# Patient Record
Sex: MALE | Race: WHITE | NOT HISPANIC OR LATINO | Employment: OTHER | URBAN - NONMETROPOLITAN AREA
[De-identification: names, ages, dates, MRNs, and addresses within clinical notes are randomized per-mention and may not be internally consistent; named-entity substitution may affect disease eponyms.]

---

## 2017-02-13 ENCOUNTER — APPOINTMENT (OUTPATIENT)
Dept: CT IMAGING | Facility: HOSPITAL | Age: 57
End: 2017-02-13

## 2017-02-13 ENCOUNTER — APPOINTMENT (OUTPATIENT)
Dept: GENERAL RADIOLOGY | Facility: HOSPITAL | Age: 57
End: 2017-02-13

## 2017-02-13 ENCOUNTER — HOSPITAL ENCOUNTER (INPATIENT)
Facility: HOSPITAL | Age: 57
LOS: 1 days | Discharge: ANOTHER HOSPITAL | End: 2017-02-14
Attending: EMERGENCY MEDICINE | Admitting: INTERNAL MEDICINE

## 2017-02-13 DIAGNOSIS — R07.9 CHEST PAIN AT REST: Primary | ICD-10-CM

## 2017-02-13 DIAGNOSIS — E03.9 HYPOTHYROIDISM, UNSPECIFIED TYPE: ICD-10-CM

## 2017-02-13 DIAGNOSIS — D72.829 LEUKOCYTOSIS, UNSPECIFIED TYPE: ICD-10-CM

## 2017-02-13 DIAGNOSIS — K22.89 ESOPHAGEAL THICKENING: ICD-10-CM

## 2017-02-13 DIAGNOSIS — E66.9 OBESITY, UNSPECIFIED OBESITY SEVERITY, UNSPECIFIED OBESITY TYPE: ICD-10-CM

## 2017-02-13 DIAGNOSIS — K44.9 HIATAL HERNIA: ICD-10-CM

## 2017-02-13 LAB
ALBUMIN SERPL-MCNC: 3.9 G/DL (ref 3.4–4.8)
ALBUMIN/GLOB SERPL: 1.5 G/DL (ref 1.1–1.8)
ALP SERPL-CCNC: 76 U/L (ref 38–126)
ALT SERPL W P-5'-P-CCNC: 41 U/L (ref 21–72)
ANION GAP SERPL CALCULATED.3IONS-SCNC: 8 MMOL/L (ref 5–15)
AST SERPL-CCNC: 27 U/L (ref 17–59)
BACTERIA UR QL AUTO: ABNORMAL /HPF
BASOPHILS # BLD AUTO: 0.04 10*3/MM3 (ref 0–0.2)
BASOPHILS NFR BLD AUTO: 0.3 % (ref 0–2)
BILIRUB SERPL-MCNC: 0.4 MG/DL (ref 0.2–1.3)
BILIRUB UR QL STRIP: NEGATIVE
BUN BLD-MCNC: 18 MG/DL (ref 7–21)
BUN/CREAT SERPL: 14.3 (ref 7–25)
CALCIUM SPEC-SCNC: 8.9 MG/DL (ref 8.4–10.2)
CHLORIDE SERPL-SCNC: 100 MMOL/L (ref 95–110)
CK MB SERPL-CCNC: 1.15 NG/ML (ref 0–5)
CK SERPL-CCNC: 100 U/L (ref 55–170)
CLARITY UR: CLEAR
CO2 SERPL-SCNC: 32 MMOL/L (ref 22–31)
COLOR UR: YELLOW
CREAT BLD-MCNC: 1.26 MG/DL (ref 0.7–1.3)
DEPRECATED RDW RBC AUTO: 43.7 FL (ref 35.1–43.9)
EOSINOPHIL # BLD AUTO: 0.49 10*3/MM3 (ref 0–0.7)
EOSINOPHIL NFR BLD AUTO: 3.4 % (ref 0–7)
ERYTHROCYTE [DISTWIDTH] IN BLOOD BY AUTOMATED COUNT: 13.8 % (ref 11.5–14.5)
GFR SERPL CREATININE-BSD FRML MDRD: 59 ML/MIN/1.73 (ref 60–130)
GLOBULIN UR ELPH-MCNC: 2.6 GM/DL (ref 2.3–3.5)
GLUCOSE BLD-MCNC: 124 MG/DL (ref 60–100)
GLUCOSE UR STRIP-MCNC: NEGATIVE MG/DL
GRAN CASTS URNS QL MICRO: ABNORMAL /LPF
HCT VFR BLD AUTO: 42.3 % (ref 39–49)
HGB BLD-MCNC: 14.5 G/DL (ref 13.7–17.3)
HGB UR QL STRIP.AUTO: NEGATIVE
HYALINE CASTS UR QL AUTO: ABNORMAL /LPF
IMM GRANULOCYTES # BLD: 0.05 10*3/MM3 (ref 0–0.02)
IMM GRANULOCYTES NFR BLD: 0.3 % (ref 0–0.5)
INR PPP: 1.11 (ref 0.8–1.2)
KETONES UR QL STRIP: NEGATIVE
LEUKOCYTE ESTERASE UR QL STRIP.AUTO: NEGATIVE
LIPASE SERPL-CCNC: 115 U/L (ref 23–300)
LYMPHOCYTES # BLD AUTO: 1.86 10*3/MM3 (ref 0.6–4.2)
LYMPHOCYTES NFR BLD AUTO: 12.9 % (ref 10–50)
MAGNESIUM SERPL-MCNC: 2 MG/DL (ref 1.6–2.3)
MCH RBC QN AUTO: 29.5 PG (ref 26.5–34)
MCHC RBC AUTO-ENTMCNC: 34.3 G/DL (ref 31.5–36.3)
MCV RBC AUTO: 86 FL (ref 80–98)
MONOCYTES # BLD AUTO: 0.82 10*3/MM3 (ref 0–0.9)
MONOCYTES NFR BLD AUTO: 5.7 % (ref 0–12)
NEUTROPHILS # BLD AUTO: 11.16 10*3/MM3 (ref 2–8.6)
NEUTROPHILS NFR BLD AUTO: 77.4 % (ref 37–80)
NITRITE UR QL STRIP: NEGATIVE
NT-PROBNP SERPL-MCNC: 71.5 PG/ML (ref 0–900)
PH UR STRIP.AUTO: 5.5 [PH] (ref 5–9)
PLATELET # BLD AUTO: 155 10*3/MM3 (ref 150–450)
PMV BLD AUTO: 11 FL (ref 8–12)
POTASSIUM BLD-SCNC: 3.7 MMOL/L (ref 3.5–5.1)
PROT SERPL-MCNC: 6.5 G/DL (ref 6.3–8.6)
PROT UR QL STRIP: ABNORMAL
PROTHROMBIN TIME: 14.3 SECONDS (ref 11.1–15.3)
RBC # BLD AUTO: 4.92 10*6/MM3 (ref 4.37–5.74)
RBC # UR: ABNORMAL /HPF
REF LAB TEST METHOD: ABNORMAL
SODIUM BLD-SCNC: 140 MMOL/L (ref 137–145)
SP GR UR STRIP: 1.04 (ref 1–1.03)
SQUAMOUS #/AREA URNS HPF: ABNORMAL /HPF
TROPONIN I SERPL-MCNC: <0.012 NG/ML
TSH SERPL DL<=0.05 MIU/L-ACNC: 6.88 MIU/ML (ref 0.46–4.68)
UROBILINOGEN UR QL STRIP: ABNORMAL
WBC NRBC COR # BLD: 14.42 10*3/MM3 (ref 3.2–9.8)
WBC UR QL AUTO: ABNORMAL /HPF

## 2017-02-13 PROCEDURE — 84443 ASSAY THYROID STIM HORMONE: CPT | Performed by: EMERGENCY MEDICINE

## 2017-02-13 PROCEDURE — 71010 HC CHEST PA OR AP: CPT

## 2017-02-13 PROCEDURE — 83735 ASSAY OF MAGNESIUM: CPT | Performed by: EMERGENCY MEDICINE

## 2017-02-13 PROCEDURE — 74176 CT ABD & PELVIS W/O CONTRAST: CPT

## 2017-02-13 PROCEDURE — 85610 PROTHROMBIN TIME: CPT | Performed by: EMERGENCY MEDICINE

## 2017-02-13 PROCEDURE — G0378 HOSPITAL OBSERVATION PER HR: HCPCS

## 2017-02-13 PROCEDURE — 84484 ASSAY OF TROPONIN QUANT: CPT | Performed by: EMERGENCY MEDICINE

## 2017-02-13 PROCEDURE — 93010 ELECTROCARDIOGRAM REPORT: CPT | Performed by: INTERNAL MEDICINE

## 2017-02-13 PROCEDURE — 81001 URINALYSIS AUTO W/SCOPE: CPT | Performed by: EMERGENCY MEDICINE

## 2017-02-13 PROCEDURE — 83690 ASSAY OF LIPASE: CPT | Performed by: EMERGENCY MEDICINE

## 2017-02-13 PROCEDURE — 83880 ASSAY OF NATRIURETIC PEPTIDE: CPT | Performed by: EMERGENCY MEDICINE

## 2017-02-13 PROCEDURE — 82550 ASSAY OF CK (CPK): CPT | Performed by: EMERGENCY MEDICINE

## 2017-02-13 PROCEDURE — 82553 CREATINE MB FRACTION: CPT | Performed by: EMERGENCY MEDICINE

## 2017-02-13 PROCEDURE — 85025 COMPLETE CBC W/AUTO DIFF WBC: CPT | Performed by: EMERGENCY MEDICINE

## 2017-02-13 PROCEDURE — 93005 ELECTROCARDIOGRAM TRACING: CPT | Performed by: EMERGENCY MEDICINE

## 2017-02-13 PROCEDURE — 99285 EMERGENCY DEPT VISIT HI MDM: CPT

## 2017-02-13 PROCEDURE — 80053 COMPREHEN METABOLIC PANEL: CPT | Performed by: EMERGENCY MEDICINE

## 2017-02-13 PROCEDURE — 93005 ELECTROCARDIOGRAM TRACING: CPT

## 2017-02-13 RX ORDER — MORPHINE SULFATE 2 MG/ML
1 INJECTION, SOLUTION INTRAMUSCULAR; INTRAVENOUS EVERY 4 HOURS PRN
Status: DISCONTINUED | OUTPATIENT
Start: 2017-02-13 | End: 2017-02-14

## 2017-02-13 RX ORDER — NALOXONE HCL 0.4 MG/ML
0.4 VIAL (ML) INJECTION
Status: DISCONTINUED | OUTPATIENT
Start: 2017-02-13 | End: 2017-02-14

## 2017-02-13 RX ORDER — ONDANSETRON 4 MG/1
4 TABLET, FILM COATED ORAL EVERY 6 HOURS PRN
Status: DISCONTINUED | OUTPATIENT
Start: 2017-02-13 | End: 2017-02-14 | Stop reason: HOSPADM

## 2017-02-13 RX ORDER — MAGNESIUM HYDROXIDE/ALUMINUM HYDROXICE/SIMETHICONE 120; 1200; 1200 MG/30ML; MG/30ML; MG/30ML
30 SUSPENSION ORAL EVERY 6 HOURS
Status: DISCONTINUED | OUTPATIENT
Start: 2017-02-13 | End: 2017-02-14 | Stop reason: HOSPADM

## 2017-02-13 RX ORDER — NITROGLYCERIN 0.4 MG/1
0.4 TABLET SUBLINGUAL
Status: DISCONTINUED | OUTPATIENT
Start: 2017-02-13 | End: 2017-02-14 | Stop reason: HOSPADM

## 2017-02-13 RX ORDER — ASPIRIN 325 MG
325 TABLET ORAL ONCE
Status: COMPLETED | OUTPATIENT
Start: 2017-02-13 | End: 2017-02-13

## 2017-02-13 RX ORDER — SODIUM CHLORIDE 0.9 % (FLUSH) 0.9 %
1-10 SYRINGE (ML) INJECTION AS NEEDED
Status: DISCONTINUED | OUTPATIENT
Start: 2017-02-13 | End: 2017-02-14 | Stop reason: HOSPADM

## 2017-02-13 RX ORDER — SODIUM CHLORIDE 0.9 % (FLUSH) 0.9 %
10 SYRINGE (ML) INJECTION AS NEEDED
Status: DISCONTINUED | OUTPATIENT
Start: 2017-02-13 | End: 2017-02-14 | Stop reason: HOSPADM

## 2017-02-13 RX ADMIN — ASPIRIN 325 MG: 325 TABLET, COATED ORAL at 20:56

## 2017-02-13 RX ADMIN — NITROGLYCERIN 1 INCH: 20 OINTMENT TOPICAL at 20:56

## 2017-02-13 RX ADMIN — NITROGLYCERIN 0.4 MG: 0.4 TABLET SUBLINGUAL at 20:56

## 2017-02-14 ENCOUNTER — HOSPITAL ENCOUNTER (INPATIENT)
Facility: HOSPITAL | Age: 57
LOS: 15 days | Discharge: HOME HEALTH | End: 2017-03-02
Attending: THORACIC SURGERY (CARDIOTHORACIC VASCULAR SURGERY) | Admitting: THORACIC SURGERY (CARDIOTHORACIC VASCULAR SURGERY)

## 2017-02-14 ENCOUNTER — PREP FOR SURGERY (OUTPATIENT)
Dept: CARDIAC SURGERY | Facility: CLINIC | Age: 57
End: 2017-02-14

## 2017-02-14 ENCOUNTER — APPOINTMENT (OUTPATIENT)
Dept: ULTRASOUND IMAGING | Facility: HOSPITAL | Age: 57
End: 2017-02-14

## 2017-02-14 ENCOUNTER — APPOINTMENT (OUTPATIENT)
Dept: CT IMAGING | Facility: HOSPITAL | Age: 57
End: 2017-02-14

## 2017-02-14 ENCOUNTER — ANESTHESIA EVENT (OUTPATIENT)
Dept: PERIOP | Facility: HOSPITAL | Age: 57
End: 2017-02-14

## 2017-02-14 ENCOUNTER — ANESTHESIA (OUTPATIENT)
Dept: PERIOP | Facility: HOSPITAL | Age: 57
End: 2017-02-14

## 2017-02-14 VITALS
HEART RATE: 66 BPM | BODY MASS INDEX: 35.89 KG/M2 | OXYGEN SATURATION: 93 % | SYSTOLIC BLOOD PRESSURE: 79 MMHG | WEIGHT: 265 LBS | DIASTOLIC BLOOD PRESSURE: 67 MMHG | TEMPERATURE: 98.2 F | RESPIRATION RATE: 18 BRPM | HEIGHT: 72 IN

## 2017-02-14 DIAGNOSIS — I71.010 ASCENDING AORTIC DISSECTION (HCC): ICD-10-CM

## 2017-02-14 DIAGNOSIS — R91.8 LUNG NODULES: ICD-10-CM

## 2017-02-14 DIAGNOSIS — J18.9 PNEUMONIA: ICD-10-CM

## 2017-02-14 DIAGNOSIS — R13.12 OROPHARYNGEAL DYSPHAGIA: ICD-10-CM

## 2017-02-14 DIAGNOSIS — G47.33 OSA (OBSTRUCTIVE SLEEP APNEA): ICD-10-CM

## 2017-02-14 DIAGNOSIS — R07.9 CHEST PAIN AT REST: Primary | ICD-10-CM

## 2017-02-14 DIAGNOSIS — Z98.890 S/P AORTIC DISSECTION REPAIR: ICD-10-CM

## 2017-02-14 DIAGNOSIS — J43.8 OTHER EMPHYSEMA (HCC): ICD-10-CM

## 2017-02-14 DIAGNOSIS — I71.010 ASCENDING AORTIC DISSECTION (HCC): Primary | ICD-10-CM

## 2017-02-14 LAB
ABO GROUP BLD: NORMAL
ABO GROUP BLD: NORMAL
ACT BLD: 126 SECONDS (ref 82–152)
ACT BLD: 374 SECONDS (ref 82–152)
ACT BLD: 379 SECONDS (ref 82–152)
ACT BLD: 399 SECONDS (ref 82–152)
ACT BLD: 477 SECONDS (ref 82–152)
ACT BLD: 523 SECONDS (ref 82–152)
ALBUMIN SERPL-MCNC: 3.8 G/DL (ref 3.4–4.8)
ALBUMIN/GLOB SERPL: 1.5 G/DL (ref 1.1–1.8)
ALP SERPL-CCNC: 60 U/L (ref 38–126)
ALT SERPL W P-5'-P-CCNC: 38 U/L (ref 21–72)
ANION GAP SERPL CALCULATED.3IONS-SCNC: 8 MMOL/L (ref 5–15)
APTT PPP: 35.4 SECONDS (ref 20–40.3)
AST SERPL-CCNC: 20 U/L (ref 17–59)
BASE EXCESS BLDA CALC-SCNC: -3 MMOL/L (ref -5–5)
BASOPHILS # BLD AUTO: 0.02 10*3/MM3 (ref 0–0.2)
BASOPHILS NFR BLD AUTO: 0.1 % (ref 0–2)
BILIRUB SERPL-MCNC: 0.6 MG/DL (ref 0.2–1.3)
BLD GP AB SCN SERPL QL: NEGATIVE
BLD GP AB SCN SERPL QL: NEGATIVE
BUN BLD-MCNC: 23 MG/DL (ref 7–21)
BUN/CREAT SERPL: 18.5 (ref 7–25)
CALCIUM SPEC-SCNC: 8.6 MG/DL (ref 8.4–10.2)
CHLORIDE SERPL-SCNC: 100 MMOL/L (ref 95–110)
CK MB SERPL-CCNC: 1.15 NG/ML (ref 0–5)
CK SERPL-CCNC: 106 U/L (ref 55–170)
CO2 BLDA-SCNC: 25 MMOL/L (ref 24–29)
CO2 SERPL-SCNC: 31 MMOL/L (ref 22–31)
CREAT BLD-MCNC: 1.24 MG/DL (ref 0.7–1.3)
D-LACTATE SERPL-SCNC: 1.5 MMOL/L (ref 0.5–2)
DEPRECATED RDW RBC AUTO: 44.4 FL (ref 35.1–43.9)
EOSINOPHIL # BLD AUTO: 0.07 10*3/MM3 (ref 0–0.7)
EOSINOPHIL NFR BLD AUTO: 0.4 % (ref 0–7)
ERYTHROCYTE [DISTWIDTH] IN BLOOD BY AUTOMATED COUNT: 13.9 % (ref 11.5–14.5)
FIBRINOGEN PPP-MCNC: 342 MG/DL (ref 228–514)
GFR SERPL CREATININE-BSD FRML MDRD: 60 ML/MIN/1.73 (ref 60–130)
GLOBULIN UR ELPH-MCNC: 2.5 GM/DL (ref 2.3–3.5)
GLUCOSE BLD-MCNC: 133 MG/DL (ref 60–100)
GLUCOSE BLDC GLUCOMTR-MCNC: 200 MG/DL (ref 70–130)
HCO3 BLDA-SCNC: 23.2 MMOL/L (ref 22–26)
HCT VFR BLD AUTO: 40.6 % (ref 39–49)
HCT VFR BLDA CALC: 26 % (ref 38–51)
HGB BLD-MCNC: 13.7 G/DL (ref 13.7–17.3)
HGB BLDA-MCNC: 8.8 G/DL (ref 12–17)
HOLD SPECIMEN: NORMAL
HOLD SPECIMEN: NORMAL
IMM GRANULOCYTES # BLD: 0.05 10*3/MM3 (ref 0–0.02)
IMM GRANULOCYTES NFR BLD: 0.3 % (ref 0–0.5)
INR PPP: 1.11 (ref 0.8–1.2)
LYMPHOCYTES # BLD AUTO: 1.82 10*3/MM3 (ref 0.6–4.2)
LYMPHOCYTES NFR BLD AUTO: 10.1 % (ref 10–50)
Lab: NORMAL
MCH RBC QN AUTO: 29.5 PG (ref 26.5–34)
MCHC RBC AUTO-ENTMCNC: 33.7 G/DL (ref 31.5–36.3)
MCV RBC AUTO: 87.3 FL (ref 80–98)
MONOCYTES # BLD AUTO: 1.35 10*3/MM3 (ref 0–0.9)
MONOCYTES NFR BLD AUTO: 7.5 % (ref 0–12)
NEUTROPHILS # BLD AUTO: 14.78 10*3/MM3 (ref 2–8.6)
NEUTROPHILS NFR BLD AUTO: 81.6 % (ref 37–80)
PCO2 BLDA: 46.1 MM HG (ref 35–45)
PH BLDA: 7.31 PH UNITS (ref 7.35–7.6)
PLATELET # BLD AUTO: 141 10*3/MM3 (ref 150–450)
PMV BLD AUTO: 12.2 FL (ref 8–12)
PO2 BLDA: 114 MMHG (ref 80–105)
POTASSIUM BLD-SCNC: 3.6 MMOL/L (ref 3.5–5.1)
POTASSIUM BLDA-SCNC: 3.7 MMOL/L (ref 3.5–4.9)
PROT SERPL-MCNC: 6.3 G/DL (ref 6.3–8.6)
PROTHROMBIN TIME: 14.3 SECONDS (ref 11.1–15.3)
RBC # BLD AUTO: 4.65 10*6/MM3 (ref 4.37–5.74)
RH BLD: POSITIVE
RH BLD: POSITIVE
SAO2 % BLDA: 98 % (ref 95–98)
SODIUM BLD-SCNC: 139 MMOL/L (ref 137–145)
TROPONIN I SERPL-MCNC: 0.02 NG/ML
TROPONIN I SERPL-MCNC: 0.03 NG/ML
WBC NRBC COR # BLD: 18.09 10*3/MM3 (ref 3.2–9.8)
WHOLE BLOOD HOLD SPECIMEN: NORMAL
WHOLE BLOOD HOLD SPECIMEN: NORMAL

## 2017-02-14 PROCEDURE — 85610 PROTHROMBIN TIME: CPT | Performed by: INTERNAL MEDICINE

## 2017-02-14 PROCEDURE — 85384 FIBRINOGEN ACTIVITY: CPT | Performed by: INTERNAL MEDICINE

## 2017-02-14 PROCEDURE — 06BP0ZZ EXCISION OF RIGHT SAPHENOUS VEIN, OPEN APPROACH: ICD-10-PCS | Performed by: THORACIC SURGERY (CARDIOTHORACIC VASCULAR SURGERY)

## 2017-02-14 PROCEDURE — 76705 ECHO EXAM OF ABDOMEN: CPT

## 2017-02-14 PROCEDURE — 86901 BLOOD TYPING SEROLOGIC RH(D): CPT

## 2017-02-14 PROCEDURE — 93010 ELECTROCARDIOGRAM REPORT: CPT | Performed by: INTERNAL MEDICINE

## 2017-02-14 PROCEDURE — 86900 BLOOD TYPING SEROLOGIC ABO: CPT

## 2017-02-14 PROCEDURE — 85014 HEMATOCRIT: CPT

## 2017-02-14 PROCEDURE — 93312 ECHO TRANSESOPHAGEAL: CPT | Performed by: ANESTHESIOLOGY

## 2017-02-14 PROCEDURE — 02RX0JZ REPLACEMENT OF THORACIC AORTA, ASCENDING/ARCH WITH SYNTHETIC SUBSTITUTE, OPEN APPROACH: ICD-10-PCS | Performed by: THORACIC SURGERY (CARDIOTHORACIC VASCULAR SURGERY)

## 2017-02-14 PROCEDURE — 25010000002 ALBUMIN HUMAN 25% PER 50 ML

## 2017-02-14 PROCEDURE — 85027 COMPLETE CBC AUTOMATED: CPT | Performed by: THORACIC SURGERY (CARDIOTHORACIC VASCULAR SURGERY)

## 2017-02-14 PROCEDURE — 82803 BLOOD GASES ANY COMBINATION: CPT

## 2017-02-14 PROCEDURE — 88305 TISSUE EXAM BY PATHOLOGIST: CPT | Performed by: THORACIC SURGERY (CARDIOTHORACIC VASCULAR SURGERY)

## 2017-02-14 PROCEDURE — 84484 ASSAY OF TROPONIN QUANT: CPT | Performed by: HOSPITALIST

## 2017-02-14 PROCEDURE — C1713 ANCHOR/SCREW BN/BN,TIS/BN: HCPCS | Performed by: THORACIC SURGERY (CARDIOTHORACIC VASCULAR SURGERY)

## 2017-02-14 PROCEDURE — 4A10X4G MONITORING OF CENTRAL NERVOUS ELECTRICAL ACTIVITY, INTRAOPERATIVE, EXTERNAL APPROACH: ICD-10-PCS | Performed by: THORACIC SURGERY (CARDIOTHORACIC VASCULAR SURGERY)

## 2017-02-14 PROCEDURE — 33864 ASCENDING AORTIC GRAFT: CPT | Performed by: PHYSICIAN ASSISTANT

## 2017-02-14 PROCEDURE — C1729 CATH, DRAINAGE: HCPCS | Performed by: THORACIC SURGERY (CARDIOTHORACIC VASCULAR SURGERY)

## 2017-02-14 PROCEDURE — 25010000002 FUROSEMIDE PER 20 MG

## 2017-02-14 PROCEDURE — 85670 THROMBIN TIME PLASMA: CPT | Performed by: INTERNAL MEDICINE

## 2017-02-14 PROCEDURE — 82947 ASSAY GLUCOSE BLOOD QUANT: CPT

## 2017-02-14 PROCEDURE — 5A1221Z PERFORMANCE OF CARDIAC OUTPUT, CONTINUOUS: ICD-10-PCS | Performed by: THORACIC SURGERY (CARDIOTHORACIC VASCULAR SURGERY)

## 2017-02-14 PROCEDURE — 25010000002 VANCOMYCIN PER 500 MG: Performed by: ANESTHESIOLOGY

## 2017-02-14 PROCEDURE — P9035 PLATELET PHERES LEUKOREDUCED: HCPCS

## 2017-02-14 PROCEDURE — 25010000002 MAGNESIUM SULFATE PER 500 MG OF MAGNESIUM: Performed by: ANESTHESIOLOGY

## 2017-02-14 PROCEDURE — 85385 FIBRINOGEN ANTIGEN: CPT | Performed by: INTERNAL MEDICINE

## 2017-02-14 PROCEDURE — P9046 ALBUMIN (HUMAN), 25%, 20 ML: HCPCS

## 2017-02-14 PROCEDURE — 25010000002 AMIODARONE PER 30 MG: Performed by: ANESTHESIOLOGY

## 2017-02-14 PROCEDURE — 25010000002 HEPARIN (PORCINE) PER 1000 UNITS: Performed by: ANESTHESIOLOGY

## 2017-02-14 PROCEDURE — 80053 COMPREHEN METABOLIC PANEL: CPT | Performed by: HOSPITALIST

## 2017-02-14 PROCEDURE — 33870 PR TRANSV AORTIC ARCH GRAFT W BYPASS: CPT | Performed by: THORACIC SURGERY (CARDIOTHORACIC VASCULAR SURGERY)

## 2017-02-14 PROCEDURE — 25010000002 HEPARIN (PORCINE) PER 1000 UNITS: Performed by: THORACIC SURGERY (CARDIOTHORACIC VASCULAR SURGERY)

## 2017-02-14 PROCEDURE — 0 IOPAMIDOL PER 1 ML: Performed by: HOSPITALIST

## 2017-02-14 PROCEDURE — 25010000002 MORPHINE SULFATE (PF) 2 MG/ML SOLUTION: Performed by: INTERNAL MEDICINE

## 2017-02-14 PROCEDURE — 85018 HEMOGLOBIN: CPT

## 2017-02-14 PROCEDURE — 83605 ASSAY OF LACTIC ACID: CPT | Performed by: HOSPITALIST

## 2017-02-14 PROCEDURE — 85635 REPTILASE TEST: CPT | Performed by: INTERNAL MEDICINE

## 2017-02-14 PROCEDURE — 25010000002 PROPOFOL 10 MG/ML EMULSION: Performed by: ANESTHESIOLOGY

## 2017-02-14 PROCEDURE — 25010000002 EPINEPHRINE PER 0.1 MG: Performed by: ANESTHESIOLOGY

## 2017-02-14 PROCEDURE — 33870 PR TRANSV AORTIC ARCH GRAFT W BYPASS: CPT | Performed by: PHYSICIAN ASSISTANT

## 2017-02-14 PROCEDURE — 86923 COMPATIBILITY TEST ELECTRIC: CPT

## 2017-02-14 PROCEDURE — 85347 COAGULATION TIME ACTIVATED: CPT

## 2017-02-14 PROCEDURE — 25010000002 PHENYLEPHRINE PER 1 ML: Performed by: ANESTHESIOLOGY

## 2017-02-14 PROCEDURE — 85384 FIBRINOGEN ACTIVITY: CPT | Performed by: THORACIC SURGERY (CARDIOTHORACIC VASCULAR SURGERY)

## 2017-02-14 PROCEDURE — 85730 THROMBOPLASTIN TIME PARTIAL: CPT | Performed by: THORACIC SURGERY (CARDIOTHORACIC VASCULAR SURGERY)

## 2017-02-14 PROCEDURE — C1751 CATH, INF, PER/CENT/MIDLINE: HCPCS | Performed by: ANESTHESIOLOGY

## 2017-02-14 PROCEDURE — 86850 RBC ANTIBODY SCREEN: CPT

## 2017-02-14 PROCEDURE — B24BZZ4 ULTRASONOGRAPHY OF HEART WITH AORTA, TRANSESOPHAGEAL: ICD-10-PCS | Performed by: THORACIC SURGERY (CARDIOTHORACIC VASCULAR SURGERY)

## 2017-02-14 PROCEDURE — 85025 COMPLETE CBC W/AUTO DIFF WBC: CPT | Performed by: HOSPITALIST

## 2017-02-14 PROCEDURE — P9017 PLASMA 1 DONOR FRZ W/IN 8 HR: HCPCS

## 2017-02-14 PROCEDURE — 25010000002 PROTAMINE SULFATE PER 10 MG: Performed by: ANESTHESIOLOGY

## 2017-02-14 PROCEDURE — 93005 ELECTROCARDIOGRAM TRACING: CPT | Performed by: HOSPITALIST

## 2017-02-14 PROCEDURE — 25010000002 MORPHINE SULFATE (PF) 2 MG/ML SOLUTION: Performed by: HOSPITALIST

## 2017-02-14 PROCEDURE — C1768 GRAFT, VASCULAR: HCPCS | Performed by: THORACIC SURGERY (CARDIOTHORACIC VASCULAR SURGERY)

## 2017-02-14 PROCEDURE — 85730 THROMBOPLASTIN TIME PARTIAL: CPT | Performed by: INTERNAL MEDICINE

## 2017-02-14 PROCEDURE — 25010000002 HEPARIN (PORCINE) PER 1000 UNITS

## 2017-02-14 PROCEDURE — G0378 HOSPITAL OBSERVATION PER HR: HCPCS

## 2017-02-14 PROCEDURE — 75574 CT ANGIO HRT W/3D IMAGE: CPT

## 2017-02-14 PROCEDURE — 36430 TRANSFUSION BLD/BLD COMPNT: CPT

## 2017-02-14 PROCEDURE — 85610 PROTHROMBIN TIME: CPT | Performed by: THORACIC SURGERY (CARDIOTHORACIC VASCULAR SURGERY)

## 2017-02-14 PROCEDURE — 25010000002 MIDAZOLAM PER 1 MG: Performed by: ANESTHESIOLOGY

## 2017-02-14 PROCEDURE — 99243 OFF/OP CNSLTJ NEW/EST LOW 30: CPT | Performed by: INTERNAL MEDICINE

## 2017-02-14 PROCEDURE — 25010000002 PROPOFOL 1000 MG/ML EMULSION: Performed by: ANESTHESIOLOGY

## 2017-02-14 PROCEDURE — P9012 CRYOPRECIPITATE EACH UNIT: HCPCS

## 2017-02-14 PROCEDURE — 88313 SPECIAL STAINS GROUP 2: CPT | Performed by: THORACIC SURGERY (CARDIOTHORACIC VASCULAR SURGERY)

## 2017-02-14 PROCEDURE — 25010000002 MORPHINE SULFATE (PF) 2 MG/ML SOLUTION: Performed by: PHYSICIAN ASSISTANT

## 2017-02-14 PROCEDURE — 25010000002 HYDROCORTISONE SODIUM SUCCINATE 100 MG RECONSTITUTED SOLUTION: Performed by: ANESTHESIOLOGY

## 2017-02-14 PROCEDURE — 33864 ASCENDING AORTIC GRAFT: CPT | Performed by: THORACIC SURGERY (CARDIOTHORACIC VASCULAR SURGERY)

## 2017-02-14 PROCEDURE — 86927 PLASMA FRESH FROZEN: CPT

## 2017-02-14 DEVICE — ADHS SURG BIOGLUE PREF STD/TP 10ML: Type: IMPLANTABLE DEVICE | Status: FUNCTIONAL

## 2017-02-14 DEVICE — GELWEAVE GELATIN IMPREGNATED WOVEN VASCULAR PROSTHESIS STRAIGHT
Type: IMPLANTABLE DEVICE | Site: AORTA | Status: FUNCTIONAL
Brand: GELWEAVE™

## 2017-02-14 DEVICE — SS SUTURE, 3 PER SLEEVE
Type: IMPLANTABLE DEVICE | Site: STERNUM | Status: FUNCTIONAL
Brand: MYO/WIRE II

## 2017-02-14 DEVICE — SS SUTURE, 4 PER SLEEVE
Type: IMPLANTABLE DEVICE | Site: STERNUM | Status: FUNCTIONAL
Brand: MYO/WIRE II

## 2017-02-14 RX ORDER — PROTAMINE SULFATE 10 MG/ML
INJECTION, SOLUTION INTRAVENOUS AS NEEDED
Status: DISCONTINUED | OUTPATIENT
Start: 2017-02-14 | End: 2017-02-15 | Stop reason: SURG

## 2017-02-14 RX ORDER — ESMOLOL HYDROCHLORIDE 10 MG/ML
50-300 INJECTION, SOLUTION INTRAVENOUS CONTINUOUS
Status: DISCONTINUED | OUTPATIENT
Start: 2017-02-14 | End: 2017-02-14 | Stop reason: HOSPADM

## 2017-02-14 RX ORDER — BACITRACIN 50000 [USP'U]/1
INJECTION, POWDER, LYOPHILIZED, FOR SOLUTION INTRAMUSCULAR AS NEEDED
Status: DISCONTINUED | OUTPATIENT
Start: 2017-02-14 | End: 2017-02-15 | Stop reason: HOSPADM

## 2017-02-14 RX ORDER — HEPARIN SODIUM 1000 [USP'U]/ML
INJECTION, SOLUTION INTRAVENOUS; SUBCUTANEOUS AS NEEDED
Status: DISCONTINUED | OUTPATIENT
Start: 2017-02-14 | End: 2017-02-15 | Stop reason: SURG

## 2017-02-14 RX ORDER — FENTANYL CITRATE 50 UG/ML
INJECTION, SOLUTION INTRAMUSCULAR; INTRAVENOUS
Status: DISPENSED
Start: 2017-02-14 | End: 2017-02-15

## 2017-02-14 RX ORDER — MORPHINE SULFATE 2 MG/ML
2 INJECTION, SOLUTION INTRAMUSCULAR; INTRAVENOUS
Status: DISCONTINUED | OUTPATIENT
Start: 2017-02-14 | End: 2017-02-14 | Stop reason: HOSPADM

## 2017-02-14 RX ORDER — METOPROLOL TARTRATE 5 MG/5ML
2.5 INJECTION INTRAVENOUS EVERY 6 HOURS
Status: DISCONTINUED | OUTPATIENT
Start: 2017-02-14 | End: 2017-02-14 | Stop reason: HOSPADM

## 2017-02-14 RX ORDER — PROPOFOL 10 MG/ML
VIAL (ML) INTRAVENOUS AS NEEDED
Status: DISCONTINUED | OUTPATIENT
Start: 2017-02-14 | End: 2017-02-15 | Stop reason: SURG

## 2017-02-14 RX ORDER — AMIODARONE HYDROCHLORIDE 50 MG/ML
INJECTION, SOLUTION INTRAVENOUS AS NEEDED
Status: DISCONTINUED | OUTPATIENT
Start: 2017-02-14 | End: 2017-02-15 | Stop reason: SURG

## 2017-02-14 RX ORDER — PAPAVERINE HYDROCHLORIDE 30 MG/ML
INJECTION INTRAMUSCULAR; INTRAVENOUS AS NEEDED
Status: DISCONTINUED | OUTPATIENT
Start: 2017-02-14 | End: 2017-02-15 | Stop reason: HOSPADM

## 2017-02-14 RX ORDER — MIDAZOLAM HYDROCHLORIDE 1 MG/ML
INJECTION INTRAMUSCULAR; INTRAVENOUS
Status: DISPENSED
Start: 2017-02-14 | End: 2017-02-15

## 2017-02-14 RX ORDER — NALOXONE HCL 0.4 MG/ML
0.4 VIAL (ML) INJECTION
Status: DISCONTINUED | OUTPATIENT
Start: 2017-02-14 | End: 2017-02-14 | Stop reason: HOSPADM

## 2017-02-14 RX ORDER — ACETAMINOPHEN 325 MG/1
650 TABLET ORAL EVERY 6 HOURS PRN
Status: DISCONTINUED | OUTPATIENT
Start: 2017-02-14 | End: 2017-02-14 | Stop reason: HOSPADM

## 2017-02-14 RX ORDER — LIDOCAINE HYDROCHLORIDE 20 MG/ML
INJECTION, SOLUTION INFILTRATION; PERINEURAL AS NEEDED
Status: DISCONTINUED | OUTPATIENT
Start: 2017-02-14 | End: 2017-02-15 | Stop reason: SURG

## 2017-02-14 RX ORDER — MIDAZOLAM HYDROCHLORIDE 1 MG/ML
INJECTION INTRAMUSCULAR; INTRAVENOUS AS NEEDED
Status: DISCONTINUED | OUTPATIENT
Start: 2017-02-14 | End: 2017-02-15 | Stop reason: SURG

## 2017-02-14 RX ORDER — HEPARIN SODIUM 5000 [USP'U]/ML
INJECTION, SOLUTION INTRAVENOUS; SUBCUTANEOUS AS NEEDED
Status: DISCONTINUED | OUTPATIENT
Start: 2017-02-14 | End: 2017-02-15 | Stop reason: HOSPADM

## 2017-02-14 RX ORDER — MAGNESIUM SULFATE HEPTAHYDRATE 500 MG/ML
INJECTION, SOLUTION INTRAMUSCULAR; INTRAVENOUS AS NEEDED
Status: DISCONTINUED | OUTPATIENT
Start: 2017-02-14 | End: 2017-02-15 | Stop reason: SURG

## 2017-02-14 RX ORDER — VECURONIUM BROMIDE 1 MG/ML
INJECTION, POWDER, LYOPHILIZED, FOR SOLUTION INTRAVENOUS AS NEEDED
Status: DISCONTINUED | OUTPATIENT
Start: 2017-02-14 | End: 2017-02-15 | Stop reason: SURG

## 2017-02-14 RX ORDER — AMINOCAPROIC ACID 250 MG/ML
INJECTION, SOLUTION INTRAVENOUS AS NEEDED
Status: DISCONTINUED | OUTPATIENT
Start: 2017-02-14 | End: 2017-02-15 | Stop reason: SURG

## 2017-02-14 RX ORDER — ROCURONIUM BROMIDE 10 MG/ML
INJECTION, SOLUTION INTRAVENOUS AS NEEDED
Status: DISCONTINUED | OUTPATIENT
Start: 2017-02-14 | End: 2017-02-15 | Stop reason: SURG

## 2017-02-14 RX ORDER — SODIUM CHLORIDE 9 MG/ML
INJECTION, SOLUTION INTRAVENOUS CONTINUOUS PRN
Status: DISCONTINUED | OUTPATIENT
Start: 2017-02-14 | End: 2017-02-15 | Stop reason: SURG

## 2017-02-14 RX ORDER — FENTANYL CITRATE 50 UG/ML
INJECTION, SOLUTION INTRAMUSCULAR; INTRAVENOUS AS NEEDED
Status: DISCONTINUED | OUTPATIENT
Start: 2017-02-14 | End: 2017-02-15 | Stop reason: SURG

## 2017-02-14 RX ADMIN — Medication 10 ML: at 09:07

## 2017-02-14 RX ADMIN — MIDAZOLAM HYDROCHLORIDE 2 MG: 1 INJECTION, SOLUTION INTRAMUSCULAR; INTRAVENOUS at 21:58

## 2017-02-14 RX ADMIN — PROPOFOL 100 MG: 10 INJECTION, EMULSION INTRAVENOUS at 18:56

## 2017-02-14 RX ADMIN — MORPHINE SULFATE 1 MG: 2 INJECTION, SOLUTION INTRAMUSCULAR; INTRAVENOUS at 04:59

## 2017-02-14 RX ADMIN — ROCURONIUM BROMIDE 50 MG: 10 INJECTION INTRAVENOUS at 18:57

## 2017-02-14 RX ADMIN — FENTANYL CITRATE 100 MCG: 50 INJECTION, SOLUTION INTRAMUSCULAR; INTRAVENOUS at 23:24

## 2017-02-14 RX ADMIN — MORPHINE SULFATE 2 MG: 2 INJECTION, SOLUTION INTRAMUSCULAR; INTRAVENOUS at 12:03

## 2017-02-14 RX ADMIN — ALUMINUM HYDROXIDE, MAGNESIUM HYDROXIDE, AND SIMETHICONE 30 ML: 200; 200; 20 SUSPENSION ORAL at 02:30

## 2017-02-14 RX ADMIN — MORPHINE SULFATE 1 MG: 2 INJECTION, SOLUTION INTRAMUSCULAR; INTRAVENOUS at 00:42

## 2017-02-14 RX ADMIN — FOSPHENYTOIN SODIUM 500 MG: 50 INJECTION, SOLUTION INTRAMUSCULAR; INTRAVENOUS at 20:28

## 2017-02-14 RX ADMIN — VANCOMYCIN HYDROCHLORIDE 2 G: 1 INJECTION, POWDER, LYOPHILIZED, FOR SOLUTION INTRAVENOUS at 19:25

## 2017-02-14 RX ADMIN — VECURONIUM BROMIDE 10 MG: 1 INJECTION, POWDER, LYOPHILIZED, FOR SOLUTION INTRAVENOUS at 19:26

## 2017-02-14 RX ADMIN — MIDAZOLAM HYDROCHLORIDE 1 MG: 1 INJECTION, SOLUTION INTRAMUSCULAR; INTRAVENOUS at 19:49

## 2017-02-14 RX ADMIN — IOPAMIDOL 90 ML: 755 INJECTION, SOLUTION INTRAVENOUS at 13:30

## 2017-02-14 RX ADMIN — PROPOFOL 50 MCG/KG/MIN: 10 INJECTION, EMULSION INTRAVENOUS at 20:20

## 2017-02-14 RX ADMIN — PHENYLEPHRINE HYDROCHLORIDE 0.3 MCG/KG/MIN: 10 INJECTION, SOLUTION INTRAMUSCULAR; INTRAVENOUS; SUBCUTANEOUS at 20:20

## 2017-02-14 RX ADMIN — METOPROLOL TARTRATE 5 MG: 5 INJECTION, SOLUTION INTRAVENOUS at 19:50

## 2017-02-14 RX ADMIN — MIDAZOLAM HYDROCHLORIDE 2 MG: 1 INJECTION, SOLUTION INTRAMUSCULAR; INTRAVENOUS at 18:42

## 2017-02-14 RX ADMIN — PROTAMINE SULFATE 150 MG: 10 INJECTION, SOLUTION INTRAVENOUS at 23:15

## 2017-02-14 RX ADMIN — ROCURONIUM BROMIDE 50 MG: 10 INJECTION INTRAVENOUS at 23:36

## 2017-02-14 RX ADMIN — AMINOCAPROIC ACID 10 G: 250 INJECTION, SOLUTION INTRAVENOUS at 23:20

## 2017-02-14 RX ADMIN — PROPOFOL 100 MG: 10 INJECTION, EMULSION INTRAVENOUS at 21:13

## 2017-02-14 RX ADMIN — MAGNESIUM SULFATE HEPTAHYDRATE 2 G: 500 INJECTION, SOLUTION INTRAMUSCULAR; INTRAVENOUS at 22:27

## 2017-02-14 RX ADMIN — Medication 10 ML: at 08:46

## 2017-02-14 RX ADMIN — MORPHINE SULFATE 2 MG: 2 INJECTION, SOLUTION INTRAMUSCULAR; INTRAVENOUS at 15:51

## 2017-02-14 RX ADMIN — SODIUM CHLORIDE: 9 INJECTION, SOLUTION INTRAVENOUS at 18:40

## 2017-02-14 RX ADMIN — VECURONIUM BROMIDE 10 MG: 1 INJECTION, POWDER, LYOPHILIZED, FOR SOLUTION INTRAVENOUS at 21:49

## 2017-02-14 RX ADMIN — AMIODARONE HYDROCHLORIDE 150 MG: 50 INJECTION, SOLUTION INTRAVENOUS at 22:38

## 2017-02-14 RX ADMIN — HEPARIN SODIUM 30000 UNITS: 1000 INJECTION, SOLUTION INTRAVENOUS; SUBCUTANEOUS at 20:07

## 2017-02-14 RX ADMIN — FENTANYL CITRATE 150 MCG: 50 INJECTION, SOLUTION INTRAMUSCULAR; INTRAVENOUS at 21:59

## 2017-02-14 RX ADMIN — FENTANYL CITRATE 200 MCG: 50 INJECTION, SOLUTION INTRAMUSCULAR; INTRAVENOUS at 19:49

## 2017-02-14 RX ADMIN — LIDOCAINE HYDROCHLORIDE 5 ML: 20 INJECTION, SOLUTION INFILTRATION; PERINEURAL at 22:27

## 2017-02-14 RX ADMIN — SODIUM CHLORIDE 0.25 MCG/KG/MIN: 0.9 INJECTION, SOLUTION INTRAVENOUS at 22:37

## 2017-02-14 RX ADMIN — FENTANYL CITRATE 150 MCG: 50 INJECTION, SOLUTION INTRAMUSCULAR; INTRAVENOUS at 20:33

## 2017-02-14 RX ADMIN — AMINOCAPROIC ACID 10 G: 250 INJECTION, SOLUTION INTRAVENOUS at 20:09

## 2017-02-14 RX ADMIN — FENTANYL CITRATE 100 MCG: 50 INJECTION, SOLUTION INTRAMUSCULAR; INTRAVENOUS at 23:18

## 2017-02-14 RX ADMIN — PROTAMINE SULFATE 50 MG: 10 INJECTION, SOLUTION INTRAVENOUS at 23:12

## 2017-02-14 RX ADMIN — PROTAMINE SULFATE 200 MG: 10 INJECTION, SOLUTION INTRAVENOUS at 23:14

## 2017-02-14 RX ADMIN — EPINEPHRINE 0.05 MCG/KG/MIN: 1 INJECTION PARENTERAL at 22:42

## 2017-02-14 RX ADMIN — PROTAMINE SULFATE 50 MG: 10 INJECTION, SOLUTION INTRAVENOUS at 23:13

## 2017-02-14 RX ADMIN — LIDOCAINE HYDROCHLORIDE 5 ML: 20 INJECTION, SOLUTION INFILTRATION; PERINEURAL at 18:57

## 2017-02-14 RX ADMIN — HYDROCORTISONE SODIUM SUCCINATE 250 MG: 100 INJECTION, POWDER, FOR SOLUTION INTRAMUSCULAR; INTRAVENOUS at 20:27

## 2017-02-14 RX ADMIN — ONDANSETRON 4 MG: 4 TABLET, FILM COATED ORAL at 00:43

## 2017-02-14 RX ADMIN — PROPOFOL 100 MG: 10 INJECTION, EMULSION INTRAVENOUS at 18:55

## 2017-02-14 RX ADMIN — SODIUM CHLORIDE 5 MG/HR: 9 INJECTION, SOLUTION INTRAVENOUS at 23:30

## 2017-02-14 RX ADMIN — Medication 10 ML: at 12:03

## 2017-02-14 RX ADMIN — FENTANYL CITRATE 50 MCG: 50 INJECTION, SOLUTION INTRAMUSCULAR; INTRAVENOUS at 18:42

## 2017-02-14 RX ADMIN — MORPHINE SULFATE 2 MG: 2 INJECTION, SOLUTION INTRAMUSCULAR; INTRAVENOUS at 09:07

## 2017-02-14 RX ADMIN — FENTANYL CITRATE 150 MCG: 50 INJECTION, SOLUTION INTRAMUSCULAR; INTRAVENOUS at 20:12

## 2017-02-14 NOTE — H&P
[facility] Medicine Admission      Date of Admission: 2/13/2017      Primary Care Physician: Marybeth Harrison, DO    Following information is obtained partially from patient, family and/or medical records.    Chief Complaint:   Chief Complaint   Patient presents with   • Chest Pain       HPI: Pt is a 56 y.o. male who presents for evaluation of chest pain Onset was 1 day ago, with unchanged course since that time.  The patient admits to chest discomfort that is constant, located in the substernal area or epigastric. with radiation to left neck/jaw and right neck/jaw, rated as a scale of 8/10 in intensity that is burning in nature.  Associated symptoms are bloated sensation. Aggravating factors are none.  Alleviating factors are: none. Patient's cardiac risk factors are advanced age (older than 55 for men, 65 for women), hypertension and obesity (BMI >= 30 kg/m2).       Concurrent Medical History: HTN    Past Surgical History: History reviewed. No pertinent past surgical history.    Family History: family history is not on file.    Social History:  reports that he has been smoking.  He has been smoking about 1.00 pack per day. He does not have any smokeless tobacco history on file. He reports that he does not drink alcohol or use illicit drugs.    Allergies:   Allergies   Allergen Reactions   • Acth [Corticotropin]    • Cephalosporins    • Eggs Or Egg-Derived Products    • Erythromycin    • Penicillins    • Tetracyclines & Related        Home Medications:   None reported    Review of Systems:  Review of Systems   Constitutional: Positive for diaphoresis.   Eyes: Negative.    Respiratory: Positive for chest tightness and shortness of breath.    Cardiovascular: Negative.    Gastrointestinal: Positive for nausea.   Endocrine: Negative.    Genitourinary: Negative.    Musculoskeletal: Positive for neck stiffness.   Skin: Negative.    Neurological: Positive for headaches.       Otherwise complete ROS is negative except as mentioned above and in HPI.    Physical Exam:   Temp:  [97.5 °F (36.4 °C)] 97.5 °F (36.4 °C)  Heart Rate:  [60-72] 61  Resp:  [18-20] 18  BP: (116-137)/(68-77) 137/69  Physical Exam   Constitutional: He is oriented to person, place, and time. He appears well-developed and well-nourished.   HENT:   Head: Normocephalic and atraumatic.   Nose: Nose normal.   Mouth/Throat: Oropharynx is clear and moist.   Eyes: Conjunctivae are normal. Pupils are equal, round, and reactive to light. No scleral icterus.   Neck: No tracheal deviation present.   Cardiovascular: Normal heart sounds.  Exam reveals no friction rub.    Pulmonary/Chest: Effort normal and breath sounds normal. No respiratory distress. He has no wheezes. He has no rales.   Abdominal: Soft. Bowel sounds are normal. He exhibits no distension. There is no tenderness.   Musculoskeletal: Normal range of motion.   Neurological: He is alert and oriented to person, place, and time.   Skin: Skin is warm and dry.         Results Reviewed:  I have personally reviewed current lab, radiology, and data and agree with results.  Lab Results (last 24 hours)     Procedure Component Value Units Date/Time    [place] Draw [[id number]] Collected:  02/13/17 2044    Specimen:  Blood Updated:  02/13/17 2049    Narrative:       The following orders were created for panel order [place] Draw.  Procedure                               Abnormality         Status                     ---------                               -----------         ------                     Light Blue Top[[id number]]                                    In process                 Green Top (Gel)[[id number]]                                   In process                 Lavender Top[[id number]]                                      In process                 Gold Top - SST[[id number]]                                    In process                   Please view results for these tests on  the individual orders.    Green Top (Gel) [[id number]] Collected:  02/13/17 2044    Specimen:  Blood Updated:  02/13/17 2049    Lavender Top [[id number]] Collected:  02/13/17 2044    Specimen:  Blood Updated:  02/13/17 2049    Gold Top - SST [[id number]] Collected:  02/13/17 2044    Specimen:  Blood Updated:  02/13/17 2049    Light Blue Top [[id number]] Collected:  02/13/17 2044    Specimen:  Blood Updated:  02/13/17 2049    CBC & Differential [[id number]] Collected:  02/13/17 2044    Specimen:  Blood Updated:  02/13/17 2052    Narrative:       The following orders were created for panel order CBC & Differential.  Procedure                               Abnormality         Status                     ---------                               -----------         ------                     CBC Auto Differential[[id number]]         Abnormal            Final result                 Please view results for these tests on the individual orders.    CBC Auto Differential [[id number]]  (Abnormal) Collected:  02/13/17 2044    Specimen:  Blood Updated:  02/13/17 2052     WBC 14.42 (H) 10*3/mm3      RBC 4.92 10*6/mm3      Hemoglobin 14.5 g/dL      Hematocrit 42.3 %      MCV 86.0 fL      MCH 29.5 pg      MCHC 34.3 g/dL      RDW 13.8 %      RDW-SD 43.7 fl      MPV 11.0 fL      Platelets 155 10*3/mm3      Neutrophil % 77.4 %      Lymphocyte % 12.9 %      Monocyte % 5.7 %      Eosinophil % 3.4 %      Basophil % 0.3 %      Immature Grans % 0.3 %      Neutrophils, Absolute 11.16 (H) 10*3/mm3      Lymphocytes, Absolute 1.86 10*3/mm3      Monocytes, Absolute 0.82 10*3/mm3      Eosinophils, Absolute 0.49 10*3/mm3      Basophils, Absolute 0.04 10*3/mm3      Immature Grans, Absolute 0.05 (H) 10*3/mm3     Comprehensive Metabolic Panel [[id number]]  (Abnormal) Collected:  02/13/17 2044    Specimen:  Blood Updated:  02/13/17 2103     Glucose 124 (H) mg/dL      BUN 18 mg/dL      Creatinine 1.26 mg/dL      Sodium 140 mmol/L      Potassium 3.7 mmol/L      Chloride  100 mmol/L      CO2 32.0 (H) mmol/L      Calcium 8.9 mg/dL      Total Protein 6.5 g/dL      Albumin 3.90 g/dL      ALT (SGPT) 41 U/L      AST (SGOT) 27 U/L      Alkaline Phosphatase 76 U/L      Total Bilirubin 0.4 mg/dL      eGFR Non African Amer 59 (L) mL/min/1.73      Globulin 2.6 gm/dL      A/G Ratio 1.5 g/dL      BUN/Creatinine Ratio 14.3      Anion Gap 8.0 mmol/L     CK [[id number]]  (Normal) Collected:  02/13/17 2044    Specimen:  Blood Updated:  02/13/17 2103     Creatine Kinase 100 U/L     Lipase [[id number]]  (Normal) Collected:  02/13/17 2044    Specimen:  Blood Updated:  02/13/17 2103     Lipase 115 U/L     Magnesium [[id number]]  (Normal) Collected:  02/13/17 2044    Specimen:  Blood Updated:  02/13/17 2103     Magnesium 2.0 mg/dL     Protime-INR [[id number]]  (Normal) Collected:  02/13/17 2044    Specimen:  Blood Updated:  02/13/17 2110     Protime 14.3 Seconds      INR 1.11     Narrative:       Therapeutic range for most indications is 2.0-3.0 INR,  or 2.5-3.5 for mechanical heart valves.    Troponin [[id number]]  (Normal) Collected:  02/13/17 2044    Specimen:  Blood Updated:  02/13/17 2115     Troponin I <0.012 ng/mL     CK-MB [[id number]]  (Normal) Collected:  02/13/17 2044    Specimen:  Blood Updated:  02/13/17 2115     CKMB 1.15 ng/mL     BNP [[id number]]  (Normal) Collected:  02/13/17 2044    Specimen:  Blood Updated:  02/13/17 2115     proBNP 71.5 pg/mL     TSH [[id number]]  (Abnormal) Collected:  02/13/17 2044    Specimen:  Blood Updated:  02/13/17 2135     TSH 6.880 (H) mIU/mL     Urinalysis With / Culture If Indicated [[id number]] Collected:  02/13/17 2255    Specimen:  Urine from Urine, Clean Catch Updated:  02/13/17 2300        Imaging Results (last 24 hours)     Procedure Component Value Units Date/Time    CT Abdomen Pelvis Without Contrast [[id number]] Collected:  02/13/17 2203     Updated:  02/13/17 2212    Narrative:       Patient Name:  ROSIE ALANIZGrzegorz ID:  [id number] Ordering:   HALLE  LEIDYAttending:  HALLE FORBESReferring:  HALLE FORBES------------------------------------------------PROCEDUR  :    EXAMINATION:  Computed Tomography         REGION: Abdomen /  Pelvis                 INDICATION: Leukocytosis and right upper  quadrant abdominal pain    HISTORY:PATRICIA. IMAGING: none         TECHNIQUE:    - reconstructions: axial, coronal, sagittal       -  contrast:  oral:  no ; intravenous:  no   - Please note:    -  Lack of IV contrast limits assessment of solid organ parenchyma,  urinary system, or vascular structures.    - Lack of oral  contrast limits assessment of GI tract structures.DLP is 1131.1      COMMENTS:       THORAX (INFERIOR):   The lung bases are  clear.The pleura is without fluid or a mass.The heart size is  normal size and there is no pericardial fluid.  The distal  esophagus is mildly thickened and there is a small hiatal hernia.  Endoscopy might be considered to ensure benignity.    ABDOMEN:   Limited assessment of the solid organ parenchyma is grossly  unremarkable demonstrating no evidence of organomegaly.Limited  assessment of the viscera is grossly unremarkable demonstrating  normal caliber bowel loops. No evidence of free fluid or free  intraperitoneal air.The osseous structures are grossly  unremarkable for age.Very low attenuation mass within the left  anterolateral abdominal musculature, probably representing a  small lipoma RETROPERITONEUM:Limited assessment of the kidneys  demonstrates overall normal size.Limited assessment of the  ureters demonstrates normal caliber and course.The adrenal glands  are of normal size and contour.No gross evidence of significant  retroperitoneal adenopathy.The vascular structures are grossly  within normal limits for age. PELVIS: Limited assessment of the  viscera is grossly unremarkable demonstrating normal caliber  bowel loops. No evidence of free fluid or free intraperitoneal  air.The osseous structures are grossly unremarkable  for age.The  vascular structures are grossly within normal limits for age.      Impression:        .    CONCLUSION:1. Limited examination due to the lack of  intravenous and oral contrast. Two. No acute abnormality  identified3. Incidental findings as above.    Electronically signed by:  Kristin Tubbs MD  2/13/2017 10:11 PM  CST Workstation: [id number]    XR Chest 1 View [[id number]] Collected:  02/13/17 2221     Updated:  02/13/17 2223    Narrative:       Exam: AP portable chest    INDICATION: Chest pain    FINDINGS: AP portable chest. The bony structures are intact. The  cardiomediastinal elsewhere is unremarkable. There is a probable  granuloma present in the left upper lung. Lungs otherwise are  clear. No pneumothorax or pleural effusion.      Impression:       No acute cardiopulmonary abnormality.    Electronically signed by:  Shahid Madrid MD  2/13/2017 10:22 PM  CST Workstation: [id number]              Assessment and Plan:  1. Chest pain poss acute coronary syndrome: IGNACIO therapy, trend cardiac enzymes.  2.  Likely indigestion with GERD: We'll start Gas-X along with PPI.      Riley Batista MD  02/13/17  11:19 PM

## 2017-02-14 NOTE — ED PROVIDER NOTES
Subjective   HPI Comments: Patient came to the emergency room complaining of chest pain with mild shortness of breath since about 6 PM.  He also complained of headachesjaw pain,  pain lower back pain and right upper quadrant abdominal pain.  cough also saying that his teeth hurts with breathing.    He said that this pain has been nonstop that he couldn't characterize the pain, he said it is just like a toothache.    He denies any surgical history.    Allergies: To cephalosporins.  Erythromycin penicillin tetracycline and ACTH.    Past medical history for hypertension and obesity.    Family history: Hypertension diabetes and cancer.    He smokes about a pack a day, used to drink but not anymore.          History provided by:  Patient      Review of Systems   Constitutional: Negative for activity change, appetite change, fatigue and fever.   HENT: Negative for congestion, facial swelling, mouth sores, nosebleeds, sore throat and trouble swallowing.    Eyes: Negative for discharge, redness and itching.   Respiratory: Negative for apnea, cough and wheezing.    Cardiovascular: Negative for chest pain and palpitations.   Gastrointestinal: Negative for blood in stool and nausea.   Endocrine: Negative for cold intolerance, heat intolerance, polydipsia, polyphagia and polyuria.   Genitourinary: Negative for difficulty urinating, dysuria, flank pain, frequency and hematuria.   Musculoskeletal: Negative for gait problem, joint swelling and neck pain.   Skin: Negative.  Negative for color change, pallor and rash.   Allergic/Immunologic: Negative for environmental allergies.   Neurological: Negative for dizziness, seizures, syncope, speech difficulty, light-headedness, numbness and headaches.   Hematological: Negative for adenopathy.   Psychiatric/Behavioral: Negative for agitation, behavioral problems, confusion and sleep disturbance. The patient is not nervous/anxious.        History reviewed. No pertinent past medical  "history.    Allergies   Allergen Reactions   • Acth [Corticotropin]    • Cephalosporins    • Eggs Or Egg-Derived Products    • Erythromycin    • Penicillins    • Tetracyclines & Related        History reviewed. No pertinent past surgical history.    History reviewed. No pertinent family history.    Social History     Social History   • Marital status:      Spouse name: N/A   • Number of children: N/A   • Years of education: N/A     Social History Main Topics   • Smoking status: Current Every Day Smoker     Packs/day: 1.00   • Smokeless tobacco: None   • Alcohol use No   • Drug use: No   • Sexual activity: Not Asked     Other Topics Concern   • None     Social History Narrative           Objective   Physical Exam   Constitutional: He is oriented to person, place, and time. He appears well-developed and well-nourished.   HENT:   Head: Normocephalic and atraumatic.   Nose: Nose normal.   Mouth/Throat: Oropharynx is clear and moist.   Eyes: Conjunctivae and EOM are normal. Pupils are equal, round, and reactive to light.   Neck: Normal range of motion. Neck supple.   Cardiovascular: Normal rate, regular rhythm, normal heart sounds and intact distal pulses.    Pulmonary/Chest: Effort normal and breath sounds normal.   Abdominal: Soft. Bowel sounds are normal. There is tenderness.       He is tender to palpation over the right upper quadrant area   Musculoskeletal: Normal range of motion.   Neurological: He is alert and oriented to person, place, and time.   Skin: Skin is warm and dry.   Psychiatric: He has a normal mood and affect. His behavior is normal. Judgment and thought content normal.   Nursing note and vitals reviewed.      Procedures         ED Course  ED Course      Blood pressure 117/60, pulse 59, temperature 98.5 °F (36.9 °C), temperature source Tympanic, resp. rate 18, height 72\" (182.9 cm), weight 270 lb (122 kg), SpO2 96 %.    Labs Reviewed   COMPREHENSIVE METABOLIC PANEL - Abnormal; Notable for the " following:        Result Value    Glucose 124 (*)     CO2 32.0 (*)     eGFR Non  Amer 59 (*)     All other components within normal limits   CBC WITH AUTO DIFFERENTIAL - Abnormal; Notable for the following:     WBC 14.42 (*)     Neutrophils, Absolute 11.16 (*)     Immature Grans, Absolute 0.05 (*)     All other components within normal limits   URINALYSIS W/ CULTURE IF INDICATED - Abnormal; Notable for the following:     Specific Gravity, UA 1.038 (*)     Protein,  mg/dL (2+) (*)     All other components within normal limits   TSH - Abnormal; Notable for the following:     TSH 6.880 (*)     All other components within normal limits   URINALYSIS, MICROSCOPIC ONLY - Abnormal; Notable for the following:     RBC, UA 0-2 (*)     Bacteria, UA Trace (*)     All other components within normal limits   PROTIME-INR - Normal    Narrative:     Therapeutic range for most indications is 2.0-3.0 INR,  or 2.5-3.5 for mechanical heart valves.   TROPONIN (IN-HOUSE) - Normal   TROPONIN (IN-HOUSE) - Normal   CK - Normal   CK - Normal   CK MB - Normal   CK MB - Normal   BNP (IN-HOUSE) - Normal   LIPASE - Normal   MAGNESIUM - Normal   RAINBOW DRAW    Narrative:     The following orders were created for panel order [place] Draw.  Procedure                               Abnormality         Status                     ---------                               -----------         ------                     Light Blue Top[[id number]]                                    Final result               Green Top (Gel)[[id number]]                                   Final result               Lavender Top[[id number]]                                      Final result               Gold Top - SST[[id number]]                                    Final result                 Please view results for these tests on the individual orders.   TROPONIN (IN-HOUSE)   CK   CBC WITH AUTO DIFFERENTIAL   COMPREHENSIVE METABOLIC PANEL   TROPONIN (IN-HOUSE)   CBC AND  DIFFERENTIAL    Narrative:     The following orders were created for panel order CBC & Differential.  Procedure                               Abnormality         Status                     ---------                               -----------         ------                     CBC Auto Differential[[id number]]         Abnormal            Final result                 Please view results for these tests on the individual orders.   LIGHT BLUE TOP   GREEN TOP   LAVENDER TOP   GOLD TOP - SST        XR Chest 1 View   Final Result   No acute cardiopulmonary abnormality.      Electronically signed by:  Shahid Madrid MD  2/13/2017 10:22 PM   CST Workstation: [id number]      CT Abdomen Pelvis Without Contrast   Final Result    .    CONCLUSION:1. Limited examination due to the lack of   intravenous and oral contrast. Two. No acute abnormality   identified3. Incidental findings as above.      Electronically signed by:  Kristin Tubbs MD  2/13/2017 10:11 PM   CST Workstation: [id number]                    [facility]    Final diagnoses:   Chest pain at rest   Leukocytosis, unspecified type   Hiatal hernia   Esophageal thickening   Hypothyroidism, unspecified type   Obesity, unspecified obesity severity, unspecified obesity type            Ronnell Koch MD  02/14/17 0549

## 2017-02-14 NOTE — DISCHARGE SUMMARY
"  Date of Discharge:  2/14/2017    Discharge Diagnosis: Aortic Dissection     Presenting Problem/History of Present Illness  Hiatal hernia [K44.9]  Chest pain at rest [R07.9]  Esophageal thickening [K22.8]  Obesity, unspecified obesity severity, unspecified obesity type [E66.9]  Leukocytosis, unspecified type [D72.829]  Hypothyroidism, unspecified type [E03.9]     This 56-year-old  male reported to the emergency department with multiple vague complaints. Patient states that approximately one day ago he was eating food and felt a \"pop.\" Patient also endorses generalized chest discomfort that radiates up into the jaw. He also complains of vague all over joint pains and malaise.     Due to patient's vague symptoms, multiple measures were taken to diagnose the patient's condition. While right upper quadrant ultrasound had been ordered secondary to the patient's complaints of a feeling that was similar to GERD, patient was also seen by cardiology.     Cardiology ordered a CT angiogram of the coronary arteries. At that time it was discovered that the patient had an aortic dissection.    Hospital Course  Patient is a 56 y.o. male presented with chest pain.  Patient was admitted in observation status.  Patient was evaluate cardio services and was recommended to have a CTA of coronaries.  During this test patient was found to have aortic dissection.  Patient was evaluated by CT surgery and was recommended to be transferred to [facility] for emergent procedure.  Patient was transferred by air.  Patient's family was present at the time of transfer.  Patient and patient's family is all questions were answered to their satisfaction.    Procedures Performed         Consults:   Consults     Date and Time Order Name Status Description    2/14/2017 1320 Inpatient Consult to Cardiology      2/14/2017 0859 Inpatient Consult to Cardiology Completed     2/13/2017 0384 Hospitalist (on-call MD unless specified) "            Pertinent Test Results:   Lab Results (last 24 hours)     Procedure Component Value Units Date/Time    CBC & Differential [[id number]] Collected:  02/13/17 2044    Specimen:  Blood Updated:  02/13/17 2052    Narrative:       The following orders were created for panel order CBC & Differential.  Procedure                               Abnormality         Status                     ---------                               -----------         ------                     CBC Auto Differential[[id number]]         Abnormal            Final result                 Please view results for these tests on the individual orders.    CBC Auto Differential [[id number]]  (Abnormal) Collected:  02/13/17 2044    Specimen:  Blood Updated:  02/13/17 2052     WBC 14.42 (H) 10*3/mm3      RBC 4.92 10*6/mm3      Hemoglobin 14.5 g/dL      Hematocrit 42.3 %      MCV 86.0 fL      MCH 29.5 pg      MCHC 34.3 g/dL      RDW 13.8 %      RDW-SD 43.7 fl      MPV 11.0 fL      Platelets 155 10*3/mm3      Neutrophil % 77.4 %      Lymphocyte % 12.9 %      Monocyte % 5.7 %      Eosinophil % 3.4 %      Basophil % 0.3 %      Immature Grans % 0.3 %      Neutrophils, Absolute 11.16 (H) 10*3/mm3      Lymphocytes, Absolute 1.86 10*3/mm3      Monocytes, Absolute 0.82 10*3/mm3      Eosinophils, Absolute 0.49 10*3/mm3      Basophils, Absolute 0.04 10*3/mm3      Immature Grans, Absolute 0.05 (H) 10*3/mm3     Comprehensive Metabolic Panel [[id number]]  (Abnormal) Collected:  02/13/17 2044    Specimen:  Blood Updated:  02/13/17 2103     Glucose 124 (H) mg/dL      BUN 18 mg/dL      Creatinine 1.26 mg/dL      Sodium 140 mmol/L      Potassium 3.7 mmol/L      Chloride 100 mmol/L      CO2 32.0 (H) mmol/L      Calcium 8.9 mg/dL      Total Protein 6.5 g/dL      Albumin 3.90 g/dL      ALT (SGPT) 41 U/L      AST (SGOT) 27 U/L      Alkaline Phosphatase 76 U/L      Total Bilirubin 0.4 mg/dL      eGFR Non African Amer 59 (L) mL/min/1.73      Globulin 2.6 gm/dL      A/G Ratio  1.5 g/dL      BUN/Creatinine Ratio 14.3      Anion Gap 8.0 mmol/L     CK [[id number]]  (Normal) Collected:  02/13/17 2044    Specimen:  Blood Updated:  02/13/17 2103     Creatine Kinase 100 U/L     Lipase [[id number]]  (Normal) Collected:  02/13/17 2044    Specimen:  Blood Updated:  02/13/17 2103     Lipase 115 U/L     Magnesium [[id number]]  (Normal) Collected:  02/13/17 2044    Specimen:  Blood Updated:  02/13/17 2103     Magnesium 2.0 mg/dL     Protime-INR [[id number]]  (Normal) Collected:  02/13/17 2044    Specimen:  Blood Updated:  02/13/17 2110     Protime 14.3 Seconds      INR 1.11     Narrative:       Therapeutic range for most indications is 2.0-3.0 INR,  or 2.5-3.5 for mechanical heart valves.    Troponin [[id number]]  (Normal) Collected:  02/13/17 2044    Specimen:  Blood Updated:  02/13/17 2115     Troponin I <0.012 ng/mL     CK-MB [[id number]]  (Normal) Collected:  02/13/17 2044    Specimen:  Blood Updated:  02/13/17 2115     CKMB 1.15 ng/mL     BNP [[id number]]  (Normal) Collected:  02/13/17 2044    Specimen:  Blood Updated:  02/13/17 2115     proBNP 71.5 pg/mL     TSH [[id number]]  (Abnormal) Collected:  02/13/17 2044    Specimen:  Blood Updated:  02/13/17 2135     TSH 6.880 (H) mIU/mL     Urinalysis With / Culture If Indicated [[id number]]  (Abnormal) Collected:  02/13/17 2255    Specimen:  Urine from Urine, Clean Catch Updated:  02/13/17 2327     Color, UA Yellow      Appearance, UA Clear      pH, UA 5.5      Specific Gravity, UA 1.038 (H)       Result obtained by Refractometer        Glucose, UA Negative      Ketones, UA Negative      Bilirubin, UA Negative      Blood, UA Negative      Protein,  mg/dL (2+) (A)      Leuk Esterase, UA Negative      Nitrite, UA Negative      Urobilinogen, UA 0.2 E.U./dL     Urinalysis, Microscopic Only [[id number]]  (Abnormal) Collected:  02/13/17 2255    Specimen:  Urine from Urine, Clean Catch Updated:  02/13/17 2331     RBC, UA 0-2 (A) /HPF      WBC, UA 3-5 /HPF      Bacteria,  UA Trace (A) /HPF      Squamous Epithelial Cells, UA 0-2 /HPF      Hyaline Casts, UA 13-20 /LPF      Granular Casts, UA 3-6 /LPF      Methodology Automated Microscopy     CK-MB [[id number]]  (Normal) Collected:  02/13/17 2336    Specimen:  Blood from Arm, Left Updated:  02/14/17 0002     CKMB 1.15 ng/mL     Green Top (Gel) [[id number]] Collected:  02/13/17 2044    Specimen:  Blood Updated:  02/14/17 0105     Extra Tube Hold for add-ons.       Auto resulted.       [place] Draw [[id number]] Collected:  02/13/17 2044    Specimen:  Blood Updated:  02/14/17 0105    Narrative:       The following orders were created for panel order [place] Draw.  Procedure                               Abnormality         Status                     ---------                               -----------         ------                     Light Blue Top[[id number]]                                    Final result               Green Top (Gel)[[id number]]                                   Final result               Lavender Top[[id number]]                                      Final result               Gold Top - SST[[id number]]                                    Final result                 Please view results for these tests on the individual orders.    Light Blue Top [[id number]] Collected:  02/13/17 2044    Specimen:  Blood Updated:  02/14/17 0105     Extra Tube hold for add-on       Auto resulted       Lavender Top [[id number]] Collected:  02/13/17 2044    Specimen:  Blood Updated:  02/14/17 0105     Extra Tube hold for add-on       Auto resulted       Gold Top - SST [[id number]] Collected:  02/13/17 2044    Specimen:  Blood Updated:  02/14/17 0105     Extra Tube Hold for add-ons.       Auto resulted.       CK [[id number]]  (Normal) Collected:  02/13/17 2336    Specimen:  Blood from Arm, Left Updated:  02/14/17 0452     Creatine Kinase 106 U/L     Troponin [[id number]]  (Normal) Collected:  02/13/17 2336    Specimen:  Blood from Arm, Left Updated:  02/14/17 0510      Troponin I 0.017 ng/mL     CBC Auto Differential [[id number]]  (Abnormal) Collected:  02/14/17 0553    Specimen:  Blood Updated:  02/14/17 0707     WBC 18.09 (H) 10*3/mm3      RBC 4.65 10*6/mm3      Hemoglobin 13.7 g/dL      Hematocrit 40.6 %      MCV 87.3 fL      MCH 29.5 pg      MCHC 33.7 g/dL      RDW 13.9 %      RDW-SD 44.4 (H) fl      MPV 12.2 (H) fL      Platelets 141 (L) 10*3/mm3      Neutrophil % 81.6 (H) %      Lymphocyte % 10.1 %      Monocyte % 7.5 %      Eosinophil % 0.4 %      Basophil % 0.1 %      Immature Grans % 0.3 %      Neutrophils, Absolute 14.78 (H) 10*3/mm3      Lymphocytes, Absolute 1.82 10*3/mm3      Monocytes, Absolute 1.35 (H) 10*3/mm3      Eosinophils, Absolute 0.07 10*3/mm3      Basophils, Absolute 0.02 10*3/mm3      Immature Grans, Absolute 0.05 (H) 10*3/mm3     Comprehensive Metabolic Panel [[id number]]  (Abnormal) Collected:  02/14/17 0553    Specimen:  Blood Updated:  02/14/17 0718     Glucose 133 (H) mg/dL      BUN 23 (H) mg/dL      Creatinine 1.24 mg/dL      Sodium 139 mmol/L      Potassium 3.6 mmol/L      Chloride 100 mmol/L      CO2 31.0 mmol/L      Calcium 8.6 mg/dL      Total Protein 6.3 g/dL      Albumin 3.80 g/dL      ALT (SGPT) 38 U/L      AST (SGOT) 20 U/L      Alkaline Phosphatase 60 U/L      Total Bilirubin 0.6 mg/dL      eGFR Non African Amer 60 (L) mL/min/1.73      Globulin 2.5 gm/dL      A/G Ratio 1.5 g/dL      BUN/Creatinine Ratio 18.5      Anion Gap 8.0 mmol/L     Troponin [[id number]]  (Normal) Collected:  02/14/17 0553    Specimen:  Blood Updated:  02/14/17 0733     Troponin I 0.026 ng/mL     Fibrinogen Evaluation Profile [[id number]] Collected:  02/14/17 1547    Specimen:  Blood Updated:  02/14/17 1549    Comprehensive Metabolic Panel [[id number]]     Specimen:  Blood Updated:  02/14/17 1556    Lactic Acid, Plasma [[id number]]  (Normal) Collected:  02/14/17 1547    Specimen:  Blood Updated:  02/14/17 1602     Lactate 1.5 mmol/L     Protime-INR [[id number]]  (Normal)  Collected:  02/14/17 1547    Specimen:  Blood Updated:  02/14/17 1615     Protime 14.3 Seconds      INR 1.11     Narrative:       Therapeutic range for most indications is 2.0-3.0 INR,  or 2.5-3.5 for mechanical heart valves.    aPTT [[id number]]  (Normal) Collected:  02/14/17 1547    Specimen:  Blood Updated:  02/14/17 1615     PTT 35.4 seconds     Narrative:       The recommended Heparin therapeutic range is 68-97 seconds.    Fibrinogen [[id number]]  (Normal) Collected:  02/14/17 1547    Specimen:  Blood Updated:  02/14/17 1615     Fibrinogen 342 mg/dL             Condition on Discharge:  worsening  Vital Signs  Temp:  [97.5 °F (36.4 °C)-98.7 °F (37.1 °C)] 98.2 °F (36.8 °C)  Heart Rate:  [58-72] 66  Resp:  [18-22] 18  BP: ()/(56-77) 79/67    Physical Exam:   Physical Exam   Constitutional: He is oriented to person, place, and time. He appears well-developed and well-nourished.   HENT:   Head: Normocephalic and atraumatic.   Nose: Nose normal.   Eyes: Conjunctivae and EOM are normal. Pupils are equal, round, and reactive to light.   Neck: Normal range of motion. Neck supple. No JVD present. No tracheal deviation present. No thyromegaly present.   Cardiovascular: Normal rate, regular rhythm, normal heart sounds and intact distal pulses.    Pulmonary/Chest: Effort normal and breath sounds normal. No respiratory distress. He has no wheezes. He has no rales. He exhibits no tenderness.   Abdominal: Soft. Bowel sounds are normal. He exhibits no distension. There is no tenderness. There is no rebound and no guarding.   Musculoskeletal: Normal range of motion. He exhibits no edema.   Lymphadenopathy:     He has no cervical adenopathy.   Neurological: He is alert and oriented to person, place, and time. He has normal reflexes. No cranial nerve deficit.   Skin: Skin is warm and dry.   Intact   Psychiatric: He has a normal mood and affect.         Discharge Disposition      Discharge Medications  There are no discharge  medications for this patient.       Discharge Diet:     Activity at Discharge:     Follow-up Appointments  No future appointments.      Test Results Pending at Discharge   Order Current Status    Fibrinogen Evaluation Profile In process           Godfrey Berman MD  02/14/17  5:37 PM    Time: 35 mins        EMR Dragon/Transcription disclaimer:   Much of this encounter note is an electronic transcription/translation of spoken language to printed text. The electronic translation of spoken language may permit erroneous, or at times, nonsensical words or phrases to be inadvertently transcribed; Although I have reviewed the note for such errors, some may still exist.

## 2017-02-14 NOTE — NURSING NOTE
Observation patient arrived to ICU overflow from Peds with newly diagnosed aortic dissection. Transfer to [facility] pending. Pt reports 7/10 chest pain, CT surgeon at bedside, vitals stable.

## 2017-02-14 NOTE — PROGRESS NOTES
"    [facility] Medicine Services  INPATIENT PROGRESS NOTE    Length of Stay: 0  Date of Admission: 2/13/2017  Primary Care Physician: Marybeth Harrison DO    Subjective   Chief Complaint/HPI:  This 56-year-old  male reported to the emergency department with multiple vague complaints.  Patient states that approximately one day ago he was eating food and felt a \"pop.\"  Patient also endorses generalized chest discomfort that radiates up into the jaw.  He also complains of vague all over joint pains and malaise.    Due to patient's vague symptoms, multiple measures were taken to diagnose the patient's condition.  While right upper quadrant ultrasound had been ordered secondary to the patient's complaints of a feeling that was similar to GERD, patient was also seen by cardiology.    Cardiology ordered a CT angiogram of the coronary arteries.  At that time it was discovered that the patient had an aortic dissection.  Though patient's labs were within normal limits upon admission, we have ordered new labs to reevaluate.    Dr. Liborio Chandra has already contacted CT in vascular surgery in [place].  Patient has been accepted.  CT surgery at [facility] has also been consulted and made aware.  Patient is currently being transferred to the ICU for blood pressure control as well as pain control.  The hospitalist responsible for ICU has been made aware and anticipates patient's arrival.    Patient has been informed of his condition.  He is aware that he will be transferred to [place] by [facility].    Patient currently is stable, he is not diaphoretic, he is in no apparent distress, and is breathing without any accessory muscle use.  Patient is able to speak in full sentences.  He is complaining of some chest discomfort, but no back pain, no dizziness, no syncope or presyncope.  He denies nausea and vomiting.    Review of Systems   Constitutional: Positive for " fatigue.   Respiratory: Negative for cough, chest tightness, shortness of breath and wheezing.    Cardiovascular: Positive for chest pain. Negative for palpitations and leg swelling.   Gastrointestinal: Negative for abdominal pain, blood in stool, constipation, diarrhea, nausea and vomiting.   Genitourinary: Negative for decreased urine volume, difficulty urinating, dysuria, flank pain, hematuria and urgency.   Musculoskeletal: Positive for arthralgias and myalgias. Negative for back pain, joint swelling and neck stiffness.   Neurological: Negative for dizziness, tremors, seizures, syncope, facial asymmetry, speech difficulty, weakness, light-headedness and numbness.   Psychiatric/Behavioral: Negative for confusion and decreased concentration.        All pertinent negatives and positives are as above. All other systems have been reviewed and are negative unless otherwise stated.     Objective    Temp:  [97.5 °F (36.4 °C)-98.7 °F (37.1 °C)] 98.7 °F (37.1 °C)  Heart Rate:  [58-72] 58  Resp:  [18-22] 20  BP: (109-137)/(56-77) 126/67    Physical Exam   Constitutional: He is oriented to person, place, and time. He appears well-developed and well-nourished. No distress.   HENT:   Head: Normocephalic and atraumatic.   Eyes: Conjunctivae and EOM are normal. Pupils are equal, round, and reactive to light.   Neck: Normal range of motion. Neck supple. No tracheal deviation present.   Cardiovascular: Normal rate, regular rhythm, normal heart sounds and intact distal pulses.  Exam reveals no gallop and no friction rub.    No murmur heard.  Pulmonary/Chest: Effort normal and breath sounds normal. No respiratory distress. He has no wheezes. He has no rales. He exhibits no tenderness.   Abdominal: Soft. Bowel sounds are normal. He exhibits no distension and no mass. There is no tenderness. There is no rebound and no guarding.   Musculoskeletal: Normal range of motion. He exhibits no edema, tenderness or deformity.   Neurological:  He is alert and oriented to person, place, and time.   Skin: Skin is warm and dry. No rash noted. He is not diaphoretic. No erythema. No pallor.   Psychiatric: He has a normal mood and affect. His behavior is normal. Judgment and thought content normal.       Results Review:  I have reviewed the labs, radiology results, and diagnostic studies.    Laboratory Data:     Results from last 7 days  Lab Units 02/14/17  0553 02/13/17  2044   SODIUM mmol/L 139 140   POTASSIUM mmol/L 3.6 3.7   CHLORIDE mmol/L 100 100   TOTAL CO2 mmol/L 31.0 32.0*   BUN mg/dL 23* 18   CREATININE mg/dL 1.24 1.26   GLUCOSE mg/dL 133* 124*   CALCIUM mg/dL 8.6 8.9   BILIRUBIN mg/dL 0.6 0.4   ALK PHOS U/L 60 76   ALT (SGPT) U/L 38 41   AST (SGOT) U/L 20 27   ANION GAP mmol/L 8.0 8.0     Estimated Creatinine Clearance: 89.8 mL/min (by C-G formula based on Cr of 1.24).    Results from last 7 days  Lab Units 02/13/17  2044   MAGNESIUM mg/dL 2.0           Results from last 7 days  Lab Units 02/14/17  0553 02/13/17  2044   WBC 10*3/mm3 18.09* 14.42*   HEMOGLOBIN g/dL 13.7 14.5   HEMATOCRIT % 40.6 42.3   PLATELETS 10*3/mm3 141* 155       Results from last 7 days  Lab Units 02/13/17  2044   INR  1.11       Culture Data:   No results found for: BLOODCX  No results found for: URINECX  No results found for: RESPCX  No results found for: WOUNDCX  No results found for: STOOLCX  No components found for: BODYFLD    Radiology Data:   Imaging Results (last 24 hours)     Procedure Component Value Units Date/Time    CT Abdomen Pelvis Without Contrast [[id number]] Collected:  02/13/17 2203     Updated:  02/13/17 2212    Narrative:       Patient Name:  ROSIE Latham ID:  [id number] Ordering:   HALLE FORBESAttending:  HALLE FORBESReferring:  HALLE FORBES------------------------------------------------PROCEDUR  :    EXAMINATION:  Computed Tomography         REGION: Abdomen /  Pelvis                 INDICATION: Leukocytosis and right upper  quadrant  abdominal pain    HISTORY:PATRICIA. IMAGING: none         TECHNIQUE:    - reconstructions: axial, coronal, sagittal       -  contrast:  oral:  no ; intravenous:  no   - Please note:    -  Lack of IV contrast limits assessment of solid organ parenchyma,  urinary system, or vascular structures.    - Lack of oral  contrast limits assessment of GI tract structures.DLP is 1131.1      COMMENTS:       THORAX (INFERIOR):   The lung bases are  clear.The pleura is without fluid or a mass.The heart size is  normal size and there is no pericardial fluid.  The distal  esophagus is mildly thickened and there is a small hiatal hernia.  Endoscopy might be considered to ensure benignity.    ABDOMEN:   Limited assessment of the solid organ parenchyma is grossly  unremarkable demonstrating no evidence of organomegaly.Limited  assessment of the viscera is grossly unremarkable demonstrating  normal caliber bowel loops. No evidence of free fluid or free  intraperitoneal air.The osseous structures are grossly  unremarkable for age.Very low attenuation mass within the left  anterolateral abdominal musculature, probably representing a  small lipoma RETROPERITONEUM:Limited assessment of the kidneys  demonstrates overall normal size.Limited assessment of the  ureters demonstrates normal caliber and course.The adrenal glands  are of normal size and contour.No gross evidence of significant  retroperitoneal adenopathy.The vascular structures are grossly  within normal limits for age. PELVIS: Limited assessment of the  viscera is grossly unremarkable demonstrating normal caliber  bowel loops. No evidence of free fluid or free intraperitoneal  air.The osseous structures are grossly unremarkable for age.The  vascular structures are grossly within normal limits for age.      Impression:        .    CONCLUSION:1. Limited examination due to the lack of  intravenous and oral contrast. Two. No acute abnormality  identified3. Incidental findings as  above.    Electronically signed by:  Kristin Tubbs MD  2/13/2017 10:11 PM  CST Workstation: [id number]    XR Chest 1 View [[id number]] Collected:  02/13/17 2221     Updated:  02/13/17 2223    Narrative:       Exam: AP portable chest    INDICATION: Chest pain    FINDINGS: AP portable chest. The bony structures are intact. The  cardiomediastinal elsewhere is unremarkable. There is a probable  granuloma present in the left upper lung. Lungs otherwise are  clear. No pneumothorax or pleural effusion.      Impression:       No acute cardiopulmonary abnormality.    Electronically signed by:  Shahid Madrid MD  2/13/2017 10:22 PM  CST Workstation: [id number]    US Gallbladder [[id number]]      Updated:  02/14/17 1316    CT Angiogram Coronary [[id number]] Collected:  02/14/17 1430     Updated:  02/14/17 1508    Narrative:       CTA coronary arteriography. Calcium score. Functional analysis.  CLINICAL HISTORY: 56-year-old male presenting with chest pain.    COMPARISON: CT abdomen February 13, 2017.    Post processing was performed by the radiologist at the [facility] workstation. Serial axial CT images were obtained through the heart at 3 mm thickness without contrast for calcium scoring. 3D images including vessel probing technique were also obtained.   Subsequently, following the intravenous administration of 90    ml of Isovue-370   , serial axial CT images were obtained through the heart at 0.6 mm thickness utilizing retrospective gating. Images were obtained without beta blocker premedication. Heart   rate 57-63. Full field of view reconstructed images were used for evaluation of the extracardiac tissues.    CALCIUM PLAQUE BURDEN:    REGION                                         CALCIUM SCORE ([place])  Left Main                                                      0      Right Coronary Artery                                 133      Left Anterior Descending                            0      Circumflex                                                     0      Posterior Descending Artery                       0             Your Calcium Score is 133   .      This places the patent in the moderate    risk for coronary artery disease. (Definite at least moderate atherosclerotic plaque. Mild coronary artery disease highly likely. Significant narrowings possible)    CTA OF THE CORONARY ARTERIES: There is adequate    visualization of the left main, LAD, diagonals, circumflex, and RCA. There is a type C    LAD. The patient is right    dominant.     There is no appreciable left main coronary artery. Duplication LAD., duplication LAD.    LAD: No calcified or soft tissue density plaque and no stenosis. There is however poor opacification of the more superior proximal LAD. This raises the possibility of dissection of the proximal LAD. There is still good flow in the mid and distal LAD.  Circumflex: No calcified or soft tissue density plaque and no stenosis.  RCA: There is calcified plaque in the mid right coronary artery. This may compromise the lumen between 50 and 75%. The right coronary is otherwise unremarkable.     There is distortion of aortic valve anatomy due to the dissection. Probably a bicuspid aortic valve. Only two cuts are identified. There is no myocardial bridging. On short axis views, the myocardium is homogeneous in thickness.    EXTRACARDIAC SOFT TISSUES:  Mediastinum: There is a an acute dissection involving the ascending and descending aorta. Both the true and false lumen are identified. In the ascending aorta the true lumen is significantly,  narrowed by intimal flap and false lumen. (Series 12 images   1hrough 45.). The dissection can be followed into the upper abdominal aorta just below the diaphragm. There is no mediastinal or hilar lymphadenopathy. The pericardium is normal.  Lungs: No consolidation or focal nodules are present. There is no pneumothorax or effusion. The pulmonary arteries are normal in  appearance.  Abdomen: No evidence of hiatal hernia. The imaged liver and spleen are unremarkable. Bones: There are no lytic or blastic lesions within the osseous structures.    CT FUNCTIONAL ANALYSIS:  Ejection Fraction      65    %  Diastolic Volume      149    ml  Systolic Volume       52    ml  Stroke Volume         97    ml  Cardiac Output        5.4    L/minute      Impression:       CONCLUSION: Acute dissection involving the ascending and descending aorta. The true lumen of the ascending aorta is significant compromised/narrowed by the intimal flap and false lumen. The dissection can be followed inferiorly to the level of the upper   abdominal aorta below the diaphragm. The more distal abdominal aorta is below the plane of view and not imaged.    Probably bicuspid aortic valve.    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 There is no appreciable left main coronary artery.     There is relatively diminished opacification of the proximal more superior LAD. This raises the specter of proximal LAD dissection. There is still good flow observed in the mid and distal LAD.    Apparently normal left circumflex.   Calcified plaque mid right coronary artery giving a borderline degree of stenosis of between 50 and 75%.     Normal functional analysis. Computer-assisted calculation of left ventricular ejection fraction 65%.    These findings discussed with Dr. Chandra at 2:45 PM.    Electronically signed by:  Russ Orozco  2/14/2017 3:06 PM CST            I have reviewed the patient current medications.     Assessment/Plan     Hospital Problem List     Chest pain at rest          Plan:  Transfer patient to ICU; continue per cardiology and CT surgery instructions; have arranged for transfer to [place] for further evaluation.  Have handed care over to Dr. Godfrey Berman in ICU.    STAN Milian   02/14/17   3:08 PM

## 2017-02-14 NOTE — CONSULTS
[facility] Cardiology  INPATIENT CONSULT NOTE  Truman Esquivel  56 y.o. male    Reason for the consult: Atypical chest pain        History of present Illness-56 yr old gentleman who is a  was bending to eat and suddenly felt a pop and developed pain in the left side of the neck and jaw and right side of the chest in the substernal area felt like a sharp shooting pain it was like a lasted for about couple of minutes is no aggravating or relieving factors.  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  He is a smoker smoked one pack a day for many years         Past medical history-hypertension    Past surgical history-none    Family history-father  of heart attack at the age of 67 post CABG    Social History     Social History   • Marital status:      Spouse name: N/A   • Number of children: N/A   • Years of education: N/A     Occupational History   • Not on file.     Social History Main Topics   • Smoking status: Current Every Day Smoker     Packs/day: 1.00   • Smokeless tobacco: Not on file   • Alcohol use No   • Drug use: No   • Sexual activity: Not on file     Other Topics Concern   • Not on file     Social History Narrative         Current Facility-Administered Medications   Medication Dose Route Frequency Provider Last Rate Last Dose   • acetaminophen (TYLENOL) tablet 650 mg  650 mg Oral Q6H PRN Riley Batista MD       • aluminum-magnesium hydroxide-simethicone (MAALOX/MYLANTA) suspension 30 mL  30 mL Oral Q6H Riley Batista MD   30 mL at 17 0230   • Morphine sulfate (PF) injection 2 mg  2 mg Intravenous Q2H PRN STAN Milian   2 mg at 17 0907    And   • naloxone (NARCAN) injection 0.4 mg  0.4 mg Intravenous Q5 Min PRN STAN Milian       • nitroglycerin (NITROSTAT) SL tablet 0.4 mg  0.4 mg Sublingual Q5 Min PRN Ronnell Koch MD   0.4 mg at 17   • ondansetron (ZOFRAN) tablet 4 mg  4 mg Oral Q6H PRN Riley  "Justice Batista MD   4 mg at 02/14/17 0043   • sodium chloride 0.9 % flush 1-10 mL  1-10 mL Intravenous PRN Lin Justice Batista MD   10 mL at 02/14/17 0907   • sodium chloride 0.9 % flush 10 mL  10 mL Intravenous PRN Ronnell Koch MD             Review of Systems     Constitution: Denies any fatigue, fever or chills    HENT: Denies any headache, hearing impairment    Eyes: Denies any blurring of vision, or photophobia     Cardivascular - As per history of present illness     Respiratory system-denies any COPD, shortness of breath     Endocrine:  No history of hyperlipidemia, diabetes       Musculoskeletal:  No history of arthritis with musculoskeletal problems    Gastrointestinal: No nausea, vomiting, or melena    Genitourinary: No dysuria or hematuria    Neurological:   No history of seizure disorder, stroke, memory problems    Psychiatric/Behavioral:        No history of depression, bipolar disorder or schizophrenia     Hematological- no history of easy bruising            OBJECTIVE    Visit Vitals   • /56 (BP Location: Left arm, Patient Position: Lying)   • Pulse 64   • Temp 98.7 °F (37.1 °C) (Temporal Artery )   • Resp 22   • Ht 72\" (182.9 cm)   • Wt 270 lb (122 kg)   • SpO2 94%   • BMI 36.62 kg/m2         Physical Exam     Constitutional: is oriented to person, place, and time.     Skin-warm and dry    Well developed and nourished in no acute distress      Head: Normocephalic and atraumatic.     Eyes: Pupils are equal, round, and reactive to light.     Neck: Neck supple. No bruit in the carotids, no elevation of JVD    Cardiovascular: [place] in the fifth intercostal space, regular rate, and  Rhythm,  S1 greater than S2, no S3 or S4, no gallop       Pulmonary/Chest:   Air  Entry is equal on both sides  No wheezing or crackles,      Abdominal: Soft.  No hepatosplenomegaly, bowel sounds are present    Musculoskeletal: No kyphoscoliosis    Neurological: is alert and oriented to person, place, and time.    cranial " nerve are intact .   No motor or sensory deficit    Extremities-no edema, no radial femoral delay      Psychiatric: He has a normal mood and affect.                  His behavior is normal.        Lab Results (last 24 hours)     Procedure Component Value Units Date/Time    CBC & Differential [[id number]] Collected:  02/13/17 2044    Specimen:  Blood Updated:  02/13/17 2052    Narrative:       The following orders were created for panel order CBC & Differential.  Procedure                               Abnormality         Status                     ---------                               -----------         ------                     CBC Auto Differential[[id number]]         Abnormal            Final result                 Please view results for these tests on the individual orders.    Comprehensive Metabolic Panel [[id number]]  (Abnormal) Collected:  02/13/17 2044    Specimen:  Blood Updated:  02/13/17 2103     Glucose 124 (H) mg/dL      BUN 18 mg/dL      Creatinine 1.26 mg/dL      Sodium 140 mmol/L      Potassium 3.7 mmol/L      Chloride 100 mmol/L      CO2 32.0 (H) mmol/L      Calcium 8.9 mg/dL      Total Protein 6.5 g/dL      Albumin 3.90 g/dL      ALT (SGPT) 41 U/L      AST (SGOT) 27 U/L      Alkaline Phosphatase 76 U/L      Total Bilirubin 0.4 mg/dL      eGFR Non African Amer 59 (L) mL/min/1.73      Globulin 2.6 gm/dL      A/G Ratio 1.5 g/dL      BUN/Creatinine Ratio 14.3      Anion Gap 8.0 mmol/L     Protime-INR [[id number]]  (Normal) Collected:  02/13/17 2044    Specimen:  Blood Updated:  02/13/17 2110     Protime 14.3 Seconds      INR 1.11     Narrative:       Therapeutic range for most indications is 2.0-3.0 INR,  or 2.5-3.5 for mechanical heart valves.    Troponin [[id number]]  (Normal) Collected:  02/13/17 2044    Specimen:  Blood Updated:  02/13/17 2115     Troponin I <0.012 ng/mL     CK [[id number]]  (Normal) Collected:  02/13/17 2044    Specimen:  Blood Updated:  02/13/17 2103     Creatine Kinase 100 U/L      CK-MB [[id number]]  (Normal) Collected:  02/13/17 2044    Specimen:  Blood Updated:  02/13/17 2115     CKMB 1.15 ng/mL     BNP [[id number]]  (Normal) Collected:  02/13/17 2044    Specimen:  Blood Updated:  02/13/17 2115     proBNP 71.5 pg/mL     CBC Auto Differential [[id number]]  (Abnormal) Collected:  02/13/17 2044    Specimen:  Blood Updated:  02/13/17 2052     WBC 14.42 (H) 10*3/mm3      RBC 4.92 10*6/mm3      Hemoglobin 14.5 g/dL      Hematocrit 42.3 %      MCV 86.0 fL      MCH 29.5 pg      MCHC 34.3 g/dL      RDW 13.8 %      RDW-SD 43.7 fl      MPV 11.0 fL      Platelets 155 10*3/mm3      Neutrophil % 77.4 %      Lymphocyte % 12.9 %      Monocyte % 5.7 %      Eosinophil % 3.4 %      Basophil % 0.3 %      Immature Grans % 0.3 %      Neutrophils, Absolute 11.16 (H) 10*3/mm3      Lymphocytes, Absolute 1.86 10*3/mm3      Monocytes, Absolute 0.82 10*3/mm3      Eosinophils, Absolute 0.49 10*3/mm3      Basophils, Absolute 0.04 10*3/mm3      Immature Grans, Absolute 0.05 (H) 10*3/mm3     Lipase [[id number]]  (Normal) Collected:  02/13/17 2044    Specimen:  Blood Updated:  02/13/17 2103     Lipase 115 U/L     Magnesium [[id number]]  (Normal) Collected:  02/13/17 2044    Specimen:  Blood Updated:  02/13/17 2103     Magnesium 2.0 mg/dL     TSH [[id number]]  (Abnormal) Collected:  02/13/17 2044    Specimen:  Blood Updated:  02/13/17 2135     TSH 6.880 (H) mIU/mL     Urinalysis With / Culture If Indicated [[id number]]  (Abnormal) Collected:  02/13/17 2255    Specimen:  Urine from Urine, Clean Catch Updated:  02/13/17 2327     Color, UA Yellow      Appearance, UA Clear      pH, UA 5.5      Specific Gravity, UA 1.038 (H)       Result obtained by Refractometer        Glucose, UA Negative      Ketones, UA Negative      Bilirubin, UA Negative      Blood, UA Negative      Protein,  mg/dL (2+) (A)      Leuk Esterase, UA Negative      Nitrite, UA Negative      Urobilinogen, UA 0.2 E.U./dL     Urinalysis, Microscopic Only [[id number]]   (Abnormal) Collected:  02/13/17 2255    Specimen:  Urine from Urine, Clean Catch Updated:  02/13/17 2331     RBC, UA 0-2 (A) /HPF      WBC, UA 3-5 /HPF      Bacteria, UA Trace (A) /HPF      Squamous Epithelial Cells, UA 0-2 /HPF      Hyaline Casts, UA 13-20 /LPF      Granular Casts, UA 3-6 /LPF      Methodology Automated Microscopy     Troponin [[id number]]  (Normal) Collected:  02/13/17 2336    Specimen:  Blood from Arm, Left Updated:  02/14/17 0510     Troponin I 0.017 ng/mL     CK [[id number]]  (Normal) Collected:  02/13/17 2336    Specimen:  Blood from Arm, Left Updated:  02/14/17 0452     Creatine Kinase 106 U/L     CK-MB [[id number]]  (Normal) Collected:  02/13/17 2336    Specimen:  Blood from Arm, Left Updated:  02/14/17 0002     CKMB 1.15 ng/mL     CBC Auto Differential [[id number]]  (Abnormal) Collected:  02/14/17 0553    Specimen:  Blood Updated:  02/14/17 0707     WBC 18.09 (H) 10*3/mm3      RBC 4.65 10*6/mm3      Hemoglobin 13.7 g/dL      Hematocrit 40.6 %      MCV 87.3 fL      MCH 29.5 pg      MCHC 33.7 g/dL      RDW 13.9 %      RDW-SD 44.4 (H) fl      MPV 12.2 (H) fL      Platelets 141 (L) 10*3/mm3      Neutrophil % 81.6 (H) %      Lymphocyte % 10.1 %      Monocyte % 7.5 %      Eosinophil % 0.4 %      Basophil % 0.1 %      Immature Grans % 0.3 %      Neutrophils, Absolute 14.78 (H) 10*3/mm3      Lymphocytes, Absolute 1.82 10*3/mm3      Monocytes, Absolute 1.35 (H) 10*3/mm3      Eosinophils, Absolute 0.07 10*3/mm3      Basophils, Absolute 0.02 10*3/mm3      Immature Grans, Absolute 0.05 (H) 10*3/mm3     Comprehensive Metabolic Panel [[id number]]  (Abnormal) Collected:  02/14/17 0553    Specimen:  Blood Updated:  02/14/17 0718     Glucose 133 (H) mg/dL      BUN 23 (H) mg/dL      Creatinine 1.24 mg/dL      Sodium 139 mmol/L      Potassium 3.6 mmol/L      Chloride 100 mmol/L      CO2 31.0 mmol/L      Calcium 8.6 mg/dL      Total Protein 6.3 g/dL      Albumin 3.80 g/dL      ALT (SGPT) 38 U/L      AST (SGOT) 20 U/L       Alkaline Phosphatase 60 U/L      Total Bilirubin 0.6 mg/dL      eGFR Non African Amer 60 (L) mL/min/1.73      Globulin 2.5 gm/dL      A/G Ratio 1.5 g/dL      BUN/Creatinine Ratio 18.5      Anion Gap 8.0 mmol/L     Troponin [[id number]]  (Normal) Collected:  02/14/17 0553    Specimen:  Blood Updated:  02/14/17 0733     Troponin I 0.026 ng/mL                  A/P    Atypical chest pain-with risk factors of hypertension, tobacco use, obesity scheduled for a CT coronary angiogram to rule out CAD.  His symptoms more sounded like cervical spondylosis.  His EKG is normal.  The troponins were normal.  Patient's heart rate is in the 50s.  Will give one dose of beta blocker and to the CT coronary angiogram.  If that is negative then he does not need any cardiac workup will need risk factor modification.    Tobacco use-needs his factor modification, will also need diet to control the BMI.      Liborio Chandra MD  2/14/2017  11:18 AM    EMR Dragon/Transcription disclaimer:   Some of this note may be an electronic transcription/translation of spoken language to printed text. The electronic translation of spoken language may permit erroneous, or at times, nonsensical words or phrases to be inadvertently transcribed; Although I have reviewed the note for such errors, some may still exist.

## 2017-02-14 NOTE — ANESTHESIA PROCEDURE NOTES
Procedure Performed: Emergent/Open-Heart Anesthesia MERI     Start Time:        End Time:      Preanesthesia Checklist:  Patient identified, IV assessed, risks and benefits discussed, monitors and equipment assessed, procedure being performed at surgeon's request and anesthesia consent obtained.

## 2017-02-14 NOTE — PLAN OF CARE
Problem: Patient Care Overview (Adult)  Goal: Plan of Care Review  Outcome: Ongoing (interventions implemented as appropriate)    02/14/17 0643   Coping/Psychosocial Response Interventions   Plan Of Care Reviewed With patient   Patient Care Overview   Progress no change       Goal: Adult Individualization and Mutuality  Outcome: Ongoing (interventions implemented as appropriate)    02/14/17 0643   Individualization   Patient Specific Preferences pt prefers door shut and lights off        Goal: Discharge Needs Assessment  Outcome: Ongoing (interventions implemented as appropriate)    02/14/17 0643   Discharge Needs Assessment   Concerns To Be Addressed no discharge needs identified   Equipment Needed After Discharge none   Discharge Disposition home or self-care   Self-Care   Equipment Currently Used at Home none         Problem: Acute Coronary Syndrome (ACS) (Adult)  Goal: Signs and Symptoms of Listed Potential Problems Will be Absent or Manageable (Acute Coronary Syndrome)  Outcome: Ongoing (interventions implemented as appropriate)    02/14/17 0643   Acute Coronary Syndrome (ACS)   Problems Assessed (Acute Coronary Syndrome (ACS)) chest pain (angina);dysrhythmia/arrhythmia   Problems Present (Acute Coronary Syndrome (ACS)) chest pain (angina)

## 2017-02-14 NOTE — PLAN OF CARE
Problem: Patient Care Overview (Adult)  Goal: Plan of Care Review  Outcome: Unable to achieve outcome(s) by discharge Date Met:  02/14/17 02/14/17 0421   Coping/Psychosocial Response Interventions   Plan Of Care Reviewed With patient;mother;sibling   Patient Care Overview   Progress declining   Outcome Evaluation   Outcome Summary/Follow up Plan pt trx to [facility] for higher acuity of care. pt to go straight to OR. pt newly dx with ascending and descending aortic tear per cardiac thoracic sx . pt a/o x 4 in nad, pain was controlled. talking in full sentences prior to transfer.        Goal: Adult Individualization and Mutuality  Outcome: Outcome(s) achieved Date Met:  02/14/17  Goal: Discharge Needs Assessment  Outcome: Unable to achieve outcome(s) by discharge Date Met:  02/14/17    Problem: Acute Coronary Syndrome (ACS) (Adult)  Goal: Signs and Symptoms of Listed Potential Problems Will be Absent or Manageable (Acute Coronary Syndrome)  Outcome: Unable to achieve outcome(s) by discharge Date Met:  02/14/17

## 2017-02-15 ENCOUNTER — APPOINTMENT (OUTPATIENT)
Dept: GENERAL RADIOLOGY | Facility: HOSPITAL | Age: 57
End: 2017-02-15

## 2017-02-15 ENCOUNTER — APPOINTMENT (OUTPATIENT)
Dept: CARDIOLOGY | Facility: HOSPITAL | Age: 57
End: 2017-02-15
Attending: THORACIC SURGERY (CARDIOTHORACIC VASCULAR SURGERY)

## 2017-02-15 ENCOUNTER — APPOINTMENT (OUTPATIENT)
Dept: GENERAL RADIOLOGY | Facility: HOSPITAL | Age: 57
End: 2017-02-15
Attending: THORACIC SURGERY (CARDIOTHORACIC VASCULAR SURGERY)

## 2017-02-15 PROBLEM — I71.010 ASCENDING AORTIC DISSECTION (HCC): Status: ACTIVE | Noted: 2017-02-15

## 2017-02-15 LAB
ABO + RH BLD: NORMAL
ACT BLD: 126 SECONDS (ref 82–152)
ALBUMIN SERPL-MCNC: 3.7 G/DL (ref 3.5–5.2)
ALBUMIN SERPL-MCNC: 3.8 G/DL (ref 3.5–5.2)
ALBUMIN SERPL-MCNC: 4.1 G/DL (ref 3.5–5.2)
ALBUMIN/GLOB SERPL: 2.1 G/DL
ALP SERPL-CCNC: 46 U/L (ref 39–117)
ALT SERPL W P-5'-P-CCNC: 20 U/L (ref 1–41)
ANION GAP SERPL CALCULATED.3IONS-SCNC: 16.3 MMOL/L
ANION GAP SERPL CALCULATED.3IONS-SCNC: 20.4 MMOL/L
ANION GAP SERPL CALCULATED.3IONS-SCNC: 29.2 MMOL/L
APTT PPP: 33.4 SECONDS (ref 22.7–35.4)
APTT PPP: 35.3 SECONDS (ref 22.7–35.4)
ARTERIAL PATENCY WRIST A: ABNORMAL
AST SERPL-CCNC: 35 U/L (ref 1–40)
ATMOSPHERIC PRESS: 740.4 MMHG
ATMOSPHERIC PRESS: 741.5 MMHG
ATMOSPHERIC PRESS: 746.1 MMHG
BASE EXCESS BLDA CALC-SCNC: -0.3 MMOL/L (ref 0–2)
BASE EXCESS BLDA CALC-SCNC: -1.3 MMOL/L (ref 0–2)
BASE EXCESS BLDA CALC-SCNC: -2 MMOL/L (ref -5–5)
BASE EXCESS BLDA CALC-SCNC: -9.1 MMOL/L (ref 0–2)
BASE EXCESS BLDA CALC-SCNC: 0 MMOL/L (ref -5–5)
BASE EXCESS BLDA CALC-SCNC: 0 MMOL/L (ref -5–5)
BASE EXCESS BLDA CALC-SCNC: 1 MMOL/L (ref -5–5)
BASE EXCESS BLDA CALC-SCNC: 2 MMOL/L (ref -5–5)
BASE EXCESS BLDA CALC-SCNC: 2 MMOL/L (ref -5–5)
BASOPHILS # BLD AUTO: 0.01 10*3/MM3 (ref 0–0.2)
BASOPHILS NFR BLD AUTO: 0.1 % (ref 0–1.5)
BDY SITE: ABNORMAL
BH BB BLOOD EXPIRATION DATE: NORMAL
BH BB BLOOD TYPE BARCODE: 5100
BH BB DISPENSE STATUS: NORMAL
BH BB PRODUCT CODE: NORMAL
BH BB UNIT NUMBER: NORMAL
BILIRUB SERPL-MCNC: 0.5 MG/DL (ref 0.1–1.2)
BUN BLD-MCNC: 25 MG/DL (ref 6–20)
BUN BLD-MCNC: 27 MG/DL (ref 6–20)
BUN BLD-MCNC: 37 MG/DL (ref 6–20)
BUN/CREAT SERPL: 10.3 (ref 7–25)
BUN/CREAT SERPL: 14.5 (ref 7–25)
BUN/CREAT SERPL: 16.9 (ref 7–25)
CA-I BLD-MCNC: 4.6 MG/DL (ref 4.6–5.4)
CA-I SERPL ISE-MCNC: 1.14 MMOL/L (ref 1.1–1.35)
CALCIUM SPEC-SCNC: 8 MG/DL (ref 8.6–10.5)
CALCIUM SPEC-SCNC: 8.1 MG/DL (ref 8.6–10.5)
CALCIUM SPEC-SCNC: 8.6 MG/DL (ref 8.6–10.5)
CHLORIDE SERPL-SCNC: 100 MMOL/L (ref 98–107)
CHLORIDE SERPL-SCNC: 100 MMOL/L (ref 98–107)
CHLORIDE SERPL-SCNC: 101 MMOL/L (ref 98–107)
CO2 BLDA-SCNC: 26 MMOL/L (ref 24–29)
CO2 BLDA-SCNC: 28 MMOL/L (ref 24–29)
CO2 BLDA-SCNC: 28 MMOL/L (ref 24–29)
CO2 BLDA-SCNC: 29 MMOL/L (ref 24–29)
CO2 BLDA-SCNC: 31 MMOL/L (ref 24–29)
CO2 BLDA-SCNC: 32 MMOL/L (ref 24–29)
CO2 SERPL-SCNC: 14.8 MMOL/L (ref 22–29)
CO2 SERPL-SCNC: 20.6 MMOL/L (ref 22–29)
CO2 SERPL-SCNC: 22.7 MMOL/L (ref 22–29)
CREAT BLD-MCNC: 1.48 MG/DL (ref 0.76–1.27)
CREAT BLD-MCNC: 1.86 MG/DL (ref 0.76–1.27)
CREAT BLD-MCNC: 3.58 MG/DL (ref 0.76–1.27)
DEPRECATED RDW RBC AUTO: 46.5 FL (ref 37–54)
DEPRECATED RDW RBC AUTO: 47 FL (ref 37–54)
DEPRECATED RDW RBC AUTO: 47 FL (ref 37–54)
DEPRECATED RDW RBC AUTO: 49.1 FL (ref 37–54)
EOSINOPHIL # BLD AUTO: 0.02 10*3/MM3 (ref 0–0.7)
EOSINOPHIL NFR BLD AUTO: 0.1 % (ref 0.3–6.2)
ERYTHROCYTE [DISTWIDTH] IN BLOOD BY AUTOMATED COUNT: 14 % (ref 11.5–14.5)
ERYTHROCYTE [DISTWIDTH] IN BLOOD BY AUTOMATED COUNT: 14.1 % (ref 11.5–14.5)
ERYTHROCYTE [DISTWIDTH] IN BLOOD BY AUTOMATED COUNT: 14.2 % (ref 11.5–14.5)
ERYTHROCYTE [DISTWIDTH] IN BLOOD BY AUTOMATED COUNT: 14.7 % (ref 11.5–14.5)
FIBRINOGEN PPP-MCNC: 260 MG/DL (ref 219–464)
GFR SERPL CREATININE-BSD FRML MDRD: 18 ML/MIN/1.73
GFR SERPL CREATININE-BSD FRML MDRD: 38 ML/MIN/1.73
GFR SERPL CREATININE-BSD FRML MDRD: 49 ML/MIN/1.73
GLOBULIN UR ELPH-MCNC: 2 GM/DL
GLUCOSE BLD-MCNC: 189 MG/DL (ref 65–99)
GLUCOSE BLD-MCNC: 238 MG/DL (ref 65–99)
GLUCOSE BLD-MCNC: 249 MG/DL (ref 65–99)
GLUCOSE BLDC GLUCOMTR-MCNC: 109 MG/DL (ref 70–130)
GLUCOSE BLDC GLUCOMTR-MCNC: 113 MG/DL (ref 70–130)
GLUCOSE BLDC GLUCOMTR-MCNC: 113 MG/DL (ref 70–130)
GLUCOSE BLDC GLUCOMTR-MCNC: 114 MG/DL (ref 70–130)
GLUCOSE BLDC GLUCOMTR-MCNC: 115 MG/DL (ref 70–130)
GLUCOSE BLDC GLUCOMTR-MCNC: 121 MG/DL (ref 70–130)
GLUCOSE BLDC GLUCOMTR-MCNC: 127 MG/DL (ref 70–130)
GLUCOSE BLDC GLUCOMTR-MCNC: 129 MG/DL (ref 70–130)
GLUCOSE BLDC GLUCOMTR-MCNC: 148 MG/DL (ref 70–130)
GLUCOSE BLDC GLUCOMTR-MCNC: 165 MG/DL (ref 70–130)
GLUCOSE BLDC GLUCOMTR-MCNC: 173 MG/DL (ref 70–130)
GLUCOSE BLDC GLUCOMTR-MCNC: 199 MG/DL (ref 70–130)
GLUCOSE BLDC GLUCOMTR-MCNC: 203 MG/DL (ref 70–130)
GLUCOSE BLDC GLUCOMTR-MCNC: 239 MG/DL (ref 70–130)
GLUCOSE BLDC GLUCOMTR-MCNC: 240 MG/DL (ref 70–130)
GLUCOSE BLDC GLUCOMTR-MCNC: 241 MG/DL (ref 70–130)
GLUCOSE BLDC GLUCOMTR-MCNC: 242 MG/DL (ref 70–130)
GLUCOSE BLDC GLUCOMTR-MCNC: 244 MG/DL (ref 70–130)
GLUCOSE BLDC GLUCOMTR-MCNC: 248 MG/DL (ref 70–130)
GLUCOSE BLDC GLUCOMTR-MCNC: 249 MG/DL (ref 70–130)
GLUCOSE BLDC GLUCOMTR-MCNC: 251 MG/DL (ref 70–130)
GLUCOSE BLDC GLUCOMTR-MCNC: 251 MG/DL (ref 70–130)
GLUCOSE BLDC GLUCOMTR-MCNC: 254 MG/DL (ref 70–130)
GLUCOSE BLDC GLUCOMTR-MCNC: 267 MG/DL (ref 70–130)
GLUCOSE BLDC GLUCOMTR-MCNC: 271 MG/DL (ref 70–130)
GLUCOSE BLDC GLUCOMTR-MCNC: 274 MG/DL (ref 70–130)
GLUCOSE BLDC GLUCOMTR-MCNC: 294 MG/DL (ref 70–130)
HBA1C MFR BLD: 5.52 % (ref 4.8–5.6)
HCO3 BLDA-SCNC: 16.1 MMOL/L (ref 22–28)
HCO3 BLDA-SCNC: 23.4 MMOL/L (ref 22–28)
HCO3 BLDA-SCNC: 24.8 MMOL/L (ref 22–26)
HCO3 BLDA-SCNC: 24.8 MMOL/L (ref 22–28)
HCO3 BLDA-SCNC: 26.4 MMOL/L (ref 22–26)
HCO3 BLDA-SCNC: 26.7 MMOL/L (ref 22–26)
HCO3 BLDA-SCNC: 27.9 MMOL/L (ref 22–26)
HCO3 BLDA-SCNC: 29.1 MMOL/L (ref 22–26)
HCO3 BLDA-SCNC: 29.2 MMOL/L (ref 22–26)
HCT VFR BLD AUTO: 26 % (ref 40.4–52.2)
HCT VFR BLD AUTO: 28.9 % (ref 40.4–52.2)
HCT VFR BLD AUTO: 33.1 % (ref 40.4–52.2)
HCT VFR BLD AUTO: 33.8 % (ref 40.4–52.2)
HCT VFR BLDA CALC: 29 % (ref 38–51)
HCT VFR BLDA CALC: 30 % (ref 38–51)
HCT VFR BLDA CALC: 30 % (ref 38–51)
HCT VFR BLDA CALC: 31 % (ref 38–51)
HCT VFR BLDA CALC: 32 % (ref 38–51)
HCT VFR BLDA CALC: 35 % (ref 38–51)
HGB BLD-MCNC: 10.7 G/DL (ref 13.7–17.6)
HGB BLD-MCNC: 11.2 G/DL (ref 13.7–17.6)
HGB BLD-MCNC: 8.6 G/DL (ref 13.7–17.6)
HGB BLD-MCNC: 9.5 G/DL (ref 13.7–17.6)
HGB BLDA-MCNC: 10.2 G/DL (ref 12–17)
HGB BLDA-MCNC: 10.2 G/DL (ref 12–17)
HGB BLDA-MCNC: 10.5 G/DL (ref 12–17)
HGB BLDA-MCNC: 10.9 G/DL (ref 12–17)
HGB BLDA-MCNC: 11.9 G/DL (ref 12–17)
HGB BLDA-MCNC: 9.9 G/DL (ref 12–17)
HOROWITZ INDEX BLD+IHG-RTO: 100 %
HOROWITZ INDEX BLD+IHG-RTO: 100 %
HOROWITZ INDEX BLD+IHG-RTO: 65 %
IMM GRANULOCYTES # BLD: 0.05 10*3/MM3 (ref 0–0.03)
IMM GRANULOCYTES NFR BLD: 0.3 % (ref 0–0.5)
INR PPP: 1.2 (ref 0.8–1.2)
INR PPP: 1.48 (ref 0.9–1.1)
INR PPP: 1.6 (ref 0.9–1.1)
LYMPHOCYTES # BLD AUTO: 0.82 10*3/MM3 (ref 0.9–4.8)
LYMPHOCYTES NFR BLD AUTO: 5 % (ref 19.6–45.3)
MAGNESIUM SERPL-MCNC: 2.3 MG/DL (ref 1.6–2.6)
MAGNESIUM SERPL-MCNC: 2.5 MG/DL (ref 1.6–2.6)
MAGNESIUM SERPL-MCNC: 2.8 MG/DL (ref 1.6–2.6)
MCH RBC QN AUTO: 29.5 PG (ref 27–32.7)
MCH RBC QN AUTO: 29.8 PG (ref 27–32.7)
MCH RBC QN AUTO: 29.9 PG (ref 27–32.7)
MCH RBC QN AUTO: 30.1 PG (ref 27–32.7)
MCHC RBC AUTO-ENTMCNC: 32.3 G/DL (ref 32.6–36.4)
MCHC RBC AUTO-ENTMCNC: 32.9 G/DL (ref 32.6–36.4)
MCHC RBC AUTO-ENTMCNC: 33.1 G/DL (ref 32.6–36.4)
MCHC RBC AUTO-ENTMCNC: 33.1 G/DL (ref 32.6–36.4)
MCV RBC AUTO: 90.1 FL (ref 79.8–96.2)
MCV RBC AUTO: 90.6 FL (ref 79.8–96.2)
MCV RBC AUTO: 90.9 FL (ref 79.8–96.2)
MCV RBC AUTO: 91.2 FL (ref 79.8–96.2)
MODALITY: ABNORMAL
MONOCYTES # BLD AUTO: 1.03 10*3/MM3 (ref 0.2–1.2)
MONOCYTES NFR BLD AUTO: 6.3 % (ref 5–12)
NEUTROPHILS # BLD AUTO: 14.32 10*3/MM3 (ref 1.9–8.1)
NEUTROPHILS NFR BLD AUTO: 88.2 % (ref 42.7–76)
O2 A-A PPRESDIFF RESPIRATORY: 0.1 MMHG
O2 A-A PPRESDIFF RESPIRATORY: 0.1 MMHG
O2 A-A PPRESDIFF RESPIRATORY: 0.2 MMHG
PCO2 BLDA: 31.7 MM HG (ref 35–45)
PCO2 BLDA: 38.5 MM HG (ref 35–45)
PCO2 BLDA: 41.6 MM HG (ref 35–45)
PCO2 BLDA: 45.5 MM HG (ref 35–45)
PCO2 BLDA: 51.7 MM HG (ref 35–45)
PCO2 BLDA: 51.9 MM HG (ref 35–45)
PCO2 BLDA: 53.2 MM HG (ref 35–45)
PCO2 BLDA: 64.6 MM HG (ref 35–45)
PCO2 BLDA: 86 MM HG (ref 35–45)
PEEP RESPIRATORY: 10 CM[H2O]
PEEP RESPIRATORY: 7.5 CM[H2O]
PEEP RESPIRATORY: 7.5 CM[H2O]
PH BLDA: 7.14 PH UNITS (ref 7.35–7.6)
PH BLDA: 7.26 PH UNITS (ref 7.35–7.6)
PH BLDA: 7.29 PH UNITS (ref 7.35–7.6)
PH BLDA: 7.31 PH UNITS (ref 7.35–7.45)
PH BLDA: 7.31 PH UNITS (ref 7.35–7.6)
PH BLDA: 7.34 PH UNITS (ref 7.35–7.6)
PH BLDA: 7.37 PH UNITS (ref 7.35–7.6)
PH BLDA: 7.38 PH UNITS (ref 7.35–7.45)
PH BLDA: 7.39 PH UNITS (ref 7.35–7.45)
PHOSPHATE SERPL-MCNC: 1.9 MG/DL (ref 2.5–4.5)
PHOSPHATE SERPL-MCNC: 1.9 MG/DL (ref 2.5–4.5)
PHOSPHATE SERPL-MCNC: 3.8 MG/DL (ref 2.5–4.5)
PLATELET # BLD AUTO: 106 10*3/MM3 (ref 140–500)
PLATELET # BLD AUTO: 120 10*3/MM3 (ref 140–500)
PLATELET # BLD AUTO: 129 10*3/MM3 (ref 140–500)
PLATELET # BLD AUTO: 174 10*3/MM3 (ref 140–500)
PMV BLD AUTO: 10.7 FL (ref 6–12)
PMV BLD AUTO: 10.9 FL (ref 6–12)
PMV BLD AUTO: 11 FL (ref 6–12)
PMV BLD AUTO: 11.1 FL (ref 6–12)
PO2 BLDA: 231 MMHG (ref 80–105)
PO2 BLDA: 324 MMHG (ref 80–105)
PO2 BLDA: 384 MMHG (ref 80–105)
PO2 BLDA: 42 MMHG (ref 80–105)
PO2 BLDA: 447 MMHG (ref 80–105)
PO2 BLDA: 59.6 MM HG (ref 80–100)
PO2 BLDA: 77.8 MM HG (ref 80–100)
PO2 BLDA: 78.4 MM HG (ref 80–100)
PO2 BLDA: 97 MMHG (ref 80–105)
POTASSIUM BLD-SCNC: 3 MMOL/L (ref 3.5–5.2)
POTASSIUM BLD-SCNC: 3.3 MMOL/L (ref 3.5–5.2)
POTASSIUM BLD-SCNC: 4 MMOL/L (ref 3.5–5.2)
POTASSIUM BLD-SCNC: 4.4 MMOL/L (ref 3.5–5.2)
POTASSIUM BLDA-SCNC: 3.5 MMOL/L (ref 3.5–4.9)
POTASSIUM BLDA-SCNC: 3.6 MMOL/L (ref 3.5–4.9)
POTASSIUM BLDA-SCNC: 3.7 MMOL/L (ref 3.5–4.9)
POTASSIUM BLDA-SCNC: 4.6 MMOL/L (ref 3.5–4.9)
POTASSIUM BLDA-SCNC: 4.6 MMOL/L (ref 3.5–4.9)
POTASSIUM BLDA-SCNC: 4.7 MMOL/L (ref 3.5–4.9)
PROT SERPL-MCNC: 6.1 G/DL (ref 6–8.5)
PROTHROMBIN TIME: 14.5 SECONDS (ref 12.8–15.2)
PROTHROMBIN TIME: 17.4 SECONDS (ref 11.7–14.2)
PROTHROMBIN TIME: 18.5 SECONDS (ref 11.7–14.2)
RBC # BLD AUTO: 2.86 10*6/MM3 (ref 4.6–6)
RBC # BLD AUTO: 3.19 10*6/MM3 (ref 4.6–6)
RBC # BLD AUTO: 3.63 10*6/MM3 (ref 4.6–6)
RBC # BLD AUTO: 3.75 10*6/MM3 (ref 4.6–6)
SAO2 % BLDA: 100 % (ref 95–98)
SAO2 % BLDA: 72 % (ref 95–98)
SAO2 % BLDA: 97 % (ref 95–98)
SAO2 % BLDCOA: 90 % (ref 92–99)
SAO2 % BLDCOA: 94.5 % (ref 92–99)
SAO2 % BLDCOA: 95.3 % (ref 92–99)
SET MECH RESP RATE: 12
SODIUM BLD-SCNC: 139 MMOL/L (ref 136–145)
SODIUM BLD-SCNC: 141 MMOL/L (ref 136–145)
SODIUM BLD-SCNC: 145 MMOL/L (ref 136–145)
TOTAL RATE: 12 BREATHS/MINUTE
TOTAL RATE: 12 BREATHS/MINUTE
TOTAL RATE: 18 BREATHS/MINUTE
UNIT  ABO: NORMAL
UNIT  RH: NORMAL
URATE SERPL-MCNC: 10.5 MG/DL (ref 3.4–7)
VENTILATOR MODE: AC
VT ON VENT VENT: 746 ML
VT ON VENT VENT: 900 ML
VT ON VENT VENT: 900 ML
WBC NRBC COR # BLD: 16.25 10*3/MM3 (ref 4.5–10.7)
WBC NRBC COR # BLD: 19.95 10*3/MM3 (ref 4.5–10.7)
WBC NRBC COR # BLD: 21.06 10*3/MM3 (ref 4.5–10.7)
WBC NRBC COR # BLD: 29.36 10*3/MM3 (ref 4.5–10.7)

## 2017-02-15 PROCEDURE — 93306 TTE W/DOPPLER COMPLETE: CPT

## 2017-02-15 PROCEDURE — 93312 ECHO TRANSESOPHAGEAL: CPT

## 2017-02-15 PROCEDURE — 82803 BLOOD GASES ANY COMBINATION: CPT

## 2017-02-15 PROCEDURE — 25010000002 INSULIN REGULAR HUMAN PER 5 UNITS: Performed by: THORACIC SURGERY (CARDIOTHORACIC VASCULAR SURGERY)

## 2017-02-15 PROCEDURE — 85027 COMPLETE CBC AUTOMATED: CPT | Performed by: INTERNAL MEDICINE

## 2017-02-15 PROCEDURE — 25010000002 PROPOFOL 1000 MG/ML EMULSION: Performed by: THORACIC SURGERY (CARDIOTHORACIC VASCULAR SURGERY)

## 2017-02-15 PROCEDURE — 93005 ELECTROCARDIOGRAM TRACING: CPT | Performed by: THORACIC SURGERY (CARDIOTHORACIC VASCULAR SURGERY)

## 2017-02-15 PROCEDURE — 25010000002 PHENYLEPHRINE PER 1 ML: Performed by: ANESTHESIOLOGY

## 2017-02-15 PROCEDURE — 83735 ASSAY OF MAGNESIUM: CPT | Performed by: THORACIC SURGERY (CARDIOTHORACIC VASCULAR SURGERY)

## 2017-02-15 PROCEDURE — 80053 COMPREHEN METABOLIC PANEL: CPT | Performed by: INTERNAL MEDICINE

## 2017-02-15 PROCEDURE — 94640 AIRWAY INHALATION TREATMENT: CPT

## 2017-02-15 PROCEDURE — 84132 ASSAY OF SERUM POTASSIUM: CPT | Performed by: THORACIC SURGERY (CARDIOTHORACIC VASCULAR SURGERY)

## 2017-02-15 PROCEDURE — 83036 HEMOGLOBIN GLYCOSYLATED A1C: CPT | Performed by: THORACIC SURGERY (CARDIOTHORACIC VASCULAR SURGERY)

## 2017-02-15 PROCEDURE — 94799 UNLISTED PULMONARY SVC/PX: CPT

## 2017-02-15 PROCEDURE — 5A1955Z RESPIRATORY VENTILATION, GREATER THAN 96 CONSECUTIVE HOURS: ICD-10-PCS | Performed by: THORACIC SURGERY (CARDIOTHORACIC VASCULAR SURGERY)

## 2017-02-15 PROCEDURE — 82330 ASSAY OF CALCIUM: CPT | Performed by: THORACIC SURGERY (CARDIOTHORACIC VASCULAR SURGERY)

## 2017-02-15 PROCEDURE — 25010000002 METOCLOPRAMIDE PER 10 MG: Performed by: THORACIC SURGERY (CARDIOTHORACIC VASCULAR SURGERY)

## 2017-02-15 PROCEDURE — 25010000002 ONDANSETRON PER 1 MG: Performed by: ANESTHESIOLOGY

## 2017-02-15 PROCEDURE — 84550 ASSAY OF BLOOD/URIC ACID: CPT | Performed by: INTERNAL MEDICINE

## 2017-02-15 PROCEDURE — 71010 HC CHEST PA OR AP: CPT

## 2017-02-15 PROCEDURE — 94002 VENT MGMT INPAT INIT DAY: CPT

## 2017-02-15 PROCEDURE — 25010000003 POTASSIUM CHLORIDE PER 2 MEQ: Performed by: THORACIC SURGERY (CARDIOTHORACIC VASCULAR SURGERY)

## 2017-02-15 PROCEDURE — 99024 POSTOP FOLLOW-UP VISIT: CPT | Performed by: PHYSICIAN ASSISTANT

## 2017-02-15 PROCEDURE — 25010000002 CHLOROTHIAZIDE PER 500 MG: Performed by: THORACIC SURGERY (CARDIOTHORACIC VASCULAR SURGERY)

## 2017-02-15 PROCEDURE — 80069 RENAL FUNCTION PANEL: CPT | Performed by: THORACIC SURGERY (CARDIOTHORACIC VASCULAR SURGERY)

## 2017-02-15 PROCEDURE — 84100 ASSAY OF PHOSPHORUS: CPT | Performed by: INTERNAL MEDICINE

## 2017-02-15 PROCEDURE — 25010000002 PROPOFOL 10 MG/ML EMULSION: Performed by: THORACIC SURGERY (CARDIOTHORACIC VASCULAR SURGERY)

## 2017-02-15 PROCEDURE — 93306 TTE W/DOPPLER COMPLETE: CPT | Performed by: INTERNAL MEDICINE

## 2017-02-15 PROCEDURE — 82962 GLUCOSE BLOOD TEST: CPT

## 2017-02-15 PROCEDURE — 74000 HC ABDOMEN KUB: CPT

## 2017-02-15 PROCEDURE — 85027 COMPLETE CBC AUTOMATED: CPT | Performed by: THORACIC SURGERY (CARDIOTHORACIC VASCULAR SURGERY)

## 2017-02-15 PROCEDURE — 85610 PROTHROMBIN TIME: CPT | Performed by: THORACIC SURGERY (CARDIOTHORACIC VASCULAR SURGERY)

## 2017-02-15 PROCEDURE — 25010000002 FENTANYL CITRATE (PF) 100 MCG/2ML SOLUTION: Performed by: ANESTHESIOLOGY

## 2017-02-15 PROCEDURE — 25010000002 ALBUMIN HUMAN 5% PER 50 ML: Performed by: THORACIC SURGERY (CARDIOTHORACIC VASCULAR SURGERY)

## 2017-02-15 PROCEDURE — 25010000002 VANCOMYCIN: Performed by: THORACIC SURGERY (CARDIOTHORACIC VASCULAR SURGERY)

## 2017-02-15 PROCEDURE — P9041 ALBUMIN (HUMAN),5%, 50ML: HCPCS | Performed by: THORACIC SURGERY (CARDIOTHORACIC VASCULAR SURGERY)

## 2017-02-15 PROCEDURE — 25010000002 MORPHINE PER 10 MG: Performed by: THORACIC SURGERY (CARDIOTHORACIC VASCULAR SURGERY)

## 2017-02-15 PROCEDURE — 93010 ELECTROCARDIOGRAM REPORT: CPT | Performed by: INTERNAL MEDICINE

## 2017-02-15 PROCEDURE — 83735 ASSAY OF MAGNESIUM: CPT | Performed by: INTERNAL MEDICINE

## 2017-02-15 PROCEDURE — 85730 THROMBOPLASTIN TIME PARTIAL: CPT | Performed by: THORACIC SURGERY (CARDIOTHORACIC VASCULAR SURGERY)

## 2017-02-15 PROCEDURE — 85025 COMPLETE CBC W/AUTO DIFF WBC: CPT | Performed by: THORACIC SURGERY (CARDIOTHORACIC VASCULAR SURGERY)

## 2017-02-15 PROCEDURE — 25010000002 MORPHINE SULFATE (PF) 2 MG/ML SOLUTION: Performed by: THORACIC SURGERY (CARDIOTHORACIC VASCULAR SURGERY)

## 2017-02-15 RX ORDER — ACETAMINOPHEN 325 MG/1
650 TABLET ORAL EVERY 4 HOURS PRN
Status: DISCONTINUED | OUTPATIENT
Start: 2017-02-15 | End: 2017-03-02 | Stop reason: HOSPADM

## 2017-02-15 RX ORDER — SODIUM CHLORIDE 9 MG/ML
30 INJECTION, SOLUTION INTRAVENOUS CONTINUOUS
Status: DISCONTINUED | OUTPATIENT
Start: 2017-02-15 | End: 2017-02-18

## 2017-02-15 RX ORDER — ALBUTEROL SULFATE 90 UG/1
6 AEROSOL, METERED RESPIRATORY (INHALATION)
Status: DISCONTINUED | OUTPATIENT
Start: 2017-02-15 | End: 2017-02-21

## 2017-02-15 RX ORDER — BUMETANIDE 0.25 MG/ML
4 INJECTION INTRAMUSCULAR; INTRAVENOUS ONCE
Status: COMPLETED | OUTPATIENT
Start: 2017-02-15 | End: 2017-02-15

## 2017-02-15 RX ORDER — OXYCODONE HYDROCHLORIDE 5 MG/1
10 TABLET ORAL EVERY 4 HOURS PRN
Status: DISPENSED | OUTPATIENT
Start: 2017-02-15 | End: 2017-02-25

## 2017-02-15 RX ORDER — CHLOROTHIAZIDE SODIUM 500 MG/1
500 INJECTION INTRAVENOUS ONCE
Status: COMPLETED | OUTPATIENT
Start: 2017-02-15 | End: 2017-02-15

## 2017-02-15 RX ORDER — SODIUM CHLORIDE 9 MG/ML
30 INJECTION, SOLUTION INTRAVENOUS CONTINUOUS PRN
Status: DISCONTINUED | OUTPATIENT
Start: 2017-02-15 | End: 2017-02-19

## 2017-02-15 RX ORDER — NITROGLYCERIN 20 MG/100ML
5-200 INJECTION INTRAVENOUS CONTINUOUS PRN
Status: DISCONTINUED | OUTPATIENT
Start: 2017-02-15 | End: 2017-03-02 | Stop reason: HOSPADM

## 2017-02-15 RX ORDER — HYDROCODONE BITARTRATE AND ACETAMINOPHEN 5; 325 MG/1; MG/1
2 TABLET ORAL EVERY 4 HOURS PRN
Status: DISPENSED | OUTPATIENT
Start: 2017-02-15 | End: 2017-02-25

## 2017-02-15 RX ORDER — BISACODYL 10 MG
10 SUPPOSITORY, RECTAL RECTAL DAILY PRN
Status: DISCONTINUED | OUTPATIENT
Start: 2017-02-16 | End: 2017-02-23

## 2017-02-15 RX ORDER — POTASSIUM CHLORIDE 750 MG/1
40 CAPSULE, EXTENDED RELEASE ORAL AS NEEDED
Status: DISCONTINUED | OUTPATIENT
Start: 2017-02-15 | End: 2017-02-18

## 2017-02-15 RX ORDER — DEXMEDETOMIDINE HYDROCHLORIDE 4 UG/ML
.2-1.5 INJECTION, SOLUTION INTRAVENOUS CONTINUOUS PRN
Status: DISCONTINUED | OUTPATIENT
Start: 2017-02-15 | End: 2017-03-02 | Stop reason: HOSPADM

## 2017-02-15 RX ORDER — POTASSIUM CHLORIDE 29.8 MG/ML
20 INJECTION INTRAVENOUS
Status: COMPLETED | OUTPATIENT
Start: 2017-02-15 | End: 2017-02-15

## 2017-02-15 RX ORDER — ALPRAZOLAM 0.25 MG/1
0.25 TABLET ORAL EVERY 8 HOURS PRN
Status: DISCONTINUED | OUTPATIENT
Start: 2017-02-15 | End: 2017-02-22

## 2017-02-15 RX ORDER — PROMETHAZINE HYDROCHLORIDE 25 MG/1
12.5 TABLET ORAL EVERY 6 HOURS PRN
Status: DISCONTINUED | OUTPATIENT
Start: 2017-02-15 | End: 2017-03-02 | Stop reason: HOSPADM

## 2017-02-15 RX ORDER — ATORVASTATIN CALCIUM 40 MG/1
40 TABLET, FILM COATED ORAL NIGHTLY
Status: DISCONTINUED | OUTPATIENT
Start: 2017-02-15 | End: 2017-02-23

## 2017-02-15 RX ORDER — DEXMEDETOMIDINE HYDROCHLORIDE 4 UG/ML
.2-1.5 INJECTION, SOLUTION INTRAVENOUS CONTINUOUS PRN
Status: DISCONTINUED | OUTPATIENT
Start: 2017-02-15 | End: 2017-02-15

## 2017-02-15 RX ORDER — MORPHINE SULFATE 2 MG/ML
1 INJECTION, SOLUTION INTRAMUSCULAR; INTRAVENOUS EVERY 4 HOURS PRN
Status: DISPENSED | OUTPATIENT
Start: 2017-02-15 | End: 2017-02-25

## 2017-02-15 RX ORDER — MIDAZOLAM HYDROCHLORIDE 1 MG/ML
2 INJECTION INTRAMUSCULAR; INTRAVENOUS
Status: DISCONTINUED | OUTPATIENT
Start: 2017-02-15 | End: 2017-03-02 | Stop reason: HOSPADM

## 2017-02-15 RX ORDER — PANTOPRAZOLE SODIUM 40 MG/10ML
40 INJECTION, POWDER, LYOPHILIZED, FOR SOLUTION INTRAVENOUS
Status: DISCONTINUED | OUTPATIENT
Start: 2017-02-16 | End: 2017-02-27

## 2017-02-15 RX ORDER — ONDANSETRON 2 MG/ML
INJECTION INTRAMUSCULAR; INTRAVENOUS AS NEEDED
Status: DISCONTINUED | OUTPATIENT
Start: 2017-02-15 | End: 2017-02-15 | Stop reason: SURG

## 2017-02-15 RX ORDER — PANTOPRAZOLE SODIUM 40 MG/10ML
40 INJECTION, POWDER, LYOPHILIZED, FOR SOLUTION INTRAVENOUS EVERY 24 HOURS
Status: DISCONTINUED | OUTPATIENT
Start: 2017-02-15 | End: 2017-02-15

## 2017-02-15 RX ORDER — SODIUM CHLORIDE 0.9 % (FLUSH) 0.9 %
30 SYRINGE (ML) INJECTION ONCE AS NEEDED
Status: DISCONTINUED | OUTPATIENT
Start: 2017-02-15 | End: 2017-03-02 | Stop reason: HOSPADM

## 2017-02-15 RX ORDER — ACETAMINOPHEN 650 MG/1
650 SUPPOSITORY RECTAL EVERY 4 HOURS PRN
Status: DISCONTINUED | OUTPATIENT
Start: 2017-02-15 | End: 2017-03-02 | Stop reason: HOSPADM

## 2017-02-15 RX ORDER — POTASSIUM CHLORIDE 1.5 G/1.77G
40 POWDER, FOR SOLUTION ORAL AS NEEDED
Status: DISCONTINUED | OUTPATIENT
Start: 2017-02-15 | End: 2017-02-18

## 2017-02-15 RX ORDER — POTASSIUM CHLORIDE 7.45 MG/ML
10 INJECTION INTRAVENOUS
Status: DISCONTINUED | OUTPATIENT
Start: 2017-02-15 | End: 2017-02-18

## 2017-02-15 RX ORDER — NALOXONE HCL 0.4 MG/ML
0.4 VIAL (ML) INJECTION
Status: DISCONTINUED | OUTPATIENT
Start: 2017-02-15 | End: 2017-03-02 | Stop reason: HOSPADM

## 2017-02-15 RX ORDER — PANTOPRAZOLE SODIUM 40 MG/1
40 TABLET, DELAYED RELEASE ORAL
Status: DISCONTINUED | OUTPATIENT
Start: 2017-02-16 | End: 2017-03-02 | Stop reason: HOSPADM

## 2017-02-15 RX ORDER — METOCLOPRAMIDE HYDROCHLORIDE 5 MG/ML
10 INJECTION INTRAMUSCULAR; INTRAVENOUS EVERY 6 HOURS
Status: DISPENSED | OUTPATIENT
Start: 2017-02-15 | End: 2017-02-16

## 2017-02-15 RX ORDER — POTASSIUM CHLORIDE 29.8 MG/ML
20 INJECTION INTRAVENOUS
Status: DISCONTINUED | OUTPATIENT
Start: 2017-02-15 | End: 2017-02-18

## 2017-02-15 RX ORDER — ACETAMINOPHEN 10 MG/ML
1000 INJECTION, SOLUTION INTRAVENOUS ONCE
Status: COMPLETED | OUTPATIENT
Start: 2017-02-15 | End: 2017-02-15

## 2017-02-15 RX ORDER — PROMETHAZINE HYDROCHLORIDE 25 MG/ML
12.5 INJECTION, SOLUTION INTRAMUSCULAR; INTRAVENOUS EVERY 6 HOURS PRN
Status: DISCONTINUED | OUTPATIENT
Start: 2017-02-15 | End: 2017-03-02 | Stop reason: HOSPADM

## 2017-02-15 RX ORDER — BUMETANIDE 0.25 MG/ML
2 INJECTION INTRAMUSCULAR; INTRAVENOUS ONCE
Status: COMPLETED | OUTPATIENT
Start: 2017-02-15 | End: 2017-02-15

## 2017-02-15 RX ORDER — ASPIRIN 81 MG/1
81 TABLET ORAL DAILY
Status: DISCONTINUED | OUTPATIENT
Start: 2017-02-16 | End: 2017-03-02 | Stop reason: HOSPADM

## 2017-02-15 RX ORDER — ALBUMIN, HUMAN INJ 5% 5 %
1500 SOLUTION INTRAVENOUS AS NEEDED
Status: DISPENSED | OUTPATIENT
Start: 2017-02-15 | End: 2017-02-16

## 2017-02-15 RX ORDER — DEXMEDETOMIDINE HYDROCHLORIDE 4 UG/ML
.2-1.5 INJECTION, SOLUTION INTRAVENOUS CONTINUOUS
Status: DISCONTINUED | OUTPATIENT
Start: 2017-02-15 | End: 2017-02-15

## 2017-02-15 RX ORDER — SENNA AND DOCUSATE SODIUM 50; 8.6 MG/1; MG/1
2 TABLET, FILM COATED ORAL NIGHTLY
Status: DISCONTINUED | OUTPATIENT
Start: 2017-02-16 | End: 2017-02-23

## 2017-02-15 RX ORDER — ONDANSETRON 2 MG/ML
4 INJECTION INTRAMUSCULAR; INTRAVENOUS EVERY 6 HOURS PRN
Status: DISCONTINUED | OUTPATIENT
Start: 2017-02-15 | End: 2017-03-02 | Stop reason: HOSPADM

## 2017-02-15 RX ORDER — DOPAMINE HYDROCHLORIDE 160 MG/100ML
2-20 INJECTION, SOLUTION INTRAVENOUS CONTINUOUS PRN
Status: DISCONTINUED | OUTPATIENT
Start: 2017-02-15 | End: 2017-03-02 | Stop reason: HOSPADM

## 2017-02-15 RX ORDER — CYCLOBENZAPRINE HCL 10 MG
10 TABLET ORAL EVERY 8 HOURS PRN
Status: DISCONTINUED | OUTPATIENT
Start: 2017-02-16 | End: 2017-02-23

## 2017-02-15 RX ADMIN — PHENYLEPHRINE HYDROCHLORIDE 0.5 MCG/KG/MIN: 10 INJECTION INTRAVENOUS at 17:50

## 2017-02-15 RX ADMIN — DEXMEDETOMIDINE HYDROCHLORIDE 0.5 MCG/KG/HR: 4 INJECTION, SOLUTION INTRAVENOUS at 17:49

## 2017-02-15 RX ADMIN — PHENYLEPHRINE HYDROCHLORIDE 0.3 MCG: 10 INJECTION INTRAVENOUS at 00:37

## 2017-02-15 RX ADMIN — SODIUM CHLORIDE 20.4 UNITS/HR: 9 INJECTION, SOLUTION INTRAVENOUS at 14:06

## 2017-02-15 RX ADMIN — METOCLOPRAMIDE 10 MG: 5 INJECTION, SOLUTION INTRAMUSCULAR; INTRAVENOUS at 20:37

## 2017-02-15 RX ADMIN — METOCLOPRAMIDE 10 MG: 5 INJECTION, SOLUTION INTRAMUSCULAR; INTRAVENOUS at 01:43

## 2017-02-15 RX ADMIN — SODIUM BICARBONATE 50 MEQ: 84 INJECTION, SOLUTION INTRAVENOUS at 16:28

## 2017-02-15 RX ADMIN — SODIUM CHLORIDE 30 ML/HR: 9 INJECTION, SOLUTION INTRAVENOUS at 02:05

## 2017-02-15 RX ADMIN — ACETAMINOPHEN 1000 MG: 10 INJECTION, SOLUTION INTRAVENOUS at 01:41

## 2017-02-15 RX ADMIN — SODIUM CHLORIDE 4.2 UNITS/HR: 9 INJECTION, SOLUTION INTRAVENOUS at 02:05

## 2017-02-15 RX ADMIN — DEXMEDETOMIDINE HYDROCHLORIDE 0.3 MCG/KG/HR: 4 INJECTION, SOLUTION INTRAVENOUS at 09:13

## 2017-02-15 RX ADMIN — OXYCODONE HYDROCHLORIDE 10 MG: 5 TABLET ORAL at 20:39

## 2017-02-15 RX ADMIN — VANCOMYCIN HYDROCHLORIDE 1750 MG: 1 INJECTION, POWDER, LYOPHILIZED, FOR SOLUTION INTRAVENOUS at 03:45

## 2017-02-15 RX ADMIN — ALBUMIN HUMAN 500 ML: 0.05 INJECTION, SOLUTION INTRAVENOUS at 13:29

## 2017-02-15 RX ADMIN — PROPOFOL 20 MCG/KG/MIN: 10 INJECTION, EMULSION INTRAVENOUS at 22:36

## 2017-02-15 RX ADMIN — METOCLOPRAMIDE 10 MG: 5 INJECTION, SOLUTION INTRAMUSCULAR; INTRAVENOUS at 13:23

## 2017-02-15 RX ADMIN — CHLOROTHIAZIDE SODIUM 500 MG: 500 INJECTION, POWDER, LYOPHILIZED, FOR SOLUTION INTRAVENOUS at 21:58

## 2017-02-15 RX ADMIN — SODIUM CHLORIDE 14.4 UNITS/HR: 9 INJECTION, SOLUTION INTRAVENOUS at 17:39

## 2017-02-15 RX ADMIN — ATORVASTATIN CALCIUM 40 MG: 40 TABLET, FILM COATED ORAL at 20:37

## 2017-02-15 RX ADMIN — MORPHINE SULFATE 4 MG: 4 INJECTION, SOLUTION INTRAMUSCULAR; INTRAVENOUS at 07:05

## 2017-02-15 RX ADMIN — PROPOFOL 30 MCG/KG/MIN: 10 INJECTION, EMULSION INTRAVENOUS at 20:38

## 2017-02-15 RX ADMIN — MILRINONE LACTATE 0.25 MCG/KG/MIN: 200 INJECTION, SOLUTION INTRAVENOUS at 22:36

## 2017-02-15 RX ADMIN — POTASSIUM CHLORIDE 20 MEQ: 400 INJECTION, SOLUTION INTRAVENOUS at 17:50

## 2017-02-15 RX ADMIN — ALBUMIN HUMAN 500 ML: 0.05 INJECTION, SOLUTION INTRAVENOUS at 18:53

## 2017-02-15 RX ADMIN — POTASSIUM CHLORIDE 20 MEQ: 400 INJECTION, SOLUTION INTRAVENOUS at 20:42

## 2017-02-15 RX ADMIN — ONDANSETRON 4 MG: 2 INJECTION INTRAMUSCULAR; INTRAVENOUS at 00:38

## 2017-02-15 RX ADMIN — ALBUMIN HUMAN 500 ML: 0.05 INJECTION, SOLUTION INTRAVENOUS at 04:08

## 2017-02-15 RX ADMIN — ACETAMINOPHEN 650 MG: 325 TABLET ORAL at 21:30

## 2017-02-15 RX ADMIN — ALBUTEROL SULFATE 6 PUFF: 90 AEROSOL, METERED RESPIRATORY (INHALATION) at 15:23

## 2017-02-15 RX ADMIN — PROPOFOL 20 MCG/KG/MIN: 10 INJECTION, EMULSION INTRAVENOUS at 13:22

## 2017-02-15 RX ADMIN — FENTANYL CITRATE 100 MCG: 50 INJECTION, SOLUTION INTRAMUSCULAR; INTRAVENOUS at 00:11

## 2017-02-15 RX ADMIN — BUMETANIDE 4 MG: 0.25 INJECTION, SOLUTION INTRAMUSCULAR; INTRAVENOUS at 18:30

## 2017-02-15 RX ADMIN — ALBUTEROL SULFATE 6 PUFF: 90 AEROSOL, METERED RESPIRATORY (INHALATION) at 11:51

## 2017-02-15 RX ADMIN — ALBUTEROL SULFATE 6 PUFF: 90 AEROSOL, METERED RESPIRATORY (INHALATION) at 20:00

## 2017-02-15 RX ADMIN — BUMETANIDE 2 MG: 0.25 INJECTION, SOLUTION INTRAMUSCULAR; INTRAVENOUS at 07:02

## 2017-02-15 RX ADMIN — VANCOMYCIN HYDROCHLORIDE 1750 MG: 1 INJECTION, POWDER, LYOPHILIZED, FOR SOLUTION INTRAVENOUS at 13:42

## 2017-02-15 RX ADMIN — MORPHINE SULFATE 2 MG: 2 INJECTION, SOLUTION INTRAMUSCULAR; INTRAVENOUS at 15:30

## 2017-02-15 RX ADMIN — MILRINONE LACTATE 0.25 MCG/KG/MIN: 200 INJECTION, SOLUTION INTRAVENOUS at 09:13

## 2017-02-15 RX ADMIN — PANTOPRAZOLE SODIUM 40 MG: 40 INJECTION, POWDER, FOR SOLUTION INTRAVENOUS at 01:42

## 2017-02-15 NOTE — ANESTHESIA PROCEDURE NOTES
Airway  Urgency: elective    Airway not difficult    General Information and Staff    Patient location during procedure: OR  Anesthesiologist: AGNES CASTILLO    Indications and Patient Condition  Indications for airway management: airway protection    Preoxygenated: yes  MILS maintained throughout  Mask difficulty assessment: 2 - vent by mask + OA or adjuvant +/- NMBA    Final Airway Details  Final airway type: endotracheal airway      Successful airway: ETT  Cuffed: yes   Successful intubation technique: direct laryngoscopy  Facilitating devices/methods: cricoid pressure  Endotracheal tube insertion site: oral  Blade: Hilda  Blade size: #3  ETT size: 8.0 mm  Cormack-Lehane Classification: grade IIa - partial view of glottis  Placement verified by: capnometry   Cuff volume (mL): 5  Measured from: lips  ETT to lips (cm): 23  Number of attempts at approach: 1

## 2017-02-15 NOTE — ANESTHESIA PROCEDURE NOTES
Arterial Line    Patient location during procedure: OR   Line placed for hemodynamic monitoring and ABGs/Labs/ISTAT.  Performed By   Anesthesiologist: TARIK MCLEOD  Preanesthetic Checklist  Completed: patient identified, site marked, surgical consent, pre-op evaluation, timeout performed, IV checked, risks and benefits discussed and monitors and equipment checked  Arterial Line Prep   Sterile Tech: cap and gloves  Prep: ChloraPrep  Patient monitoring: blood pressure monitoring, continuous pulse oximetry and EKG  Arterial Line Procedure   Laterality:right  Location:  radial artery  Catheter size: 20 G   Guidance: palpation technique  Number of attempts: 1  Successful placement: yes          Post Assessment   Dressing Type: occlusive dressing applied and wrist guard applied.   Complications no  Circ/Move/Sens Assessment: unchanged.   Patient Tolerance: patient tolerated the procedure well with no apparent complications

## 2017-02-15 NOTE — ANESTHESIA PREPROCEDURE EVALUATION
Anesthesia Evaluation     Patient summary reviewed and Nursing notes reviewed   NPO Status: waived due to emergency   Airway   Mallampati: III  TM distance: >3 FB  Neck ROM: full  no difficulty expected  Dental - normal exam     Pulmonary - negative pulmonary ROS and normal exam   Cardiovascular - negative cardio ROS and normal exam  Exercise tolerance: good (4-7 METS)    ECG reviewed  Beta blocker given within 24 hours of surgery  Rhythm: regular  Rate: normal        Neuro/Psych- negative ROS  GI/Hepatic/Renal/Endo    (+) morbid obesity,     Musculoskeletal (-) negative ROS    Abdominal  - normal exam   Substance History - negative use     OB/GYN          Other - negative ROS                                   Anesthesia Plan    ASA 4 - emergent     general   (Aortic dissection)  intravenous induction   Anesthetic plan and risks discussed with patient.

## 2017-02-15 NOTE — ANESTHESIA PROCEDURE NOTES
Central Line    Patient location during procedure: OR  Indications: central pressure monitoring, vascular access and MD/Surgeon request  Staff  Anesthesiologist: AGNES CASTILLO  Preanesthetic Checklist  Completed: patient identified, site marked, surgical consent, pre-op evaluation, timeout performed, IV checked, risks and benefits discussed and monitors and equipment checked  Central Line Prep  Sterile Tech:cap, gloves, gown, mask and sterile barriers  Prep: chloraprep  Patient monitoring: blood pressure monitoring, continuous pulse oximetry and EKGCentral Line Procedure  Laterality:right  Location:internal jugular  Catheter Type:[place]-Bob  Catheter Size:9 Fr  Guidance:ultrasound guided  Assessment  Post procedure:biopatch applied, line sutured and occlusive dressing applied  Assessement:blood return through all ports, free fluid flow and chest x-ray ordered  Complications:no  Patient Tolerance:patient tolerated the procedure well with no apparent complications

## 2017-02-16 ENCOUNTER — ANESTHESIA EVENT (OUTPATIENT)
Dept: CARDIOVASCULAR ICU | Facility: HOSPITAL | Age: 57
End: 2017-02-16

## 2017-02-16 ENCOUNTER — APPOINTMENT (OUTPATIENT)
Dept: GENERAL RADIOLOGY | Facility: HOSPITAL | Age: 57
End: 2017-02-16

## 2017-02-16 ENCOUNTER — ANESTHESIA (OUTPATIENT)
Dept: CARDIOVASCULAR ICU | Facility: HOSPITAL | Age: 57
End: 2017-02-16

## 2017-02-16 LAB
ALBUMIN SERPL-MCNC: 3.6 G/DL (ref 3.5–5.2)
ALBUMIN SERPL-MCNC: 3.6 G/DL (ref 3.5–5.2)
ALBUMIN SERPL-MCNC: 3.8 G/DL (ref 3.5–5.2)
ALBUMIN/GLOB SERPL: 1.8 G/DL
ALP SERPL-CCNC: 44 U/L (ref 39–117)
ALT SERPL W P-5'-P-CCNC: 49 U/L (ref 1–41)
ANION GAP SERPL CALCULATED.3IONS-SCNC: 18.5 MMOL/L
ANION GAP SERPL CALCULATED.3IONS-SCNC: 19.6 MMOL/L
ANION GAP SERPL CALCULATED.3IONS-SCNC: 20.6 MMOL/L
ARTERIAL PATENCY WRIST A: ABNORMAL
AST SERPL-CCNC: 143 U/L (ref 1–40)
ATMOSPHERIC PRESS: 745.9 MMHG
BASE EXCESS BLDA CALC-SCNC: -1.6 MMOL/L (ref 0–2)
BASOPHILS # BLD AUTO: 0.02 10*3/MM3 (ref 0–0.2)
BASOPHILS NFR BLD AUTO: 0.1 % (ref 0–1.5)
BDY SITE: ABNORMAL
BH CV ECHO MEAS - AO MEAN PG (FULL): 4 MMHG
BH CV ECHO MEAS - AO MEAN PG: 7 MMHG
BH CV ECHO MEAS - AO V2 MEAN: 121 CM/SEC
BH CV ECHO MEAS - AO V2 VTI: 29.2 CM
BH CV ECHO MEAS - BSA(HAYCOCK): 2.5 M^2
BH CV ECHO MEAS - BSA: 2.4 M^2
BH CV ECHO MEAS - BZI_BMI: 35.9 KILOGRAMS/M^2
BH CV ECHO MEAS - BZI_METRIC_HEIGHT: 182.9 CM
BH CV ECHO MEAS - BZI_METRIC_WEIGHT: 120.2 KG
BH CV ECHO MEAS - LV MEAN PG: 3 MMHG
BH CV ECHO MEAS - LV V1 MEAN: 85.8 CM/SEC
BH CV ECHO MEAS - LV V1 VTI: 17.4 CM
BH CV ECHO MEAS - MV A DUR: 0.14 SEC
BH CV ECHO MEAS - MV A MAX VEL: 73.5 CM/SEC
BH CV ECHO MEAS - MV DEC SLOPE: 481 CM/SEC^2
BH CV ECHO MEAS - MV DEC TIME: 0.21 SEC
BH CV ECHO MEAS - MV E MAX VEL: 98.2 CM/SEC
BH CV ECHO MEAS - MV E/A: 1.3
BH CV ECHO MEAS - MV MEAN PG: 2 MMHG
BH CV ECHO MEAS - MV P1/2T MAX VEL: 101 CM/SEC
BH CV ECHO MEAS - MV P1/2T: 61.5 MSEC
BH CV ECHO MEAS - MV V2 MEAN: 60.8 CM/SEC
BH CV ECHO MEAS - MV V2 VTI: 27.5 CM
BH CV ECHO MEAS - MVA P1/2T LCG: 2.2 CM^2
BH CV ECHO MEAS - MVA(P1/2T): 3.6 CM^2
BILIRUB SERPL-MCNC: 0.6 MG/DL (ref 0.1–1.2)
BUN BLD-MCNC: 48 MG/DL (ref 6–20)
BUN BLD-MCNC: 54 MG/DL (ref 6–20)
BUN BLD-MCNC: 56 MG/DL (ref 6–20)
BUN/CREAT SERPL: 11.7 (ref 7–25)
BUN/CREAT SERPL: 11.8 (ref 7–25)
BUN/CREAT SERPL: 12.6 (ref 7–25)
CALCIUM SPEC-SCNC: 8.6 MG/DL (ref 8.6–10.5)
CHLORIDE SERPL-SCNC: 101 MMOL/L (ref 98–107)
CHLORIDE SERPL-SCNC: 104 MMOL/L (ref 98–107)
CHLORIDE SERPL-SCNC: 104 MMOL/L (ref 98–107)
CO2 SERPL-SCNC: 21.4 MMOL/L (ref 22–29)
CO2 SERPL-SCNC: 21.4 MMOL/L (ref 22–29)
CO2 SERPL-SCNC: 21.5 MMOL/L (ref 22–29)
CREAT BLD-MCNC: 4.07 MG/DL (ref 0.76–1.27)
CREAT BLD-MCNC: 4.43 MG/DL (ref 0.76–1.27)
CREAT BLD-MCNC: 4.61 MG/DL (ref 0.76–1.27)
CYTO UR: NORMAL
DEPRECATED RDW RBC AUTO: 47.4 FL (ref 37–54)
EOSINOPHIL # BLD AUTO: 0 10*3/MM3 (ref 0–0.7)
EOSINOPHIL NFR BLD AUTO: 0 % (ref 0.3–6.2)
ERYTHROCYTE [DISTWIDTH] IN BLOOD BY AUTOMATED COUNT: 14.4 % (ref 11.5–14.5)
GFR SERPL CREATININE-BSD FRML MDRD: 13 ML/MIN/1.73
GFR SERPL CREATININE-BSD FRML MDRD: 14 ML/MIN/1.73
GFR SERPL CREATININE-BSD FRML MDRD: 15 ML/MIN/1.73
GLOBULIN UR ELPH-MCNC: 2.1 GM/DL
GLUCOSE BLD-MCNC: 100 MG/DL (ref 65–99)
GLUCOSE BLD-MCNC: 101 MG/DL (ref 65–99)
GLUCOSE BLD-MCNC: 115 MG/DL (ref 65–99)
GLUCOSE BLDC GLUCOMTR-MCNC: 101 MG/DL (ref 70–130)
GLUCOSE BLDC GLUCOMTR-MCNC: 104 MG/DL (ref 70–130)
GLUCOSE BLDC GLUCOMTR-MCNC: 106 MG/DL (ref 70–130)
GLUCOSE BLDC GLUCOMTR-MCNC: 107 MG/DL (ref 70–130)
GLUCOSE BLDC GLUCOMTR-MCNC: 108 MG/DL (ref 70–130)
GLUCOSE BLDC GLUCOMTR-MCNC: 110 MG/DL (ref 70–130)
GLUCOSE BLDC GLUCOMTR-MCNC: 111 MG/DL (ref 70–130)
GLUCOSE BLDC GLUCOMTR-MCNC: 113 MG/DL (ref 70–130)
GLUCOSE BLDC GLUCOMTR-MCNC: 115 MG/DL (ref 70–130)
GLUCOSE BLDC GLUCOMTR-MCNC: 116 MG/DL (ref 70–130)
GLUCOSE BLDC GLUCOMTR-MCNC: 117 MG/DL (ref 70–130)
GLUCOSE BLDC GLUCOMTR-MCNC: 121 MG/DL (ref 70–130)
GLUCOSE BLDC GLUCOMTR-MCNC: 123 MG/DL (ref 70–130)
GLUCOSE BLDC GLUCOMTR-MCNC: 131 MG/DL (ref 70–130)
HCO3 BLDA-SCNC: 22.8 MMOL/L (ref 22–28)
HCT VFR BLD AUTO: 30.3 % (ref 40.4–52.2)
HGB BLD-MCNC: 10.2 G/DL (ref 13.7–17.6)
HOROWITZ INDEX BLD+IHG-RTO: 90 %
IMM GRANULOCYTES # BLD: 0.11 10*3/MM3 (ref 0–0.03)
IMM GRANULOCYTES NFR BLD: 0.4 % (ref 0–0.5)
INR PPP: 1.43 (ref 0.9–1.1)
LAB AP CASE REPORT: NORMAL
LYMPHOCYTES # BLD AUTO: 1.74 10*3/MM3 (ref 0.9–4.8)
LYMPHOCYTES NFR BLD AUTO: 6.3 % (ref 19.6–45.3)
Lab: NORMAL
MAGNESIUM SERPL-MCNC: 2.6 MG/DL (ref 1.6–2.6)
MCH RBC QN AUTO: 29.9 PG (ref 27–32.7)
MCHC RBC AUTO-ENTMCNC: 33.7 G/DL (ref 32.6–36.4)
MCV RBC AUTO: 88.9 FL (ref 79.8–96.2)
MODALITY: ABNORMAL
MONOCYTES # BLD AUTO: 2.94 10*3/MM3 (ref 0.2–1.2)
MONOCYTES NFR BLD AUTO: 10.7 % (ref 5–12)
NEUTROPHILS # BLD AUTO: 22.68 10*3/MM3 (ref 1.9–8.1)
NEUTROPHILS NFR BLD AUTO: 82.5 % (ref 42.7–76)
O2 A-A PPRESDIFF RESPIRATORY: 0.1 MMHG
PATH REPORT.FINAL DX SPEC: NORMAL
PATH REPORT.GROSS SPEC: NORMAL
PCO2 BLDA: 36.5 MM HG (ref 35–45)
PEEP RESPIRATORY: 10 CM[H2O]
PH BLDA: 7.4 PH UNITS (ref 7.35–7.45)
PHOSPHATE SERPL-MCNC: 5.1 MG/DL (ref 2.5–4.5)
PHOSPHATE SERPL-MCNC: 6.3 MG/DL (ref 2.5–4.5)
PHOSPHATE SERPL-MCNC: 6.5 MG/DL (ref 2.5–4.5)
PLATELET # BLD AUTO: 126 10*3/MM3 (ref 140–500)
PMV BLD AUTO: 11.3 FL (ref 6–12)
PO2 BLDA: 75.1 MM HG (ref 80–100)
POTASSIUM BLD-SCNC: 3.6 MMOL/L (ref 3.5–5.2)
POTASSIUM BLD-SCNC: 3.7 MMOL/L (ref 3.5–5.2)
POTASSIUM BLD-SCNC: 3.7 MMOL/L (ref 3.5–5.2)
PROCALCITONIN SERPL-MCNC: 4.29 NG/ML (ref 0.1–0.25)
PROT SERPL-MCNC: 5.9 G/DL (ref 6–8.5)
PROTHROMBIN TIME: 16.9 SECONDS (ref 11.7–14.2)
RBC # BLD AUTO: 3.41 10*6/MM3 (ref 4.6–6)
SAO2 % BLDCOA: 95 % (ref 92–99)
SET MECH RESP RATE: 12
SODIUM BLD-SCNC: 143 MMOL/L (ref 136–145)
SODIUM BLD-SCNC: 144 MMOL/L (ref 136–145)
SODIUM BLD-SCNC: 145 MMOL/L (ref 136–145)
TOTAL RATE: 18 BREATHS/MINUTE
URATE SERPL-MCNC: 11 MG/DL (ref 3.4–7)
VENTILATOR MODE: ABNORMAL
VT ON VENT VENT: 647 ML
WBC NRBC COR # BLD: 27.49 10*3/MM3 (ref 4.5–10.7)

## 2017-02-16 PROCEDURE — 80069 RENAL FUNCTION PANEL: CPT | Performed by: THORACIC SURGERY (CARDIOTHORACIC VASCULAR SURGERY)

## 2017-02-16 PROCEDURE — 94640 AIRWAY INHALATION TREATMENT: CPT

## 2017-02-16 PROCEDURE — C1751 CATH, INF, PER/CENT/MIDLINE: HCPCS | Performed by: ANESTHESIOLOGY

## 2017-02-16 PROCEDURE — 93010 ELECTROCARDIOGRAM REPORT: CPT | Performed by: INTERNAL MEDICINE

## 2017-02-16 PROCEDURE — 83735 ASSAY OF MAGNESIUM: CPT | Performed by: INTERNAL MEDICINE

## 2017-02-16 PROCEDURE — 87205 SMEAR GRAM STAIN: CPT | Performed by: INTERNAL MEDICINE

## 2017-02-16 PROCEDURE — 25010000002 INSULIN REGULAR HUMAN PER 5 UNITS: Performed by: THORACIC SURGERY (CARDIOTHORACIC VASCULAR SURGERY)

## 2017-02-16 PROCEDURE — 85610 PROTHROMBIN TIME: CPT | Performed by: THORACIC SURGERY (CARDIOTHORACIC VASCULAR SURGERY)

## 2017-02-16 PROCEDURE — 94799 UNLISTED PULMONARY SVC/PX: CPT

## 2017-02-16 PROCEDURE — 25010000002 EPINEPHRINE PER 0.1 MG: Performed by: THORACIC SURGERY (CARDIOTHORACIC VASCULAR SURGERY)

## 2017-02-16 PROCEDURE — 93005 ELECTROCARDIOGRAM TRACING: CPT | Performed by: THORACIC SURGERY (CARDIOTHORACIC VASCULAR SURGERY)

## 2017-02-16 PROCEDURE — 85025 COMPLETE CBC W/AUTO DIFF WBC: CPT | Performed by: INTERNAL MEDICINE

## 2017-02-16 PROCEDURE — 25010000002 VANCOMYCIN: Performed by: THORACIC SURGERY (CARDIOTHORACIC VASCULAR SURGERY)

## 2017-02-16 PROCEDURE — 25010000002 LEVOFLOXACIN PER 250 MG: Performed by: INTERNAL MEDICINE

## 2017-02-16 PROCEDURE — 99253 IP/OBS CNSLTJ NEW/EST LOW 45: CPT | Performed by: INTERNAL MEDICINE

## 2017-02-16 PROCEDURE — 84550 ASSAY OF BLOOD/URIC ACID: CPT | Performed by: INTERNAL MEDICINE

## 2017-02-16 PROCEDURE — 84145 PROCALCITONIN (PCT): CPT | Performed by: INTERNAL MEDICINE

## 2017-02-16 PROCEDURE — 80053 COMPREHEN METABOLIC PANEL: CPT | Performed by: INTERNAL MEDICINE

## 2017-02-16 PROCEDURE — 25010000002 PROPOFOL 1000 MG/ML EMULSION: Performed by: THORACIC SURGERY (CARDIOTHORACIC VASCULAR SURGERY)

## 2017-02-16 PROCEDURE — 25010000002 PROPOFOL 10 MG/ML EMULSION: Performed by: THORACIC SURGERY (CARDIOTHORACIC VASCULAR SURGERY)

## 2017-02-16 PROCEDURE — 87070 CULTURE OTHR SPECIMN AEROBIC: CPT | Performed by: INTERNAL MEDICINE

## 2017-02-16 PROCEDURE — 87147 CULTURE TYPE IMMUNOLOGIC: CPT | Performed by: INTERNAL MEDICINE

## 2017-02-16 PROCEDURE — 94003 VENT MGMT INPAT SUBQ DAY: CPT

## 2017-02-16 PROCEDURE — 82962 GLUCOSE BLOOD TEST: CPT

## 2017-02-16 PROCEDURE — 71010 HC CHEST PA OR AP: CPT

## 2017-02-16 PROCEDURE — 94002 VENT MGMT INPAT INIT DAY: CPT

## 2017-02-16 PROCEDURE — 84100 ASSAY OF PHOSPHORUS: CPT | Performed by: INTERNAL MEDICINE

## 2017-02-16 PROCEDURE — 99024 POSTOP FOLLOW-UP VISIT: CPT | Performed by: NURSE PRACTITIONER

## 2017-02-16 PROCEDURE — 25010000002 MORPHINE PER 10 MG: Performed by: THORACIC SURGERY (CARDIOTHORACIC VASCULAR SURGERY)

## 2017-02-16 PROCEDURE — 82803 BLOOD GASES ANY COMBINATION: CPT

## 2017-02-16 RX ORDER — ASPIRIN 300 MG/1
150 SUPPOSITORY RECTAL DAILY
Status: DISCONTINUED | OUTPATIENT
Start: 2017-02-16 | End: 2017-03-01

## 2017-02-16 RX ORDER — LEVOFLOXACIN 5 MG/ML
750 INJECTION, SOLUTION INTRAVENOUS
Status: DISCONTINUED | OUTPATIENT
Start: 2017-02-16 | End: 2017-02-17

## 2017-02-16 RX ORDER — POTASSIUM CHLORIDE 1.5 G/1.77G
20 POWDER, FOR SOLUTION ORAL ONCE
Status: COMPLETED | OUTPATIENT
Start: 2017-02-16 | End: 2017-02-16

## 2017-02-16 RX ADMIN — DEXMEDETOMIDINE HYDROCHLORIDE 0.7 MCG/KG/HR: 4 INJECTION, SOLUTION INTRAVENOUS at 01:26

## 2017-02-16 RX ADMIN — OXYCODONE HYDROCHLORIDE 10 MG: 5 TABLET ORAL at 16:30

## 2017-02-16 RX ADMIN — DEXMEDETOMIDINE HYDROCHLORIDE 0.7 MCG/KG/HR: 4 INJECTION, SOLUTION INTRAVENOUS at 14:43

## 2017-02-16 RX ADMIN — VANCOMYCIN HYDROCHLORIDE 1750 MG: 1 INJECTION, POWDER, LYOPHILIZED, FOR SOLUTION INTRAVENOUS at 02:43

## 2017-02-16 RX ADMIN — ACETAMINOPHEN 650 MG: 325 TABLET ORAL at 21:52

## 2017-02-16 RX ADMIN — PANTOPRAZOLE SODIUM 40 MG: 40 INJECTION, POWDER, FOR SOLUTION INTRAVENOUS at 06:49

## 2017-02-16 RX ADMIN — PROPOFOL 20 MCG/KG/MIN: 10 INJECTION, EMULSION INTRAVENOUS at 16:50

## 2017-02-16 RX ADMIN — BUMETANIDE 1 MG/HR: 0.25 INJECTION, SOLUTION INTRAMUSCULAR; INTRAVENOUS at 08:51

## 2017-02-16 RX ADMIN — ALBUTEROL SULFATE 6 PUFF: 90 AEROSOL, METERED RESPIRATORY (INHALATION) at 10:54

## 2017-02-16 RX ADMIN — DEXMEDETOMIDINE HYDROCHLORIDE 0.7 MCG/KG/HR: 4 INJECTION, SOLUTION INTRAVENOUS at 05:19

## 2017-02-16 RX ADMIN — MORPHINE SULFATE 4 MG: 4 INJECTION, SOLUTION INTRAMUSCULAR; INTRAVENOUS at 05:45

## 2017-02-16 RX ADMIN — ASPIRIN 150 MG: 300 SUPPOSITORY RECTAL at 13:37

## 2017-02-16 RX ADMIN — MORPHINE SULFATE 2 MG: 2 INJECTION, SOLUTION INTRAMUSCULAR; INTRAVENOUS at 15:33

## 2017-02-16 RX ADMIN — MILRINONE LACTATE 0.25 MCG/KG/MIN: 200 INJECTION, SOLUTION INTRAVENOUS at 09:46

## 2017-02-16 RX ADMIN — ALBUTEROL SULFATE 6 PUFF: 90 AEROSOL, METERED RESPIRATORY (INHALATION) at 16:34

## 2017-02-16 RX ADMIN — OXYCODONE HYDROCHLORIDE 10 MG: 5 TABLET ORAL at 02:55

## 2017-02-16 RX ADMIN — PROPOFOL 20 MCG/KG/MIN: 10 INJECTION, EMULSION INTRAVENOUS at 22:12

## 2017-02-16 RX ADMIN — DEXMEDETOMIDINE HYDROCHLORIDE 0.7 MCG/KG/HR: 4 INJECTION, SOLUTION INTRAVENOUS at 09:53

## 2017-02-16 RX ADMIN — ATORVASTATIN CALCIUM 40 MG: 40 TABLET, FILM COATED ORAL at 20:58

## 2017-02-16 RX ADMIN — OXYCODONE HYDROCHLORIDE 10 MG: 5 TABLET ORAL at 20:58

## 2017-02-16 RX ADMIN — LEVOFLOXACIN 750 MG: 5 INJECTION, SOLUTION INTRAVENOUS at 15:40

## 2017-02-16 RX ADMIN — ALBUTEROL SULFATE 6 PUFF: 90 AEROSOL, METERED RESPIRATORY (INHALATION) at 08:46

## 2017-02-16 RX ADMIN — SODIUM CHLORIDE 5.6 UNITS/HR: 9 INJECTION, SOLUTION INTRAVENOUS at 08:52

## 2017-02-16 RX ADMIN — ALBUTEROL SULFATE 6 PUFF: 90 AEROSOL, METERED RESPIRATORY (INHALATION) at 20:20

## 2017-02-16 RX ADMIN — MORPHINE SULFATE 4 MG: 4 INJECTION, SOLUTION INTRAMUSCULAR; INTRAVENOUS at 13:58

## 2017-02-16 RX ADMIN — POTASSIUM CHLORIDE 20 MEQ: 1.5 POWDER, FOR SOLUTION ORAL at 23:02

## 2017-02-16 RX ADMIN — DEXMEDETOMIDINE HYDROCHLORIDE 0.7 MCG/KG/HR: 4 INJECTION, SOLUTION INTRAVENOUS at 19:18

## 2017-02-16 RX ADMIN — EPINEPHRINE 0.03 MCG/KG/MIN: 1 INJECTION PARENTERAL at 04:13

## 2017-02-16 RX ADMIN — MUPIROCIN 1 APPLICATION: 20 OINTMENT TOPICAL at 12:26

## 2017-02-16 RX ADMIN — PROPOFOL 10 MCG/KG/MIN: 10 INJECTION, EMULSION INTRAVENOUS at 08:07

## 2017-02-16 NOTE — ANESTHESIA POSTPROCEDURE EVALUATION
Patient: Truman Esquivel    Procedure Summary     Date Anesthesia Start Anesthesia Stop Room / Location    02/14/17 [id number] (02/15/17)  [facility]  / [facility] MAIN OR       Procedure Diagnosis Surgeon Provider    INTRAOPERATIVE TRANSESOPHAGEAL ECHOCARDIOGRAM, MIDLINE STERNOTOMY, ASCENDING AORTIC  AND PROXIMAL AORTIC ARCH REPAIR WITH 26MM GRAFT, AORTIC VALVE RESUSPENSION, AORTIC ROOT REPAIR, OPEN VEIN HARVEST OF RIGHT LEG (N/A Chest) Ascending aortic dissection  (Ascending aortic dissection [I71.01]) MD Navneet Ayala MD          Anesthesia Type: No value filed.  Last vitals  /73 (02/16/17 1413)    Temp      Pulse 72 (02/16/17 1413)   Resp 17 (02/16/17 1413)    SpO2 91 % (02/16/17 1413)      Post Anesthesia Care and Evaluation    Patient location during evaluation: ICU  Patient participation: complete - patient cannot participate  Level of consciousness: obtunded/minimal responses  Pain management: adequate  Airway patency: patent  Anesthetic complications: No anesthetic complications    Cardiovascular status: acceptable  Respiratory status: acceptable and intubated  Hydration status: acceptable

## 2017-02-16 NOTE — ANESTHESIA PROCEDURE NOTES
Central Line    Patient location during procedure: post-op  Indications: vascular access  Staff  Anesthesiologist: LUZMA MOFFETT  Preanesthetic Checklist  Completed: patient identified, site marked, surgical consent, pre-op evaluation, timeout performed, IV checked, risks and benefits discussed and monitors and equipment checked  Central Line Prep  Sterile Tech:cap, gloves, gown, mask and sterile barriers  Prep: chloraprep  Patient monitoring: blood pressure monitoring, continuous pulse oximetry and EKGCentral Line Procedure  Laterality:right  Location:internal jugular  Catheter Type:[place]-Bob  Catheter Size:8.5 Fr  Guidance:landmark technique  Assessment  Post procedure:biopatch applied, line sutured and occlusive dressing applied  Assessement:blood return through all ports and free fluid flow  Complications:no  Patient Tolerance:patient tolerated the procedure well with no apparent complications

## 2017-02-17 ENCOUNTER — APPOINTMENT (OUTPATIENT)
Dept: GENERAL RADIOLOGY | Facility: HOSPITAL | Age: 57
End: 2017-02-17

## 2017-02-17 ENCOUNTER — APPOINTMENT (OUTPATIENT)
Dept: GENERAL RADIOLOGY | Facility: HOSPITAL | Age: 57
End: 2017-02-17
Attending: THORACIC SURGERY (CARDIOTHORACIC VASCULAR SURGERY)

## 2017-02-17 LAB
ALBUMIN SERPL-MCNC: 3 G/DL (ref 3.5–5.2)
ALBUMIN SERPL-MCNC: 3.5 G/DL (ref 3.5–5.2)
ALBUMIN SERPL-MCNC: 3.6 G/DL (ref 3.5–5.2)
ALBUMIN/GLOB SERPL: 1.3 G/DL
ALP SERPL-CCNC: 60 U/L (ref 39–117)
ALT SERPL W P-5'-P-CCNC: 121 U/L (ref 1–41)
ANION GAP SERPL CALCULATED.3IONS-SCNC: 18.6 MMOL/L
ANION GAP SERPL CALCULATED.3IONS-SCNC: 19.1 MMOL/L
ANION GAP SERPL CALCULATED.3IONS-SCNC: 20.2 MMOL/L
APPEARANCE FLD: ABNORMAL
ARTERIAL PATENCY WRIST A: ABNORMAL
ARTERIAL PATENCY WRIST A: NORMAL
AST SERPL-CCNC: 288 U/L (ref 1–40)
ATMOSPHERIC PRESS: 743.6 MMHG
ATMOSPHERIC PRESS: 743.6 MMHG
ATMOSPHERIC PRESS: 744.2 MMHG
ATMOSPHERIC PRESS: 744.6 MMHG
ATMOSPHERIC PRESS: 745.5 MMHG
ATMOSPHERIC PRESS: 746.1 MMHG
ATMOSPHERIC PRESS: 750.9 MMHG
B PERT DNA SPEC QL NAA+PROBE: NOT DETECTED
BACTERIA UR QL AUTO: ABNORMAL /HPF
BASE EXCESS BLDA CALC-SCNC: -2.1 MMOL/L (ref 0–2)
BASE EXCESS BLDA CALC-SCNC: -3.7 MMOL/L (ref 0–2)
BASE EXCESS BLDA CALC-SCNC: 0.1 MMOL/L (ref 0–2)
BASE EXCESS BLDA CALC-SCNC: 0.8 MMOL/L (ref 0–2)
BASE EXCESS BLDV CALC-SCNC: -1.1 MMOL/L
BASE EXCESS BLDV CALC-SCNC: -2.3 MMOL/L
BASE EXCESS BLDV CALC-SCNC: -6 MMOL/L
BASOPHILS # BLD AUTO: 0.03 10*3/MM3 (ref 0–0.2)
BASOPHILS # BLD AUTO: 0.04 10*3/MM3 (ref 0–0.2)
BASOPHILS NFR BLD AUTO: 0.1 % (ref 0–1.5)
BASOPHILS NFR BLD AUTO: 0.2 % (ref 0–1.5)
BDY SITE: ABNORMAL
BDY SITE: NORMAL
BILIRUB SERPL-MCNC: 0.3 MG/DL (ref 0.1–1.2)
BILIRUB UR QL STRIP: NEGATIVE
BUN BLD-MCNC: 59 MG/DL (ref 6–20)
BUN BLD-MCNC: 61 MG/DL (ref 6–20)
BUN BLD-MCNC: 66 MG/DL (ref 6–20)
BUN/CREAT SERPL: 12.8 (ref 7–25)
BUN/CREAT SERPL: 13.4 (ref 7–25)
BUN/CREAT SERPL: 14.3 (ref 7–25)
C PNEUM DNA NPH QL NAA+NON-PROBE: NOT DETECTED
CA-I BLD-MCNC: 4.6 MG/DL (ref 4.6–5.4)
CA-I SERPL ISE-MCNC: 1.15 MMOL/L (ref 1.1–1.35)
CALCIUM SPEC-SCNC: 8.3 MG/DL (ref 8.6–10.5)
CALCIUM SPEC-SCNC: 8.6 MG/DL (ref 8.6–10.5)
CALCIUM SPEC-SCNC: 8.7 MG/DL (ref 8.6–10.5)
CHLORIDE SERPL-SCNC: 101 MMOL/L (ref 98–107)
CHLORIDE SERPL-SCNC: 102 MMOL/L (ref 98–107)
CHLORIDE SERPL-SCNC: 103 MMOL/L (ref 98–107)
CLARITY UR: CLEAR
CO2 SERPL-SCNC: 21.8 MMOL/L (ref 22–29)
CO2 SERPL-SCNC: 21.9 MMOL/L (ref 22–29)
CO2 SERPL-SCNC: 22.4 MMOL/L (ref 22–29)
COLOR FLD: COLORLESS
COLOR UR: YELLOW
CREAT BLD-MCNC: 4.54 MG/DL (ref 0.76–1.27)
CREAT BLD-MCNC: 4.6 MG/DL (ref 0.76–1.27)
CREAT BLD-MCNC: 4.61 MG/DL (ref 0.76–1.27)
DEPRECATED RDW RBC AUTO: 48 FL (ref 37–54)
DEPRECATED RDW RBC AUTO: 48.1 FL (ref 37–54)
EOSINOPHIL # BLD AUTO: 0.02 10*3/MM3 (ref 0–0.7)
EOSINOPHIL # BLD AUTO: 0.07 10*3/MM3 (ref 0–0.7)
EOSINOPHIL NFR BLD AUTO: 0.1 % (ref 0.3–6.2)
EOSINOPHIL NFR BLD AUTO: 0.3 % (ref 0.3–6.2)
ERYTHROCYTE [DISTWIDTH] IN BLOOD BY AUTOMATED COUNT: 14.8 % (ref 11.5–14.5)
ERYTHROCYTE [DISTWIDTH] IN BLOOD BY AUTOMATED COUNT: 14.9 % (ref 11.5–14.5)
FIBRINOGEN AG PPP IA-MCNC: 298 MG/DL (ref 180–350)
FIBRINOGEN PPP-MCNC: 272 MG/DL (ref 160–420)
FLUAV H1 2009 PAND RNA NPH QL NAA+PROBE: NOT DETECTED
FLUAV H1 HA GENE NPH QL NAA+PROBE: NOT DETECTED
FLUAV H3 RNA NPH QL NAA+PROBE: NOT DETECTED
FLUAV SUBTYP SPEC NAA+PROBE: NOT DETECTED
FLUBV RNA ISLT QL NAA+PROBE: NOT DETECTED
GFR SERPL CREATININE-BSD FRML MDRD: 13 ML/MIN/1.73
GLOBULIN UR ELPH-MCNC: 2.6 GM/DL
GLUCOSE BLD-MCNC: 120 MG/DL (ref 65–99)
GLUCOSE BLD-MCNC: 123 MG/DL (ref 65–99)
GLUCOSE BLD-MCNC: 91 MG/DL (ref 65–99)
GLUCOSE BLDC GLUCOMTR-MCNC: 100 MG/DL (ref 70–130)
GLUCOSE BLDC GLUCOMTR-MCNC: 104 MG/DL (ref 70–130)
GLUCOSE BLDC GLUCOMTR-MCNC: 120 MG/DL (ref 70–130)
GLUCOSE BLDC GLUCOMTR-MCNC: 122 MG/DL (ref 70–130)
GLUCOSE BLDC GLUCOMTR-MCNC: 125 MG/DL (ref 70–130)
GLUCOSE BLDC GLUCOMTR-MCNC: 127 MG/DL (ref 70–130)
GLUCOSE BLDC GLUCOMTR-MCNC: 128 MG/DL (ref 70–130)
GLUCOSE BLDC GLUCOMTR-MCNC: 134 MG/DL (ref 70–130)
GLUCOSE BLDC GLUCOMTR-MCNC: 135 MG/DL (ref 70–130)
GLUCOSE BLDC GLUCOMTR-MCNC: 138 MG/DL (ref 70–130)
GLUCOSE BLDC GLUCOMTR-MCNC: 139 MG/DL (ref 70–130)
GLUCOSE BLDC GLUCOMTR-MCNC: 147 MG/DL (ref 70–130)
GLUCOSE BLDC GLUCOMTR-MCNC: 88 MG/DL (ref 70–130)
GLUCOSE UR STRIP-MCNC: NEGATIVE MG/DL
HADV DNA SPEC NAA+PROBE: NOT DETECTED
HCO3 BLDA-SCNC: 21 MMOL/L (ref 22–28)
HCO3 BLDA-SCNC: 23.7 MMOL/L (ref 22–28)
HCO3 BLDA-SCNC: 24.4 MMOL/L (ref 22–28)
HCO3 BLDA-SCNC: 25.1 MMOL/L (ref 22–28)
HCO3 BLDV-SCNC: 18.7 MMOL/L (ref 22–28)
HCO3 BLDV-SCNC: 23.2 MMOL/L (ref 22–28)
HCO3 BLDV-SCNC: 24.5 MMOL/L (ref 22–28)
HCOV 229E RNA SPEC QL NAA+PROBE: NOT DETECTED
HCOV HKU1 RNA SPEC QL NAA+PROBE: NOT DETECTED
HCOV NL63 RNA SPEC QL NAA+PROBE: NOT DETECTED
HCOV OC43 RNA SPEC QL NAA+PROBE: NOT DETECTED
HCT VFR BLD AUTO: 33.3 % (ref 40.4–52.2)
HCT VFR BLD AUTO: 34 % (ref 40.4–52.2)
HGB BLD-MCNC: 11.2 G/DL (ref 13.7–17.6)
HGB BLD-MCNC: 11.5 G/DL (ref 13.7–17.6)
HGB UR QL STRIP.AUTO: ABNORMAL
HMPV RNA NPH QL NAA+NON-PROBE: NOT DETECTED
HOROWITZ INDEX BLD+IHG-RTO: 100 %
HOROWITZ INDEX BLD+IHG-RTO: 50 %
HOROWITZ INDEX BLD+IHG-RTO: 80 %
HPIV1 RNA SPEC QL NAA+PROBE: NOT DETECTED
HPIV2 RNA SPEC QL NAA+PROBE: NOT DETECTED
HPIV3 RNA NPH QL NAA+PROBE: NOT DETECTED
HPIV4 P GENE NPH QL NAA+PROBE: NOT DETECTED
HYALINE CASTS UR QL AUTO: ABNORMAL /LPF
IMM GRANULOCYTES # BLD: 0.05 10*3/MM3 (ref 0–0.03)
IMM GRANULOCYTES # BLD: 0.06 10*3/MM3 (ref 0–0.03)
IMM GRANULOCYTES NFR BLD: 0.2 % (ref 0–0.5)
IMM GRANULOCYTES NFR BLD: 0.3 % (ref 0–0.5)
INR PPP: 1.5 (ref 0.9–1.1)
KETONES UR QL STRIP: NEGATIVE
LEUKOCYTE ESTERASE UR QL STRIP.AUTO: NEGATIVE
LYMPHOCYTES # BLD AUTO: 2.14 10*3/MM3 (ref 0.9–4.8)
LYMPHOCYTES # BLD AUTO: 2.27 10*3/MM3 (ref 0.9–4.8)
LYMPHOCYTES NFR BLD AUTO: 10.7 % (ref 19.6–45.3)
LYMPHOCYTES NFR BLD AUTO: 10.8 % (ref 19.6–45.3)
M PNEUMO IGG SER IA-ACNC: NOT DETECTED
MAGNESIUM SERPL-MCNC: 2.3 MG/DL (ref 1.6–2.6)
MAGNESIUM SERPL-MCNC: 2.4 MG/DL (ref 1.6–2.6)
MAGNESIUM SERPL-MCNC: 2.5 MG/DL (ref 1.6–2.6)
MCH RBC QN AUTO: 29.6 PG (ref 27–32.7)
MCH RBC QN AUTO: 29.9 PG (ref 27–32.7)
MCHC RBC AUTO-ENTMCNC: 33.6 G/DL (ref 32.6–36.4)
MCHC RBC AUTO-ENTMCNC: 33.8 G/DL (ref 32.6–36.4)
MCV RBC AUTO: 87.9 FL (ref 79.8–96.2)
MCV RBC AUTO: 88.5 FL (ref 79.8–96.2)
METHOD: ABNORMAL
MODALITY: ABNORMAL
MODALITY: NORMAL
MONOCYTES # BLD AUTO: 1.53 10*3/MM3 (ref 0.2–1.2)
MONOCYTES # BLD AUTO: 1.57 10*3/MM3 (ref 0.2–1.2)
MONOCYTES NFR BLD AUTO: 7.5 % (ref 5–12)
MONOCYTES NFR BLD AUTO: 7.6 % (ref 5–12)
MONOCYTES NFR FLD: 1 %
MONOS+MACROS NFR FLD: 3 %
NEUTROPHILS # BLD AUTO: 16.26 10*3/MM3 (ref 1.9–8.1)
NEUTROPHILS # BLD AUTO: 17.04 10*3/MM3 (ref 1.9–8.1)
NEUTROPHILS NFR BLD AUTO: 80.9 % (ref 42.7–76)
NEUTROPHILS NFR BLD AUTO: 81.3 % (ref 42.7–76)
NEUTROPHILS NFR FLD MANUAL: 96 %
NITRITE UR QL STRIP: NEGATIVE
NRBC BLD MANUAL-RTO: 0 /100 WBC (ref 0–0)
NRBC BLD MANUAL-RTO: 0 /100 WBC (ref 0–0)
NUC CELL # FLD: 1550 /MM3
O2 A-A PPRESDIFF RESPIRATORY: 0.1 MMHG
O2 A-A PPRESDIFF RESPIRATORY: 0.1 MMHG
O2 A-A PPRESDIFF RESPIRATORY: 0.2 MMHG
O2 A-A PPRESDIFF RESPIRATORY: 0.2 MMHG
PCO2 BLDA: 35.7 MM HG (ref 35–45)
PCO2 BLDA: 37.4 MM HG (ref 35–45)
PCO2 BLDA: 37.9 MM HG (ref 35–45)
PCO2 BLDA: 43.8 MM HG (ref 35–45)
PCO2 BLDV: 33 MM HG (ref 41–51)
PCO2 BLDV: 41.9 MM HG (ref 41–51)
PCO2 BLDV: 43.8 MM HG (ref 41–51)
PEEP RESPIRATORY: 12 CM[H2O]
PH BLDA: 7.34 PH UNITS (ref 7.35–7.45)
PH BLDA: 7.38 PH UNITS (ref 7.35–7.45)
PH BLDA: 7.42 PH UNITS (ref 7.35–7.45)
PH BLDA: 7.43 PH UNITS (ref 7.35–7.45)
PH BLDV: 7.35 [PH] (ref 7.31–7.41)
PH BLDV: 7.36 [PH] (ref 7.31–7.41)
PH BLDV: 7.36 [PH] (ref 7.31–7.41)
PH UR STRIP.AUTO: 6 [PH] (ref 5–8)
PHOSPHATE SERPL-MCNC: 6.8 MG/DL (ref 2.5–4.5)
PHOSPHATE SERPL-MCNC: 6.9 MG/DL (ref 2.5–4.5)
PHOSPHATE SERPL-MCNC: 7 MG/DL (ref 2.5–4.5)
PLATELET # BLD AUTO: 126 10*3/MM3 (ref 140–500)
PLATELET # BLD AUTO: 126 10*3/MM3 (ref 140–500)
PMV BLD AUTO: 11.9 FL (ref 6–12)
PMV BLD AUTO: 12.4 FL (ref 6–12)
PO2 BLDA: 60.4 MM HG (ref 80–100)
PO2 BLDA: 69.6 MM HG (ref 80–100)
PO2 BLDA: 72.7 MM HG (ref 80–100)
PO2 BLDA: 82.7 MM HG (ref 80–100)
PO2 BLDV: 29.8 MM HG (ref 35–45)
PO2 BLDV: 32.2 MM HG (ref 35–45)
PO2 BLDV: 38.3 MM HG (ref 35–45)
POTASSIUM BLD-SCNC: 3.7 MMOL/L (ref 3.5–5.2)
POTASSIUM BLD-SCNC: 3.8 MMOL/L (ref 3.5–5.2)
PROCALCITONIN SERPL-MCNC: 3.3 NG/ML (ref 0.1–0.25)
PROT SERPL-MCNC: 6.1 G/DL (ref 6–8.5)
PROT UR QL STRIP: ABNORMAL
PROTHROMBIN TIME: 17.6 SECONDS (ref 11.7–14.2)
RBC # BLD AUTO: 3.79 10*6/MM3 (ref 4.6–6)
RBC # BLD AUTO: 3.84 10*6/MM3 (ref 4.6–6)
RBC # FLD AUTO: 75 /MM3
RBC # UR: ABNORMAL /HPF
REF LAB TEST METHOD: ABNORMAL
REPTILASE TIME: 14 SEC (ref 0–21.9)
RHINOVIRUS RNA SPEC NAA+PROBE: NOT DETECTED
RSV RNA NPH QL NAA+NON-PROBE: NOT DETECTED
SAO2 % BLDCOA: 53.9 % (ref 92–99)
SAO2 % BLDCOA: 58.9 % (ref 92–99)
SAO2 % BLDCOA: 71.2 % (ref 92–99)
SAO2 % BLDCOA: 91.4 % (ref 92–99)
SAO2 % BLDCOA: 92.6 % (ref 92–99)
SAO2 % BLDCOA: 94.2 % (ref 92–99)
SAO2 % BLDCOA: 96.5 % (ref 92–99)
SET MECH RESP RATE: 12
SODIUM BLD-SCNC: 143 MMOL/L (ref 136–145)
SODIUM BLD-SCNC: 143 MMOL/L (ref 136–145)
SODIUM BLD-SCNC: 144 MMOL/L (ref 136–145)
SP GR UR STRIP: 1.01 (ref 1–1.03)
SQUAMOUS #/AREA URNS HPF: ABNORMAL /HPF
THROMBIN TIME: 14.7 SEC (ref 0–20.9)
TOTAL RATE: 12 BREATHS/MINUTE
TOTAL RATE: 12 BREATHS/MINUTE
TOTAL RATE: 18 BREATHS/MINUTE
TOTAL RATE: 19 BREATHS/MINUTE
TOTAL RATE: 20 BREATHS/MINUTE
URATE SERPL-MCNC: 12.8 MG/DL (ref 3.4–7)
UROBILINOGEN UR QL STRIP: ABNORMAL
VENTILATOR MODE: ABNORMAL
VENTILATOR MODE: AC
VENTILATOR MODE: NORMAL
VT ON VENT VENT: 600 ML
VT ON VENT VENT: 602 ML
VT ON VENT VENT: 631 ML
VT ON VENT VENT: 673 ML
VT ON VENT VENT: 713 ML
VT ON VENT VENT: 750 ML
WBC NRBC COR # BLD: 20.03 10*3/MM3 (ref 4.5–10.7)
WBC NRBC COR # BLD: 21.05 10*3/MM3 (ref 4.5–10.7)
WBC UR QL AUTO: ABNORMAL /HPF

## 2017-02-17 PROCEDURE — 93005 ELECTROCARDIOGRAM TRACING: CPT | Performed by: THORACIC SURGERY (CARDIOTHORACIC VASCULAR SURGERY)

## 2017-02-17 PROCEDURE — 83735 ASSAY OF MAGNESIUM: CPT | Performed by: THORACIC SURGERY (CARDIOTHORACIC VASCULAR SURGERY)

## 2017-02-17 PROCEDURE — 94003 VENT MGMT INPAT SUBQ DAY: CPT

## 2017-02-17 PROCEDURE — 85610 PROTHROMBIN TIME: CPT | Performed by: THORACIC SURGERY (CARDIOTHORACIC VASCULAR SURGERY)

## 2017-02-17 PROCEDURE — 25010000002 AMIODARONE IN DEXTROSE 5% 360 MG/200ML SOLUTION: Performed by: THORACIC SURGERY (CARDIOTHORACIC VASCULAR SURGERY)

## 2017-02-17 PROCEDURE — 25010000002 MORPHINE PER 10 MG: Performed by: THORACIC SURGERY (CARDIOTHORACIC VASCULAR SURGERY)

## 2017-02-17 PROCEDURE — 82803 BLOOD GASES ANY COMBINATION: CPT | Performed by: THORACIC SURGERY (CARDIOTHORACIC VASCULAR SURGERY)

## 2017-02-17 PROCEDURE — 87486 CHLMYD PNEUM DNA AMP PROBE: CPT | Performed by: INTERNAL MEDICINE

## 2017-02-17 PROCEDURE — 88112 CYTOPATH CELL ENHANCE TECH: CPT | Performed by: INTERNAL MEDICINE

## 2017-02-17 PROCEDURE — 87206 SMEAR FLUORESCENT/ACID STAI: CPT | Performed by: INTERNAL MEDICINE

## 2017-02-17 PROCEDURE — 81001 URINALYSIS AUTO W/SCOPE: CPT | Performed by: NURSE PRACTITIONER

## 2017-02-17 PROCEDURE — 82962 GLUCOSE BLOOD TEST: CPT

## 2017-02-17 PROCEDURE — 88305 TISSUE EXAM BY PATHOLOGIST: CPT | Performed by: INTERNAL MEDICINE

## 2017-02-17 PROCEDURE — 25010000002 PROPOFOL 1000 MG/ML EMULSION: Performed by: THORACIC SURGERY (CARDIOTHORACIC VASCULAR SURGERY)

## 2017-02-17 PROCEDURE — 87116 MYCOBACTERIA CULTURE: CPT | Performed by: INTERNAL MEDICINE

## 2017-02-17 PROCEDURE — 87071 CULTURE AEROBIC QUANT OTHER: CPT | Performed by: INTERNAL MEDICINE

## 2017-02-17 PROCEDURE — 80069 RENAL FUNCTION PANEL: CPT | Performed by: THORACIC SURGERY (CARDIOTHORACIC VASCULAR SURGERY)

## 2017-02-17 PROCEDURE — 82330 ASSAY OF CALCIUM: CPT | Performed by: THORACIC SURGERY (CARDIOTHORACIC VASCULAR SURGERY)

## 2017-02-17 PROCEDURE — 85025 COMPLETE CBC W/AUTO DIFF WBC: CPT | Performed by: INTERNAL MEDICINE

## 2017-02-17 PROCEDURE — 87633 RESP VIRUS 12-25 TARGETS: CPT | Performed by: INTERNAL MEDICINE

## 2017-02-17 PROCEDURE — 88312 SPECIAL STAINS GROUP 1: CPT | Performed by: INTERNAL MEDICINE

## 2017-02-17 PROCEDURE — 25010000002 AMIODARONE IN DEXTROSE 5% 360 MG/200ML SOLUTION: Performed by: INTERNAL MEDICINE

## 2017-02-17 PROCEDURE — 25010000002 PROPOFOL 10 MG/ML EMULSION: Performed by: THORACIC SURGERY (CARDIOTHORACIC VASCULAR SURGERY)

## 2017-02-17 PROCEDURE — 82803 BLOOD GASES ANY COMBINATION: CPT

## 2017-02-17 PROCEDURE — 84550 ASSAY OF BLOOD/URIC ACID: CPT | Performed by: INTERNAL MEDICINE

## 2017-02-17 PROCEDURE — 71010 HC CHEST PA OR AP: CPT

## 2017-02-17 PROCEDURE — 87798 DETECT AGENT NOS DNA AMP: CPT | Performed by: INTERNAL MEDICINE

## 2017-02-17 PROCEDURE — 0BCB8ZZ EXTIRPATION OF MATTER FROM LEFT LOWER LOBE BRONCHUS, VIA NATURAL OR ARTIFICIAL OPENING ENDOSCOPIC: ICD-10-PCS | Performed by: INTERNAL MEDICINE

## 2017-02-17 PROCEDURE — 99232 SBSQ HOSP IP/OBS MODERATE 35: CPT | Performed by: INTERNAL MEDICINE

## 2017-02-17 PROCEDURE — 80053 COMPREHEN METABOLIC PANEL: CPT | Performed by: INTERNAL MEDICINE

## 2017-02-17 PROCEDURE — 84132 ASSAY OF SERUM POTASSIUM: CPT | Performed by: THORACIC SURGERY (CARDIOTHORACIC VASCULAR SURGERY)

## 2017-02-17 PROCEDURE — 89051 BODY FLUID CELL COUNT: CPT | Performed by: INTERNAL MEDICINE

## 2017-02-17 PROCEDURE — 94799 UNLISTED PULMONARY SVC/PX: CPT

## 2017-02-17 PROCEDURE — 84100 ASSAY OF PHOSPHORUS: CPT | Performed by: INTERNAL MEDICINE

## 2017-02-17 PROCEDURE — 94640 AIRWAY INHALATION TREATMENT: CPT

## 2017-02-17 PROCEDURE — 93010 ELECTROCARDIOGRAM REPORT: CPT | Performed by: INTERNAL MEDICINE

## 2017-02-17 PROCEDURE — 83735 ASSAY OF MAGNESIUM: CPT | Performed by: INTERNAL MEDICINE

## 2017-02-17 PROCEDURE — 84145 PROCALCITONIN (PCT): CPT | Performed by: INTERNAL MEDICINE

## 2017-02-17 PROCEDURE — 85025 COMPLETE CBC W/AUTO DIFF WBC: CPT | Performed by: THORACIC SURGERY (CARDIOTHORACIC VASCULAR SURGERY)

## 2017-02-17 PROCEDURE — 87205 SMEAR GRAM STAIN: CPT | Performed by: INTERNAL MEDICINE

## 2017-02-17 PROCEDURE — 0B9B8ZX DRAINAGE OF LEFT LOWER LOBE BRONCHUS, VIA NATURAL OR ARTIFICIAL OPENING ENDOSCOPIC, DIAGNOSTIC: ICD-10-PCS | Performed by: INTERNAL MEDICINE

## 2017-02-17 PROCEDURE — 87040 BLOOD CULTURE FOR BACTERIA: CPT | Performed by: NURSE PRACTITIONER

## 2017-02-17 PROCEDURE — 87581 M.PNEUMON DNA AMP PROBE: CPT | Performed by: INTERNAL MEDICINE

## 2017-02-17 PROCEDURE — 25010000002 AMIODARONE IN DEXTROSE 5% 150 MG/100ML SOLUTION

## 2017-02-17 PROCEDURE — 87102 FUNGUS ISOLATION CULTURE: CPT | Performed by: INTERNAL MEDICINE

## 2017-02-17 PROCEDURE — 99024 POSTOP FOLLOW-UP VISIT: CPT | Performed by: NURSE PRACTITIONER

## 2017-02-17 PROCEDURE — 25010000002 AMIODARONE IN DEXTROSE 5% 360 MG/200ML SOLUTION

## 2017-02-17 RX ORDER — LIDOCAINE HYDROCHLORIDE 10 MG/ML
INJECTION, SOLUTION EPIDURAL; INFILTRATION; INTRACAUDAL; PERINEURAL AS NEEDED
Status: DISCONTINUED | OUTPATIENT
Start: 2017-02-17 | End: 2017-02-17 | Stop reason: HOSPADM

## 2017-02-17 RX ORDER — METOPROLOL TARTRATE 5 MG/5ML
INJECTION INTRAVENOUS
Status: COMPLETED
Start: 2017-02-17 | End: 2017-02-17

## 2017-02-17 RX ORDER — ALBUMIN, HUMAN INJ 5% 5 %
SOLUTION INTRAVENOUS
Status: DISPENSED
Start: 2017-02-17 | End: 2017-02-17

## 2017-02-17 RX ADMIN — DEXMEDETOMIDINE HYDROCHLORIDE 0.7 MCG/KG/HR: 4 INJECTION, SOLUTION INTRAVENOUS at 00:01

## 2017-02-17 RX ADMIN — METOPROLOL TARTRATE 12.5 MG: 25 TABLET ORAL at 17:02

## 2017-02-17 RX ADMIN — AMIODARONE HYDROCHLORIDE 150 MG: 1.5 INJECTION, SOLUTION INTRAVENOUS at 01:15

## 2017-02-17 RX ADMIN — BUMETANIDE 1 MG/HR: 0.25 INJECTION, SOLUTION INTRAMUSCULAR; INTRAVENOUS at 01:09

## 2017-02-17 RX ADMIN — PANTOPRAZOLE SODIUM 40 MG: 40 INJECTION, POWDER, FOR SOLUTION INTRAVENOUS at 06:51

## 2017-02-17 RX ADMIN — CLINDAMYCIN PHOSPHATE 450 MG: 150 INJECTION, SOLUTION, CONCENTRATE INTRAVENOUS at 18:58

## 2017-02-17 RX ADMIN — MORPHINE SULFATE 4 MG: 4 INJECTION, SOLUTION INTRAMUSCULAR; INTRAVENOUS at 00:23

## 2017-02-17 RX ADMIN — MILRINONE LACTATE 0.25 MCG/KG/MIN: 200 INJECTION, SOLUTION INTRAVENOUS at 22:57

## 2017-02-17 RX ADMIN — OXYCODONE HYDROCHLORIDE 10 MG: 5 TABLET ORAL at 13:07

## 2017-02-17 RX ADMIN — DEXMEDETOMIDINE HYDROCHLORIDE 0.7 MCG/KG/HR: 4 INJECTION, SOLUTION INTRAVENOUS at 14:34

## 2017-02-17 RX ADMIN — PROPOFOL 40 MCG/KG/MIN: 10 INJECTION, EMULSION INTRAVENOUS at 10:07

## 2017-02-17 RX ADMIN — METOROPROLOL TARTRATE 5 MG: 5 INJECTION, SOLUTION INTRAVENOUS at 07:35

## 2017-02-17 RX ADMIN — AMIODARONE HYDROCHLORIDE 1 MG/MIN: 1.8 INJECTION, SOLUTION INTRAVENOUS at 14:23

## 2017-02-17 RX ADMIN — PHENYLEPHRINE HYDROCHLORIDE 0.9 MCG/KG/MIN: 10 INJECTION INTRAVENOUS at 15:42

## 2017-02-17 RX ADMIN — OXYCODONE HYDROCHLORIDE 10 MG: 5 TABLET ORAL at 17:03

## 2017-02-17 RX ADMIN — PHENYLEPHRINE HYDROCHLORIDE 0.3 MCG/KG/MIN: 10 INJECTION INTRAVENOUS at 01:20

## 2017-02-17 RX ADMIN — ALBUTEROL SULFATE 6 PUFF: 90 AEROSOL, METERED RESPIRATORY (INHALATION) at 19:47

## 2017-02-17 RX ADMIN — MUPIROCIN 1 APPLICATION: 20 OINTMENT TOPICAL at 08:56

## 2017-02-17 RX ADMIN — ALBUTEROL SULFATE 6 PUFF: 90 AEROSOL, METERED RESPIRATORY (INHALATION) at 16:15

## 2017-02-17 RX ADMIN — AMIODARONE HYDROCHLORIDE 1 MG/MIN: 1.8 INJECTION, SOLUTION INTRAVENOUS at 11:30

## 2017-02-17 RX ADMIN — MILRINONE LACTATE 0.12 MCG/KG/MIN: 200 INJECTION, SOLUTION INTRAVENOUS at 12:23

## 2017-02-17 RX ADMIN — METOPROLOL TARTRATE 5 MG: 1 INJECTION, SOLUTION INTRAVENOUS at 09:30

## 2017-02-17 RX ADMIN — METOPROLOL TARTRATE 5 MG: 5 INJECTION INTRAVENOUS at 08:24

## 2017-02-17 RX ADMIN — ACETAMINOPHEN 650 MG: 325 TABLET ORAL at 02:59

## 2017-02-17 RX ADMIN — PROPOFOL 20 MCG/KG/MIN: 10 INJECTION, EMULSION INTRAVENOUS at 04:36

## 2017-02-17 RX ADMIN — ASPIRIN 81 MG: 81 TABLET ORAL at 08:27

## 2017-02-17 RX ADMIN — DEXMEDETOMIDINE HYDROCHLORIDE 0.7 MCG/KG/HR: 4 INJECTION, SOLUTION INTRAVENOUS at 10:08

## 2017-02-17 RX ADMIN — ACETAMINOPHEN 650 MG: 325 TABLET ORAL at 13:07

## 2017-02-17 RX ADMIN — DEXMEDETOMIDINE HYDROCHLORIDE 0.7 MCG/KG/HR: 4 INJECTION, SOLUTION INTRAVENOUS at 05:14

## 2017-02-17 RX ADMIN — AMIODARONE HYDROCHLORIDE 1 MG/MIN: 1.8 INJECTION, SOLUTION INTRAVENOUS at 00:24

## 2017-02-17 RX ADMIN — OXYCODONE HYDROCHLORIDE 10 MG: 5 TABLET ORAL at 21:06

## 2017-02-17 RX ADMIN — CLINDAMYCIN PHOSPHATE 450 MG: 150 INJECTION, SOLUTION, CONCENTRATE INTRAVENOUS at 12:40

## 2017-02-17 RX ADMIN — PROPOFOL 40 MCG/KG/MIN: 10 INJECTION, EMULSION INTRAVENOUS at 20:48

## 2017-02-17 RX ADMIN — MORPHINE SULFATE 4 MG: 4 INJECTION, SOLUTION INTRAMUSCULAR; INTRAVENOUS at 15:41

## 2017-02-17 RX ADMIN — DEXMEDETOMIDINE HYDROCHLORIDE 0.7 MCG/KG/HR: 4 INJECTION, SOLUTION INTRAVENOUS at 20:06

## 2017-02-17 RX ADMIN — DILTIAZEM HYDROCHLORIDE 8 MG/HR: 100 INJECTION, POWDER, LYOPHILIZED, FOR SOLUTION INTRAVENOUS at 10:08

## 2017-02-17 RX ADMIN — ATORVASTATIN CALCIUM 40 MG: 40 TABLET, FILM COATED ORAL at 21:06

## 2017-02-17 RX ADMIN — PROPOFOL 40 MCG/KG/MIN: 10 INJECTION, EMULSION INTRAVENOUS at 17:02

## 2017-02-17 RX ADMIN — PROPOFOL 40 MCG/KG/MIN: 10 INJECTION, EMULSION INTRAVENOUS at 13:50

## 2017-02-17 RX ADMIN — SODIUM BICARBONATE 50 MEQ: 84 INJECTION, SOLUTION INTRAVENOUS at 12:06

## 2017-02-17 RX ADMIN — OXYCODONE HYDROCHLORIDE 10 MG: 5 TABLET ORAL at 08:24

## 2017-02-17 RX ADMIN — AMIODARONE HYDROCHLORIDE 1 MG/MIN: 1.8 INJECTION, SOLUTION INTRAVENOUS at 20:06

## 2017-02-17 RX ADMIN — METOPROLOL TARTRATE 12.5 MG: 25 TABLET ORAL at 08:27

## 2017-02-17 RX ADMIN — DILTIAZEM HYDROCHLORIDE 10 MG/HR: 100 INJECTION, POWDER, LYOPHILIZED, FOR SOLUTION INTRAVENOUS at 01:54

## 2017-02-17 RX ADMIN — AMIODARONE HYDROCHLORIDE 0.5 MG/MIN: 1.8 INJECTION, SOLUTION INTRAVENOUS at 08:07

## 2017-02-18 ENCOUNTER — APPOINTMENT (OUTPATIENT)
Dept: GENERAL RADIOLOGY | Facility: HOSPITAL | Age: 57
End: 2017-02-18

## 2017-02-18 ENCOUNTER — APPOINTMENT (OUTPATIENT)
Dept: CARDIOLOGY | Facility: HOSPITAL | Age: 57
End: 2017-02-18
Attending: INTERNAL MEDICINE

## 2017-02-18 LAB
ABO + RH BLD: NORMAL
ALBUMIN SERPL-MCNC: 3.1 G/DL (ref 3.5–5.2)
ALBUMIN/GLOB SERPL: 1.2 G/DL
ALP SERPL-CCNC: 77 U/L (ref 39–117)
ALT SERPL W P-5'-P-CCNC: 117 U/L (ref 1–41)
ANION GAP SERPL CALCULATED.3IONS-SCNC: 18.6 MMOL/L
ARTERIAL PATENCY WRIST A: ABNORMAL
AST SERPL-CCNC: 213 U/L (ref 1–40)
ATMOSPHERIC PRESS: 744.5 MMHG
ATMOSPHERIC PRESS: 744.6 MMHG
ATMOSPHERIC PRESS: 744.8 MMHG
ATMOSPHERIC PRESS: 745.2 MMHG
ATMOSPHERIC PRESS: 746.3 MMHG
BASE EXCESS BLDA CALC-SCNC: -1.1 MMOL/L (ref 0–2)
BASE EXCESS BLDA CALC-SCNC: -1.2 MMOL/L (ref 0–2)
BASE EXCESS BLDA CALC-SCNC: -1.2 MMOL/L (ref 0–2)
BASE EXCESS BLDA CALC-SCNC: -1.4 MMOL/L (ref 0–2)
BASE EXCESS BLDV CALC-SCNC: -1.1 MMOL/L
BASOPHILS # BLD AUTO: 0.04 10*3/MM3 (ref 0–0.2)
BASOPHILS NFR BLD AUTO: 0.2 % (ref 0–1.5)
BDY SITE: ABNORMAL
BH BB BLOOD EXPIRATION DATE: NORMAL
BH BB BLOOD TYPE BARCODE: 5100
BH BB DISPENSE STATUS: NORMAL
BH BB PRODUCT CODE: NORMAL
BH BB UNIT NUMBER: NORMAL
BH CV ECHO MEAS - BSA(HAYCOCK): 2.6 M^2
BH CV ECHO MEAS - BSA: 2.4 M^2
BH CV ECHO MEAS - BZI_BMI: 37.2 KILOGRAMS/M^2
BH CV ECHO MEAS - BZI_METRIC_HEIGHT: 182.9 CM
BH CV ECHO MEAS - BZI_METRIC_WEIGHT: 124.3 KG
BH CV ECHO MEAS - TR MAX VEL: 211 CM/SEC
BH CV LOWER VASCULAR LEFT COMMON FEMORAL AUGMENT: NORMAL
BH CV LOWER VASCULAR LEFT COMMON FEMORAL COMPETENT: NORMAL
BH CV LOWER VASCULAR LEFT COMMON FEMORAL COMPRESS: NORMAL
BH CV LOWER VASCULAR LEFT COMMON FEMORAL PHASIC: NORMAL
BH CV LOWER VASCULAR LEFT COMMON FEMORAL SPONT: NORMAL
BH CV LOWER VASCULAR LEFT DISTAL FEMORAL COMPRESS: NORMAL
BH CV LOWER VASCULAR LEFT GASTRONEMIUS COMPRESS: NORMAL
BH CV LOWER VASCULAR LEFT GREATER SAPH AK COMPRESS: NORMAL
BH CV LOWER VASCULAR LEFT GREATER SAPH BK COMPRESS: NORMAL
BH CV LOWER VASCULAR LEFT LESSER SAPH COMPRESS: NORMAL
BH CV LOWER VASCULAR LEFT MID FEMORAL AUGMENT: NORMAL
BH CV LOWER VASCULAR LEFT MID FEMORAL COMPETENT: NORMAL
BH CV LOWER VASCULAR LEFT MID FEMORAL COMPRESS: NORMAL
BH CV LOWER VASCULAR LEFT MID FEMORAL PHASIC: NORMAL
BH CV LOWER VASCULAR LEFT MID FEMORAL SPONT: NORMAL
BH CV LOWER VASCULAR LEFT PERONEAL COMPRESS: NORMAL
BH CV LOWER VASCULAR LEFT POPLITEAL AUGMENT: NORMAL
BH CV LOWER VASCULAR LEFT POPLITEAL COMPETENT: NORMAL
BH CV LOWER VASCULAR LEFT POPLITEAL COMPRESS: NORMAL
BH CV LOWER VASCULAR LEFT POPLITEAL PHASIC: NORMAL
BH CV LOWER VASCULAR LEFT POPLITEAL SPONT: NORMAL
BH CV LOWER VASCULAR LEFT POSTERIOR TIBIAL COMPRESS: NORMAL
BH CV LOWER VASCULAR LEFT PROXIMAL FEMORAL COMPRESS: NORMAL
BH CV LOWER VASCULAR LEFT SAPHENOFEMORAL JUNCTION AUGMENT: NORMAL
BH CV LOWER VASCULAR LEFT SAPHENOFEMORAL JUNCTION COMPRESS: NORMAL
BH CV LOWER VASCULAR LEFT SAPHENOFEMORAL JUNCTION PHASIC: NORMAL
BH CV LOWER VASCULAR LEFT SAPHENOFEMORAL JUNCTION SPONT: NORMAL
BH CV LOWER VASCULAR RIGHT COMMON FEMORAL AUGMENT: NORMAL
BH CV LOWER VASCULAR RIGHT COMMON FEMORAL COMPETENT: NORMAL
BH CV LOWER VASCULAR RIGHT COMMON FEMORAL COMPRESS: NORMAL
BH CV LOWER VASCULAR RIGHT COMMON FEMORAL PHASIC: NORMAL
BH CV LOWER VASCULAR RIGHT COMMON FEMORAL SPONT: NORMAL
BH CV LOWER VASCULAR RIGHT DISTAL FEMORAL COMPRESS: NORMAL
BH CV LOWER VASCULAR RIGHT GASTRONEMIUS COMPRESS: NORMAL
BH CV LOWER VASCULAR RIGHT GREATER SAPH AK COMPRESS: NORMAL
BH CV LOWER VASCULAR RIGHT GREATER SAPH BK COMPRESS: NORMAL
BH CV LOWER VASCULAR RIGHT LESSER SAPH COMPRESS: NORMAL
BH CV LOWER VASCULAR RIGHT MID FEMORAL AUGMENT: NORMAL
BH CV LOWER VASCULAR RIGHT MID FEMORAL COMPETENT: NORMAL
BH CV LOWER VASCULAR RIGHT MID FEMORAL COMPRESS: NORMAL
BH CV LOWER VASCULAR RIGHT MID FEMORAL PHASIC: NORMAL
BH CV LOWER VASCULAR RIGHT MID FEMORAL SPONT: NORMAL
BH CV LOWER VASCULAR RIGHT PERONEAL COMPRESS: NORMAL
BH CV LOWER VASCULAR RIGHT POPLITEAL AUGMENT: NORMAL
BH CV LOWER VASCULAR RIGHT POPLITEAL COMPETENT: NORMAL
BH CV LOWER VASCULAR RIGHT POPLITEAL COMPRESS: NORMAL
BH CV LOWER VASCULAR RIGHT POPLITEAL PHASIC: NORMAL
BH CV LOWER VASCULAR RIGHT POPLITEAL SPONT: NORMAL
BH CV LOWER VASCULAR RIGHT POSTERIOR TIBIAL COMPRESS: NORMAL
BH CV LOWER VASCULAR RIGHT PROXIMAL FEMORAL COMPRESS: NORMAL
BH CV LOWER VASCULAR RIGHT SAPHENOFEMORAL JUNCTION AUGMENT: NORMAL
BH CV LOWER VASCULAR RIGHT SAPHENOFEMORAL JUNCTION COMPRESS: NORMAL
BH CV LOWER VASCULAR RIGHT SAPHENOFEMORAL JUNCTION PHASIC: NORMAL
BH CV LOWER VASCULAR RIGHT SAPHENOFEMORAL JUNCTION SPONT: NORMAL
BILIRUB SERPL-MCNC: 0.3 MG/DL (ref 0.1–1.2)
BUN BLD-MCNC: 71 MG/DL (ref 6–20)
BUN/CREAT SERPL: 17.4 (ref 7–25)
CALCIUM SPEC-SCNC: 8.2 MG/DL (ref 8.6–10.5)
CHLORIDE SERPL-SCNC: 104 MMOL/L (ref 98–107)
CO2 SERPL-SCNC: 22.4 MMOL/L (ref 22–29)
CREAT BLD-MCNC: 4.09 MG/DL (ref 0.76–1.27)
DEPRECATED RDW RBC AUTO: 46.7 FL (ref 37–54)
EOSINOPHIL # BLD AUTO: 0.15 10*3/MM3 (ref 0–0.7)
EOSINOPHIL NFR BLD AUTO: 0.8 % (ref 0.3–6.2)
ERYTHROCYTE [DISTWIDTH] IN BLOOD BY AUTOMATED COUNT: 14.3 % (ref 11.5–14.5)
GFR SERPL CREATININE-BSD FRML MDRD: 15 ML/MIN/1.73
GIE STN SPEC: NORMAL
GLOBULIN UR ELPH-MCNC: 2.6 GM/DL
GLUCOSE BLD-MCNC: 137 MG/DL (ref 65–99)
GLUCOSE BLDC GLUCOMTR-MCNC: 106 MG/DL (ref 70–130)
GLUCOSE BLDC GLUCOMTR-MCNC: 108 MG/DL (ref 70–130)
GLUCOSE BLDC GLUCOMTR-MCNC: 110 MG/DL (ref 70–130)
GLUCOSE BLDC GLUCOMTR-MCNC: 116 MG/DL (ref 70–130)
GLUCOSE BLDC GLUCOMTR-MCNC: 120 MG/DL (ref 70–130)
GLUCOSE BLDC GLUCOMTR-MCNC: 125 MG/DL (ref 70–130)
GLUCOSE BLDC GLUCOMTR-MCNC: 126 MG/DL (ref 70–130)
GLUCOSE BLDC GLUCOMTR-MCNC: 126 MG/DL (ref 70–130)
GLUCOSE BLDC GLUCOMTR-MCNC: 133 MG/DL (ref 70–130)
HCO3 BLDA-SCNC: 23.2 MMOL/L (ref 22–28)
HCO3 BLDA-SCNC: 23.4 MMOL/L (ref 22–28)
HCO3 BLDA-SCNC: 23.9 MMOL/L (ref 22–28)
HCO3 BLDA-SCNC: 24 MMOL/L (ref 22–28)
HCO3 BLDV-SCNC: 25.3 MMOL/L (ref 22–28)
HCT VFR BLD AUTO: 33.6 % (ref 40.4–52.2)
HGB BLD-MCNC: 11.3 G/DL (ref 13.7–17.6)
HOROWITZ INDEX BLD+IHG-RTO: 60 %
HOROWITZ INDEX BLD+IHG-RTO: 70 %
HOROWITZ INDEX BLD+IHG-RTO: 90 %
IMM GRANULOCYTES # BLD: 0.07 10*3/MM3 (ref 0–0.03)
IMM GRANULOCYTES NFR BLD: 0.4 % (ref 0–0.5)
LV EF 2D ECHO EST: 60 %
LYMPHOCYTES # BLD AUTO: 1.78 10*3/MM3 (ref 0.9–4.8)
LYMPHOCYTES NFR BLD AUTO: 9 % (ref 19.6–45.3)
MAGNESIUM SERPL-MCNC: 2.3 MG/DL (ref 1.6–2.6)
MAGNESIUM SERPL-MCNC: 2.4 MG/DL (ref 1.6–2.6)
MCH RBC QN AUTO: 29.8 PG (ref 27–32.7)
MCHC RBC AUTO-ENTMCNC: 33.6 G/DL (ref 32.6–36.4)
MCV RBC AUTO: 88.7 FL (ref 79.8–96.2)
MODALITY: ABNORMAL
MONOCYTES # BLD AUTO: 1.34 10*3/MM3 (ref 0.2–1.2)
MONOCYTES NFR BLD AUTO: 6.7 % (ref 5–12)
NEUTROPHILS # BLD AUTO: 16.48 10*3/MM3 (ref 1.9–8.1)
NEUTROPHILS NFR BLD AUTO: 82.9 % (ref 42.7–76)
O2 A-A PPRESDIFF RESPIRATORY: 0.1 MMHG
O2 A-A PPRESDIFF RESPIRATORY: 0.2 MMHG
PCO2 BLDA: 36.9 MM HG (ref 35–45)
PCO2 BLDA: 37.3 MM HG (ref 35–45)
PCO2 BLDA: 40.5 MM HG (ref 35–45)
PCO2 BLDA: 42 MM HG (ref 35–45)
PCO2 BLDV: 48.7 MM HG (ref 41–51)
PEEP RESPIRATORY: 12 CM[H2O]
PH BLDA: 7.37 PH UNITS (ref 7.35–7.45)
PH BLDA: 7.38 PH UNITS (ref 7.35–7.45)
PH BLDA: 7.41 PH UNITS (ref 7.35–7.45)
PH BLDA: 7.41 PH UNITS (ref 7.35–7.45)
PH BLDV: 7.32 [PH] (ref 7.31–7.41)
PHOSPHATE SERPL-MCNC: 4.9 MG/DL (ref 2.5–4.5)
PHOSPHATE SERPL-MCNC: 6.8 MG/DL (ref 2.5–4.5)
PLATELET # BLD AUTO: 127 10*3/MM3 (ref 140–500)
PMV BLD AUTO: 12.2 FL (ref 6–12)
PO2 BLDA: 68.5 MM HG (ref 80–100)
PO2 BLDA: 78.2 MM HG (ref 80–100)
PO2 BLDA: 78.5 MM HG (ref 80–100)
PO2 BLDA: 98.9 MM HG (ref 80–100)
PO2 BLDV: 35 MM HG (ref 35–45)
POTASSIUM BLD-SCNC: 3.7 MMOL/L (ref 3.5–5.2)
POTASSIUM BLD-SCNC: 3.9 MMOL/L (ref 3.5–5.2)
PROT SERPL-MCNC: 5.7 G/DL (ref 6–8.5)
RBC # BLD AUTO: 3.79 10*6/MM3 (ref 4.6–6)
SAO2 % BLDCOA: 61.8 % (ref 92–99)
SAO2 % BLDCOA: 92.7 % (ref 92–99)
SAO2 % BLDCOA: 95.6 % (ref 92–99)
SAO2 % BLDCOA: 95.6 % (ref 92–99)
SAO2 % BLDCOA: 97.5 % (ref 92–99)
SET MECH RESP RATE: 12
SET MECH RESP RATE: 14
SET MECH RESP RATE: 14
SODIUM BLD-SCNC: 145 MMOL/L (ref 136–145)
T4 FREE SERPL-MCNC: 0.68 NG/DL (ref 0.93–1.7)
TOTAL RATE: 12 BREATHS/MINUTE
TOTAL RATE: 14 BREATHS/MINUTE
TOTAL RATE: 14 BREATHS/MINUTE
TOTAL RATE: 18 BREATHS/MINUTE
TOTAL RATE: 18 BREATHS/MINUTE
UNIT  ABO: NORMAL
UNIT  RH: NORMAL
URATE SERPL-MCNC: 13.3 MG/DL (ref 3.4–7)
VENTILATOR MODE: ABNORMAL
VT ON VENT VENT: 600 ML
VT ON VENT VENT: 600 ML
VT ON VENT VENT: 800 ML
WBC NRBC COR # BLD: 19.86 10*3/MM3 (ref 4.5–10.7)

## 2017-02-18 PROCEDURE — 93325 DOPPLER ECHO COLOR FLOW MAPG: CPT

## 2017-02-18 PROCEDURE — 71010 HC CHEST PA OR AP: CPT

## 2017-02-18 PROCEDURE — 84550 ASSAY OF BLOOD/URIC ACID: CPT | Performed by: INTERNAL MEDICINE

## 2017-02-18 PROCEDURE — 25010000002 AMIODARONE IN DEXTROSE 5% 150 MG/100ML SOLUTION: Performed by: INTERNAL MEDICINE

## 2017-02-18 PROCEDURE — 82962 GLUCOSE BLOOD TEST: CPT

## 2017-02-18 PROCEDURE — 93320 DOPPLER ECHO COMPLETE: CPT | Performed by: INTERNAL MEDICINE

## 2017-02-18 PROCEDURE — 84100 ASSAY OF PHOSPHORUS: CPT | Performed by: THORACIC SURGERY (CARDIOTHORACIC VASCULAR SURGERY)

## 2017-02-18 PROCEDURE — 93325 DOPPLER ECHO COLOR FLOW MAPG: CPT | Performed by: INTERNAL MEDICINE

## 2017-02-18 PROCEDURE — 93312 ECHO TRANSESOPHAGEAL: CPT | Performed by: INTERNAL MEDICINE

## 2017-02-18 PROCEDURE — 80053 COMPREHEN METABOLIC PANEL: CPT | Performed by: INTERNAL MEDICINE

## 2017-02-18 PROCEDURE — 25010000002 PROPOFOL 1000 MG/ML EMULSION: Performed by: THORACIC SURGERY (CARDIOTHORACIC VASCULAR SURGERY)

## 2017-02-18 PROCEDURE — 25010000002 INSULIN REGULAR HUMAN PER 5 UNITS: Performed by: THORACIC SURGERY (CARDIOTHORACIC VASCULAR SURGERY)

## 2017-02-18 PROCEDURE — 93005 ELECTROCARDIOGRAM TRACING: CPT | Performed by: THORACIC SURGERY (CARDIOTHORACIC VASCULAR SURGERY)

## 2017-02-18 PROCEDURE — 93970 EXTREMITY STUDY: CPT

## 2017-02-18 PROCEDURE — 93312 ECHO TRANSESOPHAGEAL: CPT

## 2017-02-18 PROCEDURE — 99232 SBSQ HOSP IP/OBS MODERATE 35: CPT | Performed by: INTERNAL MEDICINE

## 2017-02-18 PROCEDURE — 84100 ASSAY OF PHOSPHORUS: CPT | Performed by: INTERNAL MEDICINE

## 2017-02-18 PROCEDURE — 93010 ELECTROCARDIOGRAM REPORT: CPT | Performed by: INTERNAL MEDICINE

## 2017-02-18 PROCEDURE — 25010000002 AMIODARONE IN DEXTROSE 5% 360 MG/200ML SOLUTION: Performed by: INTERNAL MEDICINE

## 2017-02-18 PROCEDURE — 84132 ASSAY OF SERUM POTASSIUM: CPT | Performed by: THORACIC SURGERY (CARDIOTHORACIC VASCULAR SURGERY)

## 2017-02-18 PROCEDURE — 25010000002 ENOXAPARIN PER 10 MG: Performed by: THORACIC SURGERY (CARDIOTHORACIC VASCULAR SURGERY)

## 2017-02-18 PROCEDURE — 83735 ASSAY OF MAGNESIUM: CPT | Performed by: THORACIC SURGERY (CARDIOTHORACIC VASCULAR SURGERY)

## 2017-02-18 PROCEDURE — 82803 BLOOD GASES ANY COMBINATION: CPT

## 2017-02-18 PROCEDURE — 99024 POSTOP FOLLOW-UP VISIT: CPT | Performed by: THORACIC SURGERY (CARDIOTHORACIC VASCULAR SURGERY)

## 2017-02-18 PROCEDURE — 94799 UNLISTED PULMONARY SVC/PX: CPT

## 2017-02-18 PROCEDURE — 25010000002 EPINEPHRINE PER 0.1 MG: Performed by: THORACIC SURGERY (CARDIOTHORACIC VASCULAR SURGERY)

## 2017-02-18 PROCEDURE — 83735 ASSAY OF MAGNESIUM: CPT | Performed by: INTERNAL MEDICINE

## 2017-02-18 PROCEDURE — 84439 ASSAY OF FREE THYROXINE: CPT | Performed by: INTERNAL MEDICINE

## 2017-02-18 PROCEDURE — 94640 AIRWAY INHALATION TREATMENT: CPT

## 2017-02-18 PROCEDURE — 93320 DOPPLER ECHO COMPLETE: CPT

## 2017-02-18 PROCEDURE — 85025 COMPLETE CBC W/AUTO DIFF WBC: CPT | Performed by: INTERNAL MEDICINE

## 2017-02-18 RX ORDER — DEXTROSE MONOHYDRATE 25 G/50ML
25 INJECTION, SOLUTION INTRAVENOUS
Status: DISCONTINUED | OUTPATIENT
Start: 2017-02-18 | End: 2017-03-02 | Stop reason: HOSPADM

## 2017-02-18 RX ORDER — NICOTINE POLACRILEX 4 MG
15 LOZENGE BUCCAL
Status: DISCONTINUED | OUTPATIENT
Start: 2017-02-18 | End: 2017-03-02 | Stop reason: HOSPADM

## 2017-02-18 RX ADMIN — METOPROLOL TARTRATE 12.5 MG: 25 TABLET ORAL at 10:10

## 2017-02-18 RX ADMIN — AMIODARONE HYDROCHLORIDE 150 MG: 1.5 INJECTION, SOLUTION INTRAVENOUS at 16:24

## 2017-02-18 RX ADMIN — MUPIROCIN 1 APPLICATION: 20 OINTMENT TOPICAL at 10:10

## 2017-02-18 RX ADMIN — METOPROLOL TARTRATE 12.5 MG: 25 TABLET ORAL at 17:42

## 2017-02-18 RX ADMIN — MILRINONE LACTATE 0.25 MCG/KG/MIN: 200 INJECTION, SOLUTION INTRAVENOUS at 07:59

## 2017-02-18 RX ADMIN — PHENYLEPHRINE HYDROCHLORIDE 1.05 MCG/KG/MIN: 10 INJECTION INTRAVENOUS at 07:58

## 2017-02-18 RX ADMIN — PANTOPRAZOLE SODIUM 40 MG: 40 TABLET, DELAYED RELEASE ORAL at 05:53

## 2017-02-18 RX ADMIN — PROPOFOL 40 MCG/KG/MIN: 10 INJECTION, EMULSION INTRAVENOUS at 03:30

## 2017-02-18 RX ADMIN — DEXMEDETOMIDINE HYDROCHLORIDE 0.7 MCG/KG/HR: 4 INJECTION, SOLUTION INTRAVENOUS at 01:19

## 2017-02-18 RX ADMIN — PROPOFOL 30 MCG/KG/MIN: 10 INJECTION, EMULSION INTRAVENOUS at 12:31

## 2017-02-18 RX ADMIN — DEXMEDETOMIDINE HYDROCHLORIDE 0.7 MCG/KG/HR: 4 INJECTION, SOLUTION INTRAVENOUS at 14:37

## 2017-02-18 RX ADMIN — ALBUTEROL SULFATE 6 PUFF: 90 AEROSOL, METERED RESPIRATORY (INHALATION) at 08:19

## 2017-02-18 RX ADMIN — ATORVASTATIN CALCIUM 40 MG: 40 TABLET, FILM COATED ORAL at 20:00

## 2017-02-18 RX ADMIN — DILTIAZEM HYDROCHLORIDE 7.5 MG/HR: 100 INJECTION, POWDER, LYOPHILIZED, FOR SOLUTION INTRAVENOUS at 21:54

## 2017-02-18 RX ADMIN — DEXMEDETOMIDINE HYDROCHLORIDE 0.7 MCG/KG/HR: 4 INJECTION, SOLUTION INTRAVENOUS at 05:07

## 2017-02-18 RX ADMIN — AMIODARONE HYDROCHLORIDE 1 MG/MIN: 1.8 INJECTION, SOLUTION INTRAVENOUS at 07:59

## 2017-02-18 RX ADMIN — OXYCODONE HYDROCHLORIDE 10 MG: 5 TABLET ORAL at 15:08

## 2017-02-18 RX ADMIN — METOPROLOL TARTRATE 12.5 MG: 25 TABLET ORAL at 01:28

## 2017-02-18 RX ADMIN — EPINEPHRINE 0.02 MCG/KG/MIN: 1 INJECTION PARENTERAL at 03:18

## 2017-02-18 RX ADMIN — MILRINONE LACTATE 0.25 MCG/KG/MIN: 200 INJECTION, SOLUTION INTRAVENOUS at 20:08

## 2017-02-18 RX ADMIN — DILTIAZEM HYDROCHLORIDE 5 MG/HR: 100 INJECTION, POWDER, LYOPHILIZED, FOR SOLUTION INTRAVENOUS at 07:46

## 2017-02-18 RX ADMIN — AMIODARONE HYDROCHLORIDE 1 MG/MIN: 1.8 INJECTION, SOLUTION INTRAVENOUS at 23:45

## 2017-02-18 RX ADMIN — PHENYLEPHRINE HYDROCHLORIDE 0.8 MCG/KG/MIN: 10 INJECTION INTRAVENOUS at 23:41

## 2017-02-18 RX ADMIN — DEXMEDETOMIDINE HYDROCHLORIDE 0.7 MCG/KG/HR: 4 INJECTION, SOLUTION INTRAVENOUS at 19:24

## 2017-02-18 RX ADMIN — ALBUTEROL SULFATE 6 PUFF: 90 AEROSOL, METERED RESPIRATORY (INHALATION) at 12:40

## 2017-02-18 RX ADMIN — ENOXAPARIN SODIUM 30 MG: 30 INJECTION SUBCUTANEOUS at 17:43

## 2017-02-18 RX ADMIN — ACETAMINOPHEN 650 MG: 325 TABLET ORAL at 05:53

## 2017-02-18 RX ADMIN — ASPIRIN 150 MG: 300 SUPPOSITORY RECTAL at 10:10

## 2017-02-18 RX ADMIN — SODIUM CHLORIDE 18 ML/HR: 9 INJECTION, SOLUTION INTRAVENOUS at 03:51

## 2017-02-18 RX ADMIN — PROPOFOL 30 MCG/KG/MIN: 10 INJECTION, EMULSION INTRAVENOUS at 07:47

## 2017-02-18 RX ADMIN — CLINDAMYCIN PHOSPHATE 450 MG: 150 INJECTION, SOLUTION, CONCENTRATE INTRAVENOUS at 11:40

## 2017-02-18 RX ADMIN — CLINDAMYCIN PHOSPHATE 450 MG: 150 INJECTION, SOLUTION, CONCENTRATE INTRAVENOUS at 18:49

## 2017-02-18 RX ADMIN — DEXMEDETOMIDINE HYDROCHLORIDE 0.7 MCG/KG/HR: 4 INJECTION, SOLUTION INTRAVENOUS at 09:48

## 2017-02-18 RX ADMIN — PHENYLEPHRINE HYDROCHLORIDE 1 MCG/KG/MIN: 10 INJECTION INTRAVENOUS at 01:10

## 2017-02-18 RX ADMIN — SODIUM CHLORIDE 1.4 UNITS/HR: 9 INJECTION, SOLUTION INTRAVENOUS at 03:50

## 2017-02-18 RX ADMIN — OXYCODONE HYDROCHLORIDE 10 MG: 5 TABLET ORAL at 01:28

## 2017-02-18 RX ADMIN — PROPOFOL 30 MCG/KG/MIN: 10 INJECTION, EMULSION INTRAVENOUS at 20:37

## 2017-02-18 RX ADMIN — AMIODARONE HYDROCHLORIDE 1 MG/MIN: 1.8 INJECTION, SOLUTION INTRAVENOUS at 02:38

## 2017-02-18 RX ADMIN — CLINDAMYCIN PHOSPHATE 450 MG: 150 INJECTION, SOLUTION, CONCENTRATE INTRAVENOUS at 04:58

## 2017-02-18 RX ADMIN — PROPOFOL 40 MCG/KG/MIN: 10 INJECTION, EMULSION INTRAVENOUS at 00:42

## 2017-02-18 RX ADMIN — AMIODARONE HYDROCHLORIDE 1 MG/MIN: 1.8 INJECTION, SOLUTION INTRAVENOUS at 18:01

## 2017-02-19 ENCOUNTER — APPOINTMENT (OUTPATIENT)
Dept: GENERAL RADIOLOGY | Facility: HOSPITAL | Age: 57
End: 2017-02-19

## 2017-02-19 LAB
ALBUMIN SERPL-MCNC: 2.7 G/DL (ref 3.5–5.2)
ALBUMIN/GLOB SERPL: 0.9 G/DL
ALP SERPL-CCNC: 102 U/L (ref 39–117)
ALT SERPL W P-5'-P-CCNC: 112 U/L (ref 1–41)
ANION GAP SERPL CALCULATED.3IONS-SCNC: 17 MMOL/L
ANION GAP SERPL CALCULATED.3IONS-SCNC: 17 MMOL/L
ARTERIAL PATENCY WRIST A: ABNORMAL
AST SERPL-CCNC: 154 U/L (ref 1–40)
ATMOSPHERIC PRESS: 748.1 MMHG
ATMOSPHERIC PRESS: 749.3 MMHG
ATMOSPHERIC PRESS: 750 MMHG
BACTERIA SPEC RESP CULT: ABNORMAL
BASE EXCESS BLDA CALC-SCNC: -1.3 MMOL/L (ref 0–2)
BASE EXCESS BLDV CALC-SCNC: -0.3 MMOL/L
BASE EXCESS BLDV CALC-SCNC: -2.1 MMOL/L
BDY SITE: ABNORMAL
BILIRUB SERPL-MCNC: 0.4 MG/DL (ref 0.1–1.2)
BUN BLD-MCNC: 60 MG/DL (ref 6–20)
BUN BLD-MCNC: 60 MG/DL (ref 6–20)
BUN/CREAT SERPL: 22.1 (ref 7–25)
BUN/CREAT SERPL: 22.1 (ref 7–25)
CALCIUM SPEC-SCNC: 8.3 MG/DL (ref 8.6–10.5)
CALCIUM SPEC-SCNC: 8.3 MG/DL (ref 8.6–10.5)
CHLORIDE SERPL-SCNC: 105 MMOL/L (ref 98–107)
CHLORIDE SERPL-SCNC: 105 MMOL/L (ref 98–107)
CO2 SERPL-SCNC: 21 MMOL/L (ref 22–29)
CO2 SERPL-SCNC: 21 MMOL/L (ref 22–29)
CREAT BLD-MCNC: 2.71 MG/DL (ref 0.76–1.27)
CREAT BLD-MCNC: 2.71 MG/DL (ref 0.76–1.27)
DEPRECATED RDW RBC AUTO: 47 FL (ref 37–54)
ERYTHROCYTE [DISTWIDTH] IN BLOOD BY AUTOMATED COUNT: 14.6 % (ref 11.5–14.5)
GFR SERPL CREATININE-BSD FRML MDRD: 24 ML/MIN/1.73
GFR SERPL CREATININE-BSD FRML MDRD: 24 ML/MIN/1.73
GLOBULIN UR ELPH-MCNC: 3 GM/DL
GLUCOSE BLD-MCNC: 137 MG/DL (ref 65–99)
GLUCOSE BLD-MCNC: 137 MG/DL (ref 65–99)
GLUCOSE BLDC GLUCOMTR-MCNC: 115 MG/DL (ref 70–130)
GLUCOSE BLDC GLUCOMTR-MCNC: 123 MG/DL (ref 70–130)
GLUCOSE BLDC GLUCOMTR-MCNC: 128 MG/DL (ref 70–130)
GLUCOSE BLDC GLUCOMTR-MCNC: 128 MG/DL (ref 70–130)
GLUCOSE BLDC GLUCOMTR-MCNC: 130 MG/DL (ref 70–130)
GLUCOSE BLDC GLUCOMTR-MCNC: 136 MG/DL (ref 70–130)
GLUCOSE BLDC GLUCOMTR-MCNC: 145 MG/DL (ref 70–130)
GRAM STN SPEC: ABNORMAL
HCO3 BLDA-SCNC: 22.6 MMOL/L (ref 22–28)
HCO3 BLDV-SCNC: 22.4 MMOL/L (ref 22–28)
HCO3 BLDV-SCNC: 24.6 MMOL/L (ref 22–28)
HCT VFR BLD AUTO: 32.7 % (ref 40.4–52.2)
HGB BLD-MCNC: 11 G/DL (ref 13.7–17.6)
HOROWITZ INDEX BLD+IHG-RTO: 70 %
MCH RBC QN AUTO: 29.5 PG (ref 27–32.7)
MCHC RBC AUTO-ENTMCNC: 33.6 G/DL (ref 32.6–36.4)
MCV RBC AUTO: 87.7 FL (ref 79.8–96.2)
MODALITY: ABNORMAL
O2 A-A PPRESDIFF RESPIRATORY: 0.2 MMHG
PCO2 BLDA: 35 MM HG (ref 35–45)
PCO2 BLDV: 36.7 MM HG (ref 41–51)
PCO2 BLDV: 40.2 MM HG (ref 41–51)
PEEP RESPIRATORY: 10 CM[H2O]
PEEP RESPIRATORY: 7.5 CM[H2O]
PEEP RESPIRATORY: 7.5 CM[H2O]
PH BLDA: 7.42 PH UNITS (ref 7.35–7.45)
PH BLDV: 7.39 [PH] (ref 7.31–7.41)
PH BLDV: 7.39 [PH] (ref 7.31–7.41)
PLATELET # BLD AUTO: 134 10*3/MM3 (ref 140–500)
PMV BLD AUTO: 12.4 FL (ref 6–12)
PO2 BLDA: 80.9 MM HG (ref 80–100)
PO2 BLDV: 30.3 MM HG (ref 35–45)
PO2 BLDV: 33.5 MM HG (ref 35–45)
POTASSIUM BLD-SCNC: 3.9 MMOL/L (ref 3.5–5.2)
POTASSIUM BLD-SCNC: 3.9 MMOL/L (ref 3.5–5.2)
PROT SERPL-MCNC: 5.7 G/DL (ref 6–8.5)
RBC # BLD AUTO: 3.73 10*6/MM3 (ref 4.6–6)
SAO2 % BLDCOA: 57.6 % (ref 92–99)
SAO2 % BLDCOA: 64.6 % (ref 92–99)
SAO2 % BLDCOA: 96.2 % (ref 92–99)
SET MECH RESP RATE: 14
SODIUM BLD-SCNC: 143 MMOL/L (ref 136–145)
SODIUM BLD-SCNC: 143 MMOL/L (ref 136–145)
STREP GROUPING: ABNORMAL
TOTAL RATE: 14 BREATHS/MINUTE
VENTILATOR MODE: ABNORMAL
VENTILATOR MODE: AC
VENTILATOR MODE: AC
VT ON VENT VENT: 800 ML
WBC NRBC COR # BLD: 15.43 10*3/MM3 (ref 4.5–10.7)

## 2017-02-19 PROCEDURE — 99024 POSTOP FOLLOW-UP VISIT: CPT | Performed by: THORACIC SURGERY (CARDIOTHORACIC VASCULAR SURGERY)

## 2017-02-19 PROCEDURE — 94640 AIRWAY INHALATION TREATMENT: CPT

## 2017-02-19 PROCEDURE — 25010000002 CEFEPIME: Performed by: INTERNAL MEDICINE

## 2017-02-19 PROCEDURE — 25010000002 MORPHINE PER 10 MG: Performed by: THORACIC SURGERY (CARDIOTHORACIC VASCULAR SURGERY)

## 2017-02-19 PROCEDURE — 94799 UNLISTED PULMONARY SVC/PX: CPT

## 2017-02-19 PROCEDURE — 80053 COMPREHEN METABOLIC PANEL: CPT | Performed by: INTERNAL MEDICINE

## 2017-02-19 PROCEDURE — 87040 BLOOD CULTURE FOR BACTERIA: CPT | Performed by: THORACIC SURGERY (CARDIOTHORACIC VASCULAR SURGERY)

## 2017-02-19 PROCEDURE — 87070 CULTURE OTHR SPECIMN AEROBIC: CPT | Performed by: THORACIC SURGERY (CARDIOTHORACIC VASCULAR SURGERY)

## 2017-02-19 PROCEDURE — 82962 GLUCOSE BLOOD TEST: CPT

## 2017-02-19 PROCEDURE — 85027 COMPLETE CBC AUTOMATED: CPT | Performed by: INTERNAL MEDICINE

## 2017-02-19 PROCEDURE — 25010000002 ENOXAPARIN PER 10 MG: Performed by: THORACIC SURGERY (CARDIOTHORACIC VASCULAR SURGERY)

## 2017-02-19 PROCEDURE — 87205 SMEAR GRAM STAIN: CPT | Performed by: THORACIC SURGERY (CARDIOTHORACIC VASCULAR SURGERY)

## 2017-02-19 PROCEDURE — 71010 HC CHEST PA OR AP: CPT

## 2017-02-19 PROCEDURE — 99232 SBSQ HOSP IP/OBS MODERATE 35: CPT | Performed by: INTERNAL MEDICINE

## 2017-02-19 PROCEDURE — 82803 BLOOD GASES ANY COMBINATION: CPT

## 2017-02-19 PROCEDURE — 25010000002 AMIODARONE IN DEXTROSE 5% 360 MG/200ML SOLUTION: Performed by: INTERNAL MEDICINE

## 2017-02-19 PROCEDURE — 94003 VENT MGMT INPAT SUBQ DAY: CPT

## 2017-02-19 PROCEDURE — 87086 URINE CULTURE/COLONY COUNT: CPT | Performed by: THORACIC SURGERY (CARDIOTHORACIC VASCULAR SURGERY)

## 2017-02-19 PROCEDURE — 25010000002 PROPOFOL 1000 MG/ML EMULSION: Performed by: THORACIC SURGERY (CARDIOTHORACIC VASCULAR SURGERY)

## 2017-02-19 RX ORDER — BUMETANIDE 0.25 MG/ML
1 INJECTION INTRAMUSCULAR; INTRAVENOUS ONCE
Status: COMPLETED | OUTPATIENT
Start: 2017-02-19 | End: 2017-02-19

## 2017-02-19 RX ORDER — LEVOTHYROXINE SODIUM 0.03 MG/1
25 TABLET ORAL
Status: DISCONTINUED | OUTPATIENT
Start: 2017-02-19 | End: 2017-03-02 | Stop reason: HOSPADM

## 2017-02-19 RX ADMIN — MILRINONE LACTATE 0.25 MCG/KG/MIN: 200 INJECTION, SOLUTION INTRAVENOUS at 08:30

## 2017-02-19 RX ADMIN — ALBUTEROL SULFATE 6 PUFF: 90 AEROSOL, METERED RESPIRATORY (INHALATION) at 15:13

## 2017-02-19 RX ADMIN — METOPROLOL TARTRATE 12.5 MG: 25 TABLET ORAL at 17:30

## 2017-02-19 RX ADMIN — CLINDAMYCIN PHOSPHATE 450 MG: 150 INJECTION, SOLUTION, CONCENTRATE INTRAVENOUS at 11:34

## 2017-02-19 RX ADMIN — PHENYLEPHRINE HYDROCHLORIDE 0.8 MCG/KG/MIN: 10 INJECTION INTRAVENOUS at 09:00

## 2017-02-19 RX ADMIN — PHENYLEPHRINE HYDROCHLORIDE 0.5 MCG/KG/MIN: 10 INJECTION INTRAVENOUS at 20:33

## 2017-02-19 RX ADMIN — DEXMEDETOMIDINE HYDROCHLORIDE 0.8 MCG/KG/HR: 4 INJECTION, SOLUTION INTRAVENOUS at 05:30

## 2017-02-19 RX ADMIN — DEXMEDETOMIDINE HYDROCHLORIDE 0.8 MCG/KG/HR: 4 INJECTION, SOLUTION INTRAVENOUS at 10:00

## 2017-02-19 RX ADMIN — DEXMEDETOMIDINE HYDROCHLORIDE 0.7 MCG/KG/HR: 4 INJECTION, SOLUTION INTRAVENOUS at 18:53

## 2017-02-19 RX ADMIN — ALBUTEROL SULFATE 6 PUFF: 90 AEROSOL, METERED RESPIRATORY (INHALATION) at 19:01

## 2017-02-19 RX ADMIN — ACETAMINOPHEN 650 MG: 325 TABLET ORAL at 13:20

## 2017-02-19 RX ADMIN — PROPOFOL 20 MCG/KG/MIN: 10 INJECTION, EMULSION INTRAVENOUS at 14:12

## 2017-02-19 RX ADMIN — SODIUM CHLORIDE 9 ML/HR: 9 INJECTION, SOLUTION INTRAVENOUS at 08:08

## 2017-02-19 RX ADMIN — METOPROLOL TARTRATE 12.5 MG: 25 TABLET ORAL at 23:51

## 2017-02-19 RX ADMIN — ALBUTEROL SULFATE 6 PUFF: 90 AEROSOL, METERED RESPIRATORY (INHALATION) at 11:41

## 2017-02-19 RX ADMIN — ATORVASTATIN CALCIUM 40 MG: 40 TABLET, FILM COATED ORAL at 20:17

## 2017-02-19 RX ADMIN — PANTOPRAZOLE SODIUM 40 MG: 40 INJECTION, POWDER, FOR SOLUTION INTRAVENOUS at 05:28

## 2017-02-19 RX ADMIN — ACETAMINOPHEN 650 MG: 325 TABLET ORAL at 08:32

## 2017-02-19 RX ADMIN — AMIODARONE HYDROCHLORIDE 1 MG/MIN: 1.8 INJECTION, SOLUTION INTRAVENOUS at 23:08

## 2017-02-19 RX ADMIN — ENOXAPARIN SODIUM 30 MG: 30 INJECTION SUBCUTANEOUS at 17:35

## 2017-02-19 RX ADMIN — MORPHINE SULFATE 4 MG: 4 INJECTION, SOLUTION INTRAMUSCULAR; INTRAVENOUS at 23:14

## 2017-02-19 RX ADMIN — CLINDAMYCIN PHOSPHATE 450 MG: 150 INJECTION, SOLUTION, CONCENTRATE INTRAVENOUS at 18:43

## 2017-02-19 RX ADMIN — AMIODARONE HYDROCHLORIDE 1 MG/MIN: 1.8 INJECTION, SOLUTION INTRAVENOUS at 17:37

## 2017-02-19 RX ADMIN — CEFEPIME 2 G: 2 INJECTION, POWDER, FOR SOLUTION INTRAVENOUS at 20:08

## 2017-02-19 RX ADMIN — BUMETANIDE 1 MG: 0.25 INJECTION, SOLUTION INTRAMUSCULAR; INTRAVENOUS at 06:50

## 2017-02-19 RX ADMIN — MUPIROCIN 1 APPLICATION: 20 OINTMENT TOPICAL at 08:57

## 2017-02-19 RX ADMIN — DEXMEDETOMIDINE HYDROCHLORIDE 0.7 MCG/KG/HR: 4 INJECTION, SOLUTION INTRAVENOUS at 14:32

## 2017-02-19 RX ADMIN — ASPIRIN 150 MG: 300 SUPPOSITORY RECTAL at 08:57

## 2017-02-19 RX ADMIN — AMIODARONE HYDROCHLORIDE 1 MG/MIN: 1.8 INJECTION, SOLUTION INTRAVENOUS at 11:59

## 2017-02-19 RX ADMIN — LEVOTHYROXINE SODIUM 25 MCG: 25 TABLET ORAL at 08:57

## 2017-02-19 RX ADMIN — DILTIAZEM HYDROCHLORIDE 7.5 MG/HR: 100 INJECTION, POWDER, LYOPHILIZED, FOR SOLUTION INTRAVENOUS at 08:08

## 2017-02-19 RX ADMIN — HYDROCODONE BITARTATE AND ACETAMINOPHEN 2 TABLET: 5; 325 TABLET ORAL at 01:05

## 2017-02-19 RX ADMIN — OXYCODONE HYDROCHLORIDE 10 MG: 5 TABLET ORAL at 16:13

## 2017-02-19 RX ADMIN — DEXMEDETOMIDINE HYDROCHLORIDE 0.8 MCG/KG/HR: 4 INJECTION, SOLUTION INTRAVENOUS at 00:17

## 2017-02-19 RX ADMIN — CLINDAMYCIN PHOSPHATE 450 MG: 150 INJECTION, SOLUTION, CONCENTRATE INTRAVENOUS at 03:01

## 2017-02-19 RX ADMIN — PROPOFOL 20 MCG/KG/MIN: 10 INJECTION, EMULSION INTRAVENOUS at 07:46

## 2017-02-19 RX ADMIN — HYDROCODONE BITARTATE AND ACETAMINOPHEN 2 TABLET: 5; 325 TABLET ORAL at 20:17

## 2017-02-19 RX ADMIN — MILRINONE LACTATE 0.25 MCG/KG/MIN: 200 INJECTION, SOLUTION INTRAVENOUS at 06:11

## 2017-02-19 RX ADMIN — METOPROLOL TARTRATE 12.5 MG: 25 TABLET ORAL at 08:57

## 2017-02-19 RX ADMIN — DEXMEDETOMIDINE HYDROCHLORIDE 1 MCG/KG/HR: 4 INJECTION, SOLUTION INTRAVENOUS at 23:16

## 2017-02-19 RX ADMIN — ALBUTEROL SULFATE 6 PUFF: 90 AEROSOL, METERED RESPIRATORY (INHALATION) at 08:00

## 2017-02-19 RX ADMIN — AMIODARONE HYDROCHLORIDE 1 MG/MIN: 1.8 INJECTION, SOLUTION INTRAVENOUS at 06:11

## 2017-02-19 RX ADMIN — METOPROLOL TARTRATE 12.5 MG: 25 TABLET ORAL at 01:06

## 2017-02-20 ENCOUNTER — APPOINTMENT (OUTPATIENT)
Dept: GENERAL RADIOLOGY | Facility: HOSPITAL | Age: 57
End: 2017-02-20

## 2017-02-20 LAB
ALBUMIN SERPL-MCNC: 2.5 G/DL (ref 3.5–5.2)
ALBUMIN/GLOB SERPL: 0.9 G/DL
ALP SERPL-CCNC: 150 U/L (ref 39–117)
ALT SERPL W P-5'-P-CCNC: 105 U/L (ref 1–41)
ANION GAP SERPL CALCULATED.3IONS-SCNC: 13.8 MMOL/L
ARTERIAL PATENCY WRIST A: ABNORMAL
ARTERIAL PATENCY WRIST A: ABNORMAL
AST SERPL-CCNC: 114 U/L (ref 1–40)
ATMOSPHERIC PRESS: 750.5 MMHG
ATMOSPHERIC PRESS: 750.9 MMHG
BACTERIA SPEC AEROBE CULT: ABNORMAL
BACTERIA SPEC AEROBE CULT: ABNORMAL
BASE EXCESS BLDA CALC-SCNC: -0.9 MMOL/L (ref 0–2)
BASE EXCESS BLDA CALC-SCNC: -1 MMOL/L (ref 0–2)
BDY SITE: ABNORMAL
BDY SITE: ABNORMAL
BILIRUB SERPL-MCNC: 0.4 MG/DL (ref 0.1–1.2)
BUN BLD-MCNC: 59 MG/DL (ref 6–20)
BUN/CREAT SERPL: 26.3 (ref 7–25)
CA-I BLD-MCNC: 4.7 MG/DL (ref 4.6–5.4)
CA-I SERPL ISE-MCNC: 1.18 MMOL/L (ref 1.1–1.35)
CALCIUM SPEC-SCNC: 7.9 MG/DL (ref 8.6–10.5)
CHLORIDE SERPL-SCNC: 111 MMOL/L (ref 98–107)
CO2 SERPL-SCNC: 21.2 MMOL/L (ref 22–29)
CREAT BLD-MCNC: 2.24 MG/DL (ref 0.76–1.27)
CYTO UR: NORMAL
DEPRECATED RDW RBC AUTO: 48.7 FL (ref 37–54)
ERYTHROCYTE [DISTWIDTH] IN BLOOD BY AUTOMATED COUNT: 14.9 % (ref 11.5–14.5)
GFR SERPL CREATININE-BSD FRML MDRD: 30 ML/MIN/1.73
GLOBULIN UR ELPH-MCNC: 2.8 GM/DL
GLUCOSE BLD-MCNC: 147 MG/DL (ref 65–99)
GLUCOSE BLDC GLUCOMTR-MCNC: 120 MG/DL (ref 70–130)
GLUCOSE BLDC GLUCOMTR-MCNC: 141 MG/DL (ref 70–130)
GLUCOSE BLDC GLUCOMTR-MCNC: 143 MG/DL (ref 70–130)
GLUCOSE BLDC GLUCOMTR-MCNC: 144 MG/DL (ref 70–130)
GLUCOSE BLDC GLUCOMTR-MCNC: 146 MG/DL (ref 70–130)
GRAM STN SPEC: ABNORMAL
GRAM STN SPEC: ABNORMAL
HCO3 BLDA-SCNC: 22.5 MMOL/L (ref 22–28)
HCO3 BLDA-SCNC: 23 MMOL/L (ref 22–28)
HCT VFR BLD AUTO: 29.9 % (ref 40.4–52.2)
HGB BLD-MCNC: 10.1 G/DL (ref 13.7–17.6)
HOROWITZ INDEX BLD+IHG-RTO: 40 %
HOROWITZ INDEX BLD+IHG-RTO: 50 %
LAB AP CASE REPORT: NORMAL
Lab: NORMAL
MAGNESIUM SERPL-MCNC: 2.1 MG/DL (ref 1.6–2.6)
MCH RBC QN AUTO: 29.8 PG (ref 27–32.7)
MCHC RBC AUTO-ENTMCNC: 33.8 G/DL (ref 32.6–36.4)
MCV RBC AUTO: 88.2 FL (ref 79.8–96.2)
MODALITY: ABNORMAL
MODALITY: ABNORMAL
O2 A-A PPRESDIFF RESPIRATORY: 0.2 MMHG
O2 A-A PPRESDIFF RESPIRATORY: 0.3 MMHG
PATH REPORT.FINAL DX SPEC: NORMAL
PATH REPORT.GROSS SPEC: NORMAL
PCO2 BLDA: 32.2 MM HG (ref 35–45)
PCO2 BLDA: 34.7 MM HG (ref 35–45)
PEEP RESPIRATORY: 10 CM[H2O]
PEEP RESPIRATORY: 7.5 CM[H2O]
PH BLDA: 7.43 PH UNITS (ref 7.35–7.45)
PH BLDA: 7.45 PH UNITS (ref 7.35–7.45)
PHOSPHATE SERPL-MCNC: 3.1 MG/DL (ref 2.5–4.5)
PLATELET # BLD AUTO: 122 10*3/MM3 (ref 140–500)
PMV BLD AUTO: 11.8 FL (ref 6–12)
PO2 BLDA: 66.2 MM HG (ref 80–100)
PO2 BLDA: 79.3 MM HG (ref 80–100)
POTASSIUM BLD-SCNC: 3.5 MMOL/L (ref 3.5–5.2)
POTASSIUM BLD-SCNC: 3.6 MMOL/L (ref 3.5–5.2)
PROT SERPL-MCNC: 5.3 G/DL (ref 6–8.5)
PSV: 8 CMH2O
RBC # BLD AUTO: 3.39 10*6/MM3 (ref 4.6–6)
SAO2 % BLDCOA: 93.9 % (ref 92–99)
SAO2 % BLDCOA: 96.1 % (ref 92–99)
SET MECH RESP RATE: 14
SODIUM BLD-SCNC: 146 MMOL/L (ref 136–145)
TOTAL RATE: 18 BREATHS/MINUTE
TOTAL RATE: 21 BREATHS/MINUTE
VENTILATOR MODE: ABNORMAL
VENTILATOR MODE: AC
VT ON VENT VENT: 800 ML
WBC NRBC COR # BLD: 11.4 10*3/MM3 (ref 4.5–10.7)

## 2017-02-20 PROCEDURE — 25010000002 ENOXAPARIN PER 10 MG: Performed by: THORACIC SURGERY (CARDIOTHORACIC VASCULAR SURGERY)

## 2017-02-20 PROCEDURE — 82962 GLUCOSE BLOOD TEST: CPT

## 2017-02-20 PROCEDURE — 94003 VENT MGMT INPAT SUBQ DAY: CPT

## 2017-02-20 PROCEDURE — 25010000002 AMIODARONE IN DEXTROSE 5% 360 MG/200ML SOLUTION: Performed by: INTERNAL MEDICINE

## 2017-02-20 PROCEDURE — 94799 UNLISTED PULMONARY SVC/PX: CPT

## 2017-02-20 PROCEDURE — 80053 COMPREHEN METABOLIC PANEL: CPT | Performed by: INTERNAL MEDICINE

## 2017-02-20 PROCEDURE — 74000 HC ABDOMEN KUB: CPT

## 2017-02-20 PROCEDURE — 82330 ASSAY OF CALCIUM: CPT | Performed by: THORACIC SURGERY (CARDIOTHORACIC VASCULAR SURGERY)

## 2017-02-20 PROCEDURE — 83735 ASSAY OF MAGNESIUM: CPT | Performed by: INTERNAL MEDICINE

## 2017-02-20 PROCEDURE — 84100 ASSAY OF PHOSPHORUS: CPT | Performed by: INTERNAL MEDICINE

## 2017-02-20 PROCEDURE — 84132 ASSAY OF SERUM POTASSIUM: CPT | Performed by: THORACIC SURGERY (CARDIOTHORACIC VASCULAR SURGERY)

## 2017-02-20 PROCEDURE — 25010000002 CEFEPIME: Performed by: INTERNAL MEDICINE

## 2017-02-20 PROCEDURE — 94640 AIRWAY INHALATION TREATMENT: CPT

## 2017-02-20 PROCEDURE — 25010000002 MORPHINE PER 10 MG: Performed by: THORACIC SURGERY (CARDIOTHORACIC VASCULAR SURGERY)

## 2017-02-20 PROCEDURE — 99223 1ST HOSP IP/OBS HIGH 75: CPT | Performed by: INTERNAL MEDICINE

## 2017-02-20 PROCEDURE — 85027 COMPLETE CBC AUTOMATED: CPT | Performed by: INTERNAL MEDICINE

## 2017-02-20 PROCEDURE — 82803 BLOOD GASES ANY COMBINATION: CPT

## 2017-02-20 PROCEDURE — 99024 POSTOP FOLLOW-UP VISIT: CPT | Performed by: NURSE PRACTITIONER

## 2017-02-20 PROCEDURE — 99232 SBSQ HOSP IP/OBS MODERATE 35: CPT | Performed by: INTERNAL MEDICINE

## 2017-02-20 RX ORDER — POTASSIUM CHLORIDE 750 MG/1
40 CAPSULE, EXTENDED RELEASE ORAL ONCE
Status: COMPLETED | OUTPATIENT
Start: 2017-02-20 | End: 2017-02-20

## 2017-02-20 RX ORDER — DEXMEDETOMIDINE HYDROCHLORIDE 4 UG/ML
.2-1.5 INJECTION, SOLUTION INTRAVENOUS CONTINUOUS
Status: DISCONTINUED | OUTPATIENT
Start: 2017-02-20 | End: 2017-02-25

## 2017-02-20 RX ORDER — POTASSIUM CHLORIDE 1.5 G/1.77G
40 POWDER, FOR SOLUTION ORAL ONCE
Status: COMPLETED | OUTPATIENT
Start: 2017-02-20 | End: 2017-02-20

## 2017-02-20 RX ORDER — POTASSIUM CHLORIDE 29.8 MG/ML
20 INJECTION INTRAVENOUS ONCE
Status: COMPLETED | OUTPATIENT
Start: 2017-02-20 | End: 2017-02-20

## 2017-02-20 RX ADMIN — ALBUTEROL SULFATE 6 PUFF: 90 AEROSOL, METERED RESPIRATORY (INHALATION) at 19:24

## 2017-02-20 RX ADMIN — AMIODARONE HYDROCHLORIDE 0.5 MG/MIN: 1.8 INJECTION, SOLUTION INTRAVENOUS at 16:49

## 2017-02-20 RX ADMIN — POTASSIUM CHLORIDE 40 MEQ: 1.5 POWDER, FOR SOLUTION ORAL at 06:19

## 2017-02-20 RX ADMIN — DEXMEDETOMIDINE HYDROCHLORIDE 0.8 MCG/KG/HR: 4 INJECTION, SOLUTION INTRAVENOUS at 09:54

## 2017-02-20 RX ADMIN — PANTOPRAZOLE SODIUM 40 MG: 40 INJECTION, POWDER, FOR SOLUTION INTRAVENOUS at 05:10

## 2017-02-20 RX ADMIN — MILRINONE LACTATE 0.12 MCG/KG/MIN: 200 INJECTION, SOLUTION INTRAVENOUS at 02:11

## 2017-02-20 RX ADMIN — MORPHINE SULFATE 4 MG: 4 INJECTION, SOLUTION INTRAMUSCULAR; INTRAVENOUS at 07:53

## 2017-02-20 RX ADMIN — MUPIROCIN 1 APPLICATION: 20 OINTMENT TOPICAL at 08:21

## 2017-02-20 RX ADMIN — ALBUTEROL SULFATE 6 PUFF: 90 AEROSOL, METERED RESPIRATORY (INHALATION) at 18:02

## 2017-02-20 RX ADMIN — MORPHINE SULFATE 4 MG: 4 INJECTION, SOLUTION INTRAMUSCULAR; INTRAVENOUS at 03:34

## 2017-02-20 RX ADMIN — AMIODARONE HYDROCHLORIDE 1 MG/MIN: 1.8 INJECTION, SOLUTION INTRAVENOUS at 05:54

## 2017-02-20 RX ADMIN — HYDROCODONE BITARTATE AND ACETAMINOPHEN 2 TABLET: 5; 325 TABLET ORAL at 01:59

## 2017-02-20 RX ADMIN — CEFEPIME 2 G: 2 INJECTION, POWDER, FOR SOLUTION INTRAVENOUS at 18:04

## 2017-02-20 RX ADMIN — ENOXAPARIN SODIUM 30 MG: 30 INJECTION SUBCUTANEOUS at 18:13

## 2017-02-20 RX ADMIN — DEXMEDETOMIDINE HYDROCHLORIDE 0.8 MCG/KG/HR: 4 INJECTION, SOLUTION INTRAVENOUS at 09:46

## 2017-02-20 RX ADMIN — DEXMEDETOMIDINE HYDROCHLORIDE 0.6 MCG/KG/HR: 4 INJECTION, SOLUTION INTRAVENOUS at 13:00

## 2017-02-20 RX ADMIN — LEVOTHYROXINE SODIUM 25 MCG: 25 TABLET ORAL at 05:10

## 2017-02-20 RX ADMIN — ALBUTEROL SULFATE 6 PUFF: 90 AEROSOL, METERED RESPIRATORY (INHALATION) at 12:00

## 2017-02-20 RX ADMIN — HYDROCODONE BITARTATE AND ACETAMINOPHEN 2 TABLET: 5; 325 TABLET ORAL at 10:23

## 2017-02-20 RX ADMIN — ALBUTEROL SULFATE 6 PUFF: 90 AEROSOL, METERED RESPIRATORY (INHALATION) at 07:08

## 2017-02-20 RX ADMIN — DEXMEDETOMIDINE HYDROCHLORIDE 1 MCG/KG/HR: 4 INJECTION, SOLUTION INTRAVENOUS at 02:54

## 2017-02-20 RX ADMIN — METOPROLOL TARTRATE 12.5 MG: 25 TABLET ORAL at 15:48

## 2017-02-20 RX ADMIN — DEXMEDETOMIDINE HYDROCHLORIDE 1 MCG/KG/HR: 4 INJECTION, SOLUTION INTRAVENOUS at 18:04

## 2017-02-20 RX ADMIN — ASPIRIN 81 MG: 81 TABLET ORAL at 08:20

## 2017-02-20 RX ADMIN — DEXMEDETOMIDINE HYDROCHLORIDE 1 MCG/KG/HR: 4 INJECTION, SOLUTION INTRAVENOUS at 21:43

## 2017-02-20 RX ADMIN — DEXMEDETOMIDINE HYDROCHLORIDE 1.2 MCG/KG/HR: 4 INJECTION, SOLUTION INTRAVENOUS at 06:18

## 2017-02-20 RX ADMIN — POTASSIUM CHLORIDE 40 MEQ: 1.5 POWDER, FOR SOLUTION ORAL at 16:31

## 2017-02-20 RX ADMIN — HYDROCODONE BITARTATE AND ACETAMINOPHEN 2 TABLET: 5; 325 TABLET ORAL at 15:30

## 2017-02-21 ENCOUNTER — APPOINTMENT (OUTPATIENT)
Dept: GENERAL RADIOLOGY | Facility: HOSPITAL | Age: 57
End: 2017-02-21

## 2017-02-21 LAB
ALBUMIN SERPL-MCNC: 2.6 G/DL (ref 3.5–5.2)
ALBUMIN/GLOB SERPL: 1.1 G/DL
ALP SERPL-CCNC: 159 U/L (ref 39–117)
ALT SERPL W P-5'-P-CCNC: 131 U/L (ref 1–41)
ANION GAP SERPL CALCULATED.3IONS-SCNC: 13.1 MMOL/L
ARTERIAL PATENCY WRIST A: ABNORMAL
AST SERPL-CCNC: 128 U/L (ref 1–40)
ATMOSPHERIC PRESS: 747 MMHG
ATMOSPHERIC PRESS: 747.4 MMHG
ATMOSPHERIC PRESS: 748.3 MMHG
BACTERIA SPEC AEROBE CULT: NO GROWTH
BACTERIA SPEC RESP CULT: ABNORMAL
BASE EXCESS BLDA CALC-SCNC: -1.4 MMOL/L (ref 0–2)
BASE EXCESS BLDA CALC-SCNC: -1.9 MMOL/L (ref 0–2)
BASE EXCESS BLDA CALC-SCNC: -3.7 MMOL/L (ref 0–2)
BASOPHILS # BLD AUTO: 0.03 10*3/MM3 (ref 0–0.2)
BASOPHILS NFR BLD AUTO: 0.3 % (ref 0–1.5)
BDY SITE: ABNORMAL
BILIRUB SERPL-MCNC: 0.3 MG/DL (ref 0.1–1.2)
BUN BLD-MCNC: 54 MG/DL (ref 6–20)
BUN/CREAT SERPL: 28 (ref 7–25)
CALCIUM SPEC-SCNC: 8.1 MG/DL (ref 8.6–10.5)
CHLORIDE SERPL-SCNC: 114 MMOL/L (ref 98–107)
CO2 SERPL-SCNC: 20.9 MMOL/L (ref 22–29)
CREAT BLD-MCNC: 1.93 MG/DL (ref 0.76–1.27)
DEPRECATED RDW RBC AUTO: 49.6 FL (ref 37–54)
EOSINOPHIL # BLD AUTO: 0.77 10*3/MM3 (ref 0–0.7)
EOSINOPHIL NFR BLD AUTO: 6.8 % (ref 0.3–6.2)
ERYTHROCYTE [DISTWIDTH] IN BLOOD BY AUTOMATED COUNT: 15 % (ref 11.5–14.5)
GFR SERPL CREATININE-BSD FRML MDRD: 36 ML/MIN/1.73
GLOBULIN UR ELPH-MCNC: 2.3 GM/DL
GLUCOSE BLD-MCNC: 132 MG/DL (ref 65–99)
GLUCOSE BLDC GLUCOMTR-MCNC: 103 MG/DL (ref 70–130)
GLUCOSE BLDC GLUCOMTR-MCNC: 114 MG/DL (ref 70–130)
GLUCOSE BLDC GLUCOMTR-MCNC: 130 MG/DL (ref 70–130)
GLUCOSE BLDC GLUCOMTR-MCNC: 135 MG/DL (ref 70–130)
GRAM STN SPEC: ABNORMAL
GRAM STN SPEC: ABNORMAL
HCO3 BLDA-SCNC: 20.4 MMOL/L (ref 22–28)
HCO3 BLDA-SCNC: 22.8 MMOL/L (ref 22–28)
HCO3 BLDA-SCNC: 23.3 MMOL/L (ref 22–28)
HCT VFR BLD AUTO: 30.4 % (ref 40.4–52.2)
HGB BLD-MCNC: 10.1 G/DL (ref 13.7–17.6)
HOROWITZ INDEX BLD+IHG-RTO: 100 %
HOROWITZ INDEX BLD+IHG-RTO: 40 %
IMM GRANULOCYTES # BLD: 0.16 10*3/MM3 (ref 0–0.03)
IMM GRANULOCYTES NFR BLD: 1.4 % (ref 0–0.5)
LYMPHOCYTES # BLD AUTO: 0.62 10*3/MM3 (ref 0.9–4.8)
LYMPHOCYTES NFR BLD AUTO: 5.5 % (ref 19.6–45.3)
MAGNESIUM SERPL-MCNC: 2.3 MG/DL (ref 1.6–2.6)
MCH RBC QN AUTO: 30 PG (ref 27–32.7)
MCHC RBC AUTO-ENTMCNC: 33.2 G/DL (ref 32.6–36.4)
MCV RBC AUTO: 90.2 FL (ref 79.8–96.2)
MODALITY: ABNORMAL
MONOCYTES # BLD AUTO: 0.8 10*3/MM3 (ref 0.2–1.2)
MONOCYTES NFR BLD AUTO: 7.1 % (ref 5–12)
NEUTROPHILS # BLD AUTO: 8.95 10*3/MM3 (ref 1.9–8.1)
NEUTROPHILS NFR BLD AUTO: 78.9 % (ref 42.7–76)
O2 A-A PPRESDIFF RESPIRATORY: 0.1 MMHG
O2 A-A PPRESDIFF RESPIRATORY: 0.3 MMHG
PCO2 BLDA: 31.9 MM HG (ref 35–45)
PCO2 BLDA: 37.9 MM HG (ref 35–45)
PCO2 BLDA: 38.4 MM HG (ref 35–45)
PEEP RESPIRATORY: 7.5 CM[H2O]
PEEP RESPIRATORY: 7.5 CM[H2O]
PH BLDA: 7.39 PH UNITS (ref 7.35–7.45)
PH BLDA: 7.39 PH UNITS (ref 7.35–7.45)
PH BLDA: 7.41 PH UNITS (ref 7.35–7.45)
PHOSPHATE SERPL-MCNC: 2.9 MG/DL (ref 2.5–4.5)
PLATELET # BLD AUTO: 132 10*3/MM3 (ref 140–500)
PMV BLD AUTO: 12.2 FL (ref 6–12)
PO2 BLDA: 68.5 MM HG (ref 80–100)
PO2 BLDA: 70.4 MM HG (ref 80–100)
PO2 BLDA: 91.3 MM HG (ref 80–100)
POTASSIUM BLD-SCNC: 3.8 MMOL/L (ref 3.5–5.2)
PROT SERPL-MCNC: 4.9 G/DL (ref 6–8.5)
RBC # BLD AUTO: 3.37 10*6/MM3 (ref 4.6–6)
SAO2 % BLDCOA: 93.3 % (ref 92–99)
SAO2 % BLDCOA: 94.4 % (ref 92–99)
SAO2 % BLDCOA: 97 % (ref 92–99)
SET MECH RESP RATE: 14
SET MECH RESP RATE: 18
SODIUM BLD-SCNC: 148 MMOL/L (ref 136–145)
TOTAL RATE: 12 BREATHS/MINUTE
TOTAL RATE: 18 BREATHS/MINUTE
TOTAL RATE: 25 BREATHS/MINUTE
URATE SERPL-MCNC: 10.5 MG/DL (ref 3.4–7)
VENTILATOR MODE: ABNORMAL
VENTILATOR MODE: ABNORMAL
VT ON VENT VENT: 639 ML
WBC NRBC COR # BLD: 11.33 10*3/MM3 (ref 4.5–10.7)

## 2017-02-21 PROCEDURE — 94799 UNLISTED PULMONARY SVC/PX: CPT

## 2017-02-21 PROCEDURE — 83735 ASSAY OF MAGNESIUM: CPT | Performed by: INTERNAL MEDICINE

## 2017-02-21 PROCEDURE — 25010000002 ENOXAPARIN PER 10 MG: Performed by: THORACIC SURGERY (CARDIOTHORACIC VASCULAR SURGERY)

## 2017-02-21 PROCEDURE — 71010 HC CHEST PA OR AP: CPT

## 2017-02-21 PROCEDURE — 82803 BLOOD GASES ANY COMBINATION: CPT

## 2017-02-21 PROCEDURE — 99024 POSTOP FOLLOW-UP VISIT: CPT | Performed by: PHYSICIAN ASSISTANT

## 2017-02-21 PROCEDURE — 25010000002 CEFEPIME: Performed by: INTERNAL MEDICINE

## 2017-02-21 PROCEDURE — 94003 VENT MGMT INPAT SUBQ DAY: CPT

## 2017-02-21 PROCEDURE — 82962 GLUCOSE BLOOD TEST: CPT

## 2017-02-21 PROCEDURE — 25010000002 AMIODARONE IN DEXTROSE 5% 360 MG/200ML SOLUTION: Performed by: INTERNAL MEDICINE

## 2017-02-21 PROCEDURE — 99232 SBSQ HOSP IP/OBS MODERATE 35: CPT | Performed by: INTERNAL MEDICINE

## 2017-02-21 PROCEDURE — 85025 COMPLETE CBC W/AUTO DIFF WBC: CPT | Performed by: INTERNAL MEDICINE

## 2017-02-21 PROCEDURE — 84550 ASSAY OF BLOOD/URIC ACID: CPT | Performed by: INTERNAL MEDICINE

## 2017-02-21 PROCEDURE — 80053 COMPREHEN METABOLIC PANEL: CPT | Performed by: INTERNAL MEDICINE

## 2017-02-21 PROCEDURE — 84100 ASSAY OF PHOSPHORUS: CPT | Performed by: INTERNAL MEDICINE

## 2017-02-21 PROCEDURE — 25010000002 MIDAZOLAM PER 1 MG: Performed by: THORACIC SURGERY (CARDIOTHORACIC VASCULAR SURGERY)

## 2017-02-21 PROCEDURE — 25010000002 MORPHINE PER 10 MG: Performed by: THORACIC SURGERY (CARDIOTHORACIC VASCULAR SURGERY)

## 2017-02-21 PROCEDURE — 99231 SBSQ HOSP IP/OBS SF/LOW 25: CPT | Performed by: INTERNAL MEDICINE

## 2017-02-21 PROCEDURE — 25010000002 FUROSEMIDE PER 20 MG: Performed by: THORACIC SURGERY (CARDIOTHORACIC VASCULAR SURGERY)

## 2017-02-21 RX ORDER — FUROSEMIDE 10 MG/ML
40 INJECTION INTRAMUSCULAR; INTRAVENOUS ONCE
Status: COMPLETED | OUTPATIENT
Start: 2017-02-21 | End: 2017-02-21

## 2017-02-21 RX ORDER — IPRATROPIUM BROMIDE AND ALBUTEROL SULFATE 2.5; .5 MG/3ML; MG/3ML
3 SOLUTION RESPIRATORY (INHALATION)
Status: DISCONTINUED | OUTPATIENT
Start: 2017-02-21 | End: 2017-02-25

## 2017-02-21 RX ADMIN — OXYCODONE HYDROCHLORIDE 10 MG: 5 TABLET ORAL at 00:16

## 2017-02-21 RX ADMIN — FUROSEMIDE 40 MG: 10 INJECTION, SOLUTION INTRAMUSCULAR; INTRAVENOUS at 07:42

## 2017-02-21 RX ADMIN — HYDROCODONE BITARTATE AND ACETAMINOPHEN 2 TABLET: 5; 325 TABLET ORAL at 20:54

## 2017-02-21 RX ADMIN — MUPIROCIN 1 APPLICATION: 20 OINTMENT TOPICAL at 08:35

## 2017-02-21 RX ADMIN — DEXMEDETOMIDINE HYDROCHLORIDE 1.5 MCG/KG/HR: 4 INJECTION, SOLUTION INTRAVENOUS at 03:30

## 2017-02-21 RX ADMIN — NICARDIPINE HYDROCHLORIDE 5 MG/HR: 25 INJECTION INTRAVENOUS at 03:30

## 2017-02-21 RX ADMIN — DEXMEDETOMIDINE HYDROCHLORIDE 1 MCG/KG/HR: 4 INJECTION, SOLUTION INTRAVENOUS at 00:47

## 2017-02-21 RX ADMIN — DEXMEDETOMIDINE HYDROCHLORIDE 0.7 MCG/KG/HR: 4 INJECTION, SOLUTION INTRAVENOUS at 07:16

## 2017-02-21 RX ADMIN — PANTOPRAZOLE SODIUM 40 MG: 40 INJECTION, POWDER, FOR SOLUTION INTRAVENOUS at 05:25

## 2017-02-21 RX ADMIN — OXYCODONE HYDROCHLORIDE 10 MG: 5 TABLET ORAL at 04:42

## 2017-02-21 RX ADMIN — IPRATROPIUM BROMIDE AND ALBUTEROL SULFATE 3 ML: .5; 3 SOLUTION RESPIRATORY (INHALATION) at 19:49

## 2017-02-21 RX ADMIN — IPRATROPIUM BROMIDE AND ALBUTEROL SULFATE 3 ML: .5; 3 SOLUTION RESPIRATORY (INHALATION) at 23:55

## 2017-02-21 RX ADMIN — METOPROLOL TARTRATE 12.5 MG: 25 TABLET ORAL at 20:54

## 2017-02-21 RX ADMIN — CEFEPIME 2 G: 2 INJECTION, POWDER, FOR SOLUTION INTRAVENOUS at 03:15

## 2017-02-21 RX ADMIN — ENOXAPARIN SODIUM 30 MG: 30 INJECTION SUBCUTANEOUS at 18:07

## 2017-02-21 RX ADMIN — CEFEPIME 2 G: 2 INJECTION, POWDER, FOR SOLUTION INTRAVENOUS at 19:53

## 2017-02-21 RX ADMIN — HYDROCODONE BITARTATE AND ACETAMINOPHEN 2 TABLET: 5; 325 TABLET ORAL at 10:31

## 2017-02-21 RX ADMIN — ALBUTEROL SULFATE 6 PUFF: 90 AEROSOL, METERED RESPIRATORY (INHALATION) at 11:27

## 2017-02-21 RX ADMIN — ASPIRIN 81 MG: 81 TABLET ORAL at 08:31

## 2017-02-21 RX ADMIN — METOPROLOL TARTRATE 12.5 MG: 25 TABLET ORAL at 00:16

## 2017-02-21 RX ADMIN — ALBUTEROL SULFATE 6 PUFF: 90 AEROSOL, METERED RESPIRATORY (INHALATION) at 07:31

## 2017-02-21 RX ADMIN — ALPRAZOLAM 0.25 MG: 0.25 TABLET ORAL at 20:54

## 2017-02-21 RX ADMIN — MIDAZOLAM HYDROCHLORIDE 2 MG: 1 INJECTION, SOLUTION INTRAMUSCULAR; INTRAVENOUS at 03:45

## 2017-02-21 RX ADMIN — LEVOTHYROXINE SODIUM 25 MCG: 25 TABLET ORAL at 05:25

## 2017-02-21 RX ADMIN — DEXMEDETOMIDINE HYDROCHLORIDE 0.6 MCG/KG/HR: 4 INJECTION, SOLUTION INTRAVENOUS at 11:33

## 2017-02-21 RX ADMIN — METOPROLOL TARTRATE 12.5 MG: 25 TABLET ORAL at 10:11

## 2017-02-21 RX ADMIN — AMIODARONE HYDROCHLORIDE 0.5 MG/MIN: 1.8 INJECTION, SOLUTION INTRAVENOUS at 04:42

## 2017-02-21 RX ADMIN — MORPHINE SULFATE 4 MG: 4 INJECTION, SOLUTION INTRAMUSCULAR; INTRAVENOUS at 03:35

## 2017-02-21 RX ADMIN — ACETAMINOPHEN 650 MG: 325 TABLET ORAL at 04:42

## 2017-02-21 RX ADMIN — AMIODARONE HYDROCHLORIDE 0.5 MG/MIN: 1.8 INJECTION, SOLUTION INTRAVENOUS at 15:28

## 2017-02-21 RX ADMIN — ATORVASTATIN CALCIUM 40 MG: 40 TABLET, FILM COATED ORAL at 20:55

## 2017-02-21 RX ADMIN — CEFEPIME 2 G: 2 INJECTION, POWDER, FOR SOLUTION INTRAVENOUS at 10:46

## 2017-02-22 LAB
ALBUMIN SERPL-MCNC: 2.8 G/DL (ref 3.5–5.2)
ANION GAP SERPL CALCULATED.3IONS-SCNC: 14.4 MMOL/L
BACTERIA SPEC AEROBE CULT: NORMAL
BACTERIA SPEC AEROBE CULT: NORMAL
BUN BLD-MCNC: 54 MG/DL (ref 6–20)
BUN/CREAT SERPL: 32 (ref 7–25)
CALCIUM SPEC-SCNC: 8.3 MG/DL (ref 8.6–10.5)
CHLORIDE SERPL-SCNC: 113 MMOL/L (ref 98–107)
CO2 SERPL-SCNC: 20.6 MMOL/L (ref 22–29)
CREAT BLD-MCNC: 1.69 MG/DL (ref 0.76–1.27)
DEPRECATED RDW RBC AUTO: 49.3 FL (ref 37–54)
EOSINOPHIL # BLD MANUAL: 0.66 10*3/MM3 (ref 0–0.7)
EOSINOPHIL NFR BLD MANUAL: 4 % (ref 0.3–6.2)
ERYTHROCYTE [DISTWIDTH] IN BLOOD BY AUTOMATED COUNT: 15.2 % (ref 11.5–14.5)
GFR SERPL CREATININE-BSD FRML MDRD: 42 ML/MIN/1.73
GLUCOSE BLD-MCNC: 112 MG/DL (ref 65–99)
GLUCOSE BLDC GLUCOMTR-MCNC: 107 MG/DL (ref 70–130)
GLUCOSE BLDC GLUCOMTR-MCNC: 107 MG/DL (ref 70–130)
GLUCOSE BLDC GLUCOMTR-MCNC: 112 MG/DL (ref 70–130)
GLUCOSE BLDC GLUCOMTR-MCNC: 129 MG/DL (ref 70–130)
GLUCOSE BLDC GLUCOMTR-MCNC: 94 MG/DL (ref 70–130)
HCT VFR BLD AUTO: 31.1 % (ref 40.4–52.2)
HGB BLD-MCNC: 10.1 G/DL (ref 13.7–17.6)
LARGE PLATELETS: ABNORMAL
LYMPHOCYTES # BLD MANUAL: 0.82 10*3/MM3 (ref 0.9–4.8)
LYMPHOCYTES NFR BLD MANUAL: 5 % (ref 19.6–45.3)
LYMPHOCYTES NFR BLD MANUAL: 5 % (ref 5–12)
MAGNESIUM SERPL-MCNC: 2.3 MG/DL (ref 1.6–2.6)
MCH RBC QN AUTO: 28.9 PG (ref 27–32.7)
MCHC RBC AUTO-ENTMCNC: 32.5 G/DL (ref 32.6–36.4)
MCV RBC AUTO: 88.9 FL (ref 79.8–96.2)
METAMYELOCYTES NFR BLD MANUAL: 4 % (ref 0–0)
MONOCYTES # BLD AUTO: 0.82 10*3/MM3 (ref 0.2–1.2)
NEUTROPHILS # BLD AUTO: 13.46 10*3/MM3 (ref 1.9–8.1)
NEUTROPHILS NFR BLD MANUAL: 82 % (ref 42.7–76)
PHOSPHATE SERPL-MCNC: 3.5 MG/DL (ref 2.5–4.5)
PLATELET # BLD AUTO: 148 10*3/MM3 (ref 140–500)
PMV BLD AUTO: 12.4 FL (ref 6–12)
POLYCHROMASIA BLD QL SMEAR: ABNORMAL
POTASSIUM BLD-SCNC: 3.4 MMOL/L (ref 3.5–5.2)
POTASSIUM BLD-SCNC: 3.6 MMOL/L (ref 3.5–5.2)
PROCALCITONIN SERPL-MCNC: 0.44 NG/ML (ref 0.1–0.25)
RBC # BLD AUTO: 3.5 10*6/MM3 (ref 4.6–6)
SCAN SLIDE: NORMAL
SODIUM BLD-SCNC: 148 MMOL/L (ref 136–145)
STOMATOCYTES BLD QL SMEAR: ABNORMAL
URATE SERPL-MCNC: 9.9 MG/DL (ref 3.4–7)
WBC MORPH BLD: NORMAL
WBC NRBC COR # BLD: 16.42 10*3/MM3 (ref 4.5–10.7)

## 2017-02-22 PROCEDURE — 99232 SBSQ HOSP IP/OBS MODERATE 35: CPT | Performed by: INTERNAL MEDICINE

## 2017-02-22 PROCEDURE — 25010000002 CEFEPIME: Performed by: INTERNAL MEDICINE

## 2017-02-22 PROCEDURE — 63710000001 DIPHENHYDRAMINE PER 50 MG: Performed by: INTERNAL MEDICINE

## 2017-02-22 PROCEDURE — 99024 POSTOP FOLLOW-UP VISIT: CPT | Performed by: NURSE PRACTITIONER

## 2017-02-22 PROCEDURE — 82962 GLUCOSE BLOOD TEST: CPT

## 2017-02-22 PROCEDURE — 85007 BL SMEAR W/DIFF WBC COUNT: CPT | Performed by: INTERNAL MEDICINE

## 2017-02-22 PROCEDURE — 36600 WITHDRAWAL OF ARTERIAL BLOOD: CPT

## 2017-02-22 PROCEDURE — 87070 CULTURE OTHR SPECIMN AEROBIC: CPT | Performed by: THORACIC SURGERY (CARDIOTHORACIC VASCULAR SURGERY)

## 2017-02-22 PROCEDURE — 85025 COMPLETE CBC W/AUTO DIFF WBC: CPT | Performed by: INTERNAL MEDICINE

## 2017-02-22 PROCEDURE — 25010000002 AMIODARONE IN DEXTROSE 5% 360 MG/200ML SOLUTION: Performed by: INTERNAL MEDICINE

## 2017-02-22 PROCEDURE — 82803 BLOOD GASES ANY COMBINATION: CPT

## 2017-02-22 PROCEDURE — 25010000002 ENOXAPARIN PER 10 MG: Performed by: THORACIC SURGERY (CARDIOTHORACIC VASCULAR SURGERY)

## 2017-02-22 PROCEDURE — 80069 RENAL FUNCTION PANEL: CPT | Performed by: INTERNAL MEDICINE

## 2017-02-22 PROCEDURE — 97162 PT EVAL MOD COMPLEX 30 MIN: CPT

## 2017-02-22 PROCEDURE — 25010000002 PROMETHAZINE PER 50 MG: Performed by: THORACIC SURGERY (CARDIOTHORACIC VASCULAR SURGERY)

## 2017-02-22 PROCEDURE — 84550 ASSAY OF BLOOD/URIC ACID: CPT | Performed by: INTERNAL MEDICINE

## 2017-02-22 PROCEDURE — 63710000001 DIPHENHYDRAMINE PER 50 MG: Performed by: THORACIC SURGERY (CARDIOTHORACIC VASCULAR SURGERY)

## 2017-02-22 PROCEDURE — 94799 UNLISTED PULMONARY SVC/PX: CPT

## 2017-02-22 PROCEDURE — 83735 ASSAY OF MAGNESIUM: CPT | Performed by: INTERNAL MEDICINE

## 2017-02-22 PROCEDURE — 84145 PROCALCITONIN (PCT): CPT | Performed by: INTERNAL MEDICINE

## 2017-02-22 PROCEDURE — 92610 EVALUATE SWALLOWING FUNCTION: CPT

## 2017-02-22 PROCEDURE — 84132 ASSAY OF SERUM POTASSIUM: CPT | Performed by: INTERNAL MEDICINE

## 2017-02-22 PROCEDURE — 25010000002 MORPHINE PER 10 MG: Performed by: THORACIC SURGERY (CARDIOTHORACIC VASCULAR SURGERY)

## 2017-02-22 RX ORDER — DIPHENHYDRAMINE HCL 25 MG
25 CAPSULE ORAL EVERY 8 HOURS PRN
Status: DISCONTINUED | OUTPATIENT
Start: 2017-02-22 | End: 2017-03-02 | Stop reason: HOSPADM

## 2017-02-22 RX ORDER — POTASSIUM CHLORIDE 1.5 G/1.77G
40 POWDER, FOR SOLUTION ORAL ONCE
Status: COMPLETED | OUTPATIENT
Start: 2017-02-22 | End: 2017-02-22

## 2017-02-22 RX ORDER — ALPRAZOLAM 0.5 MG/1
0.5 TABLET ORAL 4 TIMES DAILY PRN
Status: DISCONTINUED | OUTPATIENT
Start: 2017-02-22 | End: 2017-03-02 | Stop reason: HOSPADM

## 2017-02-22 RX ORDER — LISINOPRIL 40 MG/1
40 TABLET ORAL DAILY
COMMUNITY
End: 2017-03-02 | Stop reason: HOSPADM

## 2017-02-22 RX ORDER — DIPHENHYDRAMINE HCL 25 MG
25 CAPSULE ORAL ONCE
Status: COMPLETED | OUTPATIENT
Start: 2017-02-22 | End: 2017-02-22

## 2017-02-22 RX ORDER — HYDROCHLOROTHIAZIDE 25 MG/1
25 TABLET ORAL DAILY
COMMUNITY
End: 2017-03-02 | Stop reason: HOSPADM

## 2017-02-22 RX ADMIN — AMIODARONE HYDROCHLORIDE 0.5 MG/MIN: 1.8 INJECTION, SOLUTION INTRAVENOUS at 04:14

## 2017-02-22 RX ADMIN — ENOXAPARIN SODIUM 30 MG: 30 INJECTION SUBCUTANEOUS at 17:36

## 2017-02-22 RX ADMIN — IPRATROPIUM BROMIDE AND ALBUTEROL SULFATE 3 ML: .5; 3 SOLUTION RESPIRATORY (INHALATION) at 08:22

## 2017-02-22 RX ADMIN — CEFEPIME 2 G: 2 INJECTION, POWDER, FOR SOLUTION INTRAVENOUS at 18:51

## 2017-02-22 RX ADMIN — ATORVASTATIN CALCIUM 40 MG: 40 TABLET, FILM COATED ORAL at 07:03

## 2017-02-22 RX ADMIN — OXYCODONE HYDROCHLORIDE 10 MG: 5 TABLET ORAL at 01:56

## 2017-02-22 RX ADMIN — PROMETHAZINE HYDROCHLORIDE 12.5 MG: 25 INJECTION INTRAMUSCULAR; INTRAVENOUS at 04:45

## 2017-02-22 RX ADMIN — ALPRAZOLAM 0.5 MG: 0.5 TABLET ORAL at 23:54

## 2017-02-22 RX ADMIN — ALPRAZOLAM 0.5 MG: 0.5 TABLET ORAL at 17:37

## 2017-02-22 RX ADMIN — CEFEPIME 2 G: 2 INJECTION, POWDER, FOR SOLUTION INTRAVENOUS at 02:39

## 2017-02-22 RX ADMIN — ATORVASTATIN CALCIUM 40 MG: 40 TABLET, FILM COATED ORAL at 20:08

## 2017-02-22 RX ADMIN — DEXMEDETOMIDINE HYDROCHLORIDE 0.7 MCG/KG/HR: 4 INJECTION, SOLUTION INTRAVENOUS at 05:48

## 2017-02-22 RX ADMIN — ALPRAZOLAM 0.25 MG: 0.25 TABLET ORAL at 07:04

## 2017-02-22 RX ADMIN — POTASSIUM CHLORIDE 40 MEQ: 1.5 POWDER, FOR SOLUTION ORAL at 22:05

## 2017-02-22 RX ADMIN — DEXMEDETOMIDINE HYDROCHLORIDE 0.7 MCG/KG/HR: 4 INJECTION, SOLUTION INTRAVENOUS at 01:15

## 2017-02-22 RX ADMIN — PANTOPRAZOLE SODIUM 40 MG: 40 INJECTION, POWDER, FOR SOLUTION INTRAVENOUS at 05:17

## 2017-02-22 RX ADMIN — IPRATROPIUM BROMIDE AND ALBUTEROL SULFATE 3 ML: .5; 3 SOLUTION RESPIRATORY (INHALATION) at 04:00

## 2017-02-22 RX ADMIN — OXYCODONE HYDROCHLORIDE 10 MG: 5 TABLET ORAL at 10:42

## 2017-02-22 RX ADMIN — MORPHINE SULFATE 4 MG: 4 INJECTION, SOLUTION INTRAMUSCULAR; INTRAVENOUS at 05:05

## 2017-02-22 RX ADMIN — DIPHENHYDRAMINE HYDROCHLORIDE 25 MG: 25 CAPSULE ORAL at 13:15

## 2017-02-22 RX ADMIN — DOCUSATE SODIUM -SENNOSIDES 2 TABLET: 50; 8.6 TABLET, COATED ORAL at 20:07

## 2017-02-22 RX ADMIN — METOPROLOL TARTRATE 12.5 MG: 25 TABLET ORAL at 20:08

## 2017-02-22 RX ADMIN — IPRATROPIUM BROMIDE AND ALBUTEROL SULFATE 3 ML: .5; 3 SOLUTION RESPIRATORY (INHALATION) at 21:43

## 2017-02-22 RX ADMIN — DIPHENHYDRAMINE HYDROCHLORIDE 25 MG: 25 CAPSULE ORAL at 23:54

## 2017-02-22 RX ADMIN — OXYCODONE HYDROCHLORIDE 10 MG: 5 TABLET ORAL at 22:05

## 2017-02-22 RX ADMIN — METOPROLOL TARTRATE 12.5 MG: 25 TABLET ORAL at 09:57

## 2017-02-22 RX ADMIN — ALPRAZOLAM 0.25 MG: 0.25 TABLET ORAL at 10:04

## 2017-02-22 RX ADMIN — CEFEPIME 2 G: 2 INJECTION, POWDER, FOR SOLUTION INTRAVENOUS at 10:49

## 2017-02-22 RX ADMIN — ASPIRIN 81 MG: 81 TABLET ORAL at 09:57

## 2017-02-22 RX ADMIN — LEVOTHYROXINE SODIUM 25 MCG: 25 TABLET ORAL at 07:04

## 2017-02-23 LAB
ALBUMIN SERPL-MCNC: 2.9 G/DL (ref 3.5–5.2)
ALBUMIN SERPL-MCNC: 3.1 G/DL (ref 3.5–5.2)
ALBUMIN/GLOB SERPL: 1 G/DL
ALP SERPL-CCNC: 182 U/L (ref 39–117)
ALT SERPL W P-5'-P-CCNC: 273 U/L (ref 1–41)
ANION GAP SERPL CALCULATED.3IONS-SCNC: 14.4 MMOL/L
ANION GAP SERPL CALCULATED.3IONS-SCNC: 15.3 MMOL/L
ANISOCYTOSIS BLD QL: ABNORMAL
ARTERIAL PATENCY WRIST A: NORMAL
AST SERPL-CCNC: 181 U/L (ref 1–40)
ATMOSPHERIC PRESS: NORMAL MMHG
BASE EXCESS BLDA CALC-SCNC: NORMAL MMOL/L (ref 0–2)
BDY SITE: NORMAL
BILIRUB SERPL-MCNC: 0.4 MG/DL (ref 0.1–1.2)
BUN BLD-MCNC: 47 MG/DL (ref 6–20)
BUN BLD-MCNC: 52 MG/DL (ref 6–20)
BUN/CREAT SERPL: 29.7 (ref 7–25)
BUN/CREAT SERPL: 32.7 (ref 7–25)
CALCIUM SPEC-SCNC: 8.4 MG/DL (ref 8.6–10.5)
CALCIUM SPEC-SCNC: 8.5 MG/DL (ref 8.6–10.5)
CHLORIDE SERPL-SCNC: 111 MMOL/L (ref 98–107)
CHLORIDE SERPL-SCNC: 112 MMOL/L (ref 98–107)
CO2 SERPL-SCNC: 21.7 MMOL/L (ref 22–29)
CO2 SERPL-SCNC: 22.6 MMOL/L (ref 22–29)
CREAT BLD-MCNC: 1.58 MG/DL (ref 0.76–1.27)
CREAT BLD-MCNC: 1.59 MG/DL (ref 0.76–1.27)
DEPRECATED RDW RBC AUTO: 51.3 FL (ref 37–54)
EOSINOPHIL # BLD MANUAL: 0.79 10*3/MM3 (ref 0–0.7)
EOSINOPHIL NFR BLD MANUAL: 5 % (ref 0.3–6.2)
ERYTHROCYTE [DISTWIDTH] IN BLOOD BY AUTOMATED COUNT: 15.3 % (ref 11.5–14.5)
GFR SERPL CREATININE-BSD FRML MDRD: 45 ML/MIN/1.73
GFR SERPL CREATININE-BSD FRML MDRD: 46 ML/MIN/1.73
GLOBULIN UR ELPH-MCNC: 3 GM/DL
GLUCOSE BLD-MCNC: 112 MG/DL (ref 65–99)
GLUCOSE BLD-MCNC: 112 MG/DL (ref 65–99)
GLUCOSE BLDC GLUCOMTR-MCNC: 118 MG/DL (ref 70–130)
GLUCOSE BLDC GLUCOMTR-MCNC: 119 MG/DL (ref 70–130)
GLUCOSE BLDC GLUCOMTR-MCNC: 132 MG/DL (ref 70–130)
GLUCOSE BLDC GLUCOMTR-MCNC: 145 MG/DL (ref 70–130)
HCO3 BLDA-SCNC: NORMAL MMOL/L (ref 22–28)
HCT VFR BLD AUTO: 34.5 % (ref 40.4–52.2)
HGB BLD-MCNC: 11 G/DL (ref 13.7–17.6)
HOROWITZ INDEX BLD+IHG-RTO: NORMAL %
LARGE PLATELETS: ABNORMAL
LYMPHOCYTES # BLD MANUAL: 2.54 10*3/MM3 (ref 0.9–4.8)
LYMPHOCYTES NFR BLD MANUAL: 16 % (ref 19.6–45.3)
MAGNESIUM SERPL-MCNC: 2.4 MG/DL (ref 1.6–2.6)
MCH RBC QN AUTO: 29.2 PG (ref 27–32.7)
MCHC RBC AUTO-ENTMCNC: 31.9 G/DL (ref 32.6–36.4)
MCV RBC AUTO: 91.5 FL (ref 79.8–96.2)
METAMYELOCYTES NFR BLD MANUAL: 2 % (ref 0–0)
MODALITY: NORMAL
NEUTROPHILS # BLD AUTO: 11.75 10*3/MM3 (ref 1.9–8.1)
NEUTROPHILS NFR BLD MANUAL: 74 % (ref 42.7–76)
O2 A-A PPRESDIFF RESPIRATORY: NORMAL MMHG
PCO2 BLDA: NORMAL MM HG (ref 35–45)
PEEP RESPIRATORY: NORMAL CM[H2O]
PH BLDA: NORMAL PH UNITS (ref 7.35–7.45)
PHOSPHATE SERPL-MCNC: 2.7 MG/DL (ref 2.5–4.5)
PLATELET # BLD AUTO: 190 10*3/MM3 (ref 140–500)
PMV BLD AUTO: 13.1 FL (ref 6–12)
PO2 BLDA: NORMAL MM HG (ref 80–100)
POLYCHROMASIA BLD QL SMEAR: ABNORMAL
POTASSIUM BLD-SCNC: 3.4 MMOL/L (ref 3.5–5.2)
POTASSIUM BLD-SCNC: 3.6 MMOL/L (ref 3.5–5.2)
POTASSIUM BLD-SCNC: 3.8 MMOL/L (ref 3.5–5.2)
PROT SERPL-MCNC: 6.1 G/DL (ref 6–8.5)
RBC # BLD AUTO: 3.77 10*6/MM3 (ref 4.6–6)
SAO2 % BLDCOA: NORMAL % (ref 92–99)
SCAN SLIDE: NORMAL
SET MECH RESP RATE: NORMAL
SODIUM BLD-SCNC: 148 MMOL/L (ref 136–145)
SODIUM BLD-SCNC: 149 MMOL/L (ref 136–145)
TOTAL RATE: NORMAL BREATHS/MINUTE
URATE SERPL-MCNC: 8.6 MG/DL (ref 3.4–7)
VARIANT LYMPHS NFR BLD MANUAL: 3 % (ref 0–5)
VENTILATOR MODE: NORMAL
VT ON VENT VENT: NORMAL ML
WBC MORPH BLD: NORMAL
WBC NRBC COR # BLD: 15.88 10*3/MM3 (ref 4.5–10.7)

## 2017-02-23 PROCEDURE — 25010000002 ENOXAPARIN PER 10 MG: Performed by: THORACIC SURGERY (CARDIOTHORACIC VASCULAR SURGERY)

## 2017-02-23 PROCEDURE — 85007 BL SMEAR W/DIFF WBC COUNT: CPT | Performed by: INTERNAL MEDICINE

## 2017-02-23 PROCEDURE — 82962 GLUCOSE BLOOD TEST: CPT

## 2017-02-23 PROCEDURE — 99024 POSTOP FOLLOW-UP VISIT: CPT | Performed by: NURSE PRACTITIONER

## 2017-02-23 PROCEDURE — 92526 ORAL FUNCTION THERAPY: CPT | Performed by: SPEECH-LANGUAGE PATHOLOGIST

## 2017-02-23 PROCEDURE — 99232 SBSQ HOSP IP/OBS MODERATE 35: CPT | Performed by: INTERNAL MEDICINE

## 2017-02-23 PROCEDURE — 84132 ASSAY OF SERUM POTASSIUM: CPT | Performed by: THORACIC SURGERY (CARDIOTHORACIC VASCULAR SURGERY)

## 2017-02-23 PROCEDURE — 94799 UNLISTED PULMONARY SVC/PX: CPT

## 2017-02-23 PROCEDURE — 93005 ELECTROCARDIOGRAM TRACING: CPT | Performed by: THORACIC SURGERY (CARDIOTHORACIC VASCULAR SURGERY)

## 2017-02-23 PROCEDURE — 85025 COMPLETE CBC W/AUTO DIFF WBC: CPT | Performed by: INTERNAL MEDICINE

## 2017-02-23 PROCEDURE — 84550 ASSAY OF BLOOD/URIC ACID: CPT | Performed by: INTERNAL MEDICINE

## 2017-02-23 PROCEDURE — 80069 RENAL FUNCTION PANEL: CPT | Performed by: INTERNAL MEDICINE

## 2017-02-23 PROCEDURE — 97110 THERAPEUTIC EXERCISES: CPT

## 2017-02-23 PROCEDURE — 63710000001 DIPHENHYDRAMINE PER 50 MG: Performed by: THORACIC SURGERY (CARDIOTHORACIC VASCULAR SURGERY)

## 2017-02-23 PROCEDURE — 93010 ELECTROCARDIOGRAM REPORT: CPT | Performed by: INTERNAL MEDICINE

## 2017-02-23 PROCEDURE — 25010000002 CEFEPIME: Performed by: INTERNAL MEDICINE

## 2017-02-23 PROCEDURE — 80053 COMPREHEN METABOLIC PANEL: CPT | Performed by: INTERNAL MEDICINE

## 2017-02-23 PROCEDURE — 83735 ASSAY OF MAGNESIUM: CPT | Performed by: INTERNAL MEDICINE

## 2017-02-23 RX ORDER — POTASSIUM CHLORIDE 1.5 G/1.77G
40 POWDER, FOR SOLUTION ORAL AS NEEDED
Status: DISCONTINUED | OUTPATIENT
Start: 2017-02-23 | End: 2017-03-02 | Stop reason: HOSPADM

## 2017-02-23 RX ORDER — BUMETANIDE 0.25 MG/ML
2 INJECTION INTRAMUSCULAR; INTRAVENOUS EVERY 8 HOURS
Status: DISCONTINUED | OUTPATIENT
Start: 2017-02-23 | End: 2017-02-24

## 2017-02-23 RX ORDER — DIPHENHYDRAMINE HYDROCHLORIDE, ZINC ACETATE 2; .1 G/100G; G/100G
CREAM TOPICAL 3 TIMES DAILY PRN
Status: DISCONTINUED | OUTPATIENT
Start: 2017-02-23 | End: 2017-03-02 | Stop reason: HOSPADM

## 2017-02-23 RX ORDER — CARVEDILOL 6.25 MG/1
6.25 TABLET ORAL EVERY 12 HOURS SCHEDULED
Status: DISCONTINUED | OUTPATIENT
Start: 2017-02-23 | End: 2017-02-26

## 2017-02-23 RX ORDER — DOCUSATE SODIUM 50 MG/5 ML
150 LIQUID (ML) ORAL DAILY
Status: DISCONTINUED | OUTPATIENT
Start: 2017-02-23 | End: 2017-03-02 | Stop reason: HOSPADM

## 2017-02-23 RX ORDER — BISACODYL 10 MG
10 SUPPOSITORY, RECTAL RECTAL DAILY PRN
Status: DISCONTINUED | OUTPATIENT
Start: 2017-02-23 | End: 2017-03-02 | Stop reason: HOSPADM

## 2017-02-23 RX ORDER — POTASSIUM CHLORIDE 7.45 MG/ML
10 INJECTION INTRAVENOUS
Status: DISCONTINUED | OUTPATIENT
Start: 2017-02-23 | End: 2017-03-02 | Stop reason: HOSPADM

## 2017-02-23 RX ORDER — POTASSIUM CHLORIDE 1.5 G/1.77G
40 POWDER, FOR SOLUTION ORAL AS NEEDED
Status: DISCONTINUED | OUTPATIENT
Start: 2017-02-23 | End: 2017-02-23 | Stop reason: CLARIF

## 2017-02-23 RX ORDER — CARVEDILOL 3.12 MG/1
3.12 TABLET ORAL ONCE
Status: COMPLETED | OUTPATIENT
Start: 2017-02-23 | End: 2017-02-23

## 2017-02-23 RX ORDER — CARVEDILOL 3.12 MG/1
3.12 TABLET ORAL EVERY 12 HOURS SCHEDULED
Status: DISCONTINUED | OUTPATIENT
Start: 2017-02-23 | End: 2017-02-23

## 2017-02-23 RX ORDER — DIPHENHYDRAMINE HYDROCHLORIDE, ZINC ACETATE 2; .1 G/100G; G/100G
CREAM TOPICAL 3 TIMES DAILY PRN
Status: DISCONTINUED | OUTPATIENT
Start: 2017-02-23 | End: 2017-02-23 | Stop reason: SDUPTHER

## 2017-02-23 RX ORDER — LACTULOSE 10 G/15ML
20 SOLUTION ORAL DAILY
Status: DISCONTINUED | OUTPATIENT
Start: 2017-02-23 | End: 2017-03-01

## 2017-02-23 RX ORDER — POTASSIUM CHLORIDE 750 MG/1
40 CAPSULE, EXTENDED RELEASE ORAL AS NEEDED
Status: DISCONTINUED | OUTPATIENT
Start: 2017-02-23 | End: 2017-03-02 | Stop reason: HOSPADM

## 2017-02-23 RX ADMIN — HYDROCODONE BITARTATE AND ACETAMINOPHEN 2 TABLET: 5; 325 TABLET ORAL at 16:30

## 2017-02-23 RX ADMIN — ENOXAPARIN SODIUM 30 MG: 30 INJECTION SUBCUTANEOUS at 18:03

## 2017-02-23 RX ADMIN — CARVEDILOL 6.25 MG: 6.25 TABLET, FILM COATED ORAL at 20:24

## 2017-02-23 RX ADMIN — IPRATROPIUM BROMIDE AND ALBUTEROL SULFATE 3 ML: .5; 3 SOLUTION RESPIRATORY (INHALATION) at 03:47

## 2017-02-23 RX ADMIN — LEVOTHYROXINE SODIUM 25 MCG: 25 TABLET ORAL at 10:53

## 2017-02-23 RX ADMIN — CEFEPIME 2 G: 2 INJECTION, POWDER, FOR SOLUTION INTRAVENOUS at 11:19

## 2017-02-23 RX ADMIN — ALPRAZOLAM 0.5 MG: 0.5 TABLET ORAL at 05:59

## 2017-02-23 RX ADMIN — POTASSIUM CHLORIDE 40 MEQ: 1.5 POWDER, FOR SOLUTION ORAL at 13:59

## 2017-02-23 RX ADMIN — DIPHENHYDRAMINE HYDROCHLORIDE 25 MG: 25 CAPSULE ORAL at 16:30

## 2017-02-23 RX ADMIN — CARVEDILOL 3.12 MG: 3.12 TABLET, FILM COATED ORAL at 10:53

## 2017-02-23 RX ADMIN — DOCUSATE SODIUM 150 MG: 50 LIQUID ORAL at 11:00

## 2017-02-23 RX ADMIN — BUMETANIDE 2 MG: 0.25 INJECTION, SOLUTION INTRAMUSCULAR; INTRAVENOUS at 17:04

## 2017-02-23 RX ADMIN — IPRATROPIUM BROMIDE AND ALBUTEROL SULFATE 3 ML: .5; 3 SOLUTION RESPIRATORY (INHALATION) at 14:12

## 2017-02-23 RX ADMIN — CEFEPIME 2 G: 2 INJECTION, POWDER, FOR SOLUTION INTRAVENOUS at 04:19

## 2017-02-23 RX ADMIN — POTASSIUM CHLORIDE 40 MEQ: 1.5 POWDER, FOR SOLUTION ORAL at 07:57

## 2017-02-23 RX ADMIN — ASPIRIN 81 MG: 81 TABLET ORAL at 10:53

## 2017-02-23 RX ADMIN — ALPRAZOLAM 0.5 MG: 0.5 TABLET ORAL at 20:24

## 2017-02-23 RX ADMIN — IPRATROPIUM BROMIDE AND ALBUTEROL SULFATE 3 ML: .5; 3 SOLUTION RESPIRATORY (INHALATION) at 08:08

## 2017-02-23 RX ADMIN — BUMETANIDE 2 MG: 0.25 INJECTION, SOLUTION INTRAMUSCULAR; INTRAVENOUS at 10:54

## 2017-02-23 RX ADMIN — LACTULOSE 20 G: 10 SOLUTION ORAL at 10:55

## 2017-02-23 RX ADMIN — MUPIROCIN 1 APPLICATION: 20 OINTMENT TOPICAL at 10:53

## 2017-02-23 RX ADMIN — IPRATROPIUM BROMIDE AND ALBUTEROL SULFATE 3 ML: .5; 3 SOLUTION RESPIRATORY (INHALATION) at 00:24

## 2017-02-23 RX ADMIN — CEFEPIME 2 G: 2 INJECTION, POWDER, FOR SOLUTION INTRAVENOUS at 20:24

## 2017-02-23 RX ADMIN — IPRATROPIUM BROMIDE AND ALBUTEROL SULFATE 3 ML: .5; 3 SOLUTION RESPIRATORY (INHALATION) at 20:35

## 2017-02-23 RX ADMIN — PANTOPRAZOLE SODIUM 40 MG: 40 TABLET, DELAYED RELEASE ORAL at 05:59

## 2017-02-23 RX ADMIN — CARVEDILOL 3.12 MG: 3.12 TABLET, FILM COATED ORAL at 16:12

## 2017-02-24 ENCOUNTER — APPOINTMENT (OUTPATIENT)
Dept: GENERAL RADIOLOGY | Facility: HOSPITAL | Age: 57
End: 2017-02-24

## 2017-02-24 LAB
ALBUMIN SERPL-MCNC: 3.3 G/DL (ref 3.5–5.2)
ALBUMIN/GLOB SERPL: 1.2 G/DL
ALP SERPL-CCNC: 168 U/L (ref 39–117)
ALT SERPL W P-5'-P-CCNC: 271 U/L (ref 1–41)
ANION GAP SERPL CALCULATED.3IONS-SCNC: 14.1 MMOL/L
AST SERPL-CCNC: 131 U/L (ref 1–40)
BACTERIA SPEC AEROBE CULT: NORMAL
BILIRUB SERPL-MCNC: 0.6 MG/DL (ref 0.1–1.2)
BUN BLD-MCNC: 46 MG/DL (ref 6–20)
BUN/CREAT SERPL: 27.1 (ref 7–25)
CALCIUM SPEC-SCNC: 8.4 MG/DL (ref 8.6–10.5)
CATHETER CULTURE: NORMAL
CHLORIDE SERPL-SCNC: 113 MMOL/L (ref 98–107)
CO2 SERPL-SCNC: 21.9 MMOL/L (ref 22–29)
CREAT BLD-MCNC: 1.7 MG/DL (ref 0.76–1.27)
GFR SERPL CREATININE-BSD FRML MDRD: 42 ML/MIN/1.73
GLOBULIN UR ELPH-MCNC: 2.7 GM/DL
GLUCOSE BLD-MCNC: 112 MG/DL (ref 65–99)
GLUCOSE BLDC GLUCOMTR-MCNC: 108 MG/DL (ref 70–130)
GLUCOSE BLDC GLUCOMTR-MCNC: 124 MG/DL (ref 70–130)
GLUCOSE BLDC GLUCOMTR-MCNC: 131 MG/DL (ref 70–130)
GLUCOSE BLDC GLUCOMTR-MCNC: 182 MG/DL (ref 70–130)
POTASSIUM BLD-SCNC: 4.9 MMOL/L (ref 3.5–5.2)
PROT SERPL-MCNC: 6 G/DL (ref 6–8.5)
SODIUM BLD-SCNC: 149 MMOL/L (ref 136–145)

## 2017-02-24 PROCEDURE — 82962 GLUCOSE BLOOD TEST: CPT

## 2017-02-24 PROCEDURE — 99232 SBSQ HOSP IP/OBS MODERATE 35: CPT | Performed by: INTERNAL MEDICINE

## 2017-02-24 PROCEDURE — 94799 UNLISTED PULMONARY SVC/PX: CPT

## 2017-02-24 PROCEDURE — 74230 X-RAY XM SWLNG FUNCJ C+: CPT

## 2017-02-24 PROCEDURE — 25010000002 ENOXAPARIN PER 10 MG: Performed by: THORACIC SURGERY (CARDIOTHORACIC VASCULAR SURGERY)

## 2017-02-24 PROCEDURE — 97110 THERAPEUTIC EXERCISES: CPT

## 2017-02-24 PROCEDURE — 92611 MOTION FLUOROSCOPY/SWALLOW: CPT

## 2017-02-24 PROCEDURE — 25010000002 HYDRALAZINE PER 20 MG: Performed by: NURSE PRACTITIONER

## 2017-02-24 PROCEDURE — 93005 ELECTROCARDIOGRAM TRACING: CPT | Performed by: THORACIC SURGERY (CARDIOTHORACIC VASCULAR SURGERY)

## 2017-02-24 PROCEDURE — 93010 ELECTROCARDIOGRAM REPORT: CPT | Performed by: INTERNAL MEDICINE

## 2017-02-24 PROCEDURE — 80053 COMPREHEN METABOLIC PANEL: CPT | Performed by: INTERNAL MEDICINE

## 2017-02-24 PROCEDURE — 63710000001 INSULIN ASPART PER 5 UNITS: Performed by: THORACIC SURGERY (CARDIOTHORACIC VASCULAR SURGERY)

## 2017-02-24 PROCEDURE — 99024 POSTOP FOLLOW-UP VISIT: CPT | Performed by: NURSE PRACTITIONER

## 2017-02-24 PROCEDURE — 25010000002 CEFEPIME: Performed by: INTERNAL MEDICINE

## 2017-02-24 RX ORDER — HYDRALAZINE HYDROCHLORIDE 20 MG/ML
20 INJECTION INTRAMUSCULAR; INTRAVENOUS EVERY 4 HOURS PRN
Status: DISCONTINUED | OUTPATIENT
Start: 2017-02-24 | End: 2017-03-02 | Stop reason: HOSPADM

## 2017-02-24 RX ORDER — AMLODIPINE BESYLATE 5 MG/1
5 TABLET ORAL
Status: DISCONTINUED | OUTPATIENT
Start: 2017-02-24 | End: 2017-03-02 | Stop reason: HOSPADM

## 2017-02-24 RX ORDER — DEXTROSE MONOHYDRATE 50 MG/ML
100 INJECTION, SOLUTION INTRAVENOUS CONTINUOUS
Status: ACTIVE | OUTPATIENT
Start: 2017-02-24 | End: 2017-02-25

## 2017-02-24 RX ADMIN — PANTOPRAZOLE SODIUM 40 MG: 40 TABLET, DELAYED RELEASE ORAL at 05:53

## 2017-02-24 RX ADMIN — BUMETANIDE 2 MG: 0.25 INJECTION, SOLUTION INTRAMUSCULAR; INTRAVENOUS at 01:24

## 2017-02-24 RX ADMIN — CARVEDILOL 6.25 MG: 6.25 TABLET, FILM COATED ORAL at 07:57

## 2017-02-24 RX ADMIN — OXYCODONE HYDROCHLORIDE 10 MG: 5 TABLET ORAL at 01:24

## 2017-02-24 RX ADMIN — ASPIRIN 81 MG: 81 TABLET ORAL at 08:27

## 2017-02-24 RX ADMIN — CEFEPIME 2 G: 2 INJECTION, POWDER, FOR SOLUTION INTRAVENOUS at 04:29

## 2017-02-24 RX ADMIN — CEFEPIME 2 G: 2 INJECTION, POWDER, FOR SOLUTION INTRAVENOUS at 10:54

## 2017-02-24 RX ADMIN — AMLODIPINE BESYLATE 5 MG: 5 TABLET ORAL at 09:32

## 2017-02-24 RX ADMIN — DEXTROSE MONOHYDRATE 100 ML/HR: 50 INJECTION, SOLUTION INTRAVENOUS at 18:59

## 2017-02-24 RX ADMIN — OXYCODONE HYDROCHLORIDE 10 MG: 5 TABLET ORAL at 10:00

## 2017-02-24 RX ADMIN — ENOXAPARIN SODIUM 30 MG: 30 INJECTION SUBCUTANEOUS at 17:21

## 2017-02-24 RX ADMIN — INSULIN ASPART 2 UNITS: 100 INJECTION, SOLUTION INTRAVENOUS; SUBCUTANEOUS at 20:26

## 2017-02-24 RX ADMIN — HYDRALAZINE HYDROCHLORIDE 20 MG: 20 INJECTION INTRAMUSCULAR; INTRAVENOUS at 10:54

## 2017-02-24 RX ADMIN — IPRATROPIUM BROMIDE AND ALBUTEROL SULFATE 3 ML: .5; 3 SOLUTION RESPIRATORY (INHALATION) at 11:00

## 2017-02-24 RX ADMIN — HYDRALAZINE HYDROCHLORIDE 20 MG: 20 INJECTION INTRAMUSCULAR; INTRAVENOUS at 16:23

## 2017-02-24 RX ADMIN — IPRATROPIUM BROMIDE AND ALBUTEROL SULFATE 3 ML: .5; 3 SOLUTION RESPIRATORY (INHALATION) at 16:07

## 2017-02-24 RX ADMIN — CEFEPIME 2 G: 2 INJECTION, POWDER, FOR SOLUTION INTRAVENOUS at 18:56

## 2017-02-24 RX ADMIN — LEVOTHYROXINE SODIUM 25 MCG: 25 TABLET ORAL at 06:55

## 2017-02-24 RX ADMIN — IPRATROPIUM BROMIDE AND ALBUTEROL SULFATE 3 ML: .5; 3 SOLUTION RESPIRATORY (INHALATION) at 23:28

## 2017-02-24 RX ADMIN — IPRATROPIUM BROMIDE AND ALBUTEROL SULFATE 3 ML: .5; 3 SOLUTION RESPIRATORY (INHALATION) at 03:35

## 2017-02-24 RX ADMIN — OXYCODONE HYDROCHLORIDE 10 MG: 5 TABLET ORAL at 18:54

## 2017-02-24 RX ADMIN — IPRATROPIUM BROMIDE AND ALBUTEROL SULFATE 3 ML: .5; 3 SOLUTION RESPIRATORY (INHALATION) at 19:05

## 2017-02-24 RX ADMIN — DEXTROSE MONOHYDRATE 100 ML/HR: 50 INJECTION, SOLUTION INTRAVENOUS at 07:44

## 2017-02-25 LAB
ALBUMIN SERPL-MCNC: 3.3 G/DL (ref 3.5–5.2)
ANION GAP SERPL CALCULATED.3IONS-SCNC: 14.9 MMOL/L
BASOPHILS # BLD AUTO: 0.04 10*3/MM3 (ref 0–0.2)
BASOPHILS NFR BLD AUTO: 0.2 % (ref 0–1.5)
BUN BLD-MCNC: 38 MG/DL (ref 6–20)
BUN/CREAT SERPL: 28.6 (ref 7–25)
CALCIUM SPEC-SCNC: 8.2 MG/DL (ref 8.6–10.5)
CHLORIDE SERPL-SCNC: 110 MMOL/L (ref 98–107)
CO2 SERPL-SCNC: 22.1 MMOL/L (ref 22–29)
CREAT BLD-MCNC: 1.33 MG/DL (ref 0.76–1.27)
DEPRECATED RDW RBC AUTO: 47.5 FL (ref 37–54)
EOSINOPHIL # BLD AUTO: 0.41 10*3/MM3 (ref 0–0.7)
EOSINOPHIL NFR BLD AUTO: 2.4 % (ref 0.3–6.2)
ERYTHROCYTE [DISTWIDTH] IN BLOOD BY AUTOMATED COUNT: 14.8 % (ref 11.5–14.5)
GFR SERPL CREATININE-BSD FRML MDRD: 56 ML/MIN/1.73
GLUCOSE BLD-MCNC: 118 MG/DL (ref 65–99)
GLUCOSE BLDC GLUCOMTR-MCNC: 110 MG/DL (ref 70–130)
GLUCOSE BLDC GLUCOMTR-MCNC: 124 MG/DL (ref 70–130)
GLUCOSE BLDC GLUCOMTR-MCNC: 127 MG/DL (ref 70–130)
GLUCOSE BLDC GLUCOMTR-MCNC: 181 MG/DL (ref 70–130)
HCT VFR BLD AUTO: 30.4 % (ref 40.4–52.2)
HGB BLD-MCNC: 10 G/DL (ref 13.7–17.6)
IMM GRANULOCYTES # BLD: 0.72 10*3/MM3 (ref 0–0.03)
IMM GRANULOCYTES NFR BLD: 4.2 % (ref 0–0.5)
LARGE PLATELETS: NORMAL
LYMPHOCYTES # BLD AUTO: 1.89 10*3/MM3 (ref 0.9–4.8)
LYMPHOCYTES NFR BLD AUTO: 11 % (ref 19.6–45.3)
MAGNESIUM SERPL-MCNC: 2.1 MG/DL (ref 1.6–2.6)
MCH RBC QN AUTO: 28.9 PG (ref 27–32.7)
MCHC RBC AUTO-ENTMCNC: 32.9 G/DL (ref 32.6–36.4)
MCV RBC AUTO: 87.9 FL (ref 79.8–96.2)
MONOCYTES # BLD AUTO: 1.28 10*3/MM3 (ref 0.2–1.2)
MONOCYTES NFR BLD AUTO: 7.4 % (ref 5–12)
NEUTROPHILS # BLD AUTO: 12.86 10*3/MM3 (ref 1.9–8.1)
NEUTROPHILS NFR BLD AUTO: 74.8 % (ref 42.7–76)
NRBC BLD MANUAL-RTO: 0 /100 WBC (ref 0–0)
PHOSPHATE SERPL-MCNC: 3.2 MG/DL (ref 2.5–4.5)
PLATELET # BLD AUTO: 171 10*3/MM3 (ref 140–500)
PMV BLD AUTO: 13.3 FL (ref 6–12)
POLYCHROMASIA BLD QL SMEAR: NORMAL
POTASSIUM BLD-SCNC: 3.8 MMOL/L (ref 3.5–5.2)
RBC # BLD AUTO: 3.46 10*6/MM3 (ref 4.6–6)
SODIUM BLD-SCNC: 147 MMOL/L (ref 136–145)
URATE SERPL-MCNC: 6.7 MG/DL (ref 3.4–7)
WBC MORPH BLD: NORMAL
WBC NRBC COR # BLD: 17.2 10*3/MM3 (ref 4.5–10.7)

## 2017-02-25 PROCEDURE — 93010 ELECTROCARDIOGRAM REPORT: CPT | Performed by: INTERNAL MEDICINE

## 2017-02-25 PROCEDURE — 25010000002 FUROSEMIDE PER 20 MG: Performed by: INTERNAL MEDICINE

## 2017-02-25 PROCEDURE — 25010000002 CEFEPIME: Performed by: INTERNAL MEDICINE

## 2017-02-25 PROCEDURE — 63710000001 INSULIN ASPART PER 5 UNITS: Performed by: THORACIC SURGERY (CARDIOTHORACIC VASCULAR SURGERY)

## 2017-02-25 PROCEDURE — 99232 SBSQ HOSP IP/OBS MODERATE 35: CPT | Performed by: INTERNAL MEDICINE

## 2017-02-25 PROCEDURE — 93005 ELECTROCARDIOGRAM TRACING: CPT | Performed by: THORACIC SURGERY (CARDIOTHORACIC VASCULAR SURGERY)

## 2017-02-25 PROCEDURE — 80069 RENAL FUNCTION PANEL: CPT | Performed by: INTERNAL MEDICINE

## 2017-02-25 PROCEDURE — 94762 N-INVAS EAR/PLS OXIMTRY CONT: CPT

## 2017-02-25 PROCEDURE — 85007 BL SMEAR W/DIFF WBC COUNT: CPT | Performed by: INTERNAL MEDICINE

## 2017-02-25 PROCEDURE — 83735 ASSAY OF MAGNESIUM: CPT | Performed by: INTERNAL MEDICINE

## 2017-02-25 PROCEDURE — 84550 ASSAY OF BLOOD/URIC ACID: CPT | Performed by: INTERNAL MEDICINE

## 2017-02-25 PROCEDURE — 85025 COMPLETE CBC W/AUTO DIFF WBC: CPT | Performed by: INTERNAL MEDICINE

## 2017-02-25 PROCEDURE — 94799 UNLISTED PULMONARY SVC/PX: CPT

## 2017-02-25 PROCEDURE — 97530 THERAPEUTIC ACTIVITIES: CPT

## 2017-02-25 PROCEDURE — 25010000002 ENOXAPARIN PER 10 MG: Performed by: THORACIC SURGERY (CARDIOTHORACIC VASCULAR SURGERY)

## 2017-02-25 PROCEDURE — 82962 GLUCOSE BLOOD TEST: CPT

## 2017-02-25 RX ORDER — FUROSEMIDE 40 MG/1
40 TABLET ORAL DAILY
Status: DISCONTINUED | OUTPATIENT
Start: 2017-02-25 | End: 2017-02-25

## 2017-02-25 RX ORDER — FUROSEMIDE 10 MG/ML
40 INJECTION INTRAMUSCULAR; INTRAVENOUS 2 TIMES DAILY
Status: DISCONTINUED | OUTPATIENT
Start: 2017-02-25 | End: 2017-02-26

## 2017-02-25 RX ORDER — LOSARTAN POTASSIUM 50 MG/1
50 TABLET ORAL DAILY
Status: DISCONTINUED | OUTPATIENT
Start: 2017-02-25 | End: 2017-03-02 | Stop reason: HOSPADM

## 2017-02-25 RX ORDER — POTASSIUM CHLORIDE 750 MG/1
20 CAPSULE, EXTENDED RELEASE ORAL DAILY
Status: DISCONTINUED | OUTPATIENT
Start: 2017-02-25 | End: 2017-03-02 | Stop reason: HOSPADM

## 2017-02-25 RX ORDER — DEXTROSE MONOHYDRATE 50 MG/ML
100 INJECTION, SOLUTION INTRAVENOUS CONTINUOUS
Status: ACTIVE | OUTPATIENT
Start: 2017-02-25 | End: 2017-02-26

## 2017-02-25 RX ORDER — IPRATROPIUM BROMIDE AND ALBUTEROL SULFATE 2.5; .5 MG/3ML; MG/3ML
3 SOLUTION RESPIRATORY (INHALATION)
Status: DISCONTINUED | OUTPATIENT
Start: 2017-02-25 | End: 2017-03-01

## 2017-02-25 RX ADMIN — DEXTROSE MONOHYDRATE 100 ML/HR: 50 INJECTION, SOLUTION INTRAVENOUS at 17:31

## 2017-02-25 RX ADMIN — AMLODIPINE BESYLATE 5 MG: 5 TABLET ORAL at 10:13

## 2017-02-25 RX ADMIN — IPRATROPIUM BROMIDE AND ALBUTEROL SULFATE 3 ML: .5; 3 SOLUTION RESPIRATORY (INHALATION) at 03:10

## 2017-02-25 RX ADMIN — LEVOTHYROXINE SODIUM 25 MCG: 25 TABLET ORAL at 10:18

## 2017-02-25 RX ADMIN — FUROSEMIDE 40 MG: 40 TABLET ORAL at 10:13

## 2017-02-25 RX ADMIN — CEFEPIME 2 G: 2 INJECTION, POWDER, FOR SOLUTION INTRAVENOUS at 11:42

## 2017-02-25 RX ADMIN — PANTOPRAZOLE SODIUM 40 MG: 40 TABLET, DELAYED RELEASE ORAL at 05:30

## 2017-02-25 RX ADMIN — ASPIRIN 81 MG: 81 TABLET ORAL at 10:13

## 2017-02-25 RX ADMIN — CARVEDILOL 6.25 MG: 6.25 TABLET, FILM COATED ORAL at 21:03

## 2017-02-25 RX ADMIN — FUROSEMIDE 40 MG: 10 INJECTION, SOLUTION INTRAMUSCULAR; INTRAVENOUS at 17:30

## 2017-02-25 RX ADMIN — ENOXAPARIN SODIUM 30 MG: 30 INJECTION SUBCUTANEOUS at 17:53

## 2017-02-25 RX ADMIN — INSULIN ASPART 2 UNITS: 100 INJECTION, SOLUTION INTRAVENOUS; SUBCUTANEOUS at 11:42

## 2017-02-25 RX ADMIN — CARVEDILOL 6.25 MG: 6.25 TABLET, FILM COATED ORAL at 10:13

## 2017-02-25 RX ADMIN — IPRATROPIUM BROMIDE AND ALBUTEROL SULFATE 3 ML: .5; 3 SOLUTION RESPIRATORY (INHALATION) at 07:53

## 2017-02-25 RX ADMIN — POTASSIUM CHLORIDE 20 MEQ: 750 CAPSULE, EXTENDED RELEASE ORAL at 10:14

## 2017-02-25 RX ADMIN — IPRATROPIUM BROMIDE AND ALBUTEROL SULFATE 3 ML: .5; 3 SOLUTION RESPIRATORY (INHALATION) at 11:06

## 2017-02-25 RX ADMIN — LOSARTAN POTASSIUM 50 MG: 50 TABLET, FILM COATED ORAL at 14:31

## 2017-02-25 RX ADMIN — IPRATROPIUM BROMIDE AND ALBUTEROL SULFATE 3 ML: .5; 3 SOLUTION RESPIRATORY (INHALATION) at 21:35

## 2017-02-25 RX ADMIN — CEFEPIME 2 G: 2 INJECTION, POWDER, FOR SOLUTION INTRAVENOUS at 19:15

## 2017-02-25 RX ADMIN — CEFEPIME 2 G: 2 INJECTION, POWDER, FOR SOLUTION INTRAVENOUS at 02:42

## 2017-02-26 ENCOUNTER — APPOINTMENT (OUTPATIENT)
Dept: CARDIOLOGY | Facility: HOSPITAL | Age: 57
End: 2017-02-26
Attending: INTERNAL MEDICINE

## 2017-02-26 LAB
ALBUMIN SERPL-MCNC: 3.4 G/DL (ref 3.5–5.2)
ANION GAP SERPL CALCULATED.3IONS-SCNC: 16 MMOL/L
BASOPHILS # BLD AUTO: 0.06 10*3/MM3 (ref 0–0.2)
BASOPHILS NFR BLD AUTO: 0.3 % (ref 0–1.5)
BH CV LOWER VASCULAR LEFT COMMON FEMORAL AUGMENT: NORMAL
BH CV LOWER VASCULAR LEFT COMMON FEMORAL COMPETENT: NORMAL
BH CV LOWER VASCULAR LEFT COMMON FEMORAL COMPRESS: NORMAL
BH CV LOWER VASCULAR LEFT COMMON FEMORAL PHASIC: NORMAL
BH CV LOWER VASCULAR LEFT COMMON FEMORAL SPONT: NORMAL
BH CV LOWER VASCULAR LEFT DISTAL FEMORAL COMPRESS: NORMAL
BH CV LOWER VASCULAR LEFT GASTRONEMIUS COMPRESS: NORMAL
BH CV LOWER VASCULAR LEFT GREATER SAPH AK COMPRESS: NORMAL
BH CV LOWER VASCULAR LEFT GREATER SAPH BK COMPRESS: NORMAL
BH CV LOWER VASCULAR LEFT MID FEMORAL AUGMENT: NORMAL
BH CV LOWER VASCULAR LEFT MID FEMORAL COMPETENT: NORMAL
BH CV LOWER VASCULAR LEFT MID FEMORAL COMPRESS: NORMAL
BH CV LOWER VASCULAR LEFT MID FEMORAL PHASIC: NORMAL
BH CV LOWER VASCULAR LEFT MID FEMORAL SPONT: NORMAL
BH CV LOWER VASCULAR LEFT PERONEAL COMPRESS: NORMAL
BH CV LOWER VASCULAR LEFT POPLITEAL AUGMENT: NORMAL
BH CV LOWER VASCULAR LEFT POPLITEAL COMPETENT: NORMAL
BH CV LOWER VASCULAR LEFT POPLITEAL COMPRESS: NORMAL
BH CV LOWER VASCULAR LEFT POPLITEAL PHASIC: NORMAL
BH CV LOWER VASCULAR LEFT POPLITEAL SPONT: NORMAL
BH CV LOWER VASCULAR LEFT POSTERIOR TIBIAL COMPRESS: NORMAL
BH CV LOWER VASCULAR LEFT PROXIMAL FEMORAL COMPRESS: NORMAL
BH CV LOWER VASCULAR LEFT SAPHENOFEMORAL JUNCTION COMPRESS: NORMAL
BH CV LOWER VASCULAR LEFT SAPHENOFEMORAL JUNCTION PHASIC: NORMAL
BH CV LOWER VASCULAR LEFT SAPHENOFEMORAL JUNCTION SPONT: NORMAL
BH CV LOWER VASCULAR RIGHT COMMON FEMORAL AUGMENT: NORMAL
BH CV LOWER VASCULAR RIGHT COMMON FEMORAL COMPETENT: NORMAL
BH CV LOWER VASCULAR RIGHT COMMON FEMORAL COMPRESS: NORMAL
BH CV LOWER VASCULAR RIGHT COMMON FEMORAL PHASIC: NORMAL
BH CV LOWER VASCULAR RIGHT COMMON FEMORAL SPONT: NORMAL
BH CV LOWER VASCULAR RIGHT DISTAL FEMORAL COMPRESS: NORMAL
BH CV LOWER VASCULAR RIGHT GASTRONEMIUS COMPRESS: NORMAL
BH CV LOWER VASCULAR RIGHT GREATER SAPH AK COMPRESS: NORMAL
BH CV LOWER VASCULAR RIGHT GREATER SAPH BK COMPRESS: NORMAL
BH CV LOWER VASCULAR RIGHT MID FEMORAL AUGMENT: NORMAL
BH CV LOWER VASCULAR RIGHT MID FEMORAL COMPETENT: NORMAL
BH CV LOWER VASCULAR RIGHT MID FEMORAL COMPRESS: NORMAL
BH CV LOWER VASCULAR RIGHT MID FEMORAL PHASIC: NORMAL
BH CV LOWER VASCULAR RIGHT MID FEMORAL SPONT: NORMAL
BH CV LOWER VASCULAR RIGHT PERONEAL COMPRESS: NORMAL
BH CV LOWER VASCULAR RIGHT POPLITEAL AUGMENT: NORMAL
BH CV LOWER VASCULAR RIGHT POPLITEAL COMPETENT: NORMAL
BH CV LOWER VASCULAR RIGHT POPLITEAL COMPRESS: NORMAL
BH CV LOWER VASCULAR RIGHT POPLITEAL PHASIC: NORMAL
BH CV LOWER VASCULAR RIGHT POPLITEAL SPONT: NORMAL
BH CV LOWER VASCULAR RIGHT POSTERIOR TIBIAL COMPRESS: NORMAL
BH CV LOWER VASCULAR RIGHT PROXIMAL FEMORAL COMPRESS: NORMAL
BH CV LOWER VASCULAR RIGHT SAPHENOFEMORAL JUNCTION COMPRESS: NORMAL
BH CV LOWER VASCULAR RIGHT SAPHENOFEMORAL JUNCTION PHASIC: NORMAL
BH CV LOWER VASCULAR RIGHT SAPHENOFEMORAL JUNCTION SPONT: NORMAL
BH CV UPPER VENOUS LEFT AXILLARY AUGMENT: NORMAL
BH CV UPPER VENOUS LEFT AXILLARY COMPETENT: NORMAL
BH CV UPPER VENOUS LEFT AXILLARY COMPRESS: NORMAL
BH CV UPPER VENOUS LEFT AXILLARY PHASIC: NORMAL
BH CV UPPER VENOUS LEFT AXILLARY SPONT: NORMAL
BH CV UPPER VENOUS LEFT BASILIC FOREARM COLOR: 1
BH CV UPPER VENOUS LEFT BASILIC FOREARM COMPRESS: NORMAL
BH CV UPPER VENOUS LEFT BASILIC FOREARM THROMBUS: NORMAL
BH CV UPPER VENOUS LEFT BASILIC UPPER COMPRESS: NORMAL
BH CV UPPER VENOUS LEFT BRACHIAL COMPRESS: NORMAL
BH CV UPPER VENOUS LEFT CEPHALIC FOREARM COMPRESS: NORMAL
BH CV UPPER VENOUS LEFT CEPHALIC UPPER COLOR: 1
BH CV UPPER VENOUS LEFT CEPHALIC UPPER COMPRESS: NORMAL
BH CV UPPER VENOUS LEFT CEPHALIC UPPER THROMBUS: NORMAL
BH CV UPPER VENOUS LEFT INTERNAL JUGULAR AUGMENT: NORMAL
BH CV UPPER VENOUS LEFT INTERNAL JUGULAR COMPETENT: NORMAL
BH CV UPPER VENOUS LEFT INTERNAL JUGULAR COMPRESS: NORMAL
BH CV UPPER VENOUS LEFT INTERNAL JUGULAR PHASIC: NORMAL
BH CV UPPER VENOUS LEFT INTERNAL JUGULAR SPONT: NORMAL
BH CV UPPER VENOUS LEFT RADIAL COMPRESS: NORMAL
BH CV UPPER VENOUS LEFT SUBCLAVIAN AUGMENT: NORMAL
BH CV UPPER VENOUS LEFT SUBCLAVIAN COMPETENT: NORMAL
BH CV UPPER VENOUS LEFT SUBCLAVIAN COMPRESS: NORMAL
BH CV UPPER VENOUS LEFT SUBCLAVIAN PHASIC: NORMAL
BH CV UPPER VENOUS LEFT SUBCLAVIAN SPONT: NORMAL
BH CV UPPER VENOUS LEFT ULNAR COMPRESS: NORMAL
BH CV UPPER VENOUS RIGHT AXILLARY AUGMENT: NORMAL
BH CV UPPER VENOUS RIGHT AXILLARY COMPETENT: NORMAL
BH CV UPPER VENOUS RIGHT AXILLARY COMPRESS: NORMAL
BH CV UPPER VENOUS RIGHT AXILLARY PHASIC: NORMAL
BH CV UPPER VENOUS RIGHT AXILLARY SPONT: NORMAL
BH CV UPPER VENOUS RIGHT BASILIC FOREARM COMPRESS: NORMAL
BH CV UPPER VENOUS RIGHT BASILIC UPPER COMPRESS: NORMAL
BH CV UPPER VENOUS RIGHT BRACHIAL COMPRESS: NORMAL
BH CV UPPER VENOUS RIGHT CEPHALIC FOREARM COMPRESS: NORMAL
BH CV UPPER VENOUS RIGHT CEPHALIC UPPER COLOR: 1
BH CV UPPER VENOUS RIGHT CEPHALIC UPPER COMPRESS: NORMAL
BH CV UPPER VENOUS RIGHT CEPHALIC UPPER THROMBUS: NORMAL
BH CV UPPER VENOUS RIGHT INTERNAL JUGULAR AUGMENT: NORMAL
BH CV UPPER VENOUS RIGHT INTERNAL JUGULAR COLOR: 1
BH CV UPPER VENOUS RIGHT INTERNAL JUGULAR COMPETENT: NORMAL
BH CV UPPER VENOUS RIGHT INTERNAL JUGULAR COMPRESS: NORMAL
BH CV UPPER VENOUS RIGHT INTERNAL JUGULAR PHASIC: NORMAL
BH CV UPPER VENOUS RIGHT INTERNAL JUGULAR SPONT: NORMAL
BH CV UPPER VENOUS RIGHT INTERNAL JUGULAR THROMBUS: NORMAL
BH CV UPPER VENOUS RIGHT RADIAL COMPRESS: NORMAL
BH CV UPPER VENOUS RIGHT SUBCLAVIAN AUGMENT: NORMAL
BH CV UPPER VENOUS RIGHT SUBCLAVIAN COMPETENT: NORMAL
BH CV UPPER VENOUS RIGHT SUBCLAVIAN COMPRESS: NORMAL
BH CV UPPER VENOUS RIGHT SUBCLAVIAN PHASIC: NORMAL
BH CV UPPER VENOUS RIGHT SUBCLAVIAN SPONT: NORMAL
BH CV UPPER VENOUS RIGHT ULNAR COMPRESS: NORMAL
BUN BLD-MCNC: 34 MG/DL (ref 6–20)
BUN/CREAT SERPL: 24.1 (ref 7–25)
CALCIUM SPEC-SCNC: 8.4 MG/DL (ref 8.6–10.5)
CHLORIDE SERPL-SCNC: 104 MMOL/L (ref 98–107)
CO2 SERPL-SCNC: 23 MMOL/L (ref 22–29)
CREAT BLD-MCNC: 1.41 MG/DL (ref 0.76–1.27)
DEPRECATED RDW RBC AUTO: 48.8 FL (ref 37–54)
EOSINOPHIL # BLD AUTO: 0.41 10*3/MM3 (ref 0–0.7)
EOSINOPHIL NFR BLD AUTO: 2 % (ref 0.3–6.2)
ERYTHROCYTE [DISTWIDTH] IN BLOOD BY AUTOMATED COUNT: 14.9 % (ref 11.5–14.5)
GFR SERPL CREATININE-BSD FRML MDRD: 52 ML/MIN/1.73
GLUCOSE BLD-MCNC: 121 MG/DL (ref 65–99)
GLUCOSE BLDC GLUCOMTR-MCNC: 107 MG/DL (ref 70–130)
GLUCOSE BLDC GLUCOMTR-MCNC: 128 MG/DL (ref 70–130)
GLUCOSE BLDC GLUCOMTR-MCNC: 151 MG/DL (ref 70–130)
GLUCOSE BLDC GLUCOMTR-MCNC: 153 MG/DL (ref 70–130)
HCT VFR BLD AUTO: 34.2 % (ref 40.4–52.2)
HGB BLD-MCNC: 10.9 G/DL (ref 13.7–17.6)
IMM GRANULOCYTES # BLD: 0.68 10*3/MM3 (ref 0–0.03)
IMM GRANULOCYTES NFR BLD: 3.2 % (ref 0–0.5)
INR PPP: 1.39 (ref 0.9–1.1)
LYMPHOCYTES # BLD AUTO: 1.74 10*3/MM3 (ref 0.9–4.8)
LYMPHOCYTES NFR BLD AUTO: 8.3 % (ref 19.6–45.3)
MCH RBC QN AUTO: 28.6 PG (ref 27–32.7)
MCHC RBC AUTO-ENTMCNC: 31.9 G/DL (ref 32.6–36.4)
MCV RBC AUTO: 89.8 FL (ref 79.8–96.2)
MONOCYTES # BLD AUTO: 1.42 10*3/MM3 (ref 0.2–1.2)
MONOCYTES NFR BLD AUTO: 6.8 % (ref 5–12)
NEUTROPHILS # BLD AUTO: 16.67 10*3/MM3 (ref 1.9–8.1)
NEUTROPHILS NFR BLD AUTO: 79.4 % (ref 42.7–76)
NRBC BLD MANUAL-RTO: 0 /100 WBC (ref 0–0)
PHOSPHATE SERPL-MCNC: 3.1 MG/DL (ref 2.5–4.5)
PLATELET # BLD AUTO: 308 10*3/MM3 (ref 140–500)
PMV BLD AUTO: 12.5 FL (ref 6–12)
POTASSIUM BLD-SCNC: 3.6 MMOL/L (ref 3.5–5.2)
PROTHROMBIN TIME: 16.6 SECONDS (ref 11.7–14.2)
RBC # BLD AUTO: 3.81 10*6/MM3 (ref 4.6–6)
SODIUM BLD-SCNC: 143 MMOL/L (ref 136–145)
URATE SERPL-MCNC: 5.9 MG/DL (ref 3.4–7)
WBC NRBC COR # BLD: 20.98 10*3/MM3 (ref 4.5–10.7)

## 2017-02-26 PROCEDURE — 80069 RENAL FUNCTION PANEL: CPT | Performed by: NURSE PRACTITIONER

## 2017-02-26 PROCEDURE — 85610 PROTHROMBIN TIME: CPT | Performed by: THORACIC SURGERY (CARDIOTHORACIC VASCULAR SURGERY)

## 2017-02-26 PROCEDURE — 93005 ELECTROCARDIOGRAM TRACING: CPT | Performed by: THORACIC SURGERY (CARDIOTHORACIC VASCULAR SURGERY)

## 2017-02-26 PROCEDURE — 94799 UNLISTED PULMONARY SVC/PX: CPT

## 2017-02-26 PROCEDURE — 82962 GLUCOSE BLOOD TEST: CPT

## 2017-02-26 PROCEDURE — 25010000002 ENOXAPARIN PER 10 MG: Performed by: THORACIC SURGERY (CARDIOTHORACIC VASCULAR SURGERY)

## 2017-02-26 PROCEDURE — 99232 SBSQ HOSP IP/OBS MODERATE 35: CPT | Performed by: INTERNAL MEDICINE

## 2017-02-26 PROCEDURE — 85025 COMPLETE CBC W/AUTO DIFF WBC: CPT | Performed by: NURSE PRACTITIONER

## 2017-02-26 PROCEDURE — 97110 THERAPEUTIC EXERCISES: CPT

## 2017-02-26 PROCEDURE — 93005 ELECTROCARDIOGRAM TRACING: CPT | Performed by: INTERNAL MEDICINE

## 2017-02-26 PROCEDURE — 25010000002 CEFEPIME: Performed by: INTERNAL MEDICINE

## 2017-02-26 PROCEDURE — 93970 EXTREMITY STUDY: CPT

## 2017-02-26 PROCEDURE — 84550 ASSAY OF BLOOD/URIC ACID: CPT | Performed by: NURSE PRACTITIONER

## 2017-02-26 PROCEDURE — 63710000001 INSULIN ASPART PER 5 UNITS: Performed by: THORACIC SURGERY (CARDIOTHORACIC VASCULAR SURGERY)

## 2017-02-26 PROCEDURE — 93010 ELECTROCARDIOGRAM REPORT: CPT | Performed by: INTERNAL MEDICINE

## 2017-02-26 RX ORDER — TORSEMIDE 20 MG/1
40 TABLET ORAL DAILY
Status: DISCONTINUED | OUTPATIENT
Start: 2017-02-26 | End: 2017-03-02 | Stop reason: HOSPADM

## 2017-02-26 RX ORDER — FUROSEMIDE 40 MG/1
40 TABLET ORAL DAILY
Status: DISCONTINUED | OUTPATIENT
Start: 2017-02-26 | End: 2017-02-26

## 2017-02-26 RX ORDER — WARFARIN SODIUM 4 MG/1
4 TABLET ORAL
Status: DISCONTINUED | OUTPATIENT
Start: 2017-02-26 | End: 2017-02-27

## 2017-02-26 RX ORDER — CARVEDILOL 25 MG/1
25 TABLET ORAL EVERY 12 HOURS SCHEDULED
Status: DISCONTINUED | OUTPATIENT
Start: 2017-02-26 | End: 2017-02-26

## 2017-02-26 RX ADMIN — TORSEMIDE 40 MG: 20 TABLET ORAL at 18:25

## 2017-02-26 RX ADMIN — ASPIRIN 81 MG: 81 TABLET ORAL at 10:08

## 2017-02-26 RX ADMIN — FUROSEMIDE 40 MG: 40 TABLET ORAL at 10:59

## 2017-02-26 RX ADMIN — ENOXAPARIN SODIUM 30 MG: 30 INJECTION SUBCUTANEOUS at 18:26

## 2017-02-26 RX ADMIN — CEFEPIME 2 G: 2 INJECTION, POWDER, FOR SOLUTION INTRAVENOUS at 03:11

## 2017-02-26 RX ADMIN — INSULIN ASPART 2 UNITS: 100 INJECTION, SOLUTION INTRAVENOUS; SUBCUTANEOUS at 21:38

## 2017-02-26 RX ADMIN — POTASSIUM CHLORIDE 20 MEQ: 750 CAPSULE, EXTENDED RELEASE ORAL at 10:07

## 2017-02-26 RX ADMIN — WARFARIN SODIUM 4 MG: 4 TABLET ORAL at 21:38

## 2017-02-26 RX ADMIN — LOSARTAN POTASSIUM 50 MG: 50 TABLET, FILM COATED ORAL at 10:09

## 2017-02-26 RX ADMIN — INSULIN ASPART 2 UNITS: 100 INJECTION, SOLUTION INTRAVENOUS; SUBCUTANEOUS at 10:59

## 2017-02-26 RX ADMIN — CEFEPIME 2 G: 2 INJECTION, POWDER, FOR SOLUTION INTRAVENOUS at 10:59

## 2017-02-26 RX ADMIN — IPRATROPIUM BROMIDE AND ALBUTEROL SULFATE 3 ML: .5; 3 SOLUTION RESPIRATORY (INHALATION) at 06:59

## 2017-02-26 RX ADMIN — PANTOPRAZOLE SODIUM 40 MG: 40 TABLET, DELAYED RELEASE ORAL at 05:00

## 2017-02-26 RX ADMIN — POTASSIUM CHLORIDE 40 MEQ: 750 CAPSULE, EXTENDED RELEASE ORAL at 05:00

## 2017-02-26 RX ADMIN — IPRATROPIUM BROMIDE AND ALBUTEROL SULFATE 3 ML: .5; 3 SOLUTION RESPIRATORY (INHALATION) at 19:44

## 2017-02-26 RX ADMIN — IPRATROPIUM BROMIDE AND ALBUTEROL SULFATE 3 ML: .5; 3 SOLUTION RESPIRATORY (INHALATION) at 11:10

## 2017-02-26 RX ADMIN — AMLODIPINE BESYLATE 5 MG: 5 TABLET ORAL at 10:09

## 2017-02-26 RX ADMIN — LEVOTHYROXINE SODIUM 25 MCG: 25 TABLET ORAL at 05:00

## 2017-02-27 ENCOUNTER — APPOINTMENT (OUTPATIENT)
Dept: GENERAL RADIOLOGY | Facility: HOSPITAL | Age: 57
End: 2017-02-27
Attending: INTERNAL MEDICINE

## 2017-02-27 ENCOUNTER — APPOINTMENT (OUTPATIENT)
Dept: GENERAL RADIOLOGY | Facility: HOSPITAL | Age: 57
End: 2017-02-27

## 2017-02-27 LAB
ALBUMIN SERPL-MCNC: 3.4 G/DL (ref 3.5–5.2)
ANION GAP SERPL CALCULATED.3IONS-SCNC: 14.2 MMOL/L
APTT PPP: 35.1 SECONDS (ref 22.7–35.4)
APTT PPP: 47.1 SECONDS (ref 22.7–35.4)
BASOPHILS # BLD AUTO: 0.06 10*3/MM3 (ref 0–0.2)
BASOPHILS # BLD AUTO: 0.07 10*3/MM3 (ref 0–0.2)
BASOPHILS NFR BLD AUTO: 0.2 % (ref 0–1.5)
BASOPHILS NFR BLD AUTO: 0.3 % (ref 0–1.5)
BUN BLD-MCNC: 33 MG/DL (ref 6–20)
BUN/CREAT SERPL: 21.7 (ref 7–25)
CALCIUM SPEC-SCNC: 8.2 MG/DL (ref 8.6–10.5)
CHLORIDE SERPL-SCNC: 100 MMOL/L (ref 98–107)
CO2 SERPL-SCNC: 28.8 MMOL/L (ref 22–29)
CREAT BLD-MCNC: 1.52 MG/DL (ref 0.76–1.27)
DEPRECATED RDW RBC AUTO: 48 FL (ref 37–54)
DEPRECATED RDW RBC AUTO: 48.5 FL (ref 37–54)
EOSINOPHIL # BLD AUTO: 0.16 10*3/MM3 (ref 0–0.7)
EOSINOPHIL # BLD AUTO: 0.42 10*3/MM3 (ref 0–0.7)
EOSINOPHIL NFR BLD AUTO: 0.6 % (ref 0.3–6.2)
EOSINOPHIL NFR BLD AUTO: 1.9 % (ref 0.3–6.2)
ERYTHROCYTE [DISTWIDTH] IN BLOOD BY AUTOMATED COUNT: 14.6 % (ref 11.5–14.5)
ERYTHROCYTE [DISTWIDTH] IN BLOOD BY AUTOMATED COUNT: 14.8 % (ref 11.5–14.5)
FUNGUS WND CULT: ABNORMAL
GFR SERPL CREATININE-BSD FRML MDRD: 48 ML/MIN/1.73
GLUCOSE BLD-MCNC: 120 MG/DL (ref 65–99)
GLUCOSE BLDC GLUCOMTR-MCNC: 115 MG/DL (ref 70–130)
GLUCOSE BLDC GLUCOMTR-MCNC: 132 MG/DL (ref 70–130)
GLUCOSE BLDC GLUCOMTR-MCNC: 140 MG/DL (ref 70–130)
GLUCOSE BLDC GLUCOMTR-MCNC: 93 MG/DL (ref 70–130)
HCT VFR BLD AUTO: 33.6 % (ref 40.4–52.2)
HCT VFR BLD AUTO: 33.9 % (ref 40.4–52.2)
HGB BLD-MCNC: 10.8 G/DL (ref 13.7–17.6)
HGB BLD-MCNC: 10.9 G/DL (ref 13.7–17.6)
IMM GRANULOCYTES # BLD: 0.61 10*3/MM3 (ref 0–0.03)
IMM GRANULOCYTES # BLD: 0.69 10*3/MM3 (ref 0–0.03)
IMM GRANULOCYTES NFR BLD: 2.7 % (ref 0–0.5)
IMM GRANULOCYTES NFR BLD: 2.7 % (ref 0–0.5)
INR PPP: 1.38 (ref 0.9–1.1)
INR PPP: 1.56 (ref 0.9–1.1)
LYMPHOCYTES # BLD AUTO: 1.32 10*3/MM3 (ref 0.9–4.8)
LYMPHOCYTES # BLD AUTO: 1.9 10*3/MM3 (ref 0.9–4.8)
LYMPHOCYTES NFR BLD AUTO: 5.2 % (ref 19.6–45.3)
LYMPHOCYTES NFR BLD AUTO: 8.5 % (ref 19.6–45.3)
MCH RBC QN AUTO: 29 PG (ref 27–32.7)
MCH RBC QN AUTO: 29.2 PG (ref 27–32.7)
MCHC RBC AUTO-ENTMCNC: 32.1 G/DL (ref 32.6–36.4)
MCHC RBC AUTO-ENTMCNC: 32.2 G/DL (ref 32.6–36.4)
MCV RBC AUTO: 90.1 FL (ref 79.8–96.2)
MCV RBC AUTO: 90.9 FL (ref 79.8–96.2)
MONOCYTES # BLD AUTO: 1.17 10*3/MM3 (ref 0.2–1.2)
MONOCYTES # BLD AUTO: 1.47 10*3/MM3 (ref 0.2–1.2)
MONOCYTES NFR BLD AUTO: 4.6 % (ref 5–12)
MONOCYTES NFR BLD AUTO: 6.6 % (ref 5–12)
NEUTROPHILS # BLD AUTO: 17.85 10*3/MM3 (ref 1.9–8.1)
NEUTROPHILS # BLD AUTO: 22.19 10*3/MM3 (ref 1.9–8.1)
NEUTROPHILS NFR BLD AUTO: 80 % (ref 42.7–76)
NEUTROPHILS NFR BLD AUTO: 86.7 % (ref 42.7–76)
PHOSPHATE SERPL-MCNC: 3.6 MG/DL (ref 2.5–4.5)
PLATELET # BLD AUTO: 348 10*3/MM3 (ref 140–500)
PLATELET # BLD AUTO: 382 10*3/MM3 (ref 140–500)
PMV BLD AUTO: 12.1 FL (ref 6–12)
PMV BLD AUTO: 12.3 FL (ref 6–12)
POTASSIUM BLD-SCNC: 4 MMOL/L (ref 3.5–5.2)
PROTHROMBIN TIME: 16.4 SECONDS (ref 11.7–14.2)
PROTHROMBIN TIME: 18.1 SECONDS (ref 11.7–14.2)
RBC # BLD AUTO: 3.73 10*6/MM3 (ref 4.6–6)
RBC # BLD AUTO: 3.73 10*6/MM3 (ref 4.6–6)
SODIUM BLD-SCNC: 143 MMOL/L (ref 136–145)
URATE SERPL-MCNC: 5.9 MG/DL (ref 3.4–7)
WBC NRBC COR # BLD: 22.32 10*3/MM3 (ref 4.5–10.7)
WBC NRBC COR # BLD: 25.59 10*3/MM3 (ref 4.5–10.7)

## 2017-02-27 PROCEDURE — 85025 COMPLETE CBC W/AUTO DIFF WBC: CPT | Performed by: NURSE PRACTITIONER

## 2017-02-27 PROCEDURE — 94799 UNLISTED PULMONARY SVC/PX: CPT

## 2017-02-27 PROCEDURE — 92611 MOTION FLUOROSCOPY/SWALLOW: CPT

## 2017-02-27 PROCEDURE — 85730 THROMBOPLASTIN TIME PARTIAL: CPT | Performed by: NURSE PRACTITIONER

## 2017-02-27 PROCEDURE — 74230 X-RAY XM SWLNG FUNCJ C+: CPT

## 2017-02-27 PROCEDURE — 85610 PROTHROMBIN TIME: CPT | Performed by: THORACIC SURGERY (CARDIOTHORACIC VASCULAR SURGERY)

## 2017-02-27 PROCEDURE — 25010000002 HEPARIN (PORCINE) PER 1000 UNITS: Performed by: NURSE PRACTITIONER

## 2017-02-27 PROCEDURE — 99232 SBSQ HOSP IP/OBS MODERATE 35: CPT | Performed by: INTERNAL MEDICINE

## 2017-02-27 PROCEDURE — 93010 ELECTROCARDIOGRAM REPORT: CPT | Performed by: INTERNAL MEDICINE

## 2017-02-27 PROCEDURE — 85730 THROMBOPLASTIN TIME PARTIAL: CPT | Performed by: THORACIC SURGERY (CARDIOTHORACIC VASCULAR SURGERY)

## 2017-02-27 PROCEDURE — 99024 POSTOP FOLLOW-UP VISIT: CPT | Performed by: NURSE PRACTITIONER

## 2017-02-27 PROCEDURE — 84550 ASSAY OF BLOOD/URIC ACID: CPT | Performed by: NURSE PRACTITIONER

## 2017-02-27 PROCEDURE — 99024 POSTOP FOLLOW-UP VISIT: CPT | Performed by: THORACIC SURGERY (CARDIOTHORACIC VASCULAR SURGERY)

## 2017-02-27 PROCEDURE — 71020 HC CHEST PA AND LATERAL: CPT

## 2017-02-27 PROCEDURE — 85610 PROTHROMBIN TIME: CPT | Performed by: NURSE PRACTITIONER

## 2017-02-27 PROCEDURE — 82962 GLUCOSE BLOOD TEST: CPT

## 2017-02-27 PROCEDURE — 80069 RENAL FUNCTION PANEL: CPT | Performed by: NURSE PRACTITIONER

## 2017-02-27 PROCEDURE — 93005 ELECTROCARDIOGRAM TRACING: CPT | Performed by: THORACIC SURGERY (CARDIOTHORACIC VASCULAR SURGERY)

## 2017-02-27 PROCEDURE — 97110 THERAPEUTIC EXERCISES: CPT

## 2017-02-27 RX ORDER — WARFARIN SODIUM 5 MG/1
5 TABLET ORAL
Status: DISCONTINUED | OUTPATIENT
Start: 2017-02-27 | End: 2017-03-02 | Stop reason: HOSPADM

## 2017-02-27 RX ADMIN — ASPIRIN 81 MG: 81 TABLET ORAL at 09:21

## 2017-02-27 RX ADMIN — HEPARIN SODIUM 8.13 UNITS/KG/HR: 10000 INJECTION, SOLUTION INTRAVENOUS at 15:10

## 2017-02-27 RX ADMIN — WARFARIN SODIUM 5 MG: 5 TABLET ORAL at 17:42

## 2017-02-27 RX ADMIN — PANTOPRAZOLE SODIUM 40 MG: 40 TABLET, DELAYED RELEASE ORAL at 06:03

## 2017-02-27 RX ADMIN — LACTULOSE 20 G: 10 SOLUTION ORAL at 09:21

## 2017-02-27 RX ADMIN — POTASSIUM CHLORIDE 20 MEQ: 750 CAPSULE, EXTENDED RELEASE ORAL at 09:21

## 2017-02-27 RX ADMIN — IPRATROPIUM BROMIDE AND ALBUTEROL SULFATE 3 ML: .5; 3 SOLUTION RESPIRATORY (INHALATION) at 15:24

## 2017-02-27 RX ADMIN — LEVOTHYROXINE SODIUM 25 MCG: 25 TABLET ORAL at 06:03

## 2017-02-27 RX ADMIN — AMLODIPINE BESYLATE 5 MG: 5 TABLET ORAL at 09:21

## 2017-02-27 RX ADMIN — LOSARTAN POTASSIUM 50 MG: 50 TABLET, FILM COATED ORAL at 09:21

## 2017-02-27 RX ADMIN — TORSEMIDE 40 MG: 20 TABLET ORAL at 09:21

## 2017-02-28 LAB
ANION GAP SERPL CALCULATED.3IONS-SCNC: 14.5 MMOL/L
APTT PPP: 43.7 SECONDS (ref 22.7–35.4)
APTT PPP: 72 SECONDS (ref 22.7–35.4)
BASOPHILS # BLD AUTO: 0.07 10*3/MM3 (ref 0–0.2)
BASOPHILS NFR BLD AUTO: 0.3 % (ref 0–1.5)
BUN BLD-MCNC: 38 MG/DL (ref 6–20)
BUN/CREAT SERPL: 22.5 (ref 7–25)
CALCIUM SPEC-SCNC: 8.7 MG/DL (ref 8.6–10.5)
CHLORIDE SERPL-SCNC: 98 MMOL/L (ref 98–107)
CO2 SERPL-SCNC: 28.5 MMOL/L (ref 22–29)
CREAT BLD-MCNC: 1.69 MG/DL (ref 0.76–1.27)
DEPRECATED RDW RBC AUTO: 48 FL (ref 37–54)
EOSINOPHIL # BLD AUTO: 0.42 10*3/MM3 (ref 0–0.7)
EOSINOPHIL NFR BLD AUTO: 1.7 % (ref 0.3–6.2)
ERYTHROCYTE [DISTWIDTH] IN BLOOD BY AUTOMATED COUNT: 14.7 % (ref 11.5–14.5)
GFR SERPL CREATININE-BSD FRML MDRD: 42 ML/MIN/1.73
GLUCOSE BLD-MCNC: 126 MG/DL (ref 65–99)
GLUCOSE BLDC GLUCOMTR-MCNC: 125 MG/DL (ref 70–130)
GLUCOSE BLDC GLUCOMTR-MCNC: 137 MG/DL (ref 70–130)
GLUCOSE BLDC GLUCOMTR-MCNC: 146 MG/DL (ref 70–130)
GLUCOSE BLDC GLUCOMTR-MCNC: 151 MG/DL (ref 70–130)
HCT VFR BLD AUTO: 34.5 % (ref 40.4–52.2)
HGB BLD-MCNC: 10.8 G/DL (ref 13.7–17.6)
IMM GRANULOCYTES # BLD: 0.44 10*3/MM3 (ref 0–0.03)
IMM GRANULOCYTES NFR BLD: 1.8 % (ref 0–0.5)
INR PPP: 1.54 (ref 0.9–1.1)
LYMPHOCYTES # BLD AUTO: 2.2 10*3/MM3 (ref 0.9–4.8)
LYMPHOCYTES NFR BLD AUTO: 9 % (ref 19.6–45.3)
MCH RBC QN AUTO: 28.3 PG (ref 27–32.7)
MCHC RBC AUTO-ENTMCNC: 31.3 G/DL (ref 32.6–36.4)
MCV RBC AUTO: 90.6 FL (ref 79.8–96.2)
MONOCYTES # BLD AUTO: 1.41 10*3/MM3 (ref 0.2–1.2)
MONOCYTES NFR BLD AUTO: 5.7 % (ref 5–12)
NEUTROPHILS # BLD AUTO: 20.04 10*3/MM3 (ref 1.9–8.1)
NEUTROPHILS NFR BLD AUTO: 81.5 % (ref 42.7–76)
PLATELET # BLD AUTO: 356 10*3/MM3 (ref 140–500)
PMV BLD AUTO: 11.8 FL (ref 6–12)
POTASSIUM BLD-SCNC: 4.6 MMOL/L (ref 3.5–5.2)
PROTHROMBIN TIME: 17.9 SECONDS (ref 11.7–14.2)
RBC # BLD AUTO: 3.81 10*6/MM3 (ref 4.6–6)
SODIUM BLD-SCNC: 141 MMOL/L (ref 136–145)
WBC NRBC COR # BLD: 24.58 10*3/MM3 (ref 4.5–10.7)

## 2017-02-28 PROCEDURE — 93005 ELECTROCARDIOGRAM TRACING: CPT | Performed by: THORACIC SURGERY (CARDIOTHORACIC VASCULAR SURGERY)

## 2017-02-28 PROCEDURE — 85610 PROTHROMBIN TIME: CPT | Performed by: THORACIC SURGERY (CARDIOTHORACIC VASCULAR SURGERY)

## 2017-02-28 PROCEDURE — 85730 THROMBOPLASTIN TIME PARTIAL: CPT | Performed by: THORACIC SURGERY (CARDIOTHORACIC VASCULAR SURGERY)

## 2017-02-28 PROCEDURE — 85025 COMPLETE CBC W/AUTO DIFF WBC: CPT | Performed by: NURSE PRACTITIONER

## 2017-02-28 PROCEDURE — 99232 SBSQ HOSP IP/OBS MODERATE 35: CPT | Performed by: INTERNAL MEDICINE

## 2017-02-28 PROCEDURE — 63710000001 INSULIN ASPART PER 5 UNITS: Performed by: THORACIC SURGERY (CARDIOTHORACIC VASCULAR SURGERY)

## 2017-02-28 PROCEDURE — 25010000002 ONDANSETRON PER 1 MG: Performed by: THORACIC SURGERY (CARDIOTHORACIC VASCULAR SURGERY)

## 2017-02-28 PROCEDURE — 82962 GLUCOSE BLOOD TEST: CPT

## 2017-02-28 PROCEDURE — 93010 ELECTROCARDIOGRAM REPORT: CPT | Performed by: INTERNAL MEDICINE

## 2017-02-28 PROCEDURE — 80048 BASIC METABOLIC PNL TOTAL CA: CPT | Performed by: INTERNAL MEDICINE

## 2017-02-28 PROCEDURE — 97110 THERAPEUTIC EXERCISES: CPT

## 2017-02-28 PROCEDURE — 94760 N-INVAS EAR/PLS OXIMETRY 1: CPT

## 2017-02-28 PROCEDURE — 25010000002 HEPARIN (PORCINE) PER 1000 UNITS: Performed by: NURSE PRACTITIONER

## 2017-02-28 PROCEDURE — 99024 POSTOP FOLLOW-UP VISIT: CPT | Performed by: NURSE PRACTITIONER

## 2017-02-28 PROCEDURE — 94799 UNLISTED PULMONARY SVC/PX: CPT

## 2017-02-28 RX ADMIN — ASPIRIN 81 MG: 81 TABLET ORAL at 08:44

## 2017-02-28 RX ADMIN — POTASSIUM CHLORIDE 20 MEQ: 750 CAPSULE, EXTENDED RELEASE ORAL at 08:43

## 2017-02-28 RX ADMIN — IPRATROPIUM BROMIDE AND ALBUTEROL SULFATE 3 ML: .5; 3 SOLUTION RESPIRATORY (INHALATION) at 19:33

## 2017-02-28 RX ADMIN — IPRATROPIUM BROMIDE AND ALBUTEROL SULFATE 3 ML: .5; 3 SOLUTION RESPIRATORY (INHALATION) at 10:12

## 2017-02-28 RX ADMIN — ONDANSETRON 4 MG: 2 INJECTION INTRAMUSCULAR; INTRAVENOUS at 22:12

## 2017-02-28 RX ADMIN — LEVOTHYROXINE SODIUM 25 MCG: 25 TABLET ORAL at 08:43

## 2017-02-28 RX ADMIN — METOPROLOL TARTRATE 12.5 MG: 25 TABLET ORAL at 09:55

## 2017-02-28 RX ADMIN — LOSARTAN POTASSIUM 50 MG: 50 TABLET, FILM COATED ORAL at 08:43

## 2017-02-28 RX ADMIN — IPRATROPIUM BROMIDE AND ALBUTEROL SULFATE 3 ML: .5; 3 SOLUTION RESPIRATORY (INHALATION) at 06:42

## 2017-02-28 RX ADMIN — PANTOPRAZOLE SODIUM 40 MG: 40 TABLET, DELAYED RELEASE ORAL at 05:55

## 2017-02-28 RX ADMIN — INSULIN ASPART 2 UNITS: 100 INJECTION, SOLUTION INTRAVENOUS; SUBCUTANEOUS at 17:16

## 2017-02-28 RX ADMIN — HEPARIN SODIUM 14.12 UNITS/KG/HR: 10000 INJECTION, SOLUTION INTRAVENOUS at 08:43

## 2017-02-28 RX ADMIN — AMLODIPINE BESYLATE 5 MG: 5 TABLET ORAL at 08:43

## 2017-02-28 RX ADMIN — WARFARIN SODIUM 5 MG: 5 TABLET ORAL at 17:16

## 2017-02-28 RX ADMIN — TORSEMIDE 40 MG: 20 TABLET ORAL at 08:43

## 2017-02-28 RX ADMIN — HEPARIN SODIUM 14.12 UNITS/KG/HR: 10000 INJECTION, SOLUTION INTRAVENOUS at 23:15

## 2017-02-28 RX ADMIN — METOPROLOL TARTRATE 12.5 MG: 25 TABLET ORAL at 21:27

## 2017-03-01 LAB
APTT PPP: 87.2 SECONDS (ref 22.7–35.4)
BASOPHILS # BLD AUTO: 0.12 10*3/MM3 (ref 0–0.2)
BASOPHILS NFR BLD AUTO: 0.5 % (ref 0–1.5)
DEPRECATED RDW RBC AUTO: 47.6 FL (ref 37–54)
EOSINOPHIL # BLD AUTO: 0.35 10*3/MM3 (ref 0–0.7)
EOSINOPHIL NFR BLD AUTO: 1.5 % (ref 0.3–6.2)
ERYTHROCYTE [DISTWIDTH] IN BLOOD BY AUTOMATED COUNT: 14.8 % (ref 11.5–14.5)
GLUCOSE BLDC GLUCOMTR-MCNC: 105 MG/DL (ref 70–130)
GLUCOSE BLDC GLUCOMTR-MCNC: 118 MG/DL (ref 70–130)
GLUCOSE BLDC GLUCOMTR-MCNC: 137 MG/DL (ref 70–130)
GLUCOSE BLDC GLUCOMTR-MCNC: 138 MG/DL (ref 70–130)
HCT VFR BLD AUTO: 35.2 % (ref 40.4–52.2)
HGB BLD-MCNC: 11.3 G/DL (ref 13.7–17.6)
IMM GRANULOCYTES # BLD: 0.53 10*3/MM3 (ref 0–0.03)
IMM GRANULOCYTES NFR BLD: 2.2 % (ref 0–0.5)
INR PPP: 2.02 (ref 0.9–1.1)
LYMPHOCYTES # BLD AUTO: 2.62 10*3/MM3 (ref 0.9–4.8)
LYMPHOCYTES NFR BLD AUTO: 11 % (ref 19.6–45.3)
MCH RBC QN AUTO: 28.5 PG (ref 27–32.7)
MCHC RBC AUTO-ENTMCNC: 32.1 G/DL (ref 32.6–36.4)
MCV RBC AUTO: 88.9 FL (ref 79.8–96.2)
MONOCYTES # BLD AUTO: 1.3 10*3/MM3 (ref 0.2–1.2)
MONOCYTES NFR BLD AUTO: 5.4 % (ref 5–12)
NEUTROPHILS # BLD AUTO: 19 10*3/MM3 (ref 1.9–8.1)
NEUTROPHILS NFR BLD AUTO: 79.4 % (ref 42.7–76)
NRBC BLD MANUAL-RTO: 0 /100 WBC (ref 0–0)
PLATELET # BLD AUTO: 325 10*3/MM3 (ref 140–500)
PMV BLD AUTO: 12.5 FL (ref 6–12)
PROTHROMBIN TIME: 22.2 SECONDS (ref 11.7–14.2)
RBC # BLD AUTO: 3.96 10*6/MM3 (ref 4.6–6)
WBC NRBC COR # BLD: 23.92 10*3/MM3 (ref 4.5–10.7)

## 2017-03-01 PROCEDURE — 94762 N-INVAS EAR/PLS OXIMTRY CONT: CPT

## 2017-03-01 PROCEDURE — 82962 GLUCOSE BLOOD TEST: CPT

## 2017-03-01 PROCEDURE — 85730 THROMBOPLASTIN TIME PARTIAL: CPT | Performed by: THORACIC SURGERY (CARDIOTHORACIC VASCULAR SURGERY)

## 2017-03-01 PROCEDURE — 97110 THERAPEUTIC EXERCISES: CPT

## 2017-03-01 PROCEDURE — 99024 POSTOP FOLLOW-UP VISIT: CPT | Performed by: NURSE PRACTITIONER

## 2017-03-01 PROCEDURE — 85025 COMPLETE CBC W/AUTO DIFF WBC: CPT | Performed by: NURSE PRACTITIONER

## 2017-03-01 PROCEDURE — 99232 SBSQ HOSP IP/OBS MODERATE 35: CPT | Performed by: INTERNAL MEDICINE

## 2017-03-01 PROCEDURE — 85610 PROTHROMBIN TIME: CPT | Performed by: THORACIC SURGERY (CARDIOTHORACIC VASCULAR SURGERY)

## 2017-03-01 PROCEDURE — 93005 ELECTROCARDIOGRAM TRACING: CPT | Performed by: THORACIC SURGERY (CARDIOTHORACIC VASCULAR SURGERY)

## 2017-03-01 PROCEDURE — 94799 UNLISTED PULMONARY SVC/PX: CPT

## 2017-03-01 PROCEDURE — 93010 ELECTROCARDIOGRAM REPORT: CPT | Performed by: INTERNAL MEDICINE

## 2017-03-01 RX ORDER — IPRATROPIUM BROMIDE AND ALBUTEROL SULFATE 2.5; .5 MG/3ML; MG/3ML
3 SOLUTION RESPIRATORY (INHALATION) EVERY 4 HOURS PRN
Status: DISCONTINUED | OUTPATIENT
Start: 2017-03-01 | End: 2017-03-02 | Stop reason: HOSPADM

## 2017-03-01 RX ADMIN — TORSEMIDE 40 MG: 20 TABLET ORAL at 09:23

## 2017-03-01 RX ADMIN — POTASSIUM CHLORIDE 20 MEQ: 750 CAPSULE, EXTENDED RELEASE ORAL at 09:24

## 2017-03-01 RX ADMIN — AMLODIPINE BESYLATE 5 MG: 5 TABLET ORAL at 09:23

## 2017-03-01 RX ADMIN — METOPROLOL TARTRATE 12.5 MG: 25 TABLET ORAL at 09:23

## 2017-03-01 RX ADMIN — ASPIRIN 81 MG: 81 TABLET ORAL at 09:24

## 2017-03-01 RX ADMIN — WARFARIN SODIUM 5 MG: 5 TABLET ORAL at 17:28

## 2017-03-01 RX ADMIN — LOSARTAN POTASSIUM 50 MG: 50 TABLET, FILM COATED ORAL at 09:24

## 2017-03-01 RX ADMIN — LEVOTHYROXINE SODIUM 25 MCG: 25 TABLET ORAL at 09:24

## 2017-03-01 RX ADMIN — PANTOPRAZOLE SODIUM 40 MG: 40 TABLET, DELAYED RELEASE ORAL at 06:38

## 2017-03-01 NOTE — PROGRESS NOTES
Discharge Planning Assessment  [facility]     Patient Name: Truman Esquivel  MRN: [id number]  Today's Date: 3/1/2017    Admit Date: 2/14/2017          Discharge Needs Assessment     None            Discharge Plan       03/01/17 0940    Case Management/Social Work Plan    Plan Home to mothers with [facility] to follow.      Additional Comments Pt states he is now going to d/c home at d/c.  Pt states he will be discharging to his mother Yolanda Esquivel's home at [address] [place], [place]/[facility]  Yolanda #[phone].  [facility].  [facility]P called and faxed HH referral to [facility] at 10:00 am  fx [phone].          Discharge Placement     Facility/Agency Request Status Selected? Address Phone Number Fax Number    [facility] Pending - No Request Sent     [address], [address] Declined   They are not in network with Pt [employer]     [address], [place] [facility] [address] [phone]        Philomena Woods RN 2/24/2017 19:31    2/24/2017 REFERRAL FAXED TO [facility].  Philomena Woods RN 7:27 PM                            Demographic Summary     None            Functional Status     None            Psychosocial     None            Abuse/Neglect     None            Legal     None            Substance Abuse     None            Patient Forms     None          Philomena Woods, RN

## 2017-03-01 NOTE — PROGRESS NOTES
Acute Care - Physical Therapy Treatment Note  [facility]     Patient Name: Truman Esquivel  : 1960  MRN: [id number]  Today's Date: 3/1/2017  Onset of Illness/Injury or Date of Surgery Date: 17  Date of Referral to PT: 17  Referring Physician: Marylin    Admit Date: 2017    Visit Dx:    ICD-10-CM ICD-9-CM   1. Chest pain at rest R07.9 786.50   2. Ascending aortic dissection I71.01 441.01   3. Pneumonia J18.9 486   4. Oropharyngeal dysphagia R13.12 787.22   5. Lung nodules R91.8 793.19   6. EMELIA (obstructive sleep apnea) G47.33 327.23     Patient Active Problem List   Diagnosis   • Chest pain at rest   • Ascending aortic dissection               Adult Rehabilitation Note       17 0947 17 0901 17 0927    Rehab Assessment/Intervention    Discipline physical therapist  -CH physical therapist  -CH physical therapist  -MD    Document Type therapy note (daily note)  -CH therapy note (daily note)  -CH therapy note (daily note)  -MD    Subjective Information agree to therapy  -CH agree to therapy  -CH no complaints;agree to therapy  -MD    Patient Effort, Rehab Treatment good  -CH good  -CH good  -MD    Symptoms Noted During/After Treatment none  -CH none  -CH none  -MD    Precautions/Limitations cardiac precautions;fall precautions;sternal precautions  -CH cardiac precautions;fall precautions;sternal precautions  -CH fall precautions;cardiac precautions  -MD    Recorded by [CH] Blanca Lloyd, PT [CH] Blanca Lloyd PT [MD] Gin Diamond, PT    Pain Assessment    Pain Assessment No/denies pain  -CH No/denies pain  -CH No/denies pain  -MD    Recorded by [CH] Blanca Lloyd PT [CH] Blanca Lloyd PT [MD] Gin Diamond, PT    Cognitive Assessment/Intervention    Current Cognitive/Communication Assessment functional  -CH functional  -CH functional  -MD    Orientation Status oriented x 4  -CH oriented x 4  -CH oriented to;person  -MD    Follows Commands/Answers Questions 100% of the  time  -% of the time  -% of the time  -MD    Personal Safety WNL/WFL  -CH WNL/WFL  -CH WNL/WFL  -MD    Personal Safety Interventions fall prevention program maintained;nonskid shoes/slippers when out of bed  -CH fall prevention program maintained;nonskid shoes/slippers when out of bed  -CH fall prevention program maintained  -MD    Recorded by [CH] Blanca Lloyd PT [CH] Blanca Lloyd PT [MD] Gin Diamond PT    Bed Mobility, Assessment/Treatment    Bed Mob, Supine to Sit, [place] not tested   sitting in chair  -CH not tested  -CH not tested  -MD    Bed Mob, Sit to Supine, [place] not tested   sitting in restroom  -CH not tested  -CH not tested  -MD    Bed Mobility, Comment  sitting in chair  -CH     Recorded by [CH] Blanca Lloyd PT [CH] Blanca Lloyd PT [MD] Gin Diamond PT    Transfer Assessment/Treatment    Transfers, Sit-Stand [place] verbal cues required;contact guard assist  - verbal cues required;nonverbal cues required (demo/gesture);contact guard assist;2 person assist required  - verbal cues required;stand by assist;contact guard assist  -MD    Transfers, Stand-Sit [place] verbal cues required;contact guard assist  - verbal cues required;nonverbal cues required (demo/gesture);contact guard assist;2 person assist required  - verbal cues required;contact guard assist;minimum assist (75% patient effort)  -MD    Transfer, Safety Issues   step length decreased  -MD    Transfer, Impairments   strength decreased;impaired balance  -MD    Recorded by [CH] Blanca Lloyd PT [CH] Blanca Lloyd PT [MD] Gin Diamond, PT    Gait Assessment/Treatment    Gait, [place] Level verbal cues required;contact guard assist  - verbal cues required;nonverbal cues required (demo/gesture);contact guard assist;2 person assist required  - verbal cues required;minimum assist (75% patient effort)  -MD    Gait, Distance (Feet) 200  -  -  -MD    Gait, Gait  Deviations rosanna decreased;step length decreased;stride length decreased  - [place] decreased;step length decreased;stride length decreased  - bilateral:;rosanna decreased;forward flexed posture;step length decreased;stride width increased  -MD    Gait, Safety Issues   step length decreased;balance decreased during turns  -MD    Gait, Impairments   strength decreased;impaired balance  -MD    Gait, Comment pt occassionally reaches for wall railing but no LOB noted  -      Recorded by [CH] Blanca Lloyd, PT [CH] Blanca Lloyd, PT [MD] Gin Diamond, PT    Stairs Assessment/Treatment    Number of Stairs 8  -      Stairs, Handrail Location both sides  -      Stairs, [place] Level verbal cues required;contact guard assist  -      Recorded by [CH] Blanca Lloyd PT      Therapy Exercises    Exercise Protocols --   cardiac level 5, exercises deferred due to bathroom needs  - --   10 reps cardiac protocol, level 4  -CH     Recorded by [CH] Blanca Lloyd, PT [CH] Blanca Lloyd PT     Positioning and Restraints    Pre-Treatment Position sitting in chair/recliner  - sitting in chair/recliner  - sitting in chair/recliner  -MD    Post Treatment Position bathroom  - chair  - chair  -MD    In Chair  reclined;call light within reach;encouraged to call for assist;exit alarm on  - reclined;sitting;call light within reach  -MD    Bathroom notified nsg;sitting;call light within reach;encouraged to call for assist  -      Recorded by [CH] Blanca Lloyd, PT [CH] Blanca Lloyd, PT [MD] Gin Diamond, PT      User Key  (r) = Recorded By, (t) = Taken By, (c) = Cosigned By    Initials Name Effective Dates     Blanca Lloyd, PT 12/01/15 -     MD Gin Diamond, PT 12/01/15 -                 IP PT Goals       02/22/17 1033          Cardiopulmonary PT LTG    Cardiopulmonary PT LTG, Date Established 02/22/17  -MD (r) MS (t) MD (c)      Cardiopulmonary PT LTG, Time to Achieve 5 - 7 days  -MD  (r) MS (t) MD (bro)      Cardiopulmonary PT LTG, Level Level IV  -MD (r) MS (t) MD (c)        User Key  (r) = Recorded By, (t) = Taken By, (c) = Cosigned By    Initials Name Provider Type    MD Gin Diamond, PT Physical Therapist    MS Carolyn Lay, PT Student PT Student          Physical Therapy Education     Title: PT OT SLP Therapies (Done)     Topic: Physical Therapy (Done)     Point: Precautions (Done)    Learning Progress Summary    Learner Readiness Method Response Comment Documented by Status   Patient Acceptance E,TB,D VU,NR   03/01/17 1248 Done    Acceptance E,TB,D VU,NR   03/01/17 1246 Done    Acceptance E,TB,D VU,NR   02/28/17 0914 Done    Acceptance E,D NR  MD 02/27/17 0948 Active    Acceptance E,D VU  MD 02/27/17 0929 Done    Acceptance E,D NR   02/26/17 0916 Active    Acceptance D,TB,E NR   02/25/17 0927 Active    Acceptance E,TB,D NR   02/24/17 0853 Active    Acceptance E,D VU,NR  MD 02/23/17 0956 Done    Acceptance E VU,NR  MS 02/22/17 1032 Done                      User Key     Initials Effective Dates Name Provider Type Discipline     12/01/15 -  Blanca Lloyd, PT Physical Therapist PT    MD 12/01/15 -  Gin Diamond, PT Physical Therapist PT     12/01/15 -  Carolann Garcia, PT Physical Therapist PT     01/17/16 -  Leslee Esquivel, PT Physical Therapist PT    MS 01/09/17 -  Carolyn Lay, PT Student PT Student PT                    PT Recommendation and Plan  Anticipated Discharge Disposition: skilled nursing facility  Planned Therapy Interventions: bed mobility training, transfer training, gait training  PT Frequency: daily  Plan of Care Review  Plan Of Care Reviewed With: patient  Progress: improving  Outcome Summary/Follow up Plan: Pt continues to make steady progress with PT as he required less assistance today, was able to increase his gait distance and was able to do some stairs. Pt may discharge home today with HHPT to follow to address strength, mobility, and balance.           Outcome Measures       03/01/17 1200 02/28/17 0900 02/27/17 0900    How much help from another person do you currently need...    Turning from your back to your side while in flat bed without using bedrails? 3  -CH 3  -CH 3  -MD    Moving from lying on back to sitting on the side of a flat bed without bedrails? 3  -CH 3  -CH 3  -MD    Moving to and from a bed to a chair (including a wheelchair)? 3  -CH 3  -CH 3  -MD    Standing up from a chair using your arms (e.g., wheelchair, bedside chair)? 3  -CH 3  -CH 3  -MD    Climbing 3-5 steps with a railing? 3  -CH 3  -CH 2  -MD    To walk in hospital room? 3  -CH 3  -CH 3  -MD    AM-PAC 6 Clicks Score 18  -CH 18  -CH 17  -MD    Functional Assessment    Outcome Measure Options AM-PAC 6 Clicks Basic Mobility (PT)  -CH AM-PAC 6 Clicks Basic Mobility (PT)  -CH AM-PAC 6 Clicks Basic Mobility (PT)  -MD      User Key  (r) = Recorded By, (t) = Taken By, (c) = Cosigned By    Initials Name Provider Type     Blanca Lloyd PT Physical Therapist    MD Gin Diamond, PT Physical Therapist           Time Calculation:         PT Charges       03/01/17 1250 03/01/17 1146       Time Calculation    Start Time 0937  -      Stop Time 0947  -      Time Calculation (min) 10 min  -      PT Received On 03/01/17  -      PT - Next Appointment 03/02/17  - 03/02/17  -       User Key  (r) = Recorded By, (t) = Taken By, (c) = Cosigned By    Initials Name Provider Type     Blanca lLoyd PT Physical Therapist          Therapy Charges for Today     Code Description Service Date Service Provider Modifiers Qty    90329534881 HC PT THER PROC EA 15 MIN 2/28/2017 Blanca Lloyd, PT GP 1    07081695492 HC PT THER SUPP EA 15 MIN 2/28/2017 Blanca Lloyd, PT GP 1    37939173685 HC PT THER PROC EA 15 MIN 3/1/2017 Blanca Lloyd, PT GP 1          PT G-Codes  Outcome Measure Options: AM-PAC 6 Clicks Basic Mobility (PT)    Blanca Lloyd, PT  3/1/2017

## 2017-03-01 NOTE — H&P
Patient Care Team:  Marybeth Harrison DO as PCP - General (Family Medicine)    Chief complaint   Chest pain    Subjective     History of Present Illness   Pt admitted emergently through the OR from an helicopter transfer due to his aortic dissection. Pt was sedated and unable to respond my questions.  I discussed case with his sister and got consent for surgery. Per notes, the patient had > 12 history of chest and upper back pain and high blood pressure.  A chest CTA showed an acute type A aortic dissection. No pericardial tamponade    Review of Systems     Unable to obtain  Past Medical History   Diagnosis Date   • Chest pain    • HTN (hypertension)    • Smoker      Past Surgical History   Procedure Laterality Date   • Ascending arch/hemiarch replacement N/A 2/14/2017     Procedure: INTRAOPERATIVE TRANSESOPHAGEAL ECHOCARDIOGRAM, MIDLINE STERNOTOMY, ASCENDING AORTIC  AND PROXIMAL AORTIC ARCH REPAIR WITH 26MM GRAFT, AORTIC VALVE RESUSPENSION, AORTIC ROOT REPAIR, OPEN VEIN HARVEST OF RIGHT LEG;  Surgeon: German Arreguin MD;  Location: [facility] OR;  Service:    • Bronchoscopy N/A 2/17/2017     Procedure: BRONCHOSCOPY BIOPSY AT BEDSIDE WITH BAL-LEFT LOWER LOBE;  Surgeon: Trent Chaney MD;  Location: [facility] ENDOSCOPY;  Service:      History reviewed. No pertinent family history.  Social History   Substance Use Topics   • Smoking status: Current Every Day Smoker     Packs/day: 1.00   • Smokeless tobacco: None   • Alcohol use No     Prescriptions Prior to Admission   Medication Sig Dispense Refill Last Dose   • hydrochlorothiazide (HYDRODIURIL) 25 MG tablet Take 25 mg by mouth Daily.      • lisinopril (PRINIVIL,ZESTRIL) 40 MG tablet Take 40 mg by mouth Daily.        Allergies:  Acth [corticotropin]; Eggs or egg-derived products; Erythromycin; Penicillins; Tetracyclines & related; and Cephalosporins     Objective      Vital Signs  Temp:  [98.2 °F (36.8 °C)-98.8 °F (37.1 °C)] 98.7 °F (37.1 °C)  Heart Rate:  [75-88]  75  Resp:  [16-20] 16  BP: ()/(58-91) 115/68    Physical Exam   Neck  + JVD, no bruits  Cor reg, 3/6 diastolic @, in aortic area, PMI non displaced.  No gallops or rubs  Lungs clear  Abdomen benign  Feet warm, with 2 + femoral and pedal pulses    Results Review:    CTA reviewed      Assessment/Plan     Active Problems:    Ascending aortic dissection  Acute type A  HTN    Assessment & Plan    I discussed the patients findings and my recommendations with patient and family   Plan for emergent aortic dissection repair.  OM quoted at 20 % and morbidity of 40 %.  Family agreable for surgery.    German Arreguin MD  03/01/17  3:06 PM    Time: 30 min

## 2017-03-01 NOTE — PLAN OF CARE
Problem: Patient Care Overview (Adult)  Goal: Plan of Care Review  Outcome: Ongoing (interventions implemented as appropriate)    03/01/17 1248   Coping/Psychosocial Response Interventions   Plan Of Care Reviewed With patient   Patient Care Overview   Progress improving   Outcome Evaluation   Outcome Summary/Follow up Plan Pt continues to make steady progress with PT as he required less assistance today, was able to increase his gait distance and was able to do some stairs. Pt may discharge home today with HHPT to follow to address strength, mobility, and balance.

## 2017-03-01 NOTE — PROGRESS NOTES
" LOS: 14 days   Patient Care Team:  Marybeth Harrison DO as PCP - General (Family Medicine)    Chief Complaint: f/u type A dissection repair    Subjective     Doing okay, no complaints this am      Vital Signs  Temp:  [98.2 °F (36.8 °C)-98.8 °F (37.1 °C)] 98.3 °F (36.8 °C)  Heart Rate:  [78-90] 78  Resp:  [16-20] 17  BP: ()/(58-91) 134/73  Body mass index is 35.53 kg/(m^2).    Intake/Output Summary (Last 24 hours) at 03/01/17 0816  Last data filed at 03/01/17 0700   Gross per 24 hour   Intake 1493.71 ml   Output   1300 ml   Net 193.71 ml              Last 3 weights    02/27/17  0448 02/28/17  0642 03/01/17  0500   Weight: 271 lb 9.6 oz (123 kg) 266 lb 12.8 oz (121 kg) 262 lb (119 kg)         Objective:  General Appearance:  Comfortable and in no acute distress.    Vital signs: (most recent): Blood pressure 134/73, pulse 78, temperature 98.3 °F (36.8 °C), temperature source Oral, resp. rate 17, height 72\" (182.9 cm), weight 262 lb (119 kg), SpO2 92 %.  Vital signs are normal.    Lungs:  Normal respiratory rate and normal effort.  Breath sounds clear to auscultation.    Heart: Normal rate.  Regular rhythm.  S1 normal and S2 normal.  (Tele: SR)  Abdomen: Abdomen is soft.    Extremities: Normal range of motion.    Neurological: Patient is alert and oriented to person, place and time.    Skin:  Warm and dry.  (Incision wnl)            Results Review:        WBC WBC   Date Value Ref Range Status   03/01/2017 23.92 (H) 4.50 - 10.70 10*3/mm3 Final   02/28/2017 24.58 (H) 4.50 - 10.70 10*3/mm3 Final   02/27/2017 25.59 (H) 4.50 - 10.70 10*3/mm3 Final   02/27/2017 22.32 (H) 4.50 - 10.70 10*3/mm3 Final      HGB HEMOGLOBIN   Date Value Ref Range Status   03/01/2017 11.3 (L) 13.7 - 17.6 g/dL Final   02/28/2017 10.8 (L) 13.7 - 17.6 g/dL Final   02/27/2017 10.8 (L) 13.7 - 17.6 g/dL Final   02/27/2017 10.9 (L) 13.7 - 17.6 g/dL Final      HCT HEMATOCRIT   Date Value Ref Range Status   03/01/2017 35.2 (L) 40.4 - 52.2 % Final "   02/28/2017 34.5 (L) 40.4 - 52.2 % Final   02/27/2017 33.6 (L) 40.4 - 52.2 % Final   02/27/2017 33.9 (L) 40.4 - 52.2 % Final      Platelets PLATELETS   Date Value Ref Range Status   03/01/2017 325 140 - 500 10*3/mm3 Final   02/28/2017 356 140 - 500 10*3/mm3 Final   02/27/2017 382 140 - 500 10*3/mm3 Final   02/27/2017 348 140 - 500 10*3/mm3 Final        PT/INR:    PROTIME   Date Value Ref Range Status   03/01/2017 22.2 (H) 11.7 - 14.2 Seconds Final   02/28/2017 17.9 (H) 11.7 - 14.2 Seconds Final   02/27/2017 18.1 (H) 11.7 - 14.2 Seconds Final   02/27/2017 16.4 (H) 11.7 - 14.2 Seconds Final   02/26/2017 16.6 (H) 11.7 - 14.2 Seconds Final   /  INR   Date Value Ref Range Status   03/01/2017 2.02 (H) 0.90 - 1.10 Final   02/28/2017 1.54 (H) 0.90 - 1.10 Final   02/27/2017 1.56 (H) 0.90 - 1.10 Final   02/27/2017 1.38 (H) 0.90 - 1.10 Final   02/26/2017 1.39 (H) 0.90 - 1.10 Final       Sodium SODIUM   Date Value Ref Range Status   02/28/2017 141 136 - 145 mmol/L Final   02/27/2017 143 136 - 145 mmol/L Final      Potassium POTASSIUM   Date Value Ref Range Status   02/28/2017 4.6 3.5 - 5.2 mmol/L Final   02/27/2017 4.0 3.5 - 5.2 mmol/L Final      Chloride CHLORIDE   Date Value Ref Range Status   02/28/2017 98 98 - 107 mmol/L Final   02/27/2017 100 98 - 107 mmol/L Final      Bicarbonate CO2   Date Value Ref Range Status   02/28/2017 28.5 22.0 - 29.0 mmol/L Final   02/27/2017 28.8 22.0 - 29.0 mmol/L Final      BUN BUN   Date Value Ref Range Status   02/28/2017 38 (H) 6 - 20 mg/dL Final   02/27/2017 33 (H) 6 - 20 mg/dL Final      Creatinine CREATININE   Date Value Ref Range Status   02/28/2017 1.69 (H) 0.76 - 1.27 mg/dL Final   02/27/2017 1.52 (H) 0.76 - 1.27 mg/dL Final      Calcium CALCIUM   Date Value Ref Range Status   02/28/2017 8.7 8.6 - 10.5 mg/dL Final   02/27/2017 8.2 (L) 8.6 - 10.5 mg/dL Final      Magnesium No results found for: MG         amLODIPine 5 mg Oral Q24H   aspirin 81 mg Oral Daily   aspirin 150 mg Rectal  Daily   docusate sodium 150 mg Oral Daily   insulin aspart 0-9 Units Subcutaneous 4x Daily AC & at Bedtime   ipratropium-albuterol 3 mL Nebulization 4x Daily - RT   lactulose 20 g Oral Daily   levothyroxine 25 mcg Oral Q AM   losartan 50 mg Oral Daily   metoprolol tartrate 12.5 mg Oral Q12H   pantoprazole 40 mg Oral Q AM   potassium chloride 20 mEq Oral Daily   torsemide 40 mg Oral Daily   warfarin 5 mg Oral Daily       dexmedetomidine 0.2-1.5 mcg/kg/hr Last Rate: 0.3 mcg/kg/hr (02/22/17 0650)   diltiaZEM 5-15 mg/hr Last Rate: Stopped (02/19/17 1645)   DOPamine 2-20 mcg/kg/min    EPINEPHrine 0.02-0.3 mcg/kg/min Last Rate: Stopped (02/19/17 0800)   heparin (porcine) 8.13 Units/kg/hr Last Rate: 12.12 Units/kg/hr (03/01/17 0502)   milrinone 0.25-0.75 mcg/kg/min Last Rate: Stopped (02/20/17 0651)   niCARdipine 5-15 mg/hr Last Rate: Stopped (02/21/17 0410)   nitroglycerin 5-200 mcg/min    norepinephrine 0.02-0.3 mcg/kg/min Last Rate: Stopped (02/17/17 0819)   phenylephrine 0.5-3 mcg/kg/min Last Rate: Stopped (02/20/17 1410)   propofol 5-50 mcg/kg/min Last Rate: Stopped (02/19/17 1900)   vasopressin 0.02-0.1 Units/min            Patient Active Problem List   Diagnosis Code   • Chest pain at rest R07.9   • Ascending aortic dissection I71.01       Assessment & Plan     -POD#14 Acute Type A aortic dissection s/p ascending aortic and proximal aortic arch repair with 26mm graft, aortic valve re-suspension and aortic root repair  -Expected post op acute blood loss anemia, stable, monitor  -Thrombocytopenia, resolved  -Leukocytosis, WBC improving slowly, +Bilateral upper extremity DVT's, no fever>ID following  -HTN-stable   -Tobacco abuse suspected COPD>pulmonary following  -Suspected sleep apnea, to be evaluated as outpatient  -JUAN JOSE likely associated with hemodynamic changes, non-oliguric>stable>renal following   -Post op respiratory failure, s/p bronch 2/17, extubated 2/2>on room air>pulmonary following  -Suspected  PNA/atelectasis> antibiotics completed 2/25>pulmonary & ID following  -Post op PAF but now SR and has been for several days, amio stopped due to elevated LFT's and stable rhythm, beta blocker now held due to periods of bradycardia, restarted at low dose  -Elevated LFT's ?passive congestion (2/24 , , alk phos 168)  -Dysphagia>speech evaluated 2/23>diet advanced   -Bilateral upper extremity DVT's>Coumadin therapeutic, dc heparin gtt  -Drug rash from cefepime but was able to complete the coarse    Doing okay but still requiring lots of assistance with ambulation, CCP checking to see if [facility] rehab will take him, could possibly be discharge today.    Lexie Parry, APRN  03/01/17  8:16 AM

## 2017-03-01 NOTE — PLAN OF CARE
Problem: Patient Care Overview (Adult)  Goal: Plan of Care Review  Outcome: Ongoing (interventions implemented as appropriate)  Goal: Adult Individualization and Mutuality  Outcome: Ongoing (interventions implemented as appropriate)  Goal: Discharge Needs Assessment  Outcome: Ongoing (interventions implemented as appropriate)    Problem: Aortic Aneurysm/Dissection (Adult)  Goal: Signs and Symptoms of Listed Potential Problems Will be Absent or Manageable (Aortic Aneurysm/Dissection)  Outcome: Ongoing (interventions implemented as appropriate)    Problem: Cardiac Surgery (Adult)  Goal: Signs and Symptoms of Listed Potential Problems Will be Absent or Manageable (Cardiac Surgery)  Outcome: Ongoing (interventions implemented as appropriate)    Problem: Fall Risk (Adult)  Goal: Identify Related Risk Factors and Signs and Symptoms  Outcome: Ongoing (interventions implemented as appropriate)  Goal: Absence of Falls  Outcome: Ongoing (interventions implemented as appropriate)    Problem: Skin Integrity Impairment, Risk/Actual (Adult)  Goal: Identify Related Risk Factors and Signs and Symptoms  Outcome: Ongoing (interventions implemented as appropriate)  Goal: Skin Integrity/Wound Healing  Outcome: Ongoing (interventions implemented as appropriate)    Problem: Infection, Risk/Actual (Adult)  Goal: Identify Related Risk Factors and Signs and Symptoms  Outcome: Ongoing (interventions implemented as appropriate)  Goal: Infection Prevention/Resolution  Outcome: Ongoing (interventions implemented as appropriate)

## 2017-03-01 NOTE — PROGRESS NOTES
"Patient Care Team:  Marybeth Harrison DO as PCP - General (Family Medicine)    Chief Complaint: Follow-up aortic dissection, postoperative atrial fibrillation.    Interval History:   Doing well.  Mobile.  No additional atrial fibrillation.    Objective   Vital Signs  Temp:  [98.2 °F (36.8 °C)-98.8 °F (37.1 °C)] 98.3 °F (36.8 °C)  Heart Rate:  [78-90] 78  Resp:  [16-20] 17  BP: ()/(58-91) 134/73    Intake/Output Summary (Last 24 hours) at 03/01/17 0859  Last data filed at 03/01/17 0700   Gross per 24 hour   Intake 1493.71 ml   Output   1300 ml   Net 193.71 ml     Flowsheet Rows         First Filed Value    Admission Height  72\" (182.9 cm) Documented at 02/15/2017 2000    Admission Weight  265 lb (120 kg) Documented at 02/15/2017 2000          General Appearance:    Alert, cooperative, in no acute distress   Head:    Normocephalic, without obvious abnormality, atraumatic       Neck:   No adenopathy, supple, no thyromegaly, no carotid bruit, no    JVD   Lungs:     Clear to auscultation bilaterally, no wheezes, rales, or     rhonchi    Heart:    Normal rate, regular rhythm,  No murmur, rub, or gallop   Chest Wall:    No abnormalities observed   Abdomen:     Normal bowel sounds, soft, non-tender, non-distended,            no rebound tenderness   Extremities:   No cyanosis, clubbing, or edema   Pulses:   Pulses palpable and equal bilaterally   Skin:   No bleeding or rash   Lymph nodes:   No cervical adenopathy   Neurologic:   Cranial nerves 2 - 12 grossly intact, sensation intact             amLODIPine 5 mg Oral Q24H   aspirin 81 mg Oral Daily   aspirin 150 mg Rectal Daily   docusate sodium 150 mg Oral Daily   insulin aspart 0-9 Units Subcutaneous 4x Daily AC & at Bedtime   ipratropium-albuterol 3 mL Nebulization 4x Daily - RT   lactulose 20 g Oral Daily   levothyroxine 25 mcg Oral Q AM   losartan 50 mg Oral Daily   metoprolol tartrate 12.5 mg Oral Q12H   pantoprazole 40 mg Oral Q AM   potassium chloride 20 mEq Oral " Daily   torsemide 40 mg Oral Daily   warfarin 5 mg Oral Daily         dexmedetomidine 0.2-1.5 mcg/kg/hr Last Rate: 0.3 mcg/kg/hr (02/22/17 0650)   diltiaZEM 5-15 mg/hr Last Rate: Stopped (02/19/17 1645)   DOPamine 2-20 mcg/kg/min    EPINEPHrine 0.02-0.3 mcg/kg/min Last Rate: Stopped (02/19/17 0800)   heparin (porcine) 8.13 Units/kg/hr Last Rate: 12.12 Units/kg/hr (03/01/17 0502)   milrinone 0.25-0.75 mcg/kg/min Last Rate: Stopped (02/20/17 0651)   niCARdipine 5-15 mg/hr Last Rate: Stopped (02/21/17 0410)   nitroglycerin 5-200 mcg/min    norepinephrine 0.02-0.3 mcg/kg/min Last Rate: Stopped (02/17/17 0819)   phenylephrine 0.5-3 mcg/kg/min Last Rate: Stopped (02/20/17 1410)   propofol 5-50 mcg/kg/min Last Rate: Stopped (02/19/17 1900)   vasopressin 0.02-0.1 Units/min        Results Review:      Results from last 7 days  Lab Units 02/28/17  0357   SODIUM mmol/L 141   POTASSIUM mmol/L 4.6   CHLORIDE mmol/L 98   TOTAL CO2 mmol/L 28.5   BUN mg/dL 38*   CREATININE mg/dL 1.69*   GLUCOSE mg/dL 126*   CALCIUM mg/dL 8.7           Results from last 7 days  Lab Units 03/01/17  0316   WBC 10*3/mm3 23.92*   HEMOGLOBIN g/dL 11.3*   HEMATOCRIT % 35.2*   PLATELETS 10*3/mm3 325       Results from last 7 days  Lab Units 03/01/17  0316 02/28/17  1032 02/28/17  0357  02/27/17  1352   INR  2.02*  --  1.54*  --  1.56*   APTT seconds 87.2* 72.0* 43.7*  < > 35.1   < > = values in this interval not displayed.        Results from last 7 days  Lab Units 02/25/17  0334   MAGNESIUM mg/dL 2.1           I reviewed the patient's new clinical results.  I personally viewed and interpreted the patient's EKG/Telemetry data        Assessment/Plan   1. POD #15 acute type A aortic dissection (ascending aortic and proximal arch repair, AV re-suspension, and aortic root repair).  2. Acute kidney injury  3. Hypertension. BP controlled.   4. Post-op paroxysmal atrial fibrillation  5. Acute hypoxic resp failure   6. Elevated LFT's   7. Leukocytosis   8.  VAP/sepsis  9. BUE DVT.  INR is therapeutic.  Stop heparin drip.  Continue Coumadin.  10. Bradycardia over the weekend. Resolved.  Tolerating low-dose metoprolol tartrate.     -Okay to discharge from my standpoint.  Home health likely. Will need a follow-up INR initially on Friday and then likely one week after that.  -Continue metoprolol tartrate at current dose.  -Will sign off.

## 2017-03-01 NOTE — PLAN OF CARE
Problem: Patient Care Overview (Adult)  Goal: Plan of Care Review  Outcome: Ongoing (interventions implemented as appropriate)    03/01/17 0127   Outcome Evaluation   Outcome Summary/Follow up Plan Lopressor restarted yesterday. Patient wears 2L while sleeping. Needs possible overnight. Patient still on heparin gtt.        Goal: Adult Individualization and Mutuality  Outcome: Ongoing (interventions implemented as appropriate)  Goal: Discharge Needs Assessment  Outcome: Ongoing (interventions implemented as appropriate)    Problem: Aortic Aneurysm/Dissection (Adult)  Goal: Signs and Symptoms of Listed Potential Problems Will be Absent or Manageable (Aortic Aneurysm/Dissection)  Outcome: Ongoing (interventions implemented as appropriate)    03/01/17 0127   Aortic Aneurysm/Dissection   Problems Assessed (Aortic Aneurysm/Dissection) all   Problems Present (Aortic Aneurysm/Dissection) none         Problem: Cardiac Surgery (Adult)  Goal: Signs and Symptoms of Listed Potential Problems Will be Absent or Manageable (Cardiac Surgery)  Outcome: Ongoing (interventions implemented as appropriate)    02/26/17 0044 03/01/17 0127   Cardiac Surgery   Problems Assessed (Cardiac Surgery) --  all   Problems Present (Cardiac Surgery) pain;fluid imbalance --          Problem: Fall Risk (Adult)  Goal: Identify Related Risk Factors and Signs and Symptoms  Outcome: Ongoing (interventions implemented as appropriate)    02/26/17 0044   Fall Risk   Fall Risk: Related Risk Factors confusion/agitation;gait/mobility problems;sensory deficits   Fall Risk: Signs and Symptoms presence of risk factors       Goal: Absence of Falls  Outcome: Ongoing (interventions implemented as appropriate)    Problem: Skin Integrity Impairment, Risk/Actual (Adult)  Goal: Identify Related Risk Factors and Signs and Symptoms  Outcome: Ongoing (interventions implemented as appropriate)    02/26/17 0044   Skin Integrity Impairment, Risk/Actual   Skin Integrity  Impairment, Risk/Actual: Related Risk Factors edema;fluid/nutrition status;infection/disease process   Signs and Symptoms (Skin Integrity Impairment) edema;rash       Goal: Skin Integrity/Wound Healing  Outcome: Ongoing (interventions implemented as appropriate)    Problem: Infection, Risk/Actual (Adult)  Goal: Identify Related Risk Factors and Signs and Symptoms  Outcome: Ongoing (interventions implemented as appropriate)    02/26/17 0044 02/28/17 0315   Infection, Risk/Actual   Infection, Risk/Actual: Related Risk Factors prolonged hospitalization --    Signs and Symptoms (Infection, Risk/Actual) --  edema       Goal: Infection Prevention/Resolution  Outcome: Ongoing (interventions implemented as appropriate)

## 2017-03-02 ENCOUNTER — TELEPHONE (OUTPATIENT)
Dept: CARDIAC SURGERY | Facility: CLINIC | Age: 57
End: 2017-03-02

## 2017-03-02 VITALS
OXYGEN SATURATION: 91 % | WEIGHT: 262.8 LBS | BODY MASS INDEX: 35.59 KG/M2 | HEIGHT: 72 IN | TEMPERATURE: 98.5 F | DIASTOLIC BLOOD PRESSURE: 67 MMHG | SYSTOLIC BLOOD PRESSURE: 98 MMHG | RESPIRATION RATE: 18 BRPM | HEART RATE: 88 BPM

## 2017-03-02 LAB
ANION GAP SERPL CALCULATED.3IONS-SCNC: 17.8 MMOL/L
APTT PPP: 42.9 SECONDS (ref 22.7–35.4)
BASOPHILS # BLD AUTO: 0.11 10*3/MM3 (ref 0–0.2)
BASOPHILS NFR BLD AUTO: 0.5 % (ref 0–1.5)
BUN BLD-MCNC: 42 MG/DL (ref 6–20)
BUN/CREAT SERPL: 27.6 (ref 7–25)
CALCIUM SPEC-SCNC: 8.5 MG/DL (ref 8.6–10.5)
CHLORIDE SERPL-SCNC: 93 MMOL/L (ref 98–107)
CO2 SERPL-SCNC: 26.2 MMOL/L (ref 22–29)
CREAT BLD-MCNC: 1.52 MG/DL (ref 0.76–1.27)
DEPRECATED RDW RBC AUTO: 48.1 FL (ref 37–54)
EOSINOPHIL # BLD AUTO: 0.58 10*3/MM3 (ref 0–0.7)
EOSINOPHIL NFR BLD AUTO: 2.6 % (ref 0.3–6.2)
ERYTHROCYTE [DISTWIDTH] IN BLOOD BY AUTOMATED COUNT: 14.6 % (ref 11.5–14.5)
GFR SERPL CREATININE-BSD FRML MDRD: 48 ML/MIN/1.73
GLUCOSE BLD-MCNC: 111 MG/DL (ref 65–99)
GLUCOSE BLDC GLUCOMTR-MCNC: 134 MG/DL (ref 70–130)
GLUCOSE BLDC GLUCOMTR-MCNC: 154 MG/DL (ref 70–130)
HCT VFR BLD AUTO: 35.3 % (ref 40.4–52.2)
HGB BLD-MCNC: 11.3 G/DL (ref 13.7–17.6)
IMM GRANULOCYTES # BLD: 0.36 10*3/MM3 (ref 0–0.03)
IMM GRANULOCYTES NFR BLD: 1.6 % (ref 0–0.5)
INR PPP: 2.64 (ref 0.9–1.1)
LYMPHOCYTES # BLD AUTO: 3.03 10*3/MM3 (ref 0.9–4.8)
LYMPHOCYTES NFR BLD AUTO: 13.5 % (ref 19.6–45.3)
MCH RBC QN AUTO: 28.6 PG (ref 27–32.7)
MCHC RBC AUTO-ENTMCNC: 32 G/DL (ref 32.6–36.4)
MCV RBC AUTO: 89.4 FL (ref 79.8–96.2)
MONOCYTES # BLD AUTO: 1.48 10*3/MM3 (ref 0.2–1.2)
MONOCYTES NFR BLD AUTO: 6.6 % (ref 5–12)
NEUTROPHILS # BLD AUTO: 16.82 10*3/MM3 (ref 1.9–8.1)
NEUTROPHILS NFR BLD AUTO: 75.2 % (ref 42.7–76)
NRBC BLD MANUAL-RTO: 0 /100 WBC (ref 0–0)
PLATELET # BLD AUTO: 356 10*3/MM3 (ref 140–500)
PMV BLD AUTO: 12 FL (ref 6–12)
POTASSIUM BLD-SCNC: 4.2 MMOL/L (ref 3.5–5.2)
PROTHROMBIN TIME: 27.3 SECONDS (ref 11.7–14.2)
RBC # BLD AUTO: 3.95 10*6/MM3 (ref 4.6–6)
SODIUM BLD-SCNC: 137 MMOL/L (ref 136–145)
WBC NRBC COR # BLD: 22.38 10*3/MM3 (ref 4.5–10.7)

## 2017-03-02 PROCEDURE — 85610 PROTHROMBIN TIME: CPT | Performed by: THORACIC SURGERY (CARDIOTHORACIC VASCULAR SURGERY)

## 2017-03-02 PROCEDURE — 93010 ELECTROCARDIOGRAM REPORT: CPT | Performed by: INTERNAL MEDICINE

## 2017-03-02 PROCEDURE — 85730 THROMBOPLASTIN TIME PARTIAL: CPT | Performed by: THORACIC SURGERY (CARDIOTHORACIC VASCULAR SURGERY)

## 2017-03-02 PROCEDURE — 99024 POSTOP FOLLOW-UP VISIT: CPT | Performed by: NURSE PRACTITIONER

## 2017-03-02 PROCEDURE — 80048 BASIC METABOLIC PNL TOTAL CA: CPT | Performed by: THORACIC SURGERY (CARDIOTHORACIC VASCULAR SURGERY)

## 2017-03-02 PROCEDURE — 82962 GLUCOSE BLOOD TEST: CPT

## 2017-03-02 PROCEDURE — 85025 COMPLETE CBC W/AUTO DIFF WBC: CPT | Performed by: NURSE PRACTITIONER

## 2017-03-02 PROCEDURE — 93005 ELECTROCARDIOGRAM TRACING: CPT | Performed by: THORACIC SURGERY (CARDIOTHORACIC VASCULAR SURGERY)

## 2017-03-02 RX ORDER — AMLODIPINE BESYLATE 5 MG/1
5 TABLET ORAL
Qty: 30 TABLET | Refills: 2 | Status: SHIPPED | OUTPATIENT
Start: 2017-03-02 | End: 2017-12-22

## 2017-03-02 RX ORDER — LOSARTAN POTASSIUM 50 MG/1
50 TABLET ORAL DAILY
Qty: 30 TABLET | Refills: 2 | Status: SHIPPED | OUTPATIENT
Start: 2017-03-02 | End: 2017-05-18

## 2017-03-02 RX ORDER — TORSEMIDE 20 MG/1
40 TABLET ORAL DAILY
Qty: 30 TABLET | Refills: 2 | Status: SHIPPED | OUTPATIENT
Start: 2017-03-02 | End: 2017-05-18

## 2017-03-02 RX ORDER — ASPIRIN 81 MG/1
81 TABLET ORAL DAILY
Qty: 30 TABLET | Refills: 2 | Status: SHIPPED | OUTPATIENT
Start: 2017-03-02 | End: 2017-05-18

## 2017-03-02 RX ORDER — LEVOTHYROXINE SODIUM 0.03 MG/1
25 TABLET ORAL DAILY
Qty: 30 TABLET | Refills: 2 | Status: SHIPPED | OUTPATIENT
Start: 2017-03-02 | End: 2018-02-01 | Stop reason: DRUGHIGH

## 2017-03-02 RX ORDER — WARFARIN SODIUM 2.5 MG/1
TABLET ORAL
Qty: 60 TABLET | Refills: 1 | Status: SHIPPED | OUTPATIENT
Start: 2017-03-02 | End: 2017-05-18

## 2017-03-02 RX ADMIN — AMLODIPINE BESYLATE 5 MG: 5 TABLET ORAL at 10:30

## 2017-03-02 RX ADMIN — LOSARTAN POTASSIUM 50 MG: 50 TABLET, FILM COATED ORAL at 10:29

## 2017-03-02 RX ADMIN — PANTOPRAZOLE SODIUM 40 MG: 40 TABLET, DELAYED RELEASE ORAL at 05:47

## 2017-03-02 RX ADMIN — LEVOTHYROXINE SODIUM 25 MCG: 25 TABLET ORAL at 10:30

## 2017-03-02 RX ADMIN — TORSEMIDE 40 MG: 20 TABLET ORAL at 10:30

## 2017-03-02 RX ADMIN — POTASSIUM CHLORIDE 20 MEQ: 750 CAPSULE, EXTENDED RELEASE ORAL at 10:30

## 2017-03-02 RX ADMIN — METOPROLOL TARTRATE 12.5 MG: 25 TABLET ORAL at 10:31

## 2017-03-02 RX ADMIN — ASPIRIN 81 MG: 81 TABLET ORAL at 10:30

## 2017-03-02 NOTE — SIGNIFICANT NOTE
03/02/17 1009   PT Discharge Summary   Anticipated Discharge Disposition home with home health   Reason for Discharge All goals achieved   Outcomes Achieved Refer to plan of care for updates on goals achieved

## 2017-03-02 NOTE — PLAN OF CARE
Problem: Inpatient Physical Therapy  Goal: Cardiopulmonary Goal LTG- PT  Outcome: Outcome(s) achieved Date Met:  03/02/17 03/02/17 1009   Cardiopulmonary PT LTG   Cardiopulmonary PT LTG, Level Level V   Cardiopulmonary PT LTG, Outcome goal met

## 2017-03-02 NOTE — PROGRESS NOTES
Continued Stay Note  [facility]     Patient Name: Truman Esquivel  MRN: [id number]  Today's Date: 3/2/2017    Admit Date: 2/14/2017          Discharge Plan       03/02/17 1046    Case Management/Social Work Plan    Plan Home with [facility] and [facility] to deliver nocturnal O2.    Additional Comments Plan is for Pt to d/c home today.  Pt going back and forth with [facility] this am about wether he is going to his own home or his mothers.  CCP repeatedly expressed to Pt that a definite answer had to be given so delivery of O2 concentrator and home health would be going to the correct address.  Pt ultimately decided he was going to go home to his address.  [facility] called and s/w Lianne at [facility] in regards to home O2 and she requested that [facility] fax Pt order for O2 w/ f/s to [facility] to fax [phone].  CCP faxed at 10:32 PM.  Pt will d/c home with assist of his mother and [facility] [phone] will follow.                 Discharge Codes     None            Philomena Woods RN

## 2017-03-02 NOTE — DISCHARGE SUMMARY
Date of Admission: 2/14/2017  Date of Discharge:  3/2/2017    Discharge Diagnosis:     -S/p Acute Type A aortic dissection s/p ascending aortic and proximal aortic arch repair with 26mm graft, aortic valve re-suspension and aortic root repair  -Expected post op acute blood loss anemia, stable, monitor  -Thrombocytopenia, resolved  -Leukocytosis, improving> +Bilateral upper extremity DVT's, no fever>ID following  -HTN-stable   -Tobacco abuse/COPD>pulmonary has seen and will follow as outpatient  -CXR w/lung nodules>Pulmonary has ordered follow up outpatient CT scan  -Suspected sleep apnea, to be evaluated as outpatient>did fail overnight oximetry and will require nocturnal oxygen  -JUAN JOSE likely associated with hemodynamic changes, now stable>renal will follow as outpatient  -Post op respiratory failure, s/p bronch 2/17, extubated 2/22>on room air>pulmonary to follow as outpatient  -Suspected PNA/atelectasis> antibiotics completed 2/25  -Post op PAF but now SR and has been for several days, amio stopped due to elevated LFT's and stable rhythm, beta blocker now held due to periods of bradycardia, restarted at low dose, tolerating well  -Elevated LFT's (2/24 , , alk phos 168)  -Dysphagia>speech evaluated 2/23>diet advanced   -Bilateral upper extremity DVT's>Coumadin therapeutic-INR goal 2-3.0  -Drug rash from cefepime but was able to complete the coarse without difficulty       Presenting Problem/History of Present Illness  Ascending aortic dissection [I71.01]  Ascending aortic dissection [I71.01]     Hospital Course  Patient is a 56 y.o. male presented to [facility] on 2/13/17. He was bending to eat and suddenly felt a pop and developed pain in the left side of his neck and jaw and right sided chest pain. He had a CT angiogram coronary which showed and acute dissection involving the ascending and descending aorta. He was transferred to [facility] and underwent emergency  repair of the ascending aorta and proximal arch dissection, aortic root repair and resuspension of the aortic valve. Postoperatively  He required high oxygen requirements so pulmonary, Dr. Chaney, was consulted. He had prolonged intubated but was finally able to be extubated on 2/22/17. He is now on room air during the day but has suspected sleep apnea and failed and overnight oximetry so will be discharged with nocturnal oxygen. Pulmonary has ordered for him to have an outpatient sleep evaluation. His CXR also showed some pulmonary nodules and needs a outpatient CT of the chest which pulmonary has also ordered. He also developed JUAN JOSE post operatively which was thought to be due to hemodynamic changes and over diuresis, renal was consulted and has been following him. His creatinine has improved and is stable, it is 1.5 on day of discharge. He needs to followup with then within 4 weeks. He developed PAF with RVR in the early postoperative phase and cardiology was consulted. He was initially on amiodarone but he converted to SR several days prior to discharge and has maintained it just on beta blocker. We did not place him on oral amiodarone secondary to elevated LFT's which may be secondary to passive congestion. He developed post op fevers and ID was consulted. He was treated for Sepsis/VAP. He was placed on cefepime and did develop a drug rash but when ID reviewed and given his multiple allergies to antibiotics it was felt prudent to finish the cefepime course unless his reaction progressed which it did not. He was able to complete his antibiotic course without any further problems. On 2/26 extremity dopplers were ordered for edema, they were positive for bilateral upper extremity DVT's. He was placed on Heparin and Coumadin was started. He is now therapeutic on his Coumadin and felt stable for discharge home. He is ambulating and actually did some stairs yesterday. He will follow up as described below and will be  followed by home health. He will discharge on 2.5mg of Coumadin daily with an INR goal of 2-3.0, this will be followed by his cardiologist, Dr. Chandra, likely the [facility] in his office.     Procedures Performed    PROCEDURES PERFORMED: 2/14/17  1. Repair of ascending aorta and proximal arch dissection using a 26 mm Dacron interpositional graft in a hemiarch configuration.  2. Aortic root repair and resuspension of the aortic valve.  3. Deep hyperthermic circulatory arrest with antegrade selective cerebral perfusion.  4. Exposure of the right common femoral artery for a cannulation.  5. Removal of a piece of saphenous vein graft from the right thigh.     Procedure(s): 2/17/17  BRONCHOSCOPY BIOPSY AT BEDSIDE WITH BAL-LEFT LOWER LOBE       Consults:   Consults     Date and Time Order Name Status Description    2/19/2017 1744 Inpatient Consult to Infectious Diseases Completed     2/16/2017 0552 Inpatient Consult to Cardiology Completed     2/15/2017 0909 Inpatient Consult to Nephrology Completed     2/15/2017 0657 Inpatient Consult to Pulmonology Completed     2/14/2017 0859 Inpatient Consult to Cardiology Completed           Pertinent Test Results:    Lab Results   Component Value Date    WBC 22.38 (H) 03/02/2017    HGB 11.3 (L) 03/02/2017    HCT 35.3 (L) 03/02/2017    MCV 89.4 03/02/2017     03/02/2017      Lab Results   Component Value Date    GLUCOSE 111 (H) 03/02/2017    CALCIUM 8.5 (L) 03/02/2017     03/02/2017    K 4.2 03/02/2017    CO2 26.2 03/02/2017    CL 93 (L) 03/02/2017    BUN 42 (H) 03/02/2017    CREATININE 1.52 (H) 03/02/2017    EGFRIFNONA 48 (L) 03/02/2017    BCR 27.6 (H) 03/02/2017    ANIONGAP 17.8 03/02/2017     Lab Results   Component Value Date    INR 2.64 (H) 03/02/2017    PROTIME 27.3 (H) 03/02/2017         Condition on Discharge:  Stable    Vital Signs  Temp:  [98.2 °F (36.8 °C)-98.8 °F (37.1 °C)] 98.3 °F (36.8 °C)  Heart Rate:  [75-94] 91  Resp:  [16-18] 18  BP:  (103136)/(68-87) 136/80      Discharge Disposition  Home or Self Care    Discharge Medications   Truman Esquivel   Home Medication Instructions PRABHAKAR:[id number]    Printed on:03/02/17 1107   Medication Information                      hydrochlorothiazide (HYDRODIURIL) 25 MG tablet  Take 25 mg by mouth Daily.             lisinopril (PRINIVIL,ZESTRIL) 40 MG tablet  Take 40 mg by mouth Daily.                 Discharge Diet:   Diet Instructions     Diet: Regular, Cardiac; Thin Liquids, No Restrictions       Discharge Diet:   Regular  Cardiac      Fluid Consistency:  Thin Liquids, No Restrictions   Low sodium diet                 Activity at Discharge:   Activity Instructions     Discharge Activity Restrictions       1) No driving for 2 weeks and no longer taking narcotics.   2) May shower   3) Do not lift / push / pull more then 10 lbs.  4) Walk at least 10 minutes at lease 3 times daily                 Follow-up Appointments  Future Appointments  Date Time Provider Department Center   3/23/2017 7:30 PM [facility] SLEEP [facility]     Additional Instructions for the Follow-ups that You Need to Schedule     Follow-Up    As directed    With Dr. Arreguin, f/u with Nephrology in 3-4 weeks, f/u with Dr. Mcnamara in 4 weeks (has outpatient followup CT and sleep study evaluation ordered by Dr. Mcnamara) but he did say he could follow with a pulmonary doctor in [place] if he could find one there. F/u with PCP in 1-2 weeks, F/u with Dr. Chandra in 2 weeks   Follow Up Details:  4-6 weeks       Referral to Home Health    As directed    Face to Face Visit Date:  3/2/2017   Follow-up Provider for Plan of Care?:  I will be treating the patient on an ongoing basis.  Please send me the Plan of Care for signature.   Follow-up Provider:  URBAN ARREGUIN   Reason/Clinical Findings:  s/p type a dissection repair   Describe mobility limitations that make leaving home difficult:  s/p type a dissection repair   Nursing/Therapeutic  Services Requested:  Skilled Nursing   Skilled nursing orders:   Medication education Comment - s/p dissectionr repair  Cardiopulmonary assessments      Frequency:  1 Week 1   PT/INR Friday 3/3 and call to Dr. Aceves's office [phone]       CT Chest Without Contrast    Mar 04, 2017    Send report to dr. cabrera [phone]   Reason for Exam:  lung nodules   Send report to dr. cabrera [phone]                    Order Current Status    Tissue Exam Collected (02/17/17 8266)    AFB Culture Preliminary result           ROWAN Lan  03/02/17  11:02 AM

## 2017-03-02 NOTE — PROGRESS NOTES
"   LOS: 14 days    Patient Care Team:  Marybeth Harrison DO as PCP - General (Family Medicine)    Chief Complaint:  No chief complaint on file.      Subjective     Interval History:     Patient Complaints: none  Patient Denies:  Cp or soa  History taken from: patient RN    Review of Systems:    The following systems were reviewed and negative;  constitution, respiratory, cardiovascular and gastrointestinal    Objective     Vital Signs  Temp:  [98.3 °F (36.8 °C)-98.8 °F (37.1 °C)] 98.3 °F (36.8 °C)  Heart Rate:  [75-88] 88  Resp:  [16-18] 17  BP: ()/(58-87) 121/87    Flowsheet Rows         First Filed Value    Admission Height  72\" (182.9 cm) Documented at 02/15/2017 2000    Admission Weight  265 lb (120 kg) Documented at 02/15/2017 2000             I/O last 3 completed shifts:  In: 2634.7 [P.O.:2140; I.V.:494.7]  Out: 1300 [Urine:1300]    Intake/Output Summary (Last 24 hours) at 03/01/17 2005  Last data filed at 03/01/17 1711   Gross per 24 hour   Intake   1564 ml   Output    400 ml   Net   1164 ml       Physical Exam:  Eating dinner in a chair  No jvd  ctab  rrr   abd soft nontender bs+  Ext no c/c/trace edema     Results Review:      Results from last 7 days  Lab Units 02/28/17  0357 02/27/17  0324 02/26/17  0338  02/24/17  0401  02/23/17  0339   SODIUM mmol/L 141 143 143  < > 149*  --  149*  148*   POTASSIUM mmol/L 4.6 4.0 3.6  < > 4.9  < > 3.6  3.4*   CHLORIDE mmol/L 98 100 104  < > 113*  --  112*  111*   TOTAL CO2 mmol/L 28.5 28.8 23.0  < > 21.9*  --  22.6  21.7*   BUN mg/dL 38* 33* 34*  < > 46*  --  52*  47*   CREATININE mg/dL 1.69* 1.52* 1.41*  < > 1.70*  --  1.59*  1.58*   CALCIUM mg/dL 8.7 8.2* 8.4*  < > 8.4*  --  8.5*  8.4*   BILIRUBIN mg/dL  --   --   --   --  0.6  --  0.4   ALK PHOS U/L  --   --   --   --  168*  --  182*   ALT (SGPT) U/L  --   --   --   --  271*  --  273*   AST (SGOT) U/L  --   --   --   --  131*  --  181*   GLUCOSE mg/dL 126* 120* 121*  < > 112*  --  112*  112*   < > = values " in this interval not displayed.    Estimated Creatinine Clearance: 65 mL/min (by C-G formula based on Cr of 1.69).      Results from last 7 days  Lab Units 02/27/17  0324 02/26/17  0338 02/25/17  0334 02/23/17  0339   MAGNESIUM mg/dL  --   --  2.1 2.4   PHOSPHORUS mg/dL 3.6 3.1 3.2 2.7         Results from last 7 days  Lab Units 02/27/17  0324 02/26/17  0338 02/25/17  0334 02/23/17  0339   URIC ACID mg/dL 5.9 5.9 6.7 8.6*         Results from last 7 days  Lab Units 03/01/17  0316 02/28/17  0357 02/27/17  1352 02/27/17 0324 02/26/17  0338   WBC 10*3/mm3 23.92* 24.58* 25.59* 22.32* 20.98*   HEMOGLOBIN g/dL 11.3* 10.8* 10.8* 10.9* 10.9*   PLATELETS 10*3/mm3 325 356 382 348 308         Results from last 7 days  Lab Units 03/01/17  0316 02/28/17  0357 02/27/17  1352 02/27/17  0324 02/26/17  1928   INR  2.02* 1.54* 1.56* 1.38* 1.39*         Imaging Results (last 24 hours)     ** No results found for the last 24 hours. **          amLODIPine 5 mg Oral Q24H   aspirin 81 mg Oral Daily   docusate sodium 150 mg Oral Daily   insulin aspart 0-9 Units Subcutaneous 4x Daily AC & at Bedtime   levothyroxine 25 mcg Oral Q AM   losartan 50 mg Oral Daily   metoprolol tartrate 12.5 mg Oral Q12H   pantoprazole 40 mg Oral Q AM   potassium chloride 20 mEq Oral Daily   torsemide 40 mg Oral Daily   warfarin 5 mg Oral Daily       dexmedetomidine 0.2-1.5 mcg/kg/hr Last Rate: 0.3 mcg/kg/hr (02/22/17 0650)   diltiaZEM 5-15 mg/hr Last Rate: Stopped (02/19/17 1645)   DOPamine 2-20 mcg/kg/min    EPINEPHrine 0.02-0.3 mcg/kg/min Last Rate: Stopped (02/19/17 0800)   milrinone 0.25-0.75 mcg/kg/min Last Rate: Stopped (02/20/17 0651)   niCARdipine 5-15 mg/hr Last Rate: Stopped (02/21/17 0410)   nitroglycerin 5-200 mcg/min    norepinephrine 0.02-0.3 mcg/kg/min Last Rate: Stopped (02/17/17 0819)   phenylephrine 0.5-3 mcg/kg/min Last Rate: Stopped (02/20/17 1410)   propofol 5-50 mcg/kg/min Last Rate: Stopped (02/19/17 1900)   vasopressin 0.02-0.1 Units/min         Medication Review:   Current Facility-Administered Medications   Medication Dose Route Frequency Provider Last Rate Last Dose   • acetaminophen (TYLENOL) suppository 650 mg  650 mg Rectal Q4H PRN German Arreguin MD   650 mg at 02/15/17 2122   • acetaminophen (TYLENOL) tablet 650 mg  650 mg Oral Q4H PRN German Arreguin MD   650 mg at 02/21/17 0442   • ALPRAZolam (XANAX) tablet 0.5 mg  0.5 mg Oral 4x Daily PRN Darien Hernandez MD   0.5 mg at 02/23/17 2024   • amLODIPine (NORVASC) tablet 5 mg  5 mg Oral Q24H Otto Wells MD   5 mg at 03/01/17 0923   • aspirin EC tablet 81 mg  81 mg Oral Daily German Arreguin MD   81 mg at 03/01/17 0924   • bisacodyl (DULCOLAX) suppository 10 mg  10 mg Rectal Daily PRN Sivan Meyer MD       • dexmedetomidine (PRECEDEX) 400 MCG/100ML infusion  0.2-1.5 mcg/kg/hr Intravenous Continuous PRN German Arreguin MD 9 mL/hr at 02/22/17 0650 0.3 mcg/kg/hr at 02/22/17 0650   • dextrose (D50W) solution 25 g  25 g Intravenous Q15 Min PRN German Arreguin MD       • dextrose (GLUTOSE) oral gel 15 g  15 g Oral Q15 Min PRN German Arreguin MD       • diltiaZEM (CARDIZEM) 100 mg/100 mL 0.9% NS  5-15 mg/hr Intravenous Titrated Kristi Watson MD   Stopped at 02/19/17 1645   • diphenhydrAMINE (BENADRYL) capsule 25 mg  25 mg Oral Q8H PRN German Arreguin MD   25 mg at 02/23/17 1630   • diphenhydrAMINE-zinc acetate 2-0.1 % cream   Topical TID PRN Daren Lindsay MD       • docusate sodium (COLACE) liquid 150 mg  150 mg Oral Daily Sivan Meyer MD   150 mg at 02/23/17 1100   • DOPamine infusion 1600 mcg/mL  2-20 mcg/kg/min Intravenous Continuous PRN German Arreguin MD       • EPINEPHrine 4 mg/250 mL (16 mcg/mL) infusion  0.02-0.3 mcg/kg/min Intravenous Continuous PRN German Arreguin MD   Stopped at 02/19/17 0800   • glucagon (human recombinant) (GLUCAGEN DIAGNOSTIC) injection 1 mg  1 mg Subcutaneous Q15 Min PRN German Arreguin MD       • hydrALAZINE (APRESOLINE)  injection 20 mg  20 mg Intravenous Q4H PRN ROWAN Lan   20 mg at 02/24/17 1623   • insulin aspart (novoLOG) injection 0-9 Units  0-9 Units Subcutaneous 4x Daily AC & at Bedtime German Arreguin MD   2 Units at 02/28/17 1716   • ipratropium-albuterol (DUO-NEB) nebulizer solution 3 mL  3 mL Nebulization Q4H PRN Hao Barragan MD       • levothyroxine (SYNTHROID, LEVOTHROID) tablet 25 mcg  25 mcg Oral Q AM Sivan Meyer MD   25 mcg at 03/01/17 0924   • losartan (COZAAR) tablet 50 mg  50 mg Oral Daily Joseph Rodriguez MD   50 mg at 03/01/17 0924   • metoprolol tartrate (LOPRESSOR) tablet 12.5 mg  12.5 mg Oral Q12H Daryl Roberts MD   12.5 mg at 03/01/17 0923   • midazolam (VERSED) injection 2 mg  2 mg Intravenous Q1H PRN German Arreguin MD   2 mg at 02/21/17 0345   • milrinone in dextrose infusion 200 mcg/mL  0.25-0.75 mcg/kg/min Intravenous Continuous PRN German Arreguin MD   Stopped at 02/20/17 0651   • naloxone (NARCAN) injection 0.4 mg  0.4 mg Intravenous Q5 Min PRN German Arreguin MD       • niCARdipine (CARDENE) 25 mg/250 mL 0.9% NS VTB (mbp)  5-15 mg/hr Intravenous Continuous PRN German Arreguin MD   Stopped at 02/21/17 0410   • nitroglycerin infusion 200 mcg/mL  5-200 mcg/min Intravenous Continuous PRN German Arreguin MD       • norepinephrine (LEVOPHED) 32 mcg/mL (8 mg/250 mL) in sodium chloride 0.9% infusion (premix)  0.02-0.3 mcg/kg/min Intravenous Continuous PRN German Arreguin MD   Stopped at 02/17/17 0819   • ondansetron (ZOFRAN) injection 4 mg  4 mg Intravenous Q6H PRN German Arreguin MD   4 mg at 02/28/17 2212   • pantoprazole (PROTONIX) EC tablet 40 mg  40 mg Oral Q AM German Arreguin MD   40 mg at 03/01/17 0638   • phenylephrine (MARIYA-SYNEPHRINE) 0.2 mg/mL in sodium chloride 0.9 % 250 mL infusion  0.5-3 mcg/kg/min Intravenous Continuous PRN German Arreguin MD   Stopped at 02/20/17 1410   • potassium chloride (KLOR-CON) packet 40 mEq  40 mEq Oral PRN Chun Coulter MD    40 mEq at 02/23/17 1359   • potassium chloride (MICRO-K) CR capsule 20 mEq  20 mEq Oral Daily Hao Barragan MD   20 mEq at 03/01/17 0924   • potassium chloride (MICRO-K) CR capsule 40 mEq  40 mEq Oral PRN Fabrizio Morris MD   40 mEq at 02/26/17 0500    Or   • potassium chloride 10 mEq in 100 mL IVPB  10 mEq Intravenous Q1H PRN Fabrizio Morris MD       • promethazine (PHENERGAN) tablet 12.5 mg  12.5 mg Oral Q6H PRN German Arreguin MD        Or   • promethazine (PHENERGAN) injection 12.5 mg  12.5 mg Intravenous Q6H PRN German Arreguin MD   12.5 mg at 02/22/17 0445   • propofol (DIPRIVAN) infusion 10 mg/mL 100 mL  5-50 mcg/kg/min Intravenous Continuous PRN German Arreguin MD   Stopped at 02/19/17 1900   • sodium chloride 0.9 % flush 30 mL  30 mL Intravenous Once PRN German Arreguin MD       • torsemide (DEMADEX) tablet 40 mg  40 mg Oral Daily Fabrizio Morris MD   40 mg at 03/01/17 0923   • vasopressin (PITRESSIN) 20 Units in sodium chloride 0.9 % 100 mL infusion  0.02-0.1 Units/min Intravenous Continuous PRN German Arreguin MD       • warfarin (COUMADIN) tablet 5 mg  5 mg Oral Daily Daryl Roberts MD   5 mg at 03/01/17 1728       Assessment/Plan       Active Problems:    Ascending aortic dissection    1. Acute kidney injury. Likely secondary to over diuresis. Creatinine stable at 1.5. Decreased edema.  2. Type I aortic dissection status post repair.   3. HTN improving control  4. PAF, now in sinus.  5. Hypothyroid now on replacement.         Plan: Continue torsemide 40mg qd.  Would like to see pt in the office within four weeks.  Discussed following a low sodium diet at home with patient.  Check bmp in am.          Kristi Watson MD  03/01/17  8:05 PM

## 2017-03-02 NOTE — PROGRESS NOTES
" LOS: 15 days   Patient Care Team:  Marybeth Harrison DO as PCP - General (Family Medicine)    Chief Complaint: F/u type A dissection    Subjective     Doing good, ready to go home      Vital Signs  Temp:  [98.2 °F (36.8 °C)-98.8 °F (37.1 °C)] 98.4 °F (36.9 °C)  Heart Rate:  [75-94] 94  Resp:  [16-18] 18  BP: (103-128)/(68-87) 128/69  Body mass index is 35.64 kg/(m^2).    Intake/Output Summary (Last 24 hours) at 03/02/17 0925  Last data filed at 03/01/17 1711   Gross per 24 hour   Intake    720 ml   Output      0 ml   Net    720 ml              Last 3 weights    02/28/17  0642 03/01/17  0500 03/02/17  0500   Weight: 266 lb 12.8 oz (121 kg) 262 lb (119 kg) 262 lb 12.8 oz (119 kg)         Objective:  General Appearance:  Comfortable and in no acute distress.    Vital signs: (most recent): Blood pressure 128/69, pulse 94, temperature 98.4 °F (36.9 °C), temperature source Oral, resp. rate 18, height 72\" (182.9 cm), weight 262 lb 12.8 oz (119 kg), SpO2 90 %.  Vital signs are normal.    Lungs:  Normal respiratory rate and normal effort.  Breath sounds clear to auscultation.    Heart: Normal rate.  Regular rhythm.  S1 normal and S2 normal.  (Tele: SR)  Abdomen: Abdomen is soft.    Extremities: Normal range of motion.    Neurological: Patient is alert and oriented to person, place and time.    Skin:  Warm and dry.  (Incision wnl)            Results Review:        WBC WBC   Date Value Ref Range Status   03/02/2017 22.38 (H) 4.50 - 10.70 10*3/mm3 Final   03/01/2017 23.92 (H) 4.50 - 10.70 10*3/mm3 Final   02/28/2017 24.58 (H) 4.50 - 10.70 10*3/mm3 Final   02/27/2017 25.59 (H) 4.50 - 10.70 10*3/mm3 Final      HGB HEMOGLOBIN   Date Value Ref Range Status   03/02/2017 11.3 (L) 13.7 - 17.6 g/dL Final   03/01/2017 11.3 (L) 13.7 - 17.6 g/dL Final   02/28/2017 10.8 (L) 13.7 - 17.6 g/dL Final   02/27/2017 10.8 (L) 13.7 - 17.6 g/dL Final      HCT HEMATOCRIT   Date Value Ref Range Status   03/02/2017 35.3 (L) 40.4 - 52.2 % Final "   03/01/2017 35.2 (L) 40.4 - 52.2 % Final   02/28/2017 34.5 (L) 40.4 - 52.2 % Final   02/27/2017 33.6 (L) 40.4 - 52.2 % Final      Platelets PLATELETS   Date Value Ref Range Status   03/02/2017 356 140 - 500 10*3/mm3 Final   03/01/2017 325 140 - 500 10*3/mm3 Final   02/28/2017 356 140 - 500 10*3/mm3 Final   02/27/2017 382 140 - 500 10*3/mm3 Final        PT/INR:    PROTIME   Date Value Ref Range Status   03/02/2017 27.3 (H) 11.7 - 14.2 Seconds Final   03/01/2017 22.2 (H) 11.7 - 14.2 Seconds Final   02/28/2017 17.9 (H) 11.7 - 14.2 Seconds Final   02/27/2017 18.1 (H) 11.7 - 14.2 Seconds Final   /  INR   Date Value Ref Range Status   03/02/2017 2.64 (H) 0.90 - 1.10 Final   03/01/2017 2.02 (H) 0.90 - 1.10 Final   02/28/2017 1.54 (H) 0.90 - 1.10 Final   02/27/2017 1.56 (H) 0.90 - 1.10 Final       Sodium SODIUM   Date Value Ref Range Status   03/02/2017 137 136 - 145 mmol/L Final   02/28/2017 141 136 - 145 mmol/L Final      Potassium POTASSIUM   Date Value Ref Range Status   03/02/2017 4.2 3.5 - 5.2 mmol/L Final   02/28/2017 4.6 3.5 - 5.2 mmol/L Final      Chloride CHLORIDE   Date Value Ref Range Status   03/02/2017 93 (L) 98 - 107 mmol/L Final   02/28/2017 98 98 - 107 mmol/L Final      Bicarbonate CO2   Date Value Ref Range Status   03/02/2017 26.2 22.0 - 29.0 mmol/L Final   02/28/2017 28.5 22.0 - 29.0 mmol/L Final      BUN BUN   Date Value Ref Range Status   03/02/2017 42 (H) 6 - 20 mg/dL Final   02/28/2017 38 (H) 6 - 20 mg/dL Final      Creatinine CREATININE   Date Value Ref Range Status   03/02/2017 1.52 (H) 0.76 - 1.27 mg/dL Final   02/28/2017 1.69 (H) 0.76 - 1.27 mg/dL Final      Calcium CALCIUM   Date Value Ref Range Status   03/02/2017 8.5 (L) 8.6 - 10.5 mg/dL Final   02/28/2017 8.7 8.6 - 10.5 mg/dL Final      Magnesium No results found for: MG         amLODIPine 5 mg Oral Q24H   aspirin 81 mg Oral Daily   docusate sodium 150 mg Oral Daily   insulin aspart 0-9 Units Subcutaneous 4x Daily AC & at Bedtime    levothyroxine 25 mcg Oral Q AM   losartan 50 mg Oral Daily   metoprolol tartrate 12.5 mg Oral Q12H   pantoprazole 40 mg Oral Q AM   potassium chloride 20 mEq Oral Daily   torsemide 40 mg Oral Daily   warfarin 5 mg Oral Daily       dexmedetomidine 0.2-1.5 mcg/kg/hr Last Rate: 0.3 mcg/kg/hr (02/22/17 0650)   diltiaZEM 5-15 mg/hr Last Rate: Stopped (02/19/17 1645)   DOPamine 2-20 mcg/kg/min    EPINEPHrine 0.02-0.3 mcg/kg/min Last Rate: Stopped (02/19/17 0800)   milrinone 0.25-0.75 mcg/kg/min Last Rate: Stopped (02/20/17 0651)   niCARdipine 5-15 mg/hr Last Rate: Stopped (02/21/17 0410)   nitroglycerin 5-200 mcg/min    norepinephrine 0.02-0.3 mcg/kg/min Last Rate: Stopped (02/17/17 0819)   phenylephrine 0.5-3 mcg/kg/min Last Rate: Stopped (02/20/17 1410)   propofol 5-50 mcg/kg/min Last Rate: Stopped (02/19/17 1900)   vasopressin 0.02-0.1 Units/min            Patient Active Problem List   Diagnosis Code   • Chest pain at rest R07.9   • Ascending aortic dissection I71.01       Assessment & Plan     -POD#15 Acute Type A aortic dissection s/p ascending aortic and proximal aortic arch repair with 26mm graft, aortic valve re-suspension and aortic root repair  -Expected post op acute blood loss anemia, stable, monitor  -Thrombocytopenia, resolved  -Leukocytosis, WBC improving slowly, +Bilateral upper extremity DVT's, no fever>ID following  -HTN-stable   -Tobacco abuse/COPD>pulmonary has seen and will follow as outpatient  -CXR w/lung nodules>Pulmonary has ordered follow up outpatient CT scan  -Suspected sleep apnea, to be evaluated as outpatient>did fail overnight oximetry and will require nocturnal oxygen  -JUAN JOSE likely associated with hemodynamic changes, non-oliguric>stable>renal following   -Post op respiratory failure, s/p bronch 2/17, extubated 2/2>on room air>pulmonary following  -Suspected PNA/atelectasis> antibiotics completed 2/25>pulmonary & ID following  -Post op PAF but now SR and has been for several days, amio  stopped due to elevated LFT's and stable rhythm, beta blocker now held due to periods of bradycardia, restarted at low dose, tolerating   -Elevated LFT's (2/24 , , alk phos 168)  -Dysphagia>speech evaluated 2/23>diet advanced   -Bilateral upper extremity DVT's>Coumadin therapeuti  -Drug rash from cefepime but was able to complete the coarse    Discharge home today with home health.     Lexie Parry, ROWAN  03/02/17  9:25 AM

## 2017-03-02 NOTE — TELEPHONE ENCOUNTER
I spoke with Lexie DONAHUE concerning Mr Esquivel. He is being discharged today on Coumadin.  is to follow in [place] and will be instructed to draw PT/INR tomorrow. The results need to be called to Maisha graham nurse in Dr Blackburn 's office ( she manages their coumadin clinic) [phone]. I spoke with Maisha and verified that they will follow and manage Mr Esquivel's PT/INR

## 2017-03-02 NOTE — PLAN OF CARE
Problem: Patient Care Overview (Adult)  Goal: Plan of Care Review  Outcome: Ongoing (interventions implemented as appropriate)    03/02/17 0450   Coping/Psychosocial Response Interventions   Plan Of Care Reviewed With patient   Patient Care Overview   Progress progress toward functional goals as expected   Outcome Evaluation   Outcome Summary/Follow up Plan Patient vitals stable overnight. No complaints of pain.        Goal: Adult Individualization and Mutuality  Outcome: Ongoing (interventions implemented as appropriate)  Goal: Discharge Needs Assessment  Outcome: Ongoing (interventions implemented as appropriate)    Problem: Aortic Aneurysm/Dissection (Adult)  Goal: Signs and Symptoms of Listed Potential Problems Will be Absent or Manageable (Aortic Aneurysm/Dissection)  Outcome: Ongoing (interventions implemented as appropriate)    03/02/17 0450   Aortic Aneurysm/Dissection   Problems Assessed (Aortic Aneurysm/Dissection) all   Problems Present (Aortic Aneurysm/Dissection) none         Problem: Cardiac Surgery (Adult)  Goal: Signs and Symptoms of Listed Potential Problems Will be Absent or Manageable (Cardiac Surgery)  Outcome: Ongoing (interventions implemented as appropriate)    03/02/17 0450   Cardiac Surgery   Problems Assessed (Cardiac Surgery) all   Problems Present (Cardiac Surgery) fluid imbalance         Problem: Fall Risk (Adult)  Goal: Identify Related Risk Factors and Signs and Symptoms  Outcome: Ongoing (interventions implemented as appropriate)    03/02/17 0450   Fall Risk   Fall Risk: Related Risk Factors gait/mobility problems;sensory deficits   Fall Risk: Signs and Symptoms presence of risk factors       Goal: Absence of Falls  Outcome: Ongoing (interventions implemented as appropriate)    03/02/17 0450   Fall Risk (Adult)   Absence of Falls making progress toward outcome         Problem: Skin Integrity Impairment, Risk/Actual (Adult)  Goal: Identify Related Risk Factors and Signs and  Symptoms  Outcome: Ongoing (interventions implemented as appropriate)    03/02/17 0450   Skin Integrity Impairment, Risk/Actual   Skin Integrity Impairment, Risk/Actual: Related Risk Factors edema;infection/disease process   Signs and Symptoms (Skin Integrity Impairment) edema       Goal: Skin Integrity/Wound Healing  Outcome: Ongoing (interventions implemented as appropriate)    03/02/17 0450   Skin Integrity Impairment, Risk/Actual (Adult)   Skin Integrity/Wound Healing making progress toward outcome         Problem: Infection, Risk/Actual (Adult)  Goal: Identify Related Risk Factors and Signs and Symptoms  Outcome: Ongoing (interventions implemented as appropriate)    03/02/17 0450   Infection, Risk/Actual   Infection, Risk/Actual: Related Risk Factors prolonged hospitalization   Signs and Symptoms (Infection, Risk/Actual) edema       Goal: Infection Prevention/Resolution  Outcome: Ongoing (interventions implemented as appropriate)    03/02/17 0450   Infection, Risk/Actual (Adult)   Infection Prevention/Resolution making progress toward outcome

## 2017-03-02 NOTE — PROGRESS NOTES
Continued Stay Note  [facility]     Patient Name: Truman Esquivel  MRN: [id number]  Today's Date: 3/2/2017    Admit Date: 2/14/2017          Discharge Plan       03/02/17 1727    Final Note    Final Note Pt d/c home to his mothers home (Pt mother changed mind on Pt going home) at [address] in  [place], [place].  Pt will be followed by [facility].  Pt sister called Marcelo  to advise Pt had changed his mind on where he was discharging to.  [facility] will deliver O2 conentrator once Pt calls them at [phone] once he arrives at his mothers home.  [facility] called and s/w Gabriela w/ [facility] and advised her Pt was now discharging to his mothers address.  Gabriela replied that she had already spoken with Pt and he gave her the mothers address.                Discharge Codes       03/02/17 1731    Discharge Codes    Discharge Codes 06  Discharged/transferred to home under care of organized home health service organization in anticipation of skilled care        Expected Discharge Date and Time     Expected Discharge Date Expected Discharge Time    Mar 2, 2017             Philomena Woods, RN

## 2017-03-03 ENCOUNTER — ANTICOAGULATION VISIT (OUTPATIENT)
Dept: CARDIOLOGY | Facility: CLINIC | Age: 57
End: 2017-03-03

## 2017-03-03 LAB
INR PPP: 3.4
INR PPP: 3.4

## 2017-03-03 PROCEDURE — 85610 PROTHROMBIN TIME: CPT | Performed by: INTERNAL MEDICINE

## 2017-03-03 NOTE — PROGRESS NOTES
[id number] INR 3.4, new pt to coumadin clinic, pt has DVT upper extremities, will adjust medication dosage and recheck in one week

## 2017-03-08 ENCOUNTER — LAB REQUISITION (OUTPATIENT)
Dept: LAB | Facility: HOSPITAL | Age: 57
End: 2017-03-08

## 2017-03-08 DIAGNOSIS — Z79.01 LONG TERM CURRENT USE OF ANTICOAGULANT: ICD-10-CM

## 2017-03-08 LAB
INR PPP: 3.4 (ref 0.8–1.2)
PROTHROMBIN TIME: 43.2 SECONDS (ref 11–15)

## 2017-03-08 NOTE — PROGRESS NOTES
I have reviewed the notes, assessments, and/or procedures performed by nurse, I concur with her/his documentation of Truman Esquivel.

## 2017-03-09 ENCOUNTER — ANTICOAGULATION VISIT (OUTPATIENT)
Dept: CARDIOLOGY | Facility: CLINIC | Age: 57
End: 2017-03-09

## 2017-03-09 LAB — INR PPP: 1.4

## 2017-03-09 PROCEDURE — 85610 PROTHROMBIN TIME: CPT

## 2017-03-13 ENCOUNTER — LAB REQUISITION (OUTPATIENT)
Dept: LAB | Facility: HOSPITAL | Age: 57
End: 2017-03-13

## 2017-03-13 DIAGNOSIS — Z79.01 LONG TERM CURRENT USE OF ANTICOAGULANT: ICD-10-CM

## 2017-03-13 LAB
INR PPP: 1.4 (ref 0.8–1.2)
PROTHROMBIN TIME: 16.2 SECONDS (ref 11–15)

## 2017-03-16 ENCOUNTER — ANTICOAGULATION VISIT (OUTPATIENT)
Dept: CARDIOLOGY | Facility: CLINIC | Age: 57
End: 2017-03-16

## 2017-03-16 LAB — INR PPP: 1.6

## 2017-03-16 PROCEDURE — 85610 PROTHROMBIN TIME: CPT | Performed by: INTERNAL MEDICINE

## 2017-03-17 NOTE — PROGRESS NOTES
I have reviewed the notes, assessments, and/or procedures performed by nurse, I concur with her/his documentation of Trmuan Esquivel.

## 2017-03-20 ENCOUNTER — OFFICE VISIT (OUTPATIENT)
Dept: CARDIOLOGY | Facility: CLINIC | Age: 57
End: 2017-03-20

## 2017-03-20 VITALS
DIASTOLIC BLOOD PRESSURE: 85 MMHG | HEART RATE: 80 BPM | SYSTOLIC BLOOD PRESSURE: 135 MMHG | BODY MASS INDEX: 34.95 KG/M2 | WEIGHT: 258 LBS | HEIGHT: 72 IN

## 2017-03-20 DIAGNOSIS — I71.010 ASCENDING AORTIC DISSECTION (HCC): Primary | ICD-10-CM

## 2017-03-20 DIAGNOSIS — E03.9 ACQUIRED HYPOTHYROIDISM: ICD-10-CM

## 2017-03-20 DIAGNOSIS — I10 ESSENTIAL HYPERTENSION: ICD-10-CM

## 2017-03-20 PROBLEM — I82.413 DEEP VEIN THROMBOSIS (DVT) OF FEMORAL VEIN OF BOTH LOWER EXTREMITIES: Status: ACTIVE | Noted: 2017-03-20

## 2017-03-20 PROBLEM — R07.9 CHEST PAIN AT REST: Status: RESOLVED | Noted: 2017-02-13 | Resolved: 2017-03-20

## 2017-03-20 PROCEDURE — 99024 POSTOP FOLLOW-UP VISIT: CPT | Performed by: INTERNAL MEDICINE

## 2017-03-20 RX ORDER — ATORVASTATIN CALCIUM 20 MG/1
20 TABLET, FILM COATED ORAL DAILY
Qty: 30 TABLET | Refills: 11 | Status: ON HOLD | OUTPATIENT
Start: 2017-03-20 | End: 2018-05-02

## 2017-03-20 RX ORDER — METOPROLOL SUCCINATE 25 MG/1
25 TABLET, EXTENDED RELEASE ORAL
Qty: 30 TABLET | Refills: 12 | Status: ON HOLD | OUTPATIENT
Start: 2017-03-20 | End: 2018-02-05 | Stop reason: SDUPTHER

## 2017-03-20 NOTE — PROGRESS NOTES
[facility]  OFFICE NOTE    Truman Esquivel  56 y.o. male    03/20/2017  1. Ascending aortic dissection    2. Essential hypertension    3. Acquired hypothyroidism        Chief complaint -follow-up ascending aortic dissection and repair      History of present Illness- 56 old gentleman who has history of hypertension for long-time, presented with chest pain and was found to have a aortic dissection had repair of the ascending aorta with resuspension of aortic valve.  He is currently on Coumadin for 3 months due to DVT post surgery.  He has lung nodules and is seeing a pulmonologist in [place].  He is a former smoker and has quit smoking since his surgery.  I change his Lopressor to Toprol-XL 25 mg daily.  We'll check an echo and decrease the dose of diuretics.  He still not allowed to left greater than 10-15 pounds.  He denies any shortness of breath but does have sternotomy site pain.              Allergies   Allergen Reactions   • Acth [Corticotropin]    • Eggs Or Egg-Derived Products    • Erythromycin    • Penicillins      Tolerated cefepime during 2/2017 admission. Had rash and itching (relieved by benadryl) after few doses of cefepime and cefepime was continued.   • Tetracyclines & Related    • Cephalosporins Itching and Rash     Pt developed rash, itching after cefepime administration (2-3 doses) during 2/2017 admission. Itching was relieved with benadryl. Cefepime was continued because his infection was improving and no symptoms of anaphylaxis present         Past Medical History   Diagnosis Date   • Chest pain    • HTN (hypertension)    • Smoker          Past Surgical History   Procedure Laterality Date   • Ascending arch/hemiarch replacement N/A 2/14/2017     Procedure: INTRAOPERATIVE TRANSESOPHAGEAL ECHOCARDIOGRAM, MIDLINE STERNOTOMY, ASCENDING AORTIC  AND PROXIMAL AORTIC ARCH REPAIR WITH 26MM GRAFT, AORTIC VALVE RESUSPENSION, AORTIC ROOT REPAIR, OPEN VEIN HARVEST OF RIGHT LEG;   Surgeon: German Arreguin MD;  Location: [facility] MAIN OR;  Service:    • Bronchoscopy N/A 2/17/2017     Procedure: BRONCHOSCOPY BIOPSY AT BEDSIDE WITH BAL-LEFT LOWER LOBE;  Surgeon: Trent Chaney MD;  Location: [facility] ENDOSCOPY;  Service:          No family history on file.      Social History     Social History   • Marital status: Single     Spouse name: N/A   • Number of children: N/A   • Years of education: N/A     Occupational History   • Not on file.     Social History Main Topics   • Smoking status: Former Smoker     Quit date: 2/13/2017   • Smokeless tobacco: Never Used   • Alcohol use No   • Drug use: No   • Sexual activity: Defer     Other Topics Concern   • Not on file     Social History Narrative         Current Outpatient Prescriptions   Medication Sig Dispense Refill   • amLODIPine (NORVASC) 5 MG tablet Take 1 tablet by mouth Daily. 30 tablet 2   • aspirin 81 MG EC tablet Take 1 tablet by mouth Daily. 30 tablet 2   • levothyroxine (SYNTHROID, LEVOTHROID) 25 MCG tablet Take 1 tablet by mouth Daily. 30 tablet 2   • losartan (COZAAR) 50 MG tablet Take 1 tablet by mouth Daily. 30 tablet 2   • torsemide (DEMADEX) 20 MG tablet Take 2 tablets by mouth Daily. 30 tablet 2   • warfarin (COUMADIN) 2.5 MG tablet 1 tablet daily or as directed 60 tablet 1   • atorvastatin (LIPITOR) 20 MG tablet Take 1 tablet by mouth Daily. 30 tablet 11   • metoprolol succinate XL (TOPROL XL) 25 MG 24 hr tablet Take 1 tablet by mouth Daily With Dinner. 30 tablet 12     No current facility-administered medications for this visit.          Review of Systems     Constitution: Denies any fatigue, fever or chills    HENT: Denies any headache, hearing impairment,     Eyes: Denies any blurring of vision, or photophobia     Cardivascular - As per history of present illness     Respiratory system-denies any COPD, shortness of breath,   sleep apnea.     Endocrine:   history of hyperlipidemia       Musculoskeletal:  No history of arthritis  "with musculoskeletal problems    Gastrointestinal: No nausea, vomiting, or melena    Genitourinary: No dysuria or hematuria    Neurological:   No history of seizure disorder, stroke, memory problems    Psychiatric/Behavioral:        No history of depression,  No history of bipolar disorder or schizophrenia     Hematological- no history of easy bruising or any bleeding diathesis            OBJECTIVE    Visit Vitals   • /85   • Pulse 80   • Ht 72\" (182.9 cm)   • Wt 258 lb (117 kg)   • BMI 34.99 kg/m2         Physical Exam     Constitutional: is oriented to person, place, and time.     Skin-warm and dry, sternotomy site healed well    Well developed and nourished in no acute distress      Head: Normocephalic and atraumatic.     Eyes: Pupils are equal, round, and reactive to light.     Neck: Neck supple. No bruit in the carotids, no elevation of JVD    Cardiovascular: [place] in the fifth intercostal space   Regular rate, and  Rhythm,    S1 greater than S2, no S3 or S4, no gallop     Pulmonary/Chest:   Air  Entry is equal on both sides  No wheezing or crackles,      Abdominal: Soft.  No hepatosplenomegaly, bowel sounds are present    Musculoskeletal: No kyphoscoliosis, no significant thickening of the joints    Neurological: is alert and oriented to person, place, and time.    cranial nerve are intact .   No motor or sensory deficit    Extremities-no edema, no radial femoral delay      Psychiatric: He has a normal mood and affect.                  His behavior is normal.           Procedures      Lab Results   Component Value Date    WBC 22.38 (H) 03/02/2017    HGB 11.3 (L) 03/02/2017    HCT 35.3 (L) 03/02/2017    MCV 89.4 03/02/2017     03/02/2017     Lab Results   Component Value Date    GLUCOSE 111 (H) 03/02/2017    BUN 42 (H) 03/02/2017    CREATININE 1.52 (H) 03/02/2017    EGFRIFNONA 48 (L) 03/02/2017    BCR 27.6 (H) 03/02/2017    CO2 26.2 03/02/2017    CALCIUM 8.5 (L) 03/02/2017    ALBUMIN 3.40 (L) " 02/27/2017    LABIL2 1.2 02/24/2017     (H) 02/24/2017     (H) 02/24/2017     Lab Results   Component Value Date    HGBA1C 5.52 02/15/2017     Lab Results   Component Value Date    TSH 6.880 (H) 02/13/2017                  A/P    Status post ascending aortic dissection with repair and resuspension of the aortic valve leaflets, doing well blood pressure is controlled with amlodipine, losartan and I switched her Lopressor to Toprol-XL 25 mg daily.  He is on a baby aspirin.  I asked him to discuss with the CT surgeon when he goes in 10 days to discuss about his going back to work what he can do and one of the limitations.    History of DVT post surgery-on Coumadin and will keep INR between 2 and 3.  And Coumadin is followed through the Coumadin clinic.    Hypertension well controlled with amlodipine, losartan and I started him on atorvastatin 20 mg daily.  We'll check an echo soon to reassess the valve and also to assess LV function and to decide about the diuretics.  Follow-up in 8 weeks      Liborio Chandra MD  3/20/2017  9:24 AM    EMR Dragon/Transcription disclaimer:   Some of this note may be an electronic transcription/translation of spoken language to printed text. The electronic translation of spoken language may permit erroneous, or at times, nonsensical words or phrases to be inadvertently transcribed; Although I have reviewed the note for such errors, some may still exist.

## 2017-03-21 ENCOUNTER — LAB REQUISITION (OUTPATIENT)
Dept: LAB | Facility: HOSPITAL | Age: 57
End: 2017-03-21

## 2017-03-21 DIAGNOSIS — Z48.812 ENCOUNTER FOR SURGICAL AFTERCARE FOLLOWING SURGERY OF CIRCULATORY SYSTEM: ICD-10-CM

## 2017-03-21 DIAGNOSIS — Z79.01 LONG TERM CURRENT USE OF ANTICOAGULANT: ICD-10-CM

## 2017-03-21 LAB
ALBUMIN SERPL-MCNC: 3.7 G/DL (ref 3.4–4.8)
ALBUMIN/GLOB SERPL: 1.4 G/DL (ref 1.1–1.8)
ALP SERPL-CCNC: 101 U/L (ref 38–126)
ALT SERPL W P-5'-P-CCNC: 35 U/L (ref 21–72)
ANION GAP SERPL CALCULATED.3IONS-SCNC: 12 MMOL/L (ref 5–15)
AST SERPL-CCNC: 16 U/L (ref 17–59)
BILIRUB SERPL-MCNC: 0.4 MG/DL (ref 0.2–1.3)
BILIRUB UR QL STRIP: NEGATIVE
BUN BLD-MCNC: 24 MG/DL (ref 7–21)
BUN/CREAT SERPL: 18.5 (ref 7–25)
CALCIUM SPEC-SCNC: 8.8 MG/DL (ref 8.4–10.2)
CHLORIDE SERPL-SCNC: 96 MMOL/L (ref 95–110)
CLARITY UR: ABNORMAL
CO2 SERPL-SCNC: 31 MMOL/L (ref 22–31)
COLOR UR: YELLOW
CREAT BLD-MCNC: 1.3 MG/DL (ref 0.7–1.3)
GFR SERPL CREATININE-BSD FRML MDRD: 57 ML/MIN/1.73 (ref 56–130)
GLOBULIN UR ELPH-MCNC: 2.7 GM/DL (ref 2.3–3.5)
GLUCOSE BLD-MCNC: 106 MG/DL (ref 60–100)
GLUCOSE UR STRIP-MCNC: NEGATIVE MG/DL
HGB UR QL STRIP.AUTO: NEGATIVE
INR PPP: 1.6 (ref 0.8–1.2)
KETONES UR QL STRIP: NEGATIVE
LEUKOCYTE ESTERASE UR QL STRIP.AUTO: NEGATIVE
NITRITE UR QL STRIP: NEGATIVE
PH UR STRIP.AUTO: 8 [PH] (ref 5–9)
POTASSIUM BLD-SCNC: 4.8 MMOL/L (ref 3.5–5.1)
PROT SERPL-MCNC: 6.4 G/DL (ref 6.3–8.6)
PROT UR QL STRIP: NEGATIVE
PROTHROMBIN TIME: 18.8 SECONDS (ref 11–15)
SODIUM BLD-SCNC: 139 MMOL/L (ref 137–145)
SP GR UR STRIP: 1.01 (ref 1–1.03)
URATE SERPL-MCNC: 8 MG/DL (ref 2.5–8.5)
UROBILINOGEN UR QL STRIP: ABNORMAL

## 2017-03-21 PROCEDURE — 84550 ASSAY OF BLOOD/URIC ACID: CPT | Performed by: INTERNAL MEDICINE

## 2017-03-21 PROCEDURE — 80053 COMPREHEN METABOLIC PANEL: CPT | Performed by: INTERNAL MEDICINE

## 2017-03-21 PROCEDURE — 81003 URINALYSIS AUTO W/O SCOPE: CPT | Performed by: INTERNAL MEDICINE

## 2017-03-23 ENCOUNTER — HOSPITAL ENCOUNTER (OUTPATIENT)
Dept: SLEEP MEDICINE | Facility: HOSPITAL | Age: 57
Discharge: HOME | End: 2017-03-23
Attending: INTERNAL MEDICINE | Admitting: INTERNAL MEDICINE

## 2017-03-23 DIAGNOSIS — G47.33 OSA (OBSTRUCTIVE SLEEP APNEA): ICD-10-CM

## 2017-03-23 PROCEDURE — 95810 POLYSOM 6/> YRS 4/> PARAM: CPT

## 2017-03-30 ENCOUNTER — TRANSCRIBE ORDERS (OUTPATIENT)
Dept: ADMINISTRATIVE | Facility: HOSPITAL | Age: 57
End: 2017-03-30

## 2017-03-30 ENCOUNTER — HOSPITAL ENCOUNTER (OUTPATIENT)
Dept: CT IMAGING | Facility: HOSPITAL | Age: 57
Discharge: HOME | End: 2017-03-30
Admitting: NURSE PRACTITIONER

## 2017-03-30 DIAGNOSIS — R91.8 LUNG NODULES: Primary | ICD-10-CM

## 2017-03-30 DIAGNOSIS — R91.8 LUNG NODULES: ICD-10-CM

## 2017-03-30 PROCEDURE — 71250 CT THORAX DX C-: CPT

## 2017-03-31 ENCOUNTER — APPOINTMENT (OUTPATIENT)
Dept: LAB | Facility: HOSPITAL | Age: 57
End: 2017-03-31

## 2017-03-31 ENCOUNTER — TELEPHONE (OUTPATIENT)
Dept: CARDIAC SURGERY | Facility: CLINIC | Age: 57
End: 2017-03-31

## 2017-03-31 ENCOUNTER — ANTICOAGULATION VISIT (OUTPATIENT)
Dept: CARDIOLOGY | Facility: CLINIC | Age: 57
End: 2017-03-31

## 2017-03-31 DIAGNOSIS — I82.413 DVT OF DEEP FEMORAL VEIN, BILATERAL (HCC): Primary | ICD-10-CM

## 2017-03-31 LAB
CHYMOTRYP AB SER-ACNC: NORMAL
INR PPP: 2.4
INR PPP: 2.4 (ref 0.8–1.2)
NIGHT BLUE STAIN TISS: NORMAL

## 2017-03-31 PROCEDURE — 85610 PROTHROMBIN TIME: CPT | Performed by: INTERNAL MEDICINE

## 2017-03-31 NOTE — TELEPHONE ENCOUNTER
Pt notified of appt. Cancellation, he is reqesuting to be seen ASAP in order to go back to work. 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 He has no other income. I told the patient our office would call him back on Monday, April 3, 17 -  with new time and date. He is ok with that. In addition he is scheduled for Echo on May 9 and PFTs on May 31. Afterwards he will look into having his pulmonlogist appt to [place]. Dr. Vivar/Dam[place] office [phone].

## 2017-04-13 ENCOUNTER — OFFICE VISIT (OUTPATIENT)
Dept: CARDIAC SURGERY | Facility: CLINIC | Age: 57
End: 2017-04-13

## 2017-04-13 VITALS
RESPIRATION RATE: 16 BRPM | TEMPERATURE: 98.5 F | WEIGHT: 258 LBS | BODY MASS INDEX: 34.99 KG/M2 | OXYGEN SATURATION: 96 % | DIASTOLIC BLOOD PRESSURE: 78 MMHG | SYSTOLIC BLOOD PRESSURE: 125 MMHG | HEART RATE: 88 BPM

## 2017-04-13 DIAGNOSIS — I71.010 ASCENDING AORTIC DISSECTION (HCC): Primary | ICD-10-CM

## 2017-04-13 PROCEDURE — 99024 POSTOP FOLLOW-UP VISIT: CPT | Performed by: NURSE PRACTITIONER

## 2017-04-13 NOTE — PROGRESS NOTES
"4/13/2017        Subjective:      Marybeth Harrison DO    Chief Complaint of No chief complaint on file.           Dear Dr. Marybeth Harrison DO and Colleagues,    It was nice to see Truman Esquivel in follow up today. He is status post Acute Type A aortic dissection s/p ascending aortic and proximal aortic arch repair with 26mm graft, aortic valve re-suspension and aortic root repair  . He reports he is doing fine, has followed up with his cardiologist, kidney specialist, and family practitioner.  He states he has been doing well, denies pain, shortness of breath, or popping sensation in chest.  His chest wall is stable and incision is well approximated.  He asked regarding his \"nerves\" and nightly headaches, I asked him to follow up with his primary care physician for reevaluation.  He is due for a repeat echo at his cardiologist request soon.      Patient Active Problem List   Diagnosis   • Ascending aortic dissection   • Essential hypertension   • Deep vein thrombosis (DVT) of femoral vein of both lower extremities   • Acquired hypothyroidism   • Snoring       Past Medical History:   Diagnosis Date   • Chest pain    • HTN (hypertension)    • Smoker        Past Surgical History:   Procedure Laterality Date   • ASCENDING ARCH/HEMIARCH REPLACEMENT N/A 2/14/2017    Procedure: INTRAOPERATIVE TRANSESOPHAGEAL ECHOCARDIOGRAM, MIDLINE STERNOTOMY, ASCENDING AORTIC  AND PROXIMAL AORTIC ARCH REPAIR WITH 26MM GRAFT, AORTIC VALVE RESUSPENSION, AORTIC ROOT REPAIR, OPEN VEIN HARVEST OF RIGHT LEG;  Surgeon: German Arreguin MD;  Location: [facility] OR;  Service:    • BRONCHOSCOPY N/A 2/17/2017    Procedure: BRONCHOSCOPY BIOPSY AT BEDSIDE WITH BAL-LEFT LOWER LOBE;  Surgeon: Trent Chaney MD;  Location: [facility] ENDOSCOPY;  Service:        Allergies   Allergen Reactions   • Acth [Corticotropin]    • Eggs Or Egg-Derived Products    • Erythromycin    • Penicillins      Tolerated cefepime during 2/2017 admission. Had rash and itching " (relieved by benadryl) after few doses of cefepime and cefepime was continued.   • Tetracyclines & Related    • Cephalosporins Itching and Rash     Pt developed rash, itching after cefepime administration (2-3 doses) during 2/2017 admission. Itching was relieved with benadryl. Cefepime was continued because his infection was improving and no symptoms of anaphylaxis present           Current Outpatient Prescriptions:   •  aspirin 81 MG EC tablet, Take 1 tablet by mouth Daily., Disp: 30 tablet, Rfl: 2  •  levothyroxine (SYNTHROID, LEVOTHROID) 25 MCG tablet, Take 1 tablet by mouth Daily., Disp: 30 tablet, Rfl: 2  •  metoprolol succinate XL (TOPROL XL) 25 MG 24 hr tablet, Take 1 tablet by mouth Daily With Dinner., Disp: 30 tablet, Rfl: 12  •  torsemide (DEMADEX) 20 MG tablet, Take 2 tablets by mouth Daily., Disp: 30 tablet, Rfl: 2  •  warfarin (COUMADIN) 2.5 MG tablet, 1 tablet daily or as directed, Disp: 60 tablet, Rfl: 1  •  amLODIPine (NORVASC) 5 MG tablet, Take 1 tablet by mouth Daily., Disp: 30 tablet, Rfl: 2  •  atorvastatin (LIPITOR) 20 MG tablet, Take 1 tablet by mouth Daily., Disp: 30 tablet, Rfl: 11  •  losartan (COZAAR) 50 MG tablet, Take 1 tablet by mouth Daily., Disp: 30 tablet, Rfl: 2    Social History     Social History   • Marital status: Single     Spouse name: N/A   • Number of children: N/A   • Years of education: N/A     Occupational History   • Not on file.     Social History Main Topics   • Smoking status: Former Smoker     Quit date: 2/13/2017   • Smokeless tobacco: Never Used   • Alcohol use No   • Drug use: No   • Sexual activity: Defer     Other Topics Concern   • Not on file     Social History Narrative       No family history on file.        Vital Signs:  Weight: 258 lb (117 kg)   Body mass index is 34.99 kg/(m^2).  Temp: 98.5 °F (36.9 °C)   Heart Rate: 88   BP: 125/78     Physical Exam   Constitutional: He is oriented to person, place, and time. He appears well-developed and well-nourished.    Cardiovascular: Normal rate, regular rhythm and normal heart sounds.  Exam reveals no gallop and no friction rub.    No murmur heard.  Pulmonary/Chest: Effort normal and breath sounds normal.   Neurological: He is alert and oriented to person, place, and time.   Skin: No rash noted. No erythema.   Sternal wound approximated, no clicking or popping in sternum        Recommendation/Plan:     Truman Esquivel was seen for post operative follow up. He is doing well from a surgical standpoint. His incisions are healing well. I have released him to resume normal daily activities without restrictions. 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  I will have our office staff discuss with Dr. Arreguin and contact the patient for ongoing restrictions regarding his employment and for follow up CT in 6 months to a year.        Thank you for allowing me to participate in his care.    Sincerely,    ROWAN Vale

## 2017-04-23 ENCOUNTER — HOSPITAL ENCOUNTER (EMERGENCY)
Facility: HOSPITAL | Age: 57
Discharge: HOME | End: 2017-04-24
Attending: EMERGENCY MEDICINE | Admitting: EMERGENCY MEDICINE

## 2017-04-23 ENCOUNTER — APPOINTMENT (OUTPATIENT)
Dept: GENERAL RADIOLOGY | Facility: HOSPITAL | Age: 57
End: 2017-04-23

## 2017-04-23 DIAGNOSIS — M25.061 HEMARTHROSIS OF KNEE JOINT, RIGHT: Primary | ICD-10-CM

## 2017-04-23 LAB
ALBUMIN SERPL-MCNC: 4.1 G/DL (ref 3.4–4.8)
ALBUMIN/GLOB SERPL: 1.4 G/DL (ref 1.1–1.8)
ALP SERPL-CCNC: 80 U/L (ref 38–126)
ALT SERPL W P-5'-P-CCNC: 21 U/L (ref 21–72)
ANION GAP SERPL CALCULATED.3IONS-SCNC: 13 MMOL/L (ref 5–15)
AST SERPL-CCNC: 20 U/L (ref 17–59)
BASOPHILS # BLD AUTO: 0.03 10*3/MM3 (ref 0–0.2)
BASOPHILS NFR BLD AUTO: 0.3 % (ref 0–2)
BILIRUB SERPL-MCNC: 0.7 MG/DL (ref 0.2–1.3)
BUN BLD-MCNC: 20 MG/DL (ref 7–21)
BUN/CREAT SERPL: 17.1 (ref 7–25)
CALCIUM SPEC-SCNC: 8.6 MG/DL (ref 8.4–10.2)
CHLORIDE SERPL-SCNC: 99 MMOL/L (ref 95–110)
CO2 SERPL-SCNC: 25 MMOL/L (ref 22–31)
CREAT BLD-MCNC: 1.17 MG/DL (ref 0.7–1.3)
DEPRECATED RDW RBC AUTO: 44.3 FL (ref 35.1–43.9)
EOSINOPHIL # BLD AUTO: 0.12 10*3/MM3 (ref 0–0.7)
EOSINOPHIL NFR BLD AUTO: 1 % (ref 0–7)
ERYTHROCYTE [DISTWIDTH] IN BLOOD BY AUTOMATED COUNT: 14.4 % (ref 11.5–14.5)
GFR SERPL CREATININE-BSD FRML MDRD: 64 ML/MIN/1.73 (ref 56–130)
GLOBULIN UR ELPH-MCNC: 2.9 GM/DL (ref 2.3–3.5)
GLUCOSE BLD-MCNC: 155 MG/DL (ref 60–100)
HCT VFR BLD AUTO: 32.1 % (ref 39–49)
HGB BLD-MCNC: 10.7 G/DL (ref 13.7–17.3)
IMM GRANULOCYTES # BLD: 0.03 10*3/MM3 (ref 0–0.02)
IMM GRANULOCYTES NFR BLD: 0.3 % (ref 0–0.5)
INR PPP: 3.28 (ref 0.8–1.2)
LYMPHOCYTES # BLD AUTO: 2.04 10*3/MM3 (ref 0.6–4.2)
LYMPHOCYTES NFR BLD AUTO: 17.1 % (ref 10–50)
MCH RBC QN AUTO: 27.6 PG (ref 26.5–34)
MCHC RBC AUTO-ENTMCNC: 33.3 G/DL (ref 31.5–36.3)
MCV RBC AUTO: 82.7 FL (ref 80–98)
MONOCYTES # BLD AUTO: 1.01 10*3/MM3 (ref 0–0.9)
MONOCYTES NFR BLD AUTO: 8.5 % (ref 0–12)
NEUTROPHILS # BLD AUTO: 8.72 10*3/MM3 (ref 2–8.6)
NEUTROPHILS NFR BLD AUTO: 72.8 % (ref 37–80)
PLATELET # BLD AUTO: 196 10*3/MM3 (ref 150–450)
PMV BLD AUTO: 10.5 FL (ref 8–12)
POTASSIUM BLD-SCNC: 3.8 MMOL/L (ref 3.5–5.1)
PROT SERPL-MCNC: 7 G/DL (ref 6.3–8.6)
PROTHROMBIN TIME: 33.9 SECONDS (ref 11.1–15.3)
RBC # BLD AUTO: 3.88 10*6/MM3 (ref 4.37–5.74)
SODIUM BLD-SCNC: 137 MMOL/L (ref 137–145)
WBC NRBC COR # BLD: 11.95 10*3/MM3 (ref 3.2–9.8)

## 2017-04-23 PROCEDURE — 85610 PROTHROMBIN TIME: CPT | Performed by: EMERGENCY MEDICINE

## 2017-04-23 PROCEDURE — 99284 EMERGENCY DEPT VISIT MOD MDM: CPT

## 2017-04-23 PROCEDURE — 85025 COMPLETE CBC W/AUTO DIFF WBC: CPT | Performed by: EMERGENCY MEDICINE

## 2017-04-23 PROCEDURE — 73564 X-RAY EXAM KNEE 4 OR MORE: CPT

## 2017-04-23 PROCEDURE — 80053 COMPREHEN METABOLIC PANEL: CPT | Performed by: EMERGENCY MEDICINE

## 2017-04-23 RX ORDER — HYDROCODONE BITARTRATE AND ACETAMINOPHEN 5; 325 MG/1; MG/1
1 TABLET ORAL ONCE
Status: COMPLETED | OUTPATIENT
Start: 2017-04-23 | End: 2017-04-23

## 2017-04-23 RX ORDER — LIDOCAINE HYDROCHLORIDE 10 MG/ML
10 INJECTION, SOLUTION EPIDURAL; INFILTRATION; INTRACAUDAL; PERINEURAL ONCE
Status: DISCONTINUED | OUTPATIENT
Start: 2017-04-23 | End: 2017-04-24 | Stop reason: HOSPADM

## 2017-04-23 RX ADMIN — HYDROCODONE BITARTRATE AND ACETAMINOPHEN 1 TABLET: 5; 325 TABLET ORAL at 22:59

## 2017-04-24 VITALS
BODY MASS INDEX: 34.95 KG/M2 | SYSTOLIC BLOOD PRESSURE: 163 MMHG | OXYGEN SATURATION: 97 % | WEIGHT: 258 LBS | HEIGHT: 72 IN | DIASTOLIC BLOOD PRESSURE: 92 MMHG | RESPIRATION RATE: 16 BRPM | HEART RATE: 78 BPM | TEMPERATURE: 97.9 F

## 2017-04-24 LAB
APPEARANCE FLD: ABNORMAL
COLOR FLD: ABNORMAL
HOLD SPECIMEN: NORMAL
MONOS+MACROS NFR FLD: 3 %
NEUTROPHILS NFR FLD MANUAL: 97 %
RBC # FLD AUTO: ABNORMAL /MM3 (ref 0–0)
WBC # FLD: ABNORMAL /MM3 (ref 0–5)

## 2017-04-24 PROCEDURE — 87205 SMEAR GRAM STAIN: CPT | Performed by: EMERGENCY MEDICINE

## 2017-04-24 PROCEDURE — 25010000002 MORPHINE PER 10 MG: Performed by: EMERGENCY MEDICINE

## 2017-04-24 PROCEDURE — 89051 BODY FLUID CELL COUNT: CPT | Performed by: EMERGENCY MEDICINE

## 2017-04-24 PROCEDURE — 25010000002 ONDANSETRON PER 1 MG: Performed by: EMERGENCY MEDICINE

## 2017-04-24 PROCEDURE — 87015 SPECIMEN INFECT AGNT CONCNTJ: CPT | Performed by: EMERGENCY MEDICINE

## 2017-04-24 PROCEDURE — 96374 THER/PROPH/DIAG INJ IV PUSH: CPT

## 2017-04-24 PROCEDURE — 96375 TX/PRO/DX INJ NEW DRUG ADDON: CPT

## 2017-04-24 PROCEDURE — 87070 CULTURE OTHR SPECIMN AEROBIC: CPT | Performed by: EMERGENCY MEDICINE

## 2017-04-24 RX ORDER — MORPHINE SULFATE 4 MG/ML
4 INJECTION, SOLUTION INTRAMUSCULAR; INTRAVENOUS ONCE
Status: COMPLETED | OUTPATIENT
Start: 2017-04-24 | End: 2017-04-24

## 2017-04-24 RX ORDER — ONDANSETRON 2 MG/ML
4 INJECTION INTRAMUSCULAR; INTRAVENOUS ONCE
Status: COMPLETED | OUTPATIENT
Start: 2017-04-24 | End: 2017-04-24

## 2017-04-24 RX ADMIN — MORPHINE SULFATE 4 MG: 4 INJECTION, SOLUTION INTRAMUSCULAR; INTRAVENOUS at 00:41

## 2017-04-24 RX ADMIN — ONDANSETRON 4 MG: 2 INJECTION INTRAMUSCULAR; INTRAVENOUS at 00:41

## 2017-04-24 NOTE — ED PROVIDER NOTES
Subjective   Patient is a 56 y.o. male presenting with lower extremity pain.   Lower Extremity Issue   Location:  Knee (4 days of increasing swelling/pain to R knee. No known injury. No fever.)  Time since incident:  4 days  Injury: no    Knee location:  R knee  Pain details:     Quality:  Aching    Radiates to:  Groin    Severity:  Moderate    Onset quality:  Gradual    Timing:  Constant    Progression:  Worsening  Chronicity:  New  Dislocation: no    Foreign body present:  No foreign bodies  Prior injury to area:  No  Relieved by:  Nothing  Worsened by:  Bearing weight  Ineffective treatments:  None tried  Associated symptoms: decreased ROM and swelling    Associated symptoms: no fatigue and no fever        Review of Systems   Constitutional: Negative for chills, diaphoresis, fatigue and fever.   HENT: Negative for rhinorrhea and sore throat.    Eyes: Negative for pain, discharge and visual disturbance.   Respiratory: Negative for cough, chest tightness, shortness of breath and wheezing.    Cardiovascular: Negative for chest pain and leg swelling.   Gastrointestinal: Negative for abdominal pain, blood in stool, constipation, diarrhea and vomiting.   Endocrine: Negative for polydipsia and polyuria.   Genitourinary: Negative for decreased urine volume, dysuria, flank pain, frequency and hematuria.   Musculoskeletal: Negative for myalgias.   Skin: Negative for rash.   Neurological: Negative for dizziness, syncope, speech difficulty, weakness, light-headedness and headaches.   Psychiatric/Behavioral: Negative for confusion and decreased concentration.   All other systems reviewed and are negative.      Past Medical History:   Diagnosis Date   • Chest pain    • HTN (hypertension)    • Smoker        Allergies   Allergen Reactions   • Acth [Corticotropin]    • Eggs Or Egg-Derived Products    • Erythromycin    • Penicillins      Tolerated cefepime during 2/2017 admission. Had rash and itching (relieved by benadryl) after  few doses of cefepime and cefepime was continued.   • Tetracyclines & Related    • Cephalosporins Itching and Rash     Pt developed rash, itching after cefepime administration (2-3 doses) during 2/2017 admission. Itching was relieved with benadryl. Cefepime was continued because his infection was improving and no symptoms of anaphylaxis present       Past Surgical History:   Procedure Laterality Date   • ASCENDING ARCH/HEMIARCH REPLACEMENT N/A 2/14/2017    Procedure: INTRAOPERATIVE TRANSESOPHAGEAL ECHOCARDIOGRAM, MIDLINE STERNOTOMY, ASCENDING AORTIC  AND PROXIMAL AORTIC ARCH REPAIR WITH 26MM GRAFT, AORTIC VALVE RESUSPENSION, AORTIC ROOT REPAIR, OPEN VEIN HARVEST OF RIGHT LEG;  Surgeon: German Arreguin MD;  Location: [facility] MAIN OR;  Service:    • BRONCHOSCOPY N/A 2/17/2017    Procedure: BRONCHOSCOPY BIOPSY AT BEDSIDE WITH BAL-LEFT LOWER LOBE;  Surgeon: Trent Chaney MD;  Location: [facility] ENDOSCOPY;  Service:        No family history on file.    Social History     Social History   • Marital status: Single     Spouse name: N/A   • Number of children: N/A   • Years of education: N/A     Social History Main Topics   • Smoking status: Former Smoker     Quit date: 2/13/2017   • Smokeless tobacco: Never Used   • Alcohol use No   • Drug use: No   • Sexual activity: Defer     Other Topics Concern   • Not on file     Social History Narrative           Objective   Physical Exam   Constitutional: He appears well-developed and well-nourished.  Non-toxic appearance.   HENT:   Head: Normocephalic and atraumatic.   Nose: Nose normal.   Mouth/Throat: Oropharynx is clear and moist.   Eyes: Conjunctivae, EOM and lids are normal.   Neck: Neck supple. No tracheal deviation present.   Cardiovascular: Normal rate, regular rhythm, normal heart sounds and intact distal pulses.    No murmur heard.  Pulmonary/Chest: Effort normal and breath sounds normal. No stridor. No tachypnea. No respiratory distress. He has no wheezes. He has no  "rales.   Abdominal: Soft. Bowel sounds are normal. He exhibits no distension and no mass. There is no tenderness. There is no rebound and no guarding.   Musculoskeletal: He exhibits no edema.   R knee with large effusion, no warmth or redness, no cords or calf pain.    ROM limited by pain.   Neurological: He is alert. No cranial nerve deficit. He exhibits normal muscle tone. Coordination normal.   Skin: Skin is warm and dry. No rash noted. No pallor.   Psychiatric: He has a normal mood and affect. His behavior is normal. Judgment and thought content normal.   Nursing note and vitals reviewed.      Arthrocentesis  Date/Time: 4/24/2017 12:15 AM  Performed by: SUE STAHL  Authorized by: SUE STAHL   Consent: Verbal consent obtained. Written consent obtained.  Risks and benefits: risks, benefits and alternatives were discussed  Consent given by: patient  Patient understanding: patient states understanding of the procedure being performed  Patient consent: the patient's understanding of the procedure matches consent given  Test results: test results available and properly labeled  Site marked: the operative site was marked  Imaging studies: imaging studies available  Required items: required blood products, implants, devices, and special equipment available  Patient identity confirmed: verbally with patient  Time out: Immediately prior to procedure a \"time out\" was called to verify the correct patient, procedure, equipment, support staff and site/side marked as required.  Indications: joint swelling and possible septic joint   Body area: knee  Joint: right knee  Local anesthesia used: yes  Anesthesia: local infiltration    Anesthesia:  Local anesthesia used: yes  Anesthesia: local infiltration  Local Anesthetic: lidocaine 1% without epinephrine   Anesthetic total: 10 mL  Sedation:  Patient sedated: no    Preparation: Patient was prepped and draped in the usual sterile fashion.  Needle size: 22 " G  Ultrasound guidance: no  Approach: lateral  Aspirate: bloody  Aspirate amount: 20 mL  Patient tolerance: Patient tolerated the procedure well with no immediate complications  Comments: Patient had significant pain during needle insertion and grabbed and squeezed my arm during procedure.               ED Course  ED Course                  MDM    Final diagnoses:   Hemarthrosis of knee joint, right            Daryl Estes MD  04/24/17 0118

## 2017-04-25 ENCOUNTER — APPOINTMENT (OUTPATIENT)
Dept: LAB | Facility: HOSPITAL | Age: 57
End: 2017-04-25

## 2017-04-25 ENCOUNTER — DOCUMENTATION (OUTPATIENT)
Dept: CARDIAC SURGERY | Facility: CLINIC | Age: 57
End: 2017-04-25

## 2017-04-25 ENCOUNTER — ANTICOAGULATION VISIT (OUTPATIENT)
Dept: CARDIOLOGY | Facility: CLINIC | Age: 57
End: 2017-04-25

## 2017-04-25 DIAGNOSIS — I82.413 ACUTE DEEP VEIN THROMBOSIS (DVT) OF FEMORAL VEIN OF BOTH LOWER EXTREMITIES (HCC): Primary | ICD-10-CM

## 2017-04-25 LAB
INR PPP: 3.2
INR PPP: 3.2 (ref 0.8–1.2)

## 2017-04-25 PROCEDURE — 85610 PROTHROMBIN TIME: CPT | Performed by: INTERNAL MEDICINE

## 2017-04-25 NOTE — PROGRESS NOTES
PT in today for INR, bleeding noted in knee, will hold until pt see orthopaedic surgeron DX I82.49 INR 3.2

## 2017-04-26 ENCOUNTER — LAB (OUTPATIENT)
Dept: LAB | Facility: HOSPITAL | Age: 57
End: 2017-04-26

## 2017-04-26 ENCOUNTER — OFFICE VISIT (OUTPATIENT)
Dept: ORTHOPEDIC SURGERY | Facility: CLINIC | Age: 57
End: 2017-04-26

## 2017-04-26 VITALS — HEIGHT: 72 IN | BODY MASS INDEX: 34.95 KG/M2 | WEIGHT: 258 LBS

## 2017-04-26 DIAGNOSIS — M25.461 KNEE EFFUSION, RIGHT: ICD-10-CM

## 2017-04-26 DIAGNOSIS — M25.561 ACUTE PAIN OF RIGHT KNEE: ICD-10-CM

## 2017-04-26 DIAGNOSIS — I82.413 ACUTE DEEP VEIN THROMBOSIS (DVT) OF FEMORAL VEIN OF BOTH LOWER EXTREMITIES (HCC): Primary | ICD-10-CM

## 2017-04-26 DIAGNOSIS — I82.413 ACUTE DEEP VEIN THROMBOSIS (DVT) OF FEMORAL VEIN OF BOTH LOWER EXTREMITIES (HCC): ICD-10-CM

## 2017-04-26 PROCEDURE — 87070 CULTURE OTHR SPECIMN AEROBIC: CPT | Performed by: ORTHOPAEDIC SURGERY

## 2017-04-26 PROCEDURE — 87205 SMEAR GRAM STAIN: CPT | Performed by: ORTHOPAEDIC SURGERY

## 2017-04-26 PROCEDURE — 89051 BODY FLUID CELL COUNT: CPT | Performed by: ORTHOPAEDIC SURGERY

## 2017-04-26 PROCEDURE — 20610 DRAIN/INJ JOINT/BURSA W/O US: CPT | Performed by: ORTHOPAEDIC SURGERY

## 2017-04-26 PROCEDURE — 87015 SPECIMEN INFECT AGNT CONCNTJ: CPT | Performed by: ORTHOPAEDIC SURGERY

## 2017-04-26 PROCEDURE — 99203 OFFICE O/P NEW LOW 30 MIN: CPT | Performed by: ORTHOPAEDIC SURGERY

## 2017-04-26 RX ORDER — HYDROCODONE BITARTRATE AND ACETAMINOPHEN 5; 325 MG/1; MG/1
1 TABLET ORAL EVERY 6 HOURS PRN
Qty: 50 TABLET | Refills: 0 | Status: SHIPPED | OUTPATIENT
Start: 2017-04-26 | End: 2017-12-21

## 2017-04-27 LAB
APPEARANCE FLD: ABNORMAL
COLOR FLD: ABNORMAL
MONOS+MACROS NFR FLD: 3 %
NEUTROPHILS NFR FLD MANUAL: 97 %
RBC # FLD AUTO: ABNORMAL /MM3 (ref 0–0)
WBC # FLD: ABNORMAL /MM3 (ref 0–5)

## 2017-04-30 LAB
BACTERIA FLD CULT: NORMAL
GRAM STN SPEC: NORMAL
GRAM STN SPEC: NORMAL

## 2017-05-01 LAB
BACTERIA FLD CULT: NORMAL
GRAM STN SPEC: NORMAL

## 2017-05-05 ENCOUNTER — TELEPHONE (OUTPATIENT)
Dept: CARDIAC SURGERY | Facility: CLINIC | Age: 57
End: 2017-05-05

## 2017-05-05 ENCOUNTER — OFFICE VISIT (OUTPATIENT)
Dept: ORTHOPEDIC SURGERY | Facility: CLINIC | Age: 57
End: 2017-05-05

## 2017-05-05 VITALS — HEIGHT: 72 IN | BODY MASS INDEX: 36.03 KG/M2 | WEIGHT: 266 LBS

## 2017-05-05 DIAGNOSIS — M25.561 ACUTE PAIN OF RIGHT KNEE: ICD-10-CM

## 2017-05-05 DIAGNOSIS — I82.413 ACUTE DEEP VEIN THROMBOSIS (DVT) OF FEMORAL VEIN OF BOTH LOWER EXTREMITIES (HCC): Primary | ICD-10-CM

## 2017-05-05 DIAGNOSIS — M25.461 KNEE EFFUSION, RIGHT: ICD-10-CM

## 2017-05-05 PROCEDURE — 99213 OFFICE O/P EST LOW 20 MIN: CPT | Performed by: ORTHOPAEDIC SURGERY

## 2017-05-08 ENCOUNTER — DOCUMENTATION (OUTPATIENT)
Dept: CARDIAC SURGERY | Facility: CLINIC | Age: 57
End: 2017-05-08

## 2017-05-09 ENCOUNTER — DOCUMENTATION (OUTPATIENT)
Dept: CARDIOLOGY | Facility: CLINIC | Age: 57
End: 2017-05-09

## 2017-05-09 LAB
BH CV ECHO MEAS - ACS: 2.3 CM
BH CV ECHO MEAS - AO MAX PG (FULL): 5.3 MMHG
BH CV ECHO MEAS - AO MAX PG: 11.7 MMHG
BH CV ECHO MEAS - AO MEAN PG (FULL): 2 MMHG
BH CV ECHO MEAS - AO MEAN PG: 5 MMHG
BH CV ECHO MEAS - AO ROOT AREA (BSA CORRECTED): 1.7
BH CV ECHO MEAS - AO ROOT AREA: 12.6 CM^2
BH CV ECHO MEAS - AO ROOT DIAM: 4 CM
BH CV ECHO MEAS - AO V2 MAX: 171 CM/SEC
BH CV ECHO MEAS - AO V2 MEAN: 97.4 CM/SEC
BH CV ECHO MEAS - AO V2 VTI: 32.5 CM
BH CV ECHO MEAS - BSA(HAYCOCK): 2.5 M^2
BH CV ECHO MEAS - BSA: 2.4 M^2
BH CV ECHO MEAS - BZI_BMI: 36.1 KILOGRAMS/M^2
BH CV ECHO MEAS - BZI_METRIC_HEIGHT: 182.9 CM
BH CV ECHO MEAS - BZI_METRIC_WEIGHT: 120.7 KG
BH CV ECHO MEAS - EDV(CUBED): 185.2 ML
BH CV ECHO MEAS - EDV(TEICH): 160 ML
BH CV ECHO MEAS - EF(CUBED): 72.6 %
BH CV ECHO MEAS - EF(TEICH): 63.7 %
BH CV ECHO MEAS - EPSS: 0.5 CM
BH CV ECHO MEAS - ESV(CUBED): 50.7 ML
BH CV ECHO MEAS - ESV(TEICH): 58.1 ML
BH CV ECHO MEAS - FS: 35.1 %
BH CV ECHO MEAS - IVS/LVPW: 0.88
BH CV ECHO MEAS - IVSD: 1.4 CM
BH CV ECHO MEAS - LA DIMENSION: 4 CM
BH CV ECHO MEAS - LA/AO: 1
BH CV ECHO MEAS - LV MASS(C)D: 394.4 GRAMS
BH CV ECHO MEAS - LV MASS(C)DI: 164 GRAMS/M^2
BH CV ECHO MEAS - LV MAX PG: 6.4 MMHG
BH CV ECHO MEAS - LV MEAN PG: 3 MMHG
BH CV ECHO MEAS - LV V1 MAX: 126 CM/SEC
BH CV ECHO MEAS - LV V1 MEAN: 74.3 CM/SEC
BH CV ECHO MEAS - LV V1 VTI: 26.2 CM
BH CV ECHO MEAS - LVIDD: 5.7 CM
BH CV ECHO MEAS - LVIDS: 3.7 CM
BH CV ECHO MEAS - LVPWD: 1.6 CM
BH CV ECHO MEAS - MV A MAX VEL: 96.7 CM/SEC
BH CV ECHO MEAS - MV E MAX VEL: 82.4 CM/SEC
BH CV ECHO MEAS - MV E/A: 0.85
BH CV ECHO MEAS - PA MAX PG: 6.6 MMHG
BH CV ECHO MEAS - PA MEAN PG: 3 MMHG
BH CV ECHO MEAS - PA V2 MAX: 128 CM/SEC
BH CV ECHO MEAS - PA V2 MEAN: 80.3 CM/SEC
BH CV ECHO MEAS - PA V2 VTI: 26.6 CM
BH CV ECHO MEAS - RAP SYSTOLE: 10 MMHG
BH CV ECHO MEAS - RVDD: 1.6 CM
BH CV ECHO MEAS - RVSP: 26.8 MMHG
BH CV ECHO MEAS - SI(AO): 169.8 ML/M^2
BH CV ECHO MEAS - SI(CUBED): 55.9 ML/M^2
BH CV ECHO MEAS - SI(TEICH): 42.4 ML/M^2
BH CV ECHO MEAS - SV(AO): 408.4 ML
BH CV ECHO MEAS - SV(CUBED): 134.5 ML
BH CV ECHO MEAS - SV(TEICH): 101.9 ML
BH CV ECHO MEAS - TR MAX VEL: 203.3 CM/SEC
LV EF 2D ECHO EST: 60 %

## 2017-05-15 ENCOUNTER — TELEPHONE (OUTPATIENT)
Dept: CARDIAC SURGERY | Facility: CLINIC | Age: 57
End: 2017-05-15

## 2017-05-17 ENCOUNTER — TELEPHONE (OUTPATIENT)
Dept: ORTHOPEDIC SURGERY | Facility: CLINIC | Age: 57
End: 2017-05-17

## 2017-05-18 ENCOUNTER — OFFICE VISIT (OUTPATIENT)
Dept: CARDIOLOGY | Facility: CLINIC | Age: 57
End: 2017-05-18

## 2017-05-18 VITALS
DIASTOLIC BLOOD PRESSURE: 78 MMHG | WEIGHT: 263 LBS | HEART RATE: 86 BPM | BODY MASS INDEX: 35.62 KG/M2 | HEIGHT: 72 IN | SYSTOLIC BLOOD PRESSURE: 138 MMHG

## 2017-05-18 DIAGNOSIS — G47.33 OBSTRUCTIVE SLEEP APNEA OF ADULT: ICD-10-CM

## 2017-05-18 DIAGNOSIS — I71.010 ASCENDING AORTIC DISSECTION (HCC): ICD-10-CM

## 2017-05-18 DIAGNOSIS — I10 ESSENTIAL HYPERTENSION: Primary | ICD-10-CM

## 2017-05-18 DIAGNOSIS — E03.9 ACQUIRED HYPOTHYROIDISM: ICD-10-CM

## 2017-05-18 PROCEDURE — 99213 OFFICE O/P EST LOW 20 MIN: CPT | Performed by: INTERNAL MEDICINE

## 2017-05-18 RX ORDER — ASPIRIN 325 MG
81 TABLET ORAL DAILY
Qty: 30 TABLET | Refills: 11 | COMMUNITY
End: 2017-12-21 | Stop reason: HOSPADM

## 2017-05-18 RX ORDER — LOSARTAN POTASSIUM AND HYDROCHLOROTHIAZIDE 25; 100 MG/1; MG/1
1 TABLET ORAL DAILY
Qty: 30 TABLET | Refills: 12 | Status: SHIPPED | OUTPATIENT
Start: 2017-05-18 | End: 2018-03-15

## 2017-07-07 ENCOUNTER — OFFICE VISIT (OUTPATIENT)
Dept: ORTHOPEDIC SURGERY | Facility: CLINIC | Age: 57
End: 2017-07-07

## 2017-07-07 VITALS — BODY MASS INDEX: 36.98 KG/M2 | HEIGHT: 72 IN | WEIGHT: 273 LBS

## 2017-07-07 DIAGNOSIS — M25.561 ACUTE PAIN OF RIGHT KNEE: ICD-10-CM

## 2017-07-07 DIAGNOSIS — M25.461 KNEE EFFUSION, RIGHT: Primary | ICD-10-CM

## 2017-07-07 PROCEDURE — 99212 OFFICE O/P EST SF 10 MIN: CPT | Performed by: ORTHOPAEDIC SURGERY

## 2017-07-07 NOTE — PROGRESS NOTES
Truman Esquivel is a 56 y.o. male returns for     Chief Complaint   Patient presents with   • Right Knee - Follow-up, Pain     Pain scale today 0/10   HISTORY OF PRESENT ILLNESS:continued pain and swelling right knee     HISTORY OF PRESENT ILLNESS: aspiration and steroid injection right knee done on 4/26/2017 patient states that he is doing better No longer using cane or crutches.   Knee is significantly improved, decreased swelling, now able to weight bear, with minimal pain, minimal swelling.     Knee seems to be doing ok.  He is getting around ok.  Complaining of right leg pain, states this is related to vein harvest surgery, and CABG.        CONCURRENT MEDICAL HISTORY:    Past Medical History:   Diagnosis Date   • Chest pain    • HTN (hypertension)    • Knee pain    • Smoker        Allergies   Allergen Reactions   • Acth [Corticotropin]    • Eggs Or Egg-Derived Products    • Erythromycin    • Penicillins      Tolerated cefepime during 2/2017 admission. Had rash and itching (relieved by benadryl) after few doses of cefepime and cefepime was continued.   • Tetracyclines & Related    • Cephalosporins Itching and Rash     Pt developed rash, itching after cefepime administration (2-3 doses) during 2/2017 admission. Itching was relieved with benadryl. Cefepime was continued because his infection was improving and no symptoms of anaphylaxis present         Current Outpatient Prescriptions:   •  amLODIPine (NORVASC) 5 MG tablet, Take 1 tablet by mouth Daily., Disp: 30 tablet, Rfl: 2  •  aspirin 325 MG tablet, Take 1 tablet by mouth Daily., Disp: 30 tablet, Rfl: 11  •  atorvastatin (LIPITOR) 20 MG tablet, Take 1 tablet by mouth Daily., Disp: 30 tablet, Rfl: 11  •  HYDROcodone-acetaminophen (NORCO) 5-325 MG per tablet, Take 1 tablet by mouth Every 6 (Six) Hours As Needed for Moderate Pain (4-6)., Disp: 50 tablet, Rfl: 0  •  levothyroxine (SYNTHROID, LEVOTHROID) 25 MCG tablet, Take 1 tablet by mouth Daily., Disp: 30 tablet,  "Rfl: 2  •  losartan-hydrochlorothiazide (HYZAAR) 100-25 MG per tablet, Take 1 tablet by mouth Daily., Disp: 30 tablet, Rfl: 12  •  metoprolol succinate XL (TOPROL XL) 25 MG 24 hr tablet, Take 1 tablet by mouth Daily With Dinner., Disp: 30 tablet, Rfl: 12    Past Surgical History:   Procedure Laterality Date   • ASCENDING ARCH/HEMIARCH REPLACEMENT N/A 2/14/2017    Procedure: INTRAOPERATIVE TRANSESOPHAGEAL ECHOCARDIOGRAM, MIDLINE STERNOTOMY, ASCENDING AORTIC  AND PROXIMAL AORTIC ARCH REPAIR WITH 26MM GRAFT, AORTIC VALVE RESUSPENSION, AORTIC ROOT REPAIR, OPEN VEIN HARVEST OF RIGHT LEG;  Surgeon: German Arreguin MD;  Location: [facility] MAIN OR;  Service:    • BRONCHOSCOPY N/A 2/17/2017    Procedure: BRONCHOSCOPY BIOPSY AT BEDSIDE WITH BAL-LEFT LOWER LOBE;  Surgeon: Trent Chaney MD;  Location: [facility] ENDOSCOPY;  Service:        ROS  No fevers or chills.  No chest pain or shortness of air.  No GI or  disturbances.    PHYSICAL EXAMINATION:       Ht 72\" (182.9 cm)  Wt 273 lb (124 kg)  BMI 37.03 kg/m2    Physical Exam   Musculoskeletal:        Right knee: He exhibits no effusion.        Left knee: He exhibits no effusion.       GAIT:     []  Normal  []  Antalgic    Assistive device: []  None  []  Walker     []  Crutches  []  Cane     []  Wheelchair  []  Stretcher    Right Knee Exam     Tenderness   The patient is experiencing tenderness in the medial hamstring.    Range of Motion   Extension: -5   Flexion: 110     Tests   Macho:  Medial - negative Lateral - negative    Other   Erythema: absent  Scars: absent  Pulse: present  Swelling: mild  Other tests: no effusion present    Comments:  Has pain and fullness of the back of the knee.       Left Knee Exam     Tenderness   The patient is experiencing no tenderness.         Range of Motion   Extension: -5   Flexion: 110     Tests   Macho:  Medial - negative Lateral - negative    Other   Erythema: absent  Scars: absent  Sensation: normal  Pulse: present  Swelling: " mild  Effusion: no effusion present    Comments:  No tenderness to palpation.              No results found.          ASSESSMENT:    Diagnoses and all orders for this visit:    Knee effusion, right    Acute pain of right knee          PLAN     physical therapy for right knee.         Daren Prater MD

## 2017-07-20 ENCOUNTER — TELEPHONE (OUTPATIENT)
Dept: CARDIAC SURGERY | Facility: CLINIC | Age: 57
End: 2017-07-20

## 2017-07-20 NOTE — TELEPHONE ENCOUNTER
Mr Esquivel called asking if he could use 20lb weights now during his rehab sessions. I let him know I would discuss with Dr Arreguin and call him back with an answer today or tomorrow

## 2017-07-21 ENCOUNTER — TELEPHONE (OUTPATIENT)
Dept: CARDIAC SURGERY | Facility: CLINIC | Age: 57
End: 2017-07-21

## 2017-07-21 NOTE — TELEPHONE ENCOUNTER
I called after speaking with Dr Arreguin to let patient know it was fine for him to continue rehab using 20-25 lb weights. He has requested a letter to provide to the rehab facility which we will get together and ask Dr Arreguin to sign.

## 2017-09-26 ENCOUNTER — TELEPHONE (OUTPATIENT)
Dept: CARDIAC SURGERY | Facility: CLINIC | Age: 57
End: 2017-09-26

## 2017-09-26 NOTE — TELEPHONE ENCOUNTER
After speaking with Dr Arreguin I attempted to reach Mr Esquivel. Dr Arreguin has said Mr Esquivel may return to work but should not be lifting more than 1/3 of his body weight. I have discussed this with him in the past but told him I would discuss with Dr Arreguin again and verify a weight limit

## 2017-09-26 NOTE — TELEPHONE ENCOUNTER
I discussed Mr Esquivel's concerns with Dr Arreguin . Dr Arreguin feels that he should not lift more than 1/3 of his body weight. He has no other restrictions at this time . He has been released to resume his normal activities of daily living. He feels he is unable to return to work. I let him know if he has concerns about his heart he should discuss them with his cardiologist which he will do

## 2017-10-20 ENCOUNTER — TELEPHONE (OUTPATIENT)
Dept: CARDIAC SURGERY | Facility: CLINIC | Age: 57
End: 2017-10-20

## 2017-10-20 NOTE — TELEPHONE ENCOUNTER
Mr Esquivel called asking for clarification of his restrictions. He states he has had knee problems and has gained 30lbs. Due to his aneurysm surgery the rehab facility where he receives therapy will not allow him to lift weights until he has a letter stating what it is safe for him to do. I let him know I would discuss with Dr Arreguin , have a letter formulated and mail it to him

## 2017-12-07 ENCOUNTER — DOCUMENTATION (OUTPATIENT)
Dept: CARDIOLOGY | Facility: HOSPITAL | Age: 57
End: 2017-12-07

## 2017-12-08 ENCOUNTER — HOSPITAL ENCOUNTER (EMERGENCY)
Facility: HOSPITAL | Age: 57
Discharge: HOME | End: 2017-12-08
Attending: FAMILY MEDICINE | Admitting: FAMILY MEDICINE

## 2017-12-08 ENCOUNTER — APPOINTMENT (OUTPATIENT)
Dept: GENERAL RADIOLOGY | Facility: HOSPITAL | Age: 57
End: 2017-12-08

## 2017-12-08 VITALS
TEMPERATURE: 97.9 F | OXYGEN SATURATION: 95 % | SYSTOLIC BLOOD PRESSURE: 155 MMHG | BODY MASS INDEX: 36.57 KG/M2 | DIASTOLIC BLOOD PRESSURE: 77 MMHG | WEIGHT: 270 LBS | RESPIRATION RATE: 20 BRPM | HEIGHT: 72 IN | HEART RATE: 88 BPM

## 2017-12-08 DIAGNOSIS — R04.0 ANTERIOR EPISTAXIS: Primary | ICD-10-CM

## 2017-12-08 PROCEDURE — 99282 EMERGENCY DEPT VISIT SF MDM: CPT

## 2017-12-08 RX ORDER — CETIRIZINE HYDROCHLORIDE 10 MG/1
10 TABLET ORAL DAILY
Qty: 30 TABLET | Refills: 0 | Status: SHIPPED | OUTPATIENT
Start: 2017-12-08 | End: 2018-02-01

## 2017-12-08 RX ORDER — CLOPIDOGREL BISULFATE 75 MG/1
75 TABLET ORAL DAILY
COMMUNITY
End: 2020-12-14

## 2017-12-08 NOTE — ED NOTES
Pt given d/c instructions and script. Instructed him to keep his appt on Monday. Verbalized understanding. Vss. No distress noted. Ambulated to lobby with family.     RAQUEL Howard RN  12/08/17 [id number]

## 2017-12-08 NOTE — ED PROVIDER NOTES
Subjective   Patient is a 57 y.o. male presenting with nosebleeds.   History provided by:  Patient   used: No    Nose Bleed   Location:  R nare  Severity:  Mild  Timing:  Intermittent  Progression:  Resolved  Chronicity:  New  Context: BiPAP    Relieved by:  Nothing  Worsened by:  Nothing  Ineffective treatments:  Applying pressure      Review of Systems   HENT: Positive for nosebleeds.    All other systems reviewed and are negative.      Past Medical History:   Diagnosis Date   • Chest pain    • HTN (hypertension)    • Knee pain    • Smoker        Allergies   Allergen Reactions   • Acth [Corticotropin]    • Eggs Or Egg-Derived Products    • Erythromycin    • Penicillins      Tolerated cefepime during 2/2017 admission. Had rash and itching (relieved by benadryl) after few doses of cefepime and cefepime was continued.   • Tetracyclines & Related    • Cephalosporins Itching and Rash     Pt developed rash, itching after cefepime administration (2-3 doses) during 2/2017 admission. Itching was relieved with benadryl. Cefepime was continued because his infection was improving and no symptoms of anaphylaxis present       Past Surgical History:   Procedure Laterality Date   • ASCENDING ARCH/HEMIARCH REPLACEMENT N/A 2/14/2017    Procedure: INTRAOPERATIVE TRANSESOPHAGEAL ECHOCARDIOGRAM, MIDLINE STERNOTOMY, ASCENDING AORTIC  AND PROXIMAL AORTIC ARCH REPAIR WITH 26MM GRAFT, AORTIC VALVE RESUSPENSION, AORTIC ROOT REPAIR, OPEN VEIN HARVEST OF RIGHT LEG;  Surgeon: German Arreguin MD;  Location: [facility] MAIN OR;  Service:    • BRONCHOSCOPY N/A 2/17/2017    Procedure: BRONCHOSCOPY BIOPSY AT BEDSIDE WITH BAL-LEFT LOWER LOBE;  Surgeon: Trent Chaney MD;  Location: [facility] ENDOSCOPY;  Service:        History reviewed. No pertinent family history.    Social History     Social History   • Marital status:      Spouse name: N/A   • Number of children: N/A   • Years of education: N/A     Social History Main  "Topics   • Smoking status: Former Smoker     Quit date: 2/13/2017   • Smokeless tobacco: Never Used   • Alcohol use No   • Drug use: No   • Sexual activity: Defer     Other Topics Concern   • None     Social History Narrative       /77  Pulse 88  Temp 97.9 °F (36.6 °C) (Oral)   Resp 20  Ht 182.9 cm (72\")  Wt 122 kg (270 lb)  SpO2 95%  BMI 36.62 kg/m2    Objective   Physical Exam   Constitutional: He is oriented to person, place, and time. He appears well-nourished.   HENT:   Nose: Rhinorrhea present. Epistaxis is observed.   Neck: Normal range of motion. Neck supple.   Cardiovascular: Normal rate, regular rhythm, normal heart sounds and intact distal pulses.    Pulmonary/Chest: Effort normal and breath sounds normal.   Abdominal: Soft. Bowel sounds are normal.   Neurological: He is alert and oriented to person, place, and time. He has normal reflexes.   Nursing note and vitals reviewed.      Procedures         ED Course  ED Course                  MDM    Final diagnoses:   Anterior epistaxis            Balwinder Brown MD  12/08/17 0547    "

## 2017-12-08 NOTE — ED TRIAGE NOTES
Pt reports coughing up blood x 2 weeks. States he has an appt on Monday with PCP. He went to his neurologist two days ago and was prescribed plavix. Pt reports he informed neurologist of coughing up blood.

## 2017-12-11 ENCOUNTER — DOCUMENTATION (OUTPATIENT)
Dept: CARDIOLOGY | Facility: HOSPITAL | Age: 57
End: 2017-12-11

## 2017-12-11 DIAGNOSIS — G45.9 TRANSIENT CEREBRAL ISCHEMIA, UNSPECIFIED TYPE: Primary | ICD-10-CM

## 2017-12-14 ENCOUNTER — HOSPITAL ENCOUNTER (OUTPATIENT)
Dept: MRI IMAGING | Facility: HOSPITAL | Age: 57
Discharge: HOME | End: 2017-12-14
Admitting: PSYCHIATRY & NEUROLOGY

## 2017-12-14 DIAGNOSIS — G45.0 VERTEBROBASILAR INSUFFICIENCY: ICD-10-CM

## 2017-12-14 DIAGNOSIS — I99.8 ISCHEMIA: ICD-10-CM

## 2017-12-14 DIAGNOSIS — G45.9 TRANSIENT ISCHEMIC ATTACK: ICD-10-CM

## 2017-12-14 PROCEDURE — A9576 INJ PROHANCE MULTIPACK: HCPCS | Performed by: PSYCHIATRY & NEUROLOGY

## 2017-12-14 PROCEDURE — 70553 MRI BRAIN STEM W/O & W/DYE: CPT

## 2017-12-14 PROCEDURE — 25010000002 GADOTERIDOL PER 1 ML: Performed by: PSYCHIATRY & NEUROLOGY

## 2017-12-14 RX ADMIN — GADOTERIDOL 20 ML: 279.3 INJECTION, SOLUTION INTRAVENOUS at 10:15

## 2017-12-21 ENCOUNTER — HOSPITAL ENCOUNTER (OUTPATIENT)
Dept: CARDIOLOGY | Facility: HOSPITAL | Age: 57
Discharge: HOME | End: 2017-12-21
Attending: INTERNAL MEDICINE | Admitting: INTERNAL MEDICINE

## 2017-12-21 VITALS
WEIGHT: 269.84 LBS | OXYGEN SATURATION: 98 % | BODY MASS INDEX: 36.55 KG/M2 | DIASTOLIC BLOOD PRESSURE: 75 MMHG | HEART RATE: 59 BPM | RESPIRATION RATE: 18 BRPM | HEIGHT: 72 IN | SYSTOLIC BLOOD PRESSURE: 128 MMHG | TEMPERATURE: 97.7 F

## 2017-12-21 DIAGNOSIS — G45.9 TRANSIENT CEREBRAL ISCHEMIA, UNSPECIFIED TYPE: ICD-10-CM

## 2017-12-21 LAB
BH CV ECHO MEAS - BSA(HAYCOCK): 2.5 M^2
BH CV ECHO MEAS - BSA: 2.4 M^2
BH CV ECHO MEAS - BZI_BMI: 36.5 KILOGRAMS/M^2
BH CV ECHO MEAS - BZI_METRIC_HEIGHT: 182.9 CM
BH CV ECHO MEAS - BZI_METRIC_WEIGHT: 122 KG

## 2017-12-21 PROCEDURE — 25010000002 HYDROMORPHONE PER 4 MG: Performed by: INTERNAL MEDICINE

## 2017-12-21 PROCEDURE — 93312 ECHO TRANSESOPHAGEAL: CPT

## 2017-12-21 PROCEDURE — 25010000002 MIDAZOLAM PER 1 MG: Performed by: INTERNAL MEDICINE

## 2017-12-21 PROCEDURE — 93325 DOPPLER ECHO COLOR FLOW MAPG: CPT | Performed by: INTERNAL MEDICINE

## 2017-12-21 PROCEDURE — 93312 ECHO TRANSESOPHAGEAL: CPT | Performed by: INTERNAL MEDICINE

## 2017-12-21 PROCEDURE — 93321 DOPPLER ECHO F-UP/LMTD STD: CPT | Performed by: INTERNAL MEDICINE

## 2017-12-21 PROCEDURE — 93321 DOPPLER ECHO F-UP/LMTD STD: CPT

## 2017-12-21 PROCEDURE — 93325 DOPPLER ECHO COLOR FLOW MAPG: CPT

## 2017-12-21 RX ORDER — MIDAZOLAM HYDROCHLORIDE 1 MG/ML
INJECTION INTRAMUSCULAR; INTRAVENOUS
Status: COMPLETED | OUTPATIENT
Start: 2017-12-21 | End: 2017-12-21

## 2017-12-21 RX ORDER — SODIUM CHLORIDE 0.9 % (FLUSH) 0.9 %
1-10 SYRINGE (ML) INJECTION AS NEEDED
Status: DISCONTINUED | OUTPATIENT
Start: 2017-12-21 | End: 2017-12-22 | Stop reason: HOSPADM

## 2017-12-21 RX ORDER — SODIUM CHLORIDE 9 MG/ML
75 INJECTION, SOLUTION INTRAVENOUS CONTINUOUS
Status: DISCONTINUED | OUTPATIENT
Start: 2017-12-21 | End: 2017-12-22 | Stop reason: HOSPADM

## 2017-12-21 RX ADMIN — MIDAZOLAM 2 MG: 1 INJECTION INTRAMUSCULAR; INTRAVENOUS at 09:50

## 2017-12-21 RX ADMIN — HYDROMORPHONE HYDROCHLORIDE 0.2 MG: 1 INJECTION, SOLUTION INTRAMUSCULAR; INTRAVENOUS; SUBCUTANEOUS at 09:50

## 2017-12-21 NOTE — H&P
CARDIOLOGY HISTORY AND PHYSICAL NOTE    Truman Esquivel  1960  57 y.o. male    Reason for evaluation: Transesophageal echocardiogram to rule out cardiac source of embolus    HPI  Mr. Esquivel is a 57-year-old  male with history of aortic dissection status post repair and aortic valve resuspension in February 2017, hypertension, hypothyroidism, was recently evaluated by Dr. Moore for recurrent episodes of dizziness.  Cardiac source of embolus was suspected and hence he was referred for transesophageal echocardiogram.  I understand that MRI of the brain and neck arteries have been performed.  9 any chest pain or shortness of breath.  His been compliant with his medications.    SUBJECTIVE    Past Medical History:   Diagnosis Date   • Chest pain    • Disease of thyroid gland    • HTN (hypertension)    • Knee pain    • Smoker          Past Surgical History:   Procedure Laterality Date   • ASCENDING ARCH/HEMIARCH REPLACEMENT N/A 2/14/2017    Procedure: INTRAOPERATIVE TRANSESOPHAGEAL ECHOCARDIOGRAM, MIDLINE STERNOTOMY, ASCENDING AORTIC  AND PROXIMAL AORTIC ARCH REPAIR WITH 26MM GRAFT, AORTIC VALVE RESUSPENSION, AORTIC ROOT REPAIR, OPEN VEIN HARVEST OF RIGHT LEG;  Surgeon: German Arreguin MD;  Location: [facility] OR;  Service:    • BRONCHOSCOPY N/A 2/17/2017    Procedure: BRONCHOSCOPY BIOPSY AT BEDSIDE WITH BAL-LEFT LOWER LOBE;  Surgeon: Trent Chaney MD;  Location: [facility] ENDOSCOPY;  Service:      Allergies   Acth [Corticotropin]     2/13/2017  Past Updates...   Eggs Or Egg-derived Products     2/13/2017  Past Updates...   Erythromycin     2/13/2017  Past Updates...   Penicillins     2/13/2017  Past Updates...   Tolerated cefepime during 2/2017 admission. Had rash and itching (relieved by benadryl) after few doses of cefepime and cefepime was continued.      Tetracyclines & Related     2/13/2017  Past Updates...   Adverse Reactions/Drug Intolerances   Cephalosporins  Itching, Rash Low Intolerance  2/13/2017  Past Updates...   Pt developed rash, itching after cefepime administration (2-3 do            History reviewed. No pertinent family history.      Social History     Social History   • Marital status:      Spouse name: N/A   • Number of children: N/A   • Years of education: N/A     Occupational History   • Not on file.     Social History Main Topics   • Smoking status: Former Smoker     Quit date: 2/13/2017   • Smokeless tobacco: Never Used   • Alcohol use No   • Drug use: No   • Sexual activity: Defer     Other Topics Concern   • Not on file     Social History Narrative         Current Outpatient Prescriptions   Medication Sig Dispense Refill   • amLODIPine (NORVASC) 5 MG tablet Take 1 tablet by mouth Daily. 30 tablet 2   • aspirin 325 MG tablet Take 81 mg by mouth Daily. 30 tablet 11   • ASPIRIN LOW DOSE 81 MG EC tablet      • atorvastatin (LIPITOR) 20 MG tablet Take 1 tablet by mouth Daily. 30 tablet 11   • cetirizine (zyrTEC) 10 MG tablet Take 1 tablet by mouth Daily. 30 tablet 0   • clopidogrel (PLAVIX) 75 MG tablet Take 75 mg by mouth Daily.     • gabapentin (NEURONTIN) 300 MG capsule      • levothyroxine (SYNTHROID, LEVOTHROID) 88 MCG tablet      • losartan-hydrochlorothiazide (HYZAAR) 100-25 MG per tablet Take 1 tablet by mouth Daily. 30 tablet 12   • metoprolol succinate XL (TOPROL XL) 25 MG 24 hr tablet Take 1 tablet by mouth Daily With Dinner. 30 tablet 12   • levothyroxine (SYNTHROID, LEVOTHROID) 25 MCG tablet Take 1 tablet by mouth Daily. (Patient taking differently: Take 88 mcg by mouth Daily.) 30 tablet 2     Current Facility-Administered Medications   Medication Dose Route Frequency Provider Last Rate Last Dose   • sodium chloride 0.9 % flush 1-10 mL  1-10 mL Intravenous PRN Anny Melendez MD       • sodium chloride 0.9 % infusion  75 mL/hr Intravenous Continuous Anny Melendez MD             OBJECTIVE    /75  Pulse 68  Temp 97.4 °F (36.3 °C)  Resp 18  " Ht 182.9 cm (72\")  Wt 122 kg (269 lb 13.5 oz)  SpO2 98%  BMI 36.6 kg/m2      REVIEW OF SYSTEMS    Constitutional:  Denies recent weight loss, weight gain, fever or chills, no change in exercise tolerance     HENT:  Denies any hearing loss, epistaxis, hoarseness, or difficulty speaking.     Eyes: Wears eyeglasses or contact lenses     Respiratory:  Denies dyspnea with exertion,no cough, wheezing, or hemoptysis.     Cardiovascular: Negative for palpations, chest pain    Gastrointestinal:  Denies change in bowel habits, dyspepsia, ulcer disease, hematochezia, or melena.     Endocrine: Negative for cold intolerance, heat intolerance, polydipsia, polyphagia and polyuria.     Genitourinary: Negative.      Musculoskeletal: Denies any history of arthritic symptoms or back problems     Skin:  Denies any change in hair or nails, rashes, or skin lesions.     Allergic/Immunologic: Negative.  Negative for environmental allergies, food allergies and immunocompromised state.     Neurological:  Dizziness, no weakness in the arms or legs    Hematological: Denies any food allergies, seasonal allergies, bleeding disorders, or lymphadenopathy.     Psychiatric/Behavioral: Denies any history of depression, substance abuse, or change in cognitive function.       PHYSICAL EXAM    Constitutional: Cooperative, alert and oriented, well-developed, well-nourished, in no acute distress.     HENT:   Head: Normocephalic, normal hair patterns, no masses or tenderness.  Ears, Nose, and Throat: No gross abnormalities. No pallor or cyanosis.  Eyes: EOMS intact, PERRL, conjunctivae and lids unremarkable. Fundoscopic exam and visual fields not performed.   Neck: No palpable masses or adenopathy, no thyromegaly, no JVD, carotid pulses are full and equal bilaterally and without  Bruits.     Cardiovascular: Regular rhythm, S1 and S2 normal, no S3 or S4.No murmurs, gallops, or rubs detected.     Pulmonary/Chest: Chest: normal symmetry, no tenderness to " palpation, normal respiratory excursion, no intercostal retraction, no use of accessory muscles.            Pulmonary: Normal breath sounds. No rales or ronchi.    Abdominal: Abdomen soft, bowel sounds normoactive, no masses, no hepatosplenomegaly, non-tender, no bruits.     Musculoskeletal: No deformities, clubbing, cyanosis, erythema, or edema observed. There are no spinal abnormalities noted. Normal muscle strength and tone.     Neurological: No gross motor or sensory deficits noted, Cranial nerves 2-12 normal. affect appropriate, oriented to time, person, place.     Skin: Warm and dry to the touch, no apparent skin lesions or masses noted.     Psychiatric: Normal mood and affect. Behavior is normal. Judgment and thought content normal.     RESULTS          ASSESSMENT AND PLAN    Mr. Esquivel is a 57-year-old male with multiple medical issues as described above was presented with recurrent episodes of dizziness.  To rule out cardiac source of embolus a transesophageal echocardiogram is being arranged.  Risks and benefits have been explained and he will be an ASA 2 and Mallampati 2.  Further recommendations will follow.    Anny Melendez MD  12/21/2017  8:28 AM

## 2017-12-21 NOTE — PLAN OF CARE
Problem: Patient Care Overview (Adult)  Goal: Plan of Care Review  Outcome: Outcome(s) achieved Date Met: 12/21/17  Dc criteria met.

## 2017-12-22 ENCOUNTER — OFFICE VISIT (OUTPATIENT)
Dept: CARDIOLOGY | Facility: CLINIC | Age: 57
End: 2017-12-22

## 2017-12-22 VITALS
HEIGHT: 72 IN | WEIGHT: 274 LBS | SYSTOLIC BLOOD PRESSURE: 118 MMHG | DIASTOLIC BLOOD PRESSURE: 70 MMHG | BODY MASS INDEX: 37.11 KG/M2 | HEART RATE: 75 BPM

## 2017-12-22 DIAGNOSIS — G47.33 OBSTRUCTIVE SLEEP APNEA OF ADULT: ICD-10-CM

## 2017-12-22 DIAGNOSIS — E78.00 PURE HYPERCHOLESTEROLEMIA: ICD-10-CM

## 2017-12-22 DIAGNOSIS — I10 ESSENTIAL HYPERTENSION: ICD-10-CM

## 2017-12-22 DIAGNOSIS — I71.010 ASCENDING AORTIC DISSECTION (HCC): Primary | ICD-10-CM

## 2017-12-22 PROCEDURE — 99214 OFFICE O/P EST MOD 30 MIN: CPT | Performed by: INTERNAL MEDICINE

## 2017-12-22 RX ORDER — AMLODIPINE BESYLATE 2.5 MG/1
2.5 TABLET ORAL
Qty: 30 TABLET | Refills: 11 | Status: SHIPPED | OUTPATIENT
Start: 2017-12-22 | End: 2018-02-01 | Stop reason: DRUGHIGH

## 2017-12-22 NOTE — PROGRESS NOTES
[facility] Cardiology  OFFICE NOTE    Truman Esquivel  57 y.o. male    12/22/2017  1. Ascending aortic dissection    2. Essential hypertension    3. Obstructive sleep apnea of adult    4. Pure hypercholesterolemia        Chief complaint -follow-up After MERI      History of present Illness- 57 old gentleman who has history of hypertension for long-time, presented with chest pain and was found to have a aortic dissection had repair of the ascending aorta with resuspension of aortic valve. He is doing well and he had followed with the CT surgeon in [place].  He is off of Coumadin for the DVT of the arm and takes Plavix 75 mg daily as he was having suspicion for TIAs.  And he had the MERI which did not show any abnormality.  His blood pressure is well controlled and he thinks some of it could the dizziness could be due to blood pressure getting too low and I decrease the dose of amlodipine to 2.5 mg in the evening.  We'll continue the Toprol-XL and the Hyzaar.        Allergies   Allergen Reactions   • Acth [Corticotropin]    • Eggs Or Egg-Derived Products    • Erythromycin    • Other      Mycins     • Penicillins      Tolerated cefepime during 2/2017 admission. Had rash and itching (relieved by benadryl) after few doses of cefepime and cefepime was continued.   • Tetracyclines & Related    • Cephalosporins Itching and Rash     Pt developed rash, itching after cefepime administration (2-3 doses) during 2/2017 admission. Itching was relieved with benadryl. Cefepime was continued because his infection was improving and no symptoms of anaphylaxis present         Past Medical History:   Diagnosis Date   • Chest pain    • Disease of thyroid gland    • HTN (hypertension)    • Knee pain    • Smoker          Past Surgical History:   Procedure Laterality Date   • ASCENDING ARCH/HEMIARCH REPLACEMENT N/A 2/14/2017    Procedure: INTRAOPERATIVE TRANSESOPHAGEAL ECHOCARDIOGRAM, MIDLINE STERNOTOMY, ASCENDING  AORTIC  AND PROXIMAL AORTIC ARCH REPAIR WITH 26MM GRAFT, AORTIC VALVE RESUSPENSION, AORTIC ROOT REPAIR, OPEN VEIN HARVEST OF RIGHT LEG;  Surgeon: German Arreguin MD;  Location: [facility] MAIN OR;  Service:    • BRONCHOSCOPY N/A 2/17/2017    Procedure: BRONCHOSCOPY BIOPSY AT BEDSIDE WITH BAL-LEFT LOWER LOBE;  Surgeon: Trent Chaney MD;  Location: [facility] ENDOSCOPY;  Service:          History reviewed. No pertinent family history.      Social History     Social History   • Marital status:      Spouse name: N/A   • Number of children: N/A   • Years of education: N/A     Occupational History   • Not on file.     Social History Main Topics   • Smoking status: Former Smoker     Quit date: 2/13/2017   • Smokeless tobacco: Never Used   • Alcohol use No   • Drug use: No   • Sexual activity: Defer     Other Topics Concern   • Not on file     Social History Narrative         Current Outpatient Prescriptions   Medication Sig Dispense Refill   • atorvastatin (LIPITOR) 20 MG tablet Take 1 tablet by mouth Daily. 30 tablet 11   • cetirizine (zyrTEC) 10 MG tablet Take 1 tablet by mouth Daily. 30 tablet 0   • clopidogrel (PLAVIX) 75 MG tablet Take 75 mg by mouth Daily.     • gabapentin (NEURONTIN) 300 MG capsule Take 300 mg by mouth As Needed.     • levothyroxine (SYNTHROID, LEVOTHROID) 25 MCG tablet Take 1 tablet by mouth Daily. (Patient taking differently: Take 88 mcg by mouth Daily.) 30 tablet 2   • levothyroxine (SYNTHROID, LEVOTHROID) 88 MCG tablet      • losartan-hydrochlorothiazide (HYZAAR) 100-25 MG per tablet Take 1 tablet by mouth Daily. 30 tablet 12   • metoprolol succinate XL (TOPROL XL) 25 MG 24 hr tablet Take 1 tablet by mouth Daily With Dinner. 30 tablet 12   • amLODIPine (NORVASC) 2.5 MG tablet Take 1 tablet by mouth Daily With Dinner. 30 tablet 11     No current facility-administered medications for this visit.          Review of Systems     Constitution: Denies any fatigue, fever or chills    HENT: Denies any  "headache, hearing impairment,     Eyes: Denies any blurring of vision, or photophobia     Cardivascular - As per history of present illness     Respiratory system-denies any COPD, shortness of breath,   sleep apnea Uses CPAP     Endocrine:   history of hyperlipidemia       Musculoskeletal:  history of arthritis with musculoskeletal problems of the knee    Gastrointestinal: No nausea, vomiting, or melena    Genitourinary: No dysuria or hematuria    Neurological:   Peripheral neuropathy, questionable TIA    Psychiatric/Behavioral:        No history of depression,  No history of bipolar disorder or schizophrenia     Hematological- no history of easy bruising or any bleeding diathesis            OBJECTIVE    /70  Pulse 75  Ht 182.9 cm (72\")  Wt 124 kg (274 lb)  BMI 37.16 kg/m2      Physical Exam     Constitutional: is oriented to person, place, and time.     Skin-warm and dry, sternotomy site healed well    Well developed and nourished in no acute distress      Head: Normocephalic and atraumatic.     Eyes: Pupils are equal    Neck: Neck supple. No bruit in the carotids    Cardiovascular: [place] in the fifth intercostal space   Regular rate, and  Rhythm,    S1 greater than S2, no S3 or S4, no gallop     Pulmonary/Chest:   Air  Entry is equal on both sides  No wheezing or crackles,      Abdominal: Soft.  No hepatosplenomegaly, bowel sounds are present    Musculoskeletal: No kyphoscoliosis, no significant thickening of the joints    Neurological: is alert and oriented to person, place, and time.    cranial nerve are intact .   No motor or sensory deficit    Extremities-no edema, no radial femoral delay      Psychiatric: He has a normal mood and affect.                  His behavior is normal.           Procedures      Lab Results   Component Value Date    WBC 11.95 (H) 04/23/2017    HGB 10.7 (L) 04/23/2017    HCT 32.1 (L) 04/23/2017    MCV 82.7 04/23/2017     04/23/2017     Lab Results   Component Value Date "    GLUCOSE 155 (H) 04/23/2017    BUN 20 04/23/2017    CREATININE 1.17 04/23/2017    EGFRIFNONA 64 04/23/2017    BCR 17.1 04/23/2017    CO2 25.0 04/23/2017    CALCIUM 8.6 04/23/2017    ALBUMIN 4.10 04/23/2017    LABIL2 1.4 04/23/2017    AST 20 04/23/2017    ALT 21 04/23/2017     Lab Results   Component Value Date    HGBA1C 5.52 02/15/2017     Lab Results   Component Value Date    TSH 6.880 (H) 02/13/2017                  A/P    Status post ascending aortic dissection with repair and resuspension of the aortic valve leaflets,Repeat echo showed normal LV systolic function and the valve leaflets are functioning okay.  And LV function is normal      History of DVT post surgery-Was on Coumadin and I have taken it off since it is post 3 months.  Now on Plavix per Dr. Rashel Hair for suspicious for TIA    Hypertension-Hyzaar 100/25 mg in the morning, Toprol-XL 25 mg in the evening and amlodipine 2.5 mg the evening.    Hypothyroidism continue the Synthroid      follow with me in 6 months      Liborio Chandra MD  12/22/2017  8:32 AM    EMR Dragon/Transcription disclaimer:   Some of this note may be an electronic transcription/translation of spoken language to printed text. The electronic translation of spoken language may permit erroneous, or at times, nonsensical words or phrases to be inadvertently transcribed; Although I have reviewed the note for such errors, some may still exist.

## 2018-01-23 ENCOUNTER — ANTICOAGULATION VISIT (OUTPATIENT)
Dept: CARDIAC SURGERY | Facility: CLINIC | Age: 58
End: 2018-01-23

## 2018-01-23 NOTE — PROGRESS NOTES
"Pt states he has been off of Coumadin since April 2017 when Dr Prater advised him to stop it due to it \"deteriorating the vessels\" in his knee.   "

## 2018-01-25 ENCOUNTER — OFFICE VISIT (OUTPATIENT)
Dept: OTOLARYNGOLOGY | Facility: CLINIC | Age: 58
End: 2018-01-25

## 2018-01-25 VITALS — HEIGHT: 72 IN | WEIGHT: 270 LBS | BODY MASS INDEX: 36.57 KG/M2

## 2018-01-25 DIAGNOSIS — R13.13 PHARYNGEAL DYSPHAGIA: ICD-10-CM

## 2018-01-25 DIAGNOSIS — R49.0 DYSPHONIA: ICD-10-CM

## 2018-01-25 DIAGNOSIS — D37.05 NEOPLASM OF UNCERTAIN BEHAVIOR OF PHARYNX: Primary | ICD-10-CM

## 2018-01-25 PROCEDURE — 99203 OFFICE O/P NEW LOW 30 MIN: CPT | Performed by: OTOLARYNGOLOGY

## 2018-01-25 PROCEDURE — 31575 DIAGNOSTIC LARYNGOSCOPY: CPT | Performed by: OTOLARYNGOLOGY

## 2018-01-25 RX ORDER — UBIDECARENONE 50 MG
100 CAPSULE ORAL NIGHTLY
COMMUNITY
End: 2018-02-27

## 2018-01-25 RX ORDER — UBIDECARENONE 75 MG
100 CAPSULE ORAL DAILY
COMMUNITY
End: 2021-06-09

## 2018-01-25 NOTE — PROGRESS NOTES
"Subjective   Truman Esquivel is a 57 y.o. male.     History of Present Illness   Patient is here for evaluation of his throat.  He says he's been hoarse for approximately 2 months and also had throat pain.  Symptoms are present every day.  Nothing in particular seems to bring this on.  Was not associated with an upper respiratory infection.  Also describes dysphagia.  States that he will eaten chew food and minutes later he will cough and the food will expectorate from his throat.  He says he doesn't typically get choked or feel like food goes down the wrong way.  He is also expectorated blood on occasion.  He has been anticoagulated previously.  Also has a history of ascending aortic dissection approximately 1 year ago that was treated surgically.  Was diagnosed with what sounds like a lacunar infarct of the brainstem by Dr. Vargas.  Patient states he feels like he has a \"growth in my throat\".  Uses a CPAP machine and feels like this makes things worse including the nasal bleeding.      The following portions of the patient's history were reviewed and updated as appropriate: allergies, current medications, past family history, past medical history, past social history, past surgical history and problem list.      Truman Esquivel reports that he quit smoking about a year ago. He has never used smokeless tobacco. He reports that he does not drink alcohol or use illicit drugs.  Patient is not a tobacco user and has not been counseled for use of tobacco products    Family History   Problem Relation Age of Onset   • Thyroid disease Mother        Allergies   Allergen Reactions   • Acth [Corticotropin]    • Eggs Or Egg-Derived Products    • Erythromycin    • Gabapentin Itching   • Other      Mycins     • Penicillins      Tolerated cefepime during 2/2017 admission. Had rash and itching (relieved by benadryl) after few doses of cefepime and cefepime was continued.   • Tetracyclines & Related    • Cephalosporins " Itching and Rash     Pt developed rash, itching after cefepime administration (2-3 doses) during 2/2017 admission. Itching was relieved with benadryl. Cefepime was continued because his infection was improving and no symptoms of anaphylaxis present         Current Outpatient Prescriptions:   •  amLODIPine (NORVASC) 2.5 MG tablet, Take 1 tablet by mouth Daily With Dinner., Disp: 30 tablet, Rfl: 11  •  atorvastatin (LIPITOR) 20 MG tablet, Take 1 tablet by mouth Daily., Disp: 30 tablet, Rfl: 11  •  cetirizine (zyrTEC) 10 MG tablet, Take 1 tablet by mouth Daily., Disp: 30 tablet, Rfl: 0  •  coenzyme Q10 50 MG capsule capsule, Take  by mouth Daily., Disp: , Rfl:   •  levothyroxine (SYNTHROID, LEVOTHROID) 25 MCG tablet, Take 1 tablet by mouth Daily. (Patient taking differently: Take 88 mcg by mouth Daily.), Disp: 30 tablet, Rfl: 2  •  levothyroxine (SYNTHROID, LEVOTHROID) 88 MCG tablet, , Disp: , Rfl:   •  losartan-hydrochlorothiazide (HYZAAR) 100-25 MG per tablet, Take 1 tablet by mouth Daily., Disp: 30 tablet, Rfl: 12  •  metoprolol succinate XL (TOPROL XL) 25 MG 24 hr tablet, Take 1 tablet by mouth Daily With Dinner., Disp: 30 tablet, Rfl: 12  •  Multiple Vitamins-Minerals (MULTIVITAMIN ADULT PO), Take  by mouth., Disp: , Rfl:   •  vitamin B-12 (CYANOCOBALAMIN) 100 MCG tablet, Take 50 mcg by mouth Daily., Disp: , Rfl:   •  vitamin E 100 UNIT capsule, Take 100 Units by mouth Daily., Disp: , Rfl:   •  aspirin 81 MG tablet, Take 81 mg by mouth Daily., Disp: , Rfl:   •  clopidogrel (PLAVIX) 75 MG tablet, Take 75 mg by mouth Daily., Disp: , Rfl:     Past Medical History:   Diagnosis Date   • Chest pain    • Disease of thyroid gland    • HTN (hypertension)    • Knee pain    • Smoker          Review of Systems   HENT: Positive for sore throat, trouble swallowing and voice change.    Eyes: Positive for visual disturbance.   Respiratory: Positive for cough.    Genitourinary:        Nocturia   Neurological: Positive for  headaches.   All other systems reviewed and are negative.          Objective   Physical Exam  General: Well-developed well-nourished male in no acute distress.  Alert and oriented ×3. Head: Normocephalic. Face: Symmetrical strength and appearance. PERRL. EOMI. Voice: Harsh, with slight hot potato quality no stridor or breathiness Speech:Fluent  Ears: External ears no deformity, canals no discharge, tympanic membranes intact clear and mobile bilaterally.  Nose: Nares show no discharge mass polyp or purulence.  Boggy mucosa is present.  No gross external deformity.  Septum: Midline  Oral cavity: Lips and gums without lesions.  Tongue and floor of mouth without lesions.  Parotid and submandibular ducts unobstructed.  No mucosal lesions on the buccal mucosa or vestibule of the mouth.  Pharynx: No erythema exudate mass or ulcer  Neck: No lymphadenopathy.  No thyromegaly.  Trachea and larynx midline.  No masses in the parotid or submandibular glands.    Procedure Note    Pre-operative Diagnosis:   Chief Complaint   Patient presents with   • Hoarse   • Nose Bleed       Post-operative Diagnosis: Neoplasm, hypopharynx, uncertain behavior    Anesthesia: topical with xylocaine and neosynephrine    Endoscopy Type:  Flexible Laryngoscopy    Procedure Details:    The patient was placed in the sitting position.  After topical anesthesia and decongestion, the 4 mm laryngoscope was passed.  The nasal cavities, nasopharynx, oropharynx, hypopharynx, and larynx were all examined.  Vocal cords were examined during respiration and phonation.  The following findings were noted:    Findings: No masses in the nose or nasopharynx.  In the hypopharynx there is a large exophytic lesion that appears to be roughly in the midline but extends into the vallecula bilaterally and completely obscures visualization of the larynx.  Vocal cord mobility cannot be assessed due to limited visualization because of the tumor.  This is highly suspicious for  malignancy.    Condition:  Stable.  Patient tolerated procedure well.    Complications:  None      Assessment/Plan   Truman was seen today for hoarse and nose bleed.    Diagnoses and all orders for this visit:    Neoplasm of uncertain behavior of pharynx    Dysphonia    Pharyngeal dysphagia    Plan: Obtain CT scan of the neck with contrast.  This will hopefully delineate whether this mass is confined to the tongue base or involves the supraglottic laryngeal structures as well.  Once this is available we will discuss options with the patient for obtaining tissue for biopsy.  May need surgical airway prior to diagnostic laryngoscopy.  Will return after the CT scan is obtained.

## 2018-01-29 ENCOUNTER — OFFICE VISIT (OUTPATIENT)
Dept: OTOLARYNGOLOGY | Facility: CLINIC | Age: 58
End: 2018-01-29

## 2018-01-29 ENCOUNTER — HOSPITAL ENCOUNTER (OUTPATIENT)
Dept: CT IMAGING | Facility: HOSPITAL | Age: 58
Discharge: HOME | End: 2018-01-29
Admitting: OTOLARYNGOLOGY

## 2018-01-29 VITALS — HEART RATE: 74 BPM | OXYGEN SATURATION: 98 % | WEIGHT: 270 LBS | HEIGHT: 72 IN | BODY MASS INDEX: 36.57 KG/M2

## 2018-01-29 DIAGNOSIS — R13.10 DYSPHAGIA, UNSPECIFIED TYPE: ICD-10-CM

## 2018-01-29 DIAGNOSIS — D37.05 NEOPLASM OF UNCERTAIN BEHAVIOR OF PHARYNX: ICD-10-CM

## 2018-01-29 DIAGNOSIS — J37.0 CHRONIC LARYNGITIS: ICD-10-CM

## 2018-01-29 DIAGNOSIS — D38.0 NEOPLASM OF UNCERTAIN BEHAVIOR OF LARYNX: Primary | ICD-10-CM

## 2018-01-29 PROCEDURE — 31575 DIAGNOSTIC LARYNGOSCOPY: CPT | Performed by: OTOLARYNGOLOGY

## 2018-01-29 PROCEDURE — 0 IOPAMIDOL 61 % SOLUTION: Performed by: OTOLARYNGOLOGY

## 2018-01-29 PROCEDURE — 70491 CT SOFT TISSUE NECK W/DYE: CPT

## 2018-01-29 PROCEDURE — 99214 OFFICE O/P EST MOD 30 MIN: CPT | Performed by: OTOLARYNGOLOGY

## 2018-01-29 RX ADMIN — IOPAMIDOL 94 ML: 612 INJECTION, SOLUTION INTRAVENOUS at 15:30

## 2018-01-30 ENCOUNTER — PREP FOR SURGERY (OUTPATIENT)
Dept: OTHER | Facility: HOSPITAL | Age: 58
End: 2018-01-30

## 2018-01-30 PROBLEM — R13.10 DYSPHAGIA: Status: ACTIVE | Noted: 2018-01-30

## 2018-01-30 PROBLEM — D38.0 NEOPLASM OF UNCERTAIN BEHAVIOR OF LARYNX: Status: ACTIVE | Noted: 2018-01-30

## 2018-01-30 NOTE — PROGRESS NOTES
Subjective   Truman Esquivel is a 57 y.o. male.     History of Present Illness   Patient was seen previously with what appeared to be an exophytic mass either of the tongue base or the supraglottic larynx.  Also was having dysphagia and occasional hemoptysis.  Returns today having undergone a CT scan of the neck.  This is personally reviewed and shows what appears to be an exophytic mass involving the epiglottis extending toward and abutting the tongue base but does not appear to be arising out of the tongue base.  No obvious suspicious lymphadenopathy is noted.  Patient reports his symptoms remain the same.  He feels like he has a mass in his throat.  Has trouble swallowing.  Is not experiencing any trouble breathing at this point.      The following portions of the patient's history were reviewed and updated as appropriate: allergies, current medications, past family history, past medical history, past social history, past surgical history and problem list.      Truman Esquivel reports that he quit smoking about a year ago. He has never used smokeless tobacco. He reports that he does not drink alcohol or use illicit drugs.  Patient is not a tobacco user and has not been counseled for use of tobacco products    Family History   Problem Relation Age of Onset   • Thyroid disease Mother        Allergies   Allergen Reactions   • Acth [Corticotropin]    • Eggs Or Egg-Derived Products    • Erythromycin    • Gabapentin Itching   • Other      Mycins     • Penicillins      Tolerated cefepime during 2/2017 admission. Had rash and itching (relieved by benadryl) after few doses of cefepime and cefepime was continued.   • Tetracyclines & Related    • Cephalosporins Itching and Rash     Pt developed rash, itching after cefepime administration (2-3 doses) during 2/2017 admission. Itching was relieved with benadryl. Cefepime was continued because his infection was improving and no symptoms of anaphylaxis present          Current Outpatient Prescriptions:   •  amLODIPine (NORVASC) 2.5 MG tablet, Take 1 tablet by mouth Daily With Dinner., Disp: 30 tablet, Rfl: 11  •  aspirin 81 MG tablet, Take 81 mg by mouth Daily., Disp: , Rfl:   •  levothyroxine (SYNTHROID, LEVOTHROID) 25 MCG tablet, Take 1 tablet by mouth Daily. (Patient taking differently: Take 88 mcg by mouth Daily.), Disp: 30 tablet, Rfl: 2  •  levothyroxine (SYNTHROID, LEVOTHROID) 88 MCG tablet, , Disp: , Rfl:   •  losartan-hydrochlorothiazide (HYZAAR) 100-25 MG per tablet, Take 1 tablet by mouth Daily., Disp: 30 tablet, Rfl: 12  •  metoprolol succinate XL (TOPROL XL) 25 MG 24 hr tablet, Take 1 tablet by mouth Daily With Dinner., Disp: 30 tablet, Rfl: 12  •  Multiple Vitamins-Minerals (MULTIVITAMIN ADULT PO), Take  by mouth., Disp: , Rfl:   •  vitamin B-12 (CYANOCOBALAMIN) 100 MCG tablet, Take 50 mcg by mouth Daily., Disp: , Rfl:   •  vitamin E 100 UNIT capsule, Take 100 Units by mouth Daily., Disp: , Rfl:   •  atorvastatin (LIPITOR) 20 MG tablet, Take 1 tablet by mouth Daily., Disp: 30 tablet, Rfl: 11  •  cetirizine (zyrTEC) 10 MG tablet, Take 1 tablet by mouth Daily., Disp: 30 tablet, Rfl: 0  •  clopidogrel (PLAVIX) 75 MG tablet, Take 75 mg by mouth Daily., Disp: , Rfl:   •  coenzyme Q10 50 MG capsule capsule, Take  by mouth Daily., Disp: , Rfl:   No current facility-administered medications for this visit.     Past Medical History:   Diagnosis Date   • Chest pain    • Disease of thyroid gland    • HTN (hypertension)    • Knee pain    • Smoker          Review of Systems   Constitutional: Negative for fever.   HENT: Positive for trouble swallowing and voice change.            Objective   Physical Exam  General: Well-developed well-nourished male in no acute distress.  Alert and oriented ×3. Head: Normocephalic. Face: Symmetrical strength and appearance Voice: Somewhat muffled but no breathiness or stridor. Speech:Fluent  Ears: External ears no deformity, canals no  discharge, tympanic membranes intact clear and mobile bilaterally.  Nose: Nares show no discharge mass polyp or purulence.  Boggy mucosa is present.  No gross external deformity.  Septum: Midline  Oral cavity: Lips and gums without lesions.  Tongue and floor of mouth without lesions.  Parotid and submandibular ducts unobstructed.  No mucosal lesions on the buccal mucosa or vestibule of the mouth.  Pharynx: No erythema exudate mass or ulcer  Neck: No lymphadenopathy.  No thyromegaly.  Trachea and larynx midline.  No masses in the parotid or submandibular glands.  Chest: Clear.  Heart: Regular.  Abdomen: Benign.    With the CT results reviewed, fiberoptic laryngoscopy is repeated.    Procedure Note    Pre-operative Diagnosis:   Chief Complaint   Patient presents with   • Follow-up     CT results    Hypopharyngeal mass    Post-operative Diagnosis: Exophytic lesion of the epiglottis    Anesthesia: topical with xylocaine and neosynephrine    Endoscopy Type:  Flexible Laryngoscopy    Procedure Details:    The patient was placed in the sitting position.  After topical anesthesia and decongestion, the 4 mm laryngoscope was passed.  The nasal cavities, nasopharynx, oropharynx, hypopharynx, and larynx were all examined.  Vocal cords were examined during respiration and phonation.  The following findings were noted:    Findings: No masses in the nose, nasopharynx.  The hypopharynx shows an exophytic lesion in the midline that appears friable and is overtly neoplastic.  Today I'm able to maneuver past the mass and see into the larynx itself which shows some chronic appearing edema but no evidence of obstruction of the vocal cords and no involvement of the larynx other than the epiglottis.  Vocal cord mobility is intact.    Condition:  Stable.  Patient tolerated procedure well.    Complications:  None      Assessment/Plan   Truman was seen today for follow-up.    Diagnoses and all orders for this visit:    Neoplasm of uncertain  behavior of larynx    Dysphagia, unspecified type    Chronic laryngitis      Plan: Told the patient that I this lesion is highly suspicious for malignancy and biopsy is warrant.  I have recommended direct laryngoscopy with biopsy and esophagoscopy, however given the size of the mass as well as the fact that it involves the majority of the epiglottis, I am concerned that oral endotracheal intubation would not be feasible and would have a significant risk of airway obstruction.  Therefore I recommended proceeding with tracheostomy under local anesthesia prior to the laryngoscopy with biopsy and esophagoscopy.  I explained the nature of all these procedures to the patient in layman's terms.  I've explain risks including bleeding, damage to the throat or voicebox, and the almost certain need for further treatment after pathology is confirmed.  Proposed benefit would be a secure airway which would allow treatment of his laryngeal disease without worrying about acute laryngeal obstruction, along with establishing a definitive diagnosis of the lesion.  The alternative would be observation or attempted oral endotracheal intubation both of which I think have significant risk.  Patient voices understanding of all the above and wishes to proceed with surgery.  This is scheduled.

## 2018-02-01 ENCOUNTER — ANESTHESIA EVENT (OUTPATIENT)
Dept: PERIOP | Facility: HOSPITAL | Age: 58
End: 2018-02-01

## 2018-02-01 ENCOUNTER — APPOINTMENT (OUTPATIENT)
Dept: PREADMISSION TESTING | Facility: HOSPITAL | Age: 58
End: 2018-02-01

## 2018-02-01 VITALS
SYSTOLIC BLOOD PRESSURE: 114 MMHG | RESPIRATION RATE: 16 BRPM | BODY MASS INDEX: 36.3 KG/M2 | DIASTOLIC BLOOD PRESSURE: 60 MMHG | HEIGHT: 72 IN | OXYGEN SATURATION: 97 % | WEIGHT: 268 LBS | HEART RATE: 80 BPM

## 2018-02-01 DIAGNOSIS — D37.05 NEOPLASM OF UNCERTAIN BEHAVIOR OF PHARYNX: ICD-10-CM

## 2018-02-01 LAB
ANION GAP SERPL CALCULATED.3IONS-SCNC: 14 MMOL/L (ref 5–15)
BUN BLD-MCNC: 19 MG/DL (ref 7–21)
BUN/CREAT SERPL: 15.1 (ref 7–25)
CALCIUM SPEC-SCNC: 9.1 MG/DL (ref 8.4–10.2)
CHLORIDE SERPL-SCNC: 99 MMOL/L (ref 95–110)
CO2 SERPL-SCNC: 29 MMOL/L (ref 22–31)
CREAT BLD-MCNC: 1.26 MG/DL (ref 0.7–1.3)
GFR SERPL CREATININE-BSD FRML MDRD: 59 ML/MIN/1.73 (ref 60–130)
GLUCOSE BLD-MCNC: 82 MG/DL (ref 60–100)
POTASSIUM BLD-SCNC: 3.7 MMOL/L (ref 3.5–5.1)
SODIUM BLD-SCNC: 142 MMOL/L (ref 137–145)

## 2018-02-01 PROCEDURE — 93005 ELECTROCARDIOGRAM TRACING: CPT

## 2018-02-01 PROCEDURE — 93010 ELECTROCARDIOGRAM REPORT: CPT | Performed by: INTERNAL MEDICINE

## 2018-02-01 PROCEDURE — 36415 COLL VENOUS BLD VENIPUNCTURE: CPT

## 2018-02-01 PROCEDURE — 80048 BASIC METABOLIC PNL TOTAL CA: CPT | Performed by: OTOLARYNGOLOGY

## 2018-02-01 RX ORDER — AMLODIPINE BESYLATE 5 MG/1
5 TABLET ORAL DAILY
COMMUNITY
End: 2018-03-15

## 2018-02-01 RX ORDER — SODIUM CHLORIDE, SODIUM GLUCONATE, SODIUM ACETATE, POTASSIUM CHLORIDE, AND MAGNESIUM CHLORIDE 526; 502; 368; 37; 30 MG/100ML; MG/100ML; MG/100ML; MG/100ML; MG/100ML
1000 INJECTION, SOLUTION INTRAVENOUS CONTINUOUS PRN
Status: CANCELLED | OUTPATIENT
Start: 2018-02-05

## 2018-02-01 NOTE — DISCHARGE INSTRUCTIONS
[facility]  Pre-op Information and Guidelines    You will be called after 2 p.m. the day before your surgery (Friday for Monday surgery) and notified of your time for arrival and approximate surgery time.  If you have not received a call by 4P.M., please contact Same Day Surgery at [phone] of if outside [facility] call [phone].    Please Follow these Important Safety Guidelines:    • The morning of your procedure, take only the medications listed below with   A sip of water:_____________________________________________       ____AMLODIPINE, LEVOTHYROXINE_____________      • DO NOT eat or drink anything after 12:00 midnight the night before surgery  Specific instructions concerning drinking clear liquids will be discussed during  the pre-surgery instruction call the day before your surgery.    • If you take a blood thinner (ex. Plavix, Coumadin, aspirin), ask your doctor when to stop it before surgery  STOP DATE: _________________    • Only 2 visitors are allowed in patient rooms at a time  Your visitors will be asked to wait in the lobby until the admission process is complete with the exception of a parent with a child and patients in need of special assistance.    • YOU CANNOT DRIVE YOURSELF HOME  You must be accompanied by someone who will be responsible for driving you home after surgery and for your care at home.    • DO NOT chew gum, use breath mints, hard candy, or smoke the day of surgery  • DO NOT drink alcohol for at least 24 hours before your surgery  • DO NOT wear any jewelry and remove all body piercing before coming to the hospital  • DO NOT wear make-up to the hospital  • If you are having surgery on an extremity (arm/leg/foot) remove nail polish/artificial nails on the surgical side  • Clothing, glasses, contacts, dentures, and hairpieces must be removed before surgery  • Bathe the night before or the morning of your surgery and do not use powders/lotions on  skin.

## 2018-02-01 NOTE — ANESTHESIA PREPROCEDURE EVALUATION
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 Patient with hoarseness and dysphagia, no air hunger.  States he did have problems with nose bleeds and posterior pharynx bleeding episodes when on \"blood thinners\". States since these were stopped no more episodes.   Dental - normal exam   (+) poor dentation    Comment: Overall upper and lower dentition in poor repair. Cautioned patient that due to airway and dental condition that trauma to teeth could occur. He understands and agrees.    Pulmonary - normal exam    breath sounds clear to auscultation  (+) sleep apnea,   Smoker: Quit 2/13/17.    PE comment: No audible upper airway obstruction on exam.  Cardiovascular - normal exam  Exercise tolerance: good (4-7 METS)    ECG reviewed  PT is on anticoagulation therapy  Patient on routine beta blocker and Beta blocker given within 24 hours of surgery  Rhythm: regular  Rate: normal    (+) hypertension, PVD (Aortic arch dissection repair Febuary 2017.), DVT (DVT of lower extremities.) resolved, hyperlipidemia  (-) carotid bruits    ROS comment: EKG:NSR    Neuro/Psych  (+) TIA, CVA,     GI/Hepatic/Renal/Endo    (+) obesity,  hypothyroidism,     Musculoskeletal (-) negative ROS    Abdominal  - normal exam  (+) obese,    Substance History - negative use     OB/GYN          Other      history of cancer (Larynx/supra glottic.) active                                        Anesthesia Plan    ASA 4     general   Awake fiberoptic intubation(Per surgeon, the plan is for an tracheostomy under local first to be followed by a general anesthetic to facilitate the evaluations of the esophagus, pharynx and trachea.)  intravenous induction   Anesthetic plan and risks discussed with patient.      "

## 2018-02-05 ENCOUNTER — ANESTHESIA (OUTPATIENT)
Dept: PERIOP | Facility: HOSPITAL | Age: 58
End: 2018-02-05

## 2018-02-05 ENCOUNTER — HOSPITAL ENCOUNTER (OUTPATIENT)
Facility: HOSPITAL | Age: 58
Setting detail: OBSERVATION
LOS: 1 days | Discharge: HOME | End: 2018-02-07
Attending: OTOLARYNGOLOGY | Admitting: OTOLARYNGOLOGY

## 2018-02-05 DIAGNOSIS — D38.0 NEOPLASM OF UNCERTAIN BEHAVIOR OF LARYNX: ICD-10-CM

## 2018-02-05 DIAGNOSIS — R13.13 PHARYNGEAL DYSPHAGIA: Primary | ICD-10-CM

## 2018-02-05 DIAGNOSIS — Z93.0 TRACHEOSTOMY IN PLACE (HCC): ICD-10-CM

## 2018-02-05 DIAGNOSIS — C32.9 SQUAMOUS CELL CARCINOMA OF LARYNX (HCC): ICD-10-CM

## 2018-02-05 DIAGNOSIS — C32.9 CARCINOMA LARYNX (HCC): Primary | ICD-10-CM

## 2018-02-05 DIAGNOSIS — R13.10 DYSPHAGIA, UNSPECIFIED TYPE: ICD-10-CM

## 2018-02-05 LAB
ALBUMIN SERPL-MCNC: 3.8 G/DL (ref 3.4–4.8)
ALBUMIN/GLOB SERPL: 1.4 G/DL (ref 1.1–1.8)
ALP SERPL-CCNC: 71 U/L (ref 38–126)
ALT SERPL W P-5'-P-CCNC: 29 U/L (ref 21–72)
ANION GAP SERPL CALCULATED.3IONS-SCNC: 13 MMOL/L (ref 5–15)
AST SERPL-CCNC: 16 U/L (ref 17–59)
BILIRUB SERPL-MCNC: 0.2 MG/DL (ref 0.2–1.3)
BUN BLD-MCNC: 18 MG/DL (ref 7–21)
BUN/CREAT SERPL: 15.5 (ref 7–25)
CALCIUM SPEC-SCNC: 8.8 MG/DL (ref 8.4–10.2)
CHLORIDE SERPL-SCNC: 99 MMOL/L (ref 95–110)
CO2 SERPL-SCNC: 29 MMOL/L (ref 22–31)
CREAT BLD-MCNC: 1.16 MG/DL (ref 0.7–1.3)
DEPRECATED RDW RBC AUTO: 39.1 FL (ref 35.1–43.9)
ERYTHROCYTE [DISTWIDTH] IN BLOOD BY AUTOMATED COUNT: 13.7 % (ref 11.5–14.5)
GFR SERPL CREATININE-BSD FRML MDRD: 65 ML/MIN/1.73 (ref 60–130)
GLOBULIN UR ELPH-MCNC: 2.7 GM/DL (ref 2.3–3.5)
GLUCOSE BLD-MCNC: 101 MG/DL (ref 60–100)
HCT VFR BLD AUTO: 35.2 % (ref 39–49)
HGB BLD-MCNC: 11.2 G/DL (ref 13.7–17.3)
MCH RBC QN AUTO: 24.9 PG (ref 26.5–34)
MCHC RBC AUTO-ENTMCNC: 31.8 G/DL (ref 31.5–36.3)
MCV RBC AUTO: 78.2 FL (ref 80–98)
PLATELET # BLD AUTO: 193 10*3/MM3 (ref 150–450)
PMV BLD AUTO: 11.1 FL (ref 8–12)
POTASSIUM BLD-SCNC: 4.2 MMOL/L (ref 3.5–5.1)
PROT SERPL-MCNC: 6.5 G/DL (ref 6.3–8.6)
RBC # BLD AUTO: 4.5 10*6/MM3 (ref 4.37–5.74)
SODIUM BLD-SCNC: 141 MMOL/L (ref 137–145)
WBC NRBC COR # BLD: 12.66 10*3/MM3 (ref 3.2–9.8)

## 2018-02-05 PROCEDURE — G8996 SWALLOW CURRENT STATUS: HCPCS | Performed by: SPEECH-LANGUAGE PATHOLOGIST

## 2018-02-05 PROCEDURE — 31600 PLANNED TRACHEOSTOMY: CPT | Performed by: OTOLARYNGOLOGY

## 2018-02-05 PROCEDURE — 25010000002 HYDROMORPHONE PER 4 MG: Performed by: NURSE ANESTHETIST, CERTIFIED REGISTERED

## 2018-02-05 PROCEDURE — 85027 COMPLETE CBC AUTOMATED: CPT | Performed by: OTOLARYNGOLOGY

## 2018-02-05 PROCEDURE — G8997 SWALLOW GOAL STATUS: HCPCS | Performed by: SPEECH-LANGUAGE PATHOLOGIST

## 2018-02-05 PROCEDURE — 31535 LARYNGOSCOPY W/BIOPSY: CPT | Performed by: OTOLARYNGOLOGY

## 2018-02-05 PROCEDURE — 88331 PATH CONSLTJ SURG 1 BLK 1SPC: CPT | Performed by: PATHOLOGY

## 2018-02-05 PROCEDURE — 88305 TISSUE EXAM BY PATHOLOGIST: CPT | Performed by: PATHOLOGY

## 2018-02-05 PROCEDURE — 25010000002 MORPHINE PER 10 MG: Performed by: OTOLARYNGOLOGY

## 2018-02-05 PROCEDURE — 88331 PATH CONSLTJ SURG 1 BLK 1SPC: CPT | Performed by: OTOLARYNGOLOGY

## 2018-02-05 PROCEDURE — 88305 TISSUE EXAM BY PATHOLOGIST: CPT | Performed by: OTOLARYNGOLOGY

## 2018-02-05 PROCEDURE — 25010000002 PHENYLEPHRINE PER 1 ML: Performed by: NURSE ANESTHETIST, CERTIFIED REGISTERED

## 2018-02-05 PROCEDURE — 25010000002 NEOSTIGMINE PER 0.5 MG: Performed by: NURSE ANESTHETIST, CERTIFIED REGISTERED

## 2018-02-05 PROCEDURE — 25010000002 FENTANYL CITRATE (PF) 100 MCG/2ML SOLUTION: Performed by: NURSE ANESTHETIST, CERTIFIED REGISTERED

## 2018-02-05 PROCEDURE — 25010000002 ONDANSETRON PER 1 MG: Performed by: NURSE ANESTHETIST, CERTIFIED REGISTERED

## 2018-02-05 PROCEDURE — 25010000002 MIDAZOLAM PER 1 MG: Performed by: NURSE ANESTHETIST, CERTIFIED REGISTERED

## 2018-02-05 PROCEDURE — 92610 EVALUATE SWALLOWING FUNCTION: CPT | Performed by: SPEECH-LANGUAGE PATHOLOGIST

## 2018-02-05 PROCEDURE — 25010000002 PROPOFOL 10 MG/ML EMULSION: Performed by: NURSE ANESTHETIST, CERTIFIED REGISTERED

## 2018-02-05 PROCEDURE — 43191 ESOPHAGOSCOPY RIGID TRNSO DX: CPT | Performed by: OTOLARYNGOLOGY

## 2018-02-05 PROCEDURE — 80053 COMPREHEN METABOLIC PANEL: CPT | Performed by: OTOLARYNGOLOGY

## 2018-02-05 RX ORDER — AMLODIPINE BESYLATE 5 MG/1
5 TABLET ORAL DAILY
Status: DISCONTINUED | OUTPATIENT
Start: 2018-02-05 | End: 2018-02-05 | Stop reason: SDUPTHER

## 2018-02-05 RX ORDER — DEXTROSE, SODIUM CHLORIDE, AND POTASSIUM CHLORIDE 5; .45; .15 G/100ML; G/100ML; G/100ML
80 INJECTION INTRAVENOUS CONTINUOUS
Status: DISCONTINUED | OUTPATIENT
Start: 2018-02-05 | End: 2018-02-07 | Stop reason: HOSPADM

## 2018-02-05 RX ORDER — SODIUM CHLORIDE 0.9 % (FLUSH) 0.9 %
1-10 SYRINGE (ML) INJECTION AS NEEDED
Status: DISCONTINUED | OUTPATIENT
Start: 2018-02-05 | End: 2018-02-07 | Stop reason: HOSPADM

## 2018-02-05 RX ORDER — ROCURONIUM BROMIDE 10 MG/ML
INJECTION, SOLUTION INTRAVENOUS AS NEEDED
Status: DISCONTINUED | OUTPATIENT
Start: 2018-02-05 | End: 2018-02-05 | Stop reason: SURG

## 2018-02-05 RX ORDER — LIDOCAINE HYDROCHLORIDE AND EPINEPHRINE 10; 10 MG/ML; UG/ML
INJECTION, SOLUTION INFILTRATION; PERINEURAL AS NEEDED
Status: DISCONTINUED | OUTPATIENT
Start: 2018-02-05 | End: 2018-02-07 | Stop reason: HOSPADM

## 2018-02-05 RX ORDER — ATORVASTATIN CALCIUM 20 MG/1
20 TABLET, FILM COATED ORAL DAILY
Status: DISCONTINUED | OUTPATIENT
Start: 2018-02-05 | End: 2018-02-05 | Stop reason: SDUPTHER

## 2018-02-05 RX ORDER — OXYCODONE HYDROCHLORIDE AND ACETAMINOPHEN 5; 325 MG/1; MG/1
1 TABLET ORAL EVERY 4 HOURS PRN
Status: DISCONTINUED | OUTPATIENT
Start: 2018-02-05 | End: 2018-02-07 | Stop reason: HOSPADM

## 2018-02-05 RX ORDER — METOPROLOL SUCCINATE 25 MG/1
25 TABLET, EXTENDED RELEASE ORAL NIGHTLY
Status: DISCONTINUED | OUTPATIENT
Start: 2018-02-05 | End: 2018-02-07 | Stop reason: HOSPADM

## 2018-02-05 RX ORDER — ACETAMINOPHEN 325 MG/1
650 TABLET ORAL ONCE AS NEEDED
Status: DISCONTINUED | OUTPATIENT
Start: 2018-02-05 | End: 2018-02-05 | Stop reason: HOSPADM

## 2018-02-05 RX ORDER — LEVOTHYROXINE SODIUM 88 UG/1
88 TABLET ORAL DAILY
Status: DISCONTINUED | OUTPATIENT
Start: 2018-02-05 | End: 2018-02-07 | Stop reason: HOSPADM

## 2018-02-05 RX ORDER — NALOXONE HCL 0.4 MG/ML
0.4 VIAL (ML) INJECTION
Status: DISCONTINUED | OUTPATIENT
Start: 2018-02-05 | End: 2018-02-07 | Stop reason: HOSPADM

## 2018-02-05 RX ORDER — NALOXONE HCL 0.4 MG/ML
0.2 VIAL (ML) INJECTION AS NEEDED
Status: DISCONTINUED | OUTPATIENT
Start: 2018-02-05 | End: 2018-02-05 | Stop reason: HOSPADM

## 2018-02-05 RX ORDER — LEVOTHYROXINE SODIUM 88 UG/1
88 TABLET ORAL DAILY
Status: DISCONTINUED | OUTPATIENT
Start: 2018-02-05 | End: 2018-02-05

## 2018-02-05 RX ORDER — LIDOCAINE HYDROCHLORIDE 5 MG/ML
INJECTION, SOLUTION INFILTRATION; PERINEURAL AS NEEDED
Status: DISCONTINUED | OUTPATIENT
Start: 2018-02-05 | End: 2018-02-07 | Stop reason: HOSPADM

## 2018-02-05 RX ORDER — ONDANSETRON 2 MG/ML
INJECTION INTRAMUSCULAR; INTRAVENOUS AS NEEDED
Status: DISCONTINUED | OUTPATIENT
Start: 2018-02-05 | End: 2018-02-05 | Stop reason: SURG

## 2018-02-05 RX ORDER — PROMETHAZINE HYDROCHLORIDE 25 MG/ML
12.5 INJECTION, SOLUTION INTRAMUSCULAR; INTRAVENOUS EVERY 4 HOURS PRN
Status: DISCONTINUED | OUTPATIENT
Start: 2018-02-05 | End: 2018-02-07 | Stop reason: HOSPADM

## 2018-02-05 RX ORDER — METOPROLOL SUCCINATE 25 MG/1
25 TABLET, EXTENDED RELEASE ORAL NIGHTLY
COMMUNITY
End: 2018-03-15 | Stop reason: SDUPTHER

## 2018-02-05 RX ORDER — ONDANSETRON 2 MG/ML
4 INJECTION INTRAMUSCULAR; INTRAVENOUS EVERY 8 HOURS PRN
Status: DISCONTINUED | OUTPATIENT
Start: 2018-02-05 | End: 2018-02-07 | Stop reason: HOSPADM

## 2018-02-05 RX ORDER — FENTANYL CITRATE 50 UG/ML
INJECTION, SOLUTION INTRAMUSCULAR; INTRAVENOUS AS NEEDED
Status: DISCONTINUED | OUTPATIENT
Start: 2018-02-05 | End: 2018-02-05 | Stop reason: SURG

## 2018-02-05 RX ORDER — UBIDECARENONE 75 MG
100 CAPSULE ORAL DAILY
Status: DISCONTINUED | OUTPATIENT
Start: 2018-02-05 | End: 2018-02-07 | Stop reason: HOSPADM

## 2018-02-05 RX ORDER — SODIUM CHLORIDE, SODIUM GLUCONATE, SODIUM ACETATE, POTASSIUM CHLORIDE, AND MAGNESIUM CHLORIDE 526; 502; 368; 37; 30 MG/100ML; MG/100ML; MG/100ML; MG/100ML; MG/100ML
1000 INJECTION, SOLUTION INTRAVENOUS CONTINUOUS PRN
Status: DISCONTINUED | OUTPATIENT
Start: 2018-02-05 | End: 2018-02-05 | Stop reason: HOSPADM

## 2018-02-05 RX ORDER — BACITRACIN ZINC 500 [USP'U]/G
OINTMENT TOPICAL AS NEEDED
Status: DISCONTINUED | OUTPATIENT
Start: 2018-02-05 | End: 2018-02-07 | Stop reason: HOSPADM

## 2018-02-05 RX ORDER — UBIDECARENONE 75 MG
100 CAPSULE ORAL DAILY
Status: DISCONTINUED | OUTPATIENT
Start: 2018-02-05 | End: 2018-02-05 | Stop reason: SDUPTHER

## 2018-02-05 RX ORDER — ONDANSETRON 2 MG/ML
4 INJECTION INTRAMUSCULAR; INTRAVENOUS ONCE AS NEEDED
Status: DISCONTINUED | OUTPATIENT
Start: 2018-02-05 | End: 2018-02-05 | Stop reason: HOSPADM

## 2018-02-05 RX ORDER — HYDROMORPHONE HCL 110MG/55ML
0.5 PATIENT CONTROLLED ANALGESIA SYRINGE INTRAVENOUS
Status: DISCONTINUED | OUTPATIENT
Start: 2018-02-05 | End: 2018-02-05 | Stop reason: HOSPADM

## 2018-02-05 RX ORDER — LABETALOL HYDROCHLORIDE 5 MG/ML
5 INJECTION, SOLUTION INTRAVENOUS
Status: DISCONTINUED | OUTPATIENT
Start: 2018-02-05 | End: 2018-02-05 | Stop reason: HOSPADM

## 2018-02-05 RX ORDER — HYDRALAZINE HYDROCHLORIDE 20 MG/ML
10 INJECTION INTRAMUSCULAR; INTRAVENOUS EVERY 6 HOURS PRN
Status: DISCONTINUED | OUTPATIENT
Start: 2018-02-05 | End: 2018-02-07 | Stop reason: HOSPADM

## 2018-02-05 RX ORDER — MIDAZOLAM HYDROCHLORIDE 1 MG/ML
INJECTION INTRAMUSCULAR; INTRAVENOUS AS NEEDED
Status: DISCONTINUED | OUTPATIENT
Start: 2018-02-05 | End: 2018-02-05 | Stop reason: SURG

## 2018-02-05 RX ORDER — GLYCOPYRROLATE 0.2 MG/ML
INJECTION INTRAMUSCULAR; INTRAVENOUS AS NEEDED
Status: DISCONTINUED | OUTPATIENT
Start: 2018-02-05 | End: 2018-02-05 | Stop reason: SURG

## 2018-02-05 RX ORDER — MORPHINE SULFATE 8 MG/ML
4 INJECTION INTRAMUSCULAR; INTRAVENOUS; SUBCUTANEOUS
Status: DISCONTINUED | OUTPATIENT
Start: 2018-02-05 | End: 2018-02-07 | Stop reason: HOSPADM

## 2018-02-05 RX ORDER — PROMETHAZINE HYDROCHLORIDE 25 MG/ML
12.5 INJECTION, SOLUTION INTRAMUSCULAR; INTRAVENOUS EVERY 6 HOURS PRN
Status: DISCONTINUED | OUTPATIENT
Start: 2018-02-05 | End: 2018-02-07 | Stop reason: HOSPADM

## 2018-02-05 RX ORDER — PROPOFOL 10 MG/ML
VIAL (ML) INTRAVENOUS AS NEEDED
Status: DISCONTINUED | OUTPATIENT
Start: 2018-02-05 | End: 2018-02-05 | Stop reason: SURG

## 2018-02-05 RX ORDER — METOPROLOL SUCCINATE 25 MG/1
25 TABLET, EXTENDED RELEASE ORAL NIGHTLY
Status: DISCONTINUED | OUTPATIENT
Start: 2018-02-05 | End: 2018-02-05 | Stop reason: SDUPTHER

## 2018-02-05 RX ORDER — AMLODIPINE BESYLATE 5 MG/1
5 TABLET ORAL DAILY
Status: DISCONTINUED | OUTPATIENT
Start: 2018-02-05 | End: 2018-02-07 | Stop reason: HOSPADM

## 2018-02-05 RX ORDER — FLUMAZENIL 0.1 MG/ML
0.2 INJECTION INTRAVENOUS AS NEEDED
Status: DISCONTINUED | OUTPATIENT
Start: 2018-02-05 | End: 2018-02-05 | Stop reason: HOSPADM

## 2018-02-05 RX ORDER — MORPHINE SULFATE 8 MG/ML
6 INJECTION INTRAMUSCULAR; INTRAVENOUS; SUBCUTANEOUS
Status: DISCONTINUED | OUTPATIENT
Start: 2018-02-05 | End: 2018-02-07 | Stop reason: HOSPADM

## 2018-02-05 RX ORDER — DIPHENHYDRAMINE HYDROCHLORIDE 50 MG/ML
12.5 INJECTION INTRAMUSCULAR; INTRAVENOUS
Status: DISCONTINUED | OUTPATIENT
Start: 2018-02-05 | End: 2018-02-05 | Stop reason: HOSPADM

## 2018-02-05 RX ORDER — ATORVASTATIN CALCIUM 20 MG/1
20 TABLET, FILM COATED ORAL DAILY
Status: DISCONTINUED | OUTPATIENT
Start: 2018-02-05 | End: 2018-02-07 | Stop reason: HOSPADM

## 2018-02-05 RX ORDER — ACETAMINOPHEN 650 MG/1
650 SUPPOSITORY RECTAL ONCE AS NEEDED
Status: DISCONTINUED | OUTPATIENT
Start: 2018-02-05 | End: 2018-02-05 | Stop reason: HOSPADM

## 2018-02-05 RX ORDER — EPHEDRINE SULFATE 50 MG/ML
5 INJECTION, SOLUTION INTRAVENOUS ONCE AS NEEDED
Status: DISCONTINUED | OUTPATIENT
Start: 2018-02-05 | End: 2018-02-05 | Stop reason: HOSPADM

## 2018-02-05 RX ADMIN — FENTANYL CITRATE 25 MCG: 50 INJECTION, SOLUTION INTRAMUSCULAR; INTRAVENOUS at 11:07

## 2018-02-05 RX ADMIN — FENTANYL CITRATE 50 MCG: 50 INJECTION, SOLUTION INTRAMUSCULAR; INTRAVENOUS at 12:01

## 2018-02-05 RX ADMIN — FENTANYL CITRATE 25 MCG: 50 INJECTION, SOLUTION INTRAMUSCULAR; INTRAVENOUS at 11:12

## 2018-02-05 RX ADMIN — ROCURONIUM BROMIDE 10 MG: 10 INJECTION INTRAVENOUS at 12:14

## 2018-02-05 RX ADMIN — FENTANYL CITRATE 50 MCG: 50 INJECTION, SOLUTION INTRAMUSCULAR; INTRAVENOUS at 10:51

## 2018-02-05 RX ADMIN — PROPOFOL 200 MG: 10 INJECTION, EMULSION INTRAVENOUS at 11:43

## 2018-02-05 RX ADMIN — HYDROMORPHONE HYDROCHLORIDE 0.5 MG: 2 INJECTION INTRAMUSCULAR; INTRAVENOUS; SUBCUTANEOUS at 12:38

## 2018-02-05 RX ADMIN — MIDAZOLAM 0.5 MG: 1 INJECTION INTRAMUSCULAR; INTRAVENOUS at 11:12

## 2018-02-05 RX ADMIN — Medication 3 MG: at 12:26

## 2018-02-05 RX ADMIN — SODIUM CHLORIDE, SODIUM GLUCONATE, SODIUM ACETATE, POTASSIUM CHLORIDE, AND MAGNESIUM CHLORIDE 1000 ML: 526; 502; 368; 37; 30 INJECTION, SOLUTION INTRAVENOUS at 08:51

## 2018-02-05 RX ADMIN — MORPHINE SULFATE 4 MG: 8 INJECTION, SOLUTION INTRAMUSCULAR; INTRAVENOUS at 16:17

## 2018-02-05 RX ADMIN — METOPROLOL SUCCINATE 25 MG: 25 TABLET, EXTENDED RELEASE ORAL at 21:35

## 2018-02-05 RX ADMIN — MORPHINE SULFATE 6 MG: 8 INJECTION, SOLUTION INTRAMUSCULAR; INTRAVENOUS at 18:32

## 2018-02-05 RX ADMIN — ROCURONIUM BROMIDE 40 MG: 10 INJECTION INTRAVENOUS at 11:46

## 2018-02-05 RX ADMIN — POTASSIUM CHLORIDE, DEXTROSE MONOHYDRATE AND SODIUM CHLORIDE 80 ML/HR: 150; 5; 450 INJECTION, SOLUTION INTRAVENOUS at 15:10

## 2018-02-05 RX ADMIN — ONDANSETRON 4 MG: 2 INJECTION INTRAMUSCULAR; INTRAVENOUS at 12:32

## 2018-02-05 RX ADMIN — MIDAZOLAM 0.5 MG: 1 INJECTION INTRAMUSCULAR; INTRAVENOUS at 11:07

## 2018-02-05 RX ADMIN — FENTANYL CITRATE 25 MCG: 50 INJECTION, SOLUTION INTRAMUSCULAR; INTRAVENOUS at 11:30

## 2018-02-05 RX ADMIN — PHENYLEPHRINE HYDROCHLORIDE 100 MCG: 10 INJECTION INTRAVENOUS at 11:51

## 2018-02-05 RX ADMIN — GLYCOPYRROLATE 0.4 MG: 0.2 INJECTION, SOLUTION INTRAMUSCULAR; INTRAVENOUS at 12:26

## 2018-02-05 RX ADMIN — HYDROMORPHONE HYDROCHLORIDE 0.5 MG: 2 INJECTION INTRAMUSCULAR; INTRAVENOUS; SUBCUTANEOUS at 13:03

## 2018-02-05 RX ADMIN — MIDAZOLAM 2 MG: 1 INJECTION INTRAMUSCULAR; INTRAVENOUS at 10:43

## 2018-02-05 RX ADMIN — MIDAZOLAM 1 MG: 1 INJECTION INTRAMUSCULAR; INTRAVENOUS at 10:55

## 2018-02-05 RX ADMIN — FENTANYL CITRATE 25 MCG: 50 INJECTION, SOLUTION INTRAMUSCULAR; INTRAVENOUS at 11:34

## 2018-02-05 RX ADMIN — SODIUM CHLORIDE, SODIUM GLUCONATE, SODIUM ACETATE, POTASSIUM CHLORIDE, AND MAGNESIUM CHLORIDE: 526; 502; 368; 37; 30 INJECTION, SOLUTION INTRAVENOUS at 12:16

## 2018-02-05 NOTE — BRIEF OP NOTE
TRACHEOSTOMY, DIRECT LARYNGOSCOPY, ESOPHAGOSCOPY, BRONCHOSCOPY  Progress Note    Truman Esquivel  2/5/2018    Pre-op Diagnosis:   Neoplasm of uncertain behavior of larynx [D38.0]  Dysphagia, unspecified type [R13.10]       Post-Op Diagnosis Codes:     * Neoplasm of uncertain behavior of larynx [D38.0]     * Dysphagia, unspecified type [R13.10]    Procedure/CPT® Codes:      Procedure(s):  TRACHEOSTOMY  local injection @ 1106 incision @ 1119  DIRECT LARYNGOSCOPY WITH BIOPSY, ESOPHAGOSCOPY     (no bronchoscopy, no laser)    Surgeon(s):  Joseph Venegas MD    Anesthesia: Monitor Anesthesia Care    Staff:   Circulator: Mireya Hanson RN  Scrub Person: Pau Hernandez; Shonna Butler  Assistant: Dilia Dawn    Estimated Blood Loss: 100 mL    Urine Voided: * No values recorded between 2/5/2018 10:47 AM and 2/5/2018 12:29 PM *    Specimens:                  ID Type Source Tests Collected by Time Destination   A : TUMOR EPIGLOTTIS  Tissue Oral Cavity TISSUE EXAM Joseph Venegas MD 2/5/2018 1157    B : TUMOR EPIGLOTTIS  Tissue Oral Cavity TISSUE EXAM Joseph Venegas MD 2/5/2018 1207          Drains:           Findings: Large pretracheal fat pad, size 8 cuffed Shiley tracheostomy placed without difficulty.  Exophytic tumor involving the laryngeal surface of the epiglottis measuring approximately 3.5 cm in greatest diameter.    Complications: None apparent      Joseph Venegas MD     Date: 2/5/2018  Time: 12:34 PM

## 2018-02-05 NOTE — PLAN OF CARE
Problem: Patient Care Overview (Adult)  Goal: Plan of Care Review  Outcome: Ongoing (interventions implemented as appropriate)   02/05/18 1311   Coping/Psychosocial Response Interventions   Plan Of Care Reviewed With patient   Patient Care Overview   Progress improving   Outcome Evaluation   Outcome Summary/Follow up Plan vss, neck dsg noted with slight drainage, pt tolerating trach well no distress noted     Goal: Adult Individualization and Mutuality  Outcome: Ongoing (interventions implemented as appropriate)      Problem: Perioperative Period (Adult)  Goal: Signs and Symptoms of Listed Potential Problems Will be Absent or Manageable (Perioperative Period)  Outcome: Ongoing (interventions implemented as appropriate)

## 2018-02-05 NOTE — THERAPY EVALUATION
Inpatient Rehabilitation - Speech Language Pathology   Swallow Initial Evaluation [facility]     Patient Name: Truman Esquivel  : 1960  MRN: [id number]  Today's Date: 2018        Referring Physician: GERI Venegas      Admit Date: 2018     Swallow evaluation completed this date. Pt presents s/p tracheostomy, is alert and communicating via writing. Pt states that he had been on thickened liquids during a hospital stay at [place]. Pt presented w/cough on thin liquids via cup and spoon. Pt presented w/cough on thinned NTL via cup. Pt safely tolerated NTL via cup w/no overt s/s of aspiration. SLP recommends NTL via cup at this time. Pt and family in agreement w/recommendations and POC. SLP discussed request for mighty shake and NT milk w/all meals w/dietian.    Goals:  1.  Pt will trial solids and thin liquids w/no overt s/s of aspiration:  2.  Pt will safely tolerate least restricted diet w/no overt s/s of aspiration for adequate nutrition and hydration:    Ileana Yañez MS CCC-SLP 2018 2:57 PM    Visit Dx:     ICD-10-CM ICD-9-CM   1. Pharyngeal dysphagia R13.13 787.23   2. 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 Dysphagia, unspecified type R13.10 787.20     Patient Active Problem List   Diagnosis   • Ascending aortic dissection   • Essential hypertension   • Deep vein thrombosis (DVT) of femoral vein of both lower extremities   • Acquired hypothyroidism   • Snoring   • Acute pain of right knee   • Knee effusion, right   • Knee pain   • Obstructive sleep apnea of adult   • TIA (transient ischemic attack)   • Neoplasm of uncertain behavior of larynx   • Dysphagia   • Squamous cell carcinoma of larynx     Past Medical History:   Diagnosis Date   • Aortic dissection    • Chest pain    • Disease of thyroid gland    • HTN (hypertension)    • Knee pain    • Sleep apnea    • Smoker    • Stroke    • Swallowing difficulty      Past Surgical History:   Procedure Laterality Date    • AORTA SURGERY      ruptured aorta repair   • ASCENDING ARCH/HEMIARCH REPLACEMENT N/A 2/14/2017    Procedure: INTRAOPERATIVE TRANSESOPHAGEAL ECHOCARDIOGRAM, MIDLINE STERNOTOMY, ASCENDING AORTIC  AND PROXIMAL AORTIC ARCH REPAIR WITH 26MM GRAFT, AORTIC VALVE RESUSPENSION, AORTIC ROOT REPAIR, OPEN VEIN HARVEST OF RIGHT LEG;  Surgeon: German Arreguin MD;  Location: [facility] MAIN OR;  Service:    • BRONCHOSCOPY N/A 2/17/2017    Procedure: BRONCHOSCOPY BIOPSY AT BEDSIDE WITH BAL-LEFT LOWER LOBE;  Surgeon: Trent Chaney MD;  Location: [facility] ENDOSCOPY;  Service:    • TRACHEOSTOMY  02/05/2018          SWALLOW EVALUATION (last 72 hours)      Swallow Evaluation       02/05/18 1402                Rehab Evaluation    Document Type evaluation  -CK        Subjective Information agree to therapy  -CK        Patient Effort, Rehab Treatment good  -CK        General Information    Patient Profile Review yes  -CK        Date of Surgery 02/05/18  -CK        Referring Physician GERI Venegas  -CK        Subjective Patient Observations Pt alert and communicating via writing  -CK        Pertinent History Of Current Problem dysphagia w/previous hospital admit in [place]  -CK        Current Diet Limitations NPO  -CK        Precautions/Limitations, Vision WFL with corrective lenses  -CK        Precautions/Limitations, Hearing WFL  -CK        Prior Level of Function- Communication functional in all spheres  -CK        Prior Level of Function- Swallowing diet modifications- liquid;diet modifications- foods  -CK        Plans/Goals Discussed With patient;family;agreed upon  -CK        Clinical Impression    Patient's Goals For Discharge eat/drink without coughing/choking  -CK        SLP Swallowing Diagnosis mild dysphagia;pharyngeal dysfunction  -CK        Rehab Potential/Prognosis, Swallowing good, to achieve stated therapy goals  -CK        Criteria for Skilled Therapeutic Interventions Met skilled criteria for dysphagia intervention met   -CK        FCM, Swallowing 4-->Level 4  -CK        Therapy Frequency 3-5 times/wk  -CK        Predicted Duration Therapy Interv (days) until discharge  -CK        Expected Duration Therapy Session (min) 15-30 minutes;30-45 minutes  -CK        SLP Diet Recommendation nectar/syrup-thick liquids  -CK        Recommended Feeding/Eating Techniques no straws;small sips/bites  -CK        SLP Rec. for Method of Medication Administration meds crushed in pudding/applesauce  -CK        Monitor For Signs Of Aspiration cough  -CK        Anticipated Discharge Disposition home with assist  -CK        Pain Assessment    Pain Assessment No/denies pain  -CK        Cognitive Assessment/Intervention    Current Cognitive/Communication Assessment functional  -CK        Orientation Status oriented x 4  -CK        Follows Commands/Answers Questions 100% of the time  -CK        Oral Motor Structure and Function    Dentition Assessment present and adequate  -CK        Secretion Management WNL/WFL  -CK        Volitional Swallow no difficulties initiating volitional swallow  -CK        Volitional Cough other (see comments)   trach  -CK        General Feeding/Swallowing Observations    Current Feeding Method NPO  -CK        Respiratory Support Currently in Use tracheostomy in place  -CK        Observations of Self Feeding Skills appropriate self feeding skills observed  -CK        Clinical Swallow Exam    Mode of Presentation self fed;cup;spoon  -CK        Oral Phase Results intact oral phase without signs of dysfunction   for liquids only  -CK        Pharyngeal Phase Results cough  -CK        Swallow Recommendations    Eating Assistance no assistance needed with self eating  -CK        Modified Eating Strategies NO straw use for liquid intake;upright positioning 90 degrees  -CK        Recommended Diet nectar/syrup-thick liquids  -CK        Dysphagia Treatment Objectives and Progress    Dysphagia Treatment Objectives Other 1;Other 2  -CK         Dysphagia Other 1    Dysphagia Other 1 Objective Pt will safely tolerate least restricted diet w/no overt s/s of aspiration for adequate nutrition and hydration.  -CK        Status: Dysphagia Other 1 New  -CK        Dysphagia Other 1 Progress continue to address  -CK        Dysphagia Other 2    Dysphagia Other 2 Objective Pt will trial solids and thin liquids w/no overt s/s of aspiration.  -CK        Status: Dysphagia Other 2 New  -CK        Dysphagia Other 2 Progress continue to address  -CK        Positioning and Restraints    Pre-Treatment Position in bed  -CK        Post Treatment Position bed  -CK        In Bed sitting;encouraged to call for assist;call light within reach;with other staff;with family/caregiver  -CK          User Key  (r) = Recorded By, (t) = Taken By, (c) = Cosigned By    Initials Name Effective Dates    CK Ileana Yañez MS CCC-SLP 10/17/16 -         EDUCATION  The patient has been educated in the following areas:   Dysphagia (Swallowing Impairment) Modified Diet Instruction.    SLP Recommendation and Plan  SLP Swallowing Diagnosis: mild dysphagia, pharyngeal dysfunction  SLP Diet Recommendation: nectar/syrup-thick liquids  Recommended Feeding/Eating Techniques: no straws, small sips/bites  SLP Rec. for Method of Medication Administration: meds crushed in pudding/applesauce  Monitor For Signs Of Aspiration: cough     Criteria for Skilled Therapeutic Interventions Met: skilled criteria for dysphagia intervention met  Anticipated Discharge Disposition: home with assist  Rehab Potential/Prognosis, Swallowing: good, to achieve stated therapy goals  Therapy Frequency: 3-5 times/wk             Plan of Care Review  Plan Of Care Reviewed With: patient, family  Outcome Summary/Follow up Plan: Swallow evaluation completed this date.  Pt presents s/p tracheostomy, is alert and communicating via writing.  Pt states that he had been on thickened liquids during a hospital stay at [place].  Pt  presented w/cough on thin liquids via cup and spoon.  Pt presented w/cough on thinned NTL via cup.  Pt safely tolerated NTL via cup w/no overt s/s of aspiration.  SLP recommends NTL via cup at this time.  Pt and family in agreement w/recommendations and POC.  SLP discussed request for mighty shake and NT milk w/all meals w/dietian.          IP SLP Goals       02/05/18 1452          Safely Consume Diet    Safely Consume Diet- SLP, Date Established 02/05/18  -CK      Safely Consume Diet- SLP, Time to Achieve by discharge  -CK      Safely Consume Diet- SLP, Additional Goal Pt will safely tolerate least restricted diet w/no overt s/s of aspiration for adequate nutrition and hydration.  -CK      Safely Consume Diet- SLP, Date Goal Reviewed 02/05/18  -CK        User Key  (r) = Recorded By, (t) = Taken By, (c) = Cosigned By    Initials Name Provider Type    CALEB Yañez MS CCC-SLP Speech and Language Pathologist               Time Calculation:         Time Calculation- SLP       02/05/18 1455          Time Calculation- SLP    SLP Start Time 1402  -CK      SLP Stop Time 1441  -CK      SLP Time Calculation (min) 39 min  -CK      Total Timed Code Minutes- SLP 39 minute(s)  -CK      SLP Received On 02/05/18  -CK      SLP Goal Re-Cert Due Date 02/19/18  -        User Key  (r) = Recorded By, (t) = Taken By, (c) = Cosigned By    Initials Name Provider Type    CALEB Yañez MS CCC-SLP Speech and Language Pathologist          Therapy Charges for Today     Code Description Service Date Service Provider Modifiers Qty    99065124621 HC ST SWALLOWING CURRENT STATUS 2/5/2018 Ileana Yañez MS CCC-SLP GN, CJ 1    67038259863 HC ST SWALLOWING PROJECTED 2/5/2018 Ileana Yañez MS CCC-SLP GN, CI 1    [id number] HC ST EVAL ORAL PHARYNG SWALLOW 3 2/5/2018 Ileana Yañez MS CCC-SLP GN 1          SLP G-Codes  Functional Limitations: Swallowing  Swallow Current Status (): At least 20 percent but less than 40  percent impaired, limited or restricted  Swallow Goal Status (): At least 1 percent but less than 20 percent impaired, limited or restricted    Ileana Yañez MS CCC-SLP  2/5/2018

## 2018-02-05 NOTE — OP NOTE
PREOPERATIVE DIAGNOSES:    1.  Obstructing laryngeal tumor.    2.  Dysphagia.     POSTOPERATIVE DIAGNOSES:   1.  Obstructing laryngeal tumor.    2.  Dysphagia.     PROCEDURES PERFORMED:   1.  Tracheostomy.  2.  Direct laryngoscopy with biopsy.  3.  Esophagoscopy.     SURGEON:  Joseph Venegas MD    ANESTHESIA:  Local anesthesia with monitored IV sedation for the tracheostomy and then general anesthesia for the direct laryngoscopy and esophagoscopy.      ESTIMATED BLOOD LOSS:  100 mL.     FLUIDS:  Crystalloid.    SPECIMENS:  Biopsies of the epiglottis.  Frozen section diagnosis positive for invasive squamous cell carcinoma.  Additional tissue sent for permanent section.     Complications: None(addendum added February 8, 2018)    INDICATIONS FOR PROCEDURE:  A 57-year-old man with a history of dysphagia and dysphonia who was found in the office to have an obstructing laryngeal tumor.     FINDINGS: Obstructing laryngeal tumor on the laryngeal surface of the epiglottis (previously thought to be on the lingual surface, but this was not the case).  This measured at least 3.5 cm in diameter.   No abnormal findings in the remainder of the hypopharynx and esophagus that were examined.      DESCRIPTION OF PROCEDURE:  The patient was taken to the operating room and placed in supine position.  The anterior neck was prepped with clippers and the skin was cleansed with alcohol. The sternal notch and thyroid notch as well as the proposed transverse incision were all marked.  The incision was infiltrated with 1% xylocaine with epinephrine.  The neck was then prepped and draped sterilely.  Incision was made with cutting cautery and carried to the pretracheal fat pad.  The pretracheal fat was resected using the Bovie.  This exposed the strap muscles which were retracted in the midline and retracted laterally.  Thyroid isthmus was thin and was elevated off the underlying trachea bluntly and divided between clamps.  A cricoid hook was  used to elevate the trachea anteriorly and superiorly.  A tracheostomy was created with #11 blade and heavy curved scissors creating an inferiorly based U shaped flap. This was secured to the midline of the lower neck skin with 0 Ethibond suture.  A size 8 cuffed Shiley tracheostomy tube was placed in the tracheostomy and the cuff was inflated.  Anesthesia circuit was secured and the patient was placed under general anesthesia. The flange of the tracheostomy was secured to the skin with 2-0 silk sutures.  The 0 Ethibond suture was left long as a stay suture and was taped to the patient's chest. At this point the head of the bed was turned and draped for laryngoscopy.  A mouth guard was placed over the maxillary dentition. The anterior commissure scope was passed in the oral cavity and advanced into the vallecula. The tumor was identified, but was noted to be on the laryngeal rather than the lingual surface of the epiglottis.  The scope was passed around the tumor into the piriform sinus on each side.  The mucosa of the pyriform sinus and post cricoid pharynx were examined and there was no evidence of neoplasm.  The scope was passed beneath the tumor and the endolarynx was visualized.  The tumor partially obstructed the view of the anterior commissure but the tumor did not appear to extend down to the vocal cords.  The anterior commissure scope was withdrawn.  The flared laryngoscope was passed into the mouth and placed in suspension.  This brought the tumor into view.  Multiple biopsies were taken with cup forceps and sent for frozen section. While awaiting frozen section, additional multiple biopsies were taken with cup forceps in an attempt to debulk the tumor as much as was feasible.  This tissue was saved and sent for permanent section. Frozen section returned invasive squamous cell carcinoma.  The scope was taken out of suspension and was withdrawn from the mouth. The rigid esophagoscope was passed through the  mouth into the hypopharynx and then subsequently passed through the cricopharyngeus to its full extent in the distal esophagus.  The scope was slowly withdrawn and the mucosa was examined and there was noted to be no evidence of mucosal abnormality.  At this point, the procedure was terminated. The patient tolerated the procedure well and went to the recovery room in satisfactory condition.

## 2018-02-05 NOTE — CONSULTS
CONSULT  NAME: Truman Esquivel  : 1960  MRN: [id number]    DATE OF ADMISSION: 18    DATE & TIME SEEN: 18 2:05 PM    PCP: Marybeth Harrison DO    CODE STATUS: Full Code    CHIEF COMPLAINT Tumor of unknown origin of larynx    HPI:  Truman Esquivel is a 57 y.o. male who presents to the floor s/p surgery per ENT.  Patient had tracheostomy and direct laryngoscopy with biopsy.  Patient was found to have an exophytic mass in his pharynx previously.  Patient has been evaluated by ENT.     CONCURRENT MEDICAL HISTORY:  Past Medical History:   Diagnosis Date   • Aortic dissection    • Chest pain    • Disease of thyroid gland    • HTN (hypertension)    • Knee pain    • Sleep apnea    • Smoker    • Stroke    • Swallowing difficulty        PAST SURGICAL HISTORY:  Past Surgical History:   Procedure Laterality Date   • AORTA SURGERY      ruptured aorta repair   • ASCENDING ARCH/HEMIARCH REPLACEMENT N/A 2017    Procedure: INTRAOPERATIVE TRANSESOPHAGEAL ECHOCARDIOGRAM, MIDLINE STERNOTOMY, ASCENDING AORTIC  AND PROXIMAL AORTIC ARCH REPAIR WITH 26MM GRAFT, AORTIC VALVE RESUSPENSION, AORTIC ROOT REPAIR, OPEN VEIN HARVEST OF RIGHT LEG;  Surgeon: German Arreguin MD;  Location: [facility] MAIN OR;  Service:    • BRONCHOSCOPY N/A 2017    Procedure: BRONCHOSCOPY BIOPSY AT BEDSIDE WITH BAL-LEFT LOWER LOBE;  Surgeon: Trent Chaney MD;  Location: [facility] ENDOSCOPY;  Service:    • TRACHEOSTOMY  2018       FAMILY HISTORY:  Family History   Problem Relation Age of Onset   • Thyroid disease Mother         SOCIAL HISTORY:  Social History     Social History   • Marital status:      Spouse name: N/A   • Number of children: N/A   • Years of education: N/A     Occupational History   • Not on file.     Social History Main Topics   • Smoking status: Former Smoker     Years: 38.00     Quit date: 2017   • Smokeless tobacco: Never Used   • Alcohol use No   • Drug use: No   • Sexual activity: Defer      Other Topics Concern   • Not on file     Social History Narrative       HOME MEDICATIONS:  Prior to Admission medications    Medication Sig Start Date End Date Taking? Authorizing Provider   amLODIPine (NORVASC) 5 MG tablet Take 5 mg by mouth Daily.   Yes Historical Provider, MD   atorvastatin (LIPITOR) 20 MG tablet Take 1 tablet by mouth Daily. 3/20/17  Yes Liborio Chandra MD   clopidogrel (PLAVIX) 75 MG tablet Take 75 mg by mouth Daily. Last dose approx one month ago   Yes Historical Provider, MD   coenzyme Q10 50 MG capsule capsule Take 100 mg by mouth Every Night.   Yes Historical Provider, MD   levothyroxine (SYNTHROID, LEVOTHROID) 88 MCG tablet Take 88 mcg by mouth Daily. 11/14/17  Yes Historical Provider, MD   losartan-hydrochlorothiazide (HYZAAR) 100-25 MG per tablet Take 1 tablet by mouth Daily. 5/18/17  Yes Liborio Chandra MD   metoprolol succinate XL (TOPROL-XL) 25 MG 24 hr tablet Take 25 mg by mouth Every Night.   Yes Historical Provider, MD   Multiple Vitamins-Minerals (MULTIVITAMIN ADULT PO) Take 1 tablet by mouth Daily.   Yes Historical Provider, MD   vitamin B-12 (CYANOCOBALAMIN) 100 MCG tablet Take 100 mcg by mouth Daily.   Yes Historical Provider, MD   vitamin E 100 UNIT capsule Take 100 Units by mouth Every Night.   Yes Historical Provider, MD   aspirin 81 MG tablet Take 81 mg by mouth Daily. Last dose 1/31/18    Historical Provider, MD   metoprolol succinate XL (TOPROL XL) 25 MG 24 hr tablet Take 1 tablet by mouth Daily With Dinner.  Patient taking differently: Take 25 mg by mouth Daily. 3/20/17 2/5/18  iLborio Chandra MD       ALLERGIES:  Eggs or egg-derived products; Erythromycin; Gabapentin; Other; Penicillins; Tetracyclines & related; Acth [corticotropin]; Cephalosporins; and Keflex [cephalexin]    REVIEW OF SYSTEMS  Review of Systems   Constitutional: Positive for fatigue. Negative for activity change, appetite change and fever.   HENT: Negative for ear pain and sore  throat.    Eyes: Negative for pain and visual disturbance.   Respiratory: Positive for shortness of breath. Negative for cough.    Cardiovascular: Negative for chest pain and palpitations.   Gastrointestinal: Negative for abdominal pain and nausea.   Endocrine: Negative for cold intolerance and heat intolerance.   Genitourinary: Negative for difficulty urinating and dysuria.   Musculoskeletal: Positive for arthralgias. Negative for gait problem.   Skin: Negative for color change and rash.   Neurological: Negative for dizziness, weakness and headaches.   Hematological: Negative for adenopathy. Does not bruise/bleed easily.   Psychiatric/Behavioral: Negative for agitation, confusion and sleep disturbance.       PHYSICAL EXAM:  Temp:  [97.3 °F (36.3 °C)-98.1 °F (36.7 °C)] 98.1 °F (36.7 °C)  Heart Rate:  [76-85] 78  Resp:  [18-20] 20  BP: (102-149)/(66-84) 143/72  Body mass index is 36.33 kg/(m^2).     Physical Exam   Constitutional: He is oriented to person, place, and time. He appears well-developed and well-nourished. No distress.   HENT:   Head: Normocephalic and atraumatic.   Right Ear: External ear normal.   Left Ear: External ear normal.   Nose: Nose normal.   Eyes: Conjunctivae and EOM are normal. Pupils are equal, round, and reactive to light.   Neck: Normal range of motion. Neck supple.   Tracheostomy in place.  Tracheostomy collar in place.   Cardiovascular: Normal rate, regular rhythm, normal heart sounds and intact distal pulses.  Exam reveals no gallop and no friction rub.    No murmur heard.  Pulmonary/Chest: Effort normal and breath sounds normal. No respiratory distress. He has no wheezes. He has no rales. He exhibits no tenderness.   Abdominal: Soft. Bowel sounds are normal. He exhibits no distension and no mass. There is no tenderness. There is no rebound and no guarding.   Musculoskeletal: Normal range of motion.   Neurological: He is alert and oriented to person, place, and time.   Skin: Skin is  warm and dry. No rash noted. He is not diaphoretic. No erythema. No pallor.   Psychiatric: He has a normal mood and affect. His behavior is normal.   Nursing note and vitals reviewed.      DIAGNOSTIC DATA:   Lab Results (last 24 hours)     Procedure Component Value Units Date/Time    Tissue Pathology Exam - Tissue, Oral Cavity [[id number]] Collected:  02/05/18 1157    Specimen:  Tissue from Oral Cavity, Tissue from Oral Cavity Updated:  02/05/18 1319           Imaging Results (last 24 hours)     ** No results found for the last 24 hours. **          I reviewed the patient's new clinical results.    ASSESSMENT AND PLAN: This is a 57 y.o. male with:    Active Hospital Problems (** Indicates Principal Problem)    Diagnosis Date Noted   • **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] 02/05/2018   • Pharyngeal dysphagia [R13.13] 02/05/2018   • Neoplasm of uncertain behavior of larynx [D38.0] 01/30/2018   • Dysphagia [R13.10] 01/30/2018   • Essential hypertension [I10] 03/20/2017      Resolved Hospital Problems    Diagnosis Date Noted Date Resolved   No resolved problems to display.       1. HTN. Norvasc 5 mg. Cozaar 100 mg. HCTZ 25 mg. Toprol XL 25 mg. Hydralazine PRN. Blood pressure currently well controlled.  2. SCC of the larynx. Management per ENT.    Will monitor patient's hospital course and adjust treatment as hospital course dictates.    Code status is Full Code         I discussed the patients findings and my recommendations with patient.           This document has been electronically signed by Delroy Garcia MD on February 5, 2018 2:05 PM

## 2018-02-05 NOTE — PLAN OF CARE
Problem: Patient Care Overview (Adult)  Goal: Plan of Care Review  Outcome: Ongoing (interventions implemented as appropriate)   02/05/18 1452   Coping/Psychosocial Response Interventions   Plan Of Care Reviewed With patient;family   Outcome Evaluation   Outcome Summary/Follow up Plan Swallow evaluation completed this date. Pt presents s/p tracheostomy, is alert and communicating via writing. Pt states that he had been on thickened liquids during a hospital stay at [place]. Pt presented w/cough on thin liquids via cup and spoon. Pt presented w/cough on thinned NTL via cup. Pt safely tolerated NTL via cup w/no overt s/s of aspiration. SLP recommends NTL via cup at this time. Pt and family in agreement w/recommendations and POC. SLP discussed request for mighty shake and NT milk w/all meals w/dietian.       Problem: Inpatient SLP  Goal: Dysphagia- Patient will safely consume diet as per recommendation with no signs/symptoms of aspiration  Outcome: Ongoing (interventions implemented as appropriate)   02/05/18 1452   Safely Consume Diet   Safely Consume Diet- SLP, Date Established 02/05/18   Safely Consume Diet- SLP, Time to Achieve by discharge   Safely Consume Diet- SLP, Additional Goal Pt will safely tolerate least restricted diet w/no overt s/s of aspiration for adequate nutrition and hydration.   Safely Consume Diet- SLP, Date Goal Reviewed 02/05/18

## 2018-02-05 NOTE — NURSING NOTE
Discharge Planning Assessment  [facility]     Patient Name: Truman Esquivel  MRN: [id number]  Today's Date: 2/5/2018    Admit Date: 2/5/2018          Discharge Needs Assessment       02/05/18 1530    Living Environment    Unique Family Situation Lives with his mother. His sister Yolanda Diaz said that she is a former CNA and can assist with his trach. care.    Quality Of Family Relationships supportive    Able to Return to Prior Living Arrangements yes    Living Arrangement Comments Lives at home with mother.    Discharge Needs Assessment    Concerns To Be Addressed discharge planning concerns    Concerns Comments Pt. has new trach. Will need dme and hhc.    Outpatient/Agency/Support Group Needs homecare agency (specify level of care)    Community Agency Name(S) [facility]    Anticipated Changes Related to Illness other (see comments)   inability to speak    Equipment Currently Used at Home other (see comments)   humidifier, has handicap assessible toilet and shower    Equipment Needed After Discharge suction equipment;trach supplies;respiratory supplies    Discharge Facility/Level Of Care Needs home with home health   [facility]    Current Discharge Risk chronically ill    Discharge Planning Comments DCP assessment complete. Pt. was given a list of dme and home health agencies. [facility] whichever one will accept his insurance. He is communicating with writing and can speak softly. Pt's mother and sister Yolanda were present. His sister said that she would be able to assist him with his trach. care, as she has some experience as a CNA. CM will f/u with referrals once MD orders supplies and HHC. Rozina Gambino RN [facility]      02/05/18 1526    Living Environment    Lives With parent(s)   mother    Living Arrangements house    Transportation Available family or friend will provide            Discharge Plan       02/05/18 1540    Case Management/Social Work  Plan    Plan home with home health    Patient/Family In Agreement With Plan yes    Additional Comments DCP assessment complete. Pt. chose [facility] and [facility]. I called and left vmm for Delfina at [facility]. Per Denisa at [facility], they do not do trach. supplies anymore at [facility] or [place]. Pt's wife had said any other dme that is closest and I called Shiva at [facility] and they do accept passport ins. Rozina Gambino RN [facility]        Discharge Placement     Facility/Agency Request Status Selected? Address Phone Number Fax Number    [facility] Pending - No Request Sent     [address] [facility] [address] [phone]                Demographic Summary     None            Functional Status       02/05/18 1528    Functional Status Current    Ambulation 0-->independent    Transferring 0-->independent    Toileting 0-->independent    Bathing 0-->independent    Dressing 0-->independent    Eating 0-->independent    Communication 3-->unable to speak (not related to language barrier)   new trach. using pad to write on.    Change in Functional Status Since Onset of Current Illness/Injury yes   new trach.    Functional Status Prior    Ambulation 0-->independent    Transferring 0-->independent    Toileting 0-->independent    Bathing 0-->independent    Dressing 0-->independent    Eating 0-->independent    Communication 0-->understands/communicates without difficulty            Psychosocial     None            Abuse/Neglect     None            Legal     None            Substance Abuse     None            Patient Forms     None          Rozina Gambino, RN

## 2018-02-05 NOTE — ANESTHESIA PROCEDURE NOTES
Airway    Additional Comments  Elective trach under Local placed per Dr. Venegas prior to induction

## 2018-02-05 NOTE — ANESTHESIA POSTPROCEDURE EVALUATION
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]; Dysphagia, unspecified type [R13.10]) MD Liborio Llamas MD          Anesthesia Type: general  Last vitals  BP   149/84 (02/05/18 0835)   Temp   97.3 °F (36.3 °C) (02/05/18 0835)   Pulse   76 (02/05/18 0835)   Resp   20 (02/05/18 0835)     SpO2   94 % (02/05/18 0835)     Post Anesthesia Care and Evaluation    Patient location during evaluation: PACU  Patient participation: complete - patient participated  Level of consciousness: awake and alert  Pain management: adequate  Airway patency: patent  Anesthetic complications: No anesthetic complications  PONV Status: none  Cardiovascular status: acceptable  Respiratory status: acceptable  Hydration status: acceptable

## 2018-02-06 LAB
LAB AP CASE REPORT: NORMAL
LAB AP INTRADEPARTMENTAL CONSULT: NORMAL
Lab: NORMAL
Lab: NORMAL
PATH REPORT.FINAL DX SPEC: NORMAL
PATH REPORT.GROSS SPEC: NORMAL

## 2018-02-06 PROCEDURE — 99024 POSTOP FOLLOW-UP VISIT: CPT | Performed by: OTOLARYNGOLOGY

## 2018-02-06 PROCEDURE — G0378 HOSPITAL OBSERVATION PER HR: HCPCS

## 2018-02-06 PROCEDURE — 92526 ORAL FUNCTION THERAPY: CPT | Performed by: SPEECH-LANGUAGE PATHOLOGIST

## 2018-02-06 PROCEDURE — 94799 UNLISTED PULMONARY SVC/PX: CPT

## 2018-02-06 PROCEDURE — 94760 N-INVAS EAR/PLS OXIMETRY 1: CPT

## 2018-02-06 RX ORDER — SODIUM CHLORIDE 9 MG/ML
INJECTION, SOLUTION INTRAVENOUS
Qty: 1000 ML | Refills: 11 | Status: SHIPPED | OUTPATIENT
Start: 2018-02-06 | End: 2018-02-27

## 2018-02-06 RX ADMIN — OXYCODONE HYDROCHLORIDE AND ACETAMINOPHEN 1 TABLET: 5; 325 TABLET ORAL at 21:57

## 2018-02-06 RX ADMIN — METOPROLOL SUCCINATE 25 MG: 25 TABLET, EXTENDED RELEASE ORAL at 21:54

## 2018-02-06 RX ADMIN — AMLODIPINE BESYLATE 5 MG: 5 TABLET ORAL at 10:31

## 2018-02-06 RX ADMIN — Medication 100 MCG: at 10:31

## 2018-02-06 RX ADMIN — OXYCODONE HYDROCHLORIDE AND ACETAMINOPHEN 1 TABLET: 5; 325 TABLET ORAL at 15:38

## 2018-02-06 RX ADMIN — ATORVASTATIN CALCIUM 20 MG: 20 TABLET, FILM COATED ORAL at 21:54

## 2018-02-06 RX ADMIN — OXYCODONE HYDROCHLORIDE AND ACETAMINOPHEN 1 TABLET: 5; 325 TABLET ORAL at 10:31

## 2018-02-06 RX ADMIN — LEVOTHYROXINE SODIUM 88 MCG: 88 TABLET ORAL at 10:31

## 2018-02-06 RX ADMIN — LOSARTAN POTASSIUM: 50 TABLET, FILM COATED ORAL at 10:30

## 2018-02-06 RX ADMIN — POTASSIUM CHLORIDE, DEXTROSE MONOHYDRATE AND SODIUM CHLORIDE 80 ML/HR: 150; 5; 450 INJECTION, SOLUTION INTRAVENOUS at 16:39

## 2018-02-06 NOTE — PLAN OF CARE
Problem: Patient Care Overview (Adult)  Goal: Plan of Care Review  Outcome: Ongoing (interventions implemented as appropriate)   02/06/18 1329   Coping/Psychosocial Response Interventions   Plan Of Care Reviewed With patient   Patient Care Overview   Progress improving   Outcome Evaluation   Outcome Summary/Follow up Plan Pt seen at am meal this date. Pt had been given thin water when SLP entered the room. Pt stated that he had been doing well with it. Pt safely tolerated NTL via cup. Trials of puree and thin liquids via cup were given. No overt s/s of aspiration were noted w/either consistency. SLP advanced pt's diet to puree/thin w/small sips from cup. Sign w/diet placed at [facility], nsg and cna informed of diet change.       Problem: Inpatient SLP  Goal: Dysphagia- Patient will safely consume diet as per recommendation with no signs/symptoms of aspiration  Outcome: Ongoing (interventions implemented as appropriate)   02/05/18 1452 02/06/18 1329   Safely Consume Diet   Safely Consume Diet- SLP, Date Established 02/05/18 --    Safely Consume Diet- SLP, Time to Achieve by discharge --    Safely Consume Diet- SLP, Additional Goal Pt will safely tolerate least restricted diet w/no overt s/s of aspiration for adequate nutrition and hydration. --    Safely Consume Diet- SLP, Date Goal Reviewed --  02/06/18   Safely Consume Diet- SLP, Outcome --  goal ongoing

## 2018-02-06 NOTE — PLAN OF CARE
Problem: Patient Care Overview (Adult)  Goal: Plan of Care Review  Outcome: Ongoing (interventions implemented as appropriate)   02/06/18 0404   Coping/Psychosocial Response Interventions   Plan Of Care Reviewed With patient   Patient Care Overview   Progress improving   Outcome Evaluation   Outcome Summary/Follow up Plan pt rested well during the night; some complaints of pain; VSS     Goal: Adult Individualization and Mutuality  Outcome: Ongoing (interventions implemented as appropriate)    Goal: Discharge Needs Assessment  Outcome: Ongoing (interventions implemented as appropriate)      Problem: Perioperative Period (Adult)  Goal: Signs and Symptoms of Listed Potential Problems Will be Absent or Manageable (Perioperative Period)  Outcome: Ongoing (interventions implemented as appropriate)      Problem: Pain, Acute (Adult)  Goal: Acceptable Pain Control/Comfort Level  Outcome: Ongoing (interventions implemented as appropriate)

## 2018-02-06 NOTE — DISCHARGE PLACEMENT REQUEST
"Truman Rodriguez (57 y.o. Male)     Date of Birth Social Security Number Address Home Phone MRN    1960  [id number] [facility] [address] [facility] [phone] [id number]    [employer]ational Marital Status          Yarsani        Admission Date Admission Type Admitting Provider Attending Provider Department, Room/Bed    2/5/18 Elective Joseph Venegas MD Logan, William Anderson, MD [address], 382/1    Discharge Date Discharge Disposition Discharge Destination                      Attending Provider: Joseph Venegas MD     Allergies:  Eggs Or Egg-derived Products, Erythromycin, Gabapentin, Other, Penicillins, Tetracyclines & Related, Acth [Corticotropin], Cephalosporins, Keflex [Cephalexin]    Isolation:  None   Infection:  None   Code Status:  FULL    Ht:  182.9 cm (72\")   Wt:  121 kg (267 lb 13.7 oz)    Admission Cmt:  None   Principal Problem:  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]                 Active Insurance as of 2/5/2018     Primary Coverage     Payor Plan Insurance Group Employer/Plan Group    [employer] MEDICAID     Payor Plan Address Payor Plan Phone Number Effective From Effective To    [address] [phone] 1/1/2016     [place] [address]       Subscriber Name Subscriber Birth Date Member ID       TRUMAN RODRIGUEZ [id number]                 Emergency Contacts      (Rel.) Home Phone Work Phone Mobile Phone    Yolanda Diaz (Sister) [phone] -- --    AlvinBlanca (Daughter) [phone] -- [phone]    RodriguezYolanda (Mother) -- -- [phone]               History & Physical      Joseph Venegas MD at 1/29/2018  3:40 PM          Subjective   Truman Rodriguez is a 57 y.o. male.     History of Present Illness   Patient was seen previously with what appeared to be an exophytic mass either of the tongue base or the supraglottic larynx.  Also was having dysphagia and occasional " hemoptysis.  Returns today having undergone a CT scan of the neck.  This is personally reviewed and shows what appears to be an exophytic mass involving the epiglottis extending toward and abutting the tongue base but does not appear to be arising out of the tongue base.  No obvious suspicious lymphadenopathy is noted.  Patient reports his symptoms remain the same.  He feels like he has a mass in his throat.  Has trouble swallowing.  Is not experiencing any trouble breathing at this point.      The following portions of the patient's history were reviewed and updated as appropriate: allergies, current medications, past family history, past medical history, past social history, past surgical history and problem list.      Truman Esquivel reports that he quit smoking about a year ago. He has never used smokeless tobacco. He reports that he does not drink alcohol or use illicit drugs.  Patient is not a tobacco user and has not been counseled for use of tobacco products    Family History   Problem Relation Age of Onset   • Thyroid disease Mother        Allergies   Allergen Reactions   • Acth [Corticotropin]    • Eggs Or Egg-Derived Products    • Erythromycin    • Gabapentin Itching   • Other      Mycins     • Penicillins      Tolerated cefepime during 2/2017 admission. Had rash and itching (relieved by benadryl) after few doses of cefepime and cefepime was continued.   • Tetracyclines & Related    • Cephalosporins Itching and Rash     Pt developed rash, itching after cefepime administration (2-3 doses) during 2/2017 admission. Itching was relieved with benadryl. Cefepime was continued because his infection was improving and no symptoms of anaphylaxis present         Current Outpatient Prescriptions:   •  amLODIPine (NORVASC) 2.5 MG tablet, Take 1 tablet by mouth Daily With Dinner., Disp: 30 tablet, Rfl: 11  •  aspirin 81 MG tablet, Take 81 mg by mouth Daily., Disp: , Rfl:   •  levothyroxine (SYNTHROID, LEVOTHROID)  25 MCG tablet, Take 1 tablet by mouth Daily. (Patient taking differently: Take 88 mcg by mouth Daily.), Disp: 30 tablet, Rfl: 2  •  levothyroxine (SYNTHROID, LEVOTHROID) 88 MCG tablet, , Disp: , Rfl:   •  losartan-hydrochlorothiazide (HYZAAR) 100-25 MG per tablet, Take 1 tablet by mouth Daily., Disp: 30 tablet, Rfl: 12  •  metoprolol succinate XL (TOPROL XL) 25 MG 24 hr tablet, Take 1 tablet by mouth Daily With Dinner., Disp: 30 tablet, Rfl: 12  •  Multiple Vitamins-Minerals (MULTIVITAMIN ADULT PO), Take  by mouth., Disp: , Rfl:   •  vitamin B-12 (CYANOCOBALAMIN) 100 MCG tablet, Take 50 mcg by mouth Daily., Disp: , Rfl:   •  vitamin E 100 UNIT capsule, Take 100 Units by mouth Daily., Disp: , Rfl:   •  atorvastatin (LIPITOR) 20 MG tablet, Take 1 tablet by mouth Daily., Disp: 30 tablet, Rfl: 11  •  cetirizine (zyrTEC) 10 MG tablet, Take 1 tablet by mouth Daily., Disp: 30 tablet, Rfl: 0  •  clopidogrel (PLAVIX) 75 MG tablet, Take 75 mg by mouth Daily., Disp: , Rfl:   •  coenzyme Q10 50 MG capsule capsule, Take  by mouth Daily., Disp: , Rfl:   No current facility-administered medications for this visit.     Past Medical History:   Diagnosis Date   • Chest pain    • Disease of thyroid gland    • HTN (hypertension)    • Knee pain    • Smoker          Review of Systems   Constitutional: Negative for fever.   HENT: Positive for trouble swallowing and voice change.            Objective   Physical Exam  General: Well-developed well-nourished male in no acute distress.  Alert and oriented ×3. Head: Normocephalic. Face: Symmetrical strength and appearance Voice: Somewhat muffled but no breathiness or stridor. Speech:Fluent  Ears: External ears no deformity, canals no discharge, tympanic membranes intact clear and mobile bilaterally.  Nose: Nares show no discharge mass polyp or purulence.  Boggy mucosa is present.  No gross external deformity.  Septum: Midline  Oral cavity: Lips and gums without lesions.  Tongue and floor of  mouth without lesions.  Parotid and submandibular ducts unobstructed.  No mucosal lesions on the buccal mucosa or vestibule of the mouth.  Pharynx: No erythema exudate mass or ulcer  Neck: No lymphadenopathy.  No thyromegaly.  Trachea and larynx midline.  No masses in the parotid or submandibular glands.  Chest: Clear.  Heart: Regular.  Abdomen: Benign.    With the CT results reviewed, fiberoptic laryngoscopy is repeated.    Procedure Note    Pre-operative Diagnosis:   Chief Complaint   Patient presents with   • Follow-up     CT results    Hypopharyngeal mass    Post-operative Diagnosis: Exophytic lesion of the epiglottis    Anesthesia: topical with xylocaine and neosynephrine    Endoscopy Type:  Flexible Laryngoscopy    Procedure Details:    The patient was placed in the sitting position.  After topical anesthesia and decongestion, the 4 mm laryngoscope was passed.  The nasal cavities, nasopharynx, oropharynx, hypopharynx, and larynx were all examined.  Vocal cords were examined during respiration and phonation.  The following findings were noted:    Findings: No masses in the nose, nasopharynx.  The hypopharynx shows an exophytic lesion in the midline that appears friable and is overtly neoplastic.  Today I'm able to maneuver past the mass and see into the larynx itself which shows some chronic appearing edema but no evidence of obstruction of the vocal cords and no involvement of the larynx other than the epiglottis.  Vocal cord mobility is intact.    Condition:  Stable.  Patient tolerated procedure well.    Complications:  None      Assessment/Plan   Truman was seen today for follow-up.    Diagnoses and all orders for this visit:    Neoplasm of uncertain behavior of larynx    Dysphagia, unspecified type    Chronic laryngitis      Plan: Told the patient that I this lesion is highly suspicious for malignancy and biopsy is warrant.  I have recommended direct laryngoscopy with biopsy and esophagoscopy, however given  the size of the mass as well as the fact that it involves the majority of the epiglottis, I am concerned that oral endotracheal intubation would not be feasible and would have a significant risk of airway obstruction.  Therefore I recommended proceeding with tracheostomy under local anesthesia prior to the laryngoscopy with biopsy and esophagoscopy.  I explained the nature of all these procedures to the patient in layman's terms.  I've explain risks including bleeding, damage to the throat or voicebox, and the almost certain need for further treatment after pathology is confirmed.  Proposed benefit would be a secure airway which would allow treatment of his laryngeal disease without worrying about acute laryngeal obstruction, along with establishing a definitive diagnosis of the lesion.  The alternative would be observation or attempted oral endotracheal intubation both of which I think have significant risk.  Patient voices understanding of all the above and wishes to proceed with surgery.  This is scheduled.       Electronically signed by Joseph Venegas MD at 1/30/2018  9:27 AM      Joseph Venegas MD at 2/5/2018  8:38 AM            H&P reviewed. The patient was examined and there are no changes to the H&P.           Electronically signed by Joseph Venegas MD at 2/5/2018  8:38 AM    Source Note             Subjective   Truman Esquivel is a 57 y.o. male.     History of Present Illness   Patient was seen previously with what appeared to be an exophytic mass either of the tongue base or the supraglottic larynx.  Also was having dysphagia and occasional hemoptysis.  Returns today having undergone a CT scan of the neck.  This is personally reviewed and shows what appears to be an exophytic mass involving the epiglottis extending toward and abutting the tongue base but does not appear to be arising out of the tongue base.  No obvious suspicious lymphadenopathy is noted.  Patient reports his  symptoms remain the same.  He feels like he has a mass in his throat.  Has trouble swallowing.  Is not experiencing any trouble breathing at this point.      The following portions of the patient's history were reviewed and updated as appropriate: allergies, current medications, past family history, past medical history, past social history, past surgical history and problem list.      Truman Esquivel reports that he quit smoking about a year ago. He has never used smokeless tobacco. He reports that he does not drink alcohol or use illicit drugs.  Patient is not a tobacco user and has not been counseled for use of tobacco products    Family History   Problem Relation Age of Onset   • Thyroid disease Mother        Allergies   Allergen Reactions   • Acth [Corticotropin]    • Eggs Or Egg-Derived Products    • Erythromycin    • Gabapentin Itching   • Other      Mycins     • Penicillins      Tolerated cefepime during 2/2017 admission. Had rash and itching (relieved by benadryl) after few doses of cefepime and cefepime was continued.   • Tetracyclines & Related    • Cephalosporins Itching and Rash     Pt developed rash, itching after cefepime administration (2-3 doses) during 2/2017 admission. Itching was relieved with benadryl. Cefepime was continued because his infection was improving and no symptoms of anaphylaxis present         Current Outpatient Prescriptions:   •  amLODIPine (NORVASC) 2.5 MG tablet, Take 1 tablet by mouth Daily With Dinner., Disp: 30 tablet, Rfl: 11  •  aspirin 81 MG tablet, Take 81 mg by mouth Daily., Disp: , Rfl:   •  levothyroxine (SYNTHROID, LEVOTHROID) 25 MCG tablet, Take 1 tablet by mouth Daily. (Patient taking differently: Take 88 mcg by mouth Daily.), Disp: 30 tablet, Rfl: 2  •  levothyroxine (SYNTHROID, LEVOTHROID) 88 MCG tablet, , Disp: , Rfl:   •  losartan-hydrochlorothiazide (HYZAAR) 100-25 MG per tablet, Take 1 tablet by mouth Daily., Disp: 30 tablet, Rfl: 12  •  metoprolol  succinate XL (TOPROL XL) 25 MG 24 hr tablet, Take 1 tablet by mouth Daily With Dinner., Disp: 30 tablet, Rfl: 12  •  Multiple Vitamins-Minerals (MULTIVITAMIN ADULT PO), Take  by mouth., Disp: , Rfl:   •  vitamin B-12 (CYANOCOBALAMIN) 100 MCG tablet, Take 50 mcg by mouth Daily., Disp: , Rfl:   •  vitamin E 100 UNIT capsule, Take 100 Units by mouth Daily., Disp: , Rfl:   •  atorvastatin (LIPITOR) 20 MG tablet, Take 1 tablet by mouth Daily., Disp: 30 tablet, Rfl: 11  •  cetirizine (zyrTEC) 10 MG tablet, Take 1 tablet by mouth Daily., Disp: 30 tablet, Rfl: 0  •  clopidogrel (PLAVIX) 75 MG tablet, Take 75 mg by mouth Daily., Disp: , Rfl:   •  coenzyme Q10 50 MG capsule capsule, Take  by mouth Daily., Disp: , Rfl:   No current facility-administered medications for this visit.     Past Medical History:   Diagnosis Date   • Chest pain    • Disease of thyroid gland    • HTN (hypertension)    • Knee pain    • Smoker          Review of Systems   Constitutional: Negative for fever.   HENT: Positive for trouble swallowing and voice change.            Objective   Physical Exam  General: Well-developed well-nourished male in no acute distress.  Alert and oriented ×3. Head: Normocephalic. Face: Symmetrical strength and appearance Voice: Somewhat muffled but no breathiness or stridor. Speech:Fluent  Ears: External ears no deformity, canals no discharge, tympanic membranes intact clear and mobile bilaterally.  Nose: Nares show no discharge mass polyp or purulence.  Boggy mucosa is present.  No gross external deformity.  Septum: Midline  Oral cavity: Lips and gums without lesions.  Tongue and floor of mouth without lesions.  Parotid and submandibular ducts unobstructed.  No mucosal lesions on the buccal mucosa or vestibule of the mouth.  Pharynx: No erythema exudate mass or ulcer  Neck: No lymphadenopathy.  No thyromegaly.  Trachea and larynx midline.  No masses in the parotid or submandibular glands.  Chest: Clear.  Heart: Regular.   Abdomen: Benign.    With the CT results reviewed, fiberoptic laryngoscopy is repeated.    Procedure Note    Pre-operative Diagnosis:   Chief Complaint   Patient presents with   • Follow-up     CT results    Hypopharyngeal mass    Post-operative Diagnosis: Exophytic lesion of the epiglottis    Anesthesia: topical with xylocaine and neosynephrine    Endoscopy Type:  Flexible Laryngoscopy    Procedure Details:    The patient was placed in the sitting position.  After topical anesthesia and decongestion, the 4 mm laryngoscope was passed.  The nasal cavities, nasopharynx, oropharynx, hypopharynx, and larynx were all examined.  Vocal cords were examined during respiration and phonation.  The following findings were noted:    Findings: No masses in the nose, nasopharynx.  The hypopharynx shows an exophytic lesion in the midline that appears friable and is overtly neoplastic.  Today I'm able to maneuver past the mass and see into the larynx itself which shows some chronic appearing edema but no evidence of obstruction of the vocal cords and no involvement of the larynx other than the epiglottis.  Vocal cord mobility is intact.    Condition:  Stable.  Patient tolerated procedure well.    Complications:  None      Assessment/Plan   Truman was seen today for follow-up.    Diagnoses and all orders for this visit:    Neoplasm of uncertain behavior of larynx    Dysphagia, unspecified type    Chronic laryngitis      Plan: Told the patient that I this lesion is highly suspicious for malignancy and biopsy is warrant.  I have recommended direct laryngoscopy with biopsy and esophagoscopy, however given the size of the mass as well as the fact that it involves the majority of the epiglottis, I am concerned that oral endotracheal intubation would not be feasible and would have a significant risk of airway obstruction.  Therefore I recommended proceeding with tracheostomy under local anesthesia prior to the laryngoscopy with biopsy and  esophagoscopy.  I explained the nature of all these procedures to the patient in layman's terms.  I've explain risks including bleeding, damage to the throat or voicebox, and the almost certain need for further treatment after pathology is confirmed.  Proposed benefit would be a secure airway which would allow treatment of his laryngeal disease without worrying about acute laryngeal obstruction, along with establishing a definitive diagnosis of the lesion.  The alternative would be observation or attempted oral endotracheal intubation both of which I think have significant risk.  Patient voices understanding of all the above and wishes to proceed with surgery.  This is scheduled.        Electronically signed by Joseph Venegas MD at 1/30/2018  9:27 AM                 Physician Progress Notes (last 24 hours) (Notes from 2/5/2018  1:39 PM through 2/6/2018  1:39 PM)      STAN Milian at 2/6/2018  1:17 PM  Version 1 of 1             [facility] Medicine Services  INPATIENT PROGRESS NOTE    Length of Stay: 1  Date of Admission: 2/5/2018  Primary Care Physician: Marybeth Harrison DO    Subjective   Chief Complaint/HPI:  This 57-year-old male was admitted to Dr. Venegas's service, ENT, secondary to tracheostomy and direct laryngoscopy with biopsy.  Patient previously found to have an exophytic mass in the pharynx.  Hospitalist staff has been consulted for medical management.  Patient has a history of hypertension.  Patient also has history of an aortic dissection with repair of the ascending aorta and resuspension of the aortic valve.  He had previously been on Coumadin for DVT of the arm and is now off of Coumadin.  He now takes Plavix 75 mg daily as there was suspicion for TIAs.  No abnormalities on echocardiogram.  At this time antiplatelets have been held secondary to continued bleeding from the trach site.  Bleeding is currently controlled at this time.    Review of  Systems   Constitutional: Negative for chills and fever.   Respiratory: Negative for shortness of breath.    Cardiovascular: Negative for chest pain.   Gastrointestinal: Negative for abdominal pain, nausea and vomiting.   Neurological: Negative for dizziness and headaches.   Psychiatric/Behavioral: Negative for confusion.      All pertinent negatives and positives are as above. All other systems have been reviewed and are negative unless otherwise stated.     Objective    Temp:  [97.8 °F (36.6 °C)-100.3 °F (37.9 °C)] 99 °F (37.2 °C)  Heart Rate:  [66-80] 72  Resp:  [16-20] 16  BP: (128-157)/(72-89) 128/75    Physical Exam   Constitutional: He is oriented to person, place, and time. He appears well-developed and well-nourished. No distress.   HENT:   Head: Normocephalic and atraumatic.   Eyes: Conjunctivae are normal.   Cardiovascular: Normal rate and regular rhythm.    Pulmonary/Chest: Effort normal. No respiratory distress. He has no wheezes.   Abdominal: Soft. Bowel sounds are normal. He exhibits no distension. There is no tenderness.   Neurological: He is alert and oriented to person, place, and time.   Skin: Skin is warm and dry. He is not diaphoretic.   Psychiatric: He has a normal mood and affect. His behavior is normal.     Trach site in place, mild bleeding, well controlled.      Results Review:  I have reviewed the labs, radiology results, and diagnostic studies.    Laboratory Data:     Results from last 7 days  Lab Units 02/05/18  1356 02/01/18  1519   SODIUM mmol/L 141 142   POTASSIUM mmol/L 4.2 3.7   CHLORIDE mmol/L 99 99   CO2 mmol/L 29.0 29.0   BUN mg/dL 18 19   CREATININE mg/dL 1.16 1.26   GLUCOSE mg/dL 101* 82   CALCIUM mg/dL 8.8 9.1   BILIRUBIN mg/dL 0.2  --    ALK PHOS U/L 71  --    ALT (SGPT) U/L 29  --    AST (SGOT) U/L 16*  --    ANION GAP mmol/L 13.0 14.0     Estimated Creatinine Clearance: 94.8 mL/min (by C-G formula based on Cr of 1.16).            Results from last 7 days  Lab Units  02/05/18  1356   WBC 10*3/mm3 12.66*   HEMOGLOBIN g/dL 11.2*   HEMATOCRIT % 35.2*   PLATELETS 10*3/mm3 193           Culture Data:   No results found for: BLOODCX  No results found for: URINECX  No results found for: RESPCX  No results found for: WOUNDCX  No results found for: STOOLCX  No components found for: BODYFLD    Radiology Data:   Imaging Results (last 24 hours)     ** No results found for the last 24 hours. **          I have reviewed the patient current medications.     Assessment/Plan     Hospital Problem List     * (Principal)Squamous cell carcinoma of larynx    Essential hypertension    Neoplasm of uncertain behavior of larynx    Overview Deleted 2/5/2018  2:07 PM by Delroy Garcia MD            Dysphagia    Overview Deleted 2/5/2018  2:07 PM by Delroy Garcia MD            Pharyngeal dysphagia          Plan:   Per primary team.  Monitor trach site for any possible bleeding or resolution of bleeding and restart Plavix as appropriate.                This document has been electronically signed by STAN Milian on February 6, 2018 1:17 PM         Electronically signed by STAN Milian at 2/6/2018  1:24 PM      Joseph Venegas MD at 2/6/2018  1:24 PM  Version 1 of 1         Patient had a mucous plug earlier today that did respond to coughing and suctioning but caused him quite a bit of anxiety.  Patient is tolerating oral diet.  He is able to take his medicines although he has to crush the larger pills.    Afebrile.  Blood pressure well controlled.  Trach site shows minimal bloody oozing today which is improved from yesterday.    Assessment: Squamous cell carcinoma the larynx status post tracheostomy, direct laryngoscopy and biopsy.      Plan: Tracheostomy care teaching ordered.  Patient will need skilled nursing care and DME after discharge.  Case discussed with case management to make arrangements.         Electronically signed by Joseph Venegas MD at 2/6/2018  1:26 PM

## 2018-02-06 NOTE — PROGRESS NOTES
Patient had a mucous plug earlier today that did respond to coughing and suctioning but caused him quite a bit of anxiety.  Patient is tolerating oral diet.  He is able to take his medicines although he has to crush the larger pills.    Afebrile.  Blood pressure well controlled.  Trach site shows minimal bloody oozing today which is improved from yesterday.    Assessment: Squamous cell carcinoma the larynx status post tracheostomy, direct laryngoscopy and biopsy.      Plan: Tracheostomy care teaching ordered.  Patient will need skilled nursing care and DME after discharge.  Case discussed with case management to make arrangements.

## 2018-02-06 NOTE — PROGRESS NOTES
[facility] Medicine Services  INPATIENT PROGRESS NOTE    Length of Stay: 1  Date of Admission: 2/5/2018  Primary Care Physician: Marybeth Harrison DO    Subjective   Chief Complaint/HPI:  This 57-year-old male was admitted to Dr. Venegas's service, ENT, secondary to tracheostomy and direct laryngoscopy with biopsy.  Patient previously found to have an exophytic mass in the pharynx.  Hospitalist staff has been consulted for medical management.  Patient has a history of hypertension.  Patient also has history of an aortic dissection with repair of the ascending aorta and resuspension of the aortic valve.  He had previously been on Coumadin for DVT of the arm and is now off of Coumadin.  He now takes Plavix 75 mg daily as there was suspicion for TIAs.  No abnormalities on echocardiogram.  At this time antiplatelets have been held secondary to continued bleeding from the trach site.  Bleeding is currently controlled at this time.    Review of Systems   Constitutional: Negative for chills and fever.   Respiratory: Negative for shortness of breath.    Cardiovascular: Negative for chest pain.   Gastrointestinal: Negative for abdominal pain, nausea and vomiting.   Neurological: Negative for dizziness and headaches.   Psychiatric/Behavioral: Negative for confusion.      All pertinent negatives and positives are as above. All other systems have been reviewed and are negative unless otherwise stated.     Objective    Temp:  [97.8 °F (36.6 °C)-100.3 °F (37.9 °C)] 99 °F (37.2 °C)  Heart Rate:  [66-80] 72  Resp:  [16-20] 16  BP: (128-157)/(72-89) 128/75    Physical Exam   Constitutional: He is oriented to person, place, and time. He appears well-developed and well-nourished. No distress.   HENT:   Head: Normocephalic and atraumatic.   Eyes: Conjunctivae are normal.   Cardiovascular: Normal rate and regular rhythm.    Pulmonary/Chest: Effort normal. No respiratory distress. He has no wheezes.    Abdominal: Soft. Bowel sounds are normal. He exhibits no distension. There is no tenderness.   Neurological: He is alert and oriented to person, place, and time.   Skin: Skin is warm and dry. He is not diaphoretic.   Psychiatric: He has a normal mood and affect. His behavior is normal.     Trach site in place, mild bleeding, well controlled.      Results Review:  I have reviewed the labs, radiology results, and diagnostic studies.    Laboratory Data:     Results from last 7 days  Lab Units 02/05/18  1356 02/01/18  1519   SODIUM mmol/L 141 142   POTASSIUM mmol/L 4.2 3.7   CHLORIDE mmol/L 99 99   CO2 mmol/L 29.0 29.0   BUN mg/dL 18 19   CREATININE mg/dL 1.16 1.26   GLUCOSE mg/dL 101* 82   CALCIUM mg/dL 8.8 9.1   BILIRUBIN mg/dL 0.2  --    ALK PHOS U/L 71  --    ALT (SGPT) U/L 29  --    AST (SGOT) U/L 16*  --    ANION GAP mmol/L 13.0 14.0     Estimated Creatinine Clearance: 94.8 mL/min (by C-G formula based on Cr of 1.16).            Results from last 7 days  Lab Units 02/05/18  1356   WBC 10*3/mm3 12.66*   HEMOGLOBIN g/dL 11.2*   HEMATOCRIT % 35.2*   PLATELETS 10*3/mm3 193           Culture Data:   No results found for: BLOODCX  No results found for: URINECX  No results found for: RESPCX  No results found for: WOUNDCX  No results found for: STOOLCX  No components found for: BODYFLD    Radiology Data:   Imaging Results (last 24 hours)     ** No results found for the last 24 hours. **          I have reviewed the patient current medications.     Assessment/Plan     Hospital Problem List     * (Principal)Squamous cell carcinoma of larynx    Essential hypertension    Neoplasm of uncertain behavior of larynx    Overview Deleted 2/5/2018  2:07 PM by Delroy Garcia MD            Dysphagia    Overview Deleted 2/5/2018  2:07 PM by Delroy Garcia MD            Pharyngeal dysphagia          Plan:   Per primary team.  Monitor trach site for any possible bleeding or resolution of bleeding and restart Plavix as  appropriate.                This document has been electronically signed by STAN Milian on February 6, 2018 1:17 PM

## 2018-02-06 NOTE — DISCHARGE PLACEMENT REQUEST
"Truman Rodriguez (57 y.o. Male)     Date of Birth Social Security Number Address Home Phone MRN    1960  [address]  [facility] [address] [phone] [id number]    Rastafari Marital Status          Islam        Admission Date Admission Type Admitting Provider Attending Provider Department, Room/Bed    18 Elective Joseph Venegas MD Logan, William Anderson, MD [address], [facility]/1    Discharge Date Discharge Disposition Discharge Destination                      Attending Provider: Joseph Venegas MD     Allergies:  Eggs Or Egg-derived Products, Erythromycin, Gabapentin, Other, Penicillins, Tetracyclines & Related, Acth [Corticotropin], Cephalosporins, Keflex [Cephalexin]    Isolation:  None   Infection:  None   Code Status:  FULL    Ht:  182.9 cm (72\")   Wt:  121 kg (267 lb 13.7 oz)    Admission Cmt:  None   Principal Problem:  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]                 Active Insurance as of 2018     Primary Coverage     Payor Plan Insurance Group Employer/Plan Group    PASSPORT PASSPORT MEDICAID     Payor Plan Address Payor Plan Phone Number Effective From Effective To    [address] [phone] 2016     [place] [address]       Subscriber Name Subscriber Birth Date Member ID       TRUMAN RODRIGUEZ 1960 [id number]                 Emergency Contacts      (Rel.) Home Phone Work Phone Mobile Phone    Yolanda Diaz (Sister) [phone] -- --    Blanca Rodriguez (Daughter) [phone] -- [phone]    Yolanda Rodriguez (Mother) -- -- [phone]            [address] [address]  Phone:  [phone]  Fax:   Date Ordered: 2018         Patient:  Truman Rodriguez MRN:  [id number]   [address]  [facility] [address] :  1960  SSN:    Phone: [phone] Sex:  M     Weight: 121 kg (267 lb 13.7 oz)       " "  Ht Readings from Last 1 Encounters:   02/05/18 182.9 cm (72\")         Hospital Bed            (Order ID: [id number])    Diagnosis:  Tracheostomy in place (Z93.0 [ICD-10-CM] V44.0 [ICD-9-CM])  Squamous cell carcinoma of larynx (C32.9 [ICD-10-CM] 161.9 [ICD-9-CM])   Quantity:  1     Equipment:  Hospital Bed, Semi-Electirc w/ Mattress & w/ Rails  The face to face evaluation was performed on: 2/6/2018 (Patient has tracheostomy and will have difficulty lying flat, and also will need to rapidly change position due to compromised airway)  Length of Need (99 Months = Lifetime): 15 Months            Authorizing Provider:Joseph Venegas MD  Authorizing Provider's NPI: [id number]  Order Entered By: Joseph Venegas MD 2/6/2018  1:19 PM     Electronically signed by: Joseph Venegas MD 2/6/2018  1:19 PM               Emergency Contact Information     Name Relation Home Work Mobile    Yolanda Diaz Sister [phone]      Blanca Esquivel Daughter [phone]    Yolanda Esquivel Mother   [phone]          Insurance Information                PASSPORT/PASSPORT Phone: [phone]    Subscriber: Truman Esquivel Subscriber#: [id number]    Group#: [id number] Precert#:           Problem List           Codes Noted - Resolved       Hospital    * (Principal)Squamous cell carcinoma of larynx ICD-10-CM: C32.9  ICD-9-CM: 161.9 2/5/2018 - Present    Pharyngeal dysphagia ICD-10-CM: R13.13  ICD-9-CM: 787.23 2/5/2018 - Present    Neoplasm of uncertain behavior of larynx ICD-10-CM: D38.0  ICD-9-CM: 235.6 1/30/2018 - Present    Dysphagia ICD-10-CM: R13.10  ICD-9-CM: 787.20 1/30/2018 - Present    Essential hypertension ICD-10-CM: I10  ICD-9-CM: 401.9 3/20/2017 - Present       Non-Hospital    TIA (transient ischemic attack) ICD-10-CM: G45.9  ICD-9-CM: 435.9 12/21/2017 - Present    Knee pain ICD-10-CM: M25.569  ICD-9-CM: 719.46 Unknown - Present    Obstructive sleep apnea of adult ICD-10-CM: " G47.33  ICD-9-CM: 327.23 5/18/2017 - Present    Knee effusion, right ICD-10-CM: M25.461  ICD-9-CM: 719.06 5/5/2017 - Present    Acute pain of right knee ICD-10-CM: M25.561  ICD-9-CM: 719.46 4/26/2017 - Present    Snoring ICD-10-CM: R06.83  ICD-9-CM: 786.09 Unknown - Present    Deep vein thrombosis (DVT) of femoral vein of both lower extremities ICD-10-CM: I82.413  ICD-9-CM: 453.41 3/20/2017 - Present    Acquired hypothyroidism ICD-10-CM: E03.9  ICD-9-CM: 244.9 3/20/2017 - Present    Ascending aortic dissection ICD-10-CM: I71.01  ICD-9-CM: 441.01 2/15/2017 - Present

## 2018-02-06 NOTE — THERAPY TREATMENT NOTE
Inpatient Rehabilitation - Speech Language Pathology   Swallow Treatment Note [facility]     Patient Name: Truman Esquivel  : 1960  MRN: [id number]  Today's Date: 2018        Referring Physician: GERI Venegas      Admit Date: 2018    Goals:  1.  Pt will trial solids and thin liquids w/no overt s/s of aspiration:  Pt seen at am meal this date. Pt had been given thin water when SLP entered the room. Pt stated that he had been doing well with it. Pt safely tolerated NTL via cup.  2.  Pt will safely tolerate least restricted diet w/no overt s/s of aspiration for adequate nutrition and hydration:  Trials of puree and thin liquids via cup were given. No overt s/s of aspiration were noted w/either consistency. SLP advanced pt's diet to puree/thin w/small sips from cup. Sign w/diet placed at [facility], nsg and cna informed of diet change.  Pt educated on anatomy and physiology of normal swallow and w/trach using animation from Ipad application.  Pt asked questions pertaining to anatomy and flow of air w/trach.  SLP was able to illustrate w/use of ipad.    Ileana Yañez, MS CCC-SLP 2018 1:35 PM    Visit Dx:      ICD-10-CM ICD-9-CM   1. Pharyngeal dysphagia R13.13 787.23   2. 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 Dysphagia, unspecified type R13.10 787.20   4. 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 Squamous cell carcinoma of larynx C32.9 161.9     Patient Active Problem List   Diagnosis   • Ascending aortic dissection   • Essential hypertension   • Deep vein thrombosis (DVT) of femoral vein of both lower extremities   • Acquired hypothyroidism   • Snoring   • Acute pain of right knee   • Knee effusion, right   • Knee pain   • Obstructive sleep apnea of adult   • TIA (transient ischemic attack)   • Neoplasm of uncertain behavior of larynx   • Dysphagia   • Squamous cell carcinoma of larynx   • Pharyngeal dysphagia             Adult Rehabilitation Note       18 0715          Rehab  Assessment/Intervention    Discipline speech language pathologist  -CK      Document Type therapy note (daily note)  -CK      Subjective Information agree to therapy  -CK      Patient Effort, Rehab Treatment good  -CK      Recorded by [CK] MS NABIL Fox      Pain Assessment    Pain Assessment No/denies pain  -CK      Recorded by [CK] MS NABIL Fox      Dysphagia Treatment Objectives and Progress    Dysphagia Treatment Objectives Other 1;Other 2  -CK      Recorded by [CK] MS NABIL Fox      Dysphagia Other 1    Dysphagia Other 1 Objective Pt will safely tolerate least restricted diet w/no overt s/s of aspiration for adequate nutrition and hydration.  -CK      Status: Dysphagia Other 1 Progressing as expected  -CK      Dysphagia Other 1 Progress 50%  -CK      Comments: Dysphagia Other 1 Pt's diet advanced to puree/thin   -CK      Recorded by [CK] MS NABIL Fox      Dysphagia Other 2    Dysphagia Other 2 Objective Pt will trial solids and thin liquids w/no overt s/s of aspiration.  -CK      Status: Dysphagia Other 2 Progressing as expected  -CK      Dysphagia Other 2 Progress 50%  -CK      Comments: Dysphagia Other 2 Pt safely tolerated puree solids and thin liquids via cup  -CK      Recorded by [CK] MS NABIL Fox      Positioning and Restraints    Pre-Treatment Position sitting in chair/recliner  -CK      Post Treatment Position chair  -CK      In Chair sitting;call light within reach;encouraged to call for assist;with other staff  -CK      Recorded by [CK] MS NABIL Fox        User Key  (r) = Recorded By, (t) = Taken By, (c) = Cosigned By    Initials Name Effective Dates    CK MS NABIL Fox 10/17/16 -                   IP SLP Goals       02/06/18 1329 02/05/18 1452       Safely Consume Diet    Safely Consume Diet- SLP, Date Established  02/05/18  -CK     Safely Consume Diet- SLP, Time to Achieve  by  discharge  -CK     Safely Consume Diet- SLP, Additional Goal  Pt will safely tolerate least restricted diet w/no overt s/s of aspiration for adequate nutrition and hydration.  -CK     Safely Consume Diet- SLP, Date Goal Reviewed 02/06/18  -CK 02/05/18  -CK     Safely Consume Diet- SLP, Outcome goal ongoing  -CK        User Key  (r) = Recorded By, (t) = Taken By, (c) = Cosigned By    Initials Name Provider Type    CALEB Yañez MS CCC-SLP Speech and Language Pathologist          EDUCATION  The patient has been educated in the following areas:   Dysphagia (Swallowing Impairment) Modified Diet Instruction.    SLP Recommendation and Plan                                           Plan of Care Review  Plan Of Care Reviewed With: patient  Progress: improving  Outcome Summary/Follow up Plan: Pt seen at am meal this date.  Pt had been given thin water when SLP entered the room.  Pt stated that he had been doing well with it.  Pt safely tolerated NTL via cup.  Trials of puree and thin liquids via cup were given.  No overt s/s of aspiration were noted w/either consistency.  SLP advanced pt's diet to puree/thin w/small sips from cup.  Sign w/diet placed at \[facility]\"", nsg and cna informed of diet change.           Time Calculation:         Time Calculation- SLP       02/06/18 1332          Time Calculation- SLP    SLP Start Time 0715  -CK      SLP Stop Time 0801  -CK      SLP Time Calculation (min) 46 min  -CK      Total Timed Code Minutes- SLP 46 minute(s)  -CK      SLP Received On 02/06/18  -CK      SLP Goal Re-Cert Due Date 02/19/18  -CK        User Key  (r) = Recorded By, (t) = Taken By, (c) = Cosigned By    Initials Name Provider Type    CALEB Yañez MS CCC-SLP Speech and Language Pathologist          Therapy Charges for Today     Code Description Service Date Service Provider Modifiers Qty    44571092879 HC ST SWALLOWING CURRENT STATUS 2/5/2018 Ileana Yañez MS CCC-SLP GN, CJ 1    46633147865 HC ST  SWALLOWING PROJECTED 2/5/2018 Ileana Yañez MS CCC-SLP GN, CI 1    [id number]  ST EVAL ORAL PHARYNG SWALLOW 3 2/5/2018 Ileana Yañez MS CCC-SLP GN 1    [id number]  ST TREATMENT SWALLOW 3 2/6/2018 Ileana Yañez MS CCC-SLP GN 1          SLP G-Codes  Functional Limitations: Swallowing  Swallow Current Status (): At least 20 percent but less than 40 percent impaired, limited or restricted  Swallow Goal Status (): At least 1 percent but less than 20 percent impaired, limited or restricted      Ileana Yañez MS CCC-SLP  2/6/2018

## 2018-02-06 NOTE — DISCHARGE PLACEMENT REQUEST
"Truman Rodriguez (57 y.o. Male)     Date of Birth Social Security Number Address Home Phone MRN    1960  [address]  [facility] [address] [phone] [id number]    Sabianism Marital Status          Quaker        Admission Date Admission Type Admitting Provider Attending Provider Department, Room/Bed    18 Elective Joseph Venegas MD Logan, William Anderson, MD [address], [facility]/1    Discharge Date Discharge Disposition Discharge Destination                      Attending Provider: Joseph Venegas MD     Allergies:  Eggs Or Egg-derived Products, Erythromycin, Gabapentin, Other, Penicillins, Tetracyclines & Related, Acth [Corticotropin], Cephalosporins, Keflex [Cephalexin]    Isolation:  None   Infection:  None   Code Status:  FULL    Ht:  182.9 cm (72\")   Wt:  121 kg (267 lb 13.7 oz)    Admission Cmt:  None   Principal Problem:  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]                 Active Insurance as of 2018     Primary Coverage     Payor Plan Insurance Group Employer/Plan Group    PASSPORT PASSPORT MEDICAID     Payor Plan Address Payor Plan Phone Number Effective From Effective To    [address] [phone] 2016     [place] [address]       Subscriber Name Subscriber Birth Date Member ID       TRUMAN RODRIGUEZ 1960 [id number]                 Emergency Contacts      (Rel.) Home Phone Work Phone Mobile Phone    JoeFlo sima (Sister) [phone] -- --    Blanca Rodriguez (Daughter) [phone] -- [phone]    Yolanda Rodriguez (Mother) -- -- [phone]        Plan discharge home 18          [address] [address]  Phone:  [phone]  Fax:   Date Ordered: 2018         Patient:  Truman Rodriguez MRN:  [id number]   [address]  [facility] [address] :  1960  SSN:    Phone: [phone] Sex:  M     Weight: " "121 kg (267 lb 13.7 oz)         Ht Readings from Last 1 Encounters:   02/05/18 182.9 cm (72\")         Miscellaneous DME             (Order ID: [id number])    Diagnosis:  Tracheostomy in place (Z93.0 [ICD-10-CM] V44.0 [ICD-9-CM])  Squamous cell carcinoma of larynx (C32.9 [ICD-10-CM] 161.9 [ICD-9-CM])   Quantity:  100     Type of DME: trach care kits, continuous suction machine,suction catheters(14fr) . yankauer suction, current trach is size 8 shiley cuffed. ,3ml sterile 0.9% nacl solution for inhalation,tracheostomy tube holders, 0.9% NACL sodium cloride irrigation  Length of Need (99 Months = Lifetime): 15 Months            Authorizing Provider:Joseph Venegas MD  Authorizing Provider's NPI: [id number]  Order Entered By: Joseph Venegas MD 2/6/2018  1:19 PM     Electronically signed by: Joseph Venegas MD 2/6/2018  1:19 PM             Emergency Contact Information     Name Relation Home Work Mobile    Yolanda Diaz Sister [phone]      Blanca Esquivel Daughter [phone]    Yolanda Esquivel Mother   [phone]          Insurance Information                PASSPORT/PASSPORT Phone: [phone]    Subscriber: Truman Esquivel Subscriber#: [id number]    Group#: MEDICAID Precert#:           Problem List           Codes Noted - Resolved       Hospital    * (Principal)Squamous cell carcinoma of larynx ICD-10-CM: C32.9  ICD-9-CM: 161.9 2/5/2018 - Present    Pharyngeal dysphagia ICD-10-CM: R13.13  ICD-9-CM: 787.23 2/5/2018 - Present    Neoplasm of uncertain behavior of larynx ICD-10-CM: D38.0  ICD-9-CM: 235.6 1/30/2018 - Present    Dysphagia ICD-10-CM: R13.10  ICD-9-CM: 787.20 1/30/2018 - Present    Essential hypertension ICD-10-CM: I10  ICD-9-CM: 401.9 3/20/2017 - Present       Non-Hospital    TIA (transient ischemic attack) ICD-10-CM: G45.9  ICD-9-CM: 435.9 12/21/2017 - Present    Knee pain ICD-10-CM: M25.569  ICD-9-CM: 719.46 Unknown - Present    Obstructive sleep apnea of adult " ICD-10-CM: G47.33  ICD-9-CM: 327.23 5/18/2017 - Present    Knee effusion, right ICD-10-CM: M25.461  ICD-9-CM: 719.06 5/5/2017 - Present    Acute pain of right knee ICD-10-CM: M25.561  ICD-9-CM: 719.46 4/26/2017 - Present    Snoring ICD-10-CM: R06.83  ICD-9-CM: 786.09 Unknown - Present    Deep vein thrombosis (DVT) of femoral vein of both lower extremities ICD-10-CM: I82.413  ICD-9-CM: 453.41 3/20/2017 - Present    Acquired hypothyroidism ICD-10-CM: E03.9  ICD-9-CM: 244.9 3/20/2017 - Present    Ascending aortic dissection ICD-10-CM: I71.01  ICD-9-CM: 441.01 2/15/2017 - Present

## 2018-02-07 VITALS
BODY MASS INDEX: 36.28 KG/M2 | TEMPERATURE: 98.5 F | OXYGEN SATURATION: 92 % | DIASTOLIC BLOOD PRESSURE: 74 MMHG | HEIGHT: 72 IN | WEIGHT: 267.86 LBS | HEART RATE: 71 BPM | SYSTOLIC BLOOD PRESSURE: 140 MMHG | RESPIRATION RATE: 20 BRPM

## 2018-02-07 PROBLEM — C32.9 SQUAMOUS CELL CARCINOMA OF LARYNX: Status: ACTIVE | Noted: 2018-01-30

## 2018-02-07 LAB
ALBUMIN SERPL-MCNC: 3.6 G/DL (ref 3.4–4.8)
ALBUMIN/GLOB SERPL: 1.2 G/DL (ref 1.1–1.8)
ALP SERPL-CCNC: 64 U/L (ref 38–126)
ALT SERPL W P-5'-P-CCNC: 28 U/L (ref 21–72)
ANION GAP SERPL CALCULATED.3IONS-SCNC: 11 MMOL/L (ref 5–15)
AST SERPL-CCNC: 15 U/L (ref 17–59)
BASOPHILS # BLD AUTO: 0.03 10*3/MM3 (ref 0–0.2)
BASOPHILS NFR BLD AUTO: 0.2 % (ref 0–2)
BILIRUB SERPL-MCNC: 0.3 MG/DL (ref 0.2–1.3)
BUN BLD-MCNC: 17 MG/DL (ref 7–21)
BUN/CREAT SERPL: 15.5 (ref 7–25)
CALCIUM SPEC-SCNC: 8.5 MG/DL (ref 8.4–10.2)
CHLORIDE SERPL-SCNC: 100 MMOL/L (ref 95–110)
CO2 SERPL-SCNC: 26 MMOL/L (ref 22–31)
CREAT BLD-MCNC: 1.1 MG/DL (ref 0.7–1.3)
DEPRECATED RDW RBC AUTO: 39.7 FL (ref 35.1–43.9)
EOSINOPHIL # BLD AUTO: 0.52 10*3/MM3 (ref 0–0.7)
EOSINOPHIL NFR BLD AUTO: 3.9 % (ref 0–7)
ERYTHROCYTE [DISTWIDTH] IN BLOOD BY AUTOMATED COUNT: 13.9 % (ref 11.5–14.5)
GFR SERPL CREATININE-BSD FRML MDRD: 69 ML/MIN/1.73 (ref 60–130)
GLOBULIN UR ELPH-MCNC: 2.9 GM/DL (ref 2.3–3.5)
GLUCOSE BLD-MCNC: 116 MG/DL (ref 60–100)
HCT VFR BLD AUTO: 31.5 % (ref 39–49)
HGB BLD-MCNC: 10.1 G/DL (ref 13.7–17.3)
IMM GRANULOCYTES # BLD: 0.02 10*3/MM3 (ref 0–0.02)
IMM GRANULOCYTES NFR BLD: 0.1 % (ref 0–0.5)
LYMPHOCYTES # BLD AUTO: 1.65 10*3/MM3 (ref 0.6–4.2)
LYMPHOCYTES NFR BLD AUTO: 12.4 % (ref 10–50)
MCH RBC QN AUTO: 25.4 PG (ref 26.5–34)
MCHC RBC AUTO-ENTMCNC: 32.1 G/DL (ref 31.5–36.3)
MCV RBC AUTO: 79.1 FL (ref 80–98)
MONOCYTES # BLD AUTO: 1.04 10*3/MM3 (ref 0–0.9)
MONOCYTES NFR BLD AUTO: 7.8 % (ref 0–12)
NEUTROPHILS # BLD AUTO: 10.09 10*3/MM3 (ref 2–8.6)
NEUTROPHILS NFR BLD AUTO: 75.6 % (ref 37–80)
PLATELET # BLD AUTO: 182 10*3/MM3 (ref 150–450)
PMV BLD AUTO: 11.5 FL (ref 8–12)
POTASSIUM BLD-SCNC: 3.4 MMOL/L (ref 3.5–5.1)
PROT SERPL-MCNC: 6.5 G/DL (ref 6.3–8.6)
RBC # BLD AUTO: 3.98 10*6/MM3 (ref 4.37–5.74)
SODIUM BLD-SCNC: 137 MMOL/L (ref 137–145)
WBC NRBC COR # BLD: 13.35 10*3/MM3 (ref 3.2–9.8)

## 2018-02-07 PROCEDURE — 92526 ORAL FUNCTION THERAPY: CPT | Performed by: SPEECH-LANGUAGE PATHOLOGIST

## 2018-02-07 PROCEDURE — 80053 COMPREHEN METABOLIC PANEL: CPT | Performed by: PHYSICIAN ASSISTANT

## 2018-02-07 PROCEDURE — G8996 SWALLOW CURRENT STATUS: HCPCS | Performed by: SPEECH-LANGUAGE PATHOLOGIST

## 2018-02-07 PROCEDURE — 94760 N-INVAS EAR/PLS OXIMETRY 1: CPT

## 2018-02-07 PROCEDURE — 25010000002 MORPHINE PER 10 MG: Performed by: OTOLARYNGOLOGY

## 2018-02-07 PROCEDURE — G0378 HOSPITAL OBSERVATION PER HR: HCPCS

## 2018-02-07 PROCEDURE — G8998 SWALLOW D/C STATUS: HCPCS | Performed by: SPEECH-LANGUAGE PATHOLOGIST

## 2018-02-07 PROCEDURE — 94799 UNLISTED PULMONARY SVC/PX: CPT

## 2018-02-07 PROCEDURE — G8997 SWALLOW GOAL STATUS: HCPCS | Performed by: SPEECH-LANGUAGE PATHOLOGIST

## 2018-02-07 PROCEDURE — 85025 COMPLETE CBC W/AUTO DIFF WBC: CPT | Performed by: PHYSICIAN ASSISTANT

## 2018-02-07 RX ORDER — OXYCODONE HYDROCHLORIDE AND ACETAMINOPHEN 5; 325 MG/1; MG/1
TABLET ORAL
Qty: 60 TABLET | Refills: 0 | Status: SHIPPED | OUTPATIENT
Start: 2018-02-07 | End: 2018-03-08 | Stop reason: SDUPTHER

## 2018-02-07 RX ADMIN — Medication 100 MCG: at 09:02

## 2018-02-07 RX ADMIN — LOSARTAN POTASSIUM: 50 TABLET, FILM COATED ORAL at 09:02

## 2018-02-07 RX ADMIN — POTASSIUM CHLORIDE, DEXTROSE MONOHYDRATE AND SODIUM CHLORIDE 80 ML/HR: 150; 5; 450 INJECTION, SOLUTION INTRAVENOUS at 06:17

## 2018-02-07 RX ADMIN — MORPHINE SULFATE 4 MG: 8 INJECTION, SOLUTION INTRAMUSCULAR; INTRAVENOUS at 07:45

## 2018-02-07 RX ADMIN — AMLODIPINE BESYLATE 5 MG: 5 TABLET ORAL at 09:02

## 2018-02-07 RX ADMIN — MORPHINE SULFATE 4 MG: 8 INJECTION, SOLUTION INTRAMUSCULAR; INTRAVENOUS at 12:09

## 2018-02-07 RX ADMIN — LEVOTHYROXINE SODIUM 88 MCG: 88 TABLET ORAL at 09:02

## 2018-02-07 NOTE — PLAN OF CARE
Problem: Inpatient SLP  Goal: Dysphagia- Patient will safely consume diet as per recommendation with no signs/symptoms of aspiration  Outcome: Unable to achieve outcome(s) by discharge Date Met: 02/07/18 02/05/18 1452 02/07/18 1006   Safely Consume Diet   Safely Consume Diet- SLP, Date Established 02/05/18 --    Safely Consume Diet- SLP, Time to Achieve by discharge --    Safely Consume Diet- SLP, Additional Goal Pt will safely tolerate least restricted diet w/no overt s/s of aspiration for adequate nutrition and hydration. --    Safely Consume Diet- SLP, Date Goal Reviewed --  02/07/18   Safely Consume Diet- SLP, Outcome --  goal not met   Safely Consume Diet- SLP, Reason Goal Not Met --  discharged from facility

## 2018-02-07 NOTE — THERAPY TREATMENT NOTE
"Inpatient Rehabilitation - Speech Language Pathology   Swallow Treatment Note [facility]     Patient Name: Truman Esquivel  : 1960  MRN: [id number]  Today's Date: 2018        Referring Physician: GERI Venegas      Admit Date: 2018     Goals:  1.  Pt will trial solids and thin liquids w/no overt s/s of aspiration:  Pt trialed mechanical soft solids x3 (biscuit and gravy x2; ground sausage x1). Pt had no overt s/s of aspiration; however, he stated that it was just \"too much\" on his swallow right now and it felt \"tight\" when he swallowed it. Bolus size was 1/4 teaspoon or less.  2.  Pt will safely tolerate least restricted diet w/no overt s/s of aspiration for adequate nutrition and hydration:  Pt safely tolerated puree solids/thin liquids via cup w/small sips w/no overt s/s of aspiration. Pt stated that meals were going well.  Continue puree/thin via cup at this time.    Ileana Yañez MS CCC-SLP 2018 9:26 AM    Visit Dx:      ICD-10-CM ICD-9-CM   1. Pharyngeal dysphagia R13.13 787.23   2. 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 Dysphagia, unspecified type R13.10 787.20   4. 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 Squamous cell carcinoma of larynx C32.9 161.9     Patient Active Problem List   Diagnosis   • Ascending aortic dissection   • Essential hypertension   • Deep vein thrombosis (DVT) of femoral vein of both lower extremities   • Acquired hypothyroidism   • Snoring   • Acute pain of right knee   • Knee effusion, right   • Knee pain   • Obstructive sleep apnea of adult   • TIA (transient ischemic attack)   • Squamous cell carcinoma of larynx   • Dysphagia   • Squamous cell carcinoma of larynx   • Pharyngeal dysphagia             Adult Rehabilitation Note       18 0717 18 0715       Rehab Assessment/Intervention    Discipline speech language pathologist  -CK speech language pathologist  -CK     Document Type therapy note (daily note)  -CK therapy note " "(daily note)  -CK     Subjective Information agree to therapy  -CK agree to therapy  -CK     Patient Effort, Rehab Treatment good  -CK good  -CK     Recorded by [CK] Ileana Yañez MS CCC-SLP [CK] Ileana Yañez MS CCC-SLP     Pain Assessment    Pain Assessment 0-10  -CK No/denies pain  -CK     Pain Score 6  -CK      Post Pain Score 6  -CK      Pain Type Surgical pain  -CK      Pain Location Neck  -CK      Pain Intervention(s) Medication (See MAR)   called nurse to request meds if able  -CK      Recorded by [CK] Ileana Yañez MS CCC-SLP [CK] Ileana Yañez MS CCC-SLP     Dysphagia Treatment Objectives and Progress    Dysphagia Treatment Objectives Other 1;Other 2  -CK Other 1;Other 2  -CK     Recorded by [CK] Ileana Yañez MS CCC-SLP [CK] Ileana Yañez, MS CCC-SLP     Dysphagia Other 1    Dysphagia Other 1 Objective Pt will safely tolerate least restricted diet w/no overt s/s of aspiration for adequate nutrition and hydration.  -CK Pt will safely tolerate least restricted diet w/no overt s/s of aspiration for adequate nutrition and hydration.  -CK     Status: Dysphagia Other 1 Progressing as expected  -CK Progressing as expected  -CK     Dysphagia Other 1 Progress 50%  -CK 50%  -CK     Comments: Dysphagia Other 1 continue on puree/thin by cup  -CK Pt's diet advanced to puree/thin   -CK     Recorded by [CK] Ileana Yañez MS CCC-SLP [CK] Ileana Yañez MS CCC-SLP     Dysphagia Other 2    Dysphagia Other 2 Objective Pt will trial solids and thin liquids w/no overt s/s of aspiration.  -CK Pt will trial solids and thin liquids w/no overt s/s of aspiration.  -CK     Status: Dysphagia Other 2 Progressing as expected  -CK Progressing as expected  -CK     Dysphagia Other 2 Progress 50%  -CK 50%  -CK     Comments: Dysphagia Other 2 Pt safely tolerated 3 trials of [facility] soft solids; however, stated it felt \"tight\" when he swallowed it and just \"too much\".  Pt's trial sizes were 1/4 of a " teaspoon or less.  Trialed biscuit w/gravy x2 and ground sausage x1.  -CK Pt safely tolerated puree solids and thin liquids via cup  -CK     Recorded by [CK] Ileana Yañez MS CCC-SLP [CK] Ileana Yañez MS CCC-SLP     Positioning and Restraints    Pre-Treatment Position in bed  -CK sitting in chair/recliner  -CK     Post Treatment Position bed  -CK chair  -CK     In Bed sitting;call light within reach;encouraged to call for assist;side rails up x2  -CK      In Chair  sitting;call light within reach;encouraged to call for assist;with other staff  -CK     Recorded by [CK] Ileana Yañez MS CCC-SLP [CK] Ileana Yañez MS CCC-SLP       User Key  (r) = Recorded By, (t) = Taken By, (c) = Cosigned By    Initials Name Effective Dates    CK Ileana Yañez MS CCC-SLP 10/17/16 -                   IP SLP Goals       02/07/18 0741 02/06/18 1329 02/05/18 1452    Safely Consume Diet    Safely Consume Diet- SLP, Date Established   02/05/18  -CK    Safely Consume Diet- SLP, Time to Achieve   by discharge  -CK    Safely Consume Diet- SLP, Additional Goal   Pt will safely tolerate least restricted diet w/no overt s/s of aspiration for adequate nutrition and hydration.  -CK    Safely Consume Diet- SLP, Date Goal Reviewed 02/07/18  -CK 02/06/18  -CK 02/05/18  -CK    Safely Consume Diet- SLP, Outcome goal ongoing  -CK goal ongoing  -CK       User Key  (r) = Recorded By, (t) = Taken By, (c) = Cosigned By    Initials Name Provider Type    CK Ileana Yañez MS CCC-SLP Speech and Language Pathologist          EDUCATION  The patient has been educated in the following areas:   Dysphagia (Swallowing Impairment) Modified Diet Instruction.    SLP Recommendation and Plan                                           Plan of Care Review  Plan Of Care Reviewed With: patient  Progress: improving  Outcome Summary/Follow up Plan: Pt safely tolerated puree solids/thin liquids via cup w/small sips w/no overt s/s of aspiration.  Pt  "stated that meals were going well.  Pt trialed mechanical soft solids x3 (biscuit and gravy x2; ground sausage x1).  Pt had no overt s/s of aspiration; however, he stated that it was just \"too much\" on his swallow right now and it felt \"tight\" when he swallowed it.  Bolus size was 1/4 teaspoon or less.  Continue puree/thin via cup at this time.           Time Calculation:         Time Calculation- SLP       02/07/18 0924          Time Calculation- SLP    SLP Start Time 0717  -CK      SLP Stop Time 0752  -      SLP Time Calculation (min) 35 min  -CK      Total Timed Code Minutes- SLP 35 minute(s)  -      SLP Received On 02/07/18  -      SLP Goal Re-Cert Due Date 02/19/18  -        User Key  (r) = Recorded By, (t) = Taken By, (c) = Cosigned By    Initials Name Provider Type     Ileana Yañez MS CCC-SLP Speech and Language Pathologist          Therapy Charges for Today     Code Description Service Date Service Provider Modifiers Qty    46260457450 HC ST TREATMENT SWALLOW 3 2/6/2018 Ileana Yañez MS CCC-SLP GN 1    79221652858 HC ST TREATMENT SWALLOW 2 2/7/2018 MS NABIL Fox GN 1          SLP G-Codes  Functional Limitations: Swallowing  Swallow Current Status (): At least 20 percent but less than 40 percent impaired, limited or restricted  Swallow Goal Status (): At least 1 percent but less than 20 percent impaired, limited or restricted      MS NABIL Fox  2/7/2018  "

## 2018-02-07 NOTE — CONSULTS
Nutrition Services    Patient Name:  Truman Esquivel  YOB: 1960  MRN: [id number]  Admit Date:  2/5/2018    SLP with pt.  She reports that pt is going home and will be followed by Home health SLP.  He will go home on pureed diet but regular liquids.  Pt reports an overall good appeitte.  HE loves the mighty shakes and will order some when he gets home.  He is allergic to eggs but can have eggs that are cooked in foods.  RD will note changes and monitor prn.    Electronically signed by:  Leeann Salazar RD  02/07/18 10:40 AM

## 2018-02-07 NOTE — PLAN OF CARE
"Problem: Patient Care Overview (Adult)  Goal: Plan of Care Review  Outcome: Ongoing (interventions implemented as appropriate)   02/07/18 0741   Coping/Psychosocial Response Interventions   Plan Of Care Reviewed With patient   Patient Care Overview   Progress improving   Outcome Evaluation   Outcome Summary/Follow up Plan Pt safely tolerated puree solids/thin liquids via cup w/small sips w/no overt s/s of aspiration. Pt stated that meals were going well. Pt trialed mechanical soft solids x3 (biscuit and gravy x2; ground sausage x1). Pt had no overt s/s of aspiration; however, he stated that it was just \"too much\" on his swallow right now and it felt \"tight\" when he swallowed it. Bolus size was 1/4 teaspoon or less. Continue puree/thin via cup at this time.       Problem: Inpatient SLP  Goal: Dysphagia- Patient will safely consume diet as per recommendation with no signs/symptoms of aspiration  Outcome: Ongoing (interventions implemented as appropriate)   02/05/18 1452 02/07/18 0741   Safely Consume Diet   Safely Consume Diet- SLP, Date Established 02/05/18 --    Safely Consume Diet- SLP, Time to Achieve by discharge --    Safely Consume Diet- SLP, Additional Goal Pt will safely tolerate least restricted diet w/no overt s/s of aspiration for adequate nutrition and hydration. --    Safely Consume Diet- SLP, Date Goal Reviewed --  02/07/18   Safely Consume Diet- SLP, Outcome --  goal ongoing         "

## 2018-02-07 NOTE — DISCHARGE SUMMARY
Discharge Summary    Date of Admission: 2/5/2018  Date of Discharge:  2/7/2018        Date of Discharge:  2/7/2018    Discharge Diagnosis: Squamous cell carcinoma the larynx final staging pending    Presenting Problem/History of Present Illness  Neoplasm of uncertain behavior of larynx [D38.0]  Dysphagia, unspecified type [R13.10]  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]  Pharyngeal dysphagia [R13.13]  Pharyngeal dysphagia [R13.13]       Hospital Course  Patient is a 57 y.o. male presented with tumor of the supraglottic larynx identified in the office.  On the day of admission patient underwent tracheostomy under local anesthesia followed by direct laryngoscopy with biopsy and esophagoscopy.  Biopsy was consistent with squamous cell carcinoma.  Patient was placed in a observation bed in the hospital.  Speech therapy and case management were consulted as was the hospitalist service to manage the patient's medical problems.  Patient was able to tolerate a diet as supervised by speech therapy.  Home health and durable medical equipment needed for trach care were arranged.  On postop day 2 the patient was able to be discharged home with plans for a PET scan this Friday to complete staging workup.      Procedures Performed  Procedure(s):  TRACHEOSTOMY  local injection @ 1106 incision @ 1119  DIRECT LARYNGOSCOPY WITH BIOPSY, ESOPHAGOSCOPY     (no bronchoscopy, no laser)       Consults:   Consults     Date and Time Order Name Status Description    2/5/2018 1258 Inpatient Consult to Hospitalist Completed           Pertinent Test Results: Pathology result from biopsy consistent with squamous cell carcinoma of the larynx    Condition on Discharge:  Satisfactory    Vital Signs  Temp:  [96.3 °F (35.7 °C)-99.3 °F (37.4 °C)] 98.8 °F (37.1 °C)  Heart Rate:  [72-82] 82  Resp:  [16-18] 18  BP: (128-160)/(69-90) 152/79    Physical Exam:   Tracheostomy site without significant bleeding.  Cuff was fully deflated.    Discharge  Disposition  Home or Self Care    Discharge Medications   Truman Esquivel   Home Medication Instructions PRABHAKAR:[id number]    Printed on:02/07/18 0930   Medication Information                      amLODIPine (NORVASC) 5 MG tablet  Take 5 mg by mouth Daily.             aspirin 81 MG tablet  Take 81 mg by mouth Daily. Last dose 1/31/18             atorvastatin (LIPITOR) 20 MG tablet  Take 1 tablet by mouth Daily.             clopidogrel (PLAVIX) 75 MG tablet  Take 75 mg by mouth Daily. Last dose approx one month ago             coenzyme Q10 50 MG capsule capsule  Take 100 mg by mouth Every Night.             levothyroxine (SYNTHROID, LEVOTHROID) 88 MCG tablet  Take 88 mcg by mouth Daily.             losartan-hydrochlorothiazide (HYZAAR) 100-25 MG per tablet  Take 1 tablet by mouth Daily.             metoprolol succinate XL (TOPROL-XL) 25 MG 24 hr tablet  Take 25 mg by mouth Every Night.             Multiple Vitamins-Minerals (MULTIVITAMIN ADULT PO)  Take 1 tablet by mouth Daily.             oxyCODONE-acetaminophen (PERCOCET) 5-325 MG per tablet  1-2 tablets by mouth every 4 hours as needed for pain             sodium chloride 0.9 % solution  Use for tracheostomy care as directed             vitamin B-12 (CYANOCOBALAMIN) 100 MCG tablet  Take 100 mcg by mouth Daily.             vitamin E 100 UNIT capsule  Take 400 Units by mouth Every Night.             Zinc 50 MG capsule  Take 50 mg by mouth Every Night.               Patient is instructed not to resume his Plavix until next Monday.  Discharge Diet:  liquids, may advance to mechanical soft as tolerated    Activity at Discharge:  no driving or heavy lifting or strenuous activity until released    Follow-up Appointments  Future Appointments  Date Time Provider Department Center   2/9/2018 9:45 AM MAD PET 1  MAD PET MAD   2/20/2018 9:30 AM Manuel Rivas MD [facility] SM MAD None   3/14/2018 10:00 AM MD NIKKI Evans END MAD None   6/22/2018 11:00 AM Liborio  RAQUEL Chandra MD MGW CD MAD None     Additional Instructions for the Follow-ups that You Need to Schedule     Ambulatory Referral to Home Health    As directed    Face to Face Visit Date:  2/6/2018    Follow-up Provider for Plan of Care?:  I will be treating the patient on an ongoing basis.  Please send me the Plan of Care for signature.    Follow-up Provider:  JOSEPH MEYER [[id number]]    Reason/Clinical Findings:  new tracheostomy    Describe mobility limitations that make leaving home difficult:  new tracheostomy    Nursing/Therapeutic Services Requested:  Skilled Nursing    Skilled nursing orders:  Other (tracheostomy care/teaching)    Frequency:  1 Week 1           Ambulatory Referral to Speech Therapy    As directed              Patient also has appointment for PET scan Friday, 2/9/18 and appointment with Dr. Meyer Monday, 2/12/18.           Joseph Meyer MD  02/07/18  9:23 AM                                                  Discharge Medications:   Truman Esquivel   Home Medication Instructions PRABHAKAR:[id number]    Printed on:02/07/18 0919   Medication Information                      amLODIPine (NORVASC) 5 MG tablet  Take 5 mg by mouth Daily.             aspirin 81 MG tablet  Take 81 mg by mouth Daily. Last dose 1/31/18             atorvastatin (LIPITOR) 20 MG tablet  Take 1 tablet by mouth Daily.             clopidogrel (PLAVIX) 75 MG tablet  Take 75 mg by mouth Daily. Last dose approx one month ago             coenzyme Q10 50 MG capsule capsule  Take 100 mg by mouth Every Night.             levothyroxine (SYNTHROID, LEVOTHROID) 88 MCG tablet  Take 88 mcg by mouth Daily.             losartan-hydrochlorothiazide (HYZAAR) 100-25 MG per tablet  Take 1 tablet by mouth Daily.             metoprolol succinate XL (TOPROL-XL) 25 MG 24 hr tablet  Take 25 mg by mouth Every Night.             Multiple Vitamins-Minerals (MULTIVITAMIN ADULT PO)  Take 1 tablet by mouth Daily.              oxyCODONE-acetaminophen (PERCOCET) 5-325 MG per tablet  1-2 tablets by mouth every 4 hours as needed for pain             sodium chloride 0.9 % solution  Use for tracheostomy care as directed             vitamin B-12 (CYANOCOBALAMIN) 100 MCG tablet  Take 100 mcg by mouth Daily.             vitamin E 100 UNIT capsule  Take 400 Units by mouth Every Night.             Zinc 50 MG capsule  Take 50 mg by mouth Every Night.                       This document has been electronically signed by Joseph Venegas MD on February 7, 2018 9:19 AM

## 2018-02-07 NOTE — DISCHARGE PLACEMENT REQUEST
"Truman Rodriguez (57 y.o. Male)     Date of Birth Social Security Number Address Home Phone MRN    1960  [address]  [facility] [address] [phone] [id number]    Hinduism Marital Status          Religious        Admission Date Admission Type Admitting Provider Attending Provider Department, Room/Bed    18 Elective Joseph Venegas MD Logan, William Anderson, MD [address], [facility]/1    Discharge Date Discharge Disposition Discharge Destination         Home or Self Care             Attending Provider: Joseph Venegas MD     Allergies:  Eggs Or Egg-derived Products, Erythromycin, Gabapentin, Other, Penicillins, Tetracyclines & Related, Acth [Corticotropin], Cephalosporins, Keflex [Cephalexin]    Isolation:  None   Infection:  None   Code Status:  FULL    Ht:  182.9 cm (72\")   Wt:  121 kg (267 lb 13.7 oz)    Admission Cmt:  None   Principal Problem:  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]                 Active Insurance as of 2018     Primary Coverage     Payor Plan Insurance Group Employer/Plan Group    [facility]     Payor Plan Address Payor Plan Phone Number Effective From Effective To    [address] [phone] 2016     [place] [address]       Subscriber Name Subscriber Birth Date Member ID       TRUMAN RODRIGUEZ 1960 [id number]                 Emergency Contacts      (Rel.) Home Phone Work Phone Mobile Phone    Yolanda Diaz (Sister) [phone] -- --    Blanca Rodriguez (Daughter) [phone] -- [phone]    Yolanda Rodriguez RAQUEL (Mother) -- -- [phone]        Will insurance cover 3 ml NS.   Call back [phone]      [address] [address]  Phone:  [phone]  Fax:   Date Ordered: 2018         Patient:  Truman Rodriguez MRN:  [id number]   [address]  [facility] [address] :  1960  SSN:  " "  Phone: [phone] Sex:  M     Weight: 121 kg (267 lb 13.7 oz)         Ht Readings from Last 1 Encounters:   02/05/18 182.9 cm (72\")         Miscellaneous DME             (Order ID: [id number])    Diagnosis:  Tracheostomy in place (Z93.0 [ICD-10-CM] V44.0 [ICD-9-CM])  Squamous cell carcinoma of larynx (C32.9 [ICD-10-CM] 161.9 [ICD-9-CM])   Quantity:  100     Type of DME: trach care kits, continuous suction machine,suction catheters(14fr) . yankauer suction, current trach is size 8 shiley cuffed. ,3ml sterile 0.9% nacl solution for inhalation,tracheostomy tube holders, 0.9% NACL sodium cloride irrigation  Length of Need (99 Months = Lifetime): 15 Months            Authorizing Provider:Joseph Venegas MD  Authorizing Provider's NPI: [id number]  Order Entered By: Joseph Venegas MD 2/6/2018  1:19 PM     Electronically signed by: Joseph Venegas MD 2/6/2018  1:19 PM               Insurance Information                PASSPORT/PASSPORT Phone: [phone]    Subscriber: Truman Esquivel Subscriber#: [id number]    Group#: MEDICAID Precert#:           "

## 2018-02-07 NOTE — PLAN OF CARE
Problem: Patient Care Overview (Adult)  Goal: Plan of Care Review  Outcome: Ongoing (interventions implemented as appropriate)   02/07/18 0126   Coping/Psychosocial Response Interventions   Plan Of Care Reviewed With patient   Patient Care Overview   Progress no change   Outcome Evaluation   Outcome Summary/Follow up Plan trach care performed this shift, pt tolerated well, VSS      Goal: Adult Individualization and Mutuality  Outcome: Ongoing (interventions implemented as appropriate)    Goal: Discharge Needs Assessment  Outcome: Ongoing (interventions implemented as appropriate)      Problem: Perioperative Period (Adult)  Goal: Signs and Symptoms of Listed Potential Problems Will be Absent or Manageable (Perioperative Period)  Outcome: Ongoing (interventions implemented as appropriate)

## 2018-02-07 NOTE — NURSING NOTE
Virginie from respiratory therapy did teaching for replacement of tube if it comes out and suctioning. Have reiterated to patient the teaching provided by respiratory therapy and pt will have home health order to reinforce.

## 2018-02-07 NOTE — THERAPY DISCHARGE NOTE
Inpatient Rehabilitation - Speech Language Pathology Discharge Summary  [facility]       Patient Name: Truman Esquivel  : 1960  MRN: [id number]    Today's Date: 2018          Referring Physician: GERI Venegas        Admit Date: 2018      SLP Recommendation and Plan    Visit Dx:    ICD-10-CM ICD-9-CM   1. Pharyngeal dysphagia R13.13 787.23   2. 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 Dysphagia, unspecified type R13.10 787.20   4. 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 Squamous cell carcinoma of larynx C32.9 161.9               Time Calculation- SLP       18 0924          Time Calculation- SLP    SLP Start Time 07  -CK      SLP Stop Time 075  -CK      SLP Time Calculation (min) 35 min  -CK      Total Timed Code Minutes- SLP 35 minute(s)  -CK      SLP Received On 18  -CK      SLP Goal Re-Cert Due Date 18  -CK        User Key  (r) = Recorded By, (t) = Taken By, (c) = Cosigned By    Initials Name Provider Type    CALEB Yañez MS CCC-SLP Speech and Language Pathologist                  IP SLP Goals       18 1006 18 0741 18 1329    Safely Consume Diet    Safely Consume Diet- SLP, Date Goal Reviewed 18  -CK 18  -CK 18  -CK    Safely Consume Diet- SLP, Outcome goal not met  -CK goal ongoing  -CK goal ongoing  -CK    Safely Consume Diet- SLP, Reason Goal Not Met discharged from facility  -CK        18 1452          Safely Consume Diet    Safely Consume Diet- SLP, Date Established 18  -CK      Safely Consume Diet- SLP, Time to Achieve by discharge  -CK      Safely Consume Diet- SLP, Additional Goal Pt will safely tolerate least restricted diet w/no overt s/s of aspiration for adequate nutrition and hydration.  -CK      Safely Consume Diet- SLP, Date Goal Reviewed 18  -CK        User Key  (r) = Recorded By, (t) = Taken By, (c) = Cosigned By    Initials Name Provider Type    CALEB Yañez MS CCC-SLP  Speech and Language Pathologist            Therapy Charges for Today     Code Description Service Date Service Provider Modifiers Qty    66564195275 HC ST TREATMENT SWALLOW 3 2/6/2018 Ileana Yañez MS CCC-SLP GN 1    62775943734 HC ST TREATMENT SWALLOW 2 2/7/2018 Ileana Yañez MS CCC-SLP GN 1    30244763868 HC ST SWALLOWING CURRENT STATUS 2/7/2018 Ileana Yañez MS CCC-SLP ZAKIA, CJ 1    71872677283 HC ST SWALLOWING PROJECTED 2/7/2018 Ileana Yañez MS CCC-SLP GN, CI 1    32527485609 HC ST SWALLOWING DISCHARGE 2/7/2018 Ileana Yañez MS CCC-SLP GN, CJ 1            SLP Discharge Summary  Anticipated Discharge Disposition: home with assist  Reason for Discharge: Discharge from facility  Outcomes Achieved: Refer to plan of care for updates on goals achieved  Discharge Destination: Home with home health, Home with assist      Ileana Yañez MS CCC-SLP  2/7/2018

## 2018-02-07 NOTE — DISCHARGE PLACEMENT REQUEST
"Truman Rodriguez (57 y.o. Male)     Date of Birth Social Security Number Address Home Phone MRN    1960  [id number] [facility] [phone] [id number]    Yazdanism Marital Status          Lutheran        Admission Date Admission Type Admitting Provider Attending Provider Department, Room/Bed    2/5/18 Elective Joseph Venegas MD Logan, William Anderson, MD [address], 382/1    Discharge Date Discharge Disposition Discharge Destination         Home or Self Care             Attending Provider: Joseph Venegas MD     Allergies:  Eggs Or Egg-derived Products, Erythromycin, Gabapentin, Other, Penicillins, Tetracyclines & Related, Acth [Corticotropin], Cephalosporins, Keflex [Cephalexin]    Isolation:  None   Infection:  None   Code Status:  FULL    Ht:  182.9 cm (72\")   Wt:  121 kg (267 lb 13.7 oz)    Admission Cmt:  None   Principal Problem:  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]                 Active Insurance as of 2/5/2018     Primary Coverage     Payor Plan Insurance Group Employer/Plan Group    [facility]     Payor Plan Address Payor Plan Phone Number Effective From Effective To    [address] [phone] 1/1/2016     [place] [address]       Subscriber Name Subscriber Birth Date Member ID       TRUMAN RODRIGUEZ 1960 [id number]                 Emergency Contacts      (Rel.) Home Phone Work Phone Mobile Phone    Yolanda Diaz (Sister) [phone] -- --    Blanca Rodriguez (Daughter) [phone] -- [phone]    AlvinFloa RAQUEL (Mother) -- -- [phone]        Miscellaneous DME [[id number]] (Order [id number])   General Supply    Date: 2/7/2018   Department: [address]   Ordering/Authorizing: Joseph Venegas MD           Order History  Outpatient       Date/Time Action Taken User Additional Information       02/07/18 0905 Sign Joseph Venegas MD        "     Order Details        Frequency Duration Priority Order Class       None None Routine External           Start Date/Time        Start Date       02/07/18           Order Questions        Question Answer Comment       Type of DME will need Shiley size 8 CFN trach tube for appt Monday 2/12        Length of Need (99 Months = Lifetime) 15 Months        Note:  Custom values to enter length of need for DME           Source Order Set / Preference List        Preference List       AMB SUPPLIES [1416]           Clinical Indications           ICD-10-CM ICD-9-CM       Tracheostomy in place     Z93.0 V44.0       Squamous cell carcinoma of larynx     C32.9 161.9           Reprint Order Requisition        MISCELLANEOUS DME (Order #[id number]) on 2/7/18         Encounter-Level Cardiology Documents:        There are no encounter-level cardiology documents.         Encounter        View Encounter         Order Provider Info            Office phone Pager E-mail       Ordering User Joseph Venegas [phone] -- --       Authorizing Provider Joseph Venegas [phone] -- --       Attending Provider Joseph Venegas [phone] -- --           Linked Charges        No charges linked to this procedure         Order Transmittal Tracking        MISCELLANEOUS DME (Order #[id number]) on 2/7/18         Authorized by:  Joseph Venegas MD  (NPI: [id number])                 Insurance Information                PASSPORT/PASSPORT Phone: [phone]    Subscriber: Truman Esquivel Subscriber#: [id number]    Group#: MEDICAID Precert#:

## 2018-02-09 ENCOUNTER — HOSPITAL ENCOUNTER (OUTPATIENT)
Dept: PET IMAGING | Facility: HOSPITAL | Age: 58
End: 2018-02-09

## 2018-02-09 ENCOUNTER — DOCUMENTATION (OUTPATIENT)
Dept: OTOLARYNGOLOGY | Facility: CLINIC | Age: 58
End: 2018-02-09

## 2018-02-09 NOTE — PROGRESS NOTES
Received a call from nuclear medicine.  Mr. Esquivel was there for a PET scan but informed the staff that he could not lie flat even with a pillow under his head.  He apparently had a syncopal episode at home this morning and is unwilling to even attempt to lie flat for the scan.  I advised that the scan should be canceled, he should keep his appointment for Monday in the office, and we'll proceed with discussing treatment options without a PET scan.

## 2018-02-12 ENCOUNTER — OFFICE VISIT (OUTPATIENT)
Dept: OTOLARYNGOLOGY | Facility: CLINIC | Age: 58
End: 2018-02-12

## 2018-02-12 ENCOUNTER — DOCUMENTATION (OUTPATIENT)
Dept: OTOLARYNGOLOGY | Facility: CLINIC | Age: 58
End: 2018-02-12

## 2018-02-12 ENCOUNTER — APPOINTMENT (OUTPATIENT)
Dept: SPEECH THERAPY | Facility: HOSPITAL | Age: 58
End: 2018-02-12

## 2018-02-12 VITALS — HEIGHT: 72 IN | OXYGEN SATURATION: 91 % | HEART RATE: 98 BPM | WEIGHT: 264.4 LBS | BODY MASS INDEX: 35.81 KG/M2

## 2018-02-12 DIAGNOSIS — Z43.0 ATTENTION TO TRACHEOSTOMY (HCC): ICD-10-CM

## 2018-02-12 DIAGNOSIS — C32.9 SQUAMOUS CELL CARCINOMA OF LARYNX (HCC): Primary | ICD-10-CM

## 2018-02-12 PROCEDURE — 99024 POSTOP FOLLOW-UP VISIT: CPT | Performed by: OTOLARYNGOLOGY

## 2018-02-12 NOTE — PROGRESS NOTES
Subjective   Truman Esquivel is a 57 y.o. male.       History of Present Illness   Patient is status post tracheostomy followed by direct laryngoscopy with biopsy and esophagoscopy.  Patient has a squamous cell carcinoma of the epiglottis.  This was an exophytic tumor measuring approximately 3.5 cm in diameter but at the time of surgery it appeared to be confined to the laryngeal surface of the epiglottis.  It did not appear to grossly extend into the vallecula nor did involve the false cords or ventricles and vocal cord mobility was intact.  Patient returns today stating he is having trouble with his tracheostomy tube.  He's had some trouble with mucous plugging.  Had 1 syncopal episode in the presence of the home health nurses that resolved immediately without intervention.  Was unable to lie flat and therefore could not get his PET scan this past Friday.      The following portions of the patient's history were reviewed and updated as appropriate: allergies, current medications, past family history, past medical history, past social history, past surgical history and problem list.     reports that he quit smoking about a year ago. He quit after 38.00 years of use. He has never used smokeless tobacco. He reports that he does not drink alcohol or use illicit drugs.   Patient is not a tobacco user and has not been counseled for use of tobacco products      Review of Systems        Objective   Physical Exam  Male in no acute distress.  His 8 cuffed Shiley tracheostomy tube is removed after trimming the sutures.  The tracheostomy site has expected exudate and mucoid secretions.  A size 8 Shiley cuff less fenestrated tracheostomy tube is placed without difficulty.  The stay suture is trimmed.  Patient is able to phonate with the tracheostomy occluded.      Assessment/Plan   Truman was seen today for [facility] admission 02/05/2018.    Diagnoses and all orders for this visit:    Squamous  cell carcinoma of larynx    Attention to tracheostomy      Plan: Tracheostomy tube changed as described above.  Orders written to begin tracheostomy wound care with bacitracin ointment in addition to cleaning.  Discussed treatment options with the patient.  His CT scan showed the tumor to be confined to the supraglottic larynx.  Had one lymph node measuring 1.3 cm in greatest diameter on the right.  Clinically this would be staged T2 N0 M0 although he is certainly at high-risk for occult neck disease given the location and size of his primary tumor.  I gave the patient the option of a consultation at the [facility] for possible larynx preservation surgery/supraglottic laryngectomy.  The other option would be curative intent radiation therapy with chemotherapy added at the discretion of the oncologists.  He requests consideration for curative intent radiation therapy.  I have introduced him to Delfina Flores, nurse navigator at the [facility], who will arrange consultations with radiation and medical oncology.  Next appointment with me will be in 3 weeks to reevaluate his surgical site.  Call sooner for problems.

## 2018-02-12 NOTE — PROGRESS NOTES
Patient Navigation: spoke to the patient in the office today concerning newly diagnosed cancer by Dr. Venegas. Informed patient and family of my role as the nurse navigator and my support to them. Provided written education on diagnosis, radiation therapy, and chemotherapy basics from [facility]. Spent approximately 25 mins in face to face discussion with patient and family. Patient was receptive and motivated to learn. Ongoing support and education will be provided throughout treatment as needed. Referrals to medical and radiation oncology by Dr. Venegas. Patient is agreeable for consults at the [facility]. We will set up consult appts and notify patient and family of dates and times. Provided my contact information and encouraged to call me with any concerns or questions. Patient and family stated understanding of all discussed and denied further questions.

## 2018-02-13 ENCOUNTER — DOCUMENTATION (OUTPATIENT)
Dept: ONCOLOGY | Facility: CLINIC | Age: 58
End: 2018-02-13

## 2018-02-13 ENCOUNTER — CONSULT (OUTPATIENT)
Dept: ONCOLOGY | Facility: CLINIC | Age: 58
End: 2018-02-13

## 2018-02-13 ENCOUNTER — LAB (OUTPATIENT)
Dept: ONCOLOGY | Facility: HOSPITAL | Age: 58
End: 2018-02-13

## 2018-02-13 VITALS
HEART RATE: 78 BPM | WEIGHT: 262.13 LBS | RESPIRATION RATE: 20 BRPM | TEMPERATURE: 98.8 F | HEIGHT: 72 IN | SYSTOLIC BLOOD PRESSURE: 138 MMHG | DIASTOLIC BLOOD PRESSURE: 72 MMHG | BODY MASS INDEX: 35.5 KG/M2

## 2018-02-13 DIAGNOSIS — C32.9 SQUAMOUS CELL CARCINOMA OF LARYNX (HCC): ICD-10-CM

## 2018-02-13 DIAGNOSIS — D64.9 ANEMIA, UNSPECIFIED TYPE: Primary | ICD-10-CM

## 2018-02-13 LAB
FERRITIN SERPL-MCNC: 64.4 NG/ML (ref 17.9–464)
FOLATE SERPL-MCNC: >20 NG/ML (ref 2.76–21)
IRON 24H UR-MRATE: 14 MCG/DL (ref 49–181)
IRON SATN MFR SERPL: 4 % (ref 20–55)
TIBC SERPL-MCNC: 335 MCG/DL (ref 261–462)
VIT B12 BLD-MCNC: 980 PG/ML (ref 239–931)

## 2018-02-13 PROCEDURE — G0463 HOSPITAL OUTPT CLINIC VISIT: HCPCS | Performed by: INTERNAL MEDICINE

## 2018-02-13 PROCEDURE — 83550 IRON BINDING TEST: CPT | Performed by: INTERNAL MEDICINE

## 2018-02-13 PROCEDURE — 99204 OFFICE O/P NEW MOD 45 MIN: CPT | Performed by: INTERNAL MEDICINE

## 2018-02-13 PROCEDURE — 82607 VITAMIN B-12: CPT | Performed by: INTERNAL MEDICINE

## 2018-02-13 PROCEDURE — 82728 ASSAY OF FERRITIN: CPT | Performed by: INTERNAL MEDICINE

## 2018-02-13 PROCEDURE — 82746 ASSAY OF FOLIC ACID SERUM: CPT | Performed by: INTERNAL MEDICINE

## 2018-02-13 PROCEDURE — 83540 ASSAY OF IRON: CPT | Performed by: INTERNAL MEDICINE

## 2018-02-13 RX ORDER — FERROUS SULFATE 325(65) MG
325 TABLET ORAL 2 TIMES DAILY WITH MEALS
Qty: 60 TABLET | Refills: 3 | Status: SHIPPED | OUTPATIENT
Start: 2018-02-13 | End: 2018-03-05

## 2018-02-13 RX ORDER — DOCUSATE SODIUM 100 MG/1
100 CAPSULE, LIQUID FILLED ORAL 2 TIMES DAILY PRN
Qty: 60 CAPSULE | Refills: 2 | Status: SHIPPED | OUTPATIENT
Start: 2018-02-13 | End: 2020-06-30

## 2018-02-13 NOTE — PROGRESS NOTES
Oncology Social Worker met with patient as he presents for his initial medical oncology consultation. Pt. Newly diagnosed with squamous cell carcinoma of larynx and presents this day status post tracheostomy x 1 week.  .  Pt. Presents as 57 year old  male. Pt. Presents alert and oriented x 4, affect blunted and mood within normal limits and congruent with situation.  Pt. Is accompanied by his mother and his sister. Pt. Is able to speak while covering trach.  Pt family provides most of history.  Pt. Is single and has experienced significant adjustments and psychosocial stress, beginning one year ago today when he suffered ascending aortic dissection.  Pt. Recovery complicated by DVT and stroke and now most recently cancer diagnosis.  Pt presents tearful as the events of the past year were processed. Pt. Is unemployed, having worked full time as transportation .  He has moved in with his mother to assist with care over the past year.  Sister is attentive and supportive of both.  Pt has one adult daughter, describing minimal contact. Pt. Denies symptoms of major depression, describing anxiety and feelings of sadness consistent with stressors. Pt. Completed distress screening and scored zero distress and marking no indicators of stress. Undersigned processed feedback and minimization of stressors.  Undersigned offered emotional support, normalization of feelings, education related to  services. Undersigned offered availability to  and support as pt proceeds with diagnostics and treatment. No immediate needs expressed.  Support offered to family as psychosocial stress is significant.

## 2018-02-13 NOTE — PROGRESS NOTES
DATE OF CONSULT: 2/13/2018    REQUESTING SOURCE:  Dr. 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  Subsequently patient started having problems swallowing food as well as problem with hearing on the left side of ear which progressively got worse.  He was seen by Dr. Venegas on January 25, 2018, had a CT of the neck done on January 29, 2018 which showed mass around larynx with lymph node on right side of neck.  Subsequently patient had a tracheostomy done as well as biopsy done on February 5, 2018.  She came back positive for invasive squamous cell cancer.  Patient was supposed to get PET/CT done last week, but he could not lie flat for PET CT so PET Ct was not done.  Patient complains of some nosebleed as well as coughing up some blood clots since his surgery for which he has stopped taking Plavix.  Denies any more bleeding.  Denies any excessive fatigue.  Denies any new adenopathy in neck or anywhere else in the body.  Admits to 12 pound of weight loss in last 1 year.  Admits to difficulty swallowing food secondary to cancer involving larynx.  Complains of hoarseness of voice.    PAST MEDICAL HISTORY:    Past Medical History:   Diagnosis Date   • Aortic dissection    • Chest pain    • Disease of thyroid gland    • HTN (hypertension)    • Knee pain    • Sleep apnea    • Smoker    • Squamous cell carcinoma of larynx 2/5/2018   • Stroke    • Swallowing difficulty        PAST SURGICAL HISTORY:  Past Surgical History:   Procedure Laterality Date   • AORTA SURGERY      ruptured aorta repair   • ASCENDING ARCH/HEMIARCH REPLACEMENT N/A 2/14/2017    Procedure:  INTRAOPERATIVE TRANSESOPHAGEAL ECHOCARDIOGRAM, MIDLINE STERNOTOMY, ASCENDING AORTIC  AND PROXIMAL AORTIC ARCH REPAIR WITH 26MM GRAFT, AORTIC VALVE RESUSPENSION, AORTIC ROOT REPAIR, OPEN VEIN HARVEST OF RIGHT LEG;  Surgeon: German Arreguin MD;  Location: [facility] MAIN OR;  Service:    • BRONCHOSCOPY N/A 2/17/2017    Procedure: BRONCHOSCOPY BIOPSY AT BEDSIDE WITH BAL-LEFT LOWER LOBE;  Surgeon: Trent Chaney MD;  Location: [facility] ENDOSCOPY;  Service:    • DIRECT LARYNGOSCOPY, ESOPHAGOSCOPY, BRONCHOSCOPY N/A 2/5/2018    Procedure: DIRECT LARYNGOSCOPY WITH BIOPSY, ESOPHAGOSCOPY     (no bronchoscopy, no laser);  Surgeon: Joseph Venegas MD;  Location: [facility] OR;  Service:    • TRACHEOSTOMY  02/05/2018   • TRACHEOSTOMY N/A 2/5/2018    Procedure: TRACHEOSTOMY  local injection @ 1106 incision @ 1119;  Surgeon: Joseph Venegas MD;  Location: [facility] OR;  Service:        ALLERGIES:    Allergies   Allergen Reactions   • Co Q 10 [Coenzyme Q10] Itching   • Eggs Or Egg-Derived Products Swelling   • Erythromycin Other (See Comments)     Joint pains and cold sweats   • Other GI Intolerance     Mycins     • Penicillins      Tolerated cefepime during 2/2017 admission. Had rash and itching (relieved by benadryl) after few doses of cefepime and cefepime was continued.   • Tetracyclines & Related GI Intolerance   • Acth [Corticotropin] Rash   • Cephalosporins Itching and Rash     Pt developed rash, itching after cefepime administration (2-3 doses) during 2/2017 admission. Itching was relieved with benadryl. Cefepime was continued because his infection was improving and no symptoms of anaphylaxis present   • Keflex [Cephalexin] Rash       SOCIAL HISTORY:   Social History   Substance Use Topics   • Smoking status: Former Smoker     Years: 38.00     Quit date: 2/13/2017   • Smokeless tobacco: Never Used      Comment: 02/12/2018 - Patient confirmed he ceased utilization of tobacco products 02/13/2017.   • Alcohol use No        CURRENT MEDICATIONS:    Current Outpatient Prescriptions   Medication Sig Dispense Refill   • amLODIPine (NORVASC) 5 MG tablet Take 5 mg by mouth Daily.     • aspirin 81 MG tablet Take 81 mg by mouth Daily. Last dose 1/31/18     • atorvastatin (LIPITOR) 20 MG tablet Take 1 tablet by mouth Daily. 30 tablet 11   • clopidogrel (PLAVIX) 75 MG tablet Take 75 mg by mouth Daily. Last dose approx one month ago     • coenzyme Q10 50 MG capsule capsule Take 100 mg by mouth Every Night.     • levothyroxine (SYNTHROID, LEVOTHROID) 88 MCG tablet Take 88 mcg by mouth Daily.     • losartan-hydrochlorothiazide (HYZAAR) 100-25 MG per tablet Take 1 tablet by mouth Daily. 30 tablet 12   • metoprolol succinate XL (TOPROL-XL) 25 MG 24 hr tablet Take 25 mg by mouth Every Night.     • Multiple Vitamins-Minerals (MULTIVITAMIN ADULT PO) Take 1 tablet by mouth Daily.     • oxyCODONE-acetaminophen (PERCOCET) 5-325 MG per tablet 1-2 tablets by mouth every 4 hours as needed for pain 60 tablet 0   • sodium chloride 0.9 % solution Use for tracheostomy care as directed 1000 mL 11   • vitamin B-12 (CYANOCOBALAMIN) 100 MCG tablet Take 100 mcg by mouth Daily.     • vitamin E 100 UNIT capsule Take 400 Units by mouth Every Night.     • Zinc 50 MG capsule Take 50 mg by mouth Every Night.       No current facility-administered medications for this visit.         HOME MEDICATIONS:   Current Outpatient Prescriptions on File Prior to Visit   Medication Sig Dispense Refill   • amLODIPine (NORVASC) 5 MG tablet Take 5 mg by mouth Daily.     • aspirin 81 MG tablet Take 81 mg by mouth Daily. Last dose 1/31/18     • atorvastatin (LIPITOR) 20 MG tablet Take 1 tablet by mouth Daily. 30 tablet 11   • clopidogrel (PLAVIX) 75 MG tablet Take 75 mg by mouth Daily. Last dose approx one month ago     • coenzyme Q10 50 MG capsule capsule Take 100 mg by mouth Every Night.     • levothyroxine (SYNTHROID, LEVOTHROID) 88 MCG tablet Take 88 mcg by mouth Daily.     •  losartan-hydrochlorothiazide (HYZAAR) 100-25 MG per tablet Take 1 tablet by mouth Daily. 30 tablet 12   • metoprolol succinate XL (TOPROL-XL) 25 MG 24 hr tablet Take 25 mg by mouth Every Night.     • Multiple Vitamins-Minerals (MULTIVITAMIN ADULT PO) Take 1 tablet by mouth Daily.     • oxyCODONE-acetaminophen (PERCOCET) 5-325 MG per tablet 1-2 tablets by mouth every 4 hours as needed for pain 60 tablet 0   • sodium chloride 0.9 % solution Use for tracheostomy care as directed 1000 mL 11   • vitamin B-12 (CYANOCOBALAMIN) 100 MCG tablet Take 100 mcg by mouth Daily.     • vitamin E 100 UNIT capsule Take 400 Units by mouth Every Night.     • Zinc 50 MG capsule Take 50 mg by mouth Every Night.       No current facility-administered medications on file prior to visit.        FAMILY HISTORY:    Family History   Problem Relation Age of Onset   • Thyroid disease Mother    • Hypertension Mother    • Hypertension Father        REVIEW OF SYSTEMS:      CONSTITUTIONAL:  Complains of fatigue. Admits to 12 pound of weight loss in last 1 year.  Denies any fever, chills .     HEENT:  Some visual disturbance affecting left eye since stroke in 2017.  Complains of hoarseness of voice.  Complains of difficulty swallowing food for last 4 months.    RESPIRATORY:  Complains of chronic shortness of breath, denies any recent worsening.  No new cough or hemoptysis.    CARDIOVASCULAR:  No chest pain or palpitations.    GASTROINTESTINAL:  No abdominal pain nausea, vomiting or blood in the stool.    GENITOURINARY: No Dysuria or Hematuria.    MUSCULOSKELETAL:  No new back pain or arthralgia..    LYMPHATICS:  Denies any abnormal swollen glands anywhere in the body.    NEUROLOGICAL : No tingling or numbness. No headache or dizziness. No seizures or balance problems.    SKIN: No new skin lesions.        PHYSICAL EXAMINATION:      VITAL SIGNS:  Temp:  [98.8 °F (37.1 °C)] 98.8 °F (37.1 °C)  Heart Rate:  [78] 78  Resp:  [20] 20  BP: (138)/(72)  138/72    GENERAL:  Not in any distress.    HEENT:  Normocephalic, Atraumatic.Eyes  Shows mild pallor. No icterus. Extraocular Movements Intact. No Facial Asymmetry noted.    NECK:  No adenopathy palpable on physical exam. NO JVD.Tracheostomy present.    RESPIRATORY:  Fair air entry bilateral. No rhonchi or wheezing.    CARDIOVASCULAR:  S1, S2. Regular rate and rhythm. No murmur or gallop appreciated.    ABDOMEN:  Soft, obese, nontender. Bowel sounds present in all four quadrants.  No organomegaly appreciated.    EXTREMITIES:  No edema.No Calf Tenderness.    NEUROLOGIC:  Alert, awake and oriented ×3.  No  Motor  deficit appreciated. Cranial Nerves 2-12 grossly intact.    SKIN : No new skin lesion identified.          DIAGNOSTIC DATA:    WBC   Date Value Ref Range Status   02/07/2018 13.35 (H) 3.20 - 9.80 10*3/mm3 Final     RBC   Date Value Ref Range Status   02/07/2018 3.98 (L) 4.37 - 5.74 10*6/mm3 Final     Hemoglobin   Date Value Ref Range Status   02/07/2018 10.1 (L) 13.7 - 17.3 g/dL Final     Hematocrit   Date Value Ref Range Status   02/07/2018 31.5 (L) 39.0 - 49.0 % Final     MCV   Date Value Ref Range Status   02/07/2018 79.1 (L) 80.0 - 98.0 fL Final     MCH   Date Value Ref Range Status   02/07/2018 25.4 (L) 26.5 - 34.0 pg Final     MCHC   Date Value Ref Range Status   02/07/2018 32.1 31.5 - 36.3 g/dL Final     RDW   Date Value Ref Range Status   02/07/2018 13.9 11.5 - 14.5 % Final     RDW-SD   Date Value Ref Range Status   02/07/2018 39.7 35.1 - 43.9 fl Final     MPV   Date Value Ref Range Status   02/07/2018 11.5 8.0 - 12.0 fL Final     Platelets   Date Value Ref Range Status   02/07/2018 182 150 - 450 10*3/mm3 Final     Neutrophil %   Date Value Ref Range Status   02/07/2018 75.6 37.0 - 80.0 % Final     Lymphocyte %   Date Value Ref Range Status   02/07/2018 12.4 10.0 - 50.0 % Final     Monocyte %   Date Value Ref Range Status   02/07/2018 7.8 0.0 - 12.0 % Final     Eosinophil %   Date Value Ref Range  Status   02/07/2018 3.9 0.0 - 7.0 % Final     Basophil %   Date Value Ref Range Status   02/07/2018 0.2 0.0 - 2.0 % Final     Immature Grans %   Date Value Ref Range Status   02/07/2018 0.1 0.0 - 0.5 % Final     Neutrophils, Absolute   Date Value Ref Range Status   02/07/2018 10.09 (H) 2.00 - 8.60 10*3/mm3 Final     Lymphocytes, Absolute   Date Value Ref Range Status   02/07/2018 1.65 0.60 - 4.20 10*3/mm3 Final     Monocytes, Absolute   Date Value Ref Range Status   02/07/2018 1.04 (H) 0.00 - 0.90 10*3/mm3 Final     Eosinophils, Absolute   Date Value Ref Range Status   02/07/2018 0.52 0.00 - 0.70 10*3/mm3 Final     Basophils, Absolute   Date Value Ref Range Status   02/07/2018 0.03 0.00 - 0.20 10*3/mm3 Final     Immature Grans, Absolute   Date Value Ref Range Status   02/07/2018 0.02 0.00 - 0.02 10*3/mm3 Final     nRBC   Date Value Ref Range Status   03/02/2017 0.0 0.0 - 0.0 /100 WBC Final     Glucose   Date Value Ref Range Status   02/07/2018 116 (H) 60 - 100 mg/dL Final     Sodium   Date Value Ref Range Status   02/07/2018 137 137 - 145 mmol/L Final     Potassium   Date Value Ref Range Status   02/07/2018 3.4 (L) 3.5 - 5.1 mmol/L Final     CO2   Date Value Ref Range Status   02/07/2018 26.0 22.0 - 31.0 mmol/L Final     Chloride   Date Value Ref Range Status   02/07/2018 100 95 - 110 mmol/L Final     Anion Gap   Date Value Ref Range Status   02/07/2018 11.0 5.0 - 15.0 mmol/L Final     Creatinine   Date Value Ref Range Status   02/07/2018 1.10 0.70 - 1.30 mg/dL Final     BUN   Date Value Ref Range Status   02/07/2018 17 7 - 21 mg/dL Final     BUN/Creatinine Ratio   Date Value Ref Range Status   02/07/2018 15.5 7.0 - 25.0 Final     Calcium   Date Value Ref Range Status   02/07/2018 8.5 8.4 - 10.2 mg/dL Final     eGFR Non  Amer   Date Value Ref Range Status   02/07/2018 69 >60 mL/min/1.73 Final     Alkaline Phosphatase   Date Value Ref Range Status   02/07/2018 64 38 - 126 U/L Final     Total Protein   Date  Value Ref Range Status   02/07/2018 6.5 6.3 - 8.6 g/dL Final     ALT (SGPT)   Date Value Ref Range Status   02/07/2018 28 21 - 72 U/L Final     AST (SGOT)   Date Value Ref Range Status   02/07/2018 15 (L) 17 - 59 U/L Final     Total Bilirubin   Date Value Ref Range Status   02/07/2018 0.3 0.2 - 1.3 mg/dL Final     Albumin   Date Value Ref Range Status   02/07/2018 3.60 3.40 - 4.80 g/dL Final     Globulin   Date Value Ref Range Status   02/07/2018 2.9 2.3 - 3.5 gm/dL Final     A/G Ratio   Date Value Ref Range Status   02/07/2018 1.2 1.1 - 1.8 g/dL Final     No results found for: IRON, TIBC, LABIRON, FERRITIN, [id number], FOLATE  No results found for: , LABCA2, AFPTM, HCGQUANT, , CHROMGRNA, [id number], CEA, REFLABREPO]        Radiology Data :  Ct of Neck with contrast done on January 29, 2018 showed:  IMPRESSION:  3 x 3.5 x 3.5 cm soft tissue mass with lobular margin centered at  the level the epiglottis however flush 4 cm intimate with the  posterior margin of the tongue base and the posterior margin of  the oral pharynx. This does contribute to moderate compromise of  the airway. Finding is suspicious for malignancy.     Mostly subcentimeter cervical chain lymph nodes present  bilaterally. The largest node right level II measuring 1.3 cm. No  necrotic or conglomerate adenopathy.     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 The  dissection was described on a CTA coronary artery exam from  2/14/2017.      Pathology :  Pathology result from February 5, 2018 showed:  Final Diagnosis   1.  TUMOR, EPIGLOTTIS:   INVASIVE SQUAMOUS CELL CARCINOMA, NON KERATINIZING, POORLY DIFFERENTIATED.      2.  TUMOR, EPIGLOTTIS:   INVASIVE SQUAMOUS CELL CARCINOMA, NON KERATINIZING, POORLY DIFFERENTIATED.            ASSESSMENT AND PLAN:      1.  Squamous cell cancer of larynx, clinically T2 N0 lesion based on HEENT exam by Dr. Venegas.  CT scan of neck  done on January 29, 2018 shows subcentimeter lymphadenopathy and right side of neck which could be reactive versus involvement with malignancy.  Recommend getting a PET/CT done later this week to complete the staging workup.  If there is no involvement of lymph node or any distance metastasis patient would not need any chemotherapy which was discussed with patient and his family.  Last week PET/CT was ordered but patient could not lie flat.  He thinks he can do PET/CT this week.  We will reschedule PET/CT for this Friday.  He has an appointment to see Dr. Tapia later this week, he was encouraged to keep that appointment.  We will see him back next week after PET/CT to discuss further treatment recommendation.    2.  Normocytic anemia: Patient admits to recent epistaxis as well as blood clots after biopsy.  We will do anemia workup with iron, TIBC, ferritin, vitamin B12 and folate today.  We will supplement if there is any nutritional deficiency.    3.  History of aortic dissection status post repair.    4.  History of CVA with residual visual disturbance in left eye    5.  Health maintenance: Patient quit smoking in February 2017.  Never had a screening colonoscopy done.  Remains full code.        Thank you for this consultation.          Reza Patel MD  2/13/2018  3:20 PM          EMR Dragon/Transcription disclaimer:   Much of this encounter note is an electronic transcription/translation of spoken language to printed text. The electronic translation of spoken language may permit erroneous, or at times, nonsensical words or phrases to be inadvertently transcribed; Although I have reviewed the note for such errors, some may still exist.

## 2018-02-14 ENCOUNTER — TELEPHONE (OUTPATIENT)
Dept: ONCOLOGY | Facility: HOSPITAL | Age: 58
End: 2018-02-14

## 2018-02-14 NOTE — TELEPHONE ENCOUNTER
----- Message from Reza Patel MD sent at 2/13/2018  6:44 PM CST -----  I have send prescription for ferrous sulfate as well as Colace to his pharmacy.  Please let patient know.

## 2018-02-15 ENCOUNTER — APPOINTMENT (OUTPATIENT)
Dept: RADIATION ONCOLOGY | Facility: HOSPITAL | Age: 58
End: 2018-02-15

## 2018-02-15 ENCOUNTER — TELEPHONE (OUTPATIENT)
Dept: RADIATION ONCOLOGY | Facility: HOSPITAL | Age: 58
End: 2018-02-15

## 2018-02-15 NOTE — TELEPHONE ENCOUNTER
I spoke with Mr Esquivel's mother. I told her the importance of keeping appt. For PET Scan tomorrow AM. She said he would be there for test.

## 2018-02-16 ENCOUNTER — DOCUMENTATION (OUTPATIENT)
Dept: NUTRITION | Facility: HOSPITAL | Age: 58
End: 2018-02-16

## 2018-02-16 ENCOUNTER — HOSPITAL ENCOUNTER (OUTPATIENT)
Dept: PET IMAGING | Facility: HOSPITAL | Age: 58
Discharge: HOME | End: 2018-02-16
Admitting: OTOLARYNGOLOGY

## 2018-02-16 DIAGNOSIS — C32.9 CARCINOMA LARYNX (HCC): ICD-10-CM

## 2018-02-16 PROCEDURE — 78815 PET IMAGE W/CT SKULL-THIGH: CPT

## 2018-02-16 PROCEDURE — 0 FLUDEOXYGLUCOSE F18 SOLUTION: Performed by: OTOLARYNGOLOGY

## 2018-02-16 PROCEDURE — A9552 F18 FDG: HCPCS | Performed by: OTOLARYNGOLOGY

## 2018-02-16 RX ADMIN — FLUDEOXYGLUCOSE F18 1 DOSE: 300 INJECTION INTRAVENOUS at 09:41

## 2018-02-16 NOTE — PROGRESS NOTES
Adult Outpatient Nutrition  Assessment    Patient Name:  Truman Esquivel  YOB: 1960  MRN: [id number]    Assessment Date:  Late entry from 2/13/18 visit  CHART REVIEW  Truman Esquivel  1960  57 y.o.  Wt: 262 lb  Ht: 72 in  Dx: obstructing laryngeal tumor (trache recently placed)  Tx: to be determined    PATIENT/FAMILY COMMENTS  Weight History: lost 8 lb/3 weeks  (258 lb 5/23/17-- 9 months ago)  Diet: mechanical soft--puree while in hospital (saw speech therapist in hospital)  Food Allergies: eggs  Supplements: Instant Breakfast Essentials (but feels milk makes flem worse)  Food Preparation: mother (living with her at present)  Nutrition Concerns:  *dysphagia  *weight loss  *communication difficult (Can speak when covers end of trache, however mother answered most questions)    No problems chewing/nausea/vomiting.         GOAL(s)  Optimize nutrition in attempt to prevent further loss of LBM      EDUCATION  -High calorie high protein mech soft diet   -Supplementation        * Provided samples of commercial supplements & discount coupons--recipes          for homemade supplements as well. May tolerate soy formulas better than CIB due           to fact that dairy free.  -Importance of staying hydrated    To reinforce education, written information with RD's name & number provided.  Pt and family receptive to suggestions, and agreed to call on dietitian as needed.                          Electronically signed by:  Leslee Bonds RD  02/16/18 6:05 AM

## 2018-02-19 ENCOUNTER — HOSPITAL ENCOUNTER (OUTPATIENT)
Dept: RADIATION ONCOLOGY | Facility: HOSPITAL | Age: 58
Setting detail: RADIATION/ONCOLOGY SERIES
End: 2018-02-19

## 2018-02-19 ENCOUNTER — DOCUMENTATION (OUTPATIENT)
Dept: ONCOLOGY | Facility: CLINIC | Age: 58
End: 2018-02-19

## 2018-02-19 ENCOUNTER — CONSULT (OUTPATIENT)
Dept: RADIATION ONCOLOGY | Facility: HOSPITAL | Age: 58
End: 2018-02-19

## 2018-02-19 VITALS
HEART RATE: 66 BPM | SYSTOLIC BLOOD PRESSURE: 143 MMHG | BODY MASS INDEX: 34.89 KG/M2 | WEIGHT: 257.28 LBS | TEMPERATURE: 96.8 F | RESPIRATION RATE: 20 BRPM | DIASTOLIC BLOOD PRESSURE: 76 MMHG

## 2018-02-19 DIAGNOSIS — C32.9 SQUAMOUS CELL CARCINOMA OF LARYNX (HCC): ICD-10-CM

## 2018-02-19 PROCEDURE — 99204 OFFICE O/P NEW MOD 45 MIN: CPT | Performed by: RADIOLOGY

## 2018-02-19 PROCEDURE — G0463 HOSPITAL OUTPT CLINIC VISIT: HCPCS | Performed by: RADIOLOGY

## 2018-02-19 NOTE — PROGRESS NOTES
Oncology Social Worker met with patient and his family as he presents for radiation oncology consultation.  Pt. completed distress tool this day scoring a # on scale with no specific indicators marked.  Undersigned has met patient prior to this visit and advised by radiation oncologist of pt need for dental evaluation before beginning treatment.  LCSW discussed coverage and options with pt who requests undersigned to assist with finding dentist that accepts Medicaid ([employer]).  Coverage verified by undersigned and apt. Made with Dr. Tello on 2-20-18 at 1:45.  Pt. Advised by phone call subsequent to visit.  Pt endorsed adequate coping and stress with no other immediate needs voiced. Ongoing  and support reinforced.  Pt. And family respond receptive

## 2018-02-19 NOTE — PROGRESS NOTES
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  ICD 10 Code: C32.9                                               History of Present Illness: Mr. 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     The patient was seen in consultation by Dr. Venegas on 1/25/18 with a two-month history of throat pain and difficulty swallowing food. Flexible fiberoptic laryngoscopy at that time revealed a large exophytic lesion in the hypopharynx, extending into the vallecula bilaterally, completely obscuring visualization of the larynx. A CT of the soft tissue of the neck on 1/25/18 revealed a 3 x 3.5 x 3.5 cm soft tissue mass with lobular margins, centered at the level of the epiglottis and intimate with the posterior margin of the tongue base and posterior margin of the oral pharynx, causing moderate compression of the airway. The lesion was suspicious for malignancy. Also noted was a 1.3 cm lymph node at level II on the right. On 2/5/18 the patient underwent a tracheostomy with direct laryngoscopy with biopsy and esophagoscopy. Endoscopy revealed a 3.5 cm exophytic tumor involving the laryngeal surface of the epiglottis. 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 Following an uneventful postoperative course, the patient was discharged Hospital on 2/7/18.     The patient was seen in follow-up by Dr. Venegas on 2/12/18, at which time treatment options were discussed, including referral to the [facility] for laryngeal preservation surgery versus definitive radiation  therapy. The patient wished to proceed with definitive XRT. The patient was seen in consultation by Dr. Patel on 2/13/18, who ordered a PET/CT to further evaluate lymphadenopathy noted on the previous CT scan. The patient underwent a PET/CT on 2/16/18 which revealed hypermetabolic activity in the known epiglottic neoplasm, as well as around the tracheostomy (likely inflammatory). No hypermetabolic activity was noted in the cervical lymph nodes. Hypermetabolic activity was noted in the rectum (SUV 7.1), with direct visualization recommended, as well as in the region of the right common iliac artery (no definite CT abnormality identified).    The patient has now been referred to our clinic to discuss radiation therapy recommendations.    Today on exam, the patient is doing well. He is tolerating his tracheostomy without difficulty, and is able to speak while covering his tracheostomy. He is breathing without difficulty. He states that he is having less trouble swallowing since his tracheostomy. He has no complaints of pain at this time.    This is a new problem to me, and one that is potentially life-threatening.  (Old medical records were requested, reviewed, and summarized in the HPI)    Review of Systems   Respiratory: Positive for cough.         Trach   Musculoskeletal: Positive for neck pain.       The remainder of his review of systems is otherwise negative.    Past Medical History:   Diagnosis Date   • Aortic dissection    • Chest pain    • Disease of thyroid gland    • HTN (hypertension)    • Knee pain    • Sleep apnea    • Smoker    • Squamous cell carcinoma of larynx 2/5/2018   • Stroke    • Swallowing difficulty         Past Surgical History:   Procedure Laterality Date   • AORTA SURGERY      ruptured aorta repair   • ASCENDING ARCH/HEMIARCH REPLACEMENT N/A 2/14/2017    Procedure: INTRAOPERATIVE TRANSESOPHAGEAL ECHOCARDIOGRAM, MIDLINE STERNOTOMY, ASCENDING AORTIC  AND PROXIMAL AORTIC ARCH REPAIR WITH 26MM  GRAFT, AORTIC VALVE RESUSPENSION, AORTIC ROOT REPAIR, OPEN VEIN HARVEST OF RIGHT LEG;  Surgeon: German Arreguin MD;  Location: [facility] MAIN OR;  Service:    • BRONCHOSCOPY N/A 2/17/2017    Procedure: BRONCHOSCOPY BIOPSY AT BEDSIDE WITH BAL-LEFT LOWER LOBE;  Surgeon: Trent Chaney MD;  Location: [facility] ENDOSCOPY;  Service:    • DIRECT LARYNGOSCOPY, ESOPHAGOSCOPY, BRONCHOSCOPY N/A 2/5/2018    Procedure: DIRECT LARYNGOSCOPY WITH BIOPSY, ESOPHAGOSCOPY     (no bronchoscopy, no laser);  Surgeon: Joseph Venegas MD;  Location: [facility] OR;  Service:    • TRACHEOSTOMY  02/05/2018   • TRACHEOSTOMY N/A 2/5/2018    Procedure: TRACHEOSTOMY  local injection @ 1106 incision @ 1119;  Surgeon: Joseph Venegas MD;  Location: [facility] OR;  Service:       Family History   Problem Relation Age of Onset   • Thyroid disease Mother    • Hypertension Mother    • Hypertension Father         Social History     Social History   • Marital status:      Spouse name: N/A   • Number of children: N/A   • Years of education: N/A     Occupational History   • Not on file.     Social History Main Topics   • Smoking status: Former Smoker     Years: 38.00     Quit date: 2/13/2017   • Smokeless tobacco: Never Used      Comment: 02/12/2018 - Patient confirmed he ceased utilization of tobacco products 02/13/2017.   • Alcohol use No   • Drug use: No   • Sexual activity: Defer     Other Topics Concern   • Not on file     Social History Narrative     Allergies: Co q 10 [coenzyme q10]; Eggs or egg-derived products; Erythromycin; Other; Penicillins; Tetracyclines & related; Acth [corticotropin]; Cephalosporins; and Keflex [cephalexin]     Prior to Admission medications    Medication Sig Start Date End Date Taking? Authorizing Provider   amLODIPine (NORVASC) 5 MG tablet Take 5 mg by mouth Daily.   Yes Historical Provider, MD   aspirin 81 MG tablet Take 81 mg by mouth Daily. Last dose 1/31/18   Yes Historical Provider, MD   atorvastatin  (LIPITOR) 20 MG tablet Take 1 tablet by mouth Daily. 3/20/17  Yes Liborio Chandra MD   clopidogrel (PLAVIX) 75 MG tablet Take 75 mg by mouth Daily. Last dose approx one month ago   Yes Historical Provider, MD   coenzyme Q10 50 MG capsule capsule Take 100 mg by mouth Every Night.   Yes Historical Provider, MD   docusate sodium (COLACE) 100 MG capsule Take 1 capsule by mouth 2 (Two) Times a Day As Needed for Constipation. 2/13/18  Yes Reza Patel MD   ferrous sulfate 325 (65 FE) MG tablet Take 1 tablet by mouth 2 (Two) Times a Day With Meals. 2/13/18  Yes Reza Patel MD   levothyroxine (SYNTHROID, LEVOTHROID) 88 MCG tablet Take 88 mcg by mouth Daily. 11/14/17  Yes Historical Provider, MD   losartan-hydrochlorothiazide (HYZAAR) 100-25 MG per tablet Take 1 tablet by mouth Daily. 5/18/17  Yes Liborio Chandra MD   metoprolol succinate XL (TOPROL-XL) 25 MG 24 hr tablet Take 25 mg by mouth Every Night.   Yes Historical Provider, MD   Multiple Vitamins-Minerals (MULTIVITAMIN ADULT PO) Take 1 tablet by mouth Daily.   Yes Historical Provider, MD   oxyCODONE-acetaminophen (PERCOCET) 5-325 MG per tablet 1-2 tablets by mouth every 4 hours as needed for pain 2/7/18  Yes Joseph Venegas MD   sodium chloride 0.9 % solution Use for tracheostomy care as directed 2/6/18  Yes Joesph Venegas MD   vitamin B-12 (CYANOCOBALAMIN) 100 MCG tablet Take 100 mcg by mouth Daily.   Yes Historical Provider, MD   vitamin E 100 UNIT capsule Take 400 Units by mouth Every Night.   Yes Historical Provider, MD   Zinc 50 MG capsule Take 50 mg by mouth Every Night.   Yes Historical Provider, MD      Pain: (on a scale of 0-10)     Pain Score    02/19/18 0847   PainSc:   3   PainLoc: Neck       Quality of Life:  90 - Limited Activity    Advanced Care Plan: N     Physical Examination:  Vitals:     Vitals:    02/19/18 0847   BP: 143/76   Pulse: 66   Resp: 20   Temp: 96.8 °F (36 °C)       Height:    Weight: Weight: 117 kg (257 lb  4.4 oz)   Body mass index is 34.89 kg/(m^2).      Constitutional: The patient is a well-developed, well-nourished white  male in no acute distress.  Alert and oriented ×3.  Eyes: PERRLA.  EOMI.  ENMT: Tracheostomy in place.  Ears and nose WNL.  No lesions noted in the oral cavity or oropharynx.  Lymphatics: No cervical, supraclavicular, or axillary lymphadenopathy is palpated.  CV: Regular rate and rhythm.  No murmurs, rubs, or gallops are appreciated.  Respiratory: Lungs clear to auscultation.  Breath sounds equal bilaterally.  GI: Abdomen soft, nontender, nondistended, with no hepatosplenomegaly or masses palpated.  Extremities: No clubbing, cyanosis, or edema.  Neurologic: Cranial nerves II through XII are grossly intact, with no focal neurological deficits noted on exam.  Psychiatric: Alert and oriented x3. Normal affect, with no anxiety or depression noted.    Radiographs :  EXAM:  Nuclear Medicine Body PET/CT (2/16/18)   COMPARISON EXAMINATIONS:   CT neck 1/29/2018, brain MRI  12/14/2017, CT abdomen and pelvis 1/13/2017   FINDINGS:   HEAD and NECK:  There is a soft tissue mass in the region of the epiglottis  obstructing the airway, associated with hyperintense activity  (SUV max 19.3), compatible with known neoplasm.  There is increased uptake around the insertion site tracheostomy  which is likely inflammatory nature.   THORAX:  Physiologic activity   ABDOMEN/PELVIS:  A focus of activity in the rectum (SUV max 7.1) is likely either  physiological or inflammatory.  There is a focus of activity in the region of the right common  iliac artery without a definite CT abnormality (SUV max 5.4)  possibly representing a lymph node.  Foci of activity posterior to the ischial tuberosities are  probably inflammatory.   OSSEOUS / MISC:  There are diffuse punctate low-level foci of activity throughout  the osseous structures, liver, kidneys, mediastinum which are  more likely artifactual than due to metastatic  disease.   ADDITIONAL CT FINDINGS:   Calcified granuloma in the left lung apex.  Postsurgical changes in the thoracic aorta.  Tracheostomy tube has been placed in the interim.   IMPRESSION:  CONCLUSION:   1.  Soft tissue mass in the region of the epiglottis obstructing  the airway, associated with hypermetabolic activity, compatible  with known neoplasm.  2.  Activity around the tracheostomy site is likely inflammatory.  3.  Activity in the rectum (SUV max 7.1) is probably either  physiological or inflammatory. Recommend direct visualization.  4.  There is a focus of activity in the region of the right  common iliac artery without a definite CT abnormality (SUV max  5.4) possibly representing a lymph node, nonspecific but cannot  rule out neoplasm.  5.  Foci of activity posterior to the ischial tuberosities are  probably inflammatory.  6.  There are diffuse punctate low-level foci of activity  throughout the osseous structures, liver, kidneys, mediastinum  which are more likely artifactual than due to metastatic disease.    EXAM DESCRIPTION: CT SOFT TISSUE NECK W CONTRAST  (1/29/18)   CLINICAL HISTORY: 57-year-old male with midline tongue base mass.   COMPARISON: None  FINDINGS:   Paranasal sinuses: There is mild mucosal thickening within the  anterior right ethmoid sinuses and within the bases of both  maxillary sinuses. No air-fluid levels identified. The mastoid  air cells and middle ear cavities are clear.   Nasopharynx: No suspicious asymmetry or discrete mass.   Oropharynx: Consistent with history given there is an approximate  3.0 x 3.5 x 3.5 cm soft tissue mass with lobular margin centered  at the level of the epiglottis and intimate with the posterior  margin of the tongue base the mass displaces a normal air within  the vallecula and moderately compromises the oral pharyngeal air  column. Otherwise the tongue is unremarkable in CT appearance. No  intrinsic mass or suspicious enhancement seen. The fatty  raphae  is intact and midline.  The parapharyngeal fat planes are preserved.   Prevertebral soft tissues: No thickening or suspicious fluid  collections.   Salivary glands: No suspicious asymmetry or discrete mass.   Adenopathy: Mostly small subcentimeter cervical chain lymph nodes  present bilaterally. The largest is seen on the right at level II  measuring approximately 1.3 x 0.9 cm. No conglomerate or necrotic  lymph nodes identified.   Thyroid gland: No enlargement or mass.   Larynx/trachea: Unremarkable.   Included upper lung zones: Densely calcified granuloma within the  left upper lobe posteriorly. No acute pulmonary or pleural  finding.   Bones: Degenerative spondylosis within the cervical spine,  greatest at C6-7. No acute finding. Sternotomy wires are present.   Additional comments: The included orbits and intracranial  structures are unremarkable.  There is incidental note of an aortic dissection beginning at the  level of the distal descending just proximal to the  brachiocephalic origin and continues on into the included  descending thoracic aorta. There is mural thrombus and/or  thickening involving the a.c. ascending aorta with stranding of  the adjacent fat. The dissection plane extends into the left  subclavian artery.   IMPRESSION:  3 x 3.5 x 3.5 cm soft tissue mass with lobular margin centered at  the level the epiglottis however flush 4 cm intimate with the  posterior margin of the tongue base and the posterior margin of  the oral pharynx. This does contribute to moderate compromise of  the airway. Finding is suspicious for malignancy.   Mostly subcentimeter cervical chain lymph nodes present  bilaterally. The largest node right level II measuring 1.3 cm. No  necrotic or conglomerate adenopathy.   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 The  dissection was described on a CTA coronary artery exam  from  2/14/2017.      Pathology:   Date: 2/5/2018 Department: [address] Released By: Mireya Hanson RN Authorizing: Joseph Venegas MD   OR Specimen ID: [id number]  Specimen Description: TUMOR EPIGLOTTIS    Tissue Pathology Exam - Tissue, Oral Cavity   Order: [id number]   Status:  Final result   Visible to patient:  No (Not Released) Dx:  Neoplasm of uncertain behavior of lar...      2wk ago     Final Diagnosis   1.  TUMOR, EPIGLOTTIS:   INVASIVE SQUAMOUS CELL CARCINOMA, NON KERATINIZING, POORLY DIFFERENTIATED.      2.  TUMOR, EPIGLOTTIS:   INVASIVE SQUAMOUS CELL CARCINOMA, NON KERATINIZING, POORLY DIFFERENTIATED.                 Labs:   WBC   Date Value Ref Range Status   02/07/2018 13.35 (H) 3.20 - 9.80 10*3/mm3 Final     Hemoglobin   Date Value Ref Range Status   02/07/2018 10.1 (L) 13.7 - 17.3 g/dL Final     Hematocrit   Date Value Ref Range Status   02/07/2018 31.5 (L) 39.0 - 49.0 % Final     Platelets   Date Value Ref Range Status   02/07/2018 182 150 - 450 10*3/mm3 Final       ASSESSMENT/PLAN: Mr. 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 I carefully reviewed the patient’s recent PET/CT as well as his previous CT of the neck with Dr. Thompson and Dr. Bonilla in radiology, who feel that there is evidence of involvement of the preepiglottic space, making this a T3 lesion. The NCCN Guidelines for the treatment of laryngeal cancer were discussed at length with the patient, his sister, and mother. The patient is not interested in surgical options at this time. I feel that he could benefit from a course of definitive radiation therapy to help with local control of his disease. The risks, benefits, and rationale for definitive radiation therapy were discussed in detail. The patient and his family voice understanding and agree to proceed with therapy. We have instructed the patient to have a dental  evaluation as soon as possible. The patient is scheduled for follow-up with Dr. Patel later this week to discuss chemotherapy recommendations. I have discussed this case with Dr. Patel. The timing of radiation therapy depends on the patient’s dental evaluation and whether or not he receives chemotherapy. We plan to have the patient return to our clinic in the near future for simulation of his treatment fields, with plans to deliver 7000 cGy in 35 fractions to the supraglottic larynx and neck.    Sincerely,    Jarocho Tapia MD  Radiation Oncology    Electronically signed by Jarocho Tapia MD  2/19/2018 8:51 AM     cc: Dr. Joseph Harrison

## 2018-02-20 ENCOUNTER — OFFICE VISIT (OUTPATIENT)
Dept: SLEEP MEDICINE | Facility: HOSPITAL | Age: 58
End: 2018-02-20

## 2018-02-20 VITALS
WEIGHT: 256 LBS | SYSTOLIC BLOOD PRESSURE: 130 MMHG | OXYGEN SATURATION: 98 % | BODY MASS INDEX: 34.67 KG/M2 | HEART RATE: 65 BPM | HEIGHT: 72 IN | DIASTOLIC BLOOD PRESSURE: 66 MMHG

## 2018-02-20 DIAGNOSIS — G47.33 OBSTRUCTIVE SLEEP APNEA, ADULT: Primary | ICD-10-CM

## 2018-02-20 PROCEDURE — 99214 OFFICE O/P EST MOD 30 MIN: CPT | Performed by: INTERNAL MEDICINE

## 2018-02-20 NOTE — PROGRESS NOTES
New Patient Sleep Medicine Consultation    Encounter Date: 2/20/2018         Patient's PCP: Marybeth Harrison DO  Referring provider: No ref. provider found  Reason for consultation: known EMELIA    Truman Esquivel is a 57 y.o. male who presents for 2nd opinion. He had type 1 aortic dissection in Feb 2017 with complicated repair. He was noticed to have apneas and sent for PSG. PSG on  03/23/2017 showed AHI of24.7 despite very poor sleep efficiency. He was started on autocpap 5-20 with suboptimal compliance.  In the last month he was diagnosed with squamous cell CA and a #8 cuffless Shiley was placed. He has HTN but denies daytime fatigue, excessive daytime sleepiness, morning headaches, or non-restorative sleep. His bedtime is ~ 2245. He  falls asleep after 5-10 minutes, and is up 2-3 times per night. He wakes up ~ 8. He drinks 0 cups of coffee, 0 teas, and 1 sodas per day. He drinks 0 alcoholic beverages per week. He does not smoke. He denies abnormal dreams, cataplexy, sleep paralysis, or hypnagogic hallucinations. He does not take sedatives or hypnotics. He has no sleepiness with driving. He naps when unstimulated.    [place] - 5    Past Medical History:   Diagnosis Date   • Aortic dissection    • Chest pain    • Disease of thyroid gland    • HTN (hypertension)    • Knee pain    • Sleep apnea    • Smoker    • Squamous cell carcinoma of larynx 2/5/2018   • Stroke    • Swallowing difficulty      Social History     Social History   • Marital status:      Spouse name: N/A   • Number of children: N/A   • Years of education: N/A     Occupational History   • Not on file.     Social History Main Topics   • Smoking status: Former Smoker     Years: 38.00     Quit date: 2/13/2017   • Smokeless tobacco: Never Used      Comment: 02/12/2018 - Patient confirmed he ceased utilization of tobacco products 02/13/2017.   • Alcohol use No   • Drug use: No   • Sexual activity: Defer     Other Topics Concern   • Not on file  "    Social History Narrative     Family History   Problem Relation Age of Onset   • Thyroid disease Mother    • Hypertension Mother    • Hypertension Father    single, 1 child  Ex   On disability  Smoking history: smoked 1.25 ppds from age 18 until 56 (after surgery)  FH of sleep disorders: none known    Review of Systems:  Pertinent items are noted in HPI. Patient advised to discuss any positive ROS with PCP.      Vitals:    02/20/18 0939   BP: 130/66   Pulse: 65   SpO2: 98%     Body mass index is 34.72 kg/(m^2). Patient's BMI is above normal parameters. Follow-up plan includes:  referral to primary care.  Neck circumference: 17\"            General: Alert. Cooperative. Well developed. No acute distress.             Head:  Normocephalic. Symmetrical. Atraumatic.              Eyes: Sclera clear. No icterus. PERRLA. Normal EOM.             Ears: No deformities. Normal hearing.             Nose: No septal deviation. No drainage.          Throat: No oral lesions. No thrush. Moist mucous membranes.,     Tongue is normal    Dentition is poor       Pharynx: Posterior pharyngeal pillars are narrow    Mallampati score of IV (only hard palate visible)    Pharynx is normal with unrermarkable tonsils   Chest Wall:  Normal shape. Symmetric expansion with respiration. No tenderness.             Neck:  Trachea midline.  # 8 cuffless          Lungs:  Clear to auscultation bilaterally. No wheezes. No rhonchi. No rales. Respirations regular, even and unlabored.            Heart:  Regular rhythm and normal rate. Normal S1 and S2. No murmur.     Abdomen:  Soft, non-tender and non-distended. Normal bowel sounds. No masses.  Extremities:  Moves all extremities well. No edema.           Pulses: Pulses palpable and equal bilaterally.               Skin: Dry. Intact. No bleeding. No rash.           Neuro: Moves all 4 extremities and cranial nerves grossly intact.  Psychiatric: Normal mood and affect.      Current Outpatient " Prescriptions:   •  amLODIPine (NORVASC) 5 MG tablet, Take 5 mg by mouth Daily., Disp: , Rfl:   •  aspirin 81 MG tablet, Take 81 mg by mouth Daily. Last dose 1/31/18, Disp: , Rfl:   •  atorvastatin (LIPITOR) 20 MG tablet, Take 1 tablet by mouth Daily., Disp: 30 tablet, Rfl: 11  •  clopidogrel (PLAVIX) 75 MG tablet, Take 75 mg by mouth Daily. Last dose approx one month ago, Disp: , Rfl:   •  coenzyme Q10 50 MG capsule capsule, Take 100 mg by mouth Every Night., Disp: , Rfl:   •  docusate sodium (COLACE) 100 MG capsule, Take 1 capsule by mouth 2 (Two) Times a Day As Needed for Constipation., Disp: 60 capsule, Rfl: 2  •  ferrous sulfate 325 (65 FE) MG tablet, Take 1 tablet by mouth 2 (Two) Times a Day With Meals., Disp: 60 tablet, Rfl: 3  •  levothyroxine (SYNTHROID, LEVOTHROID) 88 MCG tablet, Take 88 mcg by mouth Daily., Disp: , Rfl:   •  losartan-hydrochlorothiazide (HYZAAR) 100-25 MG per tablet, Take 1 tablet by mouth Daily., Disp: 30 tablet, Rfl: 12  •  metoprolol succinate XL (TOPROL-XL) 25 MG 24 hr tablet, Take 25 mg by mouth Every Night., Disp: , Rfl:   •  Multiple Vitamins-Minerals (MULTIVITAMIN ADULT PO), Take 1 tablet by mouth Daily., Disp: , Rfl:   •  oxyCODONE-acetaminophen (PERCOCET) 5-325 MG per tablet, 1-2 tablets by mouth every 4 hours as needed for pain, Disp: 60 tablet, Rfl: 0  •  sodium chloride 0.9 % solution, Use for tracheostomy care as directed, Disp: 1000 mL, Rfl: 11  •  vitamin B-12 (CYANOCOBALAMIN) 100 MCG tablet, Take 100 mcg by mouth Daily., Disp: , Rfl:   •  vitamin E 100 UNIT capsule, Take 400 Units by mouth Every Night., Disp: , Rfl:   •  Zinc 50 MG capsule, Take 50 mg by mouth Every Night., Disp: , Rfl:     ASSESSMENT:  1. Obstructive sleep apnea   1. EMELIA treated with tracheostomy at this point, no evidence of CSA  1. Needs repeat sleep study prior to decannulation  2. Caution with narcotics and benzos.  2. Squamous cell CA of the throat  1. Per ENT and oncology  3. RLS - mild,  follow  4. Prior tobacco abuse  5. RTC in 6 months         This document has been electronically signed by Manuel Rivas MD on February 20, 2018         CC: Marybeth Harrison,           No ref. provider found

## 2018-02-21 ENCOUNTER — OFFICE VISIT (OUTPATIENT)
Dept: ONCOLOGY | Facility: CLINIC | Age: 58
End: 2018-02-21

## 2018-02-21 ENCOUNTER — OFFICE VISIT (OUTPATIENT)
Dept: OTOLARYNGOLOGY | Facility: CLINIC | Age: 58
End: 2018-02-21

## 2018-02-21 VITALS
DIASTOLIC BLOOD PRESSURE: 67 MMHG | RESPIRATION RATE: 20 BRPM | SYSTOLIC BLOOD PRESSURE: 129 MMHG | HEIGHT: 72 IN | WEIGHT: 254.85 LBS | BODY MASS INDEX: 34.52 KG/M2 | HEART RATE: 64 BPM | TEMPERATURE: 98.3 F

## 2018-02-21 VITALS — BODY MASS INDEX: 34.67 KG/M2 | HEART RATE: 79 BPM | HEIGHT: 72 IN | WEIGHT: 256 LBS | OXYGEN SATURATION: 98 %

## 2018-02-21 DIAGNOSIS — C32.9 SQUAMOUS CELL CARCINOMA OF LARYNX (HCC): Primary | ICD-10-CM

## 2018-02-21 DIAGNOSIS — Z43.0 ATTENTION TO TRACHEOSTOMY (HCC): ICD-10-CM

## 2018-02-21 PROCEDURE — 99214 OFFICE O/P EST MOD 30 MIN: CPT | Performed by: INTERNAL MEDICINE

## 2018-02-21 PROCEDURE — G0463 HOSPITAL OUTPT CLINIC VISIT: HCPCS | Performed by: INTERNAL MEDICINE

## 2018-02-21 PROCEDURE — 99024 POSTOP FOLLOW-UP VISIT: CPT | Performed by: OTOLARYNGOLOGY

## 2018-02-21 RX ORDER — ONDANSETRON 4 MG/1
4 TABLET, FILM COATED ORAL 3 TIMES DAILY PRN
Qty: 40 TABLET | Refills: 3 | Status: SHIPPED | OUTPATIENT
Start: 2018-02-21 | End: 2019-02-21

## 2018-02-21 NOTE — PROGRESS NOTES
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  Since that clinic visit patient had a PET/CT done for staging purposes.  Is here to discuss the result of PET/CT and further recommendation.  Complains of discomfort at tracheostomy site.  Denies any bleeding.  Denies any new lymph node enlargement.        PAST MEDICAL HISTORY:    Past Medical History:   Diagnosis Date   • Aortic dissection    • Chest pain    • Disease of thyroid gland    • HTN (hypertension)    • Knee pain    • Sleep apnea    • Smoker    • Squamous cell carcinoma of larynx 2/5/2018   • Stroke    • Swallowing difficulty        SOCIAL HISTORY:    Social History   Substance Use Topics   • Smoking status: Former Smoker     Years: 38.00     Quit date: 2/13/2017   • Smokeless tobacco: Never Used      Comment: 02/12/2018 - Patient confirmed he ceased utilization of tobacco products 02/13/2017.   • Alcohol use No       Surgical History :  Past Surgical History:   Procedure Laterality Date   • AORTA SURGERY      ruptured aorta repair   • ASCENDING ARCH/HEMIARCH REPLACEMENT N/A 2/14/2017    Procedure: INTRAOPERATIVE TRANSESOPHAGEAL ECHOCARDIOGRAM, MIDLINE STERNOTOMY, ASCENDING AORTIC  AND PROXIMAL AORTIC ARCH REPAIR WITH 26MM GRAFT, AORTIC VALVE RESUSPENSION, AORTIC ROOT REPAIR, OPEN VEIN HARVEST OF RIGHT LEG;  Surgeon: German Arreguin MD;  Location: [facility] OR;  Service:    • BRONCHOSCOPY N/A 2/17/2017    Procedure: BRONCHOSCOPY BIOPSY AT BEDSIDE WITH BAL-LEFT LOWER LOBE;  Surgeon: Trent Chaney MD;  Location: [facility] ENDOSCOPY;  Service:    • DIRECT  LARYNGOSCOPY, ESOPHAGOSCOPY, BRONCHOSCOPY N/A 2/5/2018    Procedure: DIRECT LARYNGOSCOPY WITH BIOPSY, ESOPHAGOSCOPY     (no bronchoscopy, no laser);  Surgeon: Joseph Venegas MD;  Location: [facility];  Service:    • TRACHEOSTOMY  02/05/2018   • TRACHEOSTOMY N/A 2/5/2018    Procedure: TRACHEOSTOMY  local injection @ 1106 incision @ 1119;  Surgeon: Joseph Venegas MD;  Location: [facility];  Service:        ALLERGIES:    Allergies   Allergen Reactions   • Co Q 10 [Coenzyme Q10] Itching   • Eggs Or Egg-Derived Products Swelling   • Erythromycin Other (See Comments)     Joint pains and cold sweats   • Other GI Intolerance     Mycins     • Penicillins      Tolerated cefepime during 2/2017 admission. Had rash and itching (relieved by benadryl) after few doses of cefepime and cefepime was continued.   • Tetracyclines & Related GI Intolerance   • Acth [Corticotropin] Rash   • Cephalosporins Itching and Rash     Pt developed rash, itching after cefepime administration (2-3 doses) during 2/2017 admission. Itching was relieved with benadryl. Cefepime was continued because his infection was improving and no symptoms of anaphylaxis present   • Keflex [Cephalexin] Rash         FAMILY HISTORY:  Family History   Problem Relation Age of Onset   • Thyroid disease Mother    • Hypertension Mother    • Hypertension Father            REVIEW OF SYSTEMS:      CONSTITUTIONAL:  Complains of fatigue. Admits to 12 pound of weight loss in last 1 year.  Denies any fever, chills .      HEENT:  Some visual disturbance affecting left eye since stroke in 2017.  Complains of hoarseness of voice.  Complains of difficulty swallowing food for last 4 months.     RESPIRATORY:  Complains of chronic shortness of breath, denies any recent worsening.  No new cough or hemoptysis.     CARDIOVASCULAR:  No chest pain or palpitations.     GASTROINTESTINAL:  No abdominal pain nausea, vomiting or blood in the stool.     GENITOURINARY: No Dysuria or  "Hematuria.     MUSCULOSKELETAL:  No new back pain or arthralgia..     LYMPHATICS:  Denies any abnormal swollen glands anywhere in the body.     NEUROLOGICAL : No tingling or numbness. No headache or dizziness. No seizures or balance problems.     SKIN: No new skin lesions.      PHYSICAL EXAMINATION:      VITAL SIGNS:  /67  Pulse 64  Temp 98.3 °F (36.8 °C) (Temporal Artery )   Resp 20  Ht 182.9 cm (72.01\")  Wt 116 kg (254 lb 13.6 oz)  BMI 34.56 kg/m2  Last 3 weights    02/21/18  1256   Weight: 116 kg (254 lb 13.6 oz)       ECOG  performance status: 1      GENERAL:  Not in any distress.    HEENT:  Normocephalic, Atraumatic.Mild Conjunctival pallor. No icterus. Extraocular Movements Intact. No Facial Asymmetry noted.    NECK:  No adenopathy. No JVD.Tracheostomy present.    RESPIRATORY:  Fair air entry bilateral. No rhonchi or wheezing.    CARDIOVASCULAR:  S1, S2. Regular rate and rhythm. No murmur or gallop appreciated.    ABDOMEN:  Soft, obese, nontender. Bowel sounds present in all four quadrants.  No organomegaly appreciated.    EXTREMITIES:  No edema.No Calf Tenderness.    NEUROLOGIC:  Alert, awake and oriented ×3.  No  Motor or sensory deficit appreciated. Cranial Nerves 2-12 grossly intact.    SKIN : No new skin lesion identified        DIAGNOSTIC DATA:    Glucose   Date Value Ref Range Status   02/07/2018 116 (H) 60 - 100 mg/dL Final     Sodium   Date Value Ref Range Status   02/07/2018 137 137 - 145 mmol/L Final     Potassium   Date Value Ref Range Status   02/07/2018 3.4 (L) 3.5 - 5.1 mmol/L Final     CO2   Date Value Ref Range Status   02/07/2018 26.0 22.0 - 31.0 mmol/L Final     Chloride   Date Value Ref Range Status   02/07/2018 100 95 - 110 mmol/L Final     Anion Gap   Date Value Ref Range Status   02/07/2018 11.0 5.0 - 15.0 mmol/L Final     Creatinine   Date Value Ref Range Status   02/07/2018 1.10 0.70 - 1.30 mg/dL Final     BUN   Date Value Ref Range Status   02/07/2018 17 7 - 21 mg/dL Final "     BUN/Creatinine Ratio   Date Value Ref Range Status   02/07/2018 15.5 7.0 - 25.0 Final     Calcium   Date Value Ref Range Status   02/07/2018 8.5 8.4 - 10.2 mg/dL Final     eGFR Non  Amer   Date Value Ref Range Status   02/07/2018 69 >60 mL/min/1.73 Final     Alkaline Phosphatase   Date Value Ref Range Status   02/07/2018 64 38 - 126 U/L Final     Total Protein   Date Value Ref Range Status   02/07/2018 6.5 6.3 - 8.6 g/dL Final     ALT (SGPT)   Date Value Ref Range Status   02/07/2018 28 21 - 72 U/L Final     AST (SGOT)   Date Value Ref Range Status   02/07/2018 15 (L) 17 - 59 U/L Final     Total Bilirubin   Date Value Ref Range Status   02/07/2018 0.3 0.2 - 1.3 mg/dL Final     Albumin   Date Value Ref Range Status   02/07/2018 3.60 3.40 - 4.80 g/dL Final     Globulin   Date Value Ref Range Status   02/07/2018 2.9 2.3 - 3.5 gm/dL Final     A/G Ratio   Date Value Ref Range Status   02/07/2018 1.2 1.1 - 1.8 g/dL Final     Lab Results   Component Value Date    WBC 13.35 (H) 02/07/2018    HGB 10.1 (L) 02/07/2018    HCT 31.5 (L) 02/07/2018    MCV 79.1 (L) 02/07/2018     02/07/2018     Lab Results   Component Value Date    NEUTROABS 10.09 (H) 02/07/2018    IRON 14 (L) 02/13/2018    TIBC 335 02/13/2018    LABIRON 4 (L) 02/13/2018    FERRITIN 64.40 02/13/2018    YADIVBPJ82 980 (H) 02/13/2018    FOLATE >20.00 02/13/2018     No results found for: , LABCA2, AFPTM, HCGQUANT, , CHROMGRNA, [id number], CEA, REFLABREPO        Radiology Data :  PET CT done on February 16, 2018 was reviewed, discussed with patient, it showed:  IMPRESSION:  CONCLUSION:   1.  Soft tissue mass in the region of the epiglottis obstructing  the airway, associated with hypermetabolic activity, compatible  with known neoplasm.  2.  Activity around the tracheostomy site is likely inflammatory.  3.  Activity in the rectum (SUV max 7.1) is probably either  physiological or inflammatory. Recommend direct visualization.  4.  There is a  focus of activity in the region of the right  common iliac artery without a definite CT abnormality (SUV max  5.4) possibly representing a lymph node, nonspecific but cannot  rule out neoplasm.  5.  Foci of activity posterior to the ischial tuberosities are  probably inflammatory.  6.  There are diffuse punctate low-level foci of activity  throughout the osseous structures, liver, kidneys, mediastinum  which are more likely artifactual than due to metastatic disease.    ADDENDUM   ADDENDUM #1         Upon review of the patient's imaging with Dr. Tapia, there  appears to be extension of tumor into the left side of the  preepiglottic space.        Ct of Neck with contrast done on January 29, 2018 showed:  IMPRESSION:  3 x 3.5 x 3.5 cm soft tissue mass with lobular margin centered at  the level the epiglottis however flush 4 cm intimate with the  posterior margin of the tongue base and the posterior margin of  the oral pharynx. This does contribute to moderate compromise of  the airway. Finding is suspicious for malignancy.      Mostly subcentimeter cervical chain lymph nodes present  bilaterally. The largest node right level II measuring 1.3 cm. No  necrotic or conglomerate adenopathy.      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 The  dissection was described on a CTA coronary artery exam from  2/14/2017.        Pathology :  Pathology result from February 5, 2018 showed:  Final Diagnosis   1.  TUMOR, EPIGLOTTIS:   INVASIVE SQUAMOUS CELL CARCINOMA, NON KERATINIZING, POORLY DIFFERENTIATED.       2.  TUMOR, EPIGLOTTIS:   INVASIVE SQUAMOUS CELL CARCINOMA, NON KERATINIZING, POORLY DIFFERENTIATED.               ASSESSMENT AND PLAN:      1.  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  Based on PET/CT there is a tumor extension into preepiglottic space making a T3 lesion.  Result of PET CT were discussed with patient.  It  was discussed with patient with T3 N0 lesion after his treatment option includes either surgery or concurrent chemoradiation.  Concurrent chemoradiation consisting of weekly carboplatin and Taxol were discussed with patient.  Possible side effect of chemotherapy including nausea, vomiting, diarrhea, risk of kidney failure, neuropathy, lowering of blood count, risk of infection, need for blood transfusion, possibility of allergic reaction were discussed with patient.  We will also get a PICC line on the day of starting chemotherapy.  Patient is losing weight rapidly, option of getting feeding tube for nutritional support as was discussed with patient.  At this point patient is not interested in getting feeding tube placed.  Case was also discussed with Dr. Tapia.  When radiation planning is ready we will start him on concurrent chemoradiation on that day.  We will also provide them information today about chemotherapy today.    2.  Normocytic anemia: Patient admits to recent epistaxis as well as blood clots after biopsy.   anemia workup was consistent with anemia of chronic disease with component of fine deficiency with saturation being only 4%.  Patient has been started on ferrous sulfate with Colace.  Is tolerating ferrous sulfate well at this point.     3.  History of aortic dissection status post repair.     4.  History of CVA with residual visual disturbance in left eye    5.  Problem with tracheostomy: Patient is complaining of difficulty with tracheostomy ongoing since yesterday.  We will get him an appointment with Dr. Venegas for this problem.     6.  Health maintenance: Patient quit smoking in February 2017.  Never had a screening colonoscopy done.  Remains full code.     Reza Patel MD  2/21/2018  2:57 PM        EMR Dragon/Transcription disclaimer:   Much of this encounter note is an electronic transcription/translation of spoken language to printed text. The electronic translation of spoken language  may permit erroneous, or at times, nonsensical words or phrases to be inadvertently transcribed; Although I have reviewed the note for such errors, some may still exist.

## 2018-02-21 NOTE — PATIENT INSTRUCTIONS
Paclitaxel injection  What is this medicine?  PACLITAXEL (BIANCA li TAX el) is a chemotherapy drug. It targets fast dividing cells, like cancer cells, and causes these cells to die. This medicine is used to treat ovarian cancer, breast cancer, and other cancers.  This medicine may be used for other purposes; ask your health care provider or pharmacist if you have questions.  COMMON BRAND NAME(S): Onxol, Taxol  What should I tell my health care provider before I take this medicine?  They need to know if you have any of these conditions:  -blood disorders  -irregular heartbeat  -infection (especially a virus infection such as chickenpox, cold sores, or herpes)  -liver disease  -previous or ongoing radiation therapy  -an unusual or allergic reaction to paclitaxel, alcohol, polyoxyethylated castor oil, other chemotherapy agents, other medicines, foods, dyes, or preservatives  -pregnant or trying to get pregnant  -breast-feeding  How should I use this medicine?  This drug is given as an infusion into a vein. It is administered in a hospital or clinic by a specially trained health care professional.  Talk to your pediatrician regarding the use of this medicine in children. Special care may be needed.  Overdosage: If you think you have taken too much of this medicine contact a poison control center or emergency room at once.  NOTE: This medicine is only for you. Do not share this medicine with others.  What if I miss a dose?  It is important not to miss your dose. Call your doctor or health care professional if you are unable to keep an appointment.  What may interact with this medicine?  Do not take this medicine with any of the following medications:  -disulfiram  -metronidazole  This medicine may also interact with the following medications:  -cyclosporine  -diazepam  -ketoconazole  -medicines to increase blood counts like filgrastim, pegfilgrastim, sargramostim  -other chemotherapy drugs like cisplatin, doxorubicin,  epirubicin, etoposide, teniposide, vincristine  -quinidine  -testosterone  -vaccines  -verapamil  Talk to your doctor or health care professional before taking any of these medicines:  -acetaminophen  -aspirin  -ibuprofen  -ketoprofen  -naproxen  This list may not describe all possible interactions. Give your health care provider a list of all the medicines, herbs, non-prescription drugs, or dietary supplements you use. Also tell them if you smoke, drink alcohol, or use illegal drugs. Some items may interact with your medicine.  What should I watch for while using this medicine?  Your condition will be monitored carefully while you are receiving this medicine. You will need important blood work done while you are taking this medicine.  This medicine can cause serious allergic reactions. To reduce your risk you will need to take other medicine(s) before treatment with this medicine. If you experience allergic reactions like skin rash, itching or hives, swelling of the face, lips, or tongue, tell your doctor or health care professional right away.  In some cases, you may be given additional medicines to help with side effects. Follow all directions for their use.  This drug may make you feel generally unwell. This is not uncommon, as chemotherapy can affect healthy cells as well as cancer cells. Report any side effects. Continue your course of treatment even though you feel ill unless your doctor tells you to stop.  Call your doctor or health care professional for advice if you get a fever, chills or sore throat, or other symptoms of a cold or flu. Do not treat yourself. This drug decreases your body's ability to fight infections. Try to avoid being around people who are sick.  This medicine may increase your risk to bruise or bleed. Call your doctor or health care professional if you notice any unusual bleeding.  Be careful brushing and flossing your teeth or using a toothpick because you may get an infection or  bleed more easily. If you have any dental work done, tell your dentist you are receiving this medicine.  Avoid taking products that contain aspirin, acetaminophen, ibuprofen, naproxen, or ketoprofen unless instructed by your doctor. These medicines may hide a fever.  Do not become pregnant while taking this medicine. Women should inform their doctor if they wish to become pregnant or think they might be pregnant. There is a potential for serious side effects to an unborn child. Talk to your health care professional or pharmacist for more information. Do not breast-feed an infant while taking this medicine.  Men are advised not to father a child while receiving this medicine.  This product may contain alcohol. Ask your pharmacist or healthcare provider if this medicine contains alcohol. Be sure to tell all healthcare providers you are taking this medicine. Certain medicines, like metronidazole and disulfiram, can cause an unpleasant reaction when taken with alcohol. The reaction includes flushing, headache, nausea, vomiting, sweating, and increased thirst. The reaction can last from 30 minutes to several hours.  What side effects may I notice from receiving this medicine?  Side effects that you should report to your doctor or health care professional as soon as possible:  -allergic reactions like skin rash, itching or hives, swelling of the face, lips, or tongue  -low blood counts - This drug may decrease the number of white blood cells, red blood cells and platelets. You may be at increased risk for infections and bleeding.  -signs of infection - fever or chills, cough, sore throat, pain or difficulty passing urine  -signs of decreased platelets or bleeding - bruising, pinpoint red spots on the skin, black, tarry stools, nosebleeds  -signs of decreased red blood cells - unusually weak or tired, fainting spells, lightheadedness  -breathing problems  -chest pain  -high or low blood pressure  -mouth sores  -nausea and  vomiting  -pain, swelling, redness or irritation at the injection site  -pain, tingling, numbness in the hands or feet  -slow or irregular heartbeat  -swelling of the ankle, feet, hands  Side effects that usually do not require medical attention (report to your doctor or health care professional if they continue or are bothersome):  -bone pain  -complete hair loss including hair on your head, underarms, pubic hair, eyebrows, and eyelashes  -changes in the color of fingernails  -diarrhea  -loosening of the fingernails  -loss of appetite  -muscle or joint pain  -red flush to skin  -sweating  This list may not describe all possible side effects. Call your doctor for medical advice about side effects. You may report side effects to FDA at 5-229-FDA-6011.  Where should I keep my medicine?  This drug is given in a hospital or clinic and will not be stored at home.  NOTE: This sheet is a summary. It may not cover all possible information. If you have questions about this medicine, talk to your doctor, pharmacist, or health care provider.  © 2018 [employer] (2016-10-18 19:58:00)  Carboplatin injection  What is this medicine?  CARBOPLATIN (JANIE ca amber tin) is a chemotherapy drug. It targets fast dividing cells, like cancer cells, and causes these cells to die. This medicine is used to treat ovarian cancer and many other cancers.  This medicine may be used for other purposes; ask your health care provider or pharmacist if you have questions.  COMMON BRAND NAME(S): Paraplatin  What should I tell my health care provider before I take this medicine?  They need to know if you have any of these conditions:  -blood disorders  -hearing problems  -kidney disease  -recent or ongoing radiation therapy  -an unusual or allergic reaction to carboplatin, cisplatin, other chemotherapy, other medicines, foods, dyes, or preservatives  -pregnant or trying to get pregnant  -breast-feeding  How should I use this medicine?  This drug  is usually given as an infusion into a vein. It is administered in a hospital or clinic by a specially trained health care professional.  Talk to your pediatrician regarding the use of this medicine in children. Special care may be needed.  Overdosage: If you think you have taken too much of this medicine contact a poison control center or emergency room at once.  NOTE: This medicine is only for you. Do not share this medicine with others.  What if I miss a dose?  It is important not to miss a dose. Call your doctor or health care professional if you are unable to keep an appointment.  What may interact with this medicine?  -medicines for seizures  -medicines to increase blood counts like filgrastim, pegfilgrastim, sargramostim  -some antibiotics like amikacin, gentamicin, neomycin, streptomycin, tobramycin  -vaccines  Talk to your doctor or health care professional before taking any of these medicines:  -acetaminophen  -aspirin  -ibuprofen  -ketoprofen  -naproxen  This list may not describe all possible interactions. Give your health care provider a list of all the medicines, herbs, non-prescription drugs, or dietary supplements you use. Also tell them if you smoke, drink alcohol, or use illegal drugs. Some items may interact with your medicine.  What should I watch for while using this medicine?  Your condition will be monitored carefully while you are receiving this medicine. You will need important blood work done while you are taking this medicine.  This drug may make you feel generally unwell. This is not uncommon, as chemotherapy can affect healthy cells as well as cancer cells. Report any side effects. Continue your course of treatment even though you feel ill unless your doctor tells you to stop.  In some cases, you may be given additional medicines to help with side effects. Follow all directions for their use.  Call your doctor or health care professional for advice if you get a fever, chills or sore  throat, or other symptoms of a cold or flu. Do not treat yourself. This drug decreases your body's ability to fight infections. Try to avoid being around people who are sick.  This medicine may increase your risk to bruise or bleed. Call your doctor or health care professional if you notice any unusual bleeding.  Be careful brushing and flossing your teeth or using a toothpick because you may get an infection or bleed more easily. If you have any dental work done, tell your dentist you are receiving this medicine.  Avoid taking products that contain aspirin, acetaminophen, ibuprofen, naproxen, or ketoprofen unless instructed by your doctor. These medicines may hide a fever.  Do not become pregnant while taking this medicine. Women should inform their doctor if they wish to become pregnant or think they might be pregnant. There is a potential for serious side effects to an unborn child. Talk to your health care professional or pharmacist for more information. Do not breast-feed an infant while taking this medicine.  What side effects may I notice from receiving this medicine?  Side effects that you should report to your doctor or health care professional as soon as possible:  -allergic reactions like skin rash, itching or hives, swelling of the face, lips, or tongue  -signs of infection - fever or chills, cough, sore throat, pain or difficulty passing urine  -signs of decreased platelets or bleeding - bruising, pinpoint red spots on the skin, black, tarry stools, nosebleeds  -signs of decreased red blood cells - unusually weak or tired, fainting spells, lightheadedness  -breathing problems  -changes in hearing  -changes in vision  -chest pain  -high blood pressure  -low blood counts - This drug may decrease the number of white blood cells, red blood cells and platelets. You may be at increased risk for infections and bleeding.  -nausea and vomiting  -pain, swelling, redness or irritation at the injection site  -pain,  "tingling, numbness in the hands or feet  -problems with balance, talking, walking  -trouble passing urine or change in the amount of urine  Side effects that usually do not require medical attention (report to your doctor or health care professional if they continue or are bothersome):  -hair loss  -loss of appetite  -metallic taste in the mouth or changes in taste  This list may not describe all possible side effects. Call your doctor for medical advice about side effects. You may report side effects to FDA at 5-924-NPJ-7600.  Where should I keep my medicine?  This drug is given in a hospital or clinic and will not be stored at home.  NOTE: This sheet is a summary. It may not cover all possible information. If you have questions about this medicine, talk to your doctor, pharmacist, or health care provider.  © 2018 [employer] Standard (2009-03-24 14:38:05)    PICC Home Guide  A peripherally inserted central catheter (PICC) is a long, thin, flexible tube that is inserted into a vein in the upper arm. It is a form of intravenous (IV) access. It is considered to be a \"central\" line because the tip of the PICC ends in a large vein in your chest. This large vein is called the superior vena cava (SVC). The PICC tip ends in the SVC because there is a lot of blood flow in the SVC. This allows medicines and IV fluids to be quickly distributed throughout the body. The PICC is inserted using a sterile technique by a specially trained nurse or physician. After the PICC is inserted, a chest X-ray exam is done to be sure it is in the correct place.  A PICC may be placed for different reasons, such as:  · To give medicines and liquid nutrition that can only be given through a central line. Examples are:  ¨ Certain antibiotic treatments.  ¨ Chemotherapy.  ¨ Total parenteral nutrition (TPN).  · To take frequent blood samples.  · To give IV fluids and blood products.  · If there is difficulty placing a peripheral intravenous (PIV) " "catheter.  If taken care of properly, a PICC can remain in place for several months. A PICC can also allow a person to go home from the hospital early. Medicine and PICC care can be managed at home by a family member or home health care team.  What problems can happen when I have a PICC?  Problems with a PICC can occasionally occur. These may include the following:  · A blood clot (thrombus) forming in or at the tip of the PICC. This can cause the PICC to become clogged. A clot-dissolving medicine called tissue plasminogen activator (tPA) can be given through the PICC to help break up the clot.  · Inflammation of the vein (phlebitis) in which the PICC is placed. Signs of inflammation may include redness, pain at the insertion site, red streaks, or being able to feel a \"cord\" in the vein where the PICC is located.  · Infection in the PICC or at the insertion site. Signs of infection may include fever, chills, redness, swelling, or pus drainage from the PICC insertion site.  · PICC movement (malposition). The PICC tip may move from its original position due to excessive physical activity, forceful coughing, sneezing, or vomiting.  · A break or cut in the PICC. It is important to not use scissors near the PICC.  · Nerve or tendon irritation or injury during PICC insertion.  What should I keep in mind about activities when I have a PICC?  · You may bend your arm and move it freely. If your PICC is near or at the bend of your elbow, avoid activity with repeated motion at the elbow.  · Rest at home for the remainder of the day following PICC line insertion.  · Avoid lifting heavy objects as instructed by your health care provider.  · Avoid using a crutch with the arm on the same side as your PICC. You may need to use a walker.  What should I know about my PICC dressing?  · Keep your PICC bandage (dressing) clean and dry to prevent infection.  ¨ Ask your health care provider when you may shower. Ask your health care " "provider to teach you how to wrap the PICC when you do take a shower.  · Change the PICC dressing as instructed by your health care provider.  · Change your PICC dressing if it becomes loose or wet.  What should I know about PICC care?  · Check the PICC insertion site daily for leakage, redness, swelling, or pain.  · Do not take a bath, swim, or use hot tubs when you have a PICC. Cover PICC line with clear plastic wrap and tape to keep it dry while showering.  · Flush the PICC as directed by your health care provider. Let your health care provider know right away if the PICC is difficult to flush or does not flush. Do not use force to flush the PICC.  · Do not use a syringe that is less than 10 mL to flush the PICC.  · Never pull or tug on the PICC.  · Avoid blood pressure checks on the arm with the PICC.  · Keep your PICC identification card with you at all times.  · Do not take the PICC out yourself. Only a trained clinical professional should remove the PICC.  Get help right away if:  · Your PICC is accidentally pulled all the way out. If this happens, cover the insertion site with a bandage or gauze dressing. Do not throw the PICC away. Your health care provider will need to inspect it.  · Your PICC was tugged or pulled and has partially come out. Do not  push the PICC back in.  · There is any type of drainage, redness, or swelling where the PICC enters the skin.  · You cannot flush the PICC, it is difficult to flush, or the PICC leaks around the insertion site when it is flushed.  · You hear a \"flushing\" sound when the PICC is flushed.  · You have pain, discomfort, or numbness in your arm, shoulder, or jaw on the same side as the PICC.  · You feel your heart \"racing\" or skipping beats.  · You notice a hole or tear in the PICC.  · You develop chills or a fever.  This information is not intended to replace advice given to you by your health care provider. Make sure you discuss any questions you have with your " "health care provider.  Document Released: 06/23/2004 Document Revised: 07/07/2017 Document Reviewed: 10/10/2014  [employer] Interactive Patient Education © 2017 [employer]  PICC Insertion  A peripherally inserted central catheter (PICC) is a long, thin, flexible tube that is inserted into a vein in the upper arm. It is a form of IV access. It is considered to be a \"central\" line because the tip of the PICC ends in a large vein in your chest. This large vein is called the superior vena cava (SVC). The PICC tip ends in the SVC because there is a lot of blood flow in the SVC. This allows medicines and IV fluids to be quickly distributed throughout the body. The PICC is inserted using a sterile technique by a specially trained health care provider. After the PICC is inserted, a chest X-ray may be done to ensure that it is in the correct place.  Tell a health care provider about:  · Any allergies you have.  · All medicines you are taking, including vitamins, herbs, eye drops, creams, and over-the-counter medicines.  · Any problems you or family members have had with anesthetic medicines.  · Any blood disorders you have.  · Any surgeries you have had.  · Any medical conditions you have.  What are the risks?  Generally, this is a safe procedure. However, as with any procedure, complications can occur. Possible complications include:  · Infection at the insertion site or in the blood.  · Bleeding at the insertion site or internally.  · Injury or collapse of the lung.  · Movement or malposition of the PICC.  · Inflammation of the vein (phlebitis).  · Nerve injury or irritation.  · Clot formation at the tip of the PICC line.  · Blood clot in the lung (pulmonary embolus).  · Injury to the large blood vessels or heart (rare).  What happens before the procedure?  · Your health care provider may want you to have blood tests. These tests can help tell how well your blood clots.  · If you take blood thinners (anticoagulant " medicine), ask your health care provider if you should stop taking them.  What happens during the procedure?  · You will have to lie flat for about 30-45 minutes for this procedure.  · Your health care provider will start by identifying a vein into which the PICC line will be placed. This is done using ultrasound or X-ray guidance.  · Medicine is used to numb the skin around the insertion site.  · The skin where the catheter will be inserted is cleaned and covered with a sterile surgical drape.  · A small needle is inserted into the vein, and then a small guidewire is advanced into the superior vena cava.  · The catheter is then advanced over the guidewire and moved into position. The guidewire is then removed.  · The catheter is flushed and blood is drawn back to confirm it is in the vein.  · If this is done without X-ray guidance, an X-ray will be needed to make sure the catheter tip is in the correct position.  · Once the placement is confirmed, the PICC is secured to the skin with a device and covered with a sterile dressing.  What happens after the procedure?  · You should avoid any strenuous activity for the next 2 days or as directed by your health care provider.  · You will be allowed to go back to your regular activities after the procedure.  · You will be instructed on the care of your PICC line.  This information is not intended to replace advice given to you by your health care provider. Make sure you discuss any questions you have with your health care provider.  Document Released: 10/08/2014 Document Revised: 05/25/2017 Document Reviewed: 10/10/2014  "[employer] Interactive Patient Education © 2017 "[employer]  PICC Insertion, Care After  Refer to this sheet in the next few weeks. These instructions provide you with information on caring for yourself after your procedure. Your health care provider may also give you more specific instructions. Your treatment has been planned according to current medical  practices, but problems sometimes occur. Call your health care provider if you have any problems or questions after your procedure.  What can I expect after the procedure?  After your procedure, it is typical to have the following:  · Mild discomfort at the insertion site. This should not last more than a day.  Follow these instructions at home:  · Rest at home for the remainder of the day after the procedure.  · You may bend your arm and move it freely. If your PICC is near or at the bend of your elbow, avoid activity with repeated motion at the elbow.  · Avoid lifting heavy objects as instructed by your health care provider.  · Avoid using a crutch with the arm on the same side as your PICC. You may need to use a walker.  Bandage Care   · Keep your PICC bandage (dressing) clean and dry to prevent infection.  · Ask your health care provider when you may shower. To keep the dressing dry, cover the PICC with plastic wrap and tape before showering. If the dressing does become wet, replace it right after the shower.  · Do not soak in the bath, swim, or use hot tubs when you have a PICC.  · Change the PICC dressing as instructed by your health care provider.  · Change your PICC dressing if it becomes loose or wet.  General PICC Care   · Check the PICC insertion site daily for leakage, redness, swelling, or pain.  · Flush the PICC as directed by your health care provider. Let your health care provider know right away if the PICC is difficult to flush or does not flush. Do not use force to flush the PICC.  · Do not use a syringe that is less than 10 mL to flush the PICC.  · Never pull or tug on the PICC.  · Avoid blood pressure checks on the arm with the PICC.  · Keep your PICC identification card with you at all times.  · Do not take the PICC out yourself. Only a trained health care professional should remove the PICC.  Contact a health care provider if:  · You have pain in your arm, ear, face, or teeth.  · You have  "fever or chills.  · You have drainage from the PICC insertion site.  · You have redness or palpate a \"cord\" around the PICC insertion site.  · You cannot flush the catheter.  Get help right away if:  · You have swelling in the arm in which the PICC is inserted.  This information is not intended to replace advice given to you by your health care provider. Make sure you discuss any questions you have with your health care provider.  Document Released: 10/08/2014 Document Revised: 05/25/2017 Document Reviewed: 10/10/2014  [employer] Interactive Patient Education © 2017 [employer]    "

## 2018-02-23 ENCOUNTER — TELEPHONE (OUTPATIENT)
Dept: RADIATION ONCOLOGY | Facility: HOSPITAL | Age: 58
End: 2018-02-23

## 2018-02-23 NOTE — TELEPHONE ENCOUNTER
I spoke with Mr Esquivel's sister due to him having a trach. She said He wanted to have Rad. No Surgery. Rad Sim. Appt made for Thur. March 1,2018 @ 1 PM.

## 2018-02-23 NOTE — PROGRESS NOTES
"Subjective   Truman Esquivel is a 57 y.o. male.       History of Present Illness     Patient comes in for an unscheduled visit due to perceived problems with his trach.  He thinks its \"crooked\" and he is having more trouble getting the inner cannula in and out.  He has completed a PET scan now and is in the process of being evaluated further by oncology for medical treatment.    The following portions of the patient's history were reviewed and updated as appropriate: allergies, current medications, past family history, past medical history, past social history, past surgical history and problem list.     reports that he quit smoking about a year ago. He quit after 38.00 years of use. He has never used smokeless tobacco. He reports that he does not drink alcohol or use illicit drugs.   Patient is not a tobacco user and has not been counseled for use of tobacco products      Review of Systems        Objective   Physical Exam  Tracheostomy tube is actually in place without evidence of infection.  The inner cannula was removed and has quite a bit of secretions within the lumen.  This is all cleaned for him and after this the inner cannulas able to be easily replaced.  I did use the fiberoptic scope to examine the lumen of the tracheostomy with the inner cannula removed and it appears to be in place with no evidence of obstruction (this was not a separate procedure just part of the exam)      Assessment/Plan   Truman was seen today for tracheostomy tube change.    Diagnoses and all orders for this visit:    Squamous cell carcinoma of larynx    Attention to tracheostomy      Plan: Reassurance.  Advised frequent cleaning of the inner cannula and use of saline lavage in addition to his humidifier.  Keep previously scheduled appointments with oncology and follow-up with me as previously scheduled.  Call for further problems.      "

## 2018-02-27 ENCOUNTER — HOSPITAL ENCOUNTER (OUTPATIENT)
Facility: HOSPITAL | Age: 58
Setting detail: HOSPITAL OUTPATIENT SURGERY
End: 2018-02-27
Attending: INTERNAL MEDICINE | Admitting: INTERNAL MEDICINE

## 2018-02-27 ENCOUNTER — OFFICE VISIT (OUTPATIENT)
Dept: GASTROENTEROLOGY | Facility: CLINIC | Age: 58
End: 2018-02-27

## 2018-02-27 VITALS — WEIGHT: 255 LBS | HEIGHT: 72 IN | BODY MASS INDEX: 34.54 KG/M2

## 2018-02-27 VITALS
BODY MASS INDEX: 34.38 KG/M2 | SYSTOLIC BLOOD PRESSURE: 110 MMHG | HEART RATE: 65 BPM | DIASTOLIC BLOOD PRESSURE: 68 MMHG | WEIGHT: 253.8 LBS | HEIGHT: 72 IN | OXYGEN SATURATION: 98 %

## 2018-02-27 DIAGNOSIS — R94.8 ABNORMAL POSITRON EMISSION TOMOGRAPHY (PET) OF COLON: Primary | ICD-10-CM

## 2018-02-27 PROCEDURE — 99214 OFFICE O/P EST MOD 30 MIN: CPT | Performed by: NURSE PRACTITIONER

## 2018-02-27 RX ORDER — DEXTROSE AND SODIUM CHLORIDE 5; .45 G/100ML; G/100ML
30 INJECTION, SOLUTION INTRAVENOUS CONTINUOUS PRN
Status: CANCELLED | OUTPATIENT
Start: 2018-02-27

## 2018-02-27 NOTE — PROGRESS NOTES
Chief Complaint   Patient presents with   • Abnormal CT/PET Scan       Subjective    Truman Esquivel is a 57 y.o. male. he is here today.    History of Present Illness  57-year-old male presents to discuss abnormal PET scan of his rectum.  He was recently diagnosed with squamous cell carcinoma of larynx.  Had a trach placed about a month ago.  He had PET scan completed 2/16/18 which noted activity in the rectum probably either physiological or inflammatory.  Recommended direct visualization.  Patient has never had a screening colonoscopy.  He denies any abdominal pain, nausea or vomiting.  He reports problems with swallowing secondary to tracheostomy placement however states he has been doing okay with soft diet.  He denies any choking episodes.  He denies any family history of colorectal cancer or polyps.  Denies any melena or hematochezia.  States his bowel habits are somewhat constipated he has 1 bowel movement about every other day and states stool softener helps with this.  Plan; Will schedule patient for colonoscopy due to abnormality seen in rectum on recent PET scan.  Patient has never had a screening colonoscopy.  Follow-up after test return to office sooner if needed.      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   • Stroke    • Swallowing difficulty      Past Surgical History:   Procedure Laterality Date   • AORTA SURGERY      ruptured aorta repair   • ASCENDING ARCH/HEMIARCH REPLACEMENT N/A 2/14/2017    Procedure: INTRAOPERATIVE TRANSESOPHAGEAL ECHOCARDIOGRAM, MIDLINE STERNOTOMY, ASCENDING AORTIC  AND PROXIMAL AORTIC ARCH REPAIR WITH 26MM GRAFT, AORTIC VALVE RESUSPENSION, AORTIC ROOT REPAIR, OPEN VEIN HARVEST OF RIGHT LEG;   Surgeon: German Arreguin MD;  Location: [facility] MAIN OR;  Service:    • BRONCHOSCOPY N/A 2/17/2017    Procedure: BRONCHOSCOPY BIOPSY AT BEDSIDE WITH BAL-LEFT LOWER LOBE;  Surgeon: Trent Chaney MD;  Location: [facility] ENDOSCOPY;  Service:    • DIRECT LARYNGOSCOPY, ESOPHAGOSCOPY, BRONCHOSCOPY N/A 2/5/2018    Procedure: DIRECT LARYNGOSCOPY WITH BIOPSY, ESOPHAGOSCOPY     (no bronchoscopy, no laser);  Surgeon: Joseph Venegas MD;  Location: [facility] OR;  Service:    • TRACHEOSTOMY  02/05/2018   • TRACHEOSTOMY N/A 2/5/2018    Procedure: TRACHEOSTOMY  local injection @ 1106 incision @ 1119;  Surgeon: Joseph Venegas MD;  Location: [facility] OR;  Service:      Family History   Problem Relation Age of Onset   • Thyroid disease Mother    • Hypertension Mother    • Hypertension Father    • Hypertension Other        Current Outpatient Prescriptions   Medication Sig Dispense Refill   • amLODIPine (NORVASC) 5 MG tablet Take 5 mg by mouth Daily.     • aspirin 81 MG tablet Take 81 mg by mouth Daily. Last dose 1/31/18     • atorvastatin (LIPITOR) 20 MG tablet Take 1 tablet by mouth Daily. 30 tablet 11   • clopidogrel (PLAVIX) 75 MG tablet Take 75 mg by mouth Daily. Last dose approx one month ago     • docusate sodium (COLACE) 100 MG capsule Take 1 capsule by mouth 2 (Two) Times a Day As Needed for Constipation. 60 capsule 2   • ferrous sulfate 325 (65 FE) MG tablet Take 1 tablet by mouth 2 (Two) Times a Day With Meals. 60 tablet 3   • levothyroxine (SYNTHROID, LEVOTHROID) 88 MCG tablet Take 88 mcg by mouth Daily.     • losartan-hydrochlorothiazide (HYZAAR) 100-25 MG per tablet Take 1 tablet by mouth Daily. 30 tablet 12   • metoprolol succinate XL (TOPROL-XL) 25 MG 24 hr tablet Take 25 mg by mouth Every Night.     • Multiple Vitamins-Minerals (MULTIVITAMIN ADULT PO) Take 1 tablet by mouth Daily.     • ondansetron (ZOFRAN) 4 MG tablet Take 1 tablet by mouth 3 (Three) Times a Day As Needed for Nausea or Vomiting. 40 tablet  3   • oxyCODONE-acetaminophen (PERCOCET) 5-325 MG per tablet 1-2 tablets by mouth every 4 hours as needed for pain 60 tablet 0   • sodium chloride 0.9 % solution Use for tracheostomy care as directed 1000 mL 11   • vitamin B-12 (CYANOCOBALAMIN) 100 MCG tablet Take 100 mcg by mouth Daily.     • vitamin E 100 UNIT capsule Take 400 Units by mouth Every Night.     • Zinc 50 MG capsule Take 50 mg by mouth Every Night.       No current facility-administered medications for this visit.      Allergies   Allergen Reactions   • Co Q 10 [Coenzyme Q10] Itching   • Eggs Or Egg-Derived Products Swelling   • Erythromycin Other (See Comments)     Joint pains and cold sweats   • Other GI Intolerance     Mycins     • Penicillins      Tolerated cefepime during 2/2017 admission. Had rash and itching (relieved by benadryl) after few doses of cefepime and cefepime was continued.   • Tetracyclines & Related GI Intolerance   • Acth [Corticotropin] Rash   • Cephalosporins Itching and Rash     Pt developed rash, itching after cefepime administration (2-3 doses) during 2/2017 admission. Itching was relieved with benadryl. Cefepime was continued because his infection was improving and no symptoms of anaphylaxis present   • Keflex [Cephalexin] Rash     Social History     Social History   • Marital status:      Social History Main Topics   • Smoking status: Former Smoker     Packs/day: 1.25     Years: 38.00     Quit date: 2/13/2017   • Smokeless tobacco: Never Used      Comment: 02/27/2018 - Patient confirmed he ceased utilization of tobacco products 02/13/2017.   • Alcohol use No   • Drug use: No   • Sexual activity: Defer       Review of Systems  Review of Systems   Constitutional: Positive for appetite change (decreased since trach on soft diet ). Negative for activity change, chills, diaphoresis, fatigue, fever and unexpected weight change.   HENT: Negative for sore throat and trouble swallowing.    Respiratory: Negative for  "shortness of breath.    Gastrointestinal: Negative for abdominal distention, abdominal pain, anal bleeding, blood in stool, constipation, diarrhea, nausea, rectal pain and vomiting.   Musculoskeletal: Negative for arthralgias.   Skin: Negative for pallor.   Neurological: Negative for light-headedness.        /68 (BP Location: Left arm, Patient Position: Sitting, Cuff Size: Adult)  Pulse 65  Ht 182.9 cm (72\")  Wt 115 kg (253 lb 12.8 oz)  SpO2 98%  BMI 34.42 kg/m2    Objective    Physical Exam   Constitutional: He is oriented to person, place, and time. He appears well-developed and well-nourished. He is cooperative. No distress.   HENT:   Head: Normocephalic and atraumatic.   Trach in place   Neck: Normal range of motion. Neck supple. No thyromegaly present.   Cardiovascular: Normal rate, regular rhythm and normal heart sounds.    Pulmonary/Chest: Effort normal and breath sounds normal. He has no wheezes. He has no rhonchi. He has no rales.   Abdominal: Soft. Normal appearance and bowel sounds are normal. He exhibits no shifting dullness and no distension. There is no hepatosplenomegaly. There is no tenderness. There is no rigidity and no guarding. No hernia.   Lymphadenopathy:     He has no cervical adenopathy.   Neurological: He is alert and oriented to person, place, and time.   Skin: Skin is warm, dry and intact. No rash noted. No pallor.   Psychiatric: He has a normal mood and affect. His speech is normal.     Consult on 02/13/2018   Component Date Value Ref Range Status   • Iron 02/13/2018 14* 49 - 181 mcg/dL Final   • TIBC 02/13/2018 335  261 - 462 mcg/dL Final   • Iron Saturation 02/13/2018 4* 20 - 55 % Final   • Ferritin 02/13/2018 64.40  17.90 - 464.00 ng/mL Final   • Folate 02/13/2018 >20.00  2.76 - 21.00 ng/mL Final   • Vitamin B-12 02/13/2018 980* 239 - 931 pg/mL Final     Assessment/Plan      1. Abnormal positron emission tomography (PET) of colon    .       Orders placed during this " encounter include:      COLONOSCOPY (N/A)    Review and/or summary of lab tests, radiology, procedures, medications. Review and summary of old records and obtaining of history. The risks and benefits of my recommendations, as well as other treatment options were discussed with the patient today. Questions were answered.    No orders of the defined types were placed in this encounter.      Follow-up: Return in about 4 weeks (around 3/27/2018).          This document has been electronically signed by ROWAN Ashley on February 27, 2018 10:39 AM             Results for orders placed or performed in visit on 02/13/18   Iron and TIBC   Result Value Ref Range    Iron 14 (L) 49 - 181 mcg/dL    TIBC 335 261 - 462 mcg/dL    Iron Saturation 4 (L) 20 - 55 %   Folate   Result Value Ref Range    Folate >20.00 2.76 - 21.00 ng/mL   Ferritin   Result Value Ref Range    Ferritin 64.40 17.90 - 464.00 ng/mL   Vitamin B12   Result Value Ref Range    Vitamin B-12 980 (H) 239 - 931 pg/mL   Results for orders placed or performed during the hospital encounter of 02/05/18   CBC Auto Differential   Result Value Ref Range    WBC 13.35 (H) 3.20 - 9.80 10*3/mm3    RBC 3.98 (L) 4.37 - 5.74 10*6/mm3    Hemoglobin 10.1 (L) 13.7 - 17.3 g/dL    Hematocrit 31.5 (L) 39.0 - 49.0 %    MCV 79.1 (L) 80.0 - 98.0 fL    MCH 25.4 (L) 26.5 - 34.0 pg    MCHC 32.1 31.5 - 36.3 g/dL    RDW 13.9 11.5 - 14.5 %    RDW-SD 39.7 35.1 - 43.9 fl    MPV 11.5 8.0 - 12.0 fL    Platelets 182 150 - 450 10*3/mm3    Neutrophil % 75.6 37.0 - 80.0 %    Lymphocyte % 12.4 10.0 - 50.0 %    Monocyte % 7.8 0.0 - 12.0 %    Eosinophil % 3.9 0.0 - 7.0 %    Basophil % 0.2 0.0 - 2.0 %    Immature Grans % 0.1 0.0 - 0.5 %    Neutrophils, Absolute 10.09 (H) 2.00 - 8.60 10*3/mm3    Lymphocytes, Absolute 1.65 0.60 - 4.20 10*3/mm3    Monocytes, Absolute 1.04 (H) 0.00 - 0.90 10*3/mm3    Eosinophils, Absolute 0.52 0.00 - 0.70 10*3/mm3    Basophils, Absolute 0.03 0.00 - 0.20 10*3/mm3    Immature  "Grans, Absolute 0.02 0.00 - 0.02 10*3/mm3   Tissue Pathology Exam - Tissue, Oral Cavity   Result Value Ref Range    Case Report       Surgical Pathology Report                         Case: [id number]                                  Authorizing Provider:  Joesph Venegas MD Collected:           02/05/2018 11:57 AM          Ordering Location:     [facility]             Received:            02/05/2018 12:04 PM                                 [place] [facility]                                                              Pathologist:           Rodrigue Booth MD                                                          Specimens:   1) - Oral Cavity, TUMOR EPIGLOTTIS                                                                  2) - Oral Cavity, TUMOR EPIGLOTTIS                                                         Final Diagnosis       1.  TUMOR, EPIGLOTTIS:   INVASIVE SQUAMOUS CELL CARCINOMA, NON KERATINIZING, POORLY DIFFERENTIATED.     2.  TUMOR, EPIGLOTTIS:   INVASIVE SQUAMOUS CELL CARCINOMA, NON KERATINIZING, POORLY DIFFERENTIATED.       Intraoperative Consultation       Frozen Section Diagnosis:  1.  Invasive squamous cell carcinoma, epiglottis.      Intradepartmental Consult       Dr. Davey reviewed the histologic slides and concurs with the above diagnoses.       Gross Description       1.  The container is labeled \"tumor of epiglottis for frozen section\" and has multiple nodular fragments of white tissue measuring 1.5 x 1.5 x 0.4 cm in aggregate.  The entire specimen is utilized for frozen section examination as 1A.    2.  The second container is labeled \"tumor, epiglottis\" and has multiple nodular pinkish white fragments of tissue measuring 4.0 x 3.0 x 1.0 cm in aggregate.  The entire specimen is embedded as 2A through 2C.       Embedded Images     CBC (No Diff)   Result Value Ref Range    WBC 12.66 (H) 3.20 - 9.80 10*3/mm3    RBC 4.50 4.37 - 5.74 10*6/mm3    Hemoglobin 11.2 (L) 13.7 - 17.3 " g/dL    Hematocrit 35.2 (L) 39.0 - 49.0 %    MCV 78.2 (L) 80.0 - 98.0 fL    MCH 24.9 (L) 26.5 - 34.0 pg    MCHC 31.8 31.5 - 36.3 g/dL    RDW 13.7 11.5 - 14.5 %    RDW-SD 39.1 35.1 - 43.9 fl    MPV 11.1 8.0 - 12.0 fL    Platelets 193 150 - 450 10*3/mm3   Comprehensive Metabolic Panel   Result Value Ref Range    Glucose 116 (H) 60 - 100 mg/dL    BUN 17 7 - 21 mg/dL    Creatinine 1.10 0.70 - 1.30 mg/dL    Sodium 137 137 - 145 mmol/L    Potassium 3.4 (L) 3.5 - 5.1 mmol/L    Chloride 100 95 - 110 mmol/L    CO2 26.0 22.0 - 31.0 mmol/L    Calcium 8.5 8.4 - 10.2 mg/dL    Total Protein 6.5 6.3 - 8.6 g/dL    Albumin 3.60 3.40 - 4.80 g/dL    ALT (SGPT) 28 21 - 72 U/L    AST (SGOT) 15 (L) 17 - 59 U/L    Alkaline Phosphatase 64 38 - 126 U/L    Total Bilirubin 0.3 0.2 - 1.3 mg/dL    eGFR Non African Amer 69 >60 mL/min/1.73    Globulin 2.9 2.3 - 3.5 gm/dL    A/G Ratio 1.2 1.1 - 1.8 g/dL    BUN/Creatinine Ratio 15.5 7.0 - 25.0    Anion Gap 11.0 5.0 - 15.0 mmol/L   Comprehensive Metabolic Panel   Result Value Ref Range    Glucose 101 (H) 60 - 100 mg/dL    BUN 18 7 - 21 mg/dL    Creatinine 1.16 0.70 - 1.30 mg/dL    Sodium 141 137 - 145 mmol/L    Potassium 4.2 3.5 - 5.1 mmol/L    Chloride 99 95 - 110 mmol/L    CO2 29.0 22.0 - 31.0 mmol/L    Calcium 8.8 8.4 - 10.2 mg/dL    Total Protein 6.5 6.3 - 8.6 g/dL    Albumin 3.80 3.40 - 4.80 g/dL    ALT (SGPT) 29 21 - 72 U/L    AST (SGOT) 16 (L) 17 - 59 U/L    Alkaline Phosphatase 71 38 - 126 U/L    Total Bilirubin 0.2 0.2 - 1.3 mg/dL    eGFR Non African Amer 65 >60 mL/min/1.73    Globulin 2.7 2.3 - 3.5 gm/dL    A/G Ratio 1.4 1.1 - 1.8 g/dL    BUN/Creatinine Ratio 15.5 7.0 - 25.0    Anion Gap 13.0 5.0 - 15.0 mmol/L   Results for orders placed or performed in visit on 02/01/18   Basic Metabolic Panel   Result Value Ref Range    Glucose 82 60 - 100 mg/dL    BUN 19 7 - 21 mg/dL    Creatinine 1.26 0.70 - 1.30 mg/dL    Sodium 142 137 - 145 mmol/L    Potassium 3.7 3.5 - 5.1 mmol/L    Chloride 99 95 -  110 mmol/L    CO2 29.0 22.0 - 31.0 mmol/L    Calcium 9.1 8.4 - 10.2 mg/dL    eGFR Non African Amer 59 (L) >60 mL/min/1.73    BUN/Creatinine Ratio 15.1 7.0 - 25.0    Anion Gap 14.0 5.0 - 15.0 mmol/L   Results for orders placed or performed during the hospital encounter of 12/21/17   Adult Transesophageal Echo (MERI) W/ Cont if Necessary Per Protocol   Result Value Ref Range    BSA 2.4 m^2     CV ECHO QUINTEN - BZI_BMI 36.5 kilograms/m^2     CV ECHO QUINTEN - BSA(HAYCOCK) 2.5 m^2     CV ECHO QUINTEN - BZI_METRIC_WEIGHT 122.0 kg     CV ECHO QUINTEN - BZI_METRIC_HEIGHT 182.9 cm     *Note: Due to a large number of results and/or encounters for the requested time period, some results have not been displayed. A complete set of results can be found in Results Review.

## 2018-03-01 ENCOUNTER — HOSPITAL ENCOUNTER (OUTPATIENT)
Dept: RADIATION ONCOLOGY | Facility: HOSPITAL | Age: 58
Setting detail: RADIATION/ONCOLOGY SERIES
End: 2018-03-01

## 2018-03-01 DIAGNOSIS — C32.9 LARYNX CANCER (HCC): Primary | ICD-10-CM

## 2018-03-01 DIAGNOSIS — C32.9 SQUAMOUS CELL CARCINOMA OF LARYNX (HCC): Primary | ICD-10-CM

## 2018-03-01 PROCEDURE — 77334 RADIATION TREATMENT AID(S): CPT | Performed by: RADIOLOGY

## 2018-03-01 PROCEDURE — 77263 THER RADIOLOGY TX PLNG CPLX: CPT | Performed by: RADIOLOGY

## 2018-03-01 PROCEDURE — 77470 SPECIAL RADIATION TREATMENT: CPT | Performed by: RADIOLOGY

## 2018-03-02 ENCOUNTER — CONSULT (OUTPATIENT)
Dept: SURGERY | Facility: CLINIC | Age: 58
End: 2018-03-02

## 2018-03-02 VITALS
SYSTOLIC BLOOD PRESSURE: 120 MMHG | BODY MASS INDEX: 34 KG/M2 | WEIGHT: 251 LBS | HEIGHT: 72 IN | DIASTOLIC BLOOD PRESSURE: 84 MMHG

## 2018-03-02 DIAGNOSIS — C32.9 SQUAMOUS CELL CARCINOMA OF LARYNX (HCC): Primary | ICD-10-CM

## 2018-03-02 DIAGNOSIS — R94.8 ABNORMAL POSITRON EMISSION TOMOGRAPHY (PET) OF COLON: ICD-10-CM

## 2018-03-02 PROCEDURE — 99204 OFFICE O/P NEW MOD 45 MIN: CPT | Performed by: SURGERY

## 2018-03-02 RX ORDER — LEVOFLOXACIN 5 MG/ML
500 INJECTION, SOLUTION INTRAVENOUS ONCE
Status: CANCELLED | OUTPATIENT
Start: 2018-03-07 | End: 2018-03-07

## 2018-03-02 RX ORDER — SODIUM CHLORIDE 9 MG/ML
100 INJECTION, SOLUTION INTRAVENOUS CONTINUOUS
Status: CANCELLED | OUTPATIENT
Start: 2018-03-07

## 2018-03-02 RX ORDER — SODIUM CHLORIDE 0.9 % (FLUSH) 0.9 %
1-10 SYRINGE (ML) INJECTION AS NEEDED
Status: CANCELLED | OUTPATIENT
Start: 2018-03-07

## 2018-03-05 ENCOUNTER — APPOINTMENT (OUTPATIENT)
Dept: PREADMISSION TESTING | Facility: HOSPITAL | Age: 58
End: 2018-03-05

## 2018-03-05 VITALS
SYSTOLIC BLOOD PRESSURE: 100 MMHG | OXYGEN SATURATION: 100 % | HEIGHT: 72 IN | RESPIRATION RATE: 24 BRPM | BODY MASS INDEX: 33.46 KG/M2 | WEIGHT: 247 LBS | DIASTOLIC BLOOD PRESSURE: 64 MMHG | HEART RATE: 77 BPM

## 2018-03-06 ENCOUNTER — TELEPHONE (OUTPATIENT)
Dept: OTOLARYNGOLOGY | Facility: CLINIC | Age: 58
End: 2018-03-06

## 2018-03-06 NOTE — TELEPHONE ENCOUNTER
----- Message from Carmen Morfin sent at 3/6/2018  1:25 PM CST -----  Contact: [phone]  EBONI PATTERSON WITH HOME HEALTH CALLED.  HE HAS BEEN INDEPENDENT WITH HOME TRACH CARE BUT TODAY IT IS HITTING SOMETHING, LIKE FLESHY SOMETHING, AND HE CANNOT GET IT IN.  HE HAS ANOTHER CANNULA AND CAN'T GET IT IN EITHER. SHE HAS TRIED TO DO IT AND CAN'T GET IT IN EITHER.  PLEASE CALL.       EBONI [phone]    Spoke with home health nurse.  Explain that the resistance that she is meeting is likely just some granulation tissue in the fenestration and it is okay to go ahead and push past this.  A small amount of bleeding would be expected but this should not be a significant clinical problem.  Advised her to contact me if still unable to get the cannula in.  Explain that I'm in [place] and would not be able to see him in the office right away.  He has an appointment Thursday for reevaluation.

## 2018-03-06 NOTE — PROGRESS NOTES
CHIEF COMPLAINT:    Needs port for treatment of laryngeal squamous carcinoma    Abnormal PET CT of colon    HISTORY OF PRESENT ILLNESS:    Truman Esquivel is a 57 y.o. 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  He is planning to undergo concurrent chemotherapy and radiation therapy for this.  He did have a PET scan recently that also showed an abnormal area of activity involving the lower colon.  Therefore, in addition to port placement have been asked to perform a colonoscopy on the patient due to this abnormality.  Patient denies any change in bowel habits or blood in his stool.    He currently has a trach in place which was placed by Dr. Venegas for obstruction of his airway and difficulty breathing due to his supraglottic mass.    Past Medical History:   Diagnosis Date   • Aortic dissection    • Chest pain    • Disease of thyroid gland    • HTN (hypertension)    • Knee pain    • Sleep apnea    • Smoker    • Squamous cell carcinoma of larynx 2/5/2018   • Stroke    • Swallowing difficulty        Past Surgical History:   Procedure Laterality Date   • AORTA SURGERY      ruptured aorta repair   • ASCENDING ARCH/HEMIARCH REPLACEMENT N/A 2/14/2017    Procedure: INTRAOPERATIVE TRANSESOPHAGEAL ECHOCARDIOGRAM, MIDLINE STERNOTOMY, ASCENDING AORTIC  AND PROXIMAL AORTIC ARCH REPAIR WITH 26MM GRAFT, AORTIC VALVE RESUSPENSION, AORTIC ROOT REPAIR, OPEN VEIN HARVEST OF RIGHT LEG;  Surgeon: German Arreguin MD;  Location: [facility] OR;  Service:    • BRONCHOSCOPY N/A 2/17/2017    Procedure: BRONCHOSCOPY BIOPSY AT BEDSIDE WITH BAL-LEFT LOWER LOBE;  Surgeon: Trent Chaney MD;  Location: [facility] ENDOSCOPY;  Service:    • DIRECT LARYNGOSCOPY, ESOPHAGOSCOPY, BRONCHOSCOPY N/A 2/5/2018    Procedure: DIRECT LARYNGOSCOPY WITH BIOPSY, ESOPHAGOSCOPY     (no bronchoscopy, no laser);  Surgeon: Joseph Venegas MD;  Location: [facility] OR;  Service:    • TRACHEOSTOMY  02/05/2018   • TRACHEOSTOMY  N/A 2/5/2018    Procedure: TRACHEOSTOMY  local injection @ 1106 incision @ 1119;  Surgeon: Joseph Venegas MD;  Location: [facility];  Service:        Prior to Admission medications    Medication Sig Start Date End Date Taking? Authorizing Provider   amLODIPine (NORVASC) 5 MG tablet Take 5 mg by mouth Daily.    Historical Provider, MD   aspirin 81 MG tablet Take 81 mg by mouth Daily. Last dose 1/31/18    Historical Provider, MD   atorvastatin (LIPITOR) 20 MG tablet Take 1 tablet by mouth Daily. 3/20/17   Liborio Chandra MD   clopidogrel (PLAVIX) 75 MG tablet Take 75 mg by mouth Daily. Last dose approx greater than one month ago    Historical Provider, MD   docusate sodium (COLACE) 100 MG capsule Take 1 capsule by mouth 2 (Two) Times a Day As Needed for Constipation. 2/13/18   Reza Patel MD   levothyroxine (SYNTHROID, LEVOTHROID) 88 MCG tablet Take 88 mcg by mouth Daily. 11/14/17   Historical Provider, MD   losartan-hydrochlorothiazide (HYZAAR) 100-25 MG per tablet Take 1 tablet by mouth Daily. 5/18/17   Liborio Chandra MD   metoprolol succinate XL (TOPROL-XL) 25 MG 24 hr tablet Take 25 mg by mouth Every Night. Patient states he hasn't been taking this week, states he thinks he is hypotensive    Historical Provider, MD   Multiple Vitamins-Minerals (MULTIVITAMIN ADULT PO) Take 1 tablet by mouth Daily.    Historical Provider, MD   ondansetron (ZOFRAN) 4 MG tablet Take 1 tablet by mouth 3 (Three) Times a Day As Needed for Nausea or Vomiting. 2/21/18   Reza Patel MD   oxyCODONE-acetaminophen (PERCOCET) 5-325 MG per tablet 1-2 tablets by mouth every 4 hours as needed for pain 2/7/18   Joseph Venegas MD   vitamin B-12 (CYANOCOBALAMIN) 100 MCG tablet Take 100 mcg by mouth Daily.    Historical Provider, MD   vitamin E 100 UNIT capsule Take 400 Units by mouth Every Night.    Historical Provider, MD   Zinc 50 MG capsule Take 50 mg by mouth Every Night.    Historical Provider, MD       Allergies    Allergen Reactions   • Co Q 10 [Coenzyme Q10] Itching   • Eggs Or Egg-Derived Products Swelling   • Erythromycin Other (See Comments)     Joint pains and cold sweats   • Other GI Intolerance     Mycins     • Penicillins      Tolerated cefepime during 2/2017 admission. Had rash and itching (relieved by benadryl) after few doses of cefepime and cefepime was continued.   • Tetracyclines & Related GI Intolerance   • Acth [Corticotropin] Rash   • Cephalosporins Itching and Rash     Pt developed rash, itching after cefepime administration (2-3 doses) during 2/2017 admission. Itching was relieved with benadryl. Cefepime was continued because his infection was improving and no symptoms of anaphylaxis present   • Keflex [Cephalexin] Rash       Family History   Problem Relation Age of Onset   • Thyroid disease Mother    • Hypertension Mother    • Hypertension Father    • Hypertension Other        Social History     Social History   • Marital status:      Spouse name: N/A   • Number of children: N/A   • Years of education: N/A     Occupational History   • Not on file.     Social History Main Topics   • Smoking status: Former Smoker     Packs/day: 1.25     Years: 38.00     Quit date: 2/13/2017   • Smokeless tobacco: Never Used      Comment: 02/27/2018 - Patient confirmed he ceased utilization of tobacco products 02/13/2017.   • Alcohol use No   • Drug use: No   • Sexual activity: Defer     Other Topics Concern   • Not on file     Social History Narrative       Review of Systems   Constitutional: Negative for activity change, appetite change, chills and fever.   HENT: Negative for hearing loss, nosebleeds and trouble swallowing.    Respiratory: Positive for cough and shortness of breath.    Cardiovascular: Negative for chest pain, palpitations and leg swelling.   Gastrointestinal: Negative for abdominal distention, abdominal pain, anal bleeding, blood in stool, constipation, diarrhea, nausea, rectal pain and vomiting.  "  Endocrine: Negative for cold intolerance, heat intolerance, polydipsia and polyuria.   Genitourinary: Negative for decreased urine volume, difficulty urinating, dysuria, enuresis, frequency, hematuria and urgency.   Musculoskeletal: Negative for arthralgias, back pain, gait problem, myalgias and neck pain.   Skin: Negative for pallor, rash and wound.   Allergic/Immunologic: Negative for immunocompromised state.   Neurological: Negative for dizziness, seizures, weakness, light-headedness, numbness and headaches.   Psychiatric/Behavioral: Negative for agitation and behavioral problems. The patient is not nervous/anxious.        Objective     /84  Ht 182.9 cm (72\")  Wt 114 kg (251 lb)  BMI 34.04 kg/m2    Physical Exam   Constitutional: He is oriented to person, place, and time. He appears well-developed and well-nourished.   HENT:   Head: Normocephalic and atraumatic.   Nose: Nose normal.   Eyes: Conjunctivae and EOM are normal. Right eye exhibits no discharge. Left eye exhibits no discharge.   Neck: Trachea normal, normal range of motion and phonation normal. Neck supple. No JVD present. No tracheal deviation and no edema present. No thyromegaly present.       Cardiovascular: Normal rate, regular rhythm and normal heart sounds.  Exam reveals no gallop and no friction rub.    No murmur heard.  Pulmonary/Chest: Effort normal and breath sounds normal. No accessory muscle usage. No respiratory distress. He has no decreased breath sounds. He has no wheezes. He has no rales. He exhibits no tenderness.   Abdominal: Soft. He exhibits no distension, no fluid wave, no ascites, no pulsatile midline mass and no mass. There is no tenderness. There is no rebound and no guarding. No hernia.   Musculoskeletal: Normal range of motion. He exhibits no edema, tenderness or deformity.   Lymphadenopathy:     He has no cervical adenopathy.        Left: No supraclavicular adenopathy present.   Neurological: He is alert and " oriented to person, place, and time. He has normal strength. No cranial nerve deficit.   Skin: Skin is warm and dry. No rash noted. He is not diaphoretic. No erythema. No pallor.   Psychiatric: He has a normal mood and affect. His speech is normal and behavior is normal. Judgment and thought content normal. Cognition and memory are normal.   Vitals reviewed.      DIAGNOSTIC DATA:    PET scan from the skull base to the thigh was reviewed showing activity in the rectum as well as the expected activity around the area of the epiglottis and tracheostomy.  He has a few other areas of activity near the iliac artery and initial tuberosities.    ASSESSMENT:    Squamous carcinoma of the larynx, abnormal PET CT    PLAN:    He will need a port placed for chemotherapy.  The risks and benefits of port placement were discussed with the patient and he is agreeable to proceeding.  Additionally given his abnormality seen on PET scan he will need a colonoscopy.  This was discussed with the patient as well.  The risks and benefits of colonoscopy were discussed as was the prep and he is agreeable with proceeding with both of these.  He has been scheduled for 3/7/2018.          This document has been electronically signed by Kris Solano MD on March 6, 2018 5:23 PM

## 2018-03-07 ENCOUNTER — HOSPITAL ENCOUNTER (OUTPATIENT)
Facility: HOSPITAL | Age: 58
Setting detail: HOSPITAL OUTPATIENT SURGERY
Discharge: HOME | End: 2018-03-07
Attending: SURGERY | Admitting: SURGERY

## 2018-03-07 ENCOUNTER — APPOINTMENT (OUTPATIENT)
Dept: GENERAL RADIOLOGY | Facility: HOSPITAL | Age: 58
End: 2018-03-07

## 2018-03-07 ENCOUNTER — ANESTHESIA EVENT (OUTPATIENT)
Dept: PERIOP | Facility: HOSPITAL | Age: 58
End: 2018-03-07

## 2018-03-07 ENCOUNTER — ANESTHESIA (OUTPATIENT)
Dept: PERIOP | Facility: HOSPITAL | Age: 58
End: 2018-03-07

## 2018-03-07 VITALS
SYSTOLIC BLOOD PRESSURE: 124 MMHG | HEIGHT: 72 IN | HEART RATE: 67 BPM | TEMPERATURE: 97.3 F | OXYGEN SATURATION: 98 % | RESPIRATION RATE: 18 BRPM | WEIGHT: 246.25 LBS | BODY MASS INDEX: 33.35 KG/M2 | DIASTOLIC BLOOD PRESSURE: 60 MMHG

## 2018-03-07 DIAGNOSIS — C32.9 SQUAMOUS CELL CARCINOMA OF LARYNX (HCC): ICD-10-CM

## 2018-03-07 PROCEDURE — 76000 FLUOROSCOPY <1 HR PHYS/QHP: CPT

## 2018-03-07 PROCEDURE — C1788 PORT, INDWELLING, IMP: HCPCS | Performed by: SURGERY

## 2018-03-07 PROCEDURE — 77001 FLUOROGUIDE FOR VEIN DEVICE: CPT | Performed by: SURGERY

## 2018-03-07 PROCEDURE — 25010000002 FENTANYL CITRATE (PF) 100 MCG/2ML SOLUTION: Performed by: NURSE ANESTHETIST, CERTIFIED REGISTERED

## 2018-03-07 PROCEDURE — 45380 COLONOSCOPY AND BIOPSY: CPT | Performed by: SURGERY

## 2018-03-07 PROCEDURE — 88305 TISSUE EXAM BY PATHOLOGIST: CPT | Performed by: PATHOLOGY

## 2018-03-07 PROCEDURE — 25010000002 PROPOFOL 10 MG/ML EMULSION: Performed by: NURSE ANESTHETIST, CERTIFIED REGISTERED

## 2018-03-07 PROCEDURE — 36561 INSERT TUNNELED CV CATH: CPT | Performed by: SURGERY

## 2018-03-07 PROCEDURE — 25010000002 MIDAZOLAM PER 1 MG: Performed by: NURSE ANESTHETIST, CERTIFIED REGISTERED

## 2018-03-07 PROCEDURE — 77001 FLUOROGUIDE FOR VEIN DEVICE: CPT

## 2018-03-07 PROCEDURE — 25010000002 HEPARIN FLUSH (PORCINE) 100 UNIT/ML SOLUTION: Performed by: SURGERY

## 2018-03-07 PROCEDURE — 25010000002 PROPOFOL 1000 MG/ML EMULSION: Performed by: NURSE ANESTHETIST, CERTIFIED REGISTERED

## 2018-03-07 PROCEDURE — 88305 TISSUE EXAM BY PATHOLOGIST: CPT | Performed by: SURGERY

## 2018-03-07 PROCEDURE — 25010000002 ONDANSETRON PER 1 MG: Performed by: NURSE ANESTHETIST, CERTIFIED REGISTERED

## 2018-03-07 DEVICE — POWERPORT CLEARVUE IMPLANTABLE PORT WITH ATTACHABLE 8F POLYURETHANE OPEN-ENDED SINGLE-LUMEN VENOUS CATHETER INTERMEDIATE KIT
Type: IMPLANTABLE DEVICE | Site: CHEST WALL | Status: FUNCTIONAL
Brand: POWERPORT CLEARVUE

## 2018-03-07 RX ORDER — BUPIVACAINE HYDROCHLORIDE AND EPINEPHRINE 5; 5 MG/ML; UG/ML
INJECTION, SOLUTION EPIDURAL; INTRACAUDAL; PERINEURAL AS NEEDED
Status: DISCONTINUED | OUTPATIENT
Start: 2018-03-07 | End: 2018-03-07 | Stop reason: HOSPADM

## 2018-03-07 RX ORDER — ONDANSETRON 2 MG/ML
INJECTION INTRAMUSCULAR; INTRAVENOUS AS NEEDED
Status: DISCONTINUED | OUTPATIENT
Start: 2018-03-07 | End: 2018-03-07 | Stop reason: SURG

## 2018-03-07 RX ORDER — OXYCODONE HYDROCHLORIDE AND ACETAMINOPHEN 5; 325 MG/1; MG/1
1 TABLET ORAL EVERY 4 HOURS PRN
Status: DISCONTINUED | OUTPATIENT
Start: 2018-03-07 | End: 2018-03-07 | Stop reason: HOSPADM

## 2018-03-07 RX ORDER — DIPHENHYDRAMINE HYDROCHLORIDE 50 MG/ML
12.5 INJECTION INTRAMUSCULAR; INTRAVENOUS
Status: DISCONTINUED | OUTPATIENT
Start: 2018-03-07 | End: 2018-03-07 | Stop reason: HOSPADM

## 2018-03-07 RX ORDER — EPHEDRINE SULFATE 50 MG/ML
5 INJECTION, SOLUTION INTRAVENOUS ONCE AS NEEDED
Status: DISCONTINUED | OUTPATIENT
Start: 2018-03-07 | End: 2018-03-07 | Stop reason: HOSPADM

## 2018-03-07 RX ORDER — SODIUM CHLORIDE 9 MG/ML
100 INJECTION, SOLUTION INTRAVENOUS CONTINUOUS
Status: DISCONTINUED | OUTPATIENT
Start: 2018-03-07 | End: 2018-03-07 | Stop reason: HOSPADM

## 2018-03-07 RX ORDER — MIDAZOLAM HYDROCHLORIDE 1 MG/ML
INJECTION INTRAMUSCULAR; INTRAVENOUS AS NEEDED
Status: DISCONTINUED | OUTPATIENT
Start: 2018-03-07 | End: 2018-03-07 | Stop reason: SURG

## 2018-03-07 RX ORDER — NALOXONE HCL 0.4 MG/ML
0.2 VIAL (ML) INJECTION AS NEEDED
Status: DISCONTINUED | OUTPATIENT
Start: 2018-03-07 | End: 2018-03-07 | Stop reason: HOSPADM

## 2018-03-07 RX ORDER — SODIUM CHLORIDE 0.9 % (FLUSH) 0.9 %
1-10 SYRINGE (ML) INJECTION AS NEEDED
Status: DISCONTINUED | OUTPATIENT
Start: 2018-03-07 | End: 2018-03-07 | Stop reason: HOSPADM

## 2018-03-07 RX ORDER — MEPERIDINE HYDROCHLORIDE 50 MG/ML
12.5 INJECTION INTRAMUSCULAR; INTRAVENOUS; SUBCUTANEOUS
Status: DISCONTINUED | OUTPATIENT
Start: 2018-03-07 | End: 2018-03-07 | Stop reason: HOSPADM

## 2018-03-07 RX ORDER — ACETAMINOPHEN 650 MG/1
650 SUPPOSITORY RECTAL ONCE AS NEEDED
Status: DISCONTINUED | OUTPATIENT
Start: 2018-03-07 | End: 2018-03-07 | Stop reason: HOSPADM

## 2018-03-07 RX ORDER — LABETALOL HYDROCHLORIDE 5 MG/ML
5 INJECTION, SOLUTION INTRAVENOUS
Status: DISCONTINUED | OUTPATIENT
Start: 2018-03-07 | End: 2018-03-07 | Stop reason: HOSPADM

## 2018-03-07 RX ORDER — ACETAMINOPHEN 325 MG/1
650 TABLET ORAL ONCE AS NEEDED
Status: DISCONTINUED | OUTPATIENT
Start: 2018-03-07 | End: 2018-03-07 | Stop reason: HOSPADM

## 2018-03-07 RX ORDER — FLUMAZENIL 0.1 MG/ML
0.2 INJECTION INTRAVENOUS AS NEEDED
Status: DISCONTINUED | OUTPATIENT
Start: 2018-03-07 | End: 2018-03-07 | Stop reason: HOSPADM

## 2018-03-07 RX ORDER — PROPOFOL 10 MG/ML
VIAL (ML) INTRAVENOUS AS NEEDED
Status: DISCONTINUED | OUTPATIENT
Start: 2018-03-07 | End: 2018-03-07 | Stop reason: SURG

## 2018-03-07 RX ORDER — FENTANYL CITRATE 50 UG/ML
INJECTION, SOLUTION INTRAMUSCULAR; INTRAVENOUS AS NEEDED
Status: DISCONTINUED | OUTPATIENT
Start: 2018-03-07 | End: 2018-03-07 | Stop reason: SURG

## 2018-03-07 RX ORDER — ONDANSETRON 2 MG/ML
4 INJECTION INTRAMUSCULAR; INTRAVENOUS ONCE AS NEEDED
Status: DISCONTINUED | OUTPATIENT
Start: 2018-03-07 | End: 2018-03-07 | Stop reason: HOSPADM

## 2018-03-07 RX ORDER — EPHEDRINE SULFATE 50 MG/ML
INJECTION, SOLUTION INTRAVENOUS AS NEEDED
Status: DISCONTINUED | OUTPATIENT
Start: 2018-03-07 | End: 2018-03-07 | Stop reason: SURG

## 2018-03-07 RX ORDER — LEVOFLOXACIN 5 MG/ML
500 INJECTION, SOLUTION INTRAVENOUS ONCE
Status: DISCONTINUED | OUTPATIENT
Start: 2018-03-07 | End: 2018-03-07 | Stop reason: HOSPADM

## 2018-03-07 RX ADMIN — EPHEDRINE SULFATE 10 MG: 50 INJECTION INTRAMUSCULAR; INTRAVENOUS; SUBCUTANEOUS at 08:19

## 2018-03-07 RX ADMIN — MIDAZOLAM 1 MG: 1 INJECTION INTRAMUSCULAR; INTRAVENOUS at 07:13

## 2018-03-07 RX ADMIN — PROPOFOL 40 MG: 10 INJECTION, EMULSION INTRAVENOUS at 07:35

## 2018-03-07 RX ADMIN — SODIUM CHLORIDE 100 ML/HR: 9 INJECTION, SOLUTION INTRAVENOUS at 06:21

## 2018-03-07 RX ADMIN — PROPOFOL 40 MG: 10 INJECTION, EMULSION INTRAVENOUS at 08:00

## 2018-03-07 RX ADMIN — FENTANYL CITRATE 50 MCG: 50 INJECTION, SOLUTION INTRAMUSCULAR; INTRAVENOUS at 07:55

## 2018-03-07 RX ADMIN — PROPOFOL 40 MG: 10 INJECTION, EMULSION INTRAVENOUS at 07:55

## 2018-03-07 RX ADMIN — PROPOFOL 40 MG: 10 INJECTION, EMULSION INTRAVENOUS at 08:30

## 2018-03-07 RX ADMIN — FENTANYL CITRATE 50 MCG: 50 INJECTION, SOLUTION INTRAMUSCULAR; INTRAVENOUS at 08:29

## 2018-03-07 RX ADMIN — FENTANYL CITRATE 50 MCG: 50 INJECTION, SOLUTION INTRAMUSCULAR; INTRAVENOUS at 07:39

## 2018-03-07 RX ADMIN — ONDANSETRON 4 MG: 2 INJECTION INTRAMUSCULAR; INTRAVENOUS at 08:35

## 2018-03-07 RX ADMIN — METRONIDAZOLE 500 MG: 500 INJECTION, SOLUTION INTRAVENOUS at 07:31

## 2018-03-07 RX ADMIN — PROPOFOL 40 MG: 10 INJECTION, EMULSION INTRAVENOUS at 07:27

## 2018-03-07 RX ADMIN — MIDAZOLAM 1 MG: 1 INJECTION INTRAMUSCULAR; INTRAVENOUS at 07:20

## 2018-03-07 RX ADMIN — OXYCODONE HYDROCHLORIDE AND ACETAMINOPHEN 1 TABLET: 5; 325 TABLET ORAL at 10:38

## 2018-03-07 RX ADMIN — PROPOFOL 50 MG: 10 INJECTION, EMULSION INTRAVENOUS at 07:24

## 2018-03-07 RX ADMIN — FENTANYL CITRATE 50 MCG: 50 INJECTION, SOLUTION INTRAMUSCULAR; INTRAVENOUS at 07:20

## 2018-03-07 RX ADMIN — PROPOFOL 50 MG: 10 INJECTION, EMULSION INTRAVENOUS at 08:29

## 2018-03-07 RX ADMIN — PROPOFOL 60 MCG/KG/MIN: 10 INJECTION, EMULSION INTRAVENOUS at 07:24

## 2018-03-07 NOTE — PLAN OF CARE
Problem: Patient Care Overview (Adult)  Goal: Plan of Care Review  Outcome: Ongoing (interventions implemented as appropriate)   03/07/18 0901   Coping/Psychosocial Response Interventions   Plan Of Care Reviewed With patient   Patient Care Overview   Progress improving   Outcome Evaluation   Outcome Summary/Follow up Plan Once all criteria met, patient ready for transport back to [facility].       Problem: Perioperative Period (Adult)  Goal: Signs and Symptoms of Listed Potential Problems Will be Absent or Manageable (Perioperative Period)  Outcome: Ongoing (interventions implemented as appropriate)

## 2018-03-07 NOTE — PLAN OF CARE
Problem: Patient Care Overview (Adult)  Goal: Plan of Care Review  Outcome: Outcome(s) achieved Date Met: 03/07/18    Goal: Adult Individualization and Mutuality  Outcome: Outcome(s) achieved Date Met: 03/07/18    Goal: Discharge Needs Assessment  Outcome: Outcome(s) achieved Date Met: 03/07/18      Problem: Perioperative Period (Adult)  Goal: Signs and Symptoms of Listed Potential Problems Will be Absent or Manageable (Perioperative Period)  Outcome: Outcome(s) achieved Date Met: 03/07/18

## 2018-03-07 NOTE — H&P (VIEW-ONLY)
CHIEF COMPLAINT:    Needs port for treatment of laryngeal squamous carcinoma    Abnormal PET CT of colon    HISTORY OF PRESENT ILLNESS:    Truman Esquivel is a 57 y.o. 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  He is planning to undergo concurrent chemotherapy and radiation therapy for this.  He did have a PET scan recently that also showed an abnormal area of activity involving the lower colon.  Therefore, in addition to port placement have been asked to perform a colonoscopy on the patient due to this abnormality.  Patient denies any change in bowel habits or blood in his stool.    He currently has a trach in place which was placed by Dr. Venegas for obstruction of his airway and difficulty breathing due to his supraglottic mass.    Past Medical History:   Diagnosis Date   • Aortic dissection    • Chest pain    • Disease of thyroid gland    • HTN (hypertension)    • Knee pain    • Sleep apnea    • Smoker    • Squamous cell carcinoma of larynx 2/5/2018   • Stroke    • Swallowing difficulty        Past Surgical History:   Procedure Laterality Date   • AORTA SURGERY      ruptured aorta repair   • ASCENDING ARCH/HEMIARCH REPLACEMENT N/A 2/14/2017    Procedure: INTRAOPERATIVE TRANSESOPHAGEAL ECHOCARDIOGRAM, MIDLINE STERNOTOMY, ASCENDING AORTIC  AND PROXIMAL AORTIC ARCH REPAIR WITH 26MM GRAFT, AORTIC VALVE RESUSPENSION, AORTIC ROOT REPAIR, OPEN VEIN HARVEST OF RIGHT LEG;  Surgeon: German Arreguin MD;  Location: [facility] OR;  Service:    • BRONCHOSCOPY N/A 2/17/2017    Procedure: BRONCHOSCOPY BIOPSY AT BEDSIDE WITH BAL-LEFT LOWER LOBE;  Surgeon: Trent Chaney MD;  Location: [facility] ENDOSCOPY;  Service:    • DIRECT LARYNGOSCOPY, ESOPHAGOSCOPY, BRONCHOSCOPY N/A 2/5/2018    Procedure: DIRECT LARYNGOSCOPY WITH BIOPSY, ESOPHAGOSCOPY     (no bronchoscopy, no laser);  Surgeon: Joseph Venegas MD;  Location: [facility] OR;  Service:    • TRACHEOSTOMY  02/05/2018   • TRACHEOSTOMY  N/A 2/5/2018    Procedure: TRACHEOSTOMY  local injection @ 1106 incision @ 1119;  Surgeon: Joseph Venegas MD;  Location: [facility];  Service:        Prior to Admission medications    Medication Sig Start Date End Date Taking? Authorizing Provider   amLODIPine (NORVASC) 5 MG tablet Take 5 mg by mouth Daily.    Historical Provider, MD   aspirin 81 MG tablet Take 81 mg by mouth Daily. Last dose 1/31/18    Historical Provider, MD   atorvastatin (LIPITOR) 20 MG tablet Take 1 tablet by mouth Daily. 3/20/17   Liborio Chandra MD   clopidogrel (PLAVIX) 75 MG tablet Take 75 mg by mouth Daily. Last dose approx greater than one month ago    Historical Provider, MD   docusate sodium (COLACE) 100 MG capsule Take 1 capsule by mouth 2 (Two) Times a Day As Needed for Constipation. 2/13/18   Reza Patel MD   levothyroxine (SYNTHROID, LEVOTHROID) 88 MCG tablet Take 88 mcg by mouth Daily. 11/14/17   Historical Provider, MD   losartan-hydrochlorothiazide (HYZAAR) 100-25 MG per tablet Take 1 tablet by mouth Daily. 5/18/17   Liborio Chandra MD   metoprolol succinate XL (TOPROL-XL) 25 MG 24 hr tablet Take 25 mg by mouth Every Night. Patient states he hasn't been taking this week, states he thinks he is hypotensive    Historical Provider, MD   Multiple Vitamins-Minerals (MULTIVITAMIN ADULT PO) Take 1 tablet by mouth Daily.    Historical Provider, MD   ondansetron (ZOFRAN) 4 MG tablet Take 1 tablet by mouth 3 (Three) Times a Day As Needed for Nausea or Vomiting. 2/21/18   Reza Patel MD   oxyCODONE-acetaminophen (PERCOCET) 5-325 MG per tablet 1-2 tablets by mouth every 4 hours as needed for pain 2/7/18   Joseph Venegas MD   vitamin B-12 (CYANOCOBALAMIN) 100 MCG tablet Take 100 mcg by mouth Daily.    Historical Provider, MD   vitamin E 100 UNIT capsule Take 400 Units by mouth Every Night.    Historical Provider, MD   Zinc 50 MG capsule Take 50 mg by mouth Every Night.    Historical Provider, MD       Allergies     Allergen Reactions   • Co Q 10 [Coenzyme Q10] Itching   • Eggs Or Egg-Derived Products Swelling   • Erythromycin Other (See Comments)     Joint pains and cold sweats   • Other GI Intolerance     Mycins     • Penicillins      Tolerated cefepime during 2/2017 admission. Had rash and itching (relieved by benadryl) after few doses of cefepime and cefepime was continued.   • Tetracyclines & Related GI Intolerance   • Acth [Corticotropin] Rash   • Cephalosporins Itching and Rash     Pt developed rash, itching after cefepime administration (2-3 doses) during 2/2017 admission. Itching was relieved with benadryl. Cefepime was continued because his infection was improving and no symptoms of anaphylaxis present   • Keflex [Cephalexin] Rash       Family History   Problem Relation Age of Onset   • Thyroid disease Mother    • Hypertension Mother    • Hypertension Father    • Hypertension Other        Social History     Social History   • Marital status:      Spouse name: N/A   • Number of children: N/A   • Years of education: N/A     Occupational History   • Not on file.     Social History Main Topics   • Smoking status: Former Smoker     Packs/day: 1.25     Years: 38.00     Quit date: 2/13/2017   • Smokeless tobacco: Never Used      Comment: 02/27/2018 - Patient confirmed he ceased utilization of tobacco products 02/13/2017.   • Alcohol use No   • Drug use: No   • Sexual activity: Defer     Other Topics Concern   • Not on file     Social History Narrative       Review of Systems   Constitutional: Negative for activity change, appetite change, chills and fever.   HENT: Negative for hearing loss, nosebleeds and trouble swallowing.    Respiratory: Positive for cough and shortness of breath.    Cardiovascular: Negative for chest pain, palpitations and leg swelling.   Gastrointestinal: Negative for abdominal distention, abdominal pain, anal bleeding, blood in stool, constipation, diarrhea, nausea, rectal pain and vomiting.  "  Endocrine: Negative for cold intolerance, heat intolerance, polydipsia and polyuria.   Genitourinary: Negative for decreased urine volume, difficulty urinating, dysuria, enuresis, frequency, hematuria and urgency.   Musculoskeletal: Negative for arthralgias, back pain, gait problem, myalgias and neck pain.   Skin: Negative for pallor, rash and wound.   Allergic/Immunologic: Negative for immunocompromised state.   Neurological: Negative for dizziness, seizures, weakness, light-headedness, numbness and headaches.   Psychiatric/Behavioral: Negative for agitation and behavioral problems. The patient is not nervous/anxious.        Objective     /84  Ht 182.9 cm (72\")  Wt 114 kg (251 lb)  BMI 34.04 kg/m2    Physical Exam   Constitutional: He is oriented to person, place, and time. He appears well-developed and well-nourished.   HENT:   Head: Normocephalic and atraumatic.   Nose: Nose normal.   Eyes: Conjunctivae and EOM are normal. Right eye exhibits no discharge. Left eye exhibits no discharge.   Neck: Trachea normal, normal range of motion and phonation normal. Neck supple. No JVD present. No tracheal deviation and no edema present. No thyromegaly present.       Cardiovascular: Normal rate, regular rhythm and normal heart sounds.  Exam reveals no gallop and no friction rub.    No murmur heard.  Pulmonary/Chest: Effort normal and breath sounds normal. No accessory muscle usage. No respiratory distress. He has no decreased breath sounds. He has no wheezes. He has no rales. He exhibits no tenderness.   Abdominal: Soft. He exhibits no distension, no fluid wave, no ascites, no pulsatile midline mass and no mass. There is no tenderness. There is no rebound and no guarding. No hernia.   Musculoskeletal: Normal range of motion. He exhibits no edema, tenderness or deformity.   Lymphadenopathy:     He has no cervical adenopathy.        Left: No supraclavicular adenopathy present.   Neurological: He is alert and " oriented to person, place, and time. He has normal strength. No cranial nerve deficit.   Skin: Skin is warm and dry. No rash noted. He is not diaphoretic. No erythema. No pallor.   Psychiatric: He has a normal mood and affect. His speech is normal and behavior is normal. Judgment and thought content normal. Cognition and memory are normal.   Vitals reviewed.      DIAGNOSTIC DATA:    PET scan from the skull base to the thigh was reviewed showing activity in the rectum as well as the expected activity around the area of the epiglottis and tracheostomy.  He has a few other areas of activity near the iliac artery and initial tuberosities.    ASSESSMENT:    Squamous carcinoma of the larynx, abnormal PET CT    PLAN:    He will need a port placed for chemotherapy.  The risks and benefits of port placement were discussed with the patient and he is agreeable to proceeding.  Additionally given his abnormality seen on PET scan he will need a colonoscopy.  This was discussed with the patient as well.  The risks and benefits of colonoscopy were discussed as was the prep and he is agreeable with proceeding with both of these.  He has been scheduled for 3/7/2018.          This document has been electronically signed by Kris Solano MD on March 6, 2018 5:23 PM

## 2018-03-07 NOTE — ANESTHESIA POSTPROCEDURE EVALUATION
Patient: Truman Esquivel    Procedure Summary     Date Anesthesia Start Anesthesia Stop Room / Location    03/07/18 0715 0900 [facility] OR 03 / [facility]       Procedure Diagnosis Surgeon Provider    INSERTION OF MEDIPORT     (C-ARM#1) (N/A Chest); COLONOSCOPY WITH POSSIBLE POLYPECTOMY   ( DONE IN OR WITH ENDO)      COLONOSCOPY FIRST (N/A ) Squamous cell carcinoma of larynx  (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]) MD Greg Morrissey MD          Anesthesia Type: general  Last vitals  BP   132/65 (03/07/18 0910)   Temp   98.3 °F (36.8 °C) (03/07/18 0910)   Pulse   81 (03/07/18 0910)   Resp   22 (03/07/18 0910)     SpO2   98 % (03/07/18 0910)     Post Anesthesia Care and Evaluation    Patient location during evaluation: PACU  Patient participation: complete - patient participated  Level of consciousness: awake and alert  Pain score: 0  Airway patency: patent  Anesthetic complications: No anesthetic complications  PONV Status: none  Cardiovascular status: acceptable  Respiratory status: acceptable  Hydration status: acceptable

## 2018-03-07 NOTE — ANESTHESIA PREPROCEDURE EVALUATION
" Anesthesia Evaluation     Patient summary reviewed and Nursing notes reviewed   NPO Solid Status: > 8 hours  NPO Liquid Status: > 8 hours           Airway   Mallampati: III  TM distance: >3 FB  Neck ROM: full  Comment: Supraglottic probable cancerous mass. Patient with hoarseness and dysphagia, no air hunger.  States he did have problems with nose bleeds and posterior pharynx bleeding episodes when on \"blood thinners\". States since these were stopped no more episodes.   Dental - normal exam   (+) poor dentation    Comment: Overall upper and lower dentition in poor repair. Cautioned patient that due to airway and dental condition that trauma to teeth could occur. He understands and agrees.    Pulmonary - normal exam    breath sounds clear to auscultation  (+) a smoker (Quit 2/13/17) Former, sleep apnea,     PE comment: No audible upper airway obstruction on exam.  Cardiovascular - normal exam  Exercise tolerance: poor (<4 METS)    ECG reviewed  PT is on anticoagulation therapy  Patient on routine beta blocker and Beta blocker given within 24 hours of surgery  Rhythm: regular  Rate: normal    (+) hypertension, PVD (Aortic arch dissection repair Febuary 2017.), DVT (DVT of lower extremities.) resolved, hyperlipidemia,   (-) carotid bruits    ROS comment: EKG:NSR    12/21/17  EF 55-60%    Neuro/Psych  (+) TIA, CVA,     GI/Hepatic/Renal/Endo    (+) obesity,   hypothyroidism,     Musculoskeletal     Abdominal  - normal exam  (+) obese,    Substance History - negative use     OB/GYN          Other   (+) arthritis   history of cancer (Larynx/supra glottic.) active    ROS/Med Hx Other: anemia      Phys Exam Other: trach                Anesthesia Plan    ASA 4     general     intravenous and inhalational induction   Anesthetic plan and risks discussed with patient and spouse/significant other.      "

## 2018-03-07 NOTE — INTERVAL H&P NOTE
H&P reviewed. The patient was examined and there are no changes to the H&P.           This document has been electronically signed by Kris Solano MD on March 7, 2018 6:49 AM

## 2018-03-08 ENCOUNTER — OFFICE VISIT (OUTPATIENT)
Dept: OTOLARYNGOLOGY | Facility: CLINIC | Age: 58
End: 2018-03-08

## 2018-03-08 VITALS — WEIGHT: 254.6 LBS | BODY MASS INDEX: 34.48 KG/M2 | OXYGEN SATURATION: 96 % | HEIGHT: 72 IN

## 2018-03-08 DIAGNOSIS — Z43.0 ATTENTION TO TRACHEOSTOMY (HCC): ICD-10-CM

## 2018-03-08 DIAGNOSIS — C32.9 SQUAMOUS CELL CARCINOMA OF LARYNX (HCC): Primary | ICD-10-CM

## 2018-03-08 LAB
LAB AP CASE REPORT: NORMAL
Lab: NORMAL
PATH REPORT.FINAL DX SPEC: NORMAL
PATH REPORT.GROSS SPEC: NORMAL

## 2018-03-08 PROCEDURE — 99024 POSTOP FOLLOW-UP VISIT: CPT | Performed by: OTOLARYNGOLOGY

## 2018-03-08 RX ORDER — OXYCODONE HYDROCHLORIDE AND ACETAMINOPHEN 5; 325 MG/1; MG/1
TABLET ORAL
Qty: 60 TABLET | Refills: 0 | Status: SHIPPED | OUTPATIENT
Start: 2018-03-08 | End: 2018-07-17

## 2018-03-08 NOTE — PROGRESS NOTES
Subjective   Truman Esquivel is a 57 y.o. male.       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  Has undergone a tracheostomy.  Had some trouble earlier this week with what sounded like some granulation tissue at his fenestration, but was able to get is to back in and is doing well now.  Actually had his port placed and a colonoscopy performed yesterday.  Is awaiting radiation treatment plan to start definitive treatment.  Is having a good bit of pain still and requests a refill on his oxycodone.    The following portions of the patient's history were reviewed and updated as appropriate: allergies, current medications, past family history, past medical history, past social history, past surgical history and problem list.     reports that he quit smoking about 12 months ago. He has a 47.50 pack-year smoking history. He has never used smokeless tobacco. He reports that he does not drink alcohol or use illicit drugs.   Patient is not a tobacco user and has not been counseled for use of tobacco products      Review of Systems        Objective   Physical Exam tracheostomy tube in place with good airflow.  No erythema or granulation tissue externally.  The inner cannula was removed and at this point there does not appear to be a significant amount of granulation tissue in the fenestration.    Assessment/Plan   Truman was seen today for follow-up.    Diagnoses and all orders for this visit:    Squamous cell carcinoma of larynx    Attention to tracheostomy    Other orders  -     oxyCODONE-acetaminophen (PERCOCET) 5-325 MG per tablet; 1-2 tablets by mouth every 4 hours as needed for pain        Plan: Proceed with concurrent chemoradiation per Francisco Tapia and Jorge.  I did refill his Percocet prescription but I told him after this he would need to get any further pain prescriptions from his oncologists.  See me again in 3 months, at which time he  will hopefully have completed treatment.  Call sooner for problems.

## 2018-03-13 PROCEDURE — 77338 DESIGN MLC DEVICE FOR IMRT: CPT | Performed by: RADIOLOGY

## 2018-03-13 PROCEDURE — 77301 RADIOTHERAPY DOSE PLAN IMRT: CPT | Performed by: RADIOLOGY

## 2018-03-13 PROCEDURE — 77300 RADIATION THERAPY DOSE PLAN: CPT | Performed by: RADIOLOGY

## 2018-03-14 ENCOUNTER — OFFICE VISIT (OUTPATIENT)
Dept: ENDOCRINOLOGY | Facility: CLINIC | Age: 58
End: 2018-03-14

## 2018-03-14 ENCOUNTER — APPOINTMENT (OUTPATIENT)
Dept: LAB | Facility: HOSPITAL | Age: 58
End: 2018-03-14

## 2018-03-14 VITALS
HEART RATE: 86 BPM | HEIGHT: 72 IN | BODY MASS INDEX: 34.57 KG/M2 | WEIGHT: 255.2 LBS | SYSTOLIC BLOOD PRESSURE: 128 MMHG | DIASTOLIC BLOOD PRESSURE: 66 MMHG | OXYGEN SATURATION: 96 %

## 2018-03-14 DIAGNOSIS — E03.8 HYPOTHYROIDISM DUE TO HASHIMOTO'S THYROIDITIS: Primary | ICD-10-CM

## 2018-03-14 DIAGNOSIS — E53.8 B12 DEFICIENCY: ICD-10-CM

## 2018-03-14 DIAGNOSIS — E55.9 VITAMIN D DEFICIENCY: ICD-10-CM

## 2018-03-14 DIAGNOSIS — E06.3 HYPOTHYROIDISM DUE TO HASHIMOTO'S THYROIDITIS: Primary | ICD-10-CM

## 2018-03-14 LAB
25(OH)D3 SERPL-MCNC: 33.3 NG/ML (ref 30–100)
ALBUMIN SERPL-MCNC: 4.1 G/DL (ref 3.4–4.8)
ALBUMIN/GLOB SERPL: 1.3 G/DL (ref 1.1–1.8)
ALP SERPL-CCNC: 73 U/L (ref 38–126)
ALT SERPL W P-5'-P-CCNC: 29 U/L (ref 21–72)
ANION GAP SERPL CALCULATED.3IONS-SCNC: 13 MMOL/L (ref 5–15)
AST SERPL-CCNC: 73 U/L (ref 17–59)
BASOPHILS # BLD AUTO: 0.02 10*3/MM3 (ref 0–0.2)
BASOPHILS NFR BLD AUTO: 0.1 % (ref 0–2)
BILIRUB SERPL-MCNC: 0.8 MG/DL (ref 0.2–1.3)
BUN BLD-MCNC: 16 MG/DL (ref 7–21)
BUN/CREAT SERPL: 13.2 (ref 7–25)
CALCIUM SPEC-SCNC: 9.2 MG/DL (ref 8.4–10.2)
CHLORIDE SERPL-SCNC: 99 MMOL/L (ref 95–110)
CO2 SERPL-SCNC: 26 MMOL/L (ref 22–31)
CREAT BLD-MCNC: 1.21 MG/DL (ref 0.7–1.3)
DEPRECATED RDW RBC AUTO: 44.5 FL (ref 35.1–43.9)
EOSINOPHIL # BLD AUTO: 0 10*3/MM3 (ref 0–0.7)
EOSINOPHIL NFR BLD AUTO: 0 % (ref 0–7)
ERYTHROCYTE [DISTWIDTH] IN BLOOD BY AUTOMATED COUNT: 16.3 % (ref 11.5–14.5)
GFR SERPL CREATININE-BSD FRML MDRD: 62 ML/MIN/1.73 (ref 60–130)
GLOBULIN UR ELPH-MCNC: 3.2 GM/DL (ref 2.3–3.5)
GLUCOSE BLD-MCNC: 119 MG/DL (ref 60–100)
HCT VFR BLD AUTO: 32.6 % (ref 39–49)
HGB BLD-MCNC: 10.7 G/DL (ref 13.7–17.3)
IMM GRANULOCYTES # BLD: 0.13 10*3/MM3 (ref 0–0.02)
IMM GRANULOCYTES NFR BLD: 0.6 % (ref 0–0.5)
LYMPHOCYTES # BLD AUTO: 1.27 10*3/MM3 (ref 0.6–4.2)
LYMPHOCYTES NFR BLD AUTO: 5.6 % (ref 10–50)
MCH RBC QN AUTO: 24.5 PG (ref 26.5–34)
MCHC RBC AUTO-ENTMCNC: 32.8 G/DL (ref 31.5–36.3)
MCV RBC AUTO: 74.6 FL (ref 80–98)
MONOCYTES # BLD AUTO: 1.08 10*3/MM3 (ref 0–0.9)
MONOCYTES NFR BLD AUTO: 4.8 % (ref 0–12)
NEUTROPHILS # BLD AUTO: 20.21 10*3/MM3 (ref 2–8.6)
NEUTROPHILS NFR BLD AUTO: 88.9 % (ref 37–80)
PLATELET # BLD AUTO: 186 10*3/MM3 (ref 150–450)
PMV BLD AUTO: 10.8 FL (ref 8–12)
POTASSIUM BLD-SCNC: 3.5 MMOL/L (ref 3.5–5.1)
PROT SERPL-MCNC: 7.3 G/DL (ref 6.3–8.6)
RBC # BLD AUTO: 4.37 10*6/MM3 (ref 4.37–5.74)
SODIUM BLD-SCNC: 138 MMOL/L (ref 137–145)
TSH SERPL DL<=0.05 MIU/L-ACNC: 0.39 MIU/ML (ref 0.46–4.68)
VIT B12 BLD-MCNC: 546 PG/ML (ref 239–931)
WBC NRBC COR # BLD: 22.71 10*3/MM3 (ref 3.2–9.8)

## 2018-03-14 PROCEDURE — 80053 COMPREHEN METABOLIC PANEL: CPT | Performed by: INTERNAL MEDICINE

## 2018-03-14 PROCEDURE — 85025 COMPLETE CBC W/AUTO DIFF WBC: CPT | Performed by: INTERNAL MEDICINE

## 2018-03-14 PROCEDURE — 82306 VITAMIN D 25 HYDROXY: CPT | Performed by: INTERNAL MEDICINE

## 2018-03-14 PROCEDURE — 82607 VITAMIN B-12: CPT | Performed by: INTERNAL MEDICINE

## 2018-03-14 PROCEDURE — 36415 COLL VENOUS BLD VENIPUNCTURE: CPT | Performed by: INTERNAL MEDICINE

## 2018-03-14 PROCEDURE — 99214 OFFICE O/P EST MOD 30 MIN: CPT | Performed by: INTERNAL MEDICINE

## 2018-03-14 PROCEDURE — 84443 ASSAY THYROID STIM HORMONE: CPT | Performed by: INTERNAL MEDICINE

## 2018-03-15 ENCOUNTER — TELEPHONE (OUTPATIENT)
Dept: RADIATION ONCOLOGY | Facility: HOSPITAL | Age: 58
End: 2018-03-15

## 2018-03-15 ENCOUNTER — APPOINTMENT (OUTPATIENT)
Dept: RADIATION ONCOLOGY | Facility: HOSPITAL | Age: 58
End: 2018-03-15

## 2018-03-15 ENCOUNTER — OFFICE VISIT (OUTPATIENT)
Dept: CARDIOLOGY | Facility: CLINIC | Age: 58
End: 2018-03-15

## 2018-03-15 VITALS
WEIGHT: 253 LBS | OXYGEN SATURATION: 98 % | BODY MASS INDEX: 34.27 KG/M2 | HEART RATE: 80 BPM | SYSTOLIC BLOOD PRESSURE: 124 MMHG | DIASTOLIC BLOOD PRESSURE: 66 MMHG | HEIGHT: 72 IN

## 2018-03-15 DIAGNOSIS — C32.9 SQUAMOUS CELL CARCINOMA OF LARYNX (HCC): Primary | ICD-10-CM

## 2018-03-15 DIAGNOSIS — G45.9 TRANSIENT CEREBRAL ISCHEMIA, UNSPECIFIED TYPE: ICD-10-CM

## 2018-03-15 DIAGNOSIS — C32.9 SQUAMOUS CELL CARCINOMA OF LARYNX (HCC): ICD-10-CM

## 2018-03-15 DIAGNOSIS — I10 ESSENTIAL HYPERTENSION: ICD-10-CM

## 2018-03-15 DIAGNOSIS — I71.010 ASCENDING AORTIC DISSECTION (HCC): Primary | ICD-10-CM

## 2018-03-15 PROCEDURE — 99214 OFFICE O/P EST MOD 30 MIN: CPT | Performed by: INTERNAL MEDICINE

## 2018-03-15 RX ORDER — METOPROLOL SUCCINATE 25 MG/1
25 TABLET, EXTENDED RELEASE ORAL NIGHTLY
Qty: 30 TABLET | Refills: 12 | Status: SHIPPED | OUTPATIENT
Start: 2018-03-15 | End: 2019-03-17 | Stop reason: SDUPTHER

## 2018-03-15 RX ORDER — LOSARTAN POTASSIUM AND HYDROCHLOROTHIAZIDE 12.5; 5 MG/1; MG/1
1 TABLET ORAL DAILY
Qty: 30 TABLET | Refills: 12 | Status: SHIPPED | OUTPATIENT
Start: 2018-03-15 | End: 2018-09-20

## 2018-03-15 NOTE — PROGRESS NOTES
[facility] Cardiology  OFFICE NOTE    Truman Esquivel  57 y.o. male    03/15/2018  1. Ascending aortic dissection    2. Essential hypertension    3. Transient cerebral ischemia, unspecified type    4. Squamous cell carcinoma of larynx        Chief complaint -Follow-up ascending aortic dissection, hypertension      History of present Illness- 57 old gentleman who has history of hypertension for long-time, presented with chest pain and was found to have a aortic dissection had repair of the ascending aorta with resuspension of aortic valve. He is doing well and he had followed with the CT surgeon in [place].  He is off of Coumadin for the DVT of the arm and takes Plavix 75 mg daily as he was having suspicion for TIAs.  And he had the MERI which did not show any abnormality.  He was diagnosed with laryngeal Cancer and now has a tracheostomy and is going to start chemotherapy and radiation.  He has lost a lot of weight and his blood pressure dropped and I stopped his amlodipine and decrease the dose of losartan to Hyzaar 50/12.5 mg the morning and continue the Toprol in the evening.          Allergies   Allergen Reactions   • Chlorhexidine Itching     Topical cleaning wipes causes severe skin irritation   • Co Q 10 [Coenzyme Q10] Itching   • Eggs Or Egg-Derived Products Swelling   • Erythromycin Other (See Comments)     Joint pains and cold sweats   • Other GI Intolerance     Mycins     • Penicillins      Tolerated cefepime during 2/2017 admission. Had rash and itching (relieved by benadryl) after few doses of cefepime and cefepime was continued.   • Tetracyclines & Related GI Intolerance   • Acth [Corticotropin] Rash   • Cephalosporins Itching and Rash     Pt developed rash, itching after cefepime administration (2-3 doses) during 2/2017 admission. Itching was relieved with benadryl. Cefepime was continued because his infection was improving and no symptoms of anaphylaxis present   • Keflex  [Cephalexin] Rash         Past Medical History:   Diagnosis Date   • Aortic dissection    • Chest pain    • Disease of thyroid gland    • HTN (hypertension)    • Knee pain    • Sleep apnea    • Smoker    • Squamous cell carcinoma of larynx 2/5/2018   • Stroke    • Swallowing difficulty          Past Surgical History:   Procedure Laterality Date   • AORTA SURGERY      ruptured aorta repair   • ASCENDING ARCH/HEMIARCH REPLACEMENT N/A 2/14/2017    Procedure: INTRAOPERATIVE TRANSESOPHAGEAL ECHOCARDIOGRAM, MIDLINE STERNOTOMY, ASCENDING AORTIC  AND PROXIMAL AORTIC ARCH REPAIR WITH 26MM GRAFT, AORTIC VALVE RESUSPENSION, AORTIC ROOT REPAIR, OPEN VEIN HARVEST OF RIGHT LEG;  Surgeon: German Arreguin MD;  Location: [facility] MAIN OR;  Service:    • BRONCHOSCOPY N/A 2/17/2017    Procedure: BRONCHOSCOPY BIOPSY AT BEDSIDE WITH BAL-LEFT LOWER LOBE;  Surgeon: Trent Chaney MD;  Location: [facility] ENDOSCOPY;  Service:    • COLONOSCOPY N/A 3/7/2018    Procedure: COLONOSCOPY WITH POSSIBLE POLYPECTOMY   ( DONE IN OR WITH ENDO)      COLONOSCOPY FIRST;  Surgeon: Kris Solano MD;  Location: [facility] OR;  Service:    • DIRECT LARYNGOSCOPY, ESOPHAGOSCOPY, BRONCHOSCOPY N/A 2/5/2018    Procedure: DIRECT LARYNGOSCOPY WITH BIOPSY, ESOPHAGOSCOPY     (no bronchoscopy, no laser);  Surgeon: Joseph Venegas MD;  Location: [facility] OR;  Service:    • TRACHEOSTOMY  02/05/2018   • TRACHEOSTOMY N/A 2/5/2018    Procedure: TRACHEOSTOMY  local injection @ 1106 incision @ 1119;  Surgeon: Joseph Venegas MD;  Location: [facility] OR;  Service:    • VENOUS ACCESS DEVICE (PORT) INSERTION N/A 3/7/2018    Procedure: INSERTION OF MEDIPORT     (C-ARM#1);  Surgeon: Kris Solano MD;  Location: [facility];  Service:          Family History   Problem Relation Age of Onset   • Thyroid disease Mother    • Hypertension Mother    • Hypertension Father    • Hypertension Other          Social History     Social History   • Marital status:       Spouse name: N/A   • Number of children: N/A   • Years of education: N/A     Occupational History   • Not on file.     Social History Main Topics   • Smoking status: Former Smoker     Packs/day: 1.25     Years: 38.00     Quit date: 2/13/2017   • Smokeless tobacco: Never Used      Comment: 02/27/2018 - Patient confirmed he ceased utilization of tobacco products 02/13/2017.   • Alcohol use No   • Drug use: No   • Sexual activity: Defer     Other Topics Concern   • Not on file     Social History Narrative   • No narrative on file         Current Outpatient Prescriptions   Medication Sig Dispense Refill   • atorvastatin (LIPITOR) 20 MG tablet Take 1 tablet by mouth Daily. 30 tablet 11   • clopidogrel (PLAVIX) 75 MG tablet Take 75 mg by mouth Daily. Last dose approx greater than one month ago     • docusate sodium (COLACE) 100 MG capsule Take 1 capsule by mouth 2 (Two) Times a Day As Needed for Constipation. 60 capsule 2   • levothyroxine (SYNTHROID, LEVOTHROID) 88 MCG tablet Take 88 mcg by mouth Daily.     • Multiple Vitamins-Minerals (MULTIVITAMIN ADULT PO) Take 1 tablet by mouth Daily.     • ondansetron (ZOFRAN) 4 MG tablet Take 1 tablet by mouth 3 (Three) Times a Day As Needed for Nausea or Vomiting. 40 tablet 3   • oxyCODONE-acetaminophen (PERCOCET) 5-325 MG per tablet 1-2 tablets by mouth every 4 hours as needed for pain 60 tablet 0   • vitamin B-12 (CYANOCOBALAMIN) 100 MCG tablet Take 100 mcg by mouth Daily.     • vitamin E 100 UNIT capsule Take 400 Units by mouth Every Night.     • Zinc 50 MG capsule Take 50 mg by mouth Every Night.     • losartan-hydrochlorothiazide (HYZAAR) 50-12.5 MG per tablet Take 1 tablet by mouth Daily. 30 tablet 12   • metoprolol succinate XL (TOPROL-XL) 25 MG 24 hr tablet Take 1 tablet by mouth Every Night. Patient states he hasn't been taking this week, states he thinks he is hypotensive 30 tablet 12     No current facility-administered medications for this visit.          Review of  "Systems     Constitution: Denies any fatigue, fever or chills    HENT: Has tracheostomy secondary to laryngeal cancer    Eyes: Denies any blurring of vision, or photophobia     Cardivascular - As per history of present illness     Respiratory system-denies any COPD, shortness of breath,   sleep apnea Uses CPAP     Endocrine:   history of hyperlipidemia       Musculoskeletal:  history of arthritis with musculoskeletal problems of the knee    Gastrointestinal: No nausea, vomiting, or melena    Genitourinary: No dysuria or hematuria    Neurological:   Peripheral neuropathy, questionable TIA    Psychiatric/Behavioral:        No history of depression,      Hematological- no history of easy bruising             OBJECTIVE    /66   Pulse 80   Ht 182.9 cm (72.01\")   Wt 115 kg (253 lb)   SpO2 98%   BMI 34.31 kg/m²       Physical Exam     Constitutional: is oriented to person, place, and time.     Skin-warm and dry, sternotomy site healed well    Well developed and nourished      Head: Normocephalic and atraumatic.     Eyes: Pupils are equal    Neck: Neck supple. No bruit in the carotids, has a tracheostomy    Cardiovascular: [place] in the fifth intercostal space   Regular rate, and  Rhythm,    S1 greater than S2,    Pulmonary/Chest:   Air  Entry is equal on both sides  No wheezing or crackles,      Abdominal: Soft.  No hepatosplenomegaly, bowel sounds are present    Musculoskeletal: No kyphoscoliosis, no significant thickening of the joints    Neurological: is alert and oriented to person, place, and time.    cranial nerve are intact .   No motor or sensory deficit    Extremities-no edema, no radial femoral delay      Psychiatric: He has a normal mood and affect.                  His behavior is normal.           Procedures      Lab Results   Component Value Date    WBC 22.71 (H) 03/14/2018    HGB 10.7 (L) 03/14/2018    HCT 32.6 (L) 03/14/2018    MCV 74.6 (L) 03/14/2018     03/14/2018     Lab Results "   Component Value Date    GLUCOSE 119 (H) 03/14/2018    BUN 16 03/14/2018    CREATININE 1.21 03/14/2018    EGFRIFNONA 62 03/14/2018    BCR 13.2 03/14/2018    CO2 26.0 03/14/2018    CALCIUM 9.2 03/14/2018    ALBUMIN 4.10 03/14/2018    LABIL2 1.3 03/14/2018    AST 73 (H) 03/14/2018    ALT 29 03/14/2018     Lab Results   Component Value Date    HGBA1C 5.52 02/15/2017     Lab Results   Component Value Date    TSH 0.390 (L) 03/14/2018                  A/P    Status post ascending aortic dissection with repair and resuspension of the aortic valve leaflets,Repeat echo showed normal LV systolic function and the valve leaflets are functioning okay.  And LV function is normal  History of DVT post surgery-Was on Coumadin and Now on Plavix for history of TIA    Hypertension-blood pressure got dropped after he lost weight from his surgery for laryngeal CA and radiation, stopped the amlodipine, decreased the dose of Hyzaar    Hypothyroidism continue the Synthroid      Follow with me in 6 months      Liborio Chandra MD  3/15/2018  11:37 AM    EMR Dragon/Transcription disclaimer:   Some of this note may be an electronic transcription/translation of spoken language to printed text. The electronic translation of spoken language may permit erroneous, or at times, nonsensical words or phrases to be inadvertently transcribed; Although I have reviewed the note for such errors, some may still exist.

## 2018-03-18 RX ORDER — LEVOTHYROXINE SODIUM 0.07 MG/1
75 TABLET ORAL DAILY
Qty: 30 TABLET | Refills: 11 | Status: SHIPPED | OUTPATIENT
Start: 2018-03-18 | End: 2018-07-17 | Stop reason: SDUPTHER

## 2018-03-19 ENCOUNTER — APPOINTMENT (OUTPATIENT)
Dept: RADIATION ONCOLOGY | Facility: HOSPITAL | Age: 58
End: 2018-03-19

## 2018-03-19 ENCOUNTER — INFUSION (OUTPATIENT)
Dept: ONCOLOGY | Facility: HOSPITAL | Age: 58
End: 2018-03-19

## 2018-03-19 VITALS
RESPIRATION RATE: 18 BRPM | DIASTOLIC BLOOD PRESSURE: 81 MMHG | TEMPERATURE: 97.2 F | SYSTOLIC BLOOD PRESSURE: 143 MMHG | HEART RATE: 72 BPM

## 2018-03-19 DIAGNOSIS — C32.9 SQUAMOUS CELL CARCINOMA OF LARYNX (HCC): ICD-10-CM

## 2018-03-19 DIAGNOSIS — Z45.2 ENCOUNTER FOR VENOUS ACCESS DEVICE CARE: ICD-10-CM

## 2018-03-19 DIAGNOSIS — E87.6 HYPOKALEMIA: ICD-10-CM

## 2018-03-19 DIAGNOSIS — C32.9 SQUAMOUS CELL CARCINOMA OF LARYNX (HCC): Primary | ICD-10-CM

## 2018-03-19 LAB
ALBUMIN SERPL-MCNC: 3.8 G/DL (ref 3.4–4.8)
ALBUMIN/GLOB SERPL: 1.2 G/DL (ref 1.1–1.8)
ALP SERPL-CCNC: 86 U/L (ref 38–126)
ALT SERPL W P-5'-P-CCNC: 61 U/L (ref 21–72)
ANION GAP SERPL CALCULATED.3IONS-SCNC: 18 MMOL/L (ref 5–15)
AST SERPL-CCNC: 44 U/L (ref 17–59)
BASOPHILS # BLD AUTO: 0.04 10*3/MM3 (ref 0–0.2)
BASOPHILS NFR BLD AUTO: 0.4 % (ref 0–2)
BILIRUB SERPL-MCNC: 0.2 MG/DL (ref 0.2–1.3)
BUN BLD-MCNC: 20 MG/DL (ref 7–21)
BUN/CREAT SERPL: 16 (ref 7–25)
CALCIUM SPEC-SCNC: 9 MG/DL (ref 8.4–10.2)
CHLORIDE SERPL-SCNC: 104 MMOL/L (ref 95–110)
CO2 SERPL-SCNC: 22 MMOL/L (ref 22–31)
CREAT BLD-MCNC: 1.25 MG/DL (ref 0.7–1.3)
DEPRECATED RDW RBC AUTO: 44.8 FL (ref 35.1–43.9)
EOSINOPHIL # BLD AUTO: 0.41 10*3/MM3 (ref 0–0.7)
EOSINOPHIL NFR BLD AUTO: 4.4 % (ref 0–7)
ERYTHROCYTE [DISTWIDTH] IN BLOOD BY AUTOMATED COUNT: 16.3 % (ref 11.5–14.5)
GFR SERPL CREATININE-BSD FRML MDRD: 60 ML/MIN/1.73 (ref 56–130)
GLOBULIN UR ELPH-MCNC: 3.1 GM/DL (ref 2.3–3.5)
GLUCOSE BLD-MCNC: 91 MG/DL (ref 60–100)
HCT VFR BLD AUTO: 31.4 % (ref 39–49)
HGB BLD-MCNC: 10.3 G/DL (ref 13.7–17.3)
IMM GRANULOCYTES # BLD: 0.03 10*3/MM3 (ref 0–0.02)
IMM GRANULOCYTES NFR BLD: 0.3 % (ref 0–0.5)
LYMPHOCYTES # BLD AUTO: 1.75 10*3/MM3 (ref 0.6–4.2)
LYMPHOCYTES NFR BLD AUTO: 18.7 % (ref 10–50)
MCH RBC QN AUTO: 24.3 PG (ref 26.5–34)
MCHC RBC AUTO-ENTMCNC: 32.8 G/DL (ref 31.5–36.3)
MCV RBC AUTO: 74.1 FL (ref 80–98)
MONOCYTES # BLD AUTO: 0.6 10*3/MM3 (ref 0–0.9)
MONOCYTES NFR BLD AUTO: 6.4 % (ref 0–12)
NEUTROPHILS # BLD AUTO: 6.52 10*3/MM3 (ref 2–8.6)
NEUTROPHILS NFR BLD AUTO: 69.8 % (ref 37–80)
PLATELET # BLD AUTO: 245 10*3/MM3 (ref 150–450)
PMV BLD AUTO: 10.6 FL (ref 8–12)
POTASSIUM BLD-SCNC: 3.4 MMOL/L (ref 3.5–5.1)
PROT SERPL-MCNC: 6.9 G/DL (ref 6.3–8.6)
RBC # BLD AUTO: 4.24 10*6/MM3 (ref 4.37–5.74)
SODIUM BLD-SCNC: 144 MMOL/L (ref 137–145)
WBC NRBC COR # BLD: 9.35 10*3/MM3 (ref 3.2–9.8)

## 2018-03-19 PROCEDURE — 25010000002 HEPARIN FLUSH (PORCINE) 100 UNIT/ML SOLUTION: Performed by: INTERNAL MEDICINE

## 2018-03-19 PROCEDURE — 80053 COMPREHEN METABOLIC PANEL: CPT

## 2018-03-19 PROCEDURE — 36415 COLL VENOUS BLD VENIPUNCTURE: CPT

## 2018-03-19 PROCEDURE — 96375 TX/PRO/DX INJ NEW DRUG ADDON: CPT | Performed by: INTERNAL MEDICINE

## 2018-03-19 PROCEDURE — 25010000003 DEXAMETHASONE SODIUM PHOSPHATE 100 MG/10ML SOLUTION 10 ML VIAL: Performed by: INTERNAL MEDICINE

## 2018-03-19 PROCEDURE — 77470 SPECIAL RADIATION TREATMENT: CPT | Performed by: RADIOLOGY

## 2018-03-19 PROCEDURE — 77386: CPT | Performed by: RADIOLOGY

## 2018-03-19 PROCEDURE — 77427 RADIATION TX MANAGEMENT X5: CPT | Performed by: RADIOLOGY

## 2018-03-19 PROCEDURE — 85025 COMPLETE CBC W/AUTO DIFF WBC: CPT

## 2018-03-19 PROCEDURE — 96417 CHEMO IV INFUS EACH ADDL SEQ: CPT | Performed by: INTERNAL MEDICINE

## 2018-03-19 PROCEDURE — 25010000002 CARBOPLATIN PER 50 MG: Performed by: INTERNAL MEDICINE

## 2018-03-19 PROCEDURE — 25010000002 PACLITAXEL PER 30 MG: Performed by: INTERNAL MEDICINE

## 2018-03-19 PROCEDURE — 25010000002 PALONOSETRON PER 25 MCG: Performed by: INTERNAL MEDICINE

## 2018-03-19 PROCEDURE — 96413 CHEMO IV INFUSION 1 HR: CPT | Performed by: INTERNAL MEDICINE

## 2018-03-19 PROCEDURE — 25010000002 DIPHENHYDRAMINE PER 50 MG: Performed by: INTERNAL MEDICINE

## 2018-03-19 RX ORDER — POTASSIUM CHLORIDE 750 MG/1
40 CAPSULE, EXTENDED RELEASE ORAL ONCE
Status: CANCELLED
Start: 2018-03-19 | End: 2018-03-19

## 2018-03-19 RX ORDER — SODIUM CHLORIDE 0.9 % (FLUSH) 0.9 %
SYRINGE (ML) INJECTION
Status: DISCONTINUED
Start: 2018-03-19 | End: 2018-03-19 | Stop reason: HOSPADM

## 2018-03-19 RX ORDER — SODIUM CHLORIDE 0.9 % (FLUSH) 0.9 %
10 SYRINGE (ML) INJECTION AS NEEDED
Status: DISCONTINUED | OUTPATIENT
Start: 2018-03-19 | End: 2018-03-19 | Stop reason: HOSPADM

## 2018-03-19 RX ORDER — PALONOSETRON 0.05 MG/ML
0.25 INJECTION, SOLUTION INTRAVENOUS ONCE
Status: COMPLETED | OUTPATIENT
Start: 2018-03-19 | End: 2018-03-19

## 2018-03-19 RX ORDER — SODIUM CHLORIDE 9 MG/ML
250 INJECTION, SOLUTION INTRAVENOUS ONCE
Status: CANCELLED | OUTPATIENT
Start: 2018-03-19

## 2018-03-19 RX ORDER — FAMOTIDINE 10 MG/ML
20 INJECTION, SOLUTION INTRAVENOUS ONCE
Status: COMPLETED | OUTPATIENT
Start: 2018-03-19 | End: 2018-03-19

## 2018-03-19 RX ORDER — POTASSIUM CHLORIDE 750 MG/1
40 CAPSULE, EXTENDED RELEASE ORAL ONCE
Status: COMPLETED | OUTPATIENT
Start: 2018-03-19 | End: 2018-03-19

## 2018-03-19 RX ORDER — SODIUM CHLORIDE 9 MG/ML
250 INJECTION, SOLUTION INTRAVENOUS ONCE
Status: COMPLETED | OUTPATIENT
Start: 2018-03-19 | End: 2018-03-19

## 2018-03-19 RX ORDER — FAMOTIDINE 10 MG/ML
20 INJECTION, SOLUTION INTRAVENOUS ONCE
Status: CANCELLED | OUTPATIENT
Start: 2018-03-19

## 2018-03-19 RX ORDER — SODIUM CHLORIDE 0.9 % (FLUSH) 0.9 %
10 SYRINGE (ML) INJECTION AS NEEDED
Status: CANCELLED | OUTPATIENT
Start: 2018-03-26

## 2018-03-19 RX ORDER — PALONOSETRON 0.05 MG/ML
0.25 INJECTION, SOLUTION INTRAVENOUS ONCE
Status: CANCELLED | OUTPATIENT
Start: 2018-03-19

## 2018-03-19 RX ADMIN — POTASSIUM CHLORIDE 40 MEQ: 750 CAPSULE, EXTENDED RELEASE ORAL at 11:27

## 2018-03-19 RX ADMIN — FAMOTIDINE 20 MG: 10 INJECTION, SOLUTION INTRAVENOUS at 11:01

## 2018-03-19 RX ADMIN — CARBOPLATIN 260 MG: 10 INJECTION, SOLUTION INTRAVENOUS at 13:48

## 2018-03-19 RX ADMIN — SODIUM CHLORIDE 250 ML: 9 INJECTION, SOLUTION INTRAVENOUS at 11:01

## 2018-03-19 RX ADMIN — PACLITAXEL 120 MG: 6 INJECTION, SOLUTION INTRAVENOUS at 12:29

## 2018-03-19 RX ADMIN — Medication 10 ML: at 14:34

## 2018-03-19 RX ADMIN — SODIUM CHLORIDE, PRESERVATIVE FREE 500 UNITS: 5 INJECTION INTRAVENOUS at 14:35

## 2018-03-19 RX ADMIN — DIPHENHYDRAMINE HYDROCHLORIDE 50 MG: 50 INJECTION INTRAMUSCULAR; INTRAVENOUS at 11:27

## 2018-03-19 RX ADMIN — DEXAMETHASONE SODIUM PHOSPHATE 12 MG: 10 INJECTION, SOLUTION INTRAMUSCULAR; INTRAVENOUS at 12:04

## 2018-03-19 RX ADMIN — PALONOSETRON HYDROCHLORIDE 0.25 MG: 0.25 INJECTION INTRAVENOUS at 11:19

## 2018-03-19 NOTE — PATIENT INSTRUCTIONS
Chemotherapy  Chemotherapy is the use of medicines to stop or slow the growth of cancer cells. Depending on the type and stage of your cancer, you may have chemotherapy to:  · Cure your cancer.  · Slow the progression of your cancer.  · Ease your cancer symptoms.  · Improve the benefits of radiation treatment.  · Shrink a tumor before surgery.  · Rid the body of cancer cells that remain after a tumor is surgically removed.  How is chemotherapy given?  Chemotherapy may be given:  · By mouth in liquid or pill form.  · Through a thin tube that is inserted into a vein or artery.  · By getting a shot.  · By rubbing a cream or ointment on your skin.  · Through liquids that are placed directly into various areas of the body, such as the abdomen, chest, or bladder.  How often is chemotherapy given?  Chemotherapy may be given continuously over time, or it may be given in cycles. For example, you may take the medicine for one week out of every month.  For how long will I need chemotherapy treatments?  The length of treatment depends on many factors, including:  · The type of cancer.  · Whether the cancer has spread.  · How you respond to the chemotherapy.  · Whether you develop side effects.  Some types of chemotherapy medicine are given only one time. Others are given for months, years, or for life.  What safety precautions must I take while on chemotherapy?  Chemotherapy medicines are very strong. They will be in all of your bodily fluids, including your urine, stool, saliva, sweat, tears, vaginal secretions, and semen. You must carefully follow some safety precautions to prevent harm to others while you are using these medicines. Here are some recommended precautions:  · Make sure that people who help care for you wear disposable gloves if they are going to come into contact with any of your bodily fluids. Women who are pregnant or breastfeeding should not handle any of your bodily fluids.  · Wash any clothes, towels, and  linens that may have your bodily fluids on them twice in a washing machine using very hot water.  · Dispose of adult diapers, tampons, and sanitary napkins by first sealing them in a plastic bag.  · Use a condom when having sex for at least 2 weeks after receiving your chemotherapy.  · Do not share beverages or food.  · Keep your chemotherapy medicines in their original bottles. Keep them in a high, safe location, away from children. Do not expose them to heat or moisture. Do not put them in containers with other types of medicines.  · Dispose of all wrappers for your chemotherapy medicines by sealing them in a separate plastic bag.  · Do not throw away extra medicine, and do not flush it down the toilet. Take medicine that you are not going to use to your health care provider's office where it can be disposed of properly.  · Follow your health care provider’s directions for the proper disposal of needles, IV tubing, and other medical supplies that have come into contact with your chemotherapy medicines.  · If you are issued a hazardous waste container, make sure you understand the directions for using it.  · Wash your hands thoroughly with warm water and soap after using the bathroom. Dry your hands with disposable paper towels.  · When using the toilet:  ¨ Flush it twice after each use, including after vomiting.  ¨ Close the lid of the toilet prior to flushing. This helps to avoid splashing.  ¨ Both men and women should sit to use the toilet. This helps avoid splashing.  What are the side effects of chemotherapy?  Side effects depend on a variety of factors, including:  · The specific type of chemotherapy medicine used.  · The dosage.  · How long the medicine is used for.  · Your overall health.  Some of the side effects you may experience include:  · Fatigue and decreased energy.  · Decreased appetite.  · Changes in your sense of smell or taste.  · Nausea.  · Vomiting.  · Constipation or diarrhea.  · Hair  loss.  · Increased susceptibility to infection.  · Easy bleeding.  · Mouth sores.  · Burning or tingling in the hands or feet.  · Memory problems.  This information is not intended to replace advice given to you by your health care provider. Make sure you discuss any questions you have with your health care provider.  Document Released: 10/14/2008 Document Revised: 07/07/2017 Document Reviewed: 05/26/2015  [employer] Interactive Patient Education © 2017 [employer]

## 2018-03-20 ENCOUNTER — TELEPHONE (OUTPATIENT)
Dept: ENDOCRINOLOGY | Facility: CLINIC | Age: 58
End: 2018-03-20

## 2018-03-20 ENCOUNTER — TELEPHONE (OUTPATIENT)
Dept: FAMILY MEDICINE CLINIC | Facility: CLINIC | Age: 58
End: 2018-03-20

## 2018-03-20 ENCOUNTER — DOCUMENTATION (OUTPATIENT)
Dept: NUTRITION | Facility: HOSPITAL | Age: 58
End: 2018-03-20

## 2018-03-20 PROCEDURE — 77417 THER RADIOLOGY PORT IMAGE(S): CPT | Performed by: RADIOLOGY

## 2018-03-20 PROCEDURE — 77386: CPT | Performed by: RADIOLOGY

## 2018-03-20 PROCEDURE — 77336 RADIATION PHYSICS CONSULT: CPT | Performed by: RADIOLOGY

## 2018-03-20 NOTE — TELEPHONE ENCOUNTER
He is right, please let him know that I thought I had sent you the message but I didn't      His thyroid levels are slightly high so I had already called in to [facility] a lower dose        Instead of 88 I wanted him to take 75   Repeat labs before next appt       Patient is aware of lab results and recommendations

## 2018-03-20 NOTE — PROGRESS NOTES
Pt stated is feeling and eating much better. Swelling in trache area has subsided. Able to eat most foods--if moist and well chewed. Swallowing liquids without problems. Wt 253 lb. Praised pt for placing emphasis on nutrition.

## 2018-03-21 ENCOUNTER — RADIATION ONCOLOGY WEEKLY ASSESSMENT (OUTPATIENT)
Dept: RADIATION ONCOLOGY | Facility: HOSPITAL | Age: 58
End: 2018-03-21

## 2018-03-21 VITALS
OXYGEN SATURATION: 99 % | RESPIRATION RATE: 20 BRPM | TEMPERATURE: 97.2 F | HEIGHT: 72 IN | WEIGHT: 255.95 LBS | DIASTOLIC BLOOD PRESSURE: 85 MMHG | HEART RATE: 53 BPM | SYSTOLIC BLOOD PRESSURE: 159 MMHG | BODY MASS INDEX: 34.67 KG/M2

## 2018-03-21 DIAGNOSIS — C32.9 SQUAMOUS CELL CARCINOMA OF LARYNX (HCC): Primary | ICD-10-CM

## 2018-03-21 PROCEDURE — 77386: CPT | Performed by: RADIOLOGY

## 2018-03-21 NOTE — PROGRESS NOTES
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  ICD 10 Code: C32.9      was seen today for an on treatment visit.  He is receiving radiation therapy to the larynx/neck. He  has received 600 cGy in 3 fractions out of a planned dose of 7000 cGy in 35 fractions. He is receiving concurrent carboplatin/Taxol chemotherapy per Dr. Patel, and received his 1st and most recent cycle on 3/19/18.     Today on exam the patient is tolerating radiation therapy well and has no new disease or treatment-related complaints. He does state that he is anxious about coughing while wearing a mask on the treatment table. He is also having some bleeding from his trach and is having trouble sleeping.                                             Vitals:     Vitals:    03/21/18 1543   BP: 159/85   Pulse: 53   Resp: 20   Temp: 97.2 °F (36.2 °C)   SpO2: 99%       Weight:   Wt Readings from Last 3 Encounters:   03/21/18 116 kg (255 lb 15.3 oz)   03/15/18 115 kg (253 lb)   03/14/18 116 kg (255 lb 3.2 oz)      Pain:    Pain Score    03/21/18 1543   PainSc: 4  Comment: area around Trach         Plan: We plan to continue radiation therapy as prescribed. We discussed using a cough suppressant prior to his daily radiation treatments. We also discussed the possible use of an anxiolytic prior to treatments in the future. He was instructed to contact Dr. Venegas If his tracheostomy continues to bleed.    Jarocho Tapia MD  Radiation Oncology    Electronically signed by Jarocho Tapia MD 3/21/2018  3:55 PM     cc: Dr. Joseph Harrison

## 2018-03-22 ENCOUNTER — OFFICE VISIT (OUTPATIENT)
Dept: SURGERY | Facility: CLINIC | Age: 58
End: 2018-03-22

## 2018-03-22 VITALS
BODY MASS INDEX: 34.4 KG/M2 | HEIGHT: 72 IN | SYSTOLIC BLOOD PRESSURE: 142 MMHG | WEIGHT: 254 LBS | DIASTOLIC BLOOD PRESSURE: 76 MMHG

## 2018-03-22 DIAGNOSIS — Z09 FOLLOW UP: Primary | ICD-10-CM

## 2018-03-22 PROCEDURE — 77386: CPT | Performed by: RADIOLOGY

## 2018-03-22 PROCEDURE — 99024 POSTOP FOLLOW-UP VISIT: CPT | Performed by: SURGERY

## 2018-03-22 NOTE — PROGRESS NOTES
CHIEF COMPLAINT:    Chief Complaint   Patient presents with   • Post-op     Mediport placement and Colonoscopy on 3/7/18.       HISTORY OF PRESENT ILLNESS:    Truman Esquivel is a 57 y.o. male who underwent Port placement and colonoscopy on 3/7/2018.  He used his port on Monday without difficulty to receive chemotherapy.  His colonoscopy was essentially normal.  A biopsy in his rectum showed benign lymphoid aggregates only.  This was discussed with him today.    EXAM:  Vitals:    03/22/18 0906   BP: 142/76         Abdomen soft, port site well-healed on left chest    ASSESSMENT:    Status post port and colonoscopy    PLAN:    Overall doing well.  Can follow-up with me as needed.          This document has been electronically signed by Kris Solano MD on March 22, 2018 9:19 AM

## 2018-03-23 PROCEDURE — 77386: CPT | Performed by: RADIOLOGY

## 2018-03-23 PROCEDURE — 77336 RADIATION PHYSICS CONSULT: CPT | Performed by: RADIOLOGY

## 2018-03-26 ENCOUNTER — INFUSION (OUTPATIENT)
Dept: ONCOLOGY | Facility: HOSPITAL | Age: 58
End: 2018-03-26

## 2018-03-26 ENCOUNTER — OFFICE VISIT (OUTPATIENT)
Dept: ONCOLOGY | Facility: CLINIC | Age: 58
End: 2018-03-26

## 2018-03-26 VITALS
WEIGHT: 250.44 LBS | TEMPERATURE: 98.7 F | BODY MASS INDEX: 33.92 KG/M2 | RESPIRATION RATE: 20 BRPM | HEART RATE: 65 BPM | SYSTOLIC BLOOD PRESSURE: 142 MMHG | DIASTOLIC BLOOD PRESSURE: 71 MMHG | HEIGHT: 72 IN

## 2018-03-26 DIAGNOSIS — E87.6 HYPOKALEMIA: ICD-10-CM

## 2018-03-26 DIAGNOSIS — Z45.2 ENCOUNTER FOR VENOUS ACCESS DEVICE CARE: Primary | ICD-10-CM

## 2018-03-26 DIAGNOSIS — D64.9 ANEMIA, UNSPECIFIED TYPE: Primary | ICD-10-CM

## 2018-03-26 DIAGNOSIS — C32.9 SQUAMOUS CELL CARCINOMA OF LARYNX (HCC): ICD-10-CM

## 2018-03-26 LAB
ANION GAP SERPL CALCULATED.3IONS-SCNC: 16 MMOL/L (ref 5–15)
BASOPHILS # BLD AUTO: 0.04 10*3/MM3 (ref 0–0.2)
BASOPHILS NFR BLD AUTO: 0.5 % (ref 0–2)
BUN BLD-MCNC: 14 MG/DL (ref 7–21)
BUN/CREAT SERPL: 11.7 (ref 7–25)
CALCIUM SPEC-SCNC: 8.9 MG/DL (ref 8.4–10.2)
CHLORIDE SERPL-SCNC: 102 MMOL/L (ref 95–110)
CO2 SERPL-SCNC: 23 MMOL/L (ref 22–31)
CREAT BLD-MCNC: 1.2 MG/DL (ref 0.7–1.3)
DEPRECATED RDW RBC AUTO: 45 FL (ref 35.1–43.9)
EOSINOPHIL # BLD AUTO: 0.26 10*3/MM3 (ref 0–0.7)
EOSINOPHIL NFR BLD AUTO: 3 % (ref 0–7)
ERYTHROCYTE [DISTWIDTH] IN BLOOD BY AUTOMATED COUNT: 16.5 % (ref 11.5–14.5)
GFR SERPL CREATININE-BSD FRML MDRD: 62 ML/MIN/1.73 (ref 56–130)
GLUCOSE BLD-MCNC: 110 MG/DL (ref 60–100)
HCT VFR BLD AUTO: 31.3 % (ref 39–49)
HGB BLD-MCNC: 10.1 G/DL (ref 13.7–17.3)
IMM GRANULOCYTES # BLD: 0.07 10*3/MM3 (ref 0–0.02)
IMM GRANULOCYTES NFR BLD: 0.8 % (ref 0–0.5)
LYMPHOCYTES # BLD AUTO: 1.11 10*3/MM3 (ref 0.6–4.2)
LYMPHOCYTES NFR BLD AUTO: 12.8 % (ref 10–50)
MCH RBC QN AUTO: 23.8 PG (ref 26.5–34)
MCHC RBC AUTO-ENTMCNC: 32.3 G/DL (ref 31.5–36.3)
MCV RBC AUTO: 73.8 FL (ref 80–98)
MONOCYTES # BLD AUTO: 0.32 10*3/MM3 (ref 0–0.9)
MONOCYTES NFR BLD AUTO: 3.7 % (ref 0–12)
NEUTROPHILS # BLD AUTO: 6.85 10*3/MM3 (ref 2–8.6)
NEUTROPHILS NFR BLD AUTO: 79.2 % (ref 37–80)
NRBC BLD MANUAL-RTO: 0 /100 WBC (ref 0–0)
PLAT MORPH BLD: NORMAL
PLATELET # BLD AUTO: 296 10*3/MM3 (ref 150–450)
PMV BLD AUTO: 10.7 FL (ref 8–12)
POTASSIUM BLD-SCNC: 3.3 MMOL/L (ref 3.5–5.1)
RBC # BLD AUTO: 4.24 10*6/MM3 (ref 4.37–5.74)
RBC MORPH BLD: NORMAL
SODIUM BLD-SCNC: 141 MMOL/L (ref 137–145)
WBC MORPH BLD: NORMAL
WBC NRBC COR # BLD: 8.65 10*3/MM3 (ref 3.2–9.8)

## 2018-03-26 PROCEDURE — 25010000002 DIPHENHYDRAMINE PER 50 MG: Performed by: INTERNAL MEDICINE

## 2018-03-26 PROCEDURE — 96413 CHEMO IV INFUSION 1 HR: CPT | Performed by: INTERNAL MEDICINE

## 2018-03-26 PROCEDURE — 36415 COLL VENOUS BLD VENIPUNCTURE: CPT

## 2018-03-26 PROCEDURE — 77427 RADIATION TX MANAGEMENT X5: CPT | Performed by: RADIOLOGY

## 2018-03-26 PROCEDURE — 85025 COMPLETE CBC W/AUTO DIFF WBC: CPT

## 2018-03-26 PROCEDURE — 80048 BASIC METABOLIC PNL TOTAL CA: CPT

## 2018-03-26 PROCEDURE — 25010000002 PALONOSETRON PER 25 MCG: Performed by: INTERNAL MEDICINE

## 2018-03-26 PROCEDURE — 96417 CHEMO IV INFUS EACH ADDL SEQ: CPT | Performed by: INTERNAL MEDICINE

## 2018-03-26 PROCEDURE — 25010000002 PACLITAXEL PER 30 MG: Performed by: INTERNAL MEDICINE

## 2018-03-26 PROCEDURE — 25010000002 CARBOPLATIN PER 50 MG: Performed by: INTERNAL MEDICINE

## 2018-03-26 PROCEDURE — 25010000003 DEXAMETHASONE SODIUM PHOSPHATE 100 MG/10ML SOLUTION 10 ML VIAL: Performed by: INTERNAL MEDICINE

## 2018-03-26 PROCEDURE — 77386: CPT

## 2018-03-26 PROCEDURE — 96375 TX/PRO/DX INJ NEW DRUG ADDON: CPT | Performed by: INTERNAL MEDICINE

## 2018-03-26 PROCEDURE — 25010000002 HEPARIN FLUSH (PORCINE) 100 UNIT/ML SOLUTION: Performed by: INTERNAL MEDICINE

## 2018-03-26 PROCEDURE — 99214 OFFICE O/P EST MOD 30 MIN: CPT | Performed by: INTERNAL MEDICINE

## 2018-03-26 RX ORDER — SODIUM CHLORIDE 0.9 % (FLUSH) 0.9 %
10 SYRINGE (ML) INJECTION AS NEEDED
Status: DISCONTINUED | OUTPATIENT
Start: 2018-03-26 | End: 2018-03-26 | Stop reason: HOSPADM

## 2018-03-26 RX ORDER — PALONOSETRON 0.05 MG/ML
0.25 INJECTION, SOLUTION INTRAVENOUS ONCE
Status: CANCELLED | OUTPATIENT
Start: 2018-03-26

## 2018-03-26 RX ORDER — POTASSIUM CHLORIDE 750 MG/1
40 CAPSULE, EXTENDED RELEASE ORAL ONCE
Status: COMPLETED | OUTPATIENT
Start: 2018-03-26 | End: 2018-03-26

## 2018-03-26 RX ORDER — FAMOTIDINE 10 MG/ML
20 INJECTION, SOLUTION INTRAVENOUS ONCE
Status: COMPLETED | OUTPATIENT
Start: 2018-03-26 | End: 2018-03-26

## 2018-03-26 RX ORDER — PALONOSETRON 0.05 MG/ML
0.25 INJECTION, SOLUTION INTRAVENOUS ONCE
Status: COMPLETED | OUTPATIENT
Start: 2018-03-26 | End: 2018-03-26

## 2018-03-26 RX ORDER — POTASSIUM CHLORIDE 750 MG/1
40 CAPSULE, EXTENDED RELEASE ORAL ONCE
Status: CANCELLED
Start: 2018-03-26 | End: 2018-03-26

## 2018-03-26 RX ORDER — SODIUM CHLORIDE 0.9 % (FLUSH) 0.9 %
10 SYRINGE (ML) INJECTION AS NEEDED
Status: CANCELLED | OUTPATIENT
Start: 2018-04-02

## 2018-03-26 RX ORDER — SODIUM CHLORIDE 9 MG/ML
250 INJECTION, SOLUTION INTRAVENOUS ONCE
Status: CANCELLED | OUTPATIENT
Start: 2018-03-26

## 2018-03-26 RX ORDER — FAMOTIDINE 10 MG/ML
20 INJECTION, SOLUTION INTRAVENOUS ONCE
Status: CANCELLED | OUTPATIENT
Start: 2018-03-26

## 2018-03-26 RX ORDER — SODIUM CHLORIDE 9 MG/ML
250 INJECTION, SOLUTION INTRAVENOUS ONCE
Status: COMPLETED | OUTPATIENT
Start: 2018-03-26 | End: 2018-03-26

## 2018-03-26 RX ADMIN — PALONOSETRON HYDROCHLORIDE 0.25 MG: 0.25 INJECTION INTRAVENOUS at 12:09

## 2018-03-26 RX ADMIN — Medication 10 ML: at 09:54

## 2018-03-26 RX ADMIN — FAMOTIDINE 20 MG: 10 INJECTION INTRAVENOUS at 10:46

## 2018-03-26 RX ADMIN — PACLITAXEL 120 MG: 6 INJECTION, SOLUTION INTRAVENOUS at 12:26

## 2018-03-26 RX ADMIN — Medication 10 ML: at 14:23

## 2018-03-26 RX ADMIN — POTASSIUM CHLORIDE 40 MEQ: 750 CAPSULE, EXTENDED RELEASE ORAL at 13:39

## 2018-03-26 RX ADMIN — SODIUM CHLORIDE 250 ML: 9 INJECTION, SOLUTION INTRAVENOUS at 10:41

## 2018-03-26 RX ADMIN — DEXAMETHASONE SODIUM PHOSPHATE 12 MG: 10 INJECTION, SOLUTION INTRAMUSCULAR; INTRAVENOUS at 11:33

## 2018-03-26 RX ADMIN — SODIUM CHLORIDE, PRESERVATIVE FREE 500 UNITS: 5 INJECTION INTRAVENOUS at 14:23

## 2018-03-26 RX ADMIN — DIPHENHYDRAMINE HYDROCHLORIDE 25 MG: 50 INJECTION INTRAMUSCULAR; INTRAVENOUS at 11:08

## 2018-03-26 RX ADMIN — CARBOPLATIN 270 MG: 10 INJECTION, SOLUTION INTRAVENOUS at 13:38

## 2018-03-26 NOTE — PROGRESS NOTES
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  Upon staging workup patient was found to have stage III squamous cell cancer of larynx for which she has been started on concurrent chemoradiation with weekly carboplatin and Taxol on March 19, 2018.  He is here to get week 2 of carboplatin and Taxol today.  Complains of intermittent bleeding from tracheostomy.    Denies any new lymph node enlargement.        PAST MEDICAL HISTORY:    Past Medical History:   Diagnosis Date   • Aortic dissection    • Chest pain    • Disease of thyroid gland    • HTN (hypertension)    • Knee pain    • Sleep apnea    • Smoker    • Squamous cell carcinoma of larynx 2/5/2018   • Stroke    • Swallowing difficulty        SOCIAL HISTORY:    Social History   Substance Use Topics   • Smoking status: Former Smoker     Packs/day: 1.25     Years: 38.00     Quit date: 2/13/2017   • Smokeless tobacco: Never Used      Comment: 02/27/2018 - Patient confirmed he ceased utilization of tobacco products 02/13/2017.   • Alcohol use No       Surgical History :  Past Surgical History:   Procedure Laterality Date   • AORTA SURGERY      ruptured aorta repair   • ASCENDING ARCH/HEMIARCH REPLACEMENT N/A 2/14/2017    Procedure: INTRAOPERATIVE TRANSESOPHAGEAL ECHOCARDIOGRAM, MIDLINE STERNOTOMY, ASCENDING AORTIC  AND PROXIMAL AORTIC ARCH REPAIR WITH 26MM GRAFT, AORTIC VALVE RESUSPENSION, AORTIC ROOT REPAIR, OPEN VEIN HARVEST OF RIGHT LEG;  Surgeon: German Arreguin MD;  Location: [facility];  Service:    • BRONCHOSCOPY N/A 2/17/2017    Procedure: BRONCHOSCOPY BIOPSY  AT BEDSIDE WITH BAL-LEFT LOWER LOBE;  Surgeon: Trent Chaney MD;  Location: [facility] ENDOSCOPY;  Service:    • COLONOSCOPY N/A 3/7/2018    Procedure: COLONOSCOPY WITH POSSIBLE POLYPECTOMY   ( DONE IN OR WITH ENDO)      COLONOSCOPY FIRST;  Surgeon: Kris Solano MD;  Location: [facility] OR;  Service:    • DIRECT LARYNGOSCOPY, ESOPHAGOSCOPY, BRONCHOSCOPY N/A 2/5/2018    Procedure: DIRECT LARYNGOSCOPY WITH BIOPSY, ESOPHAGOSCOPY     (no bronchoscopy, no laser);  Surgeon: Joseph Venegas MD;  Location: [facility] OR;  Service:    • TRACHEOSTOMY  02/05/2018   • TRACHEOSTOMY N/A 2/5/2018    Procedure: TRACHEOSTOMY  local injection @ 1106 incision @ 1119;  Surgeon: Joseph Venegas MD;  Location: [facility] OR;  Service:    • VENOUS ACCESS DEVICE (PORT) INSERTION N/A 3/7/2018    Procedure: INSERTION OF MEDIPORT     (C-ARM#1);  Surgeon: Kris Solano MD;  Location: [facility];  Service:        ALLERGIES:    Allergies   Allergen Reactions   • Chlorhexidine Itching     Topical cleaning wipes causes severe skin irritation   • Co Q 10 [Coenzyme Q10] Itching   • Eggs Or Egg-Derived Products Swelling   • Erythromycin Other (See Comments)     Joint pains and cold sweats   • Other GI Intolerance     Mycins     • Penicillins      Tolerated cefepime during 2/2017 admission. Had rash and itching (relieved by benadryl) after few doses of cefepime and cefepime was continued.   • Tetracyclines & Related GI Intolerance   • Acth [Corticotropin] Rash   • Cephalosporins Itching and Rash     Pt developed rash, itching after cefepime administration (2-3 doses) during 2/2017 admission. Itching was relieved with benadryl. Cefepime was continued because his infection was improving and no symptoms of anaphylaxis present   • Keflex [Cephalexin] Rash         FAMILY HISTORY:  Family History   Problem Relation Age of Onset   • Thyroid disease Mother    • Hypertension Mother    • Hypertension Father    • Hypertension Other   "          REVIEW OF SYSTEMS:      CONSTITUTIONAL:  Complains of fatigue. 5 pound weight loss in last 1 month.  Denies any fever, chills .      HEENT:  Some visual disturbance affecting left eye since stroke in 2017.  Complains of hoarseness of voice.  Complains of difficulty swallowing food for last 4 months.     RESPIRATORY:  Complains of chronic shortness of breath, denies any recent worsening.  No new cough or hemoptysis.     CARDIOVASCULAR:  No chest pain or palpitations.     GASTROINTESTINAL:  No abdominal pain nausea, vomiting or blood in the stool.     GENITOURINARY: No Dysuria or Hematuria.     MUSCULOSKELETAL:  No new back pain or arthralgia..     LYMPHATICS:  Denies any abnormal swollen glands anywhere in the body.     NEUROLOGICAL : No tingling or numbness. No headache or dizziness. No seizures or balance problems.     SKIN: Complains of intermittent bleeding from Tracheostomy.          PHYSICAL EXAMINATION:      VITAL SIGNS:  /71   Pulse 65   Temp 98.7 °F (37.1 °C) (Temporal Artery )   Resp 20   Ht 182.9 cm (72.01\")   Wt 114 kg (250 lb 7.1 oz)   BMI 33.96 kg/m²   1    03/26/18  1000   Weight: 114 kg (250 lb 7.1 oz)       ECOG  performance status: 1      GENERAL:  Not in any distress.    HEENT:  Normocephalic, Atraumatic.Mild Conjunctival pallor. No icterus. Extraocular Movements Intact. No Facial Asymmetry noted.    NECK:  No adenopathy. No JVD.Tracheostomy present.    RESPIRATORY:  Fair air entry bilateral. No rhonchi or wheezing.    CARDIOVASCULAR:  S1, S2. Regular rate and rhythm. No murmur or gallop appreciated.    ABDOMEN:  Soft, obese, nontender. Bowel sounds present in all four quadrants.  No organomegaly appreciated.    EXTREMITIES:  No edema.No Calf Tenderness.    NEUROLOGIC:  Alert, awake and oriented ×3.  No  Motor or sensory deficit appreciated. Cranial Nerves 2-12 grossly intact.    SKIN : No new skin lesion identified        DIAGNOSTIC DATA:    Glucose   Date Value Ref Range " Status   03/26/2018 110 (H) 60 - 100 mg/dL Final     Sodium   Date Value Ref Range Status   03/26/2018 141 137 - 145 mmol/L Final     Potassium   Date Value Ref Range Status   03/26/2018 3.3 (L) 3.5 - 5.1 mmol/L Final     CO2   Date Value Ref Range Status   03/26/2018 23.0 22.0 - 31.0 mmol/L Final     Chloride   Date Value Ref Range Status   03/26/2018 102 95 - 110 mmol/L Final     Anion Gap   Date Value Ref Range Status   03/26/2018 16.0 (H) 5.0 - 15.0 mmol/L Final     Creatinine   Date Value Ref Range Status   03/26/2018 1.20 0.70 - 1.30 mg/dL Final     BUN   Date Value Ref Range Status   03/26/2018 14 7 - 21 mg/dL Final     BUN/Creatinine Ratio   Date Value Ref Range Status   03/26/2018 11.7 7.0 - 25.0 Final     Calcium   Date Value Ref Range Status   03/26/2018 8.9 8.4 - 10.2 mg/dL Final     eGFR Non  Amer   Date Value Ref Range Status   03/26/2018 62 56 - 130 mL/min/1.73 Final     Alkaline Phosphatase   Date Value Ref Range Status   03/19/2018 86 38 - 126 U/L Final     Total Protein   Date Value Ref Range Status   03/19/2018 6.9 6.3 - 8.6 g/dL Final     ALT (SGPT)   Date Value Ref Range Status   03/19/2018 61 21 - 72 U/L Final     AST (SGOT)   Date Value Ref Range Status   03/19/2018 44 17 - 59 U/L Final     Total Bilirubin   Date Value Ref Range Status   03/19/2018 0.2 0.2 - 1.3 mg/dL Final     Albumin   Date Value Ref Range Status   03/19/2018 3.80 3.40 - 4.80 g/dL Final     Globulin   Date Value Ref Range Status   03/19/2018 3.1 2.3 - 3.5 gm/dL Final     A/G Ratio   Date Value Ref Range Status   03/19/2018 1.2 1.1 - 1.8 g/dL Final     Lab Results   Component Value Date    WBC 8.65 03/26/2018    HGB 10.1 (L) 03/26/2018    HCT 31.3 (L) 03/26/2018    MCV 73.8 (L) 03/26/2018     03/26/2018     Lab Results   Component Value Date    NEUTROABS 6.85 03/26/2018    IRON 14 (L) 02/13/2018    TIBC 335 02/13/2018    LABIRON 4 (L) 02/13/2018    FERRITIN 64.40 02/13/2018    [id number] 546 03/14/2018    FOLATE  >20.00 02/13/2018     No results found for: , LABCA2, AFPTM, HCGQUANT, , CHROMGRNA, [id number], CEA, REFLABREPO        Radiology Data :  PET CT done on February 16, 2018 was reviewed, discussed with patient, it showed:  IMPRESSION:  CONCLUSION:   1.  Soft tissue mass in the region of the epiglottis obstructing  the airway, associated with hypermetabolic activity, compatible  with known neoplasm.  2.  Activity around the tracheostomy site is likely inflammatory.  3.  Activity in the rectum (SUV max 7.1) is probably either  physiological or inflammatory. Recommend direct visualization.  4.  There is a focus of activity in the region of the right  common iliac artery without a definite CT abnormality (SUV max  5.4) possibly representing a lymph node, nonspecific but cannot  rule out neoplasm.  5.  Foci of activity posterior to the ischial tuberosities are  probably inflammatory.  6.  There are diffuse punctate low-level foci of activity  throughout the osseous structures, liver, kidneys, mediastinum  which are more likely artifactual than due to metastatic disease.    ADDENDUM   ADDENDUM #1         Upon review of the patient's imaging with Dr. Tapia, there  appears to be extension of tumor into the left side of the  preepiglottic space.        Ct of Neck with contrast done on January 29, 2018 showed:  IMPRESSION:  3 x 3.5 x 3.5 cm soft tissue mass with lobular margin centered at  the level the epiglottis however flush 4 cm intimate with the  posterior margin of the tongue base and the posterior margin of  the oral pharynx. This does contribute to moderate compromise of  the airway. Finding is suspicious for malignancy.      Mostly subcentimeter cervical chain lymph nodes present  bilaterally. The largest node right level II measuring 1.3 cm. No  necrotic or conglomerate adenopathy.      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  The  dissection was described on a CTA coronary artery exam from  2/14/2017.        Pathology :  Pathology result from February 5, 2018 showed:  Final Diagnosis   1.  TUMOR, EPIGLOTTIS:   INVASIVE SQUAMOUS CELL CARCINOMA, NON KERATINIZING, POORLY DIFFERENTIATED.       2.  TUMOR, EPIGLOTTIS:   INVASIVE SQUAMOUS CELL CARCINOMA, NON KERATINIZING, POORLY DIFFERENTIATED.               ASSESSMENT AND PLAN:      1.  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  Based on PET/CT there is a tumor extension into preepiglottic space making a T3 lesion.  Result of PET CT were discussed with patient.  It was discussed with patient with T3 N0 lesion after his treatment option includes either surgery or concurrent chemoradiation.  He was started on concurrent chemoradiation on March 19, 2018.  Patient tolerated the first week well without any excessive side effects.  We will go ahead with week 2 of carboplatin and Taxol today on March 26, 2018.  We will see patient back in about 2 weeks with a repeat CBC and CMP on that day.  We'll continue with weekly Taxol which was discussed with patient.  He was encouraged to call us with any unusual side effects from chemotherapy.    2.  Normocytic anemia: Patient admits to recent epistaxis as well as blood clots after biopsy.   anemia workup was consistent with anemia of chronic disease with component of fine deficiency with saturation being only 4%.  In taking ferrous sulfate at least 3-4 times a week along with Colace.  Hemoglobin is stable at 10.2.     3.  History of aortic dissection status post repair.     4.  History of CVA with residual visual disturbance in left eye    5.  Problem with tracheostomy: Followed by Dr. Venegas    6. hypokalemia: Patient's potassium is 3.3.  We will give indicated 40 mg by mouth ×1 today.     7.  Health maintenance: Patient quit smoking in February 2017.  Never had a screening colonoscopy done.  Remains full code.     Reza Patel,  MD  3/26/2018  12:37 PM        EMR Dragon/Transcription disclaimer:   Much of this encounter note is an electronic transcription/translation of spoken language to printed text. The electronic translation of spoken language may permit erroneous, or at times, nonsensical words or phrases to be inadvertently transcribed; Although I have reviewed the note for such errors, some may still exist.

## 2018-03-27 PROCEDURE — 77386: CPT | Performed by: RADIOLOGY

## 2018-03-27 PROCEDURE — 77417 THER RADIOLOGY PORT IMAGE(S): CPT | Performed by: RADIOLOGY

## 2018-03-27 PROCEDURE — 77336 RADIATION PHYSICS CONSULT: CPT | Performed by: RADIOLOGY

## 2018-03-28 ENCOUNTER — RADIATION ONCOLOGY WEEKLY ASSESSMENT (OUTPATIENT)
Dept: RADIATION ONCOLOGY | Facility: HOSPITAL | Age: 58
End: 2018-03-28

## 2018-03-28 VITALS
DIASTOLIC BLOOD PRESSURE: 73 MMHG | TEMPERATURE: 97.2 F | HEIGHT: 72 IN | WEIGHT: 254.41 LBS | RESPIRATION RATE: 18 BRPM | BODY MASS INDEX: 34.46 KG/M2 | HEART RATE: 56 BPM | SYSTOLIC BLOOD PRESSURE: 148 MMHG

## 2018-03-28 DIAGNOSIS — C32.9 SQUAMOUS CELL CARCINOMA OF LARYNX (HCC): Primary | ICD-10-CM

## 2018-03-28 PROCEDURE — 77386: CPT | Performed by: RADIOLOGY

## 2018-03-28 NOTE — PROGRESS NOTES
On Treatment Visit       Patient: Truman Esquivel   YOB: 1960   Medical Record Number: [id number]     Date of Visit  March 28, 2018   Primary Diagnosis: Stage III (cT3 N0 M0) squamous cell carcinoma of the supraglottic larynx.  ICD 10 Code: C32.9      was seen today for an on treatment visit.  He is receiving radiation therapy to the larynx/neck. He  has received 1600 cGy in 8 fractions out of a planned dose of 7000 cGy in 35 fractions. He is receiving concurrent carboplatin/Taxol chemotherapy per Dr. Patel, and received his 2nd and most recent cycle on 3/26/18.     Today on exam the patient is tolerating radiation therapy well and has no new disease or treatment-related complaints. He states that using a cough suppressant prior to his daily radiation treatments has helped with his coughing while wearing a mask on the treatment table.                                              Vitals:     Vitals:    03/28/18 1540   BP: 148/73   Pulse: 56   Resp: 18   Temp: 97.2 °F (36.2 °C)       Weight:   Wt Readings from Last 3 Encounters:   03/28/18 115 kg (254 lb 6.6 oz)   03/26/18 114 kg (250 lb 7.1 oz)   03/22/18 115 kg (254 lb)      Pain:    Pain Score    03/28/18 1540   PainSc: 3  Comment: trach area         Plan: We plan to continue radiation therapy as prescribed.     Jarocho Tapia MD  Radiation Oncology    Electronically signed by Jarocho Tapia MD 3/28/2018  3:55 PM     cc: Dr. Joseph Harrison

## 2018-03-29 PROCEDURE — 77386: CPT | Performed by: RADIOLOGY

## 2018-03-30 PROCEDURE — 77386: CPT | Performed by: RADIOLOGY

## 2018-03-30 PROCEDURE — 77336 RADIATION PHYSICS CONSULT: CPT | Performed by: RADIOLOGY

## 2018-04-02 ENCOUNTER — INFUSION (OUTPATIENT)
Dept: ONCOLOGY | Facility: HOSPITAL | Age: 58
End: 2018-04-02

## 2018-04-02 ENCOUNTER — HOSPITAL ENCOUNTER (OUTPATIENT)
Dept: RADIATION ONCOLOGY | Facility: HOSPITAL | Age: 58
Setting detail: RADIATION/ONCOLOGY SERIES
End: 2018-04-02

## 2018-04-02 VITALS
DIASTOLIC BLOOD PRESSURE: 84 MMHG | RESPIRATION RATE: 18 BRPM | SYSTOLIC BLOOD PRESSURE: 160 MMHG | TEMPERATURE: 98.6 F | HEART RATE: 88 BPM

## 2018-04-02 DIAGNOSIS — C32.9 SQUAMOUS CELL CARCINOMA OF LARYNX (HCC): Primary | ICD-10-CM

## 2018-04-02 DIAGNOSIS — Z45.2 ENCOUNTER FOR VENOUS ACCESS DEVICE CARE: ICD-10-CM

## 2018-04-02 LAB
ANION GAP SERPL CALCULATED.3IONS-SCNC: 14 MMOL/L (ref 5–15)
BASOPHILS # BLD AUTO: 0.03 10*3/MM3 (ref 0–0.2)
BASOPHILS NFR BLD AUTO: 0.7 % (ref 0–2)
BUN BLD-MCNC: 14 MG/DL (ref 7–21)
BUN/CREAT SERPL: 13 (ref 7–25)
CALCIUM SPEC-SCNC: 8.9 MG/DL (ref 8.4–10.2)
CHLORIDE SERPL-SCNC: 100 MMOL/L (ref 95–110)
CO2 SERPL-SCNC: 25 MMOL/L (ref 22–31)
CREAT BLD-MCNC: 1.08 MG/DL (ref 0.7–1.3)
DEPRECATED RDW RBC AUTO: 47.6 FL (ref 35.1–43.9)
EOSINOPHIL # BLD AUTO: 0.11 10*3/MM3 (ref 0–0.7)
EOSINOPHIL NFR BLD AUTO: 2.6 % (ref 0–7)
ERYTHROCYTE [DISTWIDTH] IN BLOOD BY AUTOMATED COUNT: 17.9 % (ref 11.5–14.5)
GFR SERPL CREATININE-BSD FRML MDRD: 70 ML/MIN/1.73 (ref 56–130)
GLUCOSE BLD-MCNC: 117 MG/DL (ref 60–100)
HCT VFR BLD AUTO: 30.1 % (ref 39–49)
HGB BLD-MCNC: 10 G/DL (ref 13.7–17.3)
IMM GRANULOCYTES # BLD: 0.01 10*3/MM3 (ref 0–0.02)
IMM GRANULOCYTES NFR BLD: 0.2 % (ref 0–0.5)
LYMPHOCYTES # BLD AUTO: 0.66 10*3/MM3 (ref 0.6–4.2)
LYMPHOCYTES NFR BLD AUTO: 15.5 % (ref 10–50)
MAGNESIUM SERPL-MCNC: 2 MG/DL (ref 1.6–2.3)
MCH RBC QN AUTO: 24.8 PG (ref 26.5–34)
MCHC RBC AUTO-ENTMCNC: 33.2 G/DL (ref 31.5–36.3)
MCV RBC AUTO: 74.5 FL (ref 80–98)
MONOCYTES # BLD AUTO: 0.25 10*3/MM3 (ref 0–0.9)
MONOCYTES NFR BLD AUTO: 5.9 % (ref 0–12)
NEUTROPHILS # BLD AUTO: 3.2 10*3/MM3 (ref 2–8.6)
NEUTROPHILS NFR BLD AUTO: 75.1 % (ref 37–80)
PLATELET # BLD AUTO: 213 10*3/MM3 (ref 150–450)
PMV BLD AUTO: 10.7 FL (ref 8–12)
POTASSIUM BLD-SCNC: 3.6 MMOL/L (ref 3.5–5.1)
RBC # BLD AUTO: 4.04 10*6/MM3 (ref 4.37–5.74)
SODIUM BLD-SCNC: 139 MMOL/L (ref 137–145)
WBC NRBC COR # BLD: 4.26 10*3/MM3 (ref 3.2–9.8)

## 2018-04-02 PROCEDURE — 80048 BASIC METABOLIC PNL TOTAL CA: CPT

## 2018-04-02 PROCEDURE — 96413 CHEMO IV INFUSION 1 HR: CPT | Performed by: INTERNAL MEDICINE

## 2018-04-02 PROCEDURE — 85025 COMPLETE CBC W/AUTO DIFF WBC: CPT

## 2018-04-02 PROCEDURE — 77427 RADIATION TX MANAGEMENT X5: CPT | Performed by: RADIOLOGY

## 2018-04-02 PROCEDURE — 96375 TX/PRO/DX INJ NEW DRUG ADDON: CPT | Performed by: INTERNAL MEDICINE

## 2018-04-02 PROCEDURE — 25010000003 DEXAMETHASONE SODIUM PHOSPHATE 100 MG/10ML SOLUTION 10 ML VIAL: Performed by: INTERNAL MEDICINE

## 2018-04-02 PROCEDURE — 25010000002 PALONOSETRON PER 25 MCG: Performed by: INTERNAL MEDICINE

## 2018-04-02 PROCEDURE — 25010000002 CARBOPLATIN PER 50 MG: Performed by: INTERNAL MEDICINE

## 2018-04-02 PROCEDURE — 25010000002 PACLITAXEL PER 30 MG: Performed by: INTERNAL MEDICINE

## 2018-04-02 PROCEDURE — 83735 ASSAY OF MAGNESIUM: CPT

## 2018-04-02 PROCEDURE — 96417 CHEMO IV INFUS EACH ADDL SEQ: CPT | Performed by: INTERNAL MEDICINE

## 2018-04-02 PROCEDURE — 77386: CPT | Performed by: RADIOLOGY

## 2018-04-02 PROCEDURE — 25010000002 DIPHENHYDRAMINE PER 50 MG: Performed by: INTERNAL MEDICINE

## 2018-04-02 PROCEDURE — 25010000002 HEPARIN FLUSH (PORCINE) 100 UNIT/ML SOLUTION: Performed by: INTERNAL MEDICINE

## 2018-04-02 RX ORDER — FAMOTIDINE 10 MG/ML
20 INJECTION, SOLUTION INTRAVENOUS ONCE
Status: COMPLETED | OUTPATIENT
Start: 2018-04-02 | End: 2018-04-02

## 2018-04-02 RX ORDER — SODIUM CHLORIDE 0.9 % (FLUSH) 0.9 %
10 SYRINGE (ML) INJECTION AS NEEDED
Status: CANCELLED | OUTPATIENT
Start: 2018-04-02

## 2018-04-02 RX ORDER — PALONOSETRON 0.05 MG/ML
0.25 INJECTION, SOLUTION INTRAVENOUS ONCE
Status: CANCELLED | OUTPATIENT
Start: 2018-04-02

## 2018-04-02 RX ORDER — SODIUM CHLORIDE 9 MG/ML
250 INJECTION, SOLUTION INTRAVENOUS ONCE
Status: CANCELLED | OUTPATIENT
Start: 2018-04-02

## 2018-04-02 RX ORDER — FAMOTIDINE 10 MG/ML
20 INJECTION, SOLUTION INTRAVENOUS ONCE
Status: CANCELLED | OUTPATIENT
Start: 2018-04-02

## 2018-04-02 RX ORDER — SODIUM CHLORIDE 0.9 % (FLUSH) 0.9 %
10 SYRINGE (ML) INJECTION AS NEEDED
Status: DISCONTINUED | OUTPATIENT
Start: 2018-04-02 | End: 2018-04-02 | Stop reason: HOSPADM

## 2018-04-02 RX ORDER — PALONOSETRON 0.05 MG/ML
0.25 INJECTION, SOLUTION INTRAVENOUS ONCE
Status: COMPLETED | OUTPATIENT
Start: 2018-04-02 | End: 2018-04-02

## 2018-04-02 RX ORDER — SODIUM CHLORIDE 9 MG/ML
250 INJECTION, SOLUTION INTRAVENOUS ONCE
Status: COMPLETED | OUTPATIENT
Start: 2018-04-02 | End: 2018-04-02

## 2018-04-02 RX ADMIN — CARBOPLATIN 300 MG: 10 INJECTION, SOLUTION INTRAVENOUS at 12:50

## 2018-04-02 RX ADMIN — SODIUM CHLORIDE 250 ML: 9 INJECTION, SOLUTION INTRAVENOUS at 10:03

## 2018-04-02 RX ADMIN — PACLITAXEL 120 MG: 6 INJECTION, SOLUTION INTRAVENOUS at 11:37

## 2018-04-02 RX ADMIN — PALONOSETRON HYDROCHLORIDE 0.25 MG: 0.25 INJECTION INTRAVENOUS at 11:11

## 2018-04-02 RX ADMIN — DEXAMETHASONE SODIUM PHOSPHATE 12 MG: 10 INJECTION, SOLUTION INTRAMUSCULAR; INTRAVENOUS at 10:43

## 2018-04-02 RX ADMIN — DIPHENHYDRAMINE HYDROCHLORIDE 25 MG: 50 INJECTION INTRAMUSCULAR; INTRAVENOUS at 10:18

## 2018-04-02 RX ADMIN — FAMOTIDINE 20 MG: 10 INJECTION INTRAVENOUS at 10:06

## 2018-04-02 RX ADMIN — SODIUM CHLORIDE, PRESERVATIVE FREE 500 UNITS: 5 INJECTION INTRAVENOUS at 13:40

## 2018-04-02 RX ADMIN — Medication 10 ML: at 08:52

## 2018-04-02 RX ADMIN — Medication 10 ML: at 13:40

## 2018-04-03 PROCEDURE — 77386: CPT | Performed by: RADIOLOGY

## 2018-04-04 ENCOUNTER — RADIATION ONCOLOGY WEEKLY ASSESSMENT (OUTPATIENT)
Dept: RADIATION ONCOLOGY | Facility: HOSPITAL | Age: 58
End: 2018-04-04

## 2018-04-04 ENCOUNTER — DOCUMENTATION (OUTPATIENT)
Dept: ONCOLOGY | Facility: CLINIC | Age: 58
End: 2018-04-04

## 2018-04-04 VITALS
HEART RATE: 70 BPM | BODY MASS INDEX: 33.67 KG/M2 | DIASTOLIC BLOOD PRESSURE: 76 MMHG | TEMPERATURE: 98.8 F | OXYGEN SATURATION: 100 % | RESPIRATION RATE: 20 BRPM | SYSTOLIC BLOOD PRESSURE: 173 MMHG | WEIGHT: 248.24 LBS

## 2018-04-04 DIAGNOSIS — C32.9 SQUAMOUS CELL CARCINOMA OF LARYNX (HCC): Primary | ICD-10-CM

## 2018-04-04 PROCEDURE — 77386: CPT | Performed by: RADIOLOGY

## 2018-04-04 NOTE — PROGRESS NOTES
On Treatment Visit       Patient: Truman Esquivel   YOB: 1960   Medical Record Number: [id number]     Date of Visit  April 4, 2018   Primary Diagnosis: Stage III (cT3 N0 M0) squamous cell carcinoma of the supraglottic larynx.  ICD 10 Code: C32.9      was seen today for an on treatment visit.  He is receiving radiation therapy to the larynx/neck. He  has received 2600 cGy in 13 fractions out of a planned dose of 7000 cGy in 35 fractions. He is receiving concurrent carboplatin/Taxol chemotherapy per Dr. Patel, and received his 3rd and most recent cycle on 4/2/18.     Today on exam the patient is complaining of significant difficulty swallowing. He was diagnosed with thrush on Monday per Dr. Patel and was placed on nystatin. He is otherwise tolerating radiation therapy well, and has no other new disease or treatment-related complaints.                                               Vitals:     Vitals:    04/04/18 1518   BP: 173/76   Pulse: 70   Resp: 20   Temp: 98.8 °F (37.1 °C)   SpO2: 100%       Weight:   (Starting weight of 257 pounds on 2/21/18)  Wt Readings from Last 3 Encounters:   04/04/18 113 kg (248 lb 3.8 oz)   03/28/18 115 kg (254 lb 6.6 oz)   03/26/18 114 kg (250 lb 7.1 oz)      Pain:    Pain Score    04/04/18 1518   PainSc: 10-Worst pain ever  Comment: swallowing    #5 not swallowing   PainLoc: Throat         Plan: We plan to continue radiation therapy as prescribed. We will give the patient a 1 day treatment break this Friday, 4/6/18, at his request.    Jarocho Tapia MD  Radiation Oncology    Electronically signed by Jarocho Tapia MD 4/4/2018  3:20 PM     cc: Dr. Joseph Harrison

## 2018-04-05 ENCOUNTER — INFUSION (OUTPATIENT)
Dept: ONCOLOGY | Facility: HOSPITAL | Age: 58
End: 2018-04-05

## 2018-04-05 VITALS
TEMPERATURE: 98.4 F | DIASTOLIC BLOOD PRESSURE: 86 MMHG | RESPIRATION RATE: 20 BRPM | HEART RATE: 70 BPM | WEIGHT: 244.49 LBS | BODY MASS INDEX: 33.16 KG/M2 | SYSTOLIC BLOOD PRESSURE: 158 MMHG

## 2018-04-05 DIAGNOSIS — Z45.2 ENCOUNTER FOR VENOUS ACCESS DEVICE CARE: ICD-10-CM

## 2018-04-05 DIAGNOSIS — E86.0 DEHYDRATION: Primary | ICD-10-CM

## 2018-04-05 PROCEDURE — 25010000002 HEPARIN FLUSH (PORCINE) 100 UNIT/ML SOLUTION: Performed by: INTERNAL MEDICINE

## 2018-04-05 PROCEDURE — 96361 HYDRATE IV INFUSION ADD-ON: CPT | Performed by: INTERNAL MEDICINE

## 2018-04-05 PROCEDURE — 96360 HYDRATION IV INFUSION INIT: CPT | Performed by: INTERNAL MEDICINE

## 2018-04-05 RX ORDER — SODIUM CHLORIDE 0.9 % (FLUSH) 0.9 %
10 SYRINGE (ML) INJECTION AS NEEDED
Status: DISCONTINUED | OUTPATIENT
Start: 2018-04-05 | End: 2018-04-05 | Stop reason: HOSPADM

## 2018-04-05 RX ORDER — SODIUM CHLORIDE 0.9 % (FLUSH) 0.9 %
10 SYRINGE (ML) INJECTION AS NEEDED
Status: CANCELLED | OUTPATIENT
Start: 2018-04-06

## 2018-04-05 RX ADMIN — Medication 10 ML: at 13:50

## 2018-04-05 RX ADMIN — Medication 10 ML: at 15:49

## 2018-04-05 RX ADMIN — SODIUM CHLORIDE: 900 INJECTION, SOLUTION INTRAVENOUS at 13:52

## 2018-04-05 RX ADMIN — SODIUM CHLORIDE, PRESERVATIVE FREE 500 UNITS: 5 INJECTION INTRAVENOUS at 15:49

## 2018-04-05 NOTE — PROGRESS NOTES
Follow up encounter:  Supportive care- emotional    Oncology SW met with patient as he presents for radiation treatment.  Pt is undergoing concurrent chemo/radiation for treatment of Stage III squamous cell carcinoma of the supraglottic larynx.  Pt. Speaks through manipulation of his trach and shared that he is experiencing significant discomfort and pain, specifically related to inability to swallow. Pt. States his food and fluid intake is minimal, having only a shake today.  Pt. Presents reactive to his situation and visibly uncomfortable. Pt. Was assisted by contacting RN and MD notified of pt condition and distress.   Pt obtained fluids while here and mother present for support. Undersigned checked in with patient multiple times as he completed fluids and prior to departure pt. Indicated some relief in physical symptoms. Ongoing support reinforced.

## 2018-04-06 ENCOUNTER — INFUSION (OUTPATIENT)
Dept: ONCOLOGY | Facility: HOSPITAL | Age: 58
End: 2018-04-06

## 2018-04-06 DIAGNOSIS — Z45.2 ENCOUNTER FOR VENOUS ACCESS DEVICE CARE: ICD-10-CM

## 2018-04-06 DIAGNOSIS — E86.0 DEHYDRATION: Primary | ICD-10-CM

## 2018-04-06 PROCEDURE — 96361 HYDRATE IV INFUSION ADD-ON: CPT | Performed by: INTERNAL MEDICINE

## 2018-04-06 PROCEDURE — 25010000002 HEPARIN FLUSH (PORCINE) 100 UNIT/ML SOLUTION: Performed by: INTERNAL MEDICINE

## 2018-04-06 PROCEDURE — 96360 HYDRATION IV INFUSION INIT: CPT | Performed by: INTERNAL MEDICINE

## 2018-04-06 RX ORDER — SODIUM CHLORIDE 0.9 % (FLUSH) 0.9 %
10 SYRINGE (ML) INJECTION AS NEEDED
Status: CANCELLED | OUTPATIENT
Start: 2018-04-09

## 2018-04-06 RX ORDER — SODIUM CHLORIDE 0.9 % (FLUSH) 0.9 %
10 SYRINGE (ML) INJECTION AS NEEDED
Status: DISCONTINUED | OUTPATIENT
Start: 2018-04-06 | End: 2018-04-06 | Stop reason: HOSPADM

## 2018-04-06 RX ADMIN — SODIUM CHLORIDE, PRESERVATIVE FREE 500 UNITS: 5 INJECTION INTRAVENOUS at 14:14

## 2018-04-06 RX ADMIN — SODIUM CHLORIDE: 900 INJECTION, SOLUTION INTRAVENOUS at 11:39

## 2018-04-06 RX ADMIN — Medication 10 ML: at 14:14

## 2018-04-08 NOTE — PROGRESS NOTES
Truman Esquivel is a 57 y.o. male who presents for  evaluation of   Chief Complaint   Patient presents with   • Establish Care     Hypothyroid       Referring provider    Virginie Alvarez, APRN  [address] [place] [address]    Primary Care Provider    Marybeth Harrison DO     Hypothyroidism     Duration 10 years    Timing - Hypothyroidism  is Constant    Quality -  needs improvement and well controlled    Severity -  moderate    Complications - none    Current symptoms/problems  fatigue, weakness, weight gain     Alleviating Factors: Compliance      Side Effects  none      Past Medical History:   Diagnosis Date   • Aortic dissection    • Chest pain    • Disease of thyroid gland    • HTN (hypertension)    • Knee pain    • Sleep apnea    • Smoker    • Squamous cell carcinoma of larynx 2/5/2018   • Stroke    • Swallowing difficulty      Family History   Problem Relation Age of Onset   • Thyroid disease Mother    • Hypertension Mother    • Hypertension Father    • Hypertension Other      Social History   Substance Use Topics   • Smoking status: Former Smoker     Packs/day: 1.25     Years: 38.00     Quit date: 2/13/2017   • Smokeless tobacco: Never Used      Comment: 02/27/2018 - Patient confirmed he ceased utilization of tobacco products 02/13/2017.   • Alcohol use No         Current Outpatient Prescriptions:   •  atorvastatin (LIPITOR) 20 MG tablet, Take 1 tablet by mouth Daily., Disp: 30 tablet, Rfl: 11  •  clopidogrel (PLAVIX) 75 MG tablet, Take 75 mg by mouth Daily. Last dose approx greater than one month ago, Disp: , Rfl:   •  docusate sodium (COLACE) 100 MG capsule, Take 1 capsule by mouth 2 (Two) Times a Day As Needed for Constipation., Disp: 60 capsule, Rfl: 2  •  Multiple Vitamins-Minerals (MULTIVITAMIN ADULT PO), Take 1 tablet by mouth Daily., Disp: , Rfl:   •  ondansetron (ZOFRAN) 4 MG tablet, Take 1 tablet by mouth 3 (Three) Times a Day As Needed for Nausea or Vomiting., Disp: 40 tablet,  Rfl: 3  •  oxyCODONE-acetaminophen (PERCOCET) 5-325 MG per tablet, 1-2 tablets by mouth every 4 hours as needed for pain, Disp: 60 tablet, Rfl: 0  •  vitamin B-12 (CYANOCOBALAMIN) 100 MCG tablet, Take 100 mcg by mouth Daily., Disp: , Rfl:   •  vitamin E 100 UNIT capsule, Take 400 Units by mouth Every Night., Disp: , Rfl:   •  Zinc 50 MG capsule, Take 50 mg by mouth Every Night., Disp: , Rfl:   •  levothyroxine (SYNTHROID) 75 MCG tablet, Take 1 tablet by mouth Daily., Disp: 30 tablet, Rfl: 11  •  losartan-hydrochlorothiazide (HYZAAR) 50-12.5 MG per tablet, Take 1 tablet by mouth Daily., Disp: 30 tablet, Rfl: 12  •  metoprolol succinate XL (TOPROL-XL) 25 MG 24 hr tablet, Take 1 tablet by mouth Every Night. Patient states he hasn't been taking this week, states he thinks he is hypotensive, Disp: 30 tablet, Rfl: 12  •  nystatin (MYCOSTATIN) 123480 UNIT/ML suspension, Swish and swallow 5 mL 4 (Four) Times a Day for 14 days., Disp: 473 mL, Rfl: 1    Review of Systems    Review of Systems   Constitutional: Positive for fatigue and unexpected weight change. Negative for activity change, appetite change, chills, diaphoresis and fever.   HENT: Negative for congestion, dental problem, drooling, ear discharge, ear pain, facial swelling, mouth sores, postnasal drip, rhinorrhea, sinus pressure, sore throat, tinnitus, trouble swallowing and voice change.    Eyes: Negative for photophobia, pain, discharge, redness, itching and visual disturbance.   Respiratory: Negative for apnea, cough, choking, chest tightness, shortness of breath, wheezing and stridor.    Cardiovascular: Negative for chest pain, palpitations and leg swelling.   Gastrointestinal: Negative for abdominal distention, abdominal pain, constipation, diarrhea, nausea and vomiting.   Endocrine: Negative for cold intolerance, heat intolerance, polydipsia, polyphagia and polyuria.   Genitourinary: Negative for decreased urine volume, difficulty urinating, dysuria, flank  "pain, frequency, hematuria and urgency.   Musculoskeletal: Negative for arthralgias, back pain, gait problem, joint swelling, myalgias, neck pain and neck stiffness.   Skin: Negative for color change, pallor, rash and wound.   Allergic/Immunologic: Negative for immunocompromised state.   Neurological: Positive for weakness. Negative for dizziness, tremors, seizures, syncope, facial asymmetry, speech difficulty, light-headedness, numbness and headaches.   Hematological: Negative for adenopathy.   Psychiatric/Behavioral: Negative for agitation, behavioral problems, confusion, decreased concentration, dysphoric mood, hallucinations, self-injury, sleep disturbance and suicidal ideas. The patient is not nervous/anxious and is not hyperactive.         Objective:   /66 (BP Location: Left arm, Patient Position: Sitting, Cuff Size: Large Adult)   Pulse 86   Ht 182.9 cm (72\")   Wt 116 kg (255 lb 3.2 oz)   SpO2 96%   BMI 34.61 kg/m²     Physical Exam   Constitutional: He is oriented to person, place, and time. He appears well-developed and well-nourished. He is cooperative.   HENT:   Head: Normocephalic and atraumatic.   Right Ear: External ear normal.   Left Ear: External ear normal.   Nose: Nose normal.   Mouth/Throat: Oropharynx is clear and moist. No oropharyngeal exudate.   Eyes: Conjunctivae and EOM are normal. Pupils are equal, round, and reactive to light. No scleral icterus. Right eye exhibits normal extraocular motion. Left eye exhibits normal extraocular motion.   Neck: Neck supple. No JVD present. No muscular tenderness present. No tracheal deviation, no edema and no erythema present. No thyromegaly present.   Cardiovascular: Normal rate, regular rhythm, normal heart sounds and intact distal pulses.  Exam reveals no gallop and no friction rub.    No murmur heard.  Pulmonary/Chest: Effort normal and breath sounds normal. No stridor. No respiratory distress. He has no decreased breath sounds. He has no " wheezes. He has no rhonchi. He has no rales. He exhibits no tenderness.   Abdominal: Soft. Bowel sounds are normal. He exhibits no distension and no mass. There is no hepatomegaly. There is no tenderness. There is no rebound and no guarding. No hernia.   Musculoskeletal: Normal range of motion. He exhibits no edema, tenderness or deformity.   Lymphadenopathy:     He has no cervical adenopathy.   Neurological: He is alert and oriented to person, place, and time. He has normal reflexes. No cranial nerve deficit. He exhibits normal muscle tone. Coordination normal.   Skin: Skin is warm. No rash noted. No erythema. No pallor.   Psychiatric: He has a normal mood and affect. His behavior is normal. Judgment and thought content normal.   Nursing note and vitals reviewed.      Lab Review    Results for orders placed or performed in visit on 03/14/18   Comprehensive Metabolic Panel   Result Value Ref Range    Glucose 119 (H) 60 - 100 mg/dL    BUN 16 7 - 21 mg/dL    Creatinine 1.21 0.70 - 1.30 mg/dL    Sodium 138 137 - 145 mmol/L    Potassium 3.5 3.5 - 5.1 mmol/L    Chloride 99 95 - 110 mmol/L    CO2 26.0 22.0 - 31.0 mmol/L    Calcium 9.2 8.4 - 10.2 mg/dL    Total Protein 7.3 6.3 - 8.6 g/dL    Albumin 4.10 3.40 - 4.80 g/dL    ALT (SGPT) 29 21 - 72 U/L    AST (SGOT) 73 (H) 17 - 59 U/L    Alkaline Phosphatase 73 38 - 126 U/L    Total Bilirubin 0.8 0.2 - 1.3 mg/dL    eGFR Non African Amer 62 >60 mL/min/1.73    Globulin 3.2 2.3 - 3.5 gm/dL    A/G Ratio 1.3 1.1 - 1.8 g/dL    BUN/Creatinine Ratio 13.2 7.0 - 25.0    Anion Gap 13.0 5.0 - 15.0 mmol/L   TSH   Result Value Ref Range    TSH 0.390 (L) 0.460 - 4.680 mIU/mL   Vitamin D 25 Hydroxy   Result Value Ref Range    25 Hydroxy, Vitamin D 33.3 30.0 - 100.0 ng/ml   Vitamin B12   Result Value Ref Range    Vitamin B-12 546 239 - 931 pg/mL   CBC Auto Differential   Result Value Ref Range    WBC 22.71 (H) 3.20 - 9.80 10*3/mm3    RBC 4.37 4.37 - 5.74 10*6/mm3    Hemoglobin 10.7 (L) 13.7 -  17.3 g/dL    Hematocrit 32.6 (L) 39.0 - 49.0 %    MCV 74.6 (L) 80.0 - 98.0 fL    MCH 24.5 (L) 26.5 - 34.0 pg    MCHC 32.8 31.5 - 36.3 g/dL    RDW 16.3 (H) 11.5 - 14.5 %    RDW-SD 44.5 (H) 35.1 - 43.9 fl    MPV 10.8 8.0 - 12.0 fL    Platelets 186 150 - 450 10*3/mm3    Neutrophil % 88.9 (H) 37.0 - 80.0 %    Lymphocyte % 5.6 (L) 10.0 - 50.0 %    Monocyte % 4.8 0.0 - 12.0 %    Eosinophil % 0.0 0.0 - 7.0 %    Basophil % 0.1 0.0 - 2.0 %    Immature Grans % 0.6 (H) 0.0 - 0.5 %    Neutrophils, Absolute 20.21 (H) 2.00 - 8.60 10*3/mm3    Lymphocytes, Absolute 1.27 0.60 - 4.20 10*3/mm3    Monocytes, Absolute 1.08 (H) 0.00 - 0.90 10*3/mm3    Eosinophils, Absolute 0.00 0.00 - 0.70 10*3/mm3    Basophils, Absolute 0.02 0.00 - 0.20 10*3/mm3    Immature Grans, Absolute 0.13 (H) 0.00 - 0.02 10*3/mm3           Assessment/Plan       ICD-10-CM ICD-9-CM   1. Hypothyroidism due to Hashimoto's thyroiditis E03.8 244.8    E06.3 245.2   2. Vitamin D deficiency E55.9 268.9   3. B12 deficiency E53.8 266.2         I reviewed and summarized records from Marybeth Harrison DO from 2018 and I reviewed / ordered labs.   From review of records :    Patient has hypothyroidism for 10 years and his present symptoms include fatigue, weakness and weight gain     Lab Results   Component Value Date    TSH 0.390 (L) 03/14/2018     TSH is mildly suppressed so I have asked him to decrease levothyroxine from 88 mcgs daily to 75 mcgs daily     Orders Placed This Encounter   Procedures   • Comprehensive Metabolic Panel   • TSH   • Vitamin D 25 Hydroxy   • Vitamin B12   • CBC Auto Differential   • Scan Slide     Standing Status:   Future     Number of Occurrences:   1     Standing Expiration Date:   3/14/2019   • CBC Auto Differential   • TSH   • Vitamin B12   • Vitamin D 25 Hydroxy   • CBC & Differential     Order Specific Question:   Manual Differential     Answer:   No         A copy of my note was sent to Marybeth Harrison, DO    Please see my above opinion and  suggestions.

## 2018-04-09 ENCOUNTER — OFFICE VISIT (OUTPATIENT)
Dept: ONCOLOGY | Facility: CLINIC | Age: 58
End: 2018-04-09

## 2018-04-09 ENCOUNTER — INFUSION (OUTPATIENT)
Dept: ONCOLOGY | Facility: HOSPITAL | Age: 58
End: 2018-04-09

## 2018-04-09 VITALS
TEMPERATURE: 98.5 F | HEIGHT: 72 IN | SYSTOLIC BLOOD PRESSURE: 129 MMHG | WEIGHT: 238.2 LBS | RESPIRATION RATE: 20 BRPM | BODY MASS INDEX: 32.26 KG/M2 | DIASTOLIC BLOOD PRESSURE: 80 MMHG | HEART RATE: 69 BPM | OXYGEN SATURATION: 98 %

## 2018-04-09 DIAGNOSIS — E87.6 HYPOKALEMIA: ICD-10-CM

## 2018-04-09 DIAGNOSIS — E86.0 DEHYDRATION: ICD-10-CM

## 2018-04-09 DIAGNOSIS — D64.9 ANEMIA, UNSPECIFIED TYPE: ICD-10-CM

## 2018-04-09 DIAGNOSIS — R13.10 ODYNOPHAGIA: ICD-10-CM

## 2018-04-09 DIAGNOSIS — Z45.2 ENCOUNTER FOR VENOUS ACCESS DEVICE CARE: Primary | ICD-10-CM

## 2018-04-09 DIAGNOSIS — C32.9 SQUAMOUS CELL CARCINOMA OF LARYNX (HCC): Primary | ICD-10-CM

## 2018-04-09 DIAGNOSIS — C32.9 SQUAMOUS CELL CARCINOMA OF LARYNX (HCC): ICD-10-CM

## 2018-04-09 DIAGNOSIS — R13.10 DYSPHAGIA, UNSPECIFIED TYPE: ICD-10-CM

## 2018-04-09 DIAGNOSIS — B37.0 THRUSH, ORAL: ICD-10-CM

## 2018-04-09 DIAGNOSIS — R63.4 WEIGHT LOSS, ABNORMAL: ICD-10-CM

## 2018-04-09 LAB
ANION GAP SERPL CALCULATED.3IONS-SCNC: 16 MMOL/L (ref 5–15)
BASOPHILS # BLD AUTO: 0.03 10*3/MM3 (ref 0–0.2)
BASOPHILS NFR BLD AUTO: 0.9 % (ref 0–2)
BUN BLD-MCNC: 17 MG/DL (ref 7–21)
BUN/CREAT SERPL: 15.3 (ref 7–25)
CALCIUM SPEC-SCNC: 9.1 MG/DL (ref 8.4–10.2)
CHLORIDE SERPL-SCNC: 99 MMOL/L (ref 95–110)
CO2 SERPL-SCNC: 23 MMOL/L (ref 22–31)
CREAT BLD-MCNC: 1.11 MG/DL (ref 0.7–1.3)
DEPRECATED RDW RBC AUTO: 48.1 FL (ref 35.1–43.9)
EOSINOPHIL # BLD AUTO: 0.05 10*3/MM3 (ref 0–0.7)
EOSINOPHIL NFR BLD AUTO: 1.5 % (ref 0–7)
ERYTHROCYTE [DISTWIDTH] IN BLOOD BY AUTOMATED COUNT: 18.9 % (ref 11.5–14.5)
GFR SERPL CREATININE-BSD FRML MDRD: 68 ML/MIN/1.73 (ref 56–130)
GLUCOSE BLD-MCNC: 102 MG/DL (ref 60–100)
HCT VFR BLD AUTO: 29.7 % (ref 39–49)
HGB BLD-MCNC: 9.9 G/DL (ref 13.7–17.3)
IMM GRANULOCYTES # BLD: 0.01 10*3/MM3 (ref 0–0.02)
IMM GRANULOCYTES NFR BLD: 0.3 % (ref 0–0.5)
LYMPHOCYTES # BLD AUTO: 0.6 10*3/MM3 (ref 0.6–4.2)
LYMPHOCYTES NFR BLD AUTO: 17.8 % (ref 10–50)
MAGNESIUM SERPL-MCNC: 2 MG/DL (ref 1.6–2.3)
MCH RBC QN AUTO: 24.5 PG (ref 26.5–34)
MCHC RBC AUTO-ENTMCNC: 33.3 G/DL (ref 31.5–36.3)
MCV RBC AUTO: 73.5 FL (ref 80–98)
MONOCYTES # BLD AUTO: 0.41 10*3/MM3 (ref 0–0.9)
MONOCYTES NFR BLD AUTO: 12.1 % (ref 0–12)
NEUTROPHILS # BLD AUTO: 2.28 10*3/MM3 (ref 2–8.6)
NEUTROPHILS NFR BLD AUTO: 67.4 % (ref 37–80)
PLATELET # BLD AUTO: 152 10*3/MM3 (ref 150–450)
PMV BLD AUTO: 9.8 FL (ref 8–12)
POTASSIUM BLD-SCNC: 3.2 MMOL/L (ref 3.5–5.1)
RBC # BLD AUTO: 4.04 10*6/MM3 (ref 4.37–5.74)
SODIUM BLD-SCNC: 138 MMOL/L (ref 137–145)
WBC NRBC COR # BLD: 3.38 10*3/MM3 (ref 3.2–9.8)

## 2018-04-09 PROCEDURE — 83735 ASSAY OF MAGNESIUM: CPT

## 2018-04-09 PROCEDURE — 96366 THER/PROPH/DIAG IV INF ADDON: CPT | Performed by: INTERNAL MEDICINE

## 2018-04-09 PROCEDURE — 36591 DRAW BLOOD OFF VENOUS DEVICE: CPT | Performed by: INTERNAL MEDICINE

## 2018-04-09 PROCEDURE — 99215 OFFICE O/P EST HI 40 MIN: CPT | Performed by: INTERNAL MEDICINE

## 2018-04-09 PROCEDURE — 25010000002 POTASSIUM CHLORIDE PER 2 MEQ OF POTASSIUM: Performed by: INTERNAL MEDICINE

## 2018-04-09 PROCEDURE — 25010000002 HEPARIN FLUSH (PORCINE) 100 UNIT/ML SOLUTION: Performed by: INTERNAL MEDICINE

## 2018-04-09 PROCEDURE — 96365 THER/PROPH/DIAG IV INF INIT: CPT | Performed by: INTERNAL MEDICINE

## 2018-04-09 PROCEDURE — 85025 COMPLETE CBC W/AUTO DIFF WBC: CPT

## 2018-04-09 PROCEDURE — 80048 BASIC METABOLIC PNL TOTAL CA: CPT

## 2018-04-09 RX ORDER — FENTANYL 12 UG/H
1 PATCH TRANSDERMAL
Qty: 10 PATCH | Refills: 0 | Status: SHIPPED | OUTPATIENT
Start: 2018-04-09 | End: 2018-07-17

## 2018-04-09 RX ORDER — SODIUM CHLORIDE 0.9 % (FLUSH) 0.9 %
10 SYRINGE (ML) INJECTION AS NEEDED
Status: CANCELLED | OUTPATIENT
Start: 2018-04-16

## 2018-04-09 RX ORDER — SODIUM CHLORIDE 0.9 % (FLUSH) 0.9 %
10 SYRINGE (ML) INJECTION AS NEEDED
Status: DISCONTINUED | OUTPATIENT
Start: 2018-04-09 | End: 2018-04-09 | Stop reason: HOSPADM

## 2018-04-09 RX ORDER — DIPHENHYDRAMINE HYDROCHLORIDE 12.5 MG/5ML
LIQUID ORAL
Qty: 200 ML | Refills: 1 | Status: SHIPPED | OUTPATIENT
Start: 2018-04-09 | End: 2018-07-17

## 2018-04-09 RX ADMIN — SODIUM CHLORIDE, PRESERVATIVE FREE 500 UNITS: 5 INJECTION INTRAVENOUS at 10:09

## 2018-04-09 RX ADMIN — Medication 10 ML: at 10:09

## 2018-04-09 RX ADMIN — POTASSIUM CHLORIDE: 149 INJECTION, SOLUTION, CONCENTRATE INTRAVENOUS at 11:52

## 2018-04-09 RX ADMIN — SODIUM CHLORIDE, PRESERVATIVE FREE 500 UNITS: 5 INJECTION INTRAVENOUS at 15:51

## 2018-04-09 RX ADMIN — Medication 10 ML: at 15:51

## 2018-04-09 NOTE — PROGRESS NOTES
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  Upon staging workup patient was found to have stage III squamous cell cancer of larynx for which she has been started on concurrent chemoradiation with weekly carboplatin and Taxol on March 19, 2018.  He is here to get week 4 of carboplatin and Taxol today.  Complains of severe odynophagia and dysphagia.  States he is not able to take his medication for last 1 week.  Denies any new lymph node enlargement.Denies any bleeding.        PAST MEDICAL HISTORY:    Past Medical History:   Diagnosis Date   • Aortic dissection    • Chest pain    • Disease of thyroid gland    • HTN (hypertension)    • Knee pain    • Sleep apnea    • Smoker    • Squamous cell carcinoma of larynx 2/5/2018   • Stroke    • Swallowing difficulty        SOCIAL HISTORY:    Social History   Substance Use Topics   • Smoking status: Former Smoker     Packs/day: 1.25     Years: 38.00     Quit date: 2/13/2017   • Smokeless tobacco: Never Used      Comment: 02/27/2018 - Patient confirmed he ceased utilization of tobacco products 02/13/2017.   • Alcohol use No       Surgical History :  Past Surgical History:   Procedure Laterality Date   • AORTA SURGERY      ruptured aorta repair   • ASCENDING ARCH/HEMIARCH REPLACEMENT N/A 2/14/2017    Procedure: INTRAOPERATIVE TRANSESOPHAGEAL ECHOCARDIOGRAM, MIDLINE STERNOTOMY, ASCENDING AORTIC  AND PROXIMAL AORTIC ARCH REPAIR WITH 26MM GRAFT, AORTIC VALVE RESUSPENSION, AORTIC ROOT REPAIR, OPEN VEIN HARVEST OF RIGHT LEG;  Surgeon: German Arreguin MD;  Location: [facility];   Service:    • BRONCHOSCOPY N/A 2/17/2017    Procedure: BRONCHOSCOPY BIOPSY AT BEDSIDE WITH BAL-LEFT LOWER LOBE;  Surgeon: Trent Chaney MD;  Location: [facility];  Service:    • COLONOSCOPY N/A 3/7/2018    Procedure: COLONOSCOPY WITH POSSIBLE POLYPECTOMY   ( DONE IN OR WITH ENDO)      COLONOSCOPY FIRST;  Surgeon: Kris Solano MD;  Location: [facility] OR;  Service:    • DIRECT LARYNGOSCOPY, ESOPHAGOSCOPY, BRONCHOSCOPY N/A 2/5/2018    Procedure: DIRECT LARYNGOSCOPY WITH BIOPSY, ESOPHAGOSCOPY     (no bronchoscopy, no laser);  Surgeon: Joseph Venegas MD;  Location: [facility] OR;  Service:    • TRACHEOSTOMY  02/05/2018   • TRACHEOSTOMY N/A 2/5/2018    Procedure: TRACHEOSTOMY  local injection @ 1106 incision @ 1119;  Surgeon: Joseph Venegas MD;  Location: [facility] OR;  Service:    • VENOUS ACCESS DEVICE (PORT) INSERTION N/A 3/7/2018    Procedure: INSERTION OF MEDIPORT     (C-ARM#1);  Surgeon: Kris Solano MD;  Location: [facility];  Service:        ALLERGIES:    Allergies   Allergen Reactions   • Chlorhexidine Itching     Topical cleaning wipes causes severe skin irritation   • Co Q 10 [Coenzyme Q10] Itching   • Eggs Or Egg-Derived Products Swelling   • Erythromycin Other (See Comments)     Joint pains and cold sweats   • Other GI Intolerance     Mycins     • Penicillins      Tolerated cefepime during 2/2017 admission. Had rash and itching (relieved by benadryl) after few doses of cefepime and cefepime was continued.   • Tetracyclines & Related GI Intolerance   • Acth [Corticotropin] Rash   • Cephalosporins Itching and Rash     Pt developed rash, itching after cefepime administration (2-3 doses) during 2/2017 admission. Itching was relieved with benadryl. Cefepime was continued because his infection was improving and no symptoms of anaphylaxis present   • Keflex [Cephalexin] Rash         FAMILY HISTORY:  Family History   Problem Relation Age of Onset   • Thyroid disease Mother    •  "Hypertension Mother    • Hypertension Father    • Hypertension Other            REVIEW OF SYSTEMS:      CONSTITUTIONAL:  Complains of fatigue.12 pound weight loss in last 2 weeks.  Denies any fever, chills .      HEENT:  Some visual disturbance affecting left eye since stroke in 2017.  Complains of hoarseness of voice.   complains of worsening odynophagia and dysphagia to solids and liquids.     RESPIRATORY:  Complains of chronic shortness of breath, denies any recent worsening.  No new cough or hemoptysis.     CARDIOVASCULAR:  No chest pain or palpitations.     GASTROINTESTINAL:  No abdominal pain nausea, vomiting or blood in the stool.     GENITOURINARY: No Dysuria or Hematuria.     MUSCULOSKELETAL:  No new back pain or arthralgia..     LYMPHATICS:  Denies any abnormal swollen glands anywhere in the body.     NEUROLOGICAL : No tingling or numbness. No headache or dizziness. No seizures or balance problems.     SKIN: Complains of intermittent bleeding from Tracheostomy.          PHYSICAL EXAMINATION:      VITAL SIGNS:  /80   Pulse 69   Temp 98.5 °F (36.9 °C) (Temporal Artery )   Resp 20   Ht 182.9 cm (72.01\")   Wt 108 kg (238 lb 3.2 oz)   SpO2 98%   BMI 32.30 kg/m²   1    04/09/18  1033   Weight: 108 kg (238 lb 3.2 oz)       ECOG  performance status: 1      GENERAL:  Not in any distress.Appears uncomfortable.    HEENT:  Normocephalic, Atraumatic.Mild Conjunctival pallor. No icterus. Extraocular Movements Intact. No Facial Asymmetry noted.Thrush on oropharynx examination is improved.    NECK:  No adenopathy. No JVD.Tracheostomy present.    RESPIRATORY:  Fair air entry bilateral. No rhonchi or wheezing.    CARDIOVASCULAR:  S1, S2. Regular rate and rhythm. No murmur or gallop appreciated.    ABDOMEN:  Soft, obese, nontender. Bowel sounds present in all four quadrants.  No organomegaly appreciated.    MUSCULOSKELETAL:  No edema.No Calf Tenderness.Normal range of motion.    NEUROLOGIC:  Alert, awake and " oriented ×3.  No  Motor or sensory deficit appreciated. Cranial Nerves 2-12 grossly intact.    SKIN : No new skin lesion identified    LYMPHATICS: No new enlarged lymph node in neck or supraclavicular area.            DIAGNOSTIC DATA:    Glucose   Date Value Ref Range Status   04/09/2018 102 (H) 60 - 100 mg/dL Final     Sodium   Date Value Ref Range Status   04/09/2018 138 137 - 145 mmol/L Final     Potassium   Date Value Ref Range Status   04/09/2018 3.2 (L) 3.5 - 5.1 mmol/L Final     CO2   Date Value Ref Range Status   04/09/2018 23.0 22.0 - 31.0 mmol/L Final     Chloride   Date Value Ref Range Status   04/09/2018 99 95 - 110 mmol/L Final     Anion Gap   Date Value Ref Range Status   04/09/2018 16.0 (H) 5.0 - 15.0 mmol/L Final     Creatinine   Date Value Ref Range Status   04/09/2018 1.11 0.70 - 1.30 mg/dL Final     BUN   Date Value Ref Range Status   04/09/2018 17 7 - 21 mg/dL Final     BUN/Creatinine Ratio   Date Value Ref Range Status   04/09/2018 15.3 7.0 - 25.0 Final     Calcium   Date Value Ref Range Status   04/09/2018 9.1 8.4 - 10.2 mg/dL Final     eGFR Non  Amer   Date Value Ref Range Status   04/09/2018 68 56 - 130 mL/min/1.73 Final     Alkaline Phosphatase   Date Value Ref Range Status   03/19/2018 86 38 - 126 U/L Final     Total Protein   Date Value Ref Range Status   03/19/2018 6.9 6.3 - 8.6 g/dL Final     ALT (SGPT)   Date Value Ref Range Status   03/19/2018 61 21 - 72 U/L Final     AST (SGOT)   Date Value Ref Range Status   03/19/2018 44 17 - 59 U/L Final     Total Bilirubin   Date Value Ref Range Status   03/19/2018 0.2 0.2 - 1.3 mg/dL Final     Albumin   Date Value Ref Range Status   03/19/2018 3.80 3.40 - 4.80 g/dL Final     Globulin   Date Value Ref Range Status   03/19/2018 3.1 2.3 - 3.5 gm/dL Final     A/G Ratio   Date Value Ref Range Status   03/19/2018 1.2 1.1 - 1.8 g/dL Final     Lab Results   Component Value Date    WBC 3.38 04/09/2018    HGB 9.9 (L) 04/09/2018    HCT 29.7 (L)  04/09/2018    MCV 73.5 (L) 04/09/2018     04/09/2018     Lab Results   Component Value Date    NEUTROABS 2.28 04/09/2018    IRON 14 (L) 02/13/2018    TIBC 335 02/13/2018    LABIRON 4 (L) 02/13/2018    FERRITIN 64.40 02/13/2018    [id number] 546 03/14/2018    FOLATE >20.00 02/13/2018     No results found for: , LABCA2, AFPTM, HCGQUANT, , CHROMGRNA, [id number], CEA, REFLABREPO        Radiology Data :  PET CT done on February 16, 2018 was reviewed, discussed with patient, it showed:  IMPRESSION:  CONCLUSION:   1.  Soft tissue mass in the region of the epiglottis obstructing  the airway, associated with hypermetabolic activity, compatible  with known neoplasm.  2.  Activity around the tracheostomy site is likely inflammatory.  3.  Activity in the rectum (SUV max 7.1) is probably either  physiological or inflammatory. Recommend direct visualization.  4.  There is a focus of activity in the region of the right  common iliac artery without a definite CT abnormality (SUV max  5.4) possibly representing a lymph node, nonspecific but cannot  rule out neoplasm.  5.  Foci of activity posterior to the ischial tuberosities are  probably inflammatory.  6.  There are diffuse punctate low-level foci of activity  throughout the osseous structures, liver, kidneys, mediastinum  which are more likely artifactual than due to metastatic disease.    ADDENDUM   ADDENDUM #1         Upon review of the patient's imaging with Dr. Tapia, there  appears to be extension of tumor into the left side of the  preepiglottic space.        Ct of Neck with contrast done on January 29, 2018 showed:  IMPRESSION:  3 x 3.5 x 3.5 cm soft tissue mass with lobular margin centered at  the level the epiglottis however flush 4 cm intimate with the  posterior margin of the tongue base and the posterior margin of  the oral pharynx. This does contribute to moderate compromise of  the airway. Finding is suspicious for malignancy.      Mostly  subcentimeter cervical chain lymph nodes present  bilaterally. The largest node right level II measuring 1.3 cm. No  necrotic or conglomerate adenopathy.      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 The  dissection was described on a CTA coronary artery exam from  2/14/2017.        Pathology :  Pathology result from February 5, 2018 showed:  Final Diagnosis   1.  TUMOR, EPIGLOTTIS:   INVASIVE SQUAMOUS CELL CARCINOMA, NON KERATINIZING, POORLY DIFFERENTIATED.       2.  TUMOR, EPIGLOTTIS:   INVASIVE SQUAMOUS CELL CARCINOMA, NON KERATINIZING, POORLY DIFFERENTIATED.               ASSESSMENT AND PLAN:      1.  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  Based on PET/CT there is a tumor extension into preepiglottic space making a T3 lesion.  Result of PET CT were discussed with patient.  It was discussed with patient with T3 N0 lesion after his treatment option includes either surgery or concurrent chemoradiation.  He was started on concurrent chemoradiation on March 19, 2018.  She'll tolerated first 3 weeks of treatment well without any excessive side effects.  Since last.  Patient is complaining of progressive worsening odynophagia and dysphagia secondary to mucositis.  Patient states he is not able to eat and drink.  He has lost 12 pounds in last 2 weeks.  We will hold week 4 of chemotherapy today.  We will give him 1 L of normal saline with 40 mEq of potassium today.  We will reevaluate patient in next Monday to resume chemotherapy.  This recommendation were discussed with patient.    2.  Normocytic anemia: Patient admits to recent epistaxis as well as blood clots after biopsy.   anemia workup was consistent with anemia of chronic disease with component of fine deficiency with saturation being only 4%.  In taking ferrous sulfate at least 3-4 times a week along with Colace.  Hemoglobin is stable at 9.9.    3.   Odynophagia and dysphagia: Most likely secondary to mucositis from chemoradiation.  Patient had thrush last week for which she has been started on nystatin.  Patient also has a magic mouth was that he has been taking with minimal relief.  We will start him on fentanyl patch 12 µg every 72 hours today.  Prescription with 10 patch has been given today on April 9, 2018.    4.  Hypokalemia: Patient's potassium is only 3.2 today.  Since patient has severe odynophagia not able to swallow any pills.  We will give him 1 L of normal saline with 40 mEq of KCl.    5.  Abnormal weight loss: Patient has lost 12 pounds in last 2 weeks secondary to inability to eat and drink.  Again it was recommended that he should get a feeding tube placed for nutritional support.  Patient is reluctant to get any feeding tube placed at this point.    6.oral thrush: Patient was found to have oral thrush last week for which she has been started on nystatin.  His thrush is improved clinically on examination today.     7.  History of aortic dissection status post repair.     8.  History of CVA with residual visual disturbance in left eye    9.  Problem with tracheostomy: Followed by Dr. Venegas     10.  Health maintenance: Patient quit smoking in February 2017.  Never had a screening colonoscopy done.  Remains full code.     Reza Patel MD  4/9/2018  5:54 PM        EMR Dragon/Transcription disclaimer:   Much of this encounter note is an electronic transcription/translation of spoken language to printed text. The electronic translation of spoken language may permit erroneous, or at times, nonsensical words or phrases to be inadvertently transcribed; Although I have reviewed the note for such errors, some may still exist.

## 2018-04-10 NOTE — PROGRESS NOTES
Adult Outpatient Nutrition  Assessment    Patient Name:  Truman Esquivel  YOB: 1960  MRN: [id number]    Assessment Date:  4/9/18    Comments:  Pt stated intake very poor due to severe oral/throat pain. Being treated for thrush. Also using xylocaine (almost out)--has not been swallowing. Discussed puree diet option with pt and mother. Reinforced needs for supplementation. Pt states has gone several days taking only sips and bites--food/beverages will not go down. MD and RD discussed TEN option. Wt 244 lb to 238 lb in 4 days. Received IV fluid with potassium.        Electronically signed by:  Leslee Bonds RD  04/10/18 11:24 AM

## 2018-04-11 ENCOUNTER — RADIATION ONCOLOGY WEEKLY ASSESSMENT (OUTPATIENT)
Dept: RADIATION ONCOLOGY | Facility: HOSPITAL | Age: 58
End: 2018-04-11

## 2018-04-11 ENCOUNTER — HOSPITAL ENCOUNTER (OUTPATIENT)
Dept: SPEECH THERAPY | Facility: HOSPITAL | Age: 58
Setting detail: THERAPIES SERIES
Discharge: HOME | End: 2018-04-11

## 2018-04-11 VITALS
HEIGHT: 72 IN | BODY MASS INDEX: 31.89 KG/M2 | WEIGHT: 235.45 LBS | SYSTOLIC BLOOD PRESSURE: 153 MMHG | TEMPERATURE: 97.8 F | DIASTOLIC BLOOD PRESSURE: 68 MMHG | RESPIRATION RATE: 20 BRPM | HEART RATE: 80 BPM

## 2018-04-11 DIAGNOSIS — C32.9 SQUAMOUS CELL CARCINOMA OF LARYNX (HCC): Primary | ICD-10-CM

## 2018-04-11 DIAGNOSIS — R13.12 OROPHARYNGEAL DYSPHAGIA: Primary | ICD-10-CM

## 2018-04-11 PROCEDURE — G8997 SWALLOW GOAL STATUS: HCPCS | Performed by: SPEECH-LANGUAGE PATHOLOGIST

## 2018-04-11 PROCEDURE — 92610 EVALUATE SWALLOWING FUNCTION: CPT | Performed by: SPEECH-LANGUAGE PATHOLOGIST

## 2018-04-11 PROCEDURE — G8996 SWALLOW CURRENT STATUS: HCPCS | Performed by: SPEECH-LANGUAGE PATHOLOGIST

## 2018-04-11 PROCEDURE — 77386: CPT | Performed by: RADIOLOGY

## 2018-04-11 NOTE — PROGRESS NOTES
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  ICD 10 Code: C32.9      was seen today for an on treatment visit.  He is receiving radiation therapy to the larynx/neck. He  has received 2800 cGy in 14 fractions out of a planned dose of 7000 cGy in 35 fractions. He is receiving concurrent carboplatin/Taxol chemotherapy per Dr. Patel, and received his 3rd and most recent cycle on 4/2/18.  He was scheduled to receive his 4th cycle of chemotherapy on 4/9/18, but chemo was held due to severe dysphagia. The patient has also been on a radiation therapy treatment break since Wednesday, 4/4/18.    Today on exam the patient feels much better and is ready to resume radiation therapy. He is swallowing much better and was able to eat chicken from [facility] yesterday.                                               Vitals:     Vitals:    04/11/18 1537   BP: 153/68   Pulse: 80   Resp: 20   Temp: 97.8 °F (36.6 °C)       Weight:   (Starting weight of 257 pounds on 2/21/18)  Wt Readings from Last 3 Encounters:   04/11/18 107 kg (235 lb 7.2 oz)   04/09/18 108 kg (238 lb 3.2 oz)   04/05/18 111 kg (244 lb 7.8 oz)      Pain:    Pain Score    04/11/18 1537   PainSc: 3  Comment: trach         Plan: We plan to continue radiation therapy as prescribed.     Jarocho Tapia MD  Radiation Oncology    Electronically signed by Jarocho Tapia MD 4/11/2018  3:47 PM     cc: Dr. Joseph Harrison

## 2018-04-11 NOTE — THERAPY EVALUATION
Outpatient Speech Language Pathology   Adult Swallow Initial Evaluation  [facility]     Patient Name: Truman Esquivel  : 1960  MRN: [id number]  Today's Date: 2018         Visit Date: 2018     Patient was seen this date for a bedside swallow evaluation following a referral from Dr. Tapia. Patient is currently undergoing radiation and chemotherapy at the [facility]. Patient began having nose bleeds and blood clots from mouth and nose around 2017 in which he sought an evaluation by Dr. Venegas (ENT) who found laryngeal cancer; Stage III (cT3, cN0, cM0). Patient has experienced an abnormal weight loss of approximately 40 lbs since 2017. Patient verbalized increased pain with eating. Patient completed trials of thin liquids and purees this date. Removal of jello folowing trials was noted on  bites. Patient completed 8oz of thin liquids with no overt s/s of aspiration. Patient did verbalize increased burning with water, soda, and juice. Patient stated that he is only able to drink milk and tea which do not cause pain. SLP encouraged pt to continue eating and swallowing and recommended mechanical soft diet with thin liquids as well as purees for ease of swallow to increase calories and swallowing. Prophylactic swallow exercises were provided this date for home use. Pt was in agreement. SLP provided education on diet modification and food choices. SLP recommended avoiding thick puddings, salads, breads which present with increased difficulties for pt. Pt encouraged to begin home exercise plan 3x a day each day. SLP reviewed the importance of continuing to swallow during all treatment to encourage muscle to keep moving. Pt was in agreement. Pt has a passy thaddeus valve but does not currently wear. Patient completed the EAT10 with a total score of 32 which indicates swallowing function is judged by the patient to have a negative impact of ADLs and  safety. Patient is at risk for malnutrition, dehydration, and aspiration. Patient would benefit from skilled speech therapy 1 x a week for increased swallowing safety and preservation of swallowing function through radiation and chemotherapy.     Agnes Barone MS CCC-SLP      Patient Active Problem List   Diagnosis   • Ascending aortic dissection   • Essential hypertension   • Deep vein thrombosis (DVT) of femoral vein of both lower extremities   • Acquired hypothyroidism   • Snoring   • Acute pain of right knee   • Knee effusion, right   • Knee pain   • Obstructive sleep apnea of adult   • TIA (transient ischemic attack)   • Squamous cell carcinoma of larynx   • Dysphagia   • Squamous cell carcinoma of larynx   • Pharyngeal dysphagia   • Anemia   • Abnormal positron emission tomography (PET) of colon   • Hypokalemia   • Encounter for venous access device care   • Dehydration   • Weight loss, abnormal   • Odynophagia   • Thrush, oral        Past Medical History:   Diagnosis Date   • Aortic dissection    • Chest pain    • Disease of thyroid gland    • HTN (hypertension)    • Knee pain    • Sleep apnea    • Smoker    • Squamous cell carcinoma of larynx 2/5/2018   • Stroke    • Swallowing difficulty         Past Surgical History:   Procedure Laterality Date   • AORTA SURGERY      ruptured aorta repair   • ASCENDING ARCH/HEMIARCH REPLACEMENT N/A 2/14/2017    Procedure: INTRAOPERATIVE TRANSESOPHAGEAL ECHOCARDIOGRAM, MIDLINE STERNOTOMY, ASCENDING AORTIC  AND PROXIMAL AORTIC ARCH REPAIR WITH 26MM GRAFT, AORTIC VALVE RESUSPENSION, AORTIC ROOT REPAIR, OPEN VEIN HARVEST OF RIGHT LEG;  Surgeon: German Arreguin MD;  Location: [facility] MAIN OR;  Service:    • BRONCHOSCOPY N/A 2/17/2017    Procedure: BRONCHOSCOPY BIOPSY AT BEDSIDE WITH BAL-LEFT LOWER LOBE;  Surgeon: rTent Chaney MD;  Location: [facility] ENDOSCOPY;  Service:    • COLONOSCOPY N/A 3/7/2018    Procedure: COLONOSCOPY WITH POSSIBLE POLYPECTOMY   ( DONE IN OR WITH  ENDO)      COLONOSCOPY FIRST;  Surgeon: Kris Solano MD;  Location: [facility];  Service:    • DIRECT LARYNGOSCOPY, ESOPHAGOSCOPY, BRONCHOSCOPY N/A 2/5/2018    Procedure: DIRECT LARYNGOSCOPY WITH BIOPSY, ESOPHAGOSCOPY     (no bronchoscopy, no laser);  Surgeon: Joseph Venegas MD;  Location: [facility];  Service:    • TRACHEOSTOMY  02/05/2018   • TRACHEOSTOMY N/A 2/5/2018    Procedure: TRACHEOSTOMY  local injection @ 1106 incision @ 1119;  Surgeon: Joseph Venegas MD;  Location: [facility];  Service:    • VENOUS ACCESS DEVICE (PORT) INSERTION N/A 3/7/2018    Procedure: INSERTION OF MEDIPORT     (C-ARM#1);  Surgeon: Kris Solano MD;  Location: [facility];  Service:          Visit Dx:     ICD-10-CM ICD-9-CM   1. Oropharyngeal dysphagia R13.12 787.22                 SLP Adult Swallow Evaluation - 04/11/18 1400        Rehab Evaluation    Document Type evaluation  -EA    Total Evaluation Minutes, SLP 49  -EA    Subjective Information no complaints  -EA    Patient Observations alert;cooperative;agree to therapy  -EA    Patient/Family Observations Pt attended evaluation with mother; pt stated that he had just eaten at [facility] and had 2 chicken breast. Weight loss of 40 LBs since December 2017. Carcinoma of the larynx.  -EA    Patient Effort good  -EA    Symptoms Noted During/After Treatment none  -EA       General Information    Patient Profile Reviewed yes  -EA    Pertinent History Of Current Problem Squamous cell carcinoma of the larynx Stage III (cT3, cN0, cM0)  -EA    Current Method of Nutrition soft textures;thin liquids  -EA    Precautions/Limitations, Vision WFL with corrective lenses  -EA    Precautions/Limitations, Hearing WFL  -EA    Prior Level of Function-Communication WFL  -EA    Prior Level of Function-Swallowing no diet consistency restrictions  -EA    Plans/Goals Discussed with patient;family  -EA    Barriers to Rehab none identified  -EA    Patient's Goals for Discharge  eat/drink without coughing/choking  -EA       Pain Assessment    Additional Documentation Pain Scale: Numbers Pre/Post-Treatment (Group)  -EA       Pain Scale: Numbers Pre/Post-Treatment    Pain Scale: Numbers, Pretreatment 1/10  -EA    Pain Scale: Numbers, Post-Treatment 1/10  -EA    Pain Location - Side Bilateral  -EA    Pain Location - Orientation --   throat *burning   -EA    Pain Location throat  -EA       Oral Motor and Function    Dentition Assessment natural, present and adequate;other (see comments)   changes in coloration of teeth following radiation   -EA    Secretion Management WNL/WFL  -EA    Mucosal Quality moist, healthy  -EA    Volitional Swallow delayed  -EA       Oral Musculature and Cranial Nerve Assessment    Oral Motor General Assessment WFL  -EA       General Eating/Swallowing Observations    Respiratory Support Currently in Use trach collar  -EA    Eating/Swallowing Skills self-fed  -EA    Positioning During Eating upright 90 degree;upright in chair  -EA    Utensils Used spoon;cup  -EA    Consistencies Trialed pureed;thin liquids  -EA       Respiratory    Respiratory Status room air  -EA       Clinical Swallow Eval    Oral Prep Phase impaired  -EA    Oral Transit impaired  -EA    Oral Residue WFL  -EA    Pharyngeal Phase suspected pharyngeal impairment  -EA    Esophageal Phase unremarkable  -EA       Oral Prep Concerns    Oral Prep Concerns prolonged mastication;increased prep time  -EA    Prolonged Mastication thin;pudding  -EA    Increased Prep Time thin;pudding  -EA       Oral Transit Concerns    Oral Transit Concerns delayed initiation of bolus transit  -EA    Delayed Intiation of Bolus Transit thin;pudding  -EA       Pharyngeal Phase Concerns    Pharyngeal Phase Concerns cough  -EA    Cough thin;pudding  -EA    Pharyngeal Phase Concerns, Comment Removal of jello from trach following 5 trials; pt stated that he does remove food following meals a few times a week   -EA       Clinical  Impression    SLP Swallowing Diagnosis mild-moderate;oral dysfunction;suspected pharyngeal dysfunction  -EA    Functional Impact risk of malnutrition;risk of aspiration/pneumonia  -EA    Rehab Potential/Prognosis, Swallowing good, to achieve stated therapy goals  -EA    Criteria for Skilled Therapeutic Interventions Met demonstrates skilled criteria  -EA       Recommendations    Therapy Frequency (Swallow) 1 day per week  -EA    Predicted Duration Therapy Intervention (Days) until discharge  -EA    SLP Diet Recommendation mechanical soft with no mixed consistencies;thin liquids  -EA    Recommended Precautions and Strategies upright posture during/after eating;small bites of food and sips of liquid;multiple swallows per bite of food;multiple swallows per sip of liquid;hard swallow with each bite or sip  -EA    Monitor for Signs of Aspiration yes;notify SLP if any concerns  -EA    Anticipated Dischage Disposition home  -EA       Swallow Goals (SLP)    Oral Nutrition/Hydration Goal Selection (SLP) --  -EA    Additional Documentation --  -EA       Oral Nutrition/Hydration Goal 1 (SLP)    Oral Nutrition/Hydration Goal 1, SLP Patient will tolerate least restrictive diet with no overt s/s of aspiration.   -EA    Time Frame (Oral Nutrition/Hydration Goal 1, SLP) by discharge  -EA    Barriers (Oral Nutrition/Hydration Goal 1, SLP) laryngeal cancer   -EA    Progress/Outcomes (Oral Nutrition/Hydration Goal 1, SLP) goal ongoing  -EA       Pharyngeal Strengthening Exercise Goal 1 (SLP)    Activity (Pharyngeal Strengthening Goal 1, SLP) increase timing;increase tongue base retraction  -EA    Increase Timing ingrid;hard effortful swallow;prepping - 3 second prep or suck swallow or 3-step swallow   OME set - Prophylactic swallowing exercises   -EA    Increase Tongue Base Retraction ingrid;hard effortful swallow;prepping - 3 second prep or suck swallow or 3-step swallow  -EA    [place]/Accuracy (Pharyngeal Strengthening Goal 1,  SLP) independently (over 90% accuracy)  -EA    Time Frame (Pharyngeal Strengthening Goal 1, SLP) by discharge  -EA    Barriers (Pharyngeal Strengthening Goal 1, SLP) laryngeal cancer   -EA    Progress/Outcomes (Pharyngeal Strengthening Goal 1, SLP) goal ongoing  -EA       Dysphagia Treatment Objectives and Progress    Dysphagia Treatment Objectives Improve oral skills;Improve timing of pharyngeal response  -EA       Improve oral skills    To Improve Oral Skills, patient will: Increase tongue A-P movement;90%  -EA      User Key  (r) = Recorded By, (t) = Taken By, (c) = Cosigned By    Initials Name Provider Type    KAYLEY Barone MS CCC-SLP Speech and Language Pathologist                              OP SLP Education     Row Name 04/11/18 1500       Education    Barriers to Learning No barriers identified  -EA    Education Provided Described results of evaluation;Patient expressed understanding of evaluation;Patient participated in establishing goals and treatment plan  -EA    Assessed Learning needs;Learning motivation  -EA    Learning Motivation Strong  -EA    Learning Method Explanation;Demonstration;Written materials  -EA    Teaching Response Verbalized understanding;Demonstrated understanding  -EA    Education Comments SLP provided education on exercise program and recommendations for diet modification and oral care following meals. Pt was in agreement. Safe swallow strategies reviewed and provided.   -EA      User Key  (r) = Recorded By, (t) = Taken By, (c) = Cosigned By    Initials Name Effective Dates    KAYLEY Barone MS CCC-SLP 10/17/16 -                 SLP OP Goals     Row Name 04/11/18 1546          SLP Time Calculation    SLP Goal Re-Cert Due Date 05/11/18  -EA       User Key  (r) = Recorded By, (t) = Taken By, (c) = Cosigned By    Initials Name Provider Type    KAYLEY Barone MS CCC-SLP Speech and Language Pathologist                OP SLP Assessment/Plan - 04/11/18 2904         SLP Assessment    Functional Problems Swallowing  -EA    Impact on Function: Swallowing Risk of malnourishment;Risk of dehydration;Risk of aspiration  -EA    Clinical Impression: Swallowing Moderate:;oral phase dysphagia;oropharyngeal phase dysphagia  -EA    Functional Problems Comment Increased pain with swallowing following radiation; 40 lb weight loss since Dec. 2017.   -EA    Clinical Impression Comments Moderate oralpharyngeal dysphagia   -EA    Please refer to paper survey for additional self-reported information Yes  -EA    Please refer to items scanned into chart for additional diagnostic informaiton and handouts as provided by clinician Yes  -EA    SLP Diagnosis Dysphagia   -EA    Prognosis Good (comment)  -EA    Patient/caregiver participated in establishment of treatment plan and goals Yes  -EA    Patient would benefit from skilled therapy intervention Yes  -EA       SLP Plan    Frequency 1 time a week   -EA    Duration until discharge   -EA    Planned CPT's? SLP SWALLOW THERAPY: 26026  -EA    Expected Duration Therapy Session - minutes 30-45 minutes  -EA    Plan Comments Initiate POC  -EA      User Key  (r) = Recorded By, (t) = Taken By, (c) = Cosigned By    Initials Name Provider Type    KAYLEY Barone MS CCC-SLP Speech and Language Pathologist                    Time Calculation:   SLP Start Time: 1400  SLP Stop Time: 1449  SLP Time Calculation (min): 49 min  Total Timed Code Minutes- SLP: 49 minute(s)    Therapy Charges for Today     Code Description Service Date Service Provider Modifiers Qty    79810430708 HC ST SWALLOWING CURRENT STATUS 4/11/2018 Agnes Barone MS CCC-SLP GN, CK 1    58181298084 HC ST SWALLOWING PROJECTED 4/11/2018 Agnes Barone MS CCC-SLP GN, CI 1    [id number] HC ST EVAL ORAL PHARYNG SWALLOW 3 4/11/2018 Agnes Barone MS CCC-SLP GN 1          SLP G-Codes  SLP NOMS Used?: Yes  Functional Limitations: Swallowing  Swallow Current Status (): At least 40  percent but less than 60 percent impaired, limited or restricted  Swallow Goal Status (): At least 1 percent but less than 20 percent impaired, limited or restricted        Agnes Barone MS CCC-SLP  4/11/2018

## 2018-04-12 ENCOUNTER — HOSPITAL ENCOUNTER (OUTPATIENT)
Dept: RADIATION ONCOLOGY | Facility: HOSPITAL | Age: 58
Discharge: HOME | End: 2018-04-12

## 2018-04-12 PROCEDURE — 77386: CPT | Performed by: RADIOLOGY

## 2018-04-13 PROCEDURE — 77386: CPT

## 2018-04-13 PROCEDURE — 77427 RADIATION TX MANAGEMENT X5: CPT | Performed by: RADIOLOGY

## 2018-04-13 NOTE — PROGRESS NOTES
Adult Outpatient Nutrition  Assessment    Patient Name:  Truman Esquivel  YOB: 1960  MRN: [id number]    Assessment Date:  4/13/2018    Comments: Pt stated mouth/throat pain less since thrush treated. Taking milk and sweet tea in attempt to prevent dehydration (these actually taste okay). Wt 235.5 lb. Parent continues to help with cooking. No nutrition related problems verbalized today.        Electronically signed by:  Leslee Bonds RD  04/13/18 3:16 PM

## 2018-04-16 ENCOUNTER — OFFICE VISIT (OUTPATIENT)
Dept: ONCOLOGY | Facility: CLINIC | Age: 58
End: 2018-04-16

## 2018-04-16 ENCOUNTER — INFUSION (OUTPATIENT)
Dept: ONCOLOGY | Facility: HOSPITAL | Age: 58
End: 2018-04-16

## 2018-04-16 VITALS
WEIGHT: 235.4 LBS | BODY MASS INDEX: 31.89 KG/M2 | SYSTOLIC BLOOD PRESSURE: 138 MMHG | RESPIRATION RATE: 20 BRPM | HEART RATE: 67 BPM | TEMPERATURE: 98 F | HEIGHT: 72 IN | DIASTOLIC BLOOD PRESSURE: 68 MMHG

## 2018-04-16 DIAGNOSIS — C32.9 SQUAMOUS CELL CARCINOMA OF LARYNX (HCC): ICD-10-CM

## 2018-04-16 DIAGNOSIS — E87.6 HYPOKALEMIA: ICD-10-CM

## 2018-04-16 DIAGNOSIS — C32.9 SQUAMOUS CELL CARCINOMA OF LARYNX (HCC): Primary | ICD-10-CM

## 2018-04-16 DIAGNOSIS — R13.10 ODYNOPHAGIA: ICD-10-CM

## 2018-04-16 DIAGNOSIS — B37.0 THRUSH, ORAL: ICD-10-CM

## 2018-04-16 DIAGNOSIS — R13.10 DYSPHAGIA, UNSPECIFIED TYPE: ICD-10-CM

## 2018-04-16 DIAGNOSIS — Z45.2 ENCOUNTER FOR VENOUS ACCESS DEVICE CARE: ICD-10-CM

## 2018-04-16 DIAGNOSIS — D64.9 ANEMIA, UNSPECIFIED TYPE: ICD-10-CM

## 2018-04-16 DIAGNOSIS — D69.6 THROMBOCYTOPENIA (HCC): ICD-10-CM

## 2018-04-16 DIAGNOSIS — R63.4 WEIGHT LOSS, ABNORMAL: Primary | ICD-10-CM

## 2018-04-16 LAB
ANION GAP SERPL CALCULATED.3IONS-SCNC: 17 MMOL/L (ref 5–15)
ANISOCYTOSIS BLD QL: NORMAL
BASOPHILS # BLD AUTO: 0.02 10*3/MM3 (ref 0–0.2)
BASOPHILS NFR BLD AUTO: 0.5 % (ref 0–2)
BUN BLD-MCNC: 21 MG/DL (ref 7–21)
BUN/CREAT SERPL: 19.1 (ref 7–25)
CALCIUM SPEC-SCNC: 9.6 MG/DL (ref 8.4–10.2)
CHLORIDE SERPL-SCNC: 99 MMOL/L (ref 95–110)
CO2 SERPL-SCNC: 24 MMOL/L (ref 22–31)
CREAT BLD-MCNC: 1.1 MG/DL (ref 0.7–1.3)
DEPRECATED RDW RBC AUTO: 51.5 FL (ref 35.1–43.9)
EOSINOPHIL # BLD AUTO: 0.09 10*3/MM3 (ref 0–0.7)
EOSINOPHIL NFR BLD AUTO: 2.4 % (ref 0–7)
ERYTHROCYTE [DISTWIDTH] IN BLOOD BY AUTOMATED COUNT: 19.5 % (ref 11.5–14.5)
GFR SERPL CREATININE-BSD FRML MDRD: 69 ML/MIN/1.73 (ref 56–130)
GLUCOSE BLD-MCNC: 103 MG/DL (ref 60–100)
HCT VFR BLD AUTO: 29.6 % (ref 39–49)
HGB BLD-MCNC: 10.1 G/DL (ref 13.7–17.3)
IMM GRANULOCYTES # BLD: 0.01 10*3/MM3 (ref 0–0.02)
IMM GRANULOCYTES NFR BLD: 0.3 % (ref 0–0.5)
LYMPHOCYTES # BLD AUTO: 0.63 10*3/MM3 (ref 0.6–4.2)
LYMPHOCYTES NFR BLD AUTO: 16.8 % (ref 10–50)
MAGNESIUM SERPL-MCNC: 2 MG/DL (ref 1.6–2.3)
MCH RBC QN AUTO: 25.3 PG (ref 26.5–34)
MCHC RBC AUTO-ENTMCNC: 34.1 G/DL (ref 31.5–36.3)
MCV RBC AUTO: 74.2 FL (ref 80–98)
MONOCYTES # BLD AUTO: 0.43 10*3/MM3 (ref 0–0.9)
MONOCYTES NFR BLD AUTO: 11.4 % (ref 0–12)
NEUTROPHILS # BLD AUTO: 2.58 10*3/MM3 (ref 2–8.6)
NEUTROPHILS NFR BLD AUTO: 68.6 % (ref 37–80)
PLATELET # BLD AUTO: 106 10*3/MM3 (ref 150–450)
PMV BLD AUTO: 9.5 FL (ref 8–12)
POTASSIUM BLD-SCNC: 3 MMOL/L (ref 3.5–5.1)
RBC # BLD AUTO: 3.99 10*6/MM3 (ref 4.37–5.74)
SMALL PLATELETS BLD QL SMEAR: NORMAL
SODIUM BLD-SCNC: 140 MMOL/L (ref 137–145)
WBC MORPH BLD: NORMAL
WBC NRBC COR # BLD: 3.76 10*3/MM3 (ref 3.2–9.8)

## 2018-04-16 PROCEDURE — 96375 TX/PRO/DX INJ NEW DRUG ADDON: CPT | Performed by: INTERNAL MEDICINE

## 2018-04-16 PROCEDURE — 25010000003 DEXAMETHASONE SODIUM PHOSPHATE 100 MG/10ML SOLUTION 10 ML VIAL: Performed by: INTERNAL MEDICINE

## 2018-04-16 PROCEDURE — 25010000002 HEPARIN FLUSH (PORCINE) 100 UNIT/ML SOLUTION: Performed by: INTERNAL MEDICINE

## 2018-04-16 PROCEDURE — 99215 OFFICE O/P EST HI 40 MIN: CPT | Performed by: INTERNAL MEDICINE

## 2018-04-16 PROCEDURE — 77336 RADIATION PHYSICS CONSULT: CPT | Performed by: RADIOLOGY

## 2018-04-16 PROCEDURE — 25010000002 CARBOPLATIN PER 50 MG: Performed by: INTERNAL MEDICINE

## 2018-04-16 PROCEDURE — 83735 ASSAY OF MAGNESIUM: CPT

## 2018-04-16 PROCEDURE — 77417 THER RADIOLOGY PORT IMAGE(S): CPT | Performed by: RADIOLOGY

## 2018-04-16 PROCEDURE — 25010000002 DIPHENHYDRAMINE PER 50 MG: Performed by: INTERNAL MEDICINE

## 2018-04-16 PROCEDURE — 96417 CHEMO IV INFUS EACH ADDL SEQ: CPT | Performed by: INTERNAL MEDICINE

## 2018-04-16 PROCEDURE — 25010000002 PALONOSETRON PER 25 MCG: Performed by: INTERNAL MEDICINE

## 2018-04-16 PROCEDURE — 96409 CHEMO IV PUSH SNGL DRUG: CPT | Performed by: INTERNAL MEDICINE

## 2018-04-16 PROCEDURE — 25010000002 PACLITAXEL PER 30 MG: Performed by: INTERNAL MEDICINE

## 2018-04-16 PROCEDURE — 80048 BASIC METABOLIC PNL TOTAL CA: CPT

## 2018-04-16 PROCEDURE — 77386: CPT | Performed by: RADIOLOGY

## 2018-04-16 RX ORDER — SODIUM CHLORIDE 9 MG/ML
250 INJECTION, SOLUTION INTRAVENOUS ONCE
Status: CANCELLED | OUTPATIENT
Start: 2018-04-16

## 2018-04-16 RX ORDER — POTASSIUM CHLORIDE 750 MG/1
40 CAPSULE, EXTENDED RELEASE ORAL ONCE
Status: COMPLETED | OUTPATIENT
Start: 2018-04-16 | End: 2018-04-16

## 2018-04-16 RX ORDER — SODIUM CHLORIDE 0.9 % (FLUSH) 0.9 %
10 SYRINGE (ML) INJECTION AS NEEDED
Status: DISCONTINUED | OUTPATIENT
Start: 2018-04-16 | End: 2018-04-16 | Stop reason: HOSPADM

## 2018-04-16 RX ORDER — FAMOTIDINE 10 MG/ML
20 INJECTION, SOLUTION INTRAVENOUS ONCE
Status: COMPLETED | OUTPATIENT
Start: 2018-04-16 | End: 2018-04-16

## 2018-04-16 RX ORDER — POTASSIUM CHLORIDE 750 MG/1
40 CAPSULE, EXTENDED RELEASE ORAL ONCE
Status: CANCELLED
Start: 2018-04-16 | End: 2018-04-16

## 2018-04-16 RX ORDER — SODIUM CHLORIDE 9 MG/ML
250 INJECTION, SOLUTION INTRAVENOUS ONCE
Status: COMPLETED | OUTPATIENT
Start: 2018-04-16 | End: 2018-04-16

## 2018-04-16 RX ORDER — SODIUM CHLORIDE 0.9 % (FLUSH) 0.9 %
10 SYRINGE (ML) INJECTION AS NEEDED
Status: CANCELLED | OUTPATIENT
Start: 2018-04-23

## 2018-04-16 RX ORDER — PALONOSETRON 0.05 MG/ML
0.25 INJECTION, SOLUTION INTRAVENOUS ONCE
Status: CANCELLED | OUTPATIENT
Start: 2018-04-16

## 2018-04-16 RX ORDER — FAMOTIDINE 10 MG/ML
20 INJECTION, SOLUTION INTRAVENOUS ONCE
Status: CANCELLED | OUTPATIENT
Start: 2018-04-16

## 2018-04-16 RX ORDER — PALONOSETRON 0.05 MG/ML
0.25 INJECTION, SOLUTION INTRAVENOUS ONCE
Status: COMPLETED | OUTPATIENT
Start: 2018-04-16 | End: 2018-04-16

## 2018-04-16 RX ORDER — POTASSIUM CHLORIDE 20 MEQ/1
40 TABLET, EXTENDED RELEASE ORAL DAILY
Qty: 4 TABLET | Refills: 0 | Status: SHIPPED | OUTPATIENT
Start: 2018-04-16 | End: 2018-04-18

## 2018-04-16 RX ADMIN — POTASSIUM CHLORIDE 40 MEQ: 750 CAPSULE, EXTENDED RELEASE ORAL at 10:47

## 2018-04-16 RX ADMIN — PACLITAXEL 115 MG: 6 INJECTION, SOLUTION INTRAVENOUS at 10:55

## 2018-04-16 RX ADMIN — DEXAMETHASONE SODIUM PHOSPHATE 12 MG: 10 INJECTION, SOLUTION INTRAMUSCULAR; INTRAVENOUS at 10:20

## 2018-04-16 RX ADMIN — DIPHENHYDRAMINE HYDROCHLORIDE 25 MG: 50 INJECTION INTRAMUSCULAR; INTRAVENOUS at 09:46

## 2018-04-16 RX ADMIN — FAMOTIDINE 20 MG: 10 INJECTION INTRAVENOUS at 09:30

## 2018-04-16 RX ADMIN — CARBOPLATIN 270 MG: 10 INJECTION, SOLUTION INTRAVENOUS at 12:07

## 2018-04-16 RX ADMIN — SODIUM CHLORIDE 250 ML: 9 INJECTION, SOLUTION INTRAVENOUS at 09:22

## 2018-04-16 RX ADMIN — Medication 10 ML: at 12:46

## 2018-04-16 RX ADMIN — Medication 10 ML: at 08:14

## 2018-04-16 RX ADMIN — PALONOSETRON HYDROCHLORIDE 0.25 MG: 0.25 INJECTION INTRAVENOUS at 10:12

## 2018-04-16 RX ADMIN — SODIUM CHLORIDE, PRESERVATIVE FREE 500 UNITS: 5 INJECTION INTRAVENOUS at 12:46

## 2018-04-16 NOTE — PROGRESS NOTES
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  Upon staging workup patient was found to have stage III squamous cell cancer of larynx for which she has been started on concurrent chemoradiation with weekly carboplatin and Taxol on March 19, 2018.  He is here to get week 4 of carboplatin and Taxol today.  Chemotherapy was held last week secondary to Complains of severe odynophagia and dysphagia.  States  his dysphagia and odynophagia is improved.  Denies any new lymph node enlargement.Denies any bleeding.        PAST MEDICAL HISTORY:    Past Medical History:   Diagnosis Date   • Aortic dissection    • Chest pain    • Disease of thyroid gland    • HTN (hypertension)    • Knee pain    • Sleep apnea    • Smoker    • Squamous cell carcinoma of larynx 2/5/2018   • Stroke    • Swallowing difficulty        SOCIAL HISTORY:    Social History   Substance Use Topics   • Smoking status: Former Smoker     Packs/day: 1.25     Years: 38.00     Quit date: 2/13/2017   • Smokeless tobacco: Never Used      Comment: 02/27/2018 - Patient confirmed he ceased utilization of tobacco products 02/13/2017.   • Alcohol use No       Surgical History :  Past Surgical History:   Procedure Laterality Date   • AORTA SURGERY      ruptured aorta repair   • ASCENDING ARCH/HEMIARCH REPLACEMENT N/A 2/14/2017    Procedure: INTRAOPERATIVE TRANSESOPHAGEAL ECHOCARDIOGRAM, MIDLINE STERNOTOMY, ASCENDING AORTIC  AND PROXIMAL AORTIC ARCH REPAIR WITH 26MM GRAFT, AORTIC VALVE RESUSPENSION, AORTIC ROOT REPAIR, OPEN VEIN HARVEST OF RIGHT LEG;  Surgeon: German Arreguin,  MD;  Location: [facility] MAIN OR;  Service:    • BRONCHOSCOPY N/A 2/17/2017    Procedure: BRONCHOSCOPY BIOPSY AT BEDSIDE WITH BAL-LEFT LOWER LOBE;  Surgeon: Trent Chaney MD;  Location: [facility] ENDOSCOPY;  Service:    • COLONOSCOPY N/A 3/7/2018    Procedure: COLONOSCOPY WITH POSSIBLE POLYPECTOMY   ( DONE IN OR WITH ENDO)      COLONOSCOPY FIRST;  Surgeon: Kris Solano MD;  Location: [facility] OR;  Service:    • DIRECT LARYNGOSCOPY, ESOPHAGOSCOPY, BRONCHOSCOPY N/A 2/5/2018    Procedure: DIRECT LARYNGOSCOPY WITH BIOPSY, ESOPHAGOSCOPY     (no bronchoscopy, no laser);  Surgeon: Joseph Venegas MD;  Location: [facility] OR;  Service:    • TRACHEOSTOMY  02/05/2018   • TRACHEOSTOMY N/A 2/5/2018    Procedure: TRACHEOSTOMY  local injection @ 1106 incision @ 1119;  Surgeon: Joseph Venegas MD;  Location: [facility] OR;  Service:    • VENOUS ACCESS DEVICE (PORT) INSERTION N/A 3/7/2018    Procedure: INSERTION OF MEDIPORT     (C-ARM#1);  Surgeon: Kris Solano MD;  Location: [facility];  Service:        ALLERGIES:    Allergies   Allergen Reactions   • Chlorhexidine Itching     Topical cleaning wipes causes severe skin irritation   • Co Q 10 [Coenzyme Q10] Itching   • Eggs Or Egg-Derived Products Swelling   • Erythromycin Other (See Comments)     Joint pains and cold sweats   • Other GI Intolerance     Mycins     • Penicillins      Tolerated cefepime during 2/2017 admission. Had rash and itching (relieved by benadryl) after few doses of cefepime and cefepime was continued.   • Tetracyclines & Related GI Intolerance   • Acth [Corticotropin] Rash   • Cephalosporins Itching and Rash     Pt developed rash, itching after cefepime administration (2-3 doses) during 2/2017 admission. Itching was relieved with benadryl. Cefepime was continued because his infection was improving and no symptoms of anaphylaxis present   • Keflex [Cephalexin] Rash         FAMILY HISTORY:  Family History   Problem Relation Age of Onset  "  • Thyroid disease Mother    • Hypertension Mother    • Hypertension Father    • Hypertension Other            REVIEW OF SYSTEMS:      CONSTITUTIONAL:  Complains of fatigue.12 pound weight loss since week 2 of chemotherapy.  No weight loss in last 1 week.  Denies any fever, chills .      HEENT:  Some visual disturbance affecting left eye since stroke in 2017.  Complains of hoarseness of voice. States his odynophagia and dysphagia is improved since last week.     RESPIRATORY:  Complains of chronic shortness of breath, denies any recent worsening.  No new cough or hemoptysis.     CARDIOVASCULAR:  No chest pain or palpitations.     GASTROINTESTINAL:  No abdominal pain nausea, vomiting or blood in the stool.     GENITOURINARY: No Dysuria or Hematuria.     MUSCULOSKELETAL:  No new back pain or arthralgia..     LYMPHATICS:  Denies any abnormal swollen glands anywhere in the body.     NEUROLOGICAL : No tingling or numbness. No headache or dizziness. No seizures or balance problems.     SKIN: Complains of intermittent bleeding from Tracheostomy.          PHYSICAL EXAMINATION:      VITAL SIGNS:  /68   Pulse 67   Temp 98 °F (36.7 °C) (Temporal Artery )   Resp 20   Ht 182.9 cm (72.01\")   Wt 107 kg (235 lb 6.4 oz)   BMI 31.92 kg/m²   1    04/16/18  0845   Weight: 107 kg (235 lb 6.4 oz)       ECOG  performance status: 1      GENERAL:  Not in any distress.Appears uncomfortable.    HEENT:  Normocephalic, Atraumatic.Mild Conjunctival pallor. No icterus. Extraocular Movements Intact. No Facial Asymmetry noted.Thrush on oropharynx examination is improved.    NECK:  No adenopathy. No JVD.Tracheostomy present.    RESPIRATORY:  Fair air entry bilateral. No rhonchi or wheezing.    CARDIOVASCULAR:  S1, S2. Regular rate and rhythm. No murmur or gallop appreciated.    ABDOMEN:  Soft, obese, nontender. Bowel sounds present in all four quadrants.  No organomegaly appreciated.    MUSCULOSKELETAL:  No edema.No Calf " Tenderness.Normal range of motion.    NEUROLOGIC:  Alert, awake and oriented ×3.  No  Motor or sensory deficit appreciated. Cranial Nerves 2-12 grossly intact.    SKIN : No new skin lesion identified    LYMPHATICS: No new enlarged lymph node in neck or supraclavicular area.            DIAGNOSTIC DATA:    Glucose   Date Value Ref Range Status   04/16/2018 103 (H) 60 - 100 mg/dL Final     Sodium   Date Value Ref Range Status   04/16/2018 140 137 - 145 mmol/L Final     Potassium   Date Value Ref Range Status   04/16/2018 3.0 (L) 3.5 - 5.1 mmol/L Final     CO2   Date Value Ref Range Status   04/16/2018 24.0 22.0 - 31.0 mmol/L Final     Chloride   Date Value Ref Range Status   04/16/2018 99 95 - 110 mmol/L Final     Anion Gap   Date Value Ref Range Status   04/16/2018 17.0 (H) 5.0 - 15.0 mmol/L Final     Creatinine   Date Value Ref Range Status   04/16/2018 1.10 0.70 - 1.30 mg/dL Final     BUN   Date Value Ref Range Status   04/16/2018 21 7 - 21 mg/dL Final     BUN/Creatinine Ratio   Date Value Ref Range Status   04/16/2018 19.1 7.0 - 25.0 Final     Calcium   Date Value Ref Range Status   04/16/2018 9.6 8.4 - 10.2 mg/dL Final     eGFR Non  Amer   Date Value Ref Range Status   04/16/2018 69 56 - 130 mL/min/1.73 Final     Alkaline Phosphatase   Date Value Ref Range Status   03/19/2018 86 38 - 126 U/L Final     Total Protein   Date Value Ref Range Status   03/19/2018 6.9 6.3 - 8.6 g/dL Final     ALT (SGPT)   Date Value Ref Range Status   03/19/2018 61 21 - 72 U/L Final     AST (SGOT)   Date Value Ref Range Status   03/19/2018 44 17 - 59 U/L Final     Total Bilirubin   Date Value Ref Range Status   03/19/2018 0.2 0.2 - 1.3 mg/dL Final     Albumin   Date Value Ref Range Status   03/19/2018 3.80 3.40 - 4.80 g/dL Final     Globulin   Date Value Ref Range Status   03/19/2018 3.1 2.3 - 3.5 gm/dL Final     A/G Ratio   Date Value Ref Range Status   03/19/2018 1.2 1.1 - 1.8 g/dL Final     Lab Results   Component Value Date     WBC 3.76 04/16/2018    HGB 10.1 (L) 04/16/2018    HCT 29.6 (L) 04/16/2018    MCV 74.2 (L) 04/16/2018     (L) 04/16/2018     Lab Results   Component Value Date    NEUTROABS 2.58 04/16/2018    IRON 14 (L) 02/13/2018    TIBC 335 02/13/2018    LABIRON 4 (L) 02/13/2018    FERRITIN 64.40 02/13/2018    [id number] 546 03/14/2018    FOLATE >20.00 02/13/2018     No results found for: , LABCA2, AFPTM, HCGQUANT, , CHROMGRNA, [id number], CEA, REFLABREPO        Radiology Data :  PET CT done on February 16, 2018 was reviewed, discussed with patient, it showed:  IMPRESSION:  CONCLUSION:   1.  Soft tissue mass in the region of the epiglottis obstructing  the airway, associated with hypermetabolic activity, compatible  with known neoplasm.  2.  Activity around the tracheostomy site is likely inflammatory.  3.  Activity in the rectum (SUV max 7.1) is probably either  physiological or inflammatory. Recommend direct visualization.  4.  There is a focus of activity in the region of the right  common iliac artery without a definite CT abnormality (SUV max  5.4) possibly representing a lymph node, nonspecific but cannot  rule out neoplasm.  5.  Foci of activity posterior to the ischial tuberosities are  probably inflammatory.  6.  There are diffuse punctate low-level foci of activity  throughout the osseous structures, liver, kidneys, mediastinum  which are more likely artifactual than due to metastatic disease.    ADDENDUM   ADDENDUM #1         Upon review of the patient's imaging with Dr. Tapia, there  appears to be extension of tumor into the left side of the  preepiglottic space.        Ct of Neck with contrast done on January 29, 2018 showed:  IMPRESSION:  3 x 3.5 x 3.5 cm soft tissue mass with lobular margin centered at  the level the epiglottis however flush 4 cm intimate with the  posterior margin of the tongue base and the posterior margin of  the oral pharynx. This does contribute to moderate compromise  of  the airway. Finding is suspicious for malignancy.      Mostly subcentimeter cervical chain lymph nodes present  bilaterally. The largest node right level II measuring 1.3 cm. No  necrotic or conglomerate adenopathy.      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 The  dissection was described on a CTA coronary artery exam from  2/14/2017.        Pathology :  Pathology result from February 5, 2018 showed:  Final Diagnosis   1.  TUMOR, EPIGLOTTIS:   INVASIVE SQUAMOUS CELL CARCINOMA, NON KERATINIZING, POORLY DIFFERENTIATED.       2.  TUMOR, EPIGLOTTIS:   INVASIVE SQUAMOUS CELL CARCINOMA, NON KERATINIZING, POORLY DIFFERENTIATED.               ASSESSMENT AND PLAN:      1.  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  Based on PET/CT there is a tumor extension into preepiglottic space making a T3 lesion.  Result of PET CT were discussed with patient.  It was discussed with patient with T3 N0 lesion after his treatment option includes either surgery or concurrent chemoradiation.  He was started on concurrent chemoradiation on March 19, 2018.  She'll tolerated first 3 weeks of treatment well without any excessive side effects.  On April 19, 2018, in view of odynophagia and dysphagia, chemotherapy was held.  Patient has not lost any weight since last week.  He is able to eat and drink at this point.  We will go head with week 4 of carboplatin and Taxol today.  We will ask patient to return to clinic in 1 week with a repeat CBC and CMP on that day.    2.  Normocytic anemia: Patient admits to recent epistaxis as well as blood clots after biopsy.   anemia workup was consistent with anemia of chronic disease with component of fine deficiency with saturation being only 4%.  In taking ferrous sulfate at least 3-4 times a week along with Colace.  Hemoglobin is stable at 10.1.    3.  Odynophagia and dysphagia: Most likely secondary  to mucositis from chemoradiation.  Patient had thrush last week for which he has been started on nystatin.  Patient also has a magic mouth was that he has been taking with minimal relief.  We will start him on fentanyl patch 12 µg every 72 hours .  Prescription with 10 patch has been given on April 9, 2018.    4.  Hypokalemia: Patient's potassium is only 3.0 today.  We will start him on K-dur 40 mg by mouth daily for next status.    5.  Abnormal weight loss: Patient has lost 12 pounds in last 2 weeks secondary to inability to eat and drink.  Again it was recommended that he should get a feeding tube placed for nutritional support.  Patient is reluctant to get any feeding tube placed at this point.    6.oral thrush: Patient was found to have oral thrush last week for which she has been started on nystatin.  His thrush is improved clinically on examination today.    7.  Thrombocytopenia: Secondary to chemotherapy.  Platelet count is 106,000 today.  Patient denies any active bleeding.  We'll monitored with CBC for now     8.  History of aortic dissection status post repair.     9.  History of CVA with residual visual disturbance in left eye     10.  Health maintenance: Patient quit smoking in February 2017.  Never had a screening colonoscopy done.  Remains full code.     Reza Patel MD  4/16/2018  6:38 PM        EMR Dragon/Transcription disclaimer:   Much of this encounter note is an electronic transcription/translation of spoken language to printed text. The electronic translation of spoken language may permit erroneous, or at times, nonsensical words or phrases to be inadvertently transcribed; Although I have reviewed the note for such errors, some may still exist.

## 2018-04-17 PROCEDURE — 77386: CPT | Performed by: RADIOLOGY

## 2018-04-18 ENCOUNTER — HOSPITAL ENCOUNTER (OUTPATIENT)
Dept: SPEECH THERAPY | Facility: HOSPITAL | Age: 58
Setting detail: THERAPIES SERIES
Discharge: HOME | End: 2018-04-18

## 2018-04-18 ENCOUNTER — RADIATION ONCOLOGY WEEKLY ASSESSMENT (OUTPATIENT)
Dept: RADIATION ONCOLOGY | Facility: HOSPITAL | Age: 58
End: 2018-04-18

## 2018-04-18 VITALS
WEIGHT: 239.2 LBS | HEART RATE: 61 BPM | TEMPERATURE: 98.1 F | SYSTOLIC BLOOD PRESSURE: 127 MMHG | HEIGHT: 72 IN | DIASTOLIC BLOOD PRESSURE: 63 MMHG | RESPIRATION RATE: 20 BRPM | BODY MASS INDEX: 32.4 KG/M2

## 2018-04-18 DIAGNOSIS — R13.12 OROPHARYNGEAL DYSPHAGIA: Primary | ICD-10-CM

## 2018-04-18 DIAGNOSIS — C32.9 SQUAMOUS CELL CARCINOMA OF LARYNX (HCC): Primary | ICD-10-CM

## 2018-04-18 PROCEDURE — 77338 DESIGN MLC DEVICE FOR IMRT: CPT | Performed by: RADIOLOGY

## 2018-04-18 PROCEDURE — 77386: CPT | Performed by: RADIOLOGY

## 2018-04-18 PROCEDURE — 92526 ORAL FUNCTION THERAPY: CPT | Performed by: SPEECH-LANGUAGE PATHOLOGIST

## 2018-04-18 PROCEDURE — 77300 RADIATION THERAPY DOSE PLAN: CPT | Performed by: RADIOLOGY

## 2018-04-18 NOTE — PROGRESS NOTES
On Treatment Visit       Patient: Truman Esquivel   YOB: 1960   Medical Record Number: [id number]     Date of Visit  April 18, 2018   Primary Diagnosis: Stage III (cT3 N0 M0) squamous cell carcinoma of the supraglottic larynx.  ICD 10 Code: C32.9      was seen today for an on treatment visit.  He is receiving radiation therapy to the larynx/neck. He  has received 3800 cGy in 19 fractions out of a planned dose of 7000 cGy in 35 fractions. He is receiving concurrent carboplatin/Taxol chemotherapy per Dr. Patel, and received his 4th and most recent cycle on 4/16/18.      Today on exam the patient is doing well, and has no new disease or treatment-related complaints.. He is swallowing and eating much better. He has no complaints of pain.  He has follow-up with speech pathology scheduled for later today.                                               Vitals:     Vitals:    04/18/18 1336   BP: 127/63   Pulse: 61   Resp: 20   Temp: 98.1 °F (36.7 °C)       Weight:   (Starting weight of 257 pounds on 2/21/18)  Wt Readings from Last 3 Encounters:   04/18/18 109 kg (239 lb 3.2 oz)   04/16/18 107 kg (235 lb 6.4 oz)   04/11/18 107 kg (235 lb 7.2 oz)      Pain:    Pain Score    04/18/18 1336   PainSc: 0-No pain         Plan: We plan to continue radiation therapy as prescribed.     Jarocho Tapia MD  Radiation Oncology    Electronically signed by Jarocho Tapia MD 4/18/2018  2:24 PM     cc: Dr. Joseph Harrison

## 2018-04-18 NOTE — THERAPY TREATMENT NOTE
Outpatient Speech Language Pathology   Adult Swallow Treatment Note  [facility]     Patient Name: Truman Esquivel  : 1960  MRN: [id number]  Today's Date: 2018         Visit Date: 2018     Patient insurance has approved 4 visits for speech therapy.    Laryngeal cancer; Stage III (cT3, cN0, cM0).     Patient has attended 1/ scheduled therapy sessions.     SLP recommended using suction for trach following meals as needed. Pt presented with increased throat clearing and coughing this date on ice cream. Pt verbalized no new or increased difficulties with swallowing. Pt stated that he has gained 2 lbs in a week.     Pain was noted in pts left jaw during some of the OME this date. These exercises were discontinued during session.     SLP recommended avoiding thick puddings, salads, breads which present with increased difficulties for pt. Pt encouraged to begin home exercise plan 3x a day each day. SLP reviewed the importance of continuing to swallow during all treatment to encourage muscle to keep moving. Pt was in agreement. Pt has a passy thaddeus valve but does not currently wear.Patient would benefit from skilled speech therapy 1 x a week for increased swallowing safety and preservation of swallowing function through radiation and chemotherapy.     Agnes Barone, MS CCC-SLP       Patient Active Problem List   Diagnosis   • Ascending aortic dissection   • Essential hypertension   • Deep vein thrombosis (DVT) of femoral vein of both lower extremities   • Acquired hypothyroidism   • Snoring   • Acute pain of right knee   • Knee effusion, right   • Knee pain   • Obstructive sleep apnea of adult   • TIA (transient ischemic attack)   • Squamous cell carcinoma of larynx   • Dysphagia   • Squamous cell carcinoma of larynx   • Pharyngeal dysphagia   • Anemia   • Abnormal positron emission tomography (PET) of colon   • Hypokalemia   • Encounter for venous access device care   • Dehydration   •  Weight loss, abnormal   • Odynophagia   • Thrush, oral   • Thrombocytopenia        Visit Dx:    ICD-10-CM ICD-9-CM   1. Oropharyngeal dysphagia R13.12 787.22             SLP Adult Swallow Evaluation - 04/18/18 1402        Rehab Evaluation    Document Type therapy note (daily note)  -EA    Total Evaluation Minutes, SLP 54  -EA    Subjective Information complains of;pain   jaw - during OME   -EA    Patient Observations alert;cooperative  -EA    Patient/Family Observations Pt completed radiation treatment prior to therapy session. Pt was alone for therapy session this date.   -EA    Patient Effort good  -EA    Symptoms Noted During/After Treatment other (see comments)  -EA    Symptoms Noted, Comment throat clearing/coughing; increased mucus from trach   -EA       General Information    Patient Profile Reviewed yes  -EA    Current Method of Nutrition soft textures;thin liquids  -EA    Precautions/Limitations, Vision WFL with corrective lenses  -EA    Precautions/Limitations, Hearing WFL  -EA    Prior Level of Function-Communication WFL  -EA    Prior Level of Function-Swallowing no diet consistency restrictions  -EA    Plans/Goals Discussed with patient;family  -EA    Barriers to Rehab none identified  -EA       Pain Scale: Numbers Pre/Post-Treatment    Pain Scale: Numbers, Pretreatment 1/10  -EA    Pain Scale: Numbers, Post-Treatment 1/10  -EA    Pain Location - Side Bilateral  -EA    Pain Location - Orientation --   throat *burning   -EA    Pain Location other (see comments)   jaw  -EA       Oral Motor and Function    Dentition Assessment natural, present and adequate;other (see comments)   changes in coloration of teeth following radiation   -EA    Secretion Management WNL/WFL  -EA    Mucosal Quality moist, healthy  -EA    Volitional Swallow delayed  -EA       Oral Musculature and Cranial Nerve Assessment    Oral Motor General Assessment WFL  -EA       General Eating/Swallowing Observations    Respiratory Support  Currently in Use trach collar  -EA    Eating/Swallowing Skills self-fed  -EA    Positioning During Eating upright 90 degree;upright in chair  -EA    Utensils Used spoon;cup  -EA    Consistencies Trialed pureed;thin liquids  -EA       Respiratory    Respiratory Status room air  -EA       Clinical Swallow Eval    Oral Prep Phase impaired  -EA    Oral Transit impaired  -EA    Oral Residue WFL  -EA    Pharyngeal Phase suspected pharyngeal impairment  -EA    Esophageal Phase unremarkable  -EA       Oral Prep Concerns    Oral Prep Concerns prolonged mastication;increased prep time  -EA    Prolonged Mastication thin;pudding  -EA    Increased Prep Time thin;pudding  -EA       Oral Transit Concerns    Oral Transit Concerns delayed initiation of bolus transit  -EA    Delayed Intiation of Bolus Transit thin;pudding  -EA       Pharyngeal Phase Concerns    Pharyngeal Phase Concerns cough  -EA    Cough thin;pudding  -EA       Clinical Impression    SLP Swallowing Diagnosis mild-moderate;oral dysfunction;suspected pharyngeal dysfunction  -EA    Functional Impact risk of malnutrition;risk of aspiration/pneumonia  -EA    Rehab Potential/Prognosis, Swallowing good, to achieve stated therapy goals  -EA    Criteria for Skilled Therapeutic Interventions Met demonstrates skilled criteria  -EA       Recommendations    Therapy Frequency (Swallow) 1 day per week  -EA    Predicted Duration Therapy Intervention (Days) until discharge  -EA    SLP Diet Recommendation mechanical soft with no mixed consistencies;thin liquids  -EA    Recommended Precautions and Strategies upright posture during/after eating;small bites of food and sips of liquid;multiple swallows per bite of food;multiple swallows per sip of liquid;hard swallow with each bite or sip  -EA    Monitor for Signs of Aspiration yes;notify SLP if any concerns  -EA    Anticipated Dischage Disposition home  -EA       Oral Nutrition/Hydration Goal 1 (SLP)    Oral Nutrition/Hydration Goal 1,  SLP Patient will tolerate least restrictive diet with no overt s/s of aspiration.   -EA    Time Frame (Oral Nutrition/Hydration Goal 1, SLP) by discharge  -EA    Barriers (Oral Nutrition/Hydration Goal 1, SLP) laryngeal cancer   -EA    Progress/Outcomes (Oral Nutrition/Hydration Goal 1, SLP) goal ongoing  -EA       Pharyngeal Strengthening Exercise Goal 1 (SLP)    Activity (Pharyngeal Strengthening Goal 1, SLP) increase timing;increase tongue base retraction  -EA    Increase Timing ingrid;hard effortful swallow;prepping - 3 second prep or suck swallow or 3-step swallow   OME set - Prophylactic swallowing exercises   -EA    Increase Tongue Base Retraction ingrid;hard effortful swallow;prepping - 3 second prep or suck swallow or 3-step swallow  -EA    [place]/Accuracy (Pharyngeal Strengthening Goal 1, SLP) independently (over 90% accuracy)  -EA    Time Frame (Pharyngeal Strengthening Goal 1, SLP) by discharge  -EA    Barriers (Pharyngeal Strengthening Goal 1, SLP) laryngeal cancer   -EA    Progress/Outcomes (Pharyngeal Strengthening Goal 1, SLP) goal ongoing   Exercises reviewed and modeld for pt this date  -EA       Dysphagia Treatment Objectives and Progress    Dysphagia Treatment Objectives Improve oral skills;Improve timing of pharyngeal response  -EA       Improve oral skills    To Improve Oral Skills, patient will: Increase tongue A-P movement;90%  -EA       Improve timing of pharyngeal response    To improve timing of pharyngeal response, patient will: Swallow in timely way using three second prep;90%  -EA    Status: Improve timing of pharyngeal response New  -EA    Timing of Pharyngeal Response Progress 70%;with consistent cues  -EA    Comments: Improve timing of pharyngeal response Pt completed trials of thin liquid and ice cream this date; pt utilized three second prep as cued by SLP. Pt presented with increased throat clearing/cough following ice cream this date. Pt verbalized no increase in swallowing  difficulties and has gained 2lbs.  -EA      User Key  (r) = Recorded By, (t) = Taken By, (c) = Cosigned By    Initials Name Provider Type    KAYLEY Barone MS CCC-SLP Speech and Language Pathologist                              SLP OP Goals     Row Name 04/18/18 1603          SLP Time Calculation    SLP Goal Re-Cert Due Date 05/11/18  -EA       User Key  (r) = Recorded By, (t) = Taken By, (c) = Cosigned By    Initials Name Provider Type    KAYLEY Barone MS CCC-SLP Speech and Language Pathologist                OP SLP Education     Row Name 04/18/18 1600       Education    Barriers to Learning No barriers identified  -EA    Education Provided Patient demonstrated recommended strategies;Patient requires further education on strategies, risks  -EA    Assessed Learning needs;Learning motivation  -EA    Learning Motivation Strong  -EA    Learning Method Explanation;Demonstration;Written materials  -EA    Teaching Response Verbalized understanding;Demonstrated understanding  -EA    Education Comments SLP reviewed POC and recommendations with pt. Patient was in agreement. SLP provided written materials for pt on exercises.   -EA      User Key  (r) = Recorded By, (t) = Taken By, (c) = Cosigned By    Initials Name Effective Dates    KAYLEY Baorne MS JULIO-SLP 10/17/16 -                 OP SLP Assessment/Plan - 04/18/18 1600        SLP Assessment    Functional Problems Swallowing  -EA    Impact on Function: Swallowing Risk of malnourishment;Risk of dehydration;Risk of aspiration  -EA    Clinical Impression: Swallowing Moderate:;oropharyngeal phase dysphagia  -EA    Functional Problems Comment Pt verbalized no increased difficulties with swallow. Pt has gained 2 lbs over last week. Pt did verbalize increased pain in jaw during some OME.   -EA    Clinical Impression Comments Pt is compliant with the recommendation for effortful swallows throughout the day. Pt stated that he is swallowing all the time.    -EA    Please refer to paper survey for additional self-reported information Yes  -EA    Please refer to items scanned into chart for additional diagnostic informaiton and handouts as provided by clinician Yes  -EA    SLP Diagnosis Dysphagia   -EA    Prognosis Good (comment)  -EA    Patient/caregiver participated in establishment of treatment plan and goals Yes  -EA    Patient would benefit from skilled therapy intervention Yes  -EA       SLP Plan    Frequency 1 time a week   -EA    Duration until discharge  -EA    Planned CPT's? SLP SWALLOW THERAPY: [id number]  -EA    Expected Duration Therapy Session - minutes 30-45 minutes  -EA    Plan Comments Continue   -EA      User Key  (r) = Recorded By, (t) = Taken By, (c) = Cosigned By    Initials Name Provider Type    KAYLEY Barone MS CCC-SLP Speech and Language Pathologist                Time Calculation:   SLP Start Time: 1402  SLP Stop Time: 1456  SLP Time Calculation (min): 54 min  Total Timed Code Minutes- SLP: 54 minute(s)    Therapy Charges for Today     Code Description Service Date Service Provider Modifiers Qty    [id number]  ST TREATMENT SWALLOW 4 4/18/2018 Agnes Barone MS CCC-SLP GN 1                   Agnes Barone MS CCC-SLP  4/18/2018

## 2018-04-19 PROCEDURE — 77336 RADIATION PHYSICS CONSULT: CPT | Performed by: RADIOLOGY

## 2018-04-19 PROCEDURE — 77386: CPT | Performed by: RADIOLOGY

## 2018-04-20 PROCEDURE — 77386: CPT | Performed by: RADIOLOGY

## 2018-04-20 PROCEDURE — 77427 RADIATION TX MANAGEMENT X5: CPT | Performed by: RADIOLOGY

## 2018-04-23 ENCOUNTER — APPOINTMENT (OUTPATIENT)
Dept: ONCOLOGY | Facility: HOSPITAL | Age: 58
End: 2018-04-23

## 2018-04-23 ENCOUNTER — INFUSION (OUTPATIENT)
Dept: ONCOLOGY | Facility: HOSPITAL | Age: 58
End: 2018-04-23

## 2018-04-23 ENCOUNTER — OFFICE VISIT (OUTPATIENT)
Dept: ONCOLOGY | Facility: CLINIC | Age: 58
End: 2018-04-23

## 2018-04-23 VITALS
SYSTOLIC BLOOD PRESSURE: 134 MMHG | RESPIRATION RATE: 18 BRPM | HEART RATE: 62 BPM | DIASTOLIC BLOOD PRESSURE: 68 MMHG | HEIGHT: 72 IN | BODY MASS INDEX: 31.64 KG/M2 | WEIGHT: 233.6 LBS | TEMPERATURE: 98.6 F

## 2018-04-23 DIAGNOSIS — C32.9 SQUAMOUS CELL CARCINOMA OF LARYNX (HCC): ICD-10-CM

## 2018-04-23 DIAGNOSIS — E87.6 HYPOKALEMIA: ICD-10-CM

## 2018-04-23 DIAGNOSIS — E86.0 DEHYDRATION: ICD-10-CM

## 2018-04-23 DIAGNOSIS — Z45.2 ENCOUNTER FOR VENOUS ACCESS DEVICE CARE: Primary | ICD-10-CM

## 2018-04-23 DIAGNOSIS — D61.810 PANCYTOPENIA DUE TO ANTINEOPLASTIC CHEMOTHERAPY (HCC): ICD-10-CM

## 2018-04-23 DIAGNOSIS — B37.0 THRUSH, ORAL: Primary | ICD-10-CM

## 2018-04-23 DIAGNOSIS — T45.1X5A PANCYTOPENIA DUE TO ANTINEOPLASTIC CHEMOTHERAPY (HCC): ICD-10-CM

## 2018-04-23 DIAGNOSIS — R63.4 WEIGHT LOSS, ABNORMAL: ICD-10-CM

## 2018-04-23 LAB
ANION GAP SERPL CALCULATED.3IONS-SCNC: 14 MMOL/L (ref 5–15)
BASOPHILS # BLD AUTO: 0.01 10*3/MM3 (ref 0–0.2)
BASOPHILS NFR BLD AUTO: 0.3 % (ref 0–2)
BUN BLD-MCNC: 15 MG/DL (ref 7–21)
BUN/CREAT SERPL: 15.3 (ref 7–25)
CALCIUM SPEC-SCNC: 9.5 MG/DL (ref 8.4–10.2)
CHLORIDE SERPL-SCNC: 101 MMOL/L (ref 95–110)
CO2 SERPL-SCNC: 24 MMOL/L (ref 22–31)
CREAT BLD-MCNC: 0.98 MG/DL (ref 0.7–1.3)
DEPRECATED RDW RBC AUTO: 53.7 FL (ref 35.1–43.9)
EOSINOPHIL # BLD AUTO: 0.1 10*3/MM3 (ref 0–0.7)
EOSINOPHIL NFR BLD AUTO: 3.4 % (ref 0–7)
ERYTHROCYTE [DISTWIDTH] IN BLOOD BY AUTOMATED COUNT: 20 % (ref 11.5–14.5)
GFR SERPL CREATININE-BSD FRML MDRD: 79 ML/MIN/1.73 (ref 60–130)
GLUCOSE BLD-MCNC: 100 MG/DL (ref 60–100)
HCT VFR BLD AUTO: 27.1 % (ref 39–49)
HGB BLD-MCNC: 9.2 G/DL (ref 13.7–17.3)
IMM GRANULOCYTES # BLD: 0.01 10*3/MM3 (ref 0–0.02)
IMM GRANULOCYTES NFR BLD: 0.3 % (ref 0–0.5)
LYMPHOCYTES # BLD AUTO: 0.33 10*3/MM3 (ref 0.6–4.2)
LYMPHOCYTES NFR BLD AUTO: 11.2 % (ref 10–50)
MAGNESIUM SERPL-MCNC: 1.9 MG/DL (ref 1.6–2.3)
MCH RBC QN AUTO: 25.2 PG (ref 26.5–34)
MCHC RBC AUTO-ENTMCNC: 33.9 G/DL (ref 31.5–36.3)
MCV RBC AUTO: 74.2 FL (ref 80–98)
MONOCYTES # BLD AUTO: 0.11 10*3/MM3 (ref 0–0.9)
MONOCYTES NFR BLD AUTO: 3.7 % (ref 0–12)
NEUTROPHILS # BLD AUTO: 2.39 10*3/MM3 (ref 2–8.6)
NEUTROPHILS NFR BLD AUTO: 81.1 % (ref 37–80)
PLATELET # BLD AUTO: 127 10*3/MM3 (ref 150–450)
PMV BLD AUTO: 10.4 FL (ref 8–12)
POTASSIUM BLD-SCNC: 3.4 MMOL/L (ref 3.5–5.1)
RBC # BLD AUTO: 3.65 10*6/MM3 (ref 4.37–5.74)
SODIUM BLD-SCNC: 139 MMOL/L (ref 137–145)
WBC NRBC COR # BLD: 2.95 10*3/MM3 (ref 3.2–9.8)

## 2018-04-23 PROCEDURE — 25810000003 SODIUM CHLORIDE 0.9 % WITH KCL 20 MEQ 20-0.9 MEQ/L-% SOLUTION: Performed by: INTERNAL MEDICINE

## 2018-04-23 PROCEDURE — 99214 OFFICE O/P EST MOD 30 MIN: CPT | Performed by: INTERNAL MEDICINE

## 2018-04-23 PROCEDURE — 25010000002 HEPARIN FLUSH (PORCINE) 100 UNIT/ML SOLUTION: Performed by: INTERNAL MEDICINE

## 2018-04-23 PROCEDURE — 80048 BASIC METABOLIC PNL TOTAL CA: CPT

## 2018-04-23 PROCEDURE — 85025 COMPLETE CBC W/AUTO DIFF WBC: CPT

## 2018-04-23 PROCEDURE — 96360 HYDRATION IV INFUSION INIT: CPT | Performed by: INTERNAL MEDICINE

## 2018-04-23 PROCEDURE — 83735 ASSAY OF MAGNESIUM: CPT

## 2018-04-23 PROCEDURE — 96361 HYDRATE IV INFUSION ADD-ON: CPT | Performed by: INTERNAL MEDICINE

## 2018-04-23 RX ORDER — SODIUM CHLORIDE 0.9 % (FLUSH) 0.9 %
10 SYRINGE (ML) INJECTION AS NEEDED
Status: DISCONTINUED | OUTPATIENT
Start: 2018-04-23 | End: 2018-04-23 | Stop reason: HOSPADM

## 2018-04-23 RX ORDER — SODIUM CHLORIDE AND POTASSIUM CHLORIDE 150; 900 MG/100ML; MG/100ML
500 INJECTION, SOLUTION INTRAVENOUS ONCE
Status: COMPLETED | OUTPATIENT
Start: 2018-04-23 | End: 2018-04-23

## 2018-04-23 RX ORDER — SODIUM CHLORIDE AND POTASSIUM CHLORIDE 150; 900 MG/100ML; MG/100ML
500 INJECTION, SOLUTION INTRAVENOUS ONCE
Status: CANCELLED
Start: 2018-04-23 | End: 2018-04-23

## 2018-04-23 RX ORDER — SODIUM CHLORIDE 0.9 % (FLUSH) 0.9 %
10 SYRINGE (ML) INJECTION AS NEEDED
Status: CANCELLED | OUTPATIENT
Start: 2018-04-26

## 2018-04-23 RX ADMIN — SODIUM CHLORIDE, PRESERVATIVE FREE 500 UNITS: 5 INJECTION INTRAVENOUS at 11:47

## 2018-04-23 RX ADMIN — Medication 10 ML: at 08:31

## 2018-04-23 RX ADMIN — POTASSIUM CHLORIDE AND SODIUM CHLORIDE 500 ML/HR: 900; 150 INJECTION, SOLUTION INTRAVENOUS at 09:37

## 2018-04-23 RX ADMIN — Medication 10 ML: at 11:47

## 2018-04-23 NOTE — PROGRESS NOTES
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  Upon staging workup patient was found to have stage III squamous cell cancer of larynx for which she has been started on concurrent chemoradiation with weekly carboplatin and Taxol on March 19, 2018.  He is here to get week 5 of carboplatin and Taxol today.  She'll again complains of severe odynophagia and dysphagia since last Friday.  States he is not able to even take his medication by mouth.  Has lost 2 pounds since last week.  Denies any new lymph node enlargement.Denies any bleeding.        PAST MEDICAL HISTORY:    Past Medical History:   Diagnosis Date   • Aortic dissection    • Chest pain    • Disease of thyroid gland    • HTN (hypertension)    • Knee pain    • Sleep apnea    • Smoker    • Squamous cell carcinoma of larynx 2/5/2018   • Stroke    • Swallowing difficulty        SOCIAL HISTORY:    Social History   Substance Use Topics   • Smoking status: Former Smoker     Packs/day: 1.25     Years: 38.00     Quit date: 2/13/2017   • Smokeless tobacco: Never Used      Comment: 02/27/2018 - Patient confirmed he ceased utilization of tobacco products 02/13/2017.   • Alcohol use No       Surgical History :  Past Surgical History:   Procedure Laterality Date   • AORTA SURGERY      ruptured aorta repair   • ASCENDING ARCH/HEMIARCH REPLACEMENT N/A 2/14/2017    Procedure: INTRAOPERATIVE TRANSESOPHAGEAL ECHOCARDIOGRAM, MIDLINE STERNOTOMY, ASCENDING AORTIC  AND PROXIMAL AORTIC ARCH REPAIR WITH 26MM GRAFT, AORTIC VALVE RESUSPENSION, AORTIC ROOT REPAIR, OPEN VEIN HARVEST OF  RIGHT LEG;  Surgeon: German Arreguin MD;  Location: [facility] MAIN OR;  Service:    • BRONCHOSCOPY N/A 2/17/2017    Procedure: BRONCHOSCOPY BIOPSY AT BEDSIDE WITH BAL-LEFT LOWER LOBE;  Surgeon: Trent Chaney MD;  Location: [facility] ENDOSCOPY;  Service:    • COLONOSCOPY N/A 3/7/2018    Procedure: COLONOSCOPY WITH POSSIBLE POLYPECTOMY   ( DONE IN OR WITH ENDO)      COLONOSCOPY FIRST;  Surgeon: Kris Solano MD;  Location: [facility] OR;  Service:    • DIRECT LARYNGOSCOPY, ESOPHAGOSCOPY, BRONCHOSCOPY N/A 2/5/2018    Procedure: DIRECT LARYNGOSCOPY WITH BIOPSY, ESOPHAGOSCOPY     (no bronchoscopy, no laser);  Surgeon: Joseph Venegas MD;  Location: [facility] OR;  Service:    • TRACHEOSTOMY  02/05/2018   • TRACHEOSTOMY N/A 2/5/2018    Procedure: TRACHEOSTOMY  local injection @ 1106 incision @ 1119;  Surgeon: Joseph Venegas MD;  Location: [facility] OR;  Service:    • VENOUS ACCESS DEVICE (PORT) INSERTION N/A 3/7/2018    Procedure: INSERTION OF MEDIPORT     (C-ARM#1);  Surgeon: Kris Solano MD;  Location: [facility];  Service:        ALLERGIES:    Allergies   Allergen Reactions   • Chlorhexidine Itching     Topical cleaning wipes causes severe skin irritation   • Co Q 10 [Coenzyme Q10] Itching   • Eggs Or Egg-Derived Products Swelling   • Erythromycin Other (See Comments)     Joint pains and cold sweats   • Other GI Intolerance     Mycins     • Penicillins      Tolerated cefepime during 2/2017 admission. Had rash and itching (relieved by benadryl) after few doses of cefepime and cefepime was continued.   • Tetracyclines & Related GI Intolerance   • Acth [Corticotropin] Rash   • Cephalosporins Itching and Rash     Pt developed rash, itching after cefepime administration (2-3 doses) during 2/2017 admission. Itching was relieved with benadryl. Cefepime was continued because his infection was improving and no symptoms of anaphylaxis present   • Keflex [Cephalexin] Rash         FAMILY HISTORY:  Family  "History   Problem Relation Age of Onset   • Thyroid disease Mother    • Hypertension Mother    • Hypertension Father    • Hypertension Other            REVIEW OF SYSTEMS:      CONSTITUTIONAL:  Complains of fatigue.14 pound weight loss since week 2 of chemotherapy.  Has lost 2 pounds in last 2 weeks.  Denies any fever, chills .      HEENT:  Some visual disturbance affecting left eye since stroke in 2017.  Complains of hoarseness of voice. Complains of worsening odynophagia and dysphagia since last friday.     RESPIRATORY:  Complains of chronic shortness of breath, denies any recent worsening.  No new cough or hemoptysis.     CARDIOVASCULAR:  No chest pain or palpitations.     GASTROINTESTINAL:  No abdominal pain nausea, vomiting or blood in the stool.     GENITOURINARY: No Dysuria or Hematuria.     MUSCULOSKELETAL:  No new back pain or arthralgia..     LYMPHATICS:  Denies any abnormal swollen glands anywhere in the body.     NEUROLOGICAL : No tingling or numbness. No headache or dizziness. No seizures or balance problems.     SKIN: no new skin lesions.          PHYSICAL EXAMINATION:      VITAL SIGNS:  /68   Pulse 62   Temp 98.6 °F (37 °C) (Temporal Artery )   Resp 18   Ht 182.9 cm (72.01\")   Wt 106 kg (233 lb 9.6 oz)   BMI 31.67 kg/m²   1    04/23/18  0840   Weight: 106 kg (233 lb 9.6 oz)       ECOG  performance status: 1      GENERAL:  Not in any distress.Appears uncomfortable.    HEENT:  Normocephalic, Atraumatic.Mild Conjunctival pallor. No icterus. Extraocular Movements Intact. No Facial Asymmetry noted. No Thrush on oropharynx examination today.    NECK:  No adenopathy. No JVD.Tracheostomy present.    RESPIRATORY:  Fair air entry bilateral. No rhonchi or wheezing.    CARDIOVASCULAR:  S1, S2. Regular rate and rhythm. No murmur or gallop appreciated.    ABDOMEN:  Soft, obese, nontender. Bowel sounds present in all four quadrants.  No organomegaly appreciated.    MUSCULOSKELETAL:  No edema.No Calf " Tenderness.Normal range of motion.    NEUROLOGIC:  Alert, awake and oriented ×3.  No  Motor or sensory deficit appreciated. Cranial Nerves 2-12 grossly intact.    SKIN : No new skin lesion identified    LYMPHATICS: No new enlarged lymph node in neck or supraclavicular area.            DIAGNOSTIC DATA:    Glucose   Date Value Ref Range Status   04/23/2018 100 60 - 100 mg/dL Final     Sodium   Date Value Ref Range Status   04/23/2018 139 137 - 145 mmol/L Final     Potassium   Date Value Ref Range Status   04/23/2018 3.4 (L) 3.5 - 5.1 mmol/L Final     CO2   Date Value Ref Range Status   04/23/2018 24.0 22.0 - 31.0 mmol/L Final     Chloride   Date Value Ref Range Status   04/23/2018 101 95 - 110 mmol/L Final     Anion Gap   Date Value Ref Range Status   04/23/2018 14.0 5.0 - 15.0 mmol/L Final     Creatinine   Date Value Ref Range Status   04/23/2018 0.98 0.70 - 1.30 mg/dL Final     BUN   Date Value Ref Range Status   04/23/2018 15 7 - 21 mg/dL Final     BUN/Creatinine Ratio   Date Value Ref Range Status   04/23/2018 15.3 7.0 - 25.0 Final     Calcium   Date Value Ref Range Status   04/23/2018 9.5 8.4 - 10.2 mg/dL Final     eGFR Non  Amer   Date Value Ref Range Status   04/23/2018 79 >60 mL/min/1.73 Final     Alkaline Phosphatase   Date Value Ref Range Status   03/19/2018 86 38 - 126 U/L Final     Total Protein   Date Value Ref Range Status   03/19/2018 6.9 6.3 - 8.6 g/dL Final     ALT (SGPT)   Date Value Ref Range Status   03/19/2018 61 21 - 72 U/L Final     AST (SGOT)   Date Value Ref Range Status   03/19/2018 44 17 - 59 U/L Final     Total Bilirubin   Date Value Ref Range Status   03/19/2018 0.2 0.2 - 1.3 mg/dL Final     Albumin   Date Value Ref Range Status   03/19/2018 3.80 3.40 - 4.80 g/dL Final     Globulin   Date Value Ref Range Status   03/19/2018 3.1 2.3 - 3.5 gm/dL Final     A/G Ratio   Date Value Ref Range Status   03/19/2018 1.2 1.1 - 1.8 g/dL Final     Lab Results   Component Value Date    WBC 2.95  (L) 04/23/2018    HGB 9.2 (L) 04/23/2018    HCT 27.1 (L) 04/23/2018    MCV 74.2 (L) 04/23/2018     (L) 04/23/2018     Lab Results   Component Value Date    NEUTROABS 2.39 04/23/2018    IRON 14 (L) 02/13/2018    TIBC 335 02/13/2018    LABIRON 4 (L) 02/13/2018    FERRITIN 64.40 02/13/2018    [id number] 546 03/14/2018    FOLATE >20.00 02/13/2018     No results found for: , LABCA2, AFPTM, HCGQUANT, , CHROMGRNA, [id number], CEA, REFLABREPO        Radiology Data :  PET CT done on February 16, 2018 was reviewed, discussed with patient, it showed:  IMPRESSION:  CONCLUSION:   1.  Soft tissue mass in the region of the epiglottis obstructing  the airway, associated with hypermetabolic activity, compatible  with known neoplasm.  2.  Activity around the tracheostomy site is likely inflammatory.  3.  Activity in the rectum (SUV max 7.1) is probably either  physiological or inflammatory. Recommend direct visualization.  4.  There is a focus of activity in the region of the right  common iliac artery without a definite CT abnormality (SUV max  5.4) possibly representing a lymph node, nonspecific but cannot  rule out neoplasm.  5.  Foci of activity posterior to the ischial tuberosities are  probably inflammatory.  6.  There are diffuse punctate low-level foci of activity  throughout the osseous structures, liver, kidneys, mediastinum  which are more likely artifactual than due to metastatic disease.    ADDENDUM   ADDENDUM #1         Upon review of the patient's imaging with Dr. Tapia, there  appears to be extension of tumor into the left side of the  preepiglottic space.        Ct of Neck with contrast done on January 29, 2018 showed:  IMPRESSION:  3 x 3.5 x 3.5 cm soft tissue mass with lobular margin centered at  the level the epiglottis however flush 4 cm intimate with the  posterior margin of the tongue base and the posterior margin of  the oral pharynx. This does contribute to moderate compromise of  the  airway. Finding is suspicious for malignancy.      Mostly subcentimeter cervical chain lymph nodes present  bilaterally. The largest node right level II measuring 1.3 cm. No  necrotic or conglomerate adenopathy.      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 The  dissection was described on a CTA coronary artery exam from  2/14/2017.        Pathology :  Pathology result from February 5, 2018 showed:  Final Diagnosis   1.  TUMOR, EPIGLOTTIS:   INVASIVE SQUAMOUS CELL CARCINOMA, NON KERATINIZING, POORLY DIFFERENTIATED.       2.  TUMOR, EPIGLOTTIS:   INVASIVE SQUAMOUS CELL CARCINOMA, NON KERATINIZING, POORLY DIFFERENTIATED.               ASSESSMENT AND PLAN:      1.  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  Based on PET/CT there is a tumor extension into preepiglottic space making a T3 lesion.  Result of PET CT were discussed with patient.  It was discussed with patient with T3 N0 lesion after his treatment option includes either surgery or concurrent chemoradiation.  He was started on concurrent chemoradiation on March 19, 2018.  In view of worsening odynophagia and dysphagia, we will hold chemotherapy today.  Case was also discussed with Dr. Tapia, is planning to give him break for 2 days from radiation this week.  We will hold his chemotherapy this week.  Next week we will recheck his CBC and CMP and resume chemotherapy with 5 of carboplatin and Taxol.  We will see him back in about 2 weeks with blood work to be done on that day.    2.  Pancytopenia: Secondary to chemotherapy and radiation.  White blood cell count is 2.95 today along with hemoglobin of 9.1 platelet count of 1 27,000.  We will monitored with CBC for now.    3.  Odynophagia and dysphagia: Most likely secondary to mucositis from chemoradiation.  And is currently on nystatin swish and swallow.  Patient is also on fentanyl patch 12 µg dose.  Since  patient is complaining of worsening throat pain.  We will ask him to increase his dose to 24 µg from today.  In view of inability to take medication by mouth and poor oral hydration.  We will give him 1 L of normal saline today with 20 of KCl.    4.  Hypokalemia: Patient's potassium is only 3.4 today.  Give him 1 L of normal saline with 20 mEq of KCl in it.    5.  Abnormal weight loss: Patient has lost 14 pounds in last 3 weeks secondary to inability to eat and drink.  Again it was recommended that he should get a feeding tube placed for nutritional support.  Patient is reluctant to get any feeding tube placed at this point.    6.oral thrush: Patient was found to have oral thrush last week for which she has been started on nystatin.  His thrush is improved clinically on examination today.     7.  History of aortic dissection status post repair.     8.  History of CVA with residual visual disturbance in left eye     9.  Health maintenance: Patient quit smoking in February 2017.  Never had a screening colonoscopy done.  Remains full code.     Reza Patel MD  4/23/2018  10:01 AM        EMR Dragon/Transcription disclaimer:   Much of this encounter note is an electronic transcription/translation of spoken language to printed text. The electronic translation of spoken language may permit erroneous, or at times, nonsensical words or phrases to be inadvertently transcribed; Although I have reviewed the note for such errors, some may still exist.

## 2018-04-25 ENCOUNTER — RADIATION ONCOLOGY WEEKLY ASSESSMENT (OUTPATIENT)
Dept: RADIATION ONCOLOGY | Facility: HOSPITAL | Age: 58
End: 2018-04-25

## 2018-04-25 ENCOUNTER — HOSPITAL ENCOUNTER (OUTPATIENT)
Dept: SPEECH THERAPY | Facility: HOSPITAL | Age: 58
Setting detail: THERAPIES SERIES
Discharge: HOME | End: 2018-04-25

## 2018-04-25 VITALS
OXYGEN SATURATION: 98 % | SYSTOLIC BLOOD PRESSURE: 139 MMHG | RESPIRATION RATE: 20 BRPM | BODY MASS INDEX: 31.23 KG/M2 | TEMPERATURE: 98.6 F | DIASTOLIC BLOOD PRESSURE: 61 MMHG | WEIGHT: 230.6 LBS | HEIGHT: 72 IN | HEART RATE: 71 BPM

## 2018-04-25 DIAGNOSIS — C32.9 SQUAMOUS CELL CARCINOMA OF LARYNX (HCC): Primary | ICD-10-CM

## 2018-04-25 DIAGNOSIS — R13.12 OROPHARYNGEAL DYSPHAGIA: Primary | ICD-10-CM

## 2018-04-25 PROCEDURE — 92526 ORAL FUNCTION THERAPY: CPT | Performed by: SPEECH-LANGUAGE PATHOLOGIST

## 2018-04-25 PROCEDURE — 77417 THER RADIOLOGY PORT IMAGE(S): CPT | Performed by: RADIOLOGY

## 2018-04-25 PROCEDURE — 77386: CPT | Performed by: RADIOLOGY

## 2018-04-25 NOTE — PROGRESS NOTES
On Treatment Visit       Patient: Truman Esquivel   YOB: 1960   Medical Record Number: [id number]     Date of Visit  April 25, 2018   Primary Diagnosis: Stage III (cT3 N0 M0) squamous cell carcinoma of the supraglottic larynx.  ICD 10 Code: C32.9      was seen today for an on treatment visit.  He is receiving radiation therapy to the larynx/neck. He  has received 4400 cGy in 22 fractions out of a planned dose of 7000 cGy in 35 fractions. He is receiving concurrent carboplatin/Taxol chemotherapy per Dr. Patel, and received his 4th and most recent cycle on 4/16/18.      Today on exam the patient continues to complain of difficulty swallowing, and despite a self-imposed 2 day treatment break last week. Overall, the patient is missed 6 days of treatment. We again discussed the importance of finishing radiation therapy without an any unnecessary treatment breaks. He also continues to complain of increased phlegm.                                              Vitals:     Vitals:    04/25/18 1525   BP: 139/61   Pulse: 71   Resp: 20   Temp: 98.6 °F (37 °C)   SpO2: 98%       Weight:   (Starting weight of 257 pounds on 2/21/18)  Wt Readings from Last 3 Encounters:   04/25/18 105 kg (230 lb 9.6 oz)   04/23/18 106 kg (233 lb 9.6 oz)   04/18/18 109 kg (239 lb 3.2 oz)      Pain:    Pain Score    04/25/18 1525   PainSc:   9   PainLoc: Throat         Plan: We plan to continue radiation therapy as prescribed.   We will schedule a swallowing study, per speech pathology request. He has an appointment with gastroenterology tomorrow for evaluation of a rectal abnormality noted on his PET/CT.    Jarocho Tapia MD  Radiation Oncology    Electronically signed by Jarohco Tapia MD 4/25/2018  3:48 PM     cc: Dr. Joseph Harrison

## 2018-04-25 NOTE — THERAPY TREATMENT NOTE
Outpatient Speech Language Pathology   Adult Swallow Treatment Note  [facility]     Patient Name: Truman Esquivel  : 1960  MRN: [id number]  Today's Date: 2018         Visit Date: 2018     Patient insurance has approved 4 visits for speech therapy.     Laryngeal cancer; Stage III (cT3, cN0, cM0).      Patient has attended 2/2 scheduled therapy sessions.      SLP recommended using suction for trach following meals. Pt presented with increased throat clearing and coughing this date. Pt verbalized increased pain of the jaw, throat, and ears during swallowing. Patient did remove a noodle from trach following a meal this past weekend. Increased dysphagia noted. Patient presents with a facial grimace when initiating a swallow with increased time to initiate. SLP educated pt on the importance of continuing to swallow and use exercises as much as possible. Pt was in agreement. Increased coughing and congestion noted. Pt is removing thick yellow mucus from trach multiple times a day. Pt stated that he has lost 2 lbs since last week.      Pain was noted in pts left jaw, ears, and throat during some of the OME this date. These exercises were discontinued during session due to increased pain. Pt completed 3 sets of tongue lateralization, ingrid, and tongue protrusion.       SLP recommended avoiding thick puddings, salads, breads which present with increased difficulties for pt. Pt encouraged to continue home exercise plan 3x a day each day as tolerated. SLP reviewed the importance of continuing to swallow during all treatment to encourage muscle to keep moving. Pt was in agreement. Pt has a passy thaddeus valve but does not currently wear.Patient would benefit from skilled speech therapy 1 x a week for increased swallowing safety and preservation of swallowing function through radiation and chemotherapy.    MBS is recommended. SLP called Devi Louie RN to put in order in the [facility] center.  Patient was in  agreement.     Agnes Barone MS CCC-SLP       Patient Active Problem List   Diagnosis   • Ascending aortic dissection   • Essential hypertension   • Deep vein thrombosis (DVT) of femoral vein of both lower extremities   • Acquired hypothyroidism   • Snoring   • Acute pain of right knee   • Knee effusion, right   • Knee pain   • Obstructive sleep apnea of adult   • TIA (transient ischemic attack)   • Squamous cell carcinoma of larynx   • Dysphagia   • Squamous cell carcinoma of larynx   • Pharyngeal dysphagia   • Anemia   • Abnormal positron emission tomography (PET) of colon   • Hypokalemia   • Encounter for venous access device care   • Dehydration   • Weight loss, abnormal   • Odynophagia   • Thrush, oral   • Thrombocytopenia   • Pancytopenia due to antineoplastic chemotherapy        Visit Dx:    ICD-10-CM ICD-9-CM   1. Oropharyngeal dysphagia R13.12 787.22             SLP Adult Swallow Evaluation - 04/25/18 1401        Rehab Evaluation    Document Type therapy note (daily note)  -EA    Subjective Information complains of;pain   head and neck; throat, ears, jaw  -EA    Patient Observations alert;cooperative  -EA    Patient/Family Observations Pt attended therapy session alone. Pt presented with increased congestions and coughing. Pt stated that it all started on 4/19/2018.    -EA    Patient Effort good  -EA    Symptoms Noted During/After Treatment increased pain  -EA       General Information    Patient Profile Reviewed yes  -EA    Current Method of Nutrition soft textures;thin liquids  -EA    Precautions/Limitations, Vision WFL with corrective lenses  -EA    Precautions/Limitations, Hearing WFL  -EA    Prior Level of Function-Communication WFL  -EA    Prior Level of Function-Swallowing no diet consistency restrictions  -EA    Plans/Goals Discussed with patient;family  -EA    Barriers to Rehab none identified  -EA       Pain Scale: Numbers Pre/Post-Treatment    Pain Scale: Numbers, Pretreatment 1/10  -EA     Pain Scale: Numbers, Post-Treatment 1/10  -EA    Pain Location - Side Bilateral  -EA    Pain Location - Orientation --   throat *burning   -EA    Pain Location ear   jaw; throat   -EA       Oral Motor and Function    Dentition Assessment natural, present and adequate;other (see comments)   changes in coloration of teeth following radiation   -EA    Secretion Management WNL/WFL  -EA    Mucosal Quality moist, healthy  -EA    Volitional Swallow delayed  -EA       Oral Musculature and Cranial Nerve Assessment    Oral Motor General Assessment WFL  -EA       General Eating/Swallowing Observations    Respiratory Support Currently in Use trach collar  -EA    Eating/Swallowing Skills self-fed  -EA    Positioning During Eating upright 90 degree;upright in chair  -EA    Utensils Used spoon;cup  -EA    Consistencies Trialed pureed;thin liquids  -EA       Respiratory    Respiratory Status room air  -EA       Clinical Swallow Eval    Oral Prep Phase impaired  -EA    Oral Transit impaired  -EA    Oral Residue WFL  -EA    Pharyngeal Phase suspected pharyngeal impairment  -EA    Esophageal Phase unremarkable  -EA       Oral Prep Concerns    Oral Prep Concerns prolonged mastication;increased prep time  -EA    Prolonged Mastication thin;pudding  -EA    Increased Prep Time thin;pudding  -EA       Oral Transit Concerns    Oral Transit Concerns delayed initiation of bolus transit  -EA    Delayed Intiation of Bolus Transit thin;pudding  -EA       Pharyngeal Phase Concerns    Pharyngeal Phase Concerns cough  -EA    Cough thin;pudding  -EA       Clinical Impression    SLP Swallowing Diagnosis mild-moderate;oral dysfunction;suspected pharyngeal dysfunction  -EA    Functional Impact risk of malnutrition;risk of aspiration/pneumonia  -EA    Rehab Potential/Prognosis, Swallowing good, to achieve stated therapy goals  -EA    Criteria for Skilled Therapeutic Interventions Met demonstrates skilled criteria  -EA       Recommendations    Therapy  Frequency (Swallow) 1 day per week  -EA    Predicted Duration Therapy Intervention (Days) until discharge  -EA    SLP Diet Recommendation mechanical soft with no mixed consistencies;thin liquids  -EA    Recommended Precautions and Strategies upright posture during/after eating;small bites of food and sips of liquid;multiple swallows per bite of food;multiple swallows per sip of liquid;hard swallow with each bite or sip  -EA    Monitor for Signs of Aspiration yes;notify SLP if any concerns  -EA    Anticipated Dischage Disposition home  -EA       Oral Nutrition/Hydration Goal 1 (SLP)    Oral Nutrition/Hydration Goal 1, SLP Patient will tolerate least restrictive diet with no overt s/s of aspiration.   -EA    Time Frame (Oral Nutrition/Hydration Goal 1, SLP) by discharge  -EA    Barriers (Oral Nutrition/Hydration Goal 1, SLP) laryngeal cancer   -EA    Progress/Outcomes (Oral Nutrition/Hydration Goal 1, SLP) goal ongoing  -EA       Pharyngeal Strengthening Exercise Goal 1 (SLP)    Activity (Pharyngeal Strengthening Goal 1, SLP) increase timing;increase tongue base retraction  -EA    Increase Timing ingrid;hard effortful swallow;prepping - 3 second prep or suck swallow or 3-step swallow   OME set - Prophylactic swallowing exercises   -EA    Increase Tongue Base Retraction ingrid;hard effortful swallow;prepping - 3 second prep or suck swallow or 3-step swallow  -EA    [place]/Accuracy (Pharyngeal Strengthening Goal 1, SLP) independently (over 90% accuracy)  -EA    Time Frame (Pharyngeal Strengthening Goal 1, SLP) by discharge  -EA    Barriers (Pharyngeal Strengthening Goal 1, SLP) laryngeal cancer   -EA    Progress/Outcomes (Pharyngeal Strengthening Goal 1, SLP) goal ongoing   3 sets of OME completed this date; increased pain   -EA       Dysphagia Treatment Objectives and Progress    Dysphagia Treatment Objectives Improve oral skills;Improve timing of pharyngeal response  -EA       Improve oral skills    To Improve  Oral Skills, patient will: Increase tongue A-P movement;90%  -EA       Improve timing of pharyngeal response    To improve timing of pharyngeal response, patient will: Swallow in timely way using three second prep;90%  -EA    Status: Improve timing of pharyngeal response Progressing as expected  -EA    Timing of Pharyngeal Response Progress 70%;with consistent cues  -EA    Comments: Improve timing of pharyngeal response Pt completed 1 trial of thin liquid this date. Increased pain and difficulties noted in swallowing. Pt stated that it started on 4/19/2018. Recommend MBS be completed. Pt in agreement. 3 sets of exercises compeleted by discontinued due to pain.   -EA      User Key  (r) = Recorded By, (t) = Taken By, (c) = Cosigned By    Initials Name Provider Type    KAYLEY Barone MS CCC-SLP Speech and Language Pathologist                              SLP OP Goals     Row Name 04/25/18 1545          SLP Time Calculation    SLP Goal Re-Cert Due Date 05/11/18  -EA       User Key  (r) = Recorded By, (t) = Taken By, (c) = Cosigned By    Initials Name Provider Type    KAYLEY Barone MS CCC-SLP Speech and Language Pathologist                OP SLP Education     Row Name 04/25/18 1500       Education    Barriers to Learning No barriers identified  -EA    Education Provided Family/caregivers demonstrated recommended strategies;Patient demonstrated recommended strategies;Patient requires further education on strategies, risks;Family/caregivers require further education on strategies, risks  -EA    Assessed Learning needs;Learning motivation;Learning preferences  -EA    Learning Motivation Strong  -EA    Learning Method Explanation;Demonstration;Written materials  -EA    Teaching Response Verbalized understanding;Demonstrated understanding  -EA    Education Comments SLP reviewed safety during swallowing and strategies for improved safety. SLP educated pt on exercises and encouraged pt to continue swallowing.  Pt was in agreement. MBS recommended.   -EA      User Key  (r) = Recorded By, (t) = Taken By, (c) = Cosigned By    Initials Name Effective Dates    KAYLEY Barone MS CCC-SLP 10/17/16 -                 OP SLP Assessment/Plan - 04/25/18 1500        SLP Assessment    Functional Problems Swallowing  -EA    Impact on Function: Swallowing Risk of malnourishment;Risk of dehydration;Risk of aspiration  -EA    Clinical Impression: Swallowing Moderate:;oropharyngeal phase dysphagia  -EA    Functional Problems Comment Pt verbalized increased pain with swallowing.   -EA    Clinical Impression Comments Pt removed food particles from trach this week as reported by pt. Increased swallowing difficulties noted. Pt presented with increased time to initiate swallow.   -EA    Please refer to paper survey for additional self-reported information Yes  -EA    Please refer to items scanned into chart for additional diagnostic informaiton and handouts as provided by clinician Yes  -EA    SLP Diagnosis Dysphagia  -EA    Prognosis Good (comment)  -EA    Patient/caregiver participated in establishment of treatment plan and goals Yes  -EA    Patient would benefit from skilled therapy intervention Yes  -EA       SLP Plan    Frequency 1 time a week   -EA    Duration until discharge   -EA    Planned CPT's? SLP SWALLOW THERAPY: 10768  -EA    Expected Duration Therapy Session - minutes 30-45 minutes  -EA    Plan Comments Continue   -EA      User Key  (r) = Recorded By, (t) = Taken By, (c) = Cosigned By    Initials Name Provider Type    KAYLEY Barone MS CCC-SLP Speech and Language Pathologist                Time Calculation:   SLP Start Time: 1401  SLP Stop Time: 1444  SLP Time Calculation (min): 43 min  Total Timed Code Minutes- SLP: 43 minute(s)    Therapy Charges for Today     Code Description Service Date Service Provider Modifiers Qty    [id number]  ST TREATMENT SWALLOW 3 4/25/2018 Agnes Barone MS CCC-SLP GN 1                    Agnes Barone, MS CCC-SLP  4/25/2018

## 2018-04-26 ENCOUNTER — INFUSION (OUTPATIENT)
Dept: ONCOLOGY | Facility: HOSPITAL | Age: 58
End: 2018-04-26

## 2018-04-26 VITALS
SYSTOLIC BLOOD PRESSURE: 148 MMHG | TEMPERATURE: 98.2 F | HEART RATE: 78 BPM | DIASTOLIC BLOOD PRESSURE: 58 MMHG | RESPIRATION RATE: 18 BRPM

## 2018-04-26 DIAGNOSIS — Z45.2 ENCOUNTER FOR VENOUS ACCESS DEVICE CARE: Primary | ICD-10-CM

## 2018-04-26 DIAGNOSIS — E86.0 DEHYDRATION: ICD-10-CM

## 2018-04-26 PROCEDURE — 96360 HYDRATION IV INFUSION INIT: CPT | Performed by: INTERNAL MEDICINE

## 2018-04-26 PROCEDURE — 25010000002 HEPARIN FLUSH (PORCINE) 100 UNIT/ML SOLUTION: Performed by: INTERNAL MEDICINE

## 2018-04-26 PROCEDURE — 96361 HYDRATE IV INFUSION ADD-ON: CPT | Performed by: INTERNAL MEDICINE

## 2018-04-26 PROCEDURE — 77386: CPT | Performed by: RADIOLOGY

## 2018-04-26 RX ORDER — SODIUM CHLORIDE 0.9 % (FLUSH) 0.9 %
10 SYRINGE (ML) INJECTION AS NEEDED
Status: CANCELLED | OUTPATIENT
Start: 2018-04-30

## 2018-04-26 RX ORDER — SODIUM CHLORIDE 9 MG/ML
500 INJECTION, SOLUTION INTRAVENOUS ONCE
Status: COMPLETED | OUTPATIENT
Start: 2018-04-26 | End: 2018-04-26

## 2018-04-26 RX ORDER — SODIUM CHLORIDE 9 MG/ML
500 INJECTION, SOLUTION INTRAVENOUS ONCE
Status: CANCELLED
Start: 2018-04-26 | End: 2018-04-26

## 2018-04-26 RX ORDER — SODIUM CHLORIDE 0.9 % (FLUSH) 0.9 %
10 SYRINGE (ML) INJECTION AS NEEDED
Status: DISCONTINUED | OUTPATIENT
Start: 2018-04-26 | End: 2018-04-26 | Stop reason: HOSPADM

## 2018-04-26 RX ADMIN — Medication 10 ML: at 11:00

## 2018-04-26 RX ADMIN — Medication 10 ML: at 13:20

## 2018-04-26 RX ADMIN — SODIUM CHLORIDE, PRESERVATIVE FREE 500 UNITS: 5 INJECTION INTRAVENOUS at 13:20

## 2018-04-26 RX ADMIN — SODIUM CHLORIDE 500 ML/HR: 9 INJECTION, SOLUTION INTRAVENOUS at 11:01

## 2018-04-27 PROCEDURE — 77386: CPT

## 2018-04-30 ENCOUNTER — INFUSION (OUTPATIENT)
Dept: ONCOLOGY | Facility: HOSPITAL | Age: 58
End: 2018-04-30

## 2018-04-30 DIAGNOSIS — Z45.2 ENCOUNTER FOR VENOUS ACCESS DEVICE CARE: Primary | ICD-10-CM

## 2018-04-30 PROCEDURE — 77386: CPT | Performed by: RADIOLOGY

## 2018-04-30 PROCEDURE — 77336 RADIATION PHYSICS CONSULT: CPT

## 2018-04-30 PROCEDURE — 77336 RADIATION PHYSICS CONSULT: CPT | Performed by: RADIOLOGY

## 2018-04-30 RX ORDER — SODIUM CHLORIDE 0.9 % (FLUSH) 0.9 %
10 SYRINGE (ML) INJECTION AS NEEDED
Status: CANCELLED | OUTPATIENT
Start: 2018-05-09

## 2018-04-30 RX ORDER — SODIUM CHLORIDE 0.9 % (FLUSH) 0.9 %
10 SYRINGE (ML) INJECTION AS NEEDED
Status: DISCONTINUED | OUTPATIENT
Start: 2018-04-30 | End: 2018-04-30 | Stop reason: HOSPADM

## 2018-04-30 NOTE — PROGRESS NOTES
Adult Outpatient Nutrition  Assessment    Patient Name:  Truman Esquivel  YOB: 1960  MRN: [id number]    Assessment Date:  4/30/2018    Comments:  Pt stated trache tighter than usual--feeling painful pressure, and food/beverages/sylavia comes out trache. Gorge CHINCHILLA assessing pt--calling Dr. Patel to see if Dr Venegas needs to be contacted. Above putting pt at increased nutrition risk.      Electronically signed by:  Leslee Bonds RD  04/30/18 10:42 AM

## 2018-04-30 NOTE — CODE DOCUMENTATION
"Pt here for lab draw/chemo. Pt c/o of discomfort at trach site with \"digging\" like pain and that every thing he eats and drinks coming back out of trach. Dr abbott aware. Dr abbott stated to get pt in with dr barboza to look at trach and then for pt to come back Wednesday for labs/chemo. Pt and his mother informed and states understanding.  "

## 2018-05-01 ENCOUNTER — HOSPITAL ENCOUNTER (OUTPATIENT)
Dept: GENERAL RADIOLOGY | Facility: HOSPITAL | Age: 58
Discharge: HOME | End: 2018-05-01
Admitting: RADIOLOGY

## 2018-05-01 ENCOUNTER — DOCUMENTATION (OUTPATIENT)
Dept: NUTRITION | Facility: HOSPITAL | Age: 58
End: 2018-05-01

## 2018-05-01 ENCOUNTER — HOSPITAL ENCOUNTER (OUTPATIENT)
Dept: RADIATION ONCOLOGY | Facility: HOSPITAL | Age: 58
Setting detail: RADIATION/ONCOLOGY SERIES
End: 2018-05-01

## 2018-05-01 ENCOUNTER — APPOINTMENT (OUTPATIENT)
Dept: SPEECH THERAPY | Facility: HOSPITAL | Age: 58
End: 2018-05-01

## 2018-05-01 DIAGNOSIS — R13.10 DYSPHAGIA, UNSPECIFIED TYPE: Primary | ICD-10-CM

## 2018-05-01 DIAGNOSIS — C32.9 SQUAMOUS CELL CARCINOMA OF LARYNX (HCC): Primary | ICD-10-CM

## 2018-05-01 DIAGNOSIS — C32.9 SQUAMOUS CELL CARCINOMA OF LARYNX (HCC): ICD-10-CM

## 2018-05-01 DIAGNOSIS — R13.12 OROPHARYNGEAL DYSPHAGIA: Primary | ICD-10-CM

## 2018-05-01 PROBLEM — R63.8 UNABLE TO EAT: Status: ACTIVE | Noted: 2018-05-01

## 2018-05-01 PROCEDURE — G8998 SWALLOW D/C STATUS: HCPCS | Performed by: SPEECH-LANGUAGE PATHOLOGIST

## 2018-05-01 PROCEDURE — 92611 MOTION FLUOROSCOPY/SWALLOW: CPT | Performed by: SPEECH-LANGUAGE PATHOLOGIST

## 2018-05-01 PROCEDURE — 77427 RADIATION TX MANAGEMENT X5: CPT | Performed by: RADIOLOGY

## 2018-05-01 PROCEDURE — G8997 SWALLOW GOAL STATUS: HCPCS | Performed by: SPEECH-LANGUAGE PATHOLOGIST

## 2018-05-01 PROCEDURE — 77386: CPT | Performed by: RADIOLOGY

## 2018-05-01 PROCEDURE — 74230 X-RAY XM SWLNG FUNCJ C+: CPT

## 2018-05-01 PROCEDURE — G8996 SWALLOW CURRENT STATUS: HCPCS | Performed by: SPEECH-LANGUAGE PATHOLOGIST

## 2018-05-01 RX ADMIN — BARIUM SULFATE 5 ML: 400 SUSPENSION ORAL at 14:00

## 2018-05-01 RX ADMIN — BARIUM SULFATE 10 ML: 960 POWDER, FOR SUSPENSION ORAL at 14:00

## 2018-05-01 NOTE — THERAPY DISCHARGE NOTE
Outpatient Speech Language Pathology   Adult Swallow Initial Eval/Discharge  [facility]     Patient Name: Truman Esquivel  : 1960  MRN: [id number]  Today's Date: 2018        SPEECH PATHOLOGY MODIFIED BARIUM SWALLOW STUDY (VFSS)    Chief Complaint:  Food/liquid return from trach    Medical Diagnoses:  Squamous cell carcinoma of larynx    Present diet textures:  Soft solids/thin liquids    Views: Patient seated at 90 degrees in:    _x_lateral view    __A/P    Ability to follow instructions:  __Excellent   _x_Good __Fair  __Poor      Oral Motor Status Exam:    Dentition: _x_Natural  __Dentures   __Edentulous         __Partials         Condition/Fit:    Presence of Oral Motor Weakness:        The following consistencies were given, with symptoms as noted:    Consistency Method Labial Seal Oral Prep AP Transit Premature Spillage Delayed Swallow Reflex Laryngeal Penetration Aspiration Pooling Valleculae Pooling pyriform sinuses   Thin  Spoon  X X    X     Nectar Spoon  X X    X     Nectar Spoon/chin tuck  X X    X X X   Nectar Spoon  X X  X: pain X: pain X X X   Honey Spoon              Pt presented w/oral holding d/t anticipation of odynophagia.  Reynaldo aspiration was noted w/thin liquids and NTL via spoon with and w/o use of chin tuck.  Pt unable to swallow HTL and asked to remove bolus from oral cavity.  SLP noted significant pharyngeal residue after NTL trial w/chin tuck.  Residue spanned from base of tongue to pyriform sinuses after NTL trials #2 and #3.  Poor hyolaryngeal excursion w/absent elevation at times noted as well.  Notable changes to anatomy and physiology of epiglottis d/t carcinoma.  SLP recommended pt be NPO.  OP SLP walked pt to oncology to discuss results and recommendations w/pt's cancer care team.    Impairments:  __Premature loss of bolus  __Reduced velopharyngeal closure  __Decreased lingual propulsion  _x_Reduced epiglottic inversion  _x_Decreased hyolaryngeal  elevation  __Reduced laryngeal closure  __Reduced esophageal sphincter opening      Penetration-Aspiration Scale Rating:     Category Score Descriptions   No penetration or aspiration 1 Contrast does not enter the airway   Penetration 2 Contrast enters the airway, remains above vocal folds; no residue    3 Contrast remains above vocal folds; visible residue remains    4 Contrast contacts vocal folds; no residue    5 Contrast contacts vocal folds; visible residue remains   Aspiration 6 Contrast passes glottis; no subglottic residue visible    7 Contrast passes glottis; visible subglottic residue despite patient's response    8 Contrast passes glottis; visible subglottic residue; absent patient response       Aspiration:  __Prior _x_During __After the swallow    Cough: __Delayed __Immediate   _x_Absent with penetration/aspiration    Cough response:  __Weak _x_Strong         Dysphagia Scale Rating:    Dysphagia Rating Scale Explanation   0   Normal swallowing mechanism     1 Minimal Dysphagia- video swallow shows slight deviance from a normal swallow. Patient may report change in sensation during swallow. No change in diet is required.     2 Mild Dysphagia- oropharyngeal dysphagia present, which can be managed by specific swallow suggestions. Slight modification in consistency of diet may be indicated.      3 Mild-Moderate Dysphagia- potential for aspiration exists but is diminished by specific swallow techniques and a modified diet. Time for eating is significantly increased; thus supplemental nutrition may be indicated.      4 Moderate Dysphagia- significant potential for aspiration exists. Trace aspiration of one or more consistencies may be seen under videofluoroscopy. Patient may eat certain consistencies by using specific techniques to minimize potential for aspiration and/or facilitate swallowing. Supervision at mealtimes required. May require supplemental nutrition orally or via feeding tube.      5 Moderately  "Severe Dysphagia- patient aspirates 5% to 10% on one or more consistencies, with potential for aspiration on all consistencies. Potential for aspiration minimized by specific swallow instructions. Cough reflex absent or nonprotective. Alterative mode of feeding required to maintain patient's nutritional needs. If pulmonary status is compromised, \"nothing by mouth\" may be indicated.      6 Severe Dysphagia- more than 10% aspiration for all consistencies. \"Nothing by mouth\" recommended.          Recommendation:    _X_Non-oral nutrition/hydration             Visit Date: 05/01/2018   Patient Active Problem List   Diagnosis   • Ascending aortic dissection   • Essential hypertension   • Deep vein thrombosis (DVT) of femoral vein of both lower extremities   • Acquired hypothyroidism   • Snoring   • Acute pain of right knee   • Knee effusion, right   • Knee pain   • Obstructive sleep apnea of adult   • TIA (transient ischemic attack)   • Squamous cell carcinoma of larynx   • Dysphagia   • Squamous cell carcinoma of larynx   • Pharyngeal dysphagia   • Anemia   • Abnormal positron emission tomography (PET) of colon   • Hypokalemia   • Encounter for venous access device care   • Dehydration   • Weight loss, abnormal   • Odynophagia   • Thrush, oral   • Thrombocytopenia   • Pancytopenia due to antineoplastic chemotherapy   • Unable to eat        Past Medical History:   Diagnosis Date   • Aortic dissection    • Chest pain    • Disease of thyroid gland    • HTN (hypertension)    • Knee pain    • Sleep apnea    • Smoker    • Squamous cell carcinoma of larynx 2/5/2018   • Stroke    • Swallowing difficulty         Past Surgical History:   Procedure Laterality Date   • AORTA SURGERY      ruptured aorta repair   • ASCENDING ARCH/HEMIARCH REPLACEMENT N/A 2/14/2017    Procedure: INTRAOPERATIVE TRANSESOPHAGEAL ECHOCARDIOGRAM, MIDLINE STERNOTOMY, ASCENDING AORTIC  AND PROXIMAL AORTIC ARCH REPAIR WITH 26MM GRAFT, AORTIC VALVE RESUSPENSION, " AORTIC ROOT REPAIR, OPEN VEIN HARVEST OF RIGHT LEG;  Surgeon: German Arreguin MD;  Location: [facility] MAIN OR;  Service:    • BRONCHOSCOPY N/A 2/17/2017    Procedure: BRONCHOSCOPY BIOPSY AT BEDSIDE WITH BAL-LEFT LOWER LOBE;  Surgeon: Trent Chaney MD;  Location: [facility] ENDOSCOPY;  Service:    • COLONOSCOPY N/A 3/7/2018    Procedure: COLONOSCOPY WITH POSSIBLE POLYPECTOMY   ( DONE IN OR WITH ENDO)      COLONOSCOPY FIRST;  Surgeon: Kris Solano MD;  Location: [facility] OR;  Service:    • DIRECT LARYNGOSCOPY, ESOPHAGOSCOPY, BRONCHOSCOPY N/A 2/5/2018    Procedure: DIRECT LARYNGOSCOPY WITH BIOPSY, ESOPHAGOSCOPY     (no bronchoscopy, no laser);  Surgeon: Joseph Venegas MD;  Location: [facility] OR;  Service:    • TRACHEOSTOMY  02/05/2018   • TRACHEOSTOMY N/A 2/5/2018    Procedure: TRACHEOSTOMY  local injection @ 1106 incision @ 1119;  Surgeon: Joseph Venegas MD;  Location: [facility] OR;  Service:    • VENOUS ACCESS DEVICE (PORT) INSERTION N/A 3/7/2018    Procedure: INSERTION OF MEDIPORT     (C-ARM#1);  Surgeon: Kris Solano MD;  Location: [facility];  Service:          Visit Dx:     ICD-10-CM ICD-9-CM   1. Oropharyngeal dysphagia R13.12 787.22   2. Squamous cell carcinoma of larynx C32.9 161.9                 SLP Adult Swallow Evaluation - 05/01/18 1345        Rehab Evaluation    Document Type evaluation  -CK    Total Evaluation Minutes, SLP 49  -CK    Subjective Information complains of   food/liquid return from trach  -CK    Patient Observations alert;cooperative;agree to therapy  -CK    Patient/Family Observations Pt reports food/liquid return from trach  -CK    Patient Effort good  -CK    Symptoms Noted During/After Treatment other (see comments)   reports odynophagia  -CK       Oral Motor and Function    Dentition Assessment missing teeth  -CK    Secretion Management wet vocal quality  -CK    Volitional Swallow weak  -CK    Volitional Cough WFL  -CK       MBS/VFSS    Utensils Used spoon   -CK    Consistencies Trialed thin liquids;nectar/syrup-thick liquids;honey-thick liquids  -CK       MBS/VFSS Interpretation    Oral Prep Phase impaired oral phase of swallowing  -CK    Oral Transit Phase impaired  -CK    Oral Residue WFL  -CK    Oral Phase, Comment oral holding d/t anticipation of pain w/swallow  -CK       Oral Preparatory Phase    Oral Preparatory Phase oral holding  -CK    Oral Holding thin liquids;nectar-thick liquids;honey-thick liquids  -CK       Oral Transit Phase    Impaired Oral Transit Phase delayed initiation of bolus transit  -CK    Delayed Initiation of bolus transit thin liquids;nectar-thick liquids  -CK       Initiation of Pharyngeal Swallow    Initiation of Pharyngeal Swallow WFL  -CK    Pharyngeal Phase impaired pharyngeal phase of swallowing  -CK    Aspiration During the Swallow thin liquids;nectar-thick liquids;secondary to reduced laryngeal elevation  -CK    Response to Aspiration no response, silent aspiration  -CK    Response to Residue unable to clear residue  -CK    Attempted Compensatory Maneuvers bolus size;bolus presentation style;chin tuck  -CK    Response to Attempted Compensatory Maneuvers did not prevent aspiration  -CK       Esophageal Phase    Esophageal Phase no impairments  -CK       Recommendations    Therapy Frequency (Swallow) evaluation only  -CK    SLP Diet Recommendation NPO  -CK      User Key  (r) = Recorded By, (t) = Taken By, (c) = Cosigned By    Initials Name Provider Type    CK Ileana Yañez MS CCC-SLP Speech and Language Pathologist                              OP SLP Education     Row Name 05/01/18 1564       Education    Barriers to Learning No barriers identified  -CK    Education Provided Described results of evaluation;Patient expressed understanding of evaluation  -CK    Assessed Learning needs;Learning motivation;Learning readiness  -CK    Learning Motivation Strong  -CK    Learning Method Explanation;Demonstration;Other (comment)   allowed  pt to review video of swallow; use of ipad\  -CK    Teaching Response Verbalized understanding  -CK      User Key  (r) = Recorded By, (t) = Taken By, (c) = Cosigned By    Initials Name Effective Dates    CK MS FLORA FoxSLP 04/03/18 -                          Time Calculation:   SLP Start Time: 1345  SLP Stop Time: 1434  SLP Time Calculation (min): 49 min  Total Timed Code Minutes- SLP: 49 minute(s)    Therapy Charges for Today     Code Description Service Date Service Provider Modifiers Qty    37130401574 HC ST SWALLOWING CURRENT STATUS 5/1/2018 Ileana Yañez MS CCC-SLP GN, CN 1    [id number] HC ST SWALLOWING PROJECTED 5/1/2018 Ileana Yañez MS CCC-SLP GN, CK 1    [id number] HC ST SWALLOWING DISCHARGE 5/1/2018 Ileana Yañez MS CCC-SLP GN, CN 1    [id number] HC ST MOTION FLUORO EVAL SWALLOW 3 5/1/2018 Ileana Yañez MS CCC-SLP GN 1          SLP G-Codes  Functional Limitations: Swallowing  Swallow Current Status (): 100 percent impaired, limited or restricted  Swallow Goal Status (): At least 40 percent but less than 60 percent impaired, limited or restricted  Swallow Discharge Status (): 100 percent impaired, limited or restricted    OP SLP Discharge Summary  Date of Discharge: 05/01/18  Reason for Discharge: discharge from this facility  Progress Toward Achieving Short/long Term Goals: discharge on same date as initial evaluation  Discharge Destination: home      Ileana Yañez MS CCC-SLP  5/1/2018

## 2018-05-01 NOTE — PROGRESS NOTES
Adult Outpatient Nutrition  Assessment    Patient Name:  Truman Esquivel  YOB: 1960  MRN: [id number]    Assessment Date:  5/1/2018    Comments:  Calorie needs 0721-0315 daily                         Protein needs 97 grams/day                         Fluid needs 2625 ml/day  Tube feeding recommendation--2 cans Jevity 1.2 QID bolus. Tube being placed 5/2/18. Recommend using Osmolite 1.2 for day #1 (pt has formula for day#1).        Electronically signed by:  Leslee Bonds RD  05/01/18 4:01 PM

## 2018-05-02 ENCOUNTER — ANESTHESIA (OUTPATIENT)
Dept: GASTROENTEROLOGY | Facility: HOSPITAL | Age: 58
End: 2018-05-02

## 2018-05-02 ENCOUNTER — APPOINTMENT (OUTPATIENT)
Dept: ONCOLOGY | Facility: HOSPITAL | Age: 58
End: 2018-05-02

## 2018-05-02 ENCOUNTER — HOSPITAL ENCOUNTER (OUTPATIENT)
Facility: HOSPITAL | Age: 58
Setting detail: HOSPITAL OUTPATIENT SURGERY
Discharge: HOME | End: 2018-05-02
Attending: INTERNAL MEDICINE | Admitting: INTERNAL MEDICINE

## 2018-05-02 ENCOUNTER — ANESTHESIA EVENT (OUTPATIENT)
Dept: GASTROENTEROLOGY | Facility: HOSPITAL | Age: 58
End: 2018-05-02

## 2018-05-02 VITALS
HEIGHT: 72 IN | OXYGEN SATURATION: 96 % | DIASTOLIC BLOOD PRESSURE: 62 MMHG | RESPIRATION RATE: 18 BRPM | WEIGHT: 213.41 LBS | TEMPERATURE: 98.3 F | SYSTOLIC BLOOD PRESSURE: 150 MMHG | HEART RATE: 70 BPM | BODY MASS INDEX: 28.91 KG/M2

## 2018-05-02 PROCEDURE — 25010000002 MORPHINE PER 10 MG: Performed by: INTERNAL MEDICINE

## 2018-05-02 PROCEDURE — 25010000002 MIDAZOLAM PER 1 MG: Performed by: NURSE ANESTHETIST, CERTIFIED REGISTERED

## 2018-05-02 PROCEDURE — 43246 EGD PLACE GASTROSTOMY TUBE: CPT | Performed by: INTERNAL MEDICINE

## 2018-05-02 PROCEDURE — 25010000002 VANCOMYCIN PER 500 MG: Performed by: INTERNAL MEDICINE

## 2018-05-02 PROCEDURE — 25010000002 PROPOFOL 10 MG/ML EMULSION: Performed by: NURSE ANESTHETIST, CERTIFIED REGISTERED

## 2018-05-02 PROCEDURE — 25010000002 HEPARIN FLUSH (PORCINE) 100 UNIT/ML SOLUTION: Performed by: INTERNAL MEDICINE

## 2018-05-02 PROCEDURE — 43246 EGD PLACE GASTROSTOMY TUBE: CPT | Performed by: PHYSICIAN ASSISTANT

## 2018-05-02 DEVICE — PERCUTANEOUS ENDOSCOPIC GASTROSTOMY KIT
Type: IMPLANTABLE DEVICE | Site: STOMACH | Status: FUNCTIONAL
Brand: ENDOVIVE SAFETY PEG KIT

## 2018-05-02 RX ORDER — PROPOFOL 10 MG/ML
VIAL (ML) INTRAVENOUS AS NEEDED
Status: DISCONTINUED | OUTPATIENT
Start: 2018-05-02 | End: 2018-05-02 | Stop reason: SURG

## 2018-05-02 RX ORDER — MORPHINE SULFATE 4 MG/ML
2 INJECTION, SOLUTION INTRAMUSCULAR; INTRAVENOUS ONCE
Status: COMPLETED | OUTPATIENT
Start: 2018-05-02 | End: 2018-05-02

## 2018-05-02 RX ORDER — DEXTROSE AND SODIUM CHLORIDE 5; .45 G/100ML; G/100ML
INJECTION, SOLUTION INTRAVENOUS CONTINUOUS PRN
Status: DISCONTINUED | OUTPATIENT
Start: 2018-05-02 | End: 2018-05-02 | Stop reason: SURG

## 2018-05-02 RX ORDER — 0.9 % SODIUM CHLORIDE 0.9 %
10 VIAL (ML) INJECTION ONCE
Status: COMPLETED | OUTPATIENT
Start: 2018-05-02 | End: 2018-05-02

## 2018-05-02 RX ORDER — MIDAZOLAM HYDROCHLORIDE 1 MG/ML
INJECTION INTRAMUSCULAR; INTRAVENOUS AS NEEDED
Status: DISCONTINUED | OUTPATIENT
Start: 2018-05-02 | End: 2018-05-02 | Stop reason: SURG

## 2018-05-02 RX ADMIN — MIDAZOLAM 2 MG: 1 INJECTION INTRAMUSCULAR; INTRAVENOUS at 15:30

## 2018-05-02 RX ADMIN — PROPOFOL 50 MG: 10 INJECTION, EMULSION INTRAVENOUS at 15:38

## 2018-05-02 RX ADMIN — MORPHINE SULFATE 2 MG: 4 INJECTION INTRAVENOUS at 17:13

## 2018-05-02 RX ADMIN — PROPOFOL 50 MG: 10 INJECTION, EMULSION INTRAVENOUS at 15:36

## 2018-05-02 RX ADMIN — VANCOMYCIN HYDROCHLORIDE 2000 MG: 1 INJECTION, POWDER, LYOPHILIZED, FOR SOLUTION INTRAVENOUS at 15:14

## 2018-05-02 RX ADMIN — PROPOFOL 100 MG: 10 INJECTION, EMULSION INTRAVENOUS at 15:32

## 2018-05-02 RX ADMIN — DEXTROSE AND SODIUM CHLORIDE: 5; 450 INJECTION, SOLUTION INTRAVENOUS at 15:26

## 2018-05-02 RX ADMIN — PROPOFOL 50 MG: 10 INJECTION, EMULSION INTRAVENOUS at 15:34

## 2018-05-02 RX ADMIN — PROPOFOL 20 MG: 10 INJECTION, EMULSION INTRAVENOUS at 15:41

## 2018-05-02 RX ADMIN — SODIUM CHLORIDE, PRESERVATIVE FREE 500 UNITS: 5 INJECTION INTRAVENOUS at 18:48

## 2018-05-02 RX ADMIN — SODIUM CHLORIDE 10 ML: 9 INJECTION, SOLUTION INTRAMUSCULAR; INTRAVENOUS; SUBCUTANEOUS at 18:48

## 2018-05-02 NOTE — ANESTHESIA POSTPROCEDURE EVALUATION
Patient: Truman Esquivel    Procedure Summary     Date:  05/02/18 Room / Location:  [facility] ENDOSCOPY 3 / [facility] ENDOSCOPY    Anesthesia Start:  1528 Anesthesia Stop:  1544    Procedure:  ESOPHAGOGASTRODUODENOSCOPY WITH PERCUTANEOUS ENDOSCOPIC GASTROSTOMY TUBE INSERTION (N/A Esophagus) Diagnosis:       Dysphagia, unspecified type      Unable to eat      (Dysphagia, unspecified type [R13.10])      (Unable to eat [F50.89])    Surgeon:  Angel Maciel MD Provider:  Jayna Santo CRNA    Anesthesia Type:  MAC ASA Status:  4          Anesthesia Type: MAC  Last vitals  BP   143/71 (05/02/18 1427)   Temp   97.6 °F (36.4 °C) (05/02/18 1427)   Pulse   59 (05/02/18 1427)   Resp   18 (05/02/18 1427)     SpO2   99 % (05/02/18 1427)     Post Anesthesia Care and Evaluation    Patient location during evaluation: bedside  Patient participation: complete - patient participated  Level of consciousness: awake and awake and alert  Pain score: 2  Pain management: satisfactory to patient  Airway patency: patent  Anesthetic complications: No anesthetic complications  PONV Status: none  Cardiovascular status: acceptable and stable  Respiratory status: acceptable, room air, unassisted and spontaneous ventilation  Hydration status: acceptable

## 2018-05-02 NOTE — H&P
Progress Notes  Encounter Date: 5/2/2018  Angel ybarra  Gastroenterology   Expand All Collapse All    []Manual[]Template  []Copied  Chief Complaint   Patient presents with   • Abnormal CT/PET Scan            Subjective       Truman Esquivel is a 57 y.o. male. he is here today.     History of Present Illness  57-year-old male presents to discuss abnormal PET scan of his rectum.  He was recently diagnosed with squamous cell carcinoma of larynx.  Had a trach placed about a month ago.  He had PET scan completed 2/16/18 which noted activity in the rectum probably either physiological or inflammatory.  Recommended direct visualization.  Patient has never had a screening colonoscopy.  He denies any abdominal pain, nausea or vomiting.  He reports problems with swallowing secondary to tracheostomy placement however states he has been doing okay with soft diet.  He denies any choking episodes.  He denies any family history of colorectal cancer or polyps.  Denies any melena or hematochezia.  States his bowel habits are somewhat constipated he has 1 bowel movement about every other day and states stool softener helps with this.  Plan; Will schedule patient for colonoscopy due to abnormality seen in rectum on recent PET scan.  Patient has never had a screening colonoscopy.  Follow-up after test return to office sooner if needed.      The following portions of the patient's history were reviewed and updated as appropriate:   Medical History        Past Medical History:   Diagnosis Date   • Aortic dissection     • Chest pain     • Disease of thyroid gland     • HTN (hypertension)     • Knee pain     • Sleep apnea     • Smoker     • Squamous cell carcinoma of larynx 2/5/2018   • Stroke     • Swallowing difficulty           Surgical History         Past Surgical History:   Procedure Laterality Date   • AORTA SURGERY         ruptured aorta repair   • ASCENDING ARCH/HEMIARCH REPLACEMENT N/A 2/14/2017     Procedure:  INTRAOPERATIVE TRANSESOPHAGEAL ECHOCARDIOGRAM, MIDLINE STERNOTOMY, ASCENDING AORTIC  AND PROXIMAL AORTIC ARCH REPAIR WITH 26MM GRAFT, AORTIC VALVE RESUSPENSION, AORTIC ROOT REPAIR, OPEN VEIN HARVEST OF RIGHT LEG;  Surgeon: German Arreguin MD;  Location: [facility] MAIN OR;  Service:    • BRONCHOSCOPY N/A 2/17/2017     Procedure: BRONCHOSCOPY BIOPSY AT BEDSIDE WITH BAL-LEFT LOWER LOBE;  Surgeon: Trent Chaney MD;  Location: [facility] ENDOSCOPY;  Service:    • DIRECT LARYNGOSCOPY, ESOPHAGOSCOPY, BRONCHOSCOPY N/A 2/5/2018     Procedure: DIRECT LARYNGOSCOPY WITH BIOPSY, ESOPHAGOSCOPY     (no bronchoscopy, no laser);  Surgeon: Joseph Venegas MD;  Location: [facility] OR;  Service:    • TRACHEOSTOMY   02/05/2018   • TRACHEOSTOMY N/A 2/5/2018     Procedure: TRACHEOSTOMY  local injection @ 1106 incision @ 1119;  Surgeon: Joseph Venegas MD;  Location: [facility] OR;  Service:                Family History   Problem Relation Age of Onset   • Thyroid disease Mother     • Hypertension Mother     • Hypertension Father     • Hypertension Other        Current Medications   Current Outpatient Prescriptions   Medication Sig Dispense Refill   • amLODIPine (NORVASC) 5 MG tablet Take 5 mg by mouth Daily.       • aspirin 81 MG tablet Take 81 mg by mouth Daily. Last dose 1/31/18       • atorvastatin (LIPITOR) 20 MG tablet Take 1 tablet by mouth Daily. 30 tablet 11   • clopidogrel (PLAVIX) 75 MG tablet Take 75 mg by mouth Daily. Last dose approx one month ago       • docusate sodium (COLACE) 100 MG capsule Take 1 capsule by mouth 2 (Two) Times a Day As Needed for Constipation. 60 capsule 2   • ferrous sulfate 325 (65 FE) MG tablet Take 1 tablet by mouth 2 (Two) Times a Day With Meals. 60 tablet 3   • levothyroxine (SYNTHROID, LEVOTHROID) 88 MCG tablet Take 88 mcg by mouth Daily.       • losartan-hydrochlorothiazide (HYZAAR) 100-25 MG per tablet Take 1 tablet by mouth Daily. 30 tablet 12   • metoprolol succinate XL (TOPROL-XL) 25 MG  24 hr tablet Take 25 mg by mouth Every Night.       • Multiple Vitamins-Minerals (MULTIVITAMIN ADULT PO) Take 1 tablet by mouth Daily.       • ondansetron (ZOFRAN) 4 MG tablet Take 1 tablet by mouth 3 (Three) Times a Day As Needed for Nausea or Vomiting. 40 tablet 3   • oxyCODONE-acetaminophen (PERCOCET) 5-325 MG per tablet 1-2 tablets by mouth every 4 hours as needed for pain 60 tablet 0   • sodium chloride 0.9 % solution Use for tracheostomy care as directed 1000 mL 11   • vitamin B-12 (CYANOCOBALAMIN) 100 MCG tablet Take 100 mcg by mouth Daily.       • vitamin E 100 UNIT capsule Take 400 Units by mouth Every Night.       • Zinc 50 MG capsule Take 50 mg by mouth Every Night.          No current facility-administered medications for this visit.                Allergies   Allergen Reactions   • Co Q 10 [Coenzyme Q10] Itching   • Eggs Or Egg-Derived Products Swelling   • Erythromycin Other (See Comments)       Joint pains and cold sweats   • Other GI Intolerance       Mycins   • Penicillins         Tolerated cefepime during 2/2017 admission. Had rash and itching (relieved by benadryl) after few doses of cefepime and cefepime was continued.   • Tetracyclines & Related GI Intolerance   • Acth [Corticotropin] Rash   • Cephalosporins Itching and Rash       Pt developed rash, itching after cefepime administration (2-3 doses) during 2/2017 admission. Itching was relieved with benadryl. Cefepime was continued because his infection was improving and no symptoms of anaphylaxis present   • Keflex [Cephalexin] Rash      Social History   Social History           Social History   • Marital status:              Social History Main Topics   • Smoking status: Former Smoker       Packs/day: 1.25       Years: 38.00       Quit date: 2/13/2017   • Smokeless tobacco: Never Used         Comment: 02/27/2018 - Patient confirmed he ceased utilization of tobacco products 02/13/2017.   • Alcohol use No   • Drug use: No   • Sexual  "activity: Defer            Review of Systems  Review of Systems   Constitutional: Positive for appetite change (decreased since trach on soft diet ). Negative for activity change, chills, diaphoresis, fatigue, fever and unexpected weight change.   HENT: Negative for sore throat and trouble swallowing.    Respiratory: Negative for shortness of breath.    Gastrointestinal: Negative for abdominal distention, abdominal pain, anal bleeding, blood in stool, constipation, diarrhea, nausea, rectal pain and vomiting.   Musculoskeletal: Negative for arthralgias.   Skin: Negative for pallor.   Neurological: Negative for light-headedness.                    /68 (BP Location: Left arm, Patient Position: Sitting, Cuff Size: Adult)  Pulse 65  Ht 182.9 cm (72\")  Wt 115 kg (253 lb 12.8 oz)  SpO2 98%  BMI 34.42 kg/m2        Objective       Physical Exam   Constitutional: He is oriented to person, place, and time. He appears well-developed and well-nourished. He is cooperative. No distress.   HENT:   Head: Normocephalic and atraumatic.   Trach in place   Neck: Normal range of motion. Neck supple. No thyromegaly present.   Cardiovascular: Normal rate, regular rhythm and normal heart sounds.    Pulmonary/Chest: Effort normal and breath sounds normal. He has no wheezes. He has no rhonchi. He has no rales.   Abdominal: Soft. Normal appearance and bowel sounds are normal. He exhibits no shifting dullness and no distension. There is no hepatosplenomegaly. There is no tenderness. There is no rigidity and no guarding. No hernia.   Lymphadenopathy:     He has no cervical adenopathy.   Neurological: He is alert and oriented to person, place, and time.   Skin: Skin is warm, dry and intact. No rash noted. No pallor.   Psychiatric: He has a normal mood and affect. His speech is normal.              Consult on 02/13/2018   Component Date Value Ref Range Status   • Iron 02/13/2018 14* 49 - 181 mcg/dL Final   • TIBC 02/13/2018 335  261 - " 462 mcg/dL Final   • Iron Saturation 02/13/2018 4* 20 - 55 % Final   • Ferritin 02/13/2018 64.40  17.90 - 464.00 ng/mL Final   • Folate 02/13/2018 >20.00  2.76 - 21.00 ng/mL Final   • Vitamin B-12 02/13/2018 980* 239 - 931 pg/mL Final         Assessment/Plan          1. Abnormal positron emission tomography (PET) of colon    .         Orders placed during this encounter include:        COLONOSCOPY (N/A)     Review and/or summary of lab tests, radiology, procedures, medications. Review and summary of old records and obtaining of history. The risks and benefits of my recommendations, as well as other treatment options were discussed with the patient today. Questions were answered.     No orders of the defined types were placed in this encounter.           This document has been electronically signed by Angel Maciel MD on May 2, 2018 12:44 PM                   Results for orders placed or performed in visit on 02/13/18   Iron and TIBC   Result Value Ref Range     Iron 14 (L) 49 - 181 mcg/dL     TIBC 335 261 - 462 mcg/dL     Iron Saturation 4 (L) 20 - 55 %   Folate   Result Value Ref Range     Folate >20.00 2.76 - 21.00 ng/mL   Ferritin   Result Value Ref Range     Ferritin 64.40 17.90 - 464.00 ng/mL   Vitamin B12   Result Value Ref Range     Vitamin B-12 980 (H) 239 - 931 pg/mL   Results for orders placed or performed during the hospital encounter of 02/05/18   CBC Auto Differential   Result Value Ref Range     WBC 13.35 (H) 3.20 - 9.80 10*3/mm3     RBC 3.98 (L) 4.37 - 5.74 10*6/mm3     Hemoglobin 10.1 (L) 13.7 - 17.3 g/dL     Hematocrit 31.5 (L) 39.0 - 49.0 %     MCV 79.1 (L) 80.0 - 98.0 fL     MCH 25.4 (L) 26.5 - 34.0 pg     MCHC 32.1 31.5 - 36.3 g/dL     RDW 13.9 11.5 - 14.5 %     RDW-SD 39.7 35.1 - 43.9 fl     MPV 11.5 8.0 - 12.0 fL     Platelets 182 150 - 450 10*3/mm3     Neutrophil % 75.6 37.0 - 80.0 %     Lymphocyte % 12.4 10.0 - 50.0 %     Monocyte % 7.8 0.0 - 12.0 %     Eosinophil % 3.9 0.0 - 7.0 %      "Basophil % 0.2 0.0 - 2.0 %     Immature Grans % 0.1 0.0 - 0.5 %     Neutrophils, Absolute 10.09 (H) 2.00 - 8.60 10*3/mm3     Lymphocytes, Absolute 1.65 0.60 - 4.20 10*3/mm3     Monocytes, Absolute 1.04 (H) 0.00 - 0.90 10*3/mm3     Eosinophils, Absolute 0.52 0.00 - 0.70 10*3/mm3     Basophils, Absolute 0.03 0.00 - 0.20 10*3/mm3     Immature Grans, Absolute 0.02 0.00 - 0.02 10*3/mm3   Tissue Pathology Exam - Tissue, Oral Cavity   Result Value Ref Range     Case Report           Surgical Pathology Report                         Case: [id number]                                  Authorizing Provider:  Joseph Venegas MD Collected:           02/05/2018 11:57 AM          Ordering Location:     [facility]             Received:            02/05/2018 12:04 PM                                 [place]                                                              Pathologist:           Rodrigue Booth MD                                                          Specimens:   1) - Oral Cavity, TUMOR EPIGLOTTIS                                                                  2) - Oral Cavity, TUMOR EPIGLOTTIS                                                        Final Diagnosis           1.  TUMOR, EPIGLOTTIS:   INVASIVE SQUAMOUS CELL CARCINOMA, NON KERATINIZING, POORLY DIFFERENTIATED.      2.  TUMOR, EPIGLOTTIS:   INVASIVE SQUAMOUS CELL CARCINOMA, NON KERATINIZING, POORLY DIFFERENTIATED.      Intraoperative Consultation           Frozen Section Diagnosis:  1.  Invasive squamous cell carcinoma, epiglottis.     Intradepartmental Consult           Dr. Davey reviewed the histologic slides and concurs with the above diagnoses.      Gross Description           1.  The container is labeled \"tumor of epiglottis for frozen section\" and has multiple nodular fragments of white tissue measuring 1.5 x 1.5 x 0.4 cm in aggregate.  The entire specimen is utilized for frozen section examination as 1A.     2.  The second " "container is labeled \"tumor, epiglottis\" and has multiple nodular pinkish white fragments of tissue measuring 4.0 x 3.0 x 1.0 cm in aggregate.  The entire specimen is embedded as 2A through 2C.      Embedded Images       CBC (No Diff)   Result Value Ref Range     WBC 12.66 (H) 3.20 - 9.80 10*3/mm3     RBC 4.50 4.37 - 5.74 10*6/mm3     Hemoglobin 11.2 (L) 13.7 - 17.3 g/dL     Hematocrit 35.2 (L) 39.0 - 49.0 %     MCV 78.2 (L) 80.0 - 98.0 fL     MCH 24.9 (L) 26.5 - 34.0 pg     MCHC 31.8 31.5 - 36.3 g/dL     RDW 13.7 11.5 - 14.5 %     RDW-SD 39.1 35.1 - 43.9 fl     MPV 11.1 8.0 - 12.0 fL     Platelets 193 150 - 450 10*3/mm3   Comprehensive Metabolic Panel   Result Value Ref Range     Glucose 116 (H) 60 - 100 mg/dL     BUN 17 7 - 21 mg/dL     Creatinine 1.10 0.70 - 1.30 mg/dL     Sodium 137 137 - 145 mmol/L     Potassium 3.4 (L) 3.5 - 5.1 mmol/L     Chloride 100 95 - 110 mmol/L     CO2 26.0 22.0 - 31.0 mmol/L     Calcium 8.5 8.4 - 10.2 mg/dL     Total Protein 6.5 6.3 - 8.6 g/dL     Albumin 3.60 3.40 - 4.80 g/dL     ALT (SGPT) 28 21 - 72 U/L     AST (SGOT) 15 (L) 17 - 59 U/L     Alkaline Phosphatase 64 38 - 126 U/L     Total Bilirubin 0.3 0.2 - 1.3 mg/dL     eGFR Non African Amer 69 >60 mL/min/1.73     Globulin 2.9 2.3 - 3.5 gm/dL     A/G Ratio 1.2 1.1 - 1.8 g/dL     BUN/Creatinine Ratio 15.5 7.0 - 25.0     Anion Gap 11.0 5.0 - 15.0 mmol/L   Comprehensive Metabolic Panel   Result Value Ref Range     Glucose 101 (H) 60 - 100 mg/dL     BUN 18 7 - 21 mg/dL     Creatinine 1.16 0.70 - 1.30 mg/dL     Sodium 141 137 - 145 mmol/L     Potassium 4.2 3.5 - 5.1 mmol/L     Chloride 99 95 - 110 mmol/L     CO2 29.0 22.0 - 31.0 mmol/L     Calcium 8.8 8.4 - 10.2 mg/dL     Total Protein 6.5 6.3 - 8.6 g/dL     Albumin 3.80 3.40 - 4.80 g/dL     ALT (SGPT) 29 21 - 72 U/L     AST (SGOT) 16 (L) 17 - 59 U/L     Alkaline Phosphatase 71 38 - 126 U/L     Total Bilirubin 0.2 0.2 - 1.3 mg/dL     eGFR Non African Amer 65 >60 mL/min/1.73     Globulin " 2.7 2.3 - 3.5 gm/dL     A/G Ratio 1.4 1.1 - 1.8 g/dL     BUN/Creatinine Ratio 15.5 7.0 - 25.0     Anion Gap 13.0 5.0 - 15.0 mmol/L   Results for orders placed or performed in visit on 02/01/18   Basic Metabolic Panel   Result Value Ref Range     Glucose 82 60 - 100 mg/dL     BUN 19 7 - 21 mg/dL     Creatinine 1.26 0.70 - 1.30 mg/dL     Sodium 142 137 - 145 mmol/L     Potassium 3.7 3.5 - 5.1 mmol/L     Chloride 99 95 - 110 mmol/L     CO2 29.0 22.0 - 31.0 mmol/L     Calcium 9.1 8.4 - 10.2 mg/dL     eGFR Non African Amer 59 (L) >60 mL/min/1.73     BUN/Creatinine Ratio 15.1 7.0 - 25.0     Anion Gap 14.0 5.0 - 15.0 mmol/L   Results for orders placed or performed during the hospital encounter of 12/21/17   Adult Transesophageal Echo (MERI) W/ Cont if Necessary Per Protocol   Result Value Ref Range     BSA 2.4 m^2      CV ECHO QUINTEN - BZI_BMI 36.5 kilograms/m^2      CV ECHO QUINTEN - BSA(HAYCOCK) 2.5 m^2      CV ECHO QUINTEN - BZI_METRIC_WEIGHT 122.0 kg      CV ECHO QUINTEN - BZI_METRIC_HEIGHT 182.9 cm      *Note: Due to a large number of results and/or encounters for the requested time period, some results have not been displayed. A complete set of results can be found in Results Review.              Office Visit on 2/27/2018            Detailed Report

## 2018-05-02 NOTE — NURSING NOTE
Accessed port without difficulties. Utilized cleaning with slcohol pad and secured with gauze and silk tape as pt relates allergic to chlora prep and clear dressing. yael well

## 2018-05-02 NOTE — ANESTHESIA PREPROCEDURE EVALUATION
" Anesthesia Evaluation     Patient summary reviewed and Nursing notes reviewed   NPO Solid Status: > 8 hours  NPO Liquid Status: > 8 hours           Airway   Mallampati: III  TM distance: >3 FB  Neck ROM: full  Comment: Supraglottic probable cancerous mass. Patient with hoarseness and dysphagia, no air hunger.  States he did have problems with nose bleeds and posterior pharynx bleeding episodes when on \"blood thinners\". States since these were stopped no more episodes.   Trach in place  Dental - normal exam   (+) poor dentation    Comment: Overall upper and lower dentition in poor repair. Cautioned patient that due to airway and dental condition that trauma to teeth could occur. He understands and agrees.    Pulmonary - normal exam    breath sounds clear to auscultation  (+) a smoker (Quit 2/13/17) Former, sleep apnea,     PE comment: No audible upper airway obstruction on exam.  Cardiovascular - normal exam  Exercise tolerance: poor (<4 METS)    ECG reviewed  PT is on anticoagulation therapy  Patient on routine beta blocker and Beta blocker given within 24 hours of surgery  Rhythm: regular  Rate: normal    (+) hypertension, PVD (Aortic arch dissection repair Febuary 2017.), DVT (DVT of lower extremities.) resolved, hyperlipidemia,   (-) carotid bruits    ROS comment: EKG:NSR    12/21/17  EF 55-60%    Neuro/Psych  (+) TIA, CVA,     GI/Hepatic/Renal/Endo    (+) obesity,   hypothyroidism,     Musculoskeletal     Abdominal  - normal exam  (+) obese,    Substance History - negative use     OB/GYN          Other   (+) arthritis   history of cancer (Larynx/supra glottic.) active    ROS/Med Hx Other: anemia      Phys Exam Other: trach                Anesthesia Plan    ASA 4     MAC     intravenous induction   Anesthetic plan and risks discussed with patient and spouse/significant other.      "

## 2018-05-02 NOTE — BRIEF OP NOTE
ESOPHAGOGASTRODUODENOSCOPY WITH PERCUTANEOUS ENDOSCOPIC GASTROSTOMY TUBE INSERTION  Progress Note    Truman Esquivel  5/2/2018    Pre-op Diagnosis:   Dysphagia, unspecified type [R13.10]  Unable to eat [F50.89]       Post-Op Diagnosis Codes:     * Dysphagia, unspecified type [R13.10]     * Unable to eat [F50.89]    Procedure/CPT® Codes:      Procedure(s):  ESOPHAGOGASTRODUODENOSCOPY WITH PERCUTANEOUS ENDOSCOPIC GASTROSTOMY TUBE INSERTION    Surgeon(s):  Angel Maciel MD    Anesthesia: Monitor Anesthesia Care    Staff:   Endo Technician: Nancy Gan  Endo Nurse: Amber Antonio RN    Estimated Blood Loss: none    Urine Voided: * No values recorded between 5/2/2018  3:25 PM and 5/2/2018  3:41 PM *    Specimens:                none      Drains:      Findings: Patient sedated for EGD with Dr. Maciel. Under direct supervision PEG site was selected and confirmed by transillumination. Site prepped and draped. 1.5cc lidocaine used for local anesthetic. Needle guided by endoscopic view. Guidewire placed endoscopically and PEG pulled per os to position at 3.5cm. PEG completed and site dressed with 4x4. Patient tolerate procedure without complications and without difficulty. May begin TF in am, keep NPO until then.    Complications: none      Aftab Madrid PA-C     Date: 5/2/2018  Time: 3:47 PM

## 2018-05-02 NOTE — NURSING NOTE
Pt wanting script for home pain RX due to running out with what he had at home. Dr. Maciel wrote Percocet script and  Gave it to pt.

## 2018-05-03 PROCEDURE — 77417 THER RADIOLOGY PORT IMAGE(S): CPT | Performed by: RADIOLOGY

## 2018-05-03 PROCEDURE — 77386: CPT | Performed by: RADIOLOGY

## 2018-05-04 ENCOUNTER — OFFICE VISIT (OUTPATIENT)
Dept: RADIATION ONCOLOGY | Facility: HOSPITAL | Age: 58
End: 2018-05-04

## 2018-05-04 VITALS
WEIGHT: 217.81 LBS | TEMPERATURE: 97.7 F | SYSTOLIC BLOOD PRESSURE: 121 MMHG | BODY MASS INDEX: 29.5 KG/M2 | HEIGHT: 72 IN | RESPIRATION RATE: 20 BRPM | DIASTOLIC BLOOD PRESSURE: 62 MMHG | OXYGEN SATURATION: 97 % | HEART RATE: 73 BPM

## 2018-05-04 DIAGNOSIS — C32.9 SQUAMOUS CELL CARCINOMA OF LARYNX (HCC): Primary | ICD-10-CM

## 2018-05-04 PROCEDURE — 99212-NC PR NO CHARGE CBC OFFICE OUTPATIENT VISIT 10 MINUTES: Performed by: RADIOLOGY

## 2018-05-04 PROCEDURE — 77386: CPT | Performed by: RADIOLOGY

## 2018-05-04 NOTE — PROGRESS NOTES
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  ICD 10 Code: C32.9      was seen today for an on treatment visit.  He is receiving radiation therapy to the larynx/neck. He  has received 5600 cGy in 28 fractions out of a planned dose of 7000 cGy in 35 fractions. He is receiving concurrent carboplatin/Taxol chemotherapy per Dr. Patel, and received his 4th and most recent cycle on 4/16/18.      The patient underwent feeding tube placement per Dr. Maciel earlier this week (5/2/18) secondary to a swallowing study which showed aspiration. He was also evaluated by Dr. Maciel at that time for evaluation of a rectal abnormality noted on his PET/CT. Dr. Maciel plans to perform a colonoscopy in the future. He is otherwise tolerating radiation therapy well.                                              Vitals:     There were no vitals filed for this visit.    Weight:   (Starting weight of 257 pounds on 2/21/18)  Wt Readings from Last 3 Encounters:   05/02/18 96.8 kg (213 lb 6.5 oz)   04/25/18 105 kg (230 lb 9.6 oz)   04/23/18 106 kg (233 lb 9.6 oz)      Pain:    There were no vitals filed for this visit.      Plan: We plan to continue radiation therapy as prescribed.     Jarocho Tapia MD  Radiation Oncology    Electronically signed by Jarocho Tapia MD 5/4/2018  11:55 AM     cc: Dr. Joseph Harrison

## 2018-05-07 ENCOUNTER — HOSPITAL ENCOUNTER (OUTPATIENT)
Dept: RADIATION ONCOLOGY | Facility: HOSPITAL | Age: 58
Discharge: HOME | End: 2018-05-07

## 2018-05-07 ENCOUNTER — APPOINTMENT (OUTPATIENT)
Dept: RADIATION ONCOLOGY | Facility: HOSPITAL | Age: 58
End: 2018-05-07

## 2018-05-07 PROCEDURE — 77386: CPT | Performed by: RADIOLOGY

## 2018-05-07 NOTE — PROGRESS NOTES
Adult Outpatient Nutrition  Assessment    Patient Name:  Truman Esquivel  YOB: 1960  MRN: [id number]    Assessment Date:  5/7/2018    Comments: Pt stated received tube feeding formula and supplies. Has only been able to take 4 cans daily. Early satiety. No BM in 9 days-- pt stated did not think anything about it since taking only liquids (prior to NPO status). Explained that constipation contributes to early satiety. RN advised pt to take 1/2 bottle Magnesium Citrate--if ineffective increase to whole bottle. Later in conversation pt stated takes 1 cup H20 before and after each feeding. Explained that formula is 80% water, thus decrease water to 30cc before each feeding and 60cc (more if tube needs extra flushing) after each feeding. Wt 217.9 lb. Mother asking if pt can take anything by mouth. Explained that pt would be NPO (by mouth) until speech evaluates and approves food by mouth. Discussed risk of aspiration once again.        Electronically signed by:  Leslee Bonds RD  05/07/18 3:28 PM

## 2018-05-08 PROCEDURE — 77336 RADIATION PHYSICS CONSULT: CPT | Performed by: RADIOLOGY

## 2018-05-08 PROCEDURE — 77386: CPT | Performed by: RADIOLOGY

## 2018-05-09 ENCOUNTER — RADIATION ONCOLOGY WEEKLY ASSESSMENT (OUTPATIENT)
Dept: RADIATION ONCOLOGY | Facility: HOSPITAL | Age: 58
End: 2018-05-09

## 2018-05-09 ENCOUNTER — INFUSION (OUTPATIENT)
Dept: ONCOLOGY | Facility: HOSPITAL | Age: 58
End: 2018-05-09

## 2018-05-09 VITALS
RESPIRATION RATE: 20 BRPM | HEART RATE: 62 BPM | SYSTOLIC BLOOD PRESSURE: 116 MMHG | TEMPERATURE: 98.1 F | DIASTOLIC BLOOD PRESSURE: 55 MMHG

## 2018-05-09 VITALS
WEIGHT: 219 LBS | DIASTOLIC BLOOD PRESSURE: 55 MMHG | HEART RATE: 62 BPM | TEMPERATURE: 98.1 F | HEIGHT: 72 IN | SYSTOLIC BLOOD PRESSURE: 116 MMHG | RESPIRATION RATE: 20 BRPM | BODY MASS INDEX: 29.66 KG/M2

## 2018-05-09 DIAGNOSIS — C32.9 SQUAMOUS CELL CARCINOMA OF LARYNX (HCC): ICD-10-CM

## 2018-05-09 DIAGNOSIS — E87.6 HYPOKALEMIA: ICD-10-CM

## 2018-05-09 DIAGNOSIS — C32.9 SQUAMOUS CELL CARCINOMA OF LARYNX (HCC): Primary | ICD-10-CM

## 2018-05-09 DIAGNOSIS — Z45.2 ENCOUNTER FOR VENOUS ACCESS DEVICE CARE: Primary | ICD-10-CM

## 2018-05-09 LAB
ANION GAP SERPL CALCULATED.3IONS-SCNC: 13 MMOL/L (ref 5–15)
BASOPHILS # BLD AUTO: 0.05 10*3/MM3 (ref 0–0.2)
BASOPHILS NFR BLD AUTO: 0.9 % (ref 0–2)
BUN BLD-MCNC: 25 MG/DL (ref 7–21)
BUN/CREAT SERPL: 19.5 (ref 7–25)
CALCIUM SPEC-SCNC: 9.5 MG/DL (ref 8.4–10.2)
CHLORIDE SERPL-SCNC: 95 MMOL/L (ref 95–110)
CO2 SERPL-SCNC: 30 MMOL/L (ref 22–31)
CREAT BLD-MCNC: 1.28 MG/DL (ref 0.7–1.3)
DEPRECATED RDW RBC AUTO: 63 FL (ref 35.1–43.9)
EOSINOPHIL # BLD AUTO: 0.06 10*3/MM3 (ref 0–0.7)
EOSINOPHIL NFR BLD AUTO: 1.1 % (ref 0–7)
ERYTHROCYTE [DISTWIDTH] IN BLOOD BY AUTOMATED COUNT: 22.2 % (ref 11.5–14.5)
GFR SERPL CREATININE-BSD FRML MDRD: 58 ML/MIN/1.73 (ref 56–130)
GLUCOSE BLD-MCNC: 100 MG/DL (ref 60–100)
HCT VFR BLD AUTO: 30.2 % (ref 39–49)
HGB BLD-MCNC: 9.9 G/DL (ref 13.7–17.3)
IMM GRANULOCYTES # BLD: 0.04 10*3/MM3 (ref 0–0.02)
IMM GRANULOCYTES NFR BLD: 0.8 % (ref 0–0.5)
LYMPHOCYTES # BLD AUTO: 0.57 10*3/MM3 (ref 0.6–4.2)
LYMPHOCYTES NFR BLD AUTO: 10.8 % (ref 10–50)
MAGNESIUM SERPL-MCNC: 2.4 MG/DL (ref 1.6–2.3)
MCH RBC QN AUTO: 25.4 PG (ref 26.5–34)
MCHC RBC AUTO-ENTMCNC: 32.8 G/DL (ref 31.5–36.3)
MCV RBC AUTO: 77.6 FL (ref 80–98)
MONOCYTES # BLD AUTO: 0.61 10*3/MM3 (ref 0–0.9)
MONOCYTES NFR BLD AUTO: 11.6 % (ref 0–12)
NEUTROPHILS # BLD AUTO: 3.94 10*3/MM3 (ref 2–8.6)
NEUTROPHILS NFR BLD AUTO: 74.8 % (ref 37–80)
NRBC BLD MANUAL-RTO: 0 /100 WBC (ref 0–0)
PLATELET # BLD AUTO: 210 10*3/MM3 (ref 150–450)
PMV BLD AUTO: 10.2 FL (ref 8–12)
POTASSIUM BLD-SCNC: 3.2 MMOL/L (ref 3.5–5.1)
RBC # BLD AUTO: 3.89 10*6/MM3 (ref 4.37–5.74)
SODIUM BLD-SCNC: 138 MMOL/L (ref 137–145)
WBC NRBC COR # BLD: 5.27 10*3/MM3 (ref 3.2–9.8)

## 2018-05-09 PROCEDURE — 96375 TX/PRO/DX INJ NEW DRUG ADDON: CPT | Performed by: INTERNAL MEDICINE

## 2018-05-09 PROCEDURE — 80048 BASIC METABOLIC PNL TOTAL CA: CPT

## 2018-05-09 PROCEDURE — 77386: CPT | Performed by: RADIOLOGY

## 2018-05-09 PROCEDURE — 96413 CHEMO IV INFUSION 1 HR: CPT | Performed by: INTERNAL MEDICINE

## 2018-05-09 PROCEDURE — 25010000002 CARBOPLATIN PER 50 MG: Performed by: INTERNAL MEDICINE

## 2018-05-09 PROCEDURE — 83735 ASSAY OF MAGNESIUM: CPT

## 2018-05-09 PROCEDURE — 25010000003 DEXAMETHASONE SODIUM PHOSPHATE 100 MG/10ML SOLUTION 10 ML VIAL: Performed by: INTERNAL MEDICINE

## 2018-05-09 PROCEDURE — 85025 COMPLETE CBC W/AUTO DIFF WBC: CPT

## 2018-05-09 PROCEDURE — 96417 CHEMO IV INFUS EACH ADDL SEQ: CPT | Performed by: INTERNAL MEDICINE

## 2018-05-09 PROCEDURE — 25010000002 HEPARIN FLUSH (PORCINE) 100 UNIT/ML SOLUTION: Performed by: INTERNAL MEDICINE

## 2018-05-09 PROCEDURE — 25010000002 PACLITAXEL PER 30 MG: Performed by: INTERNAL MEDICINE

## 2018-05-09 PROCEDURE — 77427 RADIATION TX MANAGEMENT X5: CPT | Performed by: RADIOLOGY

## 2018-05-09 PROCEDURE — 25010000002 PALONOSETRON PER 25 MCG: Performed by: INTERNAL MEDICINE

## 2018-05-09 PROCEDURE — 25010000002 DIPHENHYDRAMINE PER 50 MG: Performed by: INTERNAL MEDICINE

## 2018-05-09 RX ORDER — PALONOSETRON 0.05 MG/ML
0.25 INJECTION, SOLUTION INTRAVENOUS ONCE
Status: CANCELLED | OUTPATIENT
Start: 2018-05-09

## 2018-05-09 RX ORDER — SODIUM CHLORIDE 0.9 % (FLUSH) 0.9 %
10 SYRINGE (ML) INJECTION AS NEEDED
Status: CANCELLED | OUTPATIENT
Start: 2018-05-10

## 2018-05-09 RX ORDER — SODIUM CHLORIDE 9 MG/ML
250 INJECTION, SOLUTION INTRAVENOUS ONCE
Status: CANCELLED | OUTPATIENT
Start: 2018-05-09

## 2018-05-09 RX ORDER — SODIUM CHLORIDE 0.9 % (FLUSH) 0.9 %
10 SYRINGE (ML) INJECTION AS NEEDED
Status: DISCONTINUED | OUTPATIENT
Start: 2018-05-09 | End: 2018-05-09 | Stop reason: HOSPADM

## 2018-05-09 RX ORDER — POTASSIUM CHLORIDE 1.5 G/1.77G
40 POWDER, FOR SOLUTION ORAL ONCE
Status: CANCELLED
Start: 2018-05-10 | End: 2018-05-10

## 2018-05-09 RX ORDER — POTASSIUM CHLORIDE 1.5 G/1.77G
40 POWDER, FOR SOLUTION ORAL ONCE
Status: COMPLETED | OUTPATIENT
Start: 2018-05-09 | End: 2018-05-09

## 2018-05-09 RX ORDER — PALONOSETRON 0.05 MG/ML
0.25 INJECTION, SOLUTION INTRAVENOUS ONCE
Status: COMPLETED | OUTPATIENT
Start: 2018-05-09 | End: 2018-05-09

## 2018-05-09 RX ORDER — POTASSIUM CHLORIDE 1.5 G/1.77G
40 POWDER, FOR SOLUTION ORAL ONCE
Status: CANCELLED
Start: 2018-05-09 | End: 2018-05-09

## 2018-05-09 RX ORDER — FAMOTIDINE 10 MG/ML
20 INJECTION, SOLUTION INTRAVENOUS ONCE
Status: CANCELLED | OUTPATIENT
Start: 2018-05-09

## 2018-05-09 RX ORDER — FAMOTIDINE 10 MG/ML
20 INJECTION, SOLUTION INTRAVENOUS ONCE
Status: COMPLETED | OUTPATIENT
Start: 2018-05-09 | End: 2018-05-09

## 2018-05-09 RX ORDER — SODIUM CHLORIDE 9 MG/ML
250 INJECTION, SOLUTION INTRAVENOUS ONCE
Status: COMPLETED | OUTPATIENT
Start: 2018-05-09 | End: 2018-05-09

## 2018-05-09 RX ADMIN — PALONOSETRON HYDROCHLORIDE 0.25 MG: 0.25 INJECTION INTRAVENOUS at 11:47

## 2018-05-09 RX ADMIN — POTASSIUM CHLORIDE 40 MEQ: 1.5 POWDER, FOR SOLUTION ORAL at 11:03

## 2018-05-09 RX ADMIN — SODIUM CHLORIDE, PRESERVATIVE FREE 500 UNITS: 5 INJECTION INTRAVENOUS at 14:17

## 2018-05-09 RX ADMIN — Medication 10 ML: at 14:17

## 2018-05-09 RX ADMIN — DEXAMETHASONE SODIUM PHOSPHATE 12 MG: 10 INJECTION, SOLUTION INTRAMUSCULAR; INTRAVENOUS at 11:18

## 2018-05-09 RX ADMIN — PACLITAXEL 110 MG: 6 INJECTION, SOLUTION, CONCENTRATE INTRAVENOUS at 12:15

## 2018-05-09 RX ADMIN — Medication 10 ML: at 08:38

## 2018-05-09 RX ADMIN — DIPHENHYDRAMINE HYDROCHLORIDE 25 MG: 50 INJECTION INTRAMUSCULAR; INTRAVENOUS at 10:44

## 2018-05-09 RX ADMIN — FAMOTIDINE 20 MG: 10 INJECTION INTRAVENOUS at 12:00

## 2018-05-09 RX ADMIN — SODIUM CHLORIDE 250 ML: 9 INJECTION, SOLUTION INTRAVENOUS at 10:35

## 2018-05-09 RX ADMIN — CARBOPLATIN 230 MG: 10 INJECTION, SOLUTION INTRAVENOUS at 13:26

## 2018-05-09 NOTE — PROGRESS NOTES
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  ICD 10 Code: C32.9      was seen today for an on treatment visit.  He is receiving radiation therapy to the larynx/neck. He  has received 6200 cGy in 31 fractions out of a planned dose of 7000 cGy in 35 fractions. He is receiving concurrent carboplatin/Taxol chemotherapy per Dr. Patel, and received his 5th and most recent cycle today.      The patient underwent feeding tube placement per Dr. Maciel last week (5/2/18) secondary to a swallowing study which showed aspiration.Dr. Maciel's note mentions a planned colonoscopy to follow-up on a rectal abnormality noted on his PET/CT. I asked the patient about this, however, and he stated that Dr. Solano had already performed a colonoscopy on 3/7/18, and found no evidence of malignancy.     Mr. Esquivel is tolerating radiation therapy well.                                              Vitals:     Vitals:    05/09/18 1501   BP: 116/55   Pulse: 62   Resp: 20   Temp: 98.1 °F (36.7 °C)       Weight:   (Starting weight of 257 pounds on 2/21/18)  Wt Readings from Last 3 Encounters:   05/09/18 99.3 kg (219 lb)   05/04/18 98.8 kg (217 lb 13 oz)   05/02/18 96.8 kg (213 lb 6.5 oz)      Pain:    Pain Score    05/09/18 1501   PainSc:   4   PainLoc: Abdomen         Plan: We plan to continue radiation therapy as prescribed.  We will attempt to contact gastroenterology to sort out plans for an upcoming colonoscopy (which has already been performed).  Mr. Esquivel is scheduled to complete radiation therapy next week. He has follow-up scheduled with ROWAN Soto (GI) on 5/16/18 and with Dr. Venegas on 6/6/18. He also has follow-up scheduled with Dr. Manuel Rivas (sleep medicine) on 8/21/18 and with Dr. Liborio Chandra (cardiology) on  9/20/18. We plan to see the patient back in our clinic for routine follow-up in 2 months.    Jarocho Tapia MD  Radiation Oncology    Electronically signed by Jarocho Tapia MD 5/9/2018  3:33 PM     cc: Dr. Joseph Harrison

## 2018-05-10 ENCOUNTER — INFUSION (OUTPATIENT)
Dept: ONCOLOGY | Facility: HOSPITAL | Age: 58
End: 2018-05-10

## 2018-05-10 DIAGNOSIS — E87.6 HYPOKALEMIA: Primary | ICD-10-CM

## 2018-05-10 PROCEDURE — G0463 HOSPITAL OUTPT CLINIC VISIT: HCPCS | Performed by: INTERNAL MEDICINE

## 2018-05-10 PROCEDURE — 77386: CPT | Performed by: RADIOLOGY

## 2018-05-10 RX ORDER — POTASSIUM CHLORIDE 1.5 G/1.77G
40 POWDER, FOR SOLUTION ORAL ONCE
Status: CANCELLED
Start: 2018-05-10 | End: 2018-05-10

## 2018-05-10 RX ORDER — POTASSIUM CHLORIDE 1.5 G/1.77G
40 POWDER, FOR SOLUTION ORAL ONCE
Status: COMPLETED | OUTPATIENT
Start: 2018-05-10 | End: 2018-05-10

## 2018-05-10 RX ADMIN — POTASSIUM CHLORIDE 40 MEQ: 1.5 POWDER, FOR SOLUTION ORAL at 13:49

## 2018-05-10 NOTE — PROGRESS NOTES
Adult Outpatient Nutrition  Assessment    Patient Name:  Truman Esquivel  YOB: 1960  MRN: [id number]    Assessment Date:  5/10/2018    Comments:  Pt's mother stated pt has increased TEN to 6 cans Jevity 1.2 daily--working toward goal of x8 daily.  States pt is feeling better today.        Electronically signed by:  Leslee Bonds RD  05/10/18 4:21 PM

## 2018-05-11 PROCEDURE — 77417 THER RADIOLOGY PORT IMAGE(S): CPT | Performed by: RADIOLOGY

## 2018-05-11 PROCEDURE — 77386: CPT | Performed by: RADIOLOGY

## 2018-05-14 PROCEDURE — 77386: CPT | Performed by: RADIOLOGY

## 2018-05-15 ENCOUNTER — OFFICE VISIT (OUTPATIENT)
Dept: RADIATION ONCOLOGY | Facility: HOSPITAL | Age: 58
End: 2018-05-15

## 2018-05-15 ENCOUNTER — OFFICE VISIT (OUTPATIENT)
Dept: ONCOLOGY | Facility: CLINIC | Age: 58
End: 2018-05-15

## 2018-05-15 ENCOUNTER — INFUSION (OUTPATIENT)
Dept: ONCOLOGY | Facility: HOSPITAL | Age: 58
End: 2018-05-15

## 2018-05-15 VITALS
HEIGHT: 72 IN | SYSTOLIC BLOOD PRESSURE: 121 MMHG | DIASTOLIC BLOOD PRESSURE: 63 MMHG | BODY MASS INDEX: 29.58 KG/M2 | WEIGHT: 218.4 LBS | TEMPERATURE: 98.1 F | HEART RATE: 69 BPM | RESPIRATION RATE: 20 BRPM

## 2018-05-15 VITALS
DIASTOLIC BLOOD PRESSURE: 63 MMHG | WEIGHT: 218 LBS | BODY MASS INDEX: 29.53 KG/M2 | HEIGHT: 72 IN | SYSTOLIC BLOOD PRESSURE: 121 MMHG | RESPIRATION RATE: 20 BRPM | HEART RATE: 69 BPM | TEMPERATURE: 98.1 F

## 2018-05-15 DIAGNOSIS — C32.9 SQUAMOUS CELL CARCINOMA OF LARYNX (HCC): ICD-10-CM

## 2018-05-15 DIAGNOSIS — Z45.2 ENCOUNTER FOR VENOUS ACCESS DEVICE CARE: ICD-10-CM

## 2018-05-15 DIAGNOSIS — C32.9 SQUAMOUS CELL CARCINOMA OF LARYNX (HCC): Primary | ICD-10-CM

## 2018-05-15 LAB
ANION GAP SERPL CALCULATED.3IONS-SCNC: 14 MMOL/L (ref 5–15)
BASOPHILS # BLD AUTO: 0.03 10*3/MM3 (ref 0–0.2)
BASOPHILS NFR BLD AUTO: 0.5 % (ref 0–2)
BUN BLD-MCNC: 21 MG/DL (ref 7–21)
BUN/CREAT SERPL: 17.5 (ref 7–25)
CALCIUM SPEC-SCNC: 9.1 MG/DL (ref 8.4–10.2)
CHLORIDE SERPL-SCNC: 98 MMOL/L (ref 95–110)
CO2 SERPL-SCNC: 23 MMOL/L (ref 22–31)
CREAT BLD-MCNC: 1.2 MG/DL (ref 0.7–1.3)
DEPRECATED RDW RBC AUTO: 60.1 FL (ref 35.1–43.9)
EOSINOPHIL # BLD AUTO: 0.1 10*3/MM3 (ref 0–0.7)
EOSINOPHIL NFR BLD AUTO: 1.5 % (ref 0–7)
ERYTHROCYTE [DISTWIDTH] IN BLOOD BY AUTOMATED COUNT: 21.5 % (ref 11.5–14.5)
GFR SERPL CREATININE-BSD FRML MDRD: 62 ML/MIN/1.73 (ref 56–130)
GLUCOSE BLD-MCNC: 100 MG/DL (ref 60–100)
HCT VFR BLD AUTO: 28.2 % (ref 39–49)
HGB BLD-MCNC: 9.5 G/DL (ref 13.7–17.3)
IMM GRANULOCYTES # BLD: 0.02 10*3/MM3 (ref 0–0.02)
IMM GRANULOCYTES NFR BLD: 0.3 % (ref 0–0.5)
LYMPHOCYTES # BLD AUTO: 0.51 10*3/MM3 (ref 0.6–4.2)
LYMPHOCYTES NFR BLD AUTO: 7.9 % (ref 10–50)
MAGNESIUM SERPL-MCNC: 2.3 MG/DL (ref 1.6–2.3)
MCH RBC QN AUTO: 25.8 PG (ref 26.5–34)
MCHC RBC AUTO-ENTMCNC: 33.7 G/DL (ref 31.5–36.3)
MCV RBC AUTO: 76.6 FL (ref 80–98)
MONOCYTES # BLD AUTO: 0.34 10*3/MM3 (ref 0–0.9)
MONOCYTES NFR BLD AUTO: 5.3 % (ref 0–12)
NEUTROPHILS # BLD AUTO: 5.46 10*3/MM3 (ref 2–8.6)
NEUTROPHILS NFR BLD AUTO: 84.5 % (ref 37–80)
PLATELET # BLD AUTO: 217 10*3/MM3 (ref 150–450)
PMV BLD AUTO: 10 FL (ref 8–12)
POTASSIUM BLD-SCNC: 3.6 MMOL/L (ref 3.5–5.1)
RBC # BLD AUTO: 3.68 10*6/MM3 (ref 4.37–5.74)
SODIUM BLD-SCNC: 135 MMOL/L (ref 137–145)
WBC NRBC COR # BLD: 6.46 10*3/MM3 (ref 3.2–9.8)

## 2018-05-15 PROCEDURE — 25010000002 CARBOPLATIN PER 50 MG: Performed by: NURSE PRACTITIONER

## 2018-05-15 PROCEDURE — 25010000002 PACLITAXEL PER 30 MG: Performed by: NURSE PRACTITIONER

## 2018-05-15 PROCEDURE — 99214 OFFICE O/P EST MOD 30 MIN: CPT | Performed by: NURSE PRACTITIONER

## 2018-05-15 PROCEDURE — 80048 BASIC METABOLIC PNL TOTAL CA: CPT

## 2018-05-15 PROCEDURE — 25010000003 DEXAMETHASONE SODIUM PHOSPHATE 100 MG/10ML SOLUTION 10 ML VIAL: Performed by: NURSE PRACTITIONER

## 2018-05-15 PROCEDURE — 25010000002 HEPARIN FLUSH (PORCINE) 100 UNIT/ML SOLUTION: Performed by: INTERNAL MEDICINE

## 2018-05-15 PROCEDURE — 25010000002 PALONOSETRON PER 25 MCG: Performed by: NURSE PRACTITIONER

## 2018-05-15 PROCEDURE — 99212-NC PR NO CHARGE CBC OFFICE OUTPATIENT VISIT 10 MINUTES: Performed by: RADIOLOGY

## 2018-05-15 PROCEDURE — 83735 ASSAY OF MAGNESIUM: CPT

## 2018-05-15 PROCEDURE — 85025 COMPLETE CBC W/AUTO DIFF WBC: CPT

## 2018-05-15 PROCEDURE — 25010000002 DIPHENHYDRAMINE PER 50 MG: Performed by: NURSE PRACTITIONER

## 2018-05-15 PROCEDURE — 96375 TX/PRO/DX INJ NEW DRUG ADDON: CPT | Performed by: NURSE PRACTITIONER

## 2018-05-15 PROCEDURE — 77386: CPT | Performed by: RADIOLOGY

## 2018-05-15 PROCEDURE — 96413 CHEMO IV INFUSION 1 HR: CPT | Performed by: NURSE PRACTITIONER

## 2018-05-15 PROCEDURE — 96417 CHEMO IV INFUS EACH ADDL SEQ: CPT | Performed by: NURSE PRACTITIONER

## 2018-05-15 RX ORDER — SODIUM CHLORIDE 9 MG/ML
250 INJECTION, SOLUTION INTRAVENOUS ONCE
Status: CANCELLED | OUTPATIENT
Start: 2018-05-16

## 2018-05-15 RX ORDER — SODIUM CHLORIDE 9 MG/ML
250 INJECTION, SOLUTION INTRAVENOUS ONCE
Status: COMPLETED | OUTPATIENT
Start: 2018-05-15 | End: 2018-05-15

## 2018-05-15 RX ORDER — FAMOTIDINE 10 MG/ML
20 INJECTION, SOLUTION INTRAVENOUS ONCE
Status: COMPLETED | OUTPATIENT
Start: 2018-05-15 | End: 2018-05-15

## 2018-05-15 RX ORDER — PALONOSETRON 0.05 MG/ML
0.25 INJECTION, SOLUTION INTRAVENOUS ONCE
Status: CANCELLED | OUTPATIENT
Start: 2018-05-16

## 2018-05-15 RX ORDER — FAMOTIDINE 10 MG/ML
20 INJECTION, SOLUTION INTRAVENOUS ONCE
Status: CANCELLED | OUTPATIENT
Start: 2018-05-16

## 2018-05-15 RX ORDER — PALONOSETRON 0.05 MG/ML
0.25 INJECTION, SOLUTION INTRAVENOUS ONCE
Status: COMPLETED | OUTPATIENT
Start: 2018-05-15 | End: 2018-05-15

## 2018-05-15 RX ORDER — SODIUM CHLORIDE 0.9 % (FLUSH) 0.9 %
10 SYRINGE (ML) INJECTION AS NEEDED
Status: DISCONTINUED | OUTPATIENT
Start: 2018-05-15 | End: 2018-05-15 | Stop reason: HOSPADM

## 2018-05-15 RX ORDER — SODIUM CHLORIDE 0.9 % (FLUSH) 0.9 %
10 SYRINGE (ML) INJECTION AS NEEDED
Status: CANCELLED | OUTPATIENT
Start: 2018-06-27

## 2018-05-15 RX ADMIN — PALONOSETRON HYDROCHLORIDE 0.25 MG: 0.25 INJECTION INTRAVENOUS at 11:08

## 2018-05-15 RX ADMIN — DIPHENHYDRAMINE HYDROCHLORIDE 25 MG: 50 INJECTION INTRAMUSCULAR; INTRAVENOUS at 10:34

## 2018-05-15 RX ADMIN — Medication 10 ML: at 13:46

## 2018-05-15 RX ADMIN — DEXAMETHASONE SODIUM PHOSPHATE 12 MG: 10 INJECTION, SOLUTION INTRAMUSCULAR; INTRAVENOUS at 11:21

## 2018-05-15 RX ADMIN — FAMOTIDINE 20 MG: 10 INJECTION INTRAVENOUS at 10:17

## 2018-05-15 RX ADMIN — SODIUM CHLORIDE, PRESERVATIVE FREE 500 UNITS: 5 INJECTION INTRAVENOUS at 13:46

## 2018-05-15 RX ADMIN — Medication 10 ML: at 08:57

## 2018-05-15 RX ADMIN — PACLITAXEL 110 MG: 6 INJECTION, SOLUTION, CONCENTRATE INTRAVENOUS at 11:49

## 2018-05-15 RX ADMIN — SODIUM CHLORIDE 250 ML: 9 INJECTION, SOLUTION INTRAVENOUS at 10:06

## 2018-05-15 RX ADMIN — CARBOPLATIN 240 MG: 10 INJECTION, SOLUTION INTRAVENOUS at 13:02

## 2018-05-15 NOTE — PROGRESS NOTES
"Adult Outpatient Nutrition  Assessment    Patient Name:  Truman Esquivel  YOB: 1960  MRN: [id number]    Assessment Date:  5/15/2018    Comments:  Pt stated is out of syringes for feedings--called [facility] (to be shipped). Continues to take 6 cans Jevity 1.2 daily--has not been able to achieve goal of 8 cans daily. Wt 218.5 lb. Considering trying Jevity 1.5 (will provide samples at next RD visit).  Reinforced need for oral care (noticed coating on tongue).          OP RD Assessment     Row Name 05/15/18 0913       Anthropometrics    Height 182.9 cm (72.01\")    Weight 99.1 kg (218 lb 6.4 oz)       Ideal Body Weight (IBW)    Ideal Body Weight (IBW) (kg) 82.09    % Ideal Body Weight 120.68    Ideal Body Weight (IBW), Male (kg) 82.09    % Ideal Body Weight 120.93       Body Mass Index (BMI)    BMI (kg/m2) 29.68          Electronically signed by:  Leslee Bonds RD  05/15/18 1:19 PM   "

## 2018-05-15 NOTE — PROGRESS NOTES
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  Upon staging workup patient was found to have stage III squamous cell cancer of larynx for which she has been started on concurrent chemoradiation with weekly carboplatin and Taxol on March 19, 2018.  He is here to get week 6 of carboplatin and Taxol today and complete radiation therapy today. He has had a feeding tube placed since here last and he is using about 6 cans of Jevity along with 3-4 bottles of water daily. He denies any new lymph node enlargement.Denies any bleeding.      [facility] MEDICAL HISTORY:    Past Medical History:   Diagnosis Date   • Aortic dissection    • Chest pain    • Disease of thyroid gland    • HTN (hypertension)    • Knee pain    • Sleep apnea    • Smoker    • Squamous cell carcinoma of larynx 2/5/2018   • Stroke    • Swallowing difficulty        SOCIAL HISTORY:    Social History   Substance Use Topics   • Smoking status: Former Smoker     Packs/day: 1.25     Years: 38.00     Quit date: 2/13/2017   • Smokeless tobacco: Never Used      Comment: 02/27/2018 - Patient confirmed he ceased utilization of tobacco products 02/13/2017.   • Alcohol use No       Surgical History :  Past Surgical History:   Procedure Laterality Date   • AORTA SURGERY      ruptured aorta repair   • ASCENDING ARCH/HEMIARCH REPLACEMENT N/A 2/14/2017    Procedure: INTRAOPERATIVE TRANSESOPHAGEAL ECHOCARDIOGRAM, MIDLINE STERNOTOMY, ASCENDING AORTIC  AND PROXIMAL AORTIC ARCH REPAIR WITH 26MM GRAFT, AORTIC VALVE RESUSPENSION, AORTIC ROOT REPAIR, OPEN VEIN HARVEST OF RIGHT LEG;   Surgeon: German Arreguin MD;  Location: [facility] MAIN OR;  Service:    • BRONCHOSCOPY N/A 2/17/2017    Procedure: BRONCHOSCOPY BIOPSY AT BEDSIDE WITH BAL-LEFT LOWER LOBE;  Surgeon: Trent Chaney MD;  Location: [facility] ENDOSCOPY;  Service:    • COLONOSCOPY N/A 3/7/2018    Procedure: COLONOSCOPY WITH POSSIBLE POLYPECTOMY   ( DONE IN OR WITH ENDO)      COLONOSCOPY FIRST;  Surgeon: Kris Solano MD;  Location: [facility] OR;  Service:    • DIRECT LARYNGOSCOPY, ESOPHAGOSCOPY, BRONCHOSCOPY N/A 2/5/2018    Procedure: DIRECT LARYNGOSCOPY WITH BIOPSY, ESOPHAGOSCOPY     (no bronchoscopy, no laser);  Surgeon: Joseph Venegas MD;  Location: [facility] OR;  Service:    • ENDOSCOPY W/ PEG TUBE PLACEMENT N/A 5/2/2018    Procedure: ESOPHAGOGASTRODUODENOSCOPY WITH PERCUTANEOUS ENDOSCOPIC GASTROSTOMY TUBE INSERTION;  Surgeon: Angel Maciel MD;  Location: [facility] ENDOSCOPY;  Service: Gastroenterology   • TRACHEOSTOMY  02/05/2018   • TRACHEOSTOMY N/A 2/5/2018    Procedure: TRACHEOSTOMY  local injection @ 1106 incision @ 1119;  Surgeon: Joseph Venegas MD;  Location: [facility] OR;  Service:    • VENOUS ACCESS DEVICE (PORT) INSERTION N/A 3/7/2018    Procedure: INSERTION OF MEDIPORT     (C-ARM#1);  Surgeon: Kris Solano MD;  Location: [facility] OR;  Service:        ALLERGIES:    Allergies   Allergen Reactions   • Chlorhexidine Itching     Topical cleaning wipes causes severe skin irritation   • Co Q 10 [Coenzyme Q10] Itching   • Eggs Or Egg-Derived Products Swelling   • Erythromycin Other (See Comments)     Joint pains and cold sweats   • Other GI Intolerance     Mycins     • Penicillins      Tolerated cefepime during 2/2017 admission. Had rash and itching (relieved by benadryl) after few doses of cefepime and cefepime was continued.   • Tetracyclines & Related GI Intolerance   • Acth [Corticotropin] Rash   • Cephalosporins Itching and Rash     Pt developed rash, itching after cefepime administration (2-3 doses)  "during 2/2017 admission. Itching was relieved with benadryl. Cefepime was continued because his infection was improving and no symptoms of anaphylaxis present   • Keflex [Cephalexin] Rash       REVIEW OF SYSTEMS:      CONSTITUTIONAL:  No fever, chills, or night sweats.     HEENT:  No epistaxis, mouth sores; no new worsening dysphagia.    RESPIRATORY:  No new shortness of breath or cough at present.    CARDIOVASCULAR:  No chest pain or palpitations.    GASTROINTESTINAL:  No abdominal pain, nausea, vomiting, or blood in the stool.    GENITOURINARY:  No dysuria or hematuria.    MUSCULOSKELETAL:  No any new back pain or arthralgias.     NEUROLOGICAL:  No tingling or numbness. No new headache or dizziness.     LYMPHATICS:  Denies any abnormal swollen and anywhere in the body.    SKIN:  Denies any new skin rash.    PHYSICAL EXAMINATION:      VITAL SIGNS:  /63   Pulse 69   Temp 98.1 °F (36.7 °C) (Temporal Artery )   Resp 20   Ht 182.9 cm (72.01\")   Wt 99.1 kg (218 lb 6.4 oz)   BMI 29.61 kg/m²     GENERAL:  Not in any distress.    HEENT:  Normocephalic, Atraumatic.Mild Conjunctival pallor. No icterus. Extraocular Movements Intact. No Facial Asymmetry noted; no thrush on examination    NECK:  No adenopathy. No JVD; trachestomy in place    RESPIRATORY:  Fair air entry bilateral. No rhonchi or wheezing.    CARDIOVASCULAR:  S1, S2. Regular rate and rhythm. No murmur or gallop appreciated.    ABDOMEN:  Soft, obese, nontender. Bowel sounds present in all four quadrants.  No organomegaly appreciated; feeding tube in place, with clean dry dressing intact, no redness or erythema noted at site.    EXTREMITIES:  No edema.No Calf Tenderness.    NEUROLOGIC:  Alert, awake and oriented ×3.      SKIN : No new skin lesion identified  DIAGNOSTIC DATA:    Glucose   Date Value Ref Range Status   05/15/2018 100 60 - 100 mg/dL Final     Sodium   Date Value Ref Range Status   05/15/2018 135 (L) 137 - 145 mmol/L Final     Potassium "   Date Value Ref Range Status   05/15/2018 3.6 3.5 - 5.1 mmol/L Final     CO2   Date Value Ref Range Status   05/15/2018 23.0 22.0 - 31.0 mmol/L Final     Chloride   Date Value Ref Range Status   05/15/2018 98 95 - 110 mmol/L Final     Anion Gap   Date Value Ref Range Status   05/15/2018 14.0 5.0 - 15.0 mmol/L Final     Creatinine   Date Value Ref Range Status   05/15/2018 1.20 0.70 - 1.30 mg/dL Final     BUN   Date Value Ref Range Status   05/15/2018 21 7 - 21 mg/dL Final     BUN/Creatinine Ratio   Date Value Ref Range Status   05/15/2018 17.5 7.0 - 25.0 Final     Calcium   Date Value Ref Range Status   05/15/2018 9.1 8.4 - 10.2 mg/dL Final     eGFR Non  Amer   Date Value Ref Range Status   05/15/2018 62 56 - 130 mL/min/1.73 Final     Alkaline Phosphatase   Date Value Ref Range Status   03/19/2018 86 38 - 126 U/L Final     Total Protein   Date Value Ref Range Status   03/19/2018 6.9 6.3 - 8.6 g/dL Final     ALT (SGPT)   Date Value Ref Range Status   03/19/2018 61 21 - 72 U/L Final     AST (SGOT)   Date Value Ref Range Status   03/19/2018 44 17 - 59 U/L Final     Total Bilirubin   Date Value Ref Range Status   03/19/2018 0.2 0.2 - 1.3 mg/dL Final     Albumin   Date Value Ref Range Status   03/19/2018 3.80 3.40 - 4.80 g/dL Final     Globulin   Date Value Ref Range Status   03/19/2018 3.1 2.3 - 3.5 gm/dL Final     A/G Ratio   Date Value Ref Range Status   03/19/2018 1.2 1.1 - 1.8 g/dL Final     Lab Results   Component Value Date    WBC 6.46 05/15/2018    HGB 9.5 (L) 05/15/2018    HCT 28.2 (L) 05/15/2018    MCV 76.6 (L) 05/15/2018     05/15/2018     Lab Results   Component Value Date    NEUTROABS 5.46 05/15/2018    IRON 14 (L) 02/13/2018    TIBC 335 02/13/2018    LABIRON 4 (L) 02/13/2018    FERRITIN 64.40 02/13/2018    [id number] 546 03/14/2018    FOLATE >20.00 02/13/2018     No results found for: , LABCA2, AFPTM, HCGQUANT, , CHROMGRNA, [id number], CEA, REFLABREPO]    PATHOLOGY:Pathology result  from February 5, 2018 showed:  Final Diagnosis   1.  TUMOR, EPIGLOTTIS:   INVASIVE SQUAMOUS CELL CARCINOMA, NON KERATINIZING, POORLY DIFFERENTIATED.       2.  TUMOR, EPIGLOTTIS:   INVASIVE SQUAMOUS CELL CARCINOMA, NON KERATINIZING, POORLY DIFFERENTIATED.      RADIOLOGY DATA :PET CT done on February 16, 2018 was reviewed, discussed with patient, it showed:  IMPRESSION:  CONCLUSION:   1.  Soft tissue mass in the region of the epiglottis obstructing  the airway, associated with hypermetabolic activity, compatible  with known neoplasm.  2.  Activity around the tracheostomy site is likely inflammatory.  3.  Activity in the rectum (SUV max 7.1) is probably either  physiological or inflammatory. Recommend direct visualization.  4.  There is a focus of activity in the region of the right  common iliac artery without a definite CT abnormality (SUV max  5.4) possibly representing a lymph node, nonspecific but cannot  rule out neoplasm.  5.  Foci of activity posterior to the ischial tuberosities are  probably inflammatory.  6.  There are diffuse punctate low-level foci of activity  throughout the osseous structures, liver, kidneys, mediastinum  which are more likely artifactual than due to metastatic disease.     ADDENDUM   ADDENDUM #1         Upon review of the patient's imaging with Dr. Tapia, there  appears to be extension of tumor into the left side of the  preepiglottic space.      ASSESSMENT AND PLAN:      1.  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  Based on PET/CT there is a tumor extension into preepiglottic space making a T3 lesion.  Result of PET CT were discussed with patient.  It was discussed with patient with T3 N0 lesion after his treatment option includes either surgery or concurrent chemoradiation.  He was started on concurrent chemoradiation on March 19, 2018.  He did require some intermittent breaks in chemotherapy and radiation therapy but he is completing radiation therapy today,  will proceed with cycle #6 Carboplatin/Taxol chemotherapy today as his blood counts are in good standing. Will plan for him to see Dr. Venegas back in mid June then Dr. Patel will follow-up with patient about 1 week after patient has seen Dr. Venegas, this was discussed with the patient and his caregiver.    2. History of hypokalemia, resolved, K is 3.6 today    3. Abnormal weight loss: Patient previously had 14 lb weight loss in 3 week period, he was reluctant to have feeding tube placed, but eventually had procedure done and he is using his tube exclusively, he is maintaining his weight at this time. Will continue to monitor.     4. History of aortic dissection status post repair.     5.  History of CVA with residual visual disturbance in left eye     6.  Health maintenance: Patient quit smoking in February 2017.  Never had a screening colonoscopy done.  Remains full code.                  This document has been signed by ROWAN Watkins on May 15, 2018 10:53 AM

## 2018-05-15 NOTE — PATIENT INSTRUCTIONS
RADIATION DISCHARGE INSTRUCTIONS    Truman Esquivel  1960  [id number]    May 15, 2018    · The effects from your radiation treatments will continue for several weeks, so expect them to get better slowly.  · Fatigue (extreme tiredness and weakness) may last for several weeks but will improve slowly.  · Continue to rest as necessary, drink plenty of fluids, and eat nutritious foods as you are able.    Skin Care: Skin reactions will slowly heal.  Follow the skin care instructions provided by your nurse.    Oral Care: Continue any oral care instructions your nurse provided for you if needed.  If you must have dental work or surgery in the treatment area, ask you dentist/physician to call Dr. Tapia prior to the procedure.    Diet: [x]  High Calorie, High Protein for 4 weeks      [x]  Continue eating 6 small meals per day for 4 weeks     []  Dietary Supplement 2 times per day     []  Continue soft/liquid diet     []  Low residue-after diarrhea and abdominal cramping have stopped (3-4 weeks), gradually add foods to your diet, one food at a time every 2-3 days.      []  No spicy or foods that are high in citric acid (ex: oranges, grapefruit, tomatoes, pineapple).    Medications: Continue your regular medications.  Take a multi-vitamin daily for 6 months.     []  Follow Dexamethasone (Decadron) prescriptions provided (if applicable).    Raquel Louie RN  May 15, 2018  2:16 PM

## 2018-05-16 ENCOUNTER — OFFICE VISIT (OUTPATIENT)
Dept: GASTROENTEROLOGY | Facility: CLINIC | Age: 58
End: 2018-05-16

## 2018-05-16 ENCOUNTER — HOSPITAL ENCOUNTER (OUTPATIENT)
Dept: SPEECH THERAPY | Facility: HOSPITAL | Age: 58
Setting detail: THERAPIES SERIES
Discharge: HOME | End: 2018-05-16

## 2018-05-16 ENCOUNTER — OFFICE VISIT (OUTPATIENT)
Dept: ENDOCRINOLOGY | Facility: CLINIC | Age: 58
End: 2018-05-16

## 2018-05-16 VITALS
WEIGHT: 225 LBS | SYSTOLIC BLOOD PRESSURE: 144 MMHG | BODY MASS INDEX: 30.48 KG/M2 | DIASTOLIC BLOOD PRESSURE: 64 MMHG | HEART RATE: 66 BPM | HEIGHT: 72 IN

## 2018-05-16 VITALS
BODY MASS INDEX: 30.4 KG/M2 | SYSTOLIC BLOOD PRESSURE: 118 MMHG | WEIGHT: 224.4 LBS | HEART RATE: 80 BPM | HEIGHT: 72 IN | DIASTOLIC BLOOD PRESSURE: 60 MMHG

## 2018-05-16 DIAGNOSIS — Z93.1 PEG (PERCUTANEOUS ENDOSCOPIC GASTROSTOMY) STATUS (HCC): Primary | ICD-10-CM

## 2018-05-16 DIAGNOSIS — E03.9 HYPOTHYROIDISM, UNSPECIFIED TYPE: Primary | ICD-10-CM

## 2018-05-16 DIAGNOSIS — R13.12 OROPHARYNGEAL DYSPHAGIA: Primary | ICD-10-CM

## 2018-05-16 LAB — TSH SERPL DL<=0.05 MIU/L-ACNC: 1.04 MIU/ML (ref 0.46–4.68)

## 2018-05-16 PROCEDURE — 99212 OFFICE O/P EST SF 10 MIN: CPT | Performed by: NURSE PRACTITIONER

## 2018-05-16 PROCEDURE — 36415 COLL VENOUS BLD VENIPUNCTURE: CPT | Performed by: NURSE PRACTITIONER

## 2018-05-16 PROCEDURE — 84443 ASSAY THYROID STIM HORMONE: CPT | Performed by: NURSE PRACTITIONER

## 2018-05-16 PROCEDURE — G8997 SWALLOW GOAL STATUS: HCPCS | Performed by: SPEECH-LANGUAGE PATHOLOGIST

## 2018-05-16 PROCEDURE — 99213 OFFICE O/P EST LOW 20 MIN: CPT | Performed by: NURSE PRACTITIONER

## 2018-05-16 PROCEDURE — 92526 ORAL FUNCTION THERAPY: CPT | Performed by: SPEECH-LANGUAGE PATHOLOGIST

## 2018-05-16 PROCEDURE — G8996 SWALLOW CURRENT STATUS: HCPCS | Performed by: SPEECH-LANGUAGE PATHOLOGIST

## 2018-05-16 NOTE — PROGRESS NOTES
Subjective    Truman Esquivel is a 57 y.o. male. he is here today for follow-up.    History of Present Illness       Primary Care Provider     Marybeth Harrison DO      Hypothyroidism      Duration 10 years     Timing - Hypothyroidism  is Constant     Quality -  needs improvement and well controlled     Severity -  moderate     Complications - none     Current symptoms/problems  fatigue, weakness    Weight loss -- 60 lbs since December      Alleviating Factors: Compliance       Side Effects  none         Evaluation history:  TSH   Date Value Ref Range Status   03/14/2018 0.390 (L) 0.460 - 4.680 mIU/mL Final     Free T4   Date Value Ref Range Status   02/18/2017 0.68 (L) 0.93 - 1.70 ng/dL Final       Current medications:  Current Outpatient Prescriptions   Medication Sig Dispense Refill   • clopidogrel (PLAVIX) 75 MG tablet Take 75 mg by mouth Daily. Last dose approx greater than one month ago     • DIPHENHIST 12.5 MG/5ML liquid SWISH AND SPIT TWO TEASPOONFULS (10 MLS) BY MOUTH EVERY 6 HOURS AS NEEDED 200 mL 1   • docusate sodium (COLACE) 100 MG capsule Take 1 capsule by mouth 2 (Two) Times a Day As Needed for Constipation. 60 capsule 2   • fentaNYL (DURAGESIC) 12 MCG/HR Place 1 patch on the skin Every 72 (Seventy-Two) Hours. 10 patch 0   • levothyroxine (SYNTHROID) 75 MCG tablet Take 1 tablet by mouth Daily. 30 tablet 11   • losartan-hydrochlorothiazide (HYZAAR) 50-12.5 MG per tablet Take 1 tablet by mouth Daily. 30 tablet 12   • metoprolol succinate XL (TOPROL-XL) 25 MG 24 hr tablet Take 1 tablet by mouth Every Night. Patient states he hasn't been taking this week, states he thinks he is hypotensive 30 tablet 12   • Multiple Vitamins-Minerals (MULTIVITAMIN ADULT PO) Take 1 tablet by mouth Daily.     • ondansetron (ZOFRAN) 4 MG tablet Take 1 tablet by mouth 3 (Three) Times a Day As Needed for Nausea or Vomiting. 40 tablet 3   • oxyCODONE-acetaminophen (PERCOCET) 5-325 MG per tablet 1-2 tablets by mouth every 4  hours as needed for pain 60 tablet 0   • vitamin B-12 (CYANOCOBALAMIN) 100 MCG tablet Take 100 mcg by mouth Daily.     • vitamin E 100 UNIT capsule Take 400 Units by mouth Every Night.     • Zinc 50 MG capsule Take 50 mg by mouth Every Night.       No current facility-administered medications for this visit.        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   • Stroke    • Swallowing difficulty      Past Surgical History:   Procedure Laterality Date   • AORTA SURGERY      ruptured aorta repair   • ASCENDING ARCH/HEMIARCH REPLACEMENT N/A 2/14/2017    Procedure: INTRAOPERATIVE TRANSESOPHAGEAL ECHOCARDIOGRAM, MIDLINE STERNOTOMY, ASCENDING AORTIC  AND PROXIMAL AORTIC ARCH REPAIR WITH 26MM GRAFT, AORTIC VALVE RESUSPENSION, AORTIC ROOT REPAIR, OPEN VEIN HARVEST OF RIGHT LEG;  Surgeon: German Arreguin MD;  Location: [facility] OR;  Service:    • BRONCHOSCOPY N/A 2/17/2017    Procedure: BRONCHOSCOPY BIOPSY AT BEDSIDE WITH BAL-LEFT LOWER LOBE;  Surgeon: Trent Chaney MD;  Location: [facility] ENDOSCOPY;  Service:    • COLONOSCOPY N/A 3/7/2018    Procedure: COLONOSCOPY WITH POSSIBLE POLYPECTOMY   ( DONE IN OR WITH ENDO)      COLONOSCOPY FIRST;  Surgeon: Kris Solano MD;  Location: [facility] OR;  Service:    • DIRECT LARYNGOSCOPY, ESOPHAGOSCOPY, BRONCHOSCOPY N/A 2/5/2018    Procedure: DIRECT LARYNGOSCOPY WITH BIOPSY, ESOPHAGOSCOPY     (no bronchoscopy, no laser);  Surgeon: Joseph Venegas MD;  Location: [facility] OR;  Service:    • ENDOSCOPY W/ PEG TUBE PLACEMENT N/A 5/2/2018    Procedure: ESOPHAGOGASTRODUODENOSCOPY WITH PERCUTANEOUS ENDOSCOPIC GASTROSTOMY TUBE INSERTION;  Surgeon: Angel Maciel MD;  Location: [facility] ENDOSCOPY;  Service:  Gastroenterology   • TRACHEOSTOMY  02/05/2018   • TRACHEOSTOMY N/A 2/5/2018    Procedure: TRACHEOSTOMY  local injection @ 1106 incision @ 1119;  Surgeon: Joseph Venegas MD;  Location: [facility];  Service:    • VENOUS ACCESS DEVICE (PORT) INSERTION N/A 3/7/2018    Procedure: INSERTION OF MEDIPORT     (C-ARM#1);  Surgeon: Kris Solano MD;  Location: [facility];  Service:      Family History   Problem Relation Age of Onset   • Thyroid disease Mother    • Hypertension Mother    • Hypertension Father    • Hypertension Other        Allergies   Allergen Reactions   • Chlorhexidine Itching     Topical cleaning wipes causes severe skin irritation   • Co Q 10 [Coenzyme Q10] Itching   • Eggs Or Egg-Derived Products Swelling   • Erythromycin Other (See Comments)     Joint pains and cold sweats   • Other GI Intolerance     Mycins     • Penicillins      Tolerated cefepime during 2/2017 admission. Had rash and itching (relieved by benadryl) after few doses of cefepime and cefepime was continued.   • Tetracyclines & Related GI Intolerance   • Acth [Corticotropin] Rash   • Cephalosporins Itching and Rash     Pt developed rash, itching after cefepime administration (2-3 doses) during 2/2017 admission. Itching was relieved with benadryl. Cefepime was continued because his infection was improving and no symptoms of anaphylaxis present   • Keflex [Cephalexin] Rash     Social History     Social History   • Marital status:      Social History Main Topics   • Smoking status: Former Smoker     Packs/day: 1.25     Years: 38.00     Quit date: 2/13/2017   • Smokeless tobacco: Never Used      Comment: 02/27/2018 - Patient confirmed he ceased utilization of tobacco products 02/13/2017.   • Alcohol use No   • Drug use: No   • Sexual activity: Defer     Other Topics Concern   • Not on file       Review of Systems  Review of Systems   Constitutional: Negative for activity change, appetite change, diaphoresis and fatigue.  "  HENT: Negative for facial swelling, sneezing, sore throat, tinnitus, trouble swallowing and voice change.    Eyes: Negative for photophobia, pain, discharge, redness, itching and visual disturbance.   Respiratory: Negative for apnea, cough, choking, chest tightness and shortness of breath.    Cardiovascular: Negative for chest pain, palpitations and leg swelling.   Gastrointestinal: Negative for abdominal distention, abdominal pain, constipation, diarrhea, nausea and vomiting.   Endocrine: Negative for cold intolerance, heat intolerance, polydipsia, polyphagia and polyuria.   Genitourinary: Negative for difficulty urinating, dysuria, frequency, hematuria and urgency.   Musculoskeletal: Negative for arthralgias, back pain, gait problem, joint swelling, myalgias, neck pain and neck stiffness.   Skin: Negative for color change, pallor, rash and wound.   Neurological: Negative for dizziness, tremors, weakness, light-headedness, numbness and headaches.   Hematological: Negative for adenopathy. Does not bruise/bleed easily.   Psychiatric/Behavioral: Negative for behavioral problems, confusion and sleep disturbance.        Objective    /64 (BP Location: Left arm, Patient Position: Sitting, Cuff Size: Adult)   Pulse 66   Ht 182.9 cm (72\")   Wt 102 kg (225 lb)   BMI 30.52 kg/m²   Physical Exam   Constitutional: He is oriented to person, place, and time. He appears well-developed and well-nourished. No distress.   HENT:   Head: Normocephalic and atraumatic.   Right Ear: External ear normal.   Left Ear: External ear normal.   Nose: Nose normal.   Eyes: Conjunctivae and EOM are normal. Pupils are equal, round, and reactive to light.   Neck: Normal range of motion. Neck supple. No tracheal deviation present. No thyromegaly present.   Cardiovascular: Normal rate, regular rhythm and normal heart sounds.    No murmur heard.  Pulmonary/Chest: Effort normal and breath sounds normal. No respiratory distress. He has no " wheezes.   Trach in place   Abdominal: Soft. Bowel sounds are normal. There is no tenderness. There is no rebound and no guarding.   Musculoskeletal: Normal range of motion. He exhibits no edema, tenderness or deformity.   Neurological: He is alert and oriented to person, place, and time. No cranial nerve deficit.   Skin: Skin is warm and dry. No rash noted.   Psychiatric: He has a normal mood and affect. His behavior is normal. Judgment and thought content normal.       Lab Review  Lab Results   Component Value Date    TSH 0.390 (L) 03/14/2018     Lab Results   Component Value Date    FREET4 0.68 (L) 02/18/2017        Assessment/Plan      1. Hypothyroidism, unspecified type    . This diagnosis was discussed and reviewed with the patient including the advantages of drug therapy.     1. Orders placed during this encounter include:  Orders Placed This Encounter   Procedures   • TSH       Medications prescribed:  Outpatient Encounter Prescriptions as of 5/16/2018   Medication Sig Dispense Refill   • clopidogrel (PLAVIX) 75 MG tablet Take 75 mg by mouth Daily. Last dose approx greater than one month ago     • DIPHENHIST 12.5 MG/5ML liquid SWISH AND SPIT TWO TEASPOONFULS (10 MLS) BY MOUTH EVERY 6 HOURS AS NEEDED 200 mL 1   • docusate sodium (COLACE) 100 MG capsule Take 1 capsule by mouth 2 (Two) Times a Day As Needed for Constipation. 60 capsule 2   • fentaNYL (DURAGESIC) 12 MCG/HR Place 1 patch on the skin Every 72 (Seventy-Two) Hours. 10 patch 0   • levothyroxine (SYNTHROID) 75 MCG tablet Take 1 tablet by mouth Daily. 30 tablet 11   • losartan-hydrochlorothiazide (HYZAAR) 50-12.5 MG per tablet Take 1 tablet by mouth Daily. 30 tablet 12   • metoprolol succinate XL (TOPROL-XL) 25 MG 24 hr tablet Take 1 tablet by mouth Every Night. Patient states he hasn't been taking this week, states he thinks he is hypotensive 30 tablet 12   • Multiple Vitamins-Minerals (MULTIVITAMIN ADULT PO) Take 1 tablet by mouth Daily.     •  ondansetron (ZOFRAN) 4 MG tablet Take 1 tablet by mouth 3 (Three) Times a Day As Needed for Nausea or Vomiting. 40 tablet 3   • oxyCODONE-acetaminophen (PERCOCET) 5-325 MG per tablet 1-2 tablets by mouth every 4 hours as needed for pain 60 tablet 0   • vitamin B-12 (CYANOCOBALAMIN) 100 MCG tablet Take 100 mcg by mouth Daily.     • vitamin E 100 UNIT capsule Take 400 Units by mouth Every Night.     • Zinc 50 MG capsule Take 50 mg by mouth Every Night.       Facility-Administered Encounter Medications as of 5/16/2018   Medication Dose Route Frequency Provider Last Rate Last Dose   • [COMPLETED] CARBOplatin (PARAPLATIN) 240 mg in sodium chloride 0.9 % 124 mL chemo IVPB  240 mg Intravenous Once ROWAN Watkins   Stopped at 05/15/18 1335   • [COMPLETED] PACLitaxel (TAXOL) 110 mg in sodium chloride 0.9 % 268.3 mL chemo IVPB  50 mg/m2 (Treatment Plan Recorded) Intravenous Once RWOAN Watkins   Stopped at 05/15/18 1249   • [COMPLETED] sodium chloride 0.9 % infusion 250 mL  250 mL Intravenous Once ROWAN Watkins   Stopped at 05/15/18 1345   • [DISCONTINUED] heparin flush (porcine) 100 UNIT/ML injection 500 Units  500 Units Intravenous PRN Reza Patel MD   500 Units at 05/15/18 1346   • [DISCONTINUED] sodium chloride 0.9 % flush 10 mL  10 mL Intravenous PRN Reza Patel MD   10 mL at 05/15/18 1346        Patient has hypothyroidism for 10 years and his present symptoms include fatigue, weakness and weight gain            Lab Results   Component Value Date     TSH 0.390 (L) 03/14/2018      TSH is mildly suppressed , asked him to decrease levothyroxine from 88 mcgs daily to 75 mcgs daily     Labs pending at time visit         4. Return in about 2 months (around 7/16/2018) for Recheck.

## 2018-05-16 NOTE — PROGRESS NOTES
Chief Complaint   Patient presents with   • EGD     results       Subjective    Truman Esquivel is a 57 y.o. male. he is here today.    History of Present Illness   57-year-old male presents follow-up after EGD with PEG tube placement completed 5/to/18.  Has been tolerating feedings with Jevity well.  States he still has some issues with constipation takes mag citrate as needed and stool softener daily.  He denies any melena or hematochezia.  His weight is stable.  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  He has guaze dressing around PEG secured with tape.  there is a small amount of clear to light yellow drainage or is no surrounding erythema or edema to site.  He denies any abdominal pain 1-2 days after procedure. He has been cleaning site with regular dove soap, however he is going to switch to dial antibacterial soap.  Plan; follow-up with primary care provider return to GI office if any further issues with peg tube.     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   • Stroke    • Swallowing difficulty      Past Surgical History:   Procedure Laterality Date   • AORTA SURGERY      ruptured aorta repair   • ASCENDING ARCH/HEMIARCH REPLACEMENT N/A 2/14/2017    Procedure: INTRAOPERATIVE TRANSESOPHAGEAL ECHOCARDIOGRAM, MIDLINE STERNOTOMY, ASCENDING AORTIC  AND PROXIMAL AORTIC ARCH REPAIR WITH 26MM GRAFT, AORTIC VALVE RESUSPENSION, AORTIC ROOT REPAIR, OPEN VEIN HARVEST OF RIGHT LEG;  Surgeon: German Arreguin MD;  Location: [facility] MAIN OR;  Service:    • BRONCHOSCOPY N/A 2/17/2017    Procedure: BRONCHOSCOPY BIOPSY AT BEDSIDE WITH BAL-LEFT LOWER LOBE;  Surgeon: Trent Chaney MD;  Location: [facility] ENDOSCOPY;  Service:    •  COLONOSCOPY N/A 3/7/2018    Procedure: COLONOSCOPY WITH POSSIBLE POLYPECTOMY   ( DONE IN OR WITH ENDO)      COLONOSCOPY FIRST;  Surgeon: Kris Solano MD;  Location: [facility] OR;  Service:    • DIRECT LARYNGOSCOPY, ESOPHAGOSCOPY, BRONCHOSCOPY N/A 2/5/2018    Procedure: DIRECT LARYNGOSCOPY WITH BIOPSY, ESOPHAGOSCOPY     (no bronchoscopy, no laser);  Surgeon: Joseph Venegas MD;  Location: [facility] OR;  Service:    • ENDOSCOPY W/ PEG TUBE PLACEMENT N/A 5/2/2018    Procedure: ESOPHAGOGASTRODUODENOSCOPY WITH PERCUTANEOUS ENDOSCOPIC GASTROSTOMY TUBE INSERTION;  Surgeon: Angel Maciel MD;  Location: [facility] ENDOSCOPY;  Service: Gastroenterology   • TRACHEOSTOMY  02/05/2018   • TRACHEOSTOMY N/A 2/5/2018    Procedure: TRACHEOSTOMY  local injection @ 1106 incision @ 1119;  Surgeon: Joseph Venegas MD;  Location: [facility] OR;  Service:    • VENOUS ACCESS DEVICE (PORT) INSERTION N/A 3/7/2018    Procedure: INSERTION OF MEDIPORT     (C-ARM#1);  Surgeon: Kris Solano MD;  Location: [facility] OR;  Service:      Family History   Problem Relation Age of Onset   • Thyroid disease Mother    • Hypertension Mother    • Hypertension Father    • Hypertension Other        Current Outpatient Prescriptions   Medication Sig Dispense Refill   • clopidogrel (PLAVIX) 75 MG tablet Take 75 mg by mouth Daily. Last dose approx greater than one month ago     • DIPHENHIST 12.5 MG/5ML liquid SWISH AND SPIT TWO TEASPOONFULS (10 MLS) BY MOUTH EVERY 6 HOURS AS NEEDED 200 mL 1   • docusate sodium (COLACE) 100 MG capsule Take 1 capsule by mouth 2 (Two) Times a Day As Needed for Constipation. 60 capsule 2   • fentaNYL (DURAGESIC) 12 MCG/HR Place 1 patch on the skin Every 72 (Seventy-Two) Hours. 10 patch 0   • levothyroxine (SYNTHROID) 75 MCG tablet Take 1 tablet by mouth Daily. 30 tablet 11   • losartan-hydrochlorothiazide (HYZAAR) 50-12.5 MG per tablet Take 1 tablet by mouth Daily. 30 tablet 12   • metoprolol succinate XL  (TOPROL-XL) 25 MG 24 hr tablet Take 1 tablet by mouth Every Night. Patient states he hasn't been taking this week, states he thinks he is hypotensive 30 tablet 12   • Multiple Vitamins-Minerals (MULTIVITAMIN ADULT PO) Take 1 tablet by mouth Daily.     • ondansetron (ZOFRAN) 4 MG tablet Take 1 tablet by mouth 3 (Three) Times a Day As Needed for Nausea or Vomiting. 40 tablet 3   • oxyCODONE-acetaminophen (PERCOCET) 5-325 MG per tablet 1-2 tablets by mouth every 4 hours as needed for pain 60 tablet 0   • vitamin B-12 (CYANOCOBALAMIN) 100 MCG tablet Take 100 mcg by mouth Daily.     • vitamin E 100 UNIT capsule Take 400 Units by mouth Every Night.     • Zinc 50 MG capsule Take 50 mg by mouth Every Night.       No current facility-administered medications for this visit.      Allergies   Allergen Reactions   • Chlorhexidine Itching     Topical cleaning wipes causes severe skin irritation   • Co Q 10 [Coenzyme Q10] Itching   • Eggs Or Egg-Derived Products Swelling   • Erythromycin Other (See Comments)     Joint pains and cold sweats   • Other GI Intolerance     Mycins     • Penicillins      Tolerated cefepime during 2/2017 admission. Had rash and itching (relieved by benadryl) after few doses of cefepime and cefepime was continued.   • Tetracyclines & Related GI Intolerance   • Acth [Corticotropin] Rash   • Cephalosporins Itching and Rash     Pt developed rash, itching after cefepime administration (2-3 doses) during 2/2017 admission. Itching was relieved with benadryl. Cefepime was continued because his infection was improving and no symptoms of anaphylaxis present   • Keflex [Cephalexin] Rash     Social History     Social History   • Marital status:      Social History Main Topics   • Smoking status: Former Smoker     Packs/day: 1.25     Years: 38.00     Quit date: 2/13/2017   • Smokeless tobacco: Never Used      Comment: 02/27/2018 - Patient confirmed he ceased utilization of tobacco products 02/13/2017.   •  "Alcohol use No   • Drug use: No   • Sexual activity: Defer     Other Topics Concern   • Not on file       Review of Systems  Review of Systems   Constitutional: Positive for fatigue. Negative for activity change, appetite change, chills, diaphoresis, fever and unexpected weight change.   HENT: Negative for sore throat and trouble swallowing.    Respiratory: Negative for shortness of breath.    Gastrointestinal: Negative for abdominal distention, abdominal pain, anal bleeding, blood in stool, constipation, diarrhea, nausea, rectal pain and vomiting.   Musculoskeletal: Negative for arthralgias.   Skin: Negative for pallor.   Neurological: Negative for light-headedness.        /60   Pulse 80   Ht 182.9 cm (72\")   Wt 102 kg (224 lb 6.4 oz)   BMI 30.43 kg/m²     Objective    Physical Exam   Constitutional: He is oriented to person, place, and time. He appears well-developed and well-nourished. He is cooperative. No distress.   HENT:   Head: Normocephalic and atraumatic.   Neck: Normal range of motion. Neck supple. No thyromegaly present.   Trach    Cardiovascular: Normal rate, regular rhythm and normal heart sounds.    Pulmonary/Chest: Effort normal and breath sounds normal. He has no wheezes. He has no rhonchi. He has no rales.   Abdominal: Soft. Normal appearance and bowel sounds are normal. He exhibits no shifting dullness, no distension, no fluid wave and no ascites. There is no hepatosplenomegaly. There is no tenderness. There is no rigidity and no guarding. No hernia.       Lymphadenopathy:     He has no cervical adenopathy.   Neurological: He is alert and oriented to person, place, and time.   Skin: Skin is warm, dry and intact. No rash noted. No pallor.   Psychiatric: He has a normal mood and affect. His speech is normal.     Office Visit on 05/16/2018   Component Date Value Ref Range Status   • TSH 05/16/2018 1.040  0.460 - 4.680 mIU/mL Final     Assessment/Plan      1. PEG (percutaneous endoscopic " gastrostomy) status    .       Orders placed during this encounter include:      * Surgery not found *    Review and/or summary of lab tests, radiology, procedures, medications. Review and summary of old records and obtaining of history. The risks and benefits of my recommendations, as well as other treatment options were discussed with the patient today. Questions were answered.    No orders of the defined types were placed in this encounter.      Follow-up: Return if symptoms worsen or fail to improve.          This document has been electronically signed by ROWAN Ashley on May 16, 2018 5:29 PM             Results for orders placed or performed in visit on 05/16/18   TSH   Result Value Ref Range    TSH 1.040 0.460 - 4.680 mIU/mL   Results for orders placed or performed in visit on 05/15/18   CBC Auto Differential   Result Value Ref Range    WBC 6.46 3.20 - 9.80 10*3/mm3    RBC 3.68 (L) 4.37 - 5.74 10*6/mm3    Hemoglobin 9.5 (L) 13.7 - 17.3 g/dL    Hematocrit 28.2 (L) 39.0 - 49.0 %    MCV 76.6 (L) 80.0 - 98.0 fL    MCH 25.8 (L) 26.5 - 34.0 pg    MCHC 33.7 31.5 - 36.3 g/dL    RDW 21.5 (H) 11.5 - 14.5 %    RDW-SD 60.1 (H) 35.1 - 43.9 fl    MPV 10.0 8.0 - 12.0 fL    Platelets 217 150 - 450 10*3/mm3    Neutrophil % 84.5 (H) 37.0 - 80.0 %    Lymphocyte % 7.9 (L) 10.0 - 50.0 %    Monocyte % 5.3 0.0 - 12.0 %    Eosinophil % 1.5 0.0 - 7.0 %    Basophil % 0.5 0.0 - 2.0 %    Immature Grans % 0.3 0.0 - 0.5 %    Neutrophils, Absolute 5.46 2.00 - 8.60 10*3/mm3    Lymphocytes, Absolute 0.51 (L) 0.60 - 4.20 10*3/mm3    Monocytes, Absolute 0.34 0.00 - 0.90 10*3/mm3    Eosinophils, Absolute 0.10 0.00 - 0.70 10*3/mm3    Basophils, Absolute 0.03 0.00 - 0.20 10*3/mm3    Immature Grans, Absolute 0.02 0.00 - 0.02 10*3/mm3   Magnesium   Result Value Ref Range    Magnesium 2.3 1.6 - 2.3 mg/dL   Basic metabolic panel   Result Value Ref Range    Glucose 100 60 - 100 mg/dL    BUN 21 7 - 21 mg/dL    Creatinine 1.20 0.70 - 1.30 mg/dL     Sodium 135 (L) 137 - 145 mmol/L    Potassium 3.6 3.5 - 5.1 mmol/L    Chloride 98 95 - 110 mmol/L    CO2 23.0 22.0 - 31.0 mmol/L    Calcium 9.1 8.4 - 10.2 mg/dL    eGFR Non  Amer 62 56 - 130 mL/min/1.73    BUN/Creatinine Ratio 17.5 7.0 - 25.0    Anion Gap 14.0 5.0 - 15.0 mmol/L   Results for orders placed or performed in visit on 04/30/18   CBC Auto Differential   Result Value Ref Range    WBC 5.27 3.20 - 9.80 10*3/mm3    RBC 3.89 (L) 4.37 - 5.74 10*6/mm3    Hemoglobin 9.9 (L) 13.7 - 17.3 g/dL    Hematocrit 30.2 (L) 39.0 - 49.0 %    MCV 77.6 (L) 80.0 - 98.0 fL    MCH 25.4 (L) 26.5 - 34.0 pg    MCHC 32.8 31.5 - 36.3 g/dL    RDW 22.2 (H) 11.5 - 14.5 %    RDW-SD 63.0 (H) 35.1 - 43.9 fl    MPV 10.2 8.0 - 12.0 fL    Platelets 210 150 - 450 10*3/mm3    Neutrophil % 74.8 37.0 - 80.0 %    Lymphocyte % 10.8 10.0 - 50.0 %    Monocyte % 11.6 0.0 - 12.0 %    Eosinophil % 1.1 0.0 - 7.0 %    Basophil % 0.9 0.0 - 2.0 %    Immature Grans % 0.8 (H) 0.0 - 0.5 %    Neutrophils, Absolute 3.94 2.00 - 8.60 10*3/mm3    Lymphocytes, Absolute 0.57 (L) 0.60 - 4.20 10*3/mm3    Monocytes, Absolute 0.61 0.00 - 0.90 10*3/mm3    Eosinophils, Absolute 0.06 0.00 - 0.70 10*3/mm3    Basophils, Absolute 0.05 0.00 - 0.20 10*3/mm3    Immature Grans, Absolute 0.04 (H) 0.00 - 0.02 10*3/mm3    nRBC 0.0 0.0 - 0.0 /100 WBC   Magnesium   Result Value Ref Range    Magnesium 2.4 (H) 1.6 - 2.3 mg/dL   Basic metabolic panel   Result Value Ref Range    Glucose 100 60 - 100 mg/dL    BUN 25 (H) 7 - 21 mg/dL    Creatinine 1.28 0.70 - 1.30 mg/dL    Sodium 138 137 - 145 mmol/L    Potassium 3.2 (L) 3.5 - 5.1 mmol/L    Chloride 95 95 - 110 mmol/L    CO2 30.0 22.0 - 31.0 mmol/L    Calcium 9.5 8.4 - 10.2 mg/dL    eGFR Non  Amer 58 56 - 130 mL/min/1.73    BUN/Creatinine Ratio 19.5 7.0 - 25.0    Anion Gap 13.0 5.0 - 15.0 mmol/L   Results for orders placed or performed in visit on 04/23/18   CBC Auto Differential   Result Value Ref Range    WBC 2.95 (L) 3.20 -  9.80 10*3/mm3    RBC 3.65 (L) 4.37 - 5.74 10*6/mm3    Hemoglobin 9.2 (L) 13.7 - 17.3 g/dL    Hematocrit 27.1 (L) 39.0 - 49.0 %    MCV 74.2 (L) 80.0 - 98.0 fL    MCH 25.2 (L) 26.5 - 34.0 pg    MCHC 33.9 31.5 - 36.3 g/dL    RDW 20.0 (H) 11.5 - 14.5 %    RDW-SD 53.7 (H) 35.1 - 43.9 fl    MPV 10.4 8.0 - 12.0 fL    Platelets 127 (L) 150 - 450 10*3/mm3    Neutrophil % 81.1 (H) 37.0 - 80.0 %    Lymphocyte % 11.2 10.0 - 50.0 %    Monocyte % 3.7 0.0 - 12.0 %    Eosinophil % 3.4 0.0 - 7.0 %    Basophil % 0.3 0.0 - 2.0 %    Immature Grans % 0.3 0.0 - 0.5 %    Neutrophils, Absolute 2.39 2.00 - 8.60 10*3/mm3    Lymphocytes, Absolute 0.33 (L) 0.60 - 4.20 10*3/mm3    Monocytes, Absolute 0.11 0.00 - 0.90 10*3/mm3    Eosinophils, Absolute 0.10 0.00 - 0.70 10*3/mm3    Basophils, Absolute 0.01 0.00 - 0.20 10*3/mm3    Immature Grans, Absolute 0.01 0.00 - 0.02 10*3/mm3   Magnesium   Result Value Ref Range    Magnesium 1.9 1.6 - 2.3 mg/dL   Basic metabolic panel   Result Value Ref Range    Glucose 100 60 - 100 mg/dL    BUN 15 7 - 21 mg/dL    Creatinine 0.98 0.70 - 1.30 mg/dL    Sodium 139 137 - 145 mmol/L    Potassium 3.4 (L) 3.5 - 5.1 mmol/L    Chloride 101 95 - 110 mmol/L    CO2 24.0 22.0 - 31.0 mmol/L    Calcium 9.5 8.4 - 10.2 mg/dL    eGFR Non African Amer 79 >60 mL/min/1.73    BUN/Creatinine Ratio 15.3 7.0 - 25.0    Anion Gap 14.0 5.0 - 15.0 mmol/L     *Note: Due to a large number of results and/or encounters for the requested time period, some results have not been displayed. A complete set of results can be found in Results Review.

## 2018-05-16 NOTE — PATIENT INSTRUCTIONS

## 2018-05-16 NOTE — THERAPY PROGRESS REPORT/RE-CERT
Outpatient Speech Language Pathology   Adult Swallow Progress Note  [facility]     Patient Name: Truman Esquivel  : 1960  MRN: [id number]  Today's Date: 2018         Visit Date: 2018     Patient insurance has approved 4 visits for speech therapy.     Laryngeal cancer; Stage III (cT3, cN0, cM0).      Patient has attended 4/4 scheduled therapy sessions.     MBS was completed which showed aspiration and penetration on most trials as well as a discontinuation of assessment due to increased pain (see MBS for full report). NPO was recommended and pt received a PEG tube for nutrition and hydration. Pt is maintaining weight with PEG tube and is following recommendations of NPO with small teaspoon sips throughout the day following oral care.     Pain was noted in jaw, ears, and throat during some of the OME this date. These exercises were discontinued during session due to increased pain. Pt completed 1 sets of tongue lateralization, ingrid, and tongue protrusion with model. SLP provided another copy of exercises for pt to use for home.     Pt encouraged to continue home exercise plan 2x a day each day as tolerated. SLP reviewed the importance of continuing to swallow during all treatment to encourage muscle to keep moving. Pt was in agreement. Pt has a passy thaddeus valve but does not currently wear.Patient would benefit from skilled speech therapy 2x a month for increased swallowing safety and preservation of swallowing function following completion of radiation and chemo.     Repeat MBS recommended for middle of  to assess swallowing function. This will allow time for completion of swallow exercises as well as hopefully a decrease in edema and pain in the region of the epiglottis.     Agnes Barone MS CCC-SLP       Patient Active Problem List   Diagnosis   • Ascending aortic dissection   • Essential hypertension   • Deep vein thrombosis (DVT) of femoral vein of both lower extremities   •  Acquired hypothyroidism   • Snoring   • Acute pain of right knee   • Knee effusion, right   • Knee pain   • Obstructive sleep apnea of adult   • TIA (transient ischemic attack)   • Squamous cell carcinoma of larynx   • Dysphagia   • Squamous cell carcinoma of larynx   • Pharyngeal dysphagia   • Anemia   • Abnormal positron emission tomography (PET) of colon   • Hypokalemia   • Encounter for venous access device care   • Dehydration   • Weight loss, abnormal   • Odynophagia   • Thrush, oral   • Thrombocytopenia   • Pancytopenia due to antineoplastic chemotherapy   • Unable to eat        Visit Dx:    ICD-10-CM ICD-9-CM   1. Oropharyngeal dysphagia R13.12 787.22             SLP Adult Swallow Evaluation - 05/16/18 0946        Rehab Evaluation    Document Type therapy note (daily note)  -EA    Subjective Information complains of;pain  -EA    Patient Observations alert;cooperative  -EA    Patient/Family Observations Pt attended therapy session with mother this date. Pt stated that he was still having pain in his jaw bilaterally. Radiation was completed yesterday. Pt has completed all radiation and chemo sessions.   -EA    Patient Effort good  -EA    Symptoms Noted During/After Treatment increased pain  -EA       General Information    Patient Profile Reviewed yes  -EA    Current Method of Nutrition soft textures;thin liquids  -EA    Precautions/Limitations, Vision WFL with corrective lenses  -EA    Precautions/Limitations, Hearing WFL  -EA    Prior Level of Function-Communication WFL  -EA    Prior Level of Function-Swallowing no diet consistency restrictions  -EA    Plans/Goals Discussed with patient;family  -EA    Barriers to Rehab none identified  -EA       Pain Scale: Numbers Pre/Post-Treatment    Pain Scale: Numbers, Pretreatment 1/10  -EA    Pain Scale: Numbers, Post-Treatment 1/10  -EA    Pain Location - Side Bilateral  -EA    Pain Location - Orientation --   throat *burning   -EA    Pain Location ear   jaw; throat    -EA       Oral Motor and Function    Dentition Assessment natural, present and adequate;other (see comments)   changes in coloration of teeth following radiation   -EA    Secretion Management WNL/WFL  -EA    Mucosal Quality moist, healthy  -EA    Volitional Swallow delayed  -EA       Oral Musculature and Cranial Nerve Assessment    Oral Motor General Assessment WFL  -EA       General Eating/Swallowing Observations    Respiratory Support Currently in Use trach collar  -EA    Eating/Swallowing Skills self-fed  -EA    Positioning During Eating upright 90 degree;upright in chair  -EA    Utensils Used spoon  -EA    Consistencies Trialed thin liquids  -EA       Respiratory    Respiratory Status room air  -EA       Clinical Swallow Eval    Oral Prep Phase impaired  -EA    Oral Transit impaired  -EA    Oral Residue WFL  -EA    Pharyngeal Phase suspected pharyngeal impairment  -EA    Esophageal Phase unremarkable  -EA       Oral Prep Concerns    Oral Prep Concerns prolonged mastication;increased prep time  -EA    Prolonged Mastication thin;pudding  -EA    Increased Prep Time thin;pudding  -EA       Oral Transit Concerns    Oral Transit Concerns delayed initiation of bolus transit  -EA    Delayed Intiation of Bolus Transit thin;pudding  -EA       Pharyngeal Phase Concerns    Pharyngeal Phase Concerns cough  -EA    Cough thin;pudding  -EA       Clinical Impression    SLP Swallowing Diagnosis mild-moderate;oral dysfunction;suspected pharyngeal dysfunction  -EA    Functional Impact risk of malnutrition;risk of aspiration/pneumonia  -EA    Rehab Potential/Prognosis, Swallowing good, to achieve stated therapy goals  -EA    Criteria for Skilled Therapeutic Interventions Met demonstrates skilled criteria  -EA       Recommendations    Therapy Frequency (Swallow) 1 day per week  -EA    Predicted Duration Therapy Intervention (Days) until discharge  -EA    SLP Diet Recommendation mechanical soft with no mixed consistencies;thin  liquids  -EA    Recommended Precautions and Strategies upright posture during/after eating;small bites of food and sips of liquid;multiple swallows per bite of food;multiple swallows per sip of liquid;hard swallow with each bite or sip  -EA    Monitor for Signs of Aspiration yes;notify SLP if any concerns  -EA    Anticipated Dischage Disposition home  -EA       Oral Nutrition/Hydration Goal 1 (SLP)    Oral Nutrition/Hydration Goal 1, SLP Patient will tolerate least restrictive diet with no overt s/s of aspiration.   -EA    Time Frame (Oral Nutrition/Hydration Goal 1, SLP) by discharge  -EA    Barriers (Oral Nutrition/Hydration Goal 1, SLP) laryngeal cancer   -EA    Progress/Outcomes (Oral Nutrition/Hydration Goal 1, SLP) goal ongoing  -EA       Pharyngeal Strengthening Exercise Goal 1 (SLP)    Activity (Pharyngeal Strengthening Goal 1, SLP) increase timing;increase tongue base retraction  -EA    Increase Timing ingrid;hard effortful swallow;prepping - 3 second prep or suck swallow or 3-step swallow   OME set - Prophylactic swallowing exercises   -EA    Increase Tongue Base Retraction ingrid;hard effortful swallow;prepping - 3 second prep or suck swallow or 3-step swallow  -EA    [place]/Accuracy (Pharyngeal Strengthening Goal 1, SLP) independently (over 90% accuracy)  -EA    Time Frame (Pharyngeal Strengthening Goal 1, SLP) by discharge  -EA    Barriers (Pharyngeal Strengthening Goal 1, SLP) laryngeal cancer   -EA    Progress/Outcomes (Pharyngeal Strengthening Goal 1, SLP) goal ongoing   3 sets of OME completed this date; increased pain   -EA       Dysphagia Treatment Objectives and Progress    Dysphagia Treatment Objectives Improve oral skills;Improve timing of pharyngeal response  -EA       Improve oral skills    To Improve Oral Skills, patient will: Increase tongue A-P movement;90%  -EA       Improve timing of pharyngeal response    To improve timing of pharyngeal response, patient will: Swallow in timely  way using three second prep;90%  -EA    Status: Improve timing of pharyngeal response Progressing as expected  -EA    Timing of Pharyngeal Response Progress 70%;with consistent cues  -EA    Comments: Improve timing of pharyngeal response Pt is NPO with PEG tube for nutrition/hydration. Oral care continues to be completed with small teaspoon (or less) sips of water   -EA      User Key  (r) = Recorded By, (t) = Taken By, (c) = Cosigned By    Initials Name Provider Type    KAYLEY Barone MS CCC-SLP Speech and Language Pathologist                              SLP OP Goals     Row Name 05/16/18 1058          SLP Time Calculation    SLP Goal Re-Cert Due Date 06/15/18  -EA       User Key  (r) = Recorded By, (t) = Taken By, (c) = Cosigned By    Initials Name Provider Type    KAYLEY Barone MS CCC-SLP Speech and Language Pathologist                OP SLP Education     Row Name 05/16/18 1000       Education    Barriers to Learning No barriers identified  -EA    Education Provided Patient participated in establishing goals and treatment plan;Patient demonstrated recommended strategies;Patient requires further education on strategies, risks  -EA    Assessed Learning needs;Learning motivation  -EA    Learning Motivation Moderate  -EA    Learning Method Explanation;Demonstration;Written materials  -EA    Teaching Response Verbalized understanding;Demonstrated understanding  -EA    Education Comments SLP provided swallowing exercises again this date. Pt stated that he lost the other set. SLP encouraged completion of exercises 2x a day to increase and maintain functional swallowing. MBS repeat for middle of June. Continue NPO and PEG feedings; oral care and teaspoon or less sips of water throughout the day.   -EA      User Key  (r) = Recorded By, (t) = Taken By, (c) = Cosigned By    Initials Name Effective Dates    KAYLEY Barone MS CCC-SLP 10/17/16 -                 OP SLP Assessment/Plan - 05/16/18 1000         SLP Assessment    Functional Problems Swallowing  -EA    Impact on Function: Swallowing Risk of pneumonia;Risk of aspiration;Risk of dehydration;Risk of malnourishment  -EA    Clinical Impression: Swallowing Severe:;oropharyngeal phase dysphagia  -EA    Functional Problems Comment NPO following MBS; PEG tube was placed for nutrition and hydration.   -EA    Clinical Impression Comments Radiation therapy completed on 5/15/2018; Repeat MBS for middle of June to allow time for swelling and pain to decrease.  -EA    SLP Diagnosis Dysphagia   -EA    Prognosis Good (comment)  -EA    Patient/caregiver participated in establishment of treatment plan and goals Yes  -EA    Patient would benefit from skilled therapy intervention Yes  -EA       SLP Plan    Frequency 2x a month   -EA    Duration until discharge   -EA    Planned CPT's? SLP SWALLOW THERAPY: 71865  -EA    Expected Duration Therapy Session - minutes 30-45 minutes  -EA    Plan Comments Continue   -EA      User Key  (r) = Recorded By, (t) = Taken By, (c) = Cosigned By    Initials Name Provider Type    KAYLEY Barone MS CCC-SLP Speech and Language Pathologist                Time Calculation:   SLP Start Time: 0946  SLP Stop Time: 1026  SLP Time Calculation (min): 40 min  Total Timed Code Minutes- SLP: 40 minute(s)    Therapy Charges for Today     Code Description Service Date Service Provider Modifiers Qty    68224222106 HC ST SWALLOWING CURRENT STATUS 5/16/2018 Agnes Barone MS CCC-SLP GN, CL 1    26277680101 HC ST SWALLOWING PROJECTED 5/16/2018 Agnes Barone MS CCC-RAHUL GN, CI 1    34904624641 HC ST TREATMENT SWALLOW 3 5/16/2018 Agnes Barone MS CCC-SLP GN 1          SLP G-Codes  Functional Limitations: Swallowing  Swallow Current Status (): At least 60 percent but less than 80 percent impaired, limited or restricted  Swallow Goal Status (): At least 1 percent but less than 20 percent impaired, limited or  restricted        Agnes Barone, MS CCC-SLP  5/16/2018

## 2018-05-17 ENCOUNTER — TELEPHONE (OUTPATIENT)
Dept: ENDOCRINOLOGY | Facility: CLINIC | Age: 58
End: 2018-05-17

## 2018-05-17 NOTE — TELEPHONE ENCOUNTER
----- Message from ROWAN Grayson sent at 5/16/2018 10:12 PM CDT -----  Please let him know thyroid level is normal so no dosage change

## 2018-05-18 NOTE — PROGRESS NOTES
Radiation Completion Note       Patient: Truman Esquivel   YOB: 1960   Medical Record Number: [id number]   Date of Completion: 5/15/18     Summary: Mr. Esquivel completed radiation therapy today in our department. The following is a summary of his course of treatment.    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  ICD 10 Code: C32.9     Treatment course: Mr. Esquivel received 7000 cGy in 35 fractions over 57 elapsed days, from 3/19/18 through 5/15/18, utilizing IMRT external beam radiation therapy and 6 MV photons. He completed his radiation therapy as prescribed, but required multiple treatment breaks. His first treatment break occurred during the third week of therapy, due to dysphagia secondary to thrush, and lasted 4 days. He had a 2 day self-imposed treatment break during the fifth week of therapy, due to dysphagia. He received concurrent carboplatin/Taxol chemotherapy per Dr. Patel, and received his 5th and most recent cycle on 5/9/18.    Response/Tolerance: Mr. Esquivel tolerated his radiation therapy fairly, other than the above-mentioned episodes of dysphagia. He did undergo feeding tube placement during therapy (on 5/2/18) secondary to aspiration noted on a swallowing study. He continues to be evaluated on regular basis by speech pathology.    Disposition: Mr. Esquivel has follow-up scheduled with ROWAN Soto (GI) on 5/16/18 and with Dr. Venegas on 6/6/18. He also has follow-up scheduled with Dr. Manuel Rivas (sleep medicine) on 8/21/18 and with Dr. Liborio Chandra (cardiology) on 9/20/18. We plan to see the patient back in our clinic for routine follow-up in 2 months.      Jarocho Tapia MD  Radiation Oncology    Electronically signed by Jarocho Tapia MD 5/15/2018 8:50 AM     cc: Dr. Joseph Harrison

## 2018-05-21 RX ORDER — MAGNESIUM HYDROXIDE 1200 MG/15ML
LIQUID ORAL
Qty: 1000 ML | Refills: 8 | Status: ON HOLD | OUTPATIENT
Start: 2018-05-21 | End: 2018-07-27 | Stop reason: SDUPTHER

## 2018-05-30 DIAGNOSIS — C32.9 SQUAMOUS CELL CARCINOMA OF LARYNX (HCC): Primary | ICD-10-CM

## 2018-06-06 ENCOUNTER — OFFICE VISIT (OUTPATIENT)
Dept: OTOLARYNGOLOGY | Facility: CLINIC | Age: 58
End: 2018-06-06

## 2018-06-06 VITALS
SYSTOLIC BLOOD PRESSURE: 118 MMHG | WEIGHT: 224.6 LBS | BODY MASS INDEX: 30.42 KG/M2 | HEIGHT: 72 IN | DIASTOLIC BLOOD PRESSURE: 60 MMHG

## 2018-06-06 DIAGNOSIS — C32.9 SQUAMOUS CELL CARCINOMA OF LARYNX (HCC): Primary | ICD-10-CM

## 2018-06-06 DIAGNOSIS — Z43.0 ATTENTION TO TRACHEOSTOMY (HCC): ICD-10-CM

## 2018-06-06 PROCEDURE — 31575 DIAGNOSTIC LARYNGOSCOPY: CPT | Performed by: OTOLARYNGOLOGY

## 2018-06-06 PROCEDURE — 99213 OFFICE O/P EST LOW 20 MIN: CPT | Performed by: OTOLARYNGOLOGY

## 2018-06-07 NOTE — PROGRESS NOTES
Subjective   Truman Esquivel is a 57 y.o. male.       History of Present Illness   Patient has a history of an obstructing supraglottic squamous cell carcinoma the larynx that involved the majority of the epiglottis.  Underwent tracheostomy along with laryngoscopy with biopsy.  Has now completed concurrent chemoradiation approximately 3 weeks ago.  Had to have a PEG tube placed midway through his treatment course.  Reports that his throat is improving now that he is not receiving treatments.  Says he has a modified barium swallow scheduled in the next few days.      The following portions of the patient's history were reviewed and updated as appropriate: allergies, current medications, past family history, past medical history, past social history, past surgical history and problem list.     reports that he quit smoking about 15 months ago. He has a 47.50 pack-year smoking history. He has never used smokeless tobacco. He reports that he does not drink alcohol or use drugs.   Patient is not a tobacco user and has not been counseled for use of tobacco products      Review of Systems   Constitutional: Negative for fever.   HENT: Positive for sore throat.            Objective   Physical Exam  General: Well-developed, well-nourished male in no acute distress.  Alert, oriented, and is in good spirits.  Voice is harsh but intelligible with his tracheostomy occluded.  Ears: External ears no deformity, canals no discharge, tympanic membranes intact clear and mobile bilaterally.  Nose: Nares show no discharge mass polyp or purulence.  Boggy mucosa is present.  No gross external deformity.    Oral cavity: Lips and gums without lesions.  Tongue and floor of mouth without lesions.  Parotid and submandibular ducts unobstructed.  No mucosal lesions on the buccal mucosa or vestibule of the mouth.  Pharynx: No erythema exudate or mass  Neck: Tracheostomy tube in place.  No granulation tissue.  Good air exchange.  No palpable  lymphadenopathy.  Postradiation skin changes are noted with no evidence of infection    Procedure Note    Pre-operative Diagnosis:   Chief Complaint   Patient presents with   • Follow-up       Post-operative Diagnosis: Posttreatment changes; apparent complete response    Anesthesia: topical with xylocaine and neosynephrine    Endoscopy Type:  Flexible Laryngoscopy    Procedure Details:    The patient was placed in the sitting position.  After topical anesthesia and decongestion, the 4 mm laryngoscope was passed.  The nasal cavities, nasopharynx, oropharynx, hypopharynx, and larynx were all examined.  Vocal cords were examined during respiration and phonation.  The following findings were noted:    Findings: No masses in the nose or nasopharynx.  The hypopharynx shows posttreatment changes.  The epiglottis shows significant posttreatment change with some of the epiglottis actually missing and the remainder of the epiglottis that is present is thickened but smooth.  Thick secretions are present in the vallecula and perform sinuses but I do not see any obvious neoplasm.  The endolarynx shows chronic edema and erythema but no evidence of neoplasm and vocal cord mobility is intact bilaterally.    Condition:  Stable.  Patient tolerated procedure well.    Complications:  None    Assessment/Plan   Truman was seen today for follow-up.    Diagnoses and all orders for this visit:    Squamous cell carcinoma of larynx    Attention to tracheostomy      Plan: At this point I don't see anything that is obviously persistent malignancy.  The epiglottis is misshapened likely due to posttreatment changes but it is smooth.  At this point I think observation and clinical reexamination is appropriate.  Would not want to try to do a biopsy at this point.  Advised him to proceed with his modified barium swallow and follow speech therapy recommendations regarding resumption of oral diet.  I will plan on seeing him again in 3 months and  really examining his larynx at that time however if he develops worsened difficulty swallowing, worsening throat pain, hemoptysis he is to call right away for more urgent reevaluation.

## 2018-06-08 ENCOUNTER — HOSPITAL ENCOUNTER (OUTPATIENT)
Dept: GENERAL RADIOLOGY | Facility: HOSPITAL | Age: 58
Discharge: HOME | End: 2018-06-08
Admitting: RADIOLOGY

## 2018-06-08 DIAGNOSIS — R13.12 OROPHARYNGEAL DYSPHAGIA: Primary | ICD-10-CM

## 2018-06-08 DIAGNOSIS — C32.9 SQUAMOUS CELL CARCINOMA OF LARYNX (HCC): ICD-10-CM

## 2018-06-08 PROCEDURE — 92611 MOTION FLUOROSCOPY/SWALLOW: CPT | Performed by: SPEECH-LANGUAGE PATHOLOGIST

## 2018-06-08 PROCEDURE — G8997 SWALLOW GOAL STATUS: HCPCS | Performed by: SPEECH-LANGUAGE PATHOLOGIST

## 2018-06-08 PROCEDURE — G8998 SWALLOW D/C STATUS: HCPCS | Performed by: SPEECH-LANGUAGE PATHOLOGIST

## 2018-06-08 PROCEDURE — G8996 SWALLOW CURRENT STATUS: HCPCS | Performed by: SPEECH-LANGUAGE PATHOLOGIST

## 2018-06-08 PROCEDURE — 74230 X-RAY XM SWLNG FUNCJ C+: CPT

## 2018-06-08 RX ADMIN — BARIUM SULFATE 20 ML: 960 POWDER, FOR SUSPENSION ORAL at 09:45

## 2018-06-08 NOTE — THERAPY DISCHARGE NOTE
Outpatient Speech Language Pathology   Adult Swallow Initial Eval/Discharge  [facility]     Patient Name: Truman Esquivel  : 1960  MRN: [id number]  Today's Date: 2018    SPEECH PATHOLOGY MODIFIED BARIUM SWALLOW STUDY (VFSS)    Chief Complaint: [place] [facility] [place] of NPO    Medical Diagnoses: laryngeal CA stage III      Present diet textures: sips of water for oral care throughout the day.  PEG for nutrition    Views: Patient seated at 90 degrees in:    X__lateral view    __A/P    Ability to follow instructions:  X__Excellent   __Good __Fair  __Poor      Oral Motor Status Exam:    Dentition: X__Natural  __Dentures   __Edentulous         __Partials         Condition/Fit:    The following consistencies were given, with symptoms as noted:    Consistency Method Labial Seal Oral Prep AP Transit Premature Spillage Delayed Swallow Reflex Laryngeal Penetration Aspiration Pooling Valleculae Pooling pyriform sinuses   Thin  5 ml via spoon            Thin   cuprim      Yes, during the swallow      Thin with chin tuck cuprim            Puree tablespoon pudding   piecemeal         Soft Solids cantalope   piecemeal         Cookie twyla cracker   piecemeal           Impairments:  __Premature loss of bolus  __Reduced velopharyngeal closure  __Decreased lingual propulsion  _X_decreased epiglottic formation/movement  __Decreased hyolaryngeal elevation  __Reduced laryngeal closure  __Reduced esophageal sphincter opening    Pt was talkative and attentive during study.  He did not complain of pain with swallow during the study and states he has had no return of water out of his trach after oral care.  He demonstrated intact oral phase with piecemeal swallow noted.  Patient may benefit from small bites when taking PO in order to avoid piecemeal.      Penetration-Aspiration Scale Rating:     Category Score Descriptions   No penetration or aspiration 1 Contrast does not enter the airway    Penetration 2 Contrast enters the airway, remains above vocal folds; no residue    3 Contrast remains above vocal folds; visible residue remains    4 Contrast contacts vocal folds; no residue    5 Contrast contacts vocal folds; visible residue remains   Aspiration 6 Contrast passes glottis; no subglottic residue visible    7 Contrast passes glottis; visible subglottic residue despite patient's response    8 Contrast passes glottis; visible subglottic residue; absent patient response       Penetration:  __Prior X__During __After the swallow    Cough: __Delayed __Immediate   X__Absent with penetration/aspiration    Cough response:  __Weak __Strong        Dysphagia Scale Rating:    Dysphagia Rating Scale Explanation   0   Normal swallowing mechanism     1 Minimal Dysphagia- video swallow shows slight deviance from a normal swallow. Patient may report change in sensation during swallow. No change in diet is required.     2 Mild Dysphagia- oropharyngeal dysphagia present, which can be managed by specific swallow suggestions. Slight modification in consistency of diet may be indicated.      3 Mild-Moderate Dysphagia- potential for aspiration exists but is diminished by specific swallow techniques and a modified diet. Time for eating is significantly increased; thus supplemental nutrition may be indicated.      4 Moderate Dysphagia- significant potential for aspiration exists. Trace aspiration of one or more consistencies may be seen under videofluoroscopy. Patient may eat certain consistencies by using specific techniques to minimize potential for aspiration and/or facilitate swallowing. Supervision at mealtimes required. May require supplemental nutrition orally or via feeding tube.      5 Moderately Severe Dysphagia- patient aspirates 5% to 10% on one or more consistencies, with potential for aspiration on all consistencies. Potential for aspiration minimized by specific swallow instructions. Cough reflex absent or  "nonprotective. Alterative mode of feeding required to maintain patient's nutritional needs. If pulmonary status is compromised, \"nothing by mouth\" may be indicated.      6 Severe Dysphagia- more than 10% aspiration for all consistencies. \"Nothing by mouth\" recommended.          Recommendation:    __Non-oral nutrition/hydration  __Trial feeding with Speech Pathologist ONLY  _X_May have PO nutrition/hydration to include:   _X_Puree   _X_Mechanical soft   _X_Soft   _X_Regular in small bites   __Honey thick liquids   __Nectar thick liquids   X__Thin (regular) liquids  Liquids by:   _X_Cup   __Straw   __Spoon    Compensatory Swallow Strategies suggested:   X__sit 90 degrees for all PO and _20__ minutes after meals/snacks/medication   _X_small bites    _X_small sips with chin tuck   _X_cyclic eating (2 bites/1 sip)   _X_eat/drink slowly   X__chew food well   X__empty mouth each time between bites   _X_swallow _1_ times after each _X_bite X__sip X__at the end of the meal   __check mouth for pocketing      SLP discussed with patient initiating oral diet and working toward eliminating PEG feedings under the guidance of OP SLP and RD.  Pt tendency to use piecemeal swallow to keep bolus small , may be a barrier to total oral nutrition as it would take a long time to finish a normal size meal and may require 6 small meals a day instead.  Pt agreed that he is seeing SLP for an appointment on Monday next week.     This evaluation was performed with assistance of student clinician and supervision of CI.  Thank you for this referral.  Agnes Chung, CCC-SLP 6/8/2018 12:59 PM                 Visit Date: 06/08/2018   Patient Active Problem List   Diagnosis   • Ascending aortic dissection   • Essential hypertension   • Deep vein thrombosis (DVT) of femoral vein of both lower extremities   • Hypothyroidism   • Snoring   • Acute pain of right knee   • Knee effusion, right   • Knee pain   • Obstructive sleep apnea of adult   • TIA " (transient ischemic attack)   • Squamous cell carcinoma of larynx   • Dysphagia   • Squamous cell carcinoma of larynx   • Pharyngeal dysphagia   • Anemia   • Abnormal positron emission tomography (PET) of colon   • Hypokalemia   • Encounter for venous access device care   • Dehydration   • Weight loss, abnormal   • Odynophagia   • Thrush, oral   • Thrombocytopenia   • Pancytopenia due to antineoplastic chemotherapy   • Unable to eat        Past Medical History:   Diagnosis Date   • Aortic dissection    • Chest pain    • Disease of thyroid gland    • HTN (hypertension)    • Knee pain    • Sleep apnea    • Smoker    • Squamous cell carcinoma of larynx 2/5/2018   • Stroke    • Swallowing difficulty         Past Surgical History:   Procedure Laterality Date   • AORTA SURGERY      ruptured aorta repair   • ASCENDING ARCH/HEMIARCH REPLACEMENT N/A 2/14/2017    Procedure: INTRAOPERATIVE TRANSESOPHAGEAL ECHOCARDIOGRAM, MIDLINE STERNOTOMY, ASCENDING AORTIC  AND PROXIMAL AORTIC ARCH REPAIR WITH 26MM GRAFT, AORTIC VALVE RESUSPENSION, AORTIC ROOT REPAIR, OPEN VEIN HARVEST OF RIGHT LEG;  Surgeon: German Arreguin MD;  Location: [facility] MAIN OR;  Service:    • BRONCHOSCOPY N/A 2/17/2017    Procedure: BRONCHOSCOPY BIOPSY AT BEDSIDE WITH BAL-LEFT LOWER LOBE;  Surgeon: Trent Chaney MD;  Location: [facility] ENDOSCOPY;  Service:    • COLONOSCOPY N/A 3/7/2018    Procedure: COLONOSCOPY WITH POSSIBLE POLYPECTOMY   ( DONE IN OR WITH ENDO)      COLONOSCOPY FIRST;  Surgeon: Kris Solano MD;  Location: [facility] OR;  Service:    • DIRECT LARYNGOSCOPY, ESOPHAGOSCOPY, BRONCHOSCOPY N/A 2/5/2018    Procedure: DIRECT LARYNGOSCOPY WITH BIOPSY, ESOPHAGOSCOPY     (no bronchoscopy, no laser);  Surgeon: Joseph Venegas MD;  Location: [facility] OR;  Service:    • ENDOSCOPY W/ PEG TUBE PLACEMENT N/A 5/2/2018    Procedure: ESOPHAGOGASTRODUODENOSCOPY WITH PERCUTANEOUS ENDOSCOPIC GASTROSTOMY TUBE INSERTION;  Surgeon: Angel Maciel MD;   Location: [facility] ENDOSCOPY;  Service: Gastroenterology   • TRACHEOSTOMY  02/05/2018   • TRACHEOSTOMY N/A 2/5/2018    Procedure: TRACHEOSTOMY  local injection @ 1106 incision @ 1119;  Surgeon: Joseph Venegas MD;  Location: [facility] OR;  Service:    • VENOUS ACCESS DEVICE (PORT) INSERTION N/A 3/7/2018    Procedure: INSERTION OF MEDIPORT     (C-ARM#1);  Surgeon: Kris Solano MD;  Location: [facility] OR;  Service:          Visit Dx:     ICD-10-CM ICD-9-CM   1. Oropharyngeal dysphagia R13.12 787.22   2. Squamous cell carcinoma of larynx C32.9 161.9                 SLP Adult Swallow Evaluation - 06/08/18 0935        Rehab Evaluation    Document Type evaluation;discharge evaluation/summary  -EC    Total Evaluation Minutes, SLP 35  -EC    Subjective Information no complaints  -EC    Patient Observations alert;cooperative  -EC    Patient Effort excellent  -EC       General Information    Patient Profile Reviewed yes  -EC    Pertinent History Of Current Problem mbs on 5/1 with aspiration of all consistancies  -EC    Current Method of Nutrition gastrostomy feedings  -EC    Prior Level of Function-Swallowing NPO  -EC    Plans/Goals Discussed with patient  -EC    Barriers to Rehab none identified  -EC       Oral Motor and Function    Dentition Assessment natural, present and adequate  -EC    Secretion Management WNL/WFL  -EC    Mucosal Quality moist, healthy;dry   pt wets mouth with water to keep moist  -EC    Volitional Swallow weak  -EC       MBS/VFSS Interpretation    Oral Prep Phase --  -EC    Oral Transit Phase impaired  -EC    Oral Residue WFL  -EC       Oral Transit Phase    Impaired Oral Transit Phase piecemeal oral transit  -EC    Piecemeal Oral Transit pudding/puree;mechanical soft  -EC       Initiation of Pharyngeal Swallow    Initiation of Pharyngeal Swallow WFL  -EC    Pharyngeal Phase functional pharyngeal phase of swallowing  -EC       SLP Communication to Radiology    Severity Level of Dysphagia  "mild-moderate dysphagia  -EC    Consistencies Aspirated/Penetrated penetrated;thin liquids  -EC    Summary Statement penetration improved with chin tuck  -EC       Anatomy and Physiology    Epiglottis other (see comments)   not visualized.  slight bulbous appearance at \"base\" of epig  -EC       Clinical Impression    SLP Swallowing Diagnosis mild-moderate;pharyngeal dysfunction  -EC    Functional Impact risk of aspiration/pneumonia  -EC    Rehab Potential/Prognosis, Swallowing good, to achieve stated therapy goals  -EC    Criteria for Skilled Therapeutic Interventions Met demonstrates skilled criteria  -EC      User Key  (r) = Recorded By, (t) = Taken By, (c) = Cosigned By    Initials Name Provider Type    EC Agnes Chung CCC-SLP Speech and Language Pathologist                              OP SLP Education     Row Name 06/08/18 1133       Education    Barriers to Learning No barriers identified  -EC    Education Provided Described results of evaluation  -EC    Assessed Learning needs  -EC    Learning Motivation Strong  -EC    Learning Method Demonstration;Explanation  -EC    Teaching Response Verbalized understanding  -EC      User Key  (r) = Recorded By, (t) = Taken By, (c) = Cosigned By    Initials Name Effective Dates    EC NABIL Pierce 03/07/18 -                          Time Calculation:   SLP Start Time: 0935  SLP Stop Time: 1010  SLP Time Calculation (min): 35 min  Total Timed Code Minutes- SLP: 35 minute(s)    Therapy Charges for Today     Code Description Service Date Service Provider Modifiers Qty    89171388885 HC ST SWALLOWING CURRENT STATUS 6/8/2018 NABIL Pierce, CK 1    61962000667 HC ST SWALLOWING PROJECTED 6/8/2018 NABIL Pierce, CK 1    26111851910 HC ST SWALLOWING DISCHARGE 6/8/2018 NABIL Pierce, CK 1    27265163734 HC ST MOTION FLUORO EVAL SWALLOW 2 6/8/2018 NABIL Pierce GN 1          SLP G-Codes  Functional " Limitations: Swallowing  Swallow Current Status (): At least 40 percent but less than 60 percent impaired, limited or restricted  Swallow Goal Status (): At least 40 percent but less than 60 percent impaired, limited or restricted  Swallow Discharge Status (): At least 40 percent but less than 60 percent impaired, limited or restricted    OP SLP Discharge Summary  Reason for Discharge: all goals and outcomes met, no further needs identified  Progress Toward Achieving Short/long Term Goals: all goals met within established timelines  Discharge Destination: [place]      Agnes Chung CCC-SLP  6/8/2018

## 2018-06-11 ENCOUNTER — HOSPITAL ENCOUNTER (OUTPATIENT)
Dept: SPEECH THERAPY | Facility: HOSPITAL | Age: 58
Setting detail: THERAPIES SERIES
Discharge: HOME | End: 2018-06-11

## 2018-06-11 DIAGNOSIS — R13.13 PHARYNGEAL DYSPHAGIA: Primary | ICD-10-CM

## 2018-06-11 PROCEDURE — G8997 SWALLOW GOAL STATUS: HCPCS | Performed by: SPEECH-LANGUAGE PATHOLOGIST

## 2018-06-11 PROCEDURE — G8996 SWALLOW CURRENT STATUS: HCPCS | Performed by: SPEECH-LANGUAGE PATHOLOGIST

## 2018-06-11 PROCEDURE — 92526 ORAL FUNCTION THERAPY: CPT | Performed by: SPEECH-LANGUAGE PATHOLOGIST

## 2018-06-11 NOTE — THERAPY PROGRESS REPORT/RE-CERT
Outpatient Speech Language Pathology   Adult Swallow Progress Note  [facility]     Patient Name: Truman Esquivel  : 1960  MRN: [id number]  Today's Date: 2018         Visit Date: 2018     Patient insurance has approved 4 visits for speech therapy with an additional 4 visits requested and approved on 2018.     Laryngeal cancer; Stage III (cT3, cN0, cM0).      Patient has attended 5/5 scheduled therapy sessions.      2018 - MBS was completed which showed aspiration and penetration on most trials as well as a discontinuation of assessment due to increased pain (see MBS for full report). NPO was recommended and pt received a PEG tube for nutrition and hydration. Pt is maintaining weight with PEG tube and is following recommendations of NPO with small teaspoon sips throughout the day following oral care.     2018 - MBS was completed with no aspiration noted. Laryngeal penetration noted on large thin sip from cup rim. Piece meal noted for solid trials with recommendations of small bites. Decreased epiglottic formation and movement. Recommendation of increasing PO intake with follow-up with OP SLP and RD.     Safe swallow protocol was reviewed and written copy provided for home use. Pt was in agreement. SLP encouraged pt to maintain soft solid diet with no crumbly textures and avoid bread. SLP encouraged pt to monitor for s/s of aspiration or removal of PO from trach. Thin by rim of cup. Small sip/bites, slow rate, upright position during and after meals, 2 swallows per bite/drink, alternate bites/drinks, monitor for wet vocal quality/cough and re-swallow as needed. Pt was in agreement.      Pt encouraged to continue swallowing and OME program of lateralization, ingrid, and tongue protrusion. Pt is not completing exercises as recommended.      Pt has a passy thaddeus valve but does not currently wear.Patient would benefit from skilled speech therapy 2x a month for increased swallowing  safety and preservation of swallowing function following completion of radiation and chemo as well as diet upgrade and increasing safety.         Agnes Barone, MS CCC-SLP     Patient Active Problem List   Diagnosis   • Ascending aortic dissection   • Essential hypertension   • Deep vein thrombosis (DVT) of femoral vein of both lower extremities   • Hypothyroidism   • Snoring   • Acute pain of right knee   • Knee effusion, right   • Knee pain   • Obstructive sleep apnea of adult   • TIA (transient ischemic attack)   • Squamous cell carcinoma of larynx   • Dysphagia   • Squamous cell carcinoma of larynx   • Pharyngeal dysphagia   • Anemia   • Abnormal positron emission tomography (PET) of colon   • Hypokalemia   • Encounter for venous access device care   • Dehydration   • Weight loss, abnormal   • Odynophagia   • Thrush, oral   • Thrombocytopenia   • Pancytopenia due to antineoplastic chemotherapy   • Unable to eat        Visit Dx:    ICD-10-CM ICD-9-CM   1. Pharyngeal dysphagia R13.13 787.23             SLP Adult Swallow Evaluation - 06/11/18 0900        Rehab Evaluation    Document Type therapy note (daily note)  -EA    Subjective Information no complaints  -EA    Patient Observations alert;cooperative;agree to therapy  -EA    Patient/Family Observations Pt attended therapy session alone this date.   -EA    Patient Effort good  -EA    Symptoms Noted During/After Treatment none  -EA       General Information    Patient Profile Reviewed yes  -EA    Current Method of Nutrition soft textures;thin liquids  -EA    Precautions/Limitations, Vision WFL with corrective lenses  -EA    Precautions/Limitations, Hearing WFL  -EA    Prior Level of Function-Communication WFL  -EA    Prior Level of Function-Swallowing no diet consistency restrictions  -EA    Plans/Goals Discussed with patient;family  -EA    Barriers to Rehab none identified  -EA       Pain Scale: Numbers Pre/Post-Treatment    Pain Scale: Numbers, Pretreatment  1/10  -EA    Pain Scale: Numbers, Post-Treatment 1/10  -EA    Pain Location - Side Bilateral  -EA    Pain Location - Orientation --   throat *burning   -EA    Pain Location ear   jaw; throat   -EA       Oral Motor and Function    Dentition Assessment natural, present and adequate;other (see comments)   changes in coloration of teeth following radiation   -EA    Secretion Management WNL/WFL  -EA    Mucosal Quality moist, healthy  -EA    Volitional Swallow delayed  -EA       Oral Musculature and Cranial Nerve Assessment    Oral Motor General Assessment WFL  -EA       General Eating/Swallowing Observations    Respiratory Support Currently in Use trach collar  -EA    Eating/Swallowing Skills self-fed  -EA    Positioning During Eating upright 90 degree;upright in chair  -EA    Utensils Used spoon  -EA    Consistencies Trialed thin liquids  -EA       Respiratory    Respiratory Status room air  -EA       Clinical Swallow Eval    Oral Prep Phase impaired  -EA    Oral Transit impaired  -EA    Oral Residue WFL  -EA    Pharyngeal Phase suspected pharyngeal impairment  -EA    Esophageal Phase unremarkable  -EA       Oral Prep Concerns    Oral Prep Concerns prolonged mastication;increased prep time  -EA    Prolonged Mastication thin;pudding  -EA    Increased Prep Time thin;pudding  -EA       Oral Transit Concerns    Oral Transit Concerns delayed initiation of bolus transit  -EA    Delayed Intiation of Bolus Transit thin;pudding  -EA       Pharyngeal Phase Concerns    Pharyngeal Phase Concerns cough  -EA    Cough thin;pudding  -EA       Clinical Impression    SLP Swallowing Diagnosis mild-moderate;oral dysfunction;suspected pharyngeal dysfunction  -EA    Functional Impact risk of malnutrition;risk of aspiration/pneumonia  -EA    Rehab Potential/Prognosis, Swallowing good, to achieve stated therapy goals  -EA    Criteria for Skilled Therapeutic Interventions Met demonstrates skilled criteria  -EA       Recommendations    Therapy  Frequency (Swallow) 1 day per week  -EA    Predicted Duration Therapy Intervention (Days) until discharge  -EA    SLP Diet Recommendation mechanical soft with no mixed consistencies;thin liquids  -EA    Recommended Precautions and Strategies upright posture during/after eating;small bites of food and sips of liquid;multiple swallows per bite of food;multiple swallows per sip of liquid;hard swallow with each bite or sip  -EA    Monitor for Signs of Aspiration yes;notify SLP if any concerns  -EA    Anticipated Dischage Disposition home  -EA       Oral Nutrition/Hydration Goal 1 (SLP)    Oral Nutrition/Hydration Goal 1, SLP Patient will tolerate least restrictive diet with no overt s/s of aspiration using safe swallow protocol.   -EA    Time Frame (Oral Nutrition/Hydration Goal 1, SLP) by discharge  -EA    Barriers (Oral Nutrition/Hydration Goal 1, SLP) laryngeal cancer   -EA    Progress/Outcomes (Oral Nutrition/Hydration Goal 1, SLP) goal ongoing  -EA       Pharyngeal Strengthening Exercise Goal 1 (SLP)    Activity (Pharyngeal Strengthening Goal 1, SLP) increase timing;increase tongue base retraction  -EA    Increase Timing ingrid;hard effortful swallow;prepping - 3 second prep or suck swallow or 3-step swallow   OME set - Prophylactic swallowing exercises   -EA    Increase Tongue Base Retraction ingrid;hard effortful swallow;prepping - 3 second prep or suck swallow or 3-step swallow  -EA    [place]/Accuracy (Pharyngeal Strengthening Goal 1, SLP) independently (over 90% accuracy)  -EA    Time Frame (Pharyngeal Strengthening Goal 1, SLP) by discharge  -EA    Barriers (Pharyngeal Strengthening Goal 1, SLP) laryngeal cancer   -EA    Progress/Outcomes (Pharyngeal Strengthening Goal 1, SLP) goal ongoing   3 sets of OME completed this date; increased pain   -EA       Improve oral skills    To Improve Oral Skills, patient will: Increase tongue A-P movement;90%  -EA       Improve timing of pharyngeal response    To  improve timing of pharyngeal response, patient will: Swallow in timely way using three second prep;90%  -EA    Status: Improve timing of pharyngeal response Progressing as expected  -EA    Timing of Pharyngeal Response Progress 70%;with consistent cues  -EA    Comments: Improve timing of pharyngeal response MBS reviewed - diet upgraded educated; safe swallow protocol reviewed and developed  -EA       Oral Motor Structure and Function    Dysphagia Treatment Objectives Improve oral skills;Improve timing of pharyngeal response  -EA      User Key  (r) = Recorded By, (t) = Taken By, (c) = Cosigned By    Initials Name Provider Type    KAYLEY Agnesgloria Barone MS CCC-SLP Speech and Language Pathologist                                  OP SLP Education     Row Name 06/11/18 0900       Education    Barriers to Learning No barriers identified  -EA    Education Provided Patient demonstrated recommended strategies;Patient requires further education on strategies, risks  -EA    Assessed Learning needs;Learning motivation  -EA    Learning Motivation Strong  -EA    Learning Method Explanation;Demonstration;Written materials  -EA    Teaching Response Verbalized understanding;Demonstrated understanding  -EA    Education Comments SLP reviewed MBS, safe swallow protocol, and exercise program. Pt was in agreement.   -EA      User Key  (r) = Recorded By, (t) = Taken By, (c) = Cosigned By    Initials Name Effective Dates    KAYLEY ThompsonAgnes S MS [facility]-SLP 10/17/16 -                 OP SLP Assessment/Plan - 06/11/18 0900        SLP Assessment    Functional Problems Swallowing  -EA    Impact on Function: Swallowing Risk of aspiration;Impact on social aspects of eating  -EA    Clinical Impression: Swallowing Moderate:;oropharyngeal phase dysphagia  -EA    Functional Problems Comment MBS reviewed - diet initiated with safe swallow protocol  -EA    Clinical Impression Comments Radiaton/chemo completed.   -EA    SLP Diagnosis Dysphagia   -EA     Prognosis Good (comment)  -EA    Patient/caregiver participated in establishment of treatment plan and goals Yes  -EA    Patient would benefit from skilled therapy intervention Yes  -EA       SLP Plan    Frequency 2x a month   -EA    Duration until discharge   -EA    Planned CPT's? SLP SWALLOW THERAPY: 09926  -EA    Expected Duration Therapy Session - minutes 30-45 minutes  -EA    Plan Comments Continue   -EA      User Key  (r) = Recorded By, (t) = Taken By, (c) = Cosigned By    Initials Name Provider Type    EA Agnes Barone MS CCC-SLP Speech and Language Pathologist                Time Calculation:                     Agnes Barone MS CCC-SLP  6/11/2018

## 2018-06-25 ENCOUNTER — HOSPITAL ENCOUNTER (OUTPATIENT)
Dept: SPEECH THERAPY | Facility: HOSPITAL | Age: 58
Setting detail: THERAPIES SERIES
Discharge: HOME | End: 2018-06-25

## 2018-06-25 ENCOUNTER — OFFICE VISIT (OUTPATIENT)
Dept: OTOLARYNGOLOGY | Facility: CLINIC | Age: 58
End: 2018-06-25

## 2018-06-25 VITALS — HEIGHT: 72 IN | WEIGHT: 222.2 LBS | BODY MASS INDEX: 30.1 KG/M2 | OXYGEN SATURATION: 97 %

## 2018-06-25 DIAGNOSIS — T36.5X5A: Primary | ICD-10-CM

## 2018-06-25 DIAGNOSIS — Z93.0 TRACHEOSTOMY STATUS (HCC): ICD-10-CM

## 2018-06-25 DIAGNOSIS — R13.12 OROPHARYNGEAL DYSPHAGIA: Primary | ICD-10-CM

## 2018-06-25 PROCEDURE — 92526 ORAL FUNCTION THERAPY: CPT | Performed by: SPEECH-LANGUAGE PATHOLOGIST

## 2018-06-25 PROCEDURE — G8997 SWALLOW GOAL STATUS: HCPCS | Performed by: SPEECH-LANGUAGE PATHOLOGIST

## 2018-06-25 PROCEDURE — 99212 OFFICE O/P EST SF 10 MIN: CPT | Performed by: OTOLARYNGOLOGY

## 2018-06-25 PROCEDURE — G8996 SWALLOW CURRENT STATUS: HCPCS | Performed by: SPEECH-LANGUAGE PATHOLOGIST

## 2018-06-25 RX ORDER — TRIAMCINOLONE ACETONIDE 1 MG/G
CREAM TOPICAL 2 TIMES DAILY
Qty: 45 G | Refills: 2 | Status: SHIPPED | OUTPATIENT
Start: 2018-06-25 | End: 2019-03-14 | Stop reason: SDUPTHER

## 2018-06-25 NOTE — THERAPY PROGRESS REPORT/RE-CERT
Outpatient Speech Language Pathology   Adult Swallow Progress Note  [facility]     Patient Name: Truman Esquivel  : 1960  MRN: [id number]  Today's Date: 2018         Visit Date: 2018     Patient insurance has approved 4 visits for speech therapy with an additional 4 visits requested and approved on 2018.     Laryngeal cancer; Stage III (cT3, cN0, cM0).      Patient has attended 6/6 scheduled therapy sessions.      2018 - MBS was completed which showed aspiration and penetration on most trials as well as a discontinuation of assessment due to increased pain (see MBS for full report). NPO was recommended and pt received a PEG tube for nutrition and hydration. Pt is maintaining weight with PEG tube and is following recommendations of NPO with small teaspoon sips throughout the day following oral care.      2018 - MBS was completed with no aspiration noted. Laryngeal penetration noted on large thin sip from cup rim. Piece meal noted for solid trials with recommendations of small bites. Decreased epiglottic formation and movement. Recommendation of increasing PO intake with follow-up with OP SLP and RD.      Safe swallow protocol was reviewed and written copy provided for home use. Pt was in agreement. SLP encouraged pt to maintain soft solid diet with no crumbly textures and avoid bread. SLP encouraged pt to monitor for s/s of aspiration or removal of PO from trach. Thin by rim of cup. Small sip/bites, slow rate, upright position during and after meals, 2 swallows per bite/drink, alternate bites/drinks, monitor for wet vocal quality/cough and re-swallow as needed. Pt was in agreement.     Pt completed soft solid/purees and thin liquids by rim of cup with no s/s of aspiration this date. Pt complaints of scratching and increased pain/tender when swallowing. Pt is avoiding most PO and is relying on PEG tube for 90% of nutrition and hydration. SLP encouraged pt to increase PO intake  and lean more towards purees at this time. Pt was resistant to information at times.      Pt encouraged to continue swallowing and OME program of lateralization, ingrid, and tongue protrusion. Pt is not completing exercises as recommended.      Pt has a passy thaddeus valve but does not currently wear.Patient would benefit from skilled speech therapy 2x a month for 24 weeks for increased swallowing safety and preservation of swallowing function following completion of radiation and chemo as well as diet upgrade and increasing safety.          Agnes Barone MS CCC-SLP    Patient Active Problem List   Diagnosis   • Ascending aortic dissection   • Essential hypertension   • Deep vein thrombosis (DVT) of femoral vein of both lower extremities   • Hypothyroidism   • Snoring   • Acute pain of right knee   • Knee effusion, right   • Knee pain   • Obstructive sleep apnea of adult   • TIA (transient ischemic attack)   • Squamous cell carcinoma of larynx   • Dysphagia   • Squamous cell carcinoma of larynx   • Pharyngeal dysphagia   • Anemia   • Abnormal positron emission tomography (PET) of colon   • Hypokalemia   • Encounter for venous access device care   • Dehydration   • Weight loss, abnormal   • Odynophagia   • Thrush, oral   • Thrombocytopenia   • Pancytopenia due to antineoplastic chemotherapy   • Unable to eat        Visit Dx:    ICD-10-CM ICD-9-CM   1. Oropharyngeal dysphagia R13.12 787.22             SLP Adult Swallow Evaluation - 06/25/18 1003        Rehab Evaluation    Document Type therapy note (daily note);re-evaluation  -EA    Subjective Information no complaints  -EA    Patient Observations alert;cooperative;agree to therapy  -EA    Patient/Family Observations Pt attended therapy alone this date. Pt stated that he is having increased difficulties duirng swallowing with coughing and pain.   -EA    Patient Effort good  -EA    Symptoms Noted During/After Treatment none  -EA       General Information    Patient  Profile Reviewed yes  -EA    Current Method of Nutrition soft textures;thin liquids  -EA    Precautions/Limitations, Vision WFL with corrective lenses  -EA    Precautions/Limitations, Hearing WFL  -EA    Prior Level of Function-Communication WFL  -EA    Prior Level of Function-Swallowing no diet consistency restrictions  -EA    Plans/Goals Discussed with patient;family  -EA    Barriers to Rehab none identified  -EA       Pain Scale: Numbers Pre/Post-Treatment    Pain Scale: Numbers, Pretreatment 1/10  -EA    Pain Scale: Numbers, Post-Treatment 1/10  -EA    Pain Location - Side Bilateral  -EA    Pain Location - Orientation --   throat *burning   -EA    Pain Location ear   jaw; throat   -EA       Oral Motor and Function    Dentition Assessment natural, present and adequate;other (see comments)   changes in coloration of teeth following radiation   -EA    Secretion Management WNL/WFL  -EA    Mucosal Quality moist, healthy  -EA    Volitional Swallow delayed  -EA       Oral Musculature and Cranial Nerve Assessment    Oral Motor General Assessment WFL  -EA       General Eating/Swallowing Observations    Respiratory Support Currently in Use trach collar  -EA    Eating/Swallowing Skills self-fed  -EA    Positioning During Eating upright 90 degree;upright in chair  -EA    Utensils Used spoon  -EA    Consistencies Trialed thin liquids  -EA       Respiratory    Respiratory Status room air  -EA       Clinical Swallow Eval    Oral Prep Phase impaired  -EA    Oral Transit impaired  -EA    Oral Residue WFL  -EA    Pharyngeal Phase suspected pharyngeal impairment  -EA    Esophageal Phase unremarkable  -EA       Oral Prep Concerns    Oral Prep Concerns prolonged mastication;increased prep time  -EA    Prolonged Mastication thin;pudding  -EA    Increased Prep Time thin;pudding  -EA       Oral Transit Concerns    Oral Transit Concerns delayed initiation of bolus transit  -EA    Delayed Intiation of Bolus Transit thin;pudding  -EA        Pharyngeal Phase Concerns    Pharyngeal Phase Concerns cough  -EA    Cough thin;pudding  -EA       Clinical Impression    SLP Swallowing Diagnosis mild-moderate;oral dysfunction;suspected pharyngeal dysfunction  -EA    Functional Impact risk of malnutrition;risk of aspiration/pneumonia  -EA    Rehab Potential/Prognosis, Swallowing good, to achieve stated therapy goals  -EA    Criteria for Skilled Therapeutic Interventions Met demonstrates skilled criteria  -EA       Recommendations    Therapy Frequency (Swallow) 1 day per week  -EA    Predicted Duration Therapy Intervention (Days) until discharge  -EA    SLP Diet Recommendation mechanical soft with no mixed consistencies;thin liquids  -EA    Recommended Precautions and Strategies upright posture during/after eating;small bites of food and sips of liquid;multiple swallows per bite of food;multiple swallows per sip of liquid;hard swallow with each bite or sip  -EA    Monitor for Signs of Aspiration yes;notify SLP if any concerns  -EA    Anticipated Dischage Disposition home  -EA       Oral Nutrition/Hydration Goal 1 (SLP)    Oral Nutrition/Hydration Goal 1, SLP Patient will tolerate least restrictive diet with no overt s/s of aspiration using safe swallow protocol.   -EA    Time Frame (Oral Nutrition/Hydration Goal 1, SLP) by discharge  -EA    Barriers (Oral Nutrition/Hydration Goal 1, SLP) laryngeal cancer   -EA    Progress/Outcomes (Oral Nutrition/Hydration Goal 1, SLP) goal ongoing  -EA       Pharyngeal Strengthening Exercise Goal 1 (SLP)    Activity (Pharyngeal Strengthening Goal 1, SLP) increase timing;increase tongue base retraction  -EA    Increase Timing ingrid;hard effortful swallow;prepping - 3 second prep or suck swallow or 3-step swallow   OME set - Prophylactic swallowing exercises   -EA    Increase Tongue Base Retraction ingrid;hard effortful swallow;prepping - 3 second prep or suck swallow or 3-step swallow  -EA    [place]/Accuracy (Pharyngeal  Strengthening Goal 1, SLP) independently (over 90% accuracy)  -EA    Time Frame (Pharyngeal Strengthening Goal 1, SLP) by discharge  -EA    Barriers (Pharyngeal Strengthening Goal 1, SLP) laryngeal cancer   -EA    Progress/Outcomes (Pharyngeal Strengthening Goal 1, SLP) goal ongoing   3 sets of OME completed this date; increased pain   -EA       Improve oral skills    To Improve Oral Skills, patient will: Increase tongue A-P movement;90%  -EA       Improve timing of pharyngeal response    To improve timing of pharyngeal response, patient will: Swallow in timely way using three second prep;90%  -EA    Status: Improve timing of pharyngeal response Progressing as expected  -EA    Timing of Pharyngeal Response Progress 70%;with consistent cues  -EA    Comments: Improve timing of pharyngeal response Pt is eating limited PO at this time. Pt stated that he is having scratching/pain in the throat during swallowing so he is avoiding it. Education was reviewed for increasing PO safely, food choices, s/s of aspiration, safe swallow strategies.   -EA       Oral Motor Structure and Function    Dysphagia Treatment Objectives Improve oral skills;Improve timing of pharyngeal response  -EA      User Key  (r) = Recorded By, (t) = Taken By, (c) = Cosigned By    Initials Name Provider Type    KAYLEY Barone MS CCC-SLP Speech and Language Pathologist                              SLP OP Goals     Row Name 06/25/18 1203          SLP Time Calculation    SLP Goal Re-Cert Due Date 07/11/18  -EA       User Key  (r) = Recorded By, (t) = Taken By, (c) = Cosigned By    Initials Name Provider Type    KAYLEY Barone MS CCC-SLP Speech and Language Pathologist                OP SLP Education     Row Name 06/25/18 1200       Education    Barriers to Learning No barriers identified  -EA    Education Provided Patient demonstrated recommended strategies;Patient requires further education on strategies, risks  -EA    Assessed Learning  needs;Learning motivation  -EA    Learning Motivation Strong  -EA    Learning Method Explanation;Demonstration;Written materials  -EA    Teaching Response Verbalized understanding;Demonstrated understanding  -EA    Education Comments SLP reviewed the results of the MBS and recommendations for PO diet. Safe swallow protocol reviewed. Pt was in agreement.   -EA      User Key  (r) = Recorded By, (t) = Taken By, (c) = Cosigned By    Initials Name Effective Dates    KAYLEY Barone MS CCC-SLP 10/17/16 -                 OP SLP Assessment/Plan - 06/25/18 1100        SLP Assessment    Functional Problems Swallowing  -EA    Impact on Function: Swallowing Risk of aspiration  -EA    Clinical Impression: Swallowing Mild-Moderate:;oropharyngeal phase dysphagia  -EA    Functional Problems Comment MBS was reviewed again this date. Trails of soft solid/puree and thin by rim of cup with no overt s/s of aspiration.   -EA    Clinical Impression Comments Safe swallow strategies reviewed, exercises provided, and encouragement to increase PO. Pt is currently eating less than 1 meal each day orally. Alternate nutrition and hydration is primary score of nutrition.   -EA    SLP Diagnosis Dysphagia   -EA    Prognosis Good (comment)  -EA    Patient/caregiver participated in establishment of treatment plan and goals Yes  -EA    Patient would benefit from skilled therapy intervention Yes  -EA       SLP Plan    Frequency 2x a month   -EA    Duration 24 weeks   -EA    Planned CPT's? SLP SWALLOW THERAPY: 62078  -EA    Expected Duration Therapy Session - minutes 30-45 minutes  -EA    Plan Comments Continue   -EA      User Key  (r) = Recorded By, (t) = Taken By, (c) = Cosigned By    Initials Name Provider Type    KAYLEY Barone MS CCC-SLP Speech and Language Pathologist                Time Calculation:   SLP Start Time: 1003  SLP Stop Time: 1110  SLP Time Calculation (min): 67 min  Total Timed Code Minutes- SLP: 67  minute(s)    Therapy Charges for Today     Code Description Service Date Service Provider Modifiers Qty    64421895643 HC ST SWALLOWING CURRENT STATUS 6/25/2018 Agnes Barone MS CCC-SLP GN, CJ 1    41434798859 HC ST SWALLOWING PROJECTED 6/25/2018 Agnes Barone MS CCC-SLP GN, CI 1    [id number] HC ST TREATMENT SWALLOW 4 6/25/2018 MS NABIL Banks GN 1          SLP G-Codes  SLP NOMS Used?: Yes  Functional Limitations: Swallowing  Swallow Current Status (): At least 20 percent but less than 40 percent impaired, limited or restricted  Swallow Goal Status (): At least 1 percent but less than 20 percent impaired, limited or restricted        MS NABIL Banks  6/25/2018

## 2018-06-27 ENCOUNTER — OFFICE VISIT (OUTPATIENT)
Dept: ONCOLOGY | Facility: CLINIC | Age: 58
End: 2018-06-27

## 2018-06-27 ENCOUNTER — LAB (OUTPATIENT)
Dept: ONCOLOGY | Facility: HOSPITAL | Age: 58
End: 2018-06-27

## 2018-06-27 VITALS
WEIGHT: 223 LBS | HEIGHT: 72 IN | RESPIRATION RATE: 18 BRPM | BODY MASS INDEX: 30.2 KG/M2 | SYSTOLIC BLOOD PRESSURE: 143 MMHG | DIASTOLIC BLOOD PRESSURE: 72 MMHG | HEART RATE: 61 BPM | TEMPERATURE: 98.5 F

## 2018-06-27 DIAGNOSIS — C32.9 SQUAMOUS CELL CARCINOMA OF LARYNX (HCC): Primary | ICD-10-CM

## 2018-06-27 DIAGNOSIS — Z45.2 ENCOUNTER FOR VENOUS ACCESS DEVICE CARE: ICD-10-CM

## 2018-06-27 DIAGNOSIS — D64.9 ANEMIA, UNSPECIFIED TYPE: ICD-10-CM

## 2018-06-27 LAB
ALBUMIN SERPL-MCNC: 4.2 G/DL (ref 3.4–4.8)
ALBUMIN/GLOB SERPL: 1.5 G/DL (ref 1.1–1.8)
ALP SERPL-CCNC: 64 U/L (ref 38–126)
ALT SERPL W P-5'-P-CCNC: 27 U/L (ref 21–72)
ANION GAP SERPL CALCULATED.3IONS-SCNC: 12 MMOL/L (ref 5–15)
AST SERPL-CCNC: 21 U/L (ref 17–59)
BASOPHILS # BLD AUTO: 0.02 10*3/MM3 (ref 0–0.2)
BASOPHILS NFR BLD AUTO: 0.6 % (ref 0–2)
BILIRUB SERPL-MCNC: 0.3 MG/DL (ref 0.2–1.3)
BUN BLD-MCNC: 18 MG/DL (ref 7–21)
BUN/CREAT SERPL: 19.1 (ref 7–25)
CALCIUM SPEC-SCNC: 8.8 MG/DL (ref 8.4–10.2)
CHLORIDE SERPL-SCNC: 92 MMOL/L (ref 95–110)
CO2 SERPL-SCNC: 28 MMOL/L (ref 22–31)
CREAT BLD-MCNC: 0.94 MG/DL (ref 0.7–1.3)
DEPRECATED RDW RBC AUTO: 55.6 FL (ref 35.1–43.9)
EOSINOPHIL # BLD AUTO: 0.07 10*3/MM3 (ref 0–0.7)
EOSINOPHIL NFR BLD AUTO: 2 % (ref 0–7)
ERYTHROCYTE [DISTWIDTH] IN BLOOD BY AUTOMATED COUNT: 18.1 % (ref 11.5–14.5)
GFR SERPL CREATININE-BSD FRML MDRD: 83 ML/MIN/1.73 (ref 56–130)
GLOBULIN UR ELPH-MCNC: 2.8 GM/DL (ref 2.3–3.5)
GLUCOSE BLD-MCNC: 90 MG/DL (ref 60–100)
HCT VFR BLD AUTO: 32.8 % (ref 39–49)
HGB BLD-MCNC: 10.9 G/DL (ref 13.7–17.3)
IMM GRANULOCYTES # BLD: 0.01 10*3/MM3 (ref 0–0.02)
IMM GRANULOCYTES NFR BLD: 0.3 % (ref 0–0.5)
LYMPHOCYTES # BLD AUTO: 0.35 10*3/MM3 (ref 0.6–4.2)
LYMPHOCYTES NFR BLD AUTO: 10.2 % (ref 10–50)
MCH RBC QN AUTO: 27.7 PG (ref 26.5–34)
MCHC RBC AUTO-ENTMCNC: 33.2 G/DL (ref 31.5–36.3)
MCV RBC AUTO: 83.2 FL (ref 80–98)
MONOCYTES # BLD AUTO: 0.43 10*3/MM3 (ref 0–0.9)
MONOCYTES NFR BLD AUTO: 12.5 % (ref 0–12)
NEUTROPHILS # BLD AUTO: 2.56 10*3/MM3 (ref 2–8.6)
NEUTROPHILS NFR BLD AUTO: 74.4 % (ref 37–80)
PLATELET # BLD AUTO: 291 10*3/MM3 (ref 150–450)
PMV BLD AUTO: 10.1 FL (ref 8–12)
POTASSIUM BLD-SCNC: 3.6 MMOL/L (ref 3.5–5.1)
PROT SERPL-MCNC: 7 G/DL (ref 6.3–8.6)
RBC # BLD AUTO: 3.94 10*6/MM3 (ref 4.37–5.74)
SODIUM BLD-SCNC: 132 MMOL/L (ref 137–145)
WBC NRBC COR # BLD: 3.44 10*3/MM3 (ref 3.2–9.8)

## 2018-06-27 PROCEDURE — 80053 COMPREHEN METABOLIC PANEL: CPT

## 2018-06-27 PROCEDURE — 99214 OFFICE O/P EST MOD 30 MIN: CPT | Performed by: INTERNAL MEDICINE

## 2018-06-27 PROCEDURE — 36591 DRAW BLOOD OFF VENOUS DEVICE: CPT | Performed by: INTERNAL MEDICINE

## 2018-06-27 PROCEDURE — 25010000002 HEPARIN FLUSH (PORCINE) 100 UNIT/ML SOLUTION: Performed by: INTERNAL MEDICINE

## 2018-06-27 PROCEDURE — G0463 HOSPITAL OUTPT CLINIC VISIT: HCPCS | Performed by: INTERNAL MEDICINE

## 2018-06-27 PROCEDURE — 1123F ACP DISCUSS/DSCN MKR DOCD: CPT | Performed by: INTERNAL MEDICINE

## 2018-06-27 PROCEDURE — 85025 COMPLETE CBC W/AUTO DIFF WBC: CPT

## 2018-06-27 RX ORDER — SODIUM CHLORIDE 0.9 % (FLUSH) 0.9 %
10 SYRINGE (ML) INJECTION AS NEEDED
Status: DISCONTINUED | OUTPATIENT
Start: 2018-06-27 | End: 2018-06-27 | Stop reason: HOSPADM

## 2018-06-27 RX ORDER — SODIUM CHLORIDE 0.9 % (FLUSH) 0.9 %
10 SYRINGE (ML) INJECTION AS NEEDED
Status: CANCELLED | OUTPATIENT
Start: 2018-09-19

## 2018-06-27 RX ADMIN — Medication 10 ML: at 10:04

## 2018-06-27 RX ADMIN — SODIUM CHLORIDE, PRESERVATIVE FREE 500 UNITS: 5 INJECTION INTRAVENOUS at 10:40

## 2018-06-27 NOTE — PROGRESS NOTES
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  Upon staging workup patient was found to have stage III squamous cell cancer of larynx for which she has been started on concurrent chemoradiation with weekly carboplatin and Taxol on March 19, 2018.  As dose of chemotherapy and radiation was on May 15, 2018.  Patient is here for follow-up appointment today.  Still complains of intermittent difficulty swallowing.  Denies any active bleeding.  Denies any new lymph node enlargement.    PAST MEDICAL HISTORY:    Past Medical History:   Diagnosis Date   • Aortic dissection    • Chest pain    • Disease of thyroid gland    • HTN (hypertension)    • Knee pain    • Sleep apnea    • Smoker    • Squamous cell carcinoma of larynx 2/5/2018   • Stroke    • Swallowing difficulty        SOCIAL HISTORY:    Social History   Substance Use Topics   • Smoking status: Former Smoker     Packs/day: 1.25     Years: 38.00     Quit date: 2/13/2017   • Smokeless tobacco: Never Used      Comment: 02/27/2018 - Patient confirmed he ceased utilization of tobacco products 02/13/2017.   • Alcohol use No       Surgical History :  Past Surgical History:   Procedure Laterality Date   • AORTA SURGERY      ruptured aorta repair   • ASCENDING ARCH/HEMIARCH REPLACEMENT N/A 2/14/2017    Procedure: INTRAOPERATIVE TRANSESOPHAGEAL ECHOCARDIOGRAM, MIDLINE STERNOTOMY, ASCENDING AORTIC  AND PROXIMAL AORTIC ARCH REPAIR WITH 26MM GRAFT, AORTIC VALVE RESUSPENSION, AORTIC ROOT REPAIR, OPEN VEIN HARVEST OF RIGHT LEG;  Surgeon: German Arreguin MD;  Location: [facility]  OR;  Service:    • BRONCHOSCOPY N/A 2/17/2017    Procedure: BRONCHOSCOPY BIOPSY AT BEDSIDE WITH BAL-LEFT LOWER LOBE;  Surgeon: Trent Chaney MD;  Location: [facility] ENDOSCOPY;  Service:    • COLONOSCOPY N/A 3/7/2018    Procedure: COLONOSCOPY WITH POSSIBLE POLYPECTOMY   ( DONE IN OR WITH ENDO)      COLONOSCOPY FIRST;  Surgeon: Kris Solano MD;  Location: [facility] OR;  Service:    • DIRECT LARYNGOSCOPY, ESOPHAGOSCOPY, BRONCHOSCOPY N/A 2/5/2018    Procedure: DIRECT LARYNGOSCOPY WITH BIOPSY, ESOPHAGOSCOPY     (no bronchoscopy, no laser);  Surgeon: Joseph Venegas MD;  Location: [facility] OR;  Service:    • ENDOSCOPY W/ PEG TUBE PLACEMENT N/A 5/2/2018    Procedure: ESOPHAGOGASTRODUODENOSCOPY WITH PERCUTANEOUS ENDOSCOPIC GASTROSTOMY TUBE INSERTION;  Surgeon: Angel Maciel MD;  Location: [facility] ENDOSCOPY;  Service: Gastroenterology   • TRACHEOSTOMY  02/05/2018   • TRACHEOSTOMY N/A 2/5/2018    Procedure: TRACHEOSTOMY  local injection @ 1106 incision @ 1119;  Surgeon: Joseph Venegas MD;  Location: [facility] OR;  Service:    • VENOUS ACCESS DEVICE (PORT) INSERTION N/A 3/7/2018    Procedure: INSERTION OF MEDIPORT     (C-ARM#1);  Surgeon: Kris Solano MD;  Location: [facility] OR;  Service:        ALLERGIES:    Allergies   Allergen Reactions   • Chlorhexidine Itching     Topical cleaning wipes causes severe skin irritation   • Co Q 10 [Coenzyme Q10] Itching   • Eggs Or Egg-Derived Products Swelling   • Erythromycin Other (See Comments)     Joint pains and cold sweats   • Gabapentin Itching   • Other GI Intolerance     Mycins  farzad lotion causes rash   • Penicillins      Tolerated cefepime during 2/2017 admission. Had rash and itching (relieved by benadryl) after few doses of cefepime and cefepime was continued.   • Tetracyclines & Related GI Intolerance   • Acth [Corticotropin] Rash   • Cephalosporins Itching and Rash     Pt developed rash, itching after cefepime administration (2-3 doses) during  "2/2017 admission. Itching was relieved with benadryl. Cefepime was continued because his infection was improving and no symptoms of anaphylaxis present   • Keflex [Cephalexin] Rash   • Neomycin Rash         FAMILY HISTORY:  Family History   Problem Relation Age of Onset   • Thyroid disease Mother    • Hypertension Mother    • Hypertension Father    • Hypertension Other            REVIEW OF SYSTEMS:      CONSTITUTIONAL:  Complains of fatigue. Has gained 5 pounds since last clinic visit.  Denies any fever, chills .      HEENT:  Some visual disturbance affecting left eye since stroke in 2017.  Complains of hoarseness of voice.  Complains of odynophagia, predominantly using feeding tube for nutritional purposes.     RESPIRATORY:  Complains of chronic shortness of breath, denies any recent worsening.  No new cough or hemoptysis.     CARDIOVASCULAR:  No chest pain or palpitations.     GASTROINTESTINAL:  No abdominal pain nausea, vomiting or blood in the stool.     GENITOURINARY: No Dysuria or Hematuria.     MUSCULOSKELETAL:  No new back pain or arthralgia..     LYMPHATICS:  Denies any abnormal swollen glands anywhere in the body.     NEUROLOGICAL : No tingling or numbness. No headache or dizziness. No seizures or balance problems.     SKIN: no new skin lesions.          PHYSICAL EXAMINATION:      VITAL SIGNS:  /72   Pulse 61   Temp 98.5 °F (36.9 °C) (Temporal Artery )   Resp 18   Ht 182.9 cm (72.01\")   Wt 101 kg (223 lb)   BMI 30.24 kg/m²   1    06/27/18  1006   Weight: 101 kg (223 lb)       ECOG  performance status: 1      GENERAL:  Not in any distress.Appears uncomfortable.    HEENT:  Normocephalic, Atraumatic.Mild Conjunctival pallor. No icterus. Extraocular Movements Intact. No Facial Asymmetry noted. No Thrush on oropharynx examination today.    NECK:  No adenopathy. No JVD.Tracheostomy present.    RESPIRATORY:  Fair air entry bilateral. No rhonchi or wheezing.    CARDIOVASCULAR:  S1, S2. Regular rate and " rhythm. No murmur or gallop appreciated.    ABDOMEN:  Soft, obese, nontender. Bowel sounds present in all four quadrants.  No organomegaly appreciated.    MUSCULOSKELETAL:  No edema.No Calf Tenderness.Normal range of motion.    NEUROLOGIC:  Alert, awake and oriented ×3.  No  Motor  deficit appreciated. Cranial Nerves 2-12 grossly intact.    SKIN : Erythema present around tracheostomy tube.    LYMPHATICS: No new enlarged lymph node in neck or supraclavicular area.            DIAGNOSTIC DATA:    Glucose   Date Value Ref Range Status   06/27/2018 90 60 - 100 mg/dL Final     Sodium   Date Value Ref Range Status   06/27/2018 132 (L) 137 - 145 mmol/L Final     Potassium   Date Value Ref Range Status   06/27/2018 3.6 3.5 - 5.1 mmol/L Final     CO2   Date Value Ref Range Status   06/27/2018 28.0 22.0 - 31.0 mmol/L Final     Chloride   Date Value Ref Range Status   06/27/2018 92 (L) 95 - 110 mmol/L Final     Anion Gap   Date Value Ref Range Status   06/27/2018 12.0 5.0 - 15.0 mmol/L Final     Creatinine   Date Value Ref Range Status   06/27/2018 0.94 0.70 - 1.30 mg/dL Final     BUN   Date Value Ref Range Status   06/27/2018 18 7 - 21 mg/dL Final     BUN/Creatinine Ratio   Date Value Ref Range Status   06/27/2018 19.1 7.0 - 25.0 Final     Calcium   Date Value Ref Range Status   06/27/2018 8.8 8.4 - 10.2 mg/dL Final     eGFR Non  Amer   Date Value Ref Range Status   06/27/2018 83 56 - 130 mL/min/1.73 Final     Alkaline Phosphatase   Date Value Ref Range Status   06/27/2018 64 38 - 126 U/L Final     Total Protein   Date Value Ref Range Status   06/27/2018 7.0 6.3 - 8.6 g/dL Final     ALT (SGPT)   Date Value Ref Range Status   06/27/2018 27 21 - 72 U/L Final     AST (SGOT)   Date Value Ref Range Status   06/27/2018 21 17 - 59 U/L Final     Total Bilirubin   Date Value Ref Range Status   06/27/2018 0.3 0.2 - 1.3 mg/dL Final     Albumin   Date Value Ref Range Status   06/27/2018 4.20 3.40 - 4.80 g/dL Final     Globulin    Date Value Ref Range Status   06/27/2018 2.8 2.3 - 3.5 gm/dL Final     A/G Ratio   Date Value Ref Range Status   06/27/2018 1.5 1.1 - 1.8 g/dL Final     Lab Results   Component Value Date    WBC 3.44 06/27/2018    HGB 10.9 (L) 06/27/2018    HCT 32.8 (L) 06/27/2018    MCV 83.2 06/27/2018     06/27/2018     Lab Results   Component Value Date    NEUTROABS 2.56 06/27/2018    IRON 14 (L) 02/13/2018    TIBC 335 02/13/2018    LABIRON 4 (L) 02/13/2018    FERRITIN 64.40 02/13/2018    [id number] 546 03/14/2018    FOLATE >20.00 02/13/2018     No results found for: , LABCA2, AFPTM, HCGQUANT, , CHROMGRNA, [id number], CEA, REFLABREPO        Radiology Data :  PET CT done on February 16, 2018 was reviewed, discussed with patient, it showed:  IMPRESSION:  CONCLUSION:   1.  Soft tissue mass in the region of the epiglottis obstructing  the airway, associated with hypermetabolic activity, compatible  with known neoplasm.  2.  Activity around the tracheostomy site is likely inflammatory.  3.  Activity in the rectum (SUV max 7.1) is probably either  physiological or inflammatory. Recommend direct visualization.  4.  There is a focus of activity in the region of the right  common iliac artery without a definite CT abnormality (SUV max  5.4) possibly representing a lymph node, nonspecific but cannot  rule out neoplasm.  5.  Foci of activity posterior to the ischial tuberosities are  probably inflammatory.  6.  There are diffuse punctate low-level foci of activity  throughout the osseous structures, liver, kidneys, mediastinum  which are more likely artifactual than due to metastatic disease.    ADDENDUM   ADDENDUM #1         Upon review of the patient's imaging with Dr. Tapia, there  appears to be extension of tumor into the left side of the  preepiglottic space.        Ct of Neck with contrast done on January 29, 2018 showed:  IMPRESSION:  3 x 3.5 x 3.5 cm soft tissue mass with lobular margin centered at  the level  the epiglottis however flush 4 cm intimate with the  posterior margin of the tongue base and the posterior margin of  the oral pharynx. This does contribute to moderate compromise of  the airway. Finding is suspicious for malignancy.      Mostly subcentimeter cervical chain lymph nodes present  bilaterally. The largest node right level II measuring 1.3 cm. No  necrotic or conglomerate adenopathy.      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 The  dissection was described on a CTA coronary artery exam from  2/14/2017.        Pathology :  Pathology result from February 5, 2018 showed:  Final Diagnosis   1.  TUMOR, EPIGLOTTIS:   INVASIVE SQUAMOUS CELL CARCINOMA, NON KERATINIZING, POORLY DIFFERENTIATED.       2.  TUMOR, EPIGLOTTIS:   INVASIVE SQUAMOUS CELL CARCINOMA, NON KERATINIZING, POORLY DIFFERENTIATED.               ASSESSMENT AND PLAN:      1.  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  Based on PET/CT there is a tumor extension into preepiglottic space making a T3 lesion.  Result of PET CT were discussed with patient.  It was discussed with patient with T3 N0 lesion after his treatment option includes either surgery or concurrent chemoradiation.    -He was started on concurrent chemoradiation on March 19, 2018.  Patient received 6 weekly carboplatin and Taxol from March 19, 2018 until May 15, 2018.  Radiation was also completed on May 15, 2018.  -ENT exam by Dr. Venegas in June 7, 2018 showed good response to chemoradiation without any evidence of any active tumor.  -We will ask patient to return to clinic in 3 months with a repeat CBC to be done on that day.    2.   anemia: Anemia of chronic disease plus chemotherapy.  Hemoglobin is improved to 10.9.  We will monitored with CBC for now.    3.  Odynophagia and dysphagia: Most likely secondary to mucositis from chemoradiation. Patient currently has a feeding tube and  is using ferritin for nutritional purposes with good results.  He has gained 5 pounds since last clinic visit.     4.  History of aortic dissection status post repair.     5.  History of CVA with residual visual disturbance in left eye     6.  Health maintenance: Patient quit smoking in February 2017.  Never had a screening colonoscopy done.    7. Advance care planning: Advance Care Planning: For now patient remains full code and is able to make his decisions.  Patient has health care surrogate mentioned on chart.    Reza Patel MD  6/27/2018  5:11 PM        EMR Dragon/Transcription disclaimer:   Much of this encounter note is an electronic transcription/translation of spoken language to printed text. The electronic translation of spoken language may permit erroneous, or at times, nonsensical words or phrases to be inadvertently transcribed; Although I have reviewed the note for such errors, some may still exist.

## 2018-06-27 NOTE — PROGRESS NOTES
Subjective   Truman Esquivel is a 57 y.o. male.       History of Present Illness   Patient has a history of squamous cell carcinoma the larynx.  Recently completed his treatment.  Requested to work in today because he began having erythematous swelling around his tracheostomy over the last few days.  He says he was initially using bacitracin when the erythema first started and then he changed to Neosporin over the weekend and the erythema continued and worsened.      The following portions of the patient's history were reviewed and updated as appropriate: allergies, current medications, past family history, past medical history, past social history, past surgical history and problem list.     reports that he quit smoking about 16 months ago. He has a 47.50 pack-year smoking history. He has never used smokeless tobacco. He reports that he does not drink alcohol or use drugs.   Patient is not a tobacco user and has not been counseled for use of tobacco products      Review of Systems        Objective   Physical Exam  There is erythema inferior to the tracheostomy with a classic appearance of a neomycin topical reaction.  At the edge of the tracheostomy there are some minimal mucoid secretions suggesting a possible bacterial infection.      Assessment/Plan   Truman was seen today for follow-up.    Diagnoses and all orders for this visit:    Neomycin adverse reaction, initial encounter    Tracheostomy status    Other orders  -     mupirocin (BACTROBAN) 2 % ointment; Apply  topically 2 (Two) Times a Day.  -     triamcinolone (KENALOG) 0.1 % cream; Apply  topically 2 (Two) Times a Day.        Plan: Discontinue neomycin containing antibiotic ointment and list as allergy.  Prescribed topical mupirocin and topical triamcinolone to be used twice a day.  Return in 2 weeks, call sooner for problems.

## 2018-07-03 ENCOUNTER — HOSPITAL ENCOUNTER (OUTPATIENT)
Dept: CT IMAGING | Facility: HOSPITAL | Age: 58
Discharge: HOME | End: 2018-07-03
Admitting: NURSE PRACTITIONER

## 2018-07-03 DIAGNOSIS — I71.010 ASCENDING AORTIC DISSECTION (HCC): ICD-10-CM

## 2018-07-03 PROCEDURE — 0 IOPAMIDOL PER 1 ML: Performed by: NURSE PRACTITIONER

## 2018-07-03 PROCEDURE — 71275 CT ANGIOGRAPHY CHEST: CPT

## 2018-07-03 RX ADMIN — IOPAMIDOL 93 ML: 755 INJECTION, SOLUTION INTRAVENOUS at 08:02

## 2018-07-09 ENCOUNTER — HOSPITAL ENCOUNTER (OUTPATIENT)
Dept: SPEECH THERAPY | Facility: HOSPITAL | Age: 58
Setting detail: THERAPIES SERIES
Discharge: HOME | End: 2018-07-09

## 2018-07-09 DIAGNOSIS — R13.12 OROPHARYNGEAL DYSPHAGIA: Primary | ICD-10-CM

## 2018-07-09 PROCEDURE — 92526 ORAL FUNCTION THERAPY: CPT | Performed by: SPEECH-LANGUAGE PATHOLOGIST

## 2018-07-09 NOTE — THERAPY TREATMENT NOTE
Outpatient Speech Language Pathology   Adult Swallow Treatment Note  [facility]     Patient Name: Truman Esquivel  : 1960  MRN: [id number]  Today's Date: 2018         Visit Date: 2018     Patient insurance has approved 4 visits for speech therapy with an additional 4 visits requested and approved on 2018.     Laryngeal cancer; Stage III (cT3, cN0, cM0).      Patient has attended 7/7 scheduled therapy sessions.        Hx of current dysphagia:    2018 - MBS was completed which showed aspiration and penetration on most trials as well as a discontinuation of assessment due to increased pain (see MBS for full report). NPO was recommended and pt received a PEG tube for nutrition and hydration. Pt is maintaining weight with PEG tube and is following recommendations of NPO with small teaspoon sips throughout the day following oral care.      2018 - MBS was completed with no aspiration noted. Laryngeal penetration noted on large thin sip from cup rim. Piece meal noted for solid trials with recommendations of small bites. Decreased epiglottic formation and movement. Recommendation of increasing PO intake with follow-up with OP SLP and RD.      Safe swallow protocol was reviewed and written copy provided for home use. Pt was in agreement. SLP encouraged pt to maintain soft solid diet with no crumbly textures and avoid bread leaning more towards puree choices. SLP encouraged pt to monitor for s/s of aspiration or removal of PO from trach. Thin by rim of cup. Small sip/bites, slow rate, upright position during and after meals, 2 swallows per bite/drink, alternate bites/drinks, monitor for wet vocal quality/cough and re-swallow as needed. Pt was in agreement but is only eating minimal amount of PO. Pt stated that he feels as though be is swallowing glass. Pt has been avoiding PO and relying on tube feedings for all nutrition and hydration. SLP educated pt on the need to start eating and  drinking. Pt stated that he is not having as much discomfort with liquids. SLP reviewed a liquid diet/meal replacement. Pt was resistant to this and stated that he does not drink enough. SLP again reviewed the importance of starting somewhere.      Pt completed 1/2 cup soft purees and thin liquids by rim of cup with no s/s of aspiration this date. Pt complaints of scratching/irritation when swallowing. SLP encouraged pt to increase PO intake and lean more towards purees at this time. Pt was resistant to information at times and stated that if his body wont let him eat he is not going to force it.      Pt encouraged to continue swallowing and OME program of lateralization, ingrid, and tongue protrusion. Pt is not completing exercises as recommended.      Pt brought passy thaddeus valve into session this date. SLP reviewed use of passy thaddeus and cleaning. Pt was in agreement. SLP reviewed precautions with patient regarding not wearing while sleeping and removing if dizziness or lightheadedness occurs. Pt tolerated passy thaddeus valve for 45 minutes during session with no symptoms. SLP reviewed and educated pt on the benefits of wearing the passy thaddeus. Pt noted that his voice sounded better.     Patient would benefit from skilled speech therapy 2x a month for 24 weeks for increased swallowing safety and preservation of swallowing function following completion of radiation and chemo as well as diet upgrade and increasing safety.     SLP spoke with DAREN regarding pt request for 7 cans of jevity each day instead of 6 cans. SLP continued to encourage pt to increase oral PO and reviewed swallow safety.        Agnes Barone MS CCC-SLP    Patient Active Problem List   Diagnosis   • Ascending aortic dissection (CMS/HCC)   • Essential hypertension   • Deep vein thrombosis (DVT) of femoral vein of both lower extremities (CMS/HCC)   • Hypothyroidism   • Snoring   • Acute pain of right knee   • Knee effusion, right   • Knee pain   •  Obstructive sleep apnea of adult   • TIA (transient ischemic attack)   • Dysphagia   • Squamous cell carcinoma of larynx (CMS/HCC)   • Pharyngeal dysphagia   • Anemia   • Abnormal positron emission tomography (PET) of colon   • Hypokalemia   • Encounter for venous access device care   • Dehydration   • Weight loss, abnormal   • Odynophagia   • Thrush, oral   • Thrombocytopenia (CMS/HCC)   • Pancytopenia due to antineoplastic chemotherapy (CMS/HCC)   • Unable to eat        Visit Dx:    ICD-10-CM ICD-9-CM   1. Oropharyngeal dysphagia R13.12 787.22             SLP Adult Swallow Evaluation - 07/09/18 0943        Rehab Evaluation    Document Type therapy note (daily note);re-evaluation  -EA    Subjective Information no complaints  -EA    Patient Observations alert;cooperative;agree to therapy  -EA    Patient/Family Observations Pt attended therapy this date. Pt is using PEG for most nutrition and hydration.    -EA    Patient Effort good  -EA    Symptoms Noted During/After Treatment none  -EA       General Information    Patient Profile Reviewed yes  -EA    Current Method of Nutrition soft textures;thin liquids  -EA    Precautions/Limitations, Vision WFL with corrective lenses  -EA    Precautions/Limitations, Hearing WFL  -EA    Prior Level of Function-Communication WFL  -EA    Prior Level of Function-Swallowing no diet consistency restrictions  -EA    Plans/Goals Discussed with patient;family  -EA    Barriers to Rehab none identified  -EA       Pain Scale: Numbers Pre/Post-Treatment    Pain Scale: Numbers, Pretreatment 1/10  -EA    Pain Scale: Numbers, Post-Treatment 1/10  -EA    Pain Location - Side Bilateral  -EA    Pain Location - Orientation --   throat *burning   -EA    Pain Location ear   jaw; throat   -EA       Oral Motor and Function    Dentition Assessment natural, present and adequate;other (see comments)   changes in coloration of teeth following radiation   -EA    Secretion Management WNL/WFL  -EA    Mucosal  Quality moist, healthy  -EA    Volitional Swallow delayed  -EA       Oral Musculature and Cranial Nerve Assessment    Oral Motor General Assessment WFL  -EA       General Eating/Swallowing Observations    Respiratory Support Currently in Use trach collar  -EA    Eating/Swallowing Skills self-fed  -EA    Positioning During Eating upright 90 degree;upright in chair  -EA    Utensils Used spoon  -EA    Consistencies Trialed thin liquids  -EA       Respiratory    Respiratory Status room air  -EA       Clinical Swallow Eval    Oral Prep Phase impaired  -EA    Oral Transit impaired  -EA    Oral Residue WFL  -EA    Pharyngeal Phase suspected pharyngeal impairment  -EA    Esophageal Phase unremarkable  -EA       Oral Prep Concerns    Oral Prep Concerns prolonged mastication;increased prep time  -EA    Prolonged Mastication thin;pudding  -EA    Increased Prep Time thin;pudding  -EA       Oral Transit Concerns    Oral Transit Concerns delayed initiation of bolus transit  -EA    Delayed Intiation of Bolus Transit thin;pudding  -EA       Pharyngeal Phase Concerns    Pharyngeal Phase Concerns cough  -EA    Cough thin;pudding  -EA       Clinical Impression    SLP Swallowing Diagnosis mild-moderate;oral dysfunction;suspected pharyngeal dysfunction  -EA    Functional Impact risk of malnutrition;risk of aspiration/pneumonia  -EA    Rehab Potential/Prognosis, Swallowing good, to achieve stated therapy goals  -EA    Criteria for Skilled Therapeutic Interventions Met demonstrates skilled criteria  -EA       Recommendations    Therapy Frequency (Swallow) 1 day per week  -EA    Predicted Duration Therapy Intervention (Days) until discharge  -EA    SLP Diet Recommendation mechanical soft with no mixed consistencies;thin liquids  -EA    Recommended Precautions and Strategies upright posture during/after eating;small bites of food and sips of liquid;multiple swallows per bite of food;multiple swallows per sip of liquid;hard swallow with each  bite or sip  -EA    Monitor for Signs of Aspiration yes;notify SLP if any concerns  -EA    Anticipated Dischage Disposition home  -EA       Oral Nutrition/Hydration Goal 1 (SLP)    Oral Nutrition/Hydration Goal 1, SLP Patient will tolerate least restrictive diet with no overt s/s of aspiration using safe swallow protocol.   -EA    Time Frame (Oral Nutrition/Hydration Goal 1, SLP) by discharge  -EA    Barriers (Oral Nutrition/Hydration Goal 1, SLP) laryngeal cancer   -EA    Progress/Outcomes (Oral Nutrition/Hydration Goal 1, SLP) goal ongoing  -EA       Pharyngeal Strengthening Exercise Goal 1 (SLP)    Activity (Pharyngeal Strengthening Goal 1, SLP) increase timing;increase tongue base retraction  -EA    Increase Timing ingrid;hard effortful swallow;prepping - 3 second prep or suck swallow or 3-step swallow   OME set - Prophylactic swallowing exercises   -EA    Increase Tongue Base Retraction ingrid;hard effortful swallow;prepping - 3 second prep or suck swallow or 3-step swallow  -EA    [place]/Accuracy (Pharyngeal Strengthening Goal 1, SLP) independently (over 90% accuracy)  -EA    Time Frame (Pharyngeal Strengthening Goal 1, SLP) by discharge  -EA    Barriers (Pharyngeal Strengthening Goal 1, SLP) laryngeal cancer   -EA    Progress/Outcomes (Pharyngeal Strengthening Goal 1, SLP) goal ongoing   3 sets of OME completed this date; increased pain   -EA       Improve oral skills    To Improve Oral Skills, patient will: Increase tongue A-P movement;90%  -EA       Improve timing of pharyngeal response    To improve timing of pharyngeal response, patient will: Swallow in timely way using three second prep;90%  -EA    Status: Improve timing of pharyngeal response Progressing as expected  -EA    Timing of Pharyngeal Response Progress 70%;with consistent cues  -EA    Comments: Improve timing of pharyngeal response Pt completed 1/2 cup of ice cream this date with 1 episode of coughing following PO trials. Exercises  reviewed and encouraged this date.   -EA       Oral Motor Structure and Function    Dysphagia Treatment Objectives Improve oral skills;Improve timing of pharyngeal response  -EA      User Key  (r) = Recorded By, (t) = Taken By, (c) = Cosigned By    Initials Name Provider Type    KAYLEY Barone MS CCC-SLP Speech and Language Pathologist                              SLP OP Goals     Row Name 07/09/18 1434          SLP Time Calculation    SLP Goal Re-Cert Due Date 07/25/18  -EA       User Key  (r) = Recorded By, (t) = Taken By, (c) = Cosigned By    Initials Name Provider Type    KAYLEY Barone MS CCC-SLP Speech and Language Pathologist                OP SLP Education     Row Name 07/09/18 1400       Education    Barriers to Learning No barriers identified  -EA    Education Provided Patient demonstrated recommended strategies;Patient requires further education on strategies, risks  -EA    Assessed Learning needs;Learning motivation  -EA    Learning Motivation Moderate  -EA    Learning Method Explanation;Demonstration;Written materials  -EA    Teaching Response Verbalized understanding;Demonstrated understanding  -EA    Education Comments SLP reviewed swallow exercises, increasing PO diet, use of passy thaddeus valve. Pt was in agreement and wore passy thaddeus valve for approximately 45 mins during therapy session.   -EA      User Key  (r) = Recorded By, (t) = Taken By, (c) = Cosigned By    Initials Name Effective Dates    KAYLEY Barone MS [facility]-SLP 10/17/16 -                 OP SLP Assessment/Plan - 07/09/18 1100        SLP Assessment    Functional Problems Swallowing  -EA    Impact on Function: Swallowing Risk of malnourishment;Risk of dehydration;Risk of aspiration  -EA    Clinical Impression: Swallowing Mild-Moderate:;oropharyngeal phase dysphagia  -EA    Functional Problems Comment MBS reviewed; encouraged pt to increase PO  -EA    Clinical Impression Comments Safe swallowing strategies and exercises  reviewed  -EA    SLP Diagnosis Dysphagia   -EA    Prognosis Good (comment)  -EA    Patient/caregiver participated in establishment of treatment plan and goals Yes  -EA    Patient would benefit from skilled therapy intervention Yes  -EA       SLP Plan    Frequency 2x a month   -EA    Duration 24 weeks   -EA    Planned CPT's? SLP SWALLOW THERAPY: 80649  -EA    Expected Duration Therapy Session - minutes 30-45 minutes  -EA    Plan Comments Continue   -EA      User Key  (r) = Recorded By, (t) = Taken By, (c) = Cosigned By    Initials Name Provider Type    KAYLEY Barone MS CCC-SLP Speech and Language Pathologist                Time Calculation:   SLP Start Time: 0943  SLP Stop Time: 1105  SLP Time Calculation (min): 82 min  Total Timed Code Minutes- SLP: 82 minute(s)    Therapy Charges for Today     Code Description Service Date Service Provider Modifiers Qty    [id number] HC ST TREATMENT SWALLOW 5 7/9/2018 Agnes Barone MS CCC-SLP GN 1                   Agnes Barone MS CCC-SLP  7/9/2018

## 2018-07-10 ENCOUNTER — OFFICE VISIT (OUTPATIENT)
Dept: RADIATION ONCOLOGY | Facility: HOSPITAL | Age: 58
End: 2018-07-10

## 2018-07-10 ENCOUNTER — HOSPITAL ENCOUNTER (OUTPATIENT)
Dept: RADIATION ONCOLOGY | Facility: HOSPITAL | Age: 58
Setting detail: RADIATION/ONCOLOGY SERIES
End: 2018-07-10

## 2018-07-10 ENCOUNTER — APPOINTMENT (OUTPATIENT)
Dept: LAB | Facility: HOSPITAL | Age: 58
End: 2018-07-10

## 2018-07-10 VITALS
TEMPERATURE: 97.9 F | DIASTOLIC BLOOD PRESSURE: 78 MMHG | RESPIRATION RATE: 16 BRPM | HEART RATE: 62 BPM | SYSTOLIC BLOOD PRESSURE: 153 MMHG | HEIGHT: 72 IN | BODY MASS INDEX: 29.96 KG/M2 | WEIGHT: 221.2 LBS

## 2018-07-10 DIAGNOSIS — C32.9 SQUAMOUS CELL CARCINOMA OF LARYNX (HCC): Primary | ICD-10-CM

## 2018-07-10 LAB
25(OH)D3 SERPL-MCNC: 27.4 NG/ML (ref 30–100)
DEPRECATED RDW RBC AUTO: 50.5 FL (ref 35.1–43.9)
EOSINOPHIL # BLD MANUAL: 0.06 10*3/MM3 (ref 0–0.7)
EOSINOPHIL NFR BLD MANUAL: 1 % (ref 0–7)
ERYTHROCYTE [DISTWIDTH] IN BLOOD BY AUTOMATED COUNT: 16.8 % (ref 11.5–14.5)
HCT VFR BLD AUTO: 31.9 % (ref 39–49)
HGB BLD-MCNC: 10.9 G/DL (ref 13.7–17.3)
LYMPHOCYTES # BLD MANUAL: 0.99 10*3/MM3 (ref 0.6–4.2)
LYMPHOCYTES NFR BLD MANUAL: 17 % (ref 10–50)
MCH RBC QN AUTO: 28.1 PG (ref 26.5–34)
MCHC RBC AUTO-ENTMCNC: 34.2 G/DL (ref 31.5–36.3)
MCV RBC AUTO: 82.2 FL (ref 80–98)
NEUTROPHILS # BLD AUTO: 4.76 10*3/MM3 (ref 2–8.6)
NEUTROPHILS NFR BLD MANUAL: 82 % (ref 37–80)
PLAT MORPH BLD: NORMAL
PLATELET # BLD AUTO: 188 10*3/MM3 (ref 150–450)
PMV BLD AUTO: 10.9 FL (ref 8–12)
RBC # BLD AUTO: 3.88 10*6/MM3 (ref 4.37–5.74)
RBC MORPH BLD: NORMAL
TSH SERPL DL<=0.05 MIU/L-ACNC: 5.34 MIU/ML (ref 0.46–4.68)
VIT B12 BLD-MCNC: 765 PG/ML (ref 239–931)
WBC MORPH BLD: NORMAL
WBC NRBC COR # BLD: 5.8 10*3/MM3 (ref 3.2–9.8)

## 2018-07-10 PROCEDURE — 84443 ASSAY THYROID STIM HORMONE: CPT | Performed by: INTERNAL MEDICINE

## 2018-07-10 PROCEDURE — 82306 VITAMIN D 25 HYDROXY: CPT | Performed by: INTERNAL MEDICINE

## 2018-07-10 PROCEDURE — 82607 VITAMIN B-12: CPT | Performed by: INTERNAL MEDICINE

## 2018-07-10 PROCEDURE — G0463 HOSPITAL OUTPT CLINIC VISIT: HCPCS | Performed by: RADIOLOGY

## 2018-07-10 PROCEDURE — 36415 COLL VENOUS BLD VENIPUNCTURE: CPT | Performed by: INTERNAL MEDICINE

## 2018-07-10 PROCEDURE — 85007 BL SMEAR W/DIFF WBC COUNT: CPT | Performed by: INTERNAL MEDICINE

## 2018-07-10 PROCEDURE — 85025 COMPLETE CBC W/AUTO DIFF WBC: CPT | Performed by: INTERNAL MEDICINE

## 2018-07-10 PROCEDURE — 99213 OFFICE O/P EST LOW 20 MIN: CPT | Performed by: RADIOLOGY

## 2018-07-10 NOTE — PROGRESS NOTES
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  ICD 10 Code: C32.9                                             History of Present Illness: Mr. Truman Esquivel returns to our clinic today for a routine follow-up exam. 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 He received 7000 cGy in 35 fractions and completed his radiation therapy on 5/15/18. This is his first visit in our clinic since completion of radiation therapy. In the interim, he has been seen by Dr. Venegas, and underwent a flexible fluoroscopic laryngoscopy on 6/6/18 which revealed no evidence of obviously persistent disease. The patient was seen in follow-up by Dr. Patel on 6/27/18.    Today on exam, the patient's only complaint is of continued difficulty swallowing. He states that it feels like groundglass whenever he tries to swallow food by mouth. He continues to be quite dependent on his PEG feeding tube. He continues to work regularly with speech therapy to try to improve his swallowing. He is otherwise doing well.    (Old medical records were requested, reviewed, and summarized in the HPI)    Review of Systems   HENT: Positive for sore throat and trouble swallowing.    Respiratory: Positive for cough.           All other systems reviewed and are negative.    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   • Stroke (CMS/HCC)    • Swallowing difficulty         Past Surgical History:    Procedure Laterality Date   • AORTA SURGERY      ruptured aorta repair   • ASCENDING ARCH/HEMIARCH REPLACEMENT N/A 2/14/2017    Procedure: INTRAOPERATIVE TRANSESOPHAGEAL ECHOCARDIOGRAM, MIDLINE STERNOTOMY, ASCENDING AORTIC  AND PROXIMAL AORTIC ARCH REPAIR WITH 26MM GRAFT, AORTIC VALVE RESUSPENSION, AORTIC ROOT REPAIR, OPEN VEIN HARVEST OF RIGHT LEG;  Surgeon: German Arreguin MD;  Location: [facility] MAIN OR;  Service:    • BRONCHOSCOPY N/A 2/17/2017    Procedure: BRONCHOSCOPY BIOPSY AT BEDSIDE WITH BAL-LEFT LOWER LOBE;  Surgeon: Trent Chaney MD;  Location: [facility] ENDOSCOPY;  Service:    • COLONOSCOPY N/A 3/7/2018    Procedure: COLONOSCOPY WITH POSSIBLE POLYPECTOMY   ( DONE IN OR WITH ENDO)      COLONOSCOPY FIRST;  Surgeon: Kris Solano MD;  Location: [facility] OR;  Service:    • DIRECT LARYNGOSCOPY, ESOPHAGOSCOPY, BRONCHOSCOPY N/A 2/5/2018    Procedure: DIRECT LARYNGOSCOPY WITH BIOPSY, ESOPHAGOSCOPY     (no bronchoscopy, no laser);  Surgeon: Joseph Venegas MD;  Location: [facility] OR;  Service:    • ENDOSCOPY W/ PEG TUBE PLACEMENT N/A 5/2/2018    Procedure: ESOPHAGOGASTRODUODENOSCOPY WITH PERCUTANEOUS ENDOSCOPIC GASTROSTOMY TUBE INSERTION;  Surgeon: Angel Maciel MD;  Location: [facility] ENDOSCOPY;  Service: Gastroenterology   • TRACHEOSTOMY  02/05/2018   • TRACHEOSTOMY N/A 2/5/2018    Procedure: TRACHEOSTOMY  local injection @ 1106 incision @ 1119;  Surgeon: Joseph Venegas MD;  Location: [facility] OR;  Service:    • VENOUS ACCESS DEVICE (PORT) INSERTION N/A 3/7/2018    Procedure: INSERTION OF MEDIPORT     (C-ARM#1);  Surgeon: Kris Solano MD;  Location: [facility] OR;  Service:       Family History   Problem Relation Age of Onset   • Thyroid disease Mother    • Hypertension Mother    • Hypertension Father    • Hypertension Other         Social History     Social History   • Marital status:      Spouse name: N/A   • Number of children: N/A   • Years of education: N/A      Occupational History   • Not on file.     Social History Main Topics   • Smoking status: Former Smoker     Packs/day: 1.25     Years: 38.00     Quit date: 2/13/2017   • Smokeless tobacco: Never Used      Comment: 02/27/2018 - Patient confirmed he ceased utilization of tobacco products 02/13/2017.   • Alcohol use No   • Drug use: No   • Sexual activity: Defer     Other Topics Concern   • Not on file     Social History Narrative   • No narrative on file     Allergies: Chlorhexidine; Co q 10 [coenzyme q10]; Eggs or egg-derived products; Erythromycin; Gabapentin; Other; Penicillins; Tetracyclines & related; Acth [corticotropin]; Cephalosporins; Keflex [cephalexin]; and Neomycin     Prior to Admission medications    Medication Sig Start Date End Date Taking? Authorizing Provider   clopidogrel (PLAVIX) 75 MG tablet Take 75 mg by mouth Daily. Last dose approx greater than one month ago   Yes Historical Provider, MD   DIPHENHIST 12.5 MG/5ML liquid SWISH AND SPIT TWO TEASPOONFULS (10 MLS) BY MOUTH EVERY 6 HOURS AS NEEDED 4/9/18  Yes Reza Patel MD   docusate sodium (COLACE) 100 MG capsule Take 1 capsule by mouth 2 (Two) Times a Day As Needed for Constipation. 2/13/18  Yes Reza Patel MD   fentaNYL (DURAGESIC) 12 MCG/HR Place 1 patch on the skin Every 72 (Seventy-Two) Hours. 4/9/18  Yes Reza Patel MD   levothyroxine (SYNTHROID) 75 MCG tablet Take 1 tablet by mouth Daily. 3/18/18 3/18/19 Yes Kp Sellers MD   losartan-hydrochlorothiazide (HYZAAR) 50-12.5 MG per tablet Take 1 tablet by mouth Daily. 3/15/18  Yes Liborio Chandra MD   metoprolol succinate XL (TOPROL-XL) 25 MG 24 hr tablet Take 1 tablet by mouth Every Night. Patient states he hasn't been taking this week, states he thinks he is hypotensive 3/15/18  Yes Liborio Chandra MD   Multiple Vitamins-Minerals (MULTIVITAMIN ADULT PO) Take 1 tablet by mouth Daily.   Yes Historical Provider, MD   mupirocin (BACTROBAN) 2 % ointment Apply   "topically 2 (Two) Times a Day. 6/25/18  Yes Joseph Venegas MD   ondansetron (ZOFRAN) 4 MG tablet Take 1 tablet by mouth 3 (Three) Times a Day As Needed for Nausea or Vomiting. 2/21/18  Yes Reza Patel MD   oxyCODONE-acetaminophen (PERCOCET) 5-325 MG per tablet 1-2 tablets by mouth every 4 hours as needed for pain 3/8/18  Yes Joseph Venegas MD   sodium chloride (NS) 0.9 % irrigation USE FOR TRACHEOSTOMY AS DIRECTED 5/21/18  Yes Joseph Venegas MD   triamcinolone (KENALOG) 0.1 % cream Apply  topically 2 (Two) Times a Day. 6/25/18  Yes Joseph Venegas MD   vitamin B-12 (CYANOCOBALAMIN) 100 MCG tablet Take 100 mcg by mouth Daily.   Yes Historical Provider, MD   vitamin E 100 UNIT capsule Take 400 Units by mouth Every Night.   Yes Historical Provider, MD   Zinc 50 MG capsule Take 50 mg by mouth Every Night.   Yes Historical Provider, MD      Pain:(on a scale of 0-10)  Pain Score    07/10/18 1346   PainSc: 0-No pain  Comment: throat pain @ when swallowing        Quality of Life: 80 - Restricted Physical Activity    Advanced Care Plan: N    Physical Examination:  Vitals:     Vitals:    07/10/18 1346   BP: 153/78   Pulse: 62   Resp: 16   Temp: 97.9 °F (36.6 °C)       Height: 182.9 cm (72.01\")  Weight: Weight: 100 kg (221 lb 3.2 oz) (compared to 257 pounds on 2/19/18, the day of his initial consult, and 218 pounds on 5/15/18, at the end of chemo/XRT).   Body mass index is 29.99 kg/m².      Constitutional: The patient is a well-developed, well-nourished white male in no acute distress.  Alert and oriented ×3.  HEENT: Atraumatic. Normocephalic.Trach in place and functioning properly.  Lymphatics: No cervical, supraclavicular, or axillary lymphadenopathy is palpated.  CV: Regular rate and rhythm.  No murmurs, rubs, or gallops are appreciated.  Respiratory: Lungs clear to auscultation.  Breath sounds equal bilaterally.  GI: Abdomen soft, nontender, nondistended, with no hepatosplenomegaly or " masses palpated. PEG feeding tube in place.  Extremities: No clubbing, cyanosis, or edema.  Neurologic: Cranial nerves II through XII are grossly intact, with no focal neurological deficits noted on exam.  Psychiatric: Alert and oriented x3. Normal affect, with no anxiety or depression noted.    Radiographs : No recent scans for review    Labs:   WBC   Date Value Ref Range Status   07/10/2018 5.80 3.20 - 9.80 10*3/mm3 Final     Hemoglobin   Date Value Ref Range Status   07/10/2018 10.9 (L) 13.7 - 17.3 g/dL Final     Hematocrit   Date Value Ref Range Status   07/10/2018 31.9 (L) 39.0 - 49.0 % Final     Platelets   Date Value Ref Range Status   07/10/2018 188 150 - 450 10*3/mm3 Final       ASSESSMENT/PLAN: Mr. Truman Esquivel is a 57-year-old white male who is now 2 months status post completion of definitive radiation therapy with concurrent carboplatin/Taxol chemotherapy for Stage III  (cT3 N0 M0) squamous cell carcinoma of the supraglottic larynx. He is doing well and has no evidence of persistent, recurrent, or metastatic disease at this time. He continues to follow closely with Dr. Venegas, and has follow-up in his clinic scheduled on 9/11/18. He has follow-up scheduled with Dr. Patel on 9/19/18. He also plans to continue close follow-up with speech therapy. We plan to see the patient back in our clinic for routine follow-up in 6 months.    Sincerely,    Jarocho Tapia MD  Radiation Oncology    Electronically signed by Jarocho Tapia MD 7/10/2018  2:22 PM    cc: Dr. Joseph Harrison

## 2018-07-10 NOTE — PROGRESS NOTES
"Adult Outpatient Nutrition  Assessment    Patient Name:  Truman Esquivel  YOB: 1960  MRN: [id number]    Assessment Date:  7/10/2018    Comments:  Spoke with Devi PISANO yesterday re: pt's recent swallowing eval follow-up. Stated no aspiration--tolerating liquids better than solids--swallowing uncomfortable.   Spoke with pt today. Pt asking if can take extra Jevity 1.2 through tube. Explained that needs to increase po intake by mouth. Made several suggestions for po liquids/soft food. Wt 221 lb.              Anthropometrics     Row Name 07/10/18 1346          Anthropometrics    Height 182.9 cm (72.01\")     Weight 100 kg (221 lb 3.2 oz)        Ideal Body Weight (IBW)    Ideal Body Weight (IBW) (kg) 82.09     % Ideal Body Weight 122.23        Body Mass Index (BMI)    BMI (kg/m2) 30.06        IBW Adjustment, Para/Tetraplegia    5% Adjustment, Para (IBW) 77.99     10% Adjustment, Para (IBW) 73.88     10% Adjustment, Tetra (IBW) 73.88     15% Adjustment, Tetra (IBW) 69.78                   Estimated/Assessed Needs     Row Name 07/10/18 1346          Calculation Measurements    Height 182.9 cm (72.01\")             Evaluation of Prescribed Nutrient/Fluid Intake     Row Name 07/10/18 1346          Calculation Measurements    Height 182.9 cm (72.01\")             Electronically signed by:  Leslee Bonds RD  07/10/18 3:18 PM   "

## 2018-07-12 ENCOUNTER — OFFICE VISIT (OUTPATIENT)
Dept: OTOLARYNGOLOGY | Facility: CLINIC | Age: 58
End: 2018-07-12

## 2018-07-12 VITALS
WEIGHT: 220 LBS | HEIGHT: 72 IN | HEART RATE: 60 BPM | DIASTOLIC BLOOD PRESSURE: 74 MMHG | SYSTOLIC BLOOD PRESSURE: 124 MMHG | BODY MASS INDEX: 29.8 KG/M2 | OXYGEN SATURATION: 97 %

## 2018-07-12 DIAGNOSIS — J37.0 CHRONIC LARYNGITIS: ICD-10-CM

## 2018-07-12 DIAGNOSIS — Z93.0 TRACHEOSTOMY STATUS (HCC): Primary | ICD-10-CM

## 2018-07-12 DIAGNOSIS — Z85.21 PERSONAL HISTORY OF MALIGNANT NEOPLASM OF LARYNX: ICD-10-CM

## 2018-07-12 PROCEDURE — 31575 DIAGNOSTIC LARYNGOSCOPY: CPT | Performed by: OTOLARYNGOLOGY

## 2018-07-12 PROCEDURE — 99214 OFFICE O/P EST MOD 30 MIN: CPT | Performed by: OTOLARYNGOLOGY

## 2018-07-15 NOTE — PROGRESS NOTES
Subjective   Truman Esquivel is a 57 y.o. male.       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  Has completed treatment.  Was seen 2 weeks ago with an erythematous cutaneous reaction around his tracheostomy that was thought to be a neomycin reaction.  He was prescribed mupirocin and triamcinolone he reports that the erythema improved but has now recurred.  He says he feels like the flange of the tracheostomy tube is irritating his skin.  He is attempting to use a Passy-Cromona valve but states he actually does better with his finger over the tracheostomy.      The following portions of the patient's history were reviewed and updated as appropriate: allergies, current medications, past family history, past medical history, past social history, past surgical history and problem list.     reports that he quit smoking about 17 months ago. He has a 47.50 pack-year smoking history. He has never used smokeless tobacco. He reports that he does not drink alcohol or use drugs.   Patient is not a tobacco user and has not been counseled for use of tobacco products      Review of Systems   Constitutional: Negative for fever.           Objective   Physical Exam  General: Well-developed, well-nourished male in no acute distress.  Alert and oriented.  Voice: Harsh but intelligible with his tracheostomy occluded  Ears: External ears no deformity, canals no discharge, tympanic membranes intact clear and mobile bilaterally.  Nose: Nares show no discharge mass polyp or purulence.  Boggy mucosa is present.  No gross external deformity.   Oral cavity: Lips and gums without lesions.  Tongue and floor of mouth without lesions.  Parotid and submandibular ducts unobstructed.  No mucosal lesions on the buccal mucosa or vestibule of the mouth.  Pharynx: No erythema, exudate, mass  Neck: Tracheostomy still has some mild erythema surrounding it, nuclear  improved from previous.  There is some crusted material on the flange of the tracheostomy.  No palpable lymphadenopathy.  Postradiation changes are noted.    Procedure Note    Pre-operative Diagnosis:   Chief Complaint   Patient presents with   • Follow-up       Post-operative Diagnosis: Complete response to treatment; chronic laryngitis    Anesthesia: topical with xylocaine and neosynephrine    Endoscopy Type:  Flexible Laryngoscopy    Procedure Details:    The patient was placed in the sitting position.  After topical anesthesia and decongestion, the 4 mm laryngoscope was passed.  The nasal cavities, nasopharynx, oropharynx, hypopharynx, and larynx were all examined.  Vocal cords were examined during respiration and phonation.  The following findings were noted:    Findings: No masses in the nose, nasopharynx.  Hypopharynx shows posttreatment changes.  The epiglottis is able to be visualized today and appears to have complete resolution of the previously noted tumor.  It is somewhat less vertical likely due to volume loss from treatment but there does not appear to be any mucosal abnormality.  I am able to see the endolarynx and there is posttreatment edema but the vocal cords move appropriately and there is no evidence of obstruction at the level of the glottis.    Condition:  Stable.  Patient tolerated procedure well.    Complications:  None      Assessment/Plan   Truman was seen today for follow-up.    Diagnoses and all orders for this visit:    Tracheostomy status (CMS/[facility])    Personal history of malignant neoplasm of larynx    Chronic laryngitis      Plan: I think the patient's tracheostomy tube needs to be changed.  I gave him a prescription for a size 6 CFN tracheostomy and he actually left the office, obtain this and came back to the office.  His old tracheostomy was removed and the #6 CFN tracheostomy was placed without difficulty.  I then had him place the cap over the inner cannula and he actually could  breathe and phonate quite well with this.  I have instructed him to continue applying the triamcinolone to the erythematous skin around his tracheostomy but I'm hopeful that with the trach change this will improve.  He is to try capping his tracheostomy for progressively longer periods of time.  I told him eventually if he can tolerate continuous capping day and night for a 2 week.  I would decannulate him as long as his exam continue to be consistent with complete response to treatment.  He is to keep his previously scheduled visit in approximately 2 months and call sooner for problems.

## 2018-07-17 ENCOUNTER — OFFICE VISIT (OUTPATIENT)
Dept: ENDOCRINOLOGY | Facility: CLINIC | Age: 58
End: 2018-07-17

## 2018-07-17 VITALS
SYSTOLIC BLOOD PRESSURE: 128 MMHG | BODY MASS INDEX: 29.66 KG/M2 | WEIGHT: 219.01 LBS | DIASTOLIC BLOOD PRESSURE: 70 MMHG | HEIGHT: 72 IN | OXYGEN SATURATION: 92 % | HEART RATE: 65 BPM

## 2018-07-17 DIAGNOSIS — E53.8 B12 DEFICIENCY: ICD-10-CM

## 2018-07-17 DIAGNOSIS — E06.3 HYPOTHYROIDISM DUE TO HASHIMOTO'S THYROIDITIS: ICD-10-CM

## 2018-07-17 DIAGNOSIS — E55.9 VITAMIN D DEFICIENCY: ICD-10-CM

## 2018-07-17 DIAGNOSIS — E03.8 HYPOTHYROIDISM DUE TO HASHIMOTO'S THYROIDITIS: ICD-10-CM

## 2018-07-17 DIAGNOSIS — E03.9 HYPOTHYROIDISM, UNSPECIFIED TYPE: Primary | ICD-10-CM

## 2018-07-17 PROCEDURE — 99214 OFFICE O/P EST MOD 30 MIN: CPT | Performed by: INTERNAL MEDICINE

## 2018-07-17 RX ORDER — LEVOTHYROXINE SODIUM 0.07 MG/1
TABLET ORAL
Qty: 32 TABLET | Refills: 11 | Status: SHIPPED | OUTPATIENT
Start: 2018-07-17 | End: 2019-02-11 | Stop reason: SDUPTHER

## 2018-07-17 NOTE — PROGRESS NOTES
Subjective    Truman Esquivel is a 57 y.o. male. he is here today for follow-up.     Hypothyroidism    Pertinent negatives include no abdominal pain, arthralgias, chest pain, coughing, diaphoresis, fatigue, headaches, joint swelling, myalgias, nausea, neck pain, numbness, rash, sore throat, vomiting or weakness.          Primary Care Provider     Marybeth Harrison,       Hypothyroidism      Duration 10 years     Timing - Hypothyroidism  is Constant     Quality -  needs improvement       Severity -  moderate     Complications - none     Current symptoms/problems  fatigue, weakness    Weight loss -- 60 lbs since December      Alleviating Factors: Compliance       Side Effects  none         Evaluation history:  TSH   Date Value Ref Range Status   07/10/2018 5.340 (H) 0.460 - 4.680 mIU/mL Final     Free T4   Date Value Ref Range Status   02/18/2017 0.68 (L) 0.93 - 1.70 ng/dL Final       Current medications:  Current Outpatient Prescriptions   Medication Sig Dispense Refill   • clopidogrel (PLAVIX) 75 MG tablet Take 75 mg by mouth Daily. Last dose approx greater than one month ago     • docusate sodium (COLACE) 100 MG capsule Take 1 capsule by mouth 2 (Two) Times a Day As Needed for Constipation. 60 capsule 2   • levothyroxine (SYNTHROID) 75 MCG tablet Take 1 tablet by mouth Daily. 30 tablet 11   • losartan-hydrochlorothiazide (HYZAAR) 50-12.5 MG per tablet Take 1 tablet by mouth Daily. 30 tablet 12   • metoprolol succinate XL (TOPROL-XL) 25 MG 24 hr tablet Take 1 tablet by mouth Every Night. Patient states he hasn't been taking this week, states he thinks he is hypotensive 30 tablet 12   • Multiple Vitamins-Minerals (MULTIVITAMIN ADULT PO) Take 1 tablet by mouth Daily.     • mupirocin (BACTROBAN) 2 % ointment Apply  topically 2 (Two) Times a Day. 30 g 2   • ondansetron (ZOFRAN) 4 MG tablet Take 1 tablet by mouth 3 (Three) Times a Day As Needed for Nausea or Vomiting. 40 tablet 3   • sodium chloride (NS) 0.9 %  irrigation USE FOR TRACHEOSTOMY AS DIRECTED 1000 mL 8   • triamcinolone (KENALOG) 0.1 % cream Apply  topically 2 (Two) Times a Day. 45 g 2   • vitamin B-12 (CYANOCOBALAMIN) 100 MCG tablet Take 100 mcg by mouth Daily.     • vitamin E 100 UNIT capsule Take 400 Units by mouth Every Night.     • Zinc 50 MG capsule Take 50 mg by mouth Every Night.       No current facility-administered medications for this visit.        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   • Stroke (CMS/HCC)    • Swallowing difficulty      Past Surgical History:   Procedure Laterality Date   • AORTA SURGERY      ruptured aorta repair   • ASCENDING ARCH/HEMIARCH REPLACEMENT N/A 2/14/2017    Procedure: INTRAOPERATIVE TRANSESOPHAGEAL ECHOCARDIOGRAM, MIDLINE STERNOTOMY, ASCENDING AORTIC  AND PROXIMAL AORTIC ARCH REPAIR WITH 26MM GRAFT, AORTIC VALVE RESUSPENSION, AORTIC ROOT REPAIR, OPEN VEIN HARVEST OF RIGHT LEG;  Surgeon: German Arreguin MD;  Location: [facility] MAIN OR;  Service:    • BRONCHOSCOPY N/A 2/17/2017    Procedure: BRONCHOSCOPY BIOPSY AT BEDSIDE WITH BAL-LEFT LOWER LOBE;  Surgeon: Trent Chaney MD;  Location: [facility] ENDOSCOPY;  Service:    • COLONOSCOPY N/A 3/7/2018    Procedure: COLONOSCOPY WITH POSSIBLE POLYPECTOMY   ( DONE IN OR WITH ENDO)      COLONOSCOPY FIRST;  Surgeon: Kris Solano MD;  Location: [facility] OR;  Service:    • DIRECT LARYNGOSCOPY, ESOPHAGOSCOPY, BRONCHOSCOPY N/A 2/5/2018    Procedure: DIRECT LARYNGOSCOPY WITH BIOPSY, ESOPHAGOSCOPY     (no bronchoscopy, no laser);  Surgeon: Joseph Venegas MD;  Location: [facility] OR;  Service:    • ENDOSCOPY W/ PEG TUBE PLACEMENT N/A 5/2/2018    Procedure: ESOPHAGOGASTRODUODENOSCOPY  WITH PERCUTANEOUS ENDOSCOPIC GASTROSTOMY TUBE INSERTION;  Surgeon: Angel Maciel MD;  Location: [facility] ENDOSCOPY;  Service: Gastroenterology   • TRACHEOSTOMY  02/05/2018   • TRACHEOSTOMY N/A 2/5/2018    Procedure: TRACHEOSTOMY  local injection @ 1106 incision @ 1119;  Surgeon: Joseph Venegas MD;  Location: [facility] OR;  Service:    • VENOUS ACCESS DEVICE (PORT) INSERTION N/A 3/7/2018    Procedure: INSERTION OF MEDIPORT     (C-ARM#1);  Surgeon: Kris Solano MD;  Location: [facility] OR;  Service:      Family History   Problem Relation Age of Onset   • Thyroid disease Mother    • Hypertension Mother    • Hypertension Father    • Hypertension Other        Allergies   Allergen Reactions   • Chlorhexidine Itching     Topical cleaning wipes causes severe skin irritation   • Co Q 10 [Coenzyme Q10] Itching   • Eggs Or Egg-Derived Products Swelling   • Erythromycin Other (See Comments)     Joint pains and cold sweats   • Gabapentin Itching   • Other GI Intolerance     Mycins  farzad lotion causes rash   • Penicillins      Tolerated cefepime during 2/2017 admission. Had rash and itching (relieved by benadryl) after few doses of cefepime and cefepime was continued.   • Tetracyclines & Related GI Intolerance   • Acth [Corticotropin] Rash   • Cephalosporins Itching and Rash     Pt developed rash, itching after cefepime administration (2-3 doses) during 2/2017 admission. Itching was relieved with benadryl. Cefepime was continued because his infection was improving and no symptoms of anaphylaxis present   • Keflex [Cephalexin] Rash   • Neomycin Rash     Social History     Social History   • Marital status:      Social History Main Topics   • Smoking status: Former Smoker     Packs/day: 1.25     Years: 38.00     Quit date: 2/13/2017   • Smokeless tobacco: Never Used      Comment: 02/27/2018 - Patient confirmed he ceased utilization of tobacco products 02/13/2017.   • Alcohol use No   • Drug use: No   •  "Sexual activity: Defer     Other Topics Concern   • Not on file       Review of Systems  Review of Systems   Constitutional: Negative for activity change, appetite change, diaphoresis and fatigue.   HENT: Negative for facial swelling, sneezing, sore throat, tinnitus, trouble swallowing and voice change.    Eyes: Negative for photophobia, pain, discharge, redness, itching and visual disturbance.   Respiratory: Negative for apnea, cough, choking, chest tightness and shortness of breath.    Cardiovascular: Negative for chest pain, palpitations and leg swelling.   Gastrointestinal: Negative for abdominal distention, abdominal pain, constipation, diarrhea, nausea and vomiting.   Endocrine: Negative for cold intolerance, heat intolerance, polydipsia, polyphagia and polyuria.   Genitourinary: Negative for difficulty urinating, dysuria, frequency, hematuria and urgency.   Musculoskeletal: Negative for arthralgias, back pain, gait problem, joint swelling, myalgias, neck pain and neck stiffness.   Skin: Negative for color change, pallor, rash and wound.   Neurological: Negative for dizziness, tremors, weakness, light-headedness, numbness and headaches.   Hematological: Negative for adenopathy. Does not bruise/bleed easily.   Psychiatric/Behavioral: Negative for behavioral problems, confusion and sleep disturbance.        Objective    /70 (BP Location: Left arm, Patient Position: Sitting, Cuff Size: Large Adult)   Pulse 65   Ht 182.9 cm (72\")   Wt 99.3 kg (219 lb 0.2 oz)   SpO2 92%   BMI 29.70 kg/m²    Physical Exam   Constitutional: He is oriented to person, place, and time. He appears well-developed and well-nourished. No distress.   HENT:   Head: Normocephalic and atraumatic.   Right Ear: External ear normal.   Left Ear: External ear normal.   Nose: Nose normal.   Eyes: Conjunctivae and EOM are normal. Pupils are equal, round, and reactive to light.   Neck: Normal range of motion. Neck supple. No tracheal " deviation present. No thyromegaly present.   Cardiovascular: Normal rate, regular rhythm and normal heart sounds.    No murmur heard.  Pulmonary/Chest: Effort normal and breath sounds normal. No respiratory distress. He has no wheezes.   Trach in place   Abdominal: Soft. Bowel sounds are normal. There is no tenderness. There is no rebound and no guarding.   Musculoskeletal: Normal range of motion. He exhibits no edema, tenderness or deformity.   Neurological: He is alert and oriented to person, place, and time. No cranial nerve deficit.   Skin: Skin is warm and dry. No rash noted.   Psychiatric: He has a normal mood and affect. His behavior is normal. Judgment and thought content normal.       Lab Review  Lab Results   Component Value Date    TSH 5.340 (H) 07/10/2018     Lab Results   Component Value Date    FREET4 0.68 (L) 02/18/2017        Assessment/Plan      1. Hypothyroidism, unspecified type    2. Vitamin D deficiency    3. B12 deficiency    . This diagnosis was discussed and reviewed with the patient including the advantages of drug therapy.     1. Orders placed during this encounter include:  No orders of the defined types were placed in this encounter.      Medications prescribed:  Outpatient Encounter Prescriptions as of 7/17/2018   Medication Sig Dispense Refill   • clopidogrel (PLAVIX) 75 MG tablet Take 75 mg by mouth Daily. Last dose approx greater than one month ago     • docusate sodium (COLACE) 100 MG capsule Take 1 capsule by mouth 2 (Two) Times a Day As Needed for Constipation. 60 capsule 2   • levothyroxine (SYNTHROID) 75 MCG tablet Take 1 tablet by mouth Daily. 30 tablet 11   • losartan-hydrochlorothiazide (HYZAAR) 50-12.5 MG per tablet Take 1 tablet by mouth Daily. 30 tablet 12   • metoprolol succinate XL (TOPROL-XL) 25 MG 24 hr tablet Take 1 tablet by mouth Every Night. Patient states he hasn't been taking this week, states he thinks he is hypotensive 30 tablet 12   • Multiple Vitamins-Minerals  (MULTIVITAMIN ADULT PO) Take 1 tablet by mouth Daily.     • mupirocin (BACTROBAN) 2 % ointment Apply  topically 2 (Two) Times a Day. 30 g 2   • ondansetron (ZOFRAN) 4 MG tablet Take 1 tablet by mouth 3 (Three) Times a Day As Needed for Nausea or Vomiting. 40 tablet 3   • sodium chloride (NS) 0.9 % irrigation USE FOR TRACHEOSTOMY AS DIRECTED 1000 mL 8   • triamcinolone (KENALOG) 0.1 % cream Apply  topically 2 (Two) Times a Day. 45 g 2   • vitamin B-12 (CYANOCOBALAMIN) 100 MCG tablet Take 100 mcg by mouth Daily.     • vitamin E 100 UNIT capsule Take 400 Units by mouth Every Night.     • Zinc 50 MG capsule Take 50 mg by mouth Every Night.     • [DISCONTINUED] DIPHENHIST 12.5 MG/5ML liquid SWISH AND SPIT TWO TEASPOONFULS (10 MLS) BY MOUTH EVERY 6 HOURS AS NEEDED 200 mL 1   • [DISCONTINUED] fentaNYL (DURAGESIC) 12 MCG/HR Place 1 patch on the skin Every 72 (Seventy-Two) Hours. 10 patch 0   • [DISCONTINUED] oxyCODONE-acetaminophen (PERCOCET) 5-325 MG per tablet 1-2 tablets by mouth every 4 hours as needed for pain 60 tablet 0     No facility-administered encounter medications on file as of 7/17/2018.         Patient has hypothyroidism for 10 years and his present symptoms include fatigue, weakness and weight gain      Was on 88 and we decreased to 75     TSH    Lab Results   Component Value Date    TSH 5.340 (H) 07/10/2018    TSH 1.040 05/16/2018    TSH 0.390 (L) 03/14/2018       Lab Results   Component Value Date    FREET4 0.68 (L) 02/18/2017     Keep 75 but on Sunday take 1.5 tabs     Lab Results   Component Value Date    XORQ96NL 27.4 (L) 07/10/2018    POCU56IU 33.3 03/14/2018       Lab Results   Component Value Date    EEIKPJYJ40 765 07/10/2018     Should take vit D 1000 u daily     ---    Anemia of chronic disease and chemotherapy    4. No Follow-up on file.

## 2018-07-23 ENCOUNTER — TELEPHONE (OUTPATIENT)
Dept: OTOLARYNGOLOGY | Facility: CLINIC | Age: 58
End: 2018-07-23

## 2018-07-23 NOTE — TELEPHONE ENCOUNTER
----- Message from Carmen ANCELMO Morfin sent at 7/20/2018  1:42 PM CDT -----  Contact: [phone]  He called wanting you to know that when he tried to cap off the #6 trach after about 15-20 minutes it was filled up with phlegm.  The trach without the cannula worked better.    He says he thinks to take the trach out and sew up the hole might be a better option.    Would like to talk with you.    Spoke with pt in person. Explained that if decannulated would not suture trach shut. Encouraged him to keep working on capping and call if he worsens.

## 2018-07-26 ENCOUNTER — ANESTHESIA (OUTPATIENT)
Dept: PERIOP | Facility: HOSPITAL | Age: 58
End: 2018-07-26

## 2018-07-26 ENCOUNTER — ANESTHESIA EVENT (OUTPATIENT)
Dept: PERIOP | Facility: HOSPITAL | Age: 58
End: 2018-07-26

## 2018-07-26 ENCOUNTER — APPOINTMENT (OUTPATIENT)
Dept: CT IMAGING | Facility: HOSPITAL | Age: 58
End: 2018-07-26

## 2018-07-26 ENCOUNTER — HOSPITAL ENCOUNTER (INPATIENT)
Facility: HOSPITAL | Age: 58
LOS: 20 days | Discharge: HOME HEALTH | End: 2018-08-15
Attending: EMERGENCY MEDICINE | Admitting: SURGERY

## 2018-07-26 ENCOUNTER — APPOINTMENT (OUTPATIENT)
Dept: GENERAL RADIOLOGY | Facility: HOSPITAL | Age: 58
End: 2018-07-26

## 2018-07-26 DIAGNOSIS — Z74.09 IMPAIRED MOBILITY AND ACTIVITIES OF DAILY LIVING: ICD-10-CM

## 2018-07-26 DIAGNOSIS — R13.12 OROPHARYNGEAL DYSPHAGIA: ICD-10-CM

## 2018-07-26 DIAGNOSIS — R18.8 OTHER ASCITES: ICD-10-CM

## 2018-07-26 DIAGNOSIS — R55 SYNCOPE AND COLLAPSE: Primary | ICD-10-CM

## 2018-07-26 DIAGNOSIS — Z74.09 IMPAIRED PHYSICAL MOBILITY: ICD-10-CM

## 2018-07-26 DIAGNOSIS — A41.9 SEPTIC SHOCK (HCC): ICD-10-CM

## 2018-07-26 DIAGNOSIS — I71.00 DISSECTION OF AORTA, UNSPECIFIED PORTION OF AORTA (HCC): ICD-10-CM

## 2018-07-26 DIAGNOSIS — Z78.9 IMPAIRED MOBILITY AND ACTIVITIES OF DAILY LIVING: ICD-10-CM

## 2018-07-26 DIAGNOSIS — J90 PLEURAL EFFUSION: ICD-10-CM

## 2018-07-26 DIAGNOSIS — R65.21 SEPTIC SHOCK (HCC): ICD-10-CM

## 2018-07-26 DIAGNOSIS — D73.5 SPLENIC HEMORRHAGE: ICD-10-CM

## 2018-07-26 LAB
ABO GROUP BLD: NORMAL
ALBUMIN SERPL-MCNC: 2 G/DL (ref 3.4–4.8)
ALBUMIN SERPL-MCNC: 3.3 G/DL (ref 3.4–4.8)
ALBUMIN/GLOB SERPL: 1 G/DL (ref 1.1–1.8)
ALBUMIN/GLOB SERPL: 1.2 G/DL (ref 1.1–1.8)
ALP SERPL-CCNC: 116 U/L (ref 38–126)
ALP SERPL-CCNC: 54 U/L (ref 38–126)
ALT SERPL W P-5'-P-CCNC: 42 U/L (ref 21–72)
ALT SERPL W P-5'-P-CCNC: 48 U/L (ref 21–72)
ANION GAP SERPL CALCULATED.3IONS-SCNC: 11 MMOL/L (ref 5–15)
ANION GAP SERPL CALCULATED.3IONS-SCNC: 15 MMOL/L (ref 5–15)
ANION GAP SERPL CALCULATED.3IONS-SCNC: 16 MMOL/L (ref 5–15)
ANION GAP SERPL CALCULATED.3IONS-SCNC: 9 MMOL/L (ref 5–15)
ANISOCYTOSIS BLD QL: NORMAL
APTT PPP: 35.6 SECONDS (ref 20–40.3)
APTT PPP: 36.7 SECONDS (ref 20–40.3)
ARTERIAL PATENCY WRIST A: ABNORMAL
ARTERIAL PATENCY WRIST A: NORMAL
AST SERPL-CCNC: 28 U/L (ref 17–59)
AST SERPL-CCNC: 30 U/L (ref 17–59)
ATMOSPHERIC PRESS: 747 MMHG
ATMOSPHERIC PRESS: 748 MMHG
BACTERIA UR QL AUTO: ABNORMAL /HPF
BASE EXCESS BLDA CALC-SCNC: -1.1 MMOL/L (ref 0–2)
BASE EXCESS BLDA CALC-SCNC: -11.5 MMOL/L (ref 0–2)
BASE EXCESS BLDA CALC-SCNC: -2.5 MMOL/L (ref 0–2)
BASE EXCESS BLDA CALC-SCNC: 0.7 MMOL/L (ref 0–2)
BASOPHILS # BLD AUTO: 0.04 10*3/MM3 (ref 0–0.2)
BASOPHILS NFR BLD AUTO: 0.1 % (ref 0–2)
BDY SITE: ABNORMAL
BDY SITE: NORMAL
BILIRUB SERPL-MCNC: 0.6 MG/DL (ref 0.2–1.3)
BILIRUB SERPL-MCNC: 1.2 MG/DL (ref 0.2–1.3)
BILIRUB UR QL STRIP: NEGATIVE
BLD GP AB SCN SERPL QL: NEGATIVE
BUN BLD-MCNC: 27 MG/DL (ref 7–21)
BUN BLD-MCNC: 28 MG/DL (ref 7–21)
BUN BLD-MCNC: 28 MG/DL (ref 7–21)
BUN BLD-MCNC: 29 MG/DL (ref 7–21)
BUN/CREAT SERPL: 18.5 (ref 7–25)
BUN/CREAT SERPL: 19 (ref 7–25)
BUN/CREAT SERPL: 20.8 (ref 7–25)
BUN/CREAT SERPL: 23.6 (ref 7–25)
CA-I BLD-MCNC: >7.31 MG/DL (ref 4.6–5.6)
CALCIUM SPEC-SCNC: 7.1 MG/DL (ref 8.4–10.2)
CALCIUM SPEC-SCNC: 7.3 MG/DL (ref 8.4–10.2)
CALCIUM SPEC-SCNC: 8.2 MG/DL (ref 8.4–10.2)
CALCIUM SPEC-SCNC: 8.4 MG/DL (ref 8.4–10.2)
CHLORIDE SERPL-SCNC: 84 MMOL/L (ref 95–110)
CHLORIDE SERPL-SCNC: 85 MMOL/L (ref 95–110)
CHLORIDE SERPL-SCNC: 93 MMOL/L (ref 95–110)
CHLORIDE SERPL-SCNC: 95 MMOL/L (ref 95–110)
CK MB SERPL-CCNC: 0.92 NG/ML (ref 0–5)
CK SERPL-CCNC: 26 U/L (ref 55–170)
CLARITY UR: ABNORMAL
CO2 SERPL-SCNC: 22 MMOL/L (ref 22–31)
CO2 SERPL-SCNC: 23 MMOL/L (ref 22–31)
CO2 SERPL-SCNC: 24 MMOL/L (ref 22–31)
CO2 SERPL-SCNC: 24 MMOL/L (ref 22–31)
COHGB MFR BLD: 1.1 % (ref 0–5)
COLOR UR: ABNORMAL
CREAT BLD-MCNC: 1.23 MG/DL (ref 0.7–1.3)
CREAT BLD-MCNC: 1.3 MG/DL (ref 0.7–1.3)
CREAT BLD-MCNC: 1.47 MG/DL (ref 0.7–1.3)
CREAT BLD-MCNC: 1.51 MG/DL (ref 0.7–1.3)
D-LACTATE SERPL-SCNC: 10.1 MMOL/L (ref 0.5–2)
D-LACTATE SERPL-SCNC: 5.2 MMOL/L (ref 0.5–2)
D-LACTATE SERPL-SCNC: 5.3 MMOL/L (ref 0.5–2)
DEPRECATED RDW RBC AUTO: 43.3 FL (ref 35.1–43.9)
DEPRECATED RDW RBC AUTO: 46.1 FL (ref 35.1–43.9)
DEPRECATED RDW RBC AUTO: 46.5 FL (ref 35.1–43.9)
EOSINOPHIL # BLD AUTO: 0.24 10*3/MM3 (ref 0–0.7)
EOSINOPHIL NFR BLD AUTO: 0.7 % (ref 0–7)
ERYTHROCYTE [DISTWIDTH] IN BLOOD BY AUTOMATED COUNT: 14.8 % (ref 11.5–14.5)
ERYTHROCYTE [DISTWIDTH] IN BLOOD BY AUTOMATED COUNT: 15.6 % (ref 11.5–14.5)
ERYTHROCYTE [DISTWIDTH] IN BLOOD BY AUTOMATED COUNT: 15.7 % (ref 11.5–14.5)
GAS FLOW AIRWAY: 2 LPM
GFR SERPL CREATININE-BSD FRML MDRD: 48 ML/MIN/1.73 (ref 56–130)
GFR SERPL CREATININE-BSD FRML MDRD: 49 ML/MIN/1.73 (ref 56–130)
GFR SERPL CREATININE-BSD FRML MDRD: 57 ML/MIN/1.73 (ref 56–130)
GFR SERPL CREATININE-BSD FRML MDRD: 61 ML/MIN/1.73 (ref 56–130)
GLOBULIN UR ELPH-MCNC: 2 GM/DL (ref 2.3–3.5)
GLOBULIN UR ELPH-MCNC: 2.7 GM/DL (ref 2.3–3.5)
GLUCOSE BLD-MCNC: 133 MG/DL (ref 60–100)
GLUCOSE BLD-MCNC: 137 MG/DL (ref 60–100)
GLUCOSE BLD-MCNC: 185 MG/DL (ref 60–100)
GLUCOSE BLD-MCNC: 187 MG/DL (ref 60–100)
GLUCOSE BLDA-MCNC: 156 MMOL/L (ref 65–95)
GLUCOSE BLDC GLUCOMTR-MCNC: 131 MG/DL (ref 70–130)
GLUCOSE BLDC GLUCOMTR-MCNC: 141 MG/DL (ref 70–130)
GLUCOSE BLDC GLUCOMTR-MCNC: 150 MG/DL (ref 70–130)
GLUCOSE BLDC GLUCOMTR-MCNC: 150 MG/DL (ref 70–130)
GLUCOSE UR STRIP-MCNC: NEGATIVE MG/DL
HCO3 BLDA-SCNC: 15.7 MMOL/L (ref 20–26)
HCO3 BLDA-SCNC: 22.3 MMOL/L (ref 20–26)
HCO3 BLDA-SCNC: 22.8 MMOL/L (ref 20–26)
HCO3 BLDA-SCNC: 25.9 MMOL/L (ref 20–26)
HCT VFR BLD AUTO: 21.3 % (ref 39–49)
HCT VFR BLD AUTO: 22.1 % (ref 39–49)
HCT VFR BLD AUTO: 26.2 % (ref 39–49)
HCT VFR BLD CALC: 25.4 % (ref 38–51)
HGB BLD-MCNC: 7.6 G/DL (ref 13.7–17.3)
HGB BLD-MCNC: 7.8 G/DL (ref 13.7–17.3)
HGB BLD-MCNC: 9 G/DL (ref 13.7–17.3)
HGB BLDA-MCNC: 8.3 G/DL (ref 14–18)
HGB UR QL STRIP.AUTO: NEGATIVE
HOLD SPECIMEN: NORMAL
HOLD SPECIMEN: NORMAL
HOROWITZ INDEX BLD+IHG-RTO: 45 %
HOROWITZ INDEX BLD+IHG-RTO: 60 %
HYALINE CASTS UR QL AUTO: ABNORMAL /LPF
HYPOCHROMIA BLD QL: NORMAL
IMM GRANULOCYTES # BLD: 0.44 10*3/MM3 (ref 0–0.02)
IMM GRANULOCYTES NFR BLD: 1.2 % (ref 0–0.5)
INR PPP: 1.66 (ref 0.8–1.2)
INR PPP: 1.71 (ref 0.8–1.2)
KETONES UR QL STRIP: NEGATIVE
LEUKOCYTE ESTERASE UR QL STRIP.AUTO: NEGATIVE
LIPASE SERPL-CCNC: 66 U/L (ref 23–300)
LYMPHOCYTES # BLD AUTO: 1.44 10*3/MM3 (ref 0.6–4.2)
LYMPHOCYTES NFR BLD AUTO: 4.1 % (ref 10–50)
Lab: ABNORMAL
Lab: NORMAL
Lab: NORMAL
MAGNESIUM SERPL-MCNC: 1.9 MG/DL (ref 1.6–2.3)
MCH RBC QN AUTO: 27.7 PG (ref 26.5–34)
MCH RBC QN AUTO: 28.6 PG (ref 26.5–34)
MCH RBC QN AUTO: 28.7 PG (ref 26.5–34)
MCHC RBC AUTO-ENTMCNC: 34.4 G/DL (ref 31.5–36.3)
MCHC RBC AUTO-ENTMCNC: 35.3 G/DL (ref 31.5–36.3)
MCHC RBC AUTO-ENTMCNC: 35.7 G/DL (ref 31.5–36.3)
MCV RBC AUTO: 80.4 FL (ref 80–98)
MCV RBC AUTO: 80.6 FL (ref 80–98)
MCV RBC AUTO: 81 FL (ref 80–98)
METHGB BLD QL: 0.4 % (ref 0–3)
MODALITY: ABNORMAL
MODALITY: NORMAL
MONOCYTES # BLD AUTO: 1.93 10*3/MM3 (ref 0–0.9)
MONOCYTES NFR BLD AUTO: 5.4 % (ref 0–12)
NEUTROPHILS # BLD AUTO: 31.46 10*3/MM3 (ref 2–8.6)
NEUTROPHILS NFR BLD AUTO: 88.5 % (ref 37–80)
NITRITE UR QL STRIP: NEGATIVE
NT-PROBNP SERPL-MCNC: 3400 PG/ML (ref 0–900)
OVALOCYTES BLD QL SMEAR: NORMAL
OXYHGB MFR BLDV: >98.5 % (ref 94–99)
PAW @ PEAK INSP FLOW SETTING VENT: 21 CMH2O
PCO2 BLDA: 30 MM HG (ref 35–45)
PCO2 BLDA: 39.8 MM HG (ref 35–45)
PCO2 BLDA: 40.8 MM HG (ref 35–45)
PCO2 BLDA: 42.8 MM HG (ref 35–45)
PCO2 TEMP ADJ BLD: ABNORMAL MM HG (ref 35–48)
PEEP RESPIRATORY: 5 CM[H2O]
PEEP RESPIRATORY: 5 CM[H2O]
PH BLDA: 7.2 PH UNITS (ref 7.35–7.45)
PH BLDA: 7.36 PH UNITS (ref 7.35–7.45)
PH BLDA: 7.39 PH UNITS (ref 7.35–7.45)
PH BLDA: 7.48 PH UNITS (ref 7.35–7.45)
PH UR STRIP.AUTO: 5.5 [PH] (ref 5–9)
PH, TEMP CORRECTED: ABNORMAL PH UNITS
PHOSPHATE SERPL-MCNC: 4.8 MG/DL (ref 2.4–4.4)
PLATELET # BLD AUTO: 217 10*3/MM3 (ref 150–450)
PLATELET # BLD AUTO: 241 10*3/MM3 (ref 150–450)
PLATELET # BLD AUTO: 388 10*3/MM3 (ref 150–450)
PMV BLD AUTO: 10.1 FL (ref 8–12)
PMV BLD AUTO: 10.4 FL (ref 8–12)
PMV BLD AUTO: 9.9 FL (ref 8–12)
PO2 BLDA: 117 MM HG (ref 83–108)
PO2 BLDA: 128 MM HG (ref 83–108)
PO2 BLDA: 349 MM HG (ref 83–108)
PO2 BLDA: 86.6 MM HG (ref 83–108)
PO2 TEMP ADJ BLD: ABNORMAL MM HG (ref 83–108)
POTASSIUM BLD-SCNC: 4.2 MMOL/L (ref 3.5–5.1)
POTASSIUM BLD-SCNC: 4.3 MMOL/L (ref 3.5–5.1)
POTASSIUM BLD-SCNC: 5 MMOL/L (ref 3.5–5.1)
POTASSIUM BLD-SCNC: 5 MMOL/L (ref 3.5–5.1)
POTASSIUM BLDA-SCNC: 5.1 MMOL/L (ref 3.4–4.5)
PROT SERPL-MCNC: 4 G/DL (ref 6.3–8.6)
PROT SERPL-MCNC: 6 G/DL (ref 6.3–8.6)
PROT UR QL STRIP: ABNORMAL
PROTHROMBIN TIME: 19 SECONDS (ref 11.1–15.3)
PROTHROMBIN TIME: 19.5 SECONDS (ref 11.1–15.3)
RBC # BLD AUTO: 2.65 10*6/MM3 (ref 4.37–5.74)
RBC # BLD AUTO: 2.73 10*6/MM3 (ref 4.37–5.74)
RBC # BLD AUTO: 3.25 10*6/MM3 (ref 4.37–5.74)
RBC # UR: ABNORMAL /HPF
REF LAB TEST METHOD: ABNORMAL
RH BLD: POSITIVE
SAO2 % BLDCOA: 97.5 % (ref 94–99)
SAO2 % BLDCOA: 99.1 % (ref 94–99)
SAO2 % BLDCOA: 99.6 % (ref 94–99)
SAO2 % BLDCOA: >100 % (ref 94–99)
SET MECH RESP RATE: 10
SET MECH RESP RATE: 10
SMALL PLATELETS BLD QL SMEAR: ADEQUATE
SODIUM BLD-SCNC: 123 MMOL/L (ref 137–145)
SODIUM BLD-SCNC: 124 MMOL/L (ref 137–145)
SODIUM BLD-SCNC: 126 MMOL/L (ref 137–145)
SODIUM BLD-SCNC: 128 MMOL/L (ref 137–145)
SODIUM BLDA-SCNC: 122 MMOL/L (ref 136–146)
SP GR UR STRIP: 1.02 (ref 1–1.03)
SQUAMOUS #/AREA URNS HPF: ABNORMAL /HPF
T&S EXPIRATION DATE: NORMAL
TRANS CELLS #/AREA URNS HPF: ABNORMAL /HPF
TROPONIN I SERPL-MCNC: 0.01 NG/ML
TROPONIN I SERPL-MCNC: 0.02 NG/ML
TROPONIN I SERPL-MCNC: <0.012 NG/ML
TROPONIN I SERPL-MCNC: <0.012 NG/ML
UNIDENT CRYS URNS QL MICRO: ABNORMAL /HPF
UROBILINOGEN UR QL STRIP: ABNORMAL
VENTILATOR MODE: ABNORMAL
VENTILATOR MODE: ABNORMAL
VENTILATOR MODE: AC
VENTILATOR MODE: AC
VT ON VENT VENT: 600 ML
WBC MORPH BLD: NORMAL
WBC NRBC COR # BLD: 22.01 10*3/MM3 (ref 3.2–9.8)
WBC NRBC COR # BLD: 30.81 10*3/MM3 (ref 3.2–9.8)
WBC NRBC COR # BLD: 35.55 10*3/MM3 (ref 3.2–9.8)
WBC UR QL AUTO: ABNORMAL /HPF
WHOLE BLOOD HOLD SPECIMEN: NORMAL
WHOLE BLOOD HOLD SPECIMEN: NORMAL

## 2018-07-26 PROCEDURE — 0DP6XUZ REMOVAL OF FEEDING DEVICE FROM STOMACH, EXTERNAL APPROACH: ICD-10-PCS | Performed by: SURGERY

## 2018-07-26 PROCEDURE — 0DH60UZ INSERTION OF FEEDING DEVICE INTO STOMACH, OPEN APPROACH: ICD-10-PCS | Performed by: SURGERY

## 2018-07-26 PROCEDURE — 85027 COMPLETE CBC AUTOMATED: CPT | Performed by: STUDENT IN AN ORGANIZED HEALTH CARE EDUCATION/TRAINING PROGRAM

## 2018-07-26 PROCEDURE — 99222 1ST HOSP IP/OBS MODERATE 55: CPT | Performed by: SURGERY

## 2018-07-26 PROCEDURE — 74177 CT ABD & PELVIS W/CONTRAST: CPT

## 2018-07-26 PROCEDURE — 25010000002 ONDANSETRON PER 1 MG: Performed by: EMERGENCY MEDICINE

## 2018-07-26 PROCEDURE — 51702 INSERT TEMP BLADDER CATH: CPT

## 2018-07-26 PROCEDURE — 88342 IMHCHEM/IMCYTCHM 1ST ANTB: CPT | Performed by: SURGERY

## 2018-07-26 PROCEDURE — 36600 WITHDRAWAL OF ARTERIAL BLOOD: CPT

## 2018-07-26 PROCEDURE — 07TP0ZZ RESECTION OF SPLEEN, OPEN APPROACH: ICD-10-PCS | Performed by: SURGERY

## 2018-07-26 PROCEDURE — 94002 VENT MGMT INPAT INIT DAY: CPT

## 2018-07-26 PROCEDURE — 88323 CONSLTJ&REPRT MATRL PREP SLD: CPT

## 2018-07-26 PROCEDURE — 85027 COMPLETE CBC AUTOMATED: CPT | Performed by: INTERNAL MEDICINE

## 2018-07-26 PROCEDURE — 25010000002 PHENYLEPHRINE PER 1 ML: Performed by: NURSE ANESTHETIST, CERTIFIED REGISTERED

## 2018-07-26 PROCEDURE — 85025 COMPLETE CBC W/AUTO DIFF WBC: CPT | Performed by: EMERGENCY MEDICINE

## 2018-07-26 PROCEDURE — 86927 PLASMA FRESH FROZEN: CPT

## 2018-07-26 PROCEDURE — 83880 ASSAY OF NATRIURETIC PEPTIDE: CPT | Performed by: EMERGENCY MEDICINE

## 2018-07-26 PROCEDURE — 88305 TISSUE EXAM BY PATHOLOGIST: CPT | Performed by: SURGERY

## 2018-07-26 PROCEDURE — 86923 COMPATIBILITY TEST ELECTRIC: CPT

## 2018-07-26 PROCEDURE — 93005 ELECTROCARDIOGRAM TRACING: CPT | Performed by: EMERGENCY MEDICINE

## 2018-07-26 PROCEDURE — 82803 BLOOD GASES ANY COMBINATION: CPT

## 2018-07-26 PROCEDURE — 80053 COMPREHEN METABOLIC PANEL: CPT | Performed by: EMERGENCY MEDICINE

## 2018-07-26 PROCEDURE — 88342 IMHCHEM/IMCYTCHM 1ST ANTB: CPT

## 2018-07-26 PROCEDURE — 82962 GLUCOSE BLOOD TEST: CPT

## 2018-07-26 PROCEDURE — 25010000002 PROPOFOL 1000 MG/ML EMULSION: Performed by: STUDENT IN AN ORGANIZED HEALTH CARE EDUCATION/TRAINING PROGRAM

## 2018-07-26 PROCEDURE — 85730 THROMBOPLASTIN TIME PARTIAL: CPT | Performed by: STUDENT IN AN ORGANIZED HEALTH CARE EDUCATION/TRAINING PROGRAM

## 2018-07-26 PROCEDURE — 94799 UNLISTED PULMONARY SVC/PX: CPT

## 2018-07-26 PROCEDURE — P9016 RBC LEUKOCYTES REDUCED: HCPCS

## 2018-07-26 PROCEDURE — 36430 TRANSFUSION BLD/BLD COMPNT: CPT

## 2018-07-26 PROCEDURE — 88312 SPECIAL STAINS GROUP 1: CPT

## 2018-07-26 PROCEDURE — 88341 IMHCHEM/IMCYTCHM EA ADD ANTB: CPT | Performed by: SURGERY

## 2018-07-26 PROCEDURE — 85730 THROMBOPLASTIN TIME PARTIAL: CPT | Performed by: EMERGENCY MEDICINE

## 2018-07-26 PROCEDURE — 93010 ELECTROCARDIOGRAM REPORT: CPT | Performed by: INTERNAL MEDICINE

## 2018-07-26 PROCEDURE — 85007 BL SMEAR W/DIFF WBC COUNT: CPT | Performed by: EMERGENCY MEDICINE

## 2018-07-26 PROCEDURE — 86850 RBC ANTIBODY SCREEN: CPT | Performed by: EMERGENCY MEDICINE

## 2018-07-26 PROCEDURE — 0W9B30Z DRAINAGE OF LEFT PLEURAL CAVITY WITH DRAINAGE DEVICE, PERCUTANEOUS APPROACH: ICD-10-PCS | Performed by: SURGERY

## 2018-07-26 PROCEDURE — 32551 INSERTION OF CHEST TUBE: CPT | Performed by: SURGERY

## 2018-07-26 PROCEDURE — 84484 ASSAY OF TROPONIN QUANT: CPT | Performed by: EMERGENCY MEDICINE

## 2018-07-26 PROCEDURE — 43830 GSTRST OPEN WO CONSTJ TUBE: CPT | Performed by: SURGERY

## 2018-07-26 PROCEDURE — 82550 ASSAY OF CK (CPK): CPT | Performed by: EMERGENCY MEDICINE

## 2018-07-26 PROCEDURE — 88341 IMHCHEM/IMCYTCHM EA ADD ANTB: CPT | Performed by: PATHOLOGY

## 2018-07-26 PROCEDURE — C2627 CATH, SUPRAPUBIC/CYSTOSCOPIC: HCPCS | Performed by: SURGERY

## 2018-07-26 PROCEDURE — 38100 REMOVAL OF SPLEEN TOTAL: CPT | Performed by: SURGERY

## 2018-07-26 PROCEDURE — 84100 ASSAY OF PHOSPHORUS: CPT | Performed by: STUDENT IN AN ORGANIZED HEALTH CARE EDUCATION/TRAINING PROGRAM

## 2018-07-26 PROCEDURE — 99285 EMERGENCY DEPT VISIT HI MDM: CPT

## 2018-07-26 PROCEDURE — 88342 IMHCHEM/IMCYTCHM 1ST ANTB: CPT | Performed by: PATHOLOGY

## 2018-07-26 PROCEDURE — 25010000002 CALCIUM GLUCONATE PER 10 ML: Performed by: STUDENT IN AN ORGANIZED HEALTH CARE EDUCATION/TRAINING PROGRAM

## 2018-07-26 PROCEDURE — 25010000002 PROPOFOL 1000 MG/ML EMULSION

## 2018-07-26 PROCEDURE — 81001 URINALYSIS AUTO W/SCOPE: CPT | Performed by: EMERGENCY MEDICINE

## 2018-07-26 PROCEDURE — P9037 PLATE PHERES LEUKOREDU IRRAD: HCPCS

## 2018-07-26 PROCEDURE — 83050 HGB METHEMOGLOBIN QUAN: CPT

## 2018-07-26 PROCEDURE — 80500 TISSUE PATHOLOGY EXAM: CPT | Performed by: PATHOLOGY

## 2018-07-26 PROCEDURE — 25010000002 IOPAMIDOL 61 % SOLUTION: Performed by: EMERGENCY MEDICINE

## 2018-07-26 PROCEDURE — 82805 BLOOD GASES W/O2 SATURATION: CPT

## 2018-07-26 PROCEDURE — 82375 ASSAY CARBOXYHB QUANT: CPT

## 2018-07-26 PROCEDURE — 87040 BLOOD CULTURE FOR BACTERIA: CPT | Performed by: EMERGENCY MEDICINE

## 2018-07-26 PROCEDURE — 83605 ASSAY OF LACTIC ACID: CPT | Performed by: INTERNAL MEDICINE

## 2018-07-26 PROCEDURE — P9017 PLASMA 1 DONOR FRZ W/IN 8 HR: HCPCS

## 2018-07-26 PROCEDURE — 71045 X-RAY EXAM CHEST 1 VIEW: CPT

## 2018-07-26 PROCEDURE — 88305 TISSUE EXAM BY PATHOLOGIST: CPT | Performed by: PATHOLOGY

## 2018-07-26 PROCEDURE — 0W9G0ZZ DRAINAGE OF PERITONEAL CAVITY, OPEN APPROACH: ICD-10-PCS | Performed by: SURGERY

## 2018-07-26 PROCEDURE — 86900 BLOOD TYPING SEROLOGIC ABO: CPT

## 2018-07-26 PROCEDURE — 25010000002 ONDANSETRON PER 1 MG: Performed by: INTERNAL MEDICINE

## 2018-07-26 PROCEDURE — 86901 BLOOD TYPING SEROLOGIC RH(D): CPT | Performed by: EMERGENCY MEDICINE

## 2018-07-26 PROCEDURE — 82553 CREATINE MB FRACTION: CPT | Performed by: EMERGENCY MEDICINE

## 2018-07-26 PROCEDURE — 71260 CT THORAX DX C+: CPT

## 2018-07-26 PROCEDURE — 80053 COMPREHEN METABOLIC PANEL: CPT | Performed by: STUDENT IN AN ORGANIZED HEALTH CARE EDUCATION/TRAINING PROGRAM

## 2018-07-26 PROCEDURE — 25010000002 MIDAZOLAM PER 1 MG: Performed by: NURSE ANESTHETIST, CERTIFIED REGISTERED

## 2018-07-26 PROCEDURE — 87801 DETECT AGNT MULT DNA AMPLI: CPT

## 2018-07-26 PROCEDURE — 25010000002 VANCOMYCIN: Performed by: EMERGENCY MEDICINE

## 2018-07-26 PROCEDURE — 83690 ASSAY OF LIPASE: CPT | Performed by: EMERGENCY MEDICINE

## 2018-07-26 PROCEDURE — 85610 PROTHROMBIN TIME: CPT | Performed by: STUDENT IN AN ORGANIZED HEALTH CARE EDUCATION/TRAINING PROGRAM

## 2018-07-26 PROCEDURE — 25010000003 MORPHINE PER 10 MG: Performed by: STUDENT IN AN ORGANIZED HEALTH CARE EDUCATION/TRAINING PROGRAM

## 2018-07-26 PROCEDURE — 83605 ASSAY OF LACTIC ACID: CPT | Performed by: EMERGENCY MEDICINE

## 2018-07-26 PROCEDURE — 88341 IMHCHEM/IMCYTCHM EA ADD ANTB: CPT

## 2018-07-26 PROCEDURE — 86900 BLOOD TYPING SEROLOGIC ABO: CPT | Performed by: EMERGENCY MEDICINE

## 2018-07-26 PROCEDURE — 88313 SPECIAL STAINS GROUP 2: CPT

## 2018-07-26 PROCEDURE — 83735 ASSAY OF MAGNESIUM: CPT | Performed by: STUDENT IN AN ORGANIZED HEALTH CARE EDUCATION/TRAINING PROGRAM

## 2018-07-26 PROCEDURE — 85610 PROTHROMBIN TIME: CPT | Performed by: EMERGENCY MEDICINE

## 2018-07-26 RX ORDER — ROCURONIUM BROMIDE 10 MG/ML
INJECTION, SOLUTION INTRAVENOUS AS NEEDED
Status: DISCONTINUED | OUTPATIENT
Start: 2018-07-26 | End: 2018-07-26 | Stop reason: SURG

## 2018-07-26 RX ORDER — SODIUM CHLORIDE 9 MG/ML
INJECTION, SOLUTION INTRAVENOUS
Status: DISCONTINUED
Start: 2018-07-26 | End: 2018-08-15 | Stop reason: HOSPADM

## 2018-07-26 RX ORDER — PANTOPRAZOLE SODIUM 40 MG/10ML
40 INJECTION, POWDER, LYOPHILIZED, FOR SOLUTION INTRAVENOUS
Status: DISCONTINUED | OUTPATIENT
Start: 2018-07-27 | End: 2018-08-15 | Stop reason: HOSPADM

## 2018-07-26 RX ORDER — ONDANSETRON 2 MG/ML
4 INJECTION INTRAMUSCULAR; INTRAVENOUS ONCE
Status: COMPLETED | OUTPATIENT
Start: 2018-07-26 | End: 2018-07-26

## 2018-07-26 RX ORDER — SODIUM CHLORIDE 9 MG/ML
INJECTION, SOLUTION INTRAVENOUS
Status: DISCONTINUED
Start: 2018-07-26 | End: 2018-07-26 | Stop reason: WASHOUT

## 2018-07-26 RX ORDER — MORPHINE SULFATE 1 MG/ML
INJECTION INTRAVENOUS CONTINUOUS
Status: DISCONTINUED | OUTPATIENT
Start: 2018-07-26 | End: 2018-08-02

## 2018-07-26 RX ORDER — CALCIUM CHLORIDE 100 MG/ML
INJECTION INTRAVENOUS; INTRAVENTRICULAR AS NEEDED
Status: DISCONTINUED | OUTPATIENT
Start: 2018-07-26 | End: 2018-07-26 | Stop reason: SURG

## 2018-07-26 RX ORDER — ONDANSETRON 2 MG/ML
4 INJECTION INTRAMUSCULAR; INTRAVENOUS EVERY 6 HOURS PRN
Status: DISCONTINUED | OUTPATIENT
Start: 2018-07-26 | End: 2018-08-15 | Stop reason: HOSPADM

## 2018-07-26 RX ORDER — MAGNESIUM HYDROXIDE 1200 MG/15ML
1000 LIQUID ORAL DAILY
Status: DISCONTINUED | OUTPATIENT
Start: 2018-07-26 | End: 2018-07-26

## 2018-07-26 RX ORDER — SODIUM CHLORIDE 0.9 % (FLUSH) 0.9 %
1-10 SYRINGE (ML) INJECTION AS NEEDED
Status: DISCONTINUED | OUTPATIENT
Start: 2018-07-26 | End: 2018-08-15 | Stop reason: HOSPADM

## 2018-07-26 RX ORDER — SODIUM CHLORIDE, SODIUM GLUCONATE, SODIUM ACETATE, POTASSIUM CHLORIDE, AND MAGNESIUM CHLORIDE 526; 502; 368; 37; 30 MG/100ML; MG/100ML; MG/100ML; MG/100ML; MG/100ML
INJECTION, SOLUTION INTRAVENOUS CONTINUOUS PRN
Status: DISCONTINUED | OUTPATIENT
Start: 2018-07-26 | End: 2018-07-26 | Stop reason: SURG

## 2018-07-26 RX ORDER — SODIUM CHLORIDE 9 MG/ML
INJECTION, SOLUTION INTRAVENOUS CONTINUOUS PRN
Status: DISCONTINUED | OUTPATIENT
Start: 2018-07-26 | End: 2018-07-26 | Stop reason: SURG

## 2018-07-26 RX ORDER — SODIUM CHLORIDE, SODIUM LACTATE, POTASSIUM CHLORIDE, CALCIUM CHLORIDE 600; 310; 30; 20 MG/100ML; MG/100ML; MG/100ML; MG/100ML
125 INJECTION, SOLUTION INTRAVENOUS CONTINUOUS
Status: DISCONTINUED | OUTPATIENT
Start: 2018-07-26 | End: 2018-07-26

## 2018-07-26 RX ORDER — SODIUM CHLORIDE 9 MG/ML
125 INJECTION, SOLUTION INTRAVENOUS CONTINUOUS
Status: DISCONTINUED | OUTPATIENT
Start: 2018-07-26 | End: 2018-08-02

## 2018-07-26 RX ORDER — CHLORHEXIDINE GLUCONATE 0.12 MG/ML
15 RINSE ORAL EVERY 12 HOURS SCHEDULED
Status: DISCONTINUED | OUTPATIENT
Start: 2018-07-26 | End: 2018-08-15 | Stop reason: HOSPADM

## 2018-07-26 RX ORDER — CALCIUM CHLORIDE 100 MG/ML
INJECTION INTRAVENOUS; INTRAVENTRICULAR AS NEEDED
Status: DISCONTINUED | OUTPATIENT
Start: 2018-07-26 | End: 2018-07-26

## 2018-07-26 RX ORDER — MIDAZOLAM HYDROCHLORIDE 1 MG/ML
INJECTION INTRAMUSCULAR; INTRAVENOUS AS NEEDED
Status: DISCONTINUED | OUTPATIENT
Start: 2018-07-26 | End: 2018-07-26 | Stop reason: SURG

## 2018-07-26 RX ADMIN — METRONIDAZOLE 500 MG: 500 INJECTION, SOLUTION INTRAVENOUS at 12:35

## 2018-07-26 RX ADMIN — NOREPINEPHRINE BITARTRATE 0.03 MCG/KG/MIN: 1 INJECTION INTRAVENOUS at 08:19

## 2018-07-26 RX ADMIN — NOREPINEPHRINE BITARTRATE 0.3 MCG/KG/MIN: 1 INJECTION INTRAVENOUS at 10:51

## 2018-07-26 RX ADMIN — SODIUM CHLORIDE: 9 INJECTION, SOLUTION INTRAVENOUS at 12:31

## 2018-07-26 RX ADMIN — PHENYLEPHRINE HYDROCHLORIDE 100 MCG: 10 INJECTION INTRAVENOUS at 11:13

## 2018-07-26 RX ADMIN — SODIUM BICARBONATE 50 MEQ: 84 INJECTION INTRAVENOUS at 12:19

## 2018-07-26 RX ADMIN — ONDANSETRON 4 MG: 2 INJECTION, SOLUTION INTRAMUSCULAR; INTRAVENOUS at 09:12

## 2018-07-26 RX ADMIN — PROPOFOL 50 MCG/KG/MIN: 10 INJECTION, EMULSION INTRAVENOUS at 14:00

## 2018-07-26 RX ADMIN — SODIUM CHLORIDE, SODIUM GLUCONATE, SODIUM ACETATE, POTASSIUM CHLORIDE, AND MAGNESIUM CHLORIDE: 526; 502; 368; 37; 30 INJECTION, SOLUTION INTRAVENOUS at 10:50

## 2018-07-26 RX ADMIN — SODIUM CHLORIDE 125 ML/HR: 900 INJECTION, SOLUTION INTRAVENOUS at 18:11

## 2018-07-26 RX ADMIN — IOPAMIDOL 92 ML: 612 INJECTION, SOLUTION INTRAVENOUS at 08:46

## 2018-07-26 RX ADMIN — CALCIUM CHLORIDE 1 G: 100 INJECTION, SOLUTION INTRAVENOUS at 11:50

## 2018-07-26 RX ADMIN — PROPOFOL 50 MCG/KG/MIN: 10 INJECTION, EMULSION INTRAVENOUS at 16:07

## 2018-07-26 RX ADMIN — SODIUM CHLORIDE 125 ML/HR: 900 INJECTION, SOLUTION INTRAVENOUS at 15:43

## 2018-07-26 RX ADMIN — SODIUM CHLORIDE 2 G: 900 INJECTION INTRAVENOUS at 16:07

## 2018-07-26 RX ADMIN — VANCOMYCIN HYDROCHLORIDE 2000 MG: 1 INJECTION, POWDER, LYOPHILIZED, FOR SOLUTION INTRAVENOUS at 09:08

## 2018-07-26 RX ADMIN — PROPOFOL 40 MCG/KG/MIN: 10 INJECTION, EMULSION INTRAVENOUS at 22:15

## 2018-07-26 RX ADMIN — PHENYLEPHRINE HYDROCHLORIDE 32 MCG: 10 INJECTION INTRAVENOUS at 11:17

## 2018-07-26 RX ADMIN — METRONIDAZOLE 500 MG: 500 INJECTION, SOLUTION INTRAVENOUS at 18:58

## 2018-07-26 RX ADMIN — SODIUM CHLORIDE: 9 INJECTION, SOLUTION INTRAVENOUS at 11:40

## 2018-07-26 RX ADMIN — PHENYLEPHRINE HYDROCHLORIDE 200 MCG: 10 INJECTION INTRAVENOUS at 11:10

## 2018-07-26 RX ADMIN — SODIUM CHLORIDE 1000 ML: 900 INJECTION, SOLUTION INTRAVENOUS at 06:08

## 2018-07-26 RX ADMIN — SODIUM CHLORIDE 2 G: 900 INJECTION INTRAVENOUS at 08:00

## 2018-07-26 RX ADMIN — CALCIUM GLUCONATE 2 G: 98 INJECTION, SOLUTION INTRAVENOUS at 20:54

## 2018-07-26 RX ADMIN — PHENYLEPHRINE HYDROCHLORIDE 32 MCG: 10 INJECTION INTRAVENOUS at 11:15

## 2018-07-26 RX ADMIN — SODIUM CHLORIDE 1000 ML: 9 INJECTION, SOLUTION INTRAVENOUS at 07:01

## 2018-07-26 RX ADMIN — SODIUM BICARBONATE 50 MEQ: 84 INJECTION INTRAVENOUS at 12:23

## 2018-07-26 RX ADMIN — SODIUM BICARBONATE 50 MEQ: 84 INJECTION INTRAVENOUS at 12:35

## 2018-07-26 RX ADMIN — NOREPINEPHRINE BITARTRATE 0.03 MCG/KG/MIN: 1 INJECTION INTRAVENOUS at 18:00

## 2018-07-26 RX ADMIN — METRONIDAZOLE 500 MG: 500 INJECTION, SOLUTION INTRAVENOUS at 09:31

## 2018-07-26 RX ADMIN — MIDAZOLAM 1 MG: 1 INJECTION INTRAMUSCULAR; INTRAVENOUS at 10:59

## 2018-07-26 RX ADMIN — ROCURONIUM BROMIDE 50 MG: 10 INJECTION INTRAVENOUS at 11:15

## 2018-07-26 RX ADMIN — ONDANSETRON HYDROCHLORIDE 4 MG: 2 INJECTION, SOLUTION INTRAMUSCULAR; INTRAVENOUS at 12:54

## 2018-07-26 RX ADMIN — ROCURONIUM BROMIDE 50 MG: 10 INJECTION INTRAVENOUS at 12:05

## 2018-07-26 RX ADMIN — PROPOFOL 1000 MG: 10 INJECTION, EMULSION INTRAVENOUS at 13:41

## 2018-07-26 RX ADMIN — Medication: at 15:04

## 2018-07-26 NOTE — ANESTHESIA POSTPROCEDURE EVALUATION
Patient: Truman Esquivel    Procedure Summary     Date:  07/26/18 Room / Location:  [facility] OR 03 / [facility] OR    Anesthesia Start:  1051 Anesthesia Stop:  1325    Procedure:  SPLENECTOMY, left chest tube placement, EXPLORATORY LAPAROTOMY, G-TUBE PALCEMENT (N/A Abdomen) Diagnosis:       Splenic hemorrhage      (Splenic hemorrhage [D73.89])    Surgeon:  Kris Solano MD Provider:  All Morfin CRNA    Anesthesia Type:  general ASA Status:  4 - Emergent          Anesthesia Type: general  Last vitals  BP   124/56 (07/26/18 1027)   Temp   95.4 °F (35.2 °C) (07/26/18 1003)   Pulse   70 (07/26/18 1027)   Resp   19 (07/26/18 1027)     SpO2   100 % (07/26/18 1027)     Post Anesthesia Care and Evaluation    Patient location during evaluation: ICU  Patient participation: complete - patient cannot participate  Post-procedure mental status: sedated for ventilation.  Pain score: 0  Pain management: adequate  Airway patency: patent  Anesthetic complications: No anesthetic complications  PONV Status: none  Cardiovascular status: acceptable  Respiratory status: ETT and intubated  Hydration status: stable    Comments: Patient unable to participate...intubated and sedated

## 2018-07-26 NOTE — ANESTHESIA PROCEDURE NOTES
Arterial Line    Patient location during procedure: OR   Line placed for hemodynamic monitoring.  Performed By   Anesthesiologist: TIEN FUENTES  Preanesthetic Checklist  Completed: patient identified  Arterial Line Prep   Sterile Tech: gloves, mask and cap  Prep: ChloraPrep  Patient monitoring: blood pressure monitoring, continuous pulse oximetry and EKG  Arterial Line Procedure   Location:  radial artery  Catheter size: 20 G   Guidance: palpation technique         Post Assessment   Dressing Type: occlusive dressing applied.   Complications no  Patient Tolerance: patient tolerated the procedure well with no apparent complications

## 2018-07-26 NOTE — ANESTHESIA PREPROCEDURE EVALUATION
Anesthesia Evaluation     Patient summary reviewed   NPO Solid Status: > 4 hours  NPO Liquid Status: > 4 hours           Airway   Mallampati: II  TM distance: >3 FB  Neck ROM: full  No difficulty expected  Comment: Trach in place noted on exam.  Dental      Pulmonary    (+) a smoker Former, COPD moderate, shortness of breath, sleep apnea, decreased breath sounds,   Cardiovascular   Exercise tolerance: poor (<4 METS)    NYHA Classification: III  ECG reviewed  Rhythm: regular  Rate: abnormal    (+) hypertension well controlled, murmur, PVD, DVT,       Neuro/Psych  (+) TIA, CVA, syncope, tremors, weakness, psychiatric history Depression,     GI/Hepatic/Renal/Endo    (+)  GERD poorly controlled,  hypothyroidism,     Musculoskeletal     (+) back pain, chronic pain,   Abdominal    Substance History      OB/GYN          Other      history of cancer active                    Anesthesia Plan    ASA 4 - emergent     general     intravenous induction   Anesthetic plan and risks discussed with patient.

## 2018-07-27 ENCOUNTER — APPOINTMENT (OUTPATIENT)
Dept: GENERAL RADIOLOGY | Facility: HOSPITAL | Age: 58
End: 2018-07-27

## 2018-07-27 LAB
ABO + RH BLD: NORMAL
ALBUMIN SERPL-MCNC: 2.3 G/DL (ref 3.4–4.8)
ALBUMIN/GLOB SERPL: 1.1 G/DL (ref 1.1–1.8)
ALP SERPL-CCNC: 51 U/L (ref 38–126)
ALT SERPL W P-5'-P-CCNC: 41 U/L (ref 21–72)
ANION GAP SERPL CALCULATED.3IONS-SCNC: 5 MMOL/L (ref 5–15)
ANION GAP SERPL CALCULATED.3IONS-SCNC: 7 MMOL/L (ref 5–15)
ANISOCYTOSIS BLD QL: NORMAL
ARTERIAL PATENCY WRIST A: ABNORMAL
AST SERPL-CCNC: 29 U/L (ref 17–59)
ATMOSPHERIC PRESS: 748 MMHG
BASE EXCESS BLDA CALC-SCNC: 1.5 MMOL/L (ref 0–2)
BASOPHILS # BLD AUTO: 0.01 10*3/MM3 (ref 0–0.2)
BASOPHILS NFR BLD AUTO: 0.1 % (ref 0–2)
BDY SITE: ABNORMAL
BH BB BLOOD EXPIRATION DATE: NORMAL
BH BB BLOOD TYPE BARCODE: 5100
BH BB BLOOD TYPE BARCODE: 6200
BH BB BLOOD TYPE BARCODE: 9500
BH BB BLOOD TYPE BARCODE: 9500
BH BB BLOOD TYPE BARCODE: NORMAL
BH BB DISPENSE STATUS: NORMAL
BH BB PRODUCT CODE: NORMAL
BH BB UNIT NUMBER: NORMAL
BILIRUB SERPL-MCNC: 0.4 MG/DL (ref 0.2–1.3)
BUN BLD-MCNC: 29 MG/DL (ref 7–21)
BUN BLD-MCNC: 30 MG/DL (ref 7–21)
BUN/CREAT SERPL: 22.8 (ref 7–25)
BUN/CREAT SERPL: 26.1 (ref 7–25)
CALCIUM SPEC-SCNC: 7.5 MG/DL (ref 8.4–10.2)
CALCIUM SPEC-SCNC: 7.6 MG/DL (ref 8.4–10.2)
CHLORIDE SERPL-SCNC: 95 MMOL/L (ref 95–110)
CHLORIDE SERPL-SCNC: 98 MMOL/L (ref 95–110)
CO2 SERPL-SCNC: 26 MMOL/L (ref 22–31)
CO2 SERPL-SCNC: 26 MMOL/L (ref 22–31)
CREAT BLD-MCNC: 1.15 MG/DL (ref 0.7–1.3)
CREAT BLD-MCNC: 1.27 MG/DL (ref 0.7–1.3)
D-LACTATE SERPL-SCNC: 1 MMOL/L (ref 0.5–2)
DEPRECATED RDW RBC AUTO: 45.3 FL (ref 35.1–43.9)
DEPRECATED RDW RBC AUTO: 45.5 FL (ref 35.1–43.9)
EOSINOPHIL # BLD AUTO: 0.01 10*3/MM3 (ref 0–0.7)
EOSINOPHIL NFR BLD AUTO: 0.1 % (ref 0–7)
ERYTHROCYTE [DISTWIDTH] IN BLOOD BY AUTOMATED COUNT: 15.5 % (ref 11.5–14.5)
ERYTHROCYTE [DISTWIDTH] IN BLOOD BY AUTOMATED COUNT: 15.7 % (ref 11.5–14.5)
GFR SERPL CREATININE-BSD FRML MDRD: 58 ML/MIN/1.73 (ref 56–130)
GFR SERPL CREATININE-BSD FRML MDRD: 66 ML/MIN/1.73 (ref 56–130)
GLOBULIN UR ELPH-MCNC: 2.1 GM/DL (ref 2.3–3.5)
GLUCOSE BLD-MCNC: 130 MG/DL (ref 60–100)
GLUCOSE BLD-MCNC: 132 MG/DL (ref 60–100)
GLUCOSE BLDC GLUCOMTR-MCNC: 119 MG/DL (ref 70–130)
GLUCOSE BLDC GLUCOMTR-MCNC: 126 MG/DL (ref 70–130)
GLUCOSE BLDC GLUCOMTR-MCNC: 137 MG/DL (ref 70–130)
GLUCOSE BLDC GLUCOMTR-MCNC: 141 MG/DL (ref 70–130)
GLUCOSE BLDC GLUCOMTR-MCNC: 144 MG/DL (ref 70–130)
GLUCOSE BLDC GLUCOMTR-MCNC: 145 MG/DL (ref 70–130)
GLUCOSE BLDC GLUCOMTR-MCNC: 148 MG/DL (ref 70–130)
GLUCOSE BLDC GLUCOMTR-MCNC: 97 MG/DL (ref 70–130)
HCO3 BLDA-SCNC: 26.6 MMOL/L (ref 20–26)
HCT VFR BLD AUTO: 22.4 % (ref 39–49)
HCT VFR BLD AUTO: 24.1 % (ref 39–49)
HGB BLD-MCNC: 8 G/DL (ref 13.7–17.3)
HGB BLD-MCNC: 8.6 G/DL (ref 13.7–17.3)
HOROWITZ INDEX BLD+IHG-RTO: 45 %
HYPOCHROMIA BLD QL: NORMAL
IMM GRANULOCYTES # BLD: 0.11 10*3/MM3 (ref 0–0.02)
IMM GRANULOCYTES NFR BLD: 0.6 % (ref 0–0.5)
LYMPHOCYTES # BLD AUTO: 0.3 10*3/MM3 (ref 0.6–4.2)
LYMPHOCYTES # BLD MANUAL: 0.76 10*3/MM3 (ref 0.6–4.2)
LYMPHOCYTES NFR BLD AUTO: 1.6 % (ref 10–50)
LYMPHOCYTES NFR BLD MANUAL: 4 % (ref 0–12)
LYMPHOCYTES NFR BLD MANUAL: 4 % (ref 10–50)
Lab: ABNORMAL
MAGNESIUM SERPL-MCNC: 2.1 MG/DL (ref 1.6–2.3)
MCH RBC QN AUTO: 28.1 PG (ref 26.5–34)
MCH RBC QN AUTO: 28.3 PG (ref 26.5–34)
MCHC RBC AUTO-ENTMCNC: 35.7 G/DL (ref 31.5–36.3)
MCHC RBC AUTO-ENTMCNC: 35.7 G/DL (ref 31.5–36.3)
MCV RBC AUTO: 78.6 FL (ref 80–98)
MCV RBC AUTO: 79.3 FL (ref 80–98)
MODALITY: ABNORMAL
MONOCYTES # BLD AUTO: 0.76 10*3/MM3 (ref 0–0.9)
MONOCYTES # BLD AUTO: 1.18 10*3/MM3 (ref 0–0.9)
MONOCYTES NFR BLD AUTO: 6.2 % (ref 0–12)
NEUTROPHILS # BLD AUTO: 17.38 10*3/MM3 (ref 2–8.6)
NEUTROPHILS # BLD AUTO: 17.54 10*3/MM3 (ref 2–8.6)
NEUTROPHILS NFR BLD AUTO: 91.4 % (ref 37–80)
NEUTROPHILS NFR BLD MANUAL: 89 % (ref 37–80)
NEUTS BAND NFR BLD MANUAL: 3 % (ref 0–5)
PCO2 BLDA: 43.7 MM HG (ref 35–45)
PEEP RESPIRATORY: 5 CM[H2O]
PH BLDA: 7.39 PH UNITS (ref 7.35–7.45)
PHOSPHATE SERPL-MCNC: 4.8 MG/DL (ref 2.4–4.4)
PLAT MORPH BLD: NORMAL
PLATELET # BLD AUTO: 231 10*3/MM3 (ref 150–450)
PLATELET # BLD AUTO: 241 10*3/MM3 (ref 150–450)
PMV BLD AUTO: 10.2 FL (ref 8–12)
PMV BLD AUTO: 10.5 FL (ref 8–12)
PO2 BLDA: 80.6 MM HG (ref 83–108)
POLYCHROMASIA BLD QL SMEAR: NORMAL
POTASSIUM BLD-SCNC: 4.7 MMOL/L (ref 3.5–5.1)
POTASSIUM BLD-SCNC: 4.9 MMOL/L (ref 3.5–5.1)
PROT SERPL-MCNC: 4.4 G/DL (ref 6.3–8.6)
RBC # BLD AUTO: 2.85 10*6/MM3 (ref 4.37–5.74)
RBC # BLD AUTO: 3.04 10*6/MM3 (ref 4.37–5.74)
RBC MORPH BLD: NORMAL
SAO2 % BLDCOA: 96.7 % (ref 94–99)
SET MECH RESP RATE: 10
SMALL PLATELETS BLD QL SMEAR: ADEQUATE
SODIUM BLD-SCNC: 128 MMOL/L (ref 137–145)
SODIUM BLD-SCNC: 129 MMOL/L (ref 137–145)
TSH SERPL DL<=0.05 MIU/L-ACNC: 5.16 MIU/ML (ref 0.46–4.68)
UNIT  ABO: NORMAL
UNIT  RH: NORMAL
VENTILATOR MODE: AC
VT ON VENT VENT: 600 ML
WBC MORPH BLD: NORMAL
WBC MORPH BLD: NORMAL
WBC NRBC COR # BLD: 18.99 10*3/MM3 (ref 3.2–9.8)
WBC NRBC COR # BLD: 19.07 10*3/MM3 (ref 3.2–9.8)

## 2018-07-27 PROCEDURE — 94799 UNLISTED PULMONARY SVC/PX: CPT

## 2018-07-27 PROCEDURE — 25010000002 VANCOMYCIN PER 500 MG: Performed by: SURGERY

## 2018-07-27 PROCEDURE — 85007 BL SMEAR W/DIFF WBC COUNT: CPT | Performed by: SURGERY

## 2018-07-27 PROCEDURE — 94003 VENT MGMT INPAT SUBQ DAY: CPT

## 2018-07-27 PROCEDURE — 85007 BL SMEAR W/DIFF WBC COUNT: CPT | Performed by: INTERNAL MEDICINE

## 2018-07-27 PROCEDURE — 82803 BLOOD GASES ANY COMBINATION: CPT

## 2018-07-27 PROCEDURE — 85025 COMPLETE CBC W/AUTO DIFF WBC: CPT | Performed by: SURGERY

## 2018-07-27 PROCEDURE — 84443 ASSAY THYROID STIM HORMONE: CPT | Performed by: INTERNAL MEDICINE

## 2018-07-27 PROCEDURE — 80053 COMPREHEN METABOLIC PANEL: CPT | Performed by: STUDENT IN AN ORGANIZED HEALTH CARE EDUCATION/TRAINING PROGRAM

## 2018-07-27 PROCEDURE — 82962 GLUCOSE BLOOD TEST: CPT

## 2018-07-27 PROCEDURE — 84100 ASSAY OF PHOSPHORUS: CPT | Performed by: STUDENT IN AN ORGANIZED HEALTH CARE EDUCATION/TRAINING PROGRAM

## 2018-07-27 PROCEDURE — 25010000002 PROPOFOL 1000 MG/ML EMULSION: Performed by: STUDENT IN AN ORGANIZED HEALTH CARE EDUCATION/TRAINING PROGRAM

## 2018-07-27 PROCEDURE — 99024 POSTOP FOLLOW-UP VISIT: CPT | Performed by: SURGERY

## 2018-07-27 PROCEDURE — 71045 X-RAY EXAM CHEST 1 VIEW: CPT

## 2018-07-27 PROCEDURE — 85027 COMPLETE CBC AUTOMATED: CPT | Performed by: INTERNAL MEDICINE

## 2018-07-27 PROCEDURE — 25010000003 MORPHINE PER 10 MG: Performed by: STUDENT IN AN ORGANIZED HEALTH CARE EDUCATION/TRAINING PROGRAM

## 2018-07-27 PROCEDURE — 83605 ASSAY OF LACTIC ACID: CPT | Performed by: INTERNAL MEDICINE

## 2018-07-27 PROCEDURE — 83735 ASSAY OF MAGNESIUM: CPT | Performed by: STUDENT IN AN ORGANIZED HEALTH CARE EDUCATION/TRAINING PROGRAM

## 2018-07-27 RX ADMIN — Medication 10 ML: at 10:00

## 2018-07-27 RX ADMIN — PROPOFOL 40 MCG/KG/MIN: 10 INJECTION, EMULSION INTRAVENOUS at 04:25

## 2018-07-27 RX ADMIN — SODIUM CHLORIDE 125 ML/HR: 900 INJECTION, SOLUTION INTRAVENOUS at 22:14

## 2018-07-27 RX ADMIN — VANCOMYCIN HYDROCHLORIDE 1500 MG: 5 INJECTION, POWDER, LYOPHILIZED, FOR SOLUTION INTRAVENOUS at 02:38

## 2018-07-27 RX ADMIN — METRONIDAZOLE 500 MG: 500 INJECTION, SOLUTION INTRAVENOUS at 01:26

## 2018-07-27 RX ADMIN — Medication: at 19:01

## 2018-07-27 RX ADMIN — PROPOFOL 40 MCG/KG/MIN: 10 INJECTION, EMULSION INTRAVENOUS at 08:22

## 2018-07-27 RX ADMIN — CHLORHEXIDINE GLUCONATE 0.12% ORAL RINSE 15 ML: 1.2 LIQUID ORAL at 20:19

## 2018-07-27 RX ADMIN — VANCOMYCIN HYDROCHLORIDE 1500 MG: 5 INJECTION, POWDER, LYOPHILIZED, FOR SOLUTION INTRAVENOUS at 20:18

## 2018-07-27 RX ADMIN — METRONIDAZOLE 500 MG: 500 INJECTION, SOLUTION INTRAVENOUS at 17:23

## 2018-07-27 RX ADMIN — CHLORHEXIDINE GLUCONATE 0.12% ORAL RINSE 15 ML: 1.2 LIQUID ORAL at 08:22

## 2018-07-27 RX ADMIN — SODIUM CHLORIDE 125 ML/HR: 900 INJECTION, SOLUTION INTRAVENOUS at 12:34

## 2018-07-27 RX ADMIN — Medication 10 ML: at 22:35

## 2018-07-27 RX ADMIN — Medication: at 04:20

## 2018-07-27 RX ADMIN — SODIUM CHLORIDE 2 G: 900 INJECTION INTRAVENOUS at 00:03

## 2018-07-27 RX ADMIN — PANTOPRAZOLE SODIUM 40 MG: 40 INJECTION, POWDER, FOR SOLUTION INTRAVENOUS at 06:05

## 2018-07-27 RX ADMIN — SODIUM CHLORIDE 2 G: 900 INJECTION INTRAVENOUS at 17:17

## 2018-07-27 RX ADMIN — NOREPINEPHRINE BITARTRATE 0.03 MCG/KG/MIN: 1 INJECTION INTRAVENOUS at 02:07

## 2018-07-27 RX ADMIN — SODIUM CHLORIDE 2 G: 900 INJECTION INTRAVENOUS at 08:22

## 2018-07-27 RX ADMIN — METRONIDAZOLE 500 MG: 500 INJECTION, SOLUTION INTRAVENOUS at 10:00

## 2018-07-27 RX ADMIN — SODIUM CHLORIDE 125 ML/HR: 900 INJECTION, SOLUTION INTRAVENOUS at 01:57

## 2018-07-28 LAB
ABO + RH BLD: NORMAL
ABO + RH BLD: NORMAL
ALBUMIN SERPL-MCNC: 2.5 G/DL (ref 3.4–4.8)
ALBUMIN/GLOB SERPL: 1 G/DL (ref 1.1–1.8)
ALP SERPL-CCNC: 67 U/L (ref 38–126)
ALT SERPL W P-5'-P-CCNC: 37 U/L (ref 21–72)
ANION GAP SERPL CALCULATED.3IONS-SCNC: 5 MMOL/L (ref 5–15)
ARTERIAL PATENCY WRIST A: ABNORMAL
AST SERPL-CCNC: 24 U/L (ref 17–59)
ATMOSPHERIC PRESS: 751 MMHG
BASE EXCESS BLDA CALC-SCNC: 0.7 MMOL/L (ref 0–2)
BDY SITE: ABNORMAL
BH BB BLOOD EXPIRATION DATE: NORMAL
BH BB BLOOD EXPIRATION DATE: NORMAL
BH BB BLOOD TYPE BARCODE: 5100
BH BB BLOOD TYPE BARCODE: 5100
BH BB DISPENSE STATUS: NORMAL
BH BB DISPENSE STATUS: NORMAL
BH BB PRODUCT CODE: NORMAL
BH BB PRODUCT CODE: NORMAL
BH BB UNIT NUMBER: NORMAL
BH BB UNIT NUMBER: NORMAL
BILIRUB SERPL-MCNC: 0.3 MG/DL (ref 0.2–1.3)
BUN BLD-MCNC: 21 MG/DL (ref 7–21)
BUN/CREAT SERPL: 22.8 (ref 7–25)
CALCIUM SPEC-SCNC: 7.6 MG/DL (ref 8.4–10.2)
CHLORIDE SERPL-SCNC: 103 MMOL/L (ref 95–110)
CO2 SERPL-SCNC: 27 MMOL/L (ref 22–31)
CREAT BLD-MCNC: 0.92 MG/DL (ref 0.7–1.3)
DEPRECATED RDW RBC AUTO: 49.3 FL (ref 35.1–43.9)
ERYTHROCYTE [DISTWIDTH] IN BLOOD BY AUTOMATED COUNT: 16.7 % (ref 11.5–14.5)
GAS FLOW AIRWAY: 10 LPM
GFR SERPL CREATININE-BSD FRML MDRD: 85 ML/MIN/1.73 (ref 56–130)
GLOBULIN UR ELPH-MCNC: 2.4 GM/DL (ref 2.3–3.5)
GLUCOSE BLD-MCNC: 94 MG/DL (ref 60–100)
HCO3 BLDA-SCNC: 25.7 MMOL/L (ref 20–26)
HCT VFR BLD AUTO: 22.5 % (ref 39–49)
HGB BLD-MCNC: 7.7 G/DL (ref 13.7–17.3)
HOROWITZ INDEX BLD+IHG-RTO: 50 %
Lab: ABNORMAL
MAGNESIUM SERPL-MCNC: 2.2 MG/DL (ref 1.6–2.3)
MCH RBC QN AUTO: 27.9 PG (ref 26.5–34)
MCHC RBC AUTO-ENTMCNC: 34.2 G/DL (ref 31.5–36.3)
MCV RBC AUTO: 81.5 FL (ref 80–98)
MODALITY: ABNORMAL
PCO2 BLDA: 42.2 MM HG (ref 35–45)
PH BLDA: 7.39 PH UNITS (ref 7.35–7.45)
PHOSPHATE SERPL-MCNC: 2.9 MG/DL (ref 2.4–4.4)
PLATELET # BLD AUTO: 297 10*3/MM3 (ref 150–450)
PMV BLD AUTO: 10.2 FL (ref 8–12)
PO2 BLDA: 77.7 MM HG (ref 83–108)
POTASSIUM BLD-SCNC: 4.1 MMOL/L (ref 3.5–5.1)
PROT SERPL-MCNC: 4.9 G/DL (ref 6.3–8.6)
RBC # BLD AUTO: 2.76 10*6/MM3 (ref 4.37–5.74)
SAO2 % BLDCOA: 96.3 % (ref 94–99)
SODIUM BLD-SCNC: 135 MMOL/L (ref 137–145)
UNIT  ABO: NORMAL
UNIT  ABO: NORMAL
UNIT  RH: NORMAL
UNIT  RH: NORMAL
VENTILATOR MODE: ABNORMAL
WBC NRBC COR # BLD: 22.76 10*3/MM3 (ref 3.2–9.8)

## 2018-07-28 PROCEDURE — 80053 COMPREHEN METABOLIC PANEL: CPT | Performed by: STUDENT IN AN ORGANIZED HEALTH CARE EDUCATION/TRAINING PROGRAM

## 2018-07-28 PROCEDURE — 94003 VENT MGMT INPAT SUBQ DAY: CPT

## 2018-07-28 PROCEDURE — 36600 WITHDRAWAL OF ARTERIAL BLOOD: CPT

## 2018-07-28 PROCEDURE — 99024 POSTOP FOLLOW-UP VISIT: CPT | Performed by: SURGERY

## 2018-07-28 PROCEDURE — 94799 UNLISTED PULMONARY SVC/PX: CPT

## 2018-07-28 PROCEDURE — 25010000003 MORPHINE PER 10 MG: Performed by: STUDENT IN AN ORGANIZED HEALTH CARE EDUCATION/TRAINING PROGRAM

## 2018-07-28 PROCEDURE — 82803 BLOOD GASES ANY COMBINATION: CPT

## 2018-07-28 PROCEDURE — 84100 ASSAY OF PHOSPHORUS: CPT | Performed by: STUDENT IN AN ORGANIZED HEALTH CARE EDUCATION/TRAINING PROGRAM

## 2018-07-28 PROCEDURE — 94760 N-INVAS EAR/PLS OXIMETRY 1: CPT

## 2018-07-28 PROCEDURE — 83735 ASSAY OF MAGNESIUM: CPT | Performed by: STUDENT IN AN ORGANIZED HEALTH CARE EDUCATION/TRAINING PROGRAM

## 2018-07-28 PROCEDURE — 85027 COMPLETE CBC AUTOMATED: CPT | Performed by: STUDENT IN AN ORGANIZED HEALTH CARE EDUCATION/TRAINING PROGRAM

## 2018-07-28 RX ADMIN — METRONIDAZOLE 500 MG: 500 INJECTION, SOLUTION INTRAVENOUS at 02:01

## 2018-07-28 RX ADMIN — Medication: at 15:38

## 2018-07-28 RX ADMIN — Medication: at 04:54

## 2018-07-28 RX ADMIN — CHLORHEXIDINE GLUCONATE 0.12% ORAL RINSE 15 ML: 1.2 LIQUID ORAL at 08:02

## 2018-07-28 RX ADMIN — PANTOPRAZOLE SODIUM 40 MG: 40 INJECTION, POWDER, FOR SOLUTION INTRAVENOUS at 06:25

## 2018-07-28 RX ADMIN — SODIUM CHLORIDE 2 G: 900 INJECTION INTRAVENOUS at 00:14

## 2018-07-28 RX ADMIN — CHLORHEXIDINE GLUCONATE 0.12% ORAL RINSE 15 ML: 1.2 LIQUID ORAL at 20:04

## 2018-07-28 RX ADMIN — SODIUM CHLORIDE 125 ML/HR: 900 INJECTION, SOLUTION INTRAVENOUS at 16:04

## 2018-07-28 RX ADMIN — SODIUM CHLORIDE 2 G: 900 INJECTION INTRAVENOUS at 07:58

## 2018-07-28 RX ADMIN — Medication 10 ML: at 11:34

## 2018-07-29 ENCOUNTER — APPOINTMENT (OUTPATIENT)
Dept: GENERAL RADIOLOGY | Facility: HOSPITAL | Age: 58
End: 2018-07-29

## 2018-07-29 ENCOUNTER — APPOINTMENT (OUTPATIENT)
Dept: CT IMAGING | Facility: HOSPITAL | Age: 58
End: 2018-07-29

## 2018-07-29 LAB
ABO + RH BLD: NORMAL
ALBUMIN SERPL-MCNC: 2.8 G/DL (ref 3.4–4.8)
ALBUMIN/GLOB SERPL: 1 G/DL (ref 1.1–1.8)
ALP SERPL-CCNC: 86 U/L (ref 38–126)
ALT SERPL W P-5'-P-CCNC: 36 U/L (ref 21–72)
ANION GAP SERPL CALCULATED.3IONS-SCNC: 7 MMOL/L (ref 5–15)
AST SERPL-CCNC: 22 U/L (ref 17–59)
BH BB BLOOD EXPIRATION DATE: NORMAL
BH BB BLOOD TYPE BARCODE: 5100
BH BB DISPENSE STATUS: NORMAL
BH BB PRODUCT CODE: NORMAL
BH BB UNIT NUMBER: NORMAL
BILIRUB SERPL-MCNC: 0.5 MG/DL (ref 0.2–1.3)
BUN BLD-MCNC: 17 MG/DL (ref 7–21)
BUN/CREAT SERPL: 22.4 (ref 7–25)
CALCIUM SPEC-SCNC: 8 MG/DL (ref 8.4–10.2)
CHLORIDE SERPL-SCNC: 104 MMOL/L (ref 95–110)
CO2 SERPL-SCNC: 25 MMOL/L (ref 22–31)
CREAT BLD-MCNC: 0.76 MG/DL (ref 0.7–1.3)
DEPRECATED RDW RBC AUTO: 51.4 FL (ref 35.1–43.9)
ERYTHROCYTE [DISTWIDTH] IN BLOOD BY AUTOMATED COUNT: 16.7 % (ref 11.5–14.5)
GFR SERPL CREATININE-BSD FRML MDRD: 106 ML/MIN/1.73 (ref 56–130)
GLOBULIN UR ELPH-MCNC: 2.7 GM/DL (ref 2.3–3.5)
GLUCOSE BLD-MCNC: 75 MG/DL (ref 60–100)
HCT VFR BLD AUTO: 26 % (ref 39–49)
HGB BLD-MCNC: 8.6 G/DL (ref 13.7–17.3)
MAGNESIUM SERPL-MCNC: 2 MG/DL (ref 1.6–2.3)
MCH RBC QN AUTO: 27.9 PG (ref 26.5–34)
MCHC RBC AUTO-ENTMCNC: 33.1 G/DL (ref 31.5–36.3)
MCV RBC AUTO: 84.4 FL (ref 80–98)
PHOSPHATE SERPL-MCNC: 3.3 MG/DL (ref 2.4–4.4)
PLATELET # BLD AUTO: 378 10*3/MM3 (ref 150–450)
PMV BLD AUTO: 9.7 FL (ref 8–12)
POTASSIUM BLD-SCNC: 4.4 MMOL/L (ref 3.5–5.1)
PROT SERPL-MCNC: 5.5 G/DL (ref 6.3–8.6)
RBC # BLD AUTO: 3.08 10*6/MM3 (ref 4.37–5.74)
SODIUM BLD-SCNC: 136 MMOL/L (ref 137–145)
UNIT  ABO: NORMAL
UNIT  RH: NORMAL
WBC NRBC COR # BLD: 27.47 10*3/MM3 (ref 3.2–9.8)

## 2018-07-29 PROCEDURE — 80053 COMPREHEN METABOLIC PANEL: CPT | Performed by: STUDENT IN AN ORGANIZED HEALTH CARE EDUCATION/TRAINING PROGRAM

## 2018-07-29 PROCEDURE — 99024 POSTOP FOLLOW-UP VISIT: CPT | Performed by: SURGERY

## 2018-07-29 PROCEDURE — 71260 CT THORAX DX C+: CPT

## 2018-07-29 PROCEDURE — 71045 X-RAY EXAM CHEST 1 VIEW: CPT

## 2018-07-29 PROCEDURE — 94760 N-INVAS EAR/PLS OXIMETRY 1: CPT

## 2018-07-29 PROCEDURE — 25010000003 MORPHINE PER 10 MG: Performed by: STUDENT IN AN ORGANIZED HEALTH CARE EDUCATION/TRAINING PROGRAM

## 2018-07-29 PROCEDURE — 94799 UNLISTED PULMONARY SVC/PX: CPT

## 2018-07-29 PROCEDURE — 85027 COMPLETE CBC AUTOMATED: CPT | Performed by: STUDENT IN AN ORGANIZED HEALTH CARE EDUCATION/TRAINING PROGRAM

## 2018-07-29 PROCEDURE — 84100 ASSAY OF PHOSPHORUS: CPT | Performed by: STUDENT IN AN ORGANIZED HEALTH CARE EDUCATION/TRAINING PROGRAM

## 2018-07-29 PROCEDURE — 25010000002 IOPAMIDOL 61 % SOLUTION: Performed by: SURGERY

## 2018-07-29 PROCEDURE — 83735 ASSAY OF MAGNESIUM: CPT | Performed by: STUDENT IN AN ORGANIZED HEALTH CARE EDUCATION/TRAINING PROGRAM

## 2018-07-29 RX ADMIN — SODIUM CHLORIDE 125 ML/HR: 900 INJECTION, SOLUTION INTRAVENOUS at 00:25

## 2018-07-29 RX ADMIN — SODIUM CHLORIDE 125 ML/HR: 900 INJECTION, SOLUTION INTRAVENOUS at 09:17

## 2018-07-29 RX ADMIN — SODIUM CHLORIDE 125 ML/HR: 900 INJECTION, SOLUTION INTRAVENOUS at 18:00

## 2018-07-29 RX ADMIN — PANTOPRAZOLE SODIUM 40 MG: 40 INJECTION, POWDER, FOR SOLUTION INTRAVENOUS at 06:00

## 2018-07-29 RX ADMIN — IOPAMIDOL 92 ML: 612 INJECTION, SOLUTION INTRAVENOUS at 17:00

## 2018-07-29 RX ADMIN — Medication: at 11:20

## 2018-07-29 RX ADMIN — Medication: at 02:14

## 2018-07-29 RX ADMIN — Medication: at 17:56

## 2018-07-29 RX ADMIN — CHLORHEXIDINE GLUCONATE 0.12% ORAL RINSE 15 ML: 1.2 LIQUID ORAL at 09:42

## 2018-07-30 ENCOUNTER — APPOINTMENT (OUTPATIENT)
Dept: GENERAL RADIOLOGY | Facility: HOSPITAL | Age: 58
End: 2018-07-30

## 2018-07-30 ENCOUNTER — APPOINTMENT (OUTPATIENT)
Dept: SPEECH THERAPY | Facility: HOSPITAL | Age: 58
End: 2018-07-30

## 2018-07-30 PROBLEM — J90 PLEURAL EFFUSION: Status: ACTIVE | Noted: 2018-07-26

## 2018-07-30 LAB
ALBUMIN SERPL-MCNC: 2.6 G/DL (ref 3.4–4.8)
ALBUMIN/GLOB SERPL: 1 G/DL (ref 1.1–1.8)
ALP SERPL-CCNC: 81 U/L (ref 38–126)
ALT SERPL W P-5'-P-CCNC: 28 U/L (ref 21–72)
ANION GAP SERPL CALCULATED.3IONS-SCNC: 5 MMOL/L (ref 5–15)
AST SERPL-CCNC: 23 U/L (ref 17–59)
BILIRUB SERPL-MCNC: 0.8 MG/DL (ref 0.2–1.3)
BUN BLD-MCNC: 13 MG/DL (ref 7–21)
BUN/CREAT SERPL: 17.6 (ref 7–25)
CALCIUM SPEC-SCNC: 7.9 MG/DL (ref 8.4–10.2)
CHLORIDE SERPL-SCNC: 105 MMOL/L (ref 95–110)
CO2 SERPL-SCNC: 25 MMOL/L (ref 22–31)
CREAT BLD-MCNC: 0.74 MG/DL (ref 0.7–1.3)
DEPRECATED RDW RBC AUTO: 52.7 FL (ref 35.1–43.9)
ERYTHROCYTE [DISTWIDTH] IN BLOOD BY AUTOMATED COUNT: 17 % (ref 11.5–14.5)
GFR SERPL CREATININE-BSD FRML MDRD: 109 ML/MIN/1.73 (ref 56–130)
GLOBULIN UR ELPH-MCNC: 2.5 GM/DL (ref 2.3–3.5)
GLUCOSE BLD-MCNC: 95 MG/DL (ref 60–100)
HCT VFR BLD AUTO: 25.3 % (ref 39–49)
HGB BLD-MCNC: 8.3 G/DL (ref 13.7–17.3)
MAGNESIUM SERPL-MCNC: 1.9 MG/DL (ref 1.6–2.3)
MCH RBC QN AUTO: 28.1 PG (ref 26.5–34)
MCHC RBC AUTO-ENTMCNC: 32.8 G/DL (ref 31.5–36.3)
MCV RBC AUTO: 85.8 FL (ref 80–98)
PHOSPHATE SERPL-MCNC: 3 MG/DL (ref 2.4–4.4)
PLATELET # BLD AUTO: 415 10*3/MM3 (ref 150–450)
PMV BLD AUTO: 9.4 FL (ref 8–12)
POTASSIUM BLD-SCNC: 4.2 MMOL/L (ref 3.5–5.1)
PROT SERPL-MCNC: 5.1 G/DL (ref 6.3–8.6)
RBC # BLD AUTO: 2.95 10*6/MM3 (ref 4.37–5.74)
SODIUM BLD-SCNC: 135 MMOL/L (ref 137–145)
WBC NRBC COR # BLD: 33.16 10*3/MM3 (ref 3.2–9.8)

## 2018-07-30 PROCEDURE — 85027 COMPLETE CBC AUTOMATED: CPT | Performed by: STUDENT IN AN ORGANIZED HEALTH CARE EDUCATION/TRAINING PROGRAM

## 2018-07-30 PROCEDURE — 99024 POSTOP FOLLOW-UP VISIT: CPT | Performed by: SURGERY

## 2018-07-30 PROCEDURE — 84100 ASSAY OF PHOSPHORUS: CPT | Performed by: STUDENT IN AN ORGANIZED HEALTH CARE EDUCATION/TRAINING PROGRAM

## 2018-07-30 PROCEDURE — 80053 COMPREHEN METABOLIC PANEL: CPT | Performed by: STUDENT IN AN ORGANIZED HEALTH CARE EDUCATION/TRAINING PROGRAM

## 2018-07-30 PROCEDURE — 25010000003 MORPHINE PER 10 MG: Performed by: STUDENT IN AN ORGANIZED HEALTH CARE EDUCATION/TRAINING PROGRAM

## 2018-07-30 PROCEDURE — 83735 ASSAY OF MAGNESIUM: CPT | Performed by: STUDENT IN AN ORGANIZED HEALTH CARE EDUCATION/TRAINING PROGRAM

## 2018-07-30 PROCEDURE — 99253 IP/OBS CNSLTJ NEW/EST LOW 45: CPT | Performed by: NURSE PRACTITIONER

## 2018-07-30 PROCEDURE — 71045 X-RAY EXAM CHEST 1 VIEW: CPT

## 2018-07-30 RX ORDER — MAGNESIUM HYDROXIDE 1200 MG/15ML
LIQUID ORAL
Qty: 1000 ML | Refills: 8 | Status: SHIPPED | OUTPATIENT
Start: 2018-07-30 | End: 2018-10-12 | Stop reason: SDUPTHER

## 2018-07-30 RX ADMIN — SODIUM CHLORIDE 125 ML/HR: 900 INJECTION, SOLUTION INTRAVENOUS at 10:45

## 2018-07-30 RX ADMIN — Medication: at 14:20

## 2018-07-30 RX ADMIN — Medication: at 03:44

## 2018-07-30 RX ADMIN — CHLORHEXIDINE GLUCONATE 0.12% ORAL RINSE 15 ML: 1.2 LIQUID ORAL at 11:21

## 2018-07-30 RX ADMIN — PANTOPRAZOLE SODIUM 40 MG: 40 INJECTION, POWDER, FOR SOLUTION INTRAVENOUS at 06:00

## 2018-07-30 RX ADMIN — CHLORHEXIDINE GLUCONATE 0.12% ORAL RINSE 15 ML: 1.2 LIQUID ORAL at 20:09

## 2018-07-30 RX ADMIN — SODIUM CHLORIDE 125 ML/HR: 900 INJECTION, SOLUTION INTRAVENOUS at 01:42

## 2018-07-31 ENCOUNTER — APPOINTMENT (OUTPATIENT)
Dept: GENERAL RADIOLOGY | Facility: HOSPITAL | Age: 58
End: 2018-07-31

## 2018-07-31 ENCOUNTER — ANESTHESIA EVENT (OUTPATIENT)
Dept: PERIOP | Facility: HOSPITAL | Age: 58
End: 2018-07-31

## 2018-07-31 ENCOUNTER — ANESTHESIA (OUTPATIENT)
Dept: PERIOP | Facility: HOSPITAL | Age: 58
End: 2018-07-31

## 2018-07-31 LAB
ABO GROUP BLD: NORMAL
ANION GAP SERPL CALCULATED.3IONS-SCNC: 3 MMOL/L (ref 5–15)
ANISOCYTOSIS BLD QL: ABNORMAL
ARTERIAL PATENCY WRIST A: ABNORMAL
ATMOSPHERIC PRESS: 745 MMHG
ATMOSPHERIC PRESS: 746 MMHG
ATMOSPHERIC PRESS: 746 MMHG
BACTERIA SPEC AEROBE CULT: NORMAL
BACTERIA SPEC AEROBE CULT: NORMAL
BASE EXCESS BLDA CALC-SCNC: -0.1 MMOL/L (ref 0–2)
BASE EXCESS BLDA CALC-SCNC: -2.3 MMOL/L (ref 0–2)
BASE EXCESS BLDA CALC-SCNC: -3.7 MMOL/L (ref 0–2)
BDY SITE: ABNORMAL
BLD GP AB SCN SERPL QL: NEGATIVE
BUN BLD-MCNC: 11 MG/DL (ref 7–21)
BUN/CREAT SERPL: 15.7 (ref 7–25)
CA-I BLD-MCNC: 4.52 MG/DL (ref 4.6–5.6)
CA-I BLD-MCNC: 4.58 MG/DL (ref 4.6–5.6)
CALCIUM SPEC-SCNC: 7.7 MG/DL (ref 8.4–10.2)
CHLORIDE SERPL-SCNC: 105 MMOL/L (ref 95–110)
CO2 SERPL-SCNC: 26 MMOL/L (ref 22–31)
COHGB MFR BLD: 1.3 % (ref 0–5)
COHGB MFR BLD: 1.4 % (ref 0–5)
CREAT BLD-MCNC: 0.7 MG/DL (ref 0.7–1.3)
DEPRECATED RDW RBC AUTO: 52.6 FL (ref 35.1–43.9)
ERYTHROCYTE [DISTWIDTH] IN BLOOD BY AUTOMATED COUNT: 17.1 % (ref 11.5–14.5)
GFR SERPL CREATININE-BSD FRML MDRD: 116 ML/MIN/1.73 (ref 56–130)
GLUCOSE BLD-MCNC: 115 MG/DL (ref 60–100)
GLUCOSE BLDA-MCNC: 100 MMOL/L (ref 65–95)
GLUCOSE BLDA-MCNC: 113 MMOL/L (ref 65–95)
GLUCOSE BLDC GLUCOMTR-MCNC: 88 MG/DL (ref 70–130)
HCO3 BLDA-SCNC: 24.5 MMOL/L (ref 20–26)
HCO3 BLDA-SCNC: 25 MMOL/L (ref 20–26)
HCO3 BLDA-SCNC: 25 MMOL/L (ref 20–26)
HCT VFR BLD AUTO: 24.3 % (ref 39–49)
HCT VFR BLD CALC: 23.7 % (ref 38–51)
HCT VFR BLD CALC: 30.4 % (ref 38–51)
HGB BLD-MCNC: 8.1 G/DL (ref 13.7–17.3)
HGB BLDA-MCNC: 7.7 G/DL (ref 14–18)
HGB BLDA-MCNC: 9.9 G/DL (ref 14–18)
HOROWITZ INDEX BLD+IHG-RTO: 60 %
KOH PREP NAIL: NORMAL
KOH PREP NAIL: NORMAL
LYMPHOCYTES # BLD MANUAL: 0.33 10*3/MM3 (ref 0.6–4.2)
LYMPHOCYTES NFR BLD MANUAL: 1 % (ref 10–50)
LYMPHOCYTES NFR BLD MANUAL: 3 % (ref 0–12)
Lab: ABNORMAL
Lab: NORMAL
MCH RBC QN AUTO: 28.3 PG (ref 26.5–34)
MCHC RBC AUTO-ENTMCNC: 33.3 G/DL (ref 31.5–36.3)
MCV RBC AUTO: 85 FL (ref 80–98)
METAMYELOCYTES NFR BLD MANUAL: 1 % (ref 0–0)
METHGB BLD QL: 0.3 % (ref 0–3)
METHGB BLD QL: 0.4 % (ref 0–3)
MODALITY: ABNORMAL
MONOCYTES # BLD AUTO: 1 10*3/MM3 (ref 0–0.9)
MYELOCYTES NFR BLD MANUAL: 2 % (ref 0–0)
NEUTROPHILS # BLD AUTO: 30.97 10*3/MM3 (ref 2–8.6)
NEUTROPHILS NFR BLD MANUAL: 93 % (ref 37–80)
NRBC SPEC MANUAL: 2 /100 WBC (ref 0–0)
OXYHGB MFR BLDV: 84.3 % (ref 94–99)
OXYHGB MFR BLDV: 96.5 % (ref 94–99)
PCO2 BLDA: 42 MM HG (ref 35–45)
PCO2 BLDA: 50 MM HG (ref 35–45)
PCO2 BLDA: 64.4 MM HG (ref 35–45)
PCO2 TEMP ADJ BLD: ABNORMAL MM HG (ref 35–48)
PCO2 TEMP ADJ BLD: ABNORMAL MM HG (ref 35–48)
PEEP RESPIRATORY: 5 CM[H2O]
PH BLDA: 7.2 PH UNITS (ref 7.35–7.45)
PH BLDA: 7.3 PH UNITS (ref 7.35–7.45)
PH BLDA: 7.38 PH UNITS (ref 7.35–7.45)
PH, TEMP CORRECTED: ABNORMAL PH UNITS
PH, TEMP CORRECTED: ABNORMAL PH UNITS
PLATELET # BLD AUTO: 453 10*3/MM3 (ref 150–450)
PMV BLD AUTO: 9.1 FL (ref 8–12)
PO2 BLDA: 60.1 MM HG (ref 83–108)
PO2 BLDA: 81.2 MM HG (ref 83–108)
PO2 BLDA: 99.2 MM HG (ref 83–108)
PO2 TEMP ADJ BLD: ABNORMAL MM HG (ref 83–108)
PO2 TEMP ADJ BLD: ABNORMAL MM HG (ref 83–108)
POLYCHROMASIA BLD QL SMEAR: ABNORMAL
POTASSIUM BLD-SCNC: 3.9 MMOL/L (ref 3.5–5.1)
POTASSIUM BLDA-SCNC: 3.8 MMOL/L (ref 3.4–4.5)
POTASSIUM BLDA-SCNC: 4.3 MMOL/L (ref 3.4–4.5)
RBC # BLD AUTO: 2.86 10*6/MM3 (ref 4.37–5.74)
RH BLD: POSITIVE
SAO2 % BLDCOA: 85.8 % (ref 94–99)
SAO2 % BLDCOA: 95.7 % (ref 94–99)
SAO2 % BLDCOA: 98.2 % (ref 94–99)
SET MECH RESP RATE: 10
SMALL PLATELETS BLD QL SMEAR: ABNORMAL
SODIUM BLD-SCNC: 134 MMOL/L (ref 137–145)
SODIUM BLDA-SCNC: 136 MMOL/L (ref 136–146)
SODIUM BLDA-SCNC: 136 MMOL/L (ref 136–146)
T&S EXPIRATION DATE: NORMAL
VENTILATOR MODE: ABNORMAL
VENTILATOR MODE: ABNORMAL
VENTILATOR MODE: AC
VT ON VENT VENT: 600 ML
WBC NRBC COR # BLD: 33.3 10*3/MM3 (ref 3.2–9.8)
WBC OBSERVATIONS: ABNORMAL

## 2018-07-31 PROCEDURE — 87102 FUNGUS ISOLATION CULTURE: CPT | Performed by: THORACIC SURGERY (CARDIOTHORACIC VASCULAR SURGERY)

## 2018-07-31 PROCEDURE — 80048 BASIC METABOLIC PNL TOTAL CA: CPT | Performed by: STUDENT IN AN ORGANIZED HEALTH CARE EDUCATION/TRAINING PROGRAM

## 2018-07-31 PROCEDURE — 82375 ASSAY CARBOXYHB QUANT: CPT

## 2018-07-31 PROCEDURE — 71045 X-RAY EXAM CHEST 1 VIEW: CPT

## 2018-07-31 PROCEDURE — 0BCL0ZZ EXTIRPATION OF MATTER FROM LEFT LUNG, OPEN APPROACH: ICD-10-PCS | Performed by: THORACIC SURGERY (CARDIOTHORACIC VASCULAR SURGERY)

## 2018-07-31 PROCEDURE — 94002 VENT MGMT INPAT INIT DAY: CPT

## 2018-07-31 PROCEDURE — 86850 RBC ANTIBODY SCREEN: CPT | Performed by: SURGERY

## 2018-07-31 PROCEDURE — 82803 BLOOD GASES ANY COMBINATION: CPT

## 2018-07-31 PROCEDURE — 36430 TRANSFUSION BLD/BLD COMPNT: CPT

## 2018-07-31 PROCEDURE — 86900 BLOOD TYPING SEROLOGIC ABO: CPT

## 2018-07-31 PROCEDURE — 25010000002 FENTANYL CITRATE (PF) 100 MCG/2ML SOLUTION: Performed by: NURSE ANESTHETIST, CERTIFIED REGISTERED

## 2018-07-31 PROCEDURE — 87176 TISSUE HOMOGENIZATION CULTR: CPT | Performed by: THORACIC SURGERY (CARDIOTHORACIC VASCULAR SURGERY)

## 2018-07-31 PROCEDURE — 87220 TISSUE EXAM FOR FUNGI: CPT | Performed by: THORACIC SURGERY (CARDIOTHORACIC VASCULAR SURGERY)

## 2018-07-31 PROCEDURE — 86900 BLOOD TYPING SEROLOGIC ABO: CPT | Performed by: SURGERY

## 2018-07-31 PROCEDURE — 25010000003 MORPHINE PER 10 MG: Performed by: STUDENT IN AN ORGANIZED HEALTH CARE EDUCATION/TRAINING PROGRAM

## 2018-07-31 PROCEDURE — 87070 CULTURE OTHR SPECIMN AEROBIC: CPT | Performed by: THORACIC SURGERY (CARDIOTHORACIC VASCULAR SURGERY)

## 2018-07-31 PROCEDURE — 82805 BLOOD GASES W/O2 SATURATION: CPT

## 2018-07-31 PROCEDURE — 83050 HGB METHEMOGLOBIN QUAN: CPT

## 2018-07-31 PROCEDURE — 0BJQ4ZZ INSPECTION OF PLEURA, PERCUTANEOUS ENDOSCOPIC APPROACH: ICD-10-PCS | Performed by: THORACIC SURGERY (CARDIOTHORACIC VASCULAR SURGERY)

## 2018-07-31 PROCEDURE — 25010000002 PHENYLEPHRINE PER 1 ML: Performed by: NURSE ANESTHETIST, CERTIFIED REGISTERED

## 2018-07-31 PROCEDURE — 25010000002 ALBUMIN HUMAN 5% PER 50 ML: Performed by: NURSE ANESTHETIST, CERTIFIED REGISTERED

## 2018-07-31 PROCEDURE — 32220 RELEASE OF LUNG: CPT | Performed by: THORACIC SURGERY (CARDIOTHORACIC VASCULAR SURGERY)

## 2018-07-31 PROCEDURE — 87116 MYCOBACTERIA CULTURE: CPT | Performed by: THORACIC SURGERY (CARDIOTHORACIC VASCULAR SURGERY)

## 2018-07-31 PROCEDURE — 82962 GLUCOSE BLOOD TEST: CPT

## 2018-07-31 PROCEDURE — 25010000002 MIDAZOLAM PER 1 MG: Performed by: NURSE ANESTHETIST, CERTIFIED REGISTERED

## 2018-07-31 PROCEDURE — 94799 UNLISTED PULMONARY SVC/PX: CPT

## 2018-07-31 PROCEDURE — 99024 POSTOP FOLLOW-UP VISIT: CPT | Performed by: SURGERY

## 2018-07-31 PROCEDURE — 0BJ08ZZ INSPECTION OF TRACHEOBRONCHIAL TREE, VIA NATURAL OR ARTIFICIAL OPENING ENDOSCOPIC: ICD-10-PCS | Performed by: THORACIC SURGERY (CARDIOTHORACIC VASCULAR SURGERY)

## 2018-07-31 PROCEDURE — 87206 SMEAR FLUORESCENT/ACID STAI: CPT | Performed by: THORACIC SURGERY (CARDIOTHORACIC VASCULAR SURGERY)

## 2018-07-31 PROCEDURE — 85027 COMPLETE CBC AUTOMATED: CPT | Performed by: STUDENT IN AN ORGANIZED HEALTH CARE EDUCATION/TRAINING PROGRAM

## 2018-07-31 PROCEDURE — 25010000003 CEFAZOLIN PER 500 MG: Performed by: NURSE ANESTHETIST, CERTIFIED REGISTERED

## 2018-07-31 PROCEDURE — P9016 RBC LEUKOCYTES REDUCED: HCPCS

## 2018-07-31 PROCEDURE — 87205 SMEAR GRAM STAIN: CPT | Performed by: THORACIC SURGERY (CARDIOTHORACIC VASCULAR SURGERY)

## 2018-07-31 PROCEDURE — 86901 BLOOD TYPING SEROLOGIC RH(D): CPT | Performed by: SURGERY

## 2018-07-31 PROCEDURE — 87015 SPECIMEN INFECT AGNT CONCNTJ: CPT | Performed by: THORACIC SURGERY (CARDIOTHORACIC VASCULAR SURGERY)

## 2018-07-31 PROCEDURE — 0W9B00Z DRAINAGE OF LEFT PLEURAL CAVITY WITH DRAINAGE DEVICE, OPEN APPROACH: ICD-10-PCS | Performed by: THORACIC SURGERY (CARDIOTHORACIC VASCULAR SURGERY)

## 2018-07-31 PROCEDURE — 25010000002 PROPOFOL 10 MG/ML EMULSION: Performed by: NURSE ANESTHETIST, CERTIFIED REGISTERED

## 2018-07-31 PROCEDURE — P9041 ALBUMIN (HUMAN),5%, 50ML: HCPCS | Performed by: NURSE ANESTHETIST, CERTIFIED REGISTERED

## 2018-07-31 PROCEDURE — 86923 COMPATIBILITY TEST ELECTRIC: CPT

## 2018-07-31 RX ORDER — BUPIVACAINE HCL/0.9 % NACL/PF 0.1 %
2 PLASTIC BAG, INJECTION (ML) EPIDURAL EVERY 8 HOURS
Status: COMPLETED | OUTPATIENT
Start: 2018-08-01 | End: 2018-08-01

## 2018-07-31 RX ORDER — MIDAZOLAM HYDROCHLORIDE 1 MG/ML
INJECTION INTRAMUSCULAR; INTRAVENOUS AS NEEDED
Status: DISCONTINUED | OUTPATIENT
Start: 2018-07-31 | End: 2018-07-31 | Stop reason: SURG

## 2018-07-31 RX ORDER — FLUMAZENIL 0.1 MG/ML
0.2 INJECTION INTRAVENOUS AS NEEDED
Status: DISCONTINUED | OUTPATIENT
Start: 2018-07-31 | End: 2018-08-02

## 2018-07-31 RX ORDER — ACETAMINOPHEN 325 MG/1
650 TABLET ORAL ONCE AS NEEDED
Status: DISCONTINUED | OUTPATIENT
Start: 2018-07-31 | End: 2018-08-03

## 2018-07-31 RX ORDER — VECURONIUM BROMIDE 20 MG/20ML
INJECTION, POWDER, LYOPHILIZED, FOR SOLUTION INTRAVENOUS AS NEEDED
Status: DISCONTINUED | OUTPATIENT
Start: 2018-07-31 | End: 2018-07-31 | Stop reason: SURG

## 2018-07-31 RX ORDER — ALBUTEROL SULFATE 2.5 MG/3ML
2.5 SOLUTION RESPIRATORY (INHALATION)
Status: DISPENSED | OUTPATIENT
Start: 2018-07-31 | End: 2018-08-02

## 2018-07-31 RX ORDER — LIDOCAINE HYDROCHLORIDE 20 MG/ML
INJECTION, SOLUTION INFILTRATION; PERINEURAL AS NEEDED
Status: DISCONTINUED | OUTPATIENT
Start: 2018-07-31 | End: 2018-07-31 | Stop reason: SURG

## 2018-07-31 RX ORDER — PROPOFOL 10 MG/ML
VIAL (ML) INTRAVENOUS AS NEEDED
Status: DISCONTINUED | OUTPATIENT
Start: 2018-07-31 | End: 2018-07-31 | Stop reason: SURG

## 2018-07-31 RX ORDER — FENTANYL CITRATE 50 UG/ML
INJECTION, SOLUTION INTRAMUSCULAR; INTRAVENOUS AS NEEDED
Status: DISCONTINUED | OUTPATIENT
Start: 2018-07-31 | End: 2018-07-31 | Stop reason: SURG

## 2018-07-31 RX ORDER — NALOXONE HCL 0.4 MG/ML
0.2 VIAL (ML) INJECTION AS NEEDED
Status: DISCONTINUED | OUTPATIENT
Start: 2018-07-31 | End: 2018-08-03

## 2018-07-31 RX ORDER — ONDANSETRON 2 MG/ML
4 INJECTION INTRAMUSCULAR; INTRAVENOUS ONCE AS NEEDED
Status: COMPLETED | OUTPATIENT
Start: 2018-07-31 | End: 2018-08-03

## 2018-07-31 RX ORDER — DIPHENHYDRAMINE HYDROCHLORIDE 50 MG/ML
12.5 INJECTION INTRAMUSCULAR; INTRAVENOUS
Status: DISCONTINUED | OUTPATIENT
Start: 2018-07-31 | End: 2018-08-03

## 2018-07-31 RX ORDER — VASOPRESSIN 20 U/ML
INJECTION PARENTERAL AS NEEDED
Status: DISCONTINUED | OUTPATIENT
Start: 2018-07-31 | End: 2018-07-31 | Stop reason: SURG

## 2018-07-31 RX ORDER — CEFAZOLIN SODIUM 1 G/3ML
INJECTION, POWDER, FOR SOLUTION INTRAMUSCULAR; INTRAVENOUS AS NEEDED
Status: DISCONTINUED | OUTPATIENT
Start: 2018-07-31 | End: 2018-07-31 | Stop reason: SURG

## 2018-07-31 RX ORDER — SODIUM CHLORIDE, SODIUM GLUCONATE, SODIUM ACETATE, POTASSIUM CHLORIDE, AND MAGNESIUM CHLORIDE 526; 502; 368; 37; 30 MG/100ML; MG/100ML; MG/100ML; MG/100ML; MG/100ML
INJECTION, SOLUTION INTRAVENOUS CONTINUOUS PRN
Status: DISCONTINUED | OUTPATIENT
Start: 2018-07-31 | End: 2018-07-31 | Stop reason: SURG

## 2018-07-31 RX ORDER — ALBUMIN, HUMAN INJ 5% 5 %
SOLUTION INTRAVENOUS CONTINUOUS PRN
Status: DISCONTINUED | OUTPATIENT
Start: 2018-07-31 | End: 2018-07-31 | Stop reason: SURG

## 2018-07-31 RX ORDER — EPHEDRINE SULFATE 50 MG/ML
5 INJECTION, SOLUTION INTRAVENOUS ONCE AS NEEDED
Status: DISCONTINUED | OUTPATIENT
Start: 2018-07-31 | End: 2018-08-02

## 2018-07-31 RX ORDER — ACETAMINOPHEN 650 MG/1
650 SUPPOSITORY RECTAL ONCE AS NEEDED
Status: DISCONTINUED | OUTPATIENT
Start: 2018-07-31 | End: 2018-08-03

## 2018-07-31 RX ORDER — CALCIUM CHLORIDE 100 MG/ML
INJECTION INTRAVENOUS; INTRAVENTRICULAR AS NEEDED
Status: DISCONTINUED | OUTPATIENT
Start: 2018-07-31 | End: 2018-07-31 | Stop reason: SURG

## 2018-07-31 RX ORDER — LABETALOL HYDROCHLORIDE 5 MG/ML
5 INJECTION, SOLUTION INTRAVENOUS
Status: DISCONTINUED | OUTPATIENT
Start: 2018-07-31 | End: 2018-08-03

## 2018-07-31 RX ADMIN — MIDAZOLAM 1 MG: 1 INJECTION INTRAMUSCULAR; INTRAVENOUS at 14:59

## 2018-07-31 RX ADMIN — CALCIUM CHLORIDE 0.1 G: 100 INJECTION, SOLUTION INTRAVENOUS at 17:18

## 2018-07-31 RX ADMIN — SODIUM CHLORIDE 125 ML/HR: 900 INJECTION, SOLUTION INTRAVENOUS at 09:30

## 2018-07-31 RX ADMIN — LIDOCAINE HYDROCHLORIDE 100 MG: 20 INJECTION, SOLUTION INFILTRATION; PERINEURAL at 15:20

## 2018-07-31 RX ADMIN — VASOPRESSIN 1 UNITS: 20 INJECTION INTRAVENOUS at 18:04

## 2018-07-31 RX ADMIN — PHENYLEPHRINE HYDROCHLORIDE 200 MCG: 10 INJECTION INTRAVENOUS at 15:31

## 2018-07-31 RX ADMIN — PANTOPRAZOLE SODIUM 40 MG: 40 INJECTION, POWDER, FOR SOLUTION INTRAVENOUS at 06:31

## 2018-07-31 RX ADMIN — CHLORHEXIDINE GLUCONATE 0.12% ORAL RINSE 15 ML: 1.2 LIQUID ORAL at 20:36

## 2018-07-31 RX ADMIN — CEFAZOLIN SODIUM 2 G: 1 INJECTION, POWDER, FOR SOLUTION INTRAMUSCULAR; INTRAVENOUS at 15:48

## 2018-07-31 RX ADMIN — ALBUMIN HUMAN: 0.05 INJECTION, SOLUTION INTRAVENOUS at 15:55

## 2018-07-31 RX ADMIN — VECURONIUM BROMIDE 8 MG: 1 INJECTION, POWDER, LYOPHILIZED, FOR SOLUTION INTRAVENOUS at 15:51

## 2018-07-31 RX ADMIN — FENTANYL CITRATE 50 MCG: 50 INJECTION, SOLUTION INTRAMUSCULAR; INTRAVENOUS at 16:10

## 2018-07-31 RX ADMIN — FENTANYL CITRATE 100 MCG: 50 INJECTION, SOLUTION INTRAMUSCULAR; INTRAVENOUS at 17:25

## 2018-07-31 RX ADMIN — Medication: at 01:48

## 2018-07-31 RX ADMIN — PHENYLEPHRINE HYDROCHLORIDE 100 MCG: 10 INJECTION INTRAVENOUS at 17:08

## 2018-07-31 RX ADMIN — Medication: at 10:26

## 2018-07-31 RX ADMIN — SODIUM CHLORIDE 125 ML/HR: 900 INJECTION, SOLUTION INTRAVENOUS at 01:48

## 2018-07-31 RX ADMIN — PHENYLEPHRINE HYDROCHLORIDE 200 MCG: 10 INJECTION INTRAVENOUS at 15:38

## 2018-07-31 RX ADMIN — VECURONIUM BROMIDE 12 MG: 1 INJECTION, POWDER, LYOPHILIZED, FOR SOLUTION INTRAVENOUS at 16:19

## 2018-07-31 RX ADMIN — SODIUM CHLORIDE, SODIUM GLUCONATE, SODIUM ACETATE, POTASSIUM CHLORIDE, AND MAGNESIUM CHLORIDE: 526; 502; 368; 37; 30 INJECTION, SOLUTION INTRAVENOUS at 15:05

## 2018-07-31 RX ADMIN — PROPOFOL 40 MG: 10 INJECTION, EMULSION INTRAVENOUS at 17:02

## 2018-07-31 RX ADMIN — MIDAZOLAM 1 MG: 1 INJECTION INTRAMUSCULAR; INTRAVENOUS at 15:16

## 2018-07-31 RX ADMIN — SODIUM CHLORIDE 125 ML/HR: 900 INJECTION, SOLUTION INTRAVENOUS at 18:46

## 2018-07-31 RX ADMIN — CALCIUM CHLORIDE 0.1 G: 100 INJECTION, SOLUTION INTRAVENOUS at 17:12

## 2018-07-31 RX ADMIN — VECURONIUM BROMIDE 5 MG: 1 INJECTION, POWDER, LYOPHILIZED, FOR SOLUTION INTRAVENOUS at 18:20

## 2018-07-31 RX ADMIN — VASOPRESSIN 1 UNITS: 20 INJECTION INTRAVENOUS at 17:35

## 2018-07-31 RX ADMIN — VECURONIUM BROMIDE 5 MG: 1 INJECTION, POWDER, LYOPHILIZED, FOR SOLUTION INTRAVENOUS at 17:25

## 2018-07-31 RX ADMIN — PHENYLEPHRINE HYDROCHLORIDE 200 MCG: 10 INJECTION INTRAVENOUS at 15:24

## 2018-07-31 RX ADMIN — FENTANYL CITRATE 100 MCG: 50 INJECTION, SOLUTION INTRAMUSCULAR; INTRAVENOUS at 15:14

## 2018-07-31 RX ADMIN — PHENYLEPHRINE HYDROCHLORIDE 100 MCG: 10 INJECTION INTRAVENOUS at 15:53

## 2018-07-31 RX ADMIN — VECURONIUM BROMIDE 10 MG: 1 INJECTION, POWDER, LYOPHILIZED, FOR SOLUTION INTRAVENOUS at 15:25

## 2018-07-31 RX ADMIN — VASOPRESSIN 1 UNITS: 20 INJECTION INTRAVENOUS at 17:23

## 2018-07-31 RX ADMIN — SODIUM CHLORIDE, SODIUM GLUCONATE, SODIUM ACETATE, POTASSIUM CHLORIDE, AND MAGNESIUM CHLORIDE: 526; 502; 368; 37; 30 INJECTION, SOLUTION INTRAVENOUS at 18:04

## 2018-07-31 NOTE — ANESTHESIA PROCEDURE NOTES
Airway  Urgency: elective    Airway not difficult    General Information and Staff    Patient location during procedure: OR  Anesthesiologist: TIEN FUENTES    Indications and Patient Condition  Indications for airway management: airway protection    Preoxygenated: yes  Mask difficulty assessment: 0 - not attempted    Final Airway Details  Airway interventions: 6.0 Georgi Manzo        Number of attempts at approach: 1

## 2018-07-31 NOTE — ANESTHESIA PREPROCEDURE EVALUATION
Anesthesia Evaluation     Patient summary reviewed and Nursing notes reviewed   NPO Solid Status: > 4 hours  NPO Liquid Status: > 4 hours           Airway   Mallampati: II  TM distance: >3 FB  Neck ROM: full  No difficulty expected  Comment: Number 6 Trach with cuff  in place noted on exam.  Dental      Pulmonary    (+) pleural effusion, a smoker Former, COPD moderate, shortness of breath, sleep apnea, decreased breath sounds,   Cardiovascular   Exercise tolerance: poor (<4 METS)    NYHA Classification: III  ECG reviewed  PT is on anticoagulation therapy  Patient on routine beta blocker  Rhythm: regular  Rate: abnormal    (+) hypertension well controlled, murmur, PVD, DVT,       Neuro/Psych  (+) TIA, CVA, syncope, tremors, weakness, psychiatric history Depression,     GI/Hepatic/Renal/Endo    (+) obesity,  GERD poorly controlled,  hypothyroidism,     Musculoskeletal     (+) back pain, chronic pain,   Abdominal    Substance History      OB/GYN          Other      history of cancer active    ROS/Med Hx Other: S/p trach for larngeal cancer resection.                    Anesthesia Plan    ASA 4     general   (I discussed the GA plan with the patient today at his bedside in the icu; he has recovered well after his emergent splenectomy and aggressive iv resuscitation but now has a persistent large left sided pleural effusion requiring decortication today.  The patient is awake, alert and wrote down on a clipboard any questions or issues he had pertaining to this afternoon's procedure.  )  intravenous induction   Anesthetic plan and risks discussed with patient.

## 2018-07-31 NOTE — ANESTHESIA POSTPROCEDURE EVALUATION
Patient: Truman Esquivel    Procedure Summary     Date:  07/31/18 Room / Location:  [facility] OR 05 / [facility] OR    Anesthesia Start:  1505 Anesthesia Stop:  1842    Procedure:  LEFT THORACOSCOPY VIDEO ASSISTED POSSIBLE THORACOTOMY possible decortication bronchoscopy (Left Chest) Diagnosis:       Pleural effusion      (Pleural effusion [J90])    Surgeon:  Joshua Pollock MD Provider:  Liborio Richard MD    Anesthesia Type:  general ASA Status:  4          Anesthesia Type: general  Last vitals  BP   129/62 (07/31/18 1402)   Temp   99.6 °F (37.6 °C) (07/31/18 0800)   Pulse   93 (07/31/18 1453)   Resp   18 (07/31/18 1211)     SpO2   94 % (07/31/18 1453)     Post Anesthesia Care and Evaluation    Patient location during evaluation: ICU  Patient participation: complete - patient cannot participate  Level of consciousness: sleepy but conscious  Pain score: 0  Pain management: adequate  Airway patency: patent  Anesthetic complications: No anesthetic complications  PONV Status: none  Cardiovascular status: acceptable  Respiratory status: acceptable  Hydration status: acceptable

## 2018-07-31 NOTE — ANESTHESIA PROCEDURE NOTES
Arterial Line    Patient location during procedure: OR   Line placed for hemodynamic monitoring and ABGs/Labs/ISTAT.  Performed By   Anesthesiologist: TIEN FUENTES  Preanesthetic Checklist  Completed: patient identified, site marked, surgical consent, pre-op evaluation, timeout performed, IV checked, risks and benefits discussed and monitors and equipment checked  Arterial Line Prep   Sterile Tech: cap, gloves and sterile barriers  Prep: ChloraPrep  Patient monitoring: EKG, continuous pulse oximetry and blood pressure monitoring  Arterial Line Procedure   Laterality:right  Location:  radial artery  Catheter size: 20 G   Guidance: landmark technique and palpation technique  Number of attempts: 1  Successful placement: yes          Post Assessment   Dressing Type: wrist guard applied, secured with tape and occlusive dressing applied.   Complications no  Circ/Move/Sens Assessment: normal and unchanged.   Patient Tolerance: patient tolerated the procedure well with no apparent complications

## 2018-07-31 NOTE — ANESTHESIA PROCEDURE NOTES
Airway  Urgency: elective      General Information and Staff    Patient location during procedure: OR  Anesthesiologist: TIEN FUENTES    Indications and Patient Condition  Indications for airway management: airway protection    Preoxygenated: yes  Mask difficulty assessment: 0 - not attempted    Final Airway Details  Final airway type: endotracheal airway      Successful airway: ETT  Cuffed: yes   Successful intubation technique: fiberoptic  Facilitating devices/methods: intentional mainstem  Endotracheal tube insertion site: tracheostomy  ETT size: 7.0 mm  Placement verified by: chest auscultation   Measured from: stoma  ETT to stoma (cm): 17  Number of attempts at approach: 1

## 2018-08-01 LAB
ABO + RH BLD: NORMAL
ABO + RH BLD: NORMAL
ANION GAP SERPL CALCULATED.3IONS-SCNC: 3 MMOL/L (ref 5–15)
ARTERIAL PATENCY WRIST A: ABNORMAL
ATMOSPHERIC PRESS: 746 MMHG
BASE EXCESS BLDA CALC-SCNC: -0.5 MMOL/L (ref 0–2)
BASOPHILS # BLD AUTO: 0.06 10*3/MM3 (ref 0–0.2)
BASOPHILS NFR BLD AUTO: 0.2 % (ref 0–2)
BDY SITE: ABNORMAL
BH BB BLOOD EXPIRATION DATE: NORMAL
BH BB BLOOD EXPIRATION DATE: NORMAL
BH BB BLOOD TYPE BARCODE: 5100
BH BB BLOOD TYPE BARCODE: 5100
BH BB DISPENSE STATUS: NORMAL
BH BB DISPENSE STATUS: NORMAL
BH BB PRODUCT CODE: NORMAL
BH BB PRODUCT CODE: NORMAL
BH BB UNIT NUMBER: NORMAL
BH BB UNIT NUMBER: NORMAL
BUN BLD-MCNC: 14 MG/DL (ref 7–21)
BUN/CREAT SERPL: 19.7 (ref 7–25)
CALCIUM SPEC-SCNC: 7.3 MG/DL (ref 8.4–10.2)
CHLORIDE SERPL-SCNC: 107 MMOL/L (ref 95–110)
CO2 SERPL-SCNC: 25 MMOL/L (ref 22–31)
CREAT BLD-MCNC: 0.71 MG/DL (ref 0.7–1.3)
DEPRECATED RDW RBC AUTO: 54.8 FL (ref 35.1–43.9)
EOSINOPHIL # BLD AUTO: 0.02 10*3/MM3 (ref 0–0.7)
EOSINOPHIL NFR BLD AUTO: 0.1 % (ref 0–7)
ERYTHROCYTE [DISTWIDTH] IN BLOOD BY AUTOMATED COUNT: 17.6 % (ref 11.5–14.5)
GFR SERPL CREATININE-BSD FRML MDRD: 114 ML/MIN/1.73 (ref 56–130)
GLUCOSE BLD-MCNC: 104 MG/DL (ref 60–100)
HCO3 BLDA-SCNC: 24.1 MMOL/L (ref 20–26)
HCT VFR BLD AUTO: 26.7 % (ref 39–49)
HGB BLD-MCNC: 8.7 G/DL (ref 13.7–17.3)
HOROWITZ INDEX BLD+IHG-RTO: 60 %
IMM GRANULOCYTES # BLD: 1.88 10*3/MM3 (ref 0–0.02)
IMM GRANULOCYTES NFR BLD: 4.8 % (ref 0–0.5)
LYMPHOCYTES # BLD AUTO: 1.51 10*3/MM3 (ref 0.6–4.2)
LYMPHOCYTES NFR BLD AUTO: 3.9 % (ref 10–50)
Lab: ABNORMAL
MCH RBC QN AUTO: 27.4 PG (ref 26.5–34)
MCHC RBC AUTO-ENTMCNC: 32.6 G/DL (ref 31.5–36.3)
MCV RBC AUTO: 84.2 FL (ref 80–98)
MODALITY: ABNORMAL
MONOCYTES # BLD AUTO: 2.23 10*3/MM3 (ref 0–0.9)
MONOCYTES NFR BLD AUTO: 5.7 % (ref 0–12)
NEUTROPHILS # BLD AUTO: 33.15 10*3/MM3 (ref 2–8.6)
NEUTROPHILS NFR BLD AUTO: 85.3 % (ref 37–80)
PCO2 BLDA: 38.2 MM HG (ref 35–45)
PEEP RESPIRATORY: 5 CM[H2O]
PH BLDA: 7.41 PH UNITS (ref 7.35–7.45)
PLATELET # BLD AUTO: 373 10*3/MM3 (ref 150–450)
PMV BLD AUTO: 9.2 FL (ref 8–12)
PO2 BLDA: 119 MM HG (ref 83–108)
POTASSIUM BLD-SCNC: 4.4 MMOL/L (ref 3.5–5.1)
RBC # BLD AUTO: 3.17 10*6/MM3 (ref 4.37–5.74)
SAO2 % BLDCOA: 99.2 % (ref 94–99)
SET MECH RESP RATE: 14
SODIUM BLD-SCNC: 135 MMOL/L (ref 137–145)
UNIT  ABO: NORMAL
UNIT  ABO: NORMAL
UNIT  RH: NORMAL
UNIT  RH: NORMAL
VENTILATOR MODE: AC
VT ON VENT VENT: 600 ML
WBC NRBC COR # BLD: 38.85 10*3/MM3 (ref 3.2–9.8)

## 2018-08-01 PROCEDURE — 80048 BASIC METABOLIC PNL TOTAL CA: CPT | Performed by: STUDENT IN AN ORGANIZED HEALTH CARE EDUCATION/TRAINING PROGRAM

## 2018-08-01 PROCEDURE — 94003 VENT MGMT INPAT SUBQ DAY: CPT

## 2018-08-01 PROCEDURE — 99024 POSTOP FOLLOW-UP VISIT: CPT | Performed by: NURSE PRACTITIONER

## 2018-08-01 PROCEDURE — 85025 COMPLETE CBC W/AUTO DIFF WBC: CPT | Performed by: THORACIC SURGERY (CARDIOTHORACIC VASCULAR SURGERY)

## 2018-08-01 PROCEDURE — 82803 BLOOD GASES ANY COMBINATION: CPT

## 2018-08-01 PROCEDURE — 94640 AIRWAY INHALATION TREATMENT: CPT

## 2018-08-01 PROCEDURE — 25010000002 MORPHINE (PF) 10 MG/ML SOLUTION: Performed by: STUDENT IN AN ORGANIZED HEALTH CARE EDUCATION/TRAINING PROGRAM

## 2018-08-01 PROCEDURE — 94799 UNLISTED PULMONARY SVC/PX: CPT

## 2018-08-01 PROCEDURE — 25010000002 CEFAZOLIN PER 500 MG: Performed by: THORACIC SURGERY (CARDIOTHORACIC VASCULAR SURGERY)

## 2018-08-01 PROCEDURE — 99024 POSTOP FOLLOW-UP VISIT: CPT | Performed by: SURGERY

## 2018-08-01 RX ADMIN — CEFAZOLIN 2 G: 10 INJECTION, POWDER, FOR SOLUTION INTRAVENOUS; PARENTERAL at 00:01

## 2018-08-01 RX ADMIN — Medication: at 12:40

## 2018-08-01 RX ADMIN — DEXMEDETOMIDINE HYDROCHLORIDE 0.2 MCG/KG/HR: 100 INJECTION, SOLUTION INTRAVENOUS at 02:52

## 2018-08-01 RX ADMIN — CHLORHEXIDINE GLUCONATE 0.12% ORAL RINSE 15 ML: 1.2 LIQUID ORAL at 20:27

## 2018-08-01 RX ADMIN — ALBUTEROL SULFATE 2.5 MG: 2.5 SOLUTION RESPIRATORY (INHALATION) at 22:15

## 2018-08-01 RX ADMIN — SODIUM CHLORIDE 125 ML/HR: 900 INJECTION, SOLUTION INTRAVENOUS at 23:57

## 2018-08-01 RX ADMIN — Medication: at 21:59

## 2018-08-01 RX ADMIN — CEFAZOLIN 2 G: 10 INJECTION, POWDER, FOR SOLUTION INTRAVENOUS; PARENTERAL at 09:35

## 2018-08-01 RX ADMIN — SODIUM CHLORIDE 125 ML/HR: 900 INJECTION, SOLUTION INTRAVENOUS at 04:51

## 2018-08-01 RX ADMIN — Medication: at 03:12

## 2018-08-01 RX ADMIN — DEXMEDETOMIDINE HYDROCHLORIDE 0.4 MCG/KG/HR: 100 INJECTION, SOLUTION INTRAVENOUS at 00:17

## 2018-08-01 RX ADMIN — PANTOPRAZOLE SODIUM 40 MG: 40 INJECTION, POWDER, FOR SOLUTION INTRAVENOUS at 06:47

## 2018-08-01 NOTE — PLAN OF CARE
Problem: Patient Care Overview  Goal: Plan of Care Review  Outcome: Ongoing (interventions implemented as appropriate)   08/01/18 5481   Coping/Psychosocial   Plan of Care Reviewed With caregiver;patient   OTHER   Outcome Summary Pt s/p ARI EN restarted this am. RD will monitor

## 2018-08-01 NOTE — PROGRESS NOTES
GENERAL SURGERY PROGRESS NOTE  Chief Complaint:  Surgery Follow up   LOS: 6 days       Subjective     Interval History:     No events.  Had thoracotomy for empyema last evening.     Objective     Vital Signs  Temp:  [95.8 °F (35.4 °C)-97.7 °F (36.5 °C)] 97.6 °F (36.4 °C)  Heart Rate:  [] 100  Resp:  [10-24] 22  BP: ()/(50-83) 105/53  Arterial Line BP: ()/(49-83) 138/73  FiO2 (%):  [50 %-60 %] 60 %    Physical Exam:   Drains and tubes in place. Abdomen appropriate.  Labs:  Lab Results (last 24 hours)     Procedure Component Value Units Date/Time    Body Fluid Culture - Body Fluid, Lung, L [[id number]]  (Normal) Collected:  07/31/18 1625    Specimen:  Body Fluid from Lung, L Updated:  08/01/18 0637     BF Culture Culture in progress     Gram Stain Result Few (2+) WBCs seen      No organisms seen    Tissue / Bone Culture - Tissue, Lung, L [[id number]]  (Normal) Collected:  07/31/18 1628    Specimen:  Tissue from Lung, L Updated:  08/01/18 0629     Tissue Culture Culture in progress     Gram Stain Result Moderate (3+) WBCs seen      Rare (1+) Gram positive cocci    Basic Metabolic Panel [[id number]]  (Abnormal) Collected:  08/01/18 0242    Specimen:  Blood Updated:  08/01/18 0340     Glucose 104 (H) mg/dL      BUN 14 mg/dL      Creatinine 0.71 mg/dL      Sodium 135 (L) mmol/L      Potassium 4.4 mmol/L      Chloride 107 mmol/L      CO2 25.0 mmol/L      Calcium 7.3 (L) mg/dL      eGFR Non African Amer 114 mL/min/1.73      BUN/Creatinine Ratio 19.7     Anion Gap 3.0 (L) mmol/L     CBC & Differential [[id number]] Collected:  08/01/18 0242    Specimen:  Blood Updated:  08/01/18 0329    Narrative:       The following orders were created for panel order CBC & Differential.  Procedure                               Abnormality         Status                     ---------                               -----------         ------                     Scan Slide[[id number]]                                                                   CBC Auto Differential[[id number]]        Abnormal            Final result                 Please view results for these tests on the individual orders.    CBC Auto Differential [[id number]]  (Abnormal) Collected:  08/01/18 0242    Specimen:  Blood Updated:  08/01/18 0329     WBC 38.85 (H) 10*3/mm3      RBC 3.17 (L) 10*6/mm3      Hemoglobin 8.7 (L) g/dL      Hematocrit 26.7 (L) %      MCV 84.2 fL      MCH 27.4 pg      MCHC 32.6 g/dL      RDW 17.6 (H) %      RDW-SD 54.8 (H) fl      MPV 9.2 fL      Platelets 373 10*3/mm3      Neutrophil % 85.3 (H) %      Lymphocyte % 3.9 (L) %      Monocyte % 5.7 %      Eosinophil % 0.1 %      Basophil % 0.2 %      Immature Grans % 4.8 (H) %      Neutrophils, Absolute 33.15 (H) 10*3/mm3      Lymphocytes, Absolute 1.51 10*3/mm3      Monocytes, Absolute 2.23 (H) 10*3/mm3      Eosinophils, Absolute 0.02 10*3/mm3      Basophils, Absolute 0.06 10*3/mm3      Immature Grans, Absolute 1.88 (H) 10*3/mm3     Blood Gas, Arterial [[id number]]  (Abnormal) Collected:  08/01/18 0307    Specimen:  Arterial Blood Updated:  08/01/18 0310     Site Arterial Line     Ravi's Test N/A     pH, Arterial 7.408 pH units      pCO2, Arterial 38.2 mm Hg      pO2, Arterial 119.0 (H) mm Hg      HCO3, Arterial 24.1 mmol/L      Base Excess, Arterial -0.5 (L) mmol/L      O2 Saturation, Arterial 99.2 (H) %      Barometric Pressure for Blood Gas 746 mmHg      Modality Ventilator     FIO2 60 %      Ventilator Mode AC     Set Tidal Volume 600     Set Mech Resp Rate 14.0     PEEP 5.0     Collected by sveta    POC Glucose Once [[id number]]  (Normal) Collected:  07/31/18 2317    Specimen:  Blood Updated:  07/31/18 2328     Glucose 88 mg/dL      Comment: Meter: [id number]: [id number] MELANIE FERNANDEZ       Blood Gas, Arterial [[id number]]  (Abnormal) Collected:  07/31/18 2110    Specimen:  Arterial Blood Updated:  07/31/18 2116     Site Arterial Line     Ravi's Test N/A     pH, Arterial 7.298 (L) pH units       pCO2, Arterial 50.0 (H) mm Hg      pO2, Arterial 81.2 (L) mm Hg      HCO3, Arterial 24.5 mmol/L      Base Excess, Arterial -2.3 (L) mmol/L      O2 Saturation, Arterial 95.7 %      Barometric Pressure for Blood Gas 746 mmHg      Modality Ventilator     FIO2 60 %      Ventilator Mode AC     Set Tidal Volume 600     Set Mech Resp Rate 10.0     PEEP 5.0     Collected by KAY Lucio    JESSE Prep - Swab, [[id number]]  (Normal) Collected:  07/31/18 1628    Specimen:  Tissue from Lung, L Updated:  07/31/18 1808     KOH Prep No yeast or hyphal elements seen    Blood Gas, Arterial With Co-Ox [[id number]]  (Abnormal) Collected:  07/31/18 1738    Specimen:  Arterial Blood Updated:  07/31/18 1742     Site Arterial Line     Ravi's Test N/A     pH, Arterial 7.197 (L) pH units      pCO2, Arterial 64.4 (H) mm Hg      pO2, Arterial 60.1 (L) mm Hg      HCO3, Arterial 25.0 mmol/L      Base Excess, Arterial -3.7 (L) mmol/L      O2 Saturation, Arterial 85.8 (L) %      Hemoglobin, Blood Gas 9.9 (L) g/dL      Hematocrit, Blood Gas 30.4 (L) %      Oxyhemoglobin 84.3 (L) %      Methemoglobin 0.30 %      Carboxyhemoglobin 1.4 %      Sodium, Arterial 136 mmol/L      Potassium, Arterial 4.3 mmol/L      Ionized Calcium 4.58 (L) mg/dL      Glucose, Arterial 113 (H) mmol/L      Barometric Pressure for Blood Gas 745 mmHg      Modality N/A     Ventilator Mode NA     Collected by or     pH, Temp Corrected -- pH Units      pCO2, Temperature Corrected -- mm Hg      pO2, Temperature Corrected -- mm Hg     Fungus Culture - Tissue, Lung, L [[id number]] Collected:  07/31/18 1628    Specimen:  Tissue from Lung, L Updated:  07/31/18 1729    AFB Culture - Tissue, Lung, L [[id number]] Collected:  07/31/18 1628    Specimen:  Tissue from Lung, L Updated:  07/31/18 1729    JESSE Prep - Swab, [[id number]]  (Normal) Collected:  07/31/18 1625    Specimen:  Body Fluid from Lung, L Updated:  07/31/18 1649     KOH Prep No yeast or hyphal elements seen    Fungus Culture -  Body Fluid, Lung, L [[id number]] Collected:  07/31/18 1625    Specimen:  Body Fluid from Lung, L Updated:  07/31/18 1631    AFB Culture - Body Fluid, Lung, L [[id number]] Collected:  07/31/18 1625    Specimen:  Body Fluid from Lung, L Updated:  07/31/18 1631    Blood Gas, Arterial With Co-Ox [[id number]]  (Abnormal) Collected:  07/31/18 1540    Specimen:  Arterial Blood Updated:  07/31/18 1547     Site Arterial Line     Ravi's Test N/A     pH, Arterial 7.383 pH units      pCO2, Arterial 42.0 mm Hg      pO2, Arterial 99.2 mm Hg      HCO3, Arterial 25.0 mmol/L      Base Excess, Arterial -0.1 (L) mmol/L      O2 Saturation, Arterial 98.2 %      Hemoglobin, Blood Gas 7.7 (L) g/dL      Hematocrit, Blood Gas 23.7 (L) %      Oxyhemoglobin 96.5 %      Methemoglobin 0.40 %      Carboxyhemoglobin 1.3 %      Sodium, Arterial 136 mmol/L      Potassium, Arterial 3.8 mmol/L      Ionized Calcium 4.52 (L) mg/dL      Glucose, Arterial 100 (H) mmol/L      Barometric Pressure for Blood Gas 746 mmHg      Modality N/A     Ventilator Mode NA     Collected by OR     pH, Temp Corrected -- pH Units      pCO2, Temperature Corrected -- mm Hg      pO2, Temperature Corrected -- mm Hg            Results Review:     Labs and imaging for today were reviewed.    Assessment/Plan     Truman Esquivel is a 57 y.o. male who is s/p splenectomy, now s/p thoracotomy      Chest tubes per CT surgery.  Restart tube feeds  DC ruiz.  Overall looks well.   Monitor WBC.          This document has been electronically signed by Kris Solano MD on August 1, 2018 10:46 AM        Kris Solano MD  08/01/18  10:46 AM

## 2018-08-01 NOTE — CONSULTS
Adult Nutrition  Assessment    Patient Name:  Truman Esquivel  YOB: 1960  MRN: [id number]  Admit Date:  7/26/2018    Assessment Date:  8/1/2018    Comments: Pt is s/p VATS yesterday.  He is doing well and is on trach to collar.  Tube feeding restarted this am and will be slowly advanced to goal.  RD will monitor           Reason for Assessment     Row Name 08/01/18 1539          Reason for Assessment    Reason For Assessment follow-up protocol               Nutrition/Diet History     Row Name 08/01/18 1539          Nutrition/Diet History    Typical Food/Fluid Intake PT sitting up in chair.                 Labs/Tests/Procedures/Meds     Row Name 08/01/18 1540          Labs/Procedures/Meds    Lab Results Reviewed reviewed, pertinent        Diagnostic Tests/Procedures    Diagnostic Test/Procedure Reviewed reviewed, pertinent        Medications    Pertinent Medications Reviewed reviewed, pertinent             Physical Findings     Row Name 08/01/18 1540          Physical Findings    Overall Physical Appearance on oxygen therapy     Tubes gastrostomy tube               Nutrition Prescription Ordered     Row Name 08/01/18 1540          Nutrition Prescription PO    Current PO Diet NPO        Nutrition Prescription EN    Enteral Route Gastrostomy     TF Delivery Method Continuous     Continuous TF Goal Rate (mL/hr) 65 mL/hr     Continuous TF Current Rate (mL/hr) 10 mL/hr     Water flush (mL)  25 mL     Water Flush Frequency Per hour             Evaluation of Received Nutrient/Fluid Intake     Row Name 08/01/18 1541          Calories Evaluation    Enteral Calories (kcal) --   Just restarted             Electronically signed by:  Leeann Salazar RD  08/01/18 3:45 PM

## 2018-08-01 NOTE — PAYOR COMM NOTE
"Truman Esquivel (57 y.o. Male)     Date of Birth Social Security Number Address Home Phone MRN    1960  [id number] [facility] [address] [phone] [id number]    [employer] Marital Status          Denominational        Admission Date Admission Type Admitting Provider Attending Provider Department, Room/Bed    7/26/18 Emergency Kris Solano MD Armstrong, James Edward, MD [facility] CRITICAL CARE, 08/A    Discharge Date Discharge Disposition Discharge Destination                       Attending Provider:  Kris Solano MD    Allergies:  Chlorhexidine, Co Q 10 [Coenzyme Q10], Eggs Or Egg-derived Products, Erythromycin, Gabapentin, Other, Penicillins, Tetracyclines & Related, Acth [Corticotropin], Cephalosporins, Keflex [Cephalexin], Neomycin    Isolation:  None   Infection:  None   Code Status:  CPR    Ht:  170.2 cm (67.01\")   Wt:  98.7 kg (217 lb 9.5 oz)    Admission Cmt:  None   Principal Problem:  Splenic hemorrhage [D73.89] More...                 Active Insurance as of 7/26/2018     Primary Coverage     Payor Plan Insurance Group Employer/Plan Group    PASSPORT PASSPORT MEDICAID     Payor Plan Address Payor Plan Phone Number Effective From Effective To    [address] [phone] 1/1/2016     [facility] [address]       Subscriber Name Subscriber Birth Date Member ID       TRUMAN ESQUIVEL 1960 [id number]                 Emergency Contacts      (Rel.) Home Phone Work Phone Mobile Phone    Yolanda Esquivel (Mother) [phone] -- [phone]    Blanca Esquivel (Daughter) [phone] -- [phone]    Yolanda Diaz (Sister) [phone] -- --        NEYMAR Mari  [facility]  [phone]    Phone  [phone]      Fax  Continued stay review          ICU Vital Signs     Row Name 08/01/18 0843 08/01/18 0842 08/01/18 0806 08/01/18 0800 08/01/18 0736       Vitals    Temp  --  --  -- 97.6 °F (36.4 °C)  --    " Temp src  --  --  -- Oral  --    Pulse  -- 100  -- 94  --    Heart Rate Source  --  --  -- Monitor  --    Resp  --  --  -- 22  --    Resp Rate Source  --  --  -- Visual  --    Resp Rate (Observed) Vent  --  --  --  -- 60    BP  --  -- 105/53  --  --    Noninvasive MAP (mmHg)  --  -- 72  --  --    BP Location  --  --  -- Right arm  --    BP Method Automatic  --  -- Automatic  --    Patient Position Lying  --  --  --  --       Oxygen Therapy    SpO2  -- 92 %  -- (!)  86 %  --    Pulse Oximetry Type Continuous  --  --  --  --    Device (Oxygen Therapy) ventilator  --  --  --  --       Art Line    Arterial Line BP  -- 138/73  -- 128/68  --    Arterial Line MAP (mmHg)  -- 92 mmHg  -- 87 mmHg  --    Row Name 08/01/18 0726 08/01/18 0700 08/01/18 0600 08/01/18 0521 08/01/18 0500       Vitals    Pulse  -- 78 83 84 80    Heart Rate Source Monitor  --  -- Monitor  --    Resp 24  --  -- 18  --    Resp Rate Source Ventilator  --  -- Ventilator  --    Resp Rate (Observed) Vent  -- 16 18 18 19    BP  --  --  --  -- 108/62    Noninvasive MAP (mmHg)  --  --  --  -- 80       Oxygen Therapy    SpO2  -- 97 % 93 % 93 % 92 %    Pulse Oximetry Type Continuous  --  -- Continuous  --    Device (Oxygen Therapy) ventilator  --  -- ventilator  --    Oxygen Concentration (%) 60   decreased to 50  --  -- 60  --       Art Line    Arterial Line BP  -- 92/50 102/55  -- 125/62    Arterial Line MAP (mmHg)  -- 62 mmHg 68 mmHg  -- 81 mmHg    Row Name 08/01/18 0410 08/01/18 0400 08/01/18 0340 08/01/18 0315 08/01/18 0300       Vitals    Temp  -- 97.7 °F (36.5 °C)  --  --  --    Temp src  -- Oral  --  --  --    Pulse 73 73 73 76 67    Heart Rate Source  --  -- Monitor  --  --    Resp  --  -- 15  --  --    Resp Rate Source  --  -- Ventilator  --  --    Resp Rate (Observed) Vent 17 17 15 19 15    BP  -- (!)  77/50  --  --  --    Noninvasive MAP (mmHg)  -- 60  --  --  --       Oxygen Therapy    SpO2 96 % 96 % 95 % 98 % 99 %    Pulse Oximetry Type  --  --  Continuous  --  --    Device (Oxygen Therapy)  -- ventilator ventilator  --  --    Oxygen Concentration (%)  --  -- 60  --  --       Art Line    Arterial Line BP 90/51 86/49  -- 114/59 93/52    Arterial Line MAP (mmHg) 62 mmHg 60 mmHg  -- 75 mmHg 64 mmHg    Row Name 08/01/18 0200 08/01/18 0150 08/01/18 0111 08/01/18 0100 08/01/18 0050       Vitals    Pulse 69 69 69 71 70    Heart Rate Source  --  -- Monitor  --  --    Resp  --  -- 14  --  --    Resp Rate Source  --  -- Ventilator  --  --    Resp Rate (Observed) Vent 14 14 14 14 14       Oxygen Therapy    SpO2 98 % 99 % 99 % 99 % 100 %    Pulse Oximetry Type  --  -- Continuous  --  --    Device (Oxygen Therapy)  --  -- ventilator  --  --    Oxygen Concentration (%)  --  -- 60  --  --       Art Line    Arterial Line BP 87/49 93/52  -- 93/53 103/56    Arterial Line MAP (mmHg) 60 mmHg 64 mmHg  -- 65 mmHg 69 mmHg    Row Name 08/01/18 0000 07/31/18 2308 07/31/18 2300 07/31/18 2200 07/31/18 2104       Vitals    Temp 97.7 °F (36.5 °C)  --  --  --  --    Temp src Oral  --  --  --  --    Pulse 73 71 71 73 79    Heart Rate Source  -- Monitor  --  -- Monitor    Resp  -- 14  --  -- 12    Resp Rate Source  -- Ventilator  --  -- Ventilator    Resp Rate (Observed) Vent 14 14 14 14 12    BP (!)  85/51  --  --  --  --    Noninvasive MAP (mmHg) 63  --  --  --  --       Oxygen Therapy    SpO2 100 % 100 % 100 % 99 % 98 %    Pulse Oximetry Type  -- Continuous  --  -- Continuous    Device (Oxygen Therapy) ventilator ventilator  --  -- ventilator    Oxygen Concentration (%)  -- 60  --  -- 60       Art Line    Arterial Line /56  -- 103/58 88/52  --    Arterial Line MAP (mmHg) 69 mmHg  -- 70 mmHg 61 mmHg  --    Row Name 07/31/18 2100 07/31/18 2037 07/31/18 2000 07/31/18 1905 07/31/18 1900       Vitals    Temp  -- 97.5 °F (36.4 °C)  --  --  --    Temp src  -- Oral  --  --  --    Pulse 79  -- 80 72 71    Heart Rate Source  --  --  -- Monitor  --    Resp  --  --  -- 10  --    Resp Rate  Source  --  --  -- Ventilator  --    Resp Rate (Observed) Vent 10  -- 10 10 12    BP  --  -- 96/58  -- 97/55    Noninvasive MAP (mmHg)  --  -- 71  -- 71       Oxygen Therapy    SpO2 98 %  -- 95 % 93 % 99 %    Pulse Oximetry Type  --  --  -- Continuous  --    Device (Oxygen Therapy)  --  --  -- ventilator  --    Oxygen Concentration (%)  --  --  -- 60  --       Art Line    Arterial Line /61  -- 121/67  -- 116/63    Arterial Line MAP (mmHg) 75 mmHg  -- 82 mmHg  -- 79 mmHg    Row Name 07/31/18 1850 07/31/18 1845 07/31/18 1840 07/31/18 1453 07/31/18 1402       Vitals    Temp 95.8 °F (35.4 °C) 95.8 °F (35.4 °C)  --  --  --    Temp src Temporal Artery  Temporal Artery   --  --  --    Pulse 72 71 72 93  --    Heart Rate Source Monitor Monitor  --  --  --    Resp 14 12  --  --  --    Resp Rate Source  -- Ventilator  --  --  --    BP 99/57 94/53 (!)  85/52  -- 129/62    Noninvasive MAP (mmHg) 74 68 64  -- 88       Oxygen Therapy    SpO2 97 %  --  -- 94 %  --    Pulse Oximetry Type Continuous  --  --  --  --    Device (Oxygen Therapy) ventilator  --  --  --  --    Oxygen Concentration (%) 100  --  --  --  --       Art Line    Arterial Line /61 103/56 101/65  --  --    Arterial Line MAP (mmHg) 77 mmHg 69 mmHg 77 mmHg  --  --    Row Name 07/31/18 1317 07/31/18 1302 07/31/18 1242 07/31/18 1211 07/31/18 1209       Vitals    Pulse 91  -- 96  -- 93    Heart Rate Source  --  -- Monitor Monitor  --    Resp  --  --  -- 18  --    Resp Rate Source  --  --  -- Visual  --    BP  -- 139/65  --  --  --    Noninvasive MAP (mmHg)  -- 94  --  --  --       Oxygen Therapy    SpO2 97 %  -- 96 %  -- 98 %    Pulse Oximetry Type  --  -- Continuous  --  --    Device (Oxygen Therapy)  --  -- tracheostomy collar  --  --    Oxygen Concentration (%)  --  -- 30  --  --       Art Line    Arterial Line BP  --  --  --  -- --    Arterial Line MAP (mmHg)  --  --  --  -- 115 mmHg    Row Name 07/31/18 1202 07/31/18 1155 07/31/18 1136 07/31/18 1132  "07/31/18 1100       Vitals    Pulse  --  -- 95  -- 95    Heart Rate Source  -- Monitor  --  --  --    Resp  -- 18  --  --  --    Resp Rate Source  -- Visual  --  --  --    /83  --  -- 154/67  --    Noninvasive MAP (mmHg) 106  --  -- 97  --    BP Location  -- Left arm  --  --  --    BP Method  -- Automatic  --  --  --    Patient Position  -- Lying  --  --  --       Oxygen Therapy    SpO2  --  -- 97 %  -- 96 %    Pulse Oximetry Type  -- Continuous  --  --  --       Art Line    Arterial Line BP  --  -- --  -- 182/83    Arterial Line MAP (mmHg)  --  -- --  -- 111 mmHg        Hospital Medications (active)       Dose Frequency Start End    acetaminophen (TYLENOL) suppository 650 mg 650 mg Once As Needed 7/31/2018     Sig - Route: Insert 1 suppository into the rectum 1 (One) Time As Needed for Mild Pain . - Rectal    Linked Group 1:  \"Or\" Linked Group Details        acetaminophen (TYLENOL) tablet 650 mg 650 mg Once As Needed 7/31/2018     Sig - Route: Take 2 tablets by mouth 1 (One) Time As Needed for Mild Pain . - Oral    Linked Group 1:  \"Or\" Linked Group Details        albuterol (PROVENTIL) nebulizer solution 0.083% 2.5 mg/3mL 2.5 mg Every 8 Hours - RT 7/31/2018 8/2/2018    Sig - Route: Take 2.5 mg by nebulization Every 8 (Eight) Hours. - Nebulization    ceFAZolin in 0.9% normal saline (ANCEF) IVPB solution 2 g 2 g Every 8 Hours 8/1/2018 8/1/2018    Sig - Route: Infuse 100 mL into a venous catheter Every 8 (Eight) Hours. - Intravenous    chlorhexidine (PERIDEX) 0.12 % solution 15 mL 15 mL Every 12 Hours Scheduled 7/26/2018     Sig - Route: Apply 15 mL to the mouth or throat Every 12 (Twelve) Hours. - Mouth/Throat    Cosign for Ordering: Accepted by Kris Solano MD on 7/26/2018  3:35 PM    dexmedetomidine (PRECEDEX) 200 mcg in sodium chloride 0.9% 48 mL (Total Volume 50 mL) 0.2-1.5 mcg/kg/hr × 98.7 kg Titrated 7/31/2018     Sig - Route: Infuse 19..05 mcg/hr into a venous catheter Dose Adjusted By " Provider As Needed. - Intravenous    diphenhydrAMINE (BENADRYL) injection 12.5 mg 12.5 mg Every 15 Minutes PRN 7/31/2018     Sig - Route: Infuse 0.25 mL into a venous catheter Every 15 (Fifteen) Minutes As Needed for Itching (May repeat x 1). - Intravenous    ePHEDrine injection 5 mg 5 mg Once As Needed 7/31/2018     Sig - Route: Infuse 0.1 mL into a venous catheter 1 (One) Time As Needed (symptomatic hypotension - Notify attending anesthesiologist). - Intravenous    flumazenil (ROMAZICON) injection 0.2 mg 0.2 mg As Needed 7/31/2018     Sig - Route: Infuse 2 mL into a venous catheter As Needed (unresponsiveness). - Intravenous    HYDROmorphone (DILAUDID) injection 0.5 mg 0.5 mg Every 5 Minutes PRN 7/31/2018 8/10/2018    Sig - Route: Infuse 0.5 mL into a venous catheter Every 5 (Five) Minutes As Needed for Moderate Pain  or Severe Pain . - Intravenous    labetalol (NORMODYNE,TRANDATE) injection 5 mg 5 mg Every 5 Minutes PRN 7/31/2018     Sig - Route: Infuse 1 mL into a venous catheter Every 5 (Five) Minutes As Needed for High Blood Pressure (for systolic blood pressure greater than 180 mmHg or diastolic blood pressure greater than 105 mmHg). - Intravenous    meperidine (DEMEROL) injection 12.5 mg 12.5 mg Every 5 Minutes PRN 7/31/2018 8/1/2018    Sig - Route: Infuse 0.25 mL into a venous catheter Every 5 (Five) Minutes As Needed for Shivering (May repeat x 8). - Intravenous    Morphine sulfate PCA 1 mg/mL 30 mL syringe  Continuous 7/26/2018 8/5/2018    Sig - Route: Infuse  into a venous catheter Continuous. - Intravenous    Cosign for Ordering: Accepted by Kris Solano MD on 7/26/2018  3:35 PM    naloxone (NARCAN) injection 0.2 mg 0.2 mg As Needed 7/31/2018     Sig - Route: Infuse 0.5 mL into a venous catheter As Needed for Opioid Reversal or Respiratory Depression (unresponsiveness, decrease oxygen saturation). - Intravenous    ondansetron (ZOFRAN) injection 4 mg 4 mg Every 6 Hours PRN 7/26/2018     Sig -  Route: Infuse 2 mL into a venous catheter Every 6 (Six) Hours As Needed for Nausea or Vomiting. - Intravenous    ondansetron (ZOFRAN) injection 4 mg 4 mg Once As Needed 7/31/2018     Sig - Route: Infuse 2 mL into a venous catheter 1 (One) Time As Needed for Nausea or Vomiting. - Intravenous    pantoprazole (PROTONIX) injection 40 mg 40 mg Every Early Morning 7/27/2018     Sig - Route: Infuse 10 mL into a venous catheter Every Morning. - Intravenous    propofol (DIPRIVAN) infusion 10 mg/mL 100 mL  - ADS Override Pull   7/31/2018 8/1/2018    Notes to Pharmacy: MANOJ ANGELES: cabinet override    sodium chloride 0.9 % flush 1-10 mL 1-10 mL As Needed 7/26/2018     Sig - Route: Infuse 1-10 mL into a venous catheter As Needed for Line Care. - Intravenous    sodium chloride 0.9 % infusion 125 mL/hr Continuous 7/26/2018     Sig - Route: Infuse 125 mL/hr into a venous catheter Continuous. - Intravenous    Cosign for Ordering: Accepted by Kris Solano MD on 7/26/2018  3:35 PM    albumin human 5 % bottle (Discontinued)  Continuous PRN 7/31/2018 7/31/2018    Sig: Continuous As Needed.    Reason for Discontinue: Anesthesia Stop    calcium chloride injection (Discontinued)  As Needed 7/31/2018 7/31/2018    Sig: As Needed.    Reason for Discontinue: Anesthesia Stop    ceFAZolin (ANCEF) injection (Discontinued)  As Needed 7/31/2018 7/31/2018    Sig: As Needed.    Reason for Discontinue: Anesthesia Stop    electrolyte-148 (PLASMALYTE) solution (Discontinued)  Continuous PRN 7/31/2018 7/31/2018    Sig - Route: Infuse  into a venous catheter Continuous As Needed. - Intravenous    Reason for Discontinue: Anesthesia Stop    fentaNYL citrate (PF) (SUBLIMAZE) injection (Discontinued)  As Needed 7/31/2018 7/31/2018    Sig: As Needed for Severe Pain .    Reason for Discontinue: Anesthesia Stop    lidocaine (XYLOCAINE) 2% injection (Discontinued)  As Needed 7/31/2018 7/31/2018    Sig: As Needed.    Reason for Discontinue:  Anesthesia Stop    midazolam (VERSED) injection (Discontinued)  As Needed 7/31/2018 7/31/2018    Sig - Route: Infuse  into a venous catheter As Needed. - Intravenous    Reason for Discontinue: Anesthesia Stop    phenylephrine (MARIYA-SYNEPHRINE) injection (Discontinued)  As Needed 7/31/2018 7/31/2018    Sig - Route: Infuse  into a venous catheter As Needed. - Intravenous    Reason for Discontinue: Anesthesia Stop    Propofol (DIPRIVAN) injection (Discontinued)  As Needed 7/31/2018 7/31/2018    Sig - Route: Infuse  into a venous catheter As Needed. - Intravenous    Reason for Discontinue: Anesthesia Stop    Vasopressin solution (Discontinued)  As Needed 7/31/2018 7/31/2018    Sig: As Needed.    Reason for Discontinue: Anesthesia Stop    vecuronium (NORCURON) injection (Discontinued)  As Needed 7/31/2018 7/31/2018    Sig: As Needed.    Reason for Discontinue: Anesthesia Stop          Ventilator/Non-Invasive Ventilation Settings     Start     Ordered    07/31/18 1840  Ventilator - AC/VC; 10; 50; 92%; 5; 8  Continuous     Question Answer Comment   Vent Mode AC/VC    Breath rate 10    FiO2 50    FiO2 titrate for Sp02% =/> 92%    PEEP 5    Tidal Volume ML/Kg 8        07/31/18 1839    07/26/18 1426  Ventilator - AC/VC+; 10; 60; 92%; 5  Continuous,   Status:  Canceled     Question Answer Comment   Vent Mode AC/VC+    Breath rate 10    FiO2 60    FiO2 titrate for Sp02% =/> 92%    PEEP 5        07/26/18 1426               Operative/Procedure Notes (last 24 hours) (Notes from 7/31/2018 10:15 AM through 8/1/2018 10:15 AM)      Joshua Pollock MD at 7/31/2018  3:08 PM             OPERATIVE NOTE  Truman Esquivel  1960  7/31/2018    PREOP DIAGNOSES:  Pleural effusion [J90]   Recent splenectomy for ruptured spleen  Repaired type A aortic dissection  Treated laryngeal carcinoma    POSTOP DIAGNOSES:  Pleural effusion [J90]    PROCEDURE:   LEFT THORACOSCOPY VIDEO ASSISTED  LEFT THORACOTOMY total lung decortication    Bronchoscopy  Negative pressure wound therapy (prevena)    SURGEON: KAY Pollock MD FACS RPVI    ASSISTANT: Madisyn Campbell CFA    ANESTHESIA: General ET     ESTIMATED BLOOD LOSS: 100 ml     COMPLICATIONS: none    DESCRIPTION OF OPERATION:   Patient taken to operating room, placed in supine position.   chronic indwelling trach removed, 6.0 endotracheal tube passed into RIGHT mainstem bronchus under bronchoscopic vision by anesthesia.  General anesthesia induced.  Radial arterial line placed by anesthesia.  Prepped draped in sterile fashion.    Patient placed in RIGHT lateral decubitus position with bean bag as axillary roll, table flexed.  Prepped draped in sterile fashion.1cm port site placed 8th interspace anterior axillary line.  Thoracoscope was introduced demonstrating advanced pleural space empyema.   lateral thoracotomy incision made thru 5th ICS.  Large amount of gelatinous material and  Pleural fluid removed, sent for cultures.  Lung trapped.  Fibrous rind removed from upper and lower lobes with currette.  Diaphragm and chest wall cleared of gradeaux.  Lung completely freed.  Pleural space irrigated with 3L warm water.     Two 24Fr multihole chest tube placed lateral and posteriorly to apex.  Lung inflated to fill space, no air leak.     Incision closed layers of #2 Vicryl pericostal sutures around the fifth rib thru the 6th rib. serratus was reapproximated using 2-0 PDS suture, 2-0 PDS in the latissimus fascia, 2-0 PDS and subcutaneous tissue and staples in  Skin.  Diffuse tissue edema, negative pressure wound therapy with prevena. Fiberoptic bronchoscopy performed thru trach site, no endobronchial lesions, airways clear, moderate thin secretions .Tolerated procedure well transferred to PACU stable condition.          This document has been electronically signed by Joshua Pollock MD on July 31, 2018 5:55 PM        Electronically signed by Joshua Pollock MD at 7/31/2018  6:03 PM           Physician Progress Notes (last 24 hours) (Notes from 7/31/2018 10:15 AM through 8/1/2018 10:15 AM)      ROWAN Helms at 8/1/2018  9:37 AM            Cardiothoracic - Vascular Surgery Daily Note        LOS: 6 days   Patient Care Team:  Marybeth Harrison DO as PCP - General (Family Medicine)  Joseph Venegas MD as Surgeon (Otolaryngology)  Reza Patel MD as Consulting Physician (Hematology and Oncology)  Jarocho Tapia MD as Consulting Physician (Radiation Oncology)  ROWAN Watkins as Nurse Practitioner (Oncology)  Sarah Beth Bright MA as Medical Assistant    POD#1 LEFT VATS, Total Lung Decortication    Chief Complaint: Pleural Effusion      Subjective     The following portions of the patient's history were reviewed and updated as appropriate: allergies, current medications, past family history, past medical history, past social history, past surgical history and problem list.     Subjective:  Symptoms:  Stable.    Activity level: Returning to normal.    Pain:  Pain is well controlled and requiring pain medication.          Objective     Vital Signs  Temp:  [95.8 °F (35.4 °C)-97.7 °F (36.5 °C)] 97.6 °F (36.4 °C)  Heart Rate:  [] 100  Resp:  [10-24] 22  BP: ()/(50-83) 105/53  Arterial Line BP: ()/(49-83) 138/73  FiO2 (%):  [50 %-60 %] 60 %  Body mass index is 34.07 kg/m².    Intake/Output Summary (Last 24 hours) at 08/01/18 0938  Last data filed at 08/01/18 0449   Gross per 24 hour   Intake             3260 ml   Output              830 ml   Net             2430 ml     No intake/output data recorded.    Wt Readings from Last 3 Encounters:   07/31/18 98.7 kg (217 lb 9.5 oz)   07/17/18 99.3 kg (219 lb 0.2 oz)   07/12/18 99.8 kg (220 lb)     CT 190cc. No air leak. -10sx    Physical Exam   Objective:  General Appearance:  Comfortable.    Vital signs: (most recent): Blood pressure 105/53, pulse 100, temperature 97.6 °F (36.4 °C), temperature source Oral, resp. rate  "22, height 170.2 cm (67.01\"), weight 98.7 kg (217 lb 9.5 oz), SpO2 92 %.  Vital signs are normal.  No fever.    Output: Producing urine and no stool output.    HEENT: (Trach collar)    Lungs:  Normal effort and normal respiratory rate.  There are rhonchi (bilaterally).  (Good air movement left lung)  Heart: Tachycardia.    Abdomen: Abdomen is soft.  Hypoactive bowel sounds.     Extremities: There is dependent edema.    Neurological: Patient is alert.    Skin:  Warm and dry.  (Left Thor Provena WV: Dry intact)          Results Review:    Lab Results   Component Value Date    WBC 38.85 (H) 08/01/2018    HGB 8.7 (L) 08/01/2018    HCT 26.7 (L) 08/01/2018    MCV 84.2 08/01/2018     08/01/2018     Lab Results   Component Value Date    GLUCOSE 104 (H) 08/01/2018    BUN 14 08/01/2018    CREATININE 0.71 08/01/2018    EGFRIFNONA 114 08/01/2018    BCR 19.7 08/01/2018    K 4.4 08/01/2018    CO2 25.0 08/01/2018    CALCIUM 7.3 (L) 08/01/2018    ALBUMIN 2.60 (L) 07/30/2018    AST 23 07/30/2018    ALT 28 07/30/2018       Calcium Calcium   Date/Time Value Ref Range Status   08/01/2018 0242 7.3 (L) 8.4 - 10.2 mg/dL Final   07/31/2018 0334 7.7 (L) 8.4 - 10.2 mg/dL Final   07/30/2018 0354 7.9 (L) 8.4 - 10.2 mg/dL Final      Magnesium Magnesium   Date/Time Value Ref Range Status   07/30/2018 0354 1.9 1.6 - 2.3 mg/dL Final        Imaging Results (last 24 hours)     Procedure Component Value Units Date/Time    XR Chest 1 View [[id number]] Collected:  07/31/18 1912     Updated:  07/31/18 2057    Narrative:         EXAM:         Radiograph(s), Chest   VIEWS:   Portable ; 1       DATE/TIME:  7/31/2018 8:54 PM CDT                INDICATION:   empyema, R55 Syncope and collapse A41.9 Sepsis,  unspecified organism R65.21 Severe sepsis with septic shock  I71.00 Dissection of unspecified site of aorta J90 Pleural  effusion, not elsewhere classified R18.8 Other ascites D73.89  Other diseases of spleen    COMPARISON:  CXR: 7/30/18 at 09:48 " hours             FINDINGS:             - lines/tubes:    Support lines and tubes in standard position.      - cardiac:         size within normal limits         - mediastinum: contour within normal limits         - lungs:         The right lung is clear. There has been interval  improvement regarding the degree of opacification throughout the  left hemithorax.             - pleura:         No evidence of a pneumothorax.                  - osseous:         unremarkable for age                  - misc.:          Minimal amount of subcutaneous emphysema.                             Impression:       CONCLUSION:        1. Interval improvement regarding the degree of opacification in  the left hemithorax.                                               Electronically signed by:  YULISA Duong MD  7/31/2018 8:56  PM CDT Workstation: [id number]                                albuterol 2.5 mg Nebulization Q8H - RT   ceFAZolin 2 g Intravenous Q8H   chlorhexidine 15 mL Mouth/Throat Q12H   pantoprazole 40 mg Intravenous Q AM       dexmedetomidine 0.2-1.5 mcg/kg/hr Last Rate: 0.2 mcg/kg/hr (08/01/18 0252)   Morphine     sodium chloride 125 mL/hr Last Rate: 125 mL/hr (08/01/18 0451)             ASSESSMENT/PLAN     Principal Problem:    Splenic hemorrhage  Active Problems:    Syncope and collapse    Pleural effusion      Assessment:    Condition: In stable condition.   (Stable Post Op LEFT VATS/Decortication: Progressing    Pleural Effusion: CT remain to sx today. Off Mechanical Vent.    Rehab: OOB if OK with General Surgery    ).     Plan:   (  -Anticipate CT water seal tomorrow ).                 This document has been electronically signed by ROWAN Helms on August 1, 2018 9:38 AM          Electronically signed by ROWAN Helms at 8/1/2018  9:53 AM       Medical Student Notes (last 24 hours) (Notes from 7/31/2018 10:15 AM through 8/1/2018 10:15 AM)     No notes of this type exist for this encounter.         Consult Notes (last 24 hours) (Notes from 7/31/2018 10:15 AM through 8/1/2018 10:15 AM)     No notes of this type exist for this encounter.

## 2018-08-01 NOTE — PROGRESS NOTES
"  Cardiothoracic - Vascular Surgery Daily Note        LOS: 6 days   Patient Care Team:  Marybeth Harrison DO as PCP - General (Family Medicine)  Joseph Venegas MD as Surgeon (Otolaryngology)  Reza Patel MD as Consulting Physician (Hematology and Oncology)  Jarocho Tapia MD as Consulting Physician (Radiation Oncology)  ROWAN Watkins as Nurse Practitioner (Oncology)  Sarah Beth Bright MA as Medical Assistant    POD#1 LEFT VATS, Total Lung Decortication    Chief Complaint: Pleural Effusion      Subjective     The following portions of the patient's history were reviewed and updated as appropriate: allergies, current medications, past family history, past medical history, past social history, past surgical history and problem list.     Subjective:  Symptoms:  Stable.    Activity level: Returning to normal.    Pain:  Pain is well controlled and requiring pain medication.          Objective     Vital Signs  Temp:  [95.8 °F (35.4 °C)-97.7 °F (36.5 °C)] 97.6 °F (36.4 °C)  Heart Rate:  [] 100  Resp:  [10-24] 22  BP: ()/(50-83) 105/53  Arterial Line BP: ()/(49-83) 138/73  FiO2 (%):  [50 %-60 %] 60 %  Body mass index is 34.07 kg/m².    Intake/Output Summary (Last 24 hours) at 08/01/18 0938  Last data filed at 08/01/18 0449   Gross per 24 hour   Intake             3260 ml   Output              830 ml   Net             2430 ml     No intake/output data recorded.    Wt Readings from Last 3 Encounters:   07/31/18 98.7 kg (217 lb 9.5 oz)   07/17/18 99.3 kg (219 lb 0.2 oz)   07/12/18 99.8 kg (220 lb)     CT 190cc. No air leak. -10sx    Physical Exam   Objective:  General Appearance:  Comfortable.    Vital signs: (most recent): Blood pressure 105/53, pulse 100, temperature 97.6 °F (36.4 °C), temperature source Oral, resp. rate 22, height 170.2 cm (67.01\"), weight 98.7 kg (217 lb 9.5 oz), SpO2 92 %.  Vital signs are normal.  No fever.    Output: Producing urine and no stool output.  "   HEENT: (Trach collar)    Lungs:  Normal effort and normal respiratory rate.  There are rhonchi (bilaterally).  (Good air movement left lung)  Heart: Tachycardia.    Abdomen: Abdomen is soft.  Hypoactive bowel sounds.     Extremities: There is dependent edema.    Neurological: Patient is alert.    Skin:  Warm and dry.  (Left Thor [place]: Dry intact)          Results Review:    Lab Results   Component Value Date    WBC 38.85 (H) 08/01/2018    HGB 8.7 (L) 08/01/2018    HCT 26.7 (L) 08/01/2018    MCV 84.2 08/01/2018     08/01/2018     Lab Results   Component Value Date    GLUCOSE 104 (H) 08/01/2018    BUN 14 08/01/2018    CREATININE 0.71 08/01/2018    EGFRIFNONA 114 08/01/2018    BCR 19.7 08/01/2018    K 4.4 08/01/2018    CO2 25.0 08/01/2018    CALCIUM 7.3 (L) 08/01/2018    ALBUMIN 2.60 (L) 07/30/2018    AST 23 07/30/2018    ALT 28 07/30/2018       Calcium Calcium   Date/Time Value Ref Range Status   08/01/2018 0242 7.3 (L) 8.4 - 10.2 mg/dL Final   07/31/2018 0334 7.7 (L) 8.4 - 10.2 mg/dL Final   07/30/2018 0354 7.9 (L) 8.4 - 10.2 mg/dL Final      Magnesium Magnesium   Date/Time Value Ref Range Status   07/30/2018 0354 1.9 1.6 - 2.3 mg/dL Final        Imaging Results (last 24 hours)     Procedure Component Value Units Date/Time    XR Chest 1 View [[id number]] Collected:  07/31/18 1912     Updated:  07/31/18 2057    Narrative:         EXAM:         Radiograph(s), Chest   VIEWS:   Portable ; 1       DATE/TIME:  7/31/2018 8:54 PM CDT                INDICATION:   empyema, R55 Syncope and collapse A41.9 Sepsis,  unspecified organism R65.21 Severe sepsis with septic shock  I71.00 Dissection of unspecified site of aorta J90 Pleural  effusion, not elsewhere classified R18.8 Other ascites D73.89  Other diseases of spleen    COMPARISON:  CXR: 7/30/18 at 09:48 hours             FINDINGS:             - lines/tubes:    Support lines and tubes in standard position.      - cardiac:         size within normal limits          - mediastinum: contour within normal limits         - lungs:         The right lung is clear. There has been interval  improvement regarding the degree of opacification throughout the  left hemithorax.             - pleura:         No evidence of a pneumothorax.                  - osseous:         unremarkable for age                  - misc.:          Minimal amount of subcutaneous emphysema.                             Impression:       CONCLUSION:        1. Interval improvement regarding the degree of opacification in  the left hemithorax.                                               Electronically signed by:  YULISA Duong MD  7/31/2018 8:56  PM CDT Workstation: [id number]                                albuterol 2.5 mg Nebulization Q8H - RT   ceFAZolin 2 g Intravenous Q8H   chlorhexidine 15 mL Mouth/Throat Q12H   pantoprazole 40 mg Intravenous Q AM       dexmedetomidine 0.2-1.5 mcg/kg/hr Last Rate: 0.2 mcg/kg/hr (08/01/18 0252)   Morphine     sodium chloride 125 mL/hr Last Rate: 125 mL/hr (08/01/18 0451)             ASSESSMENT/PLAN     Principal Problem:    Splenic hemorrhage  Active Problems:    Syncope and collapse    Pleural effusion      Assessment:    Condition: In stable condition.   (Stable Post Op LEFT VATS/Decortication: Progressing    Pleural Effusion: CT remain to sx today. Off Mechanical Vent.    Rehab: OOB if OK with General Surgery    ).     Plan:   (  -Anticipate CT water seal tomorrow ).                 This document has been electronically signed by ROWAN Helms on August 1, 2018 9:38 AM

## 2018-08-02 ENCOUNTER — APPOINTMENT (OUTPATIENT)
Dept: GENERAL RADIOLOGY | Facility: HOSPITAL | Age: 58
End: 2018-08-02

## 2018-08-02 LAB
ANION GAP SERPL CALCULATED.3IONS-SCNC: 4 MMOL/L (ref 5–15)
BUN BLD-MCNC: 18 MG/DL (ref 7–21)
BUN/CREAT SERPL: 24.7 (ref 7–25)
CALCIUM SPEC-SCNC: 7.5 MG/DL (ref 8.4–10.2)
CHLORIDE SERPL-SCNC: 107 MMOL/L (ref 95–110)
CO2 SERPL-SCNC: 27 MMOL/L (ref 22–31)
CREAT BLD-MCNC: 0.73 MG/DL (ref 0.7–1.3)
DEPRECATED RDW RBC AUTO: 52.9 FL (ref 35.1–43.9)
ERYTHROCYTE [DISTWIDTH] IN BLOOD BY AUTOMATED COUNT: 17.5 % (ref 11.5–14.5)
GFR SERPL CREATININE-BSD FRML MDRD: 111 ML/MIN/1.73 (ref 56–130)
GLUCOSE BLD-MCNC: 127 MG/DL (ref 60–100)
GLUCOSE BLDC GLUCOMTR-MCNC: 110 MG/DL (ref 70–130)
HCT VFR BLD AUTO: 29 % (ref 39–49)
HGB BLD-MCNC: 9.8 G/DL (ref 13.7–17.3)
MCH RBC QN AUTO: 28.2 PG (ref 26.5–34)
MCHC RBC AUTO-ENTMCNC: 33.8 G/DL (ref 31.5–36.3)
MCV RBC AUTO: 83.6 FL (ref 80–98)
PLATELET # BLD AUTO: 429 10*3/MM3 (ref 150–450)
PMV BLD AUTO: 9.1 FL (ref 8–12)
POTASSIUM BLD-SCNC: 4 MMOL/L (ref 3.5–5.1)
RBC # BLD AUTO: 3.47 10*6/MM3 (ref 4.37–5.74)
SODIUM BLD-SCNC: 138 MMOL/L (ref 137–145)
WBC NRBC COR # BLD: 26.75 10*3/MM3 (ref 3.2–9.8)

## 2018-08-02 PROCEDURE — 25010000002 ONDANSETRON PER 1 MG: Performed by: INTERNAL MEDICINE

## 2018-08-02 PROCEDURE — 85027 COMPLETE CBC AUTOMATED: CPT | Performed by: STUDENT IN AN ORGANIZED HEALTH CARE EDUCATION/TRAINING PROGRAM

## 2018-08-02 PROCEDURE — 82962 GLUCOSE BLOOD TEST: CPT

## 2018-08-02 PROCEDURE — 94760 N-INVAS EAR/PLS OXIMETRY 1: CPT

## 2018-08-02 PROCEDURE — 25010000002 MORPHINE PER 10 MG: Performed by: NURSE PRACTITIONER

## 2018-08-02 PROCEDURE — 99024 POSTOP FOLLOW-UP VISIT: CPT | Performed by: SURGERY

## 2018-08-02 PROCEDURE — 94799 UNLISTED PULMONARY SVC/PX: CPT

## 2018-08-02 PROCEDURE — 99024 POSTOP FOLLOW-UP VISIT: CPT | Performed by: NURSE PRACTITIONER

## 2018-08-02 PROCEDURE — 80048 BASIC METABOLIC PNL TOTAL CA: CPT | Performed by: STUDENT IN AN ORGANIZED HEALTH CARE EDUCATION/TRAINING PROGRAM

## 2018-08-02 PROCEDURE — 71045 X-RAY EXAM CHEST 1 VIEW: CPT

## 2018-08-02 RX ORDER — OXYCODONE AND ACETAMINOPHEN 10; 325 MG/1; MG/1
1 TABLET ORAL EVERY 4 HOURS PRN
Status: DISPENSED | OUTPATIENT
Start: 2018-08-02 | End: 2018-08-05

## 2018-08-02 RX ORDER — MORPHINE SULFATE 2 MG/ML
2 INJECTION, SOLUTION INTRAMUSCULAR; INTRAVENOUS EVERY 4 HOURS PRN
Status: DISPENSED | OUTPATIENT
Start: 2018-08-02 | End: 2018-08-12

## 2018-08-02 RX ORDER — BISACODYL 10 MG
10 SUPPOSITORY, RECTAL RECTAL DAILY
Status: DISCONTINUED | OUTPATIENT
Start: 2018-08-02 | End: 2018-08-15 | Stop reason: HOSPADM

## 2018-08-02 RX ORDER — DEXTROSE, SODIUM CHLORIDE, AND POTASSIUM CHLORIDE 5; .45; .15 G/100ML; G/100ML; G/100ML
75 INJECTION INTRAVENOUS CONTINUOUS
Status: DISCONTINUED | OUTPATIENT
Start: 2018-08-02 | End: 2018-08-09

## 2018-08-02 RX ORDER — OXYCODONE HYDROCHLORIDE AND ACETAMINOPHEN 5; 325 MG/1; MG/1
1 TABLET ORAL EVERY 4 HOURS PRN
Status: DISPENSED | OUTPATIENT
Start: 2018-08-02 | End: 2018-08-05

## 2018-08-02 RX ORDER — MAGNESIUM CARB/ALUMINUM HYDROX 105-160MG
150 TABLET,CHEWABLE ORAL ONCE
Status: DISCONTINUED | OUTPATIENT
Start: 2018-08-02 | End: 2018-08-15 | Stop reason: HOSPADM

## 2018-08-02 RX ORDER — POLYETHYLENE GLYCOL 3350 17 G/17G
17 POWDER, FOR SOLUTION ORAL DAILY
Status: DISCONTINUED | OUTPATIENT
Start: 2018-08-02 | End: 2018-08-12

## 2018-08-02 RX ADMIN — ALBUTEROL SULFATE 2.5 MG: 2.5 SOLUTION RESPIRATORY (INHALATION) at 08:28

## 2018-08-02 RX ADMIN — BISACODYL 10 MG: 10 SUPPOSITORY RECTAL at 08:57

## 2018-08-02 RX ADMIN — PANTOPRAZOLE SODIUM 40 MG: 40 INJECTION, POWDER, FOR SOLUTION INTRAVENOUS at 06:18

## 2018-08-02 RX ADMIN — CHLORHEXIDINE GLUCONATE 0.12% ORAL RINSE 15 ML: 1.2 LIQUID ORAL at 23:15

## 2018-08-02 RX ADMIN — OXYCODONE HYDROCHLORIDE AND ACETAMINOPHEN 1 TABLET: 10; 325 TABLET ORAL at 12:31

## 2018-08-02 RX ADMIN — POTASSIUM CHLORIDE, DEXTROSE MONOHYDRATE AND SODIUM CHLORIDE 75 ML/HR: 150; 5; 450 INJECTION, SOLUTION INTRAVENOUS at 08:58

## 2018-08-02 RX ADMIN — OXYCODONE HYDROCHLORIDE AND ACETAMINOPHEN 1 TABLET: 10; 325 TABLET ORAL at 19:09

## 2018-08-02 RX ADMIN — MORPHINE SULFATE 2 MG: 2 INJECTION, SOLUTION INTRAMUSCULAR; INTRAVENOUS at 10:33

## 2018-08-02 RX ADMIN — CHLORHEXIDINE GLUCONATE 0.12% ORAL RINSE 15 ML: 1.2 LIQUID ORAL at 08:57

## 2018-08-02 RX ADMIN — ONDANSETRON HYDROCHLORIDE 4 MG: 2 INJECTION, SOLUTION INTRAMUSCULAR; INTRAVENOUS at 10:33

## 2018-08-02 RX ADMIN — POTASSIUM CHLORIDE, DEXTROSE MONOHYDRATE AND SODIUM CHLORIDE 75 ML/HR: 150; 5; 450 INJECTION, SOLUTION INTRAVENOUS at 23:25

## 2018-08-02 NOTE — PROGRESS NOTES
"  Cardiothoracic - Vascular Surgery Daily Note        LOS: 7 days   Patient Care Team:  Marybeth Harrison DO as PCP - General (Family Medicine)  Joseph Venegas MD as Surgeon (Otolaryngology)  Reza Patel MD as Consulting Physician (Hematology and Oncology)  Jarocho Tapia MD as Consulting Physician (Radiation Oncology)  Yarelis Ames APRN as Nurse Practitioner (Oncology)  Sarah Beth Bright MA as Medical Assistant    POD#2 LEFT VATS, Total Lung Decortication    Chief Complaint: Pleural Effusion      Subjective     The following portions of the patient's history were reviewed and updated as appropriate: allergies, current medications, past family history, past medical history, past social history, past surgical history and problem list.     Subjective:  Symptoms:  Stable.    Activity level: Returning to normal.    Pain:  Pain is well controlled and requiring pain medication.          Objective     Vital Signs  Temp:  [97.2 °F (36.2 °C)-98.7 °F (37.1 °C)] 97.9 °F (36.6 °C)  Heart Rate:  [] 82  Resp:  [18] 18  BP: (115-183)/(65-96) 137/67  Arterial Line BP: (157-164)/(79-84) 164/84  Body mass index is 34.07 kg/m².    Intake/Output Summary (Last 24 hours) at 08/02/18 1117  Last data filed at 08/02/18 0900   Gross per 24 hour   Intake          6469.21 ml   Output             1428 ml   Net          5041.21 ml     I/O this shift:  In: 381.3 [I.V.:381.3]  Out: 420 [Urine:400; Chest Tube:20]    Wt Readings from Last 3 Encounters:   07/31/18 98.7 kg (217 lb 9.5 oz)   07/17/18 99.3 kg (219 lb 0.2 oz)   07/12/18 99.8 kg (220 lb)     CT 168cc. No air leak. H2O seal    Physical Exam   Objective:  General Appearance:  Comfortable.    Vital signs: (most recent): Blood pressure 137/67, pulse 82, temperature 97.9 °F (36.6 °C), temperature source Oral, resp. rate 18, height 170.2 cm (67.01\"), weight 98.7 kg (217 lb 9.5 oz), SpO2 94 %.  Vital signs are normal.  No fever.    Output: Producing urine and " no stool output.    HEENT: (Trach collar)    Lungs:  Normal effort and normal respiratory rate.  (Good air movement left lung)  Heart: Tachycardia.    Abdomen: Abdomen is soft.  Bowel sounds are normal.     Extremities: There is dependent edema.    Neurological: Patient is alert.    Skin:  Warm and dry.  (Left Thor [place]: Dry intact)          Results Review:    Lab Results   Component Value Date    WBC 26.75 (H) 08/02/2018    HGB 9.8 (L) 08/02/2018    HCT 29.0 (L) 08/02/2018    MCV 83.6 08/02/2018     08/02/2018     Lab Results   Component Value Date    GLUCOSE 127 (H) 08/02/2018    BUN 18 08/02/2018    CREATININE 0.73 08/02/2018    EGFRIFNONA 111 08/02/2018    BCR 24.7 08/02/2018    K 4.0 08/02/2018    CO2 27.0 08/02/2018    CALCIUM 7.5 (L) 08/02/2018    ALBUMIN 2.60 (L) 07/30/2018    AST 23 07/30/2018    ALT 28 07/30/2018       Calcium Calcium   Date/Time Value Ref Range Status   08/02/2018 0247 7.5 (L) 8.4 - 10.2 mg/dL Final   08/01/2018 0242 7.3 (L) 8.4 - 10.2 mg/dL Final   07/31/2018 0334 7.7 (L) 8.4 - 10.2 mg/dL Final      Magnesium No results found for: MG     Imaging Results (last 24 hours)     Procedure Component Value Units Date/Time    XR Chest 1 View [[id number]] Collected:  08/02/18 0503     Updated:  08/02/18 0534    Narrative:         Chest single view on  8/2/2018     CLINICAL INDICATION: Chest tube    COMPARISON: 7/31/2018    FINDINGS: Tracheostomy tube tip is in the mid to upper thoracic  trachea. Left subclavian Port-A-Cath tip is in the SVC. Right IJ  catheter tip is in the SVC. The patient is status post median  sternotomy. Cardiomegaly is noted. Two left chest tubes remain in  place. There is worsening opacification of the left lower lung  consistent with worsening atelectasis and/or pneumonia. There is  a probable small left pleural effusion. There remains left  greater than right interstitial opacities consistent with  pneumonia or asymmetric edema. No pneumothorax is noted.       Impression:       Some worsening atelectasis and/or pneumonia in the  left lower lung with otherwise no significant change.    Electronically signed by:  Guanaco Orantes  8/2/2018 5:32 AM CDT  Workstation: [id number]                                  albuterol 2.5 mg Nebulization Q8H - RT   bisacodyl 10 mg Rectal Daily   chlorhexidine 15 mL Mouth/Throat Q12H   pantoprazole 40 mg Intravenous Q AM   polyethylene glycol 17 g Oral Daily       dexmedetomidine 0.2-1.5 mcg/kg/hr Last Rate: 0.2 mcg/kg/hr (08/01/18 0252)   dextrose 5 % and sodium chloride 0.45 % with KCl 20 mEq/L 75 mL/hr Last Rate: 75 mL/hr (08/02/18 0858)             ASSESSMENT/PLAN     Principal Problem:    Splenic hemorrhage  Active Problems:    Syncope and collapse    Pleural effusion      Assessment:    Condition: In stable condition.   (Stable Post Op LEFT VATS/Decortication: Progressing    Pleural Effusion: CT H2O seal. Clamp AM/CXR    Rehab: PT/OT    Pain: DC PCA. Percocet. Morphine Breakthrough    Constipation: Bowel Regimen  ).     Plan:   (  -Clamp CT AM for possible removal  -OK to floor from CVTS Standpoint).                 This document has been electronically signed by ROWAN Helms on August 2, 2018 11:17 AM

## 2018-08-02 NOTE — PROGRESS NOTES
GENERAL SURGERY PROGRESS NOTE  Chief Complaint:  Surgery Follow up   LOS: 7 days       Subjective     Interval History:     Feels well this AM. Tolerating TF but complains of some gas pains. Working on getting out of bed.    Objective     Vital Signs  Temp:  [97.2 °F (36.2 °C)-98.6 °F (37 °C)] 98.6 °F (37 °C)  Heart Rate:  [] 94  Resp:  [18-22] 18  BP: (105-158)/(53-96) 158/74  Arterial Line BP: (128-167)/(68-84) 164/84    Physical Exam:   Tubes in place. Sitting on toilet this AM  Labs:  Lab Results (last 24 hours)     Procedure Component Value Units Date/Time    Tissue / Bone Culture - Tissue, Lung, L [[id number]]  (Normal) Collected:  07/31/18 1628    Specimen:  Tissue from Lung, L Updated:  08/02/18 0718     Tissue Culture No growth     Gram Stain Result Moderate (3+) WBCs seen      Rare (1+) Gram positive cocci    Narrative:       no anaerobes isolated    Body Fluid Culture - Body Fluid, Lung, L [[id number]]  (Normal) Collected:  07/31/18 1625    Specimen:  Body Fluid from Lung, L Updated:  08/02/18 0709     BF Culture No growth at 2 days     Gram Stain Result Few (2+) WBCs seen      No organisms seen    Basic Metabolic Panel [[id number]]  (Abnormal) Collected:  08/02/18 0247    Specimen:  Blood Updated:  08/02/18 0323     Glucose 127 (H) mg/dL      BUN 18 mg/dL      Creatinine 0.73 mg/dL      Sodium 138 mmol/L      Potassium 4.0 mmol/L      Chloride 107 mmol/L      CO2 27.0 mmol/L      Calcium 7.5 (L) mg/dL      eGFR Non African Amer 111 mL/min/1.73      BUN/Creatinine Ratio 24.7     Anion Gap 4.0 (L) mmol/L     CBC (No Diff) [[id number]]  (Abnormal) Collected:  08/02/18 0247    Specimen:  Blood Updated:  08/02/18 0311     WBC 26.75 (H) 10*3/mm3      RBC 3.47 (L) 10*6/mm3      Hemoglobin 9.8 (L) g/dL      Hematocrit 29.0 (L) %      MCV 83.6 fL      MCH 28.2 pg      MCHC 33.8 g/dL      RDW 17.5 (H) %      RDW-SD 52.9 (H) fl      MPV 9.1 fL      Platelets 429 10*3/mm3     AFB Culture - Body Fluid, Lung, L  [[id number]] Collected:  07/31/18 1625    Specimen:  Body Fluid from Lung, L Updated:  08/01/18 1050     AFB Stain No acid fast bacilli seen on concentrated smear    AFB Culture - Tissue, Lung, L [[id number]] Collected:  07/31/18 1628    Specimen:  Tissue from Lung, L Updated:  08/01/18 1050     AFB Stain No acid fast bacilli seen on concentrated smear           Results Review:     Labs and imaging for today were reviewed.    Assessment/Plan     Truman Esquivel is a 57 y.o. male who is s/p splenectomy, gtube, left chest decortication.      Continue TF  Dulcolax for constipation  DC Freedman  Chest tubes per CT surgery  Overall looks well  Needs PT/OT          This document has been electronically signed by Kris Solano MD on August 2, 2018 7:34 AM        Kris Solano MD  08/02/18  7:34 AM

## 2018-08-03 ENCOUNTER — APPOINTMENT (OUTPATIENT)
Dept: GENERAL RADIOLOGY | Facility: HOSPITAL | Age: 58
End: 2018-08-03

## 2018-08-03 LAB
ANION GAP SERPL CALCULATED.3IONS-SCNC: 5 MMOL/L (ref 5–15)
BUN BLD-MCNC: 15 MG/DL (ref 7–21)
BUN/CREAT SERPL: 20.8 (ref 7–25)
CALCIUM SPEC-SCNC: 7.7 MG/DL (ref 8.4–10.2)
CHLORIDE SERPL-SCNC: 107 MMOL/L (ref 95–110)
CO2 SERPL-SCNC: 26 MMOL/L (ref 22–31)
CREAT BLD-MCNC: 0.72 MG/DL (ref 0.7–1.3)
DEPRECATED RDW RBC AUTO: 52.2 FL (ref 35.1–43.9)
ERYTHROCYTE [DISTWIDTH] IN BLOOD BY AUTOMATED COUNT: 17.2 % (ref 11.5–14.5)
GFR SERPL CREATININE-BSD FRML MDRD: 113 ML/MIN/1.73 (ref 56–130)
GLUCOSE BLD-MCNC: 113 MG/DL (ref 60–100)
HCT VFR BLD AUTO: 28.7 % (ref 39–49)
HGB BLD-MCNC: 9.4 G/DL (ref 13.7–17.3)
MCH RBC QN AUTO: 27.3 PG (ref 26.5–34)
MCHC RBC AUTO-ENTMCNC: 32.8 G/DL (ref 31.5–36.3)
MCV RBC AUTO: 83.4 FL (ref 80–98)
PLATELET # BLD AUTO: 434 10*3/MM3 (ref 150–450)
PMV BLD AUTO: 9.3 FL (ref 8–12)
POTASSIUM BLD-SCNC: 3.6 MMOL/L (ref 3.5–5.1)
RBC # BLD AUTO: 3.44 10*6/MM3 (ref 4.37–5.74)
SODIUM BLD-SCNC: 138 MMOL/L (ref 137–145)
WBC NRBC COR # BLD: 22.89 10*3/MM3 (ref 3.2–9.8)

## 2018-08-03 PROCEDURE — 25010000002 ONDANSETRON PER 1 MG: Performed by: INTERNAL MEDICINE

## 2018-08-03 PROCEDURE — 71045 X-RAY EXAM CHEST 1 VIEW: CPT

## 2018-08-03 PROCEDURE — G8979 MOBILITY GOAL STATUS: HCPCS | Performed by: PHYSICAL THERAPIST

## 2018-08-03 PROCEDURE — 94760 N-INVAS EAR/PLS OXIMETRY 1: CPT

## 2018-08-03 PROCEDURE — 97166 OT EVAL MOD COMPLEX 45 MIN: CPT

## 2018-08-03 PROCEDURE — 99024 POSTOP FOLLOW-UP VISIT: CPT | Performed by: SURGERY

## 2018-08-03 PROCEDURE — 99024 POSTOP FOLLOW-UP VISIT: CPT | Performed by: NURSE PRACTITIONER

## 2018-08-03 PROCEDURE — 94799 UNLISTED PULMONARY SVC/PX: CPT

## 2018-08-03 PROCEDURE — 97162 PT EVAL MOD COMPLEX 30 MIN: CPT | Performed by: PHYSICAL THERAPIST

## 2018-08-03 PROCEDURE — G8988 SELF CARE GOAL STATUS: HCPCS

## 2018-08-03 PROCEDURE — 25010000002 ONDANSETRON PER 1 MG: Performed by: NURSE ANESTHETIST, CERTIFIED REGISTERED

## 2018-08-03 PROCEDURE — G8987 SELF CARE CURRENT STATUS: HCPCS

## 2018-08-03 PROCEDURE — G8978 MOBILITY CURRENT STATUS: HCPCS | Performed by: PHYSICAL THERAPIST

## 2018-08-03 PROCEDURE — 85027 COMPLETE CBC AUTOMATED: CPT | Performed by: STUDENT IN AN ORGANIZED HEALTH CARE EDUCATION/TRAINING PROGRAM

## 2018-08-03 PROCEDURE — 80048 BASIC METABOLIC PNL TOTAL CA: CPT | Performed by: STUDENT IN AN ORGANIZED HEALTH CARE EDUCATION/TRAINING PROGRAM

## 2018-08-03 RX ADMIN — PANTOPRAZOLE SODIUM 40 MG: 40 INJECTION, POWDER, FOR SOLUTION INTRAVENOUS at 08:00

## 2018-08-03 RX ADMIN — OXYCODONE HYDROCHLORIDE AND ACETAMINOPHEN 1 TABLET: 10; 325 TABLET ORAL at 03:09

## 2018-08-03 RX ADMIN — OXYCODONE HYDROCHLORIDE AND ACETAMINOPHEN 1 TABLET: 5; 325 TABLET ORAL at 16:44

## 2018-08-03 RX ADMIN — ONDANSETRON 4 MG: 2 INJECTION INTRAMUSCULAR; INTRAVENOUS at 14:38

## 2018-08-03 RX ADMIN — POTASSIUM CHLORIDE, DEXTROSE MONOHYDRATE AND SODIUM CHLORIDE 75 ML/HR: 150; 5; 450 INJECTION, SOLUTION INTRAVENOUS at 14:38

## 2018-08-03 RX ADMIN — OXYCODONE HYDROCHLORIDE AND ACETAMINOPHEN 1 TABLET: 10; 325 TABLET ORAL at 09:28

## 2018-08-03 RX ADMIN — OXYCODONE HYDROCHLORIDE AND ACETAMINOPHEN 1 TABLET: 10; 325 TABLET ORAL at 20:13

## 2018-08-03 RX ADMIN — ONDANSETRON HYDROCHLORIDE 4 MG: 2 INJECTION, SOLUTION INTRAMUSCULAR; INTRAVENOUS at 20:13

## 2018-08-03 NOTE — CONSULTS
"Adult Nutrition  Assessment    Patient Name:  Truman Esquivel  YOB: 1960  MRN: [id number]  Admit Date:  7/26/2018    Assessment Date:  8/3/2018    Comments:  Pt currently refusing tube feeding due to GI distress of \"bloating and gas.\"  He did have a BM this am per staff.  His tube feeding was only advanced to 30cc/hr and has never got to goal since his surgery.  S/p Splenectomy and Left decortication.  Have recommended that tube feeding formula be changed to Jevity 1.2 which is the formula that pt was on at home.   RD will continue to monitor.            Reason for Assessment     Row Name 08/03/18 1238          Reason for Assessment    Reason For Assessment follow-up protocol               Nutrition/Diet History     Row Name 08/03/18 1238          Nutrition/Diet History    Typical Food/Fluid Intake Pt sitting up in chair.  He has refused his tube feeding due to being \"gasey and bloated\"                  Labs/Tests/Procedures/Meds     Row Name 08/03/18 1241          Labs/Procedures/Meds    Lab Results Reviewed reviewed, pertinent        Medications    Pertinent Medications Reviewed reviewed, pertinent             Physical Findings     Row Name 08/03/18 1242          Physical Findings    Overall Physical Appearance on oxygen therapy   trach     Tubes gastrostomy tube               Nutrition Prescription Ordered     Row Name 08/03/18 1242          Nutrition Prescription PO    Current PO Diet NPO        Nutrition Prescription EN    Enteral Route Gastrostomy     Product Osmolite 1.2 francisco     TF Delivery Method Continuous     Continuous TF Goal Rate (mL/hr) 65 mL/hr     Continuous TF Goal Volume (mL) 1440 mL     Water flush (mL)  25 mL     Water Flush Frequency Per hour             Evaluation of Received Nutrient/Fluid Intake     Row Name 08/03/18 1243          EN Evaluation    Number of Days EN Intake Evaluated Insufficient Data     TF Changes Held             Electronically signed by:  Leeann Salazar, " RD  08/03/18 1:03 PM

## 2018-08-03 NOTE — THERAPY EVALUATION
Acute Care - Physical Therapy Initial Evaluation  [facility]     Patient Name: Truman Esquivel  : 1960  MRN: [id number]  Today's Date: 8/3/2018   Onset of Illness/Injury or Date of Surgery: 18  Date of Referral to PT: 18  Referring Physician: Dr Solano      Admit Date: 2018    Visit Dx:     ICD-10-CM ICD-9-CM   1. Syncope and collapse R55 780.2   2. Septic shock (CMS/HCC) A41.9 038.9    R65.21 785.52     995.92   3. Dissection of aorta, unspecified portion of aorta (CMS/HCC) I71.00 441.00   4. Pleural effusion J90 511.9   5. Other ascites R18.8 789.59   6. Splenic hemorrhage D73.89 289.59   7. Impaired physical mobility Z74.09 781.99     Patient Active Problem List   Diagnosis   • Ascending aortic dissection (CMS/HCC)   • Essential hypertension   • Deep vein thrombosis (DVT) of femoral vein of both lower extremities (CMS/HCC)   • Hypothyroidism   • Snoring   • Acute pain of right knee   • Knee effusion, right   • Knee pain   • Obstructive sleep apnea of adult   • TIA (transient ischemic attack)   • Dysphagia   • Squamous cell carcinoma of larynx (CMS/HCC)   • Pharyngeal dysphagia   • Anemia   • Abnormal positron emission tomography (PET) of colon   • Hypokalemia   • Encounter for venous access device care   • Dehydration   • Weight loss, abnormal   • Odynophagia   • Thrush, oral   • Thrombocytopenia (CMS/HCC)   • Pancytopenia due to antineoplastic chemotherapy (CMS/HCC)   • Unable to eat   • Syncope and collapse   • Splenic hemorrhage   • Pleural effusion     Past Medical History:   Diagnosis Date   • Aortic dissection (CMS/HCC)    • Chest pain    • Disease of thyroid gland    • HTN (hypertension)    • Knee pain    • Sleep apnea    • Smoker    • Squamous cell carcinoma of larynx (CMS/HCC) 2018   • Stroke (CMS/HCC)    • Swallowing difficulty      Past Surgical History:   Procedure Laterality Date   • AORTA SURGERY      ruptured aorta repair   • ASCENDING ARCH/HEMIARCH  REPLACEMENT N/A 2/14/2017    Procedure: INTRAOPERATIVE TRANSESOPHAGEAL ECHOCARDIOGRAM, MIDLINE STERNOTOMY, ASCENDING AORTIC  AND PROXIMAL AORTIC ARCH REPAIR WITH 26MM GRAFT, AORTIC VALVE RESUSPENSION, AORTIC ROOT REPAIR, OPEN VEIN HARVEST OF RIGHT LEG;  Surgeon: German Arreguin MD;  Location: [facility] MAIN OR;  Service:    • BRONCHOSCOPY N/A 2/17/2017    Procedure: BRONCHOSCOPY BIOPSY AT BEDSIDE WITH BAL-LEFT LOWER LOBE;  Surgeon: Trent Chaney MD;  Location: [facility] ENDOSCOPY;  Service:    • COLONOSCOPY N/A 3/7/2018    Procedure: COLONOSCOPY WITH POSSIBLE POLYPECTOMY   ( DONE IN OR WITH ENDO)      COLONOSCOPY FIRST;  Surgeon: Kris Solano MD;  Location: [facility] OR;  Service:    • DIRECT LARYNGOSCOPY, ESOPHAGOSCOPY, BRONCHOSCOPY N/A 2/5/2018    Procedure: DIRECT LARYNGOSCOPY WITH BIOPSY, ESOPHAGOSCOPY     (no bronchoscopy, no laser);  Surgeon: Joseph Venegas MD;  Location: [facility] OR;  Service:    • ENDOSCOPY W/ PEG TUBE PLACEMENT N/A 5/2/2018    Procedure: ESOPHAGOGASTRODUODENOSCOPY WITH PERCUTANEOUS ENDOSCOPIC GASTROSTOMY TUBE INSERTION;  Surgeon: Angel Maciel MD;  Location: [facility] ENDOSCOPY;  Service: Gastroenterology   • SPLENECTOMY N/A 7/26/2018    Procedure: SPLENECTOMY, left chest tube placement, EXPLORATORY LAPAROTOMY, G-TUBE PALCEMENT;  Surgeon: Kris Solano MD;  Location: [facility] OR;  Service: General   • THORACOSCOPY Left 7/31/2018    Procedure: LEFT THORACOSCOPY VIDEO ASSISTED POSSIBLE THORACOTOMY possible decortication bronchoscopy;  Surgeon: Joshua Pollock MD;  Location: [facility] OR;  Service: Cardiothoracic   • TRACHEOSTOMY  02/05/2018   • TRACHEOSTOMY N/A 2/5/2018    Procedure: TRACHEOSTOMY  local injection @ 1106 incision @ 1119;  Surgeon: Joseph Venegas MD;  Location: [facility] OR;  Service:    • VENOUS ACCESS DEVICE (PORT) INSERTION N/A 3/7/2018    Procedure: INSERTION OF MEDIPORT     (C-ARM#1);  Surgeon: Kris Solano MD;  Location: [facility];   Service:         PT ASSESSMENT (last 12 hours)      Physical Therapy Evaluation     Row Name 08/03/18 0916          PT Evaluation Time/Intention    Subjective Information complains of;pain  -CB     Document Type evaluation  -CB     Mode of Treatment co-treatment;physical therapy;occupational therapy  -CB     Total Evaluation Minutes, Physical Therapy 28  -CB     Patient Effort good  -CB     Symptoms Noted During/After Treatment none  -CB     Row Name 08/03/18 0916          General Information    Patient Profile Reviewed? yes  -CB     Onset of Illness/Injury or Date of Surgery 07/26/18  -CB     Referring Physician Dr Solano  -CB     Patient Observations alert;cooperative;agree to therapy  -CB     Patient/Family Observations sister came in mid eval  -CB     General Observations of Patient sitting in bedside chair with feeding tube, irby catrh. drain, chest tube tlelemetry, IV,trach SCD on bed  -CB     Prior Level of Function independent:;gait;ADL's  -CB     Equipment Currently Used at Home hospital bed   has w/c andtall commode at home if needed  -CB     Pertinent History of Current Functional Problem abdominal pain, vomiting and syncope  -CB     Existing Precautions/Restrictions hip;oxygen therapy device and L/min  -CB     Limitations/Impairments --   speaking due to trach,  -CB     Equipment Issued to Patient --   none  -CB     Risks Reviewed patient:;increased discomfort;lines disloged  -CB     Benefits Reviewed patient:;improve function;decrease risk of DVT  -CB     Row Name 08/03/18 0916          Relationship/Environment    Lives With parent(s)  -CB     Row Name 08/03/18 0916          Resource/Environmental Concerns    Current Living Arrangements home/apartment/condo  -CB     Resource/Environmental Concerns home accessibility  -CB     Home Accessibility Concerns stairs to enter home  -CB     Transportation Concerns car, none  -CB     Row Name 08/03/18 0916          Home Main Entrance    Number of Stairs,  Main Entrance one  -CB     Row Name 08/03/18 0916          Stairs Within Home, Primary    Stairs Comment, Within Home, Primary --   does not go upstairs  -CB     Row Name 08/03/18 0916          Cognitive Assessment/Interventions    Additional Documentation Cognitive Assessment/Intervention (Group)  -CB     Row Name 08/03/18 0916          Cognitive Assessment/Intervention- PT/OT    Affect/Mental Status (Cognitive) WFL  -CB     Orientation Status (Cognition) oriented x 4  -CB     Follows Commands (Cognition) WFL  -CB     Cognitive Function (Cognitive) WFL  -CB     Personal Safety Interventions fall prevention program maintained;nonskid shoes/slippers when out of bed  -CB     Row Name 08/03/18 0916          Safety Issues, Functional Mobility    Impairments Affecting Function (Mobility) endurance/activity tolerance  -CB     Row Name 08/03/18 0916          Transfer Assessment/Treatment    Transfer Assessment/Treatment sit-stand transfer;stand-sit transfer  -CB     Sit-Stand [place] (Transfers) minimum assist (75% patient effort);moderate assist (50% patient effort)  -CB     Stand-Sit [place] (Transfers) minimum assist (75% patient effort)  -CB     Row Name 08/03/18 0916          Sit-Stand Transfer    Assistive Device (Sit-Stand Transfers) walker, front-wheeled  -CB     Row Name 08/03/18 0916          Stand-Sit Transfer    Assistive Device (Stand-Sit Transfers) walker, front-wheeled  -CB     Row Name 08/03/18 0916          General ROM    GENERAL ROM COMMENTS AROM WFL BLE  -CB     Row Name 08/03/18 0916          General Assessment (Manual Muscle Testing)    Comment, General Manual Muscle Testing (MMT) Assessment grossly  LLE 3/5 RLE 4/5  -CB     Row Name 08/03/18 0916          Sensory Assessment/Intervention    Sensory General Assessment no sensation deficits identified  -CB     Row Name 08/03/18 0916          Vision Assessment/Intervention    Visual Impairment/Limitations corrective lenses full time  -     Row  Name 08/03/18 0916          Pain Assessment    Additional Documentation Pain Scale: Numbers Pre/Post-Treatment (Group)  -CB     Row Name 08/03/18 0916          Pain Scale: Numbers Pre/Post-Treatment    Pain Scale: Numbers, Pretreatment 9/10  -CB     Pain Scale: Numbers, Post-Treatment 9/10  -CB     Pain Location - Orientation incisional  -CB     Pain Location abdomen  -CB     Row Name             Wound 07/26/18 1326 Other (See comments) anterior abdomen incision;surgical    Wound - Properties Group Date first assessed: 07/26/18  -TB Time first assessed: 1326  -TB Present On Admission : no  -TB Side: Other (See comments)  -TB, MIDLINE  Orientation: anterior  -TB Location: abdomen  -TB Type: incision;surgical  -TB    Row Name             Wound 07/31/18 1700 Left lateral;upper chest surgical;incision    Wound - Properties Group Date first assessed: 07/31/18  -CH Time first assessed: 1700  -CH Present On Admission : no  -CH Side: Left  -CH Orientation: lateral;upper  -CH Location: chest  -CH Type: surgical;incision  -CH    Row Name 08/03/18 0916          Physical Therapy Clinical Impression    Date of Referral to PT 08/02/18  -CB     PT Diagnosis (PT Clinical Impression) impaired physical mobility due tos/p spenectomy L VATS  -CB     Criteria for Skilled Interventions Met (PT Clinical Impression) treatment indicated;yes  -CB     Pathology/Pathophysiology Noted (Describe Specifically for Each System) musculoskeletal  -CB     Impairments Found (describe specific impairments) aerobic capacity/endurance;gait, locomotion, and balance  -CB     Functional Limitations in Following Categories (Describe Specific Limitations) self-care  -CB     Rehab Potential (PT Clinical Summary) good, to achieve stated therapy goals  -CB     Predicted Duration of Therapy (PT) until goals met or discharged  -CB     Care Plan Review (PT) evaluation/treatment results reviewed  -CB     Row Name 08/03/18 0916          Vital Signs    Pre Systolic BP  Rehab 139  -CB     Pre Treatment Diastolic BP 73  -CB     Post Systolic BP Rehab 167  -CB     Post Treatment Diastolic BP 77  -CB     Pretreatment Heart Rate (beats/min) 70  -CB     Posttreatment Heart Rate (beats/min) 74  -CB     Pre SpO2 (%) 95  -CB     O2 Delivery Pre Treatment room air  -CB     Post SpO2 (%) 90  -CB     O2 Delivery Post Treatment room air  -CB     Pre Patient Position Sitting  -CB     Intra Patient Position Standing  -CB     Post Patient Position Sitting  -CB     Row Name 08/03/18 0916          Physical Therapy Goals    Bed Mobility Goal Selection (PT) bed mobility, PT goal 1  -CB     Transfer Goal Selection (PT) transfer, PT goal 1  -CB     Gait Training Goal Selection (PT) gait training, PT goal 1  -CB     Stairs Goal Selection (PT) stairs, PT goal 1  -CB     Additional Documentation Stairs Goal Selection (PT) (Row)  -CB     Row Name 08/03/18 0916          Bed Mobility Goal 1 (PT)    Activity/Assistive Device (Bed Mobility Goal 1, PT) sit to supine;supine to sit;bed rails  -CB     [place] Level/Cues Needed (Bed Mobility Goal 1, PT) supervision required  -CB     Time Frame (Bed Mobility Goal 1, PT) 1 week  -CB     Progress/Outcomes (Bed Mobility Goal 1, PT) goal not met  -CB     Row Name 08/03/18 0916          Transfer Goal 1 (PT)    Activity/Assistive Device (Transfer Goal 1, PT) sit-to-stand/stand-to-sit;bed-to-chair/chair-to-bed;walker, rolling  -CB     [place] Level/Cues Needed (Transfer Goal 1, PT) supervision required  -CB     Time Frame (Transfer Goal 1, PT) 1 week  -CB     Progress/Outcome (Transfer Goal 1, PT) goal not met  -CB     Row Name 08/03/18 0916          Gait Training Goal 1 (PT)    Activity/Assistive Device (Gait Training Goal 1, PT) gait (walking locomotion);assistive device use;increase endurance/gait distance;walker, rolling;decrease fall risk   or least restrictive to no device  -CB     [place] Level (Gait Training Goal 1, PT) supervision required  -CB      Distance (Gait Goal 1, PT) 200  -CB     Time Frame (Gait Training Goal 1, PT) 1 week  -CB     Progress/Outcome (Gait Training Goal 1, PT) goal not met  -CB     Row Name 08/03/18 0916          Stairs Goal 1 (PT)    Activity/Assistive Device (Stairs Goal 1, PT) ascending stairs;descending stairs;decrease fall risk;using handrail, right  -CB     [place] Level/Cues Needed (Stairs Goal 1, PT) contact guard assist  -CB     Number of Stairs (Stairs Goal 1, PT) 1  -CB     Time Frame (Stairs Goal 1, PT) 1 week  -CB     Progress/Outcome (Stairs Goal 1, PT) goal not met  -CB     Row Name 08/03/18 0916          Patient Education Goal (PT)    Activity (Patient Education Goal, PT) HEP  -CB     [place]/Cues/Accuracy (Memory Goal 2, PT) demonstrates adequately;verbalizes understanding  -CB     Time Frame (Patient Education Goal, PT) 1 week  -CB     Progress/Outcome (Patient Education Goal, PT) goal not met  -CB     Row Name 08/03/18 0916          Positioning and Restraints    Pre-Treatment Position sitting in chair/recliner  -CB     Post Treatment Position chair  -CB     Row Name 08/03/18 0916          Living Environment    Home Accessibility stairs to enter home;stairs within home  -CB       User Key  (r) = Recorded By, (t) = Taken By, (c) = Cosigned By    Initials Name Provider Type    CB Lupe Grossman, PT Physical Therapist    TB Joshua Ordonez, RN Registered Nurse    Darlene Alaniz RN Registered Nurse          Physical Therapy Education     Title: PT OT SLP Therapies (Active)     Topic: Physical Therapy (Active)     Point: Mobility training (Active)    Learning Progress Summary     Learner Status Readiness Method Response Comment Documented by    Patient Active Acceptance E,D NR  CB 08/03/18 1127          Point: Precautions (Active)    Learning Progress Summary     Learner Status Readiness Method Response Comment Documented by    Patient Active Acceptance E,D NR  CB 08/03/18 1127                       User Key     Initials Effective Dates Name Provider Type Discipline    CB 04/06/17 -  Lupe Grossman, PT Physical Therapist PT                PT Recommendation and Plan  Anticipated Discharge Disposition (PT): home with home health, home with assist, home with 24/7 care, inpatient rehabilitation facility  Planned Therapy Interventions (PT Eval): bed mobility training, gait training, home exercise program, patient/family education, stair training, strengthening, transfer training  Therapy Frequency (PT Clinical Impression): other (see comments) (5-14)  Outcome Summary/Treatment Plan (PT)  Anticipated Equipment Needs at Discharge (PT):  (to be determined)  Anticipated Discharge Disposition (PT): home with home health, home with assist, home with 24/7 care, inpatient rehabilitation facility  Plan of Care Reviewed With: patient  Outcome Summary: PT eval completed, patient sitting in bedside chair on arrival, able to come to stand with mod assist of 1 and holding onto rolling wlaker once standing, able to stand for about 45 -60 seconds before having to sit down, unable to take any steps due to tubes and lines, could benefit from skilled PT to regain and return to highest level of function in bed mobility, transfer and gait          Outcome Measures     Row Name 08/03/18 0916             How much help from another person do you currently need...    Turning from your back to your side while in flat bed without using bedrails? 2  -CB      Moving from lying on back to sitting on the side of a flat bed without bedrails? 2  -CB      Moving to and from a bed to a chair (including a wheelchair)? 2  -CB      Standing up from a chair using your arms (e.g., wheelchair, bedside chair)? 2  -CB      Climbing 3-5 steps with a railing? 1  -CB      To walk in hospital room? 2  -CB      AM-PAC 6 Clicks Score 11  -CB         Functional Assessment    Outcome Measure Options AM-PAC 6 Clicks Basic Mobility (PT)  -CB        User Key  (r) =  Recorded By, (t) = Taken By, (c) = Cosigned By    Initials Name Provider Type    Lupe Benjamin, PT Physical Therapist           Time Calculation:         PT Charges     Row Name 08/03/18 1132             Time Calculation    Start Time 0916  -CB      Stop Time 0944  -CB      Time Calculation (min) 28 min  -CB      PT Received On 08/03/18  -CB      PT Goal Re-Cert Due Date 08/16/18  -CB        User Key  (r) = Recorded By, (t) = Taken By, (c) = Cosigned By    Initials Name Provider Type    Lupe Benjamin, PT Physical Therapist        Therapy Suggested Charges     Code   Minutes Charges    None           Therapy Charges for Today     Code Description Service Date Service Provider Modifiers Qty    69261308767 HC PT MOBILITY CURRENT 8/3/2018 Lupe Grossman, PT GP, CL 1    94836610189 HC PT MOBILITY PROJECTED 8/3/2018 Lupe Grossman, PT GP, CK 1    00821518577 HC PT EVAL MOD COMPLEXITY 2 8/3/2018 Lupe Grossman, PT GP 1          PT G-Codes  PT Professional Judgement Used?: Yes  Outcome Measure Options: AM-PAC 6 Clicks Basic Mobility (PT)  Score: 11  Functional Limitation: Mobility: Walking and moving around  Mobility: Walking and Moving Around Current Status (): At least 60 percent but less than 80 percent impaired, limited or restricted  Mobility: Walking and Moving Around Goal Status (): At least 40 percent but less than 60 percent impaired, limited or restricted      Lupe Grossman PT  8/3/2018

## 2018-08-03 NOTE — PLAN OF CARE
Problem: Patient Care Overview  Goal: Plan of Care Review  Outcome: Ongoing (interventions implemented as appropriate)   08/03/18 6235   Coping/Psychosocial   Plan of Care Reviewed With patient   OTHER   Outcome Summary OT eval completed; co-eval with PT. Prior to admission, pt was indpendent in ADLs and mobility. Pt currently presents with decreased strength, LUE ROM, mobility, balance, and activity tolerance limiting safe participation in ADLs. Pt able to perform sit<>stand with mod A. Mobility and ADL assessment limited on this date due to 9/10 incisional pain. Pt would benefit from continued skilled OT services to promote return to PLOF. Anticipate home with assist and  OT pending progress.

## 2018-08-03 NOTE — PROGRESS NOTES
GENERAL SURGERY PROGRESS NOTE  Chief Complaint:  Surgery Follow up   LOS: 8 days       Subjective     Interval History:     Had BMs yesterday, breathing well. No complaints this AM.  Did ask for TF to be held due to abdominal cramps.    Objective     Vital Signs  Temp:  [98.1 °F (36.7 °C)-98.3 °F (36.8 °C)] 98.1 °F (36.7 °C)  Heart Rate:  [72-88] 72  BP: (127-155)/(64-79) 129/70    Physical Exam:   Abdomen soft, incision CDI with staples. TONYA with minimal output  Labs:  Lab Results (last 24 hours)     Procedure Component Value Units Date/Time    Body Fluid Culture - Body Fluid, Lung, L [[id number]]  (Normal) Collected:  07/31/18 1625    Specimen:  Body Fluid from Lung, L Updated:  08/03/18 0645     BF Culture No growth at 3 days     Gram Stain Result Few (2+) WBCs seen      No organisms seen    Tissue / Bone Culture - Tissue, Lung, L [[id number]]  (Normal) Collected:  07/31/18 1628    Specimen:  Tissue from Lung, L Updated:  08/03/18 0615     Tissue Culture No growth at 3 days     Gram Stain Result Moderate (3+) WBCs seen      Rare (1+) Gram positive cocci    Narrative:       no anaerobes isolated    Basic Metabolic Panel [[id number]]  (Abnormal) Collected:  08/03/18 0310    Specimen:  Blood Updated:  08/03/18 0415     Glucose 113 (H) mg/dL      BUN 15 mg/dL      Creatinine 0.72 mg/dL      Sodium 138 mmol/L      Potassium 3.6 mmol/L      Chloride 107 mmol/L      CO2 26.0 mmol/L      Calcium 7.7 (L) mg/dL      eGFR Non African Amer 113 mL/min/1.73      BUN/Creatinine Ratio 20.8     Anion Gap 5.0 mmol/L     CBC (No Diff) [[id number]]  (Abnormal) Collected:  08/03/18 0310    Specimen:  Blood Updated:  08/03/18 0336     WBC 22.89 (H) 10*3/mm3      RBC 3.44 (L) 10*6/mm3      Hemoglobin 9.4 (L) g/dL      Hematocrit 28.7 (L) %      MCV 83.4 fL      MCH 27.3 pg      MCHC 32.8 g/dL      RDW 17.2 (H) %      RDW-SD 52.2 (H) fl      MPV 9.3 fL      Platelets 434 10*3/mm3     POC Glucose Once [[id number]]  (Normal) Collected:   08/02/18 1725    Specimen:  Blood Updated:  08/02/18 2201     Glucose 110 mg/dL      Comment: Meter: [id number]: [id number] IVAN RHODES              Results Review:     Labs and imaging for today were reviewed.    Assessment/Plan     Truman Esquivel is a 57 y.o. male who is s/p splenectomy, left decortication      Overall doing well, plan for floor later today  CT working on getting chest tubes out.          This document has been electronically signed by Kris Solano MD on August 3, 2018 7:55 AM        Kris Solano MD  08/03/18  7:55 AM

## 2018-08-03 NOTE — PROGRESS NOTES
"  Cardiothoracic - Vascular Surgery Daily Note        LOS: 8 days   Patient Care Team:  Marybeth Harrison DO as PCP - General (Family Medicine)  Joseph Venegas MD as Surgeon (Otolaryngology)  Reza Patel MD as Consulting Physician (Hematology and Oncology)  Jarocho Tapia MD as Consulting Physician (Radiation Oncology)  Yarelis Ames APRN as Nurse Practitioner (Oncology)  Sarah Beth Bright MA as Medical Assistant    POD#3 LEFT VATS, Total Lung Decortication    Chief Complaint: Pleural Effusion      Subjective     The following portions of the patient's history were reviewed and updated as appropriate: allergies, current medications, past family history, past medical history, past social history, past surgical history and problem list.     Subjective:  Symptoms:  Stable.    Activity level: Returning to normal.    Pain:  Pain is well controlled and requiring pain medication.          Objective     Vital Signs  Temp:  [98.1 °F (36.7 °C)-98.3 °F (36.8 °C)] 98.1 °F (36.7 °C)  Heart Rate:  [72-88] 74  BP: (127-155)/(64-80) 139/73  Body mass index is 35.9 kg/m².    Intake/Output Summary (Last 24 hours) at 08/03/18 1125  Last data filed at 08/03/18 1000   Gross per 24 hour   Intake             1649 ml   Output              890 ml   Net              759 ml     I/O this shift:  In: -   Out: 50 [Drains:50]    Wt Readings from Last 3 Encounters:   08/02/18 104 kg (229 lb 4.5 oz)   07/17/18 99.3 kg (219 lb 0.2 oz)   07/12/18 99.8 kg (220 lb)         Physical Exam   Objective:  General Appearance:  Comfortable.    Vital signs: (most recent): Blood pressure 139/73, pulse 74, temperature 98.1 °F (36.7 °C), temperature source Oral, resp. rate 18, height 170.2 cm (67.01\"), weight 104 kg (229 lb 4.5 oz), SpO2 91 %.  Vital signs are normal.  No fever.    Output: Producing urine and no stool output.    HEENT: (Trach collar)    Lungs:  Normal effort and normal respiratory rate.  (Good air movement left " lung)  Heart: Tachycardia.    Abdomen: Abdomen is soft.  Bowel sounds are normal.     Extremities: There is dependent edema.    Neurological: Patient is alert.    Skin:  Warm and dry.  (Left [place]: Dry intact)          Results Review:    Lab Results   Component Value Date    WBC 22.89 (H) 08/03/2018    HGB 9.4 (L) 08/03/2018    HCT 28.7 (L) 08/03/2018    MCV 83.4 08/03/2018     08/03/2018     Lab Results   Component Value Date    GLUCOSE 113 (H) 08/03/2018    BUN 15 08/03/2018    CREATININE 0.72 08/03/2018    EGFRIFNONA 113 08/03/2018    BCR 20.8 08/03/2018    K 3.6 08/03/2018    CO2 26.0 08/03/2018    CALCIUM 7.7 (L) 08/03/2018    ALBUMIN 2.60 (L) 07/30/2018    AST 23 07/30/2018    ALT 28 07/30/2018       Calcium Calcium   Date/Time Value Ref Range Status   08/03/2018 0310 7.7 (L) 8.4 - 10.2 mg/dL Final   08/02/2018 0247 7.5 (L) 8.4 - 10.2 mg/dL Final   08/01/2018 0242 7.3 (L) 8.4 - 10.2 mg/dL Final      Magnesium No results found for: MG     Imaging Results (last 24 hours)     Procedure Component Value Units Date/Time    XR Chest 1 View [[id number]] Collected:  08/03/18 0515     Updated:  08/03/18 0619    Narrative:         Chest single view on  8/3/2018     CLINICAL INDICATION: Chest tube clamped    COMPARISON: 8/2/2018    FINDINGS: Tracheostomy tube tip is in the midthoracic trachea.  The patient is status post median sternotomy. Left subclavian  Port-A-Cath tip is in the SVC. Right IJ catheter tip is in the  SVC. Two left chest tubes remain in place. There is no  pneumothorax. There is a small to moderate-sized left pleural  effusion. There is minimal right basilar atelectasis. There is  continued left-sided atelectasis and/or pneumonia.      Impression:       No significant change in the appearance of the chest.    Electronically signed by:  Guanaco Orantes  8/3/2018 6:18 AM CDT  Workstation: [id number]                                  bisacodyl 10 mg Rectal Daily   chlorhexidine 15 mL  Mouth/Throat Q12H   magnesium citrate 150 mL Oral Once   pantoprazole 40 mg Intravenous Q AM   polyethylene glycol 17 g Oral Daily       dexmedetomidine 0.2-1.5 mcg/kg/hr Last Rate: 0.2 mcg/kg/hr (08/01/18 0252)   dextrose 5 % and sodium chloride 0.45 % with KCl 20 mEq/L 75 mL/hr Last Rate: 75 mL/hr (08/02/18 2346)             ASSESSMENT/PLAN     Principal Problem:    Splenic hemorrhage  Active Problems:    Syncope and collapse    Pleural effusion      Assessment:    Condition: In stable condition.   (Stable Post Op LEFT VATS/Decortication: Progressing    Pleural Effusion: CT DC    Rehab: PT/OT    Pain: Percocet. Morphine Breakthrough    Constipation: Bowel Regimen  ).     Plan:   (  -DC CT, tolerated well  -OK to floor from CVTS Standpoint  -CT to sign off).                 This document has been electronically signed by ROWAN Helms on August 3, 2018 11:25 AM

## 2018-08-03 NOTE — THERAPY EVALUATION
Acute Care - Occupational Therapy Initial Evaluation  [facility]     Patient Name: Truman Esquivel  : 1960  MRN: [id number]  Today's Date: 8/3/2018  Onset of Illness/Injury or Date of Surgery: 18  Date of Referral to OT: 18  Referring Physician: Dr. Solano    Admit Date: 2018       ICD-10-CM ICD-9-CM   1. Syncope and collapse R55 780.2   2. Septic shock (CMS/HCC) A41.9 038.9    R65.21 785.52     995.92   3. Dissection of aorta, unspecified portion of aorta (CMS/HCC) I71.00 441.00   4. Pleural effusion J90 511.9   5. Other ascites R18.8 789.59   6. Splenic hemorrhage D73.89 289.59   7. Impaired physical mobility Z74.09 781.99   8. Impaired mobility and activities of daily living Z74.09 799.89     Patient Active Problem List   Diagnosis   • Ascending aortic dissection (CMS/HCC)   • Essential hypertension   • Deep vein thrombosis (DVT) of femoral vein of both lower extremities (CMS/HCC)   • Hypothyroidism   • Snoring   • Acute pain of right knee   • Knee effusion, right   • Knee pain   • Obstructive sleep apnea of adult   • TIA (transient ischemic attack)   • Dysphagia   • Squamous cell carcinoma of larynx (CMS/HCC)   • Pharyngeal dysphagia   • Anemia   • Abnormal positron emission tomography (PET) of colon   • Hypokalemia   • Encounter for venous access device care   • Dehydration   • Weight loss, abnormal   • Odynophagia   • Thrush, oral   • Thrombocytopenia (CMS/HCC)   • Pancytopenia due to antineoplastic chemotherapy (CMS/HCC)   • Unable to eat   • Syncope and collapse   • Splenic hemorrhage   • Pleural effusion     Past Medical History:   Diagnosis Date   • Aortic dissection (CMS/HCC)    • Chest pain    • Disease of thyroid gland    • HTN (hypertension)    • Knee pain    • Sleep apnea    • Smoker    • Squamous cell carcinoma of larynx (CMS/HCC) 2018   • Stroke (CMS/HCC)    • Swallowing difficulty      Past Surgical History:   Procedure Laterality Date   • AORTA SURGERY       ruptured aorta repair   • ASCENDING ARCH/HEMIARCH REPLACEMENT N/A 2/14/2017    Procedure: INTRAOPERATIVE TRANSESOPHAGEAL ECHOCARDIOGRAM, MIDLINE STERNOTOMY, ASCENDING AORTIC  AND PROXIMAL AORTIC ARCH REPAIR WITH 26MM GRAFT, AORTIC VALVE RESUSPENSION, AORTIC ROOT REPAIR, OPEN VEIN HARVEST OF RIGHT LEG;  Surgeon: German Arreguin MD;  Location: [facility] MAIN OR;  Service:    • BRONCHOSCOPY N/A 2/17/2017    Procedure: BRONCHOSCOPY BIOPSY AT BEDSIDE WITH BAL-LEFT LOWER LOBE;  Surgeon: Trent Chaney MD;  Location: [facility] ENDOSCOPY;  Service:    • COLONOSCOPY N/A 3/7/2018    Procedure: COLONOSCOPY WITH POSSIBLE POLYPECTOMY   ( DONE IN OR WITH ENDO)      COLONOSCOPY FIRST;  Surgeon: Kris Solano MD;  Location: [facility] OR;  Service:    • DIRECT LARYNGOSCOPY, ESOPHAGOSCOPY, BRONCHOSCOPY N/A 2/5/2018    Procedure: DIRECT LARYNGOSCOPY WITH BIOPSY, ESOPHAGOSCOPY     (no bronchoscopy, no laser);  Surgeon: Joseph Venegas MD;  Location: [facility] OR;  Service:    • ENDOSCOPY W/ PEG TUBE PLACEMENT N/A 5/2/2018    Procedure: ESOPHAGOGASTRODUODENOSCOPY WITH PERCUTANEOUS ENDOSCOPIC GASTROSTOMY TUBE INSERTION;  Surgeon: Angel Maciel MD;  Location: [facility] ENDOSCOPY;  Service: Gastroenterology   • SPLENECTOMY N/A 7/26/2018    Procedure: SPLENECTOMY, left chest tube placement, EXPLORATORY LAPAROTOMY, G-TUBE PALCEMENT;  Surgeon: Kris Solano MD;  Location: [facility] OR;  Service: General   • THORACOSCOPY Left 7/31/2018    Procedure: LEFT THORACOSCOPY VIDEO ASSISTED POSSIBLE THORACOTOMY possible decortication bronchoscopy;  Surgeon: Joshua Pollock MD;  Location: [facility] OR;  Service: Cardiothoracic   • TRACHEOSTOMY  02/05/2018   • TRACHEOSTOMY N/A 2/5/2018    Procedure: TRACHEOSTOMY  local injection @ 1106 incision @ 1119;  Surgeon: Joseph Venegas MD;  Location: [facility] OR;  Service:    • VENOUS ACCESS DEVICE (PORT) INSERTION N/A 3/7/2018    Procedure: INSERTION OF MEDIPORT     (C-ARM#1);  Surgeon:  Kris Solano MD;  Location: [facility] OR;  Service:           OT ASSESSMENT FLOWSHEET (last 72 hours)      Occupational Therapy Evaluation     Row Name 08/03/18 0918                   OT Evaluation Time/Intention    Subjective Information complains of;pain  -AS        Document Type evaluation  -AS        Mode of Treatment co-treatment;occupational therapy;physical therapy  -AS        Total Evaluation Minutes, Occupational Therapy 26  -AS        Patient Effort good  -AS        Symptoms Noted During/After Treatment increased pain  -AS        Comment pt reports significant pain in abdomen and L side (surgical); RN notified and provided meds  -AS           General Information    Patient Profile Reviewed? yes  -AS        Onset of Illness/Injury or Date of Surgery 07/26/18  -AS        Referring Physician Dr. Solano  -AS        Patient Observations alert;cooperative;agree to therapy  -AS        Patient/Family Observations sister came in mid eval  -AS        General Observations of Patient rec'd sitting in chair; supplemental O2; feeding tube; foly cath; TONYA drain, chest tube, tele, IV, trach  -AS        Prior Level of Function independent:;all household mobility;community mobility;gait;ADL's  -AS        Equipment Currently Used at Home hospital bed   has w/c; handicap height commodes  -AS        Pertinent History of Current Functional Problem Pt is a 56 yo M admitted for syncope and abd pain; Pt found to have large splenic hematoma and pleural effusion; Pt s/p L chest tube placement, splenectomy,gastrsostomy on 7/26 by Dr Solano. Pt underwent Left VATS on 7/31  -AS        Existing Precautions/Restrictions oxygen therapy device and L/min   abd sx  -AS        Limitations/Impairments other (see comments)   speaking due to trach; pt uses written communication  -AS        Risks Reviewed patient:;LOB;nausea/vomiting;dizziness;increased discomfort;change in vital signs;increased drainage;lines disloged  -AS         Benefits Reviewed patient:;improve function;increase independence;increase balance;increase strength;decrease pain;improve skin integrity;decrease risk of DVT;increase knowledge  -AS           Relationship/Environment    Lives With parent(s)   mother  -AS        Family Caregiver if Needed parent(s)  -AS           Resource/Environmental Concerns    Current Living Arrangements home/apartment/condo  -AS        Home Accessibility Concerns stairs to enter home  -AS           Home Main Entrance    Number of Stairs, Main Entrance one  -AS        Stairs Comment, Main Entrance 1st floor s/u; does not have to use second level  -AS           Cognitive Assessment/Interventions    Additional Documentation Cognitive Assessment/Intervention (Group)  -AS           Cognitive Assessment/Intervention- PT/OT    Affect/Mental Status (Cognitive) WFL  -AS        Orientation Status (Cognition) oriented x 4  -AS        Follows Commands (Cognition) WFL  -AS        Cognitive Function (Cognitive) WFL  -AS        Personal Safety Interventions fall prevention program maintained;nonskid shoes/slippers when out of bed  -AS           Safety Issues, Functional Mobility    Impairments Affecting Function (Mobility) endurance/activity tolerance;balance;strength;pain  -AS           Bed Mobility Assessment/Treatment    Comment (Bed Mobility) Pt rec'd seated in chair  -AS           Functional Mobility    Functional Mobility- Comment Deferred 2/2 pain  -AS           Transfer Assessment/Treatment    Transfer Assessment/Treatment sit-stand transfer;stand-sit transfer  -AS           Sit-Stand Transfer    Sit-Stand [place] (Transfers) minimum assist (75% patient effort);moderate assist (50% patient effort)  -AS        Assistive Device (Sit-Stand Transfers) walker, front-wheeled  -AS           Stand-Sit Transfer    Stand-Sit [place] (Transfers) minimum assist (75% patient effort)  -AS        Assistive Device (Stand-Sit Transfers) walker, front-wheeled   -AS           ADL Assessment/Intervention    BADL Assessment/Intervention --   Deferred due to pain  -AS           General ROM    GENERAL ROM COMMENTS RUE WFL; LUE assessment limited due to signifcant pain on L side (surgical). Pt able to demo some L shld shrug and full elbow flex/ext w pain, wrist, forearm, and  WFL   pain increases with any lifting of LUE  -AS           MMT (Manual Muscle Testing)    Additional Documentation General Assessment (Manual Muscle Testing) (Group)  -AS           General Assessment (Manual Muscle Testing)    General Manual Muscle Testing (MMT) Assessment upper extremity strength deficits identified  -AS        Comment, General Manual Muscle Testing (MMT) Assessment RUE grossly 4/5 throughout; LUE unable to formally test 2/2 pain  -AS           Sensory Assessment/Intervention    Sensory General Assessment no sensation deficits identified  -AS           Vision Assessment/Intervention    Visual Impairment/Limitations corrective lenses full time  -AS           Positioning and Restraints    Pre-Treatment Position sitting in chair/recliner  -AS        Post Treatment Position chair  -AS        In Chair encouraged to call for assist;call light within reach;sitting;with nsg;with family/caregiver  -AS           Pain Scale: Numbers Pre/Post-Treatment    Pain Scale: Numbers, Pretreatment 9/10  -AS        Pain Scale: Numbers, Post-Treatment 9/10  -AS        Pain Location - Orientation incisional  -AS        Pain Location abdomen  -AS           Edema Assessment & Management    Additional Documentation --   BUE edema throughout; L>R  -AS           Wound 07/26/18 1326 Other (See comments) anterior abdomen incision;surgical    Wound - Properties Group Date first assessed: 07/26/18  -TB Time first assessed: 1326  -TB Present On Admission : no  -TB Side: Other (See comments)  -TB, MIDLINE  Orientation: anterior  -TB Location: abdomen  -TB Type: incision;surgical  -TB       Wound 07/31/18 1700 Left  lateral;upper chest surgical;incision    Wound - Properties Group Date first assessed: 07/31/18  -CH Time first assessed: 1700  -CH Present On Admission : no  -CH Side: Left  -CH Orientation: lateral;upper  -CH Location: chest  -CH Type: surgical;incision  -CH       Plan of Care Review    Plan of Care Reviewed With patient  -AS           Clinical Impression (OT)    Date of Referral to OT 08/02/18  -AS        OT Diagnosis impaired mobility and ADLs  -AS        Patient/Family Goals Statement (OT Eval) return home  -AS        Criteria for Skilled Therapeutic Interventions Met (OT Eval) yes;treatment indicated  -AS        Rehab Potential (OT Eval) good, to achieve stated therapy goals  -AS        Therapy Frequency (OT Eval) other (see comments)   5-7 days/wk  -AS        Predicted Duration of Therapy Intervention (Therapy Eval) until goals met or d/c from facility  -AS        Care Plan Review (OT) evaluation/treatment results reviewed;care plan/treatment goals reviewed;risks/benefits reviewed;current/potential barriers reviewed;patient/other agree to care plan  -AS        Anticipated Equipment Needs at Discharge (OT) --   may benefit from hip kit; shower chair  -AS        Anticipated Discharge Disposition (OT) home with assist;home with home health   pending progress  -AS           Vital Signs    Pre Systolic BP Rehab 139  -AS        Pre Treatment Diastolic BP 73  -AS        Post Systolic BP Rehab 167  -AS        Post Treatment Diastolic BP 77  -AS        Pretreatment Heart Rate (beats/min) 70  -AS        Posttreatment Heart Rate (beats/min) 74  -AS        Pre SpO2 (%) 95  -AS        O2 Delivery Pre Treatment room air  -AS        Post SpO2 (%) 90  -AS        O2 Delivery Post Treatment room air  -AS        Pre Patient Position Sitting  -AS        Intra Patient Position Standing  -AS        Post Patient Position Sitting  -AS           Planned OT Interventions    Planned Therapy Interventions (OT Eval) activity tolerance  training;adaptive equipment training;BADL retraining;functional balance retraining;neuromuscular control/coordination retraining;occupation/activity based interventions;patient/caregiver education/training;ROM/therapeutic exercise;strengthening exercise;transfer/mobility retraining  -AS           OT Goals    Transfer Goal Selection (OT) transfer, OT goal 1  -AS        Bathing Goal Selection (OT) bathing, OT goal 1  -AS        Dressing Goal Selection (OT) dressing, OT goal 1  -AS        Toileting Goal Selection (OT) toileting, OT goal 1  -AS        Activity Tolerance Goal Selection (OT) activity tolerance, OT goal 1  -AS        Additional Documentation Activity Tolerance Goal Selection (OT) (Row)  -AS           Transfer Goal 1 (OT)    Activity/Assistive Device (Transfer Goal 1, OT) sit-to-stand/stand-to-sit;bed-to-chair/chair-to-bed;toilet  -AS        [place] Level/Cues Needed (Transfer Goal 1, OT) supervision required  -AS        Time Frame (Transfer Goal 1, OT) long term goal (LTG);by discharge  -AS        Progress/Outcome (Transfer Goal 1, OT) goal not met  -AS           Bathing Goal 1 (OT)    Activity/Assistive Device (Bathing Goal 1, OT) bathing skills, all   using AE PRN  -AS        [place] Level/Cues Needed (Bathing Goal 1, OT) set-up required;supervision required  -AS        Time Frame (Bathing Goal 1, OT) long term goal (LTG);by discharge  -AS        Progress/Outcomes (Bathing Goal 1, OT) goal not met  -AS           Dressing Goal 1 (OT)    Activity/Assistive Device (Dressing Goal 1, OT) dressing skills, all   using AE PRN  -AS        [place]/Cues Needed (Dressing Goal 1, OT) set-up required;supervision required  -AS        Time Frame (Dressing Goal 1, OT) long term goal (LTG);other (see comments)  -AS        Progress/Outcome (Dressing Goal 1, OT) goal not met  -AS           Toileting Goal 1 (OT)    Activity/Device (Toileting Goal 1, OT) toileting skills, all  -AS        [place] Level/Cues  Needed (Toileting Goal 1, OT) conditional independence  -AS        Time Frame (Toileting Goal 1, OT) long term goal (LTG);by discharge  -AS        Progress/Outcome (Toileting Goal 1, OT) goal not met  -AS            Activity Tolerance Goal 1 (OT)    Activity Tolerance Goal 1 (OT) functional/therapeutic activities  -AS        Activity Level (Endurance Goal 1, OT) 15 min activity  -AS        Time Frame (Activity Tolerance Goal 1, OT) long term goal (LTG);by discharge  -AS        Progress/Outcome (Activity Tolerance Goal 1, OT) goal not met  -AS           Living Environment    Home Accessibility stairs to enter home;stairs within home   walk-in shower; handicap accessible bathroom  -AS          User Key  (r) = Recorded By, (t) = Taken By, (c) = Cosigned By    Initials Name Effective Dates    TB Joshua Ordonez RN 10/17/16 -     Darlene Alaniz RN 06/23/17 -     AS Erin Treviño OT 05/01/18 -            Occupational Therapy Education     Title: PT OT SLP Therapies (Active)     Topic: Occupational Therapy (Active)     Point: Precautions (Done)     Description: Instruct learner(s) on prescribed precautions during self-care and functional transfers.   Learning Progress Summary     Learner Status Readiness Method Response Comment Documented by    Patient Done Acceptance E VU,NR role of OT; OT POC; transfer training; safety precautions AS 08/03/18 1348          Point: Body mechanics (Done)     Description: Instruct learner(s) on proper positioning and spine alignment during self-care, functional mobility activities and/or exercises.   Learning Progress Summary     Learner Status Readiness Method Response Comment Documented by    Patient Done Acceptance E VU,NR role of OT; OT POC; transfer training; safety precautions AS 08/03/18 1348                      User Key     Initials Effective Dates Name Provider Type Discipline    AS 05/01/18 -  Erin Treviño OT Occupational Therapist OT                  OT  Recommendation and Plan  Outcome Summary/Treatment Plan (OT)  Anticipated Equipment Needs at Discharge (OT):  (may benefit from hip kit; shower chair)  Anticipated Discharge Disposition (OT): home with assist, home with home health (pending progress)  Planned Therapy Interventions (OT Eval): activity tolerance training, adaptive equipment training, BADL retraining, functional balance retraining, neuromuscular control/coordination retraining, occupation/activity based interventions, patient/caregiver education/training, ROM/therapeutic exercise, strengthening exercise, transfer/mobility retraining  Therapy Frequency (OT Eval): other (see comments) (5-7 days/wk)  Plan of Care Review  Plan of Care Reviewed With: patient  Plan of Care Reviewed With: patient  Outcome Summary: OT eval completed; co-eval with PT. Prior to admission, pt was indpendent in ADLs and mobility. Pt currently presents with decreased strength, LUE ROM, mobility, balance, and activity tolerance limiting safe participation in ADLs. Pt able to perform sit<>stand with mod A. Mobility and ADL assessment limited  due to 9/10 incisional pain.  Pt would benefit from continued skilled OT services to promote return to PLOF. Anticipate home with assist and  OT pending progress.          Outcome Measures     Row Name 08/03/18 0918 08/03/18 0916          How much help from another person do you currently need...    Turning from your back to your side while in flat bed without using bedrails?  -- 2  -CB     Moving from lying on back to sitting on the side of a flat bed without bedrails?  -- 2  -CB     Moving to and from a bed to a chair (including a wheelchair)?  -- 2  -CB     Standing up from a chair using your arms (e.g., wheelchair, bedside chair)?  -- 2  -CB     Climbing 3-5 steps with a railing?  -- 1  -CB     To walk in hospital room?  -- 2  -CB     AM-PAC 6 Clicks Score  -- 11  -CB        How much help from another is currently needed...    Putting on  and taking off regular lower body clothing? 2  -AS  --     Bathing (including washing, rinsing, and drying) 2  -AS  --     Toileting (which includes using toilet bed pan or urinal) 2  -AS  --     Putting on and taking off regular upper body clothing 2  -AS  --     Taking care of personal grooming (such as brushing teeth) 3  -AS  --     Eating meals 4  -AS  --     Score 15  -AS  --        Functional Assessment    Outcome Measure Options AM-PAC 6 Clicks Daily Activity (OT)  -AS AM-PAC 6 Clicks Basic Mobility (PT)  -CB       User Key  (r) = Recorded By, (t) = Taken By, (c) = Cosigned By    Initials Name Provider Type    CB Lupe Grossman, PT Physical Therapist    AS Erin Treviño, OT Occupational Therapist          Time Calculation:   OT Start Time: 0918  OT Stop Time: 0944  OT Time Calculation (min): 26 min  Therapy Suggested Charges     Code   Minutes Charges    None           Therapy Charges for Today     Code Description Service Date Service Provider Modifiers Qty    [id number]  OT SELFCARE CURRENT 8/3/2018 Erin Treviño OT GO, CK 1    [id number]  OT SELFCARE PROJECTED 8/3/2018 Erin Treviño OT GO, CI 1    [id number]  OT EVAL MOD COMPLEXITY 2 8/3/2018 Erin Treviño OT GO 1          OT G-codes  OT Professional Judgement Used?: Yes  OT Functional Scales Options: AM-PAC 6 Clicks Daily Activity (OT)  Score: 15  Functional Limitation: Self care  Self Care Current Status (): At least 40 percent but less than 60 percent impaired, limited or restricted  Self Care Goal Status (): At least 1 percent but less than 20 percent impaired, limited or restricted    Erin Treviño OT  8/3/2018

## 2018-08-03 NOTE — PLAN OF CARE
Problem: Patient Care Overview  Goal: Plan of Care Review  Outcome: Ongoing (interventions implemented as appropriate)   08/03/18 1256   Coping/Psychosocial   Plan of Care Reviewed With caregiver;patient   OTHER   Outcome Summary Pt currently refusing tube feeding due to gas and bloating. He may do better on the Jevity 1.2 which he was on at home, nsg to recommend.   Plan of Care Review   Progress declining       Problem: Nutrition, Enteral (Adult)  Goal: Signs and Symptoms of Listed Potential Problems Will be Absent, Minimized or Managed (Nutrition, Enteral)   08/03/18 1256   Goal/Outcome Evaluation   Problems Assessed (Enteral Nutrition) gastrointestinal complications;aspiration;fluid/electrolyte imbalance;mechanical complications   Problems Present (Enteral Nutrition) gastrointestinal complications

## 2018-08-03 NOTE — PLAN OF CARE
Problem: Patient Care Overview  Goal: Plan of Care Review  Outcome: Ongoing (interventions implemented as appropriate)   08/03/18 0916   Coping/Psychosocial   Plan of Care Reviewed With patient   OTHER   Outcome Summary PT eval completed, patient sitting in bedside chair on arrival, able to come to stand with mod assist of 1 and holding onto rolling wlaker once standing, able to stand for about 45 -60 seconds before having to sit down, unable to take any steps due to tubes and lines, could benefit from skilled PT to regain and return to highest level of function in bed mobility, transfer and gait     Goal: Discharge Needs Assessment  Outcome: Ongoing (interventions implemented as appropriate)   08/03/18 0916   Discharge Needs Assessment   Concerns to be Addressed adjustment to diagnosis/illness   Patient/Family Anticipates Transition to home with family   Patient/Family Anticipated Services at Transition home health care   Transportation Concerns car, none   Transportation Anticipated family or friend will provide   Anticipated Changes Related to Illness none   Equipment Needed After Discharge (to be determined)   Outpatient/Agency/Support Group Needs homecare agency   Discharge Facility/Level of Care Needs home with home health   Current Discharge Risk chronically ill   Disability   Equipment Currently Used at Home hospital bed

## 2018-08-04 LAB
ANION GAP SERPL CALCULATED.3IONS-SCNC: 3 MMOL/L (ref 5–15)
BUN BLD-MCNC: 14 MG/DL (ref 7–21)
BUN/CREAT SERPL: 20 (ref 7–25)
CALCIUM SPEC-SCNC: 7.5 MG/DL (ref 8.4–10.2)
CHLORIDE SERPL-SCNC: 107 MMOL/L (ref 95–110)
CO2 SERPL-SCNC: 26 MMOL/L (ref 22–31)
CREAT BLD-MCNC: 0.7 MG/DL (ref 0.7–1.3)
DEPRECATED RDW RBC AUTO: 52.2 FL (ref 35.1–43.9)
ERYTHROCYTE [DISTWIDTH] IN BLOOD BY AUTOMATED COUNT: 17.2 % (ref 11.5–14.5)
GFR SERPL CREATININE-BSD FRML MDRD: 116 ML/MIN/1.73 (ref 56–130)
GLUCOSE BLD-MCNC: 112 MG/DL (ref 60–100)
GLUCOSE BLDC GLUCOMTR-MCNC: 115 MG/DL (ref 70–130)
HCT VFR BLD AUTO: 26.5 % (ref 39–49)
HGB BLD-MCNC: 8.8 G/DL (ref 13.7–17.3)
MCH RBC QN AUTO: 27.8 PG (ref 26.5–34)
MCHC RBC AUTO-ENTMCNC: 33.2 G/DL (ref 31.5–36.3)
MCV RBC AUTO: 83.9 FL (ref 80–98)
PLATELET # BLD AUTO: 499 10*3/MM3 (ref 150–450)
PMV BLD AUTO: 9.7 FL (ref 8–12)
POTASSIUM BLD-SCNC: 3.3 MMOL/L (ref 3.5–5.1)
RBC # BLD AUTO: 3.16 10*6/MM3 (ref 4.37–5.74)
SODIUM BLD-SCNC: 136 MMOL/L (ref 137–145)
WBC NRBC COR # BLD: 18.68 10*3/MM3 (ref 3.2–9.8)

## 2018-08-04 PROCEDURE — 82962 GLUCOSE BLOOD TEST: CPT

## 2018-08-04 PROCEDURE — 94760 N-INVAS EAR/PLS OXIMETRY 1: CPT

## 2018-08-04 PROCEDURE — 94799 UNLISTED PULMONARY SVC/PX: CPT

## 2018-08-04 PROCEDURE — 80048 BASIC METABOLIC PNL TOTAL CA: CPT | Performed by: STUDENT IN AN ORGANIZED HEALTH CARE EDUCATION/TRAINING PROGRAM

## 2018-08-04 PROCEDURE — 25010000002 MORPHINE PER 10 MG: Performed by: NURSE PRACTITIONER

## 2018-08-04 PROCEDURE — 85027 COMPLETE CBC AUTOMATED: CPT | Performed by: STUDENT IN AN ORGANIZED HEALTH CARE EDUCATION/TRAINING PROGRAM

## 2018-08-04 RX ORDER — LEVOTHYROXINE SODIUM 0.07 MG/1
75 TABLET ORAL
Status: DISCONTINUED | OUTPATIENT
Start: 2018-08-04 | End: 2018-08-15 | Stop reason: HOSPADM

## 2018-08-04 RX ORDER — METOPROLOL SUCCINATE 25 MG/1
25 TABLET, EXTENDED RELEASE ORAL NIGHTLY
Status: DISCONTINUED | OUTPATIENT
Start: 2018-08-04 | End: 2018-08-15 | Stop reason: HOSPADM

## 2018-08-04 RX ADMIN — LEVOTHYROXINE SODIUM 75 MCG: 0.07 TABLET ORAL at 12:54

## 2018-08-04 RX ADMIN — OXYCODONE HYDROCHLORIDE AND ACETAMINOPHEN 1 TABLET: 10; 325 TABLET ORAL at 22:39

## 2018-08-04 RX ADMIN — OXYCODONE HYDROCHLORIDE AND ACETAMINOPHEN 1 TABLET: 10; 325 TABLET ORAL at 05:54

## 2018-08-04 RX ADMIN — MORPHINE SULFATE 2 MG: 2 INJECTION, SOLUTION INTRAMUSCULAR; INTRAVENOUS at 04:53

## 2018-08-04 RX ADMIN — PANTOPRAZOLE SODIUM 40 MG: 40 INJECTION, POWDER, FOR SOLUTION INTRAVENOUS at 05:54

## 2018-08-04 RX ADMIN — POTASSIUM CHLORIDE, DEXTROSE MONOHYDRATE AND SODIUM CHLORIDE 75 ML/HR: 150; 5; 450 INJECTION, SOLUTION INTRAVENOUS at 01:29

## 2018-08-04 RX ADMIN — OXYCODONE HYDROCHLORIDE AND ACETAMINOPHEN 1 TABLET: 10; 325 TABLET ORAL at 01:28

## 2018-08-04 RX ADMIN — METOPROLOL SUCCINATE 25 MG: 25 TABLET, FILM COATED, EXTENDED RELEASE ORAL at 22:34

## 2018-08-04 RX ADMIN — LOSARTAN POTASSIUM: 50 TABLET, FILM COATED ORAL at 12:54

## 2018-08-04 RX ADMIN — CHLORHEXIDINE GLUCONATE 0.12% ORAL RINSE 15 ML: 1.2 LIQUID ORAL at 22:34

## 2018-08-04 RX ADMIN — MORPHINE SULFATE 2 MG: 2 INJECTION, SOLUTION INTRAMUSCULAR; INTRAVENOUS at 19:29

## 2018-08-04 NOTE — PLAN OF CARE
Problem: Patient Care Overview  Goal: Plan of Care Review  Outcome: Ongoing (interventions implemented as appropriate)   08/04/18 1121   Coping/Psychosocial   Plan of Care Reviewed With patient   OTHER   Outcome Summary vss, pain controlled by PO meds, requesting RT to decrease humidification and o2 levels, still refusing tube feeds but considering restarting this evening   Plan of Care Review   Progress no change

## 2018-08-04 NOTE — SIGNIFICANT NOTE
08/04/18 1215   Rehab Treatment   Discipline occupational therapy assistant   Reason Treatment Not Performed patient/family declined treatment, not feeling well

## 2018-08-04 NOTE — PROGRESS NOTES
"   LOS: 9 days   Patient Care Team:  Marybeth Harrison DO as PCP - General (Family Medicine)  Joseph Venegas MD as Surgeon (Otolaryngology)  Reza Patel MD as Consulting Physician (Hematology and Oncology)  Jarocho Tapia MD as Consulting Physician (Radiation Oncology)  Yarelis Ames APRN as Nurse Practitioner (Oncology)  Sarah Beth Bright MA as Medical Assistant    Chief Complaint: Splenectomy/VATS    Subjective     History of Present Illness    Subjective:  Symptoms:  Improved.  No shortness of breath, malaise, cough, chest pain, weakness, headache, chest pressure, anorexia, diarrhea or anxiety.    Diet:  Adequate intake.  No nausea or vomiting.    Activity level: Returning to normal.    Pain:  He complains of pain that is moderate.  Pain is well controlled.        History taken from: patient chart    Objective     Vital Signs  Temp:  [97.1 °F (36.2 °C)-98.5 °F (36.9 °C)] 97.1 °F (36.2 °C)  Heart Rate:  [61-78] 62  Resp:  [19-20] 19  BP: (135-158)/(68-85) 158/78    Objective:  General Appearance:  Comfortable.    Vital signs: (most recent): Blood pressure 158/78, pulse 62, temperature 97.1 °F (36.2 °C), temperature source Oral, resp. rate 19, height 170.2 cm (67.01\"), weight 104 kg (229 lb 4.5 oz), SpO2 98 %.  Vital signs are normal.  No fever.    Output: Producing urine and producing stool.    Lungs:  Normal effort and normal respiratory rate.    Heart: Normal rate.  Regular rhythm.    Abdomen: Abdomen is soft and non-distended.  There are no signs of ascites.  (Healing incisions- TONYA in place).              Results Review:     I reviewed the patient's new clinical results.    Medication Review: DOne    Assessment/Plan     Principal Problem:    Splenic hemorrhage  Active Problems:    Syncope and collapse    Pleural effusion      Assessment:    Condition: In stable condition.  Improving.       Plan:   (Fenestrated tracheostomy).       Rubens Archer MD  08/04/18  9:42 AM    Time: 20 " mins

## 2018-08-05 LAB
ANION GAP SERPL CALCULATED.3IONS-SCNC: 5 MMOL/L (ref 5–15)
BACTERIA FLD CULT: NORMAL
BACTERIA SPEC AEROBE CULT: NORMAL
BUN BLD-MCNC: 13 MG/DL (ref 7–21)
BUN/CREAT SERPL: 20.6 (ref 7–25)
CALCIUM SPEC-SCNC: 7.7 MG/DL (ref 8.4–10.2)
CHLORIDE SERPL-SCNC: 104 MMOL/L (ref 95–110)
CO2 SERPL-SCNC: 26 MMOL/L (ref 22–31)
CREAT BLD-MCNC: 0.63 MG/DL (ref 0.7–1.3)
DEPRECATED RDW RBC AUTO: 52.6 FL (ref 35.1–43.9)
ERYTHROCYTE [DISTWIDTH] IN BLOOD BY AUTOMATED COUNT: 17.4 % (ref 11.5–14.5)
GFR SERPL CREATININE-BSD FRML MDRD: 131 ML/MIN/1.73 (ref 56–130)
GLUCOSE BLD-MCNC: 121 MG/DL (ref 60–100)
GRAM STN SPEC: NORMAL
HCT VFR BLD AUTO: 27.9 % (ref 39–49)
HGB BLD-MCNC: 9.2 G/DL (ref 13.7–17.3)
MCH RBC QN AUTO: 27.6 PG (ref 26.5–34)
MCHC RBC AUTO-ENTMCNC: 33 G/DL (ref 31.5–36.3)
MCV RBC AUTO: 83.8 FL (ref 80–98)
PLATELET # BLD AUTO: 515 10*3/MM3 (ref 150–450)
PMV BLD AUTO: 9.3 FL (ref 8–12)
POTASSIUM BLD-SCNC: 3.4 MMOL/L (ref 3.5–5.1)
RBC # BLD AUTO: 3.33 10*6/MM3 (ref 4.37–5.74)
SODIUM BLD-SCNC: 135 MMOL/L (ref 137–145)
WBC NRBC COR # BLD: 22.1 10*3/MM3 (ref 3.2–9.8)

## 2018-08-05 PROCEDURE — 85027 COMPLETE CBC AUTOMATED: CPT | Performed by: STUDENT IN AN ORGANIZED HEALTH CARE EDUCATION/TRAINING PROGRAM

## 2018-08-05 PROCEDURE — 97535 SELF CARE MNGMENT TRAINING: CPT

## 2018-08-05 PROCEDURE — 25010000002 MORPHINE PER 10 MG: Performed by: NURSE PRACTITIONER

## 2018-08-05 PROCEDURE — 94760 N-INVAS EAR/PLS OXIMETRY 1: CPT

## 2018-08-05 PROCEDURE — 97110 THERAPEUTIC EXERCISES: CPT

## 2018-08-05 PROCEDURE — 97530 THERAPEUTIC ACTIVITIES: CPT

## 2018-08-05 PROCEDURE — 80048 BASIC METABOLIC PNL TOTAL CA: CPT | Performed by: STUDENT IN AN ORGANIZED HEALTH CARE EDUCATION/TRAINING PROGRAM

## 2018-08-05 PROCEDURE — 94799 UNLISTED PULMONARY SVC/PX: CPT

## 2018-08-05 RX ORDER — SIMETHICONE 20 MG/.3ML
40 EMULSION ORAL 4 TIMES DAILY PRN
Status: DISCONTINUED | OUTPATIENT
Start: 2018-08-05 | End: 2018-08-15 | Stop reason: HOSPADM

## 2018-08-05 RX ADMIN — LOSARTAN POTASSIUM: 50 TABLET, FILM COATED ORAL at 08:37

## 2018-08-05 RX ADMIN — SIMETHICONE 40 MG: 20 SUSPENSION/ DROPS ORAL at 20:50

## 2018-08-05 RX ADMIN — MORPHINE SULFATE 2 MG: 2 INJECTION, SOLUTION INTRAMUSCULAR; INTRAVENOUS at 20:48

## 2018-08-05 RX ADMIN — METOPROLOL SUCCINATE 25 MG: 25 TABLET, FILM COATED, EXTENDED RELEASE ORAL at 20:49

## 2018-08-05 RX ADMIN — MORPHINE SULFATE 2 MG: 2 INJECTION, SOLUTION INTRAMUSCULAR; INTRAVENOUS at 03:00

## 2018-08-05 RX ADMIN — LEVOTHYROXINE SODIUM 75 MCG: 0.07 TABLET ORAL at 06:14

## 2018-08-05 RX ADMIN — POTASSIUM CHLORIDE, DEXTROSE MONOHYDRATE AND SODIUM CHLORIDE 75 ML/HR: 150; 5; 450 INJECTION, SOLUTION INTRAVENOUS at 03:01

## 2018-08-05 RX ADMIN — CHLORHEXIDINE GLUCONATE 0.12% ORAL RINSE 15 ML: 1.2 LIQUID ORAL at 20:49

## 2018-08-05 RX ADMIN — PANTOPRAZOLE SODIUM 40 MG: 40 INJECTION, POWDER, FOR SOLUTION INTRAVENOUS at 06:14

## 2018-08-05 NOTE — THERAPY TREATMENT NOTE
Acute Care - Physical Therapy Treatment Note  [facility]     Patient Name: Truman Esquivel  : 1960  MRN: [id number]  Today's Date: 2018  Onset of Illness/Injury or Date of Surgery: 18  Date of Referral to PT: 18  Referring Physician: Dr. Solano    Admit Date: 2018    Visit Dx:    ICD-10-CM ICD-9-CM   1. Syncope and collapse R55 780.2   2. Septic shock (CMS/HCC) A41.9 038.9    R65.21 785.52     995.92   3. Dissection of aorta, unspecified portion of aorta (CMS/HCC) I71.00 441.00   4. Pleural effusion J90 511.9   5. Other ascites R18.8 789.59   6. Splenic hemorrhage D73.89 289.59   7. Impaired physical mobility Z74.09 781.99   8. Impaired mobility and activities of daily living Z74.09 799.89     Patient Active Problem List   Diagnosis   • Ascending aortic dissection (CMS/HCC)   • Essential hypertension   • Deep vein thrombosis (DVT) of femoral vein of both lower extremities (CMS/HCC)   • Hypothyroidism   • Snoring   • Acute pain of right knee   • Knee effusion, right   • Knee pain   • Obstructive sleep apnea of adult   • TIA (transient ischemic attack)   • Dysphagia   • Squamous cell carcinoma of larynx (CMS/HCC)   • Pharyngeal dysphagia   • Anemia   • Abnormal positron emission tomography (PET) of colon   • Hypokalemia   • Encounter for venous access device care   • Dehydration   • Weight loss, abnormal   • Odynophagia   • Thrush, oral   • Thrombocytopenia (CMS/HCC)   • Pancytopenia due to antineoplastic chemotherapy (CMS/HCC)   • Unable to eat   • Syncope and collapse   • Splenic hemorrhage   • Pleural effusion       Therapy Treatment          Rehabilitation Treatment Summary     Row Name 18 1530 18 1010          Treatment Time/Intention    Discipline physical therapy assistant  -TW occupational therapy assistant  -BB     Document Type therapy note (daily note)  -TW therapy note (daily note)  -BB     Subjective Information complains of;pain  -TW complains of;pain   -BB     Mode of Treatment physical therapy;individual therapy  -TW individual therapy;occupational therapy  -BB     Patient/Family Observations  -- no family present  -BB     Total Minutes, Occupational Therapy Treatment  -- 40  -BB     Therapy Frequency (OT Eval)  -- other (see comments)   5-7 days/wk  -BB     Patient Effort good  -TW good  -BB     Existing Precautions/Restrictions fall;oxygen therapy device and L/min  -TW --  -BB2     Equipment Issued to Patient gait belt  -TW  --     Recorded by [TW] Lj Gross, MARGI 08/05/18 1614 [BB] Carmen Davenport SWARTZ/L 08/05/18 1329  [BB2] Carmen Davenport SWARTZ/L 08/05/18 1335     Row Name 08/05/18 1530 08/05/18 1010          Vital Signs    Pre Systolic BP Rehab  -- 146  -BB     Pre Treatment Diastolic BP  -- 66  -BB     Pretreatment Heart Rate (beats/min) 64  -TW 73  -BB     Posttreatment Heart Rate (beats/min) 68  -TW  --     Pre SpO2 (%) 93  -TW 96  -BB     O2 Delivery Pre Treatment room air  -TW room air  -BB     Post SpO2 (%) 95  -TW  --     Pre Patient Position Sitting  -TW Sitting  -BB     Intra Patient Position Standing  -TW  --     Post Patient Position Supine  -TW  --     Recorded by [TW] Lj Gross PTA 08/05/18 1614 [BB] Carmen Davneport SWARTZ/L 08/05/18 1335     Row Name 08/05/18 1530 08/05/18 1010          Cognitive Assessment/Intervention- PT/OT    Affect/Mental Status (Cognitive) WFL  -TW  --     Orientation Status (Cognition) oriented x 4  -TW oriented x 4  -BB     Follows Commands (Cognition) WFL  -TW WFL  -BB     Cognitive Function (Cognitive) WFL  -TW WFL  -BB     Personal Safety Interventions fall prevention program maintained;gait belt;nonskid shoes/slippers when out of bed  -TW fall prevention program maintained;gait belt;muscle strengthening facilitated;nonskid shoes/slippers when out of bed  -BB     Recorded by [TW] Lj Gross PTA 08/05/18 1614 [BB] Carmen Davenport SWARTZ/L 08/05/18 1335     Row Name  08/05/18 1530             Safety Issues, Functional Mobility    Impairments Affecting Function (Mobility) balance;endurance/activity tolerance;pain;strength  -TW      Recorded by [TW] Lj Gross, PTA 08/05/18 1614      Row Name 08/05/18 1530             Bed Mobility Assessment/Treatment    Bed Mobility Assessment/Treatment scooting/bridging;sit-supine  -TW      Scooting/Bridging Nolan (Bed Mobility) supervision  -TW      Sit-Supine Nolan (Bed Mobility) minimum assist (75% patient effort)   for B LE's  -TW      Bed Mobility, Safety Issues impaired trunk control for bed mobility  -TW      Assistive Device (Bed Mobility) bed rails;head of bed elevated  -TW      Recorded by [TW] Lj Gross, PTA 08/05/18 1614      Row Name 08/05/18 1530             Transfer Assessment/Treatment    Transfer Assessment/Treatment chair-bed transfer  -TW      Recorded by [TW] Lj Gross, PTA 08/05/18 1614      Row Name 08/05/18 1530             Chair-Bed Transfer    Chair-Bed Nolan (Transfers) minimum assist (75% patient effort);contact guard  -TW      Assistive Device (Chair-Bed Transfers) walker, front-wheeled  -TW      Recorded by [TW] Lj Gross, PTA 08/05/18 1614      Row Name 08/05/18 1530             Sit-Stand Transfer    Sit-Stand Nolan (Transfers) minimum assist (75% patient effort)  -TW      Assistive Device (Sit-Stand Transfers) walker, front-wheeled  -TW      Recorded by [TW] Lj Gross, PTA 08/05/18 1614      Row Name 08/05/18 1530             Stand-Sit Transfer    Stand-Sit Nolan (Transfers) minimum assist (75% patient effort)  -TW      Assistive Device (Stand-Sit Transfers) walker, front-wheeled  -TW      Recorded by [TW] Lj Gross, PTA 08/05/18 1614      Row Name 08/05/18 1010             Therapeutic Exercise    Upper Extremity Range of Motion (Therapeutic Exercise) elbow flexion/extension, bilateral;wrist flexion/extension,  bilateral;forearm supination/pronation, bilateral;shoulder internal/external rotation, bilateral  -BB      Hand (Therapeutic Exercise) finger flexion/extension, bilateral  -BB      Position (Therapeutic Exercise) seated  -BB      Sets/Reps (Therapeutic Exercise) 2 sets x10 reps  -BB      Expected Outcome (Therapeutic Exercise) improve functional tolerance, self-care activity;improve performance, transfer skills  -BB      Recorded by [BB] Carmen Davenport COTA/L 08/05/18 1335      Row Name 08/05/18 1530 08/05/18 1010          Positioning and Restraints    Pre-Treatment Position sitting in chair/recliner  -TW sitting in chair/recliner  -BB     Post Treatment Position bed  -TW chair  -BB     In Bed supine;call light within reach;encouraged to call for assist;exit alarm on  -TW  --     In Chair notified nsg  -TW call light within reach;encouraged to call for assist;sitting  -BB     Recorded by [TW] Lj Gross PTA 08/05/18 1614 [BB] Carmen Davenport COTA/L 08/05/18 1335     Row Name 08/05/18 1530 08/05/18 1010          Pain Scale: Numbers Pre/Post-Treatment    Pain Scale: Numbers, Pretreatment 5/10  -TW 5/10  -BB     Pain Scale: Numbers, Post-Treatment 5/10  -TW 5/10  -BB     Pain Location - Orientation incisional  -TW incisional  -BB     Pain Location abdomen  -TW abdomen  -BB     Recorded by [TW] Lj Gross PTA 08/05/18 1614 [BB] Carmen Davenport COTA/L 08/05/18 1335     Row Name                Wound 07/26/18 1326 Other (See comments) anterior abdomen incision;surgical    Wound - Properties Group Date first assessed: 07/26/18 [TB] Time first assessed: 1326 [TB] Present On Admission : no [TB] Side: Other (See comments) [TB], MIDLINE  Orientation: anterior [TB] Location: abdomen [TB] Type: incision;surgical [TB] Recorded by:  [TB] Joshua Ordonez RN 07/26/18 1327    Row Name                Wound 07/31/18 1700 Left lateral;upper chest surgical;incision    Wound - Properties Group Date  first assessed: 07/31/18 [CH] Time first assessed: 1700 [CH] Present On Admission : no [CH] Side: Left [CH] Orientation: lateral;upper [CH] Location: chest [CH] Type: surgical;incision [CH] Recorded by:  [CH] Darlene Slaughter RN 07/31/18 1901    Row Name 08/05/18 1010             Plan of Care Review    Plan of Care Reviewed With patient  -BB      Recorded by [BB] Carmen Davenport COTA/L 08/05/18 1335      Row Name 08/05/18 1010             Outcome Summary/Treatment Plan (OT)    Daily Summary of Progress (OT) progress towards functional goals is fair  -BB      Plan for Continued Treatment (OT) continue POC  -BB      Anticipated Discharge Disposition (OT) home with assist;home with home health;home with 24/7 care;inpatient rehabilitation facility  -BB      Recorded by [BB] Carmen Davenport COTA/L 08/05/18 1335      Row Name 08/05/18 1530             Outcome Summary/Treatment Plan (PT)    Daily Summary of Progress (PT) progress towards functional goals is fair  -TW      Barriers to Overall Progress (PT) Pain  -TW      Plan for Continued Treatment (PT) Cont with LE strengthening and t/f's.  -TW      Anticipated Discharge Disposition (PT) home with assist;home with home health;home with 24/7 care;inpatient rehabilitation facility  -TW      Recorded by [TW] Lj Gross PTA 08/05/18 1614        User Key  (r) = Recorded By, (t) = Taken By, (c) = Cosigned By    Initials Name Effective Dates Discipline    TW Lj Gross PTA 03/07/18 -  PT    BB Carmen Davenport COTA/L 03/07/18 -  OT    TB Joshua Ordonez RN 10/17/16 -  Nurse    CH Darlene Slaughter RN 06/23/17 -  Nurse          Wound 07/26/18 1326 Other (See comments) anterior abdomen incision;surgical (Active)   Dressing Appearance open to air 8/5/2018  7:10 AM   Closure Staples 8/5/2018  7:10 AM   Drainage Amount none 8/5/2018  7:10 AM       Wound 07/31/18 1700 Left lateral;upper chest surgical;incision (Active)   Dressing  Appearance dry;intact 8/5/2018  7:10 AM   Closure ROSALINO 8/5/2018  7:10 AM               PT Rehab Goals     Row Name 08/05/18 4540             Bed Mobility Goal 1 (PT)    Activity/Assistive Device (Bed Mobility Goal 1, PT) sit to supine;supine to sit;bed rails  -TW      Benson Level/Cues Needed (Bed Mobility Goal 1, PT) supervision required  -TW      Time Frame (Bed Mobility Goal 1, PT) 1 week  -TW      Progress/Outcomes (Bed Mobility Goal 1, PT) goal not met  -TW         Transfer Goal 1 (PT)    Activity/Assistive Device (Transfer Goal 1, PT) sit-to-stand/stand-to-sit;bed-to-chair/chair-to-bed;walker, rolling  -TW      Benson Level/Cues Needed (Transfer Goal 1, PT) supervision required  -TW      Time Frame (Transfer Goal 1, PT) 1 week  -TW      Progress/Outcome (Transfer Goal 1, PT) goal not met  -TW         Gait Training Goal 1 (PT)    Activity/Assistive Device (Gait Training Goal 1, PT) gait (walking locomotion);assistive device use;increase endurance/gait distance;walker, rolling;decrease fall risk   or least restrictive to no device  -TW      Benson Level (Gait Training Goal 1, PT) supervision required  -TW      Distance (Gait Goal 1, PT) 200  -TW      Time Frame (Gait Training Goal 1, PT) 1 week  -TW      Progress/Outcome (Gait Training Goal 1, PT) goal not met  -TW         Stairs Goal 1 (PT)    Activity/Assistive Device (Stairs Goal 1, PT) ascending stairs;descending stairs;decrease fall risk;using handrail, right  -TW      Benson Level/Cues Needed (Stairs Goal 1, PT) contact guard assist  -TW      Number of Stairs (Stairs Goal 1, PT) 1  -TW      Time Frame (Stairs Goal 1, PT) 1 week  -TW      Progress/Outcome (Stairs Goal 1, PT) goal not met  -TW         Patient Education Goal (PT)    Activity (Patient Education Goal, PT) HEP  -TW      Benson/Cues/Accuracy (Memory Goal 2, PT) demonstrates adequately;verbalizes understanding  -TW      Time Frame (Patient Education Goal, PT) 1 week   -TW      Progress/Outcome (Patient Education Goal, PT) goal not met  -TW        User Key  (r) = Recorded By, (t) = Taken By, (c) = Cosigned By    Initials Name Provider Type Discipline     Lj Gross PTA Physical Therapy Assistant PT          Physical Therapy Education     Title: PT OT SLP Therapies (Active)     Topic: Physical Therapy (Active)     Point: Mobility training (Active)    Learning Progress Summary     Learner Status Readiness Method Response Comment Documented by    Patient Active Acceptance E,D NR   08/03/18 1127          Point: Precautions (Active)    Learning Progress Summary     Learner Status Readiness Method Response Comment Documented by    Patient Active Acceptance E,D NR   08/03/18 1127                      User Key     Initials Effective Dates Name Provider Type Discipline     04/06/17 -  Lupe Grossman, PT Physical Therapist PT                    PT Recommendation and Plan  Anticipated Discharge Disposition (PT): home with assist, home with home health, home with 24/7 care, inpatient rehabilitation facility  Outcome Summary/Treatment Plan (PT)  Daily Summary of Progress (PT): progress towards functional goals is fair  Barriers to Overall Progress (PT): Pain  Plan for Continued Treatment (PT): Cont with LE strengthening and t/f's.  Anticipated Discharge Disposition (PT): home with assist, home with home health, home with 24/7 care, inpatient rehabilitation facility  Plan of Care Reviewed With: patient  Progress: no change  Outcome Summary: Pt able to t/f to bed from chair with RW and MIN/CGA of 1. VSS throughout tx.          Outcome Measures     Row Name 08/05/18 1530 08/05/18 1010 08/03/18 0918       How much help from another person do you currently need...    Turning from your back to your side while in flat bed without using bedrails? 2  -TW  --  --    Moving from lying on back to sitting on the side of a flat bed without bedrails? 2  -TW  --  --    Moving to and from a  bed to a chair (including a wheelchair)? 2  -TW  --  --    Standing up from a chair using your arms (e.g., wheelchair, bedside chair)? 2  -TW  --  --    Climbing 3-5 steps with a railing? 1  -TW  --  --    To walk in hospital room? 2  -TW  --  --    AM-PAC 6 Clicks Score 11  -TW  --  --       How much help from another is currently needed...    Putting on and taking off regular lower body clothing?  -- 2  -BB 2  -AS    Bathing (including washing, rinsing, and drying)  -- 2  -BB 2  -AS    Toileting (which includes using toilet bed pan or urinal)  -- 2  -BB 2  -AS    Putting on and taking off regular upper body clothing  -- 2  -BB 2  -AS    Taking care of personal grooming (such as brushing teeth)  -- 3  -BB 3  -AS    Eating meals  -- 4  -BB 4  -AS    Score  -- 15  -BB 15  -AS       Functional Assessment    Outcome Measure Options AM-PAC 6 Clicks Basic Mobility (PT)  -TW  -- AM-PAC 6 Clicks Daily Activity (OT)  -AS    Row Name 08/03/18 0916             How much help from another person do you currently need...    Turning from your back to your side while in flat bed without using bedrails? 2  -CB      Moving from lying on back to sitting on the side of a flat bed without bedrails? 2  -CB      Moving to and from a bed to a chair (including a wheelchair)? 2  -CB      Standing up from a chair using your arms (e.g., wheelchair, bedside chair)? 2  -CB      Climbing 3-5 steps with a railing? 1  -CB      To walk in hospital room? 2  -CB      AM-PAC 6 Clicks Score 11  -CB         Functional Assessment    Outcome Measure Options AM-PAC 6 Clicks Basic Mobility (PT)  -CB        User Key  (r) = Recorded By, (t) = Taken By, (c) = Cosigned By    Initials Name Provider Type    Lupe Benjamin, PT Physical Therapist    TW Lj Gross, PTA Physical Therapy Assistant    BB Carmen Davenport, SHERIDAN/L Occupational Therapy Assistant    AS Erin Treviño, OT Occupational Therapist           Time Calculation:         PT  Charges     Row Name 08/05/18 1615             Time Calculation    Start Time 1530  -TW      Stop Time 1600  -TW      Time Calculation (min) 30 min  -TW      PT Received On 08/05/18  -TW      PT Goal Re-Cert Due Date 08/16/18  -TW         Time Calculation- PT    Total Timed Code Minutes- PT 30 minute(s)  -TW        User Key  (r) = Recorded By, (t) = Taken By, (c) = Cosigned By    Initials Name Provider Type    TW Lj Gross PTA Physical Therapy Assistant        Therapy Suggested Charges     Code   Minutes Charges    None           Therapy Charges for Today     Code Description Service Date Service Provider Modifiers Qty    [id number]  PT THERAPEUTIC ACT EA 15 MIN 8/5/2018 Lj Gross PTA GP 2          PT G-Codes  PT Professional Judgement Used?: Yes  Outcome Measure Options: AM-PAC 6 Clicks Basic Mobility (PT)  Score: 11  Functional Limitation: Mobility: Walking and moving around  Mobility: Walking and Moving Around Current Status (): At least 60 percent but less than 80 percent impaired, limited or restricted  Mobility: Walking and Moving Around Goal Status (): At least 40 percent but less than 60 percent impaired, limited or restricted    Lj Gross PTA  8/5/2018

## 2018-08-05 NOTE — PLAN OF CARE
Problem: Patient Care Overview  Goal: Plan of Care Review  Outcome: Ongoing (interventions implemented as appropriate)   08/05/18 0637   Coping/Psychosocial   Plan of Care Reviewed With patient   OTHER   Outcome Summary Pt perofrmed B UE exercises while sitting in recliner. No goals met at this time.   Plan of Care Review   Progress no change

## 2018-08-05 NOTE — PLAN OF CARE
Problem: Patient Care Overview  Goal: Plan of Care Review  Outcome: Ongoing (interventions implemented as appropriate)   08/05/18 1530   Coping/Psychosocial   Plan of Care Reviewed With patient   OTHER   Outcome Summary Pt able to t/f to bed from chair with RW and MIN/CGA of 1. VSS throughout tx.   Plan of Care Review   Progress no change

## 2018-08-05 NOTE — NURSING NOTE
Called to patients room to suction patient.  When I arrived Patient had already coughed and cleared secretions so suctioning wasn't needed.   Patient asked for the second time tonight about trach care.  I had discussed this earlier with his nurse and she said that he wasn't allowing her to do trach care as it should be done.  I asked him if he would like for me to remove his inner cannula and clean it and he agreed.  Inner cannula was removed, cleaned and replaced.

## 2018-08-05 NOTE — PLAN OF CARE
Problem: Patient Care Overview  Goal: Plan of Care Review  Outcome: Ongoing (interventions implemented as appropriate)   08/05/18 0500   Coping/Psychosocial   Plan of Care Reviewed With patient   OTHER   Outcome Summary Pt awake majority of the night. Pain controlled with meds. Refused tube feedings, but stated we could statrt this morning. VSS.   Plan of Care Review   Progress no change

## 2018-08-05 NOTE — THERAPY TREATMENT NOTE
Acute Care - Occupational Therapy Treatment Note  [facility]     Patient Name: Truman Esquivel  : 1960  MRN: [id number]  Today's Date: 2018  Onset of Illness/Injury or Date of Surgery: 18  Date of Referral to OT: 18  Referring Physician: Dr. Solano    Admit Date: 2018       ICD-10-CM ICD-9-CM   1. Syncope and collapse R55 780.2   2. Septic shock (CMS/HCC) A41.9 038.9    R65.21 785.52     995.92   3. Dissection of aorta, unspecified portion of aorta (CMS/HCC) I71.00 441.00   4. Pleural effusion J90 511.9   5. Other ascites R18.8 789.59   6. Splenic hemorrhage D73.89 289.59   7. Impaired physical mobility Z74.09 781.99   8. Impaired mobility and activities of daily living Z74.09 799.89     Patient Active Problem List   Diagnosis   • Ascending aortic dissection (CMS/HCC)   • Essential hypertension   • Deep vein thrombosis (DVT) of femoral vein of both lower extremities (CMS/HCC)   • Hypothyroidism   • Snoring   • Acute pain of right knee   • Knee effusion, right   • Knee pain   • Obstructive sleep apnea of adult   • TIA (transient ischemic attack)   • Dysphagia   • Squamous cell carcinoma of larynx (CMS/HCC)   • Pharyngeal dysphagia   • Anemia   • Abnormal positron emission tomography (PET) of colon   • Hypokalemia   • Encounter for venous access device care   • Dehydration   • Weight loss, abnormal   • Odynophagia   • Thrush, oral   • Thrombocytopenia (CMS/HCC)   • Pancytopenia due to antineoplastic chemotherapy (CMS/HCC)   • Unable to eat   • Syncope and collapse   • Splenic hemorrhage   • Pleural effusion     Past Medical History:   Diagnosis Date   • Aortic dissection (CMS/HCC)    • Chest pain    • Disease of thyroid gland    • HTN (hypertension)    • Knee pain    • Sleep apnea    • Smoker    • Squamous cell carcinoma of larynx (CMS/HCC) 2018   • Stroke (CMS/HCC)    • Swallowing difficulty      Past Surgical History:   Procedure Laterality Date   • AORTA SURGERY       ruptured aorta repair   • ASCENDING ARCH/HEMIARCH REPLACEMENT N/A 2/14/2017    Procedure: INTRAOPERATIVE TRANSESOPHAGEAL ECHOCARDIOGRAM, MIDLINE STERNOTOMY, ASCENDING AORTIC  AND PROXIMAL AORTIC ARCH REPAIR WITH 26MM GRAFT, AORTIC VALVE RESUSPENSION, AORTIC ROOT REPAIR, OPEN VEIN HARVEST OF RIGHT LEG;  Surgeon: German Arreguin MD;  Location: [facility] MAIN OR;  Service:    • BRONCHOSCOPY N/A 2/17/2017    Procedure: BRONCHOSCOPY BIOPSY AT BEDSIDE WITH BAL-LEFT LOWER LOBE;  Surgeon: Trent Chaney MD;  Location: [facility] ENDOSCOPY;  Service:    • COLONOSCOPY N/A 3/7/2018    Procedure: COLONOSCOPY WITH POSSIBLE POLYPECTOMY   ( DONE IN OR WITH ENDO)      COLONOSCOPY FIRST;  Surgeon: Kris Solano MD;  Location: [facility] OR;  Service:    • DIRECT LARYNGOSCOPY, ESOPHAGOSCOPY, BRONCHOSCOPY N/A 2/5/2018    Procedure: DIRECT LARYNGOSCOPY WITH BIOPSY, ESOPHAGOSCOPY     (no bronchoscopy, no laser);  Surgeon: Joseph Venegas MD;  Location: [facility] OR;  Service:    • ENDOSCOPY W/ PEG TUBE PLACEMENT N/A 5/2/2018    Procedure: ESOPHAGOGASTRODUODENOSCOPY WITH PERCUTANEOUS ENDOSCOPIC GASTROSTOMY TUBE INSERTION;  Surgeon: Angel Maciel MD;  Location: [facility] ENDOSCOPY;  Service: Gastroenterology   • SPLENECTOMY N/A 7/26/2018    Procedure: SPLENECTOMY, left chest tube placement, EXPLORATORY LAPAROTOMY, G-TUBE PALCEMENT;  Surgeon: Kris Solano MD;  Location: [facility] OR;  Service: General   • THORACOSCOPY Left 7/31/2018    Procedure: LEFT THORACOSCOPY VIDEO ASSISTED POSSIBLE THORACOTOMY possible decortication bronchoscopy;  Surgeon: Joshua Pollock MD;  Location: [facility] OR;  Service: Cardiothoracic   • TRACHEOSTOMY  02/05/2018   • TRACHEOSTOMY N/A 2/5/2018    Procedure: TRACHEOSTOMY  local injection @ 1106 incision @ 1119;  Surgeon: Joseph Venegas MD;  Location: [facility] OR;  Service:    • VENOUS ACCESS DEVICE (PORT) INSERTION N/A 3/7/2018    Procedure: INSERTION OF MEDIPORT     (C-ARM#1);  Surgeon:  Kris Solano MD;  Location: [facility];  Service:        Therapy Treatment          Rehabilitation Treatment Summary     Row Name 08/05/18 1010             Treatment Time/Intention    Discipline occupational therapy assistant  -BB      Document Type therapy note (daily note)  -BB      Subjective Information complains of;pain  -BB      Mode of Treatment individual therapy;occupational therapy  -BB      Patient/Family Observations no family present  -BB      Total Minutes, Occupational Therapy Treatment 40  -BB      Therapy Frequency (OT Eval) other (see comments)   5-7 days/wk  -BB      Patient Effort good  -BB      Existing Precautions/Restrictions --  -BB2      Recorded by [BB] Carmen Davenport COTA/L 08/05/18 1329  [BB2] Carmen Davenport SWARTZ/L 08/05/18 1335      Row Name 08/05/18 1010             Vital Signs    Pre Systolic BP Rehab 146  -BB      Pre Treatment Diastolic BP 66  -BB      Pretreatment Heart Rate (beats/min) 73  -BB      Pre SpO2 (%) 96  -BB      O2 Delivery Pre Treatment room air  -BB      Pre Patient Position Sitting  -BB      Recorded by [BB] Carmen Davenport COTA/L 08/05/18 1335      Row Name 08/05/18 1010             Cognitive Assessment/Intervention- PT/OT    Orientation Status (Cognition) oriented x 4  -BB      Follows Commands (Cognition) WFL  -BB      Cognitive Function (Cognitive) WFL  -BB      Personal Safety Interventions fall prevention program maintained;gait belt;muscle strengthening facilitated;nonskid shoes/slippers when out of bed  -BB      Recorded by [BB] Carmen Davenport COTA/L 08/05/18 1335      Row Name 08/05/18 1010             Therapeutic Exercise    Upper Extremity Range of Motion (Therapeutic Exercise) elbow flexion/extension, bilateral;wrist flexion/extension, bilateral;forearm supination/pronation, bilateral;shoulder internal/external rotation, bilateral  -BB      Hand (Therapeutic Exercise) finger flexion/extension, bilateral  -BB      Position  (Therapeutic Exercise) seated  -BB      Sets/Reps (Therapeutic Exercise) 2 sets x10 reps  -BB      Expected Outcome (Therapeutic Exercise) improve functional tolerance, self-care activity;improve performance, transfer skills  -BB      Recorded by [BB] Carmen Davenport COTA/L 08/05/18 1335      Row Name 08/05/18 1010             Positioning and Restraints    Pre-Treatment Position sitting in chair/recliner  -BB      Post Treatment Position chair  -BB      In Chair call light within reach;encouraged to call for assist;sitting  -BB      Recorded by [BB] Carmen Davenport COTA/L 08/05/18 1335      Row Name 08/05/18 1010             Pain Scale: Numbers Pre/Post-Treatment    Pain Scale: Numbers, Pretreatment 5/10  -BB      Pain Scale: Numbers, Post-Treatment 5/10  -BB      Pain Location - Orientation incisional  -BB      Pain Location abdomen  -BB      Recorded by [BB] Carmen Davenport COTA/L 08/05/18 1335      Row Name                Wound 07/26/18 1326 Other (See comments) anterior abdomen incision;surgical    Wound - Properties Group Date first assessed: 07/26/18 [TB] Time first assessed: 1326 [TB] Present On Admission : no [TB] Side: Other (See comments) [TB], MIDLINE  Orientation: anterior [TB] Location: abdomen [TB] Type: incision;surgical [TB] Recorded by:  [TB] Joshua Ordonez RN 07/26/18 1327    Row Name                Wound 07/31/18 1700 Left lateral;upper chest surgical;incision    Wound - Properties Group Date first assessed: 07/31/18 [CH] Time first assessed: 1700 [CH] Present On Admission : no [CH] Side: Left [CH] Orientation: lateral;upper [CH] Location: chest [CH] Type: surgical;incision [CH] Recorded by:  [CH] Darlene Slaughter RN 07/31/18 1901    Row Name 08/05/18 1010             Plan of Care Review    Plan of Care Reviewed With patient  -BB      Recorded by [BB] Carmen Davenport COTA/L 08/05/18 1335      Row Name 08/05/18 1010             Outcome Summary/Treatment Plan (OT)     Daily Summary of Progress (OT) progress towards functional goals is fair  -BB      Plan for Continued Treatment (OT) continue POC  -BB      Anticipated Discharge Disposition (OT) home with assist;home with home health;home with 24/7 care;inpatient rehabilitation facility  -BB      Recorded by [CHEY] Carmen Davenport COTA/L 08/05/18 1335        User Key  (r) = Recorded By, (t) = Taken By, (c) = Cosigned By    Initials Name Effective Dates Discipline    BB Carmen Davenport COTA/L 03/07/18 -  OT    TB Joshua Ordonez, RN 10/17/16 -  Nurse    CH Darlene Slaughter RN 06/23/17 -  Nurse        Wound 07/26/18 1326 Other (See comments) anterior abdomen incision;surgical (Active)   Dressing Appearance open to air 8/5/2018  7:10 AM   Closure [place] 8/5/2018  7:10 AM   Drainage Amount none 8/5/2018  7:10 AM       Wound 07/31/18 1700 Left lateral;upper chest surgical;incision (Active)   Dressing Appearance dry;intact 8/5/2018  7:10 AM   Closure [place] 8/5/2018  7:10 AM             OT Rehab Goals     Row Name 08/05/18 1010             Transfer Goal 1 (OT)    Activity/Assistive Device (Transfer Goal 1, OT) sit-to-stand/stand-to-sit;bed-to-chair/chair-to-bed;toilet  -BB      [place] Level/Cues Needed (Transfer Goal 1, OT) supervision required  -BB      Time Frame (Transfer Goal 1, OT) long term goal (LTG);by discharge  -BB      Progress/Outcome (Transfer Goal 1, OT) goal not met;continuing progress toward goal  -BB         Bathing Goal 1 (OT)    Activity/Assistive Device (Bathing Goal 1, OT) bathing skills, all   using AE PRN  -BB      [place] Level/Cues Needed (Bathing Goal 1, OT) set-up required;supervision required  -BB      Time Frame (Bathing Goal 1, OT) long term goal (LTG);by discharge  -BB      Progress/Outcomes (Bathing Goal 1, OT) goal not met;continuing progress toward goal  -BB         Dressing Goal 1 (OT)    Activity/Assistive Device (Dressing Goal 1, OT) dressing skills, all   using AE PRN  -BB       [place]/Cues Needed (Dressing Goal 1, OT) set-up required;supervision required  -BB      Time Frame (Dressing Goal 1, OT) long term goal (LTG);other (see comments)  -BB      Progress/Outcome (Dressing Goal 1, OT) goal not met  -BB         Toileting Goal 1 (OT)    Activity/Device (Toileting Goal 1, OT) toileting skills, all  -BB      [place] Level/Cues Needed (Toileting Goal 1, OT) conditional independence  -BB      Time Frame (Toileting Goal 1, OT) long term goal (LTG);by discharge  -BB      Progress/Outcome (Toileting Goal 1, OT) goal not met;continuing progress toward goal  -BB        User Key  (r) = Recorded By, (t) = Taken By, (c) = Cosigned By    Initials Name Provider Type Discipline    Carmen Desir COTA/L Occupational Therapy Assistant OT        Occupational Therapy Education     Title: PT OT SLP Therapies (Active)     Topic: Occupational Therapy (Active)     Point: Home exercise program (Active)     Description: Instruct learner(s) on appropriate technique for monitoring, assisting and/or progressing therapeutic exercises/activities.   Learning Progress Summary     Learner Status Readiness Method Response Comment Documented by    Patient Active Acceptance E NR  BB 08/05/18 1337          Point: Precautions (Done)     Description: Instruct learner(s) on prescribed precautions during self-care and functional transfers.   Learning Progress Summary     Learner Status Readiness Method Response Comment Documented by    Patient Done Acceptance E VU,NR role of OT; OT POC; transfer training; safety precautions AS 08/03/18 1348          Point: Body mechanics (Done)     Description: Instruct learner(s) on proper positioning and spine alignment during self-care, functional mobility activities and/or exercises.   Learning Progress Summary     Learner Status Readiness Method Response Comment Documented by    Patient Done Acceptance E VU,NR role of OT; OT POC; transfer training; safety precautions AS  08/03/18 1348                      User Key     Initials Effective Dates Name Provider Type Discipline    BB 03/07/18 -  Carmen Davenport COTA/L Occupational Therapy Assistant OT    AS 05/01/18 -  Erin Treviño, OT Occupational Therapist OT                OT Recommendation and Plan  Outcome Summary/Treatment Plan (OT)  Daily Summary of Progress (OT): progress towards functional goals is fair  Plan for Continued Treatment (OT): continue POC  Anticipated Discharge Disposition (OT): home with assist, home with home health, home with 24/7 care, inpatient rehabilitation facility  Therapy Frequency (OT Eval): other (see comments) (5-7 days/wk)  Daily Summary of Progress (OT): progress towards functional goals is fair  Plan of Care Review  Plan of Care Reviewed With: patient  Plan of Care Reviewed With: patient  Outcome Summary: Pt perofrmed B UE exercises while sitting in recliner. No goals met at this time.        Outcome Measures     Row Name 08/05/18 1010 08/03/18 0918 08/03/18 0916       How much help from another person do you currently need...    Turning from your back to your side while in flat bed without using bedrails?  --  -- 2  -CB    Moving from lying on back to sitting on the side of a flat bed without bedrails?  --  -- 2  -CB    Moving to and from a bed to a chair (including a wheelchair)?  --  -- 2  -CB    Standing up from a chair using your arms (e.g., wheelchair, bedside chair)?  --  -- 2  -CB    Climbing 3-5 steps with a railing?  --  -- 1  -CB    To walk in hospital room?  --  -- 2  -CB    AM-PAC 6 Clicks Score  --  -- 11  -CB       How much help from another is currently needed...    Putting on and taking off regular lower body clothing? 2  -BB 2  -AS  --    Bathing (including washing, rinsing, and drying) 2  -BB 2  -AS  --    Toileting (which includes using toilet bed pan or urinal) 2  -BB 2  -AS  --    Putting on and taking off regular upper body clothing 2  -BB 2  -AS  --    Taking care of  personal grooming (such as brushing teeth) 3  -BB 3  -AS  --    Eating meals 4  -BB 4  -AS  --    Score 15  -BB 15  -AS  --       Functional Assessment    Outcome Measure Options  -- AM-PAC 6 Clicks Daily Activity (OT)  -AS AM-PAC 6 Clicks Basic Mobility (PT)  -CB      User Key  (r) = Recorded By, (t) = Taken By, (c) = Cosigned By    Initials Name Provider Type    CB Lupe Grossman, PT Physical Therapist    BB Carmen Davenport COTA/L Occupational Therapy Assistant    AS Erin Treviño, OT Occupational Therapist           Time Calculation:         Time Calculation- OT     Row Name 08/05/18 1338             Time Calculation- OT    OT Start Time 1010  -BB      OT Stop Time 1050  -BB      OT Time Calculation (min) 40 min  -BB      Total Timed Code Minutes- OT 40 minute(s)  -BB      OT Received On 08/05/18  -        User Key  (r) = Recorded By, (t) = Taken By, (c) = Cosigned By    Initials Name Provider Type    Carmen Desir COTA/L Occupational Therapy Assistant           Therapy Suggested Charges     Code   Minutes Charges    None           Therapy Charges for Today     Code Description Service Date Service Provider Modifiers Qty    30388108737 HC OT SELF CARE/MGMT/TRAIN EA 15 MIN 8/5/2018 Carmen Davenport COTA/L GO 1    36541737774 HC OT THER PROC EA 15 MIN 8/5/2018 Carmen aDvenport COTA/L GO 2          OT G-codes  OT Professional Judgement Used?: Yes  OT Functional Scales Options: AM-PAC 6 Clicks Daily Activity (OT)  Score: 15  Functional Limitation: Self care  Self Care Current Status (): At least 40 percent but less than 60 percent impaired, limited or restricted  Self Care Goal Status (): At least 1 percent but less than 20 percent impaired, limited or restricted    MERLINE Wells  8/5/2018

## 2018-08-05 NOTE — PLAN OF CARE
Problem: Patient Care Overview  Goal: Plan of Care Review  Outcome: Ongoing (interventions implemented as appropriate)   08/05/18 0912   Coping/Psychosocial   Plan of Care Reviewed With patient   OTHER   Outcome Summary vss, requesting med for sleep and gas, tube feeding currently infusing.   Plan of Care Review   Progress no change

## 2018-08-05 NOTE — NURSING NOTE
Pt requested labs be drawn from IJ rather than being stuck as he is quite swollen. I educated him of infection risk of drawing from line vs a lab draw. Pt acknowledged understanding but maintained request for draw from IJ. Labs sent this AM.    Pt refused trach care this AM stating his cannula was cleaned by RT but stated he would allow the gauze to be changed this afternoon when other dressing changes are completed.

## 2018-08-06 LAB
ANION GAP SERPL CALCULATED.3IONS-SCNC: 4 MMOL/L (ref 5–15)
BUN BLD-MCNC: 11 MG/DL (ref 7–21)
BUN/CREAT SERPL: 18.3 (ref 7–25)
CALCIUM SPEC-SCNC: 7.5 MG/DL (ref 8.4–10.2)
CHLORIDE SERPL-SCNC: 102 MMOL/L (ref 95–110)
CO2 SERPL-SCNC: 27 MMOL/L (ref 22–31)
CREAT BLD-MCNC: 0.6 MG/DL (ref 0.7–1.3)
DEPRECATED RDW RBC AUTO: 52 FL (ref 35.1–43.9)
ERYTHROCYTE [DISTWIDTH] IN BLOOD BY AUTOMATED COUNT: 17.5 % (ref 11.5–14.5)
GFR SERPL CREATININE-BSD FRML MDRD: 139 ML/MIN/1.73 (ref 56–130)
GLUCOSE BLD-MCNC: 107 MG/DL (ref 60–100)
HCT VFR BLD AUTO: 26.1 % (ref 39–49)
HGB BLD-MCNC: 8.7 G/DL (ref 13.7–17.3)
MCH RBC QN AUTO: 27.8 PG (ref 26.5–34)
MCHC RBC AUTO-ENTMCNC: 33.3 G/DL (ref 31.5–36.3)
MCV RBC AUTO: 83.4 FL (ref 80–98)
PLATELET # BLD AUTO: 510 10*3/MM3 (ref 150–450)
PMV BLD AUTO: 9.5 FL (ref 8–12)
POTASSIUM BLD-SCNC: 3.4 MMOL/L (ref 3.5–5.1)
RBC # BLD AUTO: 3.13 10*6/MM3 (ref 4.37–5.74)
SODIUM BLD-SCNC: 133 MMOL/L (ref 137–145)
WBC NRBC COR # BLD: 21.85 10*3/MM3 (ref 3.2–9.8)

## 2018-08-06 PROCEDURE — 92610 EVALUATE SWALLOWING FUNCTION: CPT | Performed by: SPEECH-LANGUAGE PATHOLOGIST

## 2018-08-06 PROCEDURE — 97530 THERAPEUTIC ACTIVITIES: CPT

## 2018-08-06 PROCEDURE — G8996 SWALLOW CURRENT STATUS: HCPCS | Performed by: SPEECH-LANGUAGE PATHOLOGIST

## 2018-08-06 PROCEDURE — G8997 SWALLOW GOAL STATUS: HCPCS | Performed by: SPEECH-LANGUAGE PATHOLOGIST

## 2018-08-06 PROCEDURE — 97116 GAIT TRAINING THERAPY: CPT

## 2018-08-06 PROCEDURE — 85027 COMPLETE CBC AUTOMATED: CPT | Performed by: STUDENT IN AN ORGANIZED HEALTH CARE EDUCATION/TRAINING PROGRAM

## 2018-08-06 PROCEDURE — 25010000002 ONDANSETRON PER 1 MG: Performed by: INTERNAL MEDICINE

## 2018-08-06 PROCEDURE — 80048 BASIC METABOLIC PNL TOTAL CA: CPT | Performed by: STUDENT IN AN ORGANIZED HEALTH CARE EDUCATION/TRAINING PROGRAM

## 2018-08-06 PROCEDURE — 99024 POSTOP FOLLOW-UP VISIT: CPT | Performed by: SURGERY

## 2018-08-06 PROCEDURE — 25010000002 MORPHINE PER 10 MG: Performed by: NURSE PRACTITIONER

## 2018-08-06 RX ADMIN — PANTOPRAZOLE SODIUM 40 MG: 40 INJECTION, POWDER, FOR SOLUTION INTRAVENOUS at 06:20

## 2018-08-06 RX ADMIN — METOPROLOL SUCCINATE 25 MG: 25 TABLET, FILM COATED, EXTENDED RELEASE ORAL at 22:12

## 2018-08-06 RX ADMIN — MORPHINE SULFATE 2 MG: 2 INJECTION, SOLUTION INTRAMUSCULAR; INTRAVENOUS at 21:55

## 2018-08-06 RX ADMIN — MORPHINE SULFATE 2 MG: 2 INJECTION, SOLUTION INTRAMUSCULAR; INTRAVENOUS at 11:30

## 2018-08-06 RX ADMIN — SIMETHICONE 40 MG: 20 SUSPENSION/ DROPS ORAL at 13:42

## 2018-08-06 RX ADMIN — POTASSIUM CHLORIDE, DEXTROSE MONOHYDRATE AND SODIUM CHLORIDE 75 ML/HR: 150; 5; 450 INJECTION, SOLUTION INTRAVENOUS at 18:25

## 2018-08-06 RX ADMIN — ONDANSETRON HYDROCHLORIDE 4 MG: 2 INJECTION, SOLUTION INTRAMUSCULAR; INTRAVENOUS at 11:36

## 2018-08-06 RX ADMIN — MORPHINE SULFATE 2 MG: 2 INJECTION, SOLUTION INTRAMUSCULAR; INTRAVENOUS at 06:19

## 2018-08-06 RX ADMIN — LOSARTAN POTASSIUM: 50 TABLET, FILM COATED ORAL at 08:42

## 2018-08-06 RX ADMIN — LEVOTHYROXINE SODIUM 75 MCG: 0.07 TABLET ORAL at 06:20

## 2018-08-06 RX ADMIN — POTASSIUM CHLORIDE, DEXTROSE MONOHYDRATE AND SODIUM CHLORIDE 75 ML/HR: 150; 5; 450 INJECTION, SOLUTION INTRAVENOUS at 05:43

## 2018-08-06 RX ADMIN — CHLORHEXIDINE GLUCONATE 0.12% ORAL RINSE 15 ML: 1.2 LIQUID ORAL at 22:12

## 2018-08-06 NOTE — THERAPY EVALUATION
Acute Care - Speech Language Pathology   Swallow Initial Evaluation [facility]     Patient Name: Truman Esquivel  : 1960  MRN: [id number]  Today's Date: 2018  Onset of Illness/Injury or Date of Surgery: 18     Referring Physician: Dr. Solano      Admit Date: 2018    Swallow evaluation completed. SLP initiated full liquid diet and will attempt soft solids in am. Pt can properly finger occlude trach for adequate communication but chooses to write. Pt has a [employer] Speaking Valve at home but does not wear all the time.       Visit Dx:     ICD-10-CM ICD-9-CM   1. Syncope and collapse R55 780.2   2. Septic shock (CMS/HCC) A41.9 038.9    R65.21 785.52     995.92   3. Dissection of aorta, unspecified portion of aorta (CMS/HCC) I71.00 441.00   4. Pleural effusion J90 511.9   5. Other ascites R18.8 789.59   6. Splenic hemorrhage D73.89 289.59   7. Impaired physical mobility Z74.09 781.99   8. Impaired mobility and activities of daily living Z74.09 799.89     Patient Active Problem List   Diagnosis   • Ascending aortic dissection (CMS/HCC)   • Essential hypertension   • Deep vein thrombosis (DVT) of femoral vein of both lower extremities (CMS/HCC)   • Hypothyroidism   • Snoring   • Acute pain of right knee   • Knee effusion, right   • Knee pain   • Obstructive sleep apnea of adult   • TIA (transient ischemic attack)   • Dysphagia   • Squamous cell carcinoma of larynx (CMS/HCC)   • Pharyngeal dysphagia   • Anemia   • Abnormal positron emission tomography (PET) of colon   • Hypokalemia   • Encounter for venous access device care   • Dehydration   • Weight loss, abnormal   • Odynophagia   • Thrush, oral   • Thrombocytopenia (CMS/HCC)   • Pancytopenia due to antineoplastic chemotherapy (CMS/HCC)   • Unable to eat   • Syncope and collapse   • Splenic hemorrhage   • Pleural effusion     Past Medical History:   Diagnosis Date   • Aortic dissection (CMS/HCC)    • Chest pain    • Disease of  thyroid gland    • HTN (hypertension)    • Knee pain    • Sleep apnea    • Smoker    • Squamous cell carcinoma of larynx (CMS/HCC) 2/5/2018   • Stroke (CMS/HCC)    • Swallowing difficulty      Past Surgical History:   Procedure Laterality Date   • AORTA SURGERY      ruptured aorta repair   • ASCENDING ARCH/HEMIARCH REPLACEMENT N/A 2/14/2017    Procedure: INTRAOPERATIVE TRANSESOPHAGEAL ECHOCARDIOGRAM, MIDLINE STERNOTOMY, ASCENDING AORTIC  AND PROXIMAL AORTIC ARCH REPAIR WITH 26MM GRAFT, AORTIC VALVE RESUSPENSION, AORTIC ROOT REPAIR, OPEN VEIN HARVEST OF RIGHT LEG;  Surgeon: German Arreguin MD;  Location: [facility] MAIN OR;  Service:    • BRONCHOSCOPY N/A 2/17/2017    Procedure: BRONCHOSCOPY BIOPSY AT BEDSIDE WITH BAL-LEFT LOWER LOBE;  Surgeon: Trent Chaney MD;  Location: [facility] ENDOSCOPY;  Service:    • COLONOSCOPY N/A 3/7/2018    Procedure: COLONOSCOPY WITH POSSIBLE POLYPECTOMY   ( DONE IN OR WITH ENDO)      COLONOSCOPY FIRST;  Surgeon: Kris Solano MD;  Location: [facility] OR;  Service:    • DIRECT LARYNGOSCOPY, ESOPHAGOSCOPY, BRONCHOSCOPY N/A 2/5/2018    Procedure: DIRECT LARYNGOSCOPY WITH BIOPSY, ESOPHAGOSCOPY     (no bronchoscopy, no laser);  Surgeon: Joseph Venegas MD;  Location: [facility] OR;  Service:    • ENDOSCOPY W/ PEG TUBE PLACEMENT N/A 5/2/2018    Procedure: ESOPHAGOGASTRODUODENOSCOPY WITH PERCUTANEOUS ENDOSCOPIC GASTROSTOMY TUBE INSERTION;  Surgeon: Angel Maciel MD;  Location: [facility] ENDOSCOPY;  Service: Gastroenterology   • SPLENECTOMY N/A 7/26/2018    Procedure: SPLENECTOMY, left chest tube placement, EXPLORATORY LAPAROTOMY, G-TUBE PALCEMENT;  Surgeon: Kris Solano MD;  Location: [facility] OR;  Service: General   • THORACOSCOPY Left 7/31/2018    Procedure: LEFT THORACOSCOPY VIDEO ASSISTED POSSIBLE THORACOTOMY possible decortication bronchoscopy;  Surgeon: Joshua Pollock MD;  Location: [facility] OR;  Service: Cardiothoracic   • TRACHEOSTOMY  02/05/2018   • TRACHEOSTOMY  N/A 2/5/2018    Procedure: TRACHEOSTOMY  local injection @ 1106 incision @ 1119;  Surgeon: Joseph Venegas MD;  Location: [facility];  Service:    • VENOUS ACCESS DEVICE (PORT) INSERTION N/A 3/7/2018    Procedure: INSERTION OF MEDIPORT     (C-ARM#1);  Surgeon: Kris Solano MD;  Location: [facility];  Service:           SWALLOW EVALUATION (last 72 hours)      SLP Adult Swallow Evaluation     Row Name 08/06/18 1320                   Rehab Evaluation    Document Type evaluation  -EK        Subjective Information no complaints  -EK        Patient Effort good  -EK           General Information    Patient Profile Reviewed yes  -EK        Current Method of Nutrition NPO  -EK        Precautions/Limitations, Vision WFL with corrective lenses  -EK        Precautions/Limitations, Hearing WFL  -EK        Prior Level of Function-Communication WFL  -EK        Prior Level of Function-Swallowing --   Full liquid diet  -EK        Plans/Goals Discussed with patient  -EK           Pain Assessment    Additional Documentation Pain Scale: Numbers Pre/Post-Treatment (Group)  -EK           Pain Scale: Numbers Pre/Post-Treatment    Pain Scale: Numbers, Pretreatment 0/10 - no pain  -EK        Pain Scale: Numbers, Post-Treatment 0/10 - no pain  -EK           Oral Motor and Function    Dentition Assessment edentulous, does not have dentures  -EK        Secretion Management WNL/WFL  -EK        Mucosal Quality moist, healthy  -EK        Volitional Swallow WFL  -EK           General Eating/Swallowing Observations    Respiratory Support Currently in Use tracheostomy  -EK        Eating/Swallowing Skills self-fed  -EK        Positioning During Eating upright in chair  -EK        Utensils Used cup;spoon  -EK        Consistencies Trialed --   full liquid diet  -EK           Clinical Swallow Eval    Oral Prep Phase WFL  -EK        Oral Transit WFL  -EK        Oral Residue WFL  -EK        Pharyngeal Phase no overt signs/symptoms of  pharyngeal impairment  -EK        Clinical Swallow Evaluation Summary Swallow evaluation, no s/s of aspiration. SLP spoke with pt regarding initiating full liquid diet and will trial soft solids in a.m.   -EK           Clinical Impression    SLP Swallowing Diagnosis functional oral phase;functional pharyngeal phase;mild  -EK        Rehab Potential/Prognosis, Swallowing good, to achieve stated therapy goals  -EK        Criteria for Skilled Therapeutic Interventions Met demonstrates skilled criteria  -EK           Recommendations    Therapy Frequency (Swallow) other (see comments)   3-5 days per weeks  -EK        Predicted Duration Therapy Intervention (Days) until discharge  -EK        SLP Diet Recommendation NPO  -EK        Recommended Precautions and Strategies upright posture during/after eating;small bites of food and sips of liquid;alternate between small bites of food and sips of liquid  -EK           Swallow Goals (SLP)    Oral Nutrition/Hydration Goal Selection (SLP) oral nutrition/hydration, SLP goal 1;oral nutrition/hydration, SLP goal 2  -EK           Oral Nutrition/Hydration Goal 1 (SLP)    Oral Nutrition/Hydration Goal 1, SLP Pt to tolerate least restrictive diet with no s/s of aspiration for adequate nutrition and hydration.   -EK        Time Frame (Oral Nutrition/Hydration Goal 1, SLP) by discharge  -EK        Progress/Outcomes (Oral Nutrition/Hydration Goal 1, SLP) goal not met  -EK           Oral Nutrition/Hydration Goal 2 (SLP)    Oral Nutrition/Hydration Goal 2, SLP Pt to tolerate soft solid trials with no s/s of aspiration.   -EK        Time Frame (Oral Nutrition/Hydration Goal 2, SLP) by discharge  -EK        Progress/Outcomes (Oral Nutrition/Hydration Goal 2, SLP) goal not met  -EK          User Key  (r) = Recorded By, (t) = Taken By, (c) = Cosigned By    Initials Name Effective Dates    Agnes San CCC-SLP 06/08/18 -         EDUCATION  The patient has been educated in the  following areas:   Dysphagia (Swallowing Impairment).    SLP Recommendation and Plan  SLP Swallowing Diagnosis: functional oral phase, functional pharyngeal phase, mild  SLP Diet Recommendation: NPO  Recommended Precautions and Strategies: upright posture during/after eating, small bites of food and sips of liquid, alternate between small bites of food and sips of liquid           Criteria for Skilled Therapeutic Interventions Met: demonstrates skilled criteria     Rehab Potential/Prognosis, Swallowing: good, to achieve stated therapy goals  Therapy Frequency (Swallow): other (see comments) (3-5 days per weeks)  Predicted Duration Therapy Intervention (Days): until discharge       Plan of Care Reviewed With: patient  Plan of Care Review  Plan of Care Reviewed With: patient  Progress: improving  Outcome Summary:   Swallow evaluation, no s/s of aspiration. SLP spoke with pt regarding initiating full liquid diet and will trial soft solids in a.m.           SLP GOALS     Row Name 08/06/18 1320             Oral Nutrition/Hydration Goal 1 (SLP)    Oral Nutrition/Hydration Goal 1, SLP Pt to tolerate least restrictive diet with no s/s of aspiration for adequate nutrition and hydration.   -EK      Time Frame (Oral Nutrition/Hydration Goal 1, SLP) by discharge  -EK      Progress/Outcomes (Oral Nutrition/Hydration Goal 1, SLP) goal not met  -EK         Oral Nutrition/Hydration Goal 2 (SLP)    Oral Nutrition/Hydration Goal 2, SLP Pt to tolerate soft solid trials with no s/s of aspiration.   -EK      Time Frame (Oral Nutrition/Hydration Goal 2, SLP) by discharge  -EK      Progress/Outcomes (Oral Nutrition/Hydration Goal 2, SLP) goal not met  -EK        User Key  (r) = Recorded By, (t) = Taken By, (c) = Cosigned By    Initials Name Provider Type    Agnes San CCC-SLP Speech and Language Pathologist               Time Calculation:         Time Calculation- SLP     Row Name 08/06/18 1439             Time  Calculation- SLP    SLP Start Time 1320  -EK      SLP Stop Time 1339  -EK      SLP Time Calculation (min) 19 min  -EK      SLP Received On 08/06/18  -EK      SLP Goal Re-Cert Due Date 08/19/18  -EK        User Key  (r) = Recorded By, (t) = Taken By, (c) = Cosigned By    Initials Name Provider Type    Agnes San CCC-SLP Speech and Language Pathologist          Therapy Charges for Today     Code Description Service Date Service Provider Modifiers Qty    07141788145 HC ST SWALLOWING CURRENT STATUS 8/6/2018 Agnes Yañez CCC-SLP GN, CI 1    24126581188 HC ST SWALLOWING PROJECTED 8/6/2018 Agnes Yañez CCC-SLP GN, CH 1      Evaluation charged added.     SLP G-Codes  Functional Limitations: Swallowing  Swallow Current Status (): At least 1 percent but less than 20 percent impaired, limited or restricted  Swallow Goal Status (): 0 percent impaired, limited or restricted    NABIL Prasad  8/6/2018

## 2018-08-06 NOTE — CONSULTS
Adult Nutrition  Assessment    Patient Name:  Truman Esquivel  YOB: 1960  MRN: [id number]  Admit Date:  7/26/2018    Assessment Date:  8/6/2018    Comments:      Pt using his speaking valve to talk with me.  He is currenlty taking no EN due to feeling bloating and having gas. He was only at 20cc/hr but asked that it be discontinued due to GI symptoms.  He has not got back to his goal rate since his surgery. SLP evaluated pt and full liquid diet started. Will send supplements in an effort to optimize po .  They will continue to follow and hopefully advance diet tomorrow. Will continue to monitor               Reason for Assessment     Row Name 08/06/18 1551          Reason for Assessment    Reason For Assessment follow-up protocol               Nutrition/Diet History     Row Name 08/06/18 1551          Nutrition/Diet History    Typical Food/Fluid Intake Pt talking by covering his speaking valve.  He cannot eggs but can eat eggs cooked in foods               Labs/Tests/Procedures/Meds     Row Name 08/06/18 1605          Labs/Procedures/Meds    Lab Results Reviewed reviewed, pertinent        Diagnostic Tests/Procedures    Diagnostic Test/Procedure Reviewed reviewed, pertinent     Diagnostic Test/Procedures Comments SLP eval        Medications    Pertinent Medications Reviewed reviewed, pertinent             Physical Findings     Row Name 08/06/18 1606          Physical Findings    Overall Physical Appearance on oxygen therapy;other (see comments)   Trach     Tubes gastrostomy tube               Nutrition Prescription Ordered     Row Name 08/06/18 1606          Nutrition Prescription PO    Current PO Diet Full Liquid   Just started     Fluid Consistency Thin             Evaluation of Received Nutrient/Fluid Intake     Row Name 08/06/18 1606          EN Evaluation    EN Average Volume Delivered (mL/day) 0 mL/day     TF Changes Held     TF Residual Pt rerquested that tube feeding be held and then  discontinued             Electronically signed by:  Leeann Salazar, DAREN  08/06/18 4:15 PM

## 2018-08-06 NOTE — SIGNIFICANT NOTE
08/06/18 1132   Rehab Time/Intention   Evaluation Not Performed patient/family declined evaluation  (Pt asked SLP to wait that he was feeling bloated and did not want any po trials. Will return this pm. )   Rehab Treatment   Discipline speech language pathologist

## 2018-08-06 NOTE — PLAN OF CARE
Problem: Patient Care Overview  Goal: Plan of Care Review  Outcome: Ongoing (interventions implemented as appropriate)   08/06/18 0484   Coping/Psychosocial   Plan of Care Reviewed With patient   OTHER   Outcome Summary vss, SLP evaled pt and put him on a full liquid diet, pain controlled, d/c michael drain, up in chair and ambulated in forte   Plan of Care Review   Progress no change       Problem: Fall Risk (Adult)  Goal: Identify Related Risk Factors and Signs and Symptoms  Outcome: Outcome(s) achieved Date Met: 08/06/18    Goal: Absence of Fall  Outcome: Ongoing (interventions implemented as appropriate)      Problem: Skin Injury Risk (Adult)  Goal: Identify Related Risk Factors and Signs and Symptoms  Outcome: Outcome(s) achieved Date Met: 08/06/18    Goal: Skin Health and Integrity  Outcome: Ongoing (interventions implemented as appropriate)      Problem: Restraint, Nonbehavioral (Nonviolent)  Goal: Rationale and Justification  Outcome: Outcome(s) achieved Date Met: 08/06/18    Goal: Nonbehavioral (Nonviolent) Restraint: Absence of Injury/Harm  Outcome: Outcome(s) achieved Date Met: 08/06/18    Goal: Nonbehavioral (Nonviolent) Restraint: Achievement of Discontinuation Criteria  Outcome: Outcome(s) achieved Date Met: 08/06/18    Goal: Nonbehavioral (Nonviolent) Restraint: Preservation of Dignity and Wellbeing  Outcome: Outcome(s) achieved Date Met: 08/06/18      Problem: Ventilation, Mechanical Invasive (Adult)  Goal: Signs and Symptoms of Listed Potential Problems Will be Absent, Minimized or Managed (Ventilation, Mechanical Invasive)  Outcome: Outcome(s) achieved Date Met: 08/06/18      Problem: Lung Surgery (via Thoracotomy) (Adult)  Goal: Signs and Symptoms of Listed Potential Problems Will be Absent, Minimized or Managed (Lung Surgery)  Outcome: Outcome(s) achieved Date Met: 08/06/18    Goal: Anesthesia/Sedation Recovery  Outcome: Outcome(s) achieved Date Met: 08/06/18      Problem: Pain, Acute (Adult)  Goal:  Identify Related Risk Factors and Signs and Symptoms  Outcome: Ongoing (interventions implemented as appropriate)    Goal: Acceptable Pain Control/Comfort Level  Outcome: Outcome(s) achieved Date Met: 08/06/18      Problem: Nutrition, Enteral (Adult)  Goal: Signs and Symptoms of Listed Potential Problems Will be Absent, Minimized or Managed (Nutrition, Enteral)  Outcome: Ongoing (interventions implemented as appropriate)

## 2018-08-06 NOTE — PAYOR COMM NOTE
"Truman Esquivel (57 y.o. Male)     Date of Birth Social Security Number Address Home Phone MRN    [id number]  [address] [facility] [address] [phone] [id number]    Worship Marital Status          Mormonism        Admission Date Admission Type Admitting Provider Attending Provider Department, Room/Bed    7/26/18 Emergency Kris Solano MD Armstrong, James Edward, MD [facility] 3 EAST, 364/1    Discharge Date Discharge Disposition Discharge Destination                       Attending Provider:  Kris Solano MD    Allergies:  Chlorhexidine, Co Q 10 [Coenzyme Q10], Eggs Or Egg-derived Products, Erythromycin, Gabapentin, Other, Penicillins, Tetracyclines & Related, Acth [Corticotropin], Cephalosporins, Keflex [Cephalexin], Neomycin    Isolation:  None   Infection:  None   Code Status:  CPR    Ht:  170.2 cm (67.01\")   Wt:  104 kg (229 lb 4.5 oz)    Admission Cmt:  None   Principal Problem:  Splenic hemorrhage [D73.89] More...                 Active Insurance as of 7/26/2018     Primary Coverage     Payor Plan Insurance Group Employer/Plan Group    PASSPORT PASSPORT MEDICAID     Payor Plan Address Payor Plan Phone Number Effective From Effective To    [address] [phone] 11/1/1997     [facility] [id number]       Subscriber Name Subscriber Birth Date Member ID       TRUMAN ESQUIVEL 1960 [id number]                 Emergency Contacts      (Rel.) Home Phone Work Phone Mobile Phone    Yoladna Esquivel (Mother) [phone] -- [phone]    Blanca Esquivel (Daughter) [phone] -- [phone]    Yolanda Diaz (Sister) [phone] -- --         Corazon Loomis RN [facility]  [phone] phone  [phone] fax    Ref#[id number]       History & Physical      Dianne Hickey MD at 7/26/2018  3:23 PM                [facility] Medicine Admission      Date of Admission: " 7/26/2018      Primary Care Physician: Marybeth Harrison DO      Chief Complaint: abdominal pain     HPI:This is a 57 y old male with hx of stage 3 squamous cell laryngeal cancer  Who had his last chemotherapy in May 2018 who started having some abdominal pain a day ago . Today he passed out at home he was brought to the ER where he was found to be hypotensive and act of the chest and abdomen showed a large sucapsular splenic hematoma with large amount of free abdominal fluid that is probably hemorrhagic ascites ,he also had a large left pleural effusion .  He was  Also found to be septic with an elevated lactic acidosis . He was taking plavix at home . He denies any fall at home   He denies fever but had some chills last night.  No chest pain no nausea or vomiting    He was started on a pressor in the Er ,PRBC were ordered . Was started on iv antibiotics   gereral surgery was called for  Possible splenectomy     Past Medical History:  has a past medical history of Aortic dissection (CMS/HCC); Chest pain; Disease of thyroid gland; HTN (hypertension); Knee pain; Sleep apnea; Smoker; Squamous cell carcinoma of larynx (CMS/HCC) (2/5/2018); Stroke (CMS/HCC); and Swallowing difficulty.    Past Surgical History:  has a past surgical history that includes Ascending Arch/Hemiarch Replacement (N/A, 2/14/2017); Bronchoscopy (N/A, 2/17/2017); Aorta surgery; Tracheostomy (02/05/2018); Tracheostomy tube placement (N/A, 2/5/2018); direct laryngoscopy, esophagoscopy, bronchoscopy (N/A, 2/5/2018); Venous Access Device (Port) (N/A, 3/7/2018); Colonoscopy (N/A, 3/7/2018); and Esophagogastroduodenoscopy w/ PEG (N/A, 5/2/2018).    Family History: family history includes Hypertension in his father, mother, and other; Thyroid disease in his mother.    Social History:  reports that he quit smoking about 17 months ago. He has a 47.50 pack-year smoking history. He has never used smokeless tobacco. He reports that he does not drink alcohol or  use drugs.    Allergies:   Allergies   Allergen Reactions   • Chlorhexidine Itching     Topical cleaning wipes causes severe skin irritation   • Co Q 10 [Coenzyme Q10] Itching   • Eggs Or Egg-Derived Products Swelling   • Erythromycin Other (See Comments)     Joint pains and cold sweats   • Gabapentin Itching   • Other GI Intolerance     Mycins  farzad lotion causes rash   • Penicillins      Tolerated cefepime during 2/2017 admission. Had rash and itching (relieved by benadryl) after few doses of cefepime and cefepime was continued.   • Tetracyclines & Related GI Intolerance   • Acth [Corticotropin] Rash   • Cephalosporins Itching and Rash     Pt developed rash, itching after cefepime administration (2-3 doses) during 2/2017 admission. Itching was relieved with benadryl. Cefepime was continued because his infection was improving and no symptoms of anaphylaxis present   • Keflex [Cephalexin] Rash   • Neomycin Rash       Medications:   Prescriptions Prior to Admission   Medication Sig Dispense Refill Last Dose   • Cholecalciferol 37619 units tablet 50 thousand units po q  month 3 tablet 3    • clopidogrel (PLAVIX) 75 MG tablet Take 75 mg by mouth Daily. Last dose approx greater than one month ago   Taking   • docusate sodium (COLACE) 100 MG capsule Take 1 capsule by mouth 2 (Two) Times a Day As Needed for Constipation. 60 capsule 2 Taking   • levothyroxine (SYNTHROID) 75 MCG tablet Take 1 tab daily Monday to Saturday and 1.5 on Sunday 32 tablet 11    • losartan-hydrochlorothiazide (HYZAAR) 50-12.5 MG per tablet Take 1 tablet by mouth Daily. 30 tablet 12 Taking   • metoprolol succinate XL (TOPROL-XL) 25 MG 24 hr tablet Take 1 tablet by mouth Every Night. Patient states he hasn't been taking this week, states he thinks he is hypotensive 30 tablet 12 Taking   • Multiple Vitamins-Minerals (MULTIVITAMIN ADULT PO) Take 1 tablet by mouth Daily.   Taking   • mupirocin (BACTROBAN) 2 % ointment Apply  topically 2 (Two) Times a  Day. 30 g 2 Taking   • ondansetron (ZOFRAN) 4 MG tablet Take 1 tablet by mouth 3 (Three) Times a Day As Needed for Nausea or Vomiting. 40 tablet 3 Taking   • sodium chloride (NS) 0.9 % irrigation USE FOR TRACHEOSTOMY AS DIRECTED 1000 mL 8 Taking   • triamcinolone (KENALOG) 0.1 % cream Apply  topically 2 (Two) Times a Day. 45 g 2 Taking   • vitamin B-12 (CYANOCOBALAMIN) 100 MCG tablet Take 100 mcg by mouth Daily.   Taking   • vitamin E 100 UNIT capsule Take 400 Units by mouth Every Night.   Taking   • Zinc 50 MG capsule Take 50 mg by mouth Every Night.   Taking       Review of Systems:  Review of Systems   Constitutional: Positive for chills.   Gastrointestinal: Positive for abdominal pain.   Skin: Positive for pallor.   All other systems reviewed and are negative.     Otherwise complete ROS is negative except as mentioned above.    Physical Exam:   Temp:  [92.4 °F (33.6 °C)-95.4 °F (35.2 °C)] 95.4 °F (35.2 °C)  Heart Rate:  [65-82] 66  Resp:  [16-22] 20  BP: ()/(34-59) 124/56  FiO2 (%):  [50 %-60 %] 50 %  Physical Exam   Constitutional: He is oriented to person, place, and time. He appears well-developed and well-nourished.   HENT:   Head: Normocephalic and atraumatic.   Mouth/Throat: Mucous membranes are dry.   Eyes: Pupils are equal, round, and reactive to light. EOM are normal.   Neck: Normal range of motion. Neck supple.   Cardiovascular: Normal rate and regular rhythm.    Pulmonary/Chest: Effort normal and breath sounds normal.   Abdominal: Soft. There is tenderness.   Neurological: He is alert and oriented to person, place, and time.   Skin: There is pallor.   Psychiatric: He has a normal mood and affect. His behavior is normal. Judgment and thought content normal.         Results Reviewed:  I have personally reviewed current lab, radiology, and data and agree with results.  Lab Results (last 24 hours)     Procedure Component Value Units Date/Time    Extra Tubes [[id number]] Collected:  07/26/18 7752     Specimen:  Blood from Blood, Venous Line Updated:  07/26/18 1500    Narrative:       The following orders were created for panel order Extra Tubes.  Procedure                               Abnormality         Status                     ---------                               -----------         ------                     Light Blue Top[[id number]]                                   Final result                 Please view results for these tests on the individual orders.    Light Blue Top [[id number]] Collected:  07/26/18 1358    Specimen:  Blood Updated:  07/26/18 1500     Extra Tube hold for add-on     Comment: Auto resulted       Blood Gas, Arterial [[id number]]  (Abnormal) Collected:  07/26/18 1425    Specimen:  Arterial Blood Updated:  07/26/18 1439     Site Arterial Line     Ravi's Test N/A     pH, Arterial 7.357 pH units      pCO2, Arterial 40.8 mm Hg      pO2, Arterial 117.0 (H) mm Hg      HCO3, Arterial 22.8 mmol/L      Base Excess, Arterial -2.5 (L) mmol/L      O2 Saturation, Arterial 99.1 (H) %      Barometric Pressure for Blood Gas 747 mmHg      Modality N/A     FIO2 60 %      Ventilator Mode AC     Set Tidal Volume 600     Set Mech Resp Rate 10.0     PEEP 5.0     Collected by kya    Troponin [[id number]]  (Normal) Collected:  07/26/18 1358    Specimen:  Blood Updated:  07/26/18 1438     Troponin I 0.021 ng/mL     Lactic Acid, Plasma [[id number]]  (Abnormal) Collected:  07/26/18 1358    Specimen:  Blood Updated:  07/26/18 1438     Lactate 5.2 (C) mmol/L     Comprehensive Metabolic Panel [[id number]]  (Abnormal) Collected:  07/26/18 1358    Specimen:  Blood Updated:  07/26/18 1426     Glucose 133 (H) mg/dL      BUN 27 (H) mg/dL      Creatinine 1.30 mg/dL      Sodium 128 (L) mmol/L      Potassium 5.0 mmol/L      Chloride 95 mmol/L      CO2 22.0 mmol/L      Calcium 7.1 (L) mg/dL      Total Protein 4.0 (L) g/dL      Albumin 2.00 (L) g/dL      ALT (SGPT) 42 U/L      AST (SGOT) 28 U/L      Alkaline Phosphatase 54 U/L       Total Bilirubin 1.2 mg/dL      eGFR Non African Amer 57 mL/min/1.73      Globulin 2.0 (L) gm/dL      A/G Ratio 1.0 (L) g/dL      BUN/Creatinine Ratio 20.8     Anion Gap 11.0 mmol/L     Magnesium [[id number]]  (Normal) Collected:  07/26/18 1358    Specimen:  Blood Updated:  07/26/18 1425     Magnesium 1.9 mg/dL     Phosphorus [[id number]]  (Abnormal) Collected:  07/26/18 1358    Specimen:  Blood Updated:  07/26/18 1425     Phosphorus 4.8 (H) mg/dL     CBC (No Diff) [[id number]]  (Abnormal) Collected:  07/26/18 1358    Specimen:  Blood Updated:  07/26/18 1419     WBC 30.81 (H) 10*3/mm3      RBC 2.73 (L) 10*6/mm3      Hemoglobin 7.8 (L) g/dL      Comment: RN expected results. Pt has been to surgery and will receive plts when available.         Hematocrit 22.1 (L) %      MCV 81.0 fL      MCH 28.6 pg      MCHC 35.3 g/dL      RDW 15.6 (H) %      RDW-SD 46.1 (H) fl      MPV 10.1 fL      Platelets 217 10*3/mm3     Tissue Pathology Exam [[id number]] Collected:  07/26/18 1243    Specimen:  Tissue from Spleen Updated:  07/26/18 1334    Blood Gas, Arterial With Co-Ox [[id number]]  (Abnormal) Collected:  07/26/18 1211    Specimen:  Arterial Blood Updated:  07/26/18 1215     Site Arterial Line     Ravi's Test N/A     pH, Arterial 7.203 (L) pH units      pCO2, Arterial 39.8 mm Hg      pO2, Arterial 349.0 (H) mm Hg      HCO3, Arterial 15.7 (L) mmol/L      Base Excess, Arterial -11.5 (L) mmol/L      O2 Saturation, Arterial >100.0 (H) %      Hemoglobin, Blood Gas 8.3 (L) g/dL      Hematocrit, Blood Gas 25.4 (L) %      Oxyhemoglobin >98.5 %      Methemoglobin 0.40 %      Carboxyhemoglobin 1.1 %      Sodium, Arterial 122 (L) mmol/L      Potassium, Arterial 5.1 (H) mmol/L      Ionized Calcium >7.31 (H) mg/dL      Glucose, Arterial 156 (H) mmol/L      Barometric Pressure for Blood Gas 748 mmHg      Modality N/A     Ventilator Mode NA     Collected by or     pH, Temp Corrected -- pH Units      pCO2, Temperature Corrected -- mm Hg       pO2, Temperature Corrected -- mm Hg     Lactic Acid, Reflex [[id number]]  (Abnormal) Collected:  07/26/18 1032    Specimen:  Blood Updated:  07/26/18 1053     Lactate 10.1 (C) mmol/L     Blood Culture - Blood, [[id number]] Collected:  07/26/18 1033    Specimen:  Blood from Wrist, Right Updated:  07/26/18 1033    Urinalysis, Microscopic Only - Urine, Clean Catch [[id number]]  (Abnormal) Collected:  07/26/18 0933    Specimen:  Urine from Urine, Clean Catch Updated:  07/26/18 1013     RBC, UA None Seen /HPF      WBC, UA 0-2 /HPF      Bacteria, UA 2+ (A) /HPF      Squamous Epithelial Cells, UA 3-5 (A) /HPF      Transitional Epithelial Cells, UA 7-12 (A) /HPF      Hyaline Casts, UA 3-6 /LPF      Amorphous Urate Crystals, UA Large/3+ /HPF      Methodology Manual Light Microscopy    Lactic Acid, Reflex Timer (This will reflex a repeat order 3-3:15 hours after ordered.) [[id number]] Collected:  07/26/18 0620    Specimen:  Blood from Arm, Right Updated:  07/26/18 1000     Extra Tube Hold for add-ons.     Comment: Auto resulted.       Urinalysis With Microscopic If Indicated (No Culture) - Urine, Clean Catch [[id number]]  (Abnormal) Collected:  07/26/18 0933    Specimen:  Urine from Urine, Clean Catch Updated:  07/26/18 0946     Color, UA Dark Yellow     Appearance, UA Turbid (A)     pH, UA 5.5     Specific Gravity, UA 1.020     Glucose, UA Negative     Ketones, UA Negative     Bilirubin, UA Negative     Blood, UA Negative     Protein,  mg/dL (2+) (A)     Leuk Esterase, UA Negative     Nitrite, UA Negative     Urobilinogen, UA 1.0 E.U./dL    Comprehensive Metabolic Panel [[id number]]  (Abnormal) Collected:  07/26/18 0620    Specimen:  Blood from Arm, Right Updated:  07/26/18 0836     Glucose 187 (H) mg/dL      BUN 28 (H) mg/dL      Creatinine 1.51 (H) mg/dL      Sodium 123 (L) mmol/L      Potassium 4.3 mmol/L      Chloride 84 (L) mmol/L      CO2 23.0 mmol/L      Calcium 8.4 mg/dL      Total Protein 6.0 (L) g/dL      Albumin  3.30 (L) g/dL      ALT (SGPT) 48 U/L      AST (SGOT) 30 U/L      Alkaline Phosphatase 116 U/L      Total Bilirubin 0.6 mg/dL      eGFR Non African Amer 48 (L) mL/min/1.73      Globulin 2.7 gm/dL      A/G Ratio 1.2 g/dL      BUN/Creatinine Ratio 18.5     Anion Gap 16.0 (H) mmol/L     Troponin [[id number]]  (Normal) Collected:  07/26/18 0757    Specimen:  Blood Updated:  07/26/18 0832     Troponin I <0.012 ng/mL     Blood Culture - Blood, [[id number]] Collected:  07/26/18 0757    Specimen:  Blood from Arm, Left Updated:  07/26/18 0757    Blood Gas, Arterial [[id number]]  (Abnormal) Collected:  07/26/18 0750    Specimen:  Arterial Blood Updated:  07/26/18 0753     Site Arterial Line     Ravi's Test N/A     pH, Arterial 7.478 (H) pH units      pCO2, Arterial 30.0 (L) mm Hg      pO2, Arterial 128.0 (H) mm Hg      HCO3, Arterial 22.3 mmol/L      Base Excess, Arterial -1.1 (L) mmol/L      O2 Saturation, Arterial 99.6 (H) %      Barometric Pressure for Blood Gas 748 mmHg      Modality Nasal Cannula     Flow Rate 2.0 lpm      Ventilator Mode NA     Collected by     CK-MB [[id number]]  (Normal) Collected:  07/26/18 0628    Specimen:  Blood Updated:  07/26/18 0748     CKMB 0.92 ng/mL     aPTT [[id number]]  (Normal) Collected:  07/26/18 0628    Specimen:  Blood Updated:  07/26/18 0739     PTT 35.6 seconds     Narrative:       The recommended Heparin therapeutic range is 68-97 seconds.    Protime-INR [[id number]]  (Abnormal) Collected:  07/26/18 0628    Specimen:  Blood Updated:  07/26/18 0739     Protime 19.5 (H) Seconds      INR 1.71 (H)    Narrative:       Therapeutic range for most indications is 2.0-3.0 INR,  or 2.5-3.5 for mechanical heart valves.    Lipase [[id number]]  (Normal) Collected:  07/26/18 0628    Specimen:  Blood Updated:  07/26/18 0738     Lipase 66 U/L     CK [[id number]]  (Abnormal) Collected:  07/26/18 0628    Specimen:  Blood Updated:  07/26/18 0738     Creatine Kinase 26 (L) U/L     Extra Tubes [[id number]]  Collected:  07/26/18 0628    Specimen:  Blood from Blood, Venous Line Updated:  07/26/18 0730    Narrative:       The following orders were created for panel order Extra Tubes.  Procedure                               Abnormality         Status                     ---------                               -----------         ------                     Light Blue Top[[id number]]                                   Final result               Gold Top - SST[[id number]]                                   Final result                 Please view results for these tests on the individual orders.    Light Blue Top [[id number]] Collected:  07/26/18 0628    Specimen:  Blood Updated:  07/26/18 0730     Extra Tube hold for add-on     Comment: Auto resulted       Gold Top - SST [[id number]] Collected:  07/26/18 0628    Specimen:  Blood Updated:  07/26/18 0730     Extra Tube Hold for add-ons.     Comment: Auto resulted.       CBC & Differential [[id number]] Collected:  07/26/18 0620    Specimen:  Blood Updated:  07/26/18 0705    Narrative:       The following orders were created for panel order CBC & Differential.  Procedure                               Abnormality         Status                     ---------                               -----------         ------                     Scan Slide[[id number]]                                       Final result               CBC Auto Differential[[id number]]        Abnormal            Final result                 Please view results for these tests on the individual orders.    Scan Slide [[id number]] Collected:  07/26/18 0620    Specimen:  Blood from Arm, Right Updated:  07/26/18 0705     Anisocytosis Slight/1+     Hypochromia Slight/1+     Ovalocytes Slight/1+     WBC Morphology Normal     Platelet Estimate Adequate    BNP [[id number]]  (Abnormal) Collected:  07/26/18 0620    Specimen:  Blood from Arm, Right Updated:  07/26/18 0700     proBNP 3,400.0 (H) pg/mL     Troponin [[id number]]  (Normal)  Collected:  07/26/18 0620    Specimen:  Blood from Arm, Right Updated:  07/26/18 0700     Troponin I <0.012 ng/mL     Lactic Acid, Plasma [[id number]]  (Abnormal) Collected:  07/26/18 0620    Specimen:  Blood from Arm, Right Updated:  07/26/18 0654     Lactate 5.3 (C) mmol/L     Basic Metabolic Panel [[id number]]  (Abnormal) Collected:  07/26/18 0620    Specimen:  Blood from Arm, Right Updated:  07/26/18 0651     Glucose 185 (H) mg/dL      BUN 28 (H) mg/dL      Creatinine 1.47 (H) mg/dL      Sodium 124 (L) mmol/L      Potassium 4.2 mmol/L      Chloride 85 (L) mmol/L      CO2 24.0 mmol/L      Calcium 8.2 (L) mg/dL      eGFR Non African Amer 49 (L) mL/min/1.73      BUN/Creatinine Ratio 19.0     Anion Gap 15.0 mmol/L     CBC Auto Differential [[id number]]  (Abnormal) Collected:  07/26/18 0620    Specimen:  Blood from Arm, Right Updated:  07/26/18 0633     WBC 35.55 (H) 10*3/mm3      RBC 3.25 (L) 10*6/mm3      Hemoglobin 9.0 (L) g/dL      Hematocrit 26.2 (L) %      MCV 80.6 fL      MCH 27.7 pg      MCHC 34.4 g/dL      RDW 15.7 (H) %      RDW-SD 46.5 (H) fl      MPV 10.4 fL      Platelets 388 10*3/mm3      Neutrophil % 88.5 (H) %      Lymphocyte % 4.1 (L) %      Monocyte % 5.4 %      Eosinophil % 0.7 %      Basophil % 0.1 %      Immature Grans % 1.2 (H) %      Neutrophils, Absolute 31.46 (H) 10*3/mm3      Lymphocytes, Absolute 1.44 10*3/mm3      Monocytes, Absolute 1.93 (H) 10*3/mm3      Eosinophils, Absolute 0.24 10*3/mm3      Basophils, Absolute 0.04 10*3/mm3      Immature Grans, Absolute 0.44 (H) 10*3/mm3         Imaging Results (last 24 hours)     Procedure Component Value Units Date/Time    XR Chest 1 View [[id number]] Collected:  07/26/18 1340     Updated:  07/26/18 1414    Narrative:       PROCEDURE: Chest, AP portable at 1:40 PM.    INDICATION: Check Tracheostomy tube position.    COMPARISON: 7/26/2018 exam earlier the same date at 6:24 AM.    Tracheostomy tube stable position tip about 4.5 cm above the  yonathan.  Right jugular central line tip stable position overlying  mid SVC. Left subclavian port tip at the junction of the left  innominate and SVC.    Mild cardiomegaly with normal vascularity.    Interval decrease size of the large left pleural effusion  effusion with the small residual. Persistent volume loss and  infiltrate left lower lobe. There is a new left chest tube in  place tip overlying the medial aspect left lower hemithorax. No  pneumothorax.    Right lung clear and right CP angle sharp.      Impression:       New left chest tube tip over the medial lower left  hemithorax.    Decreased left pleural effusion with small residual. No  pneumothorax.    Left lower lobe volume loss and infiltrate.    Stable positions of the tracheostomy tube, right jugular central  line and left subclavian port.    [id number]    Electronically signed by:  Rodrigue Block MD  7/26/2018 2:13  PM CDT Workstation: [id number]    CT Chest With Contrast [[id number]] Collected:  07/26/18 0828     Updated:  07/26/18 0918    Narrative:       CT chest, abdomen, pelvis with contrast.       CLINICAL INDICATION: Sepsis, and anemia. History of laryngeal  cancer, tracheostomy. History of aortic dissection and corrective  surgery.    COMPARISON: CT July 3, 2018.       TECHNIQUE: 92 mL Isovue-300 , nonionic IV contrast. Helical  scanning with axial and coronal reformations. Soft tissue, lung,  liver, and bone windows reviewed.    This exam was performed according to our departmental  dose-optimization program, which includes automated exposure  control, adjustment of the mA and/or kV according to patient size  and/or use of iterative reconstruction technique.    CHEST CT FINDINGS: Tracheostomy tube in the trachea in  satisfactory position. Midline sternotomy sutures. Surgical  correction, graft ascending aorta. An intimal flap, dissection is  noted at the aortic arch starting at the subclavian artery and  extending inferiorly down to the aortic  bifurcation. Dissection,  intimal flap also extends into the left iliac artery. Dissection  extends superiorly into the left subclavian artery. Large  left-sided pleural effusion. Dense consolidation, compressive  atelectasis left lower lobe and portions of the left upper lobe.    Abdomen pelvis CT: Feeding gastrostomy tube in the stomach.  Chronic dissection abdominal aorta. True and false lumens are  appreciated. Renal arteries, superior mesenteric artery, celiac  artery are patent.    Impression:       Large subcapsular hematoma spleen causing mass impression and  medial displacement of the spleen. This is best appreciated on  series 2 images [id number].    There is ascites. There is increased CT attenuation in portions  of the ascitic fluid suggesting hemorrhagic ascitic fluid. A  Freedman catheter within the bladder. Feeding gastrostomy tube in  the stomach. Normal kidneys. Normal liver. Normal gallbladder.  Normal pancreas. No retroperitoneal adenopathy.    CONCLUSION: Interval development of large left-sided effusion and  dense compressive atelectasis of the left lower lobe and portions  left upper lobe. Unfavorable change since prior exam.    Chronic appearing dissection with intimal flap observed, true and  false lumen starting at the level of the left subclavian artery  and extending superiorly into the left brachial artery and  inferiorly down the left iliac artery.    Evidence of surgical correction of ascending aortic dissection,  presumably surgical graft.    Large subcapsular splenic hematoma. Large amount of free  abdominal fluid, probably hemorrhagic ascites.    Electronically signed by:  Russ Orozco MD  7/26/2018 9:16 AM CDT  Workstation: [id number]    CT Abdomen Pelvis With Contrast [[id number]] Collected:  07/26/18 0828     Updated:  07/26/18 0918    Narrative:       CT chest, abdomen, pelvis with contrast.       CLINICAL INDICATION: Sepsis, and anemia. History of laryngeal  cancer, tracheostomy. History  of aortic dissection and corrective  surgery.    COMPARISON: CT July 3, 2018.       TECHNIQUE: 92 mL Isovue-300 , nonionic IV contrast. Helical  scanning with axial and coronal reformations. Soft tissue, lung,  liver, and bone windows reviewed.    This exam was performed according to our departmental  dose-optimization program, which includes automated exposure  control, adjustment of the mA and/or kV according to patient size  and/or use of iterative reconstruction technique.    CHEST CT FINDINGS: Tracheostomy tube in the trachea in  satisfactory position. Midline sternotomy sutures. Surgical  correction, graft ascending aorta. An intimal flap, dissection is  noted at the aortic arch starting at the subclavian artery and  extending inferiorly down to the aortic bifurcation. Dissection,  intimal flap also extends into the left iliac artery. Dissection  extends superiorly into the left subclavian artery. Large  left-sided pleural effusion. Dense consolidation, compressive  atelectasis left lower lobe and portions of the left upper lobe.    Abdomen pelvis CT: Feeding gastrostomy tube in the stomach.  Chronic dissection abdominal aorta. True and false lumens are  appreciated. Renal arteries, superior mesenteric artery, celiac  artery are patent.    Impression:       Large subcapsular hematoma spleen causing mass impression and  medial displacement of the spleen. This is best appreciated on  series 2 images 105-136.    There is ascites. There is increased CT attenuation in portions  of the ascitic fluid suggesting hemorrhagic ascitic fluid. A  Freedman catheter within the bladder. Feeding gastrostomy tube in  the stomach. Normal kidneys. Normal liver. Normal gallbladder.  Normal pancreas. No retroperitoneal adenopathy.    CONCLUSION: Interval development of large left-sided effusion and  dense compressive atelectasis of the left lower lobe and portions  left upper lobe. Unfavorable change since prior exam.    Chronic  appearing dissection with intimal flap observed, true and  false lumen starting at the level of the left subclavian artery  and extending superiorly into the left brachial artery and  inferiorly down the left iliac artery.    Evidence of surgical correction of ascending aortic dissection,  presumably surgical graft.    Large subcapsular splenic hematoma. Large amount of free  abdominal fluid, probably hemorrhagic ascites.    Electronically signed by:  Russ Orozco MD  7/26/2018 9:16 AM CDT  Workstation: [id number]    XR Chest 1 View [[id number]] Collected:  07/26/18 0632     Updated:  07/26/18 0715    Narrative:       Exam: AP portable chest    INDICATION: Syncope    COMPARISON: 5/7/2018    FINDINGS: AP portable chest. Tracheostomy tube and left central  venous port are in place. The central venous port tip is in the  SVC. There is a large left pleural effusion present with airspace  consolidation and/or atelectasis. Mild pulmonary venous  congestion is present. The right lung is clear. Heart is  enlarged. Status post median sternotomy      Impression:       Large left pleural effusion with airspace  consolidation and/or atelectasis.    Electronically signed by:  Shahid Madrid MD  7/26/2018 7:13 AM  CDT Workstation: [id number]            Assessment:    Hospital Problem List     * (Principal)Splenic hemorrhage    Overview Signed 7/26/2018 10:23 AM by Kris Solano MD     Added automatically from request for surgery [id number]         Syncope and collapse      septic shock   Anemia of acute blood loss   Splenic hematoma   Large left pleural effusion   Laryngeal cancer   JUAN JOSE  Hyponatremia             Plan:  -Septic shock : iv fluids ,levophed   Will monitor lactic acid  Monitor labs   Blood cultures are drawn will follow results   -Anemia of acute blood loss : will transfuse 2 units of PRBC for now   Will order 2 units  of FFP  6 units of platelets   Monitor H&H and cbc   -Splenic Hematoma: patient will get  splenectomy . DR galindo is on board   -JUAN JOSE vs CKD will hydrate will monitor kidney function   -Hyponatremia: probably from SIADHvs LOWvolume  Will hydrate then  Recheck na level   -Left large pleural effusion will get  Chest tube during surgery   DVTprophlaxis no anticoagulation as patient is bleeding   PPI      Patient is critical     I discussed the patients findings and my recommendations with: patient and his sister     Dianne KIMMicheal Hickey MD  07/26/18  3:23 PM                    Electronically signed by Dianne Hickey MD at 7/26/2018  3:45 PM     Kris Galindo MD at 7/26/2018 10:23 AM          CHIEF COMPLAINT:    Fainting. Abdominal pain    HISTORY OF PRESENT ILLNESS:    Truman Esquivel is a 57 y.o. male who presented to the emergency department this morning with complaints of syncope and near syncope at home.  He believes that he had at least 1 fall.  In the emergency department the patient was found to be tachycardic, hypotensive, with elevated white count and anemia.  The patient does take Plavix at home.  He has a history of stage III laryngeal cancer for which his last dose of chemotherapy was in May.  He has tracheostomy in place from this as well.    On imaging in the emergency department today the patient was noted to have a large splenic hematoma with free fluid in the abdomen as well as near complete atelectasis of the left lung due to a new pleural effusion.    The patient has gotten multiple units of blood in the emergency department as well as 2 L of fluid.  His hemoglobin has decreased.  He continues to be hypotensive and tachycardic.    Hudson I examination the patient is awake and alert, able to answer questions appropriately.  He is accompanied by his family members.    I know the patient from prior port placement as well as colonoscopy.    Past Medical History:   Diagnosis Date   • Aortic dissection (CMS/HCC)    • Chest pain    • Disease of thyroid gland    • HTN (hypertension)     • Knee pain    • Sleep apnea    • Smoker    • Squamous cell carcinoma of larynx (CMS/HCC) 2/5/2018   • Stroke (CMS/HCC)    • Swallowing difficulty        Past Surgical History:   Procedure Laterality Date   • AORTA SURGERY      ruptured aorta repair   • ASCENDING ARCH/HEMIARCH REPLACEMENT N/A 2/14/2017    Procedure: INTRAOPERATIVE TRANSESOPHAGEAL ECHOCARDIOGRAM, MIDLINE STERNOTOMY, ASCENDING AORTIC  AND PROXIMAL AORTIC ARCH REPAIR WITH 26MM GRAFT, AORTIC VALVE RESUSPENSION, AORTIC ROOT REPAIR, OPEN VEIN HARVEST OF RIGHT LEG;  Surgeon: German Arreguin MD;  Location: [facility] MAIN OR;  Service:    • BRONCHOSCOPY N/A 2/17/2017    Procedure: BRONCHOSCOPY BIOPSY AT BEDSIDE WITH BAL-LEFT LOWER LOBE;  Surgeon: Trent Chaney MD;  Location: [facility] ENDOSCOPY;  Service:    • COLONOSCOPY N/A 3/7/2018    Procedure: COLONOSCOPY WITH POSSIBLE POLYPECTOMY   ( DONE IN OR WITH ENDO)      COLONOSCOPY FIRST;  Surgeon: Kris Solano MD;  Location: [facility] OR;  Service:    • DIRECT LARYNGOSCOPY, ESOPHAGOSCOPY, BRONCHOSCOPY N/A 2/5/2018    Procedure: DIRECT LARYNGOSCOPY WITH BIOPSY, ESOPHAGOSCOPY     (no bronchoscopy, no laser);  Surgeon: Joseph Venegas MD;  Location: [facility] OR;  Service:    • ENDOSCOPY W/ PEG TUBE PLACEMENT N/A 5/2/2018    Procedure: ESOPHAGOGASTRODUODENOSCOPY WITH PERCUTANEOUS ENDOSCOPIC GASTROSTOMY TUBE INSERTION;  Surgeon: Angel Maciel MD;  Location: [facility] ENDOSCOPY;  Service: Gastroenterology   • TRACHEOSTOMY  02/05/2018   • TRACHEOSTOMY N/A 2/5/2018    Procedure: TRACHEOSTOMY  local injection @ 1106 incision @ 1119;  Surgeon: Joseph Venegas MD;  Location: [facility] OR;  Service:    • VENOUS ACCESS DEVICE (PORT) INSERTION N/A 3/7/2018    Procedure: INSERTION OF MEDIPORT     (C-ARM#1);  Surgeon: Kris Solano MD;  Location: [facility] OR;  Service:        Prior to Admission medications    Medication Sig Start Date End Date Taking? Authorizing Provider   Cholecalciferol 77524 units  tablet 50 thousand units po q  month 7/17/18   Kp Sellers MD   clopidogrel (PLAVIX) 75 MG tablet Take 75 mg by mouth Daily. Last dose approx greater than one month ago    Historical Provider, MD   docusate sodium (COLACE) 100 MG capsule Take 1 capsule by mouth 2 (Two) Times a Day As Needed for Constipation. 2/13/18   Reza Patel MD   levothyroxine (SYNTHROID) 75 MCG tablet Take 1 tab daily Monday to Saturday and 1.5 on Sunday 7/17/18   Kp Sellers MD   losartan-hydrochlorothiazide (HYZAAR) 50-12.5 MG per tablet Take 1 tablet by mouth Daily. 3/15/18   Liborio Chandra MD   metoprolol succinate XL (TOPROL-XL) 25 MG 24 hr tablet Take 1 tablet by mouth Every Night. Patient states he hasn't been taking this week, states he thinks he is hypotensive 3/15/18   Liborio Chandra MD   Multiple Vitamins-Minerals (MULTIVITAMIN ADULT PO) Take 1 tablet by mouth Daily.    Historical Provider, MD   mupirocin (BACTROBAN) 2 % ointment Apply  topically 2 (Two) Times a Day. 6/25/18   Joseph Venegas MD   ondansetron (ZOFRAN) 4 MG tablet Take 1 tablet by mouth 3 (Three) Times a Day As Needed for Nausea or Vomiting. 2/21/18   Reza Patel MD   sodium chloride (NS) 0.9 % irrigation USE FOR TRACHEOSTOMY AS DIRECTED 5/21/18   Joseph Venegas MD   triamcinolone (KENALOG) 0.1 % cream Apply  topically 2 (Two) Times a Day. 6/25/18   Joseph Venegas MD   vitamin B-12 (CYANOCOBALAMIN) 100 MCG tablet Take 100 mcg by mouth Daily.    Historical Provider, MD   vitamin E 100 UNIT capsule Take 400 Units by mouth Every Night.    Historical Provider, MD   Zinc 50 MG capsule Take 50 mg by mouth Every Night.    Historical Provider, MD       Allergies   Allergen Reactions   • Chlorhexidine Itching     Topical cleaning wipes causes severe skin irritation   • Co Q 10 [Coenzyme Q10] Itching   • Eggs Or Egg-Derived Products Swelling   • Erythromycin Other (See Comments)     Joint pains and cold sweats  "  • Gabapentin Itching   • Other GI Intolerance     Mycins  farzad lotion causes rash   • Penicillins      Tolerated cefepime during 2/2017 admission. Had rash and itching (relieved by benadryl) after few doses of cefepime and cefepime was continued.   • Tetracyclines & Related GI Intolerance   • Acth [Corticotropin] Rash   • Cephalosporins Itching and Rash     Pt developed rash, itching after cefepime administration (2-3 doses) during 2/2017 admission. Itching was relieved with benadryl. Cefepime was continued because his infection was improving and no symptoms of anaphylaxis present   • Keflex [Cephalexin] Rash   • Neomycin Rash       Family History   Problem Relation Age of Onset   • Thyroid disease Mother    • Hypertension Mother    • Hypertension Father    • Hypertension Other        Social History     Social History   • Marital status:      Spouse name: N/A   • Number of children: N/A   • Years of education: N/A     Occupational History   • Not on file.     Social History Main Topics   • Smoking status: Former Smoker     Packs/day: 1.25     Years: 38.00     Quit date: 2/13/2017   • Smokeless tobacco: Never Used      Comment: 02/27/2018 - Patient confirmed he ceased utilization of tobacco products 02/13/2017.   • Alcohol use No   • Drug use: No   • Sexual activity: Defer     Other Topics Concern   • Not on file     Social History Narrative   • No narrative on file       Review of Systems   Constitutional: Positive for fatigue.   Respiratory: Positive for shortness of breath.    Gastrointestinal: Positive for abdominal pain.   Neurological: Positive for dizziness, syncope, weakness and light-headedness.       Objective     BP (!) 66/34   Pulse 67   Temp 95.4 °F (35.2 °C)   Resp 20   Ht 170.2 cm (67\")   Wt 99.3 kg (218 lb 14.7 oz)   SpO2 100%   BMI 34.29 kg/m²      Physical Exam   Constitutional: He is oriented to person, place, and time. He appears distressed.   HENT:   Head: Normocephalic and " atraumatic.   Nose: Nose normal.   Eyes: Conjunctivae and EOM are normal. Right eye exhibits no discharge. Left eye exhibits no discharge.   Neck: Trachea normal, normal range of motion and phonation normal. Neck supple. No JVD present. No tracheal deviation and no edema present. No thyromegaly present.       Trach in place   Cardiovascular: Normal rate, regular rhythm and normal heart sounds.  Exam reveals no gallop and no friction rub.    No murmur heard.  Pulmonary/Chest: Effort normal and breath sounds normal. No accessory muscle usage. No respiratory distress. He has no decreased breath sounds. He has no wheezes. He has no rales. He exhibits no tenderness.   Abdominal: Soft. He exhibits no distension, no fluid wave, no ascites, no pulsatile midline mass and no mass. There is no tenderness. There is no rebound and no guarding. No hernia.       Musculoskeletal: Normal range of motion. He exhibits no edema, tenderness or deformity.   Lymphadenopathy:     He has no cervical adenopathy.        Left: No supraclavicular adenopathy present.   Neurological: He is alert and oriented to person, place, and time. He has normal strength. No cranial nerve deficit.   Skin: Skin is warm and dry. No rash noted. He is not diaphoretic. No erythema. No pallor.   Psychiatric: He has a normal mood and affect. His speech is normal and behavior is normal. Judgment and thought content normal. Cognition and memory are normal.   Vitals reviewed.      DIAGNOSTIC DATA:    CT reviewed showing splenic hematoma, free fluid, left lung collapse due to effusion    ASSESSMENT:    Splenic bleed with hemodynamic instability and large left effusion, possible blood in chest as well.    PLAN:    Continue aggressive resuscitation in the emergency department.  I have requested the hospital for platelets as the patient is on Plavix and there are no platelets currently available in this facility.  The patient is actively receiving IV fluids as well as  blood.  I recommended the patient undergo urgent splenectomy as well as left chest tube placement.  He has family are agreeable to this.  He understands that his situation is extremely serious.  I anticipate he will be in the intensive care unit on the ventilator following surgery.  He will need continued aggressive fluid resuscitation.  Given that he has a PEG tube in place he likely will need a formal gastrostomy tube placed during surgery as well.  This was all discussed with he and his family.  They're agreeable with proceeding.  The risks, benefits, possible poor outcome given his overall poor health were discussed with the family.  They're agreeable proceeding with splenectomy, gastrostomy tube placement, left chest tube placement.          This document has been electronically signed by Kris Solano MD on July 26, 2018 10:24 AM        Electronically signed by Kris Solano MD at 7/26/2018 10:29 AM       Hospital Medications (active)       Dose Frequency Start End    bisacodyl (DULCOLAX) suppository 10 mg 10 mg Daily 8/2/2018     Sig - Route: Insert 1 suppository into the rectum Daily. - Rectal    chlorhexidine (PERIDEX) 0.12 % solution 15 mL 15 mL Every 12 Hours Scheduled 7/26/2018     Sig - Route: Apply 15 mL to the mouth or throat Every 12 (Twelve) Hours. - Mouth/Throat    Cosign for Ordering: Accepted by Kris Solano MD on 7/26/2018  3:35 PM    dextrose 5 % and sodium chloride 0.45 % with KCl 20 mEq/L infusion 75 mL/hr Continuous 8/2/2018     Sig - Route: Infuse 75 mL/hr into a venous catheter Continuous. - Intravenous    levothyroxine (SYNTHROID, LEVOTHROID) tablet 75 mcg 75 mcg Every Early Morning 8/4/2018     Sig - Route: 1 tablet by Per G Tube route Every Morning. - Per G Tube    losartan (COZAAR) 50 mg, hydrochlorothiazide (MICROZIDE) 12.5 mg for HYZAAR 50-12.5  Daily 8/4/2018     Sig - Route: by Per G Tube route Daily. - Per G Tube    magnesium citrate solution 150  mL 150 mL Once 8/2/2018     Sig - Route: Take 150 mL by mouth 1 (One) Time. - Oral    metoprolol succinate XL (TOPROL-XL) 24 hr tablet 25 mg 25 mg Nightly 8/4/2018     Sig - Route: Take 1 tablet by mouth Every Night. - Oral    morphine injection 2 mg 2 mg Every 4 Hours PRN 8/2/2018 8/12/2018    Sig - Route: Infuse 1 mL into a venous catheter Every 4 (Four) Hours As Needed for Severe Pain  (Breakthrough pain). - Intravenous    ondansetron (ZOFRAN) injection 4 mg 4 mg Every 6 Hours PRN 7/26/2018     Sig - Route: Infuse 2 mL into a venous catheter Every 6 (Six) Hours As Needed for Nausea or Vomiting. - Intravenous    pantoprazole (PROTONIX) injection 40 mg 40 mg Every Early Morning 7/27/2018     Sig - Route: Infuse 10 mL into a venous catheter Every Morning. - Intravenous    pneumococcal polysaccharide 23-valent (PNEUMOVAX-23) vaccine 0.5 mL 0.5 mL During Hospitalization 8/6/2018     Sig - Route: Inject 0.5 mL into the appropriate muscle as directed by prescriber During Hospitalization for Immunization. - Intramuscular    polyethylene glycol (MIRALAX) powder 17 g 17 g Daily 8/2/2018     Sig - Route: Take 17 g by mouth Daily. - Oral    simethicone (MYLICON) 40 MG/0.6ML drops 40 mg 40 mg 4 Times Daily PRN 8/5/2018     Sig - Route: 0.6 mL by Per G Tube route 4 (Four) Times a Day As Needed for Flatulence. - Per G Tube    sodium chloride 0.9 % flush 1-10 mL 1-10 mL As Needed 7/26/2018     Sig - Route: Infuse 1-10 mL into a venous catheter As Needed for Line Care. - Intravenous             Operative/Procedure Notes (most recent note)      Joshua Pollock MD at 7/31/2018  3:08 PM             OPERATIVE NOTE  Truman Esquivel  1960  7/31/2018    PREOP DIAGNOSES:  Pleural effusion [J90]   Recent splenectomy for ruptured spleen  Repaired type A aortic dissection  Treated laryngeal carcinoma    POSTOP DIAGNOSES:  Pleural effusion [J90]    PROCEDURE:   LEFT THORACOSCOPY VIDEO ASSISTED  LEFT THORACOTOMY total lung  decortication   Bronchoscopy  Negative pressure wound therapy (prevena)    SURGEON: KAY Pollock MD FACS RPVI    ASSISTANT: Madisyn Campbell CFA    ANESTHESIA: General ET     ESTIMATED BLOOD LOSS: 100 ml     COMPLICATIONS: none    DESCRIPTION OF OPERATION:   Patient taken to operating room, placed in supine position.   chronic indwelling trach removed, 6.0 endotracheal tube passed into RIGHT mainstem bronchus under bronchoscopic vision by anesthesia.  General anesthesia induced.  Radial arterial line placed by anesthesia.  Prepped draped in sterile fashion.    Patient placed in RIGHT lateral decubitus position with bean bag as axillary roll, table flexed.  Prepped draped in sterile fashion.1cm port site placed 8th interspace anterior axillary line.  Thoracoscope was introduced demonstrating advanced pleural space empyema.   lateral thoracotomy incision made thru 5th ICS.  Large amount of gelatinous material and  Pleural fluid removed, sent for cultures.  Lung trapped.  Fibrous rind removed from upper and lower lobes with currette.  Diaphragm and chest wall cleared of gradeaux.  Lung completely freed.  Pleural space irrigated with 3L warm water.     Two 24Fr multihole chest tube placed lateral and posteriorly to apex.  Lung inflated to fill space, no air leak.     Incision closed layers of #2 Vicryl pericostal sutures around the fifth rib thru the 6th rib. serratus was reapproximated using 2-0 PDS suture, 2-0 PDS in the latissimus fascia, 2-0 PDS and subcutaneous tissue and staples in  Skin.  Diffuse tissue edema, negative pressure wound therapy with prevena. Fiberoptic bronchoscopy performed thru trach site, no endobronchial lesions, airways clear, moderate thin secretions .Tolerated procedure well transferred to PACU stable condition.          This document has been electronically signed by Joshua Pollock MD on July 31, 2018 5:55 PM        Electronically signed by Joshua Pollock MD at 7/31/2018   6:03 PM          Physician Progress Notes (most recent note)      Kris Solano MD at 8/6/2018  7:41 AM          GENERAL SURGERY PROGRESS NOTE  Chief Complaint:  Surgery Follow up   LOS: 11 days       Subjective     Interval History:     Feels well, sitting in chair this AM. Had some bloating with TF, which are currently off.    Objective     Vital Signs  Temp:  [97.6 °F (36.4 °C)-98.9 °F (37.2 °C)] 98.2 °F (36.8 °C)  Heart Rate:  [59-65] 59  Resp:  [19-20] 20  BP: (142-158)/(74-84) 157/84    Physical Exam:   Abdomen soft, incision CDI with staples. Tubes in place. TONYA with minimal output.  Labs:  Lab Results (last 24 hours)     Procedure Component Value Units Date/Time    Basic Metabolic Panel [[id number]]  (Abnormal) Collected:  08/06/18 0559    Specimen:  Blood Updated:  08/06/18 0623     Glucose 107 (H) mg/dL      BUN 11 mg/dL      Creatinine 0.60 (L) mg/dL      Sodium 133 (L) mmol/L      Potassium 3.4 (L) mmol/L      Chloride 102 mmol/L      CO2 27.0 mmol/L      Calcium 7.5 (L) mg/dL      eGFR Non African Amer 139 (H) mL/min/1.73      BUN/Creatinine Ratio 18.3     Anion Gap 4.0 (L) mmol/L     CBC (No Diff) [[id number]]  (Abnormal) Collected:  08/06/18 0559    Specimen:  Blood Updated:  08/06/18 0603     WBC 21.85 (H) 10*3/mm3      RBC 3.13 (L) 10*6/mm3      Hemoglobin 8.7 (L) g/dL      Hematocrit 26.1 (L) %      MCV 83.4 fL      MCH 27.8 pg      MCHC 33.3 g/dL      RDW 17.5 (H) %      RDW-SD 52.0 (H) fl      MPV 9.5 fL      Platelets 510 (H) 10*3/mm3     Fungus Culture - Tissue, Lung, L [[id number]] Collected:  07/31/18 1628    Specimen:  Tissue from Lung, L Updated:  08/05/18 1730     Fungus Culture No fungus isolated at less than 1 week    AFB Culture - Tissue, Lung, L [[id number]]  (Normal) Collected:  07/31/18 1628    Specimen:  Tissue from Lung, L Updated:  08/05/18 0680     AFB Culture No AFB isolated at less than 1 week     AFB Stain No acid fast bacilli seen on concentrated smear    Fungus Culture  - Body Fluid, Lung, L [[id number]] Collected:  07/31/18 1625    Specimen:  Body Fluid from Lung, L Updated:  08/05/18 1645     Fungus Culture No fungus isolated at less than 1 week    AFB Culture - Body Fluid, Lung, L [[id number]]  (Normal) Collected:  07/31/18 1625    Specimen:  Body Fluid from Lung, L Updated:  08/05/18 1645     AFB Culture No AFB isolated at less than 1 week     AFB Stain No acid fast bacilli seen on concentrated smear    Basic Metabolic Panel [[id number]]  (Abnormal) Collected:  08/05/18 0848    Specimen:  Blood Updated:  08/05/18 0901     Glucose 121 (H) mg/dL      BUN 13 mg/dL      Creatinine 0.63 (L) mg/dL      Sodium 135 (L) mmol/L      Potassium 3.4 (L) mmol/L      Chloride 104 mmol/L      CO2 26.0 mmol/L      Calcium 7.7 (L) mg/dL      eGFR Non African Amer 131 (H) mL/min/1.73      BUN/Creatinine Ratio 20.6     Anion Gap 5.0 mmol/L     CBC (No Diff) [[id number]]  (Abnormal) Collected:  08/05/18 0848    Specimen:  Blood Updated:  08/05/18 0857     WBC 22.10 (H) 10*3/mm3      RBC 3.33 (L) 10*6/mm3      Hemoglobin 9.2 (L) g/dL      Hematocrit 27.9 (L) %      MCV 83.8 fL      MCH 27.6 pg      MCHC 33.0 g/dL      RDW 17.4 (H) %      RDW-SD 52.6 (H) fl      MPV 9.3 fL      Platelets 515 (H) 10*3/mm3            Results Review:     Labs and imaging for today were reviewed.    Assessment/Plan     Truman Esquivel is a 57 y.o. male who is s.p splenectomy, decortication.      Overall looks well. Continue PT/OT. Will ask speech to see for possible PO diet and speaking valve for trach.  DC TONYA            This document has been electronically signed by Kris Solano MD on August 6, 2018 7:41 AM        Kris Solano MD  08/06/18  7:41 AM          Electronically signed by Kris Solano MD at 8/6/2018  7:42 AM       Medical Student Notes (most recent note)     No notes of this type exist for this encounter.           Consult Notes (most recent note)      Tran Troncoso, APRN  at 7/30/2018  8:58 AM      Consult Orders:    1. Inpatient Cardiothoracic Surgery Consult [[id number]] ordered by Kris Solano MD at 07/30/18 0732           Attestation signed by Joshua Pollock MD at 7/30/2018 11:45 AM    Detailed discussion with Truman Esquivel regarding situation, options and plans. recurrent pleural effusion, retained hemothorax  Pulmonary resection is advisable.      Risks:  Mortality/Major morbidity 2-3%  including but not limited to infection, bleeding, transfusion, pulmonary dysfunction (prolonged air leak, prolonged mechanical ventilation, need for long term oxygen therapy), renal dysfunction.  Benefits: diagnosis and treatment, improved quality of life, survival.  Options: observation, transthoracic needle biopsy discussed.  Understands and wishes to proceed.      LEFT VAT thoracotomy, pulmonary resection, bronchoscopy.  GEN.  SAM.  SDS.  7/31/2018                      CVTS CONSULTATION  CVTS/Dr Pollock requested to see by Dr Solano regarding continued Left pleural effusion.  3  Patient Care Team:  Marybeth Harrison DO as PCP - General (Family Medicine)  Joseph Venegas MD as Surgeon (Otolaryngology)  Reza Patel MD as Consulting Physician (Hematology and Oncology)  Jarocho Tapia MD as Consulting Physician (Radiation Oncology)  ROWAN Watkins as Nurse Practitioner (Oncology)  Sarah Beth Bright MA as Medical Assistant    Chief complaint: Mild shortness of air      Subjective     History of Present Illness:  57 y.o. male with Stage 3 squamous cell laryngeal cancer, chemotherapy in May.  Has trach and Left mediport.  Denies any falls, injury to R abdomen.  Was on Plavix.   Presented to ED with hypotension and syncope  CT demonstrated subcapsular splenic hematoma for which he underwent (7/26/18) Splenectomy with gastrostomy.  Left large bore CT placed for Left pleural effusion (1.3L).   CTA Chest dmonstrates: Multiloculated pleural effusion  on the left with near collapse and reduced aeration.  Therefore Dr Pollock is requested to see to evaluate for L VATS and related procedures.  CMH significant for Ascending Arch/Hemiarch Replacement ( 2/14/2017); Bronchoscopy (2/17/2017); Tracheostomy (02/05/2018);  direct laryngoscopy, esophagoscopy, bronchoscopy (N/A, 2/5/2018); Venous Access Device (Port) (N/A, 3/7/2018); Colonoscopy (N/A, 3/7/2018); and Esophagogastroduodenoscopy w/ PEG (N/A, 5/2/2018).      The following portions of the patient's history were reviewed and updated as appropriate: allergies, current medications, past family history, past medical history, past social history, past surgical history and problem list.  Recent images independently reviewed.  Available laboratory values reviewed.    Review of Systems   Constitutional: Positive for activity change, appetite change and fatigue. Negative for chills and fever.        Limited by communication   Nods or writes responses   HENT: Positive for congestion and voice change. Negative for facial swelling, hearing loss, mouth sores and nosebleeds.    Eyes: Negative for visual disturbance.   Respiratory: Positive for cough, chest tightness and shortness of breath. Negative for wheezing and stridor.         Having hiccups    Cardiovascular: Positive for chest pain (at CT area). Negative for palpitations and leg swelling.   Gastrointestinal: Positive for abdominal pain (surgical ).        Expelling flatus  No BM since OR reports    Genitourinary: Negative for hematuria.        Freedman catheter in place    Musculoskeletal: Positive for back pain.   Skin: Negative for color change, pallor and rash.   Allergic/Immunologic: Positive for immunocompromised state.   Neurological: Positive for speech difficulty and weakness. Negative for dizziness and seizures.   Hematological: Bruises/bleeds easily.   Psychiatric/Behavioral: The patient is not nervous/anxious.         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   • Stroke (CMS/HCC)    • Swallowing difficulty      Past Surgical History:   Procedure Laterality Date   • AORTA SURGERY      ruptured aorta repair   • ASCENDING ARCH/HEMIARCH REPLACEMENT N/A 2/14/2017    Procedure: INTRAOPERATIVE TRANSESOPHAGEAL ECHOCARDIOGRAM, MIDLINE STERNOTOMY, ASCENDING AORTIC  AND PROXIMAL AORTIC ARCH REPAIR WITH 26MM GRAFT, AORTIC VALVE RESUSPENSION, AORTIC ROOT REPAIR, OPEN VEIN HARVEST OF RIGHT LEG;  Surgeon: German Arreguin MD;  Location: [facility] MAIN OR;  Service:    • BRONCHOSCOPY N/A 2/17/2017    Procedure: BRONCHOSCOPY BIOPSY AT BEDSIDE WITH BAL-LEFT LOWER LOBE;  Surgeon: Trent Chaney MD;  Location: [facility] ENDOSCOPY;  Service:    • COLONOSCOPY N/A 3/7/2018    Procedure: COLONOSCOPY WITH POSSIBLE POLYPECTOMY   ( DONE IN OR WITH ENDO)      COLONOSCOPY FIRST;  Surgeon: Kris Solano MD;  Location: [facility] OR;  Service:    • DIRECT LARYNGOSCOPY, ESOPHAGOSCOPY, BRONCHOSCOPY N/A 2/5/2018    Procedure: DIRECT LARYNGOSCOPY WITH BIOPSY, ESOPHAGOSCOPY     (no bronchoscopy, no laser);  Surgeon: Joseph Venegas MD;  Location: [facility] OR;  Service:    • ENDOSCOPY W/ PEG TUBE PLACEMENT N/A 5/2/2018    Procedure: ESOPHAGOGASTRODUODENOSCOPY WITH PERCUTANEOUS ENDOSCOPIC GASTROSTOMY TUBE INSERTION;  Surgeon: Angel Maciel MD;  Location: [facility] ENDOSCOPY;  Service: Gastroenterology   • TRACHEOSTOMY  02/05/2018   • TRACHEOSTOMY N/A 2/5/2018    Procedure: TRACHEOSTOMY  local injection @ 1106 incision @ 1119;  Surgeon: Joseph Venegas MD;  Location: [facility] OR;  Service:    • VENOUS ACCESS DEVICE (PORT) INSERTION N/A 3/7/2018    Procedure: INSERTION OF MEDIPORT     (C-ARM#1);  Surgeon: Kris Solano MD;  Location: [facility] OR;  Service:      Family History   Problem  Relation Age of Onset   • Thyroid disease Mother    • Hypertension Mother    • Hypertension Father    • Hypertension Other      Social History   Substance Use Topics   • Smoking status: Former Smoker     Packs/day: 1.25     Years: 38.00     Quit date: 2/13/2017   • Smokeless tobacco: Never Used      Comment: 02/27/2018 - Patient confirmed he ceased utilization of tobacco products 02/13/2017.   • Alcohol use No     Prescriptions Prior to Admission   Medication Sig Dispense Refill Last Dose   • Cholecalciferol 67697 units tablet 50 thousand units po q  month 3 tablet 3    • clopidogrel (PLAVIX) 75 MG tablet Take 75 mg by mouth Daily. Last dose approx greater than one month ago   Taking   • docusate sodium (COLACE) 100 MG capsule Take 1 capsule by mouth 2 (Two) Times a Day As Needed for Constipation. 60 capsule 2 Taking   • levothyroxine (SYNTHROID) 75 MCG tablet Take 1 tab daily Monday to Saturday and 1.5 on Sunday 32 tablet 11    • losartan-hydrochlorothiazide (HYZAAR) 50-12.5 MG per tablet Take 1 tablet by mouth Daily. 30 tablet 12 Taking   • metoprolol succinate XL (TOPROL-XL) 25 MG 24 hr tablet Take 1 tablet by mouth Every Night. Patient states he hasn't been taking this week, states he thinks he is hypotensive 30 tablet 12 Taking   • Multiple Vitamins-Minerals (MULTIVITAMIN ADULT PO) Take 1 tablet by mouth Daily.   Taking   • mupirocin (BACTROBAN) 2 % ointment Apply  topically 2 (Two) Times a Day. 30 g 2 Taking   • ondansetron (ZOFRAN) 4 MG tablet Take 1 tablet by mouth 3 (Three) Times a Day As Needed for Nausea or Vomiting. 40 tablet 3 Taking   • triamcinolone (KENALOG) 0.1 % cream Apply  topically 2 (Two) Times a Day. 45 g 2 Taking   • vitamin B-12 (CYANOCOBALAMIN) 100 MCG tablet Take 100 mcg by mouth Daily.   Taking   • vitamin E 100 UNIT capsule Take 400 Units by mouth Every Night.   Taking   • Zinc 50 MG capsule Take 50 mg by mouth Every Night.   Taking       chlorhexidine 15 mL Mouth/Throat Q12H  "  pantoprazole 40 mg Intravenous Q AM     Allergies:  Chlorhexidine; Co q 10 [coenzyme q10]; Eggs or egg-derived products; Erythromycin; Gabapentin; Other; Penicillins; Tetracyclines & related; Acth [corticotropin]; Cephalosporins; Keflex [cephalexin]; and Neomycin    Objective      Vital Signs  Temp:  [97.8 °F (36.6 °C)-98.3 °F (36.8 °C)] 98.3 °F (36.8 °C)  Heart Rate:  [] 91  BP: (131-145)/(63-75) 134/69  Arterial Line BP: (134-173)/(55-73) 153/69    Flowsheet Rows      First Filed Value   Admission Height  170.2 cm (67\") Documented at 07/26/2018 0620   Admission Weight  99.3 kg (218 lb 14.7 oz) Documented at 07/26/2018 0620        170.2 cm (67.01\")    Physical Exam   Constitutional: He is oriented to person, place, and time. He appears well-nourished. No distress.   Body mass index is 33.97 kg/m².     HENT:   Head: Normocephalic.   Trach    Eyes: Pupils are equal, round, and reactive to light. Conjunctivae are normal.   Neck: No JVD present.   R IJ    Cardiovascular: Regular rhythm and normal heart sounds.  Tachycardia present.    Pulses:       Radial pulses are 2+ on the right side, and 2+ on the left side.        Dorsalis pedis pulses are 2+ on the right side, and 2+ on the left side.        Posterior tibial pulses are 2+ on the right side, and 2+ on the left side.   Pulmonary/Chest: Effort normal. No stridor. No respiratory distress. He has no wheezes.   No cough effort  Says can not  Will theodore  Diminished BS Left  Right course   CT to -20 cm sx  thin serous sang    Abdominal: Soft. Bowel sounds are normal.   Expelling flatus  G tube  Inc    Musculoskeletal: He exhibits no edema or tenderness.   Has not been OOB since surgery    Neurological: He is alert and oriented to person, place, and time.   Skin: Skin is warm and dry. Capillary refill takes less than 2 seconds. No rash noted. No erythema. No pallor.   Left mediport    Psychiatric: His behavior is normal. Judgment normal.   Nursing note and " vitals reviewed.      Results Review:   Lab Results (last 24 hours)     Procedure Component Value Units Date/Time    Blood Culture - Blood, [[id number]]  (Normal) Collected:  07/26/18 0757    Specimen:  Blood from Arm, Left Updated:  07/30/18 0800     Blood Culture No growth at 4 days    Comprehensive Metabolic Panel [[id number]]  (Abnormal) Collected:  07/30/18 0354    Specimen:  Blood Updated:  07/30/18 0425     Glucose 95 mg/dL      BUN 13 mg/dL      Creatinine 0.74 mg/dL      Sodium 135 (L) mmol/L      Potassium 4.2 mmol/L      Chloride 105 mmol/L      CO2 25.0 mmol/L      Calcium 7.9 (L) mg/dL      Total Protein 5.1 (L) g/dL      Albumin 2.60 (L) g/dL      ALT (SGPT) 28 U/L      AST (SGOT) 23 U/L      Alkaline Phosphatase 81 U/L      Total Bilirubin 0.8 mg/dL      eGFR Non African Amer 109 mL/min/1.73      Globulin 2.5 gm/dL      A/G Ratio 1.0 (L) g/dL      BUN/Creatinine Ratio 17.6     Anion Gap 5.0 mmol/L     Magnesium [[id number]]  (Normal) Collected:  07/30/18 0354    Specimen:  Blood Updated:  07/30/18 0425     Magnesium 1.9 mg/dL     Phosphorus [[id number]]  (Normal) Collected:  07/30/18 0354    Specimen:  Blood Updated:  07/30/18 0425     Phosphorus 3.0 mg/dL     CBC (No Diff) [[id number]]  (Abnormal) Collected:  07/30/18 0354    Specimen:  Blood Updated:  07/30/18 0417     WBC 33.16 (H) 10*3/mm3      RBC 2.95 (L) 10*6/mm3      Hemoglobin 8.3 (L) g/dL      Hematocrit 25.3 (L) %      MCV 85.8 fL      MCH 28.1 pg      MCHC 32.8 g/dL      RDW 17.0 (H) %      RDW-SD 52.7 (H) fl      MPV 9.4 fL      Platelets 415 10*3/mm3     Blood Culture - Blood, [[id number]]  (Normal) Collected:  07/26/18 1033    Specimen:  Blood from Wrist, Right Updated:  07/29/18 1045     Blood Culture No growth at 3 days        Imaging Results (last 72 hours)     Procedure Component Value Units Date/Time    CT Chest With Contrast [[id number]] Collected:  07/29/18 1530     Updated:  07/29/18 1943    Narrative:       Right-sided pleural  effusion.    CT, chest with intravenous contrast.    Comparison is made with study dated July 26, 2018.    Radiation dose-limiting techniques also utilized, including  automated exposure control and adjustment of mA and/or KVP to the  patient size according to ALARA (as low as reasonably  achievable).    Isovue-300 92 milliliters injected IV.    There is tracheostomy tube. There are median sternotomy wires.  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 The cardiac silhouette  is within normal limits. There is pericardial effusion. There are  few mediastinal lymph nodes normal by size criteria. There is  multiloculated pleural effusions on the left with virtually  complete collapse of the left lung. There has been worsening of  aeration compared to the prior exam. There has been interval  placement of the left-sided chest tube. There are atelectasis in  the right lung base. There is tiny pleural effusion on the right.      There is gastrostomy tube. There is catheter seen in the left  upper abdominal quadrant. No acute bony abnormality is seen.      Impression:       CONCLUSION:   1. Multiloculated pleural effusion on the left with almost  complete collapse of the left lung. Worsening of aeration on the  left compared to the prior exam.   2. Small pleural effusion on the right and mild atelectasis in  the right lung base.    Electronically signed by:  Liborio Odom MD  7/29/2018 7:42  PM CDT Workstation: [id number]    XR Chest 1 View [[id number]] Collected:  07/29/18 0550     Updated:  07/29/18 0649    Narrative:       Exam: AP portable chest    INDICATION: Chest tube    COMPARISON: 7/27/2018    FINDINGS: AP portable chest. Tracheostomy tube right IJ venous  catheter, left central venous port and left chest tube remain in  place. There is now complete opacification of the left hemothorax  due to either worsening airspace disease atelectasis  and/or  pleural fluid. Difficult to assess the heart size. Status post  median sternotomy. Right lung remains clear.      Impression:       Worsening of aeration in the left lung with complete  opacification of the current study.    Electronically signed by:  Shahid Madrid MD  7/29/2018 6:48 AM  CDT Workstation: [id number]            Assessment/Plan     Principal Problem:    Splenic hemorrhage  Active Problems:    Syncope and collapse      Assessment & Plan    Left pleural effusion persistent with CT in place:   Continue CT to negative 20  Plan LEFT VATS, drainage of Left effusion (probable hemothorax) and related procedures this week.  Discuss operative procedure with patient, risk and benefits, and he wishes to proceed.     Thank you for the opportunity to participate in this patient's care.    Copy to primary care provider.        I discussed the patients findings and my recommendations with Dr Pollock, see attestation     Tran Troncoso, ROWAN  07/30/18  8:58 AM              Electronically signed by Joshua Pollock MD at 7/30/2018 11:45 AM

## 2018-08-06 NOTE — PROGRESS NOTES
GENERAL SURGERY PROGRESS NOTE  Chief Complaint:  Surgery Follow up   LOS: 11 days       Subjective     Interval History:     Feels well, sitting in chair this AM. Had some bloating with TF, which are currently off.    Objective     Vital Signs  Temp:  [97.6 °F (36.4 °C)-98.9 °F (37.2 °C)] 98.2 °F (36.8 °C)  Heart Rate:  [59-65] 59  Resp:  [19-20] 20  BP: (142-158)/(74-84) 157/84    Physical Exam:   Abdomen soft, incision CDI with staples. Tubes in place. TONYA with minimal output.  Labs:  Lab Results (last 24 hours)     Procedure Component Value Units Date/Time    Basic Metabolic Panel [[id number]]  (Abnormal) Collected:  08/06/18 0559    Specimen:  Blood Updated:  08/06/18 0623     Glucose 107 (H) mg/dL      BUN 11 mg/dL      Creatinine 0.60 (L) mg/dL      Sodium 133 (L) mmol/L      Potassium 3.4 (L) mmol/L      Chloride 102 mmol/L      CO2 27.0 mmol/L      Calcium 7.5 (L) mg/dL      eGFR Non African Amer 139 (H) mL/min/1.73      BUN/Creatinine Ratio 18.3     Anion Gap 4.0 (L) mmol/L     CBC (No Diff) [[id number]]  (Abnormal) Collected:  08/06/18 0559    Specimen:  Blood Updated:  08/06/18 0603     WBC 21.85 (H) 10*3/mm3      RBC 3.13 (L) 10*6/mm3      Hemoglobin 8.7 (L) g/dL      Hematocrit 26.1 (L) %      MCV 83.4 fL      MCH 27.8 pg      MCHC 33.3 g/dL      RDW 17.5 (H) %      RDW-SD 52.0 (H) fl      MPV 9.5 fL      Platelets 510 (H) 10*3/mm3     Fungus Culture - Tissue, Lung, L [[id number]] Collected:  07/31/18 1628    Specimen:  Tissue from Lung, L Updated:  08/05/18 1730     Fungus Culture No fungus isolated at less than 1 week    AFB Culture - Tissue, Lung, L [[id number]]  (Normal) Collected:  07/31/18 1628    Specimen:  Tissue from Lung, L Updated:  08/05/18 1730     AFB Culture No AFB isolated at less than 1 week     AFB Stain No acid fast bacilli seen on concentrated smear    Fungus Culture - Body Fluid, Lung, L [[id number]] Collected:  07/31/18 1625    Specimen:  Body Fluid from Lung, L Updated:  08/05/18 1648      Fungus Culture No fungus isolated at less than 1 week    AFB Culture - Body Fluid, Lung, L [[id number]]  (Normal) Collected:  07/31/18 1625    Specimen:  Body Fluid from Lung, L Updated:  08/05/18 1645     AFB Culture No AFB isolated at less than 1 week     AFB Stain No acid fast bacilli seen on concentrated smear    Basic Metabolic Panel [[id number]]  (Abnormal) Collected:  08/05/18 0848    Specimen:  Blood Updated:  08/05/18 0901     Glucose 121 (H) mg/dL      BUN 13 mg/dL      Creatinine 0.63 (L) mg/dL      Sodium 135 (L) mmol/L      Potassium 3.4 (L) mmol/L      Chloride 104 mmol/L      CO2 26.0 mmol/L      Calcium 7.7 (L) mg/dL      eGFR Non African Amer 131 (H) mL/min/1.73      BUN/Creatinine Ratio 20.6     Anion Gap 5.0 mmol/L     CBC (No Diff) [[id number]]  (Abnormal) Collected:  08/05/18 0848    Specimen:  Blood Updated:  08/05/18 0857     WBC 22.10 (H) 10*3/mm3      RBC 3.33 (L) 10*6/mm3      Hemoglobin 9.2 (L) g/dL      Hematocrit 27.9 (L) %      MCV 83.8 fL      MCH 27.6 pg      MCHC 33.0 g/dL      RDW 17.4 (H) %      RDW-SD 52.6 (H) fl      MPV 9.3 fL      Platelets 515 (H) 10*3/mm3            Results Review:     Labs and imaging for today were reviewed.    Assessment/Plan     Truman Esquivel is a 57 y.o. male who is s.p splenectomy, decortication.      Overall looks well. Continue PT/OT. Will ask speech to see for possible PO diet and speaking valve for trach.  DC TONYA            This document has been electronically signed by Kris Solano MD on August 6, 2018 7:41 AM        Kris Solano MD  08/06/18  7:41 AM

## 2018-08-06 NOTE — PLAN OF CARE
Problem: Patient Care Overview  Goal: Plan of Care Review  Outcome: Ongoing (interventions implemented as appropriate)   08/06/18 5073   Coping/Psychosocial   Plan of Care Reviewed With patient   OTHER   Outcome Summary Pt rested between care. VSS. Pain controlled. Refused tube feeding.   Plan of Care Review   Progress improving

## 2018-08-06 NOTE — THERAPY TREATMENT NOTE
Acute Care - Physical Therapy Treatment Note  [facility]     Patient Name: Truman Esquivel  : 1960  MRN: [id number]  Today's Date: 2018  Onset of Illness/Injury or Date of Surgery: 18  Date of Referral to PT: 18  Referring Physician: Dr. Solano    Admit Date: 2018    Visit Dx:    ICD-10-CM ICD-9-CM   1. Syncope and collapse R55 780.2   2. Septic shock (CMS/HCC) A41.9 038.9    R65.21 785.52     995.92   3. Dissection of aorta, unspecified portion of aorta (CMS/HCC) I71.00 441.00   4. Pleural effusion J90 511.9   5. Other ascites R18.8 789.59   6. Splenic hemorrhage D73.89 289.59   7. Impaired physical mobility Z74.09 781.99   8. Impaired mobility and activities of daily living Z74.09 799.89   9. Oropharyngeal dysphagia R13.12 787.22     Patient Active Problem List   Diagnosis   • Ascending aortic dissection (CMS/HCC)   • Essential hypertension   • Deep vein thrombosis (DVT) of femoral vein of both lower extremities (CMS/HCC)   • Hypothyroidism   • Snoring   • Acute pain of right knee   • Knee effusion, right   • Knee pain   • Obstructive sleep apnea of adult   • TIA (transient ischemic attack)   • Dysphagia   • Squamous cell carcinoma of larynx (CMS/HCC)   • Pharyngeal dysphagia   • Anemia   • Abnormal positron emission tomography (PET) of colon   • Hypokalemia   • Encounter for venous access device care   • Dehydration   • Weight loss, abnormal   • Odynophagia   • Thrush, oral   • Thrombocytopenia (CMS/HCC)   • Pancytopenia due to antineoplastic chemotherapy (CMS/HCC)   • Unable to eat   • Syncope and collapse   • Splenic hemorrhage   • Pleural effusion       Therapy Treatment          Rehabilitation Treatment Summary     Row Name 18 1503             Treatment Time/Intention    Discipline physical therapy assistant  -LEANDER      Document Type therapy note (daily note)  -LEANDER      Mode of Treatment physical therapy;individual therapy  -LEANDER      Patient Effort good  -LEANDER       Existing Precautions/Restrictions fall;oxygen therapy device and L/min  -LEANDER      Recorded by [LEANDER] Reinier Hannah, PTA 08/06/18 1554      Row Name 08/06/18 1503             Vital Signs    Pre Systolic BP Rehab 154  -LEANDER      Pre Treatment Diastolic BP 75  -LEANDER      Post Systolic BP Rehab 158  -LEANDER      Post Treatment Diastolic BP 76  -LEANDER      Pretreatment Heart Rate (beats/min) 55  -LEANDER      Intratreatment Heart Rate (beats/min) 52  -LEANDER      Pre SpO2 (%) 99  -LEANDER      O2 Delivery Pre Treatment supplemental O2  -LEANDER      Intra SpO2 (%) 98  -LEANDER      O2 Delivery Intra Treatment supplemental O2  -LEANDER      Pre Patient Position Sitting  -LEANDER      Post Patient Position Supine  -LEANDER      Recorded by [LEANDER] Reinier Hannah, PTA 08/06/18 1554      Row Name 08/06/18 1503             Cognitive Assessment/Intervention- PT/OT    Affect/Mental Status (Cognitive) WFL  -LEANDER      Orientation Status (Cognition) oriented x 4  -LEANDER      Follows Commands (Cognition) WFL  -LEANDER      Cognitive Function (Cognitive) WFL  -LEANDER      Personal Safety Interventions gait belt;muscle strengthening facilitated;nonskid shoes/slippers when out of bed;supervised activity  -LEANDER      Recorded by [LEANDER] Reinier Hannah, PTA 08/06/18 1554      Row Name 08/06/18 1503             Bed Mobility Assessment/Treatment    Bed Mobility Assessment/Treatment scooting/bridging;sit-supine  -LEANDER      Scooting/Bridging [place] (Bed Mobility) supervision  -LEANDER      Sit-Supine [place] (Bed Mobility) supervision  -LEANDER      Assistive Device (Bed Mobility) bed rails;head of bed elevated  -LEANDER      Recorded by [LEANDER] Reinier Hannah, PTA 08/06/18 1554      Row Name 08/06/18 1503             Transfer Assessment/Treatment    Transfer Assessment/Treatment chair-bed transfer;sit-stand transfer;stand-sit transfer;toilet transfer  -LEANDER      Recorded by [LEANDER] Reinier Hannah, PTA 08/06/18 1554      Row Name 08/06/18 1503             Chair-Bed Transfer    Chair-Bed [place] (Transfers)  contact guard  -LEANDER      Assistive Device (Chair-Bed Transfers) walker, front-wheeled  -LEANDER      Recorded by [LEANDER] Reinier Hannah, PTA 08/06/18 1554      Row Name 08/06/18 1503             Sit-Stand Transfer    Sit-Stand [place] (Transfers) contact guard  -LEANDER      Assistive Device (Sit-Stand Transfers) walker, front-wheeled  -LEANDER      Recorded by [LEANDER] Reinier Hannah, PTA 08/06/18 1554      Row Name 08/06/18 1503             Stand-Sit Transfer    Stand-Sit [place] (Transfers) contact guard  -LEANDER      Assistive Device (Stand-Sit Transfers) walker, front-wheeled  -LEANDER      Recorded by [LEANDER] Reinier Hannah, PTA 08/06/18 1554      Row Name 08/06/18 1503             Toilet Transfer    Type (Toilet Transfer) sit-stand;stand-sit  -LEANDER      [place] Level (Toilet Transfer) contact guard  -LEANDER      Assistive Device (Toilet Transfer) walker, front-wheeled  -LEANDER      Recorded by [LEANDER] Reinier Hannah, PTA 08/06/18 1554      Row Name 08/06/18 1503             Gait/Stairs Assessment/Training    Gait/Stairs Assessment/Training gait/ambulation independence;gait/ambulation assistive device;distance ambulated;gait pattern;gait deviations  -LEANDER      [place] Level (Gait) contact guard  -LEANDER      Assistive Device (Gait) walker, front-wheeled  -LEANDER      Distance in Feet (Gait) 106  -LEANDER      Deviations/Abnormal Patterns (Gait) [place] decreased  -LAENDER      Recorded by [LEANDER] Reinier Hannah, PTA 08/06/18 1554      Row Name 08/06/18 1503             Positioning and Restraints    Pre-Treatment Position sitting in chair/recliner  -LEANDER      Post Treatment Position bed  -LEANDER      In Bed fowlers;call light within reach;encouraged to call for assist;exit alarm on;LLE elevated;RLE elevated;legs elevated   bed gatched. all needs met.   -LEANDER      Recorded by [LEANDER] Reinier Hannah, PTA 08/06/18 1554      Row Name 08/06/18 1503             Pain Scale: Numbers Pre/Post-Treatment    Pain Scale: Numbers, Pretreatment 5/10  -LEANDER      Pain Scale:  Numbers, Post-Treatment 5/10  -LEANDER      Pain Location - Orientation incisional  -LEANDER      Pain Location abdomen  -LEANDER      Recorded by [LEANDER] Reinier Hannah PTA 08/06/18 1554      Row Name                Wound 07/26/18 1326 Other (See comments) anterior abdomen incision;surgical    Wound - Properties Group Date first assessed: 07/26/18 [TB] Time first assessed: 1326 [TB] Present On Admission : no [TB] Side: Other (See comments) [TB], MIDLINE  Orientation: anterior [TB] Location: abdomen [TB] Type: incision;surgical [TB] Recorded by:  [TB] Joshua Ordonez, RN 07/26/18 1327    Row Name                Wound 07/31/18 1700 Left lateral;upper chest surgical;incision    Wound - Properties Group Date first assessed: 07/31/18 [CH] Time first assessed: 1700 [CH] Present On Admission : no [CH] Side: Left [CH] Orientation: lateral;upper [CH] Location: chest [CH] Type: surgical;incision [CH] Recorded by:  [CH] Darlene Slaughter RN 07/31/18 1901    Row Name 08/06/18 1503             Outcome Summary/Treatment Plan (PT)    Daily Summary of Progress (PT) progress toward functional goals as expected  -      Plan for Continued Treatment (PT) continue  -      Anticipated Discharge Disposition (PT) home with assist;home with home health;home with 24/7 care;inpatient rehabilitation facility  -      Recorded by [LEANDER] Reinier Hannah PTA 08/06/18 1554        User Key  (r) = Recorded By, (t) = Taken By, (c) = Cosigned By    Initials Name Effective Dates Discipline    LEANDER Reinier Hannah PTA 03/07/18 -  PT    TB Joshua Ordonez RN 10/17/16 -  Nurse    CH Darlene Slaughter RN 06/23/17 -  Nurse          Wound 07/26/18 1326 Other (See comments) anterior abdomen incision;surgical (Active)   Dressing Appearance open to air 8/6/2018  8:38 AM   Closure [place] 8/6/2018  8:38 AM   Drainage Amount none 8/6/2018  8:38 AM       Wound 07/31/18 1700 Left lateral;upper chest surgical;incision (Active)   Dressing Appearance dry;intact  8/6/2018  8:38 AM   Closure [place] 8/6/2018  8:38 AM               PT Rehab Goals     Row Name 08/06/18 1503             Bed Mobility Goal 1 (PT)    Activity/Assistive Device (Bed Mobility Goal 1, PT) sit to supine;supine to sit;bed rails  -LEANDER      [place] Level/Cues Needed (Bed Mobility Goal 1, PT) supervision required  -LEANDER      Time Frame (Bed Mobility Goal 1, PT) 1 week  -LEANDER      Progress/Outcomes (Bed Mobility Goal 1, PT) goal not met  -LEANDER         Transfer Goal 1 (PT)    Activity/Assistive Device (Transfer Goal 1, PT) sit-to-stand/stand-to-sit;bed-to-chair/chair-to-bed;walker, rolling  -LEANDER      [place] Level/Cues Needed (Transfer Goal 1, PT) supervision required  -LEANDER      Time Frame (Transfer Goal 1, PT) 1 week  -LEANDER      Progress/Outcome (Transfer Goal 1, PT) goal not met  -LEANDER         Gait Training Goal 1 (PT)    Activity/Assistive Device (Gait Training Goal 1, PT) gait (walking locomotion);assistive device use;increase endurance/gait distance;walker, rolling;decrease fall risk   or least restrictive to no device  -LEANDER      [place] Level (Gait Training Goal 1, PT) supervision required  -LEANDER      Distance (Gait Goal 1, PT) 200  -LEANDER      Time Frame (Gait Training Goal 1, PT) 1 week  -LEANDER      Progress/Outcome (Gait Training Goal 1, PT) goal not met  -LEANDER         Stairs Goal 1 (PT)    Activity/Assistive Device (Stairs Goal 1, PT) ascending stairs;descending stairs;decrease fall risk;using handrail, right  -LEANDER      [place] Level/Cues Needed (Stairs Goal 1, PT) contact guard assist  -LEANDER      Number of Stairs (Stairs Goal 1, PT) 1  -LEANDER      Time Frame (Stairs Goal 1, PT) 1 week  -LEANDER      Progress/Outcome (Stairs Goal 1, PT) goal not met  -LEANDER         Patient Education Goal (PT)    Activity (Patient Education Goal, PT) HEP  -LEANDER      [place]/Cues/Accuracy (Memory Goal 2, PT) demonstrates adequately;verbalizes understanding  -LEANDER      Time Frame (Patient Education Goal, PT) 1 week  -LEANDER       Progress/Outcome (Patient Education Goal, PT) goal not met  -        User Key  (r) = Recorded By, (t) = Taken By, (c) = Cosigned By    Initials Name Provider Type Discipline    Reinier Martin PTA Physical Therapy Assistant PT          Physical Therapy Education     Title: PT OT SLP Therapies (Active)     Topic: Physical Therapy (Active)     Point: Mobility training (Active)    Learning Progress Summary     Learner Status Readiness Method Response Comment Documented by    Patient Active Acceptance E NR   08/06/18 1555     Active Acceptance E,D NR   08/03/18 1127          Point: Precautions (Active)    Learning Progress Summary     Learner Status Readiness Method Response Comment Documented by    Patient Active Acceptance E,D NR   08/03/18 1127                      User Key     Initials Effective Dates Name Provider Type Discipline     04/06/17 -  Lupe Grossman, PT Physical Therapist PT    LEANDER 03/07/18 -  Reinier Hannah PTA Physical Therapy Assistant PT                    PT Recommendation and Plan  Anticipated Discharge Disposition (PT): home with assist, home with home health, home with 24/7 care, inpatient rehabilitation facility  Outcome Summary/Treatment Plan (PT)  Daily Summary of Progress (PT): progress toward functional goals as expected  Plan for Continued Treatment (PT): continue  Anticipated Discharge Disposition (PT): home with assist, home with home health, home with 24/7 care, inpatient rehabilitation facility  Progress: improving  Outcome Summary: pt responded well to therapy w/ increased gait to 106 ft CGA w/ RW. pt required CGA for sit-stands and t/fs. SBA for bed mob. pts vitals stable throughout tx. no new goals met at this time.           Outcome Measures     Row Name 08/06/18 1503 08/05/18 1530 08/05/18 1010       How much help from another person do you currently need...    Turning from your back to your side while in flat bed without using bedrails? 3  -LEANDER 2  -TW  --     Moving from lying on back to sitting on the side of a flat bed without bedrails? 3  -LEANDER 2  -TW  --    Moving to and from a bed to a chair (including a wheelchair)? 3  -LEANDER 2  -TW  --    Standing up from a chair using your arms (e.g., wheelchair, bedside chair)? 3  -LEANDER 2  -TW  --    Climbing 3-5 steps with a railing? 3  -LEANDER 1  -TW  --    To walk in hospital room? 3  -LEANDER 2  -TW  --    AM-PAC 6 Clicks Score 18  -LEANDER 11  -TW  --       How much help from another is currently needed...    Putting on and taking off regular lower body clothing?  --  -- 2  -BB    Bathing (including washing, rinsing, and drying)  --  -- 2  -BB    Toileting (which includes using toilet bed pan or urinal)  --  -- 2  -BB    Putting on and taking off regular upper body clothing  --  -- 2  -BB    Taking care of personal grooming (such as brushing teeth)  --  -- 3  -BB    Eating meals  --  -- 4  -BB    Score  --  -- 15  -BB       Functional Assessment    Outcome Measure Options AM-PAC 6 Clicks Basic Mobility (PT)  - AM-[facility] 6 Clicks Basic Mobility (PT)  -TW  --      User Key  (r) = Recorded By, (t) = Taken By, (c) = Cosigned By    Initials Name Provider Type    Reinier Martin PTA Physical Therapy Assistant    Lj Wong, MARGI Physical Therapy Assistant    BB Carmen Davenport, SHERIDAN/L Occupational Therapy Assistant           Time Calculation:         PT Charges     Row Name 08/06/18 1557             Time Calculation    Start Time 1503  -      Stop Time 1538  -      Time Calculation (min) 35 min  -         Time Calculation- PT    Total Timed Code Minutes- PT 35 minute(s)  -        User Key  (r) = Recorded By, (t) = Taken By, (c) = Cosigned By    Initials Name Provider Type    Reinier Martin PTA Physical Therapy Assistant        Therapy Suggested Charges     Code   Minutes Charges    None           Therapy Charges for Today     Code Description Service Date Service Provider Modifiers Qty    [id number]  GAIT TRAINING  EA 15 MIN 8/6/2018 Reinier Hannah, PTA GP 1    [id number] HC PT THERAPEUTIC ACT EA 15 MIN 8/6/2018 Reinier Hannah, MARGI GP 1          PT G-Codes  PT Professional Judgement Used?: Yes  Outcome Measure Options: AM-PAC 6 Clicks Basic Mobility (PT)  Score: 11  Functional Limitation: Mobility: Walking and moving around  Mobility: Walking and Moving Around Current Status (): At least 60 percent but less than 80 percent impaired, limited or restricted  Mobility: Walking and Moving Around Goal Status (): At least 40 percent but less than 60 percent impaired, limited or restricted    Reinier Hannah PTA  8/6/2018

## 2018-08-06 NOTE — PLAN OF CARE
Problem: Patient Care Overview  Goal: Plan of Care Review  Outcome: Ongoing (interventions implemented as appropriate)   08/06/18 1503   OTHER   Outcome Summary pt responded well to therapy w/ increased gait to 106 ft CGA w/ RW. pt required CGA for sit-stands and t/fs. SBA for bed mob. pts vitals stable throughout tx. no new goals met at this time.    Plan of Care Review   Progress improving

## 2018-08-06 NOTE — PLAN OF CARE
Problem: Patient Care Overview  Goal: Plan of Care Review  Outcome: Ongoing (interventions implemented as appropriate)   08/06/18 9824   Coping/Psychosocial   Plan of Care Reviewed With patient   OTHER   Outcome Summary Swallow evaluation, no s/s of aspiration. SLP spoke with pt regarding initiating full liquid diet and will trial soft solids in a.m.    Plan of Care Review   Progress improving

## 2018-08-06 NOTE — PLAN OF CARE
Problem: Patient Care Overview  Goal: Plan of Care Review  Outcome: Ongoing (interventions implemented as appropriate)   08/06/18 1614   Coping/Psychosocial   Plan of Care Reviewed With patient;caregiver   OTHER   Outcome Summary Pt currenlty taking no EN due to feeling bloating and having gas. He was only at 20cc/hr but asked that it be discontinued. SLP evaluated pt and full liquid diet started. Will send supplements and monitor    Plan of Care Review   Progress improving       Problem: Nutrition, Enteral (Adult)  Goal: Signs and Symptoms of Listed Potential Problems Will be Absent, Minimized or Managed (Nutrition, Enteral)  Outcome: Ongoing (interventions implemented as appropriate)   08/06/18 1614   Goal/Outcome Evaluation   Problems Assessed (Enteral Nutrition) all   Problems Present (Enteral Nutrition) gastrointestinal complications

## 2018-08-07 LAB
ANION GAP SERPL CALCULATED.3IONS-SCNC: 4 MMOL/L (ref 5–15)
BUN BLD-MCNC: 9 MG/DL (ref 7–21)
BUN/CREAT SERPL: 14.8 (ref 7–25)
CALCIUM SPEC-SCNC: 7.5 MG/DL (ref 8.4–10.2)
CHLORIDE SERPL-SCNC: 100 MMOL/L (ref 95–110)
CO2 SERPL-SCNC: 27 MMOL/L (ref 22–31)
CREAT BLD-MCNC: 0.61 MG/DL (ref 0.7–1.3)
DEPRECATED RDW RBC AUTO: 52.5 FL (ref 35.1–43.9)
ERYTHROCYTE [DISTWIDTH] IN BLOOD BY AUTOMATED COUNT: 17.7 % (ref 11.5–14.5)
GFR SERPL CREATININE-BSD FRML MDRD: 136 ML/MIN/1.73 (ref 56–130)
GLUCOSE BLD-MCNC: 108 MG/DL (ref 60–100)
GLUCOSE BLDC GLUCOMTR-MCNC: 104 MG/DL (ref 70–130)
HCT VFR BLD AUTO: 25.5 % (ref 39–49)
HGB BLD-MCNC: 8.5 G/DL (ref 13.7–17.3)
MCH RBC QN AUTO: 27.7 PG (ref 26.5–34)
MCHC RBC AUTO-ENTMCNC: 33.3 G/DL (ref 31.5–36.3)
MCV RBC AUTO: 83.1 FL (ref 80–98)
PLATELET # BLD AUTO: 513 10*3/MM3 (ref 150–450)
PMV BLD AUTO: 9.4 FL (ref 8–12)
POTASSIUM BLD-SCNC: 3.9 MMOL/L (ref 3.5–5.1)
RBC # BLD AUTO: 3.07 10*6/MM3 (ref 4.37–5.74)
SODIUM BLD-SCNC: 131 MMOL/L (ref 137–145)
WBC NRBC COR # BLD: 21.89 10*3/MM3 (ref 3.2–9.8)

## 2018-08-07 PROCEDURE — 97110 THERAPEUTIC EXERCISES: CPT

## 2018-08-07 PROCEDURE — 97535 SELF CARE MNGMENT TRAINING: CPT

## 2018-08-07 PROCEDURE — 80048 BASIC METABOLIC PNL TOTAL CA: CPT | Performed by: STUDENT IN AN ORGANIZED HEALTH CARE EDUCATION/TRAINING PROGRAM

## 2018-08-07 PROCEDURE — 25010000002 MORPHINE PER 10 MG: Performed by: NURSE PRACTITIONER

## 2018-08-07 PROCEDURE — 82962 GLUCOSE BLOOD TEST: CPT

## 2018-08-07 PROCEDURE — 92526 ORAL FUNCTION THERAPY: CPT | Performed by: SPEECH-LANGUAGE PATHOLOGIST

## 2018-08-07 PROCEDURE — 94760 N-INVAS EAR/PLS OXIMETRY 1: CPT

## 2018-08-07 PROCEDURE — 85027 COMPLETE CBC AUTOMATED: CPT | Performed by: STUDENT IN AN ORGANIZED HEALTH CARE EDUCATION/TRAINING PROGRAM

## 2018-08-07 PROCEDURE — 94799 UNLISTED PULMONARY SVC/PX: CPT

## 2018-08-07 PROCEDURE — 99024 POSTOP FOLLOW-UP VISIT: CPT | Performed by: SURGERY

## 2018-08-07 PROCEDURE — 97530 THERAPEUTIC ACTIVITIES: CPT

## 2018-08-07 RX ADMIN — MORPHINE SULFATE 2 MG: 2 INJECTION, SOLUTION INTRAMUSCULAR; INTRAVENOUS at 16:17

## 2018-08-07 RX ADMIN — MORPHINE SULFATE 2 MG: 2 INJECTION, SOLUTION INTRAMUSCULAR; INTRAVENOUS at 20:45

## 2018-08-07 RX ADMIN — Medication 10 ML: at 05:45

## 2018-08-07 RX ADMIN — METOPROLOL SUCCINATE 25 MG: 25 TABLET, FILM COATED, EXTENDED RELEASE ORAL at 20:44

## 2018-08-07 RX ADMIN — MORPHINE SULFATE 2 MG: 2 INJECTION, SOLUTION INTRAMUSCULAR; INTRAVENOUS at 05:43

## 2018-08-07 RX ADMIN — POTASSIUM CHLORIDE, DEXTROSE MONOHYDRATE AND SODIUM CHLORIDE 75 ML/HR: 150; 5; 450 INJECTION, SOLUTION INTRAVENOUS at 19:42

## 2018-08-07 RX ADMIN — SIMETHICONE 40 MG: 20 SUSPENSION/ DROPS ORAL at 20:55

## 2018-08-07 RX ADMIN — POTASSIUM CHLORIDE, DEXTROSE MONOHYDRATE AND SODIUM CHLORIDE 75 ML/HR: 150; 5; 450 INJECTION, SOLUTION INTRAVENOUS at 06:43

## 2018-08-07 RX ADMIN — PANTOPRAZOLE SODIUM 40 MG: 40 INJECTION, POWDER, FOR SOLUTION INTRAVENOUS at 05:43

## 2018-08-07 RX ADMIN — LOSARTAN POTASSIUM: 50 TABLET, FILM COATED ORAL at 08:58

## 2018-08-07 RX ADMIN — MORPHINE SULFATE 2 MG: 2 INJECTION, SOLUTION INTRAMUSCULAR; INTRAVENOUS at 09:59

## 2018-08-07 RX ADMIN — LEVOTHYROXINE SODIUM 75 MCG: 0.07 TABLET ORAL at 05:43

## 2018-08-07 RX ADMIN — CHLORHEXIDINE GLUCONATE 0.12% ORAL RINSE 15 ML: 1.2 LIQUID ORAL at 20:44

## 2018-08-07 RX ADMIN — MORPHINE SULFATE 2 MG: 2 INJECTION, SOLUTION INTRAMUSCULAR; INTRAVENOUS at 01:55

## 2018-08-07 NOTE — PAYOR COMM NOTE
"Truman Esquivel (57 y.o. Male)     Date of Birth Social Security Number Address Home Phone MRN    1960  [id number] [facility] [address] [facility] [address] [phone] [id number]    Hindu Marital Status          Gnosticism        Admission Date Admission Type Admitting Provider Attending Provider Department, Room/Bed    7/26/18 Emergency Kris Solano MD Armstrong, James Edward, MD [facility] [address] [facility], 364/1    Discharge Date Discharge Disposition Discharge Destination                       Attending Provider:  Kris Solano MD    Allergies:  Chlorhexidine, Co Q 10 [Coenzyme Q10], Eggs Or Egg-derived Products, Erythromycin, Gabapentin, Other, Penicillins, Tetracyclines & Related, Acth [Corticotropin], Cephalosporins, Keflex [Cephalexin], Neomycin    Isolation:  None   Infection:  None   Code Status:  CPR    Ht:  170.2 cm (67.01\")   Wt:  104 kg (229 lb 4.5 oz)    Admission Cmt:  None   Principal Problem:  Splenic hemorrhage [D73.89] More...                 Active Insurance as of 7/26/2018     Primary Coverage     Payor Plan Insurance Group Employer/Plan Group    PASSPORT PASSPORT MEDICAID     Payor Plan Address Payor Plan Phone Number Effective From Effective To    [address] [phone] 11/1/1997     [facility] [address]       Subscriber Name Subscriber Birth Date Member ID       TRUMAN ESQUIVEL 1960 [id number]                 Emergency Contacts      (Rel.) Home Phone Work Phone Mobile Phone    Yolanda Esquivel (Mother) [phone] -- [phone]    Blanca Esquivel (Daughter) [phone] -- [phone]    Yolanda Diaz (Sister) [phone] -- --        Corazon Loomis RN [facility]  [phone] phone  [phone] fax    Ref#[id number]    [facility] Medications (active)       Dose Frequency Start End    bisacodyl (DULCOLAX) suppository 10 mg 10 mg Daily 8/2/2018     Sig - Route: Insert 1 suppository into the rectum Daily. - " Rectal    chlorhexidine (PERIDEX) 0.12 % solution 15 mL 15 mL Every 12 Hours Scheduled 7/26/2018     Sig - Route: Apply 15 mL to the mouth or throat Every 12 (Twelve) Hours. - Mouth/Throat    Cosign for Ordering: Accepted by Kris Solano MD on 7/26/2018  3:35 PM    dextrose 5 % and sodium chloride 0.45 % with KCl 20 mEq/L infusion 75 mL/hr Continuous 8/2/2018     Sig - Route: Infuse 75 mL/hr into a venous catheter Continuous. - Intravenous    levothyroxine (SYNTHROID, LEVOTHROID) tablet 75 mcg 75 mcg Every Early Morning 8/4/2018     Sig - Route: 1 tablet by Per G Tube route Every Morning. - Per G Tube    losartan (COZAAR) 50 mg, hydrochlorothiazide (MICROZIDE) 12.5 mg for HYZAAR 50-12.5  Daily 8/4/2018     Sig - Route: by Per G Tube route Daily. - Per G Tube    magnesium citrate solution 150 mL 150 mL Once 8/2/2018     Sig - Route: Take 150 mL by mouth 1 (One) Time. - Oral    metoprolol succinate XL (TOPROL-XL) 24 hr tablet 25 mg 25 mg Nightly 8/4/2018     Sig - Route: Take 1 tablet by mouth Every Night. - Oral    morphine injection 2 mg 2 mg Every 4 Hours PRN 8/2/2018 8/12/2018    Sig - Route: Infuse 1 mL into a venous catheter Every 4 (Four) Hours As Needed for Severe Pain  (Breakthrough pain). - Intravenous    ondansetron (ZOFRAN) injection 4 mg 4 mg Every 6 Hours PRN 7/26/2018     Sig - Route: Infuse 2 mL into a venous catheter Every 6 (Six) Hours As Needed for Nausea or Vomiting. - Intravenous    pantoprazole (PROTONIX) injection 40 mg 40 mg Every Early Morning 7/27/2018     Sig - Route: Infuse 10 mL into a venous catheter Every Morning. - Intravenous    pneumococcal polysaccharide 23-valent (PNEUMOVAX-23) vaccine 0.5 mL 0.5 mL During Hospitalization 8/6/2018     Sig - Route: Inject 0.5 mL into the appropriate muscle as directed by prescriber During Hospitalization for Immunization. - Intramuscular    polyethylene glycol (MIRALAX) powder 17 g 17 g Daily 8/2/2018     Sig - Route: Take 17 g by mouth  Daily. - Oral    simethicone (MYLICON) 40 MG/0.6ML drops 40 mg 40 mg 4 Times Daily PRN 8/5/2018     Sig - Route: 0.6 mL by Per G Tube route 4 (Four) Times a Day As Needed for Flatulence. - Per G Tube    sodium chloride 0.9 % flush 1-10 mL 1-10 mL As Needed 7/26/2018     Sig - Route: Infuse 1-10 mL into a venous catheter As Needed for Line Care. - Intravenous             Operative/Procedure Notes (most recent note)      Joshua Pollock MD at 7/31/2018  3:08 PM             OPERATIVE NOTE  Truman Tolentinon  1960  7/31/2018    PREOP DIAGNOSES:  Pleural effusion [J90]   Recent splenectomy for ruptured spleen  Repaired type A aortic dissection  Treated laryngeal carcinoma    POSTOP DIAGNOSES:  Pleural effusion [J90]    PROCEDURE:   LEFT THORACOSCOPY VIDEO ASSISTED  LEFT THORACOTOMY total lung decortication   Bronchoscopy  Negative pressure wound therapy (prevena)    SURGEON: KAY Pollock MD FACS RPVI    ASSISTANT: Madisyn Campbell CFA    ANESTHESIA: General ET     ESTIMATED BLOOD LOSS: 100 ml     COMPLICATIONS: none    DESCRIPTION OF OPERATION:   Patient taken to operating room, placed in supine position.   chronic indwelling trach removed, 6.0 endotracheal tube passed into RIGHT mainstem bronchus under bronchoscopic vision by anesthesia.  General anesthesia induced.  Radial arterial line placed by anesthesia.  Prepped draped in sterile fashion.    Patient placed in RIGHT lateral decubitus position with bean bag as axillary roll, table flexed.  Prepped draped in sterile fashion.1cm port site placed 8th interspace anterior axillary line.  Thoracoscope was introduced demonstrating advanced pleural space empyema.   lateral thoracotomy incision made thru 5th ICS.  Large amount of gelatinous material and  Pleural fluid removed, sent for cultures.  Lung trapped.  Fibrous rind removed from upper and lower lobes with currette.  Diaphragm and chest wall cleared of gradeaux.  Lung completely freed.  Pleural space  irrigated with 3L warm water.     Two 24Fr multihole chest tube placed lateral and posteriorly to apex.  Lung inflated to fill space, no air leak.     Incision closed layers of #2 Vicryl pericostal sutures around the fifth rib thru the 6th rib. serratus was reapproximated using 2-0 PDS suture, 2-0 PDS in the latissimus fascia, 2-0 PDS and subcutaneous tissue and staples in  Skin.  Diffuse tissue edema, negative pressure wound therapy with prevena. Fiberoptic bronchoscopy performed thru trach site, no endobronchial lesions, airways clear, moderate thin secretions .Tolerated procedure well transferred to PACU stable condition.          This document has been electronically signed by Joshua Pollock MD on July 31, 2018 5:55 PM        Electronically signed by Joshua Pollock MD at 7/31/2018  6:03 PM          Physician Progress Notes (most recent note)      Kris Solano MD at 8/6/2018  7:41 AM          GENERAL SURGERY PROGRESS NOTE  Chief Complaint:  Surgery Follow up   LOS: 11 days       Subjective     Interval History:     Feels well, sitting in chair this AM. Had some bloating with TF, which are currently off.    Objective     Vital Signs  Temp:  [97.6 °F (36.4 °C)-98.9 °F (37.2 °C)] 98.2 °F (36.8 °C)  Heart Rate:  [59-65] 59  Resp:  [19-20] 20  BP: (142-158)/(74-84) 157/84    Physical Exam:   Abdomen soft, incision CDI with staples. Tubes in place. TONYA with minimal output.  Labs:  Lab Results (last 24 hours)     Procedure Component Value Units Date/Time    Basic Metabolic Panel [[id number]]  (Abnormal) Collected:  08/06/18 0559    Specimen:  Blood Updated:  08/06/18 0623     Glucose 107 (H) mg/dL      BUN 11 mg/dL      Creatinine 0.60 (L) mg/dL      Sodium 133 (L) mmol/L      Potassium 3.4 (L) mmol/L      Chloride 102 mmol/L      CO2 27.0 mmol/L      Calcium 7.5 (L) mg/dL      eGFR Non African Amer 139 (H) mL/min/1.73      BUN/Creatinine Ratio 18.3     Anion Gap 4.0 (L) mmol/L     CBC (No  Diff) [[id number]]  (Abnormal) Collected:  08/06/18 0559    Specimen:  Blood Updated:  08/06/18 0603     WBC 21.85 (H) 10*3/mm3      RBC 3.13 (L) 10*6/mm3      Hemoglobin 8.7 (L) g/dL      Hematocrit 26.1 (L) %      MCV 83.4 fL      MCH 27.8 pg      MCHC 33.3 g/dL      RDW 17.5 (H) %      RDW-SD 52.0 (H) fl      MPV 9.5 fL      Platelets 510 (H) 10*3/mm3     Fungus Culture - Tissue, Lung, L [[id number]] Collected:  07/31/18 1628    Specimen:  Tissue from Lung, L Updated:  08/05/18 1730     Fungus Culture No fungus isolated at less than 1 week    AFB Culture - Tissue, Lung, L [[id number]]  (Normal) Collected:  07/31/18 1628    Specimen:  Tissue from Lung, L Updated:  08/05/18 1730     AFB Culture No AFB isolated at less than 1 week     AFB Stain No acid fast bacilli seen on concentrated smear    Fungus Culture - Body Fluid, Lung, L [[id number]] Collected:  07/31/18 1625    Specimen:  Body Fluid from Lung, L Updated:  08/05/18 1645     Fungus Culture No fungus isolated at less than 1 week    AFB Culture - Body Fluid, Lung, L [[id number]]  (Normal) Collected:  07/31/18 1625    Specimen:  Body Fluid from Lung, L Updated:  08/05/18 1645     AFB Culture No AFB isolated at less than 1 week     AFB Stain No acid fast bacilli seen on concentrated smear    Basic Metabolic Panel [[id number]]  (Abnormal) Collected:  08/05/18 0848    Specimen:  Blood Updated:  08/05/18 0901     Glucose 121 (H) mg/dL      BUN 13 mg/dL      Creatinine 0.63 (L) mg/dL      Sodium 135 (L) mmol/L      Potassium 3.4 (L) mmol/L      Chloride 104 mmol/L      CO2 26.0 mmol/L      Calcium 7.7 (L) mg/dL      eGFR Non African Amer 131 (H) mL/min/1.73      BUN/Creatinine Ratio 20.6     Anion Gap 5.0 mmol/L     CBC (No Diff) [[id number]]  (Abnormal) Collected:  08/05/18 0848    Specimen:  Blood Updated:  08/05/18 0857     WBC 22.10 (H) 10*3/mm3      RBC 3.33 (L) 10*6/mm3      Hemoglobin 9.2 (L) g/dL      Hematocrit 27.9 (L) %      MCV 83.8 fL      MCH 27.6 pg       MCHC 33.0 g/dL      RDW 17.4 (H) %      RDW-SD 52.6 (H) fl      MPV 9.3 fL      Platelets 515 (H) 10*3/mm3            Results Review:     Labs and imaging for today were reviewed.    Assessment/Plan     Truman Esquivel is a 57 y.o. male who is s.p splenectomy, decortication.      Overall looks well. Continue PT/OT. Will ask speech to see for possible PO diet and speaking valve for trach.  DC TONYA            This document has been electronically signed by Kris Solano MD on August 6, 2018 7:41 AM        Kris Solano MD  08/06/18  7:41 AM          Electronically signed by Kris Solano MD at 8/6/2018  7:42 AM       Medical Student Notes (most recent note)     No notes of this type exist for this encounter.           Consult Notes (most recent note)      Tran Troncoso APRN at 7/30/2018  8:58 AM      Consult Orders:    1. Inpatient Cardiothoracic Surgery Consult [[id number]] ordered by Kris Solano MD at 07/30/18 0732           Attestation signed by Joshua Pollock MD at 7/30/2018 11:45 AM    Detailed discussion with Truman Esquivel regarding situation, options and plans. recurrent pleural effusion, retained hemothorax  Pulmonary resection is advisable.      Risks:  Mortality/Major morbidity 2-3%  including but not limited to infection, bleeding, transfusion, pulmonary dysfunction (prolonged air leak, prolonged mechanical ventilation, need for long term oxygen therapy), renal dysfunction.  Benefits: diagnosis and treatment, improved quality of life, survival.  Options: observation, transthoracic needle biopsy discussed.  Understands and wishes to proceed.      LEFT VAT thoracotomy, pulmonary resection, bronchoscopy.  GEN.  SAM.  SDS.  7/31/2018                      CVTS CONSULTATION  CVTS/Dr Pollock requested to see by Dr Solano regarding continued Left pleural effusion.  3  Patient Care Team:  Marybeth Harrison DO as PCP - General (Family Medicine)  Joseph  Juan Carlos Venegas MD as Surgeon (Otolaryngology)  Reza Patel MD as Consulting Physician (Hematology and Oncology)  Jarocho Tapia MD as Consulting Physician (Radiation Oncology)  ROWAN Watkins as Nurse Practitioner (Oncology)  Sarah Beth Bright MA as Medical Assistant    Chief complaint: Mild shortness of air      Subjective     History of Present Illness:  57 y.o. male with Stage 3 squamous cell laryngeal cancer, chemotherapy in May.  Has trach and Left mediport.  Denies any falls, injury to R abdomen.  Was on Plavix.   Presented to ED with hypotension and syncope  CT demonstrated subcapsular splenic hematoma for which he underwent (7/26/18) Splenectomy with gastrostomy.  Left large bore CT placed for Left pleural effusion (1.3L).   CTA Chest dmonstrates: Multiloculated pleural effusion on the left with near collapse and reduced aeration.  Therefore Dr Pollock is requested to see to evaluate for L VATS and related procedures.  CMH significant for Ascending Arch/Hemiarch Replacement ( 2/14/2017); Bronchoscopy (2/17/2017); Tracheostomy (02/05/2018);  direct laryngoscopy, esophagoscopy, bronchoscopy (N/A, 2/5/2018); Venous Access Device (Port) (N/A, 3/7/2018); Colonoscopy (N/A, 3/7/2018); and Esophagogastroduodenoscopy w/ PEG (N/A, 5/2/2018).      The following portions of the patient's history were reviewed and updated as appropriate: allergies, current medications, past family history, past medical history, past social history, past surgical history and problem list.  Recent images independently reviewed.  Available laboratory values reviewed.    Review of Systems   Constitutional: Positive for activity change, appetite change and fatigue. Negative for chills and fever.        Limited by communication   Nods or writes responses   HENT: Positive for congestion and voice change. Negative for facial swelling, hearing loss, mouth sores and nosebleeds.    Eyes: Negative for visual disturbance.    Respiratory: Positive for cough, chest tightness and shortness of breath. Negative for wheezing and stridor.         Having hiccups    Cardiovascular: Positive for chest pain (at CT area). Negative for palpitations and leg swelling.   Gastrointestinal: Positive for abdominal pain (surgical ).        Expelling flatus  No BM since OR reports    Genitourinary: Negative for hematuria.        Freedman catheter in place    Musculoskeletal: Positive for back pain.   Skin: Negative for color change, pallor and rash.   Allergic/Immunologic: Positive for immunocompromised state.   Neurological: Positive for speech difficulty and weakness. Negative for dizziness and seizures.   Hematological: Bruises/bleeds easily.   Psychiatric/Behavioral: The patient is not nervous/anxious.         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   • Stroke (CMS/HCC)    • Swallowing difficulty      Past Surgical History:   Procedure Laterality Date   • AORTA SURGERY      ruptured aorta repair   • ASCENDING ARCH/HEMIARCH REPLACEMENT N/A 2/14/2017    Procedure: INTRAOPERATIVE TRANSESOPHAGEAL ECHOCARDIOGRAM, MIDLINE STERNOTOMY, ASCENDING AORTIC  AND PROXIMAL AORTIC ARCH REPAIR WITH 26MM GRAFT, AORTIC VALVE RESUSPENSION, AORTIC ROOT REPAIR, OPEN VEIN HARVEST OF RIGHT LEG;  Surgeon: German Arreguin MD;  Location: [facility] OR;  Service:    • BRONCHOSCOPY N/A 2/17/2017    Procedure: BRONCHOSCOPY BIOPSY AT BEDSIDE WITH BAL-LEFT LOWER LOBE;  Surgeon: Trent Chaney MD;  Location: [facility] ENDOSCOPY;  Service:    • COLONOSCOPY N/A 3/7/2018    Procedure: COLONOSCOPY WITH POSSIBLE POLYPECTOMY   ( DONE IN OR WITH ENDO)      COLONOSCOPY FIRST;  Surgeon: Kris Solano MD;  Location: [facility] OR;  Service:    • DIRECT LARYNGOSCOPY, ESOPHAGOSCOPY,  BRONCHOSCOPY N/A 2/5/2018    Procedure: DIRECT LARYNGOSCOPY WITH BIOPSY, ESOPHAGOSCOPY     (no bronchoscopy, no laser);  Surgeon: Joseph Venegas MD;  Location: [facility] OR;  Service:    • ENDOSCOPY W/ PEG TUBE PLACEMENT N/A 5/2/2018    Procedure: ESOPHAGOGASTRODUODENOSCOPY WITH PERCUTANEOUS ENDOSCOPIC GASTROSTOMY TUBE INSERTION;  Surgeon: Angel Maciel MD;  Location: [facility] ENDOSCOPY;  Service: Gastroenterology   • TRACHEOSTOMY  02/05/2018   • TRACHEOSTOMY N/A 2/5/2018    Procedure: TRACHEOSTOMY  local injection @ 1106 incision @ 1119;  Surgeon: Joseph Venegas MD;  Location: [facility] OR;  Service:    • VENOUS ACCESS DEVICE (PORT) INSERTION N/A 3/7/2018    Procedure: INSERTION OF MEDIPORT     (C-ARM#1);  Surgeon: Kris Solano MD;  Location: [facility] OR;  Service:      Family History   Problem Relation Age of Onset   • Thyroid disease Mother    • Hypertension Mother    • Hypertension Father    • Hypertension Other      Social History   Substance Use Topics   • Smoking status: Former Smoker     Packs/day: 1.25     Years: 38.00     Quit date: 2/13/2017   • Smokeless tobacco: Never Used      Comment: 02/27/2018 - Patient confirmed he ceased utilization of tobacco products 02/13/2017.   • Alcohol use No     Prescriptions Prior to Admission   Medication Sig Dispense Refill Last Dose   • Cholecalciferol 58329 units tablet 50 thousand units po q  month 3 tablet 3    • clopidogrel (PLAVIX) 75 MG tablet Take 75 mg by mouth Daily. Last dose approx greater than one month ago   Taking   • docusate sodium (COLACE) 100 MG capsule Take 1 capsule by mouth 2 (Two) Times a Day As Needed for Constipation. 60 capsule 2 Taking   • levothyroxine (SYNTHROID) 75 MCG tablet Take 1 tab daily Monday to Saturday and 1.5 on Sunday 32 tablet 11    • losartan-hydrochlorothiazide (HYZAAR) 50-12.5 MG per tablet Take 1 tablet by mouth Daily. 30 tablet 12 Taking   • metoprolol succinate XL (TOPROL-XL) 25 MG 24 hr tablet Take 1  "tablet by mouth Every Night. Patient states he hasn't been taking this week, states he thinks he is hypotensive 30 tablet 12 Taking   • Multiple Vitamins-Minerals (MULTIVITAMIN ADULT PO) Take 1 tablet by mouth Daily.   Taking   • mupirocin (BACTROBAN) 2 % ointment Apply  topically 2 (Two) Times a Day. 30 g 2 Taking   • ondansetron (ZOFRAN) 4 MG tablet Take 1 tablet by mouth 3 (Three) Times a Day As Needed for Nausea or Vomiting. 40 tablet 3 Taking   • triamcinolone (KENALOG) 0.1 % cream Apply  topically 2 (Two) Times a Day. 45 g 2 Taking   • vitamin B-12 (CYANOCOBALAMIN) 100 MCG tablet Take 100 mcg by mouth Daily.   Taking   • vitamin E 100 UNIT capsule Take 400 Units by mouth Every Night.   Taking   • Zinc 50 MG capsule Take 50 mg by mouth Every Night.   Taking       chlorhexidine 15 mL Mouth/Throat Q12H   pantoprazole 40 mg Intravenous Q AM     Allergies:  Chlorhexidine; Co q 10 [coenzyme q10]; Eggs or egg-derived products; Erythromycin; Gabapentin; Other; Penicillins; Tetracyclines & related; Acth [corticotropin]; Cephalosporins; Keflex [cephalexin]; and Neomycin    Objective      Vital Signs  Temp:  [97.8 °F (36.6 °C)-98.3 °F (36.8 °C)] 98.3 °F (36.8 °C)  Heart Rate:  [] 91  BP: (131-145)/(63-75) 134/69  Arterial Line BP: (134-173)/(55-73) 153/69    Flowsheet Rows      First Filed Value   Admission Height  170.2 cm (67\") Documented at 07/26/2018 0620   Admission Weight  99.3 kg (218 lb 14.7 oz) Documented at 07/26/2018 0620        170.2 cm (67.01\")    Physical Exam   Constitutional: He is oriented to person, place, and time. He appears well-nourished. No distress.   Body mass index is 33.97 kg/m².     HENT:   Head: Normocephalic.   Trach    Eyes: Pupils are equal, round, and reactive to light. Conjunctivae are normal.   Neck: No JVD present.   R IJ    Cardiovascular: Regular rhythm and normal heart sounds.  Tachycardia present.    Pulses:       Radial pulses are 2+ on the right side, and 2+ on the left " side.        Dorsalis pedis pulses are 2+ on the right side, and 2+ on the left side.        Posterior tibial pulses are 2+ on the right side, and 2+ on the left side.   Pulmonary/Chest: Effort normal. No stridor. No respiratory distress. He has no wheezes.   No cough effort  Says can not  Will theodore  Diminished BS Left  Right course   CT to -20 cm sx  thin serous sang    Abdominal: Soft. Bowel sounds are normal.   Expelling flatus  G tube  Inc    Musculoskeletal: He exhibits no edema or tenderness.   Has not been OOB since surgery    Neurological: He is alert and oriented to person, place, and time.   Skin: Skin is warm and dry. Capillary refill takes less than 2 seconds. No rash noted. No erythema. No pallor.   Left mediport    Psychiatric: His behavior is normal. Judgment normal.   Nursing note and vitals reviewed.      Results Review:   Lab Results (last 24 hours)     Procedure Component Value Units Date/Time    Blood Culture - Blood, [[id number]]  (Normal) Collected:  07/26/18 0757    Specimen:  Blood from Arm, Left Updated:  07/30/18 0800     Blood Culture No growth at 4 days    Comprehensive Metabolic Panel [[id number]]  (Abnormal) Collected:  07/30/18 0354    Specimen:  Blood Updated:  07/30/18 0425     Glucose 95 mg/dL      BUN 13 mg/dL      Creatinine 0.74 mg/dL      Sodium 135 (L) mmol/L      Potassium 4.2 mmol/L      Chloride 105 mmol/L      CO2 25.0 mmol/L      Calcium 7.9 (L) mg/dL      Total Protein 5.1 (L) g/dL      Albumin 2.60 (L) g/dL      ALT (SGPT) 28 U/L      AST (SGOT) 23 U/L      Alkaline Phosphatase 81 U/L      Total Bilirubin 0.8 mg/dL      eGFR Non African Amer 109 mL/min/1.73      Globulin 2.5 gm/dL      A/G Ratio 1.0 (L) g/dL      BUN/Creatinine Ratio 17.6     Anion Gap 5.0 mmol/L     Magnesium [[id number]]  (Normal) Collected:  07/30/18 0354    Specimen:  Blood Updated:  07/30/18 0425     Magnesium 1.9 mg/dL     Phosphorus [[id number]]  (Normal) Collected:  07/30/18 0354    Specimen:   Blood Updated:  07/30/18 0425     Phosphorus 3.0 mg/dL     CBC (No Diff) [[id number]]  (Abnormal) Collected:  07/30/18 0354    Specimen:  Blood Updated:  07/30/18 0417     WBC 33.16 (H) 10*3/mm3      RBC 2.95 (L) 10*6/mm3      Hemoglobin 8.3 (L) g/dL      Hematocrit 25.3 (L) %      MCV 85.8 fL      MCH 28.1 pg      MCHC 32.8 g/dL      RDW 17.0 (H) %      RDW-SD 52.7 (H) fl      MPV 9.4 fL      Platelets 415 10*3/mm3     Blood Culture - Blood, [[id number]]  (Normal) Collected:  07/26/18 1033    Specimen:  Blood from Wrist, Right Updated:  07/29/18 1045     Blood Culture No growth at 3 days        Imaging Results (last 72 hours)     Procedure Component Value Units Date/Time    CT Chest With Contrast [[id number]] Collected:  07/29/18 1530     Updated:  07/29/18 1943    Narrative:       Right-sided pleural effusion.    CT, chest with intravenous contrast.    Comparison is made with study dated July 26, 2018.    Radiation dose-limiting techniques also utilized, including  automated exposure control and adjustment of mA and/or KVP to the  patient size according to ALARA (as low as reasonably  achievable).    Isovue-300 92 milliliters injected IV.    There is tracheostomy tube. There are median sternotomy wires.  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 The cardiac silhouette  is within normal limits. There is pericardial effusion. There are  few mediastinal lymph nodes normal by size criteria. There is  multiloculated pleural effusions on the left with virtually  complete collapse of the left lung. There has been worsening of  aeration compared to the prior exam. There has been interval  placement of the left-sided chest tube. There are atelectasis in  the right lung base. There is tiny pleural effusion on the right.      There is gastrostomy tube. There is catheter seen in the left  upper abdominal quadrant. No acute bony abnormality is seen.      Impression:        CONCLUSION:   1. Multiloculated pleural effusion on the left with almost  complete collapse of the left lung. Worsening of aeration on the  left compared to the prior exam.   2. Small pleural effusion on the right and mild atelectasis in  the right lung base.    Electronically signed by:  Liborio Odom MD  7/29/2018 7:42  PM CDT Workstation: [id number]    XR Chest 1 View [[id number]] Collected:  07/29/18 0550     Updated:  07/29/18 0649    Narrative:       Exam: AP portable chest    INDICATION: Chest tube    COMPARISON: 7/27/2018    FINDINGS: AP portable chest. Tracheostomy tube right IJ venous  catheter, left central venous port and left chest tube remain in  place. There is now complete opacification of the left hemothorax  due to either worsening airspace disease atelectasis and/or  pleural fluid. Difficult to assess the heart size. Status post  median sternotomy. Right lung remains clear.      Impression:       Worsening of aeration in the left lung with complete  opacification of the current study.    Electronically signed by:  Shahid Madrid MD  7/29/2018 6:48 AM  CDT Workstation: [id number]            Assessment/Plan     Principal Problem:    Splenic hemorrhage  Active Problems:    Syncope and collapse      Assessment & Plan    Left pleural effusion persistent with CT in place:   Continue CT to negative 20  Plan LEFT VATS, drainage of Left effusion (probable hemothorax) and related procedures this week.  Discuss operative procedure with patient, risk and benefits, and he wishes to proceed.     Thank you for the opportunity to participate in this patient's care.    Copy to primary care provider.        I discussed the patients findings and my recommendations with Dr Pollock, see attestation     ROWAN Tripathi  07/30/18  8:58 AM              Electronically signed by Joshua Pollock MD at 7/30/2018 11:45 AM

## 2018-08-07 NOTE — SIGNIFICANT NOTE
08/07/18 1356   Rehab Treatment   Discipline physical therapy assistant   Reason Treatment Not Performed patient/family declined treatment  (pt. stated I wish you would have came earlier.  my legs and feet are hurting now.  pt. stated he prefers AM treatment)

## 2018-08-07 NOTE — PLAN OF CARE
Problem: Patient Care Overview  Goal: Plan of Care Review  Outcome: Ongoing (interventions implemented as appropriate)   08/07/18 1002   Coping/Psychosocial   Plan of Care Reviewed With patient   OTHER   Outcome Summary Upgrade to mechanical soft diet with chopped meats and thin liquids recommended this date. Patient tolerated mixed consistencies this date with no overt s/s of aspiration. Pt stated that he is not having the scratching feeling when eating that was present a few weeks prior. SLP encouraged pt to increase PO and monitor for s/s of aspiration such as coughing, throat clearing, food removal from trach. Pt was in agreement. Nursing was notified of diet upgrade. Continue trials for diet toleration and safety. Safe swallow strategies reviewed. Pt was in agreement.    Plan of Care Review   Progress improving

## 2018-08-07 NOTE — PLAN OF CARE
Problem: Patient Care Overview  Goal: Plan of Care Review  Outcome: Ongoing (interventions implemented as appropriate)   08/07/18 4472   Coping/Psychosocial   Plan of Care Reviewed With caregiver;patient   OTHER   Outcome Summary Pt's diet upgraded this am. RD will continue to monitor and will supplement diet   Plan of Care Review   Progress improving

## 2018-08-07 NOTE — THERAPY TREATMENT NOTE
Acute Care - Occupational Therapy Treatment Note  [facility]     Patient Name: Truman Esquivel  : 1960  MRN: [id number]  Today's Date: 2018  Onset of Illness/Injury or Date of Surgery: 18  Date of Referral to OT: 18  Referring Physician: Dr. Solano    Admit Date: 2018       ICD-10-CM ICD-9-CM   1. Syncope and collapse R55 780.2   2. Septic shock (CMS/HCC) A41.9 038.9    R65.21 785.52     995.92   3. Dissection of aorta, unspecified portion of aorta (CMS/HCC) I71.00 441.00   4. Pleural effusion J90 511.9   5. Other ascites R18.8 789.59   6. Splenic hemorrhage D73.89 289.59   7. Impaired physical mobility Z74.09 781.99   8. Impaired mobility and activities of daily living Z74.09 799.89   9. Oropharyngeal dysphagia R13.12 787.22     Patient Active Problem List   Diagnosis   • Ascending aortic dissection (CMS/HCC)   • Essential hypertension   • Deep vein thrombosis (DVT) of femoral vein of both lower extremities (CMS/HCC)   • Hypothyroidism   • Snoring   • Acute pain of right knee   • Knee effusion, right   • Knee pain   • Obstructive sleep apnea of adult   • TIA (transient ischemic attack)   • Dysphagia   • Squamous cell carcinoma of larynx (CMS/HCC)   • Pharyngeal dysphagia   • Anemia   • Abnormal positron emission tomography (PET) of colon   • Hypokalemia   • Encounter for venous access device care   • Dehydration   • Weight loss, abnormal   • Odynophagia   • Thrush, oral   • Thrombocytopenia (CMS/HCC)   • Pancytopenia due to antineoplastic chemotherapy (CMS/HCC)   • Unable to eat   • Syncope and collapse   • Splenic hemorrhage   • Pleural effusion     Past Medical History:   Diagnosis Date   • Aortic dissection (CMS/HCC)    • Chest pain    • Disease of thyroid gland    • HTN (hypertension)    • Knee pain    • Sleep apnea    • Smoker    • Squamous cell carcinoma of larynx (CMS/HCC) 2018   • Stroke (CMS/HCC)    • Swallowing difficulty      Past Surgical History:   Procedure  Laterality Date   • AORTA SURGERY      ruptured aorta repair   • ASCENDING ARCH/HEMIARCH REPLACEMENT N/A 2/14/2017    Procedure: INTRAOPERATIVE TRANSESOPHAGEAL ECHOCARDIOGRAM, MIDLINE STERNOTOMY, ASCENDING AORTIC  AND PROXIMAL AORTIC ARCH REPAIR WITH 26MM GRAFT, AORTIC VALVE RESUSPENSION, AORTIC ROOT REPAIR, OPEN VEIN HARVEST OF RIGHT LEG;  Surgeon: German Arreguin MD;  Location: [facility] MAIN OR;  Service:    • BRONCHOSCOPY N/A 2/17/2017    Procedure: BRONCHOSCOPY BIOPSY AT BEDSIDE WITH BAL-LEFT LOWER LOBE;  Surgeon: Trent Chaney MD;  Location: [facility] ENDOSCOPY;  Service:    • COLONOSCOPY N/A 3/7/2018    Procedure: COLONOSCOPY WITH POSSIBLE POLYPECTOMY   ( DONE IN OR WITH ENDO)      COLONOSCOPY FIRST;  Surgeon: Kris Solano MD;  Location: [facility] OR;  Service:    • DIRECT LARYNGOSCOPY, ESOPHAGOSCOPY, BRONCHOSCOPY N/A 2/5/2018    Procedure: DIRECT LARYNGOSCOPY WITH BIOPSY, ESOPHAGOSCOPY     (no bronchoscopy, no laser);  Surgeon: Joseph Venegas MD;  Location: [facility] OR;  Service:    • ENDOSCOPY W/ PEG TUBE PLACEMENT N/A 5/2/2018    Procedure: ESOPHAGOGASTRODUODENOSCOPY WITH PERCUTANEOUS ENDOSCOPIC GASTROSTOMY TUBE INSERTION;  Surgeon: Angel Maciel MD;  Location: [facility] ENDOSCOPY;  Service: Gastroenterology   • SPLENECTOMY N/A 7/26/2018    Procedure: SPLENECTOMY, left chest tube placement, EXPLORATORY LAPAROTOMY, G-TUBE PALCEMENT;  Surgeon: Kris Solano MD;  Location: [facility] OR;  Service: General   • THORACOSCOPY Left 7/31/2018    Procedure: LEFT THORACOSCOPY VIDEO ASSISTED POSSIBLE THORACOTOMY possible decortication bronchoscopy;  Surgeon: Joshua Pollock MD;  Location: [facility] OR;  Service: Cardiothoracic   • TRACHEOSTOMY  02/05/2018   • TRACHEOSTOMY N/A 2/5/2018    Procedure: TRACHEOSTOMY  local injection @ 1106 incision @ 1119;  Surgeon: Joseph Venegas MD;  Location: [facility] OR;  Service:    • VENOUS ACCESS DEVICE (PORT) INSERTION N/A 3/7/2018    Procedure:  INSERTION OF MEDIPORT     (C-ARM#1);  Surgeon: Kris Solano MD;  Location: [facility] OR;  Service:        Therapy Treatment          Rehabilitation Treatment Summary     Row Name 08/07/18 0947 08/07/18 0913          Treatment Time/Intention    Discipline occupational therapy assistant  - speech language pathologist  -EA     Document Type therapy note (daily note)  -  --     Subjective Information no complaints  -  --     Mode of Treatment occupational therapy;individual therapy  -  --     Patient/Family Observations Pt sitting up in [place]   -  --     Therapy Frequency (OT Eval) other (see comments)   5-7 days a wk  -  --     Patient Effort fair  -  --     Recorded by [] Julissa Vizcaino COTA/KIM 08/07/18 1152 [EA] Agnes Barone MS CCC-SLP 08/07/18 1000     Row Name 08/07/18 0947             Vital Signs    Pre SpO2 (%) 88  -JH      O2 Delivery Pre Treatment room air   O2 off upon entering pt room  -      Post SpO2 (%) 95  -JH      O2 Delivery Post Treatment supplemental O2  -JH      Recorded by [] Julissa Vizcaino SWARTZ/L 08/07/18 1205      Row Name 08/07/18 0947             Cognitive Assessment/Intervention- PT/OT    Affect/Mental Status (Cognitive) WFL  -      Orientation Status (Cognition) oriented x 4  -JH      Recorded by [] Julissa Vizcaino SWARTZ/L 08/07/18 1205      Row Name 08/07/18 0947             Sit-Stand Transfer    Sit-Stand Pray (Transfers) contact guard;verbal cues  -      Recorded by [] Julissa Vizcaion COTA/L 08/07/18 1205      Row Name 08/07/18 0947             Stand-Sit Transfer    Stand-Sit Pray (Transfers) contact guard;verbal cues  -      Recorded by [] Julissa Vizcaino SWARTZ/L 08/07/18 1205      Row Name 08/07/18 0947             Grooming Assessment/Training    Pray Level (Grooming) grooming skills;contact guard assist;minimum assist (75% patient effort)  -      Grooming Position supported sitting  -      Recorded by []  Julissa Vizcaino COTA/L 08/07/18 1205      Row Name 08/07/18 0947             Therapeutic Exercise    Upper Extremity Range of Motion (Therapeutic Exercise) shoulder flexion/extension, right;shoulder abduction/adduction, right;shoulder horizontal abduction/adduction, right;elbow flexion/extension, bilateral;forearm supination/pronation, bilateral  -JH      Hand (Therapeutic Exercise) finger flexion/extension, bilateral  -JH      Position (Therapeutic Exercise) seated  -      Sets/Reps (Therapeutic Exercise) 1/15-20  -JH      Expected Outcome (Therapeutic Exercise) improve functional tolerance, self-care activity  -      Recorded by [] Julissa Vizcaino COTA/L 08/07/18 1257      Row Name 08/07/18 0947             Positioning and Restraints    Pre-Treatment Position sitting in chair/recliner  -      Post Treatment Position chair  -      In Chair sitting;call light within reach;encouraged to call for assist  -      Recorded by [JH] Julissa Vizcaino COTA/L 08/07/18 1205      Row Name 08/07/18 0947             Pain Scale: Numbers Pre/Post-Treatment    Pain Scale: Numbers, Pretreatment 0/10 - no pain  -      Pain Scale: Numbers, Post-Treatment 9/10  -      Pain Location - Orientation other (see comments)   L shld  -      Recorded by [] Julissa Vizcaino COTA/L 08/07/18 1205      Row Name                Wound 07/26/18 1326 Other (See comments) anterior abdomen incision;surgical    Wound - Properties Group Date first assessed: 07/26/18 [TB] Time first assessed: 1326 [TB] Present On Admission : no [TB] Side: Other (See comments) [TB], MIDLINE  Orientation: anterior [TB] Location: abdomen [TB] Type: incision;surgical [TB] Recorded by:  [TB] Joshua Ordonez RN 07/26/18 1327    Row Name                Wound 07/31/18 1700 Left lateral;upper chest surgical;incision    Wound - Properties Group Date first assessed: 07/31/18 [CH] Time first assessed: 1700 [CH] Present On Admission : no [CH] Side: Left [CH]  Orientation: lateral;upper [CH] Location: chest [CH] Type: surgical;incision [CH] Recorded by:  [CH] Darlene Slaughter RN 07/31/18 1901    Row Name 08/07/18 0947             Outcome Summary/Treatment Plan (OT)    Daily Summary of Progress (OT) progress towards functional goals is fair  -      Plan for Continued Treatment (OT) Continue per POC  -      Anticipated Discharge Disposition (OT) home with home health;home with 24/7 [facility]  -      Recorded by [JH] Julissa Vizcaino COTA/L 08/07/18 1205      Row Name 08/07/18 0913             Outcome Summary/Treatment Plan (SLP)    Daily Summary of Progress (SLP) progress toward functional goals as expected  -EA      Recorded by [EA] Agnes Barone MS CCC-SLP 08/07/18 1000        User Key  (r) = Recorded By, (t) = Taken By, (c) = Cosigned By    Initials Name Effective Dates Discipline    EA Agnes Barone, MS CCC-SLP 10/17/16 -  SLP     Julissa Vizcaino SWARTZ/L 03/07/18 -  OT    TB Joshua Ordonez RN 10/17/16 -  Nurse    CH Darlene Slaughter RN 06/23/17 -  Nurse        Wound 07/26/18 1326 Other (See comments) anterior abdomen incision;surgical (Active)   Dressing Appearance open to air 8/7/2018  8:12 AM   Closure [place] 8/7/2018  8:12 AM   Drainage Amount none 8/7/2018  8:12 AM   Dressing Care, Wound open to air 8/6/2018 10:55 PM       Wound 07/31/18 1700 Left lateral;upper chest surgical;incision (Active)   Dressing Appearance dry;intact 8/7/2018  8:12 AM   Closure [place] 8/7/2018  8:12 AM   Base dressing in place, unable to visualize 8/7/2018  8:12 AM             OT Rehab Goals     Row Name 08/07/18 0980             Transfer Goal 1 (OT)    Activity/Assistive Device (Transfer Goal 1, OT) sit-to-stand/stand-to-sit;bed-to-chair/chair-to-bed;toilet  -      [place] Level/Cues Needed (Transfer Goal 1, OT) supervision required  -      Time Frame (Transfer Goal 1, OT) long term goal (LTG);by discharge  -JH      Progress/Outcome (Transfer Goal 1,  OT) goal not met;continuing progress toward goal  -JH         Bathing Goal 1 (OT)    Activity/Assistive Device (Bathing Goal 1, OT) bathing skills, all   using AE PRN  -JH      [place] Level/Cues Needed (Bathing Goal 1, OT) set-up required;supervision required  -JH      Time Frame (Bathing Goal 1, OT) long term goal (LTG);by discharge  -JH      Progress/Outcomes (Bathing Goal 1, OT) goal not met;continuing progress toward goal  -JH         Dressing Goal 1 (OT)    Activity/Assistive Device (Dressing Goal 1, OT) dressing skills, all   using AE PRN  -JH      [place]/Cues Needed (Dressing Goal 1, OT) set-up required;supervision required  -JH      Time Frame (Dressing Goal 1, OT) long term goal (LTG);other (see comments)  -JH      Progress/Outcome (Dressing Goal 1, OT) goal not met  -JH         Toileting Goal 1 (OT)    Activity/Device (Toileting Goal 1, OT) toileting skills, all  -JH      [place] Level/Cues Needed (Toileting Goal 1, OT) conditional independence  -JH      Time Frame (Toileting Goal 1, OT) long term goal (LTG);by discharge  -JH      Progress/Outcome (Toileting Goal 1, OT) goal not met;continuing progress toward goal  -JH        User Key  (r) = Recorded By, (t) = Taken By, (c) = Cosigned By    Initials Name Provider Type Discipline    Julissa Monsivais COTA/L Occupational Therapy Assistant OT        Occupational Therapy Education     Title: PT OT SLP Therapies (Active)     Topic: Occupational Therapy (Done)     Point: ADL training (Done)     Description: Instruct learner(s) on proper safety adaptation and remediation techniques during self care or transfers.   Instruct in proper use of assistive devices.   Learning Progress Summary     Learner Status Readiness Method Response Comment Documented by    Patient Done Acceptance GUILHERME PINEDA 08/07/18 6694          Point: Home exercise program (Done)     Description: Instruct learner(s) on appropriate technique for monitoring, assisting and/or  progressing therapeutic exercises/activities.   Learning Progress Summary     Learner Status Readiness Method Response Comment Documented by    Patient Done Acceptance E VU   08/07/18 1305     Active Acceptance E NR  BB 08/05/18 1337          Point: Precautions (Done)     Description: Instruct learner(s) on prescribed precautions during self-care and functional transfers.   Learning Progress Summary     Learner Status Readiness Method Response Comment Documented by    Patient Done Acceptance E VU   08/07/18 1305     Done Acceptance E VU,NR role of OT; OT POC; transfer training; safety precautions AS 08/03/18 1348          Point: Body mechanics (Done)     Description: Instruct learner(s) on proper positioning and spine alignment during self-care, functional mobility activities and/or exercises.   Learning Progress Summary     Learner Status Readiness Method Response Comment Documented by    Patient Done Acceptance E VU   08/07/18 1305     Done Acceptance E VU,NR role of OT; OT POC; transfer training; safety precautions AS 08/03/18 1348                      User Key     Initials Effective Dates Name Provider Type Discipline     03/07/18 -  Carmen Davenport COTA/L Occupational Therapy Assistant OT     03/07/18 -  Julissa Vizcaino COTA/L Occupational Therapy Assistant OT    AS 05/01/18 -  Erin Treviño, OT Occupational Therapist OT                OT Recommendation and Plan  Outcome Summary/Treatment Plan (OT)  Daily Summary of Progress (OT): progress towards functional goals is fair  Plan for Continued Treatment (OT): Continue per POC  Anticipated Discharge Disposition (OT): home with home health, home with 24/7 care  Therapy Frequency (OT Eval): other (see comments) (5-7 days a wk)  Daily Summary of Progress (OT): progress towards functional goals is fair  Outcome Summary: Pt agree to BUE ther/ex, states his L UE shoulder with 8/10 pain and pt very gaurded with movement, wants pain medicine dose  "\"upped\" ,ed pt on positioning and gentle movement , no new OT goals met,  OT/PT upon d/c         Outcome Measures     Row Name 08/07/18 0947 08/06/18 1503 08/05/18 1530       How much help from another person do you currently need...    Turning from your back to your side while in flat bed without using bedrails?  -- 3  -LEANDER 2  -TW    Moving from lying on back to sitting on the side of a flat bed without bedrails?  -- 3  -LEANDER 2  -TW    Moving to and from a bed to a chair (including a wheelchair)?  -- 3  -LEANDER 2  -TW    Standing up from a chair using your arms (e.g., wheelchair, bedside chair)?  -- 3  -LEANDER 2  -TW    Climbing 3-5 steps with a railing?  -- 3  -LEANDER 1  -TW    To walk in hospital room?  -- 3  -LEANDER 2  -TW    AM-PAC 6 Clicks Score  -- 18  -LEANDER 11  -TW       How much help from another is currently needed...    Putting on and taking off regular lower body clothing? 2  -JH  --  --    Bathing (including washing, rinsing, and drying) 2  -JH  --  --    Toileting (which includes using toilet bed pan or urinal) 2  -JH  --  --    Putting on and taking off regular upper body clothing 2  -JH  --  --    Taking care of personal grooming (such as brushing teeth) 3  -JH  --  --    Eating meals 4  -JH  --  --    Score 15  -JH  --  --       Functional Assessment    Outcome Measure Options  -- AM-PAC 6 Clicks Basic Mobility (PT)  -LENADER AM-PAC 6 Clicks Basic Mobility (PT)  -TW    Row Name 08/05/18 1010             How much help from another is currently needed...    Putting on and taking off regular lower body clothing? 2  -BB      Bathing (including washing, rinsing, and drying) 2  -BB      Toileting (which includes using toilet bed pan or urinal) 2  -BB      Putting on and taking off regular upper body clothing 2  -BB      Taking care of personal grooming (such as brushing teeth) 3  -BB      Eating meals 4  -BB      Score 15  -BB        User Key  (r) = Recorded By, (t) = Taken By, (c) = Cosigned By    Initials Name Provider Type    " Reinier Martin, PTA Physical Therapy Assistant    TW Lj Gross, PTA Physical Therapy Assistant    BB Carmen Davenport, SWARTZ/L Occupational Therapy Assistant     Julissa Vizcaino COTA/L Occupational Therapy Assistant           Time Calculation:         Time Calculation- OT     Row Name 08/07/18 1305             Time Calculation- OT    OT Start Time 0947  -      OT Stop Time 1040  -      OT Time Calculation (min) 53 min  -      OT Received On 08/07/18  -        User Key  (r) = Recorded By, (t) = Taken By, (c) = Cosigned By    Initials Name Provider Type     Julissa Vizcaino COTA/L Occupational Therapy Assistant           Therapy Suggested Charges     Code   Minutes Charges    None           Therapy Charges for Today     Code Description Service Date Service Provider Modifiers Qty    58683712542 HC OT THER PROC EA 15 MIN 8/7/2018 Julissa Vizcaino COTA/L GO 2    20137573545 HC OT THERAPEUTIC ACT EA 15 MIN 8/7/2018 Julissa Vizcaino COTA/L GO 1    71711668095 HC OT SELF CARE/MGMT/TRAIN EA 15 MIN 8/7/2018 Julissa Vizcaino COTA/L GO 1          OT G-codes  OT Professional Judgement Used?: Yes  OT Functional Scales Options: AM-PAC 6 Clicks Daily Activity (OT)  Score: 15  Functional Limitation: Self care  Self Care Current Status (): At least 40 percent but less than 60 percent impaired, limited or restricted  Self Care Goal Status (): At least 1 percent but less than 20 percent impaired, limited or restricted    MERLINE Berger  8/7/2018

## 2018-08-07 NOTE — PROGRESS NOTES
GENERAL SURGERY PROGRESS NOTE  Chief Complaint:  Surgery Follow up   LOS: 12 days       Subjective     Interval History:     Feels well. Now tolerating PO diet.    Objective     Vital Signs  Temp:  [97.4 °F (36.3 °C)-98.8 °F (37.1 °C)] 97.4 °F (36.3 °C)  Heart Rate:  [55-70] 58  Resp:  [16-18] 18  BP: (133-167)/(65-79) 133/65    Physical Exam:   Abdomen soft,   Labs:  Lab Results (last 24 hours)     Procedure Component Value Units Date/Time    Fungus Culture - Tissue, Lung, L [[id number]] Collected:  07/31/18 1628    Specimen:  Tissue from Lung, L Updated:  08/07/18 1731     Fungus Culture No fungus isolated at 1 week    AFB Culture - Tissue, Lung, L [[id number]]  (Normal) Collected:  07/31/18 1628    Specimen:  Tissue from Lung, L Updated:  08/07/18 1731     AFB Culture No AFB isolated at 1 week     AFB Stain No acid fast bacilli seen on concentrated smear    Fungus Culture - Body Fluid, Lung, L [[id number]] Collected:  07/31/18 1625    Specimen:  Body Fluid from Lung, L Updated:  08/07/18 1631     Fungus Culture No fungus isolated at 1 week    AFB Culture - Body Fluid, Lung, L [[id number]]  (Normal) Collected:  07/31/18 1625    Specimen:  Body Fluid from Lung, L Updated:  08/07/18 1631     AFB Culture No AFB isolated at 1 week     AFB Stain No acid fast bacilli seen on concentrated smear    Basic Metabolic Panel [[id number]]  (Abnormal) Collected:  08/07/18 0657    Specimen:  Blood Updated:  08/07/18 0711     Glucose 108 (H) mg/dL      BUN 9 mg/dL      Creatinine 0.61 (L) mg/dL      Sodium 131 (L) mmol/L      Potassium 3.9 mmol/L      Chloride 100 mmol/L      CO2 27.0 mmol/L      Calcium 7.5 (L) mg/dL      eGFR Non African Amer 136 (H) mL/min/1.73      BUN/Creatinine Ratio 14.8     Anion Gap 4.0 (L) mmol/L     CBC (No Diff) [[id number]]  (Abnormal) Collected:  08/07/18 0657    Specimen:  Blood Updated:  08/07/18 0701     WBC 21.89 (H) 10*3/mm3      RBC 3.07 (L) 10*6/mm3      Hemoglobin 8.5 (L) g/dL      Hematocrit 25.5  (L) %      MCV 83.1 fL      MCH 27.7 pg      MCHC 33.3 g/dL      RDW 17.7 (H) %      RDW-SD 52.5 (H) fl      MPV 9.4 fL      Platelets 513 (H) 10*3/mm3            Results Review:     Labs and imaging for today were reviewed.    Assessment/Plan     Truman Esquivel is a 57 y.o. male who is s/p splenectomy and left decortication.      Continue working with PT/OT. Will ask for ARU evaluation vs. Home PT.  Overall looks well.          This document has been electronically signed by Kris Solano MD on August 7, 2018 5:57 PM        Kris Solano MD  08/07/18  5:57 PM

## 2018-08-07 NOTE — PLAN OF CARE
"Problem: Patient Care Overview  Goal: Plan of Care Review  Outcome: Ongoing (interventions implemented as appropriate)   08/07/18 0947 08/07/18 1002   Coping/Psychosocial   Plan of Care Reviewed With --  patient   OTHER   Outcome Summary Pt agree to BUE ther/ex, states his L UE shoulder with 8/10 pain and pt very gaurded with movement, wants pain medicine dose \"upped\" ,ed pt on positioning and gentle movement , no new OT goals met,  OT/PT upon d/c  --    Plan of Care Review   Progress --  improving         "

## 2018-08-07 NOTE — THERAPY TREATMENT NOTE
Inpatient Rehabilitation - Speech Language Pathology   Swallow Treatment Note [facility]     Patient Name: Truman Esquivel  : 1960  MRN: [id number]  Today's Date: 2018  Onset of Illness/Injury or Date of Surgery: 18     Referring Physician: Dr. Solano      Admit Date: 2018    Upgrade to mechanical soft diet with chopped meats and thin liquids recommended this date. Patient tolerated mixed consistencies this date with no overt s/s of aspiration. Pt stated that he is not having the scratching feeling when eating that was present a few weeks prior. SLP encouraged pt to increase PO and monitor for s/s of aspiration such as coughing, throat clearing, food removal from trach. Pt was in agreement. Nursing was notified of diet upgrade. Continue trials for diet toleration and safety. Safe swallow strategies reviewed. Pt was in agreement.     Agnes Barone MS CCC-SLP    Visit Dx:      ICD-10-CM ICD-9-CM   1. Syncope and collapse R55 780.2   2. Septic shock (CMS/HCC) A41.9 038.9    R65.21 785.52     995.92   3. Dissection of aorta, unspecified portion of aorta (CMS/HCC) I71.00 441.00   4. Pleural effusion J90 511.9   5. Other ascites R18.8 789.59   6. Splenic hemorrhage D73.89 289.59   7. Impaired physical mobility Z74.09 781.99   8. Impaired mobility and activities of daily living Z74.09 799.89   9. Oropharyngeal dysphagia R13.12 787.22     Patient Active Problem List   Diagnosis   • Ascending aortic dissection (CMS/HCC)   • Essential hypertension   • Deep vein thrombosis (DVT) of femoral vein of both lower extremities (CMS/HCC)   • Hypothyroidism   • Snoring   • Acute pain of right knee   • Knee effusion, right   • Knee pain   • Obstructive sleep apnea of adult   • TIA (transient ischemic attack)   • Dysphagia   • Squamous cell carcinoma of larynx (CMS/HCC)   • Pharyngeal dysphagia   • Anemia   • Abnormal positron emission tomography (PET) of colon   • Hypokalemia   • Encounter for  venous access device care   • Dehydration   • Weight loss, abnormal   • Odynophagia   • Thrush, oral   • Thrombocytopenia (CMS/HCC)   • Pancytopenia due to antineoplastic chemotherapy (CMS/HCC)   • Unable to eat   • Syncope and collapse   • Splenic hemorrhage   • Pleural effusion       Therapy Treatment    Therapy Treatment / Health Promotion    Treatment Time/Intention  Discipline: speech language pathologist (08/07/18 0913 : Agnes Barone, MS CCC-SLP)  Document Type: therapy note (daily note) (08/07/18 0913 : Agnes Barone, MS CCC-SLP)  Subjective Information: no complaints (08/07/18 0913 : Agnes Barone, MS CCC-SLP)  Patient/Family Observations: Pt was sitting in chair upon SLP arrival.  (08/07/18 0913 : Agnes Barone, MS CCC-SLP)  Therapy Frequency (Swallow): other (see comments) (3-5 days per weeks) (08/07/18 0913 : Agnes Barone, MS CCC-SLP)  Patient Effort: good (08/07/18 0913 : Agnes Barone, MS CCC-SLP)  Comment: Pt stated that he was sore L side; repositioned in chair  (08/07/18 0913 : Agnes Barone, MS CCC-SLP)  Plan of Care Review  Plan of Care Reviewed With: patient (08/07/18 1002 : Agnes Barone MS CCC-SLP)    Vitals/Pain/Safety  Pain Scale: Numbers Pre/Post-Treatment  Pain Scale: Numbers, Pretreatment: 0/10 - no pain (08/07/18 0913 : Agnes Barone MS CCC-SLP)  Pain Scale: Numbers, Post-Treatment: 0/10 - no pain (08/07/18 0913 : Agnes Barone MS CCC-SLP)    Cognition, Communication, Swallow  Oral Motor and Function  Secretion Management: WNL/WFL (08/07/18 0913 : Agnes Barone MS CCC-SLP)  Mucosal Quality: moist, healthy (08/07/18 0913 : Agnes Barone MS CCC-SLP)  Volitional Swallow: WFL (08/07/18 0913 : Agnes Barone, MS CCC-SLP)  General Eating/Swallowing Observations  Respiratory Support Currently in Use: tracheostomy (08/07/18 0913 : Agnes Barone, MS CCC-SLP)  Eating/Swallowing Skills: self-fed (08/07/18 0913 :  Agnes Barone, MS CCC-SLP)  Positioning During Eating: upright in chair (08/07/18 0913 : Agnes Barone MS CCC-SLP)  Utensils Used: cup, spoon (08/07/18 0913 : Agnes Barone MS CCC-SLP)  Consistencies Trialed:  (full liquid diet) (08/07/18 0913 : Agnes Barone MS CCC-SLP)  Clinical Swallow Eval  Oral Prep Phase: WFL (08/07/18 0913 : Agnes Barone MS CCC-SLP)  Oral Transit: WFL (08/07/18 0913 : Agnes Barone MS CCC-SLP)  Oral Residue: WFL (08/07/18 0913 : Agnes Barone MS CCC-SLP)  Pharyngeal Phase: no overt signs/symptoms of pharyngeal impairment (08/07/18 0913 : Agnes Barone MS CCC-SLP)  Clinical Impression  SLP Swallowing Diagnosis: mild, functional oral phase, functional pharyngeal phase (08/07/18 0913 : Agnes Barone MS CCC-SLP)  Rehab Potential/Prognosis, Swallowing: good, to achieve stated therapy goals (08/07/18 0913 : Agnes Barone MS CCC-SLP)  Criteria for Skilled Therapeutic Interventions Met: demonstrates skilled criteria (08/07/18 0913 : Agnes Barone MS CCC-SLP)  Recommendations  Therapy Frequency (Swallow): other (see comments) (3-5 days per weeks) (08/07/18 0913 : Agnes Barone MS CCC-SLP)  Predicted Duration Therapy Intervention (Days): until discharge (08/07/18 0913 : Agnes Barone MS CCC-SLP)  SLP Diet Recommendation: soft textures, chopped, thin liquids (08/07/18 0913 : Agnes Barone MS CCC-SLP)  Recommended Precautions and Strategies: upright posture during/after eating, small bites of food and sips of liquid, alternate between small bites of food and sips of liquid (08/07/18 0913 : Agnes Barone MS CCC-SLP)    Outcome Summary  Outcome Summary/Treatment Plan (SLP)  Daily Summary of Progress (SLP): progress toward functional goals as expected (08/07/18 0913 : Agnes Barone, MS CCC-SLP)            SLP GOALS     Row Name 08/07/18 0913 08/06/18 1320          Oral Nutrition/Hydration Goal 1  (SLP)    Oral Nutrition/Hydration Goal 1, SLP Pt to tolerate least restrictive diet with no s/s of aspiration for adequate nutrition and hydration.   -EA Pt to tolerate least restrictive diet with no s/s of aspiration for adequate nutrition and hydration.   -EK     Time Frame (Oral Nutrition/Hydration Goal 1, SLP) by discharge  -EA by discharge  -EK     Progress/Outcomes (Oral Nutrition/Hydration Goal 1, SLP) goal not met  -EA goal not met  -EK        Oral Nutrition/Hydration Goal 2 (SLP)    Oral Nutrition/Hydration Goal 2, SLP Pt to tolerate soft solid trials with no s/s of aspiration.   -EA Pt to tolerate soft solid trials with no s/s of aspiration.   -EK     Time Frame (Oral Nutrition/Hydration Goal 2, SLP) by discharge  -EA by discharge  -EK     Progress/Outcomes (Oral Nutrition/Hydration Goal 2, SLP) goal ongoing;good progress toward goal  -EA goal not met  -EK       User Key  (r) = Recorded By, (t) = Taken By, (c) = Cosigned By    Initials Name Provider Type    Agnes San CCC-SLP Speech and Language Pathologist    Agnes Damian MS CCC-SLP Speech and Language Pathologist          EDUCATION  The patient has been educated in the following areas:   Dysphagia (Swallowing Impairment).    SLP Recommendation and Plan  SLP Swallowing Diagnosis: mild, functional oral phase, functional pharyngeal phase  SLP Diet Recommendation: soft textures, chopped, thin liquids  Recommended Precautions and Strategies: upright posture during/after eating, small bites of food and sips of liquid, alternate between small bites of food and sips of liquid           Criteria for Skilled Therapeutic Interventions Met: demonstrates skilled criteria     Rehab Potential/Prognosis, Swallowing: good, to achieve stated therapy goals  Therapy Frequency (Swallow): other (see comments) (3-5 days per weeks)  Predicted Duration Therapy Intervention (Days): until discharge       Plan of Care Reviewed With: patient  Plan of  Care Review  Plan of Care Reviewed With: patient  Daily Summary of Progress (SLP): progress toward functional goals as expected  Progress: improving  Outcome Summary: Upgrade to mechanical soft diet with chopped meats and thin liquids recommended this date. Patient tolerated mixed consistencies this date with no overt s/s of aspiration. Pt stated that he is not having the scratching feeling when eating that was present a few weeks prior. SLP encouraged pt to increase PO and monitor for s/s of aspiration such as coughing, throat clearing, food removal from trach. Pt was in agreement. Nursing was notified of diet upgrade. Continue trials for diet toleration and safety. Safe swallow strategies reviewed. Pt was in agreement.            Time Calculation:         Time Calculation- SLP     Row Name 08/07/18 1005             Time Calculation- SLP    SLP Start Time 0913  -EA      SLP Stop Time 0946  -EA      SLP Time Calculation (min) 33 min  -EA      Total Timed Code Minutes- SLP 33 minute(s)  -EA      SLP Received On 08/06/18  -EA      SLP Goal Re-Cert Due Date 08/19/18  -EA        User Key  (r) = Recorded By, (t) = Taken By, (c) = Cosigned By    Initials Name Provider Type    Agnes Damian MS CCC-SLP Speech and Language Pathologist          Therapy Charges for Today     Code Description Service Date Service Provider Modifiers Qty    51711233403  ST TREATMENT SWALLOW 2 8/7/2018 Agnes Barone MS CCC-SLP GN 1          SLP G-Codes  Functional Limitations: Swallowing  Swallow Current Status (): At least 1 percent but less than 20 percent impaired, limited or restricted  Swallow Goal Status (): 0 percent impaired, limited or restricted      Agnes Barone MS CCC-SLP  8/7/2018

## 2018-08-07 NOTE — PLAN OF CARE
Problem: Patient Care Overview  Goal: Plan of Care Review  Outcome: Ongoing (interventions implemented as appropriate)   08/07/18 0324   Coping/Psychosocial   Plan of Care Reviewed With patient   OTHER   Outcome Summary vss, pain is controlled with meds, tolerating liquid diet, was able to swallow pills tonight; will continue to monitor   Plan of Care Review   Progress improving     Goal: Individualization and Mutuality  Outcome: Ongoing (interventions implemented as appropriate)    Goal: Discharge Needs Assessment  Outcome: Ongoing (interventions implemented as appropriate)      Problem: Fall Risk (Adult)  Goal: Absence of Fall  Outcome: Ongoing (interventions implemented as appropriate)      Problem: Skin Injury Risk (Adult)  Goal: Skin Health and Integrity  Outcome: Ongoing (interventions implemented as appropriate)      Problem: Pain, Acute (Adult)  Goal: Identify Related Risk Factors and Signs and Symptoms  Outcome: Ongoing (interventions implemented as appropriate)      Problem: Nutrition, Enteral (Adult)  Goal: Signs and Symptoms of Listed Potential Problems Will be Absent, Minimized or Managed (Nutrition, Enteral)  Outcome: Ongoing (interventions implemented as appropriate)

## 2018-08-07 NOTE — CONSULTS
Nutrition Services    Patient Name:  Truman Esquivel  YOB: 1960  MRN: [id number]  Admit Date:  7/26/2018   Pt saw after breakfast this am by SLP and diet was advanced to a soft diet with chopped ;meats and thin liquids.  Pt is doing well at this time with the full liquids and he is to get a soft diet at lunch.  CA obtained requests.  Good acceptance of the Ensure.  Rd will monitor diet tolerance    Electronically signed by:  Leeann Salazar RD  08/07/18 2:51 PM

## 2018-08-08 ENCOUNTER — APPOINTMENT (OUTPATIENT)
Dept: ONCOLOGY | Facility: HOSPITAL | Age: 58
End: 2018-08-08

## 2018-08-08 ENCOUNTER — TELEPHONE (OUTPATIENT)
Dept: OTOLARYNGOLOGY | Facility: CLINIC | Age: 58
End: 2018-08-08

## 2018-08-08 LAB
ANION GAP SERPL CALCULATED.3IONS-SCNC: 6 MMOL/L (ref 5–15)
BUN BLD-MCNC: 8 MG/DL (ref 7–21)
BUN/CREAT SERPL: 12.7 (ref 7–25)
CALCIUM SPEC-SCNC: 7.6 MG/DL (ref 8.4–10.2)
CHLORIDE SERPL-SCNC: 98 MMOL/L (ref 95–110)
CO2 SERPL-SCNC: 27 MMOL/L (ref 22–31)
CREAT BLD-MCNC: 0.63 MG/DL (ref 0.7–1.3)
DEPRECATED RDW RBC AUTO: 53.1 FL (ref 35.1–43.9)
ERYTHROCYTE [DISTWIDTH] IN BLOOD BY AUTOMATED COUNT: 17.7 % (ref 11.5–14.5)
GFR SERPL CREATININE-BSD FRML MDRD: 131 ML/MIN/1.73 (ref 56–130)
GLUCOSE BLD-MCNC: 107 MG/DL (ref 60–100)
HCT VFR BLD AUTO: 25.6 % (ref 39–49)
HGB BLD-MCNC: 8.6 G/DL (ref 13.7–17.3)
MCH RBC QN AUTO: 28.1 PG (ref 26.5–34)
MCHC RBC AUTO-ENTMCNC: 33.6 G/DL (ref 31.5–36.3)
MCV RBC AUTO: 83.7 FL (ref 80–98)
PLATELET # BLD AUTO: 548 10*3/MM3 (ref 150–450)
PMV BLD AUTO: 9.4 FL (ref 8–12)
POTASSIUM BLD-SCNC: 3.9 MMOL/L (ref 3.5–5.1)
RBC # BLD AUTO: 3.06 10*6/MM3 (ref 4.37–5.74)
SODIUM BLD-SCNC: 131 MMOL/L (ref 137–145)
WBC NRBC COR # BLD: 19.96 10*3/MM3 (ref 3.2–9.8)

## 2018-08-08 PROCEDURE — 97530 THERAPEUTIC ACTIVITIES: CPT

## 2018-08-08 PROCEDURE — 99024 POSTOP FOLLOW-UP VISIT: CPT | Performed by: SURGERY

## 2018-08-08 PROCEDURE — 97116 GAIT TRAINING THERAPY: CPT

## 2018-08-08 PROCEDURE — 92526 ORAL FUNCTION THERAPY: CPT | Performed by: SPEECH-LANGUAGE PATHOLOGIST

## 2018-08-08 PROCEDURE — 85027 COMPLETE CBC AUTOMATED: CPT | Performed by: STUDENT IN AN ORGANIZED HEALTH CARE EDUCATION/TRAINING PROGRAM

## 2018-08-08 PROCEDURE — G8998 SWALLOW D/C STATUS: HCPCS | Performed by: SPEECH-LANGUAGE PATHOLOGIST

## 2018-08-08 PROCEDURE — 97110 THERAPEUTIC EXERCISES: CPT

## 2018-08-08 PROCEDURE — 25010000002 MORPHINE PER 10 MG: Performed by: NURSE PRACTITIONER

## 2018-08-08 PROCEDURE — G8997 SWALLOW GOAL STATUS: HCPCS | Performed by: SPEECH-LANGUAGE PATHOLOGIST

## 2018-08-08 PROCEDURE — 80048 BASIC METABOLIC PNL TOTAL CA: CPT | Performed by: STUDENT IN AN ORGANIZED HEALTH CARE EDUCATION/TRAINING PROGRAM

## 2018-08-08 RX ADMIN — MORPHINE SULFATE 2 MG: 2 INJECTION, SOLUTION INTRAMUSCULAR; INTRAVENOUS at 01:47

## 2018-08-08 RX ADMIN — MORPHINE SULFATE 2 MG: 2 INJECTION, SOLUTION INTRAMUSCULAR; INTRAVENOUS at 22:45

## 2018-08-08 RX ADMIN — CHLORHEXIDINE GLUCONATE 0.12% ORAL RINSE 15 ML: 1.2 LIQUID ORAL at 09:20

## 2018-08-08 RX ADMIN — PANTOPRAZOLE SODIUM 40 MG: 40 INJECTION, POWDER, FOR SOLUTION INTRAVENOUS at 05:59

## 2018-08-08 RX ADMIN — CHLORHEXIDINE GLUCONATE 0.12% ORAL RINSE 15 ML: 1.2 LIQUID ORAL at 21:39

## 2018-08-08 RX ADMIN — LOSARTAN POTASSIUM: 50 TABLET, FILM COATED ORAL at 09:20

## 2018-08-08 RX ADMIN — MORPHINE SULFATE 2 MG: 2 INJECTION, SOLUTION INTRAMUSCULAR; INTRAVENOUS at 11:59

## 2018-08-08 RX ADMIN — POTASSIUM CHLORIDE, DEXTROSE MONOHYDRATE AND SODIUM CHLORIDE 75 ML/HR: 150; 5; 450 INJECTION, SOLUTION INTRAVENOUS at 09:20

## 2018-08-08 RX ADMIN — LEVOTHYROXINE SODIUM 75 MCG: 0.07 TABLET ORAL at 05:59

## 2018-08-08 RX ADMIN — METOPROLOL SUCCINATE 25 MG: 25 TABLET, FILM COATED, EXTENDED RELEASE ORAL at 21:39

## 2018-08-08 RX ADMIN — MORPHINE SULFATE 2 MG: 2 INJECTION, SOLUTION INTRAMUSCULAR; INTRAVENOUS at 15:51

## 2018-08-08 RX ADMIN — SIMETHICONE 40 MG: 20 SUSPENSION/ DROPS ORAL at 18:49

## 2018-08-08 RX ADMIN — MORPHINE SULFATE 2 MG: 2 INJECTION, SOLUTION INTRAMUSCULAR; INTRAVENOUS at 05:59

## 2018-08-08 RX ADMIN — MORPHINE SULFATE 2 MG: 2 INJECTION, SOLUTION INTRAMUSCULAR; INTRAVENOUS at 18:36

## 2018-08-08 NOTE — NURSING NOTE
Nursing at bedside to complete trach care. Patient familiar with doing own trach care but refuses to do while admitted. Nursing removed and cleaned inner cannula. Patient unable to insert inner cannula at this time. Refuses for nursing or respiratory to help insert. Patient also becomes very irritated when prompted/coached by nursing or respiratory. Outer cannula still in place. Patient is stable as well as vitals at this time. Will continue to monitor. Patient strongly refuses for Dr. Archer to treat trach. Dr. Archer is surgeon on call at this time.

## 2018-08-08 NOTE — PLAN OF CARE
Problem: Patient Care Overview  Goal: Plan of Care Review  Outcome: Ongoing (interventions implemented as appropriate)   08/08/18 1149   Coping/Psychosocial   Plan of Care Reviewed With patient   OTHER   Outcome Summary pt. VS stable; Dr. Venegas to replace trach this evening   Plan of Care Review   Progress no change     Goal: Individualization and Mutuality  Outcome: Ongoing (interventions implemented as appropriate)    Goal: Discharge Needs Assessment  Outcome: Ongoing (interventions implemented as appropriate)    Goal: Interprofessional Rounds/Family Conf  Outcome: Ongoing (interventions implemented as appropriate)      Problem: Fall Risk (Adult)  Goal: Absence of Fall  Outcome: Ongoing (interventions implemented as appropriate)      Problem: Skin Injury Risk (Adult)  Goal: Skin Health and Integrity  Outcome: Ongoing (interventions implemented as appropriate)      Problem: Pain, Acute (Adult)  Goal: Acceptable Pain Control/Comfort Level  Outcome: Ongoing (interventions implemented as appropriate)

## 2018-08-08 NOTE — PLAN OF CARE
Problem: Patient Care Overview  Goal: Plan of Care Review  Outcome: Ongoing (interventions implemented as appropriate)   08/08/18 1316   Coping/Psychosocial   Plan of Care Reviewed With patient   OTHER   Outcome Summary speech therapy: pt is tolerating mech soft solids and thin liquids. there is no aspiration , coughing, or choking. pt eating small amount of meals only. encouraged to increase PO in hopes of decreasing tube feeds. SLP will d/c skilled st at this time with goals met.    Plan of Care Review   Progress improving

## 2018-08-08 NOTE — TELEPHONE ENCOUNTER
----- Message from Sharri Thomas sent at 8/8/2018 10:19 AM CDT -----  Contact: [phone]  SISTER ISABEL CALLED.. SAID THEY GOT A LETTER THAT HE NEEDS NEW ORDERS TO KEEP HIS HOSPITAL BED. CALLED IN TO [facility]. (MR RODRIGUEZ IS IN THE HOSPITAL SO HIS SISTER IS GETTING THIS TAKEN CARE FOR HIM). THEY ARE GOING TO COME TO GET IT ON 8/9.    Explained that she should just let the company come get the bed as he is likely going to rehabilitation after his hospitalization and it may be a while longer before he gets home.  When he is ready to leave whatever rehabilitation facility he goes to they can reorder the hospital bed if needed.

## 2018-08-08 NOTE — THERAPY DISCHARGE NOTE
Acute Care - Speech Language Pathology   Swallow Progress Note/Discharge   [facility]     Patient Name: Truman Esquivel  : 1960  MRN: [id number]  Today's Date: 2018  Onset of Illness/Injury or Date of Surgery: 18     Referring Physician: Dr. Solano      Admit Date: 2018  Pt has met all goals for diet toleration.  Pt does not wish to have regular solids at this time.  He is taking in small amounts of po and is aprehensive to eat more, stating his throat gets scratchy at times.  Pt states his throat is feeling better now, and he understands that he needs to increase PO to decrease TF.  Skilled st services d/c this date.  Thank you for this consult.    Visit Dx:      ICD-10-CM ICD-9-CM   1. Syncope and collapse R55 780.2   2. Septic shock (CMS/HCC) A41.9 038.9    R65.21 785.52     995.92   3. Dissection of aorta, unspecified portion of aorta (CMS/HCC) I71.00 441.00   4. Pleural effusion J90 511.9   5. Other ascites R18.8 789.59   6. Splenic hemorrhage D73.89 289.59   7. Impaired physical mobility Z74.09 781.99   8. Impaired mobility and activities of daily living Z74.09 799.89   9. Oropharyngeal dysphagia R13.12 787.22     Patient Active Problem List   Diagnosis   • Ascending aortic dissection (CMS/HCC)   • Essential hypertension   • Deep vein thrombosis (DVT) of femoral vein of both lower extremities (CMS/HCC)   • Hypothyroidism   • Snoring   • Acute pain of right knee   • Knee effusion, right   • Knee pain   • Obstructive sleep apnea of adult   • TIA (transient ischemic attack)   • Dysphagia   • Squamous cell carcinoma of larynx (CMS/HCC)   • Pharyngeal dysphagia   • Anemia   • Abnormal positron emission tomography (PET) of colon   • Hypokalemia   • Encounter for venous access device care   • Dehydration   • Weight loss, abnormal   • Odynophagia   • Thrush, oral   • Thrombocytopenia (CMS/HCC)   • Pancytopenia due to antineoplastic chemotherapy (CMS/HCC)   • Unable to eat   • Syncope  and collapse   • Splenic hemorrhage   • Pleural effusion       Therapy Treatment    Therapy Treatment / Health Promotion    Treatment Time/Intention  Discipline: speech language pathologist (08/08/18 1220 : Agnes Chung CCC-SLP)  Document Type: discharge treatment (08/08/18 1220 : Agnes Chung CCC-SLP)  Subjective Information: no complaints (08/08/18 1220 : Agnes Chung CCC-SLP)  Mode of Treatment: individual therapy, speech-language pathology (08/08/18 1220 : Agnes Chung CCC-SLP)  Patient/Family Observations: pt in chair, family and visitors present (08/08/18 1220 : Agnes Chung CCC-SLP)  Care Plan Review: evaluation/treatment results reviewed (08/08/18 1220 : Agnes Chung CCC-SLP)  Care Plan Review, Other Participant(s): family (08/08/18 1220 : Agnes Chung CCC-SLP)  Plan of Care Review  Plan of Care Reviewed With: patient (08/08/18 1316 : Suncook, Agnes A, CCC-SLP)    Vitals/Pain/Safety  Pain Assessment  Additional Documentation: Pain Scale: Numbers Pre/Post-Treatment (Group) (08/08/18 1220 : Agnes Chung CCC-SLP)  Pain Scale: Numbers Pre/Post-Treatment  Pain Scale: Numbers, Pretreatment: 3/10 (08/08/18 1220 : Agnes Chung CCC-SLP)  Pain Scale: Numbers, Post-Treatment: 3/10 (08/08/18 1220 : Agnes Chung CCC-SLP)  Pain Location - Orientation: incisional (08/08/18 1220 : Agnes Chung CCC-SLP)  Pain Location: abdomen (08/08/18 1220 : Agnes Chung CCC-SLP)  Pre/Post Treatment Pain Comment: when he moves (08/08/18 1220 : Agnes Chung CCC-SLP)  Pain Scale: Word Pre/Post-Treatment  Pain Location - Orientation: incisional (08/08/18 1220 : Agnes Chung CCC-SLP)  Pain Location: abdomen (08/08/18 1220 : Agnes Chung CCC-SLP)  Pain Scale: FACES Pre/Post-Treatment  Pain Location - Orientation: incisional (08/08/18 1220 : Agnes Chung CCC-SLP)  Pain Location: abdomen (08/08/18 1220 : Juliet  NABIL Leon)  Positioning and Restraints  Pre-Treatment Position: sitting in chair/recliner (08/08/18 1220 : Agnes Chung CCC-SLP)  Post Treatment Position: chair (08/08/18 1220 : Agnes Chung CCC-SLP)  In Chair: call light within reach, encouraged to call for assist, exit alarm on, with family/caregiver (08/08/18 1220 : Agnes Chung CCC-SLP)    Cognition, Communication, Swallow  Recommendations  Anticipated Dischage Disposition: home with assist (08/08/18 1319 : Agnes Chung CCC-SLP)    Outcome Summary  Outcome Summary/Treatment Plan (SLP)  Anticipated Dischage Disposition: home with assist (08/08/18 1319 : Agnes Chung CCC-SLP)  Reason for Discharge: all goals and outcomes met, no further needs identified (08/08/18 1319 : Agnes Chung CCC-SLP)        SLP GOALS     Row Name 08/08/18 1220 08/07/18 0913 08/06/18 1320       Oral Nutrition/Hydration Goal 1 (SLP)    Oral Nutrition/Hydration Goal 1, SLP Pt to tolerate least restrictive diet with no s/s of aspiration for adequate nutrition and hydration.   -EC Pt to tolerate least restrictive diet with no s/s of aspiration for adequate nutrition and hydration.   -EA Pt to tolerate least restrictive diet with no s/s of aspiration for adequate nutrition and hydration.   -EK    Time Frame (Oral Nutrition/Hydration Goal 1, SLP) by discharge  -EC by discharge  -EA by discharge  -EK    Barriers (Oral Nutrition/Hydration Goal 1, SLP) none  -EC  --  --    Progress/Outcomes (Oral Nutrition/Hydration Goal 1, SLP) goal met  -EC goal not met  -EA goal not met  -EK       Oral Nutrition/Hydration Goal 2 (SLP)    Oral Nutrition/Hydration Goal 2, SLP Pt to tolerate soft solid trials with no s/s of aspiration.   -EC Pt to tolerate soft solid trials with no s/s of aspiration.   -EA Pt to tolerate soft solid trials with no s/s of aspiration.   -EK    Time Frame (Oral Nutrition/Hydration Goal 2, SLP) by discharge  -EC by discharge  -EA  by discharge  -EK    Barriers (Oral Nutrition/Hydration Goal 2, SLP) none  -EC  --  --    Progress/Outcomes (Oral Nutrition/Hydration Goal 2, SLP) goal met  -EC goal ongoing;good progress toward goal  -EA goal not met  -EK      User Key  (r) = Recorded By, (t) = Taken By, (c) = Cosigned By    Initials Name Provider Type    Agnes Mcmanus CCC-SLP Speech and Language Pathologist    Agnes San CCC-SLP Speech and Language Pathologist    Agnes Damian, MS CCC-SLP Speech and Language Pathologist          EDUCATION  The patient has been educated in the following areas:   Dysphagia (Swallowing Impairment).    SLP Recommendation and Plan                    Anticipated Dischage Disposition: home with assist                Plan of Care Reviewed With: patient  Progress: improving  Plan of Care Reviewed With: patient           Time Calculation:         Time Calculation- SLP     Row Name 08/08/18 1320             Time Calculation- SLP    SLP Start Time 1220  -EC      SLP Stop Time 1320  -EC      SLP Time Calculation (min) 60 min  -EC      Total Timed Code Minutes- SLP 60 minute(s)  -EC      SLP Received On 08/08/18  -EC        User Key  (r) = Recorded By, (t) = Taken By, (c) = Cosigned By    Initials Name Provider Type    Agnes Mcmanus CCC-SLP Speech and Language Pathologist          Therapy Charges for Today     Code Description Service Date Service Provider Modifiers Qty    61005566142 HC ST SWALLOWING PROJECTED 8/8/2018 Agnes Chung CCC-SLP GN, CI 1    11685203489 HC ST SWALLOWING DISCHARGE 8/8/2018 Agnes Chung CCC-RAHUL KOEHLER, CJ 1    83467290257 HC ST TREATMENT SWALLOW 4 8/8/2018 Agnes Chung CCC-SLP GN 1          SLP G-Codes  Functional Limitations: Swallowing  Swallow Current Status (): At least 1 percent but less than 20 percent impaired, limited or restricted  Swallow Goal Status (): At least 1 percent but less than 20 percent impaired,  limited or restricted  Swallow Discharge Status (): At least 20 percent but less than 40 percent impaired, limited or restricted    SLP Discharge Summary  Anticipated Dischage Disposition: home with assist  Reason for Discharge: all goals and outcomes met, no further needs identified  Discharge Destination: other (see comments)    Agnes Chung CCC-SLP  8/8/2018

## 2018-08-08 NOTE — PLAN OF CARE
Problem: Patient Care Overview  Goal: Plan of Care Review   08/08/18 0443 08/08/18 0910   Coping/Psychosocial   Plan of Care Reviewed With --  patient   OTHER   Outcome Summary --  SIt-stand- Mod A x 1, Pt stood ~4 mins staic standing with RW and no LOB or c/o. Pt completed UE ther ex w/ good tolerance. No new goals met this date. Cont OT POC.   Plan of Care Review   Progress no change --

## 2018-08-08 NOTE — PLAN OF CARE
Problem: Patient Care Overview  Goal: Plan of Care Review  Outcome: Ongoing (interventions implemented as appropriate)   08/08/18 9703   Coping/Psychosocial   Plan of Care Reviewed With patient   OTHER   Outcome Summary VSS, pain relieved with oral pain meds, pt unable to insert inner cannula after trach care, pt refusing nursing and respiratory help with inserting   Plan of Care Review   Progress no change     Goal: Individualization and Mutuality  Outcome: Ongoing (interventions implemented as appropriate)    Goal: Discharge Needs Assessment  Outcome: Ongoing (interventions implemented as appropriate)      Problem: Fall Risk (Adult)  Goal: Absence of Fall  Outcome: Ongoing (interventions implemented as appropriate)      Problem: Skin Injury Risk (Adult)  Goal: Skin Health and Integrity  Outcome: Ongoing (interventions implemented as appropriate)      Problem: Pain, Acute (Adult)  Goal: Identify Related Risk Factors and Signs and Symptoms  Outcome: Outcome(s) achieved Date Met: 08/08/18    Goal: Acceptable Pain Control/Comfort Level  Outcome: Ongoing (interventions implemented as appropriate)

## 2018-08-08 NOTE — NURSING NOTE
Phoned Dr. Archer about patients trach.  He stated that it would be ok with the inner canula out.  For us to make sure Dr. Solano was aware on his round this morning.  He also stated that it should be ok for the patient to swallow pills.

## 2018-08-08 NOTE — THERAPY TREATMENT NOTE
Acute Care - Physical Therapy Treatment Note  [facility]     Patient Name: Truman Esquivel  : 1960  MRN: [id number]  Today's Date: 2018  Onset of Illness/Injury or Date of Surgery: 18  Date of Referral to PT: 18  Referring Physician: Dr. Solano    Admit Date: 2018    Visit Dx:    ICD-10-CM ICD-9-CM   1. Syncope and collapse R55 780.2   2. Septic shock (CMS/HCC) A41.9 038.9    R65.21 785.52     995.92   3. Dissection of aorta, unspecified portion of aorta (CMS/HCC) I71.00 441.00   4. Pleural effusion J90 511.9   5. Other ascites R18.8 789.59   6. Splenic hemorrhage D73.89 289.59   7. Impaired physical mobility Z74.09 781.99   8. Impaired mobility and activities of daily living Z74.09 799.89   9. Oropharyngeal dysphagia R13.12 787.22     Patient Active Problem List   Diagnosis   • Ascending aortic dissection (CMS/HCC)   • Essential hypertension   • Deep vein thrombosis (DVT) of femoral vein of both lower extremities (CMS/HCC)   • Hypothyroidism   • Snoring   • Acute pain of right knee   • Knee effusion, right   • Knee pain   • Obstructive sleep apnea of adult   • TIA (transient ischemic attack)   • Dysphagia   • Squamous cell carcinoma of larynx (CMS/HCC)   • Pharyngeal dysphagia   • Anemia   • Abnormal positron emission tomography (PET) of colon   • Hypokalemia   • Encounter for venous access device care   • Dehydration   • Weight loss, abnormal   • Odynophagia   • Thrush, oral   • Thrombocytopenia (CMS/HCC)   • Pancytopenia due to antineoplastic chemotherapy (CMS/HCC)   • Unable to eat   • Syncope and collapse   • Splenic hemorrhage   • Pleural effusion       Therapy Treatment          Rehabilitation Treatment Summary     Row Name 18 1108 18 0910          Treatment Time/Intention    Discipline physical therapy assistant  -LEANDER occupational therapy assistant  -KD     Document Type therapy note (daily note)  -LEANDER therapy note (daily note)  -KD     Subjective  Information  -- no complaints  -KD     Mode of Treatment physical therapy;individual therapy  -LEANDER occupational therapy  -KD     Patient/Family Observations  -- Pt up in chair upon entry  -KD     Therapy Frequency (OT Eval)  -- other (see comments)   5-7x/wk  -KD     Patient Effort adequate  -JC2 fair  -KD     Comment pt agrees to gait w/ encouragement  -JC2  --     Existing Precautions/Restrictions fall;oxygen therapy device and L/min  -LEANDER fall;oxygen therapy device and L/min  -KD     Recorded by [LEANDER] Reinier Hannah, PTA 08/08/18 1221  [JC2] Reinier Hannah, PTA 08/08/18 1239 [KD] Adrianna Richardson, SWARTZ/L 08/08/18 1102     Row Name 08/08/18 1108 08/08/18 0910          Vital Signs    Pre Systolic BP Rehab 146  -LEANDER 131  -KD     Pre Treatment Diastolic BP 74  -LEANDER 61  -KD     Post Systolic BP Rehab 158  -LEANDER  --     Post Treatment Diastolic BP 77  -LEANDER 74  -KD     Pretreatment Heart Rate (beats/min) 62  -LEANDER  --     Intratreatment Heart Rate (beats/min) 70  -LEANDER  --     Posttreatment Heart Rate (beats/min) 62  -LEANDER 78  -KD     Pre SpO2 (%) 94  -LEANDER 94  -KD     O2 Delivery Pre Treatment trach collar  -LEANDER trach collar  -KD     Intra SpO2 (%) 92  -LEANDER  --     O2 Delivery Intra Treatment supplemental O2  -LEANDER  --     Post SpO2 (%) 97  -LEANDER 95  -KD     O2 Delivery Post Treatment supplemental O2  -LEANDER supplemental O2  -KD     Pre Patient Position Sitting  -LEANDER Sitting  -KD     Intra Patient Position  -- Standing  -KD     Post Patient Position Sitting  -LEANDER Sitting  -KD     Recorded by [LEANDER] Reinier Hannah, PTA 08/08/18 1221 [KD] Adrianna Richardson, SWARTZ/L 08/08/18 1104     Row Name 08/08/18 1108 08/08/18 0910          Cognitive Assessment/Intervention- PT/OT    Affect/Mental Status (Cognitive) WFL  -LEANDER WFL  -KD     Orientation Status (Cognition) oriented x 4  -LEANDER oriented x 4  -KD     Follows Commands (Cognition) WFL  -LEANDER WFL  -KD     Cognitive Function (Cognitive) WFL  -LEANDER WFL  -KD     Personal Safety Interventions supervised activity    no gait belt due to incision.   -LEANDER gait belt  -KD     Recorded by [LEANDER] Reinier Hannah, PTA 08/08/18 1221 [KD] Adrianna Richardson SWARTZ/L 08/08/18 1104     Row Name 08/08/18 1108             Transfer Assessment/Treatment    Transfer Assessment/Treatment sit-stand transfer;stand-sit transfer  -LEANDER      Recorded by [LEANDER] Reinier Hannah, PTA 08/08/18 1221      Row Name 08/08/18 1108 08/08/18 0910          Sit-Stand Transfer    Sit-Stand Tibbie (Transfers) contact guard;minimum assist (75% patient effort)   pt pulled on PTAs hand to stand. other arm pushing off chair  -LEANDER moderate assist (50% patient effort)  -KD     Assistive Device (Sit-Stand Transfers) walker, front-wheeled  -LEANDER walker, front-wheeled   Pt stood x 4 mins w/RW  -KD     Recorded by [LEANDER] Reinier Hannah, PTA 08/08/18 1221 [KD] Adrianna Richardson SWARTZ/L 08/08/18 1104     Row Name 08/08/18 1108 08/08/18 0910          Stand-Sit Transfer    Stand-Sit Tibbie (Transfers) minimum assist (75% patient effort)  -LEANDER contact guard  -KD     Assistive Device (Stand-Sit Transfers) walker, front-wheeled  -JC2 walker, front-wheeled  -KD     Recorded by [LEANDER] Reinier Hannah, PTA 08/08/18 1239  [JC2] Reinier Hannah, PTA 08/08/18 1221 [KD] Adrianna Richardson SWARTZ/L 08/08/18 1104     Row Name 08/08/18 1108             Toilet Transfer    Type (Toilet Transfer) --  -LEANDER      Tibbie Level (Toilet Transfer) --  -LEANDER      Assistive Device (Toilet Transfer) --  -LEANDER      Recorded by [LEANDER] Reinier Hannah, PTA 08/08/18 1239      Row Name 08/08/18 1108             Gait/Stairs Assessment/Training    Gait/Stairs Assessment/Training gait/ambulation independence;gait/ambulation assistive device;distance ambulated;gait pattern;gait deviations  -LEANDER      Tibbie Level (Gait) contact guard  -LEANDER      Assistive Device (Gait) walker, front-wheeled  -LEANDER      Distance in Feet (Gait) 88  -JC2      Deviations/Abnormal Patterns (Gait) rosanna decreased   FF posture.   -JC2       Recorded by [LEANDER] Reinier Hannah, PTA 08/08/18 1221  [JC2] Reinier Hannah, PTA 08/08/18 1239      Row Name 08/08/18 0910             Therapeutic Exercise    Upper Extremity Range of Motion (Therapeutic Exercise) shoulder flexion/extension, bilateral;shoulder abduction/adduction, bilateral;shoulder horizontal abduction/adduction, bilateral;shoulder internal/external rotation, bilateral;elbow flexion/extension, bilateral;forearm supination/pronation, bilateral;wrist flexion/extension, bilateral  -KD      Position (Therapeutic Exercise) seated  -KD      Sets/Reps (Therapeutic Exercise) 2/20  -KD      Recorded by [KD] Adrianna Richardson SWARTZ/L 08/08/18 1104      Row Name 08/08/18 1108 08/08/18 0910          Positioning and Restraints    Pre-Treatment Position sitting in chair/recliner  -LEANDER sitting in chair/recliner  -KD     Post Treatment Position chair  -LEANDER chair  -KD     In Chair reclined;call light within reach;encouraged to call for assist;exit alarm on   all needs met.   -LEANDER sitting;call light within reach;encouraged to call for assist;exit alarm on  -KD     Recorded by [LEANDER] Reinier Hannah, PTA 08/08/18 1239 [KD] Adrianna Richardson SWARTZ/L 08/08/18 1104     Row Name 08/08/18 1108             Pain Assessment    Additional Documentation Pain Scale: Numbers Pre/Post-Treatment (Group)  -LEANDER      Recorded by [LEANDER] Reinier Hannah, PTA 08/08/18 1239      Row Name 08/08/18 1108 08/08/18 0910          Pain Scale: Numbers Pre/Post-Treatment    Pain Scale: Numbers, Pretreatment 7/10  -LEANDER 0/10 - no pain  -KD     Pain Scale: Numbers, Post-Treatment 7/10  -LEANDER 0/10 - no pain  -KD     Pain Location - Orientation incisional  -JC2  --     Pain Location abdomen   and low back  -LEANDER  --     Pre/Post Treatment Pain Comment nsg notified.   -LEANDER  --     Recorded by [LEANDER] Reinier Hannah, PTA 08/08/18 1239  [JC2] Reinier Hannah, PTA 08/08/18 1221 [KD] Adrianna Richardson SWARTZ/L 08/08/18 1104     Row Name                Wound 07/26/18 1326  Other (See comments) anterior abdomen incision;surgical    Wound - Properties Group Date first assessed: 07/26/18 [TB] Time first assessed: 1326 [TB] Present On Admission : no [TB] Side: Other (See comments) [TB], MIDLINE  Orientation: anterior [TB] Location: abdomen [TB] Type: incision;surgical [TB] Recorded by:  [TB] Joshua Ordonez RN 07/26/18 1327    Row Name                Wound 07/31/18 1700 Left lateral;upper chest surgical;incision    Wound - Properties Group Date first assessed: 07/31/18 [CH] Time first assessed: 1700 [CH] Present On Admission : no [CH] Side: Left [CH] Orientation: lateral;upper [CH] Location: chest [CH] Type: surgical;incision [CH] Recorded by:  [CH] Darlene Slaughter RN 07/31/18 1901    Row Name 08/08/18 0910             Outcome Summary/Treatment Plan (OT)    Daily Summary of Progress (OT) progress toward functional goals as expected  -KD      Plan for Continued Treatment (OT) cont ot poc  -KD      Anticipated Discharge Disposition (OT) home with home health  -KD      Recorded by [KD] Adrianna Richardson COTA/L 08/08/18 1104      Row Name 08/08/18 1108             Outcome Summary/Treatment Plan (PT)    Daily Summary of Progress (PT) progress toward functional goals as expected  -LEANDER      Plan for Continued Treatment (PT) continue  -LEANDER      Anticipated Discharge Disposition (PT) home with assist;home with home health;home with 24/7 care;inpatient rehabilitation facility  -JC2      Recorded by [LEANDER] Reinier Hannah, PTA 08/08/18 1239  [JC2] Reinier Hannah, PTA 08/08/18 1221        User Key  (r) = Recorded By, (t) = Taken By, (c) = Cosigned By    Initials Name Effective Dates Discipline    LEANDER Reinier Hannah, PTA 03/07/18 -  PT    KD Adrianna Richardson SWARTZ/L 03/07/18 -  OT    TB Joshua Ordonez, RN 10/17/16 -  Nurse    CH Darlene Slaughter RN 06/23/17 -  Nurse          Wound 07/26/18 1326 Other (See comments) anterior abdomen incision;surgical (Active)   Dressing Appearance open to  air 8/8/2018  8:45 AM   Closure [place] 8/8/2018  8:45 AM   Drainage Amount none 8/8/2018  8:45 AM       Wound 07/31/18 1700 Left lateral;upper chest surgical;incision (Active)   Dressing Appearance dry;intact 8/8/2018  8:45 AM   Closure ROSALINO 8/8/2018  8:45 AM   Base dressing in place, unable to visualize 8/8/2018  8:45 AM               PT Rehab Goals     Row Name 08/08/18 1108             Bed Mobility Goal 1 (PT)    Activity/Assistive Device (Bed Mobility Goal 1, PT) sit to supine;supine to sit;bed rails  -LEANDER      [place] Level/Cues Needed (Bed Mobility Goal 1, PT) supervision required  -LEANDER      Time Frame (Bed Mobility Goal 1, PT) 1 week  -LEANDER      Progress/Outcomes (Bed Mobility Goal 1, PT) goal not met  -LEANDER         Transfer Goal 1 (PT)    Activity/Assistive Device (Transfer Goal 1, PT) sit-to-stand/stand-to-sit;bed-to-chair/chair-to-bed;walker, rolling  -LEANDER      [place] Level/Cues Needed (Transfer Goal 1, PT) supervision required  -LEANDER      Time Frame (Transfer Goal 1, PT) 1 week  -LEANDER      Progress/Outcome (Transfer Goal 1, PT) goal not met  -LEANDER         Gait Training Goal 1 (PT)    Activity/Assistive Device (Gait Training Goal 1, PT) gait (walking locomotion);assistive device use;increase endurance/gait distance;walker, rolling;decrease fall risk   or least restrictive to no device  -LEANDER      [place] Level (Gait Training Goal 1, PT) supervision required  -LEANDER      Distance (Gait Goal 1, PT) 200  -LEANDER      Time Frame (Gait Training Goal 1, PT) 1 week  -LEANDER      Progress/Outcome (Gait Training Goal 1, PT) goal not met  -LEANDER         Stairs Goal 1 (PT)    Activity/Assistive Device (Stairs Goal 1, PT) ascending stairs;descending stairs;decrease fall risk;using handrail, right  -LEANDER      [place] Level/Cues Needed (Stairs Goal 1, PT) contact guard assist  -LEANDER      Number of Stairs (Stairs Goal 1, PT) 1  -LEANDER      Time Frame (Stairs Goal 1, PT) 1 week  -LEANDER      Progress/Outcome (Stairs Goal 1, PT) goal not met   -LEANDER         Patient Education Goal (PT)    Activity (Patient Education Goal, PT) HEP  -LEANDER      [place]/Cues/Accuracy (Memory Goal 2, PT) demonstrates adequately;verbalizes understanding  -LEANDER      Time Frame (Patient Education Goal, PT) 1 week  -LEANDER      Progress/Outcome (Patient Education Goal, PT) goal not met  -LEANDER        User Key  (r) = Recorded By, (t) = Taken By, (c) = Cosigned By    Initials Name Provider Type Discipline    Reinier Martin, PTA Physical Therapy Assistant PT          Physical Therapy Education     Title: PT OT SLP Therapies (Active)     Topic: Physical Therapy (Active)     Point: Mobility training (Active)    Learning Progress Summary     Learner Status Readiness Method Response Comment Documented by    Patient Active Acceptance E NR  LEANDER 08/08/18 1241     Active Acceptance E NR  LEANDER 08/06/18 1555     Active Acceptance E,D NR   08/03/18 1127          Point: Home exercise program (Active)    Learning Progress Summary     Learner Status Readiness Method Response Comment Documented by    Patient Active Acceptance E NR  LEANDER 08/08/18 1241          Point: Precautions (Active)    Learning Progress Summary     Learner Status Readiness Method Response Comment Documented by    Patient Active Acceptance E NR  LEANDER 08/08/18 1241     Active Acceptance E,D NR  CB 08/03/18 1127                      User Key     Initials Effective Dates Name Provider Type Discipline     04/06/17 -  Lupe Grossman, PT Physical Therapist PT     03/07/18 -  Reinier Hannah, MARGI Physical Therapy Assistant PT                    PT Recommendation and Plan  Anticipated Discharge Disposition (PT): home with assist, home with home health, home with 24/7 care, inpatient rehabilitation facility  Outcome Summary/Treatment Plan (PT)  Daily Summary of Progress (PT): progress toward functional goals as expected  Plan for Continued Treatment (PT): continue  Anticipated Discharge Disposition (PT): home with assist, home with home  [facility], home with 24/7 care, inpatient rehabilitation facility  Plan of Care Reviewed With: patient  Progress: improving  Outcome Summary: pt responded well to therapy. pt amb 88 ft CGA w/ RW. pt required CGA-min A for =sit-stand and CGA for stand-sit. pts vitals stable throughout tx. no new goals met at this time. Recommend continued therapy upon DC.           Outcome Measures     Row Name 08/08/18 1108 08/08/18 0910 08/07/18 0947       How much help from another person do you currently need...    Turning from your back to your side while in flat bed without using bedrails? 3  -LEANDER  --  --    Moving from lying on back to sitting on the side of a flat bed without bedrails? 3  -LEANDER  --  --    Moving to and from a bed to a chair (including a wheelchair)? 3  -LEANDER  --  --    Standing up from a chair using your arms (e.g., wheelchair, bedside chair)? 3  -LEANDER  --  --    Climbing 3-5 steps with a railing? 3  -LEANDER  --  --    To walk in hospital room? 3  -LEANDER  --  --    AM-PAC 6 Clicks Score 18  -LEANDER  --  --       How much help from another is currently needed...    Putting on and taking off regular lower body clothing?  -- 2  -KD 2  -JH    Bathing (including washing, rinsing, and drying)  -- 2  -KD 2  -JH    Toileting (which includes using toilet bed pan or urinal)  -- 2  -KD 2  -JH    Putting on and taking off regular upper body clothing  -- 2  -KD 2  -JH    Taking care of personal grooming (such as brushing teeth)  -- 3  -KD 3  -JH    Eating meals  -- 4  -KD 4  -JH    Score  -- 15  -KD 15  -JH       Functional Assessment    Outcome Measure Options AM-PAC 6 Clicks Basic Mobility (PT)  -LEANDER  --  --    Row Name 08/06/18 1503 08/05/18 1530          How much help from another person do you currently need...    Turning from your back to your side while in flat bed without using bedrails? 3  -LEANDER 2  -TW     Moving from lying on back to sitting on the side of a flat bed without bedrails? 3  -LEANDER 2  -TW     Moving to and from a bed to a  chair (including a wheelchair)? 3  -LEANDER 2  -TW     Standing up from a chair using your arms (e.g., wheelchair, bedside chair)? 3  -LEANDER 2  -TW     Climbing 3-5 steps with a railing? 3  -LEANDER 1  -TW     To walk in hospital room? 3  -LEANDER 2  -TW     AM-PAC 6 Clicks Score 18  -LEANDER 11  -TW        Functional Assessment    Outcome Measure Options AM-PAC 6 Clicks Basic Mobility (PT)  -LEANDER AM-PAC 6 Clicks Basic Mobility (PT)  -TW       User Key  (r) = Recorded By, (t) = Taken By, (c) = Cosigned By    Initials Name Provider Type    Reinier Martin PTA Physical Therapy Assistant    TW Lj Gross, MARGI Physical Therapy Assistant    KD Adrianna Richardson, SWARTZ/L Occupational Therapy Assistant     Julissa Vizcaino, SWARTZ/L Occupational Therapy Assistant           Time Calculation:         PT Charges     Row Name 08/08/18 1243             Time Calculation    Start Time 1108  -LEANDER      Stop Time 1135  -LEANDER      Time Calculation (min) 27 min  -LEANDER         Time Calculation- PT    Total Timed Code Minutes- PT 27 minute(s)  -LEANDER        User Key  (r) = Recorded By, (t) = Taken By, (c) = Cosigned By    Initials Name Provider Type    LEANDER Reinier Hannah PTA Physical Therapy Assistant        Therapy Suggested Charges     Code   Minutes Charges    None           Therapy Charges for Today     Code Description Service Date Service Provider Modifiers Qty    97131906119 HC GAIT TRAINING EA 15 MIN 8/8/2018 Reinier Hannah PTA GP 1    15960462286 HC PT THERAPEUTIC ACT EA 15 MIN 8/8/2018 Reinier Hannah PTA GP 1          PT G-Codes  PT Professional Judgement Used?: Yes  Outcome Measure Options: AM-PAC 6 Clicks Basic Mobility (PT)  Score: 11  Functional Limitation: Mobility: Walking and moving around  Mobility: Walking and Moving Around Current Status (): At least 60 percent but less than 80 percent impaired, limited or restricted  Mobility: Walking and Moving Around Goal Status (): At least 40 percent but less than 60 percent  impaired, limited or restricted    Reinier Hannah, PTA  8/8/2018

## 2018-08-08 NOTE — THERAPY TREATMENT NOTE
Acute Care - Occupational Therapy Treatment Note  [facility]     Patient Name: Truman Esquivel  : 1960  MRN: [id number]  Today's Date: 2018  Onset of Illness/Injury or Date of Surgery: 18  Date of Referral to OT: 18  Referring Physician: Dr. Solano    Admit Date: 2018       ICD-10-CM ICD-9-CM   1. Syncope and collapse R55 780.2   2. Septic shock (CMS/HCC) A41.9 038.9    R65.21 785.52     995.92   3. Dissection of aorta, unspecified portion of aorta (CMS/HCC) I71.00 441.00   4. Pleural effusion J90 511.9   5. Other ascites R18.8 789.59   6. Splenic hemorrhage D73.89 289.59   7. Impaired physical mobility Z74.09 781.99   8. Impaired mobility and activities of daily living Z74.09 799.89   9. Oropharyngeal dysphagia R13.12 787.22     Patient Active Problem List   Diagnosis   • Ascending aortic dissection (CMS/HCC)   • Essential hypertension   • Deep vein thrombosis (DVT) of femoral vein of both lower extremities (CMS/HCC)   • Hypothyroidism   • Snoring   • Acute pain of right knee   • Knee effusion, right   • Knee pain   • Obstructive sleep apnea of adult   • TIA (transient ischemic attack)   • Dysphagia   • Squamous cell carcinoma of larynx (CMS/HCC)   • Pharyngeal dysphagia   • Anemia   • Abnormal positron emission tomography (PET) of colon   • Hypokalemia   • Encounter for venous access device care   • Dehydration   • Weight loss, abnormal   • Odynophagia   • Thrush, oral   • Thrombocytopenia (CMS/HCC)   • Pancytopenia due to antineoplastic chemotherapy (CMS/HCC)   • Unable to eat   • Syncope and collapse   • Splenic hemorrhage   • Pleural effusion     Past Medical History:   Diagnosis Date   • Aortic dissection (CMS/HCC)    • Chest pain    • Disease of thyroid gland    • HTN (hypertension)    • Knee pain    • Sleep apnea    • Smoker    • Squamous cell carcinoma of larynx (CMS/HCC) 2018   • Stroke (CMS/HCC)    • Swallowing difficulty      Past Surgical History:   Procedure  Laterality Date   • AORTA SURGERY      ruptured aorta repair   • ASCENDING ARCH/HEMIARCH REPLACEMENT N/A 2/14/2017    Procedure: INTRAOPERATIVE TRANSESOPHAGEAL ECHOCARDIOGRAM, MIDLINE STERNOTOMY, ASCENDING AORTIC  AND PROXIMAL AORTIC ARCH REPAIR WITH 26MM GRAFT, AORTIC VALVE RESUSPENSION, AORTIC ROOT REPAIR, OPEN VEIN HARVEST OF RIGHT LEG;  Surgeon: German Arreguin MD;  Location: [facility] MAIN OR;  Service:    • BRONCHOSCOPY N/A 2/17/2017    Procedure: BRONCHOSCOPY BIOPSY AT BEDSIDE WITH BAL-LEFT LOWER LOBE;  Surgeon: Trent Chaney MD;  Location: [facility] ENDOSCOPY;  Service:    • COLONOSCOPY N/A 3/7/2018    Procedure: COLONOSCOPY WITH POSSIBLE POLYPECTOMY   ( DONE IN OR WITH ENDO)      COLONOSCOPY FIRST;  Surgeon: Kris Solano MD;  Location: [facility] OR;  Service:    • DIRECT LARYNGOSCOPY, ESOPHAGOSCOPY, BRONCHOSCOPY N/A 2/5/2018    Procedure: DIRECT LARYNGOSCOPY WITH BIOPSY, ESOPHAGOSCOPY     (no bronchoscopy, no laser);  Surgeon: Joseph Venegas MD;  Location: [facility] OR;  Service:    • ENDOSCOPY W/ PEG TUBE PLACEMENT N/A 5/2/2018    Procedure: ESOPHAGOGASTRODUODENOSCOPY WITH PERCUTANEOUS ENDOSCOPIC GASTROSTOMY TUBE INSERTION;  Surgeon: Angel Maciel MD;  Location: [facility] ENDOSCOPY;  Service: Gastroenterology   • SPLENECTOMY N/A 7/26/2018    Procedure: SPLENECTOMY, left chest tube placement, EXPLORATORY LAPAROTOMY, G-TUBE PALCEMENT;  Surgeon: Kris Solano MD;  Location: [facility] OR;  Service: General   • THORACOSCOPY Left 7/31/2018    Procedure: LEFT THORACOSCOPY VIDEO ASSISTED POSSIBLE THORACOTOMY possible decortication bronchoscopy;  Surgeon: Joshua Pollock MD;  Location: [facility] OR;  Service: Cardiothoracic   • TRACHEOSTOMY  02/05/2018   • TRACHEOSTOMY N/A 2/5/2018    Procedure: TRACHEOSTOMY  local injection @ 1106 incision @ 1119;  Surgeon: Joseph Venegas MD;  Location: [facility] OR;  Service:    • VENOUS ACCESS DEVICE (PORT) INSERTION N/A 3/7/2018    Procedure:  INSERTION OF MEDIPORT     (C-ARM#1);  Surgeon: Kris Solano MD;  Location: [facility];  Service:        Therapy Treatment          Rehabilitation Treatment Summary     Row Name 08/08/18 0910             Treatment Time/Intention    Discipline occupational therapy assistant  -KD      Document Type therapy note (daily note)  -KD      Subjective Information no complaints  -KD      Mode of Treatment occupational therapy  -KD      Patient/Family Observations Pt up in chair upon entry  -KD      Therapy Frequency (OT Eval) other (see comments)   5-7x/wk  -KD      Patient Effort fair  -KD      Existing Precautions/Restrictions fall;oxygen therapy device and L/min  -KD      Recorded by [KD] Adrianna Richardson COTA/L 08/08/18 1102      Row Name 08/08/18 0910             Vital Signs    Pre Systolic BP Rehab 131  -KD      Pre Treatment Diastolic BP 61  -KD      Post Treatment Diastolic BP 74  -KD      Posttreatment Heart Rate (beats/min) 78  -KD      Pre SpO2 (%) 94  -KD      O2 Delivery Pre Treatment trach collar  -KD      Post SpO2 (%) 95  -KD      O2 Delivery Post Treatment supplemental O2  -KD      Pre Patient Position Sitting  -KD      Intra Patient Position Standing  -KD      Post Patient Position Sitting  -KD      Recorded by [KD] Adrianna Richardson COTA/L 08/08/18 1104      Row Name 08/08/18 0910             Cognitive Assessment/Intervention- PT/OT    Affect/Mental Status (Cognitive) WFL  -KD      Orientation Status (Cognition) oriented x 4  -KD      Follows Commands (Cognition) WFL  -KD      Cognitive Function (Cognitive) WFL  -KD      Personal Safety Interventions gait belt  -KD      Recorded by [KD] Adrianna Richardson COTA/L 08/08/18 1104      Row Name 08/08/18 0910             Sit-Stand Transfer    Sit-Stand [place] (Transfers) moderate assist (50% patient effort)  -KD      Assistive Device (Sit-Stand Transfers) walker, front-wheeled   Pt stood x 4 mins w/RW  -KD      Recorded by [KD] Adrianna Richardson SWARTZ/L  08/08/18 1104      Row Name 08/08/18 0910             Stand-Sit Transfer    Stand-Sit [place] (Transfers) contact guard  -KD      Assistive Device (Stand-Sit Transfers) walker, front-wheeled  -KD      Recorded by [KD] Adrianna Richardson COTA/L 08/08/18 1104      Row Name 08/08/18 0910             Therapeutic Exercise    Upper Extremity Range of Motion (Therapeutic Exercise) shoulder flexion/extension, bilateral;shoulder abduction/adduction, bilateral;shoulder horizontal abduction/adduction, bilateral;shoulder internal/external rotation, bilateral;elbow flexion/extension, bilateral;forearm supination/pronation, bilateral;wrist flexion/extension, bilateral  -KD      Position (Therapeutic Exercise) seated  -KD      Sets/Reps (Therapeutic Exercise) 2/20  -KD      Recorded by [KD] Adrianna Richardson COTA/L 08/08/18 1104      Row Name 08/08/18 0910             Positioning and Restraints    Pre-Treatment Position sitting in chair/recliner  -KD      Post Treatment Position chair  -KD      In Chair sitting;call light within reach;encouraged to call for assist;exit alarm on  -KD      Recorded by [KD] Adrianna Richardson COTA/L 08/08/18 1104      Row Name 08/08/18 0910             Pain Scale: Numbers Pre/Post-Treatment    Pain Scale: Numbers, Pretreatment 0/10 - no pain  -KD      Pain Scale: Numbers, Post-Treatment 0/10 - no pain  -KD      Recorded by [KD] Adrianna Richardson COTA/L 08/08/18 1104      Row Name                Wound 07/26/18 1326 Other (See comments) anterior abdomen incision;surgical    Wound - Properties Group Date first assessed: 07/26/18 [TB] Time first assessed: 1326 [TB] Present On Admission : no [TB] Side: Other (See comments) [TB], MIDLINE  Orientation: anterior [TB] Location: abdomen [TB] Type: incision;surgical [TB] Recorded by:  [TB] Joshua Ordonez RN 07/26/18 1327    Row Name                Wound 07/31/18 1700 Left lateral;upper chest surgical;incision    Wound - Properties Group Date first assessed:  07/31/18 [CH] Time first assessed: 1700 [CH] Present On Admission : no [CH] Side: Left [CH] Orientation: lateral;upper [CH] Location: chest [CH] Type: surgical;incision [CH] Recorded by:  [CH] Darlene Slaughter RN 07/31/18 1901    Row Name 08/08/18 0910             Outcome Summary/Treatment Plan (OT)    Daily Summary of Progress (OT) progress toward functional goals as expected  -KD      Plan for Continued Treatment (OT) cont ot poc  -KD      Anticipated Discharge Disposition (OT) home with home health  -KD      Recorded by [KD] Adrianna Richardson COTA/L 08/08/18 1104        User Key  (r) = Recorded By, (t) = Taken By, (c) = Cosigned By    Initials Name Effective Dates Discipline    KD Adrianna Richardson SWARTZ/L 03/07/18 -  OT    TB Joshua Ordonez RN 10/17/16 -  Nurse    CH Darlene Slaughter RN 06/23/17 -  Nurse        Wound 07/26/18 1326 Other (See comments) anterior abdomen incision;surgical (Active)   Dressing Appearance open to air 8/8/2018  8:45 AM   Closure [place] 8/8/2018  8:45 AM   Drainage Amount none 8/8/2018  8:45 AM       Wound 07/31/18 1700 Left lateral;upper chest surgical;incision (Active)   Dressing Appearance dry;intact 8/8/2018  8:45 AM   Closure [place]ALINO 8/8/2018  8:45 AM   Base dressing in place, unable to visualize 8/8/2018  8:45 AM             OT Rehab Goals     Row Name 08/08/18 0910             Transfer Goal 1 (OT)    Activity/Assistive Device (Transfer Goal 1, OT) sit-to-stand/stand-to-sit;bed-to-chair/chair-to-bed;toilet  -KD      [place] Level/Cues Needed (Transfer Goal 1, OT) supervision required  -KD      Time Frame (Transfer Goal 1, OT) long term goal (LTG);by discharge  -KD      Progress/Outcome (Transfer Goal 1, OT) goal not met;continuing progress toward goal  -KD         Bathing Goal 1 (OT)    Activity/Assistive Device (Bathing Goal 1, OT) bathing skills, all   Using AE PRN  -KD      [place] Level/Cues Needed (Bathing Goal 1, OT) set-up required;supervision required   -KD      Time Frame (Bathing Goal 1, OT) long term goal (LTG);by discharge  -KD      Progress/Outcomes (Bathing Goal 1, OT) goal not met;continuing progress toward goal  -KD         Dressing Goal 1 (OT)    Activity/Assistive Device (Dressing Goal 1, OT) dressing skills, all   Using AE PRN  -KD      [place]/Cues Needed (Dressing Goal 1, OT) set-up required;supervision required  -KD      Time Frame (Dressing Goal 1, OT) long term goal (LTG);other (see comments)  -KD      Progress/Outcome (Dressing Goal 1, OT) goal not met  -KD         Toileting Goal 1 (OT)    Activity/Device (Toileting Goal 1, OT) toileting skills, all  -KD      [place] Level/Cues Needed (Toileting Goal 1, OT) conditional independence  -KD      Time Frame (Toileting Goal 1, OT) long term goal (LTG);by discharge  -KD      Progress/Outcome (Toileting Goal 1, OT) goal not met;continuing progress toward goal  -KD        User Key  (r) = Recorded By, (t) = Taken By, (c) = Cosigned By    Initials Name Provider Type Discipline    KD Adrianna Richardson COTA/L Occupational Therapy Assistant OT        Occupational Therapy Education     Title: PT OT SLP Therapies (Active)     Topic: Occupational Therapy (Done)     Point: ADL training (Done)     Description: Instruct learner(s) on proper safety adaptation and remediation techniques during self care or transfers.   Instruct in proper use of assistive devices.   Learning Progress Summary     Learner Status Readiness Method Response Comment Documented by    Patient Done Acceptance E ZHAO   08/07/18 1305          Point: Home exercise program (Done)     Description: Instruct learner(s) on appropriate technique for monitoring, assisting and/or progressing therapeutic exercises/activities.   Learning Progress Summary     Learner Status Readiness Method Response Comment Documented by    Patient Done Acceptance E ZHAO   08/07/18 1305     Active Acceptance E GENET GUERRIER 08/05/18 1337          Point: Precautions (Done)      Description: Instruct learner(s) on prescribed precautions during self-care and functional transfers.   Learning Progress Summary     Learner Status Readiness Method Response Comment Documented by    Patient Done Acceptance E VU   08/07/18 1305     Done Acceptance E VU,NR role of OT; OT POC; transfer training; safety precautions AS 08/03/18 1348          Point: Body mechanics (Done)     Description: Instruct learner(s) on proper positioning and spine alignment during self-care, functional mobility activities and/or exercises.   Learning Progress Summary     Learner Status Readiness Method Response Comment Documented by    Patient Done Acceptance E VU   08/07/18 1305     Done Acceptance E VU,NR role of OT; OT POC; transfer training; safety precautions AS 08/03/18 1348                      User Key     Initials Effective Dates Name Provider Type Discipline     03/07/18 -  Carmen Davenport COTA/L Occupational Therapy Assistant OT     03/07/18 -  Julissa Vizcaino COTA/L Occupational Therapy Assistant OT    AS 05/01/18 -  Erin Treviño, OT Occupational Therapist OT                OT Recommendation and Plan  Outcome Summary/Treatment Plan (OT)  Daily Summary of Progress (OT): progress toward functional goals as expected  Plan for Continued Treatment (OT): cont ot poc  Anticipated Discharge Disposition (OT): home with home health  Therapy Frequency (OT Eval): other (see comments) (5-7x/wk)  Daily Summary of Progress (OT): progress toward functional goals as expected  Plan of Care Review  Plan of Care Reviewed With: patient  Plan of Care Reviewed With: patient  Outcome Summary: SIt-stand- Mod A x 1, Pt stood ~4 mins staic standing with RW and no LOB or c/o. Pt completed UE ther ex w/ good tolerance. No new goals met this date. Cont OT POC.        Outcome Measures     Row Name 08/08/18 0910 08/07/18 0947 08/06/18 1503       How much help from another person do you currently need...    Turning from your back  to your side while in flat bed without using bedrails?  --  -- 3  -LEANDER    Moving from lying on back to sitting on the side of a flat bed without bedrails?  --  -- 3  -LEANDER    Moving to and from a bed to a chair (including a wheelchair)?  --  -- 3  -LEANDER    Standing up from a chair using your arms (e.g., wheelchair, bedside chair)?  --  -- 3  -LEANDER    Climbing 3-5 steps with a railing?  --  -- 3  -LEANDER    To walk in hospital room?  --  -- 3  -LEANDER    AM-PAC 6 Clicks Score  --  -- 18  -LEANDER       How much help from another is currently needed...    Putting on and taking off regular lower body clothing? 2  -KD 2  -JH  --    Bathing (including washing, rinsing, and drying) 2  -KD 2  -JH  --    Toileting (which includes using toilet bed pan or urinal) 2  -KD 2  -JH  --    Putting on and taking off regular upper body clothing 2  -KD 2  -JH  --    Taking care of personal grooming (such as brushing teeth) 3  -KD 3  -JH  --    Eating meals 4  -KD 4  -JH  --    Score 15  -KD 15  -JH  --       Functional Assessment    Outcome Measure Options  --  -- AM-PAC 6 Clicks Basic Mobility (PT)  -    Row Name 08/05/18 1530             How much help from another person do you currently need...    Turning from your back to your side while in flat bed without using bedrails? 2  -TW      Moving from lying on back to sitting on the side of a flat bed without bedrails? 2  -TW      Moving to and from a bed to a chair (including a wheelchair)? 2  -TW      Standing up from a chair using your arms (e.g., wheelchair, bedside chair)? 2  -TW      Climbing 3-5 steps with a railing? 1  -TW      To walk in hospital room? 2  -TW      AM-PAC 6 Clicks Score 11  -TW         Functional Assessment    Outcome Measure Options AM-PAC 6 Clicks Basic Mobility (PT)  -        User Key  (r) = Recorded By, (t) = Taken By, (c) = Cosigned By    Initials Name Provider Type    Reinier Martin, PTA Physical Therapy Assistant    TW Lj Gross, MARGI Physical Therapy  Assistant    Adrianna Bryant COTA/L Occupational Therapy Assistant     Julissa Vizcaino COTA/L Occupational Therapy Assistant           Time Calculation:         Time Calculation- OT     Row Name 08/08/18 1108             Time Calculation- OT    OT Start Time 0910  -KD      OT Stop Time 0953  -KD      OT Time Calculation (min) 43 min  -KD      Total Timed Code Minutes- OT 43 minute(s)  -KD      OT Received On 08/08/18  -KD        User Key  (r) = Recorded By, (t) = Taken By, (c) = Cosigned By    Initials Name Provider Type    Adrianna Bryant COTA/L Occupational Therapy Assistant           Therapy Suggested Charges     Code   Minutes Charges    None           Therapy Charges for Today     Code Description Service Date Service Provider Modifiers Qty    16806357768 HC OT THER PROC EA 15 MIN 8/8/2018 Adrianna Richardson COTA/L GO 2    72004136964 HC OT THERAPEUTIC ACT EA 15 MIN 8/8/2018 Adrianna Richardson COTA/L GO 1          OT G-codes  OT Professional Judgement Used?: Yes  OT Functional Scales Options: AM-PAC 6 Clicks Daily Activity (OT)  Score: 15  Functional Limitation: Self care  Self Care Current Status (): At least 40 percent but less than 60 percent impaired, limited or restricted  Self Care Goal Status (): At least 1 percent but less than 20 percent impaired, limited or restricted    MERLINE Donovan  8/8/2018

## 2018-08-08 NOTE — PLAN OF CARE
Problem: Patient Care Overview  Goal: Plan of Care Review  Outcome: Ongoing (interventions implemented as appropriate)   08/08/18 1108   Coping/Psychosocial   Plan of Care Reviewed With patient   OTHER   Outcome Summary pt responded well to therapy. pt amb 88 ft CGA w/ RW. pt required CGA-min A for sit-stand and CGA for stand-sit. pts vitals stable throughout tx. no new goals met at this time. Recommend continued therapy upon DC.    Plan of Care Review   Progress improving

## 2018-08-08 NOTE — PROGRESS NOTES
GENERAL SURGERY PROGRESS NOTE  Chief Complaint:  Surgery Follow up   LOS: 13 days       Subjective     Interval History:     Had issues overnight with trach, currently has no inner cannula. Refuses to let me manipulate or look at trach.  Tolerating oral diet. Working with PT/OT    Objective     Vital Signs  Temp:  [97.4 °F (36.3 °C)-98.6 °F (37 °C)] 98 °F (36.7 °C)  Heart Rate:  [58-74] 74  Resp:  [18-22] 22  BP: (131-163)/(61-84) 131/61    Physical Exam:   Abdomen soft  Labs:  Lab Results (last 24 hours)     Procedure Component Value Units Date/Time    Basic Metabolic Panel [[id number]]  (Abnormal) Collected:  08/08/18 0656    Specimen:  Blood Updated:  08/08/18 0729     Glucose 107 (H) mg/dL      BUN 8 mg/dL      Creatinine 0.63 (L) mg/dL      Sodium 131 (L) mmol/L      Potassium 3.9 mmol/L      Chloride 98 mmol/L      CO2 27.0 mmol/L      Calcium 7.6 (L) mg/dL      eGFR Non African Amer 131 (H) mL/min/1.73      BUN/Creatinine Ratio 12.7     Anion Gap 6.0 mmol/L     CBC (No Diff) [[id number]]  (Abnormal) Collected:  08/08/18 0655    Specimen:  Blood Updated:  08/08/18 0702     WBC 19.96 (H) 10*3/mm3      RBC 3.06 (L) 10*6/mm3      Hemoglobin 8.6 (L) g/dL      Hematocrit 25.6 (L) %      MCV 83.7 fL      MCH 28.1 pg      MCHC 33.6 g/dL      RDW 17.7 (H) %      RDW-SD 53.1 (H) fl      MPV 9.4 fL      Platelets 548 (H) 10*3/mm3     POC Glucose Once [[id number]]  (Normal) Collected:  08/07/18 2030    Specimen:  Blood Updated:  08/07/18 2211     Glucose 104 mg/dL      Comment: RN NotifiedMeter: [id number]: [id number] JUAN PABLO IRVING       Fungus Culture - Tissue, Lung, L [[id number]] Collected:  07/31/18 1628    Specimen:  Tissue from Lung, L Updated:  08/07/18 1731     Fungus Culture No fungus isolated at 1 week    AFB Culture - Tissue, Lung, L [[id number]]  (Normal) Collected:  07/31/18 1628    Specimen:  Tissue from Lung, L Updated:  08/07/18 3601     AFB Culture No AFB isolated at 1 week     AFB Stain No acid  fast bacilli seen on concentrated smear    Fungus Culture - Body Fluid, Lung, L [[id number]] Collected:  07/31/18 1625    Specimen:  Body Fluid from Lung, L Updated:  08/07/18 1631     Fungus Culture No fungus isolated at 1 week    AFB Culture - Body Fluid, Lung, L [[id number]]  (Normal) Collected:  07/31/18 1625    Specimen:  Body Fluid from Lung, L Updated:  08/07/18 1631     AFB Culture No AFB isolated at 1 week     AFB Stain No acid fast bacilli seen on concentrated smear           Results Review:     Labs and imaging for today were reviewed.    Assessment/Plan     Truman Esquivel is a 57 y.o. male who is s/p splenectomy, left decortication.      Work towards [employer]  Will ask Dr. Venegas to see per patient request regarding trach.          This document has been electronically signed by Kris Solano MD on August 8, 2018 10:07 AM        Kris Solano MD  08/08/18  10:07 AM

## 2018-08-09 LAB
ANION GAP SERPL CALCULATED.3IONS-SCNC: 6 MMOL/L (ref 5–15)
BUN BLD-MCNC: 8 MG/DL (ref 7–21)
BUN/CREAT SERPL: 11.1 (ref 7–25)
CALCIUM SPEC-SCNC: 7.6 MG/DL (ref 8.4–10.2)
CHLORIDE SERPL-SCNC: 98 MMOL/L (ref 95–110)
CO2 SERPL-SCNC: 26 MMOL/L (ref 22–31)
CREAT BLD-MCNC: 0.72 MG/DL (ref 0.7–1.3)
DEPRECATED RDW RBC AUTO: 52.8 FL (ref 35.1–43.9)
ERYTHROCYTE [DISTWIDTH] IN BLOOD BY AUTOMATED COUNT: 17.6 % (ref 11.5–14.5)
GFR SERPL CREATININE-BSD FRML MDRD: 113 ML/MIN/1.73 (ref 56–130)
GLUCOSE BLD-MCNC: 100 MG/DL (ref 60–100)
HCT VFR BLD AUTO: 25.3 % (ref 39–49)
HGB BLD-MCNC: 8.4 G/DL (ref 13.7–17.3)
MCH RBC QN AUTO: 27.6 PG (ref 26.5–34)
MCHC RBC AUTO-ENTMCNC: 33.2 G/DL (ref 31.5–36.3)
MCV RBC AUTO: 83.2 FL (ref 80–98)
PLATELET # BLD AUTO: 549 10*3/MM3 (ref 150–450)
PMV BLD AUTO: 9.1 FL (ref 8–12)
POTASSIUM BLD-SCNC: 3.9 MMOL/L (ref 3.5–5.1)
RBC # BLD AUTO: 3.04 10*6/MM3 (ref 4.37–5.74)
SODIUM BLD-SCNC: 130 MMOL/L (ref 137–145)
WBC NRBC COR # BLD: 18.77 10*3/MM3 (ref 3.2–9.8)

## 2018-08-09 PROCEDURE — 99231 SBSQ HOSP IP/OBS SF/LOW 25: CPT | Performed by: OTOLARYNGOLOGY

## 2018-08-09 PROCEDURE — 99024 POSTOP FOLLOW-UP VISIT: CPT | Performed by: SURGERY

## 2018-08-09 PROCEDURE — 80048 BASIC METABOLIC PNL TOTAL CA: CPT | Performed by: STUDENT IN AN ORGANIZED HEALTH CARE EDUCATION/TRAINING PROGRAM

## 2018-08-09 PROCEDURE — 97535 SELF CARE MNGMENT TRAINING: CPT

## 2018-08-09 PROCEDURE — 25010000002 MORPHINE PER 10 MG: Performed by: NURSE PRACTITIONER

## 2018-08-09 PROCEDURE — 85027 COMPLETE CBC AUTOMATED: CPT | Performed by: STUDENT IN AN ORGANIZED HEALTH CARE EDUCATION/TRAINING PROGRAM

## 2018-08-09 RX ADMIN — CHLORHEXIDINE GLUCONATE 0.12% ORAL RINSE 15 ML: 1.2 LIQUID ORAL at 22:08

## 2018-08-09 RX ADMIN — METOPROLOL SUCCINATE 25 MG: 25 TABLET, FILM COATED, EXTENDED RELEASE ORAL at 22:08

## 2018-08-09 RX ADMIN — MORPHINE SULFATE 2 MG: 2 INJECTION, SOLUTION INTRAMUSCULAR; INTRAVENOUS at 02:44

## 2018-08-09 RX ADMIN — LEVOTHYROXINE SODIUM 75 MCG: 0.07 TABLET ORAL at 06:07

## 2018-08-09 RX ADMIN — LOSARTAN POTASSIUM: 50 TABLET, FILM COATED ORAL at 09:12

## 2018-08-09 RX ADMIN — MORPHINE SULFATE 2 MG: 2 INJECTION, SOLUTION INTRAMUSCULAR; INTRAVENOUS at 22:08

## 2018-08-09 RX ADMIN — POTASSIUM CHLORIDE, DEXTROSE MONOHYDRATE AND SODIUM CHLORIDE 75 ML/HR: 150; 5; 450 INJECTION, SOLUTION INTRAVENOUS at 00:21

## 2018-08-09 RX ADMIN — MORPHINE SULFATE 2 MG: 2 INJECTION, SOLUTION INTRAMUSCULAR; INTRAVENOUS at 18:38

## 2018-08-09 RX ADMIN — MORPHINE SULFATE 2 MG: 2 INJECTION, SOLUTION INTRAMUSCULAR; INTRAVENOUS at 14:41

## 2018-08-09 RX ADMIN — MORPHINE SULFATE 2 MG: 2 INJECTION, SOLUTION INTRAMUSCULAR; INTRAVENOUS at 09:13

## 2018-08-09 RX ADMIN — SIMETHICONE 40 MG: 20 SUSPENSION/ DROPS ORAL at 09:42

## 2018-08-09 RX ADMIN — PANTOPRAZOLE SODIUM 40 MG: 40 INJECTION, POWDER, FOR SOLUTION INTRAVENOUS at 05:52

## 2018-08-09 NOTE — THERAPY TREATMENT NOTE
Acute Care - Occupational Therapy Treatment Note  [facility]     Patient Name: Truman Esquivel  : 1960  MRN: [id number]  Today's Date: 2018  Onset of Illness/Injury or Date of Surgery: 18  Date of Referral to OT: 18  Referring Physician: Dr. Solano    Admit Date: 2018       ICD-10-CM ICD-9-CM   1. Syncope and collapse R55 780.2   2. Septic shock (CMS/HCC) A41.9 038.9    R65.21 785.52     995.92   3. Dissection of aorta, unspecified portion of aorta (CMS/HCC) I71.00 441.00   4. Pleural effusion J90 511.9   5. Other ascites R18.8 789.59   6. Splenic hemorrhage D73.89 289.59   7. Impaired physical mobility Z74.09 781.99   8. Impaired mobility and activities of daily living Z74.09 799.89   9. Oropharyngeal dysphagia R13.12 787.22     Patient Active Problem List   Diagnosis   • Ascending aortic dissection (CMS/HCC)   • Essential hypertension   • Deep vein thrombosis (DVT) of femoral vein of both lower extremities (CMS/HCC)   • Hypothyroidism   • Snoring   • Acute pain of right knee   • Knee effusion, right   • Knee pain   • Obstructive sleep apnea of adult   • TIA (transient ischemic attack)   • Dysphagia   • Squamous cell carcinoma of larynx (CMS/HCC)   • Pharyngeal dysphagia   • Anemia   • Abnormal positron emission tomography (PET) of colon   • Hypokalemia   • Encounter for venous access device care   • Dehydration   • Weight loss, abnormal   • Odynophagia   • Thrush, oral   • Thrombocytopenia (CMS/HCC)   • Pancytopenia due to antineoplastic chemotherapy (CMS/HCC)   • Unable to eat   • Syncope and collapse   • Splenic hemorrhage   • Pleural effusion     Past Medical History:   Diagnosis Date   • Aortic dissection (CMS/HCC)    • Chest pain    • Disease of thyroid gland    • HTN (hypertension)    • Knee pain    • Sleep apnea    • Smoker    • Squamous cell carcinoma of larynx (CMS/HCC) 2018   • Stroke (CMS/HCC)    • Swallowing difficulty      Past Surgical History:   Procedure  Laterality Date   • AORTA SURGERY      ruptured aorta repair   • ASCENDING ARCH/HEMIARCH REPLACEMENT N/A 2/14/2017    Procedure: INTRAOPERATIVE TRANSESOPHAGEAL ECHOCARDIOGRAM, MIDLINE STERNOTOMY, ASCENDING AORTIC  AND PROXIMAL AORTIC ARCH REPAIR WITH 26MM GRAFT, AORTIC VALVE RESUSPENSION, AORTIC ROOT REPAIR, OPEN VEIN HARVEST OF RIGHT LEG;  Surgeon: German Arreguin MD;  Location: [facility] MAIN OR;  Service:    • BRONCHOSCOPY N/A 2/17/2017    Procedure: BRONCHOSCOPY BIOPSY AT BEDSIDE WITH BAL-LEFT LOWER LOBE;  Surgeon: Trent Chaney MD;  Location: [facility] ENDOSCOPY;  Service:    • COLONOSCOPY N/A 3/7/2018    Procedure: COLONOSCOPY WITH POSSIBLE POLYPECTOMY   ( DONE IN OR WITH ENDO)      COLONOSCOPY FIRST;  Surgeon: Kris Solano MD;  Location: [facility] OR;  Service:    • DIRECT LARYNGOSCOPY, ESOPHAGOSCOPY, BRONCHOSCOPY N/A 2/5/2018    Procedure: DIRECT LARYNGOSCOPY WITH BIOPSY, ESOPHAGOSCOPY     (no bronchoscopy, no laser);  Surgeon: Joseph Venegas MD;  Location: [facility] OR;  Service:    • ENDOSCOPY W/ PEG TUBE PLACEMENT N/A 5/2/2018    Procedure: ESOPHAGOGASTRODUODENOSCOPY WITH PERCUTANEOUS ENDOSCOPIC GASTROSTOMY TUBE INSERTION;  Surgeon: Angel Maciel MD;  Location: [facility] ENDOSCOPY;  Service: Gastroenterology   • SPLENECTOMY N/A 7/26/2018    Procedure: SPLENECTOMY, left chest tube placement, EXPLORATORY LAPAROTOMY, G-TUBE PALCEMENT;  Surgeon: Kris Solano MD;  Location: [facility] OR;  Service: General   • THORACOSCOPY Left 7/31/2018    Procedure: LEFT THORACOSCOPY VIDEO ASSISTED POSSIBLE THORACOTOMY possible decortication bronchoscopy;  Surgeon: Joshua Pollock MD;  Location: [facility] OR;  Service: Cardiothoracic   • TRACHEOSTOMY  02/05/2018   • TRACHEOSTOMY N/A 2/5/2018    Procedure: TRACHEOSTOMY  local injection @ 1106 incision @ 1119;  Surgeon: Joseph Venegas MD;  Location: [facility] OR;  Service:    • VENOUS ACCESS DEVICE (PORT) INSERTION N/A 3/7/2018    Procedure:  INSERTION OF MEDIPORT     (C-ARM#1);  Surgeon: Kris Solano MD;  Location: [facility];  Service:        Therapy Treatment          Rehabilitation Treatment Summary     Row Name 08/09/18 1004             Treatment Time/Intention    Discipline occupational therapy assistant  -CS      Document Type therapy note (daily note)  -CS      Subjective Information complains of;pain  -CS      Mode of Treatment occupational therapy  -CS      Patient Effort adequate  -CS2      Existing Precautions/Restrictions fall;oxygen therapy device and L/min  -CS2      Recorded by [CS] Ileana Yang SWARTZ/L 08/09/18 1058  [CS2] Ileana Yang SWARTZ/L 08/09/18 1309      Row Name 08/09/18 1004             Vital Signs    Post Systolic BP Rehab 151  -CS      Post Treatment Diastolic BP 77  -CS      Pretreatment Heart Rate (beats/min) 68  -CS2      Posttreatment Heart Rate (beats/min) 66  -CS      Pre SpO2 (%) 91  -CS2      O2 Delivery Pre Treatment supplemental O2  -CS2      Post SpO2 (%) 91  -CS      O2 Delivery Post Treatment supplemental O2  -CS      Pre Patient Position Sitting  -CS2      Post Patient Position Sitting  -CS2      Recorded by [CS] Ileana Yang SWARTZ/L 08/09/18 1058  [CS2] Ileana Yang SWARTZ/L 08/09/18 1309      Row Name 08/09/18 1004             Cognitive Assessment/Intervention- PT/OT    Affect/Mental Status (Cognitive) WFL  -CS      Orientation Status (Cognition) oriented x 4  -CS      Follows Commands (Cognition) WFL  -CS      Cognitive Function (Cognitive) WFL  -CS      Recorded by [CS] Ilenaa Yang SWARTZ/L 08/09/18 1309      Row Name 08/09/18 1004             Sit-Stand Transfer    Sit-Stand [place] (Transfers) minimum assist (75% patient effort);moderate assist (50% patient effort)  -CS      Assistive Device (Sit-Stand Transfers) walker, front-wheeled  -CS      Recorded by [CS] Ileana Yang SWARTZ/L 08/09/18 1309      Row Name 08/09/18 1004             Stand-Sit Transfer     Stand-Sit [place] (Transfers) minimum assist (75% patient effort);moderate assist (50% patient effort)  -CS      Assistive Device (Stand-Sit Transfers) walker, front-wheeled  -CS      Recorded by [CS] Ileana Yang COTA/L 08/09/18 1309      Row Name 08/09/18 1004             ADL Assessment/Intervention    BADL Assessment/Intervention bathing;upper body dressing;lower body dressing;grooming;toileting  -CS      Recorded by [CS] Ileana Yang SWARTZ/L 08/09/18 1309      Row Name 08/09/18 1004             Bathing Assessment/Intervention    Bathing [place] Level upper body;minimum assist (75% patient effort);lower body;moderate assist (50% patient effort)  -CS      Assistive Devices (Bathing) bath mitt  -CS      Bathing Position supported sitting;supported standing  -CS      Recorded by [CS] Ileana Yang COTA/L 08/09/18 1309      Row Name 08/09/18 1004             Upper Body Dressing Assessment/Training    Upper Body Dressing [place] Level doff;don;minimum assist (75% patient effort)   gown  -CS      Upper Body Dressing Position supported sitting  -CS      Recorded by [CS] Ileana Yang SWARTZ/L 08/09/18 1309      Row Name 08/09/18 1004             Lower Body Dressing Assessment/Training    Lower Body Dressing [place] Level doff;don;socks;maximum assist (25% patient effort)  -CS      Lower Body Dressing Position supported sitting  -CS      Recorded by [CS] Ileana Yang SWARTZ/L 08/09/18 1309      Row Name 08/09/18 1004             Grooming Assessment/Training    [place] Level (Grooming) grooming skills;oral care regimen;wash face, hands;set up   appply deodorant/lotion  -CS      Grooming Position supported sitting  -CS      Recorded by [CS] Ileana Yang SWARTZ/L 08/09/18 1309      Row Name 08/09/18 1004             Toileting Assessment/Training    [place] Level (Toileting) toileting skills;independent  -CS      Assistive Devices (Toileting) urinal  -CS      Toileting  Position supported sitting  -CS      Recorded by [CS] Ileana Yang COTA/L 08/09/18 1309      Row Name 08/09/18 1004             Positioning and Restraints    Pre-Treatment Position sitting in chair/recliner  -CS      Post Treatment Position chair  -CS      In Chair reclined;call light within reach;encouraged to call for assist  -CS      Recorded by [CS] Ileana Yang COTA/L 08/09/18 1309      Row Name 08/09/18 1004             Pain Scale: Numbers Pre/Post-Treatment    Pain Scale: Numbers, Pretreatment 7/10  -CS      Pain Scale: Numbers, Post-Treatment 7/10  -CS      Pain Location - Orientation incisional  -CS      Pain Location abdomen  -CS      Recorded by [CS] Ileana Yang COTA/L 08/09/18 1058      Row Name                Wound 07/26/18 1326 Other (See comments) anterior abdomen incision;surgical    Wound - Properties Group Date first assessed: 07/26/18 [TB] Time first assessed: 1326 [TB] Present On Admission : no [TB] Side: Other (See comments) [TB], MIDLINE  Orientation: anterior [TB] Location: abdomen [TB] Type: incision;surgical [TB] Recorded by:  [TB] Joshua Ordonez RN 07/26/18 1327    Row Name                Wound 07/31/18 1700 Left lateral;upper chest surgical;incision    Wound - Properties Group Date first assessed: 07/31/18 [CH] Time first assessed: 1700 [CH] Present On Admission : no [CH] Side: Left [CH] Orientation: lateral;upper [CH] Location: chest [CH] Type: surgical;incision [CH] Recorded by:  [CH] Darlene Slaughter RN 07/31/18 1901    Row Name 08/09/18 1004             Outcome Summary/Treatment Plan (OT)    Daily Summary of Progress (OT) progress toward functional goals is good  -CS      Plan for Continued Treatment (OT) cont OT POC  -CS      Anticipated Discharge Disposition (OT) home with home health  -CS      Recorded by [CS] Ileana Yang COTA/KIM 08/09/18 1309        User Key  (r) = Recorded By, (t) = Taken By, (c) = Cosigned By    Initials Name Effective Dates  Discipline    CS Ileana Yang, SWARTZ/L 03/07/18 -  OT    TB Joshua Ordonez, RN 10/17/16 -  Nurse    CH Darlene Slaughter RN 06/23/17 -  Nurse        Wound 07/26/18 1326 Other (See comments) anterior abdomen incision;surgical (Active)   Dressing Appearance open to air 8/9/2018  8:55 AM   Closure [place] 8/9/2018  8:55 AM   Drainage Amount none 8/9/2018  8:55 AM       Wound 07/31/18 1700 Left lateral;upper chest surgical;incision (Active)   Dressing Appearance dry;intact 8/8/2018  8:20 PM   Closure [place];Open to air 8/9/2018  8:55 AM   Base dressing in place, unable to visualize 8/8/2018  8:20 PM             OT Rehab Goals     Row Name 08/09/18 1004             Transfer Goal 1 (OT)    Activity/Assistive Device (Transfer Goal 1, OT) sit-to-stand/stand-to-sit;bed-to-chair/chair-to-bed;toilet  -CS      [place] Level/Cues Needed (Transfer Goal 1, OT) supervision required  -CS      Time Frame (Transfer Goal 1, OT) long term goal (LTG);by discharge  -CS      Progress/Outcome (Transfer Goal 1, OT) goal not met;continuing progress toward goal  -CS         Bathing Goal 1 (OT)    Activity/Assistive Device (Bathing Goal 1, OT) bathing skills, all   using AE PRN  -CS      [place] Level/Cues Needed (Bathing Goal 1, OT) set-up required;supervision required  -CS      Time Frame (Bathing Goal 1, OT) long term goal (LTG);by discharge  -CS      Progress/Outcomes (Bathing Goal 1, OT) goal not met;continuing progress toward goal  -CS         Dressing Goal 1 (OT)    Activity/Assistive Device (Dressing Goal 1, OT) dressing skills, all   using AE PRN  -CS      [place]/Cues Needed (Dressing Goal 1, OT) set-up required;supervision required  -CS      Time Frame (Dressing Goal 1, OT) long term goal (LTG);other (see comments)  -CS      Progress/Outcome (Dressing Goal 1, OT) goal not met  -CS         Toileting Goal 1 (OT)    Activity/Device (Toileting Goal 1, OT) toileting skills, all  -CS      [place] Level/Cues  Needed (Toileting Goal 1, OT) conditional independence  -CS      Time Frame (Toileting Goal 1, OT) long term goal (LTG);by discharge  -CS      Progress/Outcome (Toileting Goal 1, OT) goal not met;continuing progress toward goal  -CS        User Key  (r) = Recorded By, (t) = Taken By, (c) = Cosigned By    Initials Name Provider Type Discipline    CS Ileana Yang COTA/KIM Occupational Therapy Assistant OT        Occupational Therapy Education     Title: PT OT SLP Therapies (Active)     Topic: Occupational Therapy (Done)     Point: ADL training (Done)     Description: Instruct learner(s) on proper safety adaptation and remediation techniques during self care or transfers.   Instruct in proper use of assistive devices.   Learning Progress Summary     Learner Status Readiness Method Response Comment Documented by    Patient Done Acceptance MEDHAT VANEGAS D VU   08/09/18 1311     Done Acceptance E VU   08/07/18 1305          Point: Home exercise program (Done)     Description: Instruct learner(s) on appropriate technique for monitoring, assisting and/or progressing therapeutic exercises/activities.   Learning Progress Summary     Learner Status Readiness Method Response Comment Documented by    Patient Done Acceptance EMEDHAT D VU   08/09/18 1311     Done Acceptance E VU   08/07/18 1305     Active Acceptance E NR  BB 08/05/18 1337          Point: Precautions (Done)     Description: Instruct learner(s) on prescribed precautions during self-care and functional transfers.   Learning Progress Summary     Learner Status Readiness Method Response Comment Documented by    Patient Done Acceptance E,TBANA MARIA VU   08/09/18 1311     Done Acceptance E VU   08/07/18 1305     Done Acceptance E VU,NR role of OT; OT POC; transfer training; safety precautions AS 08/03/18 1348          Point: Body mechanics (Done)     Description: Instruct learner(s) on proper positioning and spine alignment during self-care, functional mobility activities  and/or exercises.   Learning Progress Summary     Learner Status Readiness Method Response Comment Documented by    Patient Done Acceptance E,TB,D VU   08/09/18 1311     Done Acceptance E VU   08/07/18 1305     Done Acceptance E ZHAO,NR role of OT; OT POC; transfer training; safety precautions AS 08/03/18 1348                      User Key     Initials Effective Dates Name Provider Type Discipline     03/07/18 -  Carmen Davenport COTA/L Occupational Therapy Assistant OT     03/07/18 -  Julissa Vizcaino COTA/L Occupational Therapy Assistant OT     03/07/18 -  Ileana Yang SWARTZ/L Occupational Therapy Assistant OT    AS 05/01/18 -  Erin Treviño, OT Occupational Therapist OT                OT Recommendation and Plan  Outcome Summary/Treatment Plan (OT)  Daily Summary of Progress (OT): progress toward functional goals is good  Plan for Continued Treatment (OT): cont OT POC  Anticipated Discharge Disposition (OT): home with home health  Daily Summary of Progress (OT): progress toward functional goals is good  Plan of Care Review  Plan of Care Reviewed With: patient  Plan of Care Reviewed With: patient  Outcome Summary: Pt tolerated tx well this date. Pt completed an ADL. Pt was min/mod A  with sit-stand t/f. No goals met this tx. Continue OT POC.        Outcome Measures     Row Name 08/09/18 1300 08/08/18 1108 08/08/18 0910       How much help from another person do you currently need...    Turning from your back to your side while in flat bed without using bedrails?  -- 3  -LEANDER  --    Moving from lying on back to sitting on the side of a flat bed without bedrails?  -- 3  -LEANDER  --    Moving to and from a bed to a chair (including a wheelchair)?  -- 3  -LEANDER  --    Standing up from a chair using your arms (e.g., wheelchair, bedside chair)?  -- 3  -LEANDER  --    Climbing 3-5 steps with a railing?  -- 3  -LEANDER  --    To walk in hospital room?  -- 3  -LEANDER  --    AM-PAC 6 Clicks Score  -- 18  -LEANDER  --       How much  help from another is currently needed...    Putting on and taking off regular lower body clothing? 2  -CS  -- 2  -KD    Bathing (including washing, rinsing, and drying) 2  -CS  -- 2  -KD    Toileting (which includes using toilet bed pan or urinal) 2  -CS  -- 2  -KD    Putting on and taking off regular upper body clothing 2  -CS  -- 2  -KD    Taking care of personal grooming (such as brushing teeth) 3  -CS  -- 3  -KD    Eating meals 4  -CS  -- 4  -KD    Score 15  -CS  -- 15  -KD       Functional Assessment    Outcome Measure Options AM-PAC 6 Clicks Daily Activity (OT)  -CS AM-PAC 6 Clicks Basic Mobility (PT)  -LEANDER  --    Row Name 08/07/18 0947 08/06/18 1503          How much help from another person do you currently need...    Turning from your back to your side while in flat bed without using bedrails?  -- 3  -LEANDER     Moving from lying on back to sitting on the side of a flat bed without bedrails?  -- 3  -LEANDER     Moving to and from a bed to a chair (including a wheelchair)?  -- 3  -LEANDER     Standing up from a chair using your arms (e.g., wheelchair, bedside chair)?  -- 3  -LEANDER     Climbing 3-5 steps with a railing?  -- 3  -LEANDER     To walk in hospital room?  -- 3  -LEANDER     AM-PAC 6 Clicks Score  -- 18  -LEANDER        How much help from another is currently needed...    Putting on and taking off regular lower body clothing? 2  -JH  --     Bathing (including washing, rinsing, and drying) 2  -JH  --     Toileting (which includes using toilet bed pan or urinal) 2  -JH  --     Putting on and taking off regular upper body clothing 2  -JH  --     Taking care of personal grooming (such as brushing teeth) 3  -JH  --     Eating meals 4  -JH  --     Score 15  -JH  --        Functional Assessment    Outcome Measure Options  -- AM-PAC 6 Clicks Basic Mobility (PT)  -LEANDER       User Key  (r) = Recorded By, (t) = Taken By, (c) = Cosigned By    Initials Name Provider Type    LEANDER Reinier Hannah PTA Physical Therapy Assistant    Adrianna Bryant  SHERIDAN GARCÍA/L Occupational Therapy Assistant     Julissa Vizcaino COTA/L Occupational Therapy Assistant     Ileana Yang COTA/L Occupational Therapy Assistant           Time Calculation:         Time Calculation- OT     Row Name 08/09/18 1312             Time Calculation- OT    OT Start Time 1004  -CS      OT Stop Time 1059  -CS      OT Time Calculation (min) 55 min  -CS      Total Timed Code Minutes- OT 55 minute(s)  -CS      OT Received On 08/09/18  -        User Key  (r) = Recorded By, (t) = Taken By, (c) = Cosigned By    Initials Name Provider Type    CS Ileana Yang COTA/L Occupational Therapy Assistant           Therapy Suggested Charges     Code   Minutes Charges    None           Therapy Charges for Today     Code Description Service Date Service Provider Modifiers Qty    91814333167 HC OT SELF CARE/MGMT/TRAIN EA 15 MIN 8/9/2018 Ileana Yang COTA/L GO 4          OT G-codes  OT Professional Judgement Used?: Yes  OT Functional Scales Options: AM-PAC 6 Clicks Daily Activity (OT)  Score: 15  Functional Limitation: Self care  Self Care Current Status (): At least 40 percent but less than 60 percent impaired, limited or restricted  Self Care Goal Status (): At least 1 percent but less than 20 percent impaired, limited or restricted    MERLINE Joseph  8/9/2018

## 2018-08-09 NOTE — NURSING NOTE
Patient refused to wear oxygen while ambulating. O2 sats dropped to 86% after getting back to the room. Humidified trach collar put back on once back in the room and sats are now at 92%.

## 2018-08-09 NOTE — PLAN OF CARE
Problem: Patient Care Overview  Goal: Plan of Care Review  Outcome: Ongoing (interventions implemented as appropriate)   08/09/18 1311   Coping/Psychosocial   Plan of Care Reviewed With patient   OTHER   Outcome Summary Pt tolerated tx well this date. Pt completed an ADL. Pt was min/mod A with sit-stand t/f. No goals met this tx. Continue OT POC.   Plan of Care Review   Progress improving

## 2018-08-09 NOTE — PROGRESS NOTES
ARU consult rec'd.  Chart reviewed.  Patient is not a candidate for ARU at present time.  Informed NEYMAR De Leon.  Will complete consult.

## 2018-08-09 NOTE — PLAN OF CARE
Problem: Patient Care Overview  Goal: Plan of Care Review  Outcome: Ongoing (interventions implemented as appropriate)   08/09/18 0459   Coping/Psychosocial   Plan of Care Reviewed With patient   OTHER   Outcome Summary VSS, pt refused trach care this shift, pt still does not have inner cannula in trach, awaiting visit from Dr. Venegas   Plan of Care Review   Progress no change     Goal: Individualization and Mutuality  Outcome: Ongoing (interventions implemented as appropriate)    Goal: Discharge Needs Assessment  Outcome: Ongoing (interventions implemented as appropriate)      Problem: Fall Risk (Adult)  Goal: Absence of Fall  Outcome: Ongoing (interventions implemented as appropriate)      Problem: Skin Injury Risk (Adult)  Goal: Skin Health and Integrity  Outcome: Ongoing (interventions implemented as appropriate)      Problem: Pain, Acute (Adult)  Goal: Acceptable Pain Control/Comfort Level  Outcome: Ongoing (interventions implemented as appropriate)

## 2018-08-09 NOTE — PROGRESS NOTES
Nutrition Services    Patient Name:  Truman Esquivel  YOB: 1960  MRN: [id number]  Admit Date:  7/26/2018    Spoke with pt at lunch.. He said he dislikes the ensure and wants mighty shakes instead in chocolate flavor.  Will honor preferences, notify unit RDN and RDN staff will continue to monitor.    Electronically signed by:  An Edwards RD  08/09/18 2:34 PM

## 2018-08-09 NOTE — PROGRESS NOTES
GENERAL SURGERY PROGRESS NOTE  Chief Complaint:  Surgery Follow up   LOS: 14 days       Subjective     Interval History:     Trach changed this AM. Breathing well. Tolerating diet. Working with PT and OT    Objective     Vital Signs  Temp:  [98 °F (36.7 °C)-98.9 °F (37.2 °C)] 98.8 °F (37.1 °C)  Heart Rate:  [65-90] 78  Resp:  [16-22] 18  BP: (131-146)/(61-89) 146/77    Physical Exam:   Abdomen soft  Labs:  Lab Results (last 24 hours)     Procedure Component Value Units Date/Time    Basic Metabolic Panel [[id number]]  (Abnormal) Collected:  08/09/18 0606    Specimen:  Blood Updated:  08/09/18 0624     Glucose 100 mg/dL      BUN 8 mg/dL      Creatinine 0.72 mg/dL      Sodium 130 (L) mmol/L      Potassium 3.9 mmol/L      Chloride 98 mmol/L      CO2 26.0 mmol/L      Calcium 7.6 (L) mg/dL      eGFR Non African Amer 113 mL/min/1.73      BUN/Creatinine Ratio 11.1     Anion Gap 6.0 mmol/L     CBC (No Diff) [[id number]]  (Abnormal) Collected:  08/09/18 0606    Specimen:  Blood Updated:  08/09/18 0616     WBC 18.77 (H) 10*3/mm3      RBC 3.04 (L) 10*6/mm3      Hemoglobin 8.4 (L) g/dL      Hematocrit 25.3 (L) %      MCV 83.2 fL      MCH 27.6 pg      MCHC 33.2 g/dL      RDW 17.6 (H) %      RDW-SD 52.8 (H) fl      MPV 9.1 fL      Platelets 549 (H) 10*3/mm3            Results Review:     Labs and imaging for today were reviewed.    Assessment/Plan     Truman Esquivel is a 57 y.o. male who is s/p splenectomy, decortication      DC IVF.  DC Central line.  DC Prevena from left chest.  Work on placement in ARU.          This document has been electronically signed by Kris Solano MD on August 9, 2018 8:47 AM        Kris Solano MD  08/09/18  8:47 AM

## 2018-08-09 NOTE — CONSULTS
Adult Nutrition  Assessment    Patient Name:  Truman Esquivel  YOB: 1960  MRN: [id number]  Admit Date:  7/26/2018    Assessment Date:  8/9/2018    Comments:  Pt has been advanced to a soft diet with chopped meals.  He continues to report Gi distress of bloating and gas.  His intake for the last 3 meals has averaged ~42%.  RD will adjust supplements and d/c Ensure and add mighty shakes and magic cups.  He is no longer receiving EN.  Will monitor po and tx plans  ARU consult in progress.            Reason for Assessment     Row Name 08/09/18 1606          Reason for Assessment    Reason For Assessment follow-up protocol               Nutrition/Diet History     Row Name 08/09/18 1607          Nutrition/Diet History    Typical Food/Fluid Intake Pt                Labs/Tests/Procedures/Meds     Row Name 08/09/18 1607          Labs/Procedures/Meds    Lab Results Reviewed reviewed, pertinent        Diagnostic Tests/Procedures    Diagnostic Test/Procedure Reviewed reviewed, pertinent     Diagnostic Test/Procedures Comments SLP follow up        Medications    Pertinent Medications Reviewed reviewed, pertinent             Physical Findings     Row Name 08/09/18 1607          Physical Findings    Overall Physical Appearance on oxygen therapy   trach     Tubes gastrostomy tube               Nutrition Prescription Ordered     Row Name 08/09/18 1608          Nutrition Prescription PO    Current PO Diet Soft Texture     Texture Chopped     Fluid Consistency Thin     Supplement Ensure Enlive     Common Modifiers Cardiac             Evaluation of Received Nutrient/Fluid Intake     Row Name 08/09/18 1608          PO Evaluation    Number of Days PO Intake Evaluated 1 day     Number of Meals 3     % PO Intake 50/25/50             Electronically signed by:  Leeann Salazar RD  08/09/18 4:15 PM

## 2018-08-09 NOTE — SIGNIFICANT NOTE
08/09/18 1556   Rehab Treatment   Discipline physical therapy assistant   Reason Treatment Not Performed patient/family declined treatment  (Pt declined 2 attempts to tx this date. We will check back tomorrow.)

## 2018-08-09 NOTE — PROGRESS NOTES
Patient seen for trach change.  He had allowed the inner cannula to stay out long enough that he needs a new trach.  Size 6 CFN trach is placed without difficulty..

## 2018-08-09 NOTE — PLAN OF CARE
Problem: Patient Care Overview  Goal: Plan of Care Review  Outcome: Ongoing (interventions implemented as appropriate)   08/09/18 2485   Coping/Psychosocial   Plan of Care Reviewed With patient   OTHER   Outcome Summary DC'd prevena wound vac from pt - incision has staples SISSY; pt. ambulating in halls; pt. Trach changed by Dr. Venegas   Plan of Care Review   Progress improving     Goal: Individualization and Mutuality  Outcome: Ongoing (interventions implemented as appropriate)    Goal: Discharge Needs Assessment  Outcome: Ongoing (interventions implemented as appropriate)    Goal: Interprofessional Rounds/Family Conf  Outcome: Ongoing (interventions implemented as appropriate)      Problem: Fall Risk (Adult)  Goal: Absence of Fall  Outcome: Ongoing (interventions implemented as appropriate)      Problem: Skin Injury Risk (Adult)  Goal: Skin Health and Integrity  Outcome: Ongoing (interventions implemented as appropriate)      Problem: Pain, Acute (Adult)  Goal: Acceptable Pain Control/Comfort Level  Outcome: Ongoing (interventions implemented as appropriate)

## 2018-08-09 NOTE — PLAN OF CARE
Problem: Patient Care Overview  Goal: Plan of Care Review  Outcome: Ongoing (interventions implemented as appropriate)   08/09/18 1613   Coping/Psychosocial   Plan of Care Reviewed With caregiver;patient   OTHER   Outcome Summary Pt on soft diet wtih chopped meats. Still with some Gi distress preventing optimal intake. Will adjust supplements per his preference   Plan of Care Review   Progress improving       Problem: Skin Injury Risk (Adult)  Intervention: Promote/Optimize Nutrition   08/09/18 1613   Nutrition Interventions   Oral Nutrition Promotion calorie dense foods provided;calorie dense liquids provided;nutritional therapy counseling provided

## 2018-08-10 LAB
ANION GAP SERPL CALCULATED.3IONS-SCNC: 6 MMOL/L (ref 5–15)
BUN BLD-MCNC: 9 MG/DL (ref 7–21)
BUN/CREAT SERPL: 12.5 (ref 7–25)
CALCIUM SPEC-SCNC: 7.6 MG/DL (ref 8.4–10.2)
CHLORIDE SERPL-SCNC: 99 MMOL/L (ref 95–110)
CO2 SERPL-SCNC: 25 MMOL/L (ref 22–31)
CREAT BLD-MCNC: 0.72 MG/DL (ref 0.7–1.3)
DEPRECATED RDW RBC AUTO: 52.9 FL (ref 35.1–43.9)
ERYTHROCYTE [DISTWIDTH] IN BLOOD BY AUTOMATED COUNT: 17.5 % (ref 11.5–14.5)
GFR SERPL CREATININE-BSD FRML MDRD: 113 ML/MIN/1.73 (ref 56–130)
GLUCOSE BLD-MCNC: 92 MG/DL (ref 60–100)
GLUCOSE BLDC GLUCOMTR-MCNC: 107 MG/DL (ref 70–130)
GLUCOSE BLDC GLUCOMTR-MCNC: 115 MG/DL (ref 70–130)
GLUCOSE BLDC GLUCOMTR-MCNC: 132 MG/DL (ref 70–130)
HCT VFR BLD AUTO: 24.2 % (ref 39–49)
HGB BLD-MCNC: 8.1 G/DL (ref 13.7–17.3)
MCH RBC QN AUTO: 27.9 PG (ref 26.5–34)
MCHC RBC AUTO-ENTMCNC: 33.5 G/DL (ref 31.5–36.3)
MCV RBC AUTO: 83.4 FL (ref 80–98)
PLATELET # BLD AUTO: 513 10*3/MM3 (ref 150–450)
PMV BLD AUTO: 9.4 FL (ref 8–12)
POTASSIUM BLD-SCNC: 4 MMOL/L (ref 3.5–5.1)
RBC # BLD AUTO: 2.9 10*6/MM3 (ref 4.37–5.74)
SODIUM BLD-SCNC: 130 MMOL/L (ref 137–145)
WBC NRBC COR # BLD: 14.86 10*3/MM3 (ref 3.2–9.8)

## 2018-08-10 PROCEDURE — 99024 POSTOP FOLLOW-UP VISIT: CPT | Performed by: SURGERY

## 2018-08-10 PROCEDURE — 85027 COMPLETE CBC AUTOMATED: CPT | Performed by: STUDENT IN AN ORGANIZED HEALTH CARE EDUCATION/TRAINING PROGRAM

## 2018-08-10 PROCEDURE — 80048 BASIC METABOLIC PNL TOTAL CA: CPT | Performed by: STUDENT IN AN ORGANIZED HEALTH CARE EDUCATION/TRAINING PROGRAM

## 2018-08-10 PROCEDURE — 82962 GLUCOSE BLOOD TEST: CPT

## 2018-08-10 PROCEDURE — 94799 UNLISTED PULMONARY SVC/PX: CPT

## 2018-08-10 PROCEDURE — 97110 THERAPEUTIC EXERCISES: CPT

## 2018-08-10 PROCEDURE — 97116 GAIT TRAINING THERAPY: CPT

## 2018-08-10 PROCEDURE — 94760 N-INVAS EAR/PLS OXIMETRY 1: CPT

## 2018-08-10 PROCEDURE — 25010000002 MORPHINE PER 10 MG: Performed by: NURSE PRACTITIONER

## 2018-08-10 RX ADMIN — LEVOTHYROXINE SODIUM 75 MCG: 0.07 TABLET ORAL at 05:34

## 2018-08-10 RX ADMIN — PANTOPRAZOLE SODIUM 40 MG: 40 INJECTION, POWDER, FOR SOLUTION INTRAVENOUS at 05:34

## 2018-08-10 RX ADMIN — MORPHINE SULFATE 2 MG: 2 INJECTION, SOLUTION INTRAMUSCULAR; INTRAVENOUS at 17:30

## 2018-08-10 RX ADMIN — CHLORHEXIDINE GLUCONATE 0.12% ORAL RINSE 15 ML: 1.2 LIQUID ORAL at 09:40

## 2018-08-10 RX ADMIN — MORPHINE SULFATE 2 MG: 2 INJECTION, SOLUTION INTRAMUSCULAR; INTRAVENOUS at 02:45

## 2018-08-10 RX ADMIN — CHLORHEXIDINE GLUCONATE 0.12% ORAL RINSE 15 ML: 1.2 LIQUID ORAL at 20:23

## 2018-08-10 RX ADMIN — LOSARTAN POTASSIUM: 50 TABLET, FILM COATED ORAL at 09:40

## 2018-08-10 RX ADMIN — METOPROLOL SUCCINATE 25 MG: 25 TABLET, FILM COATED, EXTENDED RELEASE ORAL at 20:23

## 2018-08-10 RX ADMIN — Medication 10 ML: at 20:36

## 2018-08-10 RX ADMIN — MORPHINE SULFATE 2 MG: 2 INJECTION, SOLUTION INTRAMUSCULAR; INTRAVENOUS at 09:40

## 2018-08-10 RX ADMIN — MORPHINE SULFATE 2 MG: 2 INJECTION, SOLUTION INTRAMUSCULAR; INTRAVENOUS at 20:34

## 2018-08-10 RX ADMIN — MORPHINE SULFATE 2 MG: 2 INJECTION, SOLUTION INTRAMUSCULAR; INTRAVENOUS at 13:26

## 2018-08-10 RX ADMIN — Medication 10 ML: at 05:34

## 2018-08-10 RX ADMIN — Medication 10 ML: at 02:45

## 2018-08-10 NOTE — PLAN OF CARE
Problem: Patient Care Overview  Goal: Plan of Care Review  Outcome: Ongoing (interventions implemented as appropriate)   08/10/18 1050 08/10/18 1410   Coping/Psychosocial   Plan of Care Reviewed With patient --    OTHER   Outcome Summary --  Pt responded well to therapy but did c/o 7/10 incisional pain with UE ther ex. No new goals met this date. Cont OT POC   Plan of Care Review   Progress improving --

## 2018-08-10 NOTE — THERAPY TREATMENT NOTE
Acute Care - Physical Therapy Treatment Note  [facility]     Patient Name: Truman Esquivel  : 1960  MRN: [id number]  Today's Date: 8/10/2018  Onset of Illness/Injury or Date of Surgery: 18  Date of Referral to PT: 18  Referring Physician: Dr. Solano    Admit Date: 2018    Visit Dx:    ICD-10-CM ICD-9-CM   1. Syncope and collapse R55 780.2   2. Septic shock (CMS/HCC) A41.9 038.9    R65.21 785.52     995.92   3. Dissection of aorta, unspecified portion of aorta (CMS/HCC) I71.00 441.00   4. Pleural effusion J90 511.9   5. Other ascites R18.8 789.59   6. Splenic hemorrhage D73.89 289.59   7. Impaired physical mobility Z74.09 781.99   8. Impaired mobility and activities of daily living Z74.09 799.89   9. Oropharyngeal dysphagia R13.12 787.22     Patient Active Problem List   Diagnosis   • Ascending aortic dissection (CMS/HCC)   • Essential hypertension   • Deep vein thrombosis (DVT) of femoral vein of both lower extremities (CMS/HCC)   • Hypothyroidism   • Snoring   • Acute pain of right knee   • Knee effusion, right   • Knee pain   • Obstructive sleep apnea of adult   • TIA (transient ischemic attack)   • Dysphagia   • Squamous cell carcinoma of larynx (CMS/HCC)   • Pharyngeal dysphagia   • Anemia   • Abnormal positron emission tomography (PET) of colon   • Hypokalemia   • Encounter for venous access device care   • Dehydration   • Weight loss, abnormal   • Odynophagia   • Thrush, oral   • Thrombocytopenia (CMS/HCC)   • Pancytopenia due to antineoplastic chemotherapy (CMS/HCC)   • Unable to eat   • Syncope and collapse   • Splenic hemorrhage   • Pleural effusion       Therapy Treatment          Rehabilitation Treatment Summary     Row Name 08/10/18 1050             Treatment Time/Intention    Discipline physical therapy assistant  -LEANDER      Document Type therapy note (daily note)  -LEANDER      Mode of Treatment physical therapy;individual therapy  -LEANDER      Patient Effort adequate  -LEANDER       Existing Precautions/Restrictions fall;oxygen therapy device and L/min  -LEANDER      Recorded by [LEANDER] Reinier Hannah, PTA 08/10/18 1148      Row Name 08/10/18 1050             Vital Signs    Pre Systolic BP Rehab 149  -LEANDER      Pre Treatment Diastolic BP 74  -LEANDER      Post Systolic BP Rehab 140  -LEANDER      Post Treatment Diastolic BP 77  -LEANDER      Pretreatment Heart Rate (beats/min) 63  -LEANDER      Intratreatment Heart Rate (beats/min) 69  -LEANDER      Posttreatment Heart Rate (beats/min) 65  -LEANDER      Pre SpO2 (%) 91  -LEANDER      O2 Delivery Pre Treatment supplemental O2  -LEANDER      Intra SpO2 (%) 94  -LEANDER      O2 Delivery Intra Treatment supplemental O2  -LEANDER      Post SpO2 (%) 96  -LEANDER      O2 Delivery Post Treatment supplemental O2  -LEANDER      Pre Patient Position Sitting  -LEANDER      Post Patient Position Sitting  -LEANDER      Recorded by [LEANDER] Reinier Hannah, PTA 08/10/18 1148      Row Name 08/10/18 1050             Cognitive Assessment/Intervention- PT/OT    Affect/Mental Status (Cognitive) WFL  -LEANDER      Orientation Status (Cognition) oriented x 4  -LEANDER      Follows Commands (Cognition) WFL  -LEANDER      Cognitive Function (Cognitive) WFL  -LEANDER      Personal Safety Interventions gait belt;muscle strengthening facilitated;nonskid shoes/slippers when out of bed;supervised activity  -LEANDER      Recorded by [LEANDER] Reinier Hannah, PTA 08/10/18 1148      Row Name 08/10/18 1050             Transfer Assessment/Treatment    Transfer Assessment/Treatment sit-stand transfer;stand-sit transfer  -LEANDER      Recorded by [LEANDER] Reinier Hannah, PTA 08/10/18 1148      Row Name 08/10/18 1050             Sit-Stand Transfer    Sit-Stand [place] (Transfers) contact guard;minimum assist (75% patient effort)   pt pulled on PTAs hand to stand. other arm pushing off chair  -LEANDER      Assistive Device (Sit-Stand Transfers) walker, front-wheeled  -LEANDER      Recorded by [LEANDER] Reinier Hannah PTA 08/10/18 1148      Row Name 08/10/18 1050             Stand-Sit Transfer     Stand-Sit [place] (Transfers) supervision  -LEANDER      Assistive Device (Stand-Sit Transfers) walker, front-wheeled  -LEANDER      Recorded by [LEANDER] Reinier Hannah, PTA 08/10/18 1148      Row Name 08/10/18 1050             Gait/Stairs Assessment/Training    Gait/Stairs Assessment/Training gait/ambulation independence;gait/ambulation assistive device;distance ambulated;gait pattern;gait deviations  -LEANDER      [place] Level (Gait) supervision  -LEANDER      Assistive Device (Gait) walker, front-wheeled  -LEANDER      Distance in Feet (Gait) 160  -LEANDER      Pattern (Gait) step-through  -LEANDER      Deviations/Abnormal Patterns (Gait) [place] decreased   FF posture.   -LEANDER      Recorded by [LEANDER] Reinier Hannah, \[facility] [place] [facility]\"" 08/10/18 1148      Row Name 08/10/18 1050             Therapeutic Exercise    Therapeutic Exercise seated, lower extremities  -LEANDER      Recorded by [LEANDER] Reinier Hannah \[facility] [place] [facility]\"" 08/10/18 1148      Row Name 08/10/18 1050             Lower Extremity Seated Therapeutic Exercise    Performed, Seated Lower Extremity (Therapeutic Exercise) ankle dorsiflexion/plantarflexion;hip abduction/adduction;LAQ (long arc quad), knee extension;hip flexion/extension  -LEANDER      Exercise Type, Seated Lower Extremity (Therapeutic Exercise) AROM (active range of motion)  -LEANDER      Sets/Reps Detail, Seated Lower Extremity (Therapeutic Exercise) 20x1  -LEANDER      Recorded by [LEANDER] Reinier Hannah PTA 08/10/18 1148      Row Name 08/10/18 1050             Positioning and Restraints    Pre-Treatment Position sitting in chair/recliner  -LEANDER      Post Treatment Position chair  -LEANDER      In Chair reclined;call light within reach;encouraged to call for assist   all needs met.   -LEANDER      Recorded by [LEANDER] Reinier Hannah, PTA 08/10/18 1148      Row Name 08/10/18 1050             Pain Assessment    Additional Documentation Pain Scale: Numbers Pre/Post-Treatment (Group)  -LEANDER      Recorded by [LEANDER] Reinier Hannah \[facility] [place] [facility]\"" 08/10/18 1148      Row Name 08/10/18 1050              Pain Scale: Numbers Pre/Post-Treatment    Pain Scale: Numbers, Pretreatment 8/10  -LEANDER      Pain Scale: Numbers, Post-Treatment 8/10  -LEANDER      Pain Location - Orientation incisional  -LEANDER      Pain Location abdomen   low back  -LEANDER      Recorded by [LEANDER] Reinier Hannah PTA 08/10/18 1148      Row Name                Wound 07/26/18 1326 Other (See comments) anterior abdomen incision;surgical    Wound - Properties Group Date first assessed: 07/26/18 [TB] Time first assessed: 1326 [TB] Present On Admission : no [TB] Side: Other (See comments) [TB], MIDLINE  Orientation: anterior [TB] Location: abdomen [TB] Type: incision;surgical [TB] Recorded by:  [TB] Joshua Ordonez, RN 07/26/18 1327    Row Name                Wound 07/31/18 1700 Left lateral;upper chest surgical;incision    Wound - Properties Group Date first assessed: 07/31/18 [CH] Time first assessed: 1700 [CH] Present On Admission : no [CH] Side: Left [CH] Orientation: lateral;upper [CH] Location: chest [CH] Type: surgical;incision [CH] Recorded by:  [CH] Darlene Slaughter RN 07/31/18 1901    Row Name 08/10/18 1050             Outcome Summary/Treatment Plan (PT)    Daily Summary of Progress (PT) progress toward functional goals as expected  -      Plan for Continued Treatment (PT) continue  -LEANDER      Anticipated Discharge Disposition (PT) home with home health;home with 24/7 care  -LEANDER      Recorded by [LEANDER] Reinier Hannah PTA 08/10/18 1148        User Key  (r) = Recorded By, (t) = Taken By, (c) = Cosigned By    Initials Name Effective Dates Discipline    LEANDER Reinier Hannah PTA 03/07/18 -  PT    TB Joshua Ordonez RN 10/17/16 -  Nurse    CH Darlene Slaughter RN 06/23/17 -  Nurse          Wound 07/26/18 1326 Other (See comments) anterior abdomen incision;surgical (Active)   Dressing Appearance open to air 8/10/2018  7:54 AM   Closure Staples;Open to air 8/10/2018  7:54 AM   Drainage Characteristics/Odor serosanguineous 8/10/2018  7:54 AM    Drainage Amount none 8/10/2018  7:54 AM   Dressing Care, Wound open to air 8/10/2018  7:54 AM       Wound 07/31/18 1700 Left lateral;upper chest surgical;incision (Active)   Dressing Appearance intact;dry 8/10/2018  7:54 AM   Closure Staples 8/10/2018  7:54 AM   Base dressing in place, unable to visualize 8/10/2018  7:54 AM               PT Rehab Goals     Row Name 08/10/18 1050             Bed Mobility Goal 1 (PT)    Activity/Assistive Device (Bed Mobility Goal 1, PT) sit to supine;supine to sit;bed rails  -LEANDER      [place] Level/Cues Needed (Bed Mobility Goal 1, PT) supervision required  -LEANDER      Time Frame (Bed Mobility Goal 1, PT) 1 week  -LEANDER      Progress/Outcomes (Bed Mobility Goal 1, PT) goal not met  -LEANDER         Transfer Goal 1 (PT)    Activity/Assistive Device (Transfer Goal 1, PT) sit-to-stand/stand-to-sit;bed-to-chair/chair-to-bed;walker, rolling  -LEANDER      [place] Level/Cues Needed (Transfer Goal 1, PT) supervision required  -LEANDER      Time Frame (Transfer Goal 1, PT) 1 week  -LEANDER      Progress/Outcome (Transfer Goal 1, PT) goal not met  -LEANDER         Gait Training Goal 1 (PT)    Activity/Assistive Device (Gait Training Goal 1, PT) gait (walking locomotion);assistive device use;increase endurance/gait distance;walker, rolling;decrease fall risk   or least restrictive to no device  -LEANDER      [place] Level (Gait Training Goal 1, PT) supervision required  -LEANDER      Distance (Gait Goal 1, PT) 200  -LEANDER      Time Frame (Gait Training Goal 1, PT) 1 week  -LEANDER      Progress/Outcome (Gait Training Goal 1, PT) goal not met  -LEANDER         Stairs Goal 1 (PT)    Activity/Assistive Device (Stairs Goal 1, PT) ascending stairs;descending stairs;decrease fall risk;using handrail, right  -LEANDER      [place] Level/Cues Needed (Stairs Goal 1, PT) contact guard assist  -LEANDER      Number of Stairs (Stairs Goal 1, PT) 1  -LEANDER      Time Frame (Stairs Goal 1, PT) 1 week  -LEANDER      Progress/Outcome (Stairs Goal 1, PT) goal not  met  -LEANDER         Patient Education Goal (PT)    Activity (Patient Education Goal, PT) HEP  -LEANDER      [place]/Cues/Accuracy (Memory Goal 2, PT) demonstrates adequately;verbalizes understanding  -LEANDER      Time Frame (Patient Education Goal, PT) 1 week  -LEANDER      Progress/Outcome (Patient Education Goal, PT) goal not met  -LEANDER        User Key  (r) = Recorded By, (t) = Taken By, (c) = Cosigned By    Initials Name Provider Type Discipline    Reinier Martin, PTA Physical Therapy Assistant PT          Physical Therapy Education     Title: PT OT SLP Therapies (Active)     Topic: Physical Therapy (Active)     Point: Mobility training (Active)    Learning Progress Summary     Learner Status Readiness Method Response Comment Documented by    Patient Active Acceptance E NR  LEANDER 08/10/18 1151     Active Acceptance E NR  LEANDER 08/08/18 1241     Active Acceptance E NR  LEANDER 08/06/18 1555     Active Acceptance E,D NR  CB 08/03/18 1127          Point: Home exercise program (Active)    Learning Progress Summary     Learner Status Readiness Method Response Comment Documented by    Patient Active Acceptance E NR  LEANDER 08/08/18 1241          Point: Precautions (Active)    Learning Progress Summary     Learner Status Readiness Method Response Comment Documented by    Patient Active Acceptance E NR  LEANDER 08/08/18 1241     Active Acceptance E,D NR  CB 08/03/18 1127                      User Key     Initials Effective Dates Name Provider Type Discipline     04/06/17 -  Lupe Grossman, PT Physical Therapist PT    LEANDER 03/07/18 -  Reinier Hannah, MARGI Physical Therapy Assistant PT                    PT Recommendation and Plan  Anticipated Discharge Disposition (PT): home with home health, home with 24/7 care  Outcome Summary/Treatment Plan (PT)  Daily Summary of Progress (PT): progress toward functional goals as expected  Plan for Continued Treatment (PT): continue  Anticipated Discharge Disposition (PT): home with home health, home with 24/7  care  Plan of Care Reviewed With: patient  Progress: improving  Outcome Summary: pt responded well to therapy w/ increased gait to 160 ft SBA w/ RW. pt participated in LE ther ex. no new goals met at this time. Recommend further therapy upon DC.           Outcome Measures     Row Name 08/10/18 1050 08/09/18 1300 08/08/18 1108       How much help from another person do you currently need...    Turning from your back to your side while in flat bed without using bedrails? 3  -LEANDER  -- 3  -LEANDER    Moving from lying on back to sitting on the side of a flat bed without bedrails? 3  -LEANDER  -- 3  -LEANDER    Moving to and from a bed to a chair (including a wheelchair)? 3  -LEANDER  -- 3  -LEANDER    Standing up from a chair using your arms (e.g., wheelchair, bedside chair)? 3  -LEANDER  -- 3  -LEANDER    Climbing 3-5 steps with a railing? 3  -LEANDER  -- 3  -LEANDER    To walk in hospital room? 3  -LEANDER  -- 3  -LEANDER    AM-PAC 6 Clicks Score 18  -LEANDER  -- 18  -LEANDER       How much help from another is currently needed...    Putting on and taking off regular lower body clothing?  -- 2  -CS  --    Bathing (including washing, rinsing, and drying)  -- 2  -CS  --    Toileting (which includes using toilet bed pan or urinal)  -- 2  -CS  --    Putting on and taking off regular upper body clothing  -- 2  -CS  --    Taking care of personal grooming (such as brushing teeth)  -- 3  -CS  --    Eating meals  -- 4  -CS  --    Score  -- 15  -CS  --       Functional Assessment    Outcome Measure Options AM-PAC 6 Clicks Basic Mobility (PT)  -LEANDER AM-PAC 6 Clicks Daily Activity (OT)  -CS AM-PAC 6 Clicks Basic Mobility (PT)  -LEANDER    Row Name 08/08/18 0910             How much help from another is currently needed...    Putting on and taking off regular lower body clothing? 2  -KD      Bathing (including washing, rinsing, and drying) 2  -KD      Toileting (which includes using toilet bed pan or urinal) 2  -KD      Putting on and taking off regular upper body clothing 2  -KD      Taking care of  personal grooming (such as brushing teeth) 3  -KD      Eating meals 4  -KD      Score 15  -KD        User Key  (r) = Recorded By, (t) = Taken By, (c) = Cosigned By    Initials Name Provider Type    Reinier Martin PTA Physical Therapy Assistant    Adrianna Bryant, SWARTZ/L Occupational Therapy Assistant    Ileana Maya, SWARTZ/L Occupational Therapy Assistant           Time Calculation:         PT Charges     Row Name 08/10/18 1152             Time Calculation    Start Time 1050  -LEANDER      Stop Time 1118  -LEANDER      Time Calculation (min) 28 min  -LEANDER         Time Calculation- PT    Total Timed Code Minutes- PT 28 minute(s)  -LEANDER        User Key  (r) = Recorded By, (t) = Taken By, (c) = Cosigned By    Initials Name Provider Type    Reinier Martin PTA Physical Therapy Assistant        Therapy Suggested Charges     Code   Minutes Charges    None           Therapy Charges for Today     Code Description Service Date Service Provider Modifiers Qty    28448105560 HC GAIT TRAINING EA 15 MIN 8/10/2018 Reinier Hannah PTA GP 1    [id number] HC PT THER PROC EA 15 MIN 8/10/2018 Reinier Hannah PTA GP 1          PT G-Codes  PT Professional Judgement Used?: Yes  Outcome Measure Options: AM-PAC 6 Clicks Basic Mobility (PT)  Score: 11  Functional Limitation: Mobility: Walking and moving around  Mobility: Walking and Moving Around Current Status (): At least 60 percent but less than 80 percent impaired, limited or restricted  Mobility: Walking and Moving Around Goal Status (): At least 40 percent but less than 60 percent impaired, limited or restricted    Reinier Hannah PTA  8/10/2018

## 2018-08-10 NOTE — THERAPY TREATMENT NOTE
Acute Care - Occupational Therapy Treatment Note  [facility]     Patient Name: Truman Esquivel  : 1960  MRN: [id number]  Today's Date: 8/10/2018  Onset of Illness/Injury or Date of Surgery: 18  Date of Referral to OT: 18  Referring Physician: Dr. Solano    Admit Date: 2018       ICD-10-CM ICD-9-CM   1. Syncope and collapse R55 780.2   2. Septic shock (CMS/HCC) A41.9 038.9    R65.21 785.52     995.92   3. Dissection of aorta, unspecified portion of aorta (CMS/HCC) I71.00 441.00   4. Pleural effusion J90 511.9   5. Other ascites R18.8 789.59   6. Splenic hemorrhage D73.89 289.59   7. Impaired physical mobility Z74.09 781.99   8. Impaired mobility and activities of daily living Z74.09 799.89   9. Oropharyngeal dysphagia R13.12 787.22     Patient Active Problem List   Diagnosis   • Ascending aortic dissection (CMS/HCC)   • Essential hypertension   • Deep vein thrombosis (DVT) of femoral vein of both lower extremities (CMS/HCC)   • Hypothyroidism   • Snoring   • Acute pain of right knee   • Knee effusion, right   • Knee pain   • Obstructive sleep apnea of adult   • TIA (transient ischemic attack)   • Dysphagia   • Squamous cell carcinoma of larynx (CMS/HCC)   • Pharyngeal dysphagia   • Anemia   • Abnormal positron emission tomography (PET) of colon   • Hypokalemia   • Encounter for venous access device care   • Dehydration   • Weight loss, abnormal   • Odynophagia   • Thrush, oral   • Thrombocytopenia (CMS/HCC)   • Pancytopenia due to antineoplastic chemotherapy (CMS/HCC)   • Unable to eat   • Syncope and collapse   • Splenic hemorrhage   • Pleural effusion     Past Medical History:   Diagnosis Date   • Aortic dissection (CMS/HCC)    • Chest pain    • Disease of thyroid gland    • HTN (hypertension)    • Knee pain    • Sleep apnea    • Smoker    • Squamous cell carcinoma of larynx (CMS/HCC) 2018   • Stroke (CMS/HCC)    • Swallowing difficulty      Past Surgical History:    Procedure Laterality Date   • AORTA SURGERY      ruptured aorta repair   • ASCENDING ARCH/HEMIARCH REPLACEMENT N/A 2/14/2017    Procedure: INTRAOPERATIVE TRANSESOPHAGEAL ECHOCARDIOGRAM, MIDLINE STERNOTOMY, ASCENDING AORTIC  AND PROXIMAL AORTIC ARCH REPAIR WITH 26MM GRAFT, AORTIC VALVE RESUSPENSION, AORTIC ROOT REPAIR, OPEN VEIN HARVEST OF RIGHT LEG;  Surgeon: German Arreguin MD;  Location: [facility] MAIN OR;  Service:    • BRONCHOSCOPY N/A 2/17/2017    Procedure: BRONCHOSCOPY BIOPSY AT BEDSIDE WITH BAL-LEFT LOWER LOBE;  Surgeon: Trent Chaney MD;  Location: [facility] ENDOSCOPY;  Service:    • COLONOSCOPY N/A 3/7/2018    Procedure: COLONOSCOPY WITH POSSIBLE POLYPECTOMY   ( DONE IN OR WITH ENDO)      COLONOSCOPY FIRST;  Surgeon: Kris Solano MD;  Location: [facility] OR;  Service:    • DIRECT LARYNGOSCOPY, ESOPHAGOSCOPY, BRONCHOSCOPY N/A 2/5/2018    Procedure: DIRECT LARYNGOSCOPY WITH BIOPSY, ESOPHAGOSCOPY     (no bronchoscopy, no laser);  Surgeon: Joseph Venegas MD;  Location: [facility] OR;  Service:    • ENDOSCOPY W/ PEG TUBE PLACEMENT N/A 5/2/2018    Procedure: ESOPHAGOGASTRODUODENOSCOPY WITH PERCUTANEOUS ENDOSCOPIC GASTROSTOMY TUBE INSERTION;  Surgeon: Angel Maciel MD;  Location: [facility] ENDOSCOPY;  Service: Gastroenterology   • SPLENECTOMY N/A 7/26/2018    Procedure: SPLENECTOMY, left chest tube placement, EXPLORATORY LAPAROTOMY, G-TUBE PALCEMENT;  Surgeon: Kris Solano MD;  Location: [facility] OR;  Service: General   • THORACOSCOPY Left 7/31/2018    Procedure: LEFT THORACOSCOPY VIDEO ASSISTED POSSIBLE THORACOTOMY possible decortication bronchoscopy;  Surgeon: Joshua Pollock MD;  Location: [facility] OR;  Service: Cardiothoracic   • TRACHEOSTOMY  02/05/2018   • TRACHEOSTOMY N/A 2/5/2018    Procedure: TRACHEOSTOMY  local injection @ 1106 incision @ 1119;  Surgeon: Joseph Venegas MD;  Location: [facility] OR;  Service:    • VENOUS ACCESS DEVICE (PORT) INSERTION N/A 3/7/2018     Procedure: INSERTION OF MEDIPORT     (C-ARM#1);  Surgeon: Kris Solano MD;  Location: [facility];  Service:        Therapy Treatment          Rehabilitation Treatment Summary     Row Name 08/10/18 1410 08/10/18 1050          Treatment Time/Intention    Discipline occupational therapy assistant  -KD physical therapy assistant  -LEANDER     Document Type therapy note (daily note)  -KD therapy note (daily note)  -LEANDER     Subjective Information complains of;pain  -KD  --     Mode of Treatment occupational therapy  -KD physical therapy;individual therapy  -LEANDER     Patient/Family Observations pt in chair upon entry  -KD  --     Care Plan Review care plan/treatment goals reviewed  -KD  --     Therapy Frequency (OT Eval) other (see comments)   5-7x/wk  -KD  --     Patient Effort adequate  -KD adequate  -LEANDER     Existing Precautions/Restrictions fall;oxygen therapy device and L/min  -KD fall;oxygen therapy device and L/min  -LEANDER     Equipment Issued to Patient gait belt  -KD  --     Recorded by [KD] Adrianna Richardson, SWARTZ/KIM 08/10/18 1557 [LEANDER] Reinier Hannah, PTA 08/10/18 1148     Row Name 08/10/18 1410 08/10/18 1050          Vital Signs    Pre Systolic BP Rehab 120  -  -LEANDER     Pre Treatment Diastolic BP 68  -KD 74  -LEANDER     Post Systolic BP Rehab  -- 140  -LEANDER     Post Treatment Diastolic BP  -- 77  -LEANDER     Pretreatment Heart Rate (beats/min) 68  -KD 63  -LEANDER     Intratreatment Heart Rate (beats/min)  -- 69  -LEANDER     Posttreatment Heart Rate (beats/min) 70  -KD 65  -LEANDER     Pre SpO2 (%) 92  -KD 91  -LEANDER     O2 Delivery Pre Treatment supplemental O2  -KD supplemental O2  -LEANDER     Intra SpO2 (%)  -- 94  -LEANDER     O2 Delivery Intra Treatment  -- supplemental O2  -LEANDER     Post SpO2 (%) 94  -KD 96  -LEANDER     O2 Delivery Post Treatment supplemental O2  -KD supplemental O2  -LEANDER     Pre Patient Position Sitting  -KD Sitting  -LEANDER     Intra Patient Position Sitting  -KD  --     Post Patient Position Sitting  -KD Sitting  -LEANDER     Recorded  by [KD] Adrianna Richardson SWARTZ/KIM 08/10/18 1600 [LEANDER] Reinier Hannah, PTA 08/10/18 1148     Row Name 08/10/18 1410 08/10/18 1050          Cognitive Assessment/Intervention- PT/OT    Affect/Mental Status (Cognitive) WFL  -KD WFL  -LEANDER     Orientation Status (Cognition) oriented x 4  -KD oriented x 4  -LEANDER     Follows Commands (Cognition) WFL  -KD WFL  -LEANDER     Cognitive Function (Cognitive) WFL  -KD WFL  -LEANDER     Personal Safety Interventions gait belt  -KD gait belt;muscle strengthening facilitated;nonskid shoes/slippers when out of bed;supervised activity  -LEANDER     Recorded by [KD] Adrianna Richardson SWARTZ/L 08/10/18 1600 [LEANDER] Reinier Hannah, PTA 08/10/18 1148     Row Name 08/10/18 1050             Transfer Assessment/Treatment    Transfer Assessment/Treatment sit-stand transfer;stand-sit transfer  -LEANDER      Recorded by [LEANDER] Reinier Hannah, PTA 08/10/18 1148      Row Name 08/10/18 1050             Sit-Stand Transfer    Sit-Stand [place] (Transfers) contact guard;minimum assist (75% patient effort)   pt pulled on PTAs hand to stand. other arm pushing off chair  -LEANDER      Assistive Device (Sit-Stand Transfers) walker, front-wheeled  -LEANDER      Recorded by [LEANDER] Reinier Hannah, PTA 08/10/18 1148      Row Name 08/10/18 1050             Stand-Sit Transfer    Stand-Sit [place] (Transfers) supervision  -LEANDER      Assistive Device (Stand-Sit Transfers) walker, front-wheeled  -LEANDER      Recorded by [LEANDER] Reinier Hannah, PTA 08/10/18 1148      Row Name 08/10/18 1050             Gait/Stairs Assessment/Training    Gait/Stairs Assessment/Training gait/ambulation independence;gait/ambulation assistive device;distance ambulated;gait pattern;gait deviations  -LEANDER      [place] Level (Gait) supervision  -LEANDER      Assistive Device (Gait) walker, front-wheeled  -LEANDER      Distance in Feet (Gait) 160  -LEANDER      Pattern (Gait) step-through  -LEANDER      Deviations/Abnormal Patterns (Gait) rosanna decreased   FF posture.   -LEANDER      Recorded  by [LEANDER] Reinier Hannah PTA 08/10/18 1148      Row Name 08/10/18 1050             Therapeutic Exercise    Therapeutic Exercise seated, lower extremities  -LEANDER      Recorded by [LEANDER] Reinier Hannah PTA 08/10/18 1148      Row Name 08/10/18 1050             Lower Extremity Seated Therapeutic Exercise    Performed, Seated Lower Extremity (Therapeutic Exercise) ankle dorsiflexion/plantarflexion;hip abduction/adduction;LAQ (long arc quad), knee extension;hip flexion/extension  -LEANDER      Exercise Type, Seated Lower Extremity (Therapeutic Exercise) AROM (active range of motion)  -LEANDER      Sets/Reps Detail, Seated Lower Extremity (Therapeutic Exercise) 20x1  -LEANDER      Recorded by [LEANDER] Reinier Hannah PTA 08/10/18 1148      Row Name 08/10/18 1410             Therapeutic Exercise    Upper Extremity Range of Motion (Therapeutic Exercise) shoulder flexion/extension, bilateral;shoulder abduction/adduction, bilateral;shoulder horizontal abduction/adduction, bilateral;shoulder internal/external rotation, bilateral;elbow flexion/extension, bilateral;forearm supination/pronation, bilateral;wrist flexion/extension, bilateral  -KD      Position (Therapeutic Exercise) seated  -KD      Sets/Reps (Therapeutic Exercise) 30  -KD      Expected Outcome (Therapeutic Exercise) improve functional tolerance, self-care activity  -KD      Recorded by [KD] Adrianna Richardson COTA/L 08/10/18 1600      Row Name 08/10/18 1410 08/10/18 1050          Positioning and Restraints    Pre-Treatment Position sitting in chair/recliner  -KD sitting in chair/recliner  -LEANDER     Post Treatment Position chair  -KD chair  -LEANDER     In Chair sitting;call light within reach;encouraged to call for assist;exit alarm on  -KD reclined;call light within reach;encouraged to call for assist   all needs met.   -LEANDER     Recorded by [KD] Adrianna Richarsdon COTA/KIM 08/10/18 1600 [LEANDER] Reinier Hannah PTA 08/10/18 1148     Row Name 08/10/18 1050             Pain Assessment    Additional  Documentation Pain Scale: Numbers Pre/Post-Treatment (Group)  -LEANDER      Recorded by [LEANDER] Reinier Hannah, PTA 08/10/18 1148      Row Name 08/10/18 1410 08/10/18 1050          Pain Scale: Numbers Pre/Post-Treatment    Pain Scale: Numbers, Pretreatment 7/10  -KD 8/10  -LEANDER     Pain Scale: Numbers, Post-Treatment 8/10  -KD 8/10  -LEANDER     Pain Location - Orientation incisional  -KD incisional  -LEANDER     Pain Location abdomen  -KD abdomen   low back  -LEANDER     Recorded by [KD] Adrianna Richardson SWARTZ/L 08/10/18 1600 [LEANDER] Reinier Hannah, PTA 08/10/18 1148     Row Name                Wound 07/26/18 1326 Other (See comments) anterior abdomen incision;surgical    Wound - Properties Group Date first assessed: 07/26/18 [TB] Time first assessed: 1326 [TB] Present On Admission : no [TB] Side: Other (See comments) [TB], MIDLINE  Orientation: anterior [TB] Location: abdomen [TB] Type: incision;surgical [TB] Recorded by:  [TB] Joshua Ordonez RN 07/26/18 1327    Row Name                Wound 07/31/18 1700 Left lateral;upper chest surgical;incision    Wound - Properties Group Date first assessed: 07/31/18 [CH] Time first assessed: 1700 [CH] Present On Admission : no [CH] Side: Left [CH] Orientation: lateral;upper [CH] Location: chest [CH] Type: surgical;incision [CH] Recorded by:  [CH] Darlene Slaughter RN 07/31/18 1901    Row Name 08/10/18 1410             Outcome Summary/Treatment Plan (OT)    Daily Summary of Progress (OT) progress toward functional goals as expected  -KD      Plan for Continued Treatment (OT) cont ot poc  -KD      Anticipated Discharge Disposition (OT) home with home health  -KD      Recorded by [KD] Adrianna Richardson SWARTZ/L 08/10/18 1600      Row Name 08/10/18 1050             Outcome Summary/Treatment Plan (PT)    Daily Summary of Progress (PT) progress toward functional goals as expected  -LEANDER      Plan for Continued Treatment (PT) continue  -LEANDER      Anticipated Discharge Disposition (PT) home with home  health;home with 24/7 care  -LEANDER      Recorded by [LEANDER] Reinier Hannah, PTA 08/10/18 1148        User Key  (r) = Recorded By, (t) = Taken By, (c) = Cosigned By    Initials Name Effective Dates Discipline    LEANDER Reinier Hannah, PTA 03/07/18 -  PT    KD Adrianna Richardson, SWARTZ/L 03/07/18 -  OT    TB Joshua Ordonez RN 10/17/16 -  Nurse    CH Darlene Slaughter RN 06/23/17 -  Nurse        Wound 07/26/18 1326 Other (See comments) anterior abdomen incision;surgical (Active)   Dressing Appearance open to air 8/10/2018  7:54 AM   Closure [place];Open to air 8/10/2018  7:54 AM   Drainage Characteristics/Odor serosanguineous 8/10/2018  7:54 AM   Drainage Amount none 8/10/2018  7:54 AM   Dressing Care, Wound open to air 8/10/2018  7:54 AM       Wound 07/31/18 1700 Left lateral;upper chest surgical;incision (Active)   Dressing Appearance intact;dry 8/10/2018  7:54 AM   Closure Staples 8/10/2018  7:54 AM   Base dressing in place, unable to visualize 8/10/2018  7:54 AM             OT Rehab Goals     Row Name 08/10/18 1410             Transfer Goal 1 (OT)    Activity/Assistive Device (Transfer Goal 1, OT) sit-to-stand/stand-to-sit;bed-to-chair/chair-to-bed;toilet  -KD      [place] Level/Cues Needed (Transfer Goal 1, OT) supervision required  -KD      Time Frame (Transfer Goal 1, OT) long term goal (LTG);by discharge  -KD      Progress/Outcome (Transfer Goal 1, OT) goal not met;continuing progress toward goal  -KD         Bathing Goal 1 (OT)    Activity/Assistive Device (Bathing Goal 1, OT) bathing skills, all   Using AE PRN  -KD      [place] Level/Cues Needed (Bathing Goal 1, OT) set-up required;supervision required  -KD      Time Frame (Bathing Goal 1, OT) long term goal (LTG);by discharge  -KD      Progress/Outcomes (Bathing Goal 1, OT) goal not met;continuing progress toward goal  -KD         Dressing Goal 1 (OT)    Activity/Assistive Device (Dressing Goal 1, OT) dressing skills, all   Using AE PRN  -KD       [place]/Cues Needed (Dressing Goal 1, OT) set-up required;supervision required  -KD      Time Frame (Dressing Goal 1, OT) long term goal (LTG);other (see comments)  -KD      Progress/Outcome (Dressing Goal 1, OT) goal not met  -KD         Toileting Goal 1 (OT)    Activity/Device (Toileting Goal 1, OT) toileting skills, all  -KD      [place] Level/Cues Needed (Toileting Goal 1, OT) conditional independence  -KD      Time Frame (Toileting Goal 1, OT) long term goal (LTG);by discharge  -KD      Progress/Outcome (Toileting Goal 1, OT) goal not met;continuing progress toward goal  -KD        User Key  (r) = Recorded By, (t) = Taken By, (c) = Cosigned By    Initials Name Provider Type Discipline    KD Adrianna Richardson COTA/L Occupational Therapy Assistant OT        Occupational Therapy Education     Title: PT OT SLP Therapies (Active)     Topic: Occupational Therapy (Done)     Point: ADL training (Done)     Description: Instruct learner(s) on proper safety adaptation and remediation techniques during self care or transfers.   Instruct in proper use of assistive devices.   Learning Progress Summary     Learner Status Readiness Method Response Comment Documented by    Patient Done Acceptance EMEDHAT D VU   08/09/18 1311     Done Acceptance E ZHAO   08/07/18 1305          Point: Home exercise program (Done)     Description: Instruct learner(s) on appropriate technique for monitoring, assisting and/or progressing therapeutic exercises/activities.   Learning Progress Summary     Learner Status Readiness Method Response Comment Documented by    Patient Done Acceptance EMEDHAT D VU CS 08/09/18 1311     Done Acceptance E ZHAO   08/07/18 1305     Active Acceptance E GENET   08/05/18 1337          Point: Precautions (Done)     Description: Instruct learner(s) on prescribed precautions during self-care and functional transfers.   Learning Progress Summary     Learner Status Readiness Method Response Comment Documented by     Patient Done Acceptance E,MEDHAT,D ZHAO   08/09/18 1311     Done Acceptance E [facility] 08/07/18 1305     Done Acceptance E VU,NR role of OT; OT POC; transfer training; safety precautions AS 08/03/18 1348          Point: Body mechanics (Done)     Description: Instruct learner(s) on proper positioning and spine alignment during self-care, functional mobility activities and/or exercises.   Learning Progress Summary     Learner Status Readiness Method Response Comment Documented by    Patient Done Acceptance GUILHERME,MEDHAT,ANA MARIA CHURCHILL   08/09/18 1311     Done Acceptance E VU   08/07/18 1305     Done Acceptance E VU,NR role of OT; OT POC; transfer training; safety precautions AS 08/03/18 1348                      User Key     Initials Effective Dates Name Provider Type Discipline     03/07/18 -  Carmen Davenport COTA/L Occupational Therapy Assistant OT     03/07/18 -  Julissa Vizcaino COTA/L Occupational Therapy Assistant OT     03/07/18 -  Ileana Yang COTA/KIM Occupational Therapy Assistant OT    AS 05/01/18 -  Erin Treviño, OT Occupational Therapist OT                OT Recommendation and Plan  Outcome Summary/Treatment Plan (OT)  Daily Summary of Progress (OT): progress toward functional goals as expected  Plan for Continued Treatment (OT): cont ot poc  Anticipated Discharge Disposition (OT): home with home health  Therapy Frequency (OT Eval): other (see comments) (5-7x/wk)  Daily Summary of Progress (OT): progress toward functional goals as expected  Plan of Care Review  Plan of Care Reviewed With: patient  Plan of Care Reviewed With: patient  Outcome Summary: Pt responded well to therapy but did c/o 7/10 incisional pain with UE ther ex. No new goals met this date. Cont OT POC        Outcome Measures     Row Name 08/10/18 1410 08/10/18 1050 08/09/18 1300       How much help from another person do you currently need...    Turning from your back to your side while in flat bed without using bedrails?  -- 3  -LEANDER  --     Moving from lying on back to sitting on the side of a flat bed without bedrails?  -- 3  -LEANDER  --    Moving to and from a bed to a chair (including a wheelchair)?  -- 3  -LEANDER  --    Standing up from a chair using your arms (e.g., wheelchair, bedside chair)?  -- 3  -LEANDER  --    Climbing 3-5 steps with a railing?  -- 3  -LEANDER  --    To walk in hospital room?  -- 3  -LEANDER  --    AM-PAC 6 Clicks Score  -- 18  -LEANDER  --       How much help from another is currently needed...    Putting on and taking off regular lower body clothing? 2  -KD  -- 2  -CS    Bathing (including washing, rinsing, and drying) 2  -KD  -- 2  -CS    Toileting (which includes using toilet bed pan or urinal) 2  -KD  -- 2  -CS    Putting on and taking off regular upper body clothing 2  -KD  -- 2  -CS    Taking care of personal grooming (such as brushing teeth) 3  -KD  -- 3  -CS    Eating meals 4  -KD  -- 4  -CS    Score 15  -KD  -- 15  -CS       Functional Assessment    Outcome Measure Options  -- AM-PAC 6 Clicks Basic Mobility (PT)  -LEANDER AM-PAC 6 Clicks Daily Activity (OT)  -CS    Row Name 08/08/18 1108 08/08/18 0910          How much help from another person do you currently need...    Turning from your back to your side while in flat bed without using bedrails? 3  -LEANDER  --     Moving from lying on back to sitting on the side of a flat bed without bedrails? 3  -LEANDER  --     Moving to and from a bed to a chair (including a wheelchair)? 3  -LEANDER  --     Standing up from a chair using your arms (e.g., wheelchair, bedside chair)? 3  -LEANDER  --     Climbing 3-5 steps with a railing? 3  -LEANDER  --     To walk in hospital room? 3  -LEANDER  --     AM-PAC 6 Clicks Score 18  -LEANDER  --        How much help from another is currently needed...    Putting on and taking off regular lower body clothing?  -- 2  -KD     Bathing (including washing, rinsing, and drying)  -- 2  -KD     Toileting (which includes using toilet bed pan or urinal)  -- 2  -KD     Putting on and taking off regular upper  body clothing  -- 2  -KD     Taking care of personal grooming (such as brushing teeth)  -- 3  -KD     Eating meals  -- 4  -KD     Score  -- 15  -KD        Functional Assessment    Outcome Measure Options AM-PAC 6 Clicks Basic Mobility (PT)  -  --       User Key  (r) = Recorded By, (t) = Taken By, (c) = Cosigned By    Initials Name Provider Type    LEANDER Reinier Hannah, MARGI Physical Therapy Assistant    Adrianna Bryant COTA/L Occupational Therapy Assistant    Ileana Maya COTA/L Occupational Therapy Assistant           Time Calculation:         Time Calculation- OT     Row Name 08/10/18 1602             Time Calculation- OT    OT Start Time 1410  -KD      OT Stop Time 1425  -KD      OT Time Calculation (min) 15 min  -KD      Total Timed Code Minutes- OT 15 minute(s)  -KD      OT Received On 08/10/18  -KD        User Key  (r) = Recorded By, (t) = Taken By, (c) = Cosigned By    Initials Name Provider Type    Adrianna Bryant COTA/L Occupational Therapy Assistant           Therapy Suggested Charges     Code   Minutes Charges    None           Therapy Charges for Today     Code Description Service Date Service Provider Modifiers Qty    [id number] HC OT THER PROC EA 15 MIN 8/10/2018 Adrianna Richardson COTA/L GO 1          OT G-codes  OT Professional Judgement Used?: Yes  OT Functional Scales Options: AM-PAC 6 Clicks Daily Activity (OT)  Score: 15  Functional Limitation: Self care  Self Care Current Status (): At least 40 percent but less than 60 percent impaired, limited or restricted  Self Care Goal Status (): At least 1 percent but less than 20 percent impaired, limited or restricted    MERLINE Donovan  8/10/2018

## 2018-08-10 NOTE — PLAN OF CARE
Problem: Patient Care Overview  Goal: Plan of Care Review  Outcome: Ongoing (interventions implemented as appropriate)   08/10/18 1032   Coping/Psychosocial   Plan of Care Reviewed With patient   OTHER   Outcome Summary no acute changes   Plan of Care Review   Progress no change     Goal: Individualization and Mutuality  Outcome: Ongoing (interventions implemented as appropriate)    Goal: Discharge Needs Assessment  Outcome: Ongoing (interventions implemented as appropriate)    Goal: Interprofessional Rounds/Family Conf  Outcome: Ongoing (interventions implemented as appropriate)      Problem: Fall Risk (Adult)  Goal: Absence of Fall  Outcome: Ongoing (interventions implemented as appropriate)      Problem: Skin Injury Risk (Adult)  Goal: Skin Health and Integrity  Outcome: Ongoing (interventions implemented as appropriate)      Problem: Pain, Acute (Adult)  Goal: Acceptable Pain Control/Comfort Level  Outcome: Ongoing (interventions implemented as appropriate)

## 2018-08-10 NOTE — PROGRESS NOTES
GENERAL SURGERY PROGRESS NOTE  Chief Complaint:  Surgery Follow up   LOS: 15 days       Subjective     Interval History:     Feels well, not ARU candidate per initial note. Tolerating diet, working with PT    Objective     Vital Signs  Temp:  [96.5 °F (35.8 °C)-99.1 °F (37.3 °C)] 98.4 °F (36.9 °C)  Heart Rate:  [65-84] 84  Resp:  [18-19] 18  BP: (121-156)/(71-84) 149/74    Physical Exam:   Abdomen soft, trach in place  Labs:  Lab Results (last 24 hours)     Procedure Component Value Units Date/Time    POC Glucose Once [[id number]]  (Normal) Collected:  08/10/18 1053    Specimen:  Blood Updated:  08/10/18 1108     Glucose 107 mg/dL      Comment: Meter: [id number]: [id number] SARINA RUSSO       POC Glucose Once [[id number]]  (Abnormal) Collected:  08/10/18 0740    Specimen:  Blood Updated:  08/10/18 1107     Glucose 132 (H) mg/dL      Comment: RN NotifiedMeter: [id number]: [id number] SARINA RUSSO       Basic Metabolic Panel [[id number]]  (Abnormal) Collected:  08/10/18 0629    Specimen:  Blood Updated:  08/10/18 0650     Glucose 92 mg/dL      BUN 9 mg/dL      Creatinine 0.72 mg/dL      Sodium 130 (L) mmol/L      Potassium 4.0 mmol/L      Chloride 99 mmol/L      CO2 25.0 mmol/L      Calcium 7.6 (L) mg/dL      eGFR Non African Amer 113 mL/min/1.73      BUN/Creatinine Ratio 12.5     Anion Gap 6.0 mmol/L     CBC (No Diff) [[id number]]  (Abnormal) Collected:  08/10/18 0629    Specimen:  Blood Updated:  08/10/18 0634     WBC 14.86 (H) 10*3/mm3      RBC 2.90 (L) 10*6/mm3      Hemoglobin 8.1 (L) g/dL      Hematocrit 24.2 (L) %      MCV 83.4 fL      MCH 27.9 pg      MCHC 33.5 g/dL      RDW 17.5 (H) %      RDW-SD 52.9 (H) fl      MPV 9.4 fL      Platelets 513 (H) 10*3/mm3            Results Review:     Labs and imaging for today were reviewed.    Assessment/Plan     Truman Esquivel is a 57 y.o. male who is s/p splenectomy, left decortication      Overall doing well.  Will need to work on dispo while  regaining strength.          This document has been electronically signed by Kris Solano MD on August 10, 2018 12:52 PM        Kris Solano MD  08/10/18  12:52 PM

## 2018-08-10 NOTE — PLAN OF CARE
Problem: Patient Care Overview  Goal: Plan of Care Review  Outcome: Ongoing (interventions implemented as appropriate)   08/10/18 1050   Coping/Psychosocial   Plan of Care Reviewed With patient   OTHER   Outcome Summary pt responded well to therapy w/ increased gait to 160 ft SBA w/ RW. pt participated in LE ther ex. no new goals met at this time. Recommend further therapy upon DC.    Plan of Care Review   Progress improving

## 2018-08-10 NOTE — PLAN OF CARE
Problem: Patient Care Overview  Goal: Plan of Care Review  Outcome: Ongoing (interventions implemented as appropriate)   08/10/18 0039   Coping/Psychosocial   Plan of Care Reviewed With patient   OTHER   Outcome Summary VSS, mechanical soft diet, no N/V per pt, complains of pain, pt needs encouragement to do more, pt calls frequently with many request, resting in between pain meds and calls, cont to monitor   Plan of Care Review   Progress no change       Problem: Fall Risk (Adult)  Goal: Absence of Fall  Outcome: Ongoing (interventions implemented as appropriate)      Problem: Skin Injury Risk (Adult)  Goal: Skin Health and Integrity  Outcome: Ongoing (interventions implemented as appropriate)

## 2018-08-11 LAB
ANION GAP SERPL CALCULATED.3IONS-SCNC: 6 MMOL/L (ref 5–15)
BUN BLD-MCNC: 13 MG/DL (ref 7–21)
BUN/CREAT SERPL: 17.3 (ref 7–25)
CALCIUM SPEC-SCNC: 8 MG/DL (ref 8.4–10.2)
CHLORIDE SERPL-SCNC: 98 MMOL/L (ref 95–110)
CO2 SERPL-SCNC: 28 MMOL/L (ref 22–31)
CREAT BLD-MCNC: 0.75 MG/DL (ref 0.7–1.3)
DEPRECATED RDW RBC AUTO: 52.4 FL (ref 35.1–43.9)
ERYTHROCYTE [DISTWIDTH] IN BLOOD BY AUTOMATED COUNT: 17.3 % (ref 11.5–14.5)
GFR SERPL CREATININE-BSD FRML MDRD: 107 ML/MIN/1.73 (ref 56–130)
GLUCOSE BLD-MCNC: 96 MG/DL (ref 60–100)
HCT VFR BLD AUTO: 25.8 % (ref 39–49)
HGB BLD-MCNC: 8.6 G/DL (ref 13.7–17.3)
MCH RBC QN AUTO: 27.9 PG (ref 26.5–34)
MCHC RBC AUTO-ENTMCNC: 33.3 G/DL (ref 31.5–36.3)
MCV RBC AUTO: 83.8 FL (ref 80–98)
PLATELET # BLD AUTO: 561 10*3/MM3 (ref 150–450)
PMV BLD AUTO: 9.1 FL (ref 8–12)
POTASSIUM BLD-SCNC: 4 MMOL/L (ref 3.5–5.1)
RBC # BLD AUTO: 3.08 10*6/MM3 (ref 4.37–5.74)
SODIUM BLD-SCNC: 132 MMOL/L (ref 137–145)
WBC NRBC COR # BLD: 12.03 10*3/MM3 (ref 3.2–9.8)

## 2018-08-11 PROCEDURE — 97535 SELF CARE MNGMENT TRAINING: CPT

## 2018-08-11 PROCEDURE — 97116 GAIT TRAINING THERAPY: CPT

## 2018-08-11 PROCEDURE — 25010000002 MORPHINE PER 10 MG: Performed by: NURSE PRACTITIONER

## 2018-08-11 PROCEDURE — 85027 COMPLETE CBC AUTOMATED: CPT | Performed by: STUDENT IN AN ORGANIZED HEALTH CARE EDUCATION/TRAINING PROGRAM

## 2018-08-11 PROCEDURE — 80048 BASIC METABOLIC PNL TOTAL CA: CPT | Performed by: STUDENT IN AN ORGANIZED HEALTH CARE EDUCATION/TRAINING PROGRAM

## 2018-08-11 RX ADMIN — LOSARTAN POTASSIUM: 50 TABLET, FILM COATED ORAL at 08:58

## 2018-08-11 RX ADMIN — LEVOTHYROXINE SODIUM 75 MCG: 0.07 TABLET ORAL at 05:18

## 2018-08-11 RX ADMIN — METOPROLOL SUCCINATE 25 MG: 25 TABLET, FILM COATED, EXTENDED RELEASE ORAL at 20:25

## 2018-08-11 RX ADMIN — MORPHINE SULFATE 2 MG: 2 INJECTION, SOLUTION INTRAMUSCULAR; INTRAVENOUS at 18:36

## 2018-08-11 RX ADMIN — PANTOPRAZOLE SODIUM 40 MG: 40 INJECTION, POWDER, FOR SOLUTION INTRAVENOUS at 05:18

## 2018-08-11 RX ADMIN — MORPHINE SULFATE 2 MG: 2 INJECTION, SOLUTION INTRAMUSCULAR; INTRAVENOUS at 22:46

## 2018-08-11 RX ADMIN — MORPHINE SULFATE 2 MG: 2 INJECTION, SOLUTION INTRAMUSCULAR; INTRAVENOUS at 04:40

## 2018-08-11 RX ADMIN — MORPHINE SULFATE 2 MG: 2 INJECTION, SOLUTION INTRAMUSCULAR; INTRAVENOUS at 00:26

## 2018-08-11 RX ADMIN — CHLORHEXIDINE GLUCONATE 0.12% ORAL RINSE 15 ML: 1.2 LIQUID ORAL at 08:58

## 2018-08-11 RX ADMIN — MORPHINE SULFATE 2 MG: 2 INJECTION, SOLUTION INTRAMUSCULAR; INTRAVENOUS at 13:00

## 2018-08-11 RX ADMIN — MORPHINE SULFATE 2 MG: 2 INJECTION, SOLUTION INTRAMUSCULAR; INTRAVENOUS at 08:58

## 2018-08-11 RX ADMIN — POLYETHYLENE GLYCOL 3350 17 G: 17 POWDER, FOR SOLUTION ORAL at 08:58

## 2018-08-11 NOTE — THERAPY TREATMENT NOTE
Acute Care - Physical Therapy Treatment Note  [facility]     Patient Name: Truman Esquivel  : 1960  MRN: [id number]  Today's Date: 2018  Onset of Illness/Injury or Date of Surgery: 18  Date of Referral to PT: 18  Referring Physician: Dr. Solano    Admit Date: 2018    Visit Dx:    ICD-10-CM ICD-9-CM   1. Syncope and collapse R55 780.2   2. Septic shock (CMS/HCC) A41.9 038.9    R65.21 785.52     995.92   3. Dissection of aorta, unspecified portion of aorta (CMS/HCC) I71.00 441.00   4. Pleural effusion J90 511.9   5. Other ascites R18.8 789.59   6. Splenic hemorrhage D73.89 289.59   7. Impaired physical mobility Z74.09 781.99   8. Impaired mobility and activities of daily living Z74.09 799.89   9. Oropharyngeal dysphagia R13.12 787.22     Patient Active Problem List   Diagnosis   • Ascending aortic dissection (CMS/HCC)   • Essential hypertension   • Deep vein thrombosis (DVT) of femoral vein of both lower extremities (CMS/HCC)   • Hypothyroidism   • Snoring   • Acute pain of right knee   • Knee effusion, right   • Knee pain   • Obstructive sleep apnea of adult   • TIA (transient ischemic attack)   • Dysphagia   • Squamous cell carcinoma of larynx (CMS/HCC)   • Pharyngeal dysphagia   • Anemia   • Abnormal positron emission tomography (PET) of colon   • Hypokalemia   • Encounter for venous access device care   • Dehydration   • Weight loss, abnormal   • Odynophagia   • Thrush, oral   • Thrombocytopenia (CMS/HCC)   • Pancytopenia due to antineoplastic chemotherapy (CMS/HCC)   • Unable to eat   • Syncope and collapse   • Splenic hemorrhage   • Pleural effusion       Therapy Treatment          Rehabilitation Treatment Summary     Row Name 18 1423 18 0925          Treatment Time/Intention    Discipline physical therapy assistant  -LEANDER occupational therapy assistant  -KD     Document Type therapy note (daily note)  -LEANDER therapy note (daily note)  -KD     Subjective  Information  -- no complaints  -KD     Mode of Treatment physical therapy;individual therapy  -LEANDER occupational therapy  -KD     Care Plan Review  -- care plan/treatment goals reviewed  -KD     Care Plan Review, Other Participant(s)  -- --   no family present  -KD     Therapy Frequency (OT Eval)  -- other (see comments)   5-7x/wk  -KD     Patient Effort adequate  -LEANDER  --     Existing Precautions/Restrictions fall;oxygen therapy device and L/min  -LEANDER fall;oxygen therapy device and L/min  -KD     Equipment Issued to Patient  -- gait belt  -KD     Recorded by [LEANDER] Reinier Hannah, PTA 08/11/18 1457 [KD] Adrianna Richardson SWARTZ/L 08/11/18 1403     Row Name 08/11/18 1423 08/11/18 0925          Vital Signs    Pre Systolic BP Rehab  -- 147  -KD     Pre Treatment Diastolic BP  -- 71  -KD     Post Systolic BP Rehab 124  -LEANDER  --     Post Treatment Diastolic BP 69  -LEANDER  --     Pretreatment Heart Rate (beats/min)  -- 70  -KD     Intratreatment Heart Rate (beats/min) 74  -LEANDER  --     Posttreatment Heart Rate (beats/min) 67  -LEANDER 72  -KD     Pre SpO2 (%)  -- 92  -KD     O2 Delivery Pre Treatment  -- supplemental O2  -KD     Intra SpO2 (%) 96  -LEANDER  --     O2 Delivery Intra Treatment supplemental O2  -LEANDER  --     Post SpO2 (%) 94  -LEANDER 94  -KD     O2 Delivery Post Treatment supplemental O2  -LEANDER supplemental O2  -KD     Pre Patient Position Sitting  -LEANDER Sitting  -KD     Intra Patient Position  -- Standing  -KD     Post Patient Position Sitting  -LEANDER Sitting  -KD     Recorded by [LEANDER] Reinier Hannah, PTA 08/11/18 1457 [KD] Adrianna Richardson SWARTZ/L 08/11/18 1406     Row Name 08/11/18 1423 08/11/18 0925          Cognitive Assessment/Intervention- PT/OT    Affect/Mental Status (Cognitive) WFL  -LEANDER WFL  -KD     Orientation Status (Cognition) oriented x 4  -LEANDER oriented x 4  -KD     Follows Commands (Cognition) WFL  -LEANDER WFL  -KD     Cognitive Function (Cognitive) WFL  -LEANDER  --     Personal Safety Interventions muscle strengthening  facilitated;nonskid shoes/slippers when out of bed;supervised activity   no gait belt due to incisinon  - fall prevention program maintained;gait belt  -KD     Recorded by [LEANDER] Reinier Hannah, PTA 08/11/18 1457 [KD] Adrianna Richardson SWARTZ/L 08/11/18 1406     Row Name 08/11/18 0925             Functional Mobility    Functional Mobility- Ind. Level supervision required  -KD      Functional Mobility-Distance (Feet) 285  -KD      Recorded by [KD] Adrianna Richardson SWARTZ/L 08/11/18 1406      Row Name 08/11/18 1423 08/11/18 0925          Transfer Assessment/Treatment    Transfer Assessment/Treatment sit-stand transfer;stand-sit transfer  -LEANDER sit-stand transfer;stand-sit transfer  -KD     Recorded by [LEANDER] Reinier Hannah, PTA 08/11/18 1457 [KD] Adrianna Richardson SWARTZ/L 08/11/18 1406     Row Name 08/11/18 0925             Chair-Bed Transfer    Chair-Bed White (Transfers) contact guard  -KD      Assistive Device (Chair-Bed Transfers) walker, front-wheeled  -KD      Recorded by [KD] Adrianna Richardson SWARTZ/L 08/11/18 1406      Row Name 08/11/18 1423 08/11/18 0925          Sit-Stand Transfer    Sit-Stand White (Transfers) contact guard;minimum assist (75% patient effort)   pt pulled on PTAs hand to stand. other arm pushing off chair  -LEANDER contact guard  -KD     Assistive Device (Sit-Stand Transfers) walker, front-wheeled  -LEANDER walker, front-wheeled  -KD     Recorded by [LEANDER] Reinier Hannah, PTA 08/11/18 1457 [KD] Adrianna Richardson SWARTZ/L 08/11/18 1406     Row Name 08/11/18 1423 08/11/18 0925          Stand-Sit Transfer    Stand-Sit White (Transfers) supervision  - contact guard  -KD     Assistive Device (Stand-Sit Transfers) walker, front-wheeled  -LEANDER walker, front-wheeled  -KD     Recorded by [LEANDER] Reinier Hannah, PTA 08/11/18 1457 [KD] Adrianna Richardson SWARTZ/L 08/11/18 1406     Row Name 08/11/18 1423             Gait/Stairs Assessment/Training    Gait/Stairs Assessment/Training gait/ambulation  independence;gait/ambulation assistive device;distance ambulated;gait pattern;gait deviations  -LEANDER      [place] Level (Gait) supervision  -LEANDER      Assistive Device (Gait) walker, front-wheeled  -LEANDER      Distance in Feet (Gait) 270  -LEANDER      Pattern (Gait) step-through  -LEANDER      Deviations/Abnormal Patterns (Gait) rosanna decreased   FF posture.   -LEANDER      Recorded by [LEANDER] Reinier Hannah, PTA 08/11/18 1457      Row Name 08/11/18 1423             Lower Extremity Seated Therapeutic Exercise    Comment, Seated Lower Extremity (Therapeutic Exercise) pt edu on HEP and home safety.   -LEANDER      Recorded by [LEANDER] Reinier Hannah, PTA 08/11/18 1457      Row Name 08/11/18 1423 08/11/18 0925          Positioning and Restraints    Pre-Treatment Position sitting in chair/recliner  -LEANDER sitting in chair/recliner  -KD     Post Treatment Position chair  -LEANDER chair  -KD     In Chair reclined;call light within reach;encouraged to call for assist   all needs met.   -LEANDER sitting;call light within reach;encouraged to call for assist;exit alarm on  -KD     Recorded by [LEANDER] Reinier Hannah, PTA 08/11/18 1457 [KD] Adrianna Richardson SWARTZ/L 08/11/18 1406     Row Name 08/11/18 1423 08/11/18 0925          Pain Scale: Numbers Pre/Post-Treatment    Pain Scale: Numbers, Pretreatment 6/10  -LEANDER 6/10  -KD     Pain Scale: Numbers, Post-Treatment 6/10  -LEANDER 6/10  -KD     Pain Location - Orientation incisional  -LEANDER incisional  -KD     Pain Location chest   L chest  -LEANDER abdomen  -KD     Recorded by [LEANDER] Reinier Hannah, PTA 08/11/18 1457 [KD] Adrianna Richardson SWARTZ/L 08/11/18 1406     Row Name                Wound 07/26/18 1326 Other (See comments) anterior abdomen incision;surgical    Wound - Properties Group Date first assessed: 07/26/18 [TB] Time first assessed: 1326 [TB] Present On Admission : no [TB] Side: Other (See comments) [TB], MIDLINE  Orientation: anterior [TB] Location: abdomen [TB] Type: incision;surgical [TB] Recorded by:  [TB] José Luis  Joshua PINEDA RN 07/26/18 1327    Row Name                Wound 07/31/18 1700 Left lateral;upper chest surgical;incision    Wound - Properties Group Date first assessed: 07/31/18 [CH] Time first assessed: 1700 [CH] Present On Admission : no [CH] Side: Left [CH] Orientation: lateral;upper [CH] Location: chest [CH] Type: surgical;incision [CH] Recorded by:  [CH] Darlene Slaughter RN 07/31/18 1901    Row Name 08/11/18 0925             Outcome Summary/Treatment Plan (OT)    Daily Summary of Progress (OT) progress toward functional goals as expected  -KD      Plan for Continued Treatment (OT) cont ot poc  -KD      Anticipated Discharge Disposition (OT) home with home health  -KD      Recorded by [KD] Adrianna Richardson SWARTZ/L 08/11/18 1406      Row Name 08/11/18 1423             Outcome Summary/Treatment Plan (PT)    Daily Summary of Progress (PT) progress toward functional goals as expected  -LEANDER      Plan for Continued Treatment (PT) continue  -LEANDER      Anticipated Discharge Disposition (PT) home with home health;home with 24/7 care  -LEANDER      Recorded by [LEANDER] Reinier Hannah, PTA 08/11/18 1457        User Key  (r) = Recorded By, (t) = Taken By, (c) = Cosigned By    Initials Name Effective Dates Discipline    LEANDER Reinier Hannah, PTA 03/07/18 -  PT    KD Adrianna Richardson SWARTZ/L 03/07/18 -  OT    TB Joshua Ordonez RN 10/17/16 -  Nurse    CH Darlene Slaughter RN 06/23/17 -  Nurse          Wound 07/26/18 1326 Other (See comments) anterior abdomen incision;surgical (Active)   Dressing Appearance open to air 8/11/2018  7:33 AM   Closure Staples;Open to air 8/11/2018  7:33 AM   Drainage Characteristics/Odor serosanguineous 8/11/2018  7:33 AM   Drainage Amount none 8/11/2018  7:33 AM   Dressing Care, Wound open to air 8/11/2018  7:33 AM       Wound 07/31/18 1700 Left lateral;upper chest surgical;incision (Active)   Dressing Appearance intact;dry 8/11/2018  7:33 AM   Closure Staples 8/11/2018  7:33 AM   Base dressing in  place, unable to visualize 8/11/2018  7:33 AM             Physical Therapy Education     Title: PT OT SLP Therapies (Active)     Topic: Physical Therapy (Active)     Point: Mobility training (Active)    Learning Progress Summary     Learner Status Readiness Method Response Comment Documented by    Patient Active Acceptance E NR  LEANDER 08/10/18 1151     Active Acceptance E NR  LEANDER 08/08/18 1241     Active Acceptance E NR  LEANDER 08/06/18 1555     Active Acceptance E,D NR  CB 08/03/18 1127          Point: Home exercise program (Active)    Learning Progress Summary     Learner Status Readiness Method Response Comment Documented by    Patient Active Acceptance E NR   08/08/18 1241          Point: Precautions (Active)    Learning Progress Summary     Learner Status Readiness Method Response Comment Documented by    Patient Active Acceptance E NR   08/08/18 1241     Active Acceptance E,D NR   08/03/18 1127                      User Key     Initials Effective Dates Name Provider Type Discipline     04/06/17 -  Lupe Grossman, PT Physical Therapist PT     03/07/18 -  Reinier Hannah, PTA Physical Therapy Assistant PT                    PT Recommendation and Plan  Anticipated Discharge Disposition (PT): home with home health, home with 24/7 care  Outcome Summary/Treatment Plan (PT)  Daily Summary of Progress (PT): progress toward functional goals as expected  Plan for Continued Treatment (PT): continue  Anticipated Discharge Disposition (PT): home with home health, home with 24/7 care  Plan of Care Reviewed With: patient  Progress: improving  Outcome Summary: pt responded well to therapy w/ increased gait to 270 ft SBA w/ RW and FF posture. pt required CGA-min A for sit-stands. pt edu on HEP and home safety. no new goals met at this time. Recommend  PT upon DC.           Outcome Measures     Row Name 08/11/18 0925 08/10/18 1410 08/10/18 1050       How much help from another person do you currently need...    Turning  from your back to your side while in flat bed without using bedrails?  --  -- 3  -LEANDER    Moving from lying on back to sitting on the side of a flat bed without bedrails?  --  -- 3  -LEANDER    Moving to and from a bed to a chair (including a wheelchair)?  --  -- 3  -LEANDER    Standing up from a chair using your arms (e.g., wheelchair, bedside chair)?  --  -- 3  -LEANDER    Climbing 3-5 steps with a railing?  --  -- 3  -LEANDER    To walk in hospital room?  --  -- 3  -LEANDER    AM-PAC 6 Clicks Score  --  -- 18  -LEANDER       How much help from another is currently needed...    Putting on and taking off regular lower body clothing? 2  -KD 2  -KD  --    Bathing (including washing, rinsing, and drying) 2  -KD 2  -KD  --    Toileting (which includes using toilet bed pan or urinal) 2  -KD 2  -KD  --    Putting on and taking off regular upper body clothing 3  -KD 2  -KD  --    Taking care of personal grooming (such as brushing teeth) 3  -KD 3  -KD  --    Eating meals 4  -KD 4  -KD  --    Score 16  -KD 15  -KD  --       Functional Assessment    Outcome Measure Options  --  -- AM-PAC 6 Clicks Basic Mobility (PT)  -LEANDER    Row Name 08/09/18 1300             How much help from another is currently needed...    Putting on and taking off regular lower body clothing? 2  -CS      Bathing (including washing, rinsing, and drying) 2  -CS      Toileting (which includes using toilet bed pan or urinal) 2  -CS      Putting on and taking off regular upper body clothing 2  -CS      Taking care of personal grooming (such as brushing teeth) 3  -CS      Eating meals 4  -CS      Score 15  -CS         Functional Assessment    Outcome Measure Options AM-PAC 6 Clicks Daily Activity (OT)  -CS        User Key  (r) = Recorded By, (t) = Taken By, (c) = Cosigned By    Initials Name Provider Type    LEANDER Reinier Hannah, MARGI Physical Therapy Assistant    KD Adrianna Richardson COTA/L Occupational Therapy Assistant    Ileana Maya COTA/L Occupational Therapy Assistant            Time Calculation:         PT Charges     Row Name 08/11/18 1500             Time Calculation    Start Time 1423  -LEANDER      Stop Time 1446  -LEANDER      Time Calculation (min) 23 min  -LEANDER         Time Calculation- PT    Total Timed Code Minutes- PT 23 minute(s)  -LEANDER        User Key  (r) = Recorded By, (t) = Taken By, (c) = Cosigned By    Initials Name Provider Type    LEANDER Reinier Hannah PTA Physical Therapy Assistant        Therapy Suggested Charges     Code   Minutes Charges    None           Therapy Charges for Today     Code Description Service Date Service Provider Modifiers Qty    18694181148 HC GAIT TRAINING EA 15 MIN 8/10/2018 Reinier Hannah, PTA GP 1    95262760434 HC PT THER PROC EA 15 MIN 8/10/2018 Reinier Hannah PTA GP 1    66300999010 HC GAIT TRAINING EA 15 MIN 8/11/2018 Reinier Hannah, MARGI GP 1    43974655421 HC PT SELF CARE/MGMT/TRAIN EA 15 MIN 8/11/2018 Reinier Hannah PTA GP 1          PT G-Codes  PT Professional Judgement Used?: Yes  Outcome Measure Options: AM-PAC 6 Clicks Basic Mobility (PT)  Score: 11  Functional Limitation: Mobility: Walking and moving around  Mobility: Walking and Moving Around Current Status (): At least 60 percent but less than 80 percent impaired, limited or restricted  Mobility: Walking and Moving Around Goal Status (): At least 40 percent but less than 60 percent impaired, limited or restricted    Reinier Hannah PTA  8/11/2018

## 2018-08-11 NOTE — THERAPY TREATMENT NOTE
Acute Care - Occupational Therapy Treatment Note  [facility] [place]     Patient Name: Truman Esquivel  : 1960  MRN: [id number]  Today's Date: 2018  Onset of Illness/Injury or Date of Surgery: 18  Date of Referral to OT: 18  Referring Physician: Dr. Solano    Admit Date: 2018       ICD-10-CM ICD-9-CM   1. Syncope and collapse R55 780.2   2. Septic shock (CMS/HCC) A41.9 038.9    R65.21 785.52     995.92   3. Dissection of aorta, unspecified portion of aorta (CMS/HCC) I71.00 441.00   4. Pleural effusion J90 511.9   5. Other ascites R18.8 789.59   6. Splenic hemorrhage D73.89 289.59   7. Impaired physical mobility Z74.09 781.99   8. Impaired mobility and activities of daily living Z74.09 799.89   9. Oropharyngeal dysphagia R13.12 787.22     Patient Active Problem List   Diagnosis   • Ascending aortic dissection (CMS/HCC)   • Essential hypertension   • Deep vein thrombosis (DVT) of femoral vein of both lower extremities (CMS/HCC)   • Hypothyroidism   • Snoring   • Acute pain of right knee   • Knee effusion, right   • Knee pain   • Obstructive sleep apnea of adult   • TIA (transient ischemic attack)   • Dysphagia   • Squamous cell carcinoma of larynx (CMS/HCC)   • Pharyngeal dysphagia   • Anemia   • Abnormal positron emission tomography (PET) of colon   • Hypokalemia   • Encounter for venous access device care   • Dehydration   • Weight loss, abnormal   • Odynophagia   • Thrush, oral   • Thrombocytopenia (CMS/HCC)   • Pancytopenia due to antineoplastic chemotherapy (CMS/HCC)   • Unable to eat   • Syncope and collapse   • Splenic hemorrhage   • Pleural effusion     Past Medical History:   Diagnosis Date   • Aortic dissection (CMS/HCC)    • Chest pain    • Disease of thyroid gland    • HTN (hypertension)    • Knee pain    • Sleep apnea    • Smoker    • Squamous cell carcinoma of larynx (CMS/HCC) 2018   • Stroke (CMS/HCC)    • Swallowing difficulty      Past Surgical History:    Procedure Laterality Date   • AORTA SURGERY      ruptured aorta repair   • ASCENDING ARCH/HEMIARCH REPLACEMENT N/A 2/14/2017    Procedure: INTRAOPERATIVE TRANSESOPHAGEAL ECHOCARDIOGRAM, MIDLINE STERNOTOMY, ASCENDING AORTIC  AND PROXIMAL AORTIC ARCH REPAIR WITH 26MM GRAFT, AORTIC VALVE RESUSPENSION, AORTIC ROOT REPAIR, OPEN VEIN HARVEST OF RIGHT LEG;  Surgeon: German Arreguin MD;  Location: [facility] MAIN OR;  Service:    • BRONCHOSCOPY N/A 2/17/2017    Procedure: BRONCHOSCOPY BIOPSY AT BEDSIDE WITH BAL-LEFT LOWER LOBE;  Surgeon: Trent Chaney MD;  Location: [facility] ENDOSCOPY;  Service:    • COLONOSCOPY N/A 3/7/2018    Procedure: COLONOSCOPY WITH POSSIBLE POLYPECTOMY   ( DONE IN OR WITH ENDO)      COLONOSCOPY FIRST;  Surgeon: Kris Solano MD;  Location: [facility] OR;  Service:    • DIRECT LARYNGOSCOPY, ESOPHAGOSCOPY, BRONCHOSCOPY N/A 2/5/2018    Procedure: DIRECT LARYNGOSCOPY WITH BIOPSY, ESOPHAGOSCOPY     (no bronchoscopy, no laser);  Surgeon: Joseph Venegas MD;  Location: [facility] OR;  Service:    • ENDOSCOPY W/ PEG TUBE PLACEMENT N/A 5/2/2018    Procedure: ESOPHAGOGASTRODUODENOSCOPY WITH PERCUTANEOUS ENDOSCOPIC GASTROSTOMY TUBE INSERTION;  Surgeon: Angel Maciel MD;  Location: [facility] ENDOSCOPY;  Service: Gastroenterology   • SPLENECTOMY N/A 7/26/2018    Procedure: SPLENECTOMY, left chest tube placement, EXPLORATORY LAPAROTOMY, G-TUBE PALCEMENT;  Surgeon: Kris Solano MD;  Location: [facility] OR;  Service: General   • THORACOSCOPY Left 7/31/2018    Procedure: LEFT THORACOSCOPY VIDEO ASSISTED POSSIBLE THORACOTOMY possible decortication bronchoscopy;  Surgeon: Joshua Pollock MD;  Location: [facility] OR;  Service: Cardiothoracic   • TRACHEOSTOMY  02/05/2018   • TRACHEOSTOMY N/A 2/5/2018    Procedure: TRACHEOSTOMY  local injection @ 1106 incision @ 1119;  Surgeon: Joseph Venegas MD;  Location: [facility] OR;  Service:    • VENOUS ACCESS DEVICE (PORT) INSERTION N/A 3/7/2018     Procedure: INSERTION OF MEDIPORT     (C-ARM#1);  Surgeon: Kris Solano MD;  Location: [facility] OR;  Service:        Therapy Treatment          Rehabilitation Treatment Summary     Row Name 08/11/18 0925             Treatment Time/Intention    Discipline occupational therapy assistant  -KD      Document Type therapy note (daily note)  -KD      Subjective Information no complaints  -KD      Mode of Treatment occupational therapy  -KD      Care Plan Review care plan/treatment goals reviewed  -KD      Care Plan Review, Other Participant(s) --   no family present  -KD      Therapy Frequency (OT Eval) other (see comments)   5-7x/wk  -KD      Existing Precautions/Restrictions fall;oxygen therapy device and L/min  -KD      Equipment Issued to Patient gait belt  -KD      Recorded by [KD] Adrianna Richardson COTA/L 08/11/18 1403      Row Name 08/11/18 0925             Vital Signs    Pre Systolic BP Rehab 147  -KD      Pre Treatment Diastolic BP 71  -KD      Pretreatment Heart Rate (beats/min) 70  -KD      Posttreatment Heart Rate (beats/min) 72  -KD      Pre SpO2 (%) 92  -KD      O2 Delivery Pre Treatment supplemental O2  -KD      Post SpO2 (%) 94  -KD      O2 Delivery Post Treatment supplemental O2  -KD      Pre Patient Position Sitting  -KD      Intra Patient Position Standing  -KD      Post Patient Position Sitting  -KD      Recorded by [KD] Adrianna Richardson COTA/L 08/11/18 1406      Row Name 08/11/18 0925             Cognitive Assessment/Intervention- PT/OT    Affect/Mental Status (Cognitive) WFL  -KD      Orientation Status (Cognition) oriented x 4  -KD      Follows Commands (Cognition) WFL  -KD      Personal Safety Interventions fall prevention program maintained;gait belt  -KD      Recorded by [KD] Adrianna Richardson COTA/L 08/11/18 1406      Row Name 08/11/18 0925             Functional Mobility    Functional Mobility- Ind. Level supervision required  -KD      Functional Mobility-Distance (Feet) 285  -KD       Recorded by [KD] Adrianna Richardson SWARTZ/L 08/11/18 1406      Row Name 08/11/18 0925             Transfer Assessment/Treatment    Transfer Assessment/Treatment sit-stand transfer;stand-sit transfer  -KD      Recorded by [KD] Adrianna Richardson SWARTZ/L 08/11/18 1406      Row Name 08/11/18 0925             Chair-Bed Transfer    Chair-Bed Isabella (Transfers) contact guard  -KD      Assistive Device (Chair-Bed Transfers) walker, front-wheeled  -KD      Recorded by [KD] Adrianna Richardson SWARTZ/L 08/11/18 1406      Row Name 08/11/18 0925             Sit-Stand Transfer    Sit-Stand Isabella (Transfers) contact guard  -KD      Assistive Device (Sit-Stand Transfers) walker, front-wheeled  -KD      Recorded by [KD] Adrianna Richardson SWARTZ/L 08/11/18 1406      Row Name 08/11/18 0925             Stand-Sit Transfer    Stand-Sit Isabella (Transfers) contact guard  -KD      Assistive Device (Stand-Sit Transfers) walker, front-wheeled  -KD      Recorded by [KD] Adrianna Richardson SWARTZ/L 08/11/18 1406      Row Name 08/11/18 0925             Positioning and Restraints    Pre-Treatment Position sitting in chair/recliner  -KD      Post Treatment Position chair  -KD      In Chair sitting;call light within reach;encouraged to call for assist;exit alarm on  -KD      Recorded by [KD] Adrianna Richardson SWARTZ/L 08/11/18 1406      Row Name 08/11/18 0925             Pain Scale: Numbers Pre/Post-Treatment    Pain Scale: Numbers, Pretreatment 6/10  -KD      Pain Scale: Numbers, Post-Treatment 6/10  -KD      Pain Location - Orientation incisional  -KD      Pain Location abdomen  -KD      Recorded by [KD] Adrianna Richardson SWARTZ/L 08/11/18 1406      Row Name                Wound 07/26/18 1326 Other (See comments) anterior abdomen incision;surgical    Wound - Properties Group Date first assessed: 07/26/18 [TB] Time first assessed: 1326 [TB] Present On Admission : no [TB] Side: Other (See comments) [TB], MIDLINE  Orientation: anterior [TB] Location:  abdomen [TB] Type: incision;surgical [TB] Recorded by:  [TB] Joshua Ordonez RN 07/26/18 1327    Row Name                Wound 07/31/18 1700 Left lateral;upper chest surgical;incision    Wound - Properties Group Date first assessed: 07/31/18 [CH] Time first assessed: 1700 [CH] Present On Admission : no [CH] Side: Left [CH] Orientation: lateral;upper [CH] Location: chest [CH] Type: surgical;incision [CH] Recorded by:  [CH] Darlene Slaughter RN 07/31/18 1901    Row Name 08/11/18 0925             Outcome Summary/Treatment Plan (OT)    Daily Summary of Progress (OT) progress toward functional goals as expected  -KD      Plan for Continued Treatment (OT) cont ot poc  -KD      Anticipated Discharge Disposition (OT) home with home health  -KD      Recorded by [KD] Adrianna Richardson COTA/L 08/11/18 1406        User Key  (r) = Recorded By, (t) = Taken By, (c) = Cosigned By    Initials Name Effective Dates Discipline    KD Adrianna Richardson SWARTZ/L 03/07/18 -  OT    TB Joshua Ordonez RN 10/17/16 -  Nurse    CH Darlene Slaughter RN 06/23/17 -  Nurse        Wound 07/26/18 1326 Other (See comments) anterior abdomen incision;surgical (Active)   Dressing Appearance open to air 8/11/2018  7:33 AM   Closure Staples;Open to air 8/11/2018  7:33 AM   Drainage Characteristics/Odor serosanguineous 8/11/2018  7:33 AM   Drainage Amount none 8/11/2018  7:33 AM   Dressing Care, Wound open to air 8/11/2018  7:33 AM       Wound 07/31/18 1700 Left lateral;upper chest surgical;incision (Active)   Dressing Appearance intact;dry 8/11/2018  7:33 AM   Closure Staples 8/11/2018  7:33 AM   Base dressing in place, unable to visualize 8/11/2018  7:33 AM             OT Rehab Goals     Row Name 08/11/18 0991             Transfer Goal 1 (OT)    Activity/Assistive Device (Transfer Goal 1, OT) sit-to-stand/stand-to-sit;bed-to-chair/chair-to-bed;toilet  -KD      [place] Level/Cues Needed (Transfer Goal 1, OT) supervision required  -KD      Time  Frame (Transfer Goal 1, OT) long term goal (LTG);by discharge  -KD      Progress/Outcome (Transfer Goal 1, OT) goal not met;continuing progress toward goal  -KD         Bathing Goal 1 (OT)    Activity/Assistive Device (Bathing Goal 1, OT) bathing skills, all   Using AE PRN  -KD      [place] Level Level/Cues Needed (Bathing Goal 1, OT) set-up required;supervision required  -KD      Time Frame (Bathing Goal 1, OT) long term goal (LTG);by discharge  -KD      Progress/Outcomes (Bathing Goal 1, OT) goal not met;continuing progress toward goal  -KD         Dressing Goal 1 (OT)    Activity/Assistive Device (Dressing Goal 1, OT) dressing skills, all   Using AE PRN  -KD      [place] Level/Cues Needed (Dressing Goal 1, OT) set-up required;supervision required  -KD      Time Frame (Dressing Goal 1, OT) long term goal (LTG);other (see comments)  -KD      Progress/Outcome (Dressing Goal 1, OT) goal not met  -KD         Toileting Goal 1 (OT)    Activity/Device (Toileting Goal 1, OT) toileting skills, all  -KD      [place] Level Level/Cues Needed (Toileting Goal 1, OT) conditional independence  -KD      Time Frame (Toileting Goal 1, OT) long term goal (LTG);by discharge  -KD      Progress/Outcome (Toileting Goal 1, OT) goal not met;continuing progress toward goal  -KD        User Key  (r) = Recorded By, (t) = Taken By, (c) = Cosigned By    Initials Name Provider Type Discipline    KD Adrianna Richardson, SWARTZ/L Occupational Therapy Assistant OT        Occupational Therapy Education     Title: PT OT SLP Therapies (Active)     Topic: Occupational Therapy (Done)     Point: ADL training (Done)     Description: Instruct learner(s) on proper safety adaptation and remediation techniques during self care or transfers.   Instruct in proper use of assistive devices.   Learning Progress Summary     Learner Status Readiness Method Response Comment Documented by    Patient Done Acceptance E,ANA MARIA MELÉNDEZ 08/09/18 1311     Done Acceptance GUILHERME PINEDA  08/07/18 1305          Point: Home exercise program (Done)     Description: Instruct learner(s) on appropriate technique for monitoring, assisting and/or progressing therapeutic exercises/activities.   Learning Progress Summary     Learner Status Readiness Method Response Comment Documented by    Patient Done Acceptance E,TB,ANA MARIA VU   08/09/18 1311     Done Acceptance E VU   08/07/18 1305     Active Acceptance E NR  BB 08/05/18 1337          Point: Precautions (Done)     Description: Instruct learner(s) on prescribed precautions during self-care and functional transfers.   Learning Progress Summary     Learner Status Readiness Method Response Comment Documented by    Patient Done Acceptance E,TBANA MARIA VU   08/09/18 1311     Done Acceptance E VU   08/07/18 1305     Done Acceptance E VU,NR role of OT; OT POC; transfer training; safety precautions AS 08/03/18 1348          Point: Body mechanics (Done)     Description: Instruct learner(s) on proper positioning and spine alignment during self-care, functional mobility activities and/or exercises.   Learning Progress Summary     Learner Status Readiness Method Response Comment Documented by    Patient Done Acceptance E,ANA MARIA MELÉNDEZ VU   08/09/18 1311     Done Acceptance E VU   08/07/18 1305     Done Acceptance E VU,NR role of OT; OT POC; transfer training; safety precautions AS 08/03/18 1348                      User Key     Initials Effective Dates Name Provider Type Discipline     03/07/18 -  Carmen Davenport SWARTZ/L Occupational Therapy Assistant OT     03/07/18 -  Julissa Vizcaino SWARTZ/L Occupational Therapy Assistant OT     03/07/18 -  Ielana Yang COTA/L Occupational Therapy Assistant OT    AS 05/01/18 -  Erin Treviño, OT Occupational Therapist OT                OT Recommendation and Plan  Outcome Summary/Treatment Plan (OT)  Daily Summary of Progress (OT): progress toward functional goals as expected  Plan for Continued Treatment (OT): cont ot  poc  Anticipated Discharge Disposition (OT): home with home health  Therapy Frequency (OT Eval): other (see comments) (5-7x/wk)  Daily Summary of Progress (OT): progress toward functional goals as expected  Plan of Care Review  Plan of Care Reviewed With: patient  Plan of Care Reviewed With: patient  Outcome Summary: Pt ambulated SBA of 1 w/ ' w/ no LOB. Sit-stand Min A of 1. No new goals met this date.        Outcome Measures     Row Name 08/11/18 0925 08/10/18 1410 08/10/18 1050       How much help from another person do you currently need...    Turning from your back to your side while in flat bed without using bedrails?  --  -- 3  -LEANDER    Moving from lying on back to sitting on the side of a flat bed without bedrails?  --  -- 3  -LEANDER    Moving to and from a bed to a chair (including a wheelchair)?  --  -- 3  -LEANDER    Standing up from a chair using your arms (e.g., wheelchair, bedside chair)?  --  -- 3  -LEANDER    Climbing 3-5 steps with a railing?  --  -- 3  -LEANDER    To walk in hospital room?  --  -- 3  -LEANDER    AM-PAC 6 Clicks Score  --  -- 18  -LEANDER       How much help from another is currently needed...    Putting on and taking off regular lower body clothing? 2  -KD 2  -KD  --    Bathing (including washing, rinsing, and drying) 2  -KD 2  -KD  --    Toileting (which includes using toilet bed pan or urinal) 2  -KD 2  -KD  --    Putting on and taking off regular upper body clothing 3  -KD 2  -KD  --    Taking care of personal grooming (such as brushing teeth) 3  -KD 3  -KD  --    Eating meals 4  -KD 4  -KD  --    Score 16  -KD 15  -KD  --       Functional Assessment    Outcome Measure Options  --  -- AM-PAC 6 Clicks Basic Mobility (PT)  -LEANDER    Row Name 08/09/18 1300             How much help from another is currently needed...    Putting on and taking off regular lower body clothing? 2  -CS      Bathing (including washing, rinsing, and drying) 2  -CS      Toileting (which includes using toilet bed pan or urinal) 2  -CS       Putting on and taking off regular upper body clothing 2  -CS      Taking care of personal grooming (such as brushing teeth) 3  -CS      Eating meals 4  -CS      Score 15  -CS         Functional Assessment    Outcome Measure Options AM-PAC 6 Clicks Daily Activity (OT)  -CS        User Key  (r) = Recorded By, (t) = Taken By, (c) = Cosigned By    Initials Name Provider Type    Reinier Martin, MARGI Physical Therapy Assistant    Adrianna Bryant COTA/L Occupational Therapy Assistant    Ileana Maya COTA/KIM Occupational Therapy Assistant           Time Calculation:         Time Calculation- OT     Row Name 08/11/18 1411             Time Calculation- OT    OT Start Time 0925  -KD      OT Stop Time 0950  -KD      OT Time Calculation (min) 25 min  -KD      Total Timed Code Minutes- OT 25 minute(s)  -KD      OT Received On 08/11/18  -KD        User Key  (r) = Recorded By, (t) = Taken By, (c) = Cosigned By    Initials Name Provider Type    Adrianna Bryant COTA/L Occupational Therapy Assistant           Therapy Suggested Charges     Code   Minutes Charges    None           Therapy Charges for Today     Code Description Service Date Service Provider Modifiers Qty    56181083206 HC OT THER PROC EA 15 MIN 8/10/2018 Adrianna Richardson COTA/L GO 1    47091388467 HC OT SELF CARE/MGMT/TRAIN EA 15 MIN 8/11/2018 Adrianna Richardson COTA/L GO 2          OT G-codes  OT Professional Judgement Used?: Yes  OT Functional Scales Options: AM-PAC 6 Clicks Daily Activity (OT)  Score: 15  Functional Limitation: Self care  Self Care Current Status (): At least 40 percent but less than 60 percent impaired, limited or restricted  Self Care Goal Status (): At least 1 percent but less than 20 percent impaired, limited or restricted    MERLINE Donovan  8/11/2018

## 2018-08-11 NOTE — PLAN OF CARE
Problem: Patient Care Overview  Goal: Plan of Care Review  Outcome: Ongoing (interventions implemented as appropriate)   08/11/18 0747 08/11/18 0922   Coping/Psychosocial   Plan of Care Reviewed With patient --    OTHER   Outcome Summary --  Pt ambulated SBA of 1 w/ ' w/ no LOB. Sit-stand Min A of 1. No new goals met this date.   Plan of Care Review   Progress no change --

## 2018-08-11 NOTE — PLAN OF CARE
Problem: Patient Care Overview  Goal: Plan of Care Review  Outcome: Ongoing (interventions implemented as appropriate)   08/11/18 7365   Coping/Psychosocial   Plan of Care Reviewed With patient   OTHER   Outcome Summary pt responded well to therapy w/ increased gait to 270 ft SBA w/ RW and FF posture. pt required CGA-min A for sit-stands. pt edu on HEP and home safety. no new goals met at this time. Recommend  PT upon DC.    Plan of Care Review   Progress improving

## 2018-08-11 NOTE — PLAN OF CARE
Problem: Patient Care Overview  Goal: Plan of Care Review  Outcome: Ongoing (interventions implemented as appropriate)   08/11/18 0747   Coping/Psychosocial   Plan of Care Reviewed With patient   OTHER   Outcome Summary no acute changes   Plan of Care Review   Progress no change     Goal: Individualization and Mutuality  Outcome: Ongoing (interventions implemented as appropriate)    Goal: Discharge Needs Assessment  Outcome: Ongoing (interventions implemented as appropriate)    Goal: Interprofessional Rounds/Family Conf  Outcome: Ongoing (interventions implemented as appropriate)      Problem: Fall Risk (Adult)  Goal: Absence of Fall  Outcome: Ongoing (interventions implemented as appropriate)      Problem: Skin Injury Risk (Adult)  Goal: Skin Health and Integrity  Outcome: Ongoing (interventions implemented as appropriate)      Problem: Pain, Acute (Adult)  Goal: Acceptable Pain Control/Comfort Level  Outcome: Ongoing (interventions implemented as appropriate)

## 2018-08-11 NOTE — PLAN OF CARE
Problem: Patient Care Overview  Goal: Plan of Care Review  Outcome: Ongoing (interventions implemented as appropriate)  Pt complains of constant pain rated at 6-7.   08/10/18 1050   Coping/Psychosocial   Plan of Care Reviewed With patient   Plan of Care Review   Progress improving     Goal: Individualization and Mutuality  Outcome: Ongoing (interventions implemented as appropriate)      Problem: Fall Risk (Adult)  Goal: Absence of Fall  Outcome: Ongoing (interventions implemented as appropriate)      Problem: Skin Injury Risk (Adult)  Goal: Skin Health and Integrity  Outcome: Ongoing (interventions implemented as appropriate)

## 2018-08-11 NOTE — PROGRESS NOTES
"  Subjective:  Resting.  No complaints other than improving incisional pain.  Undergoing rehab.     /67   Pulse 73   Temp 98.6 °F (37 °C)   Resp 18   Ht 170.2 cm (67.01\")   Wt 104 kg (229 lb 4.5 oz)   SpO2 96%   BMI 35.90 kg/m²     Lab Results (last 24 hours)     Procedure Component Value Units Date/Time    Basic Metabolic Panel [[id number]]  (Abnormal) Collected:  08/11/18 0630    Specimen:  Blood Updated:  08/11/18 0705     Glucose 96 mg/dL      BUN 13 mg/dL      Creatinine 0.75 mg/dL      Sodium 132 (L) mmol/L      Potassium 4.0 mmol/L      Chloride 98 mmol/L      CO2 28.0 mmol/L      Calcium 8.0 (L) mg/dL      eGFR Non African Amer 107 mL/min/1.73      BUN/Creatinine Ratio 17.3     Anion Gap 6.0 mmol/L     CBC (No Diff) [[id number]]  (Abnormal) Collected:  08/11/18 0630    Specimen:  Blood Updated:  08/11/18 0639     WBC 12.03 (H) 10*3/mm3      RBC 3.08 (L) 10*6/mm3      Hemoglobin 8.6 (L) g/dL      Hematocrit 25.8 (L) %      MCV 83.8 fL      MCH 27.9 pg      MCHC 33.3 g/dL      RDW 17.3 (H) %      RDW-SD 52.4 (H) fl      MPV 9.1 fL      Platelets 561 (H) 10*3/mm3     POC Glucose Once [[id number]]  (Normal) Collected:  08/10/18 1528    Specimen:  Blood Updated:  08/10/18 1549     Glucose 115 mg/dL      Comment: RN NotifiedMeter: [id number]: [id number] BRANCH RAFAELA       POC Glucose Once [[id number]]  (Normal) Collected:  08/10/18 1053    Specimen:  Blood Updated:  08/10/18 1108     Glucose 107 mg/dL      Comment: Meter: [id number]: [id number] BRANCH RAFAELA       POC Glucose Once [[id number]]  (Abnormal) Collected:  08/10/18 0740    Specimen:  Blood Updated:  08/10/18 1107     Glucose 132 (H) mg/dL      Comment: RN NotifiedMeter: [id number]: [id number] SARINA RUSSO             Current Medications:  Current Facility-Administered Medications   Medication Dose Route Frequency Provider Last Rate Last Dose   • bisacodyl (DULCOLAX) suppository 10 mg  10 mg Rectal Daily Russ, " Kris Marshall MD   10 mg at 08/02/18 0857   • chlorhexidine (PERIDEX) 0.12 % solution 15 mL  15 mL Mouth/Throat Q12H Russ Giles MD   15 mL at 08/11/18 0858   • levothyroxine (SYNTHROID, LEVOTHROID) tablet 75 mcg  75 mcg Per G Tube Q AM Cheadle, Gerald A, MD   75 mcg at 08/11/18 0518   • losartan (COZAAR) 50 mg, hydrochlorothiazide (MICROZIDE) 12.5 mg for HYZAAR 50-12.5   Per G Tube Daily Cheadle, Gerald A, MD       • magnesium citrate solution 150 mL  150 mL Oral Once Kris Solano MD       • metoprolol succinate XL (TOPROL-XL) 24 hr tablet 25 mg  25 mg Oral Nightly Cheadle, Gerald A, MD   25 mg at 08/10/18 2023   • morphine injection 2 mg  2 mg Intravenous Q4H PRN Taryn Estrada APRN   2 mg at 08/11/18 0858   • ondansetron (ZOFRAN) injection 4 mg  4 mg Intravenous Q6H PRN Dianne Hickey MD   4 mg at 08/06/18 1136   • pantoprazole (PROTONIX) injection 40 mg  40 mg Intravenous Q AM Dianne Hickey MD   40 mg at 08/11/18 0518   • pneumococcal polysaccharide 23-valent (PNEUMOVAX-23) vaccine 0.5 mL  0.5 mL Intramuscular During Hospitalization Kris Solano MD       • polyethylene glycol (MIRALAX) powder 17 g  17 g Oral Daily Taryn Estrada APRN   17 g at 08/11/18 0858   • simethicone (MYLICON) 40 MG/0.6ML drops 40 mg  40 mg Per G Tube 4x Daily PRN Cheadle, Gerald A, MD   40 mg at 08/09/18 0942   • sodium chloride 0.9 % flush 1-10 mL  1-10 mL Intravenous PRN Dianne Hickey MD   10 mL at 08/10/18 2036       Prior to admission medications:  Prescriptions Prior to Admission   Medication Sig Dispense Refill Last Dose   • Cholecalciferol 27774 units tablet 50 thousand units po q  month 3 tablet 3    • clopidogrel (PLAVIX) 75 MG tablet Take 75 mg by mouth Daily. Last dose approx greater than one month ago   Taking   • docusate sodium (COLACE) 100 MG capsule Take 1 capsule by mouth 2 (Two) Times a Day As Needed for Constipation. 60 capsule 2 Taking   • levothyroxine (SYNTHROID) 75  MCG tablet Take 1 tab daily Monday to Saturday and 1.5 on Sunday 32 tablet 11    • losartan-hydrochlorothiazide (HYZAAR) 50-12.5 MG per tablet Take 1 tablet by mouth Daily. 30 tablet 12 Taking   • metoprolol succinate XL (TOPROL-XL) 25 MG 24 hr tablet Take 1 tablet by mouth Every Night. Patient states he hasn't been taking this week, states he thinks he is hypotensive 30 tablet 12 Taking   • Multiple Vitamins-Minerals (MULTIVITAMIN ADULT PO) Take 1 tablet by mouth Daily.   Taking   • mupirocin (BACTROBAN) 2 % ointment Apply  topically 2 (Two) Times a Day. 30 g 2 Taking   • ondansetron (ZOFRAN) 4 MG tablet Take 1 tablet by mouth 3 (Three) Times a Day As Needed for Nausea or Vomiting. 40 tablet 3 Taking   • triamcinolone (KENALOG) 0.1 % cream Apply  topically 2 (Two) Times a Day. 45 g 2 Taking   • vitamin B-12 (CYANOCOBALAMIN) 100 MCG tablet Take 100 mcg by mouth Daily.   Taking   • vitamin E 100 UNIT capsule Take 400 Units by mouth Every Night.   Taking   • Zinc 50 MG capsule Take 50 mg by mouth Every Night.   Taking       Physical exam: Resting in chair    Assessment : Improving    Plan: Continue rehab.  Awaiting disposition.

## 2018-08-12 LAB
ANION GAP SERPL CALCULATED.3IONS-SCNC: 5 MMOL/L (ref 5–15)
BUN BLD-MCNC: 12 MG/DL (ref 7–21)
BUN/CREAT SERPL: 16.4 (ref 7–25)
CALCIUM SPEC-SCNC: 8 MG/DL (ref 8.4–10.2)
CHLORIDE SERPL-SCNC: 98 MMOL/L (ref 95–110)
CO2 SERPL-SCNC: 28 MMOL/L (ref 22–31)
CREAT BLD-MCNC: 0.73 MG/DL (ref 0.7–1.3)
DEPRECATED RDW RBC AUTO: 53.2 FL (ref 35.1–43.9)
ERYTHROCYTE [DISTWIDTH] IN BLOOD BY AUTOMATED COUNT: 17.3 % (ref 11.5–14.5)
GFR SERPL CREATININE-BSD FRML MDRD: 111 ML/MIN/1.73 (ref 56–130)
GLUCOSE BLD-MCNC: 92 MG/DL (ref 60–100)
GLUCOSE BLDC GLUCOMTR-MCNC: 114 MG/DL (ref 70–130)
HCT VFR BLD AUTO: 24.3 % (ref 39–49)
HGB BLD-MCNC: 7.8 G/DL (ref 13.7–17.3)
MCH RBC QN AUTO: 27 PG (ref 26.5–34)
MCHC RBC AUTO-ENTMCNC: 32.1 G/DL (ref 31.5–36.3)
MCV RBC AUTO: 84.1 FL (ref 80–98)
PLATELET # BLD AUTO: 547 10*3/MM3 (ref 150–450)
PMV BLD AUTO: 8.9 FL (ref 8–12)
POTASSIUM BLD-SCNC: 3.9 MMOL/L (ref 3.5–5.1)
RBC # BLD AUTO: 2.89 10*6/MM3 (ref 4.37–5.74)
SODIUM BLD-SCNC: 131 MMOL/L (ref 137–145)
WBC NRBC COR # BLD: 12.56 10*3/MM3 (ref 3.2–9.8)

## 2018-08-12 PROCEDURE — 97110 THERAPEUTIC EXERCISES: CPT

## 2018-08-12 PROCEDURE — 85027 COMPLETE CBC AUTOMATED: CPT | Performed by: STUDENT IN AN ORGANIZED HEALTH CARE EDUCATION/TRAINING PROGRAM

## 2018-08-12 PROCEDURE — 97116 GAIT TRAINING THERAPY: CPT

## 2018-08-12 PROCEDURE — 97535 SELF CARE MNGMENT TRAINING: CPT

## 2018-08-12 PROCEDURE — 25010000002 MORPHINE PER 10 MG: Performed by: NURSE PRACTITIONER

## 2018-08-12 PROCEDURE — 25010000002 MORPHINE PER 10 MG: Performed by: SURGERY

## 2018-08-12 PROCEDURE — 80048 BASIC METABOLIC PNL TOTAL CA: CPT | Performed by: STUDENT IN AN ORGANIZED HEALTH CARE EDUCATION/TRAINING PROGRAM

## 2018-08-12 PROCEDURE — 82962 GLUCOSE BLOOD TEST: CPT

## 2018-08-12 RX ORDER — POLYETHYLENE GLYCOL 3350 17 G/17G
17 POWDER, FOR SOLUTION ORAL NIGHTLY
Status: DISCONTINUED | OUTPATIENT
Start: 2018-08-12 | End: 2018-08-15 | Stop reason: HOSPADM

## 2018-08-12 RX ORDER — MORPHINE SULFATE 2 MG/ML
2 INJECTION, SOLUTION INTRAMUSCULAR; INTRAVENOUS EVERY 4 HOURS PRN
Status: DISCONTINUED | OUTPATIENT
Start: 2018-08-12 | End: 2018-08-15 | Stop reason: HOSPADM

## 2018-08-12 RX ADMIN — LEVOTHYROXINE SODIUM 75 MCG: 0.07 TABLET ORAL at 05:13

## 2018-08-12 RX ADMIN — LOSARTAN POTASSIUM: 50 TABLET, FILM COATED ORAL at 09:19

## 2018-08-12 RX ADMIN — MORPHINE SULFATE 2 MG: 2 INJECTION, SOLUTION INTRAMUSCULAR; INTRAVENOUS at 17:32

## 2018-08-12 RX ADMIN — POLYETHYLENE GLYCOL 3350 17 G: 17 POWDER, FOR SOLUTION ORAL at 21:37

## 2018-08-12 RX ADMIN — MORPHINE SULFATE 2 MG: 2 INJECTION, SOLUTION INTRAMUSCULAR; INTRAVENOUS at 09:18

## 2018-08-12 RX ADMIN — MORPHINE SULFATE 2 MG: 2 INJECTION, SOLUTION INTRAMUSCULAR; INTRAVENOUS at 21:37

## 2018-08-12 RX ADMIN — PANTOPRAZOLE SODIUM 40 MG: 40 INJECTION, POWDER, FOR SOLUTION INTRAVENOUS at 05:13

## 2018-08-12 RX ADMIN — MORPHINE SULFATE 2 MG: 2 INJECTION, SOLUTION INTRAMUSCULAR; INTRAVENOUS at 02:47

## 2018-08-12 RX ADMIN — METOPROLOL SUCCINATE 25 MG: 25 TABLET, FILM COATED, EXTENDED RELEASE ORAL at 21:37

## 2018-08-12 RX ADMIN — CHLORHEXIDINE GLUCONATE 0.12% ORAL RINSE 15 ML: 1.2 LIQUID ORAL at 09:18

## 2018-08-12 RX ADMIN — POLYETHYLENE GLYCOL 3350 17 G: 17 POWDER, FOR SOLUTION ORAL at 09:18

## 2018-08-12 RX ADMIN — MORPHINE SULFATE 2 MG: 2 INJECTION, SOLUTION INTRAMUSCULAR; INTRAVENOUS at 13:12

## 2018-08-12 RX ADMIN — CHLORHEXIDINE GLUCONATE 0.12% ORAL RINSE 15 ML: 1.2 LIQUID ORAL at 21:38

## 2018-08-12 NOTE — PLAN OF CARE
Problem: Patient Care Overview  Goal: Plan of Care Review  Outcome: Ongoing (interventions implemented as appropriate)   08/12/18 0744   Coping/Psychosocial   Plan of Care Reviewed With patient   OTHER   Outcome Summary no acute changes   Plan of Care Review   Progress no change     Goal: Individualization and Mutuality  Outcome: Ongoing (interventions implemented as appropriate)    Goal: Discharge Needs Assessment  Outcome: Ongoing (interventions implemented as appropriate)    Goal: Interprofessional Rounds/Family Conf  Outcome: Ongoing (interventions implemented as appropriate)      Problem: Fall Risk (Adult)  Goal: Absence of Fall  Outcome: Ongoing (interventions implemented as appropriate)      Problem: Skin Injury Risk (Adult)  Goal: Skin Health and Integrity  Outcome: Ongoing (interventions implemented as appropriate)      Problem: Pain, Acute (Adult)  Goal: Acceptable Pain Control/Comfort Level  Outcome: Ongoing (interventions implemented as appropriate)

## 2018-08-12 NOTE — PLAN OF CARE
Problem: Patient Care Overview  Goal: Plan of Care Review  Outcome: Ongoing (interventions implemented as appropriate)   08/12/18 0254   Coping/Psychosocial   Plan of Care Reviewed With patient   OTHER   Outcome Summary vss. pain controlled with IV pain meds. voiding frequently this shift. continue to monitor.   Plan of Care Review   Progress no change       Problem: Fall Risk (Adult)  Goal: Absence of Fall  Outcome: Ongoing (interventions implemented as appropriate)      Problem: Skin Injury Risk (Adult)  Goal: Skin Health and Integrity  Outcome: Ongoing (interventions implemented as appropriate)

## 2018-08-12 NOTE — THERAPY TREATMENT NOTE
Acute Care - Occupational Therapy Treatment Note  [facility]     Patient Name: Truman Esquivel  : 1960  MRN: [id number]  Today's Date: 2018  Onset of Illness/Injury or Date of Surgery: 18  Date of Referral to OT: 18  Referring Physician: Dr. Solano    Admit Date: 2018       ICD-10-CM ICD-9-CM   1. Syncope and collapse R55 780.2   2. Septic shock (CMS/HCC) A41.9 038.9    R65.21 785.52     995.92   3. Dissection of aorta, unspecified portion of aorta (CMS/HCC) I71.00 441.00   4. Pleural effusion J90 511.9   5. Other ascites R18.8 789.59   6. Splenic hemorrhage D73.89 289.59   7. Impaired physical mobility Z74.09 781.99   8. Impaired mobility and activities of daily living Z74.09 799.89   9. Oropharyngeal dysphagia R13.12 787.22     Patient Active Problem List   Diagnosis   • Ascending aortic dissection (CMS/HCC)   • Essential hypertension   • Deep vein thrombosis (DVT) of femoral vein of both lower extremities (CMS/HCC)   • Hypothyroidism   • Snoring   • Acute pain of right knee   • Knee effusion, right   • Knee pain   • Obstructive sleep apnea of adult   • TIA (transient ischemic attack)   • Dysphagia   • Squamous cell carcinoma of larynx (CMS/HCC)   • Pharyngeal dysphagia   • Anemia   • Abnormal positron emission tomography (PET) of colon   • Hypokalemia   • Encounter for venous access device care   • Dehydration   • Weight loss, abnormal   • Odynophagia   • Thrush, oral   • Thrombocytopenia (CMS/HCC)   • Pancytopenia due to antineoplastic chemotherapy (CMS/HCC)   • Unable to eat   • Syncope and collapse   • Splenic hemorrhage   • Pleural effusion     Past Medical History:   Diagnosis Date   • Aortic dissection (CMS/HCC)    • Chest pain    • Disease of thyroid gland    • HTN (hypertension)    • Knee pain    • Sleep apnea    • Smoker    • Squamous cell carcinoma of larynx (CMS/HCC) 2018   • Stroke (CMS/HCC)    • Swallowing difficulty      Past Surgical History:    Procedure Laterality Date   • AORTA SURGERY      ruptured aorta repair   • ASCENDING ARCH/HEMIARCH REPLACEMENT N/A 2/14/2017    Procedure: INTRAOPERATIVE TRANSESOPHAGEAL ECHOCARDIOGRAM, MIDLINE STERNOTOMY, ASCENDING AORTIC  AND PROXIMAL AORTIC ARCH REPAIR WITH 26MM GRAFT, AORTIC VALVE RESUSPENSION, AORTIC ROOT REPAIR, OPEN VEIN HARVEST OF RIGHT LEG;  Surgeon: German Arreguin MD;  Location: [facility] MAIN OR;  Service:    • BRONCHOSCOPY N/A 2/17/2017    Procedure: BRONCHOSCOPY BIOPSY AT BEDSIDE WITH BAL-LEFT LOWER LOBE;  Surgeon: Trent Chaney MD;  Location: [facility] ENDOSCOPY;  Service:    • COLONOSCOPY N/A 3/7/2018    Procedure: COLONOSCOPY WITH POSSIBLE POLYPECTOMY   ( DONE IN OR WITH ENDO)      COLONOSCOPY FIRST;  Surgeon: Kris Solnao MD;  Location: [facility] OR;  Service:    • DIRECT LARYNGOSCOPY, ESOPHAGOSCOPY, BRONCHOSCOPY N/A 2/5/2018    Procedure: DIRECT LARYNGOSCOPY WITH BIOPSY, ESOPHAGOSCOPY     (no bronchoscopy, no laser);  Surgeon: Joseph Venegas MD;  Location: [facility] OR;  Service:    • ENDOSCOPY W/ PEG TUBE PLACEMENT N/A 5/2/2018    Procedure: ESOPHAGOGASTRODUODENOSCOPY WITH PERCUTANEOUS ENDOSCOPIC GASTROSTOMY TUBE INSERTION;  Surgeon: Angel Maciel MD;  Location: [facility] ENDOSCOPY;  Service: Gastroenterology   • SPLENECTOMY N/A 7/26/2018    Procedure: SPLENECTOMY, left chest tube placement, EXPLORATORY LAPAROTOMY, G-TUBE PALCEMENT;  Surgeon: Kris Solano MD;  Location: [facility] OR;  Service: General   • THORACOSCOPY Left 7/31/2018    Procedure: LEFT THORACOSCOPY VIDEO ASSISTED POSSIBLE THORACOTOMY possible decortication bronchoscopy;  Surgeon: Joshua Pollock MD;  Location: [facility] OR;  Service: Cardiothoracic   • TRACHEOSTOMY  02/05/2018   • TRACHEOSTOMY N/A 2/5/2018    Procedure: TRACHEOSTOMY  local injection @ 1106 incision @ 1119;  Surgeon: Joseph Venegas MD;  Location: [facility] OR;  Service:    • VENOUS ACCESS DEVICE (PORT) INSERTION N/A 3/7/2018     Procedure: INSERTION OF MEDIPORT     (C-ARM#1);  Surgeon: Kris Solano MD;  Location: [facility] OR;  Service:        Therapy Treatment          Rehabilitation Treatment Summary     Row Name 08/12/18 1048             Treatment Time/Intention    Discipline occupational therapy assistant  -KD      Document Type therapy note (daily note)  -KD2      Subjective Information no complaints  -KD2      Mode of Treatment occupational therapy  -KD2      Patient/Family Observations No family present  -KD2      Care Plan Review care plan/treatment goals reviewed  -KD2      Therapy Frequency (OT Eval) other (see comments)   5-7x/wk  -KD2      Patient Effort adequate  -KD2      Existing Precautions/Restrictions fall;oxygen therapy device and L/min  -KD2      Equipment Issued to Patient gait belt  -KD2      Recorded by [KD] Adrianna Richardson SWARTZ/L 08/12/18 1243  [KD2] Adrianna Richardson SWARTZ/L 08/12/18 1247      Row Name 08/12/18 1048             Vital Signs    Pre Systolic BP Rehab 150  -KD      Pre Treatment Diastolic BP 71  -KD      Pretreatment Heart Rate (beats/min) 72  -KD      Posttreatment Heart Rate (beats/min) 72  -KD      Pre SpO2 (%) 93  -KD      O2 Delivery Pre Treatment supplemental O2  -KD      Post SpO2 (%) 94  -KD      O2 Delivery Post Treatment supplemental O2  -KD      Pre Patient Position Sitting  -KD      Intra Patient Position Standing  -KD      Post Patient Position Sitting  -KD      Recorded by [KD] Adrianna Richardson SWARTZ/L 08/12/18 1250      Row Name 08/12/18 1048             Cognitive Assessment/Intervention- PT/OT    Affect/Mental Status (Cognitive) WFL  -KD      Orientation Status (Cognition) oriented x 4  -KD      Follows Commands (Cognition) WFL  -KD      Cognitive Function (Cognitive) WFL  -KD      Recorded by [KD] Adrianna Richardson SWARTZ/L 08/12/18 1250      Row Name 08/12/18 1048             Bed Mobility Assessment/Treatment    Bed Mobility Assessment/Treatment --  -KD      Recorded by [KD] Dylan  SHERIDAN Harley/L 08/12/18 1250      Row Name 08/12/18 1048             Functional Mobility    Functional Mobility- Ind. Level supervision required  -KD      Functional Mobility-Distance (Feet) 285  -KD      Recorded by [KD] Adrianna Richardson COTA/L 08/12/18 1250      Row Name 08/12/18 1048             Chair-Bed Transfer    Chair-Bed Foard (Transfers) supervision  -KD      Assistive Device (Chair-Bed Transfers) other (see comments)   none  -KD      Recorded by [KD] Adrianna Richardson COTA/L 08/12/18 1250      Row Name 08/12/18 1048             Sit-Stand Transfer    Sit-Stand Foard (Transfers) supervision  -KD      Assistive Device (Sit-Stand Transfers) other (see comments)   none  -KD      Recorded by [KD] Adrianna Richardson COTA/L 08/12/18 1250      Row Name 08/12/18 1048             Stand-Sit Transfer    Stand-Sit Foard (Transfers) supervision  -KD      Assistive Device (Stand-Sit Transfers) other (see comments)   none  -KD      Recorded by [KD] Adrianna Richardson COTA/L 08/12/18 1250      Row Name 08/12/18 1048             Upper Extremity Seated Therapeutic Exercise    Performed, Seated Upper Extremity (Therapeutic Exercise) shoulder flexion/extension;shoulder abduction/adduction;shoulder horizontal abduction/adduction;elbow flexion/extension;forearm supination/pronation;wrist flexion/extension  -KD      Device, Seated Upper Extremity (Therapeutic Exercise) free weights, barbell  -KD      Exercise Type, Seated Upper Extremity (Therapeutic Exercise) AROM (active range of motion)  -KD      Expected Outcomes, Seated Upper Extremity (Therapeutic Exercise) improve functional tolerance, self-care activity  -KD      Sets/Reps Detail, Seated Upper Extremity (Therapeutic Exercise) 2/20  -KD      Recorded by [KD] Adrianna Richardson COTA/L 08/12/18 1250      Row Name 08/12/18 1048             Positioning and Restraints    Pre-Treatment Position sitting in chair/recliner  -KD      Post Treatment Position chair  -KD       In Chair sitting;call light within reach;encouraged to call for assist;exit alarm on  -KD      Recorded by [KD] Adrianna Richardson SWARTZ/L 08/12/18 1250      Row Name 08/12/18 1048             Pain Scale: Numbers Pre/Post-Treatment    Pain Scale: Numbers, Pretreatment 4/10  -KD      Pain Scale: Numbers, Post-Treatment 4/10  -KD      Pain Location - Orientation incisional  -KD      Pre/Post Treatment Pain Comment meds not due  -KD      Recorded by [KD] Adrianna Richardson SWARTZ/L 08/12/18 1250      Row Name                Wound 07/26/18 1326 Other (See comments) anterior abdomen incision;surgical    Wound - Properties Group Date first assessed: 07/26/18 [TB] Time first assessed: 1326 [TB] Present On Admission : no [TB] Side: Other (See comments) [TB], MIDLINE  Orientation: anterior [TB] Location: abdomen [TB] Type: incision;surgical [TB] Recorded by:  [TB] Joshua Ordonez RN 07/26/18 1327    Row Name                Wound 07/31/18 1700 Left lateral;upper chest surgical;incision    Wound - Properties Group Date first assessed: 07/31/18 [CH] Time first assessed: 1700 [CH] Present On Admission : no [CH] Side: Left [CH] Orientation: lateral;upper [CH] Location: chest [CH] Type: surgical;incision [CH] Recorded by:  [CH] Darlene Slaughter RN 07/31/18 1901    Row Name 08/12/18 1048             Outcome Summary/Treatment Plan (OT)    Daily Summary of Progress (OT) progress toward functional goals as expected  -KD      Plan for Continued Treatment (OT) Cont OT POC  -KD      Anticipated Discharge Disposition (OT) home with home health  -KD      Recorded by [KD] Adrianna Richardson SWARTZ/L 08/12/18 1250        User Key  (r) = Recorded By, (t) = Taken By, (c) = Cosigned By    Initials Name Effective Dates Discipline    KD Adrianna Richardson SWARTZ/L 03/07/18 -  OT    TB Joshua Ordonez RN 10/17/16 -  Nurse    CH Darlene Slaughter RN 06/23/17 -  Nurse        Wound 07/26/18 1326 Other (See comments) anterior abdomen  incision;surgical (Active)   Dressing Appearance open to air 8/12/2018  7:38 AM   Closure Staples;Open to air 8/12/2018  7:38 AM   Drainage Characteristics/Odor serosanguineous 8/12/2018  7:38 AM   Drainage Amount none 8/12/2018  7:38 AM   Dressing Care, Wound open to air 8/12/2018  7:38 AM       Wound 07/31/18 1700 Left lateral;upper chest surgical;incision (Active)   Dressing Appearance intact;dry 8/12/2018  7:38 AM   Closure ROSALINO 8/12/2018  7:38 AM   Base dressing in place, unable to visualize 8/12/2018  7:38 AM             OT Rehab Goals     Row Name 08/12/18 1048             Transfer Goal 1 (OT)    Activity/Assistive Device (Transfer Goal 1, OT) sit-to-stand/stand-to-sit;bed-to-chair/chair-to-bed;toilet  -KD      [place] Level/Cues Needed (Transfer Goal 1, OT) supervision required  -KD      Time Frame (Transfer Goal 1, OT) long term goal (LTG);by discharge  -KD      Progress/Outcome (Transfer Goal 1, OT) goal not met;continuing progress toward goal  -KD         Bathing Goal 1 (OT)    Activity/Assistive Device (Bathing Goal 1, OT) bathing skills, all   Using AE PRn  -KD      [place] Level/Cues Needed (Bathing Goal 1, OT) set-up required;supervision required  -KD      Time Frame (Bathing Goal 1, OT) long term goal (LTG);by discharge  -KD      Progress/Outcomes (Bathing Goal 1, OT) goal not met;continuing progress toward goal  -KD         Dressing Goal 1 (OT)    Activity/Assistive Device (Dressing Goal 1, OT) dressing skills, all   Using AE PRN  -KD      [place]/Cues Needed (Dressing Goal 1, OT) set-up required;supervision required  -KD      Time Frame (Dressing Goal 1, OT) long term goal (LTG);other (see comments)  -KD      Progress/Outcome (Dressing Goal 1, OT) goal not met  -KD         Toileting Goal 1 (OT)    Activity/Device (Toileting Goal 1, OT) toileting skills, all  -KD      [place] Level/Cues Needed (Toileting Goal 1, OT) conditional independence  -KD      Time Frame (Toileting Goal 1,  OT) long term goal (LTG);by discharge  -KD      Progress/Outcome (Toileting Goal 1, OT) goal not met;continuing progress toward goal  -KD        User Key  (r) = Recorded By, (t) = Taken By, (c) = Cosigned By    Initials Name Provider Type Discipline    Adrianna Bryant COTA/L Occupational Therapy Assistant OT        Occupational Therapy Education     Title: PT OT SLP Therapies (Active)     Topic: Occupational Therapy (Done)     Point: ADL training (Done)     Description: Instruct learner(s) on proper safety adaptation and remediation techniques during self care or transfers.   Instruct in proper use of assistive devices.   Learning Progress Summary     Learner Status Readiness Method Response Comment Documented by    Patient Done Acceptance MEDHAT VANEGAS D VU   08/09/18 1311     Done Acceptance E [facility] 08/07/18 1305          Point: Home exercise program (Done)     Description: Instruct learner(s) on appropriate technique for monitoring, assisting and/or progressing therapeutic exercises/activities.   Learning Progress Summary     Learner Status Readiness Method Response Comment Documented by    Patient Done Acceptance MEDHAT VANEGAS D VU   08/09/18 1311     Done Acceptance E VU   08/07/18 1305     Active Acceptance E NR  BB 08/05/18 1337          Point: Precautions (Done)     Description: Instruct learner(s) on prescribed precautions during self-care and functional transfers.   Learning Progress Summary     Learner Status Readiness Method Response Comment Documented by    Patient Done Acceptance E,ANA MARIA MELÉNDEZ VU   08/09/18 1311     Done Acceptance E [facility] 08/07/18 1305     Done Acceptance E VU,NR role of OT; OT POC; transfer training; safety precautions AS 08/03/18 1348          Point: Body mechanics (Done)     Description: Instruct learner(s) on proper positioning and spine alignment during self-care, functional mobility activities and/or exercises.   Learning Progress Summary     Learner Status Readiness Method Response  Comment Documented by    Patient Done Acceptance E,TB,D ZHAO   08/09/18 1311     Done Acceptance E VU   08/07/18 1305     Done Acceptance E VU,NR role of OT; OT POC; transfer training; safety precautions AS 08/03/18 1348                      User Key     Initials Effective Dates Name Provider Type Discipline     03/07/18 -  Carmen Davenport SWARTZ/L Occupational Therapy Assistant OT     03/07/18 -  Julissa Vizcaino SWARTZ/L Occupational Therapy Assistant OT     03/07/18 -  Ileana Yang SWARTZ/L Occupational Therapy Assistant OT    AS 05/01/18 -  rEin Treviño, OT Occupational Therapist OT                OT Recommendation and Plan  Outcome Summary/Treatment Plan (OT)  Daily Summary of Progress (OT): progress toward functional goals as expected  Plan for Continued Treatment (OT): Cont OT POC  Anticipated Discharge Disposition (OT): home with home health  Therapy Frequency (OT Eval): other (see comments) (5-7x/wk)  Daily Summary of Progress (OT): progress toward functional goals as expected  Plan of Care Review  Plan of Care Reviewed With: patient  Plan of Care Reviewed With: patient  Outcome Summary: Pt SBA sit-stand, pt ambulated 275' with no AD and SBA of 1 with good tolerance. Pt completed UE ther ex  with 2lb HW . No new goals met this date. Cont OT POC.        Outcome Measures     Row Name 08/12/18 1048 08/11/18 0925 08/10/18 1410       How much help from another is currently needed...    Putting on and taking off regular lower body clothing? 2  -KD 2  -KD 2  -KD    Bathing (including washing, rinsing, and drying) 2  -KD 2  -KD 2  -KD    Toileting (which includes using toilet bed pan or urinal) 2  -KD 2  -KD 2  -KD    Putting on and taking off regular upper body clothing 3  -KD 3  -KD 2  -KD    Taking care of personal grooming (such as brushing teeth) 3  -KD 3  -KD 3  -KD    Eating meals 4  -KD 4  -KD 4  -KD    Score 16  -KD 16  -KD 15  -KD    Row Name 08/10/18 1050 08/09/18 1300          How much  help from another person do you currently need...    Turning from your back to your side while in flat bed without using bedrails? 3  -LEANDER  --     Moving from lying on back to sitting on the side of a flat bed without bedrails? 3  -LEANDER  --     Moving to and from a bed to a chair (including a wheelchair)? 3  -LEANDER  --     Standing up from a chair using your arms (e.g., wheelchair, bedside chair)? 3  -LEANDER  --     Climbing 3-5 steps with a railing? 3  -LEANDER  --     To walk in hospital room? 3  -LEANDER  --     AM-PAC 6 Clicks Score 18  -LEANDER  --        How much help from another is currently needed...    Putting on and taking off regular lower body clothing?  -- 2  -CS     Bathing (including washing, rinsing, and drying)  -- 2  -CS     Toileting (which includes using toilet bed pan or urinal)  -- 2  -CS     Putting on and taking off regular upper body clothing  -- 2  -CS     Taking care of personal grooming (such as brushing teeth)  -- 3  -CS     Eating meals  -- 4  -CS     Score  -- 15  -CS        Functional Assessment    Outcome Measure Options AM-PAC 6 Clicks Basic Mobility (PT)  -LEANDER AM-PAC 6 Clicks Daily Activity (OT)  -CS       User Key  (r) = Recorded By, (t) = Taken By, (c) = Cosigned By    Initials Name Provider Type    Reinier Martin, MARGI Physical Therapy Assistant    Adrianna Bryant COTA/L Occupational Therapy Assistant    Ileana Maya COTA/KIM Occupational Therapy Assistant           Time Calculation:         Time Calculation- OT     Row Name 08/12/18 1252             Time Calculation- OT    OT Start Time 1048  -KD      OT Stop Time 1127  -KD      OT Time Calculation (min) 39 min  -KD      Total Timed Code Minutes- OT 39 minute(s)  -KD      OT Received On 08/12/18  -KD        User Key  (r) = Recorded By, (t) = Taken By, (c) = Cosigned By    Initials Name Provider Type    Adrianna Bryant COTA/L Occupational Therapy Assistant           Therapy Suggested Charges     Code   Minutes Charges    None            Therapy Charges for Today     Code Description Service Date Service Provider Modifiers Qty    13806440999 HC OT SELF CARE/MGMT/TRAIN EA 15 MIN 8/11/2018 Adrianna Richardson COTA/L GO 2    27786403469 HC OT THER PROC EA 15 MIN 8/12/2018 Adrianna Richardson SWARTZ/L GO 2    76780312357 HC OT SELF CARE/MGMT/TRAIN EA 15 MIN 8/12/2018 Adrianna Richardson COTA/L GO 1          OT G-codes  OT Professional Judgement Used?: Yes  OT Functional Scales Options: AM-PAC 6 Clicks Daily Activity (OT)  Score: 15  Functional Limitation: Self care  Self Care Current Status (): At least 40 percent but less than 60 percent impaired, limited or restricted  Self Care Goal Status (): At least 1 percent but less than 20 percent impaired, limited or restricted    SHERIDAN Donovan/KIM  8/12/2018

## 2018-08-12 NOTE — PLAN OF CARE
Problem: Patient Care Overview  Goal: Plan of Care Review  Outcome: Ongoing (interventions implemented as appropriate)   08/12/18 4164   Coping/Psychosocial   Plan of Care Reviewed With patient   OTHER   Outcome Summary pt responded well to therapy. pt able sit-stand CGA this tx w/o pulling from therapist hand for the first time. pt amb 270 ft w/ FF posture, using RW. pt demonstrated understanding of HEP. pt met 2 new goals this tx. Follow up plan: begin stair training.    Plan of Care Review   Progress improving

## 2018-08-12 NOTE — PLAN OF CARE
Problem: Patient Care Overview  Goal: Plan of Care Review  Outcome: Ongoing (interventions implemented as appropriate)   08/12/18 1251   Coping/Psychosocial   Plan of Care Reviewed With patient   OTHER   Outcome Summary Pt SBA sit-stand, pt ambulated 275' with no AD and SBA of 1 with good tolerance. Pt completed UE ther ex with 2lb HW . No new goals met this date. Cont OT POC.   Plan of Care Review   Progress improving

## 2018-08-12 NOTE — THERAPY TREATMENT NOTE
Acute Care - Physical Therapy Treatment Note  [facility]     Patient Name: Truman Esquivel  : 1960  MRN: [id number]  Today's Date: 2018  Onset of Illness/Injury or Date of Surgery: 18  Date of Referral to PT: 18  Referring Physician: Dr. Solano    Admit Date: 2018    Visit Dx:    ICD-10-CM ICD-9-CM   1. Syncope and collapse R55 780.2   2. Septic shock (CMS/HCC) A41.9 038.9    R65.21 785.52     995.92   3. Dissection of aorta, unspecified portion of aorta (CMS/HCC) I71.00 441.00   4. Pleural effusion J90 511.9   5. Other ascites R18.8 789.59   6. Splenic hemorrhage D73.89 289.59   7. Impaired physical mobility Z74.09 781.99   8. Impaired mobility and activities of daily living Z74.09 799.89   9. Oropharyngeal dysphagia R13.12 787.22     Patient Active Problem List   Diagnosis   • Ascending aortic dissection (CMS/HCC)   • Essential hypertension   • Deep vein thrombosis (DVT) of femoral vein of both lower extremities (CMS/HCC)   • Hypothyroidism   • Snoring   • Acute pain of right knee   • Knee effusion, right   • Knee pain   • Obstructive sleep apnea of adult   • TIA (transient ischemic attack)   • Dysphagia   • Squamous cell carcinoma of larynx (CMS/HCC)   • Pharyngeal dysphagia   • Anemia   • Abnormal positron emission tomography (PET) of colon   • Hypokalemia   • Encounter for venous access device care   • Dehydration   • Weight loss, abnormal   • Odynophagia   • Thrush, oral   • Thrombocytopenia (CMS/HCC)   • Pancytopenia due to antineoplastic chemotherapy (CMS/HCC)   • Unable to eat   • Syncope and collapse   • Splenic hemorrhage   • Pleural effusion       Therapy Treatment          Rehabilitation Treatment Summary     Row Name 18 1406 18 1048          Treatment Time/Intention    Discipline physical therapy assistant  -LEANDER occupational therapy assistant  -KD     Document Type therapy note (daily note)  -LEANDER therapy note (daily note)  -KD2     Subjective  Information  -- no complaints  -KD2     Mode of Treatment physical therapy;individual therapy  -LEANDER occupational therapy  -KD2     Patient/Family Observations  -- No family present  -KD2     Care Plan Review  -- care plan/treatment goals reviewed  -KD2     Therapy Frequency (OT Eval)  -- other (see comments)   5-7x/wk  -KD2     Patient Effort adequate  -LEANDER adequate  -KD2     Existing Precautions/Restrictions fall;oxygen therapy device and L/min  -LEANDER fall;oxygen therapy device and L/min  -KD2     Equipment Issued to Patient  -- gait belt  -KD2     Recorded by [LEANDER] Reinier Hannah, PTA 08/12/18 1511 [KD] Adrianna Richardson, SWARTZ/L 08/12/18 1243  [KD2] Adrianna Richardson SWARTZ/L 08/12/18 1247     Row Name 08/12/18 1406 08/12/18 1048          Vital Signs    Pre Systolic BP Rehab 126  -LEANDER 150  -KD     Pre Treatment Diastolic BP 69  -LEANDER 71  -KD     Post Systolic BP Rehab 141  -LEANDER  --     Post Treatment Diastolic BP 77  -LEANDER  --     Pretreatment Heart Rate (beats/min) 61  -LEANDER 72  -KD     Posttreatment Heart Rate (beats/min) 55  -LEANDER 72  -KD     Pre SpO2 (%) 96  -ELANDER 93  -KD     O2 Delivery Pre Treatment supplemental O2  -LEANDER supplemental O2  -KD     Post SpO2 (%) 90   cold fingers, may be inaccurate.   -LEANDER 94  -KD     O2 Delivery Post Treatment supplemental O2  -LEANDER supplemental O2  -KD     Pre Patient Position Sitting  -LEANDER Sitting  -KD     Intra Patient Position  -- Standing  -KD     Post Patient Position Sitting  -LEANDER Sitting  -KD     Recorded by [LEANDER] Reinier Hannah, PTA 08/12/18 1511 [KD] Adrianna Richardson SWARTZ/L 08/12/18 1250     Row Name 08/12/18 1406 08/12/18 1048          Cognitive Assessment/Intervention- PT/OT    Affect/Mental Status (Cognitive) WFL  -LEANDER WFL  -KD     Orientation Status (Cognition) oriented x 4  -LEANDER oriented x 4  -KD     Follows Commands (Cognition) WFL  -LEANDER WFL  -KD     Cognitive Function (Cognitive) WFL  -LEANDER WFL  -KD     Recorded by [LEANDER] Reinier Hannah, PTA 08/12/18 1511 [KD] Adrianna Richardson, SWARTZ/L  08/12/18 1250     Row Name 08/12/18 1048             Bed Mobility Assessment/Treatment    Bed Mobility Assessment/Treatment --  -KD      Recorded by [KD] Adrianna Richardson SWARTZ/L 08/12/18 1250      Row Name 08/12/18 1048             Functional Mobility    Functional Mobility- Ind. Level supervision required  -KD      Functional Mobility-Distance (Feet) 285  -KD      Recorded by [KD] Adrianna Richardson SWARTZ/L 08/12/18 1250      Row Name 08/12/18 1406             Transfer Assessment/Treatment    Transfer Assessment/Treatment sit-stand transfer;stand-sit transfer  -LEANDER      Recorded by [LEANDER] Reinier Hannah, PTA 08/12/18 1511      Row Name 08/12/18 1048             Chair-Bed Transfer    Chair-Bed [place] (Transfers) supervision  -KD      Assistive Device (Chair-Bed Transfers) other (see comments)   none  -KD      Recorded by [KD] Adrianna Richardson SWARTZ/L 08/12/18 1250      Row Name 08/12/18 1406 08/12/18 1048          Sit-Stand Transfer    Sit-Stand [place] (Transfers) contact guard   1st time pt able stand w/o pulling up from PTAs hand  -LEANDER supervision  -KD     Assistive Device (Sit-Stand Transfers) walker, front-wheeled  -LEANDER other (see comments)   none  -KD     Recorded by [LEANDER] Reinier Hannah, PTA 08/12/18 1511 [KD] Adrianna Richardson SWARTZ/L 08/12/18 1250     Row Name 08/12/18 1406 08/12/18 1048          Stand-Sit Transfer    Stand-Sit [place] (Transfers) supervision  -LEANDER supervision  -KD     Assistive Device (Stand-Sit Transfers) walker, front-wheeled  -LEANDER other (see comments)   none  -KD     Recorded by [LEANDER] Reinier Hannah, PTA 08/12/18 1511 [KD] Adrianna Richardson SWARTZ/L 08/12/18 1250     Row Name 08/12/18 1406             Gait/Stairs Assessment/Training    Gait/Stairs Assessment/Training gait/ambulation independence;gait/ambulation assistive device;distance ambulated;gait pattern;gait deviations  -LEANDER      [place] Level (Gait) supervision  -LEANDER      Assistive Device (Gait) walker, front-wheeled  -LEANDER       Distance in Feet (Gait) 270   pt deferred further gait at this time.   -LEANDER      Pattern (Gait) step-through  -LEANDER      Deviations/Abnormal Patterns (Gait) rosanna decreased   FF posture. needs cues for walker proximity,   -LEANDER      Recorded by [LEANDER] Reinier Hannah, MARGI 08/12/18 1511      Row Name 08/12/18 1048             Upper Extremity Seated Therapeutic Exercise    Performed, Seated Upper Extremity (Therapeutic Exercise) shoulder flexion/extension;shoulder abduction/adduction;shoulder horizontal abduction/adduction;elbow flexion/extension;forearm supination/pronation;wrist flexion/extension  -KD      Device, Seated Upper Extremity (Therapeutic Exercise) free weights, barbell  -KD      Exercise Type, Seated Upper Extremity (Therapeutic Exercise) AROM (active range of motion)  -KD      Expected Outcomes, Seated Upper Extremity (Therapeutic Exercise) improve functional tolerance, self-care activity  -KD      Sets/Reps Detail, Seated Upper Extremity (Therapeutic Exercise) 2/20  -KD      Recorded by [KD] Adrianna Richardson COTA/L 08/12/18 1250      Row Name 08/12/18 1406             Lower Extremity Seated Therapeutic Exercise    Performed, Seated Lower Extremity (Therapeutic Exercise) ankle dorsiflexion/plantarflexion;LAQ (long arc quad), knee extension;hip abduction/adduction;hip flexion/extension  -LEANDER      Exercise Type, Seated Lower Extremity (Therapeutic Exercise) AROM (active range of motion)  -LEANDER      Sets/Reps Detail, Seated Lower Extremity (Therapeutic Exercise) 20x1  -LEANDER      Comment, Seated Lower Extremity (Therapeutic Exercise) pt demonstrated understanding of HEP  -LEANDER      Recorded by [LEANDER] Reinier Hannah, MARGI 08/12/18 1511      Row Name 08/12/18 1048             Positioning and Restraints    Pre-Treatment Position sitting in chair/recliner  -KD      Post Treatment Position chair  -KD      In Chair sitting;call light within reach;encouraged to call for assist;exit alarm on  -KD      Recorded by [KD]  Adrianna Richardson SWARTZ/L 08/12/18 1250      Row Name 08/12/18 1406 08/12/18 1048          Pain Scale: Numbers Pre/Post-Treatment    Pain Scale: Numbers, Pretreatment 6/10  -LEANDER 4/10  -KD     Pain Scale: Numbers, Post-Treatment 6/10  -LEANDER 4/10  -KD     Pain Location - Orientation incisional  -LEANDER incisional  -KD     Pain Location chest   under L arm  -LEANDER  --     Pre/Post Treatment Pain Comment  -- meds not due  -KD     Recorded by [LEANDER] Reinier Hannah PTA 08/12/18 1511 [KD] Adrianna Richardson SWARTZ/L 08/12/18 1250     Row Name                Wound 07/26/18 1326 Other (See comments) anterior abdomen incision;surgical    Wound - Properties Group Date first assessed: 07/26/18 [TB] Time first assessed: 1326 [TB] Present On Admission : no [TB] Side: Other (See comments) [TB], MIDLINE  Orientation: anterior [TB] Location: abdomen [TB] Type: incision;surgical [TB] Recorded by:  [TB] Joshua Ordonez RN 07/26/18 1327    Row Name                Wound 07/31/18 1700 Left lateral;upper chest surgical;incision    Wound - Properties Group Date first assessed: 07/31/18 [CH] Time first assessed: 1700 [CH] Present On Admission : no [CH] Side: Left [CH] Orientation: lateral;upper [CH] Location: chest [CH] Type: surgical;incision [CH] Recorded by:  [CH] Darlene Slaughter RN 07/31/18 1901    Row Name 08/12/18 1048             Outcome Summary/Treatment Plan (OT)    Daily Summary of Progress (OT) progress toward functional goals as expected  -KD      Plan for Continued Treatment (OT) Cont OT POC  -KD      Anticipated Discharge Disposition (OT) home with home health  -KD      Recorded by [KD] Adrianna Richardson SWARTZ/L 08/12/18 1250      Row Name 08/12/18 1406             Outcome Summary/Treatment Plan (PT)    Daily Summary of Progress (PT) progress toward functional goals as expected  -LEANDER      Plan for Continued Treatment (PT) begin stair training.   -LEANDER      Anticipated Discharge Disposition (PT) home with home health;home with /7 care   -LEANDER      Recorded by [LEANDER] Camden Reinier OLIVER, PTA 08/12/18 1511        User Key  (r) = Recorded By, (t) = Taken By, (c) = Cosigned By    Initials Name Effective Dates Discipline    LEANDER Camedn Reinier OLIVER, PTA 03/07/18 -  PT    KD Adrianna Richardson, SWARTZ/L 03/07/18 -  OT    TB Joshua Ordonez, RN 10/17/16 -  Nurse    CH Darlene Slaughter RN 06/23/17 -  Nurse          Wound 07/26/18 1326 Other (See comments) anterior abdomen incision;surgical (Active)   Dressing Appearance open to air 8/12/2018  7:38 AM   Closure [place];Open to air 8/12/2018  7:38 AM   Drainage Characteristics/Odor serosanguineous 8/12/2018  7:38 AM   Drainage Amount none 8/12/2018  7:38 AM   Dressing Care, Wound open to air 8/12/2018  7:38 AM       Wound 07/31/18 1700 Left lateral;upper chest surgical;incision (Active)   Dressing Appearance intact;dry 8/12/2018  7:38 AM   Closure [place]AL[place] 8/12/2018  7:38 AM   Base dressing in place, unable to visualize 8/12/2018  7:38 AM               PT Rehab Goals     Row Name 08/12/18 1406             Bed Mobility Goal 1 (PT)    Activity/Assistive Device (Bed Mobility Goal 1, PT) sit to supine;supine to sit;bed rails  -      [place] Level/Cues Needed (Bed Mobility Goal 1, PT) supervision required  -LEANDER      Time Frame (Bed Mobility Goal 1, PT) 1 week  -LEANDER      Progress/Outcomes (Bed Mobility Goal 1, PT) goal not met  -LEANDER         Transfer Goal 1 (PT)    Activity/Assistive Device (Transfer Goal 1, PT) sit-to-stand/stand-to-sit;bed-to-chair/chair-to-bed;walker, rolling  -LEANDER      [place] Level/Cues Needed (Transfer Goal 1, PT) supervision required  -LEANDER      Time Frame (Transfer Goal 1, PT) 1 week  -LEANDER      Progress/Outcome (Transfer Goal 1, PT) goal not met  -LEANDER         Gait Training Goal 1 (PT)    Activity/Assistive Device (Gait Training Goal 1, PT) gait (walking locomotion);assistive device use;increase endurance/gait distance;walker, rolling;decrease fall risk   or least restrictive to no device  -LEANDER       [place] Level (Gait Training Goal 1, PT) supervision required  -LEANDER      Distance (Gait Goal 1, PT) 200  -LEANDER      Time Frame (Gait Training Goal 1, PT) 1 week  -LEANDER      Progress/Outcome (Gait Training Goal 1, PT) goal met  -LEANDER         Stairs Goal 1 (PT)    Activity/Assistive Device (Stairs Goal 1, PT) ascending stairs;descending stairs;decrease fall risk;using handrail, right  -LEANDER      [place] Level/Cues Needed (Stairs Goal 1, PT) contact guard assist  -LEANDER      Number of Stairs (Stairs Goal 1, PT) 1  -LEANDER      Time Frame (Stairs Goal 1, PT) 1 week  -LEANDER      Progress/Outcome (Stairs Goal 1, PT) goal not met  -LEANDER         Patient Education Goal (PT)    Activity (Patient Education Goal, PT) HEP  -LEANDER      [place]/Cues/Accuracy (Memory Goal 2, PT) demonstrates adequately;verbalizes understanding  -LEANDER      Time Frame (Patient Education Goal, PT) 1 week  -LEANDER      Progress/Outcome (Patient Education Goal, PT) goal met  -LEANDER        User Key  (r) = Recorded By, (t) = Taken By, (c) = Cosigned By    Initials Name Provider Type Discipline    Reinier Martin, PTA Physical Therapy Assistant PT          Physical Therapy Education     Title: PT OT SLP Therapies (Active)     Topic: Physical Therapy (Active)     Point: Mobility training (Active)    Learning Progress Summary     Learner Status Readiness Method Response Comment Documented by    Patient Active Acceptance E NR  LEANDER 08/10/18 1151     Active Acceptance E NR  LEANDER 08/08/18 1241     Active Acceptance E NR  LEANDER 08/06/18 1555     Active Acceptance E,D NR  CB 08/03/18 1127          Point: Home exercise program (Done)    Learning Progress Summary     Learner Status Readiness Method Response Comment Documented by    Patient Done Acceptance H HUE [place]UR[place]  LEANDER 08/12/18 1512     Active Acceptance E NR  LEANDER 08/08/18 1241          Point: Precautions (Active)    Learning Progress Summary     Learner Status Readiness Method Response Comment Documented by    Patient Active Acceptance E  NR   08/08/18 1241     Active Acceptance E,D NR   08/03/18 1127                      User Key     Initials Effective Dates Name Provider Type Discipline     04/06/17 -  Lupe Grossman, PT Physical Therapist PT     03/07/18 -  Reinier Hannah, PTA Physical Therapy Assistant PT                    PT Recommendation and Plan  Anticipated Discharge Disposition (PT): home with home health, home with 24/7 care  Outcome Summary/Treatment Plan (PT)  Daily Summary of Progress (PT): progress toward functional goals as expected  Plan for Continued Treatment (PT): begin stair training.   Anticipated Discharge Disposition (PT): home with home health, home with 24/7 care  Plan of Care Reviewed With: patient  Progress: improving  Outcome Summary: pt responded well to therapy. pt able sit-stand CGA this tx w/ out pulling from therapist hand for the first time. pt amb 270 w/ FF posture, using RW. pt demonstrated understanding of HEP. pt met 2 new goals this tx. Follow up plan: begin stair training.           Outcome Measures     Row Name 08/12/18 1406 08/12/18 1048 08/11/18 0925       How much help from another person do you currently need...    Turning from your back to your side while in flat bed without using bedrails? 3  -LEANDER  --  --    Moving from lying on back to sitting on the side of a flat bed without bedrails? 3  -LEANDER  --  --    Moving to and from a bed to a chair (including a wheelchair)? 3  -LEANDER  --  --    Standing up from a chair using your arms (e.g., wheelchair, bedside chair)? 3  -LEANDER  --  --    Climbing 3-5 steps with a railing? 3  -LEANDER  --  --    To walk in hospital room? 3  -LEANDER  --  --    AM-PAC 6 Clicks Score 18  -LEANDER  --  --       How much help from another is currently needed...    Putting on and taking off regular lower body clothing?  -- 2  -KD 2  -KD    Bathing (including washing, rinsing, and drying)  -- 2  -KD 2  -KD    Toileting (which includes using toilet bed pan or urinal)  -- 2  -KD 2  -KD     Putting on and taking off regular upper body clothing  -- 3  -KD 3  -KD    Taking care of personal grooming (such as brushing teeth)  -- 3  -KD 3  -KD    Eating meals  -- 4  -KD 4  -KD    Score  -- 16  -KD 16  -KD       Functional Assessment    Outcome Measure Options AM-[facility] 6 Clicks Basic Mobility (PT)  -  --  --    Row Name 08/10/18 1410 08/10/18 1050          How much help from another person do you currently need...    Turning from your back to your side while in flat bed without using bedrails?  -- 3  -LEANDER     Moving from lying on back to sitting on the side of a flat bed without bedrails?  -- 3  -LEANDER     Moving to and from a bed to a chair (including a wheelchair)?  -- 3  -LEANDER     Standing up from a chair using your arms (e.g., wheelchair, bedside chair)?  -- 3  -LEANDER     Climbing 3-5 steps with a railing?  -- 3  -LEANDER     To walk in hospital room?  -- 3  -LEANDER     [facility]  -- 18  -LEANDER        How much help from another is currently needed...    Putting on and taking off regular lower body clothing? 2  -KD  --     Bathing (including washing, rinsing, and drying) 2  -KD  --     Toileting (which includes using toilet bed pan or urinal) 2  -KD  --     Putting on and taking off regular upper body clothing 2  -KD  --     Taking care of personal grooming (such as brushing teeth) 3  -KD  --     Eating meals 4  -KD  --     Score 15  -KD  --        Functional Assessment    Outcome Measure Options  -- AM-[facility] 6 Clicks Basic Mobility (PT)  -       User Key  (r) = Recorded By, (t) = Taken By, (c) = Cosigned By    Initials Name Provider Type    LEANDER Reinier Hannah PTA Physical Therapy Assistant    KD Adrianna Richardson, SHERIDAN/L Occupational Therapy Assistant           Time Calculation:         PT Charges     Row Name 08/12/18 1515             Time Calculation    Start Time 1406  -LEANDER      Stop Time 1436  -LEANDER      Time Calculation (min) 30 min  -LEANDER         Time Calculation- PT    Total Timed Code Minutes- PT 30 minute(s)   -LEANDER        User Key  (r) = Recorded By, (t) = Taken By, (c) = Cosigned By    Initials Name Provider Type    Reinier Martin PTA Physical Therapy Assistant        Therapy Suggested Charges     Code   Minutes Charges    None           Therapy Charges for Today     Code Description Service Date Service Provider Modifiers Qty    43666986045 HC GAIT TRAINING EA 15 MIN 8/11/2018 Reinier Hannah, PTA GP 1    47488135119 HC PT SELF CARE/MGMT/TRAIN EA 15 MIN 8/11/2018 Reinier Hannah, PTA GP 1    31046183967 HC GAIT TRAINING EA 15 MIN 8/12/2018 Reinier Hannah, PTA GP 1    73428129448 HC PT THER PROC EA 15 MIN 8/12/2018 Reinier Hannah, PTA GP 1          PT G-Codes  PT Professional Judgement Used?: Yes  Outcome Measure Options: AM-PAC 6 Clicks Basic Mobility (PT)  Score: 11  Functional Limitation: Mobility: Walking and moving around  Mobility: Walking and Moving Around Current Status (): At least 60 percent but less than 80 percent impaired, limited or restricted  Mobility: Walking and Moving Around Goal Status (): At least 40 percent but less than 60 percent impaired, limited or restricted    Reinier Hannah PTA  8/12/2018

## 2018-08-13 LAB
ANION GAP SERPL CALCULATED.3IONS-SCNC: 7 MMOL/L (ref 5–15)
BUN BLD-MCNC: 12 MG/DL (ref 7–21)
BUN/CREAT SERPL: 18.2 (ref 7–25)
CALCIUM SPEC-SCNC: 8.1 MG/DL (ref 8.4–10.2)
CHLORIDE SERPL-SCNC: 99 MMOL/L (ref 95–110)
CO2 SERPL-SCNC: 27 MMOL/L (ref 22–31)
CREAT BLD-MCNC: 0.66 MG/DL (ref 0.7–1.3)
DEPRECATED RDW RBC AUTO: 52.3 FL (ref 35.1–43.9)
ERYTHROCYTE [DISTWIDTH] IN BLOOD BY AUTOMATED COUNT: 17.1 % (ref 11.5–14.5)
GFR SERPL CREATININE-BSD FRML MDRD: 124 ML/MIN/1.73 (ref 56–130)
GLUCOSE BLD-MCNC: 95 MG/DL (ref 60–100)
GLUCOSE BLDC GLUCOMTR-MCNC: 102 MG/DL (ref 70–130)
GLUCOSE BLDC GLUCOMTR-MCNC: 110 MG/DL (ref 70–130)
GLUCOSE BLDC GLUCOMTR-MCNC: 91 MG/DL (ref 70–130)
HCT VFR BLD AUTO: 25.7 % (ref 39–49)
HGB BLD-MCNC: 8.4 G/DL (ref 13.7–17.3)
MCH RBC QN AUTO: 27.5 PG (ref 26.5–34)
MCHC RBC AUTO-ENTMCNC: 32.7 G/DL (ref 31.5–36.3)
MCV RBC AUTO: 84.3 FL (ref 80–98)
PLATELET # BLD AUTO: 423 10*3/MM3 (ref 150–450)
PMV BLD AUTO: 8.7 FL (ref 8–12)
POTASSIUM BLD-SCNC: 4.1 MMOL/L (ref 3.5–5.1)
RBC # BLD AUTO: 3.05 10*6/MM3 (ref 4.37–5.74)
SODIUM BLD-SCNC: 133 MMOL/L (ref 137–145)
WBC NRBC COR # BLD: 10.95 10*3/MM3 (ref 3.2–9.8)

## 2018-08-13 PROCEDURE — 82962 GLUCOSE BLOOD TEST: CPT

## 2018-08-13 PROCEDURE — 97110 THERAPEUTIC EXERCISES: CPT

## 2018-08-13 PROCEDURE — 99024 POSTOP FOLLOW-UP VISIT: CPT | Performed by: SURGERY

## 2018-08-13 PROCEDURE — 85027 COMPLETE CBC AUTOMATED: CPT | Performed by: STUDENT IN AN ORGANIZED HEALTH CARE EDUCATION/TRAINING PROGRAM

## 2018-08-13 PROCEDURE — 80048 BASIC METABOLIC PNL TOTAL CA: CPT | Performed by: STUDENT IN AN ORGANIZED HEALTH CARE EDUCATION/TRAINING PROGRAM

## 2018-08-13 PROCEDURE — 25010000002 MORPHINE PER 10 MG: Performed by: SURGERY

## 2018-08-13 RX ORDER — INDOMETHACIN 25 MG/1
50 CAPSULE ORAL 2 TIMES DAILY WITH MEALS
Status: DISCONTINUED | OUTPATIENT
Start: 2018-08-13 | End: 2018-08-15 | Stop reason: HOSPADM

## 2018-08-13 RX ADMIN — LOSARTAN POTASSIUM: 50 TABLET, FILM COATED ORAL at 08:24

## 2018-08-13 RX ADMIN — CHLORHEXIDINE GLUCONATE 0.12% ORAL RINSE 15 ML: 1.2 LIQUID ORAL at 22:31

## 2018-08-13 RX ADMIN — LEVOTHYROXINE SODIUM 75 MCG: 0.07 TABLET ORAL at 06:27

## 2018-08-13 RX ADMIN — MORPHINE SULFATE 2 MG: 2 INJECTION, SOLUTION INTRAMUSCULAR; INTRAVENOUS at 01:50

## 2018-08-13 RX ADMIN — MORPHINE SULFATE 2 MG: 2 INJECTION, SOLUTION INTRAMUSCULAR; INTRAVENOUS at 19:37

## 2018-08-13 RX ADMIN — METOPROLOL SUCCINATE 25 MG: 25 TABLET, FILM COATED, EXTENDED RELEASE ORAL at 22:31

## 2018-08-13 RX ADMIN — MORPHINE SULFATE 2 MG: 2 INJECTION, SOLUTION INTRAMUSCULAR; INTRAVENOUS at 23:37

## 2018-08-13 RX ADMIN — Medication 10 ML: at 10:21

## 2018-08-13 RX ADMIN — INDOMETHACIN 50 MG: 25 CAPSULE ORAL at 09:05

## 2018-08-13 RX ADMIN — POLYETHYLENE GLYCOL 3350 17 G: 17 POWDER, FOR SOLUTION ORAL at 22:31

## 2018-08-13 RX ADMIN — MORPHINE SULFATE 2 MG: 2 INJECTION, SOLUTION INTRAMUSCULAR; INTRAVENOUS at 10:18

## 2018-08-13 RX ADMIN — INDOMETHACIN 50 MG: 25 CAPSULE ORAL at 17:43

## 2018-08-13 RX ADMIN — MORPHINE SULFATE 2 MG: 2 INJECTION, SOLUTION INTRAMUSCULAR; INTRAVENOUS at 06:27

## 2018-08-13 RX ADMIN — Medication 10 ML: at 23:38

## 2018-08-13 RX ADMIN — PANTOPRAZOLE SODIUM 40 MG: 40 INJECTION, POWDER, FOR SOLUTION INTRAVENOUS at 06:27

## 2018-08-13 NOTE — PLAN OF CARE
Problem: Patient Care Overview  Goal: Plan of Care Review  Outcome: Ongoing (interventions implemented as appropriate)   08/13/18 0528   Coping/Psychosocial   Plan of Care Reviewed With patient   OTHER   Outcome Summary Pt rested very little. Void several times & had a bowel movement. Pain controlled with PRN med.   Plan of Care Review   Progress improving

## 2018-08-13 NOTE — PLAN OF CARE
Problem: Patient Care Overview  Goal: Plan of Care Review  Outcome: Ongoing (interventions implemented as appropriate)   08/13/18 2880   Coping/Psychosocial   Plan of Care Reviewed With patient   OTHER   Outcome Summary Pt states he just finished amb with nsg but agreeable to seated LE ther ex. Pt able to perform LE ther ex without difficulty.   Plan of Care Review   Progress no change

## 2018-08-13 NOTE — CONSULTS
"Adult Nutrition  Assessment    Patient Name:  Truman Esquivel  YOB: 1960  MRN: [id number]  Admit Date:  7/26/2018    Assessment Date:  8/13/2018    Comments: Pt in good spirits and reports a good appetite.  No difficulty swallowing.  His intake is averaging ~95% for the last 5 documented meals.  He is drinking milk with meals but reports that he is no longer taking the mighty shakes.  Will d/c mighty shakes and continue milk and ice cream. Rd will continue to monitor.            Reason for Assessment     Row Name 08/13/18 1437          Reason for Assessment    Reason For Assessment follow-up protocol               Nutrition/Diet History     Row Name 08/13/18 1437          Nutrition/Diet History    Typical Food/Fluid Intake Pt in good spirits.  He reports that he is eating well.  NO drinking the mighty shakes but he is drinking his milk.  bloating and gas are \"so much better\"                 Labs/Tests/Procedures/Meds     Row Name 08/13/18 1438          Labs/Procedures/Meds    Lab Results Reviewed reviewed, pertinent        Diagnostic Tests/Procedures    Diagnostic Test/Procedure Reviewed reviewed, pertinent        Medications    Pertinent Medications Reviewed reviewed, pertinent             Physical Findings     Row Name 08/13/18 1439          Physical Findings    Tubes gastrostomy tube               Nutrition Prescription Ordered     Row Name 08/13/18 1439          Nutrition Prescription PO    Current PO Diet Soft Texture     Texture Chopped     Fluid Consistency Thin     Supplement Milk;Magic Cup;Mighty Shake     Supplement Frequency 2 times a day;3 times a day     Common Modifiers Cardiac             Evaluation of Received Nutrient/Fluid Intake     Row Name 08/13/18 1439          PO Evaluation    Number of Days PO Intake Evaluated 3 days     Number of Meals 5     % PO Intake 95% average             Electronically signed by:  Leeann Salazar RD  08/13/18 2:47 PM  "

## 2018-08-13 NOTE — THERAPY TREATMENT NOTE
Acute Care - Physical Therapy Treatment Note  [facility]     Patient Name: Truman Esquivel  : 1960  MRN: [id number]  Today's Date: 2018  Onset of Illness/Injury or Date of Surgery: 18  Date of Referral to PT: 18  Referring Physician: Dr. Solano    Admit Date: 2018    Visit Dx:    ICD-10-CM ICD-9-CM   1. Syncope and collapse R55 780.2   2. Septic shock (CMS/HCC) A41.9 038.9    R65.21 785.52     995.92   3. Dissection of aorta, unspecified portion of aorta (CMS/HCC) I71.00 441.00   4. Pleural effusion J90 511.9   5. Other ascites R18.8 789.59   6. Splenic hemorrhage D73.89 289.59   7. Impaired physical mobility Z74.09 781.99   8. Impaired mobility and activities of daily living Z74.09 799.89   9. Oropharyngeal dysphagia R13.12 787.22     Patient Active Problem List   Diagnosis   • Ascending aortic dissection (CMS/HCC)   • Essential hypertension   • Deep vein thrombosis (DVT) of femoral vein of both lower extremities (CMS/HCC)   • Hypothyroidism   • Snoring   • Acute pain of right knee   • Knee effusion, right   • Knee pain   • Obstructive sleep apnea of adult   • TIA (transient ischemic attack)   • Dysphagia   • Squamous cell carcinoma of larynx (CMS/HCC)   • Pharyngeal dysphagia   • Anemia   • Abnormal positron emission tomography (PET) of colon   • Hypokalemia   • Encounter for venous access device care   • Dehydration   • Weight loss, abnormal   • Odynophagia   • Thrush, oral   • Thrombocytopenia (CMS/HCC)   • Pancytopenia due to antineoplastic chemotherapy (CMS/HCC)   • Unable to eat   • Syncope and collapse   • Splenic hemorrhage   • Pleural effusion       Therapy Treatment          Rehabilitation Treatment Summary     Row Name 18 1330 18 0900          Treatment Time/Intention    Discipline physical therapy assistant  -TW occupational therapy assistant  -KD     Document Type therapy note (daily note)  -TW therapy note (daily note)  -KD     Subjective  Information no complaints  -TW no complaints  -KD     Mode of Treatment physical therapy;individual therapy  -TW occupational therapy  -KD     Patient/Family Observations  -- No family present  -KD     Care Plan Review  -- care plan/treatment goals reviewed  -KD     Therapy Frequency (OT Eval)  -- other (see comments)   5-7x/week  -KD     Patient Effort adequate  -TW adequate  -KD     Existing Precautions/Restrictions fall;oxygen therapy device and L/min  -TW fall;oxygen therapy device and L/min  -KD     Equipment Issued to Patient gait belt  -TW  --     Recorded by [TW] Lj Gross, PTA 08/13/18 1418 [KD] Adrianna Richardson COTA/L 08/13/18 1328     Row Name 08/13/18 1330 08/13/18 0900          Vital Signs    Pre Systolic BP Rehab 119  -  -KD     Pre Treatment Diastolic BP 63  -TW 70  -KD     Pretreatment Heart Rate (beats/min) 70  -TW 65  -KD     Posttreatment Heart Rate (beats/min) 76  -TW 68  -KD     Pre SpO2 (%) 93  -TW 95  -KD     O2 Delivery Pre Treatment room air  -TW supplemental O2  -KD     Intra SpO2 (%) 96  -TW  --     O2 Delivery Intra Treatment supplemental O2  -TW  --     Post SpO2 (%) 96  -TW 96  -KD     O2 Delivery Post Treatment supplemental O2  -TW supplemental O2  -KD     Pre Patient Position Sitting  -TW Sitting  -KD     Intra Patient Position Sitting  -TW Sitting  -KD     Post Patient Position Sitting  -TW Sitting  -KD     Recorded by [TW] Lj Gross, PTA 08/13/18 1418 [KD] Adrianna Richardson COTA/L 08/13/18 1332     Row Name 08/13/18 1330 08/13/18 0900          Cognitive Assessment/Intervention- PT/OT    Affect/Mental Status (Cognitive) WFL  -TW WFL  -KD     Orientation Status (Cognition) oriented x 4  -TW oriented x 4  -KD     Follows Commands (Cognition) WFL  -TW WFL  -KD     Cognitive Function (Cognitive) WFL  -TW WFL  -KD     Personal Safety Interventions muscle strengthening facilitated  -TW  --     Recorded by [TW] Lj Gross, PTA 08/13/18 1418 [KD] Dylan  SHERIDAN Harley/L 08/13/18 1332     Row Name 08/13/18 1330             Lower Extremity Seated Therapeutic Exercise    Performed, Seated Lower Extremity (Therapeutic Exercise) hip flexion/extension;hip abduction/adduction;ankle dorsiflexion/plantarflexion;LAQ (long arc quad), knee extension  -TW      Exercise Type, Seated Lower Extremity (Therapeutic Exercise) AROM (active range of motion)  -TW      Sets/Reps Detail, Seated Lower Extremity (Therapeutic Exercise) 2/20  -TW      Recorded by [TW] Lj Gross, MARGI 08/13/18 1418      Row Name 08/13/18 0900             Therapeutic Exercise    Upper Extremity Range of Motion (Therapeutic Exercise) shoulder flexion/extension, bilateral;shoulder abduction/adduction, bilateral;shoulder horizontal abduction/adduction, bilateral;shoulder internal/external rotation, bilateral;elbow flexion/extension, bilateral;forearm supination/pronation, bilateral;wrist flexion/extension, bilateral  -KD      Position (Therapeutic Exercise) seated  -KD      Sets/Reps (Therapeutic Exercise) 2/20  -KD      Expected Outcome (Therapeutic Exercise) improve functional tolerance, self-care activity  -KD      Recorded by [KD] Adrianna Richardson COTA/L 08/13/18 1332      Row Name 08/13/18 1330 08/13/18 0900          Positioning and Restraints    Pre-Treatment Position sitting in chair/recliner  -TW sitting in chair/recliner  -KD     Post Treatment Position chair  -TW chair  -KD     In Chair reclined;call light within reach;encouraged to call for assist  -TW sitting;call light within reach  -KD     Recorded by [TW] Lj Gross PTA 08/13/18 1418 [KD] Adrianna Richardson COTA/L 08/13/18 1332     Row Name 08/13/18 1330 08/13/18 0900          Pain Scale: Numbers Pre/Post-Treatment    Pain Scale: Numbers, Pretreatment 7/10  -TW 10/10  -KD     Pain Scale: Numbers, Post-Treatment 7/10  -TW 10/10  -KD     Pain Location - Side Left  -TW Left  -KD     Pain Location - Orientation incisional  -TW incisional   -KD     Pain Location chest   under L arm  -TW  --     Pre/Post Treatment Pain Comment  -- --   meds not due  -KD     Recorded by [TW] Lj Gross PTA 08/13/18 1418 [KD] Adrianna Richardson COTA/L 08/13/18 1332     Row Name                Wound 07/26/18 1326 Other (See comments) anterior abdomen incision;surgical    Wound - Properties Group Date first assessed: 07/26/18 [TB] Time first assessed: 1326 [TB] Present On Admission : no [TB] Side: Other (See comments) [TB], MIDLINE  Orientation: anterior [TB] Location: abdomen [TB] Type: incision;surgical [TB] Recorded by:  [TB] Joshua Ordonez RN 07/26/18 1327    Row Name                Wound 07/31/18 1700 Left lateral;upper chest surgical;incision    Wound - Properties Group Date first assessed: 07/31/18 [CH] Time first assessed: 1700 [CH] Present On Admission : no [CH] Side: Left [CH] Orientation: lateral;upper [CH] Location: chest [CH] Type: surgical;incision [CH] Recorded by:  [CH] Darlene Slaughter RN 07/31/18 1901    Row Name 08/13/18 0900             Outcome Summary/Treatment Plan (OT)    Daily Summary of Progress (OT) progress toward functional goals as expected  -KD      Plan for Continued Treatment (OT) --   cont OT POC  -KD      Anticipated Discharge Disposition (OT) home with home health  -KD      Recorded by [KD] Adrianna Richardson COTA/L 08/13/18 1332      Row Name 08/13/18 1330             Outcome Summary/Treatment Plan (PT)    Daily Summary of Progress (PT) progress toward functional goals as expected  -TW      Barriers to Overall Progress (PT) Pain  -TW      Plan for Continued Treatment (PT) Cont and begin step training.  -TW      Anticipated Discharge Disposition (PT) home with home health;home with 24/7 care  -TW      Recorded by [TW] Lj Gross, PTA 08/13/18 1418        User Key  (r) = Recorded By, (t) = Taken By, (c) = Cosigned By    Initials Name Effective Dates Discipline    TW Lj Gross PTA 03/07/18 -  PT    KD  Adrianna Richardson, SWARTZ/L 03/07/18 -  OT    TB Joshua Ordonez, RN 10/17/16 -  Nurse    CH Darlene Slaughter RN 06/23/17 -  Nurse          Wound 07/26/18 1326 Other (See comments) anterior abdomen incision;surgical (Active)   Dressing Appearance open to air 8/13/2018  8:23 AM   Closure Staples;Open to air 8/13/2018  8:23 AM   Drainage Amount none 8/13/2018  8:23 AM       Wound 07/31/18 1700 Left lateral;upper chest surgical;incision (Active)   Dressing Appearance intact;dry 8/13/2018  8:23 AM   Closure ROSALINO 8/12/2018  8:40 PM   Base dressing in place, unable to visualize 8/13/2018  8:23 AM               PT Rehab Goals     Row Name 08/13/18 1330             Bed Mobility Goal 1 (PT)    Activity/Assistive Device (Bed Mobility Goal 1, PT) sit to supine;supine to sit;bed rails  -TW      [place] Level/Cues Needed (Bed Mobility Goal 1, PT) supervision required  -TW      Time Frame (Bed Mobility Goal 1, PT) 1 week  -TW      Progress/Outcomes (Bed Mobility Goal 1, PT) goal not met  -TW         Transfer Goal 1 (PT)    Activity/Assistive Device (Transfer Goal 1, PT) sit-to-stand/stand-to-sit;bed-to-chair/chair-to-bed;walker, rolling  -TW      [place] Level/Cues Needed (Transfer Goal 1, PT) supervision required  -TW      Time Frame (Transfer Goal 1, PT) 1 week  -TW      Progress/Outcome (Transfer Goal 1, PT) goal not met  -TW         Gait Training Goal 1 (PT)    Activity/Assistive Device (Gait Training Goal 1, PT) gait (walking locomotion);assistive device use;increase endurance/gait distance;walker, rolling;decrease fall risk   or least restrictive to no device  -TW      [place] Level (Gait Training Goal 1, PT) supervision required  -TW      Distance (Gait Goal 1, PT) 200  -TW      Time Frame (Gait Training Goal 1, PT) 1 week  -TW      Progress/Outcome (Gait Training Goal 1, PT) goal met  -TW         Stairs Goal 1 (PT)    Activity/Assistive Device (Stairs Goal 1, PT) ascending stairs;descending stairs;decrease  fall risk;using handrail, right  -TW      [place] Level/Cues Needed (Stairs Goal 1, PT) contact guard assist  -TW      Number of Stairs (Stairs Goal 1, PT) 1  -TW      Time Frame (Stairs Goal 1, PT) 1 week  -TW      Progress/Outcome (Stairs Goal 1, PT) goal not met  -TW         Patient Education Goal (PT)    Activity (Patient Education Goal, PT) HEP  -TW      [place]/Cues/Accuracy (Memory Goal 2, PT) demonstrates adequately;verbalizes understanding  -TW      Time Frame (Patient Education Goal, PT) 1 week  -TW      Progress/Outcome (Patient Education Goal, PT) goal met  -TW        User Key  (r) = Recorded By, (t) = Taken By, (c) = Cosigned By    Initials Name Provider Type Discipline    TW Lj Gross, PTA Physical Therapy Assistant PT          Physical Therapy Education     Title: PT OT SLP Therapies (Active)     Topic: Physical Therapy (Active)     Point: Mobility training (Active)    Learning Progress Summary     Learner Status Readiness Method Response Comment Documented by    Patient Active Acceptance E NR  LEANDER 08/10/18 1151     Active Acceptance E NR   08/08/18 1241     Active Acceptance E NR   08/06/18 1555     Active Acceptance E,D NR   08/03/18 1127          Point: Home exercise program (Done)    Learning Progress Summary     Learner Status Readiness Method Response Comment Documented by    Patient Done Acceptance H HUE [place]URCH[place]   08/12/18 1512     Active Acceptance E NR   08/08/18 1241          Point: Precautions (Active)    Learning Progress Summary     Learner Status Readiness Method Response Comment Documented by    Patient Active Acceptance E NR  LEANDER 08/08/18 1241     Active Acceptance E,D NR   08/03/18 1127                      User Key     Initials Effective Dates Name Provider Type Discipline     04/06/17 -  Lupe Grossman, PT Physical Therapist PT    LEANDER 03/07/18 -  Reinier Hannah PTA Physical Therapy Assistant PT                    PT Recommendation and Plan  Anticipated  Discharge Disposition (PT): home with home health, home with 24/7 care  Outcome Summary/Treatment Plan (PT)  Daily Summary of Progress (PT): progress toward functional goals as expected  Barriers to Overall Progress (PT): Pain  Plan for Continued Treatment (PT): Cont and begin step training.  Anticipated Discharge Disposition (PT): home with home health, home with 24/7 care  Plan of Care Reviewed With: patient  Progress: no change  Outcome Summary: Pt states he just finished amb with nsg but agreeable to seated LE ther ex. Pt able to perform LE ther ex without difficulty.          Outcome Measures     Row Name 08/13/18 1330 08/13/18 0900 08/12/18 1406       How much help from another person do you currently need...    Turning from your back to your side while in flat bed without using bedrails? 3  -TW  -- 3  -LEANDER    Moving from lying on back to sitting on the side of a flat bed without bedrails? 3  -TW  -- 3  -LEANDER    Moving to and from a bed to a chair (including a wheelchair)? 3  -TW  -- 3  -LEANDER    Standing up from a chair using your arms (e.g., wheelchair, bedside chair)? 3  -TW  -- 3  -LEANDER    Climbing 3-5 steps with a railing? 3  -TW  -- 3  -LEANDER    To walk in hospital room? 3  -TW  -- 3  -LEANDER    AM-PAC 6 Clicks Score 18  -TW  -- 18  -LEANDER       How much help from another is currently needed...    Putting on and taking off regular lower body clothing?  -- 2  -KD  --    Bathing (including washing, rinsing, and drying)  -- 2  -KD  --    Toileting (which includes using toilet bed pan or urinal)  -- 2  -KD  --    Putting on and taking off regular upper body clothing  -- 2  -KD  --    Taking care of personal grooming (such as brushing teeth)  -- 2  -KD  --    Eating meals  -- 4  -KD  --    Score  -- 14  -KD  --       Functional Assessment    Outcome Measure Options AM-PAC 6 Clicks Basic Mobility (PT)  -TW  -- AM-PAC 6 Clicks Basic Mobility (PT)  -LEANDER    Row Name 08/12/18 1048 08/11/18 0925          How much help from another is  currently needed...    Putting on and taking off regular lower body clothing? 2  -KD 2  -KD     Bathing (including washing, rinsing, and drying) 2  -KD 2  -KD     Toileting (which includes using toilet bed pan or urinal) 2  -KD 2  -KD     Putting on and taking off regular upper body clothing 3  -KD 3  -KD     Taking care of personal grooming (such as brushing teeth) 3  -KD 3  -KD     Eating meals 4  -KD 4  -KD     Score 16  -KD 16  -KD       User Key  (r) = Recorded By, (t) = Taken By, (c) = Cosigned By    Initials Name Provider Type    Reinier Martin, MARGI Physical Therapy Assistant    TW Lj Gross, MARGI Physical Therapy Assistant    KD Adrianna Richardson, SHERIDAN/L Occupational Therapy Assistant           Time Calculation:         PT Charges     Row Name 08/13/18 1420             Time Calculation    Start Time 1330  -TW      Stop Time 1357  -TW      Time Calculation (min) 27 min  -TW      PT Received On 08/13/18  -TW      PT Goal Re-Cert Due Date 08/16/18  -TW         Time Calculation- PT    Total Timed Code Minutes- PT 27 minute(s)  -TW        User Key  (r) = Recorded By, (t) = Taken By, (c) = Cosigned By    Initials Name Provider Type    Lj Wong PTA Physical Therapy Assistant        Therapy Suggested Charges     Code   Minutes Charges    None           Therapy Charges for Today     Code Description Service Date Service Provider Modifiers Qty    29671287115 HC PT THER PROC EA 15 MIN 8/13/2018 Lj Gross PTA GP 2          PT G-Codes  PT Professional Judgement Used?: Yes  Outcome Measure Options: AM-PAC 6 Clicks Basic Mobility (PT)  Score: 11  Functional Limitation: Mobility: Walking and moving around  Mobility: Walking and Moving Around Current Status (): At least 60 percent but less than 80 percent impaired, limited or restricted  Mobility: Walking and Moving Around Goal Status (): At least 40 percent but less than 60 percent impaired, limited or restricted    Lj ALVAREZ  Renato, PTA  8/13/2018

## 2018-08-13 NOTE — SIGNIFICANT NOTE
08/13/18 1286   Plan   Plan Comments Spoke with patient about discharge plan. He would like to have Home Health for therapy on discharge. Has used [facility]  and would like to have them again. Patient no longer does tube feeding and is tolerating his diet well. He denies any other needs at this time. AMOR Chiang, CM

## 2018-08-13 NOTE — PLAN OF CARE
Problem: Patient Care Overview  Goal: Plan of Care Review   08/13/18 1180   Coping/Psychosocial   Plan of Care Reviewed With patient   OTHER   Outcome Summary pt vss. Working on pain management   Plan of Care Review   Progress no change     Goal: Individualization and Mutuality  Outcome: Ongoing (interventions implemented as appropriate)    Goal: Discharge Needs Assessment  Outcome: Ongoing (interventions implemented as appropriate)      Problem: Fall Risk (Adult)  Goal: Absence of Fall  Outcome: Ongoing (interventions implemented as appropriate)      Problem: Skin Injury Risk (Adult)  Goal: Skin Health and Integrity  Outcome: Ongoing (interventions implemented as appropriate)      Problem: Pain, Acute (Adult)  Goal: Acceptable Pain Control/Comfort Level  Outcome: Ongoing (interventions implemented as appropriate)

## 2018-08-13 NOTE — THERAPY TREATMENT NOTE
Acute Care - Occupational Therapy Treatment Note  [facility]     Patient Name: Truman Esquivel  : 1960  MRN: [id number]  Today's Date: 2018  Onset of Illness/Injury or Date of Surgery: 18  Date of Referral to OT: 18  Referring Physician: Dr. Solano    Admit Date: 2018       ICD-10-CM ICD-9-CM   1. Syncope and collapse R55 780.2   2. Septic shock (CMS/HCC) A41.9 038.9    R65.21 785.52     995.92   3. Dissection of aorta, unspecified portion of aorta (CMS/HCC) I71.00 441.00   4. Pleural effusion J90 511.9   5. Other ascites R18.8 789.59   6. Splenic hemorrhage D73.89 289.59   7. Impaired physical mobility Z74.09 781.99   8. Impaired mobility and activities of daily living Z74.09 799.89   9. Oropharyngeal dysphagia R13.12 787.22     Patient Active Problem List   Diagnosis   • Ascending aortic dissection (CMS/HCC)   • Essential hypertension   • Deep vein thrombosis (DVT) of femoral vein of both lower extremities (CMS/HCC)   • Hypothyroidism   • Snoring   • Acute pain of right knee   • Knee effusion, right   • Knee pain   • Obstructive sleep apnea of adult   • TIA (transient ischemic attack)   • Dysphagia   • Squamous cell carcinoma of larynx (CMS/HCC)   • Pharyngeal dysphagia   • Anemia   • Abnormal positron emission tomography (PET) of colon   • Hypokalemia   • Encounter for venous access device care   • Dehydration   • Weight loss, abnormal   • Odynophagia   • Thrush, oral   • Thrombocytopenia (CMS/HCC)   • Pancytopenia due to antineoplastic chemotherapy (CMS/HCC)   • Unable to eat   • Syncope and collapse   • Splenic hemorrhage   • Pleural effusion     Past Medical History:   Diagnosis Date   • Aortic dissection (CMS/HCC)    • Chest pain    • Disease of thyroid gland    • HTN (hypertension)    • Knee pain    • Sleep apnea    • Smoker    • Squamous cell carcinoma of larynx (CMS/HCC) 2018   • Stroke (CMS/HCC)    • Swallowing difficulty      Past Surgical History:    Procedure Laterality Date   • AORTA SURGERY      ruptured aorta repair   • ASCENDING ARCH/HEMIARCH REPLACEMENT N/A 2/14/2017    Procedure: INTRAOPERATIVE TRANSESOPHAGEAL ECHOCARDIOGRAM, MIDLINE STERNOTOMY, ASCENDING AORTIC  AND PROXIMAL AORTIC ARCH REPAIR WITH 26MM GRAFT, AORTIC VALVE RESUSPENSION, AORTIC ROOT REPAIR, OPEN VEIN HARVEST OF RIGHT LEG;  Surgeon: German Arreguin MD;  Location: [facility] MAIN OR;  Service:    • BRONCHOSCOPY N/A 2/17/2017    Procedure: BRONCHOSCOPY BIOPSY AT BEDSIDE WITH BAL-LEFT LOWER LOBE;  Surgeon: Trent Chaney MD;  Location: [facility] ENDOSCOPY;  Service:    • COLONOSCOPY N/A 3/7/2018    Procedure: COLONOSCOPY WITH POSSIBLE POLYPECTOMY   ( DONE IN OR WITH ENDO)      COLONOSCOPY FIRST;  Surgeon: Kris Solano MD;  Location: [facility] OR;  Service:    • DIRECT LARYNGOSCOPY, ESOPHAGOSCOPY, BRONCHOSCOPY N/A 2/5/2018    Procedure: DIRECT LARYNGOSCOPY WITH BIOPSY, ESOPHAGOSCOPY     (no bronchoscopy, no laser);  Surgeon: Joseph Venegas MD;  Location: [facility] OR;  Service:    • ENDOSCOPY W/ PEG TUBE PLACEMENT N/A 5/2/2018    Procedure: ESOPHAGOGASTRODUODENOSCOPY WITH PERCUTANEOUS ENDOSCOPIC GASTROSTOMY TUBE INSERTION;  Surgeon: Angel Maciel MD;  Location: [facility] ENDOSCOPY;  Service: Gastroenterology   • SPLENECTOMY N/A 7/26/2018    Procedure: SPLENECTOMY, left chest tube placement, EXPLORATORY LAPAROTOMY, G-TUBE PALCEMENT;  Surgeon: Kris Solano MD;  Location: [facility] OR;  Service: General   • THORACOSCOPY Left 7/31/2018    Procedure: LEFT THORACOSCOPY VIDEO ASSISTED POSSIBLE THORACOTOMY possible decortication bronchoscopy;  Surgeon: Joshua Pollock MD;  Location: [facility] OR;  Service: Cardiothoracic   • TRACHEOSTOMY  02/05/2018   • TRACHEOSTOMY N/A 2/5/2018    Procedure: TRACHEOSTOMY  local injection @ 1106 incision @ 1119;  Surgeon: Joseph Venegas MD;  Location: [facility] OR;  Service:    • VENOUS ACCESS DEVICE (PORT) INSERTION N/A 3/7/2018     Procedure: INSERTION OF MEDIPORT     (C-ARM#1);  Surgeon: Kris Solano MD;  Location: [facility];  Service:        Therapy Treatment          Rehabilitation Treatment Summary     Row Name 08/13/18 0900             Treatment Time/Intention    Discipline occupational therapy assistant  -KD      Document Type therapy note (daily note)  -KD      Subjective Information no complaints  -KD      Mode of Treatment occupational therapy  -KD      Patient/Family Observations No family present  -KD      Care Plan Review care plan/treatment goals reviewed  -KD      Therapy Frequency (OT Eval) other (see comments)   5-7x/week  -KD      Patient Effort adequate  -KD      Existing Precautions/Restrictions fall;oxygen therapy device and L/min  -KD      Recorded by [KD] Adrianna Richardson SWARTZ/L 08/13/18 1328      Row Name 08/13/18 0900             Vital Signs    Pre Systolic BP Rehab 129  -KD      Pre Treatment Diastolic BP 70  -KD      Pretreatment Heart Rate (beats/min) 65  -KD      Posttreatment Heart Rate (beats/min) 68  -KD      Pre SpO2 (%) 95  -KD      O2 Delivery Pre Treatment supplemental O2  -KD      Post SpO2 (%) 96  -KD      O2 Delivery Post Treatment supplemental O2  -KD      Pre Patient Position Sitting  -KD      Intra Patient Position Sitting  -KD      Post Patient Position Sitting  -KD      Recorded by [KD] Adrianna Richardson SWARTZ/L 08/13/18 1332      Row Name 08/13/18 0900             Cognitive Assessment/Intervention- PT/OT    Affect/Mental Status (Cognitive) WFL  -KD      Orientation Status (Cognition) oriented x 4  -KD      Follows Commands (Cognition) WFL  -KD      Cognitive Function (Cognitive) WFL  -KD      Recorded by [KD] Adrianna Richardson SWARTZ/L 08/13/18 1332      Row Name 08/13/18 0900             Therapeutic Exercise    Upper Extremity Range of Motion (Therapeutic Exercise) shoulder flexion/extension, bilateral;shoulder abduction/adduction, bilateral;shoulder horizontal abduction/adduction,  bilateral;shoulder internal/external rotation, bilateral;elbow flexion/extension, bilateral;forearm supination/pronation, bilateral;wrist flexion/extension, bilateral  -KD      Position (Therapeutic Exercise) seated  -KD      Sets/Reps (Therapeutic Exercise) 2/20  -KD      Expected Outcome (Therapeutic Exercise) improve functional tolerance, self-care activity  -KD      Recorded by [KD] Adrianna Richardson COTA/L 08/13/18 1332      Row Name 08/13/18 0900             Positioning and Restraints    Pre-Treatment Position sitting in chair/recliner  -KD      Post Treatment Position chair  -KD      In Chair sitting;call light within reach  -KD      Recorded by [KD] Adrianna Richardson SWARTZ/L 08/13/18 1332      Row Name 08/13/18 0900             Pain Scale: Numbers Pre/Post-Treatment    Pain Scale: Numbers, Pretreatment 10/10  -KD      Pain Scale: Numbers, Post-Treatment 10/10  -KD      Pain Location - Side Left  -KD      Pain Location - Orientation incisional  -KD      Pre/Post Treatment Pain Comment --   meds not due  -KD      Recorded by [KD] Adrianna Richardson COTA/L 08/13/18 1332      Row Name                Wound 07/26/18 1326 Other (See comments) anterior abdomen incision;surgical    Wound - Properties Group Date first assessed: 07/26/18 [TB] Time first assessed: 1326 [TB] Present On Admission : no [TB] Side: Other (See comments) [TB], MIDLINE  Orientation: anterior [TB] Location: abdomen [TB] Type: incision;surgical [TB] Recorded by:  [TB] Joshua Ordonez RN 07/26/18 1327    Row Name                Wound 07/31/18 1700 Left lateral;upper chest surgical;incision    Wound - Properties Group Date first assessed: 07/31/18 [CH] Time first assessed: 1700 [CH] Present On Admission : no [CH] Side: Left [CH] Orientation: lateral;upper [CH] Location: chest [CH] Type: surgical;incision [CH] Recorded by:  [CH] Darlene Slaughter RN 07/31/18 1901    Row Name 08/13/18 0900             Outcome Summary/Treatment Plan (OT)    Daily  Summary of Progress (OT) progress toward functional goals as expected  -KD      Plan for Continued Treatment (OT) --   cont OT POC  -KD      Anticipated Discharge Disposition (OT) home with home health  -KD      Recorded by [KD] Adrianna Richardson COTA/KIM 08/13/18 1332        User Key  (r) = Recorded By, (t) = Taken By, (c) = Cosigned By    Initials Name Effective Dates Discipline    KD Adrianna Richardson COTA/L 03/07/18 -  OT    TB Joshua Ordonez RN 10/17/16 -  Nurse    CH Darlene Slaughter RN 06/23/17 -  Nurse        Wound 07/26/18 1326 Other (See comments) anterior abdomen incision;surgical (Active)   Dressing Appearance open to air 8/13/2018  8:23 AM   Closure [place];Open to air 8/13/2018  8:23 AM   Drainage Amount none 8/13/2018  8:23 AM       Wound 07/31/18 1700 Left lateral;upper chest surgical;incision (Active)   Dressing Appearance intact;dry 8/13/2018  8:23 AM   Closure ROSALINO 8/12/2018  8:40 PM   Base dressing in place, unable to visualize 8/13/2018  8:23 AM             OT Rehab Goals     Row Name 08/13/18 0900             Transfer Goal 1 (OT)    Activity/Assistive Device (Transfer Goal 1, OT) sit-to-stand/stand-to-sit;bed-to-chair/chair-to-bed;toilet  -KD      [place] Level/Cues Needed (Transfer Goal 1, OT) supervision required  -KD      Time Frame (Transfer Goal 1, OT) long term goal (LTG);by discharge  -KD      Progress/Outcome (Transfer Goal 1, OT) goal not met;continuing progress toward goal  -KD         Bathing Goal 1 (OT)    Activity/Assistive Device (Bathing Goal 1, OT) bathing skills, all   Using AE PRN  -KD      [place] Level/Cues Needed (Bathing Goal 1, OT) set-up required;supervision required  -KD      Time Frame (Bathing Goal 1, OT) long term goal (LTG);by discharge  -KD      Progress/Outcomes (Bathing Goal 1, OT) goal not met;continuing progress toward goal  -KD         Dressing Goal 1 (OT)    Activity/Assistive Device (Dressing Goal 1, OT) dressing skills, all   Using AE PRN  -KD       [place]/Cues Needed (Dressing Goal 1, OT) set-up required;supervision required  -KD      Time Frame (Dressing Goal 1, OT) long term goal (LTG);other (see comments)  -KD      Progress/Outcome (Dressing Goal 1, OT) goal not met  -KD         Toileting Goal 1 (OT)    Activity/Device (Toileting Goal 1, OT) toileting skills, all  -KD      [place] Level/Cues Needed (Toileting Goal 1, OT) conditional independence  -KD      Time Frame (Toileting Goal 1, OT) long term goal (LTG);by discharge  -KD      Progress/Outcome (Toileting Goal 1, OT) goal not met;continuing progress toward goal  -KD        User Key  (r) = Recorded By, (t) = Taken By, (c) = Cosigned By    Initials Name Provider Type Discipline    KD Adrianna Richardson COTA/L Occupational Therapy Assistant OT        Occupational Therapy Education     Title: PT OT SLP Therapies (Active)     Topic: Occupational Therapy (Done)     Point: ADL training (Done)     Description: Instruct learner(s) on proper safety adaptation and remediation techniques during self care or transfers.   Instruct in proper use of assistive devices.   Learning Progress Summary     Learner Status Readiness Method Response Comment Documented by    Patient Done Acceptance EMEDHAT D VU   08/09/18 1311     Done Acceptance E ZHAO   08/07/18 1305          Point: Home exercise program (Done)     Description: Instruct learner(s) on appropriate technique for monitoring, assisting and/or progressing therapeutic exercises/activities.   Learning Progress Summary     Learner Status Readiness Method Response Comment Documented by    Patient Done Acceptance EMEDHAT D VU CS 08/09/18 1311     Done Acceptance E ZHAO   08/07/18 1305     Active Acceptance E GENET   08/05/18 1337          Point: Precautions (Done)     Description: Instruct learner(s) on prescribed precautions during self-care and functional transfers.   Learning Progress Summary     Learner Status Readiness Method Response Comment Documented by     Patient Done Acceptance E,MEDHAT,ANA MARIA CHURCHILL   08/09/18 1311     Done Acceptance E [facility] 08/07/18 1305     Done Acceptance E VU,NR role of OT; OT POC; transfer training; safety precautions AS 08/03/18 1348          Point: Body mechanics (Done)     Description: Instruct learner(s) on proper positioning and spine alignment during self-care, functional mobility activities and/or exercises.   Learning Progress Summary     Learner Status Readiness Method Response Comment Documented by    Patient Done Acceptance GUILHERME,ANA MARIA MELÉNDEZ   08/09/18 1311     Done Acceptance E VU   08/07/18 1305     Done Acceptance E VU,NR role of OT; OT POC; transfer training; safety precautions AS 08/03/18 1348                      User Key     Initials Effective Dates Name Provider Type Discipline     03/07/18 -  Carmen Davenport COTA/L Occupational Therapy Assistant OT     03/07/18 -  Julissa Vizcaino COTA/L Occupational Therapy Assistant OT     03/07/18 -  Ileana Yang COTA/KIM Occupational Therapy Assistant OT    AS 05/01/18 -  Erin Treviño, OT Occupational Therapist OT                OT Recommendation and Plan  Outcome Summary/Treatment Plan (OT)  Daily Summary of Progress (OT): progress toward functional goals as expected  Plan for Continued Treatment (OT):  (cont OT POC)  Anticipated Discharge Disposition (OT): home with home health  Therapy Frequency (OT Eval): other (see comments) (5-7x/week)  Daily Summary of Progress (OT): progress toward functional goals as expected  Plan of Care Review  Plan of Care Reviewed With: patient  Plan of Care Reviewed With: patient  Outcome Summary: Pt declined any Out of Cgair this tx 2' just finishing with PT. Pt did completed UE ther ex with good tolerance but required RB's PRN. No new goals met. Cont OT POC.        Outcome Measures     Row Name 08/13/18 0900 08/12/18 1406 08/12/18 1048       How much help from another person do you currently need...    Turning from your back to your side while  in flat bed without using bedrails?  -- 3  -LEANDER  --    Moving from lying on back to sitting on the side of a flat bed without bedrails?  -- 3  -LEANDER  --    Moving to and from a bed to a chair (including a wheelchair)?  -- 3  -LEANDER  --    Standing up from a chair using your arms (e.g., wheelchair, bedside chair)?  -- 3  -LEANDER  --    Climbing 3-5 steps with a railing?  -- 3  -LEANDER  --    To walk in hospital room?  -- 3  -LEANDER  --    AM-PAC 6 Clicks Score  -- 18  -LEANDER  --       How much help from another is currently needed...    Putting on and taking off regular lower body clothing? 2  -KD  -- 2  -KD    Bathing (including washing, rinsing, and drying) 2  -KD  -- 2  -KD    Toileting (which includes using toilet bed pan or urinal) 2  -KD  -- 2  -KD    Putting on and taking off regular upper body clothing 2  -KD  -- 3  -KD    Taking care of personal grooming (such as brushing teeth) 2  -KD  -- 3  -KD    Eating meals 4  -KD  -- 4  -KD    Score 14  -KD  -- 16  -KD       Functional Assessment    Outcome Measure Options  -- AM-PAC 6 Clicks Basic Mobility (PT)  -LEANDER  --    Row Name 08/11/18 0925 08/10/18 1410          How much help from another is currently needed...    Putting on and taking off regular lower body clothing? 2  -KD 2  -KD     Bathing (including washing, rinsing, and drying) 2  -KD 2  -KD     Toileting (which includes using toilet bed pan or urinal) 2  -KD 2  -KD     Putting on and taking off regular upper body clothing 3  -KD 2  -KD     Taking care of personal grooming (such as brushing teeth) 3  -KD 3  -KD     Eating meals 4  -KD 4  -KD     Score 16  -KD 15  -KD       User Key  (r) = Recorded By, (t) = Taken By, (c) = Cosigned By    Initials Name Provider Type    LEANDER Reinier Hannah, MARGI Physical Therapy Assistant    KD Adrianna Richardson, SHERIDAN/L Occupational Therapy Assistant           Time Calculation:         Time Calculation- OT     Row Name 08/13/18 0136             Time Calculation- OT    OT Start Time 0900  -KD       OT Stop Time 0924  -KD      OT Time Calculation (min) 24 min  -KD      Total Timed Code Minutes- OT 24 minute(s)  -KD      OT Received On 08/13/18  -KD        User Key  (r) = Recorded By, (t) = Taken By, (c) = Cosigned By    Initials Name Provider Type    Adrianna Bryant COTA/L Occupational Therapy Assistant           Therapy Suggested Charges     Code   Minutes Charges    None           Therapy Charges for Today     Code Description Service Date Service Provider Modifiers Qty    10558815939 HC OT THER PROC EA 15 MIN 8/12/2018 Adrianna Richardson COTA/L GO 2    65584949660 HC OT SELF CARE/MGMT/TRAIN EA 15 MIN 8/12/2018 Adrianna Richardson COTA/L GO 1    97130720890 HC OT THER PROC EA 15 MIN 8/13/2018 Adrianna Richardson COTA/L GO 2          OT G-codes  OT Professional Judgement Used?: Yes  OT Functional Scales Options: AM-PAC 6 Clicks Daily Activity (OT)  Score: 15  Functional Limitation: Self care  Self Care Current Status (): At least 40 percent but less than 60 percent impaired, limited or restricted  Self Care Goal Status (): At least 1 percent but less than 20 percent impaired, limited or restricted    SHERIDAN Donovan/KIM  8/13/2018

## 2018-08-13 NOTE — PAYOR COMM NOTE
"Truman Esquivel (57 y.o. Male)     Date of Birth Social Security Number Address Home Phone MRN    1960  [id number] [facility] [address] [facility] [address] [phone] [id number]    Church Marital Status          Gnosticist        Admission Date Admission Type Admitting Provider Attending Provider Department, Room/Bed    7/26/18 Emergency Kris Solano MD Armstrong, James Edward, MD [facility] 3 EAST, 364/1    Discharge Date Discharge Disposition Discharge Destination                       Attending Provider:  Kris Solano MD    Allergies:  Chlorhexidine, Co Q 10 [Coenzyme Q10], Eggs Or Egg-derived Products, Erythromycin, Gabapentin, Other, Penicillins, Tetracyclines & Related, Acth [Corticotropin], Cephalosporins, Keflex [Cephalexin], Neomycin    Isolation:  None   Infection:  None   Code Status:  CPR    Ht:  170.2 cm (67.01\")   Wt:  104 kg (229 lb 4.5 oz)    Admission Cmt:  None   Principal Problem:  Splenic hemorrhage [D73.89] More...                 Active Insurance as of 7/26/2018     Primary Coverage     Payor Plan Insurance Group Employer/Plan Group    PASSPORT PASSPORT MEDICAID     Payor Plan Address Payor Plan Phone Number Effective From Effective To    [address] [phone] 11/1/1997     [facility] [address]       Subscriber Name Subscriber Birth Date Member ID       TRUMAN ESQUIVEL 1960 [id number]                 Emergency Contacts      (Rel.) Home Phone Work Phone Mobile Phone    Yolanda Esquivel (Mother) [phone] -- [phone]    Blanca Esquivel (Daughter) [phone] -- [phone]    Yolanda Diaz (Sister) [phone] -- --        Corazon Loomis RN [facility]  [phone] phone  [phone] fax    Ref#[id number]       History & Physical      Dianne Hickey MD at 7/26/2018  3:23 PM                [facility] Medicine Admission      Date of Admission: " 7/26/2018      Primary Care Physician: Marybeth Harrison DO      Chief Complaint: abdominal pain     HPI:This is a 57 y old male with hx of stage 3 squamous cell laryngeal cancer  Who had his last chemotherapy in May 2018 who started having some abdominal pain a day ago . Today he passed out at home he was brought to the ER where he was found to be hypotensive and act of the chest and abdomen showed a large sucapsular splenic hematoma with large amount of free abdominal fluid that is probably hemorrhagic ascites ,he also had a large left pleural effusion .  He was  Also found to be septic with an elevated lactic acidosis . He was taking plavix at home . He denies any fall at home   He denies fever but had some chills last night.  No chest pain no nausea or vomiting    He was started on a pressor in the Er ,PRBC were ordered . Was started on iv antibiotics   gereral surgery was called for  Possible splenectomy     Past Medical History:  has a past medical history of Aortic dissection (CMS/HCC); Chest pain; Disease of thyroid gland; HTN (hypertension); Knee pain; Sleep apnea; Smoker; Squamous cell carcinoma of larynx (CMS/HCC) (2/5/2018); Stroke (CMS/HCC); and Swallowing difficulty.    Past Surgical History:  has a past surgical history that includes Ascending Arch/Hemiarch Replacement (N/A, 2/14/2017); Bronchoscopy (N/A, 2/17/2017); Aorta surgery; Tracheostomy (02/05/2018); Tracheostomy tube placement (N/A, 2/5/2018); direct laryngoscopy, esophagoscopy, bronchoscopy (N/A, 2/5/2018); Venous Access Device (Port) (N/A, 3/7/2018); Colonoscopy (N/A, 3/7/2018); and Esophagogastroduodenoscopy w/ PEG (N/A, 5/2/2018).    Family History: family history includes Hypertension in his father, mother, and other; Thyroid disease in his mother.    Social History:  reports that he quit smoking about 17 months ago. He has a 47.50 pack-year smoking history. He has never used smokeless tobacco. He reports that he does not drink alcohol or  use drugs.    Allergies:   Allergies   Allergen Reactions   • Chlorhexidine Itching     Topical cleaning wipes causes severe skin irritation   • Co Q 10 [Coenzyme Q10] Itching   • Eggs Or Egg-Derived Products Swelling   • Erythromycin Other (See Comments)     Joint pains and cold sweats   • Gabapentin Itching   • Other GI Intolerance     Mycins  farzad lotion causes rash   • Penicillins      Tolerated cefepime during 2/2017 admission. Had rash and itching (relieved by benadryl) after few doses of cefepime and cefepime was continued.   • Tetracyclines & Related GI Intolerance   • Acth [Corticotropin] Rash   • Cephalosporins Itching and Rash     Pt developed rash, itching after cefepime administration (2-3 doses) during 2/2017 admission. Itching was relieved with benadryl. Cefepime was continued because his infection was improving and no symptoms of anaphylaxis present   • Keflex [Cephalexin] Rash   • Neomycin Rash       Medications:   Prescriptions Prior to Admission   Medication Sig Dispense Refill Last Dose   • Cholecalciferol 63604 units tablet 50 thousand units po q  month 3 tablet 3    • clopidogrel (PLAVIX) 75 MG tablet Take 75 mg by mouth Daily. Last dose approx greater than one month ago   Taking   • docusate sodium (COLACE) 100 MG capsule Take 1 capsule by mouth 2 (Two) Times a Day As Needed for Constipation. 60 capsule 2 Taking   • levothyroxine (SYNTHROID) 75 MCG tablet Take 1 tab daily Monday to Saturday and 1.5 on Sunday 32 tablet 11    • losartan-hydrochlorothiazide (HYZAAR) 50-12.5 MG per tablet Take 1 tablet by mouth Daily. 30 tablet 12 Taking   • metoprolol succinate XL (TOPROL-XL) 25 MG 24 hr tablet Take 1 tablet by mouth Every Night. Patient states he hasn't been taking this week, states he thinks he is hypotensive 30 tablet 12 Taking   • Multiple Vitamins-Minerals (MULTIVITAMIN ADULT PO) Take 1 tablet by mouth Daily.   Taking   • mupirocin (BACTROBAN) 2 % ointment Apply  topically 2 (Two) Times a  Day. 30 g 2 Taking   • ondansetron (ZOFRAN) 4 MG tablet Take 1 tablet by mouth 3 (Three) Times a Day As Needed for Nausea or Vomiting. 40 tablet 3 Taking   • sodium chloride (NS) 0.9 % irrigation USE FOR TRACHEOSTOMY AS DIRECTED 1000 mL 8 Taking   • triamcinolone (KENALOG) 0.1 % cream Apply  topically 2 (Two) Times a Day. 45 g 2 Taking   • vitamin B-12 (CYANOCOBALAMIN) 100 MCG tablet Take 100 mcg by mouth Daily.   Taking   • vitamin E 100 UNIT capsule Take 400 Units by mouth Every Night.   Taking   • Zinc 50 MG capsule Take 50 mg by mouth Every Night.   Taking       Review of Systems:  Review of Systems   Constitutional: Positive for chills.   Gastrointestinal: Positive for abdominal pain.   Skin: Positive for pallor.   All other systems reviewed and are negative.     Otherwise complete ROS is negative except as mentioned above.    Physical Exam:   Temp:  [92.4 °F (33.6 °C)-95.4 °F (35.2 °C)] 95.4 °F (35.2 °C)  Heart Rate:  [65-82] 66  Resp:  [16-22] 20  BP: ()/(34-59) 124/56  FiO2 (%):  [50 %-60 %] 50 %  Physical Exam   Constitutional: He is oriented to person, place, and time. He appears well-developed and well-nourished.   HENT:   Head: Normocephalic and atraumatic.   Mouth/Throat: Mucous membranes are dry.   Eyes: Pupils are equal, round, and reactive to light. EOM are normal.   Neck: Normal range of motion. Neck supple.   Cardiovascular: Normal rate and regular rhythm.    Pulmonary/Chest: Effort normal and breath sounds normal.   Abdominal: Soft. There is tenderness.   Neurological: He is alert and oriented to person, place, and time.   Skin: There is pallor.   Psychiatric: He has a normal mood and affect. His behavior is normal. Judgment and thought content normal.         Results Reviewed:  I have personally reviewed current lab, radiology, and data and agree with results.  Lab Results (last 24 hours)     Procedure Component Value Units Date/Time    Extra Tubes [[id number]] Collected:  07/26/18 5872     Specimen:  Blood from Blood, Venous Line Updated:  07/26/18 1500    Narrative:       The following orders were created for panel order Extra Tubes.  Procedure                               Abnormality         Status                     ---------                               -----------         ------                     Light Blue Top[[id number]]                                   Final result                 Please view results for these tests on the individual orders.    Light Blue Top [[id number]] Collected:  07/26/18 1358    Specimen:  Blood Updated:  07/26/18 1500     Extra Tube hold for add-on     Comment: Auto resulted       Blood Gas, Arterial [[id number]]  (Abnormal) Collected:  07/26/18 1425    Specimen:  Arterial Blood Updated:  07/26/18 1439     Site Arterial Line     Ravi's Test N/A     pH, Arterial 7.357 pH units      pCO2, Arterial 40.8 mm Hg      pO2, Arterial 117.0 (H) mm Hg      HCO3, Arterial 22.8 mmol/L      Base Excess, Arterial -2.5 (L) mmol/L      O2 Saturation, Arterial 99.1 (H) %      Barometric Pressure for Blood Gas 747 mmHg      Modality N/A     FIO2 60 %      Ventilator Mode AC     Set Tidal Volume 600     Set Mech Resp Rate 10.0     PEEP 5.0     Collected by kya    Troponin [[id number]]  (Normal) Collected:  07/26/18 1358    Specimen:  Blood Updated:  07/26/18 1438     Troponin I 0.021 ng/mL     Lactic Acid, Plasma [[id number]]  (Abnormal) Collected:  07/26/18 1358    Specimen:  Blood Updated:  07/26/18 1438     Lactate 5.2 (C) mmol/L     Comprehensive Metabolic Panel [[id number]]  (Abnormal) Collected:  07/26/18 1358    Specimen:  Blood Updated:  07/26/18 1426     Glucose 133 (H) mg/dL      BUN 27 (H) mg/dL      Creatinine 1.30 mg/dL      Sodium 128 (L) mmol/L      Potassium 5.0 mmol/L      Chloride 95 mmol/L      CO2 22.0 mmol/L      Calcium 7.1 (L) mg/dL      Total Protein 4.0 (L) g/dL      Albumin 2.00 (L) g/dL      ALT (SGPT) 42 U/L      AST (SGOT) 28 U/L      Alkaline Phosphatase 54 U/L       Total Bilirubin 1.2 mg/dL      eGFR Non African Amer 57 mL/min/1.73      Globulin 2.0 (L) gm/dL      A/G Ratio 1.0 (L) g/dL      BUN/Creatinine Ratio 20.8     Anion Gap 11.0 mmol/L     Magnesium [[id number]]  (Normal) Collected:  07/26/18 1358    Specimen:  Blood Updated:  07/26/18 1425     Magnesium 1.9 mg/dL     Phosphorus [[id number]]  (Abnormal) Collected:  07/26/18 1358    Specimen:  Blood Updated:  07/26/18 1425     Phosphorus 4.8 (H) mg/dL     CBC (No Diff) [[id number]]  (Abnormal) Collected:  07/26/18 1358    Specimen:  Blood Updated:  07/26/18 1419     WBC 30.81 (H) 10*3/mm3      RBC 2.73 (L) 10*6/mm3      Hemoglobin 7.8 (L) g/dL      Comment: RN expected results. Pt has been to surgery and will receive plts when available.         Hematocrit 22.1 (L) %      MCV 81.0 fL      MCH 28.6 pg      MCHC 35.3 g/dL      RDW 15.6 (H) %      RDW-SD 46.1 (H) fl      MPV 10.1 fL      Platelets 217 10*3/mm3     Tissue Pathology Exam [[id number]] Collected:  07/26/18 1243    Specimen:  Tissue from Spleen Updated:  07/26/18 1334    Blood Gas, Arterial With Co-Ox [[id number]]  (Abnormal) Collected:  07/26/18 1211    Specimen:  Arterial Blood Updated:  07/26/18 1215     Site Arterial Line     Ravi's Test N/A     pH, Arterial 7.203 (L) pH units      pCO2, Arterial 39.8 mm Hg      pO2, Arterial 349.0 (H) mm Hg      HCO3, Arterial 15.7 (L) mmol/L      Base Excess, Arterial -11.5 (L) mmol/L      O2 Saturation, Arterial >100.0 (H) %      Hemoglobin, Blood Gas 8.3 (L) g/dL      Hematocrit, Blood Gas 25.4 (L) %      Oxyhemoglobin >98.5 %      Methemoglobin 0.40 %      Carboxyhemoglobin 1.1 %      Sodium, Arterial 122 (L) mmol/L      Potassium, Arterial 5.1 (H) mmol/L      Ionized Calcium >7.31 (H) mg/dL      Glucose, Arterial 156 (H) mmol/L      Barometric Pressure for Blood Gas 748 mmHg      Modality N/A     Ventilator Mode NA     Collected by or     pH, Temp Corrected -- pH Units      pCO2, Temperature Corrected -- mm Hg       pO2, Temperature Corrected -- mm Hg     Lactic Acid, Reflex [[id number]]  (Abnormal) Collected:  07/26/18 1032    Specimen:  Blood Updated:  07/26/18 1053     Lactate 10.1 (C) mmol/L     Blood Culture - Blood, [[id number]] Collected:  07/26/18 1033    Specimen:  Blood from Wrist, Right Updated:  07/26/18 1033    Urinalysis, Microscopic Only - Urine, Clean Catch [[id number]]  (Abnormal) Collected:  07/26/18 0933    Specimen:  Urine from Urine, Clean Catch Updated:  07/26/18 1013     RBC, UA None Seen /HPF      WBC, UA 0-2 /HPF      Bacteria, UA 2+ (A) /HPF      Squamous Epithelial Cells, UA 3-5 (A) /HPF      Transitional Epithelial Cells, UA 7-12 (A) /HPF      Hyaline Casts, UA 3-6 /LPF      Amorphous Urate Crystals, UA Large/3+ /HPF      Methodology Manual Light Microscopy    Lactic Acid, Reflex Timer (This will reflex a repeat order 3-3:15 hours after ordered.) [[id number]] Collected:  07/26/18 0620    Specimen:  Blood from Arm, Right Updated:  07/26/18 1000     Extra Tube Hold for add-ons.     Comment: Auto resulted.       Urinalysis With Microscopic If Indicated (No Culture) - Urine, Clean Catch [[id number]]  (Abnormal) Collected:  07/26/18 0933    Specimen:  Urine from Urine, Clean Catch Updated:  07/26/18 0946     Color, UA Dark Yellow     Appearance, UA Turbid (A)     pH, UA 5.5     Specific Gravity, UA 1.020     Glucose, UA Negative     Ketones, UA Negative     Bilirubin, UA Negative     Blood, UA Negative     Protein,  mg/dL (2+) (A)     Leuk Esterase, UA Negative     Nitrite, UA Negative     Urobilinogen, UA 1.0 E.U./dL    Comprehensive Metabolic Panel [[id number]]  (Abnormal) Collected:  07/26/18 0620    Specimen:  Blood from Arm, Right Updated:  07/26/18 0836     Glucose 187 (H) mg/dL      BUN 28 (H) mg/dL      Creatinine 1.51 (H) mg/dL      Sodium 123 (L) mmol/L      Potassium 4.3 mmol/L      Chloride 84 (L) mmol/L      CO2 23.0 mmol/L      Calcium 8.4 mg/dL      Total Protein 6.0 (L) g/dL      Albumin  3.30 (L) g/dL      ALT (SGPT) 48 U/L      AST (SGOT) 30 U/L      Alkaline Phosphatase 116 U/L      Total Bilirubin 0.6 mg/dL      eGFR Non African Amer 48 (L) mL/min/1.73      Globulin 2.7 gm/dL      A/G Ratio 1.2 g/dL      BUN/Creatinine Ratio 18.5     Anion Gap 16.0 (H) mmol/L     Troponin [[id number]]  (Normal) Collected:  07/26/18 0757    Specimen:  Blood Updated:  07/26/18 0832     Troponin I <0.012 ng/mL     Blood Culture - Blood, [[id number]] Collected:  07/26/18 0757    Specimen:  Blood from Arm, Left Updated:  07/26/18 0757    Blood Gas, Arterial [[id number]]  (Abnormal) Collected:  07/26/18 0750    Specimen:  Arterial Blood Updated:  07/26/18 0753     Site Arterial Line     Ravi's Test N/A     pH, Arterial 7.478 (H) pH units      pCO2, Arterial 30.0 (L) mm Hg      pO2, Arterial 128.0 (H) mm Hg      HCO3, Arterial 22.3 mmol/L      Base Excess, Arterial -1.1 (L) mmol/L      O2 Saturation, Arterial 99.6 (H) %      Barometric Pressure for Blood Gas 748 mmHg      Modality Nasal Cannula     Flow Rate 2.0 lpm      Ventilator Mode NA     Collected by     CK-MB [[id number]]  (Normal) Collected:  07/26/18 0628    Specimen:  Blood Updated:  07/26/18 0748     CKMB 0.92 ng/mL     aPTT [[id number]]  (Normal) Collected:  07/26/18 0628    Specimen:  Blood Updated:  07/26/18 0739     PTT 35.6 seconds     Narrative:       The recommended Heparin therapeutic range is 68-97 seconds.    Protime-INR [[id number]]  (Abnormal) Collected:  07/26/18 0628    Specimen:  Blood Updated:  07/26/18 0739     Protime 19.5 (H) Seconds      INR 1.71 (H)    Narrative:       Therapeutic range for most indications is 2.0-3.0 INR,  or 2.5-3.5 for mechanical heart valves.    Lipase [[id number]]  (Normal) Collected:  07/26/18 0628    Specimen:  Blood Updated:  07/26/18 0738     Lipase 66 U/L     CK [[id number]]  (Abnormal) Collected:  07/26/18 0628    Specimen:  Blood Updated:  07/26/18 0738     Creatine Kinase 26 (L) U/L     Extra Tubes [[id number]]  Collected:  07/26/18 0628    Specimen:  Blood from Blood, Venous Line Updated:  07/26/18 0730    Narrative:       The following orders were created for panel order Extra Tubes.  Procedure                               Abnormality         Status                     ---------                               -----------         ------                     Light Blue Top[[id number]]                                   Final result               Gold Top - SST[[id number]]                                   Final result                 Please view results for these tests on the individual orders.    Light Blue Top [[id number]] Collected:  07/26/18 0628    Specimen:  Blood Updated:  07/26/18 0730     Extra Tube hold for add-on     Comment: Auto resulted       Gold Top - SST [[id number]] Collected:  07/26/18 0628    Specimen:  Blood Updated:  07/26/18 0730     Extra Tube Hold for add-ons.     Comment: Auto resulted.       CBC & Differential [[id number]] Collected:  07/26/18 0620    Specimen:  Blood Updated:  07/26/18 0705    Narrative:       The following orders were created for panel order CBC & Differential.  Procedure                               Abnormality         Status                     ---------                               -----------         ------                     Scan Slide[[id number]]                                       Final result               CBC Auto Differential[[id number]]        Abnormal            Final result                 Please view results for these tests on the individual orders.    Scan Slide [[id number]] Collected:  07/26/18 0620    Specimen:  Blood from Arm, Right Updated:  07/26/18 0705     Anisocytosis Slight/1+     Hypochromia Slight/1+     Ovalocytes Slight/1+     WBC Morphology Normal     Platelet Estimate Adequate    BNP [[id number]]  (Abnormal) Collected:  07/26/18 0620    Specimen:  Blood from Arm, Right Updated:  07/26/18 0700     proBNP 3,400.0 (H) pg/mL     Troponin [[id number]]  (Normal)  Collected:  07/26/18 0620    Specimen:  Blood from Arm, Right Updated:  07/26/18 0700     Troponin I <0.012 ng/mL     Lactic Acid, Plasma [[id number]]  (Abnormal) Collected:  07/26/18 0620    Specimen:  Blood from Arm, Right Updated:  07/26/18 0654     Lactate 5.3 (C) mmol/L     Basic Metabolic Panel [[id number]]  (Abnormal) Collected:  07/26/18 0620    Specimen:  Blood from Arm, Right Updated:  07/26/18 0651     Glucose 185 (H) mg/dL      BUN 28 (H) mg/dL      Creatinine 1.47 (H) mg/dL      Sodium 124 (L) mmol/L      Potassium 4.2 mmol/L      Chloride 85 (L) mmol/L      CO2 24.0 mmol/L      Calcium 8.2 (L) mg/dL      eGFR Non African Amer 49 (L) mL/min/1.73      BUN/Creatinine Ratio 19.0     Anion Gap 15.0 mmol/L     CBC Auto Differential [[id number]]  (Abnormal) Collected:  07/26/18 0620    Specimen:  Blood from Arm, Right Updated:  07/26/18 0633     WBC 35.55 (H) 10*3/mm3      RBC 3.25 (L) 10*6/mm3      Hemoglobin 9.0 (L) g/dL      Hematocrit 26.2 (L) %      MCV 80.6 fL      MCH 27.7 pg      MCHC 34.4 g/dL      RDW 15.7 (H) %      RDW-SD 46.5 (H) fl      MPV 10.4 fL      Platelets 388 10*3/mm3      Neutrophil % 88.5 (H) %      Lymphocyte % 4.1 (L) %      Monocyte % 5.4 %      Eosinophil % 0.7 %      Basophil % 0.1 %      Immature Grans % 1.2 (H) %      Neutrophils, Absolute 31.46 (H) 10*3/mm3      Lymphocytes, Absolute 1.44 10*3/mm3      Monocytes, Absolute 1.93 (H) 10*3/mm3      Eosinophils, Absolute 0.24 10*3/mm3      Basophils, Absolute 0.04 10*3/mm3      Immature Grans, Absolute 0.44 (H) 10*3/mm3         Imaging Results (last 24 hours)     Procedure Component Value Units Date/Time    XR Chest 1 View [[id number]] Collected:  07/26/18 1340     Updated:  07/26/18 1414    Narrative:       PROCEDURE: Chest, AP portable at 1:40 PM.    INDICATION: Check Tracheostomy tube position.    COMPARISON: 7/26/2018 exam earlier the same date at 6:24 AM.    Tracheostomy tube stable position tip about 4.5 cm above the  yonathan.  Right jugular central line tip stable position overlying  mid SVC. Left subclavian port tip at the junction of the left  innominate and SVC.    Mild cardiomegaly with normal vascularity.    Interval decrease size of the large left pleural effusion  effusion with the small residual. Persistent volume loss and  infiltrate left lower lobe. There is a new left chest tube in  place tip overlying the medial aspect left lower hemithorax. No  pneumothorax.    Right lung clear and right CP angle sharp.      Impression:       New left chest tube tip over the medial lower left  hemithorax.    Decreased left pleural effusion with small residual. No  pneumothorax.    Left lower lobe volume loss and infiltrate.    Stable positions of the tracheostomy tube, right jugular central  line and left subclavian port.    [id number]    Electronically signed by:  Rodrigue Block MD  7/26/2018 2:13  PM CDT Workstation: [id number]    CT Chest With Contrast [[id number]] Collected:  07/26/18 0828     Updated:  07/26/18 0918    Narrative:       CT chest, abdomen, pelvis with contrast.       CLINICAL INDICATION: Sepsis, and anemia. History of laryngeal  cancer, tracheostomy. History of aortic dissection and corrective  surgery.    COMPARISON: CT July 3, 2018.       TECHNIQUE: 92 mL Isovue-300 , nonionic IV contrast. Helical  scanning with axial and coronal reformations. Soft tissue, lung,  liver, and bone windows reviewed.    This exam was performed according to our departmental  dose-optimization program, which includes automated exposure  control, adjustment of the mA and/or kV according to patient size  and/or use of iterative reconstruction technique.    CHEST CT FINDINGS: Tracheostomy tube in the trachea in  satisfactory position. Midline sternotomy sutures. Surgical  correction, graft ascending aorta. An intimal flap, dissection is  noted at the aortic arch starting at the subclavian artery and  extending inferiorly down to the aortic  bifurcation. Dissection,  intimal flap also extends into the left iliac artery. Dissection  extends superiorly into the left subclavian artery. Large  left-sided pleural effusion. Dense consolidation, compressive  atelectasis left lower lobe and portions of the left upper lobe.    Abdomen pelvis CT: Feeding gastrostomy tube in the stomach.  Chronic dissection abdominal aorta. True and false lumens are  appreciated. Renal arteries, superior mesenteric artery, celiac  artery are patent.    Impression:       Large subcapsular hematoma spleen causing mass impression and  medial displacement of the spleen. This is best appreciated on  series 2 images [id number].    There is ascites. There is increased CT attenuation in portions  of the ascitic fluid suggesting hemorrhagic ascitic fluid. A  Freedman catheter within the bladder. Feeding gastrostomy tube in  the stomach. Normal kidneys. Normal liver. Normal gallbladder.  Normal pancreas. No retroperitoneal adenopathy.    CONCLUSION: Interval development of large left-sided effusion and  dense compressive atelectasis of the left lower lobe and portions  left upper lobe. Unfavorable change since prior exam.    Chronic appearing dissection with intimal flap observed, true and  false lumen starting at the level of the left subclavian artery  and extending superiorly into the left brachial artery and  inferiorly down the left iliac artery.    Evidence of surgical correction of ascending aortic dissection,  presumably surgical graft.    Large subcapsular splenic hematoma. Large amount of free  abdominal fluid, probably hemorrhagic ascites.    Electronically signed by:  Russ Orozco MD  7/26/2018 9:16 AM CDT  Workstation: [id number]    CT Abdomen Pelvis With Contrast [[id number]] Collected:  07/26/18 0828     Updated:  07/26/18 0918    Narrative:       CT chest, abdomen, pelvis with contrast.       CLINICAL INDICATION: Sepsis, and anemia. History of laryngeal  cancer, tracheostomy. History  of aortic dissection and corrective  surgery.    COMPARISON: CT July 3, 2018.       TECHNIQUE: 92 mL Isovue-300 , nonionic IV contrast. Helical  scanning with axial and coronal reformations. Soft tissue, lung,  liver, and bone windows reviewed.    This exam was performed according to our departmental  dose-optimization program, which includes automated exposure  control, adjustment of the mA and/or kV according to patient size  and/or use of iterative reconstruction technique.    CHEST CT FINDINGS: Tracheostomy tube in the trachea in  satisfactory position. Midline sternotomy sutures. Surgical  correction, graft ascending aorta. An intimal flap, dissection is  noted at the aortic arch starting at the subclavian artery and  extending inferiorly down to the aortic bifurcation. Dissection,  intimal flap also extends into the left iliac artery. Dissection  extends superiorly into the left subclavian artery. Large  left-sided pleural effusion. Dense consolidation, compressive  atelectasis left lower lobe and portions of the left upper lobe.    Abdomen pelvis CT: Feeding gastrostomy tube in the stomach.  Chronic dissection abdominal aorta. True and false lumens are  appreciated. Renal arteries, superior mesenteric artery, celiac  artery are patent.    Impression:       Large subcapsular hematoma spleen causing mass impression and  medial displacement of the spleen. This is best appreciated on  series 2 images [id number].    There is ascites. There is increased CT attenuation in portions  of the ascitic fluid suggesting hemorrhagic ascitic fluid. A  Freedman catheter within the bladder. Feeding gastrostomy tube in  the stomach. Normal kidneys. Normal liver. Normal gallbladder.  Normal pancreas. No retroperitoneal adenopathy.    CONCLUSION: Interval development of large left-sided effusion and  dense compressive atelectasis of the left lower lobe and portions  left upper lobe. Unfavorable change since prior exam.    Chronic  appearing dissection with intimal flap observed, true and  false lumen starting at the level of the left subclavian artery  and extending superiorly into the left brachial artery and  inferiorly down the left iliac artery.    Evidence of surgical correction of ascending aortic dissection,  presumably surgical graft.    Large subcapsular splenic hematoma. Large amount of free  abdominal fluid, probably hemorrhagic ascites.    Electronically signed by:  Russ Orozco MD  7/26/2018 9:16 AM CDT  Workstation: [id number]    XR Chest 1 View [[id number]] Collected:  07/26/18 0632     Updated:  07/26/18 0715    Narrative:       Exam: AP portable chest    INDICATION: Syncope    COMPARISON: 5/7/2018    FINDINGS: AP portable chest. Tracheostomy tube and left central  venous port are in place. The central venous port tip is in the  SVC. There is a large left pleural effusion present with airspace  consolidation and/or atelectasis. Mild pulmonary venous  congestion is present. The right lung is clear. Heart is  enlarged. Status post median sternotomy      Impression:       Large left pleural effusion with airspace  consolidation and/or atelectasis.    Electronically signed by:  Shahid Madrid MD  7/26/2018 7:13 AM  CDT Workstation: [id number]            Assessment:    Hospital Problem List     * (Principal)Splenic hemorrhage    Overview Signed 7/26/2018 10:23 AM by Kris Solano MD     Added automatically from request for surgery [id number]         Syncope and collapse      septic shock   Anemia of acute blood loss   Splenic hematoma   Large left pleural effusion   Laryngeal cancer   JUAN JOSE  Hyponatremia             Plan:  -Septic shock : iv fluids ,levophed   Will monitor lactic acid  Monitor labs   Blood cultures are drawn will follow results   -Anemia of acute blood loss : will transfuse 2 units of PRBC for now   Will order 2 units  of FFP  6 units of platelets   Monitor H&H and cbc   -Splenic Hematoma: patient will get  splenectomy . DR galindo is on board   -JUAN JOSE vs CKD will hydrate will monitor kidney function   -Hyponatremia: probably from SIADHvs LOWvolume  Will hydrate then  Recheck na level   -Left large pleural effusion will get  Chest tube during surgery   DVTprophlaxis no anticoagulation as patient is bleeding   PPI      Patient is critical     I discussed the patients findings and my recommendations with: patient and his sister     Dianne KIMMicheal Hickey MD  07/26/18  3:23 PM                    Electronically signed by Dianne Hickey MD at 7/26/2018  3:45 PM     Kris Galindo MD at 7/26/2018 10:23 AM          CHIEF COMPLAINT:    Fainting. Abdominal pain    HISTORY OF PRESENT ILLNESS:    Truman Esquivel is a 57 y.o. male who presented to the emergency department this morning with complaints of syncope and near syncope at home.  He believes that he had at least 1 fall.  In the emergency department the patient was found to be tachycardic, hypotensive, with elevated white count and anemia.  The patient does take Plavix at home.  He has a history of stage III laryngeal cancer for which his last dose of chemotherapy was in May.  He has tracheostomy in place from this as well.    On imaging in the emergency department today the patient was noted to have a large splenic hematoma with free fluid in the abdomen as well as near complete atelectasis of the left lung due to a new pleural effusion.    The patient has gotten multiple units of blood in the emergency department as well as 2 L of fluid.  His hemoglobin has decreased.  He continues to be hypotensive and tachycardic.    Hudson I examination the patient is awake and alert, able to answer questions appropriately.  He is accompanied by his family members.    I know the patient from prior port placement as well as colonoscopy.    Past Medical History:   Diagnosis Date   • Aortic dissection (CMS/HCC)    • Chest pain    • Disease of thyroid gland    • HTN (hypertension)     • Knee pain    • Sleep apnea    • Smoker    • Squamous cell carcinoma of larynx (CMS/HCC) 2/5/2018   • Stroke (CMS/HCC)    • Swallowing difficulty        Past Surgical History:   Procedure Laterality Date   • AORTA SURGERY      ruptured aorta repair   • ASCENDING ARCH/HEMIARCH REPLACEMENT N/A 2/14/2017    Procedure: INTRAOPERATIVE TRANSESOPHAGEAL ECHOCARDIOGRAM, MIDLINE STERNOTOMY, ASCENDING AORTIC  AND PROXIMAL AORTIC ARCH REPAIR WITH 26MM GRAFT, AORTIC VALVE RESUSPENSION, AORTIC ROOT REPAIR, OPEN VEIN HARVEST OF RIGHT LEG;  Surgeon: German Arreguin MD;  Location: [facility] MAIN OR;  Service:    • BRONCHOSCOPY N/A 2/17/2017    Procedure: BRONCHOSCOPY BIOPSY AT BEDSIDE WITH BAL-LEFT LOWER LOBE;  Surgeon: Trent Chaney MD;  Location: [facility] ENDOSCOPY;  Service:    • COLONOSCOPY N/A 3/7/2018    Procedure: COLONOSCOPY WITH POSSIBLE POLYPECTOMY   ( DONE IN OR WITH ENDO)      COLONOSCOPY FIRST;  Surgeon: Kris Solano MD;  Location: [facility] OR;  Service:    • DIRECT LARYNGOSCOPY, ESOPHAGOSCOPY, BRONCHOSCOPY N/A 2/5/2018    Procedure: DIRECT LARYNGOSCOPY WITH BIOPSY, ESOPHAGOSCOPY     (no bronchoscopy, no laser);  Surgeon: Joseph Venegas MD;  Location: [facility] OR;  Service:    • ENDOSCOPY W/ PEG TUBE PLACEMENT N/A 5/2/2018    Procedure: ESOPHAGOGASTRODUODENOSCOPY WITH PERCUTANEOUS ENDOSCOPIC GASTROSTOMY TUBE INSERTION;  Surgeon: Angel Maciel MD;  Location: [facility] ENDOSCOPY;  Service: Gastroenterology   • TRACHEOSTOMY  02/05/2018   • TRACHEOSTOMY N/A 2/5/2018    Procedure: TRACHEOSTOMY  local injection @ 1106 incision @ 1119;  Surgeon: Joseph Venegas MD;  Location: [facility] OR;  Service:    • VENOUS ACCESS DEVICE (PORT) INSERTION N/A 3/7/2018    Procedure: INSERTION OF MEDIPORT     (C-ARM#1);  Surgeon: Kris Solano MD;  Location: [facility] OR;  Service:        Prior to Admission medications    Medication Sig Start Date End Date Taking? Authorizing Provider   Cholecalciferol 54457 units  tablet 50 thousand units po q  month 7/17/18   Kp Sellers MD   clopidogrel (PLAVIX) 75 MG tablet Take 75 mg by mouth Daily. Last dose approx greater than one month ago    Historical Provider, MD   docusate sodium (COLACE) 100 MG capsule Take 1 capsule by mouth 2 (Two) Times a Day As Needed for Constipation. 2/13/18   Reza Patel MD   levothyroxine (SYNTHROID) 75 MCG tablet Take 1 tab daily Monday to Saturday and 1.5 on Sunday 7/17/18   Kp Sellers MD   losartan-hydrochlorothiazide (HYZAAR) 50-12.5 MG per tablet Take 1 tablet by mouth Daily. 3/15/18   Liborio Chandra MD   metoprolol succinate XL (TOPROL-XL) 25 MG 24 hr tablet Take 1 tablet by mouth Every Night. Patient states he hasn't been taking this week, states he thinks he is hypotensive 3/15/18   Liborio Chandra MD   Multiple Vitamins-Minerals (MULTIVITAMIN ADULT PO) Take 1 tablet by mouth Daily.    Historical Provider, MD   mupirocin (BACTROBAN) 2 % ointment Apply  topically 2 (Two) Times a Day. 6/25/18   Joseph Venegas MD   ondansetron (ZOFRAN) 4 MG tablet Take 1 tablet by mouth 3 (Three) Times a Day As Needed for Nausea or Vomiting. 2/21/18   Reza Patel MD   sodium chloride (NS) 0.9 % irrigation USE FOR TRACHEOSTOMY AS DIRECTED 5/21/18   Joseph Venegas MD   triamcinolone (KENALOG) 0.1 % cream Apply  topically 2 (Two) Times a Day. 6/25/18   Joseph Venegas MD   vitamin B-12 (CYANOCOBALAMIN) 100 MCG tablet Take 100 mcg by mouth Daily.    Historical Provider, MD   vitamin E 100 UNIT capsule Take 400 Units by mouth Every Night.    Historical Provider, MD   Zinc 50 MG capsule Take 50 mg by mouth Every Night.    Historical Provider, MD       Allergies   Allergen Reactions   • Chlorhexidine Itching     Topical cleaning wipes causes severe skin irritation   • Co Q 10 [Coenzyme Q10] Itching   • Eggs Or Egg-Derived Products Swelling   • Erythromycin Other (See Comments)     Joint pains and cold sweats  "  • Gabapentin Itching   • Other GI Intolerance     Mycins  farzad lotion causes rash   • Penicillins      Tolerated cefepime during 2/2017 admission. Had rash and itching (relieved by benadryl) after few doses of cefepime and cefepime was continued.   • Tetracyclines & Related GI Intolerance   • Acth [Corticotropin] Rash   • Cephalosporins Itching and Rash     Pt developed rash, itching after cefepime administration (2-3 doses) during 2/2017 admission. Itching was relieved with benadryl. Cefepime was continued because his infection was improving and no symptoms of anaphylaxis present   • Keflex [Cephalexin] Rash   • Neomycin Rash       Family History   Problem Relation Age of Onset   • Thyroid disease Mother    • Hypertension Mother    • Hypertension Father    • Hypertension Other        Social History     Social History   • Marital status:      Spouse name: N/A   • Number of children: N/A   • Years of education: N/A     Occupational History   • Not on file.     Social History Main Topics   • Smoking status: Former Smoker     Packs/day: 1.25     Years: 38.00     Quit date: 2/13/2017   • Smokeless tobacco: Never Used      Comment: 02/27/2018 - Patient confirmed he ceased utilization of tobacco products 02/13/2017.   • Alcohol use No   • Drug use: No   • Sexual activity: Defer     Other Topics Concern   • Not on file     Social History Narrative   • No narrative on file       Review of Systems   Constitutional: Positive for fatigue.   Respiratory: Positive for shortness of breath.    Gastrointestinal: Positive for abdominal pain.   Neurological: Positive for dizziness, syncope, weakness and light-headedness.       Objective     BP (!) 66/34   Pulse 67   Temp 95.4 °F (35.2 °C)   Resp 20   Ht 170.2 cm (67\")   Wt 99.3 kg (218 lb 14.7 oz)   SpO2 100%   BMI 34.29 kg/m²      Physical Exam   Constitutional: He is oriented to person, place, and time. He appears distressed.   HENT:   Head: Normocephalic and " atraumatic.   Nose: Nose normal.   Eyes: Conjunctivae and EOM are normal. Right eye exhibits no discharge. Left eye exhibits no discharge.   Neck: Trachea normal, normal range of motion and phonation normal. Neck supple. No JVD present. No tracheal deviation and no edema present. No thyromegaly present.       Trach in place   Cardiovascular: Normal rate, regular rhythm and normal heart sounds.  Exam reveals no gallop and no friction rub.    No murmur heard.  Pulmonary/Chest: Effort normal and breath sounds normal. No accessory muscle usage. No respiratory distress. He has no decreased breath sounds. He has no wheezes. He has no rales. He exhibits no tenderness.   Abdominal: Soft. He exhibits no distension, no fluid wave, no ascites, no pulsatile midline mass and no mass. There is no tenderness. There is no rebound and no guarding. No hernia.       Musculoskeletal: Normal range of motion. He exhibits no edema, tenderness or deformity.   Lymphadenopathy:     He has no cervical adenopathy.        Left: No supraclavicular adenopathy present.   Neurological: He is alert and oriented to person, place, and time. He has normal strength. No cranial nerve deficit.   Skin: Skin is warm and dry. No rash noted. He is not diaphoretic. No erythema. No pallor.   Psychiatric: He has a normal mood and affect. His speech is normal and behavior is normal. Judgment and thought content normal. Cognition and memory are normal.   Vitals reviewed.      DIAGNOSTIC DATA:    CT reviewed showing splenic hematoma, free fluid, left lung collapse due to effusion    ASSESSMENT:    Splenic bleed with hemodynamic instability and large left effusion, possible blood in chest as well.    PLAN:    Continue aggressive resuscitation in the emergency department.  I have requested the hospital for platelets as the patient is on Plavix and there are no platelets currently available in this facility.  The patient is actively receiving IV fluids as well as  blood.  I recommended the patient undergo urgent splenectomy as well as left chest tube placement.  He has family are agreeable to this.  He understands that his situation is extremely serious.  I anticipate he will be in the intensive care unit on the ventilator following surgery.  He will need continued aggressive fluid resuscitation.  Given that he has a PEG tube in place he likely will need a formal gastrostomy tube placed during surgery as well.  This was all discussed with he and his family.  They're agreeable with proceeding.  The risks, benefits, possible poor outcome given his overall poor health were discussed with the family.  They're agreeable proceeding with splenectomy, gastrostomy tube placement, left chest tube placement.          This document has been electronically signed by Kris Solano MD on July 26, 2018 10:24 AM        Electronically signed by Kris Solano MD at 7/26/2018 10:29 AM       Hospital Medications (active)       Dose Frequency Start End    bisacodyl (DULCOLAX) suppository 10 mg 10 mg Daily 8/2/2018     Sig - Route: Insert 1 suppository into the rectum Daily. - Rectal    chlorhexidine (PERIDEX) 0.12 % solution 15 mL 15 mL Every 12 Hours Scheduled 7/26/2018     Sig - Route: Apply 15 mL to the mouth or throat Every 12 (Twelve) Hours. - Mouth/Throat    Cosign for Ordering: Accepted by Kris Solano MD on 7/26/2018  3:35 PM    indomethacin (INDOCIN) capsule 50 mg 50 mg 2 Times Daily With Meals 8/13/2018     Sig - Route: Take 2 capsules by mouth 2 (Two) Times a Day With Meals. - Oral    levothyroxine (SYNTHROID, LEVOTHROID) tablet 75 mcg 75 mcg Every Early Morning 8/4/2018     Sig - Route: 1 tablet by Per G Tube route Every Morning. - Per G Tube    losartan (COZAAR) 50 mg, hydrochlorothiazide (MICROZIDE) 12.5 mg for HYZAAR 50-12.5  Daily 8/4/2018     Sig - Route: by Per G Tube route Daily. - Per G Tube    magnesium citrate solution 150 mL 150 mL Once  8/2/2018     Sig - Route: Take 150 mL by mouth 1 (One) Time. - Oral    metoprolol succinate XL (TOPROL-XL) 24 hr tablet 25 mg 25 mg Nightly 8/4/2018     Sig - Route: Take 1 tablet by mouth Every Night. - Oral    morphine injection 2 mg 2 mg Every 4 Hours PRN 8/12/2018 8/22/2018    Sig - Route: Infuse 1 mL into a venous catheter Every 4 (Four) Hours As Needed for Severe Pain  (Breakthrough pain). - Intravenous    ondansetron (ZOFRAN) injection 4 mg 4 mg Every 6 Hours PRN 7/26/2018     Sig - Route: Infuse 2 mL into a venous catheter Every 6 (Six) Hours As Needed for Nausea or Vomiting. - Intravenous    pantoprazole (PROTONIX) injection 40 mg 40 mg Every Early Morning 7/27/2018     Sig - Route: Infuse 10 mL into a venous catheter Every Morning. - Intravenous    pneumococcal polysaccharide 23-valent (PNEUMOVAX-23) vaccine 0.5 mL 0.5 mL During Hospitalization 8/6/2018     Sig - Route: Inject 0.5 mL into the appropriate muscle as directed by prescriber During Hospitalization for Immunization. - Intramuscular    polyethylene glycol (MIRALAX) powder 17 g 17 g Nightly 8/12/2018     Sig - Route: Take 17 g by mouth Every Night. - Oral    Cosign for Ordering: Accepted by Moncho Zapata MD on 8/12/2018  1:30 PM    simethicone (MYLICON) 40 MG/0.6ML drops 40 mg 40 mg 4 Times Daily PRN 8/5/2018     Sig - Route: 0.6 mL by Per G Tube route 4 (Four) Times a Day As Needed for Flatulence. - Per G Tube    sodium chloride 0.9 % flush 1-10 mL 1-10 mL As Needed 7/26/2018     Sig - Route: Infuse 1-10 mL into a venous catheter As Needed for Line Care. - Intravenous             Operative/Procedure Notes (most recent note)      Joshua Pollock MD at 7/31/2018  3:08 PM             OPERATIVE NOTE  Truman Esquivel  1960  7/31/2018    PREOP DIAGNOSES:  Pleural effusion [J90]   Recent splenectomy for ruptured spleen  Repaired type A aortic dissection  Treated laryngeal carcinoma    POSTOP DIAGNOSES:  Pleural effusion  [J90]    PROCEDURE:   LEFT THORACOSCOPY VIDEO ASSISTED  LEFT THORACOTOMY total lung decortication   Bronchoscopy  Negative pressure wound therapy (prevena)    SURGEON: KAY Pollock MD FACS RPVI    ASSISTANT: Madisyn Campbell CFA    ANESTHESIA: General ET     ESTIMATED BLOOD LOSS: 100 ml     COMPLICATIONS: none    DESCRIPTION OF OPERATION:   Patient taken to operating room, placed in supine position.   chronic indwelling trach removed, 6.0 endotracheal tube passed into RIGHT mainstem bronchus under bronchoscopic vision by anesthesia.  General anesthesia induced.  Radial arterial line placed by anesthesia.  Prepped draped in sterile fashion.    Patient placed in RIGHT lateral decubitus position with bean bag as axillary roll, table flexed.  Prepped draped in sterile fashion.1cm port site placed 8th interspace anterior axillary line.  Thoracoscope was introduced demonstrating advanced pleural space empyema.   lateral thoracotomy incision made thru 5th ICS.  Large amount of gelatinous material and  Pleural fluid removed, sent for cultures.  Lung trapped.  Fibrous rind removed from upper and lower lobes with currette.  Diaphragm and chest wall cleared of gradeaux.  Lung completely freed.  Pleural space irrigated with 3L warm water.     Two 24Fr multihole chest tube placed lateral and posteriorly to apex.  Lung inflated to fill space, no air leak.     Incision closed layers of #2 Vicryl pericostal sutures around the fifth rib thru the 6th rib. serratus was reapproximated using 2-0 PDS suture, 2-0 PDS in the latissimus fascia, 2-0 PDS and subcutaneous tissue and staples in  Skin.  Diffuse tissue edema, negative pressure wound therapy with prevena. Fiberoptic bronchoscopy performed thru trach site, no endobronchial lesions, airways clear, moderate thin secretions .Tolerated procedure well transferred to PACU stable condition.          This document has been electronically signed by Joshua Pollock MD on July 31,  2018 5:55 PM        Electronically signed by Joshua Pollock MD at 7/31/2018  6:03 PM          Physician Progress Notes (most recent note)      Kris Solano MD at 8/13/2018  7:44 AM          GENERAL SURGERY PROGRESS NOTE  Chief Complaint:  Surgery Follow up   LOS: 18 days       Subjective     Interval History:     Feels well, complains of bilateral knee pain. Requesting indomethacin. Otherwise doing well.    Objective     Vital Signs  Temp:  [98.2 °F (36.8 °C)-98.9 °F (37.2 °C)] 98.9 °F (37.2 °C)  Heart Rate:  [60-77] 72  Resp:  [18-20] 20  BP: (131-158)/(63-78) 145/70    Physical Exam:   Abdomen soft, incision CDI  Labs:  Lab Results (last 24 hours)     Procedure Component Value Units Date/Time    POC Glucose Once [[id number]]  (Normal) Collected:  08/12/18 2032    Specimen:  Blood Updated:  08/12/18 2325     Glucose 114 mg/dL      Comment: Meter: [id number]: [id number] PIA CHEN              Results Review:     Labs and imaging for today were reviewed.    Assessment/Plan     Truman Esquivel is a 57 y.o. male who is s/p splenectomy, left decortication.      Will restart indomethacin.  DC staples today.  Work with case management on disposition.          This document has been electronically signed by Kris Solano MD on August 13, 2018 7:44 AM        Kris Solano MD  08/13/18  7:44 AM          Electronically signed by Kris Solano MD at 8/13/2018  7:45 AM       Medical Student Notes (most recent note)     No notes of this type exist for this encounter.           Consult Notes (most recent note)      Tran Troncoso APRN at 7/30/2018  8:58 AM      Consult Orders:    1. Inpatient Cardiothoracic Surgery Consult [[id number]] ordered by Kris Solano MD at 07/30/18 0732           Attestation signed by Joshua Pollock MD at 7/30/2018 11:45 AM    Detailed discussion with Truman Esquivel regarding situation, options and plans.  recurrent pleural effusion, retained hemothorax  Pulmonary resection is advisable.      Risks:  Mortality/Major morbidity 2-3%  including but not limited to infection, bleeding, transfusion, pulmonary dysfunction (prolonged air leak, prolonged mechanical ventilation, need for long term oxygen therapy), renal dysfunction.  Benefits: diagnosis and treatment, improved quality of life, survival.  Options: observation, transthoracic needle biopsy discussed.  Understands and wishes to proceed.      LEFT VAT thoracotomy, pulmonary resection, bronchoscopy.  GEN.  SAM.  SDS.  7/31/2018                      CVTS CONSULTATION  CVTS/Dr Pollock requested to see by Dr Solano regarding continued Left pleural effusion.  3  Patient Care Team:  Marybeth Harrison DO as PCP - General (Family Medicine)  Joseph Venegas MD as Surgeon (Otolaryngology)  Reza Patel MD as Consulting Physician (Hematology and Oncology)  Jarocho Tapia MD as Consulting Physician (Radiation Oncology)  ROWAN Watkins as Nurse Practitioner (Oncology)  Sarah Beth Bright MA as Medical Assistant    Chief complaint: Mild shortness of air      Subjective     History of Present Illness:  57 y.o. male with Stage 3 squamous cell laryngeal cancer, chemotherapy in May.  Has trach and Left mediport.  Denies any falls, injury to R abdomen.  Was on Plavix.   Presented to ED with hypotension and syncope  CT demonstrated subcapsular splenic hematoma for which he underwent (7/26/18) Splenectomy with gastrostomy.  Left large bore CT placed for Left pleural effusion (1.3L).   CTA Chest dmonstrates: Multiloculated pleural effusion on the left with near collapse and reduced aeration.  Therefore Dr Pollock is requested to see to evaluate for L VATS and related procedures.  CMH significant for Ascending Arch/Hemiarch Replacement ( 2/14/2017); Bronchoscopy (2/17/2017); Tracheostomy (02/05/2018);  direct laryngoscopy, esophagoscopy, bronchoscopy (N/A,  2/5/2018); Venous Access Device (Port) (N/A, 3/7/2018); Colonoscopy (N/A, 3/7/2018); and Esophagogastroduodenoscopy w/ PEG (N/A, 5/2/2018).      The following portions of the patient's history were reviewed and updated as appropriate: allergies, current medications, past family history, past medical history, past social history, past surgical history and problem list.  Recent images independently reviewed.  Available laboratory values reviewed.    Review of Systems   Constitutional: Positive for activity change, appetite change and fatigue. Negative for chills and fever.        Limited by communication   Nods or writes responses   HENT: Positive for congestion and voice change. Negative for facial swelling, hearing loss, mouth sores and nosebleeds.    Eyes: Negative for visual disturbance.   Respiratory: Positive for cough, chest tightness and shortness of breath. Negative for wheezing and stridor.         Having hiccups    Cardiovascular: Positive for chest pain (at CT area). Negative for palpitations and leg swelling.   Gastrointestinal: Positive for abdominal pain (surgical ).        Expelling flatus  No BM since OR reports    Genitourinary: Negative for hematuria.        Freedman catheter in place    Musculoskeletal: Positive for back pain.   Skin: Negative for color change, pallor and rash.   Allergic/Immunologic: Positive for immunocompromised state.   Neurological: Positive for speech difficulty and weakness. Negative for dizziness and seizures.   Hematological: Bruises/bleeds easily.   Psychiatric/Behavioral: The patient is not nervous/anxious.         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   • Stroke (CMS/HCC)    • Swallowing difficulty      Past Surgical History:   Procedure Laterality Date   • AORTA SURGERY       ruptured aorta repair   • ASCENDING ARCH/HEMIARCH REPLACEMENT N/A 2/14/2017    Procedure: INTRAOPERATIVE TRANSESOPHAGEAL ECHOCARDIOGRAM, MIDLINE STERNOTOMY, ASCENDING AORTIC  AND PROXIMAL AORTIC ARCH REPAIR WITH 26MM GRAFT, AORTIC VALVE RESUSPENSION, AORTIC ROOT REPAIR, OPEN VEIN HARVEST OF RIGHT LEG;  Surgeon: German Arreguin MD;  Location: [facility] MAIN OR;  Service:    • BRONCHOSCOPY N/A 2/17/2017    Procedure: BRONCHOSCOPY BIOPSY AT BEDSIDE WITH BAL-LEFT LOWER LOBE;  Surgeon: Trent Chaney MD;  Location: [facility] ENDOSCOPY;  Service:    • COLONOSCOPY N/A 3/7/2018    Procedure: COLONOSCOPY WITH POSSIBLE POLYPECTOMY   ( DONE IN OR WITH ENDO)      COLONOSCOPY FIRST;  Surgeon: Kris Solano MD;  Location: [facility] OR;  Service:    • DIRECT LARYNGOSCOPY, ESOPHAGOSCOPY, BRONCHOSCOPY N/A 2/5/2018    Procedure: DIRECT LARYNGOSCOPY WITH BIOPSY, ESOPHAGOSCOPY     (no bronchoscopy, no laser);  Surgeon: Joseph Venegas MD;  Location: [facility] OR;  Service:    • ENDOSCOPY W/ PEG TUBE PLACEMENT N/A 5/2/2018    Procedure: ESOPHAGOGASTRODUODENOSCOPY WITH PERCUTANEOUS ENDOSCOPIC GASTROSTOMY TUBE INSERTION;  Surgeon: Angel Maciel MD;  Location: [facility] ENDOSCOPY;  Service: Gastroenterology   • TRACHEOSTOMY  02/05/2018   • TRACHEOSTOMY N/A 2/5/2018    Procedure: TRACHEOSTOMY  local injection @ 1106 incision @ 1119;  Surgeon: Joseph Venegas MD;  Location: [facility] OR;  Service:    • VENOUS ACCESS DEVICE (PORT) INSERTION N/A 3/7/2018    Procedure: INSERTION OF MEDIPORT     (C-ARM#1);  Surgeon: Kris Solano MD;  Location: [facility] OR;  Service:      Family History   Problem Relation Age of Onset   • Thyroid disease Mother    • Hypertension Mother    • Hypertension Father    • Hypertension Other      Social History   Substance Use Topics   • Smoking status: Former Smoker     Packs/day: 1.25     Years: 38.00     Quit date: 2/13/2017   • Smokeless tobacco: Never Used      Comment: 02/27/2018 - Patient  confirmed he ceased utilization of tobacco products 02/13/2017.   • Alcohol use No     Prescriptions Prior to Admission   Medication Sig Dispense Refill Last Dose   • Cholecalciferol 69846 units tablet 50 thousand units po q  month 3 tablet 3    • clopidogrel (PLAVIX) 75 MG tablet Take 75 mg by mouth Daily. Last dose approx greater than one month ago   Taking   • docusate sodium (COLACE) 100 MG capsule Take 1 capsule by mouth 2 (Two) Times a Day As Needed for Constipation. 60 capsule 2 Taking   • levothyroxine (SYNTHROID) 75 MCG tablet Take 1 tab daily Monday to Saturday and 1.5 on Sunday 32 tablet 11    • losartan-hydrochlorothiazide (HYZAAR) 50-12.5 MG per tablet Take 1 tablet by mouth Daily. 30 tablet 12 Taking   • metoprolol succinate XL (TOPROL-XL) 25 MG 24 hr tablet Take 1 tablet by mouth Every Night. Patient states he hasn't been taking this week, states he thinks he is hypotensive 30 tablet 12 Taking   • Multiple Vitamins-Minerals (MULTIVITAMIN ADULT PO) Take 1 tablet by mouth Daily.   Taking   • mupirocin (BACTROBAN) 2 % ointment Apply  topically 2 (Two) Times a Day. 30 g 2 Taking   • ondansetron (ZOFRAN) 4 MG tablet Take 1 tablet by mouth 3 (Three) Times a Day As Needed for Nausea or Vomiting. 40 tablet 3 Taking   • triamcinolone (KENALOG) 0.1 % cream Apply  topically 2 (Two) Times a Day. 45 g 2 Taking   • vitamin B-12 (CYANOCOBALAMIN) 100 MCG tablet Take 100 mcg by mouth Daily.   Taking   • vitamin E 100 UNIT capsule Take 400 Units by mouth Every Night.   Taking   • Zinc 50 MG capsule Take 50 mg by mouth Every Night.   Taking       chlorhexidine 15 mL Mouth/Throat Q12H   pantoprazole 40 mg Intravenous Q AM     Allergies:  Chlorhexidine; Co q 10 [coenzyme q10]; Eggs or egg-derived products; Erythromycin; Gabapentin; Other; Penicillins; Tetracyclines & related; Acth [corticotropin]; Cephalosporins; Keflex [cephalexin]; and Neomycin    Objective      Vital Signs  Temp:  [97.8 °F (36.6 °C)-98.3 °F (36.8 °C)]  "98.3 °F (36.8 °C)  Heart Rate:  [] 91  BP: (131-145)/(63-75) 134/69  Arterial Line BP: (134-173)/(55-73) 153/69    Flowsheet Rows      First Filed Value   Admission Height  170.2 cm (67\") Documented at 07/26/2018 0620   Admission Weight  99.3 kg (218 lb 14.7 oz) Documented at 07/26/2018 0620        170.2 cm (67.01\")    Physical Exam   Constitutional: He is oriented to person, place, and time. He appears well-nourished. No distress.   Body mass index is 33.97 kg/m².     HENT:   Head: Normocephalic.   Trach    Eyes: Pupils are equal, round, and reactive to light. Conjunctivae are normal.   Neck: No JVD present.   R IJ    Cardiovascular: Regular rhythm and normal heart sounds.  Tachycardia present.    Pulses:       Radial pulses are 2+ on the right side, and 2+ on the left side.        Dorsalis pedis pulses are 2+ on the right side, and 2+ on the left side.        Posterior tibial pulses are 2+ on the right side, and 2+ on the left side.   Pulmonary/Chest: Effort normal. No stridor. No respiratory distress. He has no wheezes.   No cough effort  Says can not  Will theodore  Diminished BS Left  Right course   CT to -20 cm sx  thin serous sang    Abdominal: Soft. Bowel sounds are normal.   Expelling flatus  G tube  Inc    Musculoskeletal: He exhibits no edema or tenderness.   Has not been OOB since surgery    Neurological: He is alert and oriented to person, place, and time.   Skin: Skin is warm and dry. Capillary refill takes less than 2 seconds. No rash noted. No erythema. No pallor.   Left mediport    Psychiatric: His behavior is normal. Judgment normal.   Nursing note and vitals reviewed.      Results Review:   Lab Results (last 24 hours)     Procedure Component Value Units Date/Time    Blood Culture - Blood, [[id number]]  (Normal) Collected:  07/26/18 0757    Specimen:  Blood from Arm, Left Updated:  07/30/18 0800     Blood Culture No growth at 4 days    Comprehensive Metabolic Panel [[id number]]  (Abnormal) " Collected:  07/30/18 0354    Specimen:  Blood Updated:  07/30/18 0425     Glucose 95 mg/dL      BUN 13 mg/dL      Creatinine 0.74 mg/dL      Sodium 135 (L) mmol/L      Potassium 4.2 mmol/L      Chloride 105 mmol/L      CO2 25.0 mmol/L      Calcium 7.9 (L) mg/dL      Total Protein 5.1 (L) g/dL      Albumin 2.60 (L) g/dL      ALT (SGPT) 28 U/L      AST (SGOT) 23 U/L      Alkaline Phosphatase 81 U/L      Total Bilirubin 0.8 mg/dL      eGFR Non African Amer 109 mL/min/1.73      Globulin 2.5 gm/dL      A/G Ratio 1.0 (L) g/dL      BUN/Creatinine Ratio 17.6     Anion Gap 5.0 mmol/L     Magnesium [[id number]]  (Normal) Collected:  07/30/18 0354    Specimen:  Blood Updated:  07/30/18 0425     Magnesium 1.9 mg/dL     Phosphorus [[id number]]  (Normal) Collected:  07/30/18 0354    Specimen:  Blood Updated:  07/30/18 0425     Phosphorus 3.0 mg/dL     CBC (No Diff) [[id number]]  (Abnormal) Collected:  07/30/18 0354    Specimen:  Blood Updated:  07/30/18 0417     WBC 33.16 (H) 10*3/mm3      RBC 2.95 (L) 10*6/mm3      Hemoglobin 8.3 (L) g/dL      Hematocrit 25.3 (L) %      MCV 85.8 fL      MCH 28.1 pg      MCHC 32.8 g/dL      RDW 17.0 (H) %      RDW-SD 52.7 (H) fl      MPV 9.4 fL      Platelets 415 10*3/mm3     Blood Culture - Blood, [[id number]]  (Normal) Collected:  07/26/18 1033    Specimen:  Blood from Wrist, Right Updated:  07/29/18 1045     Blood Culture No growth at 3 days        Imaging Results (last 72 hours)     Procedure Component Value Units Date/Time    CT Chest With Contrast [[id number]] Collected:  07/29/18 1530     Updated:  07/29/18 1943    Narrative:       Right-sided pleural effusion.    CT, chest with intravenous contrast.    Comparison is made with study dated July 26, 2018.    Radiation dose-limiting techniques also utilized, including  automated exposure control and adjustment of mA and/or KVP to the  patient size according to ALARA (as low as reasonably  achievable).    Isovue-300 92 milliliters injected  IV.    There is tracheostomy tube. There are median sternotomy wires.  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 The cardiac silhouette  is within normal limits. There is pericardial effusion. There are  few mediastinal lymph nodes normal by size criteria. There is  multiloculated pleural effusions on the left with virtually  complete collapse of the left lung. There has been worsening of  aeration compared to the prior exam. There has been interval  placement of the left-sided chest tube. There are atelectasis in  the right lung base. There is tiny pleural effusion on the right.      There is gastrostomy tube. There is catheter seen in the left  upper abdominal quadrant. No acute bony abnormality is seen.      Impression:       CONCLUSION:   1. Multiloculated pleural effusion on the left with almost  complete collapse of the left lung. Worsening of aeration on the  left compared to the prior exam.   2. Small pleural effusion on the right and mild atelectasis in  the right lung base.    Electronically signed by:  Liborio Odom MD  7/29/2018 7:42  PM CDT Workstation: [id number]    XR Chest 1 View [[id number]] Collected:  07/29/18 0550     Updated:  07/29/18 0649    Narrative:       Exam: AP portable chest    INDICATION: Chest tube    COMPARISON: 7/27/2018    FINDINGS: AP portable chest. Tracheostomy tube right IJ venous  catheter, left central venous port and left chest tube remain in  place. There is now complete opacification of the left hemothorax  due to either worsening airspace disease atelectasis and/or  pleural fluid. Difficult to assess the heart size. Status post  median sternotomy. Right lung remains clear.      Impression:       Worsening of aeration in the left lung with complete  opacification of the current study.    Electronically signed by:  Shahid Madrid MD  7/29/2018 6:48 AM  CDT Workstation: [id number]             Assessment/Plan     Principal Problem:    Splenic hemorrhage  Active Problems:    Syncope and collapse      Assessment & Plan    Left pleural effusion persistent with CT in place:   Continue CT to negative 20  Plan LEFT VATS, drainage of Left effusion (probable hemothorax) and related procedures this week.  Discuss operative procedure with patient, risk and benefits, and he wishes to proceed.     Thank you for the opportunity to participate in this patient's care.    Copy to primary care provider.        I discussed the patients findings and my recommendations with Dr Pollock, see attestation     Tran Troncoso, APRN  07/30/18  8:58 AM              Electronically signed by Joshua Pollock MD at 7/30/2018 11:45 AM

## 2018-08-13 NOTE — PLAN OF CARE
Problem: Patient Care Overview  Goal: Plan of Care Review  Outcome: Ongoing (interventions implemented as appropriate)   08/13/18 1444   Coping/Psychosocial   Plan of Care Reviewed With patient;caregiver   OTHER   Outcome Summary Pt with improving appetite and intake. AT this time, he is eating very well with intake averagin ~95% for the last 5 meals. Rd will continue current nutrition regimen   Plan of Care Review   Progress improving       Problem: Skin Injury Risk (Adult)  Intervention: Promote/Optimize Nutrition   08/13/18 1444   Nutrition Interventions   Oral Nutrition Promotion calorie dense foods provided;calorie dense liquids provided;nutritional therapy counseling provided

## 2018-08-13 NOTE — PROGRESS NOTES
GENERAL SURGERY PROGRESS NOTE  Chief Complaint:  Surgery Follow up   LOS: 18 days       Subjective     Interval History:     Feels well, complains of bilateral knee pain. Requesting indomethacin. Otherwise doing well.    Objective     Vital Signs  Temp:  [98.2 °F (36.8 °C)-98.9 °F (37.2 °C)] 98.9 °F (37.2 °C)  Heart Rate:  [60-77] 72  Resp:  [18-20] 20  BP: (131-158)/(63-78) 145/70    Physical Exam:   Abdomen soft, incision CDI  Labs:  Lab Results (last 24 hours)     Procedure Component Value Units Date/Time    POC Glucose Once [[id number]]  (Normal) Collected:  08/12/18 2032    Specimen:  Blood Updated:  08/12/18 2325     Glucose 114 mg/dL      Comment: Meter: [id number]: [id number] PIA CHEN              Results Review:     Labs and imaging for today were reviewed.    Assessment/Plan     Truman Esquivel is a 57 y.o. male who is s/p splenectomy, left decortication.      Will restart indomethacin.  DC staples today.  Work with case management on disposition.          This document has been electronically signed by Kris Solano MD on August 13, 2018 7:44 AM        Kris Solano MD  08/13/18  7:44 AM

## 2018-08-13 NOTE — PLAN OF CARE
Problem: Patient Care Overview  Goal: Plan of Care Review  Outcome: Ongoing (interventions implemented as appropriate)   08/13/18 0900   Coping/Psychosocial   Plan of Care Reviewed With patient   OTHER   Outcome Summary Pt declined any Out of Cgair this tx 2' just finishing with PT. Pt did completed UE ther ex with good tolerance but required RB's PRN. No new goals met. Cont OT POC.   Plan of Care Review   Progress improving

## 2018-08-14 ENCOUNTER — TELEPHONE (OUTPATIENT)
Dept: SURGERY | Facility: CLINIC | Age: 58
End: 2018-08-14

## 2018-08-14 LAB
ANION GAP SERPL CALCULATED.3IONS-SCNC: 5 MMOL/L (ref 5–15)
BUN BLD-MCNC: 16 MG/DL (ref 7–21)
BUN/CREAT SERPL: 20.8 (ref 7–25)
CALCIUM SPEC-SCNC: 8.2 MG/DL (ref 8.4–10.2)
CHLORIDE SERPL-SCNC: 99 MMOL/L (ref 95–110)
CO2 SERPL-SCNC: 30 MMOL/L (ref 22–31)
CREAT BLD-MCNC: 0.77 MG/DL (ref 0.7–1.3)
DEPRECATED RDW RBC AUTO: 52.4 FL (ref 35.1–43.9)
ERYTHROCYTE [DISTWIDTH] IN BLOOD BY AUTOMATED COUNT: 16.7 % (ref 11.5–14.5)
GFR SERPL CREATININE-BSD FRML MDRD: 104 ML/MIN/1.73 (ref 56–130)
GLUCOSE BLD-MCNC: 94 MG/DL (ref 60–100)
GLUCOSE BLDC GLUCOMTR-MCNC: 105 MG/DL (ref 70–130)
GLUCOSE BLDC GLUCOMTR-MCNC: 143 MG/DL (ref 70–130)
GLUCOSE BLDC GLUCOMTR-MCNC: 147 MG/DL (ref 70–130)
GLUCOSE BLDC GLUCOMTR-MCNC: 201 MG/DL (ref 70–130)
GLUCOSE BLDC GLUCOMTR-MCNC: 90 MG/DL (ref 70–130)
HCT VFR BLD AUTO: 25.1 % (ref 39–49)
HGB BLD-MCNC: 8.1 G/DL (ref 13.7–17.3)
MCH RBC QN AUTO: 27.6 PG (ref 26.5–34)
MCHC RBC AUTO-ENTMCNC: 32.3 G/DL (ref 31.5–36.3)
MCV RBC AUTO: 85.4 FL (ref 80–98)
PLATELET # BLD AUTO: 579 10*3/MM3 (ref 150–450)
PMV BLD AUTO: 9.6 FL (ref 8–12)
POTASSIUM BLD-SCNC: 4.1 MMOL/L (ref 3.5–5.1)
RBC # BLD AUTO: 2.94 10*6/MM3 (ref 4.37–5.74)
SODIUM BLD-SCNC: 134 MMOL/L (ref 137–145)
WBC NRBC COR # BLD: 9.41 10*3/MM3 (ref 3.2–9.8)

## 2018-08-14 PROCEDURE — 97116 GAIT TRAINING THERAPY: CPT

## 2018-08-14 PROCEDURE — 85027 COMPLETE CBC AUTOMATED: CPT | Performed by: STUDENT IN AN ORGANIZED HEALTH CARE EDUCATION/TRAINING PROGRAM

## 2018-08-14 PROCEDURE — 97530 THERAPEUTIC ACTIVITIES: CPT

## 2018-08-14 PROCEDURE — 82962 GLUCOSE BLOOD TEST: CPT

## 2018-08-14 PROCEDURE — 25010000002 MORPHINE PER 10 MG: Performed by: SURGERY

## 2018-08-14 PROCEDURE — 97535 SELF CARE MNGMENT TRAINING: CPT

## 2018-08-14 PROCEDURE — 99024 POSTOP FOLLOW-UP VISIT: CPT | Performed by: SURGERY

## 2018-08-14 PROCEDURE — 97110 THERAPEUTIC EXERCISES: CPT

## 2018-08-14 PROCEDURE — 80048 BASIC METABOLIC PNL TOTAL CA: CPT | Performed by: STUDENT IN AN ORGANIZED HEALTH CARE EDUCATION/TRAINING PROGRAM

## 2018-08-14 RX ADMIN — INDOMETHACIN 50 MG: 25 CAPSULE ORAL at 17:23

## 2018-08-14 RX ADMIN — MORPHINE SULFATE 2 MG: 2 INJECTION, SOLUTION INTRAMUSCULAR; INTRAVENOUS at 21:32

## 2018-08-14 RX ADMIN — Medication 10 ML: at 13:15

## 2018-08-14 RX ADMIN — MORPHINE SULFATE 2 MG: 2 INJECTION, SOLUTION INTRAMUSCULAR; INTRAVENOUS at 13:15

## 2018-08-14 RX ADMIN — LEVOTHYROXINE SODIUM 75 MCG: 0.07 TABLET ORAL at 06:11

## 2018-08-14 RX ADMIN — INDOMETHACIN 50 MG: 25 CAPSULE ORAL at 08:58

## 2018-08-14 RX ADMIN — MORPHINE SULFATE 2 MG: 2 INJECTION, SOLUTION INTRAMUSCULAR; INTRAVENOUS at 06:22

## 2018-08-14 RX ADMIN — METOPROLOL SUCCINATE 25 MG: 25 TABLET, FILM COATED, EXTENDED RELEASE ORAL at 21:32

## 2018-08-14 RX ADMIN — PANTOPRAZOLE SODIUM 40 MG: 40 INJECTION, POWDER, FOR SOLUTION INTRAVENOUS at 06:11

## 2018-08-14 RX ADMIN — LOSARTAN POTASSIUM: 50 TABLET, FILM COATED ORAL at 08:58

## 2018-08-14 RX ADMIN — Medication 10 ML: at 21:32

## 2018-08-14 RX ADMIN — MORPHINE SULFATE 2 MG: 2 INJECTION, SOLUTION INTRAMUSCULAR; INTRAVENOUS at 17:23

## 2018-08-14 RX ADMIN — POLYETHYLENE GLYCOL 3350 17 G: 17 POWDER, FOR SOLUTION ORAL at 21:31

## 2018-08-14 RX ADMIN — CHLORHEXIDINE GLUCONATE 0.12% ORAL RINSE 15 ML: 1.2 LIQUID ORAL at 21:31

## 2018-08-14 NOTE — THERAPY TREATMENT NOTE
Acute Care - Physical Therapy Treatment Note  [facility]     Patient Name: Truman Esquivel  : 1960  MRN: [id number]  Today's Date: 2018  Onset of Illness/Injury or Date of Surgery: 18  Date of Referral to PT: 18  Referring Physician: Dr. Solano    Admit Date: 2018    Visit Dx:    ICD-10-CM ICD-9-CM   1. Syncope and collapse R55 780.2   2. Septic shock (CMS/HCC) A41.9 038.9    R65.21 785.52     995.92   3. Dissection of aorta, unspecified portion of aorta (CMS/HCC) I71.00 441.00   4. Pleural effusion J90 511.9   5. Other ascites R18.8 789.59   6. Splenic hemorrhage D73.89 289.59   7. Impaired physical mobility Z74.09 781.99   8. Impaired mobility and activities of daily living Z74.09 799.89   9. Oropharyngeal dysphagia R13.12 787.22     Patient Active Problem List   Diagnosis   • Ascending aortic dissection (CMS/HCC)   • Essential hypertension   • Deep vein thrombosis (DVT) of femoral vein of both lower extremities (CMS/HCC)   • Hypothyroidism   • Snoring   • Acute pain of right knee   • Knee effusion, right   • Knee pain   • Obstructive sleep apnea of adult   • TIA (transient ischemic attack)   • Dysphagia   • Squamous cell carcinoma of larynx (CMS/HCC)   • Pharyngeal dysphagia   • Anemia   • Abnormal positron emission tomography (PET) of colon   • Hypokalemia   • Encounter for venous access device care   • Dehydration   • Weight loss, abnormal   • Odynophagia   • Thrush, oral   • Thrombocytopenia (CMS/HCC)   • Pancytopenia due to antineoplastic chemotherapy (CMS/HCC)   • Unable to eat   • Syncope and collapse   • Splenic hemorrhage   • Pleural effusion       Therapy Treatment          Rehabilitation Treatment Summary     Row Name 18 1059 18 0957          Treatment Time/Intention    Discipline physical therapy assistant  -LEANDER occupational therapy assistant  -CS     Document Type therapy note (daily note)  -LEANDER therapy note (daily note)  -CS     Subjective  Information  -- no complaints  -CS     Mode of Treatment physical therapy;individual therapy  -LEANDER occupational therapy  -CS     Patient Effort good  -LEANDER good  -CS     Existing Precautions/Restrictions fall;oxygen therapy device and L/min  -LEANDER  --     Recorded by [LEANDER] Reinier Hannah, PTA 08/14/18 1304 [CS] Ileana Yang SWARTZ/L 08/14/18 1302     Row Name 08/14/18 1059 08/14/18 0957          Vital Signs    Pre Systolic BP Rehab 118  -LEANDER  --     Pre Treatment Diastolic BP 65  -LEANDER  --     Post Systolic BP Rehab 134  -LEANDER  --     Post Treatment Diastolic BP 71  -LEANDER  --     Pretreatment Heart Rate (beats/min) 59  -LEANDER 65  -CS     Posttreatment Heart Rate (beats/min) 55  -LEANDER 68  -CS     Pre SpO2 (%) 100  -LEANDER 93  -CS     O2 Delivery Pre Treatment supplemental O2  -LEANDER supplemental O2  -CS     Post SpO2 (%) 98  -LEANDER 96  -CS     O2 Delivery Post Treatment supplemental O2  -LEANDER supplemental O2  -CS     Pre Patient Position Sitting  -LEANDER Sitting  -CS     Intra Patient Position  -- Standing  -CS     Post Patient Position Sitting  -LEANDER Sitting  -CS     Recorded by [LEANDER] Reinier Hannah, PTA 08/14/18 1304 [CS] Ileana Yang SWARTZ/L 08/14/18 1302     Row Name 08/14/18 1059 08/14/18 0957          Cognitive Assessment/Intervention- PT/OT    Affect/Mental Status (Cognitive) WFL  -LEANDER WFL  -CS     Orientation Status (Cognition) oriented x 4  -LEANDER oriented x 4  -CS     Follows Commands (Cognition) WFL  -LEANDER WFL  -CS     Cognitive Function (Cognitive) WFL  -LEANDER WFL  -CS     Recorded by [LEANDER] Reinier Hannah, PTA 08/14/18 1304 [CS] Ileana Yang SWARTZ/L 08/14/18 1302     Row Name 08/14/18 1059             Transfer Assessment/Treatment    Transfer Assessment/Treatment sit-stand transfer;stand-sit transfer;bed-chair transfer;chair-bed transfer  -LEANDER      Recorded by [LEANDER] Reinier Hannah, PTA 08/14/18 1307      Row Name 08/14/18 1059             Bed-Chair Transfer    Bed-Chair [place] (Transfers) conditional independence  -       Assistive Device (Bed-Chair Transfers) walker, front-wheeled  -LEANDER      Recorded by [LEANDER] Reinier Hannah, PTA 08/14/18 1307      Row Name 08/14/18 1059             Chair-Bed Transfer    Chair-Bed [place] (Transfers) conditional independence  -LEANDER      Assistive Device (Chair-Bed Transfers) walker, front-wheeled  -LEANDER      Recorded by [LEANDER] Reinier Hannah, PTA 08/14/18 1307      Row Name 08/14/18 1059 08/14/18 0957          Sit-Stand Transfer    Sit-Stand [place] (Transfers) conditional independence  -LEANDER supervision  -CS     Assistive Device (Sit-Stand Transfers) walker, front-wheeled  -LEANDER walker, front-wheeled  -CS     Recorded by [LEANDER] Reinier Hannah, PTA 08/14/18 1304 [CS] Ileana Yang SWARTZ/L 08/14/18 1302     Row Name 08/14/18 1059 08/14/18 0957          Stand-Sit Transfer    Stand-Sit [place] (Transfers) conditional independence  -LEANDER supervision  -CS     Assistive Device (Stand-Sit Transfers) walker, front-wheeled  -LEANDER walker, front-wheeled  -CS     Recorded by [LEANDER] Reinier Hannah, PTA 08/14/18 1304 [CS] Ileana Yang SWARTZ/L 08/14/18 1302     Row Name 08/14/18 1059             Gait/Stairs Assessment/Training    Gait/Stairs Assessment/Training gait/ambulation independence;gait/ambulation assistive device;distance ambulated;gait pattern;gait deviations  -LEANDER      [place] Level (Gait) conditional independence  -LEANDER      Assistive Device (Gait) walker, front-wheeled  -LEANDER      Distance in Feet (Gait) 441  -LEANDER      Deviations/Abnormal Patterns (Gait) other (see comments)   FF posture  -LEANDER      Negotiation (Stairs) stairs independence;stairs assistive device;handrail location;number of steps;ascending technique;descending technique  -LEANDER      [place] Level (Stairs) supervision  -LEANDER      Assistive Device (Stairs) cane, straight  -LEANDER      Handrail Location (Stairs) left side (ascending)  -LEANDER      Number of Steps (Stairs) 5  -LEANDER      Ascending Technique (Stairs) step-to-step  -LEANDER       Descending Technique (Stairs) step-to-step  -LEANDER      Recorded by [LEANDER] Reinier Hannah, PTA 08/14/18 1304      Row Name 08/14/18 0957             Upper Extremity Seated Therapeutic Exercise    Performed, Seated Upper Extremity (Therapeutic Exercise) shoulder flexion/extension;shoulder external/internal rotation;elbow flexion/extension;forearm supination/pronation;wrist flexion/extension;digit flexion/extension  -CS      Device, Seated Upper Extremity (Therapeutic Exercise) free weights, barbell  -CS      Exercise Type, Seated Upper Extremity (Therapeutic Exercise) AROM (active range of motion)  -CS      Expected Outcomes, Seated Upper Extremity (Therapeutic Exercise) improve functional tolerance, self-care activity;improve performance, transfer skills  -CS      Sets/Reps Detail, Seated Upper Extremity (Therapeutic Exercise) 1/20  -CS      Recorded by [CS] Ileana Yang COTA/L 08/14/18 1302      Row Name 08/14/18 0957             Static Standing Balance    Level of [place] (Supported Standing, Static Balance) supervision  -CS      Time Able to Maintain Position (Supported Standing, Static Balance) 4 to 5 minutes  -CS      Assistive Device Utilized (Supported Standing, Static Balance) rolling walker  -CS      Recorded by [CS] Ileana Yang COTA/L 08/14/18 1302      Row Name 08/14/18 1059 08/14/18 0957          Positioning and Restraints    Pre-Treatment Position sitting in chair/recliner  -LEANDER sitting in chair/recliner  -CS     Post Treatment Position chair  -LEANDER chair  -CS     In Chair reclined;call light within reach;encouraged to call for assist  -LEANDER sitting;call light within reach;encouraged to call for assist  -CS     Recorded by [LEANDER] Reinier Hannah, MARGI 08/14/18 1304 [CS] Ileana Yang COTA/L 08/14/18 1302     Row Name 08/14/18 1059 08/14/18 0957          Pain Scale: Numbers Pre/Post-Treatment    Pain Scale: Numbers, Pretreatment 5/10  -LEANDER 5/10  -CS     Pain Scale: Numbers, Post-Treatment  5/10  -LEANDER 5/10  -CS     Pain Location - Side Left  -LEANDER Left  -CS     Pain Location - Orientation incisional  -LEANDER incisional  -CS     Pain Location chest   under L arm  -LEANDER chest  -CS     Recorded by [LEANDER] Reinier Hannah, PTA 08/14/18 1304 [CS] Ileana Yang, SWARTZ/L 08/14/18 1302     Row Name                Wound 07/26/18 1326 Other (See comments) anterior abdomen incision;surgical    Wound - Properties Group Date first assessed: 07/26/18 [TB] Time first assessed: 1326 [TB] Present On Admission : no [TB] Side: Other (See comments) [TB], MIDLINE  Orientation: anterior [TB] Location: abdomen [TB] Type: incision;surgical [TB] Recorded by:  [TB] Joshua Ordonez, RN 07/26/18 1327    Row Name                Wound 07/31/18 1700 Left lateral;upper chest surgical;incision    Wound - Properties Group Date first assessed: 07/31/18 [CH] Time first assessed: 1700 [CH] Present On Admission : no [CH] Side: Left [CH] Orientation: lateral;upper [CH] Location: chest [CH] Type: surgical;incision [CH] Recorded by:  [CH] Darlene Slaughter RN 07/31/18 1901    Row Name 08/14/18 0957             Outcome Summary/Treatment Plan (OT)    Daily Summary of Progress (OT) progress toward functional goals is good  -CS      Plan for Continued Treatment (OT) cont OT POC  -CS      Anticipated Discharge Disposition (OT) home with home health  -CS      Recorded by [CS] Ileana Yang, SWARTZ/L 08/14/18 1302      Row Name 08/14/18 1059             Outcome Summary/Treatment Plan (PT)    Daily Summary of Progress (PT) progress toward functional goals as expected  -LEANDER      Anticipated Discharge Disposition (PT) home with home health  -LEANDER      Recorded by [LEANDER] Reinier Hannah, PTA 08/14/18 1304        User Key  (r) = Recorded By, (t) = Taken By, (c) = Cosigned By    Initials Name Effective Dates Discipline    LEANDER Reinier Hannah, PTA 03/07/18 -  PT    CS Ileana Yang, SWARTZ/L 03/07/18 -  OT    TB Joshua Ordonez, RN 10/17/16 -  Nurse    CH  Darlene Slaughter RN 06/23/17 -  Nurse          Wound 07/26/18 1326 Other (See comments) anterior abdomen incision;surgical (Active)   Dressing Appearance open to air 8/14/2018  8:57 AM   Closure Adhesive closure strips 8/14/2018  8:57 AM   Drainage Amount none 8/14/2018  8:57 AM   Dressing Care, Wound open to air 8/14/2018  8:57 AM       Wound 07/31/18 1700 Left lateral;upper chest surgical;incision (Active)   Dressing Appearance open to air 8/14/2018  8:57 AM   Closure [place] 8/14/2018  8:57 AM   Base clean 8/14/2018  8:57 AM               PT Rehab Goals     Row Name 08/14/18 1059             Bed Mobility Goal 1 (PT)    Activity/Assistive Device (Bed Mobility Goal 1, PT) sit to supine;supine to sit;bed rails  -LEANDER      [place] Level/Cues Needed (Bed Mobility Goal 1, PT) supervision required  -LEANDER      Time Frame (Bed Mobility Goal 1, PT) 1 week  -LEANDER      Progress/Outcomes (Bed Mobility Goal 1, PT) goal not met  -LEANDER         Transfer Goal 1 (PT)    Activity/Assistive Device (Transfer Goal 1, PT) sit-to-stand/stand-to-sit;bed-to-chair/chair-to-bed;walker, rolling  -LEANDER      [place] Level/Cues Needed (Transfer Goal 1, PT) supervision required  -LEANDER      Time Frame (Transfer Goal 1, PT) 1 week  -LEANDER      Progress/Outcome (Transfer Goal 1, PT) goal met  -LEANDER         Gait Training Goal 1 (PT)    Activity/Assistive Device (Gait Training Goal 1, PT) gait (walking locomotion);assistive device use;increase endurance/gait distance;walker, rolling;decrease fall risk   or least restrictive to no device  -LEANDER      [place] Level (Gait Training Goal 1, PT) supervision required  -LEANDER      Distance (Gait Goal 1, PT) 200  -LEANDER      Time Frame (Gait Training Goal 1, PT) 1 week  -LEANDER      Progress/Outcome (Gait Training Goal 1, PT) goal met  -LEANDER         Stairs Goal 1 (PT)    Activity/Assistive Device (Stairs Goal 1, PT) ascending stairs;descending stairs;decrease fall risk;using handrail, right  -LEANDER      [place]  Level/Cues Needed (Stairs Goal 1, PT) contact guard assist  -LEANDER      Number of Stairs (Stairs Goal 1, PT) 1  -LEANDER      Time Frame (Stairs Goal 1, PT) 1 week  -LEANDER      Progress/Outcome (Stairs Goal 1, PT) goal met  -LEANDER         Patient Education Goal (PT)    Activity (Patient Education Goal, PT) HEP  -LEANDER      [place]/Cues/Accuracy (Memory Goal 2, PT) demonstrates adequately;verbalizes understanding  -LEANDER      Time Frame (Patient Education Goal, PT) 1 week  -LEANDER      Progress/Outcome (Patient Education Goal, PT) goal met  -LEANDER        User Key  (r) = Recorded By, (t) = Taken By, (c) = Cosigned By    Initials Name Provider Type Discipline    Reinier Martin, PTA Physical Therapy Assistant PT          Physical Therapy Education     Title: PT OT SLP Therapies (Active)     Topic: Physical Therapy (Active)     Point: Mobility training (Done)    Learning Progress Summary     Learner Status Readiness Method Response Comment Documented by    Patient Done Acceptance E VU  LEANDER 08/14/18 1305     Active Acceptance E NR  LEANDER 08/10/18 1151     Active Acceptance E NR  LEANDER 08/08/18 1241     Active Acceptance E NR  LEANDER 08/06/18 1555     Active Acceptance E,D NR  CB 08/03/18 1127          Point: Home exercise program (Done)    Learning Progress Summary     Learner Status Readiness Method Response Comment Documented by    Patient Done Acceptance H HUE [place]  LEANDER 08/12/18 1512     Active Acceptance E NR  LEANDER 08/08/18 1241          Point: Precautions (Active)    Learning Progress Summary     Learner Status Readiness Method Response Comment Documented by    Patient Active Acceptance E NR  LEANDER 08/08/18 1241     Active Acceptance E,D NR  CB 08/03/18 1127                      User Key     Initials Effective Dates Name Provider Type Discipline     04/06/17 -  Lupe Grossman, PT Physical Therapist PT    LEANDER 03/07/18 -  Reinier Hannah PTA Physical Therapy Assistant PT                    PT Recommendation and Plan  Anticipated Discharge Disposition  (PT): home with home health  Outcome Summary/Treatment Plan (PT)  Daily Summary of Progress (PT): progress toward functional goals as expected  Plan for Continued Treatment (PT): begin stair training.   Anticipated Discharge Disposition (PT): home with home health  Plan of Care Reviewed With: patient  Progress: improving  Outcome Summary: pt responded well to therapy w/ increased gait to 441 ft mod indep w/ RW. pt ascended 5 steps SBA w/ SC and 1 handrail. pt met 1 new goal this tx. Recommend  PT upon DC.           Outcome Measures     Row Name 08/14/18 1059 08/13/18 1330 08/13/18 0900       How much help from another person do you currently need...    Turning from your back to your side while in flat bed without using bedrails? 3  -LEANDER 3  -TW  --    Moving from lying on back to sitting on the side of a flat bed without bedrails? 3  -LEANDER 3  -TW  --    Moving to and from a bed to a chair (including a wheelchair)? 4  -LEANDER 3  -TW  --    Standing up from a chair using your arms (e.g., wheelchair, bedside chair)? 4  -LEANDER 3  -TW  --    Climbing 3-5 steps with a railing? 3  -LEANDER 3  -TW  --    To walk in hospital room? 3  -LEANDER 3  -TW  --    AM-PAC 6 Clicks Score 20  -LEANDER 18  -TW  --       How much help from another is currently needed...    Putting on and taking off regular lower body clothing?  --  -- 2  -KD    Bathing (including washing, rinsing, and drying)  --  -- 2  -KD    Toileting (which includes using toilet bed pan or urinal)  --  -- 2  -KD    Putting on and taking off regular upper body clothing  --  -- 2  -KD    Taking care of personal grooming (such as brushing teeth)  --  -- 2  -KD    Eating meals  --  -- 4  -KD    Score  --  -- 14  -KD       Functional Assessment    Outcome Measure Options AM-PAC 6 Clicks Basic Mobility (PT)  -LEANDER AM-PAC 6 Clicks Basic Mobility (PT)  -TW  --    Row Name 08/12/18 1406 08/12/18 1048          How much help from another person do you currently need...    Turning from your back to your  side while in flat bed without using bedrails? 3  -LEANDER  --     Moving from lying on back to sitting on the side of a flat bed without bedrails? 3  -LEANDER  --     Moving to and from a bed to a chair (including a wheelchair)? 3  -LEANDER  --     Standing up from a chair using your arms (e.g., wheelchair, bedside chair)? 3  -LEANDER  --     Climbing 3-5 steps with a railing? 3  -LEANDER  --     To walk in hospital room? 3  -LEANDER  --     AM-PAC 6 Clicks Score 18  -LEANDER  --        How much help from another is currently needed...    Putting on and taking off regular lower body clothing?  -- 2  -KD     Bathing (including washing, rinsing, and drying)  -- 2  -KD     Toileting (which includes using toilet bed pan or urinal)  -- 2  -KD     Putting on and taking off regular upper body clothing  -- 3  -KD     Taking care of personal grooming (such as brushing teeth)  -- 3  -KD     Eating meals  -- 4  -KD     Score  -- 16  -KD        Functional Assessment    Outcome Measure Options AM-PAC 6 Clicks Basic Mobility (PT)  -LEANDER  --       User Key  (r) = Recorded By, (t) = Taken By, (c) = Cosigned By    Initials Name Provider Type    Reinier Martin, MARGI Physical Therapy Assistant    TW Lj Gross, MARGI Physical Therapy Assistant    KD Adrianna Richardson, SHERIDAN/L Occupational Therapy Assistant           Time Calculation:         PT Charges     Row Name 08/14/18 1305             Time Calculation    Start Time 1059  -      Stop Time 1133  -      Time Calculation (min) 34 min  -      PT Non-Billable Time (min) 4 min  -LEANDER         Time Calculation- PT    Total Timed Code Minutes- PT 30 minute(s)  -        User Key  (r) = Recorded By, (t) = Taken By, (c) = Cosigned By    Initials Name Provider Type    Reinier Martin PTA Physical Therapy Assistant        Therapy Suggested Charges     Code   Minutes Charges    None           Therapy Charges for Today     Code Description Service Date Service Provider Modifiers Qty    [id number] HC GAIT  TRAINING EA 15 MIN 8/14/2018 Reinier Hannah, PTA GP 1    [id number]  PT THERAPEUTIC ACT EA 15 MIN 8/14/2018 Reinier Hannah PTA GP 1          PT G-Codes  PT Professional Judgement Used?: Yes  Outcome Measure Options: AM-PAC 6 Clicks Basic Mobility (PT)  Score: 11  Functional Limitation: Mobility: Walking and moving around  Mobility: Walking and Moving Around Current Status (): At least 60 percent but less than 80 percent impaired, limited or restricted  Mobility: Walking and Moving Around Goal Status (): At least 40 percent but less than 60 percent impaired, limited or restricted    Reinier Hannah PTA  8/14/2018

## 2018-08-14 NOTE — THERAPY TREATMENT NOTE
Acute Care - Occupational Therapy Treatment Note  [facility]     Patient Name: Truman Esquivel  : 1960  MRN: [id number]  Today's Date: 2018  Onset of Illness/Injury or Date of Surgery: 18  Date of Referral to OT: 18  Referring Physician: Dr. Solano    Admit Date: 2018       ICD-10-CM ICD-9-CM   1. Syncope and collapse R55 780.2   2. Septic shock (CMS/HCC) A41.9 038.9    R65.21 785.52     995.92   3. Dissection of aorta, unspecified portion of aorta (CMS/HCC) I71.00 441.00   4. Pleural effusion J90 511.9   5. Other ascites R18.8 789.59   6. Splenic hemorrhage D73.89 289.59   7. Impaired physical mobility Z74.09 781.99   8. Impaired mobility and activities of daily living Z74.09 799.89   9. Oropharyngeal dysphagia R13.12 787.22     Patient Active Problem List   Diagnosis   • Ascending aortic dissection (CMS/HCC)   • Essential hypertension   • Deep vein thrombosis (DVT) of femoral vein of both lower extremities (CMS/HCC)   • Hypothyroidism   • Snoring   • Acute pain of right knee   • Knee effusion, right   • Knee pain   • Obstructive sleep apnea of adult   • TIA (transient ischemic attack)   • Dysphagia   • Squamous cell carcinoma of larynx (CMS/HCC)   • Pharyngeal dysphagia   • Anemia   • Abnormal positron emission tomography (PET) of colon   • Hypokalemia   • Encounter for venous access device care   • Dehydration   • Weight loss, abnormal   • Odynophagia   • Thrush, oral   • Thrombocytopenia (CMS/HCC)   • Pancytopenia due to antineoplastic chemotherapy (CMS/HCC)   • Unable to eat   • Syncope and collapse   • Splenic hemorrhage   • Pleural effusion     Past Medical History:   Diagnosis Date   • Aortic dissection (CMS/HCC)    • Chest pain    • Disease of thyroid gland    • HTN (hypertension)    • Knee pain    • Sleep apnea    • Smoker    • Squamous cell carcinoma of larynx (CMS/HCC) 2018   • Stroke (CMS/HCC)    • Swallowing difficulty      Past Surgical History:    Procedure Laterality Date   • AORTA SURGERY      ruptured aorta repair   • ASCENDING ARCH/HEMIARCH REPLACEMENT N/A 2/14/2017    Procedure: INTRAOPERATIVE TRANSESOPHAGEAL ECHOCARDIOGRAM, MIDLINE STERNOTOMY, ASCENDING AORTIC  AND PROXIMAL AORTIC ARCH REPAIR WITH 26MM GRAFT, AORTIC VALVE RESUSPENSION, AORTIC ROOT REPAIR, OPEN VEIN HARVEST OF RIGHT LEG;  Surgeon: German Arreguin MD;  Location: [facility] MAIN OR;  Service:    • BRONCHOSCOPY N/A 2/17/2017    Procedure: BRONCHOSCOPY BIOPSY AT BEDSIDE WITH BAL-LEFT LOWER LOBE;  Surgeon: Trent Chaney MD;  Location: [facility] ENDOSCOPY;  Service:    • COLONOSCOPY N/A 3/7/2018    Procedure: COLONOSCOPY WITH POSSIBLE POLYPECTOMY   ( DONE IN OR WITH ENDO)      COLONOSCOPY FIRST;  Surgeon: Kris Solano MD;  Location: [facility] OR;  Service:    • DIRECT LARYNGOSCOPY, ESOPHAGOSCOPY, BRONCHOSCOPY N/A 2/5/2018    Procedure: DIRECT LARYNGOSCOPY WITH BIOPSY, ESOPHAGOSCOPY     (no bronchoscopy, no laser);  Surgeon: Joseph Venegas MD;  Location: [facility] OR;  Service:    • ENDOSCOPY W/ PEG TUBE PLACEMENT N/A 5/2/2018    Procedure: ESOPHAGOGASTRODUODENOSCOPY WITH PERCUTANEOUS ENDOSCOPIC GASTROSTOMY TUBE INSERTION;  Surgeon: Angel Maciel MD;  Location: [facility] ENDOSCOPY;  Service: Gastroenterology   • SPLENECTOMY N/A 7/26/2018    Procedure: SPLENECTOMY, left chest tube placement, EXPLORATORY LAPAROTOMY, G-TUBE PALCEMENT;  Surgeon: Kris Solano MD;  Location: [facility] OR;  Service: General   • THORACOSCOPY Left 7/31/2018    Procedure: LEFT THORACOSCOPY VIDEO ASSISTED POSSIBLE THORACOTOMY possible decortication bronchoscopy;  Surgeon: Joshua Pollock MD;  Location: [facility] OR;  Service: Cardiothoracic   • TRACHEOSTOMY  02/05/2018   • TRACHEOSTOMY N/A 2/5/2018    Procedure: TRACHEOSTOMY  local injection @ 1106 incision @ 1119;  Surgeon: Joseph Venegas MD;  Location: [facility] OR;  Service:    • VENOUS ACCESS DEVICE (PORT) INSERTION N/A 3/7/2018     Procedure: INSERTION OF MEDIPORT     (C-ARM#1);  Surgeon: Kris Solano MD;  Location: [facility];  Service:        Therapy Treatment          Rehabilitation Treatment Summary     Row Name 08/14/18 1059 08/14/18 0957          Treatment Time/Intention    Discipline physical therapy assistant  -LEANDER occupational therapy assistant  -CS     Document Type therapy note (daily note)  -LEANDER therapy note (daily note)  -CS     Subjective Information  -- no complaints  -CS     Mode of Treatment physical therapy;individual therapy  -LEANDER occupational therapy  -CS     Patient Effort good  -LEANDER good  -CS     Existing Precautions/Restrictions fall;oxygen therapy device and L/min  -LEANDER  --     Recorded by [LEANDER] Reinier Hannah, PTA 08/14/18 1304 [CS] Ileana Yang SWARTZ/L 08/14/18 1302     Row Name 08/14/18 1059 08/14/18 0957          Vital Signs    Pre Systolic BP Rehab 118  -LEANDER  --     Pre Treatment Diastolic BP 65  -LEANDER  --     Post Systolic BP Rehab 134  -LEANDER  --     Post Treatment Diastolic BP 71  -LEANDER  --     Pretreatment Heart Rate (beats/min) 59  -LEANDER 65  -CS     Posttreatment Heart Rate (beats/min) 55  -LEANDER 68  -CS     Pre SpO2 (%) 100  -LEANDER 93  -CS     O2 Delivery Pre Treatment supplemental O2  -LEANDER supplemental O2  -CS     Post SpO2 (%) 98  -LEANDER 96  -CS     O2 Delivery Post Treatment supplemental O2  -LEANDER supplemental O2  -CS     Pre Patient Position Sitting  -LEANDER Sitting  -CS     Intra Patient Position  -- Standing  -CS     Post Patient Position Sitting  -LEANDER Sitting  -CS     Recorded by [LEANDER] Reinier Hannah, PTA 08/14/18 1304 [CS] Ileana Yang SWARTZ/L 08/14/18 1302     Row Name 08/14/18 1059 08/14/18 0957          Cognitive Assessment/Intervention- PT/OT    Affect/Mental Status (Cognitive) WFL  -LEANDER WFL  -CS     Orientation Status (Cognition) oriented x 4  -LEANDER oriented x 4  -CS     Follows Commands (Cognition) WFL  -LEANDER WFL  -CS     Cognitive Function (Cognitive) WFL  -LEANDER WFL  -CS     Recorded by [LEANDER] Reinier Hannah,  PTA 08/14/18 1304 [CS] Ileana Yang SWARTZ/L 08/14/18 1302     Row Name 08/14/18 1059             Transfer Assessment/Treatment    Transfer Assessment/Treatment sit-stand transfer;stand-sit transfer;bed-chair transfer;chair-bed transfer  -LEANDER      Recorded by [LEANDER] Reinier Hannah, PTA 08/14/18 1307      Row Name 08/14/18 1059             Bed-Chair Transfer    Bed-Chair [place] (Transfers) conditional independence  -LEANDER      Assistive Device (Bed-Chair Transfers) walker, front-wheeled  -LEANDER      Recorded by [LEANDER] Reinier Hannah, PTA 08/14/18 1307      Row Name 08/14/18 1059             Chair-Bed Transfer    Chair-Bed [place] (Transfers) conditional independence  -LEANDER      Assistive Device (Chair-Bed Transfers) walker, front-wheeled  -LEANDER      Recorded by [LEANDER] Reinier Hannah, PTA 08/14/18 1307      Row Name 08/14/18 1059 08/14/18 0957          Sit-Stand Transfer    Sit-Stand [place] (Transfers) conditional independence  -LEANDER supervision  -CS     Assistive Device (Sit-Stand Transfers) walker, front-wheeled  -LEANDER walker, front-wheeled  -CS     Recorded by [LEANDER] Reinier Hannah, PTA 08/14/18 1304 [CS] Ileana Yang SWARTZ/L 08/14/18 1302     Row Name 08/14/18 1059 08/14/18 0957          Stand-Sit Transfer    Stand-Sit [place] (Transfers) conditional independence  -LEANDER supervision  -CS     Assistive Device (Stand-Sit Transfers) walker, front-wheeled  -LEANDER walker, front-wheeled  -CS     Recorded by [LEANDER] Reinier Hannah, PTA 08/14/18 1304 [CS] Ileana Yang SWARTZ/L 08/14/18 1302     Row Name 08/14/18 1059             Gait/Stairs Assessment/Training    Gait/Stairs Assessment/Training gait/ambulation independence;gait/ambulation assistive device;distance ambulated;gait pattern;gait deviations  -LEANDER      [place] Level (Gait) conditional independence  -LEANDER      Assistive Device (Gait) walker, front-wheeled  -LEANDER      Distance in Feet (Gait) 441  -LEANDER      Deviations/Abnormal Patterns (Gait) other  (see comments)   FF posture  -LEANDER      Negotiation (Stairs) stairs independence;stairs assistive device;handrail location;number of steps;ascending technique;descending technique  -LEANDER      [place] Level (Stairs) supervision  -LEANDER      Assistive Device (Stairs) cane, straight  -LEANDER      Handrail Location (Stairs) left side (ascending)  -LEANDER      Number of Steps (Stairs) 5  -LEANDER      Ascending Technique (Stairs) step-to-step  -LEANDER      Descending Technique (Stairs) step-to-step  -LEANDER      Recorded by [LEANDER] Reinier Hannah, MARGI 08/14/18 1304      Row Name 08/14/18 0957             Upper Extremity Seated Therapeutic Exercise    Performed, Seated Upper Extremity (Therapeutic Exercise) shoulder flexion/extension;shoulder external/internal rotation;elbow flexion/extension;forearm supination/pronation;wrist flexion/extension;digit flexion/extension  -CS      Device, Seated Upper Extremity (Therapeutic Exercise) free weights, barbell  -CS      Exercise Type, Seated Upper Extremity (Therapeutic Exercise) AROM (active range of motion)  -CS      Expected Outcomes, Seated Upper Extremity (Therapeutic Exercise) improve functional tolerance, self-care activity;improve performance, transfer skills  -CS      Sets/Reps Detail, Seated Upper Extremity (Therapeutic Exercise) 1/20  -CS      Recorded by [CS] Ileana Yang COTA/L 08/14/18 1302      Row Name 08/14/18 0957             Static Standing Balance    Level of [place] (Supported Standing, Static Balance) supervision  -CS      Time Able to Maintain Position (Supported Standing, Static Balance) 4 to 5 minutes  -CS      Assistive Device Utilized (Supported Standing, Static Balance) rolling walker  -CS      Recorded by [CS] Ileana Yang COTA/L 08/14/18 1302      Row Name 08/14/18 1059 08/14/18 0957          Positioning and Restraints    Pre-Treatment Position sitting in chair/recliner  -LEANDER sitting in chair/recliner  -CS     Post Treatment Position chair  -LEANDER chair  -CS      In Chair reclined;call light within reach;encouraged to call for assist  -LEANDER sitting;call light within reach;encouraged to call for assist  -CS     Recorded by [LEANDER] Reinier Hannah PTA 08/14/18 1304 [CS] Ileana Yang SWARTZ/L 08/14/18 1302     Row Name 08/14/18 1059 08/14/18 0957          Pain Scale: Numbers Pre/Post-Treatment    Pain Scale: Numbers, Pretreatment 5/10  -LEANDER 5/10  -CS     Pain Scale: Numbers, Post-Treatment 5/10  -LEANDER 5/10  -CS     Pain Location - Side Left  -LEANDER Left  -CS     Pain Location - Orientation incisional  -LEANDER incisional  -CS     Pain Location chest   under L arm  -LEANDER chest  -CS     Recorded by [LEANDER] Reinier Hannah PTA 08/14/18 1304 [CS] Ileana Yang SWARTZ/L 08/14/18 1302     Row Name                Wound 07/26/18 1326 Other (See comments) anterior abdomen incision;surgical    Wound - Properties Group Date first assessed: 07/26/18 [TB] Time first assessed: 1326 [TB] Present On Admission : no [TB] Side: Other (See comments) [TB], MIDLINE  Orientation: anterior [TB] Location: abdomen [TB] Type: incision;surgical [TB] Recorded by:  [TB] Joshua Ordonez RN 07/26/18 1327    Row Name                Wound 07/31/18 1700 Left lateral;upper chest surgical;incision    Wound - Properties Group Date first assessed: 07/31/18 [CH] Time first assessed: 1700 [CH] Present On Admission : no [CH] Side: Left [CH] Orientation: lateral;upper [CH] Location: chest [CH] Type: surgical;incision [CH] Recorded by:  [CH] Darlene Slaughter RN 07/31/18 1901    Row Name 08/14/18 0957             Outcome Summary/Treatment Plan (OT)    Daily Summary of Progress (OT) progress toward functional goals is good  -CS      Plan for Continued Treatment (OT) cont OT POC  -CS      Anticipated Discharge Disposition (OT) home with home health  -CS      Recorded by [CS] Ileaan Yang SWARTZ/L 08/14/18 1302      Row Name 08/14/18 1059             Outcome Summary/Treatment Plan (PT)    Daily Summary of Progress (PT)  progress toward functional goals as expected  -LEANDER      Anticipated Discharge Disposition (PT) home with home health  -LEANDER      Recorded by [LEANDER] Reinier Hannah, PTA 08/14/18 1304        User Key  (r) = Recorded By, (t) = Taken By, (c) = Cosigned By    Initials Name Effective Dates Discipline    LEANDER Reinier Hannah, PTA 03/07/18 -  PT    CS Trey Ileana L, SWARTZ/L 03/07/18 -  OT    TB Joshua Ordonez, RN 10/17/16 -  Nurse    [facility] HedgeDarlene morris RN 06/23/17 -  Nurse        Wound 07/26/18 1326 Other (See comments) anterior abdomen incision;surgical (Active)   Dressing Appearance open to air 8/14/2018  8:57 AM   Closure Adhesive closure strips 8/14/2018  8:57 AM   Drainage Amount none 8/14/2018  8:57 AM   Dressing Care, Wound open to air 8/14/2018  8:57 AM       Wound 07/31/18 1700 Left lateral;upper chest surgical;incision (Active)   Dressing Appearance open to air 8/14/2018  8:57 AM   Closure Staples 8/14/2018  8:57 AM   Base clean 8/14/2018  8:57 AM             OT Rehab Goals     Row Name 08/14/18 0957             Transfer Goal 1 (OT)    Activity/Assistive Device (Transfer Goal 1, OT) sit-to-stand/stand-to-sit;bed-to-chair/chair-to-bed;toilet  -CS      [place] Level/Cues Needed (Transfer Goal 1, OT) supervision required  -CS      Time Frame (Transfer Goal 1, OT) long term goal (LTG);by discharge  -CS      Progress/Outcome (Transfer Goal 1, OT) goal not met;continuing progress toward goal  -CS         Bathing Goal 1 (OT)    Activity/Assistive Device (Bathing Goal 1, OT) bathing skills, all   using AE PRN  -CS      [place] Level/Cues Needed (Bathing Goal 1, OT) set-up required;supervision required  -CS      Time Frame (Bathing Goal 1, OT) long term goal (LTG);by discharge  -CS      Progress/Outcomes (Bathing Goal 1, OT) goal not met;continuing progress toward goal  -CS         Dressing Goal 1 (OT)    Activity/Assistive Device (Dressing Goal 1, OT) dressing skills, all   using AE PRN  -CS       [place]/Cues Needed (Dressing Goal 1, OT) set-up required;supervision required  -CS      Time Frame (Dressing Goal 1, OT) long term goal (LTG);other (see comments)  -CS      Progress/Outcome (Dressing Goal 1, OT) goal not met  -CS         Toileting Goal 1 (OT)    Activity/Device (Toileting Goal 1, OT) toileting skills, all  -CS      [place] Level/Cues Needed (Toileting Goal 1, OT) conditional independence  -CS      Time Frame (Toileting Goal 1, OT) long term goal (LTG);by discharge  -CS      Progress/Outcome (Toileting Goal 1, OT) goal not met;continuing progress toward goal  -CS        User Key  (r) = Recorded By, (t) = Taken By, (c) = Cosigned By    Initials Name Provider Type Discipline    Ileana Maya COTA/L Occupational Therapy Assistant OT        Occupational Therapy Education     Title: PT OT SLP Therapies (Active)     Topic: Occupational Therapy (Done)     Point: ADL training (Done)     Description: Instruct learner(s) on proper safety adaptation and remediation techniques during self care or transfers.   Instruct in proper use of assistive devices.   Learning Progress Summary     Learner Status Readiness Method Response Comment Documented by    Patient Done Acceptance E,TB,D VU Pt was educated on home safety.  08/14/18 1308     Done Acceptance E,TB,D VU   08/09/18 1311     Done Acceptance E VU   08/07/18 1305          Point: Home exercise program (Done)     Description: Instruct learner(s) on appropriate technique for monitoring, assisting and/or progressing therapeutic exercises/activities.   Learning Progress Summary     Learner Status Readiness Method Response Comment Documented by    Patient Done Acceptance E,TB,D VU Pt was educated on home safety.  08/14/18 1308     Done Acceptance E,TB,D VU  CS 08/09/18 1311     Done Acceptance E VU   08/07/18 1305     Active Acceptance E NR  BB 08/05/18 1337          Point: Precautions (Done)     Description: Instruct learner(s) on  prescribed precautions during self-care and functional transfers.   Learning Progress Summary     Learner Status Readiness Method Response Comment Documented by    Patient Done Acceptance E,TB,D VU Pt was educated on home safety.  08/14/18 1308     Done Acceptance E,TBD VU   08/09/18 1311     Done Acceptance E VU   08/07/18 1305     Done Acceptance E VU,NR role of OT; OT POC; transfer training; safety precautions AS 08/03/18 1348          Point: Body mechanics (Done)     Description: Instruct learner(s) on proper positioning and spine alignment during self-care, functional mobility activities and/or exercises.   Learning Progress Summary     Learner Status Readiness Method Response Comment Documented by    Patient Done Acceptance E,TB,D VU Pt was educated on home safety.  08/14/18 1308     Done Acceptance ETBD VU   08/09/18 1311     Done Acceptance E VU   08/07/18 1305     Done Acceptance E VU,NR role of OT; OT POC; transfer training; safety precautions AS 08/03/18 1348                      User Key     Initials Effective Dates Name Provider Type Discipline     03/07/18 -  Carmen Davenport SWARTZ/L Occupational Therapy Assistant OT     03/07/18 -  Julissa Vizcaino SWARTZ/L Occupational Therapy Assistant OT     03/07/18 -  Ileana Yang COTA/KIM Occupational Therapy Assistant OT    AS 05/01/18 -  Erin Treviño, OT Occupational Therapist OT                OT Recommendation and Plan  Outcome Summary/Treatment Plan (OT)  Daily Summary of Progress (OT): progress toward functional goals is good  Plan for Continued Treatment (OT): cont OT POC  Anticipated Discharge Disposition (OT): home with home health  Daily Summary of Progress (OT): progress toward functional goals is good  Plan of Care Review  Plan of Care Reviewed With: patient  Plan of Care Reviewed With: patient  Outcome Summary: Pt tolerated tx well this date. Pt was SBA with sit-stand t/f. Static standing x 5 mins with no LOB. NO goals  met this tx. Continue OT POC.        Outcome Measures     Row Name 08/14/18 1300 08/14/18 1059 08/13/18 1330       How much help from another person do you currently need...    Turning from your back to your side while in flat bed without using bedrails?  -- 3  -LEANDER 3  -TW    Moving from lying on back to sitting on the side of a flat bed without bedrails?  -- 3  -LEANDER 3  -TW    Moving to and from a bed to a chair (including a wheelchair)?  -- 4  -LEANDER 3  -TW    Standing up from a chair using your arms (e.g., wheelchair, bedside chair)?  -- 4  -LEANDER 3  -TW    Climbing 3-5 steps with a railing?  -- 3  -LEANDER 3  -TW    To walk in hospital room?  -- 3  -LEANDER 3  -TW    AM-PAC 6 Clicks Score  -- 20  -LEANDER 18  -TW       How much help from another is currently needed...    Putting on and taking off regular lower body clothing? 2  -CS  --  --    Bathing (including washing, rinsing, and drying) 2  -CS  --  --    Toileting (which includes using toilet bed pan or urinal) 2  -CS  --  --    Putting on and taking off regular upper body clothing 2  -CS  --  --    Taking care of personal grooming (such as brushing teeth) 2  -CS  --  --    Eating meals 4  -CS  --  --    Score 14  -CS  --  --       Functional Assessment    Outcome Measure Options AM-PAC 6 Clicks Daily Activity (OT)  -CS AM-[facility] 6 Clicks Basic Mobility (PT)  -[facility] 6 Clicks Basic Mobility (PT)  -TW    Row Name 08/13/18 0900 08/12/18 1406 08/12/18 1048       How much help from another person do you currently need...    Turning from your back to your side while in flat bed without using bedrails?  -- 3  -LEANDER  --    Moving from lying on back to sitting on the side of a flat bed without bedrails?  -- 3  -LEANDER  --    Moving to and from a bed to a chair (including a wheelchair)?  -- 3  -LEANDER  --    Standing up from a chair using your arms (e.g., wheelchair, bedside chair)?  -- 3  -LEANDER  --    Climbing 3-5 steps with a railing?  -- 3  -LEANDER  --    To walk in hospital room?  -- 3  -LEANDER  --     AM-PAC 6 Clicks Score  -- 18  -LEANDER  --       How much help from another is currently needed...    Putting on and taking off regular lower body clothing? 2  -KD  -- 2  -KD    Bathing (including washing, rinsing, and drying) 2  -KD  -- 2  -KD    Toileting (which includes using toilet bed pan or urinal) 2  -KD  -- 2  -KD    Putting on and taking off regular upper body clothing 2  -KD  -- 3  -KD    Taking care of personal grooming (such as brushing teeth) 2  -KD  -- 3  -KD    Eating meals 4  -KD  -- 4  -KD    Score 14  -KD  -- 16  -KD       Functional Assessment    Outcome Measure Options  -- AM-PAC 6 Clicks Basic Mobility (PT)  -LEANDER  --      User Key  (r) = Recorded By, (t) = Taken By, (c) = Cosigned By    Initials Name Provider Type    LEANDER Reinier Hannah, PTA Physical Therapy Assistant    TW Lj Gross, PTA Physical Therapy Assistant    KD Adrianna Richardson COTA/L Occupational Therapy Assistant    Ileana Maya COTA/KIM Occupational Therapy Assistant           Time Calculation:         Time Calculation- OT     Row Name 08/14/18 1310             Time Calculation- OT    OT Start Time 0957  -CS      OT Stop Time 1055  -CS      OT Time Calculation (min) 58 min  -CS      Total Timed Code Minutes- OT 58 minute(s)  -CS      OT Received On 08/14/18  -        User Key  (r) = Recorded By, (t) = Taken By, (c) = Cosigned By    Initials Name Provider Type    CS Ileana Yang COTA/KIM Occupational Therapy Assistant           Therapy Suggested Charges     Code   Minutes Charges    None           Therapy Charges for Today     Code Description Service Date Service Provider Modifiers Qty    55715485996 HC OT SELF CARE/MGMT/TRAIN EA 15 MIN 8/14/2018 Ileana Yang COTA/L GO 1    34158390976 HC OT THER PROC EA 15 MIN 8/14/2018 Ileana Yang COTA/L GO 2    60001418047 HC OT THERAPEUTIC ACT EA 15 MIN 8/14/2018 Ileana Yang COTA/L GO 1          OT G-codes  OT Professional Judgement Used?: Yes  OT Functional  Scales Options: AM-PAC 6 Clicks Daily Activity (OT)  Score: 15  Functional Limitation: Self care  Self Care Current Status (): At least 40 percent but less than 60 percent impaired, limited or restricted  Self Care Goal Status (): At least 1 percent but less than 20 percent impaired, limited or restricted    MERLINE Joseph  8/14/2018

## 2018-08-14 NOTE — PLAN OF CARE
Problem: Patient Care Overview  Goal: Plan of Care Review  Outcome: Ongoing (interventions implemented as appropriate)   08/14/18 4618   Coping/Psychosocial   Plan of Care Reviewed With patient   OTHER   Outcome Summary pt vss. pt working on pain.   Plan of Care Review   Progress no change     Goal: Individualization and Mutuality  Outcome: Ongoing (interventions implemented as appropriate)    Goal: Discharge Needs Assessment  Outcome: Ongoing (interventions implemented as appropriate)      Problem: Fall Risk (Adult)  Goal: Absence of Fall  Outcome: Ongoing (interventions implemented as appropriate)      Problem: Skin Injury Risk (Adult)  Goal: Skin Health and Integrity  Outcome: Ongoing (interventions implemented as appropriate)      Problem: Pain, Acute (Adult)  Goal: Acceptable Pain Control/Comfort Level  Outcome: Ongoing (interventions implemented as appropriate)

## 2018-08-14 NOTE — TELEPHONE ENCOUNTER
CALLED TO ASK IF DR CALVO WOULD ORDER A HOSPITAL BED FROM [facility] FOR [place]   ISABEL PH# [phone]

## 2018-08-14 NOTE — PLAN OF CARE
Problem: Patient Care Overview  Goal: Plan of Care Review  Outcome: Ongoing (interventions implemented as appropriate)   08/14/18 1059   Coping/Psychosocial   Plan of Care Reviewed With patient   OTHER   Outcome Summary pt responded well to therapy w/ increased gait to 441 ft mod indep w/ RW. pt ascended 5 steps SBA w/ SC and 1 handrail. pt met 1 new goal this tx. Recommend  PT upon DC.    Plan of Care Review   Progress improving

## 2018-08-14 NOTE — PLAN OF CARE
Problem: Patient Care Overview  Goal: Plan of Care Review  Outcome: Ongoing (interventions implemented as appropriate)   08/14/18 0444   Coping/Psychosocial   Plan of Care Reviewed With patient   OTHER   Outcome Summary vss, ambulating, cooperative attitude, more independent with care; will continue to monitor   Plan of Care Review   Progress improving     Goal: Individualization and Mutuality  Outcome: Ongoing (interventions implemented as appropriate)    Goal: Discharge Needs Assessment  Outcome: Ongoing (interventions implemented as appropriate)    Goal: Interprofessional Rounds/Family Conf  Outcome: Ongoing (interventions implemented as appropriate)      Problem: Fall Risk (Adult)  Goal: Absence of Fall  Outcome: Ongoing (interventions implemented as appropriate)      Problem: Skin Injury Risk (Adult)  Goal: Skin Health and Integrity  Outcome: Ongoing (interventions implemented as appropriate)      Problem: Pain, Acute (Adult)  Goal: Acceptable Pain Control/Comfort Level  Outcome: Ongoing (interventions implemented as appropriate)

## 2018-08-14 NOTE — PLAN OF CARE
Problem: Patient Care Overview  Goal: Plan of Care Review  Outcome: Ongoing (interventions implemented as appropriate)   08/14/18 9062   Coping/Psychosocial   Plan of Care Reviewed With patient   OTHER   Outcome Summary Pt tolerated tx well this date. Pt was SBA with sit-stand t/f. Static standing x 5 mins with no LOB. NO goals met this tx. Continue OT POC.   Plan of Care Review   Progress improving

## 2018-08-15 VITALS
HEART RATE: 75 BPM | DIASTOLIC BLOOD PRESSURE: 64 MMHG | SYSTOLIC BLOOD PRESSURE: 134 MMHG | OXYGEN SATURATION: 95 % | RESPIRATION RATE: 18 BRPM | TEMPERATURE: 97.7 F | WEIGHT: 229.28 LBS | BODY MASS INDEX: 35.99 KG/M2 | HEIGHT: 67 IN

## 2018-08-15 PROBLEM — J90 PLEURAL EFFUSION: Status: RESOLVED | Noted: 2018-07-26 | Resolved: 2018-08-15

## 2018-08-15 PROBLEM — D73.5 SPLENIC HEMORRHAGE: Status: RESOLVED | Noted: 2018-07-26 | Resolved: 2018-08-15

## 2018-08-15 PROBLEM — R55 SYNCOPE AND COLLAPSE: Status: RESOLVED | Noted: 2018-07-26 | Resolved: 2018-08-15

## 2018-08-15 LAB
ANION GAP SERPL CALCULATED.3IONS-SCNC: 5 MMOL/L (ref 5–15)
BUN BLD-MCNC: 16 MG/DL (ref 7–21)
BUN/CREAT SERPL: 20.8 (ref 7–25)
CALCIUM SPEC-SCNC: 8.2 MG/DL (ref 8.4–10.2)
CHLORIDE SERPL-SCNC: 100 MMOL/L (ref 95–110)
CO2 SERPL-SCNC: 31 MMOL/L (ref 22–31)
CREAT BLD-MCNC: 0.77 MG/DL (ref 0.7–1.3)
DEPRECATED RDW RBC AUTO: 52.3 FL (ref 35.1–43.9)
ERYTHROCYTE [DISTWIDTH] IN BLOOD BY AUTOMATED COUNT: 16.8 % (ref 11.5–14.5)
GFR SERPL CREATININE-BSD FRML MDRD: 104 ML/MIN/1.73 (ref 56–130)
GLUCOSE BLD-MCNC: 95 MG/DL (ref 60–100)
GLUCOSE BLDC GLUCOMTR-MCNC: 118 MG/DL (ref 70–130)
GLUCOSE BLDC GLUCOMTR-MCNC: 99 MG/DL (ref 70–130)
HCT VFR BLD AUTO: 24.6 % (ref 39–49)
HGB BLD-MCNC: 7.7 G/DL (ref 13.7–17.3)
MCH RBC QN AUTO: 26.7 PG (ref 26.5–34)
MCHC RBC AUTO-ENTMCNC: 31.3 G/DL (ref 31.5–36.3)
MCV RBC AUTO: 85.4 FL (ref 80–98)
PLATELET # BLD AUTO: 541 10*3/MM3 (ref 150–450)
PMV BLD AUTO: 8.9 FL (ref 8–12)
POTASSIUM BLD-SCNC: 4 MMOL/L (ref 3.5–5.1)
RBC # BLD AUTO: 2.88 10*6/MM3 (ref 4.37–5.74)
SODIUM BLD-SCNC: 136 MMOL/L (ref 137–145)
WBC NRBC COR # BLD: 9.43 10*3/MM3 (ref 3.2–9.8)

## 2018-08-15 PROCEDURE — 80048 BASIC METABOLIC PNL TOTAL CA: CPT | Performed by: STUDENT IN AN ORGANIZED HEALTH CARE EDUCATION/TRAINING PROGRAM

## 2018-08-15 PROCEDURE — 99024 POSTOP FOLLOW-UP VISIT: CPT | Performed by: SURGERY

## 2018-08-15 PROCEDURE — 97530 THERAPEUTIC ACTIVITIES: CPT

## 2018-08-15 PROCEDURE — 97116 GAIT TRAINING THERAPY: CPT

## 2018-08-15 PROCEDURE — 97535 SELF CARE MNGMENT TRAINING: CPT

## 2018-08-15 PROCEDURE — 97110 THERAPEUTIC EXERCISES: CPT

## 2018-08-15 PROCEDURE — 99024 POSTOP FOLLOW-UP VISIT: CPT | Performed by: NURSE PRACTITIONER

## 2018-08-15 PROCEDURE — 25010000002 HEPARIN FLUSH (PORCINE) 100 UNIT/ML SOLUTION: Performed by: SURGERY

## 2018-08-15 PROCEDURE — 82962 GLUCOSE BLOOD TEST: CPT

## 2018-08-15 PROCEDURE — 25010000002 MORPHINE PER 10 MG: Performed by: SURGERY

## 2018-08-15 PROCEDURE — 85027 COMPLETE CBC AUTOMATED: CPT | Performed by: STUDENT IN AN ORGANIZED HEALTH CARE EDUCATION/TRAINING PROGRAM

## 2018-08-15 RX ORDER — OXYCODONE HYDROCHLORIDE AND ACETAMINOPHEN 5; 325 MG/1; MG/1
1-2 TABLET ORAL EVERY 4 HOURS PRN
Qty: 40 TABLET | Refills: 0 | Status: SHIPPED | OUTPATIENT
Start: 2018-08-15 | End: 2018-09-04 | Stop reason: SDUPTHER

## 2018-08-15 RX ADMIN — INDOMETHACIN 50 MG: 25 CAPSULE ORAL at 08:26

## 2018-08-15 RX ADMIN — MORPHINE SULFATE 2 MG: 2 INJECTION, SOLUTION INTRAMUSCULAR; INTRAVENOUS at 01:59

## 2018-08-15 RX ADMIN — LOSARTAN POTASSIUM: 50 TABLET, FILM COATED ORAL at 08:26

## 2018-08-15 RX ADMIN — MORPHINE SULFATE 2 MG: 2 INJECTION, SOLUTION INTRAMUSCULAR; INTRAVENOUS at 06:42

## 2018-08-15 RX ADMIN — CHLORHEXIDINE GLUCONATE 0.12% ORAL RINSE 15 ML: 1.2 LIQUID ORAL at 08:26

## 2018-08-15 RX ADMIN — PANTOPRAZOLE SODIUM 40 MG: 40 INJECTION, POWDER, FOR SOLUTION INTRAVENOUS at 06:25

## 2018-08-15 RX ADMIN — MORPHINE SULFATE 2 MG: 2 INJECTION, SOLUTION INTRAMUSCULAR; INTRAVENOUS at 12:31

## 2018-08-15 RX ADMIN — HEPARIN SODIUM (PORCINE) LOCK FLUSH IV SOLN 100 UNIT/ML 500 UNITS: 100 SOLUTION at 15:11

## 2018-08-15 RX ADMIN — LEVOTHYROXINE SODIUM 75 MCG: 0.07 TABLET ORAL at 06:25

## 2018-08-15 NOTE — THERAPY TREATMENT NOTE
Acute Care - Physical Therapy Treatment Note  [facility]     Patient Name: Truman Esquivel  : 1960  MRN: [id number]  Today's Date: 8/15/2018  Onset of Illness/Injury or Date of Surgery: 18  Date of Referral to PT: 18  Referring Physician: Dr. Solano    Admit Date: 2018    Visit Dx:    ICD-10-CM ICD-9-CM   1. Syncope and collapse R55 780.2   2. Septic shock (CMS/HCC) A41.9 038.9    R65.21 785.52     995.92   3. Dissection of aorta, unspecified portion of aorta (CMS/HCC) I71.00 441.00   4. Pleural effusion J90 511.9   5. Other ascites R18.8 789.59   6. Splenic hemorrhage D73.89 289.59   7. Impaired physical mobility Z74.09 781.99   8. Impaired mobility and activities of daily living Z74.09 799.89   9. Oropharyngeal dysphagia R13.12 787.22     Patient Active Problem List   Diagnosis   • Ascending aortic dissection (CMS/HCC)   • Essential hypertension   • Deep vein thrombosis (DVT) of femoral vein of both lower extremities (CMS/HCC)   • Hypothyroidism   • Snoring   • Acute pain of right knee   • Knee effusion, right   • Knee pain   • Obstructive sleep apnea of adult   • TIA (transient ischemic attack)   • Dysphagia   • Squamous cell carcinoma of larynx (CMS/HCC)   • Pharyngeal dysphagia   • Anemia   • Abnormal positron emission tomography (PET) of colon   • Hypokalemia   • Encounter for venous access device care   • Dehydration   • Weight loss, abnormal   • Odynophagia   • Thrush, oral   • Thrombocytopenia (CMS/HCC)   • Pancytopenia due to antineoplastic chemotherapy (CMS/HCC)   • Unable to eat       Therapy Treatment          Rehabilitation Treatment Summary     Row Name 08/15/18 1304 08/15/18 0950          Treatment Time/Intention    Discipline physical therapy assistant  -TW occupational therapy assistant  -CS     Document Type therapy note (daily note)  -TW therapy note (daily note)  -CS     Subjective Information no complaints  -TW no complaints  -CS     Mode of Treatment  physical therapy;individual therapy  -TW occupational therapy  -CS     Patient Effort good  -TW good  -CS     Existing Precautions/Restrictions fall;oxygen therapy device and L/min  -TW fall;oxygen therapy device and L/min  -CS     Equipment Issued to Patient gait belt  -TW  --     Recorded by [TW] Lj Gross, MARGI 08/15/18 1343 [CS] Ileana Yang SWARTZ/L 08/15/18 1305     Row Name 08/15/18 1304 08/15/18 0950          Vital Signs    Pretreatment Heart Rate (beats/min) 74  -TW 64  -CS     Posttreatment Heart Rate (beats/min) 82  -TW 62  -CS     Pre SpO2 (%) 96  -TW 95  -CS     O2 Delivery Pre Treatment room air  -TW room air  -CS     Post SpO2 (%) 98  -TW 96  -CS     O2 Delivery Post Treatment  -- room air  -CS     Pre Patient Position Sitting  -TW Sitting  -CS     Intra Patient Position Standing  -TW Standing  -CS     Post Patient Position Sitting  -TW Sitting  -CS     Recorded by [TW] Lj Gross, MARGI 08/15/18 1343 [CS] Ileana Yang SWARTZ/L 08/15/18 1305     Row Name 08/15/18 1304 08/15/18 0950          Cognitive Assessment/Intervention- PT/OT    Affect/Mental Status (Cognitive) WFL  -TW WFL  -CS     Orientation Status (Cognition) oriented x 4  -TW oriented x 4  -CS     Follows Commands (Cognition) WFL  -TW WFL  -CS     Cognitive Function (Cognitive) WFL  -TW WFL  -CS     Recorded by [TW] Lj Gross, MARGI 08/15/18 1343 [CS] Ileana Yang SWARTZ/L 08/15/18 1305     Row Name 08/15/18 0950             Cognitive Assessment Intervention- SLP    Cognitive Function (Cognition) WFL  -CS      Recorded by [CS] Ileana Yang SWARTZ/L 08/15/18 1305      Row Name 08/15/18 0950             Functional Mobility    Functional Mobility- Ind. Level supervision required  -CS      Functional Mobility- Device straight cane  -CS      Functional Mobility-Distance (Feet) 400  -CS      Recorded by [CS] Ileana Yang SWARTZ/L 08/15/18 1305      Row Name 08/15/18 1304             Transfer  Assessment/Treatment    Transfer Assessment/Treatment sit-stand transfer;stand-sit transfer;bed-chair transfer;chair-bed transfer  -TW      Recorded by [TW] Lj Gross, PTA 08/15/18 1343      Row Name 08/15/18 1304             Bed-Chair Transfer    Bed-Chair [place] (Transfers) conditional independence  -TW      Assistive Device (Bed-Chair Transfers) cane, straight  -TW      Recorded by [TW] Lj Gross, PTA 08/15/18 1343      Row Name 08/15/18 1304             Chair-Bed Transfer    Chair-Bed [place] (Transfers) conditional independence  -TW      Assistive Device (Chair-Bed Transfers) cane, straight  -TW      Recorded by [TW] Lj Gross, PTA 08/15/18 1343      Row Name 08/15/18 1304 08/15/18 0950          Sit-Stand Transfer    Sit-Stand [place] (Transfers) conditional independence  -TW conditional independence  -CS     Assistive Device (Sit-Stand Transfers) cane, straight  -TW cane, straight  -CS     Recorded by [TW] Lj Gross, PTA 08/15/18 1343 [CS] Ileana Yang COTA/L 08/15/18 1305     Row Name 08/15/18 1304 08/15/18 0950          Stand-Sit Transfer    Stand-Sit [place] (Transfers) conditional independence  -TW conditional independence  -CS     Assistive Device (Stand-Sit Transfers) cane, straight  -TW cane, straight  -CS     Recorded by [TW] Lj Gross, PTA 08/15/18 1343 [CS] Ileana Yang SWARTZ/L 08/15/18 1305     Row Name 08/15/18 1304             Gait/Stairs Assessment/Training    Gait/Stairs Assessment/Training gait/ambulation independence;gait/ambulation assistive device;distance ambulated;gait pattern;gait deviations  -TW      [place] Level (Gait) conditional independence  -TW      Assistive Device (Gait) cane, straight  -TW      Distance in Feet (Gait) 444ft  -TW      Bilateral Gait Deviations forward flexed posture  -TW      Negotiation (Stairs) stairs independence;stairs assistive device;handrail location;number of  steps;ascending technique;descending technique  -TW      [place] Level (Stairs) supervision  -TW      Assistive Device (Stairs) cane, straight  -TW      Handrail Location (Stairs) left side (ascending)  -TW      Ascending Technique (Stairs) step-to-step  -TW      Descending Technique (Stairs) step-to-step  -TW      Recorded by [TW] Lj Gross, PTA 08/15/18 1343      Row Name 08/15/18 0950             Lower Body Dressing Assessment/Training    Lower Body Dressing [place] Level don;undergarment;pants/bottoms;shoes/slippers;supervision  -CS      Lower Body Dressing Position supported sitting;supported standing  -CS      Recorded by [CS] Ileana Yang COTA/L 08/15/18 1305      Row Name 08/15/18 0950             Toileting Assessment/Training    [place] Level (Toileting) perform perineal hygiene;maximum assist (25% patient effort)  -CS      Toileting Position supported standing  -CS      Recorded by [CS] Ileana Yang COTA/L 08/15/18 1305      Row Name 08/15/18 0950             Upper Extremity Seated Therapeutic Exercise    Performed, Seated Upper Extremity (Therapeutic Exercise) shoulder flexion/extension;shoulder external/internal rotation;elbow flexion/extension;forearm supination/pronation;wrist flexion/extension;digit flexion/extension  -CS      Device, Seated Upper Extremity (Therapeutic Exercise) free weights, barbell  -CS      Exercise Type, Seated Upper Extremity (Therapeutic Exercise) AROM (active range of motion)  -CS      Expected Outcomes, Seated Upper Extremity (Therapeutic Exercise) improve functional tolerance, self-care activity;improve performance, transfer skills  -CS      Sets/Reps Detail, Seated Upper Extremity (Therapeutic Exercise) 1/20  -CS      Recorded by [CS] Ileana Yang COTA/L 08/15/18 1305      Row Name 08/15/18 1304 08/15/18 0950          Positioning and Restraints    Pre-Treatment Position sitting in chair/recliner  -TW sitting in chair/recliner  -CS      Post Treatment Position chair  -TW chair  -CS     In Bed  -- sitting;call light within reach;encouraged to call for assist  -CS     In Chair sitting;call light within reach;encouraged to call for assist;with family/caregiver  -TW  --     Recorded by [TW] Lj Gross PTA 08/15/18 1343 [CS] Ileana Yang SWARTZ/L 08/15/18 1305     Row Name 08/15/18 1304             Pain Scale: Numbers Pre/Post-Treatment    Pain Scale: Numbers, Pretreatment 5/10  -TW      Pain Scale: Numbers, Post-Treatment 5/10  -TW      Pain Location - Side Left  -TW      Pain Location - Orientation incisional  -TW      Pain Location chest   under L arm  -TW      Recorded by [TW] Lj Gross PTA 08/15/18 1343      Row Name                Wound 07/26/18 1326 Other (See comments) anterior abdomen incision;surgical    Wound - Properties Group Date first assessed: 07/26/18 [TB] Time first assessed: 1326 [TB] Present On Admission : no [TB] Side: Other (See comments) [TB], MIDLINE  Orientation: anterior [TB] Location: abdomen [TB] Type: incision;surgical [TB] Recorded by:  [TB] Joshua Ordonez RN 07/26/18 1327    Row Name                Wound 07/31/18 1700 Left lateral;upper chest surgical;incision    Wound - Properties Group Date first assessed: 07/31/18 [CH] Time first assessed: 1700 [CH] Present On Admission : no [CH] Side: Left [CH] Orientation: lateral;upper [CH] Location: chest [CH] Type: surgical;incision [CH] Recorded by:  [CH] Darlene Slaughter RN 07/31/18 1901    Row Name 08/15/18 0950             Outcome Summary/Treatment Plan (OT)    Daily Summary of Progress (OT) progress toward functional goals as expected  -CS      Plan for Continued Treatment (OT) cont OT POC  -CS      Anticipated Discharge Disposition (OT) home with home health  -CS      Recorded by [CS] Ileana Yang SWARTZ/L 08/15/18 1305      Row Name 08/15/18 1304             Outcome Summary/Treatment Plan (PT)    Daily Summary of Progress (PT) progress  toward functional goals as expected  -TW      Barriers to Overall Progress (PT) Pain  -TW      Plan for Continued Treatment (PT) Cont   -TW      Anticipated Discharge Disposition (PT) home with home health  -TW      Recorded by [TW] Lj Gross, PTA 08/15/18 1343        User Key  (r) = Recorded By, (t) = Taken By, (c) = Cosigned By    Initials Name Effective Dates Discipline    TW Lj Gross, PTA 03/07/18 -  PT    CS Ileana Yang, SWARTZ/L 03/07/18 -  OT    TB Joshua Ordonez, RN 10/17/16 -  Nurse    CH Darlene Slaughter RN 06/23/17 -  Nurse          Wound 07/26/18 1326 Other (See comments) anterior abdomen incision;surgical (Active)   Dressing Appearance open to air 8/15/2018  8:32 AM   Drainage Amount none 8/15/2018  8:32 AM       Wound 07/31/18 1700 Left lateral;upper chest surgical;incision (Active)   Dressing Appearance open to air 8/15/2018  8:32 AM   Closure Staples 8/15/2018  8:32 AM   Base clean;dry 8/15/2018  8:32 AM               PT Rehab Goals     Row Name 08/15/18 1304             Bed Mobility Goal 1 (PT)    Activity/Assistive Device (Bed Mobility Goal 1, PT) sit to supine;supine to sit;bed rails  -TW      [place] Level/Cues Needed (Bed Mobility Goal 1, PT) supervision required  -TW      Time Frame (Bed Mobility Goal 1, PT) 1 week  -TW      Progress/Outcomes (Bed Mobility Goal 1, PT) goal not met  -TW         Transfer Goal 1 (PT)    Activity/Assistive Device (Transfer Goal 1, PT) sit-to-stand/stand-to-sit;bed-to-chair/chair-to-bed;walker, rolling  -TW      [place] Level/Cues Needed (Transfer Goal 1, PT) supervision required  -TW      Time Frame (Transfer Goal 1, PT) 1 week  -TW      Progress/Outcome (Transfer Goal 1, PT) goal met  -TW         Gait Training Goal 1 (PT)    Activity/Assistive Device (Gait Training Goal 1, PT) gait (walking locomotion);assistive device use;increase endurance/gait distance;walker, rolling;decrease fall risk   or least restrictive to no  device  -TW      [place] Level (Gait Training Goal 1, PT) supervision required  -TW      Distance (Gait Goal 1, PT) 200  -TW      Time Frame (Gait Training Goal 1, PT) 1 week  -TW      Progress/Outcome (Gait Training Goal 1, PT) goal met  -TW         Stairs Goal 1 (PT)    Activity/Assistive Device (Stairs Goal 1, PT) ascending stairs;descending stairs;decrease fall risk;using handrail, right  -TW      [place] Level/Cues Needed (Stairs Goal 1, PT) contact guard assist  -TW      Number of Stairs (Stairs Goal 1, PT) 1  -TW      Time Frame (Stairs Goal 1, PT) 1 week  -TW      Progress/Outcome (Stairs Goal 1, PT) goal met  -TW         Patient Education Goal (PT)    Activity (Patient Education Goal, PT) HEP  -TW      [place]/Cues/Accuracy (Memory Goal 2, PT) demonstrates adequately;verbalizes understanding  -TW      Time Frame (Patient Education Goal, PT) 1 week  -TW      Progress/Outcome (Patient Education Goal, PT) goal met  -TW        User Key  (r) = Recorded By, (t) = Taken By, (c) = Cosigned By    Initials Name Provider Type Discipline    TW Lj Gross, PTA Physical Therapy Assistant PT          Physical Therapy Education     Title: PT OT SLP Therapies (Active)     Topic: Physical Therapy (Active)     Point: Mobility training (Done)    Learning Progress Summary     Learner Status Readiness Method Response Comment Documented by    Patient Done Acceptance E ZHAO  LEANDER 08/14/18 1305     Active Acceptance E NR  LEANDER 08/10/18 1151     Active Acceptance E NR  LEANDER 08/08/18 1241     Active Acceptance E NR  LEANDER 08/06/18 1555     Active Acceptance E,D NR  CB 08/03/18 1127          Point: Home exercise program (Done)    Learning Progress Summary     Learner Status Readiness Method Response Comment Documented by    Patient Done Acceptance H HUE CHURCHILL  LEANDER 08/12/18 1512     Active Acceptance E NR  LEANDER 08/08/18 1241          Point: Precautions (Active)    Learning Progress Summary     Learner Status Readiness Method  Response Comment Documented by    Patient Active Acceptance E NR   08/08/18 1241     Active Acceptance E,D NR   08/03/18 1127                      User Key     Initials Effective Dates Name Provider Type Discipline     04/06/17 -  Lupe Grossman, PT Physical Therapist PT     03/07/18 -  Reinier Hannah, PTA Physical Therapy Assistant PT                    PT Recommendation and Plan  Anticipated Discharge Disposition (PT): home with home health  Outcome Summary/Treatment Plan (PT)  Daily Summary of Progress (PT): progress toward functional goals as expected  Barriers to Overall Progress (PT): Pain  Plan for Continued Treatment (PT): Cont   Anticipated Discharge Disposition (PT): home with home health  Plan of Care Reviewed With: patient, sibling  Progress: improving  Outcome Summary: Pt cont to have pain on left side just underneath L UE but able to amb with str cane for 444ft with no LOB noted. Pt able to perform step training with min SOA but O2 sats remained well within good range.          Outcome Measures     Row Name 08/15/18 1304 08/15/18 1300 08/14/18 1300       How much help from another person do you currently need...    Turning from your back to your side while in flat bed without using bedrails? 3  -TW  --  --    Moving from lying on back to sitting on the side of a flat bed without bedrails? 3  -TW  --  --    Moving to and from a bed to a chair (including a wheelchair)? 4  -TW  --  --    Standing up from a chair using your arms (e.g., wheelchair, bedside chair)? 4  -TW  --  --    Climbing 3-5 steps with a railing? 3  -TW  --  --    To walk in hospital room? 3  -TW  --  --    AM-PAC 6 Clicks Score 20  -TW  --  --       How much help from another is currently needed...    Putting on and taking off regular lower body clothing?  -- 2  -CS 2  -CS    Bathing (including washing, rinsing, and drying)  -- 2  -CS 2  -CS    Toileting (which includes using toilet bed pan or urinal)  -- 2  -CS 2  -CS     Putting on and taking off regular upper body clothing  -- 2  -CS 2  -CS    Taking care of personal grooming (such as brushing teeth)  -- 2  -CS 2  -CS    Eating meals  -- 4  -CS 4  -CS    Score  -- 14  -CS 14  -CS       Functional Assessment    Outcome Measure Options AM-PAC 6 Clicks Basic Mobility (PT)  -TW AM-PAC 6 Clicks Daily Activity (OT)  -CS AM-PAC 6 Clicks Daily Activity (OT)  -CS    Row Name 08/14/18 1059 08/13/18 1330 08/13/18 0900       How much help from another person do you currently need...    Turning from your back to your side while in flat bed without using bedrails? 3  -LEANDER 3  -TW  --    Moving from lying on back to sitting on the side of a flat bed without bedrails? 3  -LEANDER 3  -TW  --    Moving to and from a bed to a chair (including a wheelchair)? 4  -LEANDER 3  -TW  --    Standing up from a chair using your arms (e.g., wheelchair, bedside chair)? 4  -LEANDER 3  -TW  --    Climbing 3-5 steps with a railing? 3  -LEANDER 3  -TW  --    To walk in hospital room? 3  -LEANDER 3  -TW  --    AM-PAC 6 Clicks Score 20  -LEANDER 18  -TW  --       How much help from another is currently needed...    Putting on and taking off regular lower body clothing?  --  -- 2  -KD    Bathing (including washing, rinsing, and drying)  --  -- 2  -KD    Toileting (which includes using toilet bed pan or urinal)  --  -- 2  -KD    Putting on and taking off regular upper body clothing  --  -- 2  -KD    Taking care of personal grooming (such as brushing teeth)  --  -- 2  -KD    Eating meals  --  -- 4  -KD    Score  --  -- 14  -KD       Functional Assessment    Outcome Measure Options AM-PAC 6 Clicks Basic Mobility (PT)  -LEANDER AM-PAC 6 Clicks Basic Mobility (PT)  -TW  --    Row Name 08/12/18 1406             How much help from another person do you currently need...    Turning from your back to your side while in flat bed without using bedrails? 3  -LEANDER      Moving from lying on back to sitting on the side of a flat bed without bedrails? 3  -LEANDER      Moving to  and from a bed to a chair (including a wheelchair)? 3  -LEANDER      Standing up from a chair using your arms (e.g., wheelchair, bedside chair)? 3  -LEANDER      Climbing 3-5 steps with a railing? 3  -LEANDER      To walk in hospital room? 3  -LEANDER      AM-PAC 6 Clicks Score 18  -LEANDER         Functional Assessment    Outcome Measure Options AM-PAC 6 Clicks Basic Mobility (PT)  -LEANDER        User Key  (r) = Recorded By, (t) = Taken By, (c) = Cosigned By    Initials Name Provider Type    Reinier Martin, MARGI Physical Therapy Assistant    TW Lj Gross, MARGI Physical Therapy Assistant    Adrianna Bryant, SWARTZ/L Occupational Therapy Assistant    Ileana Maya, SWARTZ/L Occupational Therapy Assistant           Time Calculation:         PT Charges     Row Name 08/15/18 1347             Time Calculation    Start Time 1304  -TW      Stop Time 1329  -TW      Time Calculation (min) 25 min  -TW      PT Received On 08/15/18  -TW      PT Goal Re-Cert Due Date 08/16/18  -TW         Time Calculation- PT    Total Timed Code Minutes- PT 25 minute(s)  -TW        User Key  (r) = Recorded By, (t) = Taken By, (c) = Cosigned By    Initials Name Provider Type    Lj Wong PTA Physical Therapy Assistant        Therapy Suggested Charges     Code   Minutes Charges    None           Therapy Charges for Today     Code Description Service Date Service Provider Modifiers Qty    70305399052 HC GAIT TRAINING EA 15 MIN 8/15/2018 Lj Gross PTA GP 1    34000338105 HC PT THERAPEUTIC ACT EA 15 MIN 8/15/2018 Lj Gross PTA GP 1          PT G-Codes  PT Professional Judgement Used?: Yes  Outcome Measure Options: AM-PAC 6 Clicks Basic Mobility (PT)  Score: 11  Functional Limitation: Mobility: Walking and moving around  Mobility: Walking and Moving Around Current Status (): At least 60 percent but less than 80 percent impaired, limited or restricted  Mobility: Walking and Moving Around Goal Status (): At least 40  percent but less than 60 percent impaired, limited or restricted    Lj Gross, PTA  8/15/2018

## 2018-08-15 NOTE — DISCHARGE PLACEMENT REQUEST
"Truman Esquivel (57 y.o. Male)     Date of Birth Social Security Number Address Home Phone MRN    1960  [address] [facility] [address] [id number] [phone] [id number]    Yazdanism Marital Status          Yazdanism        Admission Date Admission Type Admitting Provider Attending Provider Department, Room/Bed    7/26/18 Emergency Kris Solano MD Armstrong, James Edward, MD [address], 364/1    Discharge Date Discharge Disposition Discharge Destination                       Attending Provider:  Kris Solano MD    Allergies:  Chlorhexidine, Co Q 10 [Coenzyme Q10], Eggs Or Egg-derived Products, Erythromycin, Gabapentin, Other, Penicillins, Tetracyclines & Related, Acth [Corticotropin], Cephalosporins, Keflex [Cephalexin], Neomycin    Isolation:  None   Infection:  None   Code Status:  CPR    Ht:  170.2 cm (67.01\")   Wt:  104 kg (229 lb 4.5 oz)    Admission Cmt:  None   Principal Problem:  Splenic hemorrhage [D73.89] More...                 Active Insurance as of 7/26/2018     Primary Coverage     Payor Plan Insurance Group Employer/Plan Group    PASSPORT PASSPORT MEDICAID     Payor Plan Address Payor Plan Phone Number Effective From Effective To    [address] [phone] 11/1/1997     [facility] [id number]       Subscriber Name Subscriber Birth Date Member ID       TRUMAN ESQUIVEL 1960 [id number]                 Emergency Contacts      (Rel.) Home Phone Work Phone Mobile Phone    Yolanda Esquivel (Mother) [phone] -- [phone]    Blanca Esquivel (Daughter) [phone] -- [phone]    Yolanda Diaz (Sister) [phone] -- --        Corazon Loomis RN [facility]  [phone] phone  [phone] fax    [address] [address]  Dept. Phone:  [phone]  Dept. Fax:   Date Ordered: Aug 15, 2018         Patient:  Truman Esquivel MRN:  " "[id number]   [address]  [facility] [address] :  1960  SSN:    Phone: [phone] Sex:  M     Weight: 104 kg (229 lb 4.5 oz)         Ht Readings from Last 1 Encounters:   18 170.2 cm (67.01\")         Miscellaneous DME   (Order ID: [id number])    Diagnosis:  Splenic hemorrhage (D73.89 [ICD-10-CM] 289.59 [ICD-9-CM])   Quantity:  1     Type of DME: Please supply patient with hospital bed for home use.  Length of Need (99 Months = Lifetime): 6 Months        Authorizing Provider's Phone: [phone]         Authorizing Provider:Kris Solano MD  Authorizing Provider's NPI: [id number]  Order Entered By: Kris Solano MD 8/15/2018 11:11 AM     Electronically signed by: Kris Solano MD 8/15/2018 11:11 AM                History & Physical      Dianne Hickey MD at 2018  3:23 PM                [facility] Admission      Date of Admission: 2018      Primary Care Physician: Marybeth Harrison DO      Chief Complaint: abdominal pain     HPI:This is a 57 y old male with hx of stage 3 squamous cell laryngeal cancer  Who had his last chemotherapy in May 2018 who started having some abdominal pain a day ago . Today he passed out at home he was brought to the ER where he was found to be hypotensive and act of the chest and abdomen showed a large sucapsular splenic hematoma with large amount of free abdominal fluid that is probably hemorrhagic ascites ,he also had a large left pleural effusion .  He was  Also found to be septic with an elevated lactic acidosis . He was taking plavix at home . He denies any fall at home   He denies fever but had some chills last night.  No chest pain no nausea or vomiting    He was started on a pressor in the Er ,PRBC were ordered . Was started on iv antibiotics   gereral surgery was called for  Possible splenectomy     Past Medical History:  has a past medical history of Aortic " dissection (CMS/HCC); Chest pain; Disease of thyroid gland; HTN (hypertension); Knee pain; Sleep apnea; Smoker; Squamous cell carcinoma of larynx (CMS/HCC) (2/5/2018); Stroke (CMS/[facility]); and Swallowing difficulty.    Past Surgical History:  has a past surgical history that includes Ascending Arch/Hemiarch Replacement (N/A, 2/14/2017); Bronchoscopy (N/A, 2/17/2017); Aorta surgery; Tracheostomy (02/05/2018); Tracheostomy tube placement (N/A, 2/5/2018); direct laryngoscopy, esophagoscopy, bronchoscopy (N/A, 2/5/2018); Venous Access Device (Port) (N/A, 3/7/2018); Colonoscopy (N/A, 3/7/2018); and Esophagogastroduodenoscopy w/ PEG (N/A, 5/2/2018).    Family History: family history includes Hypertension in his father, mother, and other; Thyroid disease in his mother.    Social History:  reports that he quit smoking about 17 months ago. He has a 47.50 pack-year smoking history. He has never used smokeless tobacco. He reports that he does not drink alcohol or use drugs.    Allergies:   Allergies   Allergen Reactions   • Chlorhexidine Itching     Topical cleaning wipes causes severe skin irritation   • Co Q 10 [Coenzyme Q10] Itching   • Eggs Or Egg-Derived Products Swelling   • Erythromycin Other (See Comments)     Joint pains and cold sweats   • Gabapentin Itching   • Other GI Intolerance     Mycins  farzad lotion causes rash   • Penicillins      Tolerated cefepime during 2/2017 admission. Had rash and itching (relieved by benadryl) after few doses of cefepime and cefepime was continued.   • Tetracyclines & Related GI Intolerance   • Acth [Corticotropin] Rash   • Cephalosporins Itching and Rash     Pt developed rash, itching after cefepime administration (2-3 doses) during 2/2017 admission. Itching was relieved with benadryl. Cefepime was continued because his infection was improving and no symptoms of anaphylaxis present   • Keflex [Cephalexin] Rash   • Neomycin Rash       Medications:   Prescriptions Prior to Admission    Medication Sig Dispense Refill Last Dose   • Cholecalciferol 21240 units tablet 50 thousand units po q  month 3 tablet 3    • clopidogrel (PLAVIX) 75 MG tablet Take 75 mg by mouth Daily. Last dose approx greater than one month ago   Taking   • docusate sodium (COLACE) 100 MG capsule Take 1 capsule by mouth 2 (Two) Times a Day As Needed for Constipation. 60 capsule 2 Taking   • levothyroxine (SYNTHROID) 75 MCG tablet Take 1 tab daily Monday to Saturday and 1.5 on Sunday 32 tablet 11    • losartan-hydrochlorothiazide (HYZAAR) 50-12.5 MG per tablet Take 1 tablet by mouth Daily. 30 tablet 12 Taking   • metoprolol succinate XL (TOPROL-XL) 25 MG 24 hr tablet Take 1 tablet by mouth Every Night. Patient states he hasn't been taking this week, states he thinks he is hypotensive 30 tablet 12 Taking   • Multiple Vitamins-Minerals (MULTIVITAMIN ADULT PO) Take 1 tablet by mouth Daily.   Taking   • mupirocin (BACTROBAN) 2 % ointment Apply  topically 2 (Two) Times a Day. 30 g 2 Taking   • ondansetron (ZOFRAN) 4 MG tablet Take 1 tablet by mouth 3 (Three) Times a Day As Needed for Nausea or Vomiting. 40 tablet 3 Taking   • sodium chloride (NS) 0.9 % irrigation USE FOR TRACHEOSTOMY AS DIRECTED 1000 mL 8 Taking   • triamcinolone (KENALOG) 0.1 % cream Apply  topically 2 (Two) Times a Day. 45 g 2 Taking   • vitamin B-12 (CYANOCOBALAMIN) 100 MCG tablet Take 100 mcg by mouth Daily.   Taking   • vitamin E 100 UNIT capsule Take 400 Units by mouth Every Night.   Taking   • Zinc 50 MG capsule Take 50 mg by mouth Every Night.   Taking       Review of Systems:  Review of Systems   Constitutional: Positive for chills.   Gastrointestinal: Positive for abdominal pain.   Skin: Positive for pallor.   All other systems reviewed and are negative.     Otherwise complete ROS is negative except as mentioned above.    Physical Exam:   Temp:  [92.4 °F (33.6 °C)-95.4 °F (35.2 °C)] 95.4 °F (35.2 °C)  Heart Rate:  [65-82] 66  Resp:  [16-22] 20  BP:  ()/(34-59) 124/56  FiO2 (%):  [50 %-60 %] 50 %  Physical Exam   Constitutional: He is oriented to person, place, and time. He appears well-developed and well-nourished.   HENT:   Head: Normocephalic and atraumatic.   Mouth/Throat: Mucous membranes are dry.   Eyes: Pupils are equal, round, and reactive to light. EOM are normal.   Neck: Normal range of motion. Neck supple.   Cardiovascular: Normal rate and regular rhythm.    Pulmonary/Chest: Effort normal and breath sounds normal.   Abdominal: Soft. There is tenderness.   Neurological: He is alert and oriented to person, place, and time.   Skin: There is pallor.   Psychiatric: He has a normal mood and affect. His behavior is normal. Judgment and thought content normal.         Results Reviewed:  I have personally reviewed current lab, radiology, and data and agree with results.  Lab Results (last 24 hours)     Procedure Component Value Units Date/Time    Extra Tubes [[id number]] Collected:  07/26/18 1358    Specimen:  Blood from Blood, Venous Line Updated:  07/26/18 1500    Narrative:       The following orders were created for panel order Extra Tubes.  Procedure                               Abnormality         Status                     ---------                               -----------         ------                     Light Blue Top[[id number]]                                   Final result                 Please view results for these tests on the individual orders.    Light Blue Top [[id number]] Collected:  07/26/18 1358    Specimen:  Blood Updated:  07/26/18 1500     Extra Tube hold for add-on     Comment: Auto resulted       Blood Gas, Arterial [[id number]]  (Abnormal) Collected:  07/26/18 1425    Specimen:  Arterial Blood Updated:  07/26/18 1439     Site Arterial Line     Ravi's Test N/A     pH, Arterial 7.357 pH units      pCO2, Arterial 40.8 mm Hg      pO2, Arterial 117.0 (H) mm Hg      HCO3, Arterial 22.8 mmol/L      Base Excess, Arterial -2.5 (L)  mmol/L      O2 Saturation, Arterial 99.1 (H) %      Barometric Pressure for Blood Gas 747 mmHg      Modality N/A     FIO2 60 %      Ventilator Mode AC     Set Tidal Volume 600     Set Mech Resp Rate 10.0     PEEP 5.0     Collected by kya    Troponin [[id number]]  (Normal) Collected:  07/26/18 1358    Specimen:  Blood Updated:  07/26/18 1438     Troponin I 0.021 ng/mL     Lactic Acid, Plasma [[id number]]  (Abnormal) Collected:  07/26/18 1358    Specimen:  Blood Updated:  07/26/18 1438     Lactate 5.2 (C) mmol/L     Comprehensive Metabolic Panel [[id number]]  (Abnormal) Collected:  07/26/18 1358    Specimen:  Blood Updated:  07/26/18 1426     Glucose 133 (H) mg/dL      BUN 27 (H) mg/dL      Creatinine 1.30 mg/dL      Sodium 128 (L) mmol/L      Potassium 5.0 mmol/L      Chloride 95 mmol/L      CO2 22.0 mmol/L      Calcium 7.1 (L) mg/dL      Total Protein 4.0 (L) g/dL      Albumin 2.00 (L) g/dL      ALT (SGPT) 42 U/L      AST (SGOT) 28 U/L      Alkaline Phosphatase 54 U/L      Total Bilirubin 1.2 mg/dL      eGFR Non African Amer 57 mL/min/1.73      Globulin 2.0 (L) gm/dL      A/G Ratio 1.0 (L) g/dL      BUN/Creatinine Ratio 20.8     Anion Gap 11.0 mmol/L     Magnesium [[id number]]  (Normal) Collected:  07/26/18 1358    Specimen:  Blood Updated:  07/26/18 1425     Magnesium 1.9 mg/dL     Phosphorus [[id number]]  (Abnormal) Collected:  07/26/18 1358    Specimen:  Blood Updated:  07/26/18 1425     Phosphorus 4.8 (H) mg/dL     CBC (No Diff) [[id number]]  (Abnormal) Collected:  07/26/18 1358    Specimen:  Blood Updated:  07/26/18 1419     WBC 30.81 (H) 10*3/mm3      RBC 2.73 (L) 10*6/mm3      Hemoglobin 7.8 (L) g/dL      Comment: RN expected results. Pt has been to surgery and will receive plts when available.         Hematocrit 22.1 (L) %      MCV 81.0 fL      MCH 28.6 pg      MCHC 35.3 g/dL      RDW 15.6 (H) %      RDW-SD 46.1 (H) fl      MPV 10.1 fL      Platelets 217 10*3/mm3     Tissue Pathology Exam [[id number]] Collected:   07/26/18 1243    Specimen:  Tissue from Spleen Updated:  07/26/18 1334    Blood Gas, Arterial With Co-Ox [[id number]]  (Abnormal) Collected:  07/26/18 1211    Specimen:  Arterial Blood Updated:  07/26/18 1215     Site Arterial Line     Ravi's Test N/A     pH, Arterial 7.203 (L) pH units      pCO2, Arterial 39.8 mm Hg      pO2, Arterial 349.0 (H) mm Hg      HCO3, Arterial 15.7 (L) mmol/L      Base Excess, Arterial -11.5 (L) mmol/L      O2 Saturation, Arterial >100.0 (H) %      Hemoglobin, Blood Gas 8.3 (L) g/dL      Hematocrit, Blood Gas 25.4 (L) %      Oxyhemoglobin >98.5 %      Methemoglobin 0.40 %      Carboxyhemoglobin 1.1 %      Sodium, Arterial 122 (L) mmol/L      Potassium, Arterial 5.1 (H) mmol/L      Ionized Calcium >7.31 (H) mg/dL      Glucose, Arterial 156 (H) mmol/L      Barometric Pressure for Blood Gas 748 mmHg      Modality N/A     Ventilator Mode NA     Collected by or     pH, Temp Corrected -- pH Units      pCO2, Temperature Corrected -- mm Hg      pO2, Temperature Corrected -- mm Hg     Lactic Acid, Reflex [[id number]]  (Abnormal) Collected:  07/26/18 1032    Specimen:  Blood Updated:  07/26/18 1053     Lactate 10.1 (C) mmol/L     Blood Culture - Blood, [[id number]] Collected:  07/26/18 1033    Specimen:  Blood from Wrist, Right Updated:  07/26/18 1033    Urinalysis, Microscopic Only - Urine, Clean Catch [[id number]]  (Abnormal) Collected:  07/26/18 0933    Specimen:  Urine from Urine, Clean Catch Updated:  07/26/18 1013     RBC, UA None Seen /HPF      WBC, UA 0-2 /HPF      Bacteria, UA 2+ (A) /HPF      Squamous Epithelial Cells, UA 3-5 (A) /HPF      Transitional Epithelial Cells, UA 7-12 (A) /HPF      Hyaline Casts, UA 3-6 /LPF      Amorphous Urate Crystals, UA Large/3+ /HPF      Methodology Manual Light Microscopy    Lactic Acid, Reflex Timer (This will reflex a repeat order 3-3:15 hours after ordered.) [[id number]] Collected:  07/26/18 0620    Specimen:  Blood from Arm, Right Updated:  07/26/18 1000      Extra Tube Hold for add-ons.     Comment: Auto resulted.       Urinalysis With Microscopic If Indicated (No Culture) - Urine, Clean Catch [[id number]]  (Abnormal) Collected:  07/26/18 0933    Specimen:  Urine from Urine, Clean Catch Updated:  07/26/18 0946     Color, UA Dark Yellow     Appearance, UA Turbid (A)     pH, UA 5.5     Specific Gravity, UA 1.020     Glucose, UA Negative     Ketones, UA Negative     Bilirubin, UA Negative     Blood, UA Negative     Protein,  mg/dL (2+) (A)     Leuk Esterase, UA Negative     Nitrite, UA Negative     Urobilinogen, UA 1.0 E.U./dL    Comprehensive Metabolic Panel [[id number]]  (Abnormal) Collected:  07/26/18 0620    Specimen:  Blood from Arm, Right Updated:  07/26/18 0836     Glucose 187 (H) mg/dL      BUN 28 (H) mg/dL      Creatinine 1.51 (H) mg/dL      Sodium 123 (L) mmol/L      Potassium 4.3 mmol/L      Chloride 84 (L) mmol/L      CO2 23.0 mmol/L      Calcium 8.4 mg/dL      Total Protein 6.0 (L) g/dL      Albumin 3.30 (L) g/dL      ALT (SGPT) 48 U/L      AST (SGOT) 30 U/L      Alkaline Phosphatase 116 U/L      Total Bilirubin 0.6 mg/dL      eGFR Non African Amer 48 (L) mL/min/1.73      Globulin 2.7 gm/dL      A/G Ratio 1.2 g/dL      BUN/Creatinine Ratio 18.5     Anion Gap 16.0 (H) mmol/L     Troponin [[id number]]  (Normal) Collected:  07/26/18 0757    Specimen:  Blood Updated:  07/26/18 0832     Troponin I <0.012 ng/mL     Blood Culture - Blood, [[id number]] Collected:  07/26/18 0757    Specimen:  Blood from Arm, Left Updated:  07/26/18 0757    Blood Gas, Arterial [[id number]]  (Abnormal) Collected:  07/26/18 0750    Specimen:  Arterial Blood Updated:  07/26/18 0753     Site Arterial Line     Ravi's Test N/A     pH, Arterial 7.478 (H) pH units      pCO2, Arterial 30.0 (L) mm Hg      pO2, Arterial 128.0 (H) mm Hg      HCO3, Arterial 22.3 mmol/L      Base Excess, Arterial -1.1 (L) mmol/L      O2 Saturation, Arterial 99.6 (H) %      Barometric Pressure for Blood Gas 748  mmHg      Modality Nasal Cannula     Flow Rate 2.0 lpm      Ventilator Mode NA     Collected by     CK-MB [[id number]]  (Normal) Collected:  07/26/18 0628    Specimen:  Blood Updated:  07/26/18 0748     CKMB 0.92 ng/mL     aPTT [[id number]]  (Normal) Collected:  07/26/18 0628    Specimen:  Blood Updated:  07/26/18 0739     PTT 35.6 seconds     Narrative:       The recommended Heparin therapeutic range is 68-97 seconds.    Protime-INR [[id number]]  (Abnormal) Collected:  07/26/18 0628    Specimen:  Blood Updated:  07/26/18 0739     Protime 19.5 (H) Seconds      INR 1.71 (H)    Narrative:       Therapeutic range for most indications is 2.0-3.0 INR,  or 2.5-3.5 for mechanical heart valves.    Lipase [[id number]]  (Normal) Collected:  07/26/18 0628    Specimen:  Blood Updated:  07/26/18 0738     Lipase 66 U/L     CK [[id number]]  (Abnormal) Collected:  07/26/18 0628    Specimen:  Blood Updated:  07/26/18 0738     Creatine Kinase 26 (L) U/L     Extra Tubes [[id number]] Collected:  07/26/18 0628    Specimen:  Blood from Blood, Venous Line Updated:  07/26/18 0730    Narrative:       The following orders were created for panel order Extra Tubes.  Procedure                               Abnormality         Status                     ---------                               -----------         ------                     Light Blue Top[[id number]]                                   Final result               Gold Top - SST[[id number]]                                   Final result                 Please view results for these tests on the individual orders.    Light Blue Top [[id number]] Collected:  07/26/18 0628    Specimen:  Blood Updated:  07/26/18 0730     Extra Tube hold for add-on     Comment: Auto resulted       Gold Top - SST [[id number]] Collected:  07/26/18 0628    Specimen:  Blood Updated:  07/26/18 0730     Extra Tube Hold for add-ons.     Comment: Auto resulted.       CBC & Differential [[id number]] Collected:  07/26/18 0620     Specimen:  Blood Updated:  07/26/18 0705    Narrative:       The following orders were created for panel order CBC & Differential.  Procedure                               Abnormality         Status                     ---------                               -----------         ------                     Scan Slide[[id number]]                                       Final result               CBC Auto Differential[[id number]]        Abnormal            Final result                 Please view results for these tests on the individual orders.    Scan Slide [[id number]] Collected:  07/26/18 0620    Specimen:  Blood from Arm, Right Updated:  07/26/18 0705     Anisocytosis Slight/1+     Hypochromia Slight/1+     Ovalocytes Slight/1+     WBC Morphology Normal     Platelet Estimate Adequate    BNP [[id number]]  (Abnormal) Collected:  07/26/18 0620    Specimen:  Blood from Arm, Right Updated:  07/26/18 0700     proBNP 3,400.0 (H) pg/mL     Troponin [[id number]]  (Normal) Collected:  07/26/18 0620    Specimen:  Blood from Arm, Right Updated:  07/26/18 0700     Troponin I <0.012 ng/mL     Lactic Acid, Plasma [[id number]]  (Abnormal) Collected:  07/26/18 0620    Specimen:  Blood from Arm, Right Updated:  07/26/18 0654     Lactate 5.3 (C) mmol/L     Basic Metabolic Panel [[id number]]  (Abnormal) Collected:  07/26/18 0620    Specimen:  Blood from Arm, Right Updated:  07/26/18 0651     Glucose 185 (H) mg/dL      BUN 28 (H) mg/dL      Creatinine 1.47 (H) mg/dL      Sodium 124 (L) mmol/L      Potassium 4.2 mmol/L      Chloride 85 (L) mmol/L      CO2 24.0 mmol/L      Calcium 8.2 (L) mg/dL      eGFR Non African Amer 49 (L) mL/min/1.73      BUN/Creatinine Ratio 19.0     Anion Gap 15.0 mmol/L     CBC Auto Differential [[id number]]  (Abnormal) Collected:  07/26/18 0620    Specimen:  Blood from Arm, Right Updated:  07/26/18 0633     WBC 35.55 (H) 10*3/mm3      RBC 3.25 (L) 10*6/mm3      Hemoglobin 9.0 (L) g/dL      Hematocrit 26.2 (L) %       MCV 80.6 fL      MCH 27.7 pg      MCHC 34.4 g/dL      RDW 15.7 (H) %      RDW-SD 46.5 (H) fl      MPV 10.4 fL      Platelets 388 10*3/mm3      Neutrophil % 88.5 (H) %      Lymphocyte % 4.1 (L) %      Monocyte % 5.4 %      Eosinophil % 0.7 %      Basophil % 0.1 %      Immature Grans % 1.2 (H) %      Neutrophils, Absolute 31.46 (H) 10*3/mm3      Lymphocytes, Absolute 1.44 10*3/mm3      Monocytes, Absolute 1.93 (H) 10*3/mm3      Eosinophils, Absolute 0.24 10*3/mm3      Basophils, Absolute 0.04 10*3/mm3      Immature Grans, Absolute 0.44 (H) 10*3/mm3         Imaging Results (last 24 hours)     Procedure Component Value Units Date/Time    XR Chest 1 View [[id number]] Collected:  07/26/18 1340     Updated:  07/26/18 1414    Narrative:       PROCEDURE: Chest, AP portable at 1:40 PM.    INDICATION: Check Tracheostomy tube position.    COMPARISON: 7/26/2018 exam earlier the same date at 6:24 AM.    Tracheostomy tube stable position tip about 4.5 cm above the  yonathan. Right jugular central line tip stable position overlying  mid SVC. Left subclavian port tip at the junction of the left  innominate and SVC.    Mild cardiomegaly with normal vascularity.    Interval decrease size of the large left pleural effusion  effusion with the small residual. Persistent volume loss and  infiltrate left lower lobe. There is a new left chest tube in  place tip overlying the medial aspect left lower hemithorax. No  pneumothorax.    Right lung clear and right CP angle sharp.      Impression:       New left chest tube tip over the medial lower left  hemithorax.    Decreased left pleural effusion with small residual. No  pneumothorax.    Left lower lobe volume loss and infiltrate.    Stable positions of the tracheostomy tube, right jugular central  line and left subclavian port.    [id number]    Electronically signed by:  Rodrigue Block MD  7/26/2018 2:13  PM CDT [facility]station: [email or web address]    CT Chest With Contrast [[id number]] Collected:  07/26/18  0828     Updated:  07/26/18 0918    Narrative:       CT chest, abdomen, pelvis with contrast.       CLINICAL INDICATION: Sepsis, and anemia. History of laryngeal  cancer, tracheostomy. History of aortic dissection and corrective  surgery.    COMPARISON: CT July 3, 2018.       TECHNIQUE: 92 mL Isovue-300 , nonionic IV contrast. Helical  scanning with axial and coronal reformations. Soft tissue, lung,  liver, and bone windows reviewed.    This exam was performed according to our departmental  dose-optimization program, which includes automated exposure  control, adjustment of the mA and/or kV according to patient size  and/or use of iterative reconstruction technique.    CHEST CT FINDINGS: Tracheostomy tube in the trachea in  satisfactory position. Midline sternotomy sutures. Surgical  correction, graft ascending aorta. An intimal flap, dissection is  noted at the aortic arch starting at the subclavian artery and  extending inferiorly down to the aortic bifurcation. Dissection,  intimal flap also extends into the left iliac artery. Dissection  extends superiorly into the left subclavian artery. Large  left-sided pleural effusion. Dense consolidation, compressive  atelectasis left lower lobe and portions of the left upper lobe.    Abdomen pelvis CT: Feeding gastrostomy tube in the stomach.  Chronic dissection abdominal aorta. True and false lumens are  appreciated. Renal arteries, superior mesenteric artery, celiac  artery are patent.    Impression:       Large subcapsular hematoma spleen causing mass impression and  medial displacement of the spleen. This is best appreciated on  series 2 images [id number].    There is ascites. There is increased CT attenuation in portions  of the ascitic fluid suggesting hemorrhagic ascitic fluid. A  Freedman catheter within the bladder. Feeding gastrostomy tube in  the stomach. Normal kidneys. Normal liver. Normal gallbladder.  Normal pancreas. No retroperitoneal adenopathy.    CONCLUSION:  Interval development of large left-sided effusion and  dense compressive atelectasis of the left lower lobe and portions  left upper lobe. Unfavorable change since prior exam.    Chronic appearing dissection with intimal flap observed, true and  false lumen starting at the level of the left subclavian artery  and extending superiorly into the left brachial artery and  inferiorly down the left iliac artery.    Evidence of surgical correction of ascending aortic dissection,  presumably surgical graft.    Large subcapsular splenic hematoma. Large amount of free  abdominal fluid, probably hemorrhagic ascites.    Electronically signed by:  Russ Orozco MD  7/26/2018 9:16 AM CDT  Workstation: [id number]    CT Abdomen Pelvis With Contrast [[id number]] Collected:  07/26/18 0828     Updated:  07/26/18 0918    Narrative:       CT chest, abdomen, pelvis with contrast.       CLINICAL INDICATION: Sepsis, and anemia. History of laryngeal  cancer, tracheostomy. History of aortic dissection and corrective  surgery.    COMPARISON: CT July 3, 2018.       TECHNIQUE: 92 mL Isovue-300 , nonionic IV contrast. Helical  scanning with axial and coronal reformations. Soft tissue, lung,  liver, and bone windows reviewed.    This exam was performed according to our departmental  dose-optimization program, which includes automated exposure  control, adjustment of the mA and/or kV according to patient size  and/or use of iterative reconstruction technique.    CHEST CT FINDINGS: Tracheostomy tube in the trachea in  satisfactory position. Midline sternotomy sutures. Surgical  correction, graft ascending aorta. An intimal flap, dissection is  noted at the aortic arch starting at the subclavian artery and  extending inferiorly down to the aortic bifurcation. Dissection,  intimal flap also extends into the left iliac artery. Dissection  extends superiorly into the left subclavian artery. Large  left-sided pleural effusion. Dense consolidation,  compressive  atelectasis left lower lobe and portions of the left upper lobe.    Abdomen pelvis CT: Feeding gastrostomy tube in the stomach.  Chronic dissection abdominal aorta. True and false lumens are  appreciated. Renal arteries, superior mesenteric artery, celiac  artery are patent.    Impression:       Large subcapsular hematoma spleen causing mass impression and  medial displacement of the spleen. This is best appreciated on  series 2 images 105-136.    There is ascites. There is increased CT attenuation in portions  of the ascitic fluid suggesting hemorrhagic ascitic fluid. A  Freedman catheter within the bladder. Feeding gastrostomy tube in  the stomach. Normal kidneys. Normal liver. Normal gallbladder.  Normal pancreas. No retroperitoneal adenopathy.    CONCLUSION: Interval development of large left-sided effusion and  dense compressive atelectasis of the left lower lobe and portions  left upper lobe. Unfavorable change since prior exam.    Chronic appearing dissection with intimal flap observed, true and  false lumen starting at the level of the left subclavian artery  and extending superiorly into the left brachial artery and  inferiorly down the left iliac artery.    Evidence of surgical correction of ascending aortic dissection,  presumably surgical graft.    Large subcapsular splenic hematoma. Large amount of free  abdominal fluid, probably hemorrhagic ascites.    Electronically signed by:  Russ Orozco MD  7/26/2018 9:16 AM CDT  Workstation: [id number]    XR Chest 1 View [[id number]] Collected:  07/26/18 0632     Updated:  07/26/18 0715    Narrative:       Exam: AP portable chest    INDICATION: Syncope    COMPARISON: 5/7/2018    FINDINGS: AP portable chest. Tracheostomy tube and left central  venous port are in place. The central venous port tip is in the  SVC. There is a large left pleural effusion present with airspace  consolidation and/or atelectasis. Mild pulmonary venous  congestion is present. The  right lung is clear. Heart is  enlarged. Status post median sternotomy      Impression:       Large left pleural effusion with airspace  consolidation and/or atelectasis.    Electronically signed by:  Shahid Madrid MD  7/26/2018 7:13 AM  CDT Workstation: [id number]            Assessment:    Hospital Problem List     * (Principal)Splenic hemorrhage    Overview Signed 7/26/2018 10:23 AM by Kris Solano MD     Added automatically from request for surgery [id number]         Syncope and collapse      septic shock   Anemia of acute blood loss   Splenic hematoma   Large left pleural effusion   Laryngeal cancer   JUAN JOSE  Hyponatremia             Plan:  -Septic shock : iv fluids ,levophed   Will monitor lactic acid  Monitor labs   Blood cultures are drawn will follow results   -Anemia of acute blood loss : will transfuse 2 units of PRBC for now   Will order 2 units  of FFP  6 units of platelets   Monitor H&H and cbc   -Splenic Hematoma: patient will get splenectomy . DR solano is on board   -JUAN JOSE vs CKD will hydrate will monitor kidney function   -Hyponatremia: probably from SIADHvs LOWvolume  Will hydrate then  Recheck na level   -Left large pleural effusion will get  Chest tube during surgery   DVTprophlaxis no anticoagulation as patient is bleeding   PPI      Patient is critical     I discussed the patients findings and my recommendations with: patient and his sister     Dianne Hickey MD  07/26/18  3:23 PM                    Electronically signed by Dianne Hickey MD at 7/26/2018  3:45 PM     Kris Solano MD at 7/26/2018 10:23 AM          CHIEF COMPLAINT:    Fainting. Abdominal pain    HISTORY OF PRESENT ILLNESS:    Truman Esquivel is a 57 y.o. male who presented to the emergency department this morning with complaints of syncope and near syncope at home.  He believes that he had at least 1 fall.  In the emergency department the patient was found to be tachycardic, hypotensive, with  elevated white count and anemia.  The patient does take Plavix at home.  He has a history of stage III laryngeal cancer for which his last dose of chemotherapy was in May.  He has tracheostomy in place from this as well.    On imaging in the emergency department today the patient was noted to have a large splenic hematoma with free fluid in the abdomen as well as near complete atelectasis of the left lung due to a new pleural effusion.    The patient has gotten multiple units of blood in the emergency department as well as 2 L of fluid.  His hemoglobin has decreased.  He continues to be hypotensive and tachycardic.    Hudson I examination the patient is awake and alert, able to answer questions appropriately.  He is accompanied by his family members.    I know the patient from prior port placement as well as colonoscopy.    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   • Stroke (CMS/HCC)    • Swallowing difficulty        Past Surgical History:   Procedure Laterality Date   • AORTA SURGERY      ruptured aorta repair   • ASCENDING ARCH/HEMIARCH REPLACEMENT N/A 2/14/2017    Procedure: INTRAOPERATIVE TRANSESOPHAGEAL ECHOCARDIOGRAM, MIDLINE STERNOTOMY, ASCENDING AORTIC  AND PROXIMAL AORTIC ARCH REPAIR WITH 26MM GRAFT, AORTIC VALVE RESUSPENSION, AORTIC ROOT REPAIR, OPEN VEIN HARVEST OF RIGHT LEG;  Surgeon: German Arreguin MD;  Location: [facility] OR;  Service:    • BRONCHOSCOPY N/A 2/17/2017    Procedure: BRONCHOSCOPY BIOPSY AT BEDSIDE WITH BAL-LEFT LOWER LOBE;  Surgeon: Trent Chaney MD;  Location: [facility] ENDOSCOPY;  Service:    • COLONOSCOPY N/A 3/7/2018    Procedure: COLONOSCOPY WITH POSSIBLE POLYPECTOMY   ( DONE IN OR WITH ENDO)      COLONOSCOPY FIRST;  Surgeon: Kris Solano MD;  Location: [facility] OR;  Service:     • DIRECT LARYNGOSCOPY, ESOPHAGOSCOPY, BRONCHOSCOPY N/A 2/5/2018    Procedure: DIRECT LARYNGOSCOPY WITH BIOPSY, ESOPHAGOSCOPY     (no bronchoscopy, no laser);  Surgeon: Joseph Venegas MD;  Location: [facility] OR;  Service:    • ENDOSCOPY W/ PEG TUBE PLACEMENT N/A 5/2/2018    Procedure: ESOPHAGOGASTRODUODENOSCOPY WITH PERCUTANEOUS ENDOSCOPIC GASTROSTOMY TUBE INSERTION;  Surgeon: Angel Maciel MD;  Location: [facility] ENDOSCOPY;  Service: Gastroenterology   • TRACHEOSTOMY  02/05/2018   • TRACHEOSTOMY N/A 2/5/2018    Procedure: TRACHEOSTOMY  local injection @ 1106 incision @ 1119;  Surgeon: Joseph Venegas MD;  Location: [facility] OR;  Service:    • VENOUS ACCESS DEVICE (PORT) INSERTION N/A 3/7/2018    Procedure: INSERTION OF MEDIPORT     (C-ARM#1);  Surgeon: Kris Solano MD;  Location: [facility] OR;  Service:        Prior to Admission medications    Medication Sig Start Date End Date Taking? Authorizing Provider   Cholecalciferol 36893 units tablet 50 thousand units po q  month 7/17/18   Kp Sellers MD   clopidogrel (PLAVIX) 75 MG tablet Take 75 mg by mouth Daily. Last dose approx greater than one month ago    Historical Provider, MD   docusate sodium (COLACE) 100 MG capsule Take 1 capsule by mouth 2 (Two) Times a Day As Needed for Constipation. 2/13/18   Reza Patel MD   levothyroxine (SYNTHROID) 75 MCG tablet Take 1 tab daily Monday to Saturday and 1.5 on Sunday 7/17/18   Kp Sellers MD   losartan-hydrochlorothiazide (HYZAAR) 50-12.5 MG per tablet Take 1 tablet by mouth Daily. 3/15/18   Liborio Chandra MD   metoprolol succinate XL (TOPROL-XL) 25 MG 24 hr tablet Take 1 tablet by mouth Every Night. Patient states he hasn't been taking this week, states he thinks he is hypotensive 3/15/18   Liborio Chandra MD   Multiple Vitamins-Minerals (MULTIVITAMIN ADULT PO) Take 1 tablet by mouth Daily.    Historical Provider, MD   mupirocin (BACTROBAN) 2 % ointment Apply   topically 2 (Two) Times a Day. 6/25/18   Joseph Venegas MD   ondansetron (ZOFRAN) 4 MG tablet Take 1 tablet by mouth 3 (Three) Times a Day As Needed for Nausea or Vomiting. 2/21/18   Reza Patel MD   sodium chloride (NS) 0.9 % irrigation USE FOR TRACHEOSTOMY AS DIRECTED 5/21/18   Joseph Venegas MD   triamcinolone (KENALOG) 0.1 % cream Apply  topically 2 (Two) Times a Day. 6/25/18   Joseph Venegas MD   vitamin B-12 (CYANOCOBALAMIN) 100 MCG tablet Take 100 mcg by mouth Daily.    Historical Provider, MD   vitamin E 100 UNIT capsule Take 400 Units by mouth Every Night.    Historical Provider, MD   Zinc 50 MG capsule Take 50 mg by mouth Every Night.    Historical Provider, MD       Allergies   Allergen Reactions   • Chlorhexidine Itching     Topical cleaning wipes causes severe skin irritation   • Co Q 10 [Coenzyme Q10] Itching   • Eggs Or Egg-Derived Products Swelling   • Erythromycin Other (See Comments)     Joint pains and cold sweats   • Gabapentin Itching   • Other GI Intolerance     Mycins  farzad lotion causes rash   • Penicillins      Tolerated cefepime during 2/2017 admission. Had rash and itching (relieved by benadryl) after few doses of cefepime and cefepime was continued.   • Tetracyclines & Related GI Intolerance   • Acth [Corticotropin] Rash   • Cephalosporins Itching and Rash     Pt developed rash, itching after cefepime administration (2-3 doses) during 2/2017 admission. Itching was relieved with benadryl. Cefepime was continued because his infection was improving and no symptoms of anaphylaxis present   • Keflex [Cephalexin] Rash   • Neomycin Rash       Family History   Problem Relation Age of Onset   • Thyroid disease Mother    • Hypertension Mother    • Hypertension Father    • Hypertension Other        Social History     Social History   • Marital status:      Spouse name: N/A   • Number of children: N/A   • Years of education: N/A     Occupational History   • Not  "on file.     Social History Main Topics   • Smoking status: Former Smoker     Packs/day: 1.25     Years: 38.00     Quit date: 2/13/2017   • Smokeless tobacco: Never Used      Comment: 02/27/2018 - Patient confirmed he ceased utilization of tobacco products 02/13/2017.   • Alcohol use No   • Drug use: No   • Sexual activity: Defer     Other Topics Concern   • Not on file     Social History Narrative   • No narrative on file       Review of Systems   Constitutional: Positive for fatigue.   Respiratory: Positive for shortness of breath.    Gastrointestinal: Positive for abdominal pain.   Neurological: Positive for dizziness, syncope, weakness and light-headedness.       Objective     BP (!) 66/34   Pulse 67   Temp 95.4 °F (35.2 °C)   Resp 20   Ht 170.2 cm (67\")   Wt 99.3 kg (218 lb 14.7 oz)   SpO2 100%   BMI 34.29 kg/m²      Physical Exam   Constitutional: He is oriented to person, place, and time. He appears distressed.   HENT:   Head: Normocephalic and atraumatic.   Nose: Nose normal.   Eyes: Conjunctivae and EOM are normal. Right eye exhibits no discharge. Left eye exhibits no discharge.   Neck: Trachea normal, normal range of motion and phonation normal. Neck supple. No JVD present. No tracheal deviation and no edema present. No thyromegaly present.       Trach in place   Cardiovascular: Normal rate, regular rhythm and normal heart sounds.  Exam reveals no gallop and no friction rub.    No murmur heard.  Pulmonary/Chest: Effort normal and breath sounds normal. No accessory muscle usage. No respiratory distress. He has no decreased breath sounds. He has no wheezes. He has no rales. He exhibits no tenderness.   Abdominal: Soft. He exhibits no distension, no fluid wave, no ascites, no pulsatile midline mass and no mass. There is no tenderness. There is no rebound and no guarding. No hernia.       Musculoskeletal: Normal range of motion. He exhibits no edema, tenderness or deformity.   Lymphadenopathy:     He " has no cervical adenopathy.        Left: No supraclavicular adenopathy present.   Neurological: He is alert and oriented to person, place, and time. He has normal strength. No cranial nerve deficit.   Skin: Skin is warm and dry. No rash noted. He is not diaphoretic. No erythema. No pallor.   Psychiatric: He has a normal mood and affect. His speech is normal and behavior is normal. Judgment and thought content normal. Cognition and memory are normal.   Vitals reviewed.      DIAGNOSTIC DATA:    CT reviewed showing splenic hematoma, free fluid, left lung collapse due to effusion    ASSESSMENT:    Splenic bleed with hemodynamic instability and large left effusion, possible blood in chest as well.    PLAN:    Continue aggressive resuscitation in the emergency department.  I have requested the hospital for platelets as the patient is on Plavix and there are no platelets currently available in this facility.  The patient is actively receiving IV fluids as well as blood.  I recommended the patient undergo urgent splenectomy as well as left chest tube placement.  He has family are agreeable to this.  He understands that his situation is extremely serious.  I anticipate he will be in the intensive care unit on the ventilator following surgery.  He will need continued aggressive fluid resuscitation.  Given that he has a PEG tube in place he likely will need a formal gastrostomy tube placed during surgery as well.  This was all discussed with he and his family.  They're agreeable with proceeding.  The risks, benefits, possible poor outcome given his overall poor health were discussed with the family.  They're agreeable proceeding with splenectomy, gastrostomy tube placement, left chest tube placement.          This document has been electronically signed by Kris Solano MD on July 26, 2018 10:24 AM        Electronically signed by Kris Solano MD at 7/26/2018 10:29 AM          Operative/Procedure Notes  (most recent note)      Joshua Pollock MD at 7/31/2018  3:08 PM             OPERATIVE NOTE  Truman Esquivel  1960  7/31/2018    PREOP DIAGNOSES:  Pleural effusion [J90]   Recent splenectomy for ruptured spleen  Repaired type A aortic dissection  Treated laryngeal carcinoma    POSTOP DIAGNOSES:  Pleural effusion [J90]    PROCEDURE:   LEFT THORACOSCOPY VIDEO ASSISTED  LEFT THORACOTOMY total lung decortication   Bronchoscopy  Negative pressure wound therapy (prevena)    SURGEON: KAY Pollock MD FACS RPVI    ASSISTANT: Madisyn Campbell CFA    ANESTHESIA: General ET     ESTIMATED BLOOD LOSS: 100 ml     COMPLICATIONS: none    DESCRIPTION OF OPERATION:   Patient taken to operating room, placed in supine position.   chronic indwelling trach removed, 6.0 endotracheal tube passed into RIGHT mainstem bronchus under bronchoscopic vision by anesthesia.  General anesthesia induced.  Radial arterial line placed by anesthesia.  Prepped draped in sterile fashion.    Patient placed in RIGHT lateral decubitus position with bean bag as axillary roll, table flexed.  Prepped draped in sterile fashion.1cm port site placed 8th interspace anterior axillary line.  Thoracoscope was introduced demonstrating advanced pleural space empyema.   lateral thoracotomy incision made thru 5th ICS.  Large amount of gelatinous material and  Pleural fluid removed, sent for cultures.  Lung trapped.  Fibrous rind removed from upper and lower lobes with currette.  Diaphragm and chest wall cleared of gradeaux.  Lung completely freed.  Pleural space irrigated with 3L warm water.     Two 24Fr multihole chest tube placed lateral and posteriorly to apex.  Lung inflated to fill space, no air leak.     Incision closed layers of #2 Vicryl pericostal sutures around the fifth rib thru the 6th rib. serratus was reapproximated using 2-0 PDS suture, 2-0 PDS in the latissimus fascia, 2-0 PDS and subcutaneous tissue and staples in  Skin.  Diffuse tissue  edema, negative pressure wound therapy with prevena. Fiberoptic bronchoscopy performed thru trach site, no endobronchial lesions, airways clear, moderate thin secretions .Tolerated procedure well transferred to PACU stable condition.          This document has been electronically signed by Joshua Pollock MD on July 31, 2018 5:55 PM        Electronically signed by Joshua Pollock MD at 7/31/2018  6:03 PM          Physician Progress Notes (most recent note)      Kris Solano MD at 8/14/2018  8:00 AM          GENERAL SURGERY PROGRESS NOTE  Chief Complaint:  Surgery Follow up   LOS: 19 days       Subjective     Interval History:     Feels well, PT has recommended home PT    Objective     Vital Signs  Temp:  [96.7 °F (35.9 °C)-97.8 °F (36.6 °C)] 96.7 °F (35.9 °C)  Heart Rate:  [53-75] 61  Resp:  [18-19] 18  BP: (118-142)/(61-71) 142/69    Physical Exam:   Abdomen soft, incision intact  Labs:  Lab Results (last 24 hours)     Procedure Component Value Units Date/Time    Fungus Culture - Tissue, Lung, L [[id number]] Collected:  07/31/18 1628    Specimen:  Tissue from Lung, L Updated:  08/14/18 1731     Fungus Culture No fungus isolated at 2 weeks    AFB Culture - Tissue, Lung, L [[id number]]  (Normal) Collected:  07/31/18 1628    Specimen:  Tissue from Lung, L Updated:  08/14/18 1731     AFB Culture No AFB isolated at 2 weeks     AFB Stain No acid fast bacilli seen on concentrated smear    POC Glucose Once [[id number]]  (Normal) Collected:  08/14/18 1621    Specimen:  Blood Updated:  08/14/18 1649     Glucose 90 mg/dL      Comment: RN NotifiedOperator: [id number] MATHEW MELISSAMeter ID: [id number]       Fungus Culture - Body Fluid, Lung, L [[id number]] Collected:  07/31/18 1625    Specimen:  Body Fluid from Lung, L Updated:  08/14/18 1645     Fungus Culture No fungus isolated at 2 weeks    AFB Culture - Body Fluid, Lung, L [[id number]]  (Normal) Collected:  07/31/18 1625    Specimen:  Body Fluid  from Lung, L Updated:  08/14/18 1645     AFB Culture No AFB isolated at 2 weeks     AFB Stain No acid fast bacilli seen on concentrated smear    POC Glucose Once [[id number]]  (Abnormal) Collected:  08/14/18 1026    Specimen:  Blood Updated:  08/14/18 1101     Glucose 147 (H) mg/dL      Comment: RN NotifiedOperator: [id number] MATHEW vogogoISSAMeter ID: [id number]       POC Glucose Once [[id number]]  (Normal) Collected:  08/14/18 0718    Specimen:  Blood Updated:  08/14/18 1054     Glucose 105 mg/dL      Comment: RN NotifiedOperator: [id number] SparkroomMeter ID: [id number]       Basic Metabolic Panel [[id number]]  (Abnormal) Collected:  08/14/18 0700    Specimen:  Blood Updated:  08/14/18 0817     Glucose 94 mg/dL      BUN 16 mg/dL      Creatinine 0.77 mg/dL      Sodium 134 (L) mmol/L      Potassium 4.1 mmol/L      Chloride 99 mmol/L      CO2 30.0 mmol/L      Calcium 8.2 (L) mg/dL      eGFR Non African Amer 104 mL/min/1.73      BUN/Creatinine Ratio 20.8     Anion Gap 5.0 mmol/L     CBC (No Diff) [[id number]]  (Abnormal) Collected:  08/14/18 0700    Specimen:  Blood Updated:  08/14/18 0813     WBC 9.41 10*3/mm3      RBC 2.94 (L) 10*6/mm3      Hemoglobin 8.1 (L) g/dL      Hematocrit 25.1 (L) %      MCV 85.4 fL      MCH 27.6 pg      MCHC 32.3 g/dL      RDW 16.7 (H) %      RDW-SD 52.4 (H) fl      MPV 9.6 fL      Platelets 579 (H) 10*3/mm3     POC Glucose Once [[id number]]  (Abnormal) Collected:  08/13/18 2119    Specimen:  Blood Updated:  08/14/18 0718     Glucose 143 (H) mg/dL      Comment: RN NotifiedOperator: [id number] AZUL Vallecillo ID: [id number]              Results Review:     Labs and imaging for today were reviewed.    Assessment/Plan     Truman Esquivel is a 57 y.o. male who is s/p splenectomy, left decortication.      Anticipate home tomorrow with home PT.          This document has been electronically signed by Kris Solano MD on August 14, 2018 6:26 PM        Kris Solano,  MD  08/14/18  6:26 PM          Electronically signed by Kris Solano MD at 8/14/2018  6:27 PM       Medical Student Notes (most recent note)     No notes of this type exist for this encounter.           Consult Notes (most recent note)      Tran Troncoso APRN at 7/30/2018  8:58 AM      Consult Orders:    1. Inpatient Cardiothoracic Surgery Consult [[id number]] ordered by Kris Solano MD at 07/30/18 0732           Attestation signed by Joshua Pollock MD at 7/30/2018 11:45 AM    Detailed discussion with Truman Esquivel regarding situation, options and plans. recurrent pleural effusion, retained hemothorax  Pulmonary resection is advisable.      Risks:  Mortality/Major morbidity 2-3%  including but not limited to infection, bleeding, transfusion, pulmonary dysfunction (prolonged air leak, prolonged mechanical ventilation, need for long term oxygen therapy), renal dysfunction.  Benefits: diagnosis and treatment, improved quality of life, survival.  Options: observation, transthoracic needle biopsy discussed.  Understands and wishes to proceed.      LEFT VAT thoracotomy, pulmonary resection, bronchoscopy.  GEN.  SAM.  SDS.  7/31/2018                      CVTS CONSULTATION  CVTS/Dr Pollock requested to see by Dr Solano regarding continued Left pleural effusion.  3  Patient Care Team:  Marybeth Harrison DO as PCP - General (Family Medicine)  Joseph Venegsa MD as Surgeon (Otolaryngology)  Reza Patel MD as Consulting Physician (Hematology and Oncology)  Jarocho Tapia MD as Consulting Physician (Radiation Oncology)  ROWAN Watkins as Nurse Practitioner (Oncology)  Sarah Beth Bright MA as Medical Assistant    Chief complaint: Mild shortness of air      Subjective     History of Present Illness:  57 y.o. male with Stage 3 squamous cell laryngeal cancer, chemotherapy in May.  Has trach and Left mediport.  Denies any falls, injury to R abdomen.  Was on  Plavix.   Presented to ED with hypotension and syncope  CT demonstrated subcapsular splenic hematoma for which he underwent (7/26/18) Splenectomy with gastrostomy.  Left large bore CT placed for Left pleural effusion (1.3L).   CTA Chest dmonstrates: Multiloculated pleural effusion on the left with near collapse and reduced aeration.  Therefore Dr Pollock is requested to see to evaluate for L VATS and related procedures.  CMH significant for Ascending Arch/Hemiarch Replacement ( 2/14/2017); Bronchoscopy (2/17/2017); Tracheostomy (02/05/2018);  direct laryngoscopy, esophagoscopy, bronchoscopy (N/A, 2/5/2018); Venous Access Device (Port) (N/A, 3/7/2018); Colonoscopy (N/A, 3/7/2018); and Esophagogastroduodenoscopy w/ PEG (N/A, 5/2/2018).      The following portions of the patient's history were reviewed and updated as appropriate: allergies, current medications, past family history, past medical history, past social history, past surgical history and problem list.  Recent images independently reviewed.  Available laboratory values reviewed.    Review of Systems   Constitutional: Positive for activity change, appetite change and fatigue. Negative for chills and fever.        Limited by communication   Nods or writes responses   HENT: Positive for congestion and voice change. Negative for facial swelling, hearing loss, mouth sores and nosebleeds.    Eyes: Negative for visual disturbance.   Respiratory: Positive for cough, chest tightness and shortness of breath. Negative for wheezing and stridor.         Having hiccups    Cardiovascular: Positive for chest pain (at CT area). Negative for palpitations and leg swelling.   Gastrointestinal: Positive for abdominal pain (surgical ).        Expelling flatus  No BM since OR reports    Genitourinary: Negative for hematuria.        Freedman catheter in place    Musculoskeletal: Positive for back pain.   Skin: Negative for color change, pallor and rash.   Allergic/Immunologic:  Positive for immunocompromised state.   Neurological: Positive for speech difficulty and weakness. Negative for dizziness and seizures.   Hematological: Bruises/bleeds easily.   Psychiatric/Behavioral: The patient is not nervous/anxious.         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   • Stroke (CMS/HCC)    • Swallowing difficulty      Past Surgical History:   Procedure Laterality Date   • AORTA SURGERY      ruptured aorta repair   • ASCENDING ARCH/HEMIARCH REPLACEMENT N/A 2/14/2017    Procedure: INTRAOPERATIVE TRANSESOPHAGEAL ECHOCARDIOGRAM, MIDLINE STERNOTOMY, ASCENDING AORTIC  AND PROXIMAL AORTIC ARCH REPAIR WITH 26MM GRAFT, AORTIC VALVE RESUSPENSION, AORTIC ROOT REPAIR, OPEN VEIN HARVEST OF RIGHT LEG;  Surgeon: German Arreguin MD;  Location: [facility] MAIN OR;  Service:    • BRONCHOSCOPY N/A 2/17/2017    Procedure: BRONCHOSCOPY BIOPSY AT BEDSIDE WITH BAL-LEFT LOWER LOBE;  Surgeon: Trent Chaney MD;  Location: [facility] ENDOSCOPY;  Service:    • COLONOSCOPY N/A 3/7/2018    Procedure: COLONOSCOPY WITH POSSIBLE POLYPECTOMY   ( DONE IN OR WITH ENDO)      COLONOSCOPY FIRST;  Surgeon: Kris Solano MD;  Location: [facility] OR;  Service:    • DIRECT LARYNGOSCOPY, ESOPHAGOSCOPY, BRONCHOSCOPY N/A 2/5/2018    Procedure: DIRECT LARYNGOSCOPY WITH BIOPSY, ESOPHAGOSCOPY     (no bronchoscopy, no laser);  Surgeon: Joseph Venegas MD;  Location: [facility] OR;  Service:    • ENDOSCOPY W/ PEG TUBE PLACEMENT N/A 5/2/2018    Procedure: ESOPHAGOGASTRODUODENOSCOPY WITH PERCUTANEOUS ENDOSCOPIC GASTROSTOMY TUBE INSERTION;  Surgeon: Angel Maciel MD;  Location: [facility] ENDOSCOPY;  Service: Gastroenterology   • TRACHEOSTOMY  02/05/2018   • TRACHEOSTOMY N/A 2/5/2018    Procedure: TRACHEOSTOMY  local injection @ 1106 incision @  1119;  Surgeon: Joseph Venegas MD;  Location: [facility];  Service:    • VENOUS ACCESS DEVICE (PORT) INSERTION N/A 3/7/2018    Procedure: INSERTION OF MEDIPORT     (C-ARM#1);  Surgeon: Kris Solano MD;  Location: [facility];  Service:      Family History   Problem Relation Age of Onset   • Thyroid disease Mother    • Hypertension Mother    • Hypertension Father    • Hypertension Other      Social History   Substance Use Topics   • Smoking status: Former Smoker     Packs/day: 1.25     Years: 38.00     Quit date: 2/13/2017   • Smokeless tobacco: Never Used      Comment: 02/27/2018 - Patient confirmed he ceased utilization of tobacco products 02/13/2017.   • Alcohol use No     Prescriptions Prior to Admission   Medication Sig Dispense Refill Last Dose   • Cholecalciferol 32056 units tablet 50 thousand units po q  month 3 tablet 3    • clopidogrel (PLAVIX) 75 MG tablet Take 75 mg by mouth Daily. Last dose approx greater than one month ago   Taking   • docusate sodium (COLACE) 100 MG capsule Take 1 capsule by mouth 2 (Two) Times a Day As Needed for Constipation. 60 capsule 2 Taking   • levothyroxine (SYNTHROID) 75 MCG tablet Take 1 tab daily Monday to Saturday and 1.5 on Sunday 32 tablet 11    • losartan-hydrochlorothiazide (HYZAAR) 50-12.5 MG per tablet Take 1 tablet by mouth Daily. 30 tablet 12 Taking   • metoprolol succinate XL (TOPROL-XL) 25 MG 24 hr tablet Take 1 tablet by mouth Every Night. Patient states he hasn't been taking this week, states he thinks he is hypotensive 30 tablet 12 Taking   • Multiple Vitamins-Minerals (MULTIVITAMIN ADULT PO) Take 1 tablet by mouth Daily.   Taking   • mupirocin (BACTROBAN) 2 % ointment Apply  topically 2 (Two) Times a Day. 30 g 2 Taking   • ondansetron (ZOFRAN) 4 MG tablet Take 1 tablet by mouth 3 (Three) Times a Day As Needed for Nausea or Vomiting. 40 tablet 3 Taking   • triamcinolone (KENALOG) 0.1 % cream Apply  topically 2 (Two) Times a Day. 45 g 2 Taking  "  • vitamin B-12 (CYANOCOBALAMIN) 100 MCG tablet Take 100 mcg by mouth Daily.   Taking   • vitamin E 100 UNIT capsule Take 400 Units by mouth Every Night.   Taking   • Zinc 50 MG capsule Take 50 mg by mouth Every Night.   Taking       chlorhexidine 15 mL Mouth/Throat Q12H   pantoprazole 40 mg Intravenous Q AM     Allergies:  Chlorhexidine; Co q 10 [coenzyme q10]; Eggs or egg-derived products; Erythromycin; Gabapentin; Other; Penicillins; Tetracyclines & related; Acth [corticotropin]; Cephalosporins; Keflex [cephalexin]; and Neomycin    Objective      Vital Signs  Temp:  [97.8 °F (36.6 °C)-98.3 °F (36.8 °C)] 98.3 °F (36.8 °C)  Heart Rate:  [] 91  BP: (131-145)/(63-75) 134/69  Arterial Line BP: (134-173)/(55-73) 153/69    Flowsheet Rows      First Filed Value   Admission Height  170.2 cm (67\") Documented at 07/26/2018 0620   Admission Weight  99.3 kg (218 lb 14.7 oz) Documented at 07/26/2018 0620        170.2 cm (67.01\")    Physical Exam   Constitutional: He is oriented to person, place, and time. He appears well-nourished. No distress.   Body mass index is 33.97 kg/m².     HENT:   Head: Normocephalic.   Trach    Eyes: Pupils are equal, round, and reactive to light. Conjunctivae are normal.   Neck: No JVD present.   R IJ    Cardiovascular: Regular rhythm and normal heart sounds.  Tachycardia present.    Pulses:       Radial pulses are 2+ on the right side, and 2+ on the left side.        Dorsalis pedis pulses are 2+ on the right side, and 2+ on the left side.        Posterior tibial pulses are 2+ on the right side, and 2+ on the left side.   Pulmonary/Chest: Effort normal. No stridor. No respiratory distress. He has no wheezes.   No cough effort  Says can not  Will theodore  Diminished BS Left  Right course   CT to -20 cm sx  thin serous sang    Abdominal: Soft. Bowel sounds are normal.   Expelling flatus  G tube  Inc    Musculoskeletal: He exhibits no edema or tenderness.   Has not been OOB since surgery  "   Neurological: He is alert and oriented to person, place, and time.   Skin: Skin is warm and dry. Capillary refill takes less than 2 seconds. No rash noted. No erythema. No pallor.   Left mediport    Psychiatric: His behavior is normal. Judgment normal.   Nursing note and vitals reviewed.      Results Review:   Lab Results (last 24 hours)     Procedure Component Value Units Date/Time    Blood Culture - Blood, [[id number]]  (Normal) Collected:  07/26/18 0757    Specimen:  Blood from Arm, Left Updated:  07/30/18 0800     Blood Culture No growth at 4 days    Comprehensive Metabolic Panel [[id number]]  (Abnormal) Collected:  07/30/18 0354    Specimen:  Blood Updated:  07/30/18 0425     Glucose 95 mg/dL      BUN 13 mg/dL      Creatinine 0.74 mg/dL      Sodium 135 (L) mmol/L      Potassium 4.2 mmol/L      Chloride 105 mmol/L      CO2 25.0 mmol/L      Calcium 7.9 (L) mg/dL      Total Protein 5.1 (L) g/dL      Albumin 2.60 (L) g/dL      ALT (SGPT) 28 U/L      AST (SGOT) 23 U/L      Alkaline Phosphatase 81 U/L      Total Bilirubin 0.8 mg/dL      eGFR Non African Amer 109 mL/min/1.73      Globulin 2.5 gm/dL      A/G Ratio 1.0 (L) g/dL      BUN/Creatinine Ratio 17.6     Anion Gap 5.0 mmol/L     Magnesium [[id number]]  (Normal) Collected:  07/30/18 0354    Specimen:  Blood Updated:  07/30/18 0425     Magnesium 1.9 mg/dL     Phosphorus [[id number]]  (Normal) Collected:  07/30/18 0354    Specimen:  Blood Updated:  07/30/18 0425     Phosphorus 3.0 mg/dL     CBC (No Diff) [[id number]]  (Abnormal) Collected:  07/30/18 0354    Specimen:  Blood Updated:  07/30/18 0417     WBC 33.16 (H) 10*3/mm3      RBC 2.95 (L) 10*6/mm3      Hemoglobin 8.3 (L) g/dL      Hematocrit 25.3 (L) %      MCV 85.8 fL      MCH 28.1 pg      MCHC 32.8 g/dL      RDW 17.0 (H) %      RDW-SD 52.7 (H) fl      MPV 9.4 fL      Platelets 415 10*3/mm3     Blood Culture - Blood, [[id number]]  (Normal) Collected:  07/26/18 1033    Specimen:  Blood from Wrist, Right Updated:   07/29/18 1045     Blood Culture No growth at 3 days        Imaging Results (last 72 hours)     Procedure Component Value Units Date/Time    CT Chest With Contrast [[id number]] Collected:  07/29/18 1530     Updated:  07/29/18 1943    Narrative:       Right-sided pleural effusion.    CT, chest with intravenous contrast.    Comparison is made with study dated July 26, 2018.    Radiation dose-limiting techniques also utilized, including  automated exposure control and adjustment of mA and/or KVP to the  patient size according to ALARA (as low as reasonably  achievable).    Isovue-300 92 milliliters injected IV.    There is tracheostomy tube. There are median sternotomy wires.  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 The cardiac silhouette  is within normal limits. There is pericardial effusion. There are  few mediastinal lymph nodes normal by size criteria. There is  multiloculated pleural effusions on the left with virtually  complete collapse of the left lung. There has been worsening of  aeration compared to the prior exam. There has been interval  placement of the left-sided chest tube. There are atelectasis in  the right lung base. There is tiny pleural effusion on the right.      There is gastrostomy tube. There is catheter seen in the left  upper abdominal quadrant. No acute bony abnormality is seen.      Impression:       CONCLUSION:   1. Multiloculated pleural effusion on the left with almost  complete collapse of the left lung. Worsening of aeration on the  left compared to the prior exam.   2. Small pleural effusion on the right and mild atelectasis in  the right lung base.    Electronically signed by:  Liborio Odom MD  7/29/2018 7:42  PM CDT Workstation: [id number]    XR Chest 1 View [[id number]] Collected:  07/29/18 0550     Updated:  07/29/18 0649    Narrative:       Exam: AP portable chest    INDICATION: Chest tube    COMPARISON:  7/27/2018    FINDINGS: AP portable chest. Tracheostomy tube right IJ venous  catheter, left central venous port and left chest tube remain in  place. There is now complete opacification of the left hemothorax  due to either worsening airspace disease atelectasis and/or  pleural fluid. Difficult to assess the heart size. Status post  median sternotomy. Right lung remains clear.      Impression:       Worsening of aeration in the left lung with complete  opacification of the current study.    Electronically signed by:  Shahid Madrid MD  7/29/2018 6:48 AM  CDT Workstation: [id number]            Assessment/Plan     Principal Problem:    Splenic hemorrhage  Active Problems:    Syncope and collapse      Assessment & Plan    Left pleural effusion persistent with CT in place:   Continue CT to negative 20  Plan LEFT VATS, drainage of Left effusion (probable hemothorax) and related procedures this week.  Discuss operative procedure with patient, risk and benefits, and he wishes to proceed.     Thank you for the opportunity to participate in this patient's care.    Copy to primary care provider.        I discussed the patients findings and my recommendations with Dr Pollock, see attestation     Tran Troncoso, ROWAN  07/30/18  8:58 AM              Electronically signed by Joshua Pollock MD at 7/30/2018 11:45 AM

## 2018-08-15 NOTE — PLAN OF CARE
Problem: Patient Care Overview  Goal: Plan of Care Review  Outcome: Ongoing (interventions implemented as appropriate)   08/15/18 1313   Coping/Psychosocial   Plan of Care Reviewed With patient   OTHER   Outcome Summary Pt tolerated tx well this date. PT was SBA with sit-stand t/f and fuctional mobility. Pt was SBA with LB dressing. Cont OT POC.   Plan of Care Review   Progress improving

## 2018-08-15 NOTE — THERAPY TREATMENT NOTE
Acute Care - Occupational Therapy Treatment Note  [facility]     Patient Name: Truman Esquivel  : 1960  MRN: [id number]  Today's Date: 8/15/2018  Onset of Illness/Injury or Date of Surgery: 18  Date of Referral to OT: 18  Referring Physician: Dr. Solano    Admit Date: 2018       ICD-10-CM ICD-9-CM   1. Syncope and collapse R55 780.2   2. Septic shock (CMS/HCC) A41.9 038.9    R65.21 785.52     995.92   3. Dissection of aorta, unspecified portion of aorta (CMS/HCC) I71.00 441.00   4. Pleural effusion J90 511.9   5. Other ascites R18.8 789.59   6. Splenic hemorrhage D73.89 289.59   7. Impaired physical mobility Z74.09 781.99   8. Impaired mobility and activities of daily living Z74.09 799.89   9. Oropharyngeal dysphagia R13.12 787.22     Patient Active Problem List   Diagnosis   • Ascending aortic dissection (CMS/HCC)   • Essential hypertension   • Deep vein thrombosis (DVT) of femoral vein of both lower extremities (CMS/HCC)   • Hypothyroidism   • Snoring   • Acute pain of right knee   • Knee effusion, right   • Knee pain   • Obstructive sleep apnea of adult   • TIA (transient ischemic attack)   • Dysphagia   • Squamous cell carcinoma of larynx (CMS/HCC)   • Pharyngeal dysphagia   • Anemia   • Abnormal positron emission tomography (PET) of colon   • Hypokalemia   • Encounter for venous access device care   • Dehydration   • Weight loss, abnormal   • Odynophagia   • Thrush, oral   • Thrombocytopenia (CMS/HCC)   • Pancytopenia due to antineoplastic chemotherapy (CMS/HCC)   • Unable to eat   • Syncope and collapse   • Splenic hemorrhage   • Pleural effusion     Past Medical History:   Diagnosis Date   • Aortic dissection (CMS/HCC)    • Chest pain    • Disease of thyroid gland    • HTN (hypertension)    • Knee pain    • Sleep apnea    • Smoker    • Squamous cell carcinoma of larynx (CMS/HCC) 2018   • Stroke (CMS/HCC)    • Swallowing difficulty      Past Surgical History:    Procedure Laterality Date   • AORTA SURGERY      ruptured aorta repair   • ASCENDING ARCH/HEMIARCH REPLACEMENT N/A 2/14/2017    Procedure: INTRAOPERATIVE TRANSESOPHAGEAL ECHOCARDIOGRAM, MIDLINE STERNOTOMY, ASCENDING AORTIC  AND PROXIMAL AORTIC ARCH REPAIR WITH 26MM GRAFT, AORTIC VALVE RESUSPENSION, AORTIC ROOT REPAIR, OPEN VEIN HARVEST OF RIGHT LEG;  Surgeon: German Arreguin MD;  Location: [facility] MAIN OR;  Service:    • BRONCHOSCOPY N/A 2/17/2017    Procedure: BRONCHOSCOPY BIOPSY AT BEDSIDE WITH BAL-LEFT LOWER LOBE;  Surgeon: Trent Chaney MD;  Location: [facility] ENDOSCOPY;  Service:    • COLONOSCOPY N/A 3/7/2018    Procedure: COLONOSCOPY WITH POSSIBLE POLYPECTOMY   ( DONE IN OR WITH ENDO)      COLONOSCOPY FIRST;  Surgeon: Kris Solano MD;  Location: [facility] OR;  Service:    • DIRECT LARYNGOSCOPY, ESOPHAGOSCOPY, BRONCHOSCOPY N/A 2/5/2018    Procedure: DIRECT LARYNGOSCOPY WITH BIOPSY, ESOPHAGOSCOPY     (no bronchoscopy, no laser);  Surgeon: Joseph Venegas MD;  Location: [facility] OR;  Service:    • ENDOSCOPY W/ PEG TUBE PLACEMENT N/A 5/2/2018    Procedure: ESOPHAGOGASTRODUODENOSCOPY WITH PERCUTANEOUS ENDOSCOPIC GASTROSTOMY TUBE INSERTION;  Surgeon: Angel Maciel MD;  Location: [facility] ENDOSCOPY;  Service: Gastroenterology   • SPLENECTOMY N/A 7/26/2018    Procedure: SPLENECTOMY, left chest tube placement, EXPLORATORY LAPAROTOMY, G-TUBE PALCEMENT;  Surgeon: Kris Solano MD;  Location: [facility] OR;  Service: General   • THORACOSCOPY Left 7/31/2018    Procedure: LEFT THORACOSCOPY VIDEO ASSISTED POSSIBLE THORACOTOMY possible decortication bronchoscopy;  Surgeon: Joshua Pollock MD;  Location: [facility] OR;  Service: Cardiothoracic   • TRACHEOSTOMY  02/05/2018   • TRACHEOSTOMY N/A 2/5/2018    Procedure: TRACHEOSTOMY  local injection @ 1106 incision @ 1119;  Surgeon: Joseph Venegas MD;  Location: [facility] OR;  Service:    • VENOUS ACCESS DEVICE (PORT) INSERTION N/A 3/7/2018     Procedure: INSERTION OF MEDIPORT     (C-ARM#1);  Surgeon: Kris Solano MD;  Location: [facility];  Service:        Therapy Treatment          Rehabilitation Treatment Summary     Row Name 08/15/18 0950             Treatment Time/Intention    Discipline occupational therapy assistant  -CS      Document Type therapy note (daily note)  -CS      Subjective Information no complaints  -CS      Mode of Treatment occupational therapy  -CS      Patient Effort good  -CS      Existing Precautions/Restrictions fall;oxygen therapy device and L/min  -CS      Recorded by [CS] Ileana Yang COTA/L 08/15/18 1305      Row Name 08/15/18 0950             Vital Signs    Pretreatment Heart Rate (beats/min) 64  -CS      Posttreatment Heart Rate (beats/min) 62  -CS      Pre SpO2 (%) 95  -CS      O2 Delivery Pre Treatment room air  -CS      Post SpO2 (%) 96  -CS      O2 Delivery Post Treatment room air  -CS      Pre Patient Position Sitting  -CS      Intra Patient Position Standing  -CS      Post Patient Position Sitting  -CS      Recorded by [CS] Ileana Yang COTA/L 08/15/18 1305      Row Name 08/15/18 0950             Cognitive Assessment/Intervention- PT/OT    Affect/Mental Status (Cognitive) WFL  -CS      Orientation Status (Cognition) oriented x 4  -CS      Follows Commands (Cognition) WFL  -CS      Cognitive Function (Cognitive) WFL  -CS      Recorded by [CS] Ileana Yang COTA/L 08/15/18 1305      Row Name 08/15/18 0950             Cognitive Assessment Intervention- SLP    Cognitive Function (Cognition) WFL  -CS      Recorded by [CS] Ileana Yang COTA/L 08/15/18 1305      Row Name 08/15/18 0950             Functional Mobility    Functional Mobility- Ind. Level supervision required  -CS      Functional Mobility- Device straight cane  -CS      Functional Mobility-Distance (Feet) 400  -CS      Recorded by [CS] Ileana Yang SWARTZ/L 08/15/18 1305      Row Name 08/15/18 0950             Sit-Stand  Transfer    Sit-Stand [place] (Transfers) conditional independence  -CS      Assistive Device (Sit-Stand Transfers) cane, straight  -CS      Recorded by [CS] Ileana Yang COTA/L 08/15/18 1305      Row Name 08/15/18 0950             Stand-Sit Transfer    Stand-Sit [place] (Transfers) conditional independence  -CS      Assistive Device (Stand-Sit Transfers) cane straight  -CS      Recorded by [CS] Ileana Yang COTA/L 08/15/18 1305      Row Name 08/15/18 0950             Lower Body Dressing Assessment/Training    Lower Body Dressing [place] Level don;undergarment;pants/bottoms;shoes/slippers;supervision  -CS      Lower Body Dressing Position supported sitting;supported standing  -CS      Recorded by [CS] Ileana Yang COTA/L 08/15/18 1305      Row Name 08/15/18 0950             Toileting Assessment/Training    [place] Level (Toileting) perform perineal hygiene;maximum assist (25% patient effort)  -CS      Toileting Position supported standing  -CS      Recorded by [CS] Ileana Yang COTA/L 08/15/18 1305      Row Name 08/15/18 0950             Upper Extremity Seated Therapeutic Exercise    Performed, Seated Upper Extremity (Therapeutic Exercise) shoulder flexion/extension;shoulder external/internal rotation;elbow flexion/extension;forearm supination/pronation;wrist flexion/extension;digit flexion/extension  -CS      Device, Seated Upper Extremity (Therapeutic Exercise) free weights, barbell  -CS      Exercise Type, Seated Upper Extremity (Therapeutic Exercise) AROM (active range of motion)  -CS      Expected Outcomes, Seated Upper Extremity (Therapeutic Exercise) improve functional tolerance, self-care activity;improve performance, transfer skills  -CS      Sets/Reps Detail, Seated Upper Extremity (Therapeutic Exercise) 1/20  -CS      Recorded by [CS] Ileana Yang COTA/L 08/15/18 1305      Row Name 08/15/18 0950             Positioning and Restraints    Pre-Treatment  Position sitting in chair/recliner  -CS      Post Treatment Position chair  -CS      In Bed sitting;call light within reach;encouraged to call for assist  -CS      Recorded by [CS] Ileana Yang COTA/L 08/15/18 1305      Row Name                Wound 07/26/18 1326 Other (See comments) anterior abdomen incision;surgical    Wound - Properties Group Date first assessed: 07/26/18 [TB] Time first assessed: 1326 [TB] Present On Admission : no [TB] Side: Other (See comments) [TB], MIDLINE  Orientation: anterior [TB] Location: abdomen [TB] Type: incision;surgical [TB] Recorded by:  [TB] Joshua Ordonez, RN 07/26/18 1327    Row Name                Wound 07/31/18 1700 Left lateral;upper chest surgical;incision    Wound - Properties Group Date first assessed: 07/31/18 [CH] Time first assessed: 1700 [CH] Present On Admission : no [CH] Side: Left [CH] Orientation: lateral;upper [CH] Location: chest [CH] Type: surgical;incision [CH] Recorded by:  [CH] Darlene Slaughter RN 07/31/18 1901    Row Name 08/15/18 0950             Outcome Summary/Treatment Plan (OT)    Daily Summary of Progress (OT) progress toward functional goals as expected  -CS      Plan for Continued Treatment (OT) cont OT POC  -CS      Anticipated Discharge Disposition (OT) home with home health  -CS      Recorded by [CS] Ileana Yang COTA/L 08/15/18 1305        User Key  (r) = Recorded By, (t) = Taken By, (c) = Cosigned By    Initials Name Effective Dates Discipline    CS Ileana Yang COTA/L 03/07/18 -  OT    TB Joshua Ordonez RN 10/17/16 -  Nurse    CH Darlene Slaughter RN 06/23/17 -  Nurse        Wound 07/26/18 1326 Other (See comments) anterior abdomen incision;surgical (Active)   Dressing Appearance open to air 8/15/2018  8:32 AM   Drainage Amount none 8/15/2018  8:32 AM       Wound 07/31/18 1700 Left lateral;upper chest surgical;incision (Active)   Dressing Appearance open to air 8/15/2018  8:32 AM   Closure Staples 8/15/2018   8:32 AM   Base clean;dry 8/15/2018  8:32 AM             OT Rehab Goals     Row Name 08/15/18 0950             Transfer Goal 1 (OT)    Activity/Assistive Device (Transfer Goal 1, OT) sit-to-stand/stand-to-sit;bed-to-chair/chair-to-bed;toilet  -CS      [place] Level/Cues Needed (Transfer Goal 1, OT) supervision required  -CS      Time Frame (Transfer Goal 1, OT) long term goal (LTG);by discharge  -CS      Progress/Outcome (Transfer Goal 1, OT) goal not met;continuing progress toward goal  -CS         Bathing Goal 1 (OT)    Activity/Assistive Device (Bathing Goal 1, OT) bathing skills, all   using AE PRN  -CS      [place] Level/Cues Needed (Bathing Goal 1, OT) set-up required;supervision required  -CS      Time Frame (Bathing Goal 1, OT) long term goal (LTG);by discharge  -CS      Progress/Outcomes (Bathing Goal 1, OT) goal not met;continuing progress toward goal  -CS         Dressing Goal 1 (OT)    Activity/Assistive Device (Dressing Goal 1, OT) dressing skills, all   using AE PRN  -CS      [place]/Cues Needed (Dressing Goal 1, OT) set-up required;supervision required  -CS      Time Frame (Dressing Goal 1, OT) long term goal (LTG);other (see comments)  -CS      Progress/Outcome (Dressing Goal 1, OT) goal met  -CS         Toileting Goal 1 (OT)    Activity/Device (Toileting Goal 1, OT) toileting skills, all  -CS      [place] Level/Cues Needed (Toileting Goal 1, OT) conditional independence  -CS      Time Frame (Toileting Goal 1, OT) long term goal (LTG);by discharge  -CS      Progress/Outcome (Toileting Goal 1, OT) goal not met;continuing progress toward goal  -CS        User Key  (r) = Recorded By, (t) = Taken By, (c) = Cosigned By    Initials Name Provider Type Discipline    CS Ileana Yang COTA/KIM Occupational Therapy Assistant OT        Occupational Therapy Education     Title: PT OT SLP Therapies (Active)     Topic: Occupational Therapy (Done)     Point: ADL training (Done)     Description:  Instruct learner(s) on proper safety adaptation and remediation techniques during self care or transfers.   Instruct in proper use of assistive devices.   Learning Progress Summary     Learner Status Readiness Method Response Comment Documented by    Patient Done Acceptance E,TB,D VU   08/15/18 1313     Done Acceptance E,TB,D VU Pt was educated on home safety.  08/14/18 1308     Done Acceptance E,TB,D VU   08/09/18 1311     Done Acceptance E VU   08/07/18 1305          Point: Home exercise program (Done)     Description: Instruct learner(s) on appropriate technique for monitoring, assisting and/or progressing therapeutic exercises/activities.   Learning Progress Summary     Learner Status Readiness Method Response Comment Documented by    Patient Done Acceptance E,TB,D VU   08/15/18 1313     Done Acceptance E,TB,D VU Pt was educated on home safety.  08/14/18 1308     Done Acceptance E,TB,D VU   08/09/18 1311     Done Acceptance E VU   08/07/18 1305     Active Acceptance E NR   08/05/18 1337          Point: Precautions (Done)     Description: Instruct learner(s) on prescribed precautions during self-care and functional transfers.   Learning Progress Summary     Learner Status Readiness Method Response Comment Documented by    Patient Done Acceptance E,TB,D VU   08/15/18 1313     Done Acceptance E,TB,D VU Pt was educated on home safety.  08/14/18 1308     Done Acceptance E,TB,D VU   08/09/18 1311     Done Acceptance E VU   08/07/18 1305     Done Acceptance E VU,NR role of OT; OT POC; transfer training; safety precautions AS 08/03/18 1348          Point: Body mechanics (Done)     Description: Instruct learner(s) on proper positioning and spine alignment during self-care, functional mobility activities and/or exercises.   Learning Progress Summary     Learner Status Readiness Method Response Comment Documented by    Patient Done Acceptance E,TB,D VU   08/15/18 1313     Done Acceptance E,TB,D  VU Pt was educated on home safety.  08/14/18 1308     Done Acceptance E,TB,D VU   08/09/18 1311     Done Acceptance E VU   08/07/18 1305     Done Acceptance E VU,NR role of OT; OT POC; transfer training; safety precautions AS 08/03/18 1348                      User Key     Initials Effective Dates Name Provider Type Discipline     03/07/18 -  Carmen Davenport COTA/L Occupational Therapy Assistant OT     03/07/18 -  Julissa Vizcaino SWARTZ/L Occupational Therapy Assistant OT     03/07/18 -  Ileana Yang SWARTZ/L Occupational Therapy Assistant OT    AS 05/01/18 -  Erin Treviño, OT Occupational Therapist OT                OT Recommendation and Plan  Outcome Summary/Treatment Plan (OT)  Daily Summary of Progress (OT): progress toward functional goals as expected  Plan for Continued Treatment (OT): cont OT POC  Anticipated Discharge Disposition (OT): home with home health  Daily Summary of Progress (OT): progress toward functional goals as expected  Plan of Care Review  Plan of Care Reviewed With: patient  Plan of Care Reviewed With: patient  Outcome Summary: Pt tolerated tx well this date. PT was SBA with sit-stand t/f and fuctional mobility. Pt was SBA  with LB dressing. Cont OT POC.        Outcome Measures     Row Name 08/15/18 1300 08/14/18 1300 08/14/18 1059       How much help from another person do you currently need...    Turning from your back to your side while in flat bed without using bedrails?  --  -- 3  -LEANDER    Moving from lying on back to sitting on the side of a flat bed without bedrails?  --  -- 3  -LEANDER    Moving to and from a bed to a chair (including a wheelchair)?  --  -- 4  -LEANDER    Standing up from a chair using your arms (e.g., wheelchair, bedside chair)?  --  -- 4  -LEANDER    Climbing 3-5 steps with a railing?  --  -- 3  -LEANDER    To walk in hospital room?  --  -- 3  -LEANDER    AM-PAC 6 Clicks Score  --  -- 20  -LEANDER       How much help from another is currently needed...    Putting on and  taking off regular lower body clothing? 2  -CS 2  -CS  --    Bathing (including washing, rinsing, and drying) 2  -CS 2  -CS  --    Toileting (which includes using toilet bed pan or urinal) 2  -CS 2  -CS  --    Putting on and taking off regular upper body clothing 2  -CS 2  -CS  --    Taking care of personal grooming (such as brushing teeth) 2  -CS 2  -CS  --    Eating meals 4  -CS 4  -CS  --    Score 14  -CS 14  -CS  --       Functional Assessment    Outcome Measure Options AM-PAC 6 Clicks Daily Activity (OT)  -CS AM-PAC 6 Clicks Daily Activity (OT)  -CS AM-PAC 6 Clicks Basic Mobility (PT)  -LEANDER    Row Name 08/13/18 1330 08/13/18 0900 08/12/18 1406       How much help from another person do you currently need...    Turning from your back to your side while in flat bed without using bedrails? 3  -TW  -- 3  -LEANDER    Moving from lying on back to sitting on the side of a flat bed without bedrails? 3  -TW  -- 3  -LEANDER    Moving to and from a bed to a chair (including a wheelchair)? 3  -TW  -- 3  -LEANDER    Standing up from a chair using your arms (e.g., wheelchair, bedside chair)? 3  -TW  -- 3  -LEANDER    Climbing 3-5 steps with a railing? 3  -TW  -- 3  -LEANDER    To walk in hospital room? 3  -TW  -- 3  -LEANDER    AM-PAC 6 Clicks Score 18  -TW  -- 18  -LEANDER       How much help from another is currently needed...    Putting on and taking off regular lower body clothing?  -- 2  -KD  --    Bathing (including washing, rinsing, and drying)  -- 2  -KD  --    Toileting (which includes using toilet bed pan or urinal)  -- 2  -KD  --    Putting on and taking off regular upper body clothing  -- 2  -KD  --    Taking care of personal grooming (such as brushing teeth)  -- 2  -KD  --    Eating meals  -- 4  -KD  --    Score  -- 14  -KD  --       Functional Assessment    Outcome Measure Options AM-PAC 6 Clicks Basic Mobility (PT)  -TW  -- AM-PAC 6 Clicks Basic Mobility (PT)  -LEANDER      User Key  (r) = Recorded By, (t) = Taken By, (c) = Cosigned By    Initials  Name Provider Type    LEANDER Reinier Hannah, PTA Physical Therapy Assistant    TW Lj Gross, MARGI Physical Therapy Assistant    KD Adrianna Richardson, SWARTZ/L Occupational Therapy Assistant    CS Ileana Yang COTA/KIM Occupational Therapy Assistant           Time Calculation:         Time Calculation- OT     Row Name 08/15/18 1316             Time Calculation- OT    OT Start Time 0950  -CS      OT Stop Time 1059  -CS      OT Time Calculation (min) 69 min  -CS      Total Timed Code Minutes- OT 69 minute(s)  -CS      OT Received On 08/15/18  -CS        User Key  (r) = Recorded By, (t) = Taken By, (c) = Cosigned By    Initials Name Provider Type    CS Ileana Yang SWARTZ/KIM Occupational Therapy Assistant           Therapy Suggested Charges     Code   Minutes Charges    None           Therapy Charges for Today     Code Description Service Date Service Provider Modifiers Qty    85196500116 HC OT SELF CARE/MGMT/TRAIN EA 15 MIN 8/14/2018 Ileana Yang, SWARTZ/L GO 1    92865956724 HC OT THER PROC EA 15 MIN 8/14/2018 Ileana Yang, SWARTZ/L GO 2    64800293183 HC OT THERAPEUTIC ACT EA 15 MIN 8/14/2018 Ileana Yang, SWARTZ/L GO 1    65482811878 HC OT SELF CARE/MGMT/TRAIN EA 15 MIN 8/15/2018 Ileana Yang, SWARTZ/L GO 2    10418967424 HC OT THER PROC EA 15 MIN 8/15/2018 Ileana Yang, SWARTZ/L GO 2    07327416169 HC OT THERAPEUTIC ACT EA 15 MIN 8/15/2018 Ileana Yang, SWARTZ/L GO 1          OT G-codes  OT Professional Judgement Used?: Yes  OT Functional Scales Options: AM-PAC 6 Clicks Daily Activity (OT)  Score: 15  Functional Limitation: Self care  Self Care Current Status (): At least 40 percent but less than 60 percent impaired, limited or restricted  Self Care Goal Status (): At least 1 percent but less than 20 percent impaired, limited or restricted    MERLINE Joseph  8/15/2018

## 2018-08-15 NOTE — PLAN OF CARE
Problem: Patient Care Overview  Goal: Plan of Care Review  Outcome: Ongoing (interventions implemented as appropriate)   08/15/18 1304   Coping/Psychosocial   Plan of Care Reviewed With patient;sibling   OTHER   Outcome Summary Pt cont to have pain on left side just underneath L UE but able to amb with str cane for 444ft with no LOB noted. Pt able to perform step training with min SOA but O2 sats remained well within good range.   Plan of Care Review   Progress improving

## 2018-08-15 NOTE — PROGRESS NOTES
GENERAL SURGERY PROGRESS NOTE  Chief Complaint:  Surgery Follow up   LOS: 20 days       Subjective     Interval History:   Feels well, ready to go home.    Objective     Vital Signs  Temp:  [96.7 °F (35.9 °C)-98.5 °F (36.9 °C)] 97.7 °F (36.5 °C)  Heart Rate:  [53-75] 75  Resp:  [18-20] 18  BP: (118-142)/(62-70) 134/64    Physical Exam:   Abdomen soft. Incision CDI. Left chest incision healing well, staples in place.  Labs:  Lab Results (last 24 hours)     Procedure Component Value Units Date/Time    POC Glucose Once [[id number]]  (Normal) Collected:  08/15/18 1028    Specimen:  Blood Updated:  08/15/18 1052     Glucose 118 mg/dL      Comment: RN NotifiedOperator: [id number] TiciesMeter ID: [id number]       POC Glucose Once [[id number]]  (Normal) Collected:  08/15/18 0723    Specimen:  Blood Updated:  08/15/18 0753     Glucose 99 mg/dL      Comment: RN NotifiedOperator: [id number] Tic[employer]Meter ID: [id number]       Basic Metabolic Panel [[id number]]  (Abnormal) Collected:  08/15/18 0658    Specimen:  Blood Updated:  08/15/18 0716     Glucose 95 mg/dL      BUN 16 mg/dL      Creatinine 0.77 mg/dL      Sodium 136 (L) mmol/L      Potassium 4.0 mmol/L      Chloride 100 mmol/L      CO2 31.0 mmol/L      Calcium 8.2 (L) mg/dL      eGFR Non African Amer 104 mL/min/1.73      BUN/Creatinine Ratio 20.8     Anion Gap 5.0 mmol/L     CBC (No Diff) [[id number]]  (Abnormal) Collected:  08/15/18 0658    Specimen:  Blood Updated:  08/15/18 0714     WBC 9.43 10*3/mm3      RBC 2.88 (L) 10*6/mm3      Hemoglobin 7.7 (L) g/dL      Hematocrit 24.6 (L) %      MCV 85.4 fL      MCH 26.7 pg      MCHC 31.3 (L) g/dL      RDW 16.8 (H) %      RDW-SD 52.3 (H) fl      MPV 8.9 fL      Platelets 541 (H) 10*3/mm3     POC Glucose Once [[id number]]  (Abnormal) Collected:  08/14/18 2028    Specimen:  Blood Updated:  08/14/18 2104     Glucose 201 (H) mg/dL      Comment: RN NotifiedOperator: [id number] SHANNAN Costa ID: [id number]        Fungus Culture - Tissue, Lung, L [[id number]] Collected:  07/31/18 1628    Specimen:  Tissue from Lung, L Updated:  08/14/18 1731     Fungus Culture No fungus isolated at 2 weeks    AFB Culture - Tissue, Lung, L [[id number]]  (Normal) Collected:  07/31/18 1628    Specimen:  Tissue from Lung, L Updated:  08/14/18 1731     AFB Culture No AFB isolated at 2 weeks     AFB Stain No acid fast bacilli seen on concentrated smear    POC Glucose Once [[id number]]  (Normal) Collected:  08/14/18 1621    Specimen:  Blood Updated:  08/14/18 1649     Glucose 90 mg/dL      Comment: RN NotifiedOperator: [id number] MATHEW MELISSAMeter ID: [id number]       Fungus Culture - Body Fluid, Lung, L [[id number]] Collected:  07/31/18 1625    Specimen:  Body Fluid from Lung, L Updated:  08/14/18 1645     Fungus Culture No fungus isolated at 2 weeks    AFB Culture - Body Fluid, Lung, L [[id number]]  (Normal) Collected:  07/31/18 1625    Specimen:  Body Fluid from Lung, L Updated:  08/14/18 1645     AFB Culture No AFB isolated at 2 weeks     AFB Stain No acid fast bacilli seen on concentrated smear           Results Review:     Labs and imaging for today were reviewed.    Assessment/Plan     Truman Esquivle is a 57 y.o. male who is s/p splenectomy, left decortication.      Home today with home PT  See Dr. Pollock next weeks for staples out from chest.  Overall doing well.          This document has been electronically signed by Kris Solano MD on August 15, 2018 1:35 PM        Kris Solano MD  08/15/18  1:35 PM

## 2018-08-15 NOTE — PLAN OF CARE
Problem: Patient Care Overview  Goal: Plan of Care Review  Outcome: Ongoing (interventions implemented as appropriate)   08/15/18 0409   Coping/Psychosocial   Plan of Care Reviewed With patient   OTHER   Outcome Summary vss, working toward pain control, trach care completed, pt ambulated in halls   Plan of Care Review   Progress no change     Goal: Individualization and Mutuality  Outcome: Ongoing (interventions implemented as appropriate)    Goal: Discharge Needs Assessment  Outcome: Ongoing (interventions implemented as appropriate)    Goal: Interprofessional Rounds/Family Conf  Outcome: Ongoing (interventions implemented as appropriate)      Problem: Fall Risk (Adult)  Goal: Absence of Fall  Outcome: Ongoing (interventions implemented as appropriate)      Problem: Skin Injury Risk (Adult)  Goal: Skin Health and Integrity  Outcome: Ongoing (interventions implemented as appropriate)      Problem: Pain, Acute (Adult)  Goal: Acceptable Pain Control/Comfort Level  Outcome: Ongoing (interventions implemented as appropriate)

## 2018-08-15 NOTE — THERAPY DISCHARGE NOTE
Acute Care - Physical Therapy Discharge Summary  [facility]       Patient Name: Truman Esquivel  : 1960  MRN: [id number]    Today's Date: 8/15/2018  Onset of Illness/Injury or Date of Surgery: 18    Date of Referral to PT: 18  Referring Physician: Dr. Solano      Admit Date: 2018      PT Recommendation and Plan    Visit Dx:    ICD-10-CM ICD-9-CM   1. Syncope and collapse R55 780.2   2. Septic shock (CMS/[facility]) A41.9 038.9    R65.21 785.52     995.92   3. Dissection of aorta, unspecified portion of aorta (CMS/[facility]) I71.00 441.00   4. Pleural effusion J90 511.9   5. Other ascites R18.8 789.59   6. Splenic hemorrhage D73.89 289.59   7. Impaired physical mobility Z74.09 781.99   8. Impaired mobility and activities of daily living Z74.09 799.89   9. Oropharyngeal dysphagia R13.12 787.22             Outcome Measures     Row Name 08/15/18 1304 08/15/18 1300 18 1300       How much help from another person do you currently need...    Turning from your back to your side while in flat bed without using bedrails? 3  -TW  --  --    Moving from lying on back to sitting on the side of a flat bed without bedrails? 3  -TW  --  --    Moving to and from a bed to a chair (including a wheelchair)? 4  -TW  --  --    Standing up from a chair using your arms (e.g., wheelchair, bedside chair)? 4  -TW  --  --    Climbing 3-5 steps with a railing? 3  -TW  --  --    To walk in hospital room? 3  -TW  --  --    AM-PAC 6 Clicks Score 20  -TW  --  --       How much help from another is currently needed...    Putting on and taking off regular lower body clothing?  -- 2  -CS 2  -CS    Bathing (including washing, rinsing, and drying)  -- 2  -CS 2  -CS    Toileting (which includes using toilet bed pan or urinal)  -- 2  -CS 2  -CS    Putting on and taking off regular upper body clothing  -- 2  -CS 2  -CS    Taking care of personal grooming (such as brushing teeth)  -- 2  -CS 2  -CS    Eating meals  -- 4  -CS 4   -    Score  -- 14  -CS 14  -CS       Functional Assessment    Outcome Measure Options AM-PAC 6 Clicks Basic Mobility (PT)  -TW AM-PAC 6 Clicks Daily Activity (OT)  - AM-[facility] 6 Clicks Daily Activity (OT)  -    Row Name 08/14/18 1059 08/13/18 1330 08/13/18 0900       How much help from another person do you currently need...    Turning from your back to your side while in flat bed without using bedrails? 3  -LEANDER 3  -TW  --    Moving from lying on back to sitting on the side of a flat bed without bedrails? 3  -LEANDER 3  -TW  --    Moving to and from a bed to a chair (including a wheelchair)? 4  -LEANDER 3  -TW  --    Standing up from a chair using your arms (e.g., wheelchair, bedside chair)? 4  -LEANDER 3  -TW  --    Climbing 3-5 steps with a railing? 3  -LEANDER 3  -TW  --    To walk in hospital room? 3  - 3  -TW  --    AM-PAC 6 Clicks Score 20  -LEANDER 18  -TW  --       How much help from another is currently needed...    Putting on and taking off regular lower body clothing?  --  -- 2  -KD    Bathing (including washing, rinsing, and drying)  --  -- 2  -KD    Toileting (which includes using toilet bed pan or urinal)  --  -- 2  -KD    Putting on and taking off regular upper body clothing  --  -- 2  -KD    Taking care of personal grooming (such as brushing teeth)  --  -- 2  -KD    Eating meals  --  -- 4  -KD    Score  --  -- 14  -KD       Functional Assessment    Outcome Measure Options AM-[facility] 6 Clicks Basic Mobility (PT)  -[facility] 6 Clicks Basic Mobility (PT)  -TW  --      User Key  (r) = Recorded By, (t) = Taken By, (c) = Cosigned By    Initials Name Provider Type     Reinier Hannah, PTA Physical Therapy Assistant    TW Lj Gross, PTA Physical Therapy Assistant    KD Adrianna Richardson SWARTZ/L Occupational Therapy Assistant     Ileana Yang, SWARTZ/L Occupational Therapy Assistant                PT Charges     Row Name 08/15/18 1347             Time Calculation    Start Time 1304  -TW      Stop Time 1329  -TW       Time Calculation (min) 25 min  -TW      PT Received On 08/15/18  -TW      PT Goal Re-Cert Due Date 08/16/18  -TW         Time Calculation- PT    Total Timed Code Minutes- PT 25 minute(s)  -TW        User Key  (r) = Recorded By, (t) = Taken By, (c) = Cosigned By    Initials Name Provider Type    TW Lj Gross PTA Physical Therapy Assistant        Therapy Suggested Charges     Code   Minutes Charges    None                   PT Rehab Goals     Row Name 08/15/18 1304             Bed Mobility Goal 1 (PT)    Activity/Assistive Device (Bed Mobility Goal 1, PT) sit to supine;supine to sit;bed rails  -TW      [place] Level/Cues Needed (Bed Mobility Goal 1, PT) supervision required  -TW      Time Frame (Bed Mobility Goal 1, PT) 1 week  -TW      Progress/Outcomes (Bed Mobility Goal 1, PT) goal not met  -TW         Transfer Goal 1 (PT)    Activity/Assistive Device (Transfer Goal 1, PT) sit-to-stand/stand-to-sit;bed-to-chair/chair-to-bed;walker, rolling  -TW      [place] Level/Cues Needed (Transfer Goal 1, PT) supervision required  -TW      Time Frame (Transfer Goal 1, PT) 1 week  -TW      Progress/Outcome (Transfer Goal 1, PT) goal met  -TW         Gait Training Goal 1 (PT)    Activity/Assistive Device (Gait Training Goal 1, PT) gait (walking locomotion);assistive device use;increase endurance/gait distance;walker, rolling;decrease fall risk   or least restrictive to no device  -TW      [place] Level (Gait Training Goal 1, PT) supervision required  -TW      Distance (Gait Goal 1, PT) 200  -TW      Time Frame (Gait Training Goal 1, PT) 1 week  -TW      Progress/Outcome (Gait Training Goal 1, PT) goal met  -TW         Stairs Goal 1 (PT)    Activity/Assistive Device (Stairs Goal 1, PT) ascending stairs;descending stairs;decrease fall risk;using handrail, right  -TW      [place] Level/Cues Needed (Stairs Goal 1, PT) contact guard assist  -TW      Number of Stairs (Stairs Goal 1, PT) 1  -TW      Time  Frame (Stairs Goal 1, PT) 1 week  -TW      Progress/Outcome (Stairs Goal 1, PT) goal met  -TW         Patient Education Goal (PT)    Activity (Patient Education Goal, PT) HEP  -TW      [place]/Cues/Accuracy (Memory Goal 2, PT) demonstrates adequately;verbalizes understanding  -TW      Time Frame (Patient Education Goal, PT) 1 week  -TW      Progress/Outcome (Patient Education Goal, PT) goal met  -TW        User Key  (r) = Recorded By, (t) = Taken By, (c) = Cosigned By    Initials Name Provider Type Discipline    TW Lj Gross, PTA Physical Therapy Assistant PT              PT Discharge Summary  Anticipated Discharge Disposition (PT): home with home health  Reason for Discharge: Discharge from facility  Outcomes Achieved: Patient able to partially acheive established goals  Discharge Destination: Home with home health      Erickson Roy, PT   8/15/2018

## 2018-08-16 NOTE — PAYOR COMM NOTE
"Truman Esquivel (57 y.o. Male)     Date of Birth Social Security Number Address Home Phone MRN    1960  [id number]  [facility] [address] [phone] [id number]    [employer] Marital Status          Baptism        Admission Date Admission Type Admitting Provider Attending Provider Department, Room/Bed    7/26/18 Emergency Sumit Calvo MD  [address], 364/1    Discharge Date Discharge Disposition Discharge Destination        8/15/2018 Home-Health [facility]              Attending Provider:  (none)   Allergies:  Chlorhexidine, Co Q 10 [Coenzyme Q10], Eggs Or Egg-derived Products, Erythromycin, Gabapentin, Other, Penicillins, Tetracyclines & Related, Acth [Corticotropin], Cephalosporins, Keflex [Cephalexin], Neomycin    Isolation:  None   Infection:  None   Code Status:  Prior    Ht:  170.2 cm (67.01\")   Wt:  104 kg (229 lb 4.5 oz)    Admission Cmt:  None   Principal Problem:  Splenic hemorrhage [D73.89] More...                 Active Insurance as of 7/26/2018     Primary Coverage     Payor Plan Insurance Group Employer/Plan Group    PASSPORT PASSPORT MEDICAID     Payor Plan Address Payor Plan Phone Number Effective From Effective To    [address] [phone] 11/1/1997     [facility] [id number]       Subscriber Name Subscriber Birth Date Member ID       TRUMAN ESQUIVEL 1960 [id number]                 Emergency Contacts      (Rel.) Home Phone Work Phone Mobile Phone    Yolanda Esquivel (Mother) [phone] -- [phone]    Blanca Esquivel (Daughter) [phone] -- [phone]    Yolanda Diaz (Sister) [phone] -- --            Discharge Order     Start     Ordered    08/15/18 1339  Discharge patient  Once     Expected Discharge Date:  08/15/18    Discharge Disposition:  Home-Health Care c    Physician of Record for Attribution - Please select from Treatment Team:  SUMIT CALVO [[id number]]    Review needed by CMO to " determine Physician of Record:  No       Question Answer Comment   Physician of Record for Attribution - Please select from Treatment Team SUMIT CALVO    Review needed by CMO to determine Physician of Record No        08/15/18 8619

## 2018-08-16 NOTE — THERAPY DISCHARGE NOTE
Acute Care - Occupational Therapy Discharge Summary  [facility]     Patient Name: Truman Esquivel  : 1960  MRN: [id number]    Today's Date: 2018  Onset of Illness/Injury or Date of Surgery: 18    Date of Referral to OT: 18  Referring Physician: Dr. Solano      Admit Date: 2018        OT Recommendation and Plan    Visit Dx:    ICD-10-CM ICD-9-CM   1. Syncope and collapse R55 780.2   2. Septic shock (CMS/[facility]) A41.9 038.9    R65.21 785.52     995.92   3. Dissection of aorta, unspecified portion of aorta (CMS/[facility]) I71.00 441.00   4. Pleural effusion J90 511.9   5. Other ascites R18.8 789.59   6. Splenic hemorrhage D73.89 289.59   7. Impaired physical mobility Z74.09 781.99   8. Impaired mobility and activities of daily living Z74.09 799.89   9. Oropharyngeal dysphagia R13.12 787.22                     OT Rehab Goals     Row Name 18 1321             Transfer Goal 1 (OT)    Activity/Assistive Device (Transfer Goal 1, OT) sit-to-stand/stand-to-sit;bed-to-chair/chair-to-bed;toilet  -      [place] Level/Cues Needed (Transfer Goal 1, OT) supervision required  -      Time Frame (Transfer Goal 1, OT) long term goal (LTG);by discharge  -      Progress/Outcome (Transfer Goal 1, OT) goal not met  -         Bathing Goal 1 (OT)    Activity/Assistive Device (Bathing Goal 1, OT) bathing skills, all   using AE PRN  -      [place] Level/Cues Needed (Bathing Goal 1, OT) set-up required;supervision required  -      Time Frame (Bathing Goal 1, OT) long term goal (LTG);by discharge  -      Progress/Outcomes (Bathing Goal 1, OT) goal not met  -         Dressing Goal 1 (OT)    Activity/Assistive Device (Dressing Goal 1, OT) dressing skills, all   using AE PRN  -      [place]/Cues Needed (Dressing Goal 1, OT) set-up required;supervision required  -      Time Frame (Dressing Goal 1, OT) long term goal (LTG);other (see comments)  -BH      Progress/Outcome  (Dressing Goal 1, OT) goal met  -         Toileting Goal 1 (OT)    Activity/Device (Toileting Goal 1, OT) toileting skills, all  -      [place] Level/Cues Needed (Toileting Goal 1, OT) conditional independence  -      Time Frame (Toileting Goal 1, OT) long term goal (LTG);by discharge  -      Progress/Outcome (Toileting Goal 1, OT) goal not met  -        User Key  (r) = Recorded By, (t) = Taken By, (c) = Cosigned By    Initials Name Provider Type Discipline     Devi Manuel, OTR/L Occupational Therapist OT                Outcome Measures     Row Name 08/15/18 1304 08/15/18 1300 08/14/18 1300       How much help from another person do you currently need...    Turning from your back to your side while in flat bed without using bedrails? 3  -TW  --  --    Moving from lying on back to sitting on the side of a flat bed without bedrails? 3  -TW  --  --    Moving to and from a bed to a chair (including a wheelchair)? 4  -TW  --  --    Standing up from a chair using your arms (e.g., wheelchair, bedside chair)? 4  -TW  --  --    Climbing 3-5 steps with a railing? 3  -TW  --  --    To walk in hospital room? 3  -TW  --  --    AM-PAC 6 Clicks Score 20  -TW  --  --       How much help from another is currently needed...    Putting on and taking off regular lower body clothing?  -- 2  -CS 2  -CS    Bathing (including washing, rinsing, and drying)  -- 2  -CS 2  -CS    Toileting (which includes using toilet bed pan or urinal)  -- 2  -CS 2  -CS    Putting on and taking off regular upper body clothing  -- 2  -CS 2  -CS    Taking care of personal grooming (such as brushing teeth)  -- 2  -CS 2  -CS    Eating meals  -- 4  -CS 4  -CS    Score  -- 14  -CS 14  -CS       Functional Assessment    Outcome Measure Options AM-PAC 6 Clicks Basic Mobility (PT)  -TW AM-PAC 6 Clicks Daily Activity (OT)  -CS AM-PAC 6 Clicks Daily Activity (OT)  -CS    Row Name 08/14/18 1059 08/13/18 1333          How much help from another person  do you currently need...    Turning from your back to your side while in flat bed without using bedrails? 3  -LEANDER 3  -TW     Moving from lying on back to sitting on the side of a flat bed without bedrails? 3  -LEANDER 3  -TW     Moving to and from a bed to a chair (including a wheelchair)? 4  -LEANDER 3  -TW     Standing up from a chair using your arms (e.g., wheelchair, bedside chair)? 4  -LEANDER 3  -TW     Climbing 3-5 steps with a railing? 3  -LEANDER 3  -TW     To walk in hospital room? 3  -LEANDER 3  -TW     AM-PAC 6 Clicks Score 20  -LEANDER 18  -TW        Functional Assessment    Outcome Measure Options AM-PAC 6 Clicks Basic Mobility (PT)  -LEANDER AM-PAC 6 Clicks Basic Mobility (PT)  -TW       User Key  (r) = Recorded By, (t) = Taken By, (c) = Cosigned By    Initials Name Provider Type    Reinier Martin, PTA Physical Therapy Assistant    TW Lj Gross, MARGI Physical Therapy Assistant    Ileana Maya, SHERIDAN/KIM Occupational Therapy Assistant          Therapy Suggested Charges     Code   Minutes Charges    None                 OT Discharge Summary  Anticipated Discharge Disposition (OT): home with home health  Reason for Discharge: Discharge from facility  Outcomes Achieved: Refer to plan of care for updates on goals achieved, Patient able to partially acheive established goals  Discharge Destination: Home with home health      Devi Manuel OTR/L  8/16/2018

## 2018-08-17 ENCOUNTER — TELEPHONE (OUTPATIENT)
Dept: SURGERY | Facility: CLINIC | Age: 58
End: 2018-08-17

## 2018-08-17 LAB
LAB AP CASE REPORT: NORMAL
LAB AP DIAGNOSIS COMMENT: NORMAL
LAB AP SPECIAL STAINS: NORMAL
PATH REPORT.FINAL DX SPEC: NORMAL
PATH REPORT.GROSS SPEC: NORMAL

## 2018-08-19 NOTE — DISCHARGE SUMMARY
Discharge Summary  Date: 08/15/18  Service: General Surgery  Attending: Kris Solano    Procedures: Splenectomy, Left thoracotomy with decortication  Consults: CT surgery  Discharge Diagnoses: Spontaneous Splenic Rupture with Hemorrhage, Left effusion and Empyema  Hospital Course: Patient taken to the OR from the ER where he presented with hypotension and massive hemoperitoneum.  He underwent splenectomy, gastrostomy and left chest tube placement and was kept in the ICU post op.  He had persistent white out on the left and was taken for decortication of the left chest.  He did well from this and was weaned from the vent.  He had a trach in place due to his history of head and neck cancer.    He was cleared for diet by speech.  His chest tubes were removed.  He worked diligently with physical therapy and was ultimately discharged home with home PT.    Discharge Diet: Regular    Discharge Medications:     Your medication list      START taking these medications      Instructions Last Dose Given Next Dose Due   oxyCODONE-acetaminophen 5-325 MG per tablet  Commonly known as:  PERCOCET      Take 1-2 tablets by mouth Every 4 (Four) Hours As Needed (Pain).          CONTINUE taking these medications      Instructions Last Dose Given Next Dose Due   Cholecalciferol 72458 units tablet      50 thousand units po q  month       clopidogrel 75 MG tablet  Commonly known as:  PLAVIX      Take 75 mg by mouth Daily. Last dose approx greater than one month ago       docusate sodium 100 MG capsule  Commonly known as:  COLACE      Take 1 capsule by mouth 2 (Two) Times a Day As Needed for Constipation.       levothyroxine 75 MCG tablet  Commonly known as:  SYNTHROID      Take 1 tab daily Monday to Saturday and 1.5 on Sunday       losartan-hydrochlorothiazide 50-12.5 MG per tablet  Commonly known as:  HYZAAR      Take 1 tablet by mouth Daily.       metoprolol succinate XL 25 MG 24 hr tablet  Commonly known as:  TOPROL-XL      Take 1  tablet by mouth Every Night. Patient states he hasn't been taking this week, states he thinks he is hypotensive       MULTIVITAMIN ADULT PO      Take 1 tablet by mouth Daily.       mupirocin 2 % ointment  Commonly known as:  BACTROBAN      Apply  topically 2 (Two) Times a Day.       ondansetron 4 MG tablet  Commonly known as:  ZOFRAN      Take 1 tablet by mouth 3 (Three) Times a Day As Needed for Nausea or Vomiting.       sodium chloride 0.9 % irrigation  Commonly known as:  NS      USE FOR TRACHEOSTOMY AS DIRECTED       triamcinolone 0.1 % cream  Commonly known as:  KENALOG      Apply  topically 2 (Two) Times a Day.       vitamin B-12 100 MCG tablet  Commonly known as:  CYANOCOBALAMIN      Take 100 mcg by mouth Daily.       vitamin E 100 UNIT capsule      Take 400 Units by mouth Every Night.       Zinc 50 MG capsule      Take 50 mg by mouth Every Night.             Where to Get Your Medications      These medications were sent to [place], [place] - [address] [facility] [phone]  - [phone] [address] [address]    Phone:  [phone]   · sodium chloride 0.9 % irrigation     You can get these medications from any pharmacy    Bring a paper prescription for each of these medications  · oxyCODONE-acetaminophen 5-325 MG per tablet         Activity Instructions     Discharge Activity       1) No driving while taking narcotics.   2) May shower, no tub bath or submerging incisions  3) Do not lift / push / pull more than 15 lbs.            Your Scheduled Appointments    Aug 21, 2018 10:15 AM CDT  Follow Up with Manuel Rivas MD  [facility] (--) [address] [place] [address]   [facility] [address] [phone]   Arrive 15 minutes prior to appointment.   Aug 29, 2018 10:00 AM CDT  Post-Op with Moncho Zapata MD  [facility] GENERAL SURGERY (--) [address] [place] [address] [address]  [address]  [facility] [address] [phone]   Sep 11, 2018 10:15 AM  CDT  Follow Up with Joseph Venegas MD  [facility] OTOLARYNGOLOGY (--) [address] [place] [address] [address]  [address] [facility] [address] [phone]   Arrive 15 minutes prior to appointment.   Sep 19, 2018  9:45 AM CDT  VAD FLUSH with NURSE DIONE SANTIAGO  [facility] OP INFUSION ([facility]) [address] [facility] [address] [phone]   Sep 19, 2018 10:15 AM CDT  Follow Up with Reza Patel MD  [facility] HEMATOLOGY AND ONCOLOGY ([facility]) [address] [place] [address] [phone]   Arrive 15 minutes prior to appointment.   Sep 19, 2018  1:00 PM CDT  Post-Op with ROWAN Clay  [facility] CARDIOTHORACIC AND VASCULAR SURGERY (--) [address] [place] [address] [address] [facility] [address] [phone]   Sep 20, 2018 11:30 AM CDT  Office Visit with Liborio Chandra MD  [facility] CARDIOLOGY (--) [address] [place] [address] [address] [facility] [address] [phone]   Arrive 15 minutes prior to appointment.  If you are in need of a language or hearing  please call the Department.   Oct 30, 2018  8:30 AM CDT  Follow Up with Kp Sellers MD  [facility] ENDOCRINOLOGY (--) [address] [facility] [address]  [address] [facility] [address] [phone]   Arrive 15 minutes prior to appointment.   Kale 10, 2019  1:15 PM CST  FOLLOW UP with ROWAN Watkins  [facility] RADIATION ONCOLOGY (--) [address] [place] [address] [phone]                  This document has been electronically signed by Kris Solano MD on August 19, 2018 3:22 PM

## 2018-08-21 ENCOUNTER — OFFICE VISIT (OUTPATIENT)
Dept: SLEEP MEDICINE | Facility: HOSPITAL | Age: 58
End: 2018-08-21

## 2018-08-21 VITALS
WEIGHT: 213 LBS | SYSTOLIC BLOOD PRESSURE: 140 MMHG | OXYGEN SATURATION: 98 % | HEIGHT: 72 IN | HEART RATE: 78 BPM | BODY MASS INDEX: 28.85 KG/M2 | DIASTOLIC BLOOD PRESSURE: 74 MMHG

## 2018-08-21 DIAGNOSIS — G47.33 OBSTRUCTIVE SLEEP APNEA, ADULT: Primary | ICD-10-CM

## 2018-08-21 PROCEDURE — 99214 OFFICE O/P EST MOD 30 MIN: CPT | Performed by: INTERNAL MEDICINE

## 2018-08-21 NOTE — PROGRESS NOTES
Sleep Clinic Follow Up    Date: 8/21/2018  Primary Care Physician: Marybeth Harrison DO      Interim History (1/3):  Since the last visit on 02/20/2018, patient has had a complicated hospital stay.  He developed a bleeding spleen and was hospitalized.  His hospital stay she was complicated with a complicated left pleural effusion with loculations.  He underwent decortication and has done relatively well.  He persists with some copious secretions which is able to clear through his tracheostomy..  He has a #6 Shiley, and can talk by placing his finger over the port.  However when using a [place] valve he frequently develops copious secretions.  Significant time was spent discussing possible benefits of continued tracheostomy even a lower size versus decannulation.  The patient would like to proceed with decannulation in the near future.  Next    He is sleeping in the upright position because it helps with his breathing.  I've advised him to call us once he decannulated and I will check overnight oximetry to determine whether the patient needs supplemental oxygen.      Bed time: 2200  Sleep latency: 16-60 minutes  Number of times awakens during the night: 3-4  Wake time: 2392-0455  Estimated total sleep time at night: 7-8 hours  Caffeine intake: 0oz of coffee, 0oz of tea, and 0oz of soda  Alcohol intake: 0 drinks per week  Nap time: rare     [place] - 6    2) Patient denies RLS symptoms.     PMHx, FH, SH reviewed and pertinent changes are: as above      REVIEW OF SYSTEMS:   Negative for chest pain, fever, chills, SOA, abdominal pain. Smoking: none      Exam (6-11/12):    Vitals:    08/21/18 1030   BP: 140/74   Pulse: 78   SpO2: 98%           Body mass index is 28.89 kg/m². Patient's Body mass index is 28.89 kg/m². BMI is above normal parameters. Recommendations include: no follow-up required.      Gen:  No distress, conversant, pleasant, appears stated age, alert, oriented  Eyes:   Anicteric sclera, moist conjunctiva,  no lid lag     PERRLA, EOMI   Heent:   NC/AT    Oropharynx clear, Mallampati 4    normal hearing    #6 cuffless trach  Lungs:  Normal effort, non-labored breathing    Clear to auscultation    CV:  Normal S1/S2, no murmur    no lower extremity edema  ABD:  Soft, normal bowel sounds    Psych:  Appropriate affect  Neuro:  CN 2-12 intact    Past Medical History:   Diagnosis Date   • Aortic dissection (CMS/[facility])    • Chest pain    • Disease of thyroid gland    • HTN (hypertension)    • Knee pain    • Sleep apnea    • Smoker    • Squamous cell carcinoma of larynx (CMS/HCC) 2/5/2018   • Stroke (CMS/[facility])    • Swallowing difficulty        Current Outpatient Prescriptions:   •  Cholecalciferol 97830 units tablet, 50 thousand units po q  month, Disp: 3 tablet, Rfl: 3  •  clopidogrel (PLAVIX) 75 MG tablet, Take 75 mg by mouth Daily. Last dose approx greater than one month ago, Disp: , Rfl:   •  docusate sodium (COLACE) 100 MG capsule, Take 1 capsule by mouth 2 (Two) Times a Day As Needed for Constipation., Disp: 60 capsule, Rfl: 2  •  levothyroxine (SYNTHROID) 75 MCG tablet, Take 1 tab daily Monday to Saturday and 1.5 on Sunday, Disp: 32 tablet, Rfl: 11  •  losartan-hydrochlorothiazide (HYZAAR) 50-12.5 MG per tablet, Take 1 tablet by mouth Daily., Disp: 30 tablet, Rfl: 12  •  metoprolol succinate XL (TOPROL-XL) 25 MG 24 hr tablet, Take 1 tablet by mouth Every Night. Patient states he hasn't been taking this week, states he thinks he is hypotensive, Disp: 30 tablet, Rfl: 12  •  Multiple Vitamins-Minerals (MULTIVITAMIN ADULT PO), Take 1 tablet by mouth Daily., Disp: , Rfl:   •  mupirocin (BACTROBAN) 2 % ointment, Apply  topically 2 (Two) Times a Day., Disp: 30 g, Rfl: 2  •  ondansetron (ZOFRAN) 4 MG tablet, Take 1 tablet by mouth 3 (Three) Times a Day As Needed for Nausea or Vomiting., Disp: 40 tablet, Rfl: 3  •  oxyCODONE-acetaminophen (PERCOCET) 5-325 MG per tablet, Take 1-2 tablets by mouth Every 4 (Four) Hours As Needed  (Pain)., Disp: 40 tablet, Rfl: 0  •  sodium chloride (NS) 0.9 % irrigation, USE FOR TRACHEOSTOMY AS DIRECTED, Disp: 1000 mL, Rfl: 8  •  triamcinolone (KENALOG) 0.1 % cream, Apply  topically 2 (Two) Times a Day., Disp: 45 g, Rfl: 2  •  vitamin B-12 (CYANOCOBALAMIN) 100 MCG tablet, Take 100 mcg by mouth Daily., Disp: , Rfl:   •  vitamin E 100 UNIT capsule, Take 400 Units by mouth Every Night., Disp: , Rfl:   •  Zinc 50 MG capsule, Take 50 mg by mouth Every Night., Disp: , Rfl:     Sleep Testin. PSG on 2017, AHI of 24.7   2. Was on autocpap 5-20 cm H2O  3. Currently with #6 Shiley trach for squamous cell CA of the throat    ASSESSMENT / PLAN:     1. Obstructive sleep apnea  1. Continue tracheostomy for now.  Once he decannulated we can restart the patient here overnight oximetry or repeat PSG.  Given his moderate to severe EMELIA prior most likely he will prior positive airway pressure.  However he comes he sleeps right he may do well with just supplemental oxygen area I will have him follow-up in 6 months, unless he is decannulated prior.    2. Throat CA - per ENT/oncoloy  3. Loculated pleural effusion  1. Per CT surgery and pulm  2.   Total time 25 min, more than half spent in face to face counseling and coordination of care.    RTC in 6 months     This document has been electronically signed by Manuel Rivas MD on 2018         CC: Marybeth Harrison,           No ref. provider found

## 2018-08-22 ENCOUNTER — OFFICE VISIT (OUTPATIENT)
Dept: SURGERY | Facility: CLINIC | Age: 58
End: 2018-08-22

## 2018-08-22 VITALS
SYSTOLIC BLOOD PRESSURE: 132 MMHG | HEIGHT: 72 IN | BODY MASS INDEX: 28.85 KG/M2 | DIASTOLIC BLOOD PRESSURE: 80 MMHG | WEIGHT: 213 LBS

## 2018-08-22 DIAGNOSIS — Z90.81 H/O SPLENECTOMY: Primary | ICD-10-CM

## 2018-08-22 PROCEDURE — 99024 POSTOP FOLLOW-UP VISIT: CPT | Performed by: SURGERY

## 2018-08-22 NOTE — PROGRESS NOTES
Patient of Dr. Solano's presents today to have staples removed from a chest wound.  Patient had a chest tube placed for a pleural effusion and also had a splenectomy.  He has a G-tube in place that he uses intermittently.  He's had pain on left side of his chest since she had the surgery.  The staples from a chest tube site and we removed the staples forearm.  His other wounds appear to be clean and healing appropriately.  Patient will follow up with Dr. Solano and we can a half.

## 2018-08-22 NOTE — PATIENT INSTRUCTIONS

## 2018-08-28 LAB
FUNGUS WND CULT: NORMAL
FUNGUS WND CULT: NORMAL

## 2018-09-04 ENCOUNTER — OFFICE VISIT (OUTPATIENT)
Dept: SURGERY | Facility: CLINIC | Age: 58
End: 2018-09-04

## 2018-09-04 VITALS
HEART RATE: 78 BPM | SYSTOLIC BLOOD PRESSURE: 118 MMHG | BODY MASS INDEX: 27.82 KG/M2 | TEMPERATURE: 98.8 F | DIASTOLIC BLOOD PRESSURE: 82 MMHG | WEIGHT: 205.4 LBS | HEIGHT: 72 IN

## 2018-09-04 DIAGNOSIS — Z09 FOLLOW UP: Primary | ICD-10-CM

## 2018-09-04 PROCEDURE — 99024 POSTOP FOLLOW-UP VISIT: CPT | Performed by: SURGERY

## 2018-09-04 RX ORDER — OXYCODONE HYDROCHLORIDE AND ACETAMINOPHEN 5; 325 MG/1; MG/1
1-2 TABLET ORAL EVERY 4 HOURS PRN
Qty: 40 TABLET | Refills: 0 | Status: SHIPPED | OUTPATIENT
Start: 2018-09-04 | End: 2018-12-12 | Stop reason: SDUPTHER

## 2018-09-05 NOTE — PROGRESS NOTES
CHIEF COMPLAINT:    Chief Complaint   Patient presents with   • Post-op Follow-up     P.O. Left chest tube placement, Exp Lap., Splenectomy, G-tube placement 7-26-18.       HISTORY OF PRESENT ILLNESS:    Truman Esquivel is a 57 y.o. male who underwent emergent splenectomy and placement of left chest tube with subsequent left thoracotomy and decortication.  He is here today for posthospitalization follow-up.  Overall he is doing well at home.  He is tolerating a regular diet.  He reports minimal pain to his left chest incision site.  He is having regular bowel function.    EXAM:  Vitals:    09/04/18 1530   BP: 118/82   Pulse: 78   Temp: 98.8 °F (37.1 °C)         Abdomen soft, incision well healed, G-tube in place.    Chest tube sites and thoracotomy incision well healed    ASSESSMENT:    Status post emergent splenectomy    PLAN:    Overall he appears to be doing well.  We'll see him back in 1 month.        This document has been electronically signed by Kris Solano MD on September 5, 2018 1:42 PM

## 2018-09-09 ENCOUNTER — APPOINTMENT (OUTPATIENT)
Dept: GENERAL RADIOLOGY | Facility: HOSPITAL | Age: 58
End: 2018-09-09

## 2018-09-09 ENCOUNTER — HOSPITAL ENCOUNTER (EMERGENCY)
Facility: HOSPITAL | Age: 58
Discharge: HOME | End: 2018-09-09
Admitting: NURSE PRACTITIONER

## 2018-09-09 VITALS
DIASTOLIC BLOOD PRESSURE: 92 MMHG | TEMPERATURE: 97.8 F | HEART RATE: 56 BPM | RESPIRATION RATE: 18 BRPM | BODY MASS INDEX: 27.63 KG/M2 | OXYGEN SATURATION: 99 % | HEIGHT: 72 IN | SYSTOLIC BLOOD PRESSURE: 187 MMHG | WEIGHT: 204 LBS

## 2018-09-09 DIAGNOSIS — T85.598A FEEDING TUBE DYSFUNCTION, INITIAL ENCOUNTER: Primary | ICD-10-CM

## 2018-09-09 PROCEDURE — 99283 EMERGENCY DEPT VISIT LOW MDM: CPT

## 2018-09-09 PROCEDURE — 74018 RADEX ABDOMEN 1 VIEW: CPT

## 2018-09-09 PROCEDURE — 0 DIATRIZOATE MEGLUMINE & SODIUM PER 1 ML: Performed by: NURSE PRACTITIONER

## 2018-09-09 RX ADMIN — DIATRIZOATE MEGLUMINE AND DIATRIZOATE SODIUM 30 ML: 660; 100 LIQUID ORAL; RECTAL at 17:46

## 2018-09-09 NOTE — ED PROVIDER NOTES
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  Patient has had the tube in place for 16 weeks, was sutured in.  Reports today he was bending over to pull his boot up and felt the sutures come dislodged and his feeding tube came out approximately 2 inches.        History provided by:  Patient   used: No        Review of Systems   Constitutional: Negative for chills, fatigue and fever.   HENT: Negative for sinus pressure and trouble swallowing.    Respiratory: Negative for shortness of breath.    Cardiovascular: Negative for chest pain and palpitations.   Gastrointestinal: Negative for abdominal pain, diarrhea, nausea and vomiting.   Genitourinary: Negative for difficulty urinating.   Musculoskeletal: Negative for back pain.   Skin: Negative for rash.   Allergic/Immunologic: Positive for immunocompromised state.   Neurological: Negative for dizziness and tremors.   Hematological: Negative for adenopathy.   Psychiatric/Behavioral: Negative for confusion.       Past Medical History:   Diagnosis Date   • Aortic dissection (CMS/[facility])    • Chest pain    • Disease of thyroid gland    • HTN (hypertension)    • Knee pain    • Sleep apnea    • Smoker    • Squamous cell carcinoma of larynx (CMS/[facility]) 2/5/2018   • Stroke (CMS/[facility])    • Swallowing difficulty        Allergies   Allergen Reactions   • Chlorhexidine Itching     Topical cleaning wipes causes severe skin irritation   • Co Q 10 [Coenzyme Q10] Itching   • Eggs Or Egg-Derived Products Swelling   • Erythromycin Other (See Comments)     Joint pains and cold sweats   • Gabapentin Itching   • Other GI Intolerance     Mycins  farzad lotion causes rash   • Penicillins      Tolerated cefepime during 2/2017 admission. Had rash and itching (relieved by benadryl) after few doses of cefepime and cefepime was continued.   •  Tetracyclines & Related GI Intolerance   • Acth [Corticotropin] Rash   • Cephalosporins Itching and Rash     Pt developed rash, itching after cefepime administration (2-3 doses) during 2/2017 admission. Itching was relieved with benadryl. Cefepime was continued because his infection was improving and no symptoms of anaphylaxis present   • Keflex [Cephalexin] Rash   • Neomycin Rash       Past Surgical History:   Procedure Laterality Date   • AORTA SURGERY      ruptured aorta repair   • ASCENDING ARCH/HEMIARCH REPLACEMENT N/A 2/14/2017    Procedure: INTRAOPERATIVE TRANSESOPHAGEAL ECHOCARDIOGRAM, MIDLINE STERNOTOMY, ASCENDING AORTIC  AND PROXIMAL AORTIC ARCH REPAIR WITH 26MM GRAFT, AORTIC VALVE RESUSPENSION, AORTIC ROOT REPAIR, OPEN VEIN HARVEST OF RIGHT LEG;  Surgeon: German Arreguin MD;  Location: [facility] MAIN OR;  Service:    • BRONCHOSCOPY N/A 2/17/2017    Procedure: BRONCHOSCOPY BIOPSY AT BEDSIDE WITH BAL-LEFT LOWER LOBE;  Surgeon: Trent Chaney MD;  Location: [facility] ENDOSCOPY;  Service:    • COLONOSCOPY N/A 3/7/2018    Procedure: COLONOSCOPY WITH POSSIBLE POLYPECTOMY   ( DONE IN OR WITH ENDO)      COLONOSCOPY FIRST;  Surgeon: Kris Solano MD;  Location: [facility] OR;  Service:    • DIRECT LARYNGOSCOPY, ESOPHAGOSCOPY, BRONCHOSCOPY N/A 2/5/2018    Procedure: DIRECT LARYNGOSCOPY WITH BIOPSY, ESOPHAGOSCOPY     (no bronchoscopy, no laser);  Surgeon: Joseph Venegas MD;  Location: [facility] OR;  Service:    • ENDOSCOPY W/ PEG TUBE PLACEMENT N/A 5/2/2018    Procedure: ESOPHAGOGASTRODUODENOSCOPY WITH PERCUTANEOUS ENDOSCOPIC GASTROSTOMY TUBE INSERTION;  Surgeon: Angel Maciel MD;  Location: [facility] ENDOSCOPY;  Service: Gastroenterology   • SPLENECTOMY N/A 7/26/2018    Procedure: SPLENECTOMY, left chest tube placement, EXPLORATORY LAPAROTOMY, G-TUBE PALCEMENT;  Surgeon: Kris Solano MD;  Location: [facility] OR;  Service: General   • THORACOSCOPY Left 7/31/2018    Procedure: LEFT THORACOSCOPY VIDEO  ASSISTED POSSIBLE THORACOTOMY possible decortication bronchoscopy;  Surgeon: Joshua Pollock MD;  Location: [facility];  Service: Cardiothoracic   • TRACHEOSTOMY  02/05/2018   • TRACHEOSTOMY N/A 2/5/2018    Procedure: TRACHEOSTOMY  local injection @ 1106 incision @ 1119;  Surgeon: Joseph Venegas MD;  Location: [facility] OR;  Service:    • VENOUS ACCESS DEVICE (PORT) INSERTION N/A 3/7/2018    Procedure: INSERTION OF MEDIPORT     (C-ARM#1);  Surgeon: Kris Solano MD;  Location: [facility] OR;  Service:        Family History   Problem Relation Age of Onset   • Thyroid disease Mother    • Hypertension Mother    • Hypertension Father    • Hypertension Other        Social History     Social History   • Marital status:      Social History Main Topics   • Smoking status: Former Smoker     Packs/day: 1.25     Years: 38.00     Quit date: 2/13/2017   • Smokeless tobacco: Never Used      Comment: 02/27/2018 - Patient confirmed he ceased utilization of tobacco products 02/13/2017.   • Alcohol use No   • Drug use: No   • Sexual activity: Defer     Other Topics Concern   • Not on file           Objective   Physical Exam   Constitutional: He is oriented to person, place, and time. Vital signs are normal. He appears well-developed and well-nourished.   HENT:   Head: Normocephalic.   Nose: Nose normal.   Eyes: Pupils are equal, round, and reactive to light. Conjunctivae are normal.   Cardiovascular: Normal rate, regular rhythm and normal heart sounds.    Pulmonary/Chest: Effort normal and breath sounds normal.   Abdominal: Soft.   Feeding tube noted. See hpi.    Musculoskeletal: Normal range of motion.   Neurological: He is alert and oriented to person, place, and time. GCS eye subscore is 4. GCS verbal subscore is 5. GCS motor subscore is 6.   Skin: Skin is warm and dry.   Psychiatric: He has a normal mood and affect.   Nursing note and vitals reviewed.      Procedures           ED Course  ED Course as of  Sep 09 1855   Sun Sep 09, 2018   1851 Xray performed. Tube in place, Gastrografin into the gastrostomy tube shows good placement. Tube cleaned with betadine and placed back in approximate previous location near suture marks. And secured with tape.   [JL]      ED Course User Index  [JL] Otto Doty, APRN      ..  XR Abdomen KUB   Final Result   Conclusion:   Gastrostomy tube in the proximal to mid body of the stomach.   No leak or extravasation of contrast         [id number]      Electronically signed by:  Trent Bruno MD  9/9/2018 5:58 PM CDT   Workstation: [email or web address]                    [facility]      Final diagnoses:   Feeding tube dysfunction, initial encounter            Otto Doty, ROWAN  09/09/18 1853

## 2018-09-10 ENCOUNTER — TELEPHONE (OUTPATIENT)
Dept: SURGERY | Facility: CLINIC | Age: 58
End: 2018-09-10

## 2018-09-10 ENCOUNTER — TELEPHONE (OUTPATIENT)
Dept: SPEECH THERAPY | Facility: HOSPITAL | Age: 58
End: 2018-09-10

## 2018-09-10 NOTE — ED NOTES
Feeding tube cleaned with betadine, secured back in place, split gauze place around tubing, taped with medipore tape.     Margret Ross, LPN  09/09/18 1927

## 2018-09-10 NOTE — TELEPHONE ENCOUNTER
Pt says feeding tube has come unanchored. He wants to know what he should do? He is still having issues aspirating food. He thinks he may be put back on a liquid diet.

## 2018-09-10 NOTE — TELEPHONE ENCOUNTER
Return call - no answer. Will attempt return call again on 9/12/2018.     Agnes Barone MS CCC-SLP

## 2018-09-11 ENCOUNTER — OFFICE VISIT (OUTPATIENT)
Dept: SURGERY | Facility: CLINIC | Age: 58
End: 2018-09-11

## 2018-09-11 ENCOUNTER — OFFICE VISIT (OUTPATIENT)
Dept: OTOLARYNGOLOGY | Facility: CLINIC | Age: 58
End: 2018-09-11

## 2018-09-11 VITALS
TEMPERATURE: 98.5 F | SYSTOLIC BLOOD PRESSURE: 150 MMHG | HEART RATE: 68 BPM | DIASTOLIC BLOOD PRESSURE: 82 MMHG | WEIGHT: 206 LBS | BODY MASS INDEX: 27.9 KG/M2 | HEIGHT: 72 IN

## 2018-09-11 VITALS — BODY MASS INDEX: 27.98 KG/M2 | WEIGHT: 206.6 LBS | HEIGHT: 72 IN | RESPIRATION RATE: 18 BRPM

## 2018-09-11 DIAGNOSIS — J37.0 CHRONIC LARYNGITIS: ICD-10-CM

## 2018-09-11 DIAGNOSIS — Z09 FOLLOW UP: Primary | ICD-10-CM

## 2018-09-11 DIAGNOSIS — Z85.21 PERSONAL HISTORY OF MALIGNANT NEOPLASM OF LARYNX: Primary | ICD-10-CM

## 2018-09-11 DIAGNOSIS — Z93.0 TRACHEOSTOMY STATUS (HCC): ICD-10-CM

## 2018-09-11 DIAGNOSIS — R13.13 PHARYNGEAL DYSPHAGIA: ICD-10-CM

## 2018-09-11 DIAGNOSIS — R13.10 DYSPHAGIA, UNSPECIFIED TYPE: Primary | ICD-10-CM

## 2018-09-11 LAB
MYCOBACTERIUM SPEC CULT: NORMAL
MYCOBACTERIUM SPEC CULT: NORMAL
NIGHT BLUE STAIN TISS: NORMAL
NIGHT BLUE STAIN TISS: NORMAL

## 2018-09-11 PROCEDURE — 99024 POSTOP FOLLOW-UP VISIT: CPT | Performed by: SURGERY

## 2018-09-11 PROCEDURE — 99214 OFFICE O/P EST MOD 30 MIN: CPT | Performed by: OTOLARYNGOLOGY

## 2018-09-11 PROCEDURE — 31575 DIAGNOSTIC LARYNGOSCOPY: CPT | Performed by: OTOLARYNGOLOGY

## 2018-09-11 NOTE — PROGRESS NOTES
CHIEF COMPLAINT:    Chief Complaint   Patient presents with   • Follow-up     Check feeding tube.       HISTORY OF PRESENT ILLNESS:    Truman Esquivel is a 57 y.o. male who underwent emergency splenectomy with left chest tube placement and subsequent left thoracotomy for decortication.  The patient also had his PEG tube replaced with a gastrostomy tube.  This was on 7/26/2018.    He returns today with complaints of his gastrostomy tube stitch pulling through his skin.  His G-tube has slid out a few centimeters since that time.  It was secured with tape in the emergency department.  He has been able to continue using the gastrostomy tube as needed.  He is anticipating possibly returning to using it for tube feeds as well as he has had aspiration issues with solid foods.    EXAM:  Vitals:    09/11/18 1327   BP: 150/82   Pulse: 68   Temp: 98.5 °F (36.9 °C)         Abdomen soft, incisions well-healed.  Gastrostomy tube in place.  Secured to skin with tape today.  Tube clamp placed on tube for patient convenience.    ASSESSMENT:    Status post emergency splenectomy    PLAN:    Overall he appears to be doing well.  Continue current care, anticipate possible tube feeds via gastrostomy tube.  Follow-up with me as previously scheduled.        This document has been electronically signed by Kris Solano MD on September 11, 2018 2:25 PM

## 2018-09-12 ENCOUNTER — DOCUMENTATION (OUTPATIENT)
Dept: SPEECH THERAPY | Facility: HOSPITAL | Age: 58
End: 2018-09-12

## 2018-09-12 NOTE — PROGRESS NOTES
"Subjective   Truman Esquivel is a 57 y.o. male.       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  He completed treatment with concurrent chemoradiation.  Had been downsized to a size 6 tracheostomy tube to attempt decannulation however in the interim since he was last seen he suffered a lacerated spleen which required laparotomy and splenectomy and a prolonged hospital stay.  He has a feeding tube in place.  He has been trying to feed himself orally but says that there are times when the food seems to come out his tracheostomy.  He denies hemoptysis.  He does complain of some \"swelling\" along his jawline and under his chin that seems to be worse in the morning.  He believes it is significantly worse since he had a splenectomy.      The following portions of the patient's history were reviewed and updated as appropriate: allergies, current medications, past family history, past medical history, past social history, past surgical history and problem list.     reports that he quit smoking about 18 months ago. He has a 47.50 pack-year smoking history. He has never used smokeless tobacco. He reports that he does not drink alcohol or use drugs.   Patient is not a tobacco user and has not been counseled for use of tobacco products      Review of Systems   Constitutional: Negative for fever.   HENT: Positive for trouble swallowing.            Objective   Physical Exam  General: Well-developed well-nourished male in no acute distress.  Alert and oriented ×3. Head: Normocephalic. Face: Symmetrical strength and appearance.  Voice: Harsh with tracheostomy occluded, no stridor. Speech:Fluent  Ears: External ears no deformity, canals no discharge, tympanic membranes intact clear and mobile bilaterally.  Nose: Nares show no discharge mass polyp or purulence.  Boggy mucosa is present.  No gross external deformity.  Septum: " Midline  Oral cavity: Lips and gums without lesions.  Tongue and floor of mouth without lesions.  Parotid and submandibular ducts unobstructed.  No mucosal lesions on the buccal mucosa or vestibule of the mouth.  Pharynx: No erythema exudate mass or ulcer  Neck: Tracheostomy tube in place.  Posttreatment changes including some lymphedema of the submandibular tissues is present but there is no evidence of neoplasm or infection.  No palpable lymphadenopathy.  Trachea and larynx are midline.    Procedure Note    Pre-operative Diagnosis:   Chief Complaint   Patient presents with   • Follow-up       Post-operative Diagnosis: Chronic laryngitis; no evidence of recurrent cancer    Anesthesia: topical with xylocaine and neosynephrine    Endoscopy Type:  Flexible Laryngoscopy    Procedure Details:    The patient was placed in the sitting position.  After topical anesthesia and decongestion, the 4 mm laryngoscope was passed.  The nasal cavities, nasopharynx, oropharynx, hypopharynx, and larynx were all examined.  Vocal cords were examined during respiration and phonation.  The following findings were noted:    Findings: No masses in the nose or nasopharynx.  The hypopharynx shows posttreatment changes.  Epiglottis shows significant posttreatment changes with apparent volume loss.  Epiglottis appears to also be more posteriorly angulated.  The endolarynx shows marked edema of the arytenoids and aryepiglottic folds bilaterally but no evidence of neoplasm.  This is also consistent with posttreatment change.  Vocal cord mobility appears intact.    Condition:  Stable.  Patient tolerated procedure well.    Complications:  None    Assessment/Plan   Truman was seen today for follow-up.    Diagnoses and all orders for this visit:    Personal history of malignant neoplasm of larynx    Chronic laryngitis    Pharyngeal dysphagia    Tracheostomy status (CMS/[facility])        Plan: Schedule modified barium swallow with speech therapy as well as  follow up with speech therapy regarding dietary recommendations.  Patient has a feeding tube so if he needs to be nothing by mouth that is an option.  Told him I would not consider decannulation in the near future as long as he had a significant swallowing issues and also in light of the appearance of his larynx.  I have recommended he return to me in 3 months and call sooner for problems.

## 2018-09-13 ENCOUNTER — HOSPITAL ENCOUNTER (OUTPATIENT)
Dept: GENERAL RADIOLOGY | Facility: HOSPITAL | Age: 58
Discharge: HOME | End: 2018-09-13
Admitting: OTOLARYNGOLOGY

## 2018-09-13 DIAGNOSIS — R13.10 DYSPHAGIA, UNSPECIFIED TYPE: ICD-10-CM

## 2018-09-13 PROCEDURE — 74230 X-RAY XM SWLNG FUNCJ C+: CPT

## 2018-09-13 PROCEDURE — 92611 MOTION FLUOROSCOPY/SWALLOW: CPT | Performed by: SPEECH-LANGUAGE PATHOLOGIST

## 2018-09-13 PROCEDURE — G8996 SWALLOW CURRENT STATUS: HCPCS | Performed by: SPEECH-LANGUAGE PATHOLOGIST

## 2018-09-13 PROCEDURE — G8998 SWALLOW D/C STATUS: HCPCS | Performed by: SPEECH-LANGUAGE PATHOLOGIST

## 2018-09-13 PROCEDURE — G8997 SWALLOW GOAL STATUS: HCPCS | Performed by: SPEECH-LANGUAGE PATHOLOGIST

## 2018-09-13 RX ADMIN — BARIUM SULFATE 40 ML: 960 POWDER, FOR SUSPENSION ORAL at 09:30

## 2018-09-13 NOTE — THERAPY DISCHARGE NOTE
Outpatient Speech Language Pathology   Adult Swallow Initial Eval/Discharge modified barium swallow study  [facility]     Patient Name: Truman Esquivel  : 1960  MRN: [id number]  Today's Date: 2018    SPEECH PATHOLOGY MODIFIED BARIUM SWALLOW STUDY (VFSS)    Chief Complaint: return of liquids and solids out of trach at times.     Medical Diagnoses: throat cancer s/p radiation.      Past Medical History: tracheostomy    Present diet textures: pt eats only soft foods and drinks.  Takes 6 small meals a day.  Uses PEG for meds    Views: Patient seated at 90 degrees in:    _X_lateral view    __A/P    Ability to follow instructions:  __Excellent   _X_Good __Fair  __Poor      Oral Motor Status Exam:    Dentition: X__Natural  __Dentures   __Edentulous         __Partials         Condition/Fit:    Presence of Oral Motor Weakness: no        The following consistencies were given, with symptoms as noted:    Consistency Method Oral Prep Piecemeal swallow Delayed Swallow Reflex Laryngeal Penetration Aspiration Automatic use of chin tuck Pooling in valleculae   Thin  5ml spoon increased  Yes, d/t increased oral prep time   yes    Thin   Cup rim increased  Yes, d/t increased oral prep time yes Yes, after next trial yes    thin Cup rim increased  Yes; same as above   yes    Puree Table spoon increased yes Yes, d/t increased oral prep time   yes Yes, after the swallow   Cookie bite increased yes Yes, d/t increased oral prep time   yes Yes, after the swallow   Pt did not have a functioning epiglottis. The pharyngeal wall muscles act as a squeeze to protect airway when using a chin tuck.  Pt requested a liquid wash for every solid.  He stated that he could not swallow without a drink.    Impairments:  __Premature loss of bolus  __Reduced velopharyngeal closure  _X_Decreased lingual propulsion  X__Reduced epiglottic inversion  __Decreased hyolaryngeal elevation  X__Reduced laryngeal closure  __Reduced esophageal  sphincter opening      Penetration-Aspiration Scale Rating:     Category Score Descriptions   No penetration or aspiration 1 Contrast does not enter the airway   Penetration 2 Contrast enters the airway, remains above vocal folds; no residue    3 Contrast remains above vocal folds; visible residue remains    4 Contrast contacts vocal folds; no residue    5 Contrast contacts vocal folds; visible residue remains   Aspiration 6 Contrast passes glottis; no subglottic residue visible    7 Contrast passes glottis; visible subglottic residue despite patient's response    8 Contrast passes glottis; visible subglottic residue; absent patient response       Dysphagia Scale Rating:    Dysphagia Rating Scale Explanation   0   Normal swallowing mechanism     1 Minimal Dysphagia- video swallow shows slight deviance from a normal swallow. Patient may report change in sensation during swallow. No change in diet is required.     2 Mild Dysphagia- oropharyngeal dysphagia present, which can be managed by specific swallow suggestions. Slight modification in consistency of diet may be indicated.      3 Mild-Moderate Dysphagia- potential for aspiration exists but is diminished by specific swallow techniques and a modified diet. Time for eating is significantly increased; thus supplemental nutrition may be indicated.      4 Moderate Dysphagia- significant potential for aspiration exists. Trace aspiration of one or more consistencies may be seen under videofluoroscopy. Patient may eat certain consistencies by using specific techniques to minimize potential for aspiration and/or facilitate swallowing. Supervision at mealtimes required. May require supplemental nutrition orally or via feeding tube.      5 Moderately Severe Dysphagia- patient aspirates 5% to 10% on one or more consistencies, with potential for aspiration on all consistencies. Potential for aspiration minimized by specific swallow instructions. Cough reflex absent or  "nonprotective. Alterative mode of feeding required to maintain patient's nutritional needs. If pulmonary status is compromised, \"nothing by mouth\" may be indicated.      6 Severe Dysphagia- more than 10% aspiration for all consistencies. \"Nothing by mouth\" recommended.          Recommendation:    __Non-oral nutrition/hydration  __Trial feeding with Speech Pathologist ONLY  _X_May have PO nutrition/hydration to include:   _X_Puree   X__Mechanical soft   X__Soft   __Regular   __Honey thick liquids   _X_Nectar thick liquids   _X_Thin (regular) liquids  Liquids by:   _X_Cup   __Straw   __Spoon  Medications:   X__Crushed and mixed into puree or via PEG     Compensatory Swallow Strategies suggested:   X__sit 90 degrees for all PO and _10__ minutes after meals/snacks/medication   X__small bites   X__small sips   X__cyclic eating (2 bites/1 sip)   X__eat/drink slowly   X__chew food well   X__empty mouth each time between bites   X__swallow _`1_ times after each X__bite _X_sip _X_at the end of the meal  Education provided to patient and sister present after the MBS to include chin tuck for airway protection as he does not have a functioning epiglottis.  The patient indicates he is eating only soft foods, but not thick foods such as peanutbutter.  He also indicates use of liquid wash for all solids.  He will benefit from Skilled St to address cyclic eating with liquid wash.      Recommend f/u with outpatient speech therapy for education and functional use of compensatory strategies during meal time to decrease r/o aspriation.  Evaluation completed with student clinician and supervision of DAVINA Chung CCC-SLP 9/13/2018 1:43 PM                Visit Date: 09/13/2018   Patient Active Problem List   Diagnosis   • Ascending aortic dissection (CMS/HCC)   • Essential hypertension   • Deep vein thrombosis (DVT) of femoral vein of both lower extremities (CMS/HCC)   • Hypothyroidism   • Snoring   • Acute pain of right " knee   • Knee effusion, right   • Knee pain   • Obstructive sleep apnea of adult   • TIA (transient ischemic attack)   • Dysphagia   • Squamous cell carcinoma of larynx (CMS/HCC)   • Pharyngeal dysphagia   • Anemia   • Abnormal positron emission tomography (PET) of colon   • Hypokalemia   • Encounter for venous access device care   • Dehydration   • Weight loss, abnormal   • Odynophagia   • Thrush, oral   • Thrombocytopenia (CMS/HCC)   • Pancytopenia due to antineoplastic chemotherapy (CMS/HCC)   • Unable to eat        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   • Stroke (CMS/HCC)    • Swallowing difficulty         Past Surgical History:   Procedure Laterality Date   • AORTA SURGERY      ruptured aorta repair   • ASCENDING ARCH/HEMIARCH REPLACEMENT N/A 2/14/2017    Procedure: INTRAOPERATIVE TRANSESOPHAGEAL ECHOCARDIOGRAM, MIDLINE STERNOTOMY, ASCENDING AORTIC  AND PROXIMAL AORTIC ARCH REPAIR WITH 26MM GRAFT, AORTIC VALVE RESUSPENSION, AORTIC ROOT REPAIR, OPEN VEIN HARVEST OF RIGHT LEG;  Surgeon: German Arreguin MD;  Location: [facility] MAIN OR;  Service:    • BRONCHOSCOPY N/A 2/17/2017    Procedure: BRONCHOSCOPY BIOPSY AT BEDSIDE WITH BAL-LEFT LOWER LOBE;  Surgeon: Trent Chaney MD;  Location: [facility] ENDOSCOPY;  Service:    • COLONOSCOPY N/A 3/7/2018    Procedure: COLONOSCOPY WITH POSSIBLE POLYPECTOMY   ( DONE IN OR WITH ENDO)      COLONOSCOPY FIRST;  Surgeon: Kris Solano MD;  Location: [facility] [place] OR;  Service:    • DIRECT LARYNGOSCOPY, ESOPHAGOSCOPY, BRONCHOSCOPY N/A 2/5/2018    Procedure: DIRECT LARYNGOSCOPY WITH BIOPSY, ESOPHAGOSCOPY     (no bronchoscopy, no laser);  Surgeon: Joseph Venegas MD;  Location: [facility] [place] OR;  Service:    • ENDOSCOPY W/ PEG TUBE PLACEMENT N/A 5/2/2018    Procedure:  ESOPHAGOGASTRODUODENOSCOPY WITH PERCUTANEOUS ENDOSCOPIC GASTROSTOMY TUBE INSERTION;  Surgeon: Angel Maciel MD;  Location: [facility] ENDOSCOPY;  Service: Gastroenterology   • SPLENECTOMY N/A 7/26/2018    Procedure: SPLENECTOMY, left chest tube placement, EXPLORATORY LAPAROTOMY, G-TUBE PALCEMENT;  Surgeon: Kris Solano MD;  Location: [facility] OR;  Service: General   • THORACOSCOPY Left 7/31/2018    Procedure: LEFT THORACOSCOPY VIDEO ASSISTED POSSIBLE THORACOTOMY possible decortication bronchoscopy;  Surgeon: Joshua Pollock MD;  Location: [facility] OR;  Service: Cardiothoracic   • TRACHEOSTOMY  02/05/2018   • TRACHEOSTOMY N/A 2/5/2018    Procedure: TRACHEOSTOMY  local injection @ 1106 incision @ 1119;  Surgeon: Joseph Venegas MD;  Location: [facility] OR;  Service:    • VENOUS ACCESS DEVICE (PORT) INSERTION N/A 3/7/2018    Procedure: INSERTION OF MEDIPORT     (C-ARM#1);  Surgeon: Kris Solano MD;  Location: [facility] OR;  Service:          Visit Dx:     ICD-10-CM ICD-9-CM   1. Dysphagia, unspecified type R13.10 787.20                 SLP Adult Swallow Evaluation - 09/13/18 0930        Rehab Evaluation    Document Type evaluation  -EC    Total Evaluation Minutes, SLP 30  -EC    Subjective Information complains of   liquids coming out trach  -EC    Patient Observations alert;cooperative;agree to therapy  -EC    Patient Effort excellent  -EC       General Information    Patient Profile Reviewed yes  -EC    Pertinent History Of Current Problem notes liquids and food return out trach at times  -EC    Current Method of Nutrition soft textures;pureed;thin liquids  -EC    Barriers to Rehab none identified  -EC       Pain Assessment    Additional Documentation Pain Scale: Numbers Pre/Post-Treatment (Group)  -EC       Pain Scale: Numbers Pre/Post-Treatment    Pain Scale: Numbers, Pretreatment 0/10 - no pain  -EC    Pain Scale: Numbers, Post-Treatment 0/10 - no pain  -EC       Oral Motor and Function     Dentition Assessment natural, present and adequate  -EC    Secretion Management WNL/WFL  -EC    Mucosal Quality moist, healthy  -EC    Volitional Swallow delayed  -EC       MBS/VFSS Interpretation    Oral Prep Phase impaired oral phase of swallowing  -EC    Oral Transit Phase impaired  -EC    VFSS Summary delayed swallow reflex noted with laryngeal penetration from thin via cup one instance  -EC       Oral Preparatory Phase    Oral Preparatory Phase prolonged manipulation  -EC    Prolonged Manipulation thin liquids;pudding/puree;all consistencies tested  -EC    Oral Preparatory Phase, Comment increased oral time  -EC       Oral Transit Phase    Impaired Oral Transit Phase delayed initiation of bolus transit;increased A-P transit time  -EC    Delayed Initiation of bolus transit all consistencies tested  -EC    Increased A-P Transit Time all consistencies tested  -EC       Initiation of Pharyngeal Swallow    Pharyngeal Phase impaired pharyngeal phase of swallowing  -EC    Penetration During the Swallow thin liquids  -EC    Response to Penetration shallow  -EC      User Key  (r) = Recorded By, (t) = Taken By, (c) = Cosigned By    Initials Name Provider Type    EC Agnes Chung CCC-SLP Speech and Language Pathologist                                           Time Calculation:   SLP Start Time: 0930  SLP Stop Time: 1000  SLP Time Calculation (min): 30 min  Total Timed Code Minutes- SLP: 30 minute(s)    Therapy Charges for Today     Code Description Service Date Service Provider Modifiers Qty    72373668180 HC ST SWALLOWING CURRENT STATUS 9/13/2018 Agnes Chung CCC-SLP GN, CK 1    47665798094 HC ST SWALLOWING PROJECTED 9/13/2018 Agnes Chung CCC-SLP GN, CJ 1    [id number] HC ST SWALLOWING DISCHARGE 9/13/2018 Agnes Chung CCC-SLP GN, CJ 1    [id number] HC ST MOTION FLUORO EVAL SWALLOW 2 9/13/2018 Agnes Chung CCC-SLP GN 1          SLP G-Codes  Functional Limitations:  Swallowing  Swallow Current Status (): At least 40 percent but less than 60 percent impaired, limited or restricted  Swallow Goal Status (): At least 20 percent but less than 40 percent impaired, limited or restricted  Swallow Discharge Status (): At least 20 percent but less than 40 percent impaired, limited or restricted           Agnes Chung CCC-SLP  9/13/2018

## 2018-09-17 ENCOUNTER — HOSPITAL ENCOUNTER (OUTPATIENT)
Dept: SPEECH THERAPY | Facility: HOSPITAL | Age: 58
Setting detail: THERAPIES SERIES
Discharge: HOME | End: 2018-09-17

## 2018-09-17 DIAGNOSIS — R13.12 OROPHARYNGEAL DYSPHAGIA: Primary | ICD-10-CM

## 2018-09-17 PROCEDURE — G8997 SWALLOW GOAL STATUS: HCPCS | Performed by: SPEECH-LANGUAGE PATHOLOGIST

## 2018-09-17 PROCEDURE — 92610 EVALUATE SWALLOWING FUNCTION: CPT | Performed by: SPEECH-LANGUAGE PATHOLOGIST

## 2018-09-17 PROCEDURE — G8996 SWALLOW CURRENT STATUS: HCPCS | Performed by: SPEECH-LANGUAGE PATHOLOGIST

## 2018-09-17 NOTE — THERAPY EVALUATION
Outpatient Speech Language Pathology   Adult Swallow Initial Evaluation  [facility]     Patient Name: Truman Esquivel  : 1960  MRN: [id number]  Today's Date: 2018         Visit Date: 2018      Patient has a hx of Laryngeal cancer; Stage III (cT3, cN0, cM0) with dysphagia. Patient currently has a trach. He does not use passy thaddeus valve but does have one.       This was an initial evaluation following increased removal of PO from trach. MBS was completed on 2018. Hx of dysphagia is as follows.         Hx of current dysphagia:     2018 - MBS was completed which showed aspiration and penetration on most trials as well as a discontinuation of assessment due to increased pain (see MBS for full report). NPO was recommended and pt received a PEG tube for nutrition and hydration. Pt is maintaining weight with PEG tube and is following recommendations of NPO with small teaspoon sips throughout the day following oral care.      2018 - MBS was completed with no aspiration noted. Laryngeal penetration noted on large thin sip from cup rim. Piece meal noted for solid trials with recommendations of small bites. Decreased epiglottic formation and movement. Recommendation of increasing PO intake with follow-up with OP SLP and RD.     2018 - MBS was completed that reveled aspiration on thin liquids during 1 trial as well as penetration present. Chin tuck was recommended as well as safe swallow strategies of slow rate, small bites/sips, multiple swallows, no straw, upright position, cyclic eating. Outpatient speech therapy was recommended for education on safe swallow strategies and use of chin tuck to decrease risk of aspiration.     Safe swallow protocol was reviewed and written copy provided for home use. Recommendations of soft food choices as well as naturally thickened liquids. Pt was educated on the importance of using chin tuck during all PO especially with thin liquid (water). Pt  was in agreement. SLP encouraged pt to maintain soft solid diet with no crumbly textures and avoid bread. SLP encouraged pt to monitor for s/s of aspiration or removal of PO from trach. Thin by rim of cup. Small sip/bites, slow rate, upright position during and after meals, 2 swallows per bite/drink, alternate bites/drinks, monitor for wet vocal quality/cough and re-swallow as needed, as well as chin tuck. Written copy of safe swallow strategies was provided and reviewed for home use.      Pt completed 4 crackers this date with water by rim of cup. Pt did not present with any s/s of aspiration or difficulties during PO but did begin to cough approximately 10 minutes following trials. Education on safe swallow strategies reviewed.      Patient would benefit from skilled speech therapy 1x a week for 4 weeks for increased swallowing safety and preservation of swallowing function following completion of radiation and chemo as well as diet upgrade and increasing safety.      Pt was in agreement.     Agnes Barone MS CCC-SLP         Patient Active Problem List   Diagnosis   • Ascending aortic dissection (CMS/HCC)   • Essential hypertension   • Deep vein thrombosis (DVT) of femoral vein of both lower extremities (CMS/HCC)   • Hypothyroidism   • Snoring   • Acute pain of right knee   • Knee effusion, right   • Knee pain   • Obstructive sleep apnea of adult   • TIA (transient ischemic attack)   • Dysphagia   • Squamous cell carcinoma of larynx (CMS/HCC)   • Pharyngeal dysphagia   • Anemia   • Abnormal positron emission tomography (PET) of colon   • Hypokalemia   • Encounter for venous access device care   • Dehydration   • Weight loss, abnormal   • Odynophagia   • Thrush, oral   • Thrombocytopenia (CMS/HCC)   • Pancytopenia due to antineoplastic chemotherapy (CMS/HCC)   • Unable to eat        Past Medical History:   Diagnosis Date   • Aortic dissection (CMS/HCC)    • Chest pain    • Disease of thyroid gland    • HTN  (hypertension)    • Knee pain    • Sleep apnea    • Smoker    • Squamous cell carcinoma of larynx (CMS/HCC) 2/5/2018   • Stroke (CMS/HCC)    • Swallowing difficulty         Past Surgical History:   Procedure Laterality Date   • AORTA SURGERY      ruptured aorta repair   • ASCENDING ARCH/HEMIARCH REPLACEMENT N/A 2/14/2017    Procedure: INTRAOPERATIVE TRANSESOPHAGEAL ECHOCARDIOGRAM, MIDLINE STERNOTOMY, ASCENDING AORTIC  AND PROXIMAL AORTIC ARCH REPAIR WITH 26MM GRAFT, AORTIC VALVE RESUSPENSION, AORTIC ROOT REPAIR, OPEN VEIN HARVEST OF RIGHT LEG;  Surgeon: German Arreguin MD;  Location: [facility] MAIN OR;  Service:    • BRONCHOSCOPY N/A 2/17/2017    Procedure: BRONCHOSCOPY BIOPSY AT BEDSIDE WITH BAL-LEFT LOWER LOBE;  Surgeon: Trent Chaney MD;  Location: [facility] ENDOSCOPY;  Service:    • COLONOSCOPY N/A 3/7/2018    Procedure: COLONOSCOPY WITH POSSIBLE POLYPECTOMY   ( DONE IN OR WITH ENDO)      COLONOSCOPY FIRST;  Surgeon: Kris Solano MD;  Location: [facility] OR;  Service:    • DIRECT LARYNGOSCOPY, ESOPHAGOSCOPY, BRONCHOSCOPY N/A 2/5/2018    Procedure: DIRECT LARYNGOSCOPY WITH BIOPSY, ESOPHAGOSCOPY     (no bronchoscopy, no laser);  Surgeon: Joseph Venegas MD;  Location: [facility] OR;  Service:    • ENDOSCOPY W/ PEG TUBE PLACEMENT N/A 5/2/2018    Procedure: ESOPHAGOGASTRODUODENOSCOPY WITH PERCUTANEOUS ENDOSCOPIC GASTROSTOMY TUBE INSERTION;  Surgeon: Angel Maciel MD;  Location: [facility] ENDOSCOPY;  Service: Gastroenterology   • SPLENECTOMY N/A 7/26/2018    Procedure: SPLENECTOMY, left chest tube placement, EXPLORATORY LAPAROTOMY, G-TUBE PALCEMENT;  Surgeon: Kris Solano MD;  Location: [facility] OR;  Service: General   • THORACOSCOPY Left 7/31/2018    Procedure: LEFT THORACOSCOPY VIDEO ASSISTED POSSIBLE THORACOTOMY possible decortication bronchoscopy;  Surgeon: Joshua Pollock MD;  Location: [facility] OR;  Service: Cardiothoracic   • TRACHEOSTOMY  02/05/2018   • TRACHEOSTOMY N/A 2/5/2018     Procedure: TRACHEOSTOMY  local injection @ 1106 incision @ 1119;  Surgeon: Joseph Venegas MD;  Location: [facility];  Service:    • VENOUS ACCESS DEVICE (PORT) INSERTION N/A 3/7/2018    Procedure: INSERTION OF MEDIPORT     (C-ARM#1);  Surgeon: Kris Solano MD;  Location: [facility];  Service:          Visit Dx:     ICD-10-CM ICD-9-CM   1. Oropharyngeal dysphagia R13.12 787.22                 SLP Adult Swallow Evaluation - 09/17/18 1008        Rehab Evaluation    Document Type evaluation  -EA    Subjective Information no complaints  -EA    Patient Observations alert;cooperative;agree to therapy  -EA    Patient/Family Observations Pt attended therapy session alone this date.   -EA    Patient Effort good  -EA    Symptoms Noted During/After Treatment none  -EA       General Information    Patient Profile Reviewed yes  -EA    Current Method of Nutrition soft textures;thin liquids  -EA    Precautions/Limitations, Vision WFL with corrective lenses  -EA    Precautions/Limitations, Hearing WFL  -EA    Prior Level of Function-Communication WFL  -EA    Prior Level of Function-Swallowing no diet consistency restrictions;mechanical soft textures  -EA    Plans/Goals Discussed with patient  -EA    Barriers to Rehab none identified  -EA       Pain Scale: Numbers Pre/Post-Treatment    Pain Scale: Numbers, Pretreatment 1/10  -EA    Pain Scale: Numbers, Post-Treatment 1/10  -EA    Pain Location - Side Left  -EA    Pain Location - Orientation incisional  -EA    Pain Location chest  -EA       Oral Motor and Function    Dentition Assessment natural, present and adequate;other (see comments)  -EA    Secretion Management WNL/WFL  -EA    Mucosal Quality moist, healthy  -EA    Volitional Swallow delayed  -EA       Oral Musculature and Cranial Nerve Assessment    Oral Motor General Assessment WFL  -EA       General Eating/Swallowing Observations    Respiratory Support Currently in Use trach collar  -EA    Eating/Swallowing  Skills self-fed  -EA    Positioning During Eating upright 90 degree;upright in chair  -EA    Utensils Used spoon  -EA    Consistencies Trialed thin liquids  -EA       Respiratory    Respiratory Status room air  -EA       Clinical Swallow Eval    Oral Prep Phase impaired  -EA    Oral Transit impaired  -EA    Oral Residue WFL  -EA    Pharyngeal Phase suspected pharyngeal impairment  -EA    Esophageal Phase unremarkable  -EA       Oral Prep Concerns    Oral Prep Concerns prolonged mastication;increased prep time  -EA    Prolonged Mastication thin;pudding  -EA    Increased Prep Time thin;pudding  -EA       Oral Transit Concerns    Oral Transit Concerns delayed initiation of bolus transit  -EA    Delayed Intiation of Bolus Transit thin;pudding  -EA       Pharyngeal Phase Concerns    Pharyngeal Phase Concerns cough  -EA    Cough thin;pudding  -EA       Clinical Impression    SLP Swallowing Diagnosis mild-moderate;oral dysfunction;suspected pharyngeal dysfunction  -EA    Functional Impact risk of malnutrition;risk of aspiration/pneumonia  -EA    Rehab Potential/Prognosis, Swallowing good, to achieve stated therapy goals  -EA    Swallow Criteria for Skilled Therapeutic Interventions Met demonstrates skilled criteria  -EA       Recommendations    Therapy Frequency (Swallow) 1 day per week  -EA    Predicted Duration Therapy Intervention (Days) until discharge  -EA    SLP Diet Recommendation mechanical soft with no mixed consistencies;thin liquids  -EA    Recommended Precautions and Strategies upright posture during/after eating;small bites of food and sips of liquid;multiple swallows per bite of food;multiple swallows per sip of liquid;hard swallow with each bite or sip  -EA    Monitor for Signs of Aspiration yes;notify SLP if any concerns  -EA    Anticipated Dischage Disposition home  -EA       Oral Nutrition/Hydration Goal 1 (SLP)    Oral Nutrition/Hydration Goal 1, SLP Patient will tolerate least restrictive diet with no  overt s/s of aspiration using safe swallow protocol.   -EA    Time Frame (Oral Nutrition/Hydration Goal 1, SLP) by discharge  -EA    Barriers (Oral Nutrition/Hydration Goal 1, SLP) laryngeal cancer   -EA    Progress/Outcomes (Oral Nutrition/Hydration Goal 1, SLP) goal ongoing  -EA       Pharyngeal Strengthening Exercise Goal 1 (SLP)    Activity (Pharyngeal Strengthening Goal 1, SLP) increase timing;increase tongue base retraction  -EA    Increase Timing ingrid;hard effortful swallow;prepping - 3 second prep or suck swallow or 3-step swallow   OME set - Prophylactic swallowing exercises   -EA    Increase Tongue Base Retraction ingrid;hard effortful swallow;prepping - 3 second prep or suck swallow or 3-step swallow  -EA    [place]/Accuracy (Pharyngeal Strengthening Goal 1, SLP) independently (over 90% accuracy)  -EA    Time Frame (Pharyngeal Strengthening Goal 1, SLP) by discharge  -EA    Barriers (Pharyngeal Strengthening Goal 1, SLP) laryngeal cancer   -EA    Progress/Outcomes (Pharyngeal Strengthening Goal 1, SLP) goal met  -EA       Improve oral skills    To Improve Oral Skills, patient will: Increase tongue A-P movement;90%  -EA       Improve timing of pharyngeal response    To improve timing of pharyngeal response, patient will: Swallow in timely way using three second prep;90%   Chin Tuck for all PO  -EA    Status: Improve timing of pharyngeal response Progressing as expected  -EA    Timing of Pharyngeal Response Progress 70%;with consistent cues  -EA    Comments: Improve timing of pharyngeal response Education provided this date; written safe swallow strategies provided.   -EA       Oral Motor Structure and Function    Dysphagia Treatment Objectives Improve oral skills;Improve timing of pharyngeal response  -EA      User Key  (r) = Recorded By, (t) = Taken By, (c) = Cosigned By    Initials Name Provider Type    Agnes Damian MS CCC-SLP Speech and Language Pathologist                               OP SLP Education     Row Name 09/17/18 1000       Education    Barriers to Learning No barriers identified  -EA    Education Provided Described results of evaluation;Patient expressed understanding of evaluation;Patient participated in establishing goals and treatment plan  -EA    Assessed Learning needs;Learning motivation  -EA    Learning Motivation Strong  -EA    Learning Method Explanation;Demonstration;Written materials  -EA    Teaching Response Verbalized understanding;Demonstrated understanding  -EA    Education Comments SLP reviewed the results of the MBS and provided written  safe swallowing strategies. Pt was in agreement.   -EA      User Key  (r) = Recorded By, (t) = Taken By, (c) = Cosigned By    Initials Name Effective Dates    Agnes Damian MS CCC-SLP 10/17/16 -                 SLP OP Goals     Row Name 09/17/18 1044          SLP Time Calculation    SLP Goal Re-Cert Due Date 10/17/18  -EA       User Key  (r) = Recorded By, (t) = Taken By, (c) = Cosigned By    Initials Name Provider Type    Agnes Damian MS CCC-SLP Speech and Language Pathologist                OP SLP Assessment/Plan - 09/17/18 1000        SLP Assessment    Functional Problems Swallowing  -EA    Impact on Function: Swallowing Risk of aspiration;Risk of pneumonia  -EA    Clinical Impression: Swallowing Mild:;oropharyngeal phase dysphagia  -EA    Functional Problems Comment MBS reviewed   -EA    Clinical Impression Comments Strategies provided   -EA    SLP Diagnosis Dysphagia   -EA    Prognosis Good (comment)  -EA    Patient/caregiver participated in establishment of treatment plan and goals Yes  -EA    Patient would benefit from skilled therapy intervention Yes  -EA       SLP Plan    Frequency 1x a week   -EA    Duration 4 weeks  -EA    Planned CPT's? SLP SWALLOW THERAPY: 37934  -EA    Expected Duration Therapy Session - minutes 30-45 minutes  -EA    Plan Comments Initiate POC  -EA      User Key  (r) = Recorded By,  (t) = Taken By, (c) = Cosigned By    Initials Name Provider Type    Agnes Damian MS CCC-SLP Speech and Language Pathologist                    Time Calculation:   SLP Start Time: 1008  SLP Stop Time: 1049  SLP Time Calculation (min): 41 min  Total Timed Code Minutes- SLP: 41 minute(s)    Therapy Charges for Today     Code Description Service Date Service Provider Modifiers Qty    27581431648 HC ST SWALLOWING CURRENT STATUS 9/17/2018 Agnes Barone MS CCC-SLP GN, CJ 1    81513076103 HC ST SWALLOWING PROJECTED 9/17/2018 Agnes Barone MS CCC-RAHUL GN, CI 1    69866579897 HC ST EVAL ORAL PHARYNG SWALLOW 3 9/17/2018 Agnes Barone MS CCC-SLP GN 1          SLP G-Codes  SLP NOMS Used?: Yes  Functional Limitations: Swallowing  Swallow Current Status (): At least 20 percent but less than 40 percent impaired, limited or restricted  Swallow Goal Status (): At least 1 percent but less than 20 percent impaired, limited or restricted        MS NABIL Banks  9/17/2018

## 2018-09-19 ENCOUNTER — HOSPITAL ENCOUNTER (OUTPATIENT)
Dept: GENERAL RADIOLOGY | Facility: HOSPITAL | Age: 58
Discharge: HOME | End: 2018-09-19
Admitting: NURSE PRACTITIONER

## 2018-09-19 ENCOUNTER — OFFICE VISIT (OUTPATIENT)
Dept: CARDIAC SURGERY | Facility: CLINIC | Age: 58
End: 2018-09-19

## 2018-09-19 ENCOUNTER — INFUSION (OUTPATIENT)
Dept: ONCOLOGY | Facility: HOSPITAL | Age: 58
End: 2018-09-19

## 2018-09-19 ENCOUNTER — OFFICE VISIT (OUTPATIENT)
Dept: ONCOLOGY | Facility: CLINIC | Age: 58
End: 2018-09-19

## 2018-09-19 VITALS
SYSTOLIC BLOOD PRESSURE: 166 MMHG | TEMPERATURE: 98.4 F | HEIGHT: 72 IN | HEART RATE: 66 BPM | WEIGHT: 204.3 LBS | BODY MASS INDEX: 27.67 KG/M2 | DIASTOLIC BLOOD PRESSURE: 80 MMHG | OXYGEN SATURATION: 98 %

## 2018-09-19 VITALS
HEART RATE: 57 BPM | SYSTOLIC BLOOD PRESSURE: 180 MMHG | BODY MASS INDEX: 27.77 KG/M2 | TEMPERATURE: 99.4 F | WEIGHT: 205 LBS | OXYGEN SATURATION: 98 % | HEIGHT: 72 IN | RESPIRATION RATE: 20 BRPM | DIASTOLIC BLOOD PRESSURE: 85 MMHG

## 2018-09-19 DIAGNOSIS — Z48.813 SURGICAL AFTERCARE, RESPIRATORY SYSTEM: ICD-10-CM

## 2018-09-19 DIAGNOSIS — J94.2 HEMOTHORAX ON LEFT: ICD-10-CM

## 2018-09-19 DIAGNOSIS — D64.9 ANEMIA, UNSPECIFIED TYPE: Primary | ICD-10-CM

## 2018-09-19 DIAGNOSIS — C32.9 SQUAMOUS CELL CARCINOMA OF LARYNX (HCC): ICD-10-CM

## 2018-09-19 DIAGNOSIS — E87.6 HYPOKALEMIA: ICD-10-CM

## 2018-09-19 DIAGNOSIS — J93.83 OTHER PNEUMOTHORAX: ICD-10-CM

## 2018-09-19 DIAGNOSIS — Z45.2 ENCOUNTER FOR VENOUS ACCESS DEVICE CARE: Primary | ICD-10-CM

## 2018-09-19 DIAGNOSIS — J93.83 OTHER PNEUMOTHORAX: Primary | ICD-10-CM

## 2018-09-19 DIAGNOSIS — D72.828 OTHER ELEVATED WHITE BLOOD CELL (WBC) COUNT: ICD-10-CM

## 2018-09-19 DIAGNOSIS — D64.9 ANEMIA, UNSPECIFIED TYPE: ICD-10-CM

## 2018-09-19 PROBLEM — D72.829 LEUKOCYTOSIS: Status: ACTIVE | Noted: 2018-09-19

## 2018-09-19 LAB
ALBUMIN SERPL-MCNC: 3.8 G/DL (ref 3.4–4.8)
ALBUMIN/GLOB SERPL: 1.2 G/DL (ref 1.1–1.8)
ALP SERPL-CCNC: 81 U/L (ref 38–126)
ALT SERPL W P-5'-P-CCNC: 27 U/L (ref 21–72)
ANION GAP SERPL CALCULATED.3IONS-SCNC: 9 MMOL/L (ref 5–15)
AST SERPL-CCNC: 15 U/L (ref 17–59)
BASOPHILS # BLD AUTO: 0.05 10*3/MM3 (ref 0–0.2)
BASOPHILS NFR BLD AUTO: 0.3 % (ref 0–2)
BILIRUB SERPL-MCNC: 0.7 MG/DL (ref 0.2–1.3)
BUN BLD-MCNC: 19 MG/DL (ref 7–21)
BUN/CREAT SERPL: 19.4 (ref 7–25)
CALCIUM SPEC-SCNC: 8.9 MG/DL (ref 8.4–10.2)
CHLORIDE SERPL-SCNC: 104 MMOL/L (ref 95–110)
CO2 SERPL-SCNC: 25 MMOL/L (ref 22–31)
CREAT BLD-MCNC: 0.98 MG/DL (ref 0.7–1.3)
DEPRECATED RDW RBC AUTO: 44.9 FL (ref 35.1–43.9)
EOSINOPHIL # BLD AUTO: 0.37 10*3/MM3 (ref 0–0.7)
EOSINOPHIL NFR BLD AUTO: 2.4 % (ref 0–7)
ERYTHROCYTE [DISTWIDTH] IN BLOOD BY AUTOMATED COUNT: 15.3 % (ref 11.5–14.5)
FERRITIN SERPL-MCNC: 19.9 NG/ML (ref 17.9–464)
FOLATE SERPL-MCNC: >20 NG/ML (ref 2.76–21)
GFR SERPL CREATININE-BSD FRML MDRD: 79 ML/MIN/1.73 (ref 56–130)
GLOBULIN UR ELPH-MCNC: 3.1 GM/DL (ref 2.3–3.5)
GLUCOSE BLD-MCNC: 92 MG/DL (ref 60–100)
HCT VFR BLD AUTO: 32.2 % (ref 39–49)
HGB BLD-MCNC: 10.6 G/DL (ref 13.7–17.3)
IMM GRANULOCYTES # BLD: 0.05 10*3/MM3 (ref 0–0.02)
IMM GRANULOCYTES NFR BLD: 0.3 % (ref 0–0.5)
IRON 24H UR-MRATE: 30 MCG/DL (ref 49–181)
IRON SATN MFR SERPL: 7 % (ref 20–55)
LYMPHOCYTES # BLD AUTO: 0.77 10*3/MM3 (ref 0.6–4.2)
LYMPHOCYTES NFR BLD AUTO: 5 % (ref 10–50)
MCH RBC QN AUTO: 26.3 PG (ref 26.5–34)
MCHC RBC AUTO-ENTMCNC: 32.9 G/DL (ref 31.5–36.3)
MCV RBC AUTO: 79.9 FL (ref 80–98)
MONOCYTES # BLD AUTO: 1.24 10*3/MM3 (ref 0–0.9)
MONOCYTES NFR BLD AUTO: 8.1 % (ref 0–12)
NEUTROPHILS # BLD AUTO: 12.77 10*3/MM3 (ref 2–8.6)
NEUTROPHILS NFR BLD AUTO: 83.9 % (ref 37–80)
PLATELET # BLD AUTO: 335 10*3/MM3 (ref 150–450)
PMV BLD AUTO: 10.1 FL (ref 8–12)
POTASSIUM BLD-SCNC: 3.4 MMOL/L (ref 3.5–5.1)
PROT SERPL-MCNC: 6.9 G/DL (ref 6.3–8.6)
RBC # BLD AUTO: 4.03 10*6/MM3 (ref 4.37–5.74)
SODIUM BLD-SCNC: 138 MMOL/L (ref 137–145)
TIBC SERPL-MCNC: 406 MCG/DL (ref 261–462)
VIT B12 BLD-MCNC: 723 PG/ML (ref 239–931)
WBC NRBC COR # BLD: 15.25 10*3/MM3 (ref 3.2–9.8)

## 2018-09-19 PROCEDURE — 80053 COMPREHEN METABOLIC PANEL: CPT

## 2018-09-19 PROCEDURE — 25010000002 HEPARIN FLUSH (PORCINE) 100 UNIT/ML SOLUTION: Performed by: INTERNAL MEDICINE

## 2018-09-19 PROCEDURE — 99214 OFFICE O/P EST MOD 30 MIN: CPT | Performed by: INTERNAL MEDICINE

## 2018-09-19 PROCEDURE — 85025 COMPLETE CBC W/AUTO DIFF WBC: CPT

## 2018-09-19 PROCEDURE — 99024 POSTOP FOLLOW-UP VISIT: CPT | Performed by: NURSE PRACTITIONER

## 2018-09-19 PROCEDURE — 83540 ASSAY OF IRON: CPT

## 2018-09-19 PROCEDURE — 83550 IRON BINDING TEST: CPT

## 2018-09-19 PROCEDURE — 82728 ASSAY OF FERRITIN: CPT

## 2018-09-19 PROCEDURE — 71046 X-RAY EXAM CHEST 2 VIEWS: CPT

## 2018-09-19 PROCEDURE — 82607 VITAMIN B-12: CPT

## 2018-09-19 PROCEDURE — G8417 CALC BMI ABV UP PARAM F/U: HCPCS | Performed by: INTERNAL MEDICINE

## 2018-09-19 PROCEDURE — 36591 DRAW BLOOD OFF VENOUS DEVICE: CPT | Performed by: INTERNAL MEDICINE

## 2018-09-19 PROCEDURE — G0463 HOSPITAL OUTPT CLINIC VISIT: HCPCS | Performed by: INTERNAL MEDICINE

## 2018-09-19 PROCEDURE — 1123F ACP DISCUSS/DSCN MKR DOCD: CPT | Performed by: INTERNAL MEDICINE

## 2018-09-19 PROCEDURE — 82746 ASSAY OF FOLIC ACID SERUM: CPT

## 2018-09-19 RX ORDER — SODIUM CHLORIDE 0.9 % (FLUSH) 0.9 %
10 SYRINGE (ML) INJECTION AS NEEDED
Status: DISCONTINUED | OUTPATIENT
Start: 2018-09-19 | End: 2018-09-19 | Stop reason: HOSPADM

## 2018-09-19 RX ORDER — SODIUM CHLORIDE 0.9 % (FLUSH) 0.9 %
10 SYRINGE (ML) INJECTION AS NEEDED
Status: CANCELLED | OUTPATIENT
Start: 2018-09-19

## 2018-09-19 RX ORDER — FERROUS SULFATE 7.5 MG/0.5
375 SYRINGE (EA) ORAL DAILY
Qty: 150 ML | Refills: 2 | Status: SHIPPED | OUTPATIENT
Start: 2018-09-19 | End: 2018-12-13 | Stop reason: SDUPTHER

## 2018-09-19 RX ADMIN — Medication 10 ML: at 09:52

## 2018-09-19 RX ADMIN — HEPARIN SODIUM (PORCINE) LOCK FLUSH IV SOLN 100 UNIT/ML 500 UNITS: 100 SOLUTION at 09:52

## 2018-09-19 NOTE — PROGRESS NOTES
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  Upon staging workup patient was found to have stage III squamous cell cancer of larynx for which she has been started on concurrent chemoradiation with weekly carboplatin and Taxol on March 19, 2018.  Last dose of chemotherapy and radiation was on May 15, 2018.  Patient is here for follow-up appointment today.  He was admitted to [facility] in July 2018 with ruptured spleen requiring splint removal.  States he is able to swallow by mouth better.  Denies any active bleeding.  Denies any new lymph node enlargement.    PAST MEDICAL HISTORY:    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   • Stroke (CMS/HCC)    • Swallowing difficulty        SOCIAL HISTORY:    Social History   Substance Use Topics   • Smoking status: Former Smoker     Packs/day: 1.25     Years: 38.00     Quit date: 2/13/2017   • Smokeless tobacco: Never Used      Comment: 02/27/2018 - Patient confirmed he ceased utilization of tobacco products 02/13/2017.   • Alcohol use No       Surgical History :  Past Surgical History:   Procedure Laterality Date   • AORTA SURGERY      ruptured aorta repair   • ASCENDING ARCH/HEMIARCH REPLACEMENT N/A 2/14/2017    Procedure: INTRAOPERATIVE TRANSESOPHAGEAL ECHOCARDIOGRAM, MIDLINE STERNOTOMY, ASCENDING AORTIC  AND PROXIMAL AORTIC ARCH  REPAIR WITH 26MM GRAFT, AORTIC VALVE RESUSPENSION, AORTIC ROOT REPAIR, OPEN VEIN HARVEST OF RIGHT LEG;  Surgeon: German Arreguin MD;  Location: [facility] MAIN OR;  Service:    • BRONCHOSCOPY N/A 2/17/2017    Procedure: BRONCHOSCOPY BIOPSY AT BEDSIDE WITH BAL-LEFT LOWER LOBE;  Surgeon: Trent Chaney MD;  Location: [facility] ENDOSCOPY;  Service:    • COLONOSCOPY N/A 3/7/2018    Procedure: COLONOSCOPY WITH POSSIBLE POLYPECTOMY   ( DONE IN OR WITH ENDO)      COLONOSCOPY FIRST;  Surgeon: Kris Solano MD;  Location: [facility] OR;  Service:    • DIRECT LARYNGOSCOPY, ESOPHAGOSCOPY, BRONCHOSCOPY N/A 2/5/2018    Procedure: DIRECT LARYNGOSCOPY WITH BIOPSY, ESOPHAGOSCOPY     (no bronchoscopy, no laser);  Surgeon: Joseph Venegas MD;  Location: [facility] OR;  Service:    • ENDOSCOPY W/ PEG TUBE PLACEMENT N/A 5/2/2018    Procedure: ESOPHAGOGASTRODUODENOSCOPY WITH PERCUTANEOUS ENDOSCOPIC GASTROSTOMY TUBE INSERTION;  Surgeon: Angel Maciel MD;  Location: [facility] ENDOSCOPY;  Service: Gastroenterology   • SPLENECTOMY N/A 7/26/2018    Procedure: SPLENECTOMY, left chest tube placement, EXPLORATORY LAPAROTOMY, G-TUBE PALCEMENT;  Surgeon: Kris Solano MD;  Location: [facility] OR;  Service: General   • THORACOSCOPY Left 7/31/2018    Procedure: LEFT THORACOSCOPY VIDEO ASSISTED POSSIBLE THORACOTOMY possible decortication bronchoscopy;  Surgeon: Joshua Pollock MD;  Location: [facility] OR;  Service: Cardiothoracic   • TRACHEOSTOMY  02/05/2018   • TRACHEOSTOMY N/A 2/5/2018    Procedure: TRACHEOSTOMY  local injection @ 1106 incision @ 1119;  Surgeon: Joseph Venegas MD;  Location: [facility] OR;  Service:    • VENOUS ACCESS DEVICE (PORT) INSERTION N/A 3/7/2018    Procedure: INSERTION OF MEDIPORT     (C-ARM#1);  Surgeon: Kris Solano MD;  Location: [facility] OR;  Service:        ALLERGIES:    Allergies   Allergen Reactions   • Chlorhexidine Itching     Topical cleaning wipes causes severe skin irritation   •  Eggs Or Egg-Derived Products Swelling   • Erythromycin Other (See Comments)     Joint pains and cold sweats   • Gabapentin Itching   • Other GI Intolerance     Mycins  farzad lotion causes rash   • Acth [Corticotropin] Rash   • Cephalosporins Itching and Rash     Pt developed rash, itching after cefepime administration (2-3 doses) during 2/2017 admission. Itching was relieved with benadryl. Cefepime was continued because his infection was improving and no symptoms of anaphylaxis present   • Co Q 10 [Coenzyme Q10] Rash   • Keflex [Cephalexin] Rash   • Neomycin Rash   • Penicillins Rash     Tolerated cefepime during 2/2017 admission. Had rash and itching (relieved by benadryl) after few doses of cefepime and cefepime was continued.   • Tetracyclines & Related GI Intolerance         FAMILY HISTORY:  Family History   Problem Relation Age of Onset   • Thyroid disease Mother    • Hypertension Mother    • Hypertension Father    • Hypertension Other            REVIEW OF SYSTEMS:      CONSTITUTIONAL:  Complains of fatigue.  Denies any fever, chills .      HEENT:  Some visual disturbance affecting left eye since stroke in 2017.  Complains of hoarseness of voice.  States dysphagia is improved, still using feeding tube for nutritional purposes.     RESPIRATORY:  Complains of chronic shortness of breath, denies any recent worsening.  No new cough or hemoptysis.     CARDIOVASCULAR:  No chest pain or palpitations.     GASTROINTESTINAL:  No abdominal pain nausea, vomiting or blood in the stool.     GENITOURINARY: No Dysuria or Hematuria.     MUSCULOSKELETAL:  No new back pain or arthralgia..     LYMPHATICS:  Denies any abnormal swollen glands anywhere in the body.     NEUROLOGICAL : No tingling or numbness. No headache or dizziness. No seizures or balance problems.     SKIN: no new skin lesions.          PHYSICAL EXAMINATION:      VITAL SIGNS:  /85   Pulse 57   Temp 99.4 °F (37.4 °C) (Temporal Artery )   Resp 20   Ht  "182.9 cm (72.01\")   Wt 93 kg (205 lb)   SpO2 98%   BMI 27.80 kg/m²   1    09/19/18  0952   Weight: 93 kg (205 lb)       ECOG  performance status: 1      GENERAL:  Not in any distress.Appears uncomfortable.    HEENT:  Normocephalic, Atraumatic.Mild Conjunctival pallor. No icterus. Extraocular Movements Intact. No Facial Asymmetry noted. No Thrush on oropharynx examination today.    NECK:  No adenopathy. No JVD.Tracheostomy present.    RESPIRATORY:  Fair air entry bilateral. No rhonchi or wheezing.    CARDIOVASCULAR:  S1, S2. Regular rate and rhythm. No murmur or gallop appreciated.    ABDOMEN:  Soft, obese, nontender. Bowel sounds present in all four quadrants.  No hepatosplenomegaly appreciated.Well -healed surgical scar present in abdomen.    MUSCULOSKELETAL:  No edema.No Calf Tenderness.Normal range of motion.    NEUROLOGIC:  Alert, awake and oriented ×3.  No  Motor  deficit appreciated. Cranial Nerves 2-12 grossly intact.    SKIN : Erythema present around tracheostomy tube.    LYMPHATICS: No new enlarged lymph node in neck or supraclavicular area.            DIAGNOSTIC DATA:    Glucose   Date Value Ref Range Status   09/19/2018 92 60 - 100 mg/dL Final     Glucose, Arterial   Date Value Ref Range Status   07/31/2018 113 (H) 65 - 95 mmol/L Final     Sodium   Date Value Ref Range Status   09/19/2018 138 137 - 145 mmol/L Final     Potassium   Date Value Ref Range Status   09/19/2018 3.4 (L) 3.5 - 5.1 mmol/L Final     CO2   Date Value Ref Range Status   09/19/2018 25.0 22.0 - 31.0 mmol/L Final     Chloride   Date Value Ref Range Status   09/19/2018 104 95 - 110 mmol/L Final     Anion Gap   Date Value Ref Range Status   09/19/2018 9.0 5.0 - 15.0 mmol/L Final     Creatinine   Date Value Ref Range Status   09/19/2018 0.98 0.70 - 1.30 mg/dL Final     BUN   Date Value Ref Range Status   09/19/2018 19 7 - 21 mg/dL Final     BUN/Creatinine Ratio   Date Value Ref Range Status   09/19/2018 19.4 7.0 - 25.0 Final     Calcium "   Date Value Ref Range Status   09/19/2018 8.9 8.4 - 10.2 mg/dL Final     eGFR Non  Amer   Date Value Ref Range Status   09/19/2018 79 56 - 130 mL/min/1.73 Final     Alkaline Phosphatase   Date Value Ref Range Status   09/19/2018 81 38 - 126 U/L Final     Total Protein   Date Value Ref Range Status   09/19/2018 6.9 6.3 - 8.6 g/dL Final     ALT (SGPT)   Date Value Ref Range Status   09/19/2018 27 21 - 72 U/L Final     AST (SGOT)   Date Value Ref Range Status   09/19/2018 15 (L) 17 - 59 U/L Final     Total Bilirubin   Date Value Ref Range Status   09/19/2018 0.7 0.2 - 1.3 mg/dL Final     Albumin   Date Value Ref Range Status   09/19/2018 3.80 3.40 - 4.80 g/dL Final     Globulin   Date Value Ref Range Status   09/19/2018 3.1 2.3 - 3.5 gm/dL Final     Lab Results   Component Value Date    WBC 15.25 (H) 09/19/2018    HGB 10.6 (L) 09/19/2018    HCT 32.2 (L) 09/19/2018    MCV 79.9 (L) 09/19/2018     09/19/2018     Lab Results   Component Value Date    NEUTROABS 12.77 (H) 09/19/2018    IRON 30 (L) 09/19/2018    TIBC 406 09/19/2018    LABIRON 7 (L) 09/19/2018    FERRITIN 19.90 09/19/2018    DOFQZLBZ10 723 09/19/2018    FOLATE >20.00 09/19/2018     No results found for: , LABCA2, AFPTM, HCGQUANT, , CHROMGRNA, [id number], CEA, REFLABREPO        Radiology Data :  PET CT done on February 16, 2018 was reviewed, discussed with patient, it showed:  IMPRESSION:  CONCLUSION:   1.  Soft tissue mass in the region of the epiglottis obstructing  the airway, associated with hypermetabolic activity, compatible  with known neoplasm.  2.  Activity around the tracheostomy site is likely inflammatory.  3.  Activity in the rectum (SUV max 7.1) is probably either  physiological or inflammatory. Recommend direct visualization.  4.  There is a focus of activity in the region of the right  common iliac artery without a definite CT abnormality (SUV max  5.4) possibly representing a lymph node, nonspecific but cannot  rule out  neoplasm.  5.  Foci of activity posterior to the ischial tuberosities are  probably inflammatory.  6.  There are diffuse punctate low-level foci of activity  throughout the osseous structures, liver, kidneys, mediastinum  which are more likely artifactual than due to metastatic disease.    ADDENDUM   ADDENDUM #1         Upon review of the patient's imaging with Dr. Tapia, there  appears to be extension of tumor into the left side of the  preepiglottic space.        Ct of Neck with contrast done on January 29, 2018 showed:  IMPRESSION:  3 x 3.5 x 3.5 cm soft tissue mass with lobular margin centered at  the level the epiglottis however flush 4 cm intimate with the  posterior margin of the tongue base and the posterior margin of  the oral pharynx. This does contribute to moderate compromise of  the airway. Finding is suspicious for malignancy.      Mostly subcentimeter cervical chain lymph nodes present  bilaterally. The largest node right level II measuring 1.3 cm. No  necrotic or conglomerate adenopathy.      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 The  dissection was described on a CTA coronary artery exam from  2/14/2017.        Pathology :  Pathology result from February 5, 2018 showed:  Final Diagnosis   1.  TUMOR, EPIGLOTTIS:   INVASIVE SQUAMOUS CELL CARCINOMA, NON KERATINIZING, POORLY DIFFERENTIATED.       2.  TUMOR, EPIGLOTTIS:   INVASIVE SQUAMOUS CELL CARCINOMA, NON KERATINIZING, POORLY DIFFERENTIATED.               ASSESSMENT AND PLAN:      1.  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  Based on PET/CT there is a tumor extension into preepiglottic space making a T3 lesion.  Result of PET CT were discussed with patient.  It was discussed with patient with T3 N0 lesion after his treatment option includes either surgery or concurrent chemoradiation.    -He was started on concurrent chemoradiation on March  19, 2018.  Patient received 6 weekly carboplatin and Taxol from March 19, 2018 until May 15, 2018.  Radiation was also completed on May 15, 2018.  -ENT exam by Dr. Venegas in September 12, 2018 showed good response to chemoradiation without any evidence of any active tumor.  -We will ask patient to return to clinic in 3 months with a repeat CBC and anemia workup to be done on that day.    2.   anemia:   -Hemoglobin is 10.6 today.  Anemia workup done earlier today shows component of iron deficiency with iron saturation of 7%.  -Since patient is not able to take tablet by mouth, prescription for liquid iron has been sent to his pharmacy.  -We'll repeat anemia workup with next clinic visit in 3 months.    3.  Odynophagia and dysphagia: Improved.    4.  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  We will monitored with CBC for now    5.  Hypokalemia: Patient's potassium is 3.4 today.  Patient continues to take potassium supplement at home.     5.  History of aortic dissection status post repair.     6.  History of CVA with residual visual disturbance in left eye     7.  Health maintenance: Patient quit smoking in February 2017.  Never had a screening colonoscopy done.    8. Advance care planning: Advance Care Planning: For now patient remains full code and is able to make his decisions.  Patient has health care surrogate mentioned on chart.    9. BMI: Patient's Body mass index is 27.8 kg/m². BMI is higher than reference range.  Patient was notified about her elevated BMI.        Reza Patel MD  9/19/2018  6:11 PM        EMR Dragon/Transcription disclaimer:   Much of this encounter note is an electronic transcription/translation of spoken language to printed text. The electronic translation of spoken language may permit erroneous, or at times, nonsensical words or phrases to be inadvertently transcribed; Although I have reviewed the note for such errors, some may still exist.

## 2018-09-20 ENCOUNTER — OFFICE VISIT (OUTPATIENT)
Dept: CARDIOLOGY | Facility: CLINIC | Age: 58
End: 2018-09-20

## 2018-09-20 VITALS
DIASTOLIC BLOOD PRESSURE: 80 MMHG | OXYGEN SATURATION: 98 % | WEIGHT: 204 LBS | HEART RATE: 61 BPM | SYSTOLIC BLOOD PRESSURE: 130 MMHG | HEIGHT: 72 IN | BODY MASS INDEX: 27.63 KG/M2

## 2018-09-20 DIAGNOSIS — G47.33 OBSTRUCTIVE SLEEP APNEA OF ADULT: ICD-10-CM

## 2018-09-20 DIAGNOSIS — C32.9 SQUAMOUS CELL CARCINOMA OF LARYNX (HCC): ICD-10-CM

## 2018-09-20 DIAGNOSIS — I71.010 ASCENDING AORTIC DISSECTION (HCC): Primary | ICD-10-CM

## 2018-09-20 DIAGNOSIS — I10 ESSENTIAL HYPERTENSION: ICD-10-CM

## 2018-09-20 PROCEDURE — 99214 OFFICE O/P EST MOD 30 MIN: CPT | Performed by: INTERNAL MEDICINE

## 2018-09-20 RX ORDER — LOSARTAN POTASSIUM AND HYDROCHLOROTHIAZIDE 12.5; 1 MG/1; MG/1
1 TABLET ORAL DAILY
Qty: 30 TABLET | Refills: 12 | Status: SHIPPED | OUTPATIENT
Start: 2018-09-20 | End: 2019-02-11

## 2018-09-20 NOTE — PROGRESS NOTES
[facility] Cardiology  OFFICE NOTE    Truman Esquivel  57 y.o. male    09/20/2018  1. Ascending aortic dissection (CMS/HCC)    2. Essential hypertension    3. Obstructive sleep apnea of adult    4. Squamous cell carcinoma of larynx (CMS/HCC)        Chief complaint -follow-up hypertension    History of present Illness- 57-year-old gentleman who has history of ascending aortic dissection and subsequently had a repair with resuspension of the aortic valve and has been doing well.  He was diagnosed with squamous cell carcinoma of the larynx and had radiation and chemotherapy.  He is doing well.  He has a tracheostomy and a feeding tube.  He started eating food now and his supplements some food through the PEG tube.  His blood pressure has been labile and his blood pressure had dropped previously and I had to cut down some of his blood pressure medicines and now it has come up.  He is found to be anemic and is getting iron supplements.  I put him back on Hyzaar 100/12.5 mg daily and continue the Toprol-XL 25 mg in the evening.              Allergies   Allergen Reactions   • Chlorhexidine Itching     Topical cleaning wipes causes severe skin irritation   • Eggs Or Egg-Derived Products Swelling   • Erythromycin Other (See Comments)     Joint pains and cold sweats   • Gabapentin Itching   • Other GI Intolerance     Mycins  farzad lotion causes rash   • Acth [Corticotropin] Rash   • Cephalosporins Itching and Rash     Pt developed rash, itching after cefepime administration (2-3 doses) during 2/2017 admission. Itching was relieved with benadryl. Cefepime was continued because his infection was improving and no symptoms of anaphylaxis present   • Co Q 10 [Coenzyme Q10] Rash   • Keflex [Cephalexin] Rash   • Neomycin Rash   • Penicillins Rash     Tolerated cefepime during 2/2017 admission. Had rash and itching (relieved by benadryl) after few doses of cefepime and cefepime was continued.   • Tetracyclines  & Related GI Intolerance         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   • Stroke (CMS/HCC)    • Swallowing difficulty          Past Surgical History:   Procedure Laterality Date   • AORTA SURGERY      ruptured aorta repair   • ASCENDING ARCH/HEMIARCH REPLACEMENT N/A 2/14/2017    Procedure: INTRAOPERATIVE TRANSESOPHAGEAL ECHOCARDIOGRAM, MIDLINE STERNOTOMY, ASCENDING AORTIC  AND PROXIMAL AORTIC ARCH REPAIR WITH 26MM GRAFT, AORTIC VALVE RESUSPENSION, AORTIC ROOT REPAIR, OPEN VEIN HARVEST OF RIGHT LEG;  Surgeon: German Arreguin MD;  Location: [facility] MAIN OR;  Service:    • BRONCHOSCOPY N/A 2/17/2017    Procedure: BRONCHOSCOPY BIOPSY AT BEDSIDE WITH BAL-LEFT LOWER LOBE;  Surgeon: Trent Chaney MD;  Location: [facility] ENDOSCOPY;  Service:    • COLONOSCOPY N/A 3/7/2018    Procedure: COLONOSCOPY WITH POSSIBLE POLYPECTOMY   ( DONE IN OR WITH ENDO)      COLONOSCOPY FIRST;  Surgeon: Kris Solano MD;  Location: [facility] OR;  Service:    • DIRECT LARYNGOSCOPY, ESOPHAGOSCOPY, BRONCHOSCOPY N/A 2/5/2018    Procedure: DIRECT LARYNGOSCOPY WITH BIOPSY, ESOPHAGOSCOPY     (no bronchoscopy, no laser);  Surgeon: Joseph Venegas MD;  Location: [facility] OR;  Service:    • ENDOSCOPY W/ PEG TUBE PLACEMENT N/A 5/2/2018    Procedure: ESOPHAGOGASTRODUODENOSCOPY WITH PERCUTANEOUS ENDOSCOPIC GASTROSTOMY TUBE INSERTION;  Surgeon: Angel Maciel MD;  Location: [facility] ENDOSCOPY;  Service: Gastroenterology   • SPLENECTOMY N/A 7/26/2018    Procedure: SPLENECTOMY, left chest tube placement, EXPLORATORY LAPAROTOMY, G-TUBE PALCEMENT;  Surgeon: Kris Solano MD;  Location: [facility] OR;  Service: General   • THORACOSCOPY Left 7/31/2018    Procedure: LEFT THORACOSCOPY VIDEO ASSISTED POSSIBLE THORACOTOMY possible decortication  bronchoscopy;  Surgeon: Joshua Pollock MD;  Location: [facility];  Service: Cardiothoracic   • TRACHEOSTOMY  02/05/2018   • TRACHEOSTOMY N/A 2/5/2018    Procedure: TRACHEOSTOMY  local injection @ 1106 incision @ 1119;  Surgeon: Joseph Venegas MD;  Location: [facility];  Service:    • VENOUS ACCESS DEVICE (PORT) INSERTION N/A 3/7/2018    Procedure: INSERTION OF MEDIPORT     (C-ARM#1);  Surgeon: Kris Solano MD;  Location: [facility];  Service:          Family History   Problem Relation Age of Onset   • Thyroid disease Mother    • Hypertension Mother    • Hypertension Father    • Hypertension Other          Social History     Social History   • Marital status:      Spouse name: N/A   • Number of children: N/A   • Years of education: N/A     Occupational History   • Not on file.     Social History Main Topics   • Smoking status: Former Smoker     Packs/day: 1.25     Years: 38.00     Quit date: 2/13/2017   • Smokeless tobacco: Never Used      Comment: 02/27/2018 - Patient confirmed he ceased utilization of tobacco products 02/13/2017.   • Alcohol use No   • Drug use: No   • Sexual activity: Defer     Other Topics Concern   • Not on file     Social History Narrative   • No narrative on file         Current Outpatient Prescriptions   Medication Sig Dispense Refill   • Cholecalciferol 03867 units tablet 50 thousand units po q  month 3 tablet 3   • clopidogrel (PLAVIX) 75 MG tablet Take 75 mg by mouth Daily. Last dose approx greater than one month ago     • docusate sodium (COLACE) 100 MG capsule Take 1 capsule by mouth 2 (Two) Times a Day As Needed for Constipation. 60 capsule 2   • ferrous sulfate, as mg of FE, (VISHAL-IN-SOL) 75 (15 Fe) MG/ML drops 25 mL by Per G Tube route Daily. 150 mL 2   • levothyroxine (SYNTHROID) 75 MCG tablet Take 1 tab daily Monday to Saturday and 1.5 on Sunday 32 tablet 11   • metoprolol succinate XL (TOPROL-XL) 25 MG 24 hr tablet Take 1 tablet by mouth Every Night.  "Patient states he hasn't been taking this week, states he thinks he is hypotensive 30 tablet 12   • Multiple Vitamins-Minerals (MULTIVITAMIN ADULT PO) Take 1 tablet by mouth Daily.     • mupirocin (BACTROBAN) 2 % ointment Apply  topically 2 (Two) Times a Day. 30 g 2   • ondansetron (ZOFRAN) 4 MG tablet Take 1 tablet by mouth 3 (Three) Times a Day As Needed for Nausea or Vomiting. 40 tablet 3   • oxyCODONE-acetaminophen (PERCOCET) 5-325 MG per tablet Take 1-2 tablets by mouth Every 4 (Four) Hours As Needed (Pain). 40 tablet 0   • POTASSIUM PO Take  by mouth.     • sodium chloride (NS) 0.9 % irrigation USE FOR TRACHEOSTOMY AS DIRECTED 1000 mL 8   • triamcinolone (KENALOG) 0.1 % cream Apply  topically 2 (Two) Times a Day. 45 g 2   • vitamin B-12 (CYANOCOBALAMIN) 100 MCG tablet Take 100 mcg by mouth Daily.     • losartan-hydrochlorothiazide (HYZAAR) 100-12.5 MG per tablet Take 1 tablet by mouth Daily. 30 tablet 12     No current facility-administered medications for this visit.          Review of Systems     Constitution: Denies any fatigue, fever or chills    HENT: Denies any headache, hearing impairment,     Eyes: Denies any blurring of vision, or photophobia     Cardivascular - As per history of present illness     Respiratory system- shortness of breath NYHA class II     Endocrine:  history of hyperlipidemia                       Musculoskeletal:   history of arthritis with musculoskeletal problems    Gastrointestinal: No nausea, vomiting, or melena    Genitourinary: No dysuria or hematuria    Neurological:   No history of seizure disorder, stroke, memory problems    Psychiatric/Behavioral:        No history of depression    Hematological- had laryngeal carcinoma status post radiation and chemotherapy            OBJECTIVE    /80   Pulse 61   Ht 182.9 cm (72.01\")   Wt 92.5 kg (204 lb)   SpO2 98%   BMI 27.66 kg/m²       Physical Exam     Constitutional: is oriented to person, place, and time.     Skin-warm " and dry    Well developed and nourished in no acute distress      Head: Normocephalic and atraumatic.     Eyes: Pupils are equal    Neck: Tracheostomy tube in place    Cardiovascular: Lau[place] in the fifth intercostal space   Regular rate, and  Rhythm,    S1 greater than S2, no S3 or S4, no gallop     Pulmonary/Chest:   Air  Entry is equal on both sides  No wheezing or crackles,      Abdominal: Soft.  No hepatosplenomegaly, PEG tube in situ    Musculoskeletal: No kyphoscoliosis, no significant thickening of the joints    Neurological: is alert and oriented to person, place, and time.    cranial nerve are intact .   No motor or sensory deficit    Extremities-no edema, no radial femoral delay      Psychiatric: He has a normal mood and affect.                  His behavior is normal.           Procedures      Lab Results   Component Value Date    WBC 15.25 (H) 09/19/2018    HGB 10.6 (L) 09/19/2018    HCT 32.2 (L) 09/19/2018    MCV 79.9 (L) 09/19/2018     09/19/2018     Lab Results   Component Value Date    GLUCOSE 92 09/19/2018    BUN 19 09/19/2018    CREATININE 0.98 09/19/2018    EGFRIFNONA 79 09/19/2018    BCR 19.4 09/19/2018    CO2 25.0 09/19/2018    CALCIUM 8.9 09/19/2018    ALBUMIN 3.80 09/19/2018    AST 15 (L) 09/19/2018    ALT 27 09/19/2018       Lab Results   Component Value Date    HGBA1C 5.52 02/15/2017     Lab Results   Component Value Date    TSH 5.160 (H) 07/27/2018                  A/P    Status post aortic dissection of the ascending aorta with repair and aortic valve resuspension doing well no problems.    Hypertension-increased Hyzaar 100/12.5 mg daily and continue Toprol-XL.    On antiplatelet therapy with Plavix, as he has history of DVT.    Laryngeal carcinoma-being followed at the [facility] center.  And has tracheostomy and also feeding tube for supplemental nutrition.    Follow-up in 6 months            This document has been electronically signed by Liborio Chandra MD on September 20, 2018  10:52 AM       EMR Dragon/Transcription disclaimer:   Some of this note may be an electronic transcription/translation of spoken language to printed text. The electronic translation of spoken language may permit erroneous, or at times, nonsensical words or phrases to be inadvertently transcribed; Although I have reviewed the note for such errors, some may still exist.

## 2018-09-20 NOTE — PROGRESS NOTES
[facility]  OFFICE NOTE    Truman Esquivel  57 y.o. male    03/15/2018  1. Ascending aortic dissection (CMS/HCC)    2. Essential hypertension    3. Obstructive sleep apnea of adult    4. Squamous cell carcinoma of larynx (CMS/HCC)        Chief complaint -Follow-up ascending aortic dissection, hypertension      History of present Illness- 57 old gentleman who has history of hypertension for long-time, presented with chest pain and was found to have a aortic dissection had repair of the ascending aorta with resuspension of aortic valve. He is doing well and he had followed with the CT surgeon in [place].  He is off of Coumadin for the DVT of the arm and takes Plavix 75 mg daily as he was having suspicion for TIAs.  And he had the MERI which did not show any abnormality.  He was diagnosed with laryngeal Cancer and now has a tracheostomy and is going to start chemotherapy and radiation.  He has lost a lot of weight and his blood pressure dropped and I stopped his amlodipine and decrease the dose of losartan to Hyzaar 50/12.5 mg the morning and continue the Toprol in the evening.          Allergies   Allergen Reactions   • Chlorhexidine Itching     Topical cleaning wipes causes severe skin irritation   • Eggs Or Egg-Derived Products Swelling   • Erythromycin Other (See Comments)     Joint pains and cold sweats   • Gabapentin Itching   • Other GI Intolerance     Mycins  farzad lotion causes rash   • Acth [Corticotropin] Rash   • Cephalosporins Itching and Rash     Pt developed rash, itching after cefepime administration (2-3 doses) during 2/2017 admission. Itching was relieved with benadryl. Cefepime was continued because his infection was improving and no symptoms of anaphylaxis present   • Co Q 10 [Coenzyme Q10] Rash   • Keflex [Cephalexin] Rash   • Neomycin Rash   • Penicillins Rash     Tolerated cefepime during 2/2017 admission. Had rash and itching (relieved by benadryl) after few  doses of cefepime and cefepime was continued.   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   • Stroke (CMS/HCC)    • Swallowing difficulty          Past Surgical History:   Procedure Laterality Date   • AORTA SURGERY      ruptured aorta repair   • ASCENDING ARCH/HEMIARCH REPLACEMENT N/A 2/14/2017    Procedure: INTRAOPERATIVE TRANSESOPHAGEAL ECHOCARDIOGRAM, MIDLINE STERNOTOMY, ASCENDING AORTIC  AND PROXIMAL AORTIC ARCH REPAIR WITH 26MM GRAFT, AORTIC VALVE RESUSPENSION, AORTIC ROOT REPAIR, OPEN VEIN HARVEST OF RIGHT LEG;  Surgeon: German Arreguin MD;  Location: [facility] MAIN OR;  Service:    • BRONCHOSCOPY N/A 2/17/2017    Procedure: BRONCHOSCOPY BIOPSY AT BEDSIDE WITH BAL-LEFT LOWER LOBE;  Surgeon: Trent Chaney MD;  Location: [facility] ENDOSCOPY;  Service:    • COLONOSCOPY N/A 3/7/2018    Procedure: COLONOSCOPY WITH POSSIBLE POLYPECTOMY   ( DONE IN OR WITH ENDO)      COLONOSCOPY FIRST;  Surgeon: Kris Solano MD;  Location: [facility] OR;  Service:    • DIRECT LARYNGOSCOPY, ESOPHAGOSCOPY, BRONCHOSCOPY N/A 2/5/2018    Procedure: DIRECT LARYNGOSCOPY WITH BIOPSY, ESOPHAGOSCOPY     (no bronchoscopy, no laser);  Surgeon: Joseph Venegas MD;  Location: [facility] OR;  Service:    • ENDOSCOPY W/ PEG TUBE PLACEMENT N/A 5/2/2018    Procedure: ESOPHAGOGASTRODUODENOSCOPY WITH PERCUTANEOUS ENDOSCOPIC GASTROSTOMY TUBE INSERTION;  Surgeon: Angel Maciel MD;  Location: [facility] ENDOSCOPY;  Service: Gastroenterology   • SPLENECTOMY N/A 7/26/2018    Procedure: SPLENECTOMY, left chest tube placement, EXPLORATORY LAPAROTOMY, G-TUBE PALCEMENT;  Surgeon: Kris Solano MD;  Location: [facility] OR;  Service: General   • THORACOSCOPY Left 7/31/2018    Procedure: LEFT THORACOSCOPY  VIDEO ASSISTED POSSIBLE THORACOTOMY possible decortication bronchoscopy;  Surgeon: Joshua Pollock MD;  Location: [facility];  Service: Cardiothoracic   • TRACHEOSTOMY  02/05/2018   • TRACHEOSTOMY N/A 2/5/2018    Procedure: TRACHEOSTOMY  local injection @ 1106 incision @ 1119;  Surgeon: Joseph Venegas MD;  Location: [facility] OR;  Service:    • VENOUS ACCESS DEVICE (PORT) INSERTION N/A 3/7/2018    Procedure: INSERTION OF MEDIPORT     (C-ARM#1);  Surgeon: Kris Solano MD;  Location: [facility] OR;  Service:          Family History   Problem Relation Age of Onset   • Thyroid disease Mother    • Hypertension Mother    • Hypertension Father    • Hypertension Other          Social History     Social History   • Marital status:      Spouse name: N/A   • Number of children: N/A   • Years of education: N/A     Occupational History   • Not on file.     Social History Main Topics   • Smoking status: Former Smoker     Packs/day: 1.25     Years: 38.00     Quit date: 2/13/2017   • Smokeless tobacco: Never Used      Comment: 02/27/2018 - Patient confirmed he ceased utilization of tobacco products 02/13/2017.   • Alcohol use No   • Drug use: No   • Sexual activity: Defer     Other Topics Concern   • Not on file     Social History Narrative   • No narrative on file         Current Outpatient Prescriptions   Medication Sig Dispense Refill   • Cholecalciferol 68002 units tablet 50 thousand units po q  month 3 tablet 3   • clopidogrel (PLAVIX) 75 MG tablet Take 75 mg by mouth Daily. Last dose approx greater than one month ago     • docusate sodium (COLACE) 100 MG capsule Take 1 capsule by mouth 2 (Two) Times a Day As Needed for Constipation. 60 capsule 2   • ferrous sulfate, as mg of FE, (VISHAL-IN-SOL) 75 (15 Fe) MG/ML drops 25 mL by Per G Tube route Daily. 150 mL 2   • levothyroxine (SYNTHROID) 75 MCG tablet Take 1 tab daily Monday to Saturday and 1.5 on Sunday 32 tablet 11   • metoprolol succinate XL  "(TOPROL-XL) 25 MG 24 hr tablet Take 1 tablet by mouth Every Night. Patient states he hasn't been taking this week, states he thinks he is hypotensive 30 tablet 12   • Multiple Vitamins-Minerals (MULTIVITAMIN ADULT PO) Take 1 tablet by mouth Daily.     • mupirocin (BACTROBAN) 2 % ointment Apply  topically 2 (Two) Times a Day. 30 g 2   • ondansetron (ZOFRAN) 4 MG tablet Take 1 tablet by mouth 3 (Three) Times a Day As Needed for Nausea or Vomiting. 40 tablet 3   • oxyCODONE-acetaminophen (PERCOCET) 5-325 MG per tablet Take 1-2 tablets by mouth Every 4 (Four) Hours As Needed (Pain). 40 tablet 0   • POTASSIUM PO Take  by mouth.     • sodium chloride (NS) 0.9 % irrigation USE FOR TRACHEOSTOMY AS DIRECTED 1000 mL 8   • triamcinolone (KENALOG) 0.1 % cream Apply  topically 2 (Two) Times a Day. 45 g 2   • vitamin B-12 (CYANOCOBALAMIN) 100 MCG tablet Take 100 mcg by mouth Daily.     • losartan-hydrochlorothiazide (HYZAAR) 100-12.5 MG per tablet Take 1 tablet by mouth Daily. 30 tablet 12     No current facility-administered medications for this visit.          Review of Systems     Constitution: Denies any fatigue, fever or chills    HENT: Has tracheostomy secondary to laryngeal cancer    Eyes: Denies any blurring of vision, or photophobia     Cardivascular - As per history of present illness     Respiratory system-denies any COPD, shortness of breath,   sleep apnea Uses CPAP     Endocrine:   history of hyperlipidemia       Musculoskeletal:  history of arthritis with musculoskeletal problems of the knee    Gastrointestinal: No nausea, vomiting, or melena    Genitourinary: No dysuria or hematuria    Neurological:   Peripheral neuropathy, questionable TIA    Psychiatric/Behavioral:        No history of depression,      Hematological- no history of easy bruising             OBJECTIVE    /80   Pulse 61   Ht 182.9 cm (72.01\")   Wt 92.5 kg (204 lb)   SpO2 98%   BMI 27.66 kg/m²       Physical Exam     Constitutional: is " oriented to person, place, and time.     Skin-warm and dry, sternotomy site healed well    Well developed and nourished      Head: Normocephalic and atraumatic.     Eyes: Pupils are equal    Neck: Neck supple. No bruit in the carotids, has a tracheostomy    Cardiovascular: [place] in the fifth intercostal space   Regular rate, and  Rhythm,    S1 greater than S2,    Pulmonary/Chest:   Air  Entry is equal on both sides  No wheezing or crackles,      Abdominal: Soft.  No hepatosplenomegaly, bowel sounds are present    Musculoskeletal: No kyphoscoliosis, no significant thickening of the joints    Neurological: is alert and oriented to person, place, and time.    cranial nerve are intact .   No motor or sensory deficit    Extremities-no edema, no radial femoral delay      Psychiatric: He has a normal mood and affect.                  His behavior is normal.           Procedures      Lab Results   Component Value Date    WBC 15.25 (H) 09/19/2018    HGB 10.6 (L) 09/19/2018    HCT 32.2 (L) 09/19/2018    MCV 79.9 (L) 09/19/2018     09/19/2018     Lab Results   Component Value Date    GLUCOSE 92 09/19/2018    BUN 19 09/19/2018    CREATININE 0.98 09/19/2018    EGFRIFNONA 79 09/19/2018    BCR 19.4 09/19/2018    CO2 25.0 09/19/2018    CALCIUM 8.9 09/19/2018    ALBUMIN 3.80 09/19/2018    AST 15 (L) 09/19/2018    ALT 27 09/19/2018     Lab Results   Component Value Date    HGBA1C 5.52 02/15/2017     Lab Results   Component Value Date    TSH 5.160 (H) 07/27/2018                  A/P    Status post ascending aortic dissection with repair and resuspension of the aortic valve leaflets,Repeat echo showed normal LV systolic function and the valve leaflets are functioning okay.  And LV function is normal  History of DVT post surgery-Was on Coumadin and Now on Plavix for history of TIA    Hypertension-blood pressure got dropped after he lost weight from his surgery for laryngeal CA and radiation, stopped the amlodipine, decreased the  dose of Hyzaar    Hypothyroidism continue the Synthroid      Follow with me in 6 months      Liborio Chandra MD  9/20/2018  10:52 AM    EMR Dragon/Transcription disclaimer:   Some of this note may be an electronic transcription/translation of spoken language to printed text. The electronic translation of spoken language may permit erroneous, or at times, nonsensical words or phrases to be inadvertently transcribed; Although I have reviewed the note for such errors, some may still exist.

## 2018-09-24 ENCOUNTER — HOSPITAL ENCOUNTER (OUTPATIENT)
Dept: SPEECH THERAPY | Facility: HOSPITAL | Age: 58
Setting detail: THERAPIES SERIES
Discharge: HOME | End: 2018-09-24

## 2018-09-24 DIAGNOSIS — R13.12 OROPHARYNGEAL DYSPHAGIA: Primary | ICD-10-CM

## 2018-09-24 PROCEDURE — 92526 ORAL FUNCTION THERAPY: CPT | Performed by: SPEECH-LANGUAGE PATHOLOGIST

## 2018-09-25 ENCOUNTER — EDUCATION (OUTPATIENT)
Dept: NUTRITION | Facility: HOSPITAL | Age: 58
End: 2018-09-25

## 2018-09-25 NOTE — PROGRESS NOTES
Adult Outpatient Nutrition  Assessment    Patient Name:  Truman Esquivel  YOB: 1960  MRN: [id number]    Assessment Date:  9/25/2018    Comments: Pt dropped by [facility] to visit staff. Stated po intake much better. Taking 3 meals daily--states is well tolerated. Only uses tube for meds. Wt 204 lb-- up/down (overall stable this month). Reinforced needs for calories/protein/fluid.                        Electronically signed by:  Leslee Bonds RD  09/25/18 5:24 PM

## 2018-10-01 NOTE — PROGRESS NOTES
Subjective   Patient ID: Truman Esquivel is a 57 y.o. male is here today for surgical follow-up SP L thoracotomy decortication.  Chief Complaint   Patient presents with   • Follow-up   Surgical CP L VAS 5  No SOA    PCP LESLI Chandra     History of Present Illness   57 y.o. male with Stage 3 squamous cell laryngeal cancer, chemotherapy in May.  Has trach and Left mediport.  Denies any falls, injury to R abdomen.  Was on Plavix.   Presented to ED with hypotension and syncope  CT demonstrated subcapsular splenic hematoma for which he underwent (7/26/18) Splenectomy with gastrostomy.  Left large bore CT placed for Left pleural effusion (1.3L).   CTA Chest dmonstrates: Multiloculated pleural effusion on the left with near collapse and reduced aeration.  Dr Pollock recommend L VATS and related procedures. Pt underwent surgical procedure [place] well.  CT air leak resolved.  CT removed.  Pt was discharged by Dr Solano.  Returns today in scheduled FU.    [facility] significant for Ascending Arch/Hemiarch Replacement ( 2/14/2017); Bronchoscopy (2/17/2017); Tracheostomy (02/05/2018);  direct laryngoscopy, esophagoscopy, bronchoscopy (N/A, 2/5/2018); Venous Access Device (Port) (N/A, 3/7/2018); Colonoscopy (N/A, 3/7/2018); and Esophagogastroduodenoscopy w/ PEG (N/A, 5/2/2018).  7/31/18  LEFT thoracotomy with left total lung decortication.  8/15/18  [facility]ospitals in [place].     The following portions of the patient's history were reviewed and updated as appropriate: allergies, current medications, past family history, past medical history, past social history, past surgical history and problem list.  Allergies   Allergen Reactions   • Chlorhexidine Itching     Topical cleaning wipes causes severe skin irritation   • Eggs Or Egg-Derived Products Swelling   • Erythromycin Other (See Comments)     Joint pains and cold sweats   • Gabapentin Itching   • Other GI Intolerance     Mycins  farzad lotion causes rash   • Acth [Corticotropin]  Rash   • Cephalosporins Itching and Rash     Pt developed rash, itching after cefepime administration (2-3 doses) during 2/2017 admission. Itching was relieved with benadryl. Cefepime was continued because his infection was improving and no symptoms of anaphylaxis present   • Co Q 10 [Coenzyme Q10] Rash   • Keflex [Cephalexin] Rash   • Neomycin Rash   • Penicillins Rash     Tolerated cefepime during 2/2017 admission. Had rash and itching (relieved by benadryl) after few doses of cefepime and cefepime was continued.   • Tetracyclines & Related GI Intolerance       Current Outpatient Prescriptions:   •  Cholecalciferol 40022 units tablet, 50 thousand units po q  month, Disp: 3 tablet, Rfl: 3  •  clopidogrel (PLAVIX) 75 MG tablet, Take 75 mg by mouth Daily. Last dose approx greater than one month ago, Disp: , Rfl:   •  docusate sodium (COLACE) 100 MG capsule, Take 1 capsule by mouth 2 (Two) Times a Day As Needed for Constipation., Disp: 60 capsule, Rfl: 2  •  ferrous sulfate, as mg of FE, (VISHAL-IN-SOL) 75 (15 Fe) MG/ML drops, 25 mL by Per G Tube route Daily., Disp: 150 mL, Rfl: 2  •  levothyroxine (SYNTHROID) 75 MCG tablet, Take 1 tab daily Monday to Saturday and 1.5 on Sunday, Disp: 32 tablet, Rfl: 11  •  metoprolol succinate XL (TOPROL-XL) 25 MG 24 hr tablet, Take 1 tablet by mouth Every Night. Patient states he hasn't been taking this week, states he thinks he is hypotensive, Disp: 30 tablet, Rfl: 12  •  Multiple Vitamins-Minerals (MULTIVITAMIN ADULT PO), Take 1 tablet by mouth Daily., Disp: , Rfl:   •  mupirocin (BACTROBAN) 2 % ointment, Apply  topically 2 (Two) Times a Day., Disp: 30 g, Rfl: 2  •  ondansetron (ZOFRAN) 4 MG tablet, Take 1 tablet by mouth 3 (Three) Times a Day As Needed for Nausea or Vomiting., Disp: 40 tablet, Rfl: 3  •  oxyCODONE-acetaminophen (PERCOCET) 5-325 MG per tablet, Take 1-2 tablets by mouth Every 4 (Four) Hours As Needed (Pain)., Disp: 40 tablet, Rfl: 0  •  POTASSIUM PO, Take  by mouth.,  Disp: , Rfl:   •  sodium chloride (NS) 0.9 % irrigation, USE FOR TRACHEOSTOMY AS DIRECTED, Disp: 1000 mL, Rfl: 8  •  triamcinolone (KENALOG) 0.1 % cream, Apply  topically 2 (Two) Times a Day., Disp: 45 g, Rfl: 2  •  vitamin B-12 (CYANOCOBALAMIN) 100 MCG tablet, Take 100 mcg by mouth Daily., Disp: , Rfl:   •  losartan-hydrochlorothiazide (HYZAAR) 100-12.5 MG per tablet, Take 1 tablet by mouth Daily., Disp: 30 tablet, Rfl: 12  Current outpatient and discharge medications have been reconciled for the patient.  Reviewed by: ROWAN Clay    Review of Systems   Constitution: Negative for chills, decreased appetite, fever and malaise/fatigue.   HENT: Negative for hearing loss, nosebleeds and stridor.         Has trach   Eyes: Negative for visual disturbance.   Cardiovascular: Positive for chest pain (surgical VAS 5). Negative for dyspnea on exertion, leg swelling, near-syncope and syncope.   Respiratory: Positive for sputum production. Negative for cough, hemoptysis and shortness of breath.    Hematologic/Lymphatic: Negative for bleeding problem. Does not bruise/bleed easily.   Skin: Negative for color change, itching, poor wound healing and rash.        INC:  No drainage  No odor  No reddness   Musculoskeletal: Negative for falls, muscle cramps and muscle weakness.   Gastrointestinal: Negative for anorexia, change in bowel habit, hematemesis, melena and nausea.   Genitourinary: Negative for hematuria.   Neurological: Negative for difficulty with concentration, dizziness, headaches, light-headedness, paresthesias and sensory change.   Psychiatric/Behavioral: Negative for altered mental status.   Allergic/Immunologic: Negative for environmental allergies and hives.        Objective   Physical Exam   Constitutional: He is oriented to person, place, and time. He appears well-nourished. No distress.   Body mass index is 27.71 kg/m².     HENT:   Head: Normocephalic.   Mouth/Throat: Oropharynx is clear and moist.    Eyes: Pupils are equal, round, and reactive to light. Conjunctivae are normal.   Neck: No JVD present.   Trach in place  Covers and speaks with raspy voice    Cardiovascular: Normal rate, regular rhythm, normal heart sounds and intact distal pulses.    Pulmonary/Chest: Effort normal and breath sounds normal.   Mild decrease left base  L thoracotomy/CT incision well healed no herniation with cough    Abdominal: Soft. Bowel sounds are normal.   G Tube    Musculoskeletal: He exhibits no edema or tenderness.   Neurological: He is alert and oriented to person, place, and time.   Skin: Skin is warm and dry. Capillary refill takes less than 2 seconds. No rash noted. No erythema. No pallor.   L thoracotomy/CT incision well healed no herniation with coug   Psychiatric: His behavior is normal. Judgment normal.   Nursing note and vitals reviewed.      Vitals:    09/19/18 1303   BP: 166/80   Pulse: 66   Temp: 98.4 °F (36.9 °C)   SpO2: 98%     Lab Results   Component Value Date    WBC 15.25 (H) 09/19/2018    HGB 10.6 (L) 09/19/2018    HCT 32.2 (L) 09/19/2018    MCV 79.9 (L) 09/19/2018     09/19/2018     Lab Results   Component Value Date    GLUCOSE 92 09/19/2018    BUN 19 09/19/2018    CREATININE 0.98 09/19/2018    EGFRIFNONA 79 09/19/2018    BCR 19.4 09/19/2018    K 3.4 (L) 09/19/2018    CO2 25.0 09/19/2018    CALCIUM 8.9 09/19/2018    ALBUMIN 3.80 09/19/2018    AST 15 (L) 09/19/2018    ALT 27 09/19/2018           Assessment/Plan   Independent Review of Radiographic Studies:    Chest xray:  Small/tiny left pleural effusion    1. Other pneumothorax  Surgical indication   - XR Chest 2 View; Future    2. Surgical aftercare, respiratory system  Shower or bath daily  No special wound care  Increase acitivity  No surgical restrictions of LEFT arm after 9/31/18     3. Hemothorax on left  Surgical indication

## 2018-10-02 ENCOUNTER — OFFICE VISIT (OUTPATIENT)
Dept: SURGERY | Facility: CLINIC | Age: 58
End: 2018-10-02

## 2018-10-02 VITALS
WEIGHT: 204 LBS | HEIGHT: 72 IN | DIASTOLIC BLOOD PRESSURE: 76 MMHG | HEART RATE: 64 BPM | BODY MASS INDEX: 27.63 KG/M2 | TEMPERATURE: 98.6 F | SYSTOLIC BLOOD PRESSURE: 148 MMHG

## 2018-10-02 DIAGNOSIS — Z09 FOLLOW UP: Primary | ICD-10-CM

## 2018-10-02 PROCEDURE — 99024 POSTOP FOLLOW-UP VISIT: CPT | Performed by: SURGERY

## 2018-10-02 RX ORDER — AMLODIPINE BESYLATE 5 MG/1
5 TABLET ORAL DAILY
Qty: 30 TABLET | Refills: 6 | Status: SHIPPED | OUTPATIENT
Start: 2018-10-02 | End: 2018-10-09 | Stop reason: ALTCHOICE

## 2018-10-02 NOTE — PROGRESS NOTES
CHIEF COMPLAINT:    Chief Complaint   Patient presents with   • Follow-up     Recheck feeding tube - S/P Emergency Splenectomy.       HISTORY OF PRESENT ILLNESS:    Truman Esquivel is a 57 y.o. male who underwent splenectomy and gastrostomy tube placement previously.  Overall he is doing well.  He continues to have some issues with swallowing.  He is using his gastrostomy tube for medications.  He denies any fevers or chills.    EXAM:  Vitals:    10/02/18 1316   BP: 148/76   Pulse: 64   Temp: 98.6 °F (37 °C)         Abdomen soft, incisions well-healed on abdomen and left chest.  Gastrostomy tube in place    ASSESSMENT:    Status post splenectomy    PLAN:    Overall he is doing well.  He can follow-up with me as needed.          This document has been electronically signed by Kris Solano MD on October 2, 2018 1:54 PM

## 2018-10-08 ENCOUNTER — APPOINTMENT (OUTPATIENT)
Dept: SPEECH THERAPY | Facility: HOSPITAL | Age: 58
End: 2018-10-08

## 2018-10-08 ENCOUNTER — DOCUMENTATION (OUTPATIENT)
Dept: CARDIOLOGY | Facility: CLINIC | Age: 58
End: 2018-10-08

## 2018-10-08 NOTE — PROGRESS NOTES
Pt came in today for blood pressure check  BP was 150/70  Will consult with MD for medication changes  
Constitutional:  no behavior changes  Head: no injury; no headache; no loss of consciousness; no dizziness  Eyes:  no visual changes; no eye pain, redness, or discharge; no injury; no foreign body sensation  ENMT:  no ear pain or discharge; no hearing problems; see HPI  Neck:  no pain, injury; no loss of range of motion  Cardiac: no chest pain, or palpitations  Chest/respiratory: no wheezing, shortness of breath, chest tightness, or trouble breathing; no pain; no injuries see HPI  GI: no nausea, vomiting, diarrhea or abdominal pain; no injuries  :  no dysuria, hematuria, or injuries  MS: no pain, weakness, injury, numbness, or tingling; no loss of range of motion  Back:  no pain or injury  Skin:  no rashes or color changes; no lacerations or abrasions

## 2018-10-09 RX ORDER — AMLODIPINE BESYLATE 10 MG/1
10 TABLET ORAL DAILY
Qty: 30 TABLET | Refills: 11 | Status: SHIPPED | OUTPATIENT
Start: 2018-10-09 | End: 2019-10-10 | Stop reason: SDUPTHER

## 2018-10-09 RX ORDER — SPIRONOLACTONE 25 MG/1
12.5 TABLET ORAL DAILY
Qty: 15 TABLET | Refills: 6 | Status: SHIPPED | OUTPATIENT
Start: 2018-10-09 | End: 2019-04-28 | Stop reason: SDUPTHER

## 2018-10-10 ENCOUNTER — HOSPITAL ENCOUNTER (OUTPATIENT)
Dept: SPEECH THERAPY | Facility: HOSPITAL | Age: 58
Setting detail: THERAPIES SERIES
Discharge: HOME | End: 2018-10-10

## 2018-10-10 DIAGNOSIS — R13.12 OROPHARYNGEAL DYSPHAGIA: Primary | ICD-10-CM

## 2018-10-10 PROCEDURE — 92526 ORAL FUNCTION THERAPY: CPT | Performed by: SPEECH-LANGUAGE PATHOLOGIST

## 2018-10-10 NOTE — THERAPY TREATMENT NOTE
Outpatient Speech Language Pathology   Adult Swallow Treatment Note  [facility]     Patient Name: Truman Esquivel  : 1960  MRN: [id number]  Today's Date: 10/10/2018         Visit Date: 10/10/2018     Patient has a hx of Laryngeal cancer; Stage III (cT3, cN0, cM0) with dysphagia. Patient currently has a trach. He does not use passy thaddeus valve but does have one.      Initial evaluation for this therapy series - 2018  Re-certification due - 10/17/2018    Safe swallowing protocol was reviewed this date. SLP recommended chin tuck, upright position, slow rate, small bites/sips, cyclic eating, avoid mixed consistencies/crumbly foods. Patient verbalized compliance with recommendations. However, he continues to eat cereal 2x a day and presents with difficulties as reported by pt. During this session pt removed cereal and chocolate milk from trach. SLP educated and again encouraged pt to avoid mixed consistencies. [facility] soft diet and thin liquids recommended with no mixed consistencies or crumbly foods. Pt verbalized understanding. SLP educated pt on the dangers of not following protocol. SLP again reviewed safe swallow strategies with patient. Education provided for importance of strategies. Pt agreed.         Patient would benefit from skilled speech therapy 2x a month for 4 weeks for increased swallowing safety and preservation of swallowing function following completion of radiation and chemo as well as diet upgrade and increasing safety.      Pt was in agreement.      Agnes Barone MS CCC-SLP  Patient Active Problem List   Diagnosis   • Ascending aortic dissection (CMS/HCC)   • Essential hypertension   • Deep vein thrombosis (DVT) of femoral vein of both lower extremities (CMS/HCC)   • Hypothyroidism   • Snoring   • Acute pain of right knee   • Knee effusion, right   • Knee pain   • Obstructive sleep apnea of adult   • TIA (transient ischemic attack)   • Dysphagia   • Squamous cell carcinoma of  larynx (CMS/HCC)   • Pharyngeal dysphagia   • Anemia   • Abnormal positron emission tomography (PET) of colon   • Hypokalemia   • Encounter for venous access device care   • Dehydration   • Weight loss, abnormal   • Odynophagia   • Thrush, oral   • Thrombocytopenia (CMS/HCC)   • Pancytopenia due to antineoplastic chemotherapy (CMS/HCC)   • Unable to eat   • Leukocytosis   • Surgical aftercare, respiratory system   • Hemothorax on left        Visit Dx:    ICD-10-CM ICD-9-CM   1. Oropharyngeal dysphagia R13.12 787.22             SLP Adult Swallow Evaluation - 10/10/18 0948        Rehab Evaluation    Document Type therapy note (daily note)  -EA    Subjective Information no complaints  -EA    Patient Observations alert;cooperative;agree to therapy  -EA    Patient/Family Observations Pt stated that he continues to have difficulties with cereal. SLP recommended no mixed consistencies.   -EA    Patient Effort good  -EA    Symptoms Noted During/After Treatment none  -EA       General Information    Patient Profile Reviewed yes  -EA    Current Method of Nutrition soft textures;thin liquids  -EA    Precautions/Limitations, Vision WFL with corrective lenses  -EA    Precautions/Limitations, Hearing WFL  -EA    Prior Level of Function-Communication WFL  -EA    Prior Level of Function-Swallowing no diet consistency restrictions;mechanical soft textures  -EA    Plans/Goals Discussed with patient  -EA    Barriers to Rehab none identified  -EA       Pain Scale: Numbers Pre/Post-Treatment    Pain Scale: Numbers, Pretreatment 0/10 - no pain  -EA    Pain Scale: Numbers, Post-Treatment 0/10 - no pain  -EA       Oral Motor and Function    Dentition Assessment natural, present and adequate;other (see comments)  -EA    Secretion Management WNL/WFL  -EA    Mucosal Quality moist, healthy  -EA    Volitional Swallow delayed  -EA       Oral Musculature and Cranial Nerve Assessment    Oral Motor General Assessment WFL  -EA       General  Eating/Swallowing Observations    Respiratory Support Currently in Use trach collar  -EA    Eating/Swallowing Skills self-fed  -EA    Positioning During Eating upright 90 degree;upright in chair  -EA    Utensils Used spoon  -EA    Consistencies Trialed thin liquids  -EA       Respiratory    Respiratory Status room air  -EA       Clinical Swallow Eval    Oral Prep Phase impaired  -EA    Oral Transit impaired  -EA    Oral Residue WFL  -EA    Pharyngeal Phase suspected pharyngeal impairment  -EA    Esophageal Phase unremarkable  -EA       Oral Prep Concerns    Oral Prep Concerns prolonged mastication;increased prep time  -EA    Prolonged Mastication thin;pudding  -EA    Increased Prep Time thin;pudding  -EA       Oral Transit Concerns    Oral Transit Concerns delayed initiation of bolus transit  -EA    Delayed Intiation of Bolus Transit thin;pudding  -EA       Pharyngeal Phase Concerns    Pharyngeal Phase Concerns cough  -EA    Cough thin;pudding  -EA       Clinical Impression    SLP Swallowing Diagnosis mild-moderate;oral dysfunction;suspected pharyngeal dysfunction  -EA    Functional Impact risk of malnutrition;risk of aspiration/pneumonia  -EA    Rehab Potential/Prognosis, Swallowing good, to achieve stated therapy goals  -EA    Swallow Criteria for Skilled Therapeutic Interventions Met demonstrates skilled criteria  -EA       Recommendations    Therapy Frequency (Swallow) 1 day per week  -EA    Predicted Duration Therapy Intervention (Days) until discharge  -EA    SLP Diet Recommendation mechanical soft with no mixed consistencies;thin liquids  -EA    Recommended Precautions and Strategies upright posture during/after eating;small bites of food and sips of liquid;multiple swallows per bite of food;multiple swallows per sip of liquid;hard swallow with each bite or sip  -EA    Monitor for Signs of Aspiration yes;notify SLP if any concerns  -EA    Anticipated Dischage Disposition home  -EA       Oral  Nutrition/Hydration Goal 1 (SLP)    Oral Nutrition/Hydration Goal 1, SLP Patient will tolerate least restrictive diet with no overt s/s of aspiration using safe swallow protocol.   -EA    Time Frame (Oral Nutrition/Hydration Goal 1, SLP) by discharge  -EA    Barriers (Oral Nutrition/Hydration Goal 1, SLP) laryngeal cancer   -EA    Progress/Outcomes (Oral Nutrition/Hydration Goal 1, SLP) goal ongoing  -EA       Pharyngeal Strengthening Exercise Goal 1 (SLP)    Activity (Pharyngeal Strengthening Goal 1, SLP) increase timing;increase tongue base retraction  -EA    Increase Timing ingrid;hard effortful swallow;prepping - 3 second prep or suck swallow or 3-step swallow   OME set - Prophylactic swallowing exercises   -EA    Increase Tongue Base Retraction ingrid;hard effortful swallow;prepping - 3 second prep or suck swallow or 3-step swallow  -EA    [place]/Accuracy (Pharyngeal Strengthening Goal 1, SLP) independently (over 90% accuracy)  -EA    Time Frame (Pharyngeal Strengthening Goal 1, SLP) by discharge  -EA    Barriers (Pharyngeal Strengthening Goal 1, SLP) laryngeal cancer   -EA    Progress/Outcomes (Pharyngeal Strengthening Goal 1, SLP) goal met  -EA       Improve oral skills    To Improve Oral Skills, patient will: Increase tongue A-P movement;90%  -EA       Improve timing of pharyngeal response    To improve timing of pharyngeal response, patient will: Swallow in timely way using three second prep;90%   Chin Tuck for all PO  -EA    Status: Improve timing of pharyngeal response Progressing as expected  -EA    Timing of Pharyngeal Response Progress 70%;with consistent cues  -EA    Comments: Improve timing of pharyngeal response Review of strategies and recommendations for diet modification provided. Pt was in agreement.   -EA       Oral Motor Structure and Function    Dysphagia Treatment Objectives Improve oral skills;Improve timing of pharyngeal response  -EA      User Key  (r) = Recorded By, (t) = Taken By,  (c) = Cosigned By    Initials Name Provider Type    Agnes Damian MS CCC-SLP Speech and Language Pathologist                              SLP OP Goals     Row Name 10/10/18 1359          SLP Time Calculation    SLP Goal Re-Cert Due Date 10/17/18  -EA       User Key  (r) = Recorded By, (t) = Taken By, (c) = Cosigned By    Initials Name Provider Type    Agnes Damian MS CCC-SLP Speech and Language Pathologist                OP SLP Education     Row Name 10/10/18 1300       Education    Barriers to Learning No barriers identified  -EA    Education Provided Patient requires further education on strategies, risks;Patient demonstrated recommended strategies  -EA    Assessed Learning needs;Learning motivation  -EA    Learning Motivation Moderate  -EA    Learning Method Explanation;Demonstration;Written materials  -EA    Teaching Response Verbalized understanding;Reinforcement needed  -EA    Education Comments SLP reviewed recommendations for no mixed consisitencies and safe swallow strategies with chin tuck. Pt was in agreement.   -EA      User Key  (r) = Recorded By, (t) = Taken By, (c) = Cosigned By    Initials Name Effective Dates    KAYLEY Agnes Barone MS CCC-SLP 10/17/16 -                 OP SLP Assessment/Plan - 10/10/18 1300        SLP Assessment    Functional Problems Swallowing  -EA    Impact on Function: Swallowing Risk of aspiration  -EA    Clinical Impression: Swallowing Mild:;oropharyngeal phase dysphagia  -EA    Functional Problems Comment Strategies reviewed.   -EA    Clinical Impression Comments Recommend no mixed consistencies. Pt in agreement.   -EA    SLP Diagnosis Dysphagia   -EA    Prognosis Good (comment)  -EA    Patient/caregiver participated in establishment of treatment plan and goals Yes  -EA    Patient would benefit from skilled therapy intervention Yes  -EA       SLP Plan    Frequency 2x a month   -EA    Duration 4 weeks   -EA    Planned CPT's? SLP SWALLOW THERAPY: 91997   -EA    Expected Duration Therapy Session - minutes 30-45 minutes  -EA    Plan Comments Continue   -EA      User Key  (r) = Recorded By, (t) = Taken By, (c) = Cosigned By    Initials Name Provider Type    Agnes Damian MS CCC-SLP Speech and Language Pathologist                Time Calculation:   SLP Start Time: 0948  SLP Stop Time: 1030  SLP Time Calculation (min): 42 min  Total Timed Code Minutes- SLP: 42 minute(s)    Therapy Charges for Today     Code Description Service Date Service Provider Modifiers Qty    [id number] HC ST TREATMENT SWALLOW 3 10/10/2018 Agnes Barone MS CCC-SLP GN 1                   Agnes Barone MS CCC-SLP  10/10/2018

## 2018-10-12 RX ORDER — MAGNESIUM HYDROXIDE 1200 MG/15ML
LIQUID ORAL
Qty: 1000 ML | Refills: 8 | Status: SHIPPED | OUTPATIENT
Start: 2018-10-12 | End: 2020-06-30

## 2018-10-16 ENCOUNTER — HOSPITAL ENCOUNTER (EMERGENCY)
Facility: HOSPITAL | Age: 58
Discharge: HOME | End: 2018-10-16
Attending: EMERGENCY MEDICINE | Admitting: EMERGENCY MEDICINE

## 2018-10-16 ENCOUNTER — APPOINTMENT (OUTPATIENT)
Dept: GENERAL RADIOLOGY | Facility: HOSPITAL | Age: 58
End: 2018-10-16

## 2018-10-16 VITALS
RESPIRATION RATE: 18 BRPM | HEART RATE: 63 BPM | WEIGHT: 200 LBS | DIASTOLIC BLOOD PRESSURE: 80 MMHG | BODY MASS INDEX: 27.09 KG/M2 | HEIGHT: 72 IN | SYSTOLIC BLOOD PRESSURE: 125 MMHG | TEMPERATURE: 97.8 F | OXYGEN SATURATION: 95 %

## 2018-10-16 DIAGNOSIS — K94.23 PEG TUBE MALFUNCTION (HCC): Primary | ICD-10-CM

## 2018-10-16 PROCEDURE — 74018 RADEX ABDOMEN 1 VIEW: CPT

## 2018-10-16 PROCEDURE — 0 DIATRIZOATE MEGLUMINE & SODIUM PER 1 ML: Performed by: EMERGENCY MEDICINE

## 2018-10-16 PROCEDURE — 99283 EMERGENCY DEPT VISIT LOW MDM: CPT

## 2018-10-16 RX ORDER — TRAZODONE HYDROCHLORIDE 100 MG/1
100 TABLET ORAL NIGHTLY
COMMUNITY

## 2018-10-16 RX ORDER — DULOXETIN HYDROCHLORIDE 60 MG/1
60 CAPSULE, DELAYED RELEASE ORAL DAILY
COMMUNITY

## 2018-10-16 RX ADMIN — DIATRIZOATE MEGLUMINE AND DIATRIZOATE SODIUM 25 ML: 660; 100 LIQUID ORAL; RECTAL at 10:30

## 2018-10-16 NOTE — ED TRIAGE NOTES
Patient presents to the ED with complaints of his PEG tube coming out this morning. Patient states that he went to flush his medication this morning and met a little resistance when he went to flush and then his PEG tube came out. Patient states he just had this type of tube placed this past July when he had his spleen out. Patient states that the area is burning but not really painful at this time.

## 2018-10-16 NOTE — ED PROVIDER NOTES
Subjective   57 years old with history of squamous cell cancer epiglottitis/larynx who has PEG tube presented in the ER after he was putting his medications through the tube , tried to flush it and it popped out.  No bleeding.  It happened almost 2 hours ago.  Denies any abdominal pain.  Patient can eat but uses PEG tube for medications which she cannot swallow.        History provided by:  Patient      Review of Systems   Constitutional: Negative for chills.   HENT: Negative for congestion and sore throat.    Eyes: Negative for pain.   Respiratory: Negative for chest tightness and shortness of breath.    Cardiovascular: Negative for chest pain.   Gastrointestinal: Negative for abdominal pain.   Genitourinary: Negative for flank pain.   Skin: Negative for pallor.   Neurological: Negative for syncope and headaches.   Psychiatric/Behavioral: Negative for agitation.       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   • Stroke (CMS/[facility])    • Swallowing difficulty        Allergies   Allergen Reactions   • Chlorhexidine Itching     Topical cleaning wipes causes severe skin irritation   • Eggs Or Egg-Derived Products Swelling   • Erythromycin Other (See Comments)     Joint pains and cold sweats   • Gabapentin Itching   • Other GI Intolerance     Mycins  farzad lotion causes rash   • Acth [Corticotropin] Rash   • Cephalosporins Itching and Rash     Pt developed rash, itching after cefepime administration (2-3 doses) during 2/2017 admission. Itching was relieved with benadryl. Cefepime was continued because his infection was improving and no symptoms of anaphylaxis present   • Co Q 10 [Coenzyme Q10] Rash   • Keflex [Cephalexin] Rash   • Neomycin Rash   • Penicillins Rash     Tolerated cefepime during 2/2017 admission. Had rash  and itching (relieved by benadryl) after few doses of cefepime and cefepime was continued.   • Tetracyclines & Related GI Intolerance       Past Surgical History:   Procedure Laterality Date   • AORTA SURGERY      ruptured aorta repair   • ASCENDING ARCH/HEMIARCH REPLACEMENT N/A 2/14/2017    Procedure: INTRAOPERATIVE TRANSESOPHAGEAL ECHOCARDIOGRAM, MIDLINE STERNOTOMY, ASCENDING AORTIC  AND PROXIMAL AORTIC ARCH REPAIR WITH 26MM GRAFT, AORTIC VALVE RESUSPENSION, AORTIC ROOT REPAIR, OPEN VEIN HARVEST OF RIGHT LEG;  Surgeon: German Arreguin MD;  Location: [facility] MAIN OR;  Service:    • BRONCHOSCOPY N/A 2/17/2017    Procedure: BRONCHOSCOPY BIOPSY AT BEDSIDE WITH BAL-LEFT LOWER LOBE;  Surgeon: Trent Chaney MD;  Location: [facility] ENDOSCOPY;  Service:    • COLONOSCOPY N/A 3/7/2018    Procedure: COLONOSCOPY WITH POSSIBLE POLYPECTOMY   ( DONE IN OR WITH ENDO)      COLONOSCOPY FIRST;  Surgeon: Kris Solano MD;  Location: [facility] OR;  Service:    • DIRECT LARYNGOSCOPY, ESOPHAGOSCOPY, BRONCHOSCOPY N/A 2/5/2018    Procedure: DIRECT LARYNGOSCOPY WITH BIOPSY, ESOPHAGOSCOPY     (no bronchoscopy, no laser);  Surgeon: Joseph Venegas MD;  Location: [facility] OR;  Service:    • ENDOSCOPY W/ PEG TUBE PLACEMENT N/A 5/2/2018    Procedure: ESOPHAGOGASTRODUODENOSCOPY WITH PERCUTANEOUS ENDOSCOPIC GASTROSTOMY TUBE INSERTION;  Surgeon: Angel Maciel MD;  Location: [facility] ENDOSCOPY;  Service: Gastroenterology   • SPLENECTOMY N/A 7/26/2018    Procedure: SPLENECTOMY, left chest tube placement, EXPLORATORY LAPAROTOMY, G-TUBE PALCEMENT;  Surgeon: Kris Solano MD;  Location: [facility] OR;  Service: General   • THORACOSCOPY Left 7/31/2018    Procedure: LEFT THORACOSCOPY VIDEO ASSISTED POSSIBLE THORACOTOMY possible decortication bronchoscopy;  Surgeon: Joshua Pollock MD;  Location: [facility] OR;  Service: Cardiothoracic   • TRACHEOSTOMY  02/05/2018   • TRACHEOSTOMY N/A 2/5/2018    Procedure: TRACHEOSTOMY  local  injection @ 1106 incision @ 1119;  Surgeon: Joseph Venegas MD;  Location: [facility] OR;  Service:    • VENOUS ACCESS DEVICE (PORT) INSERTION N/A 3/7/2018    Procedure: INSERTION OF MEDIPORT     (C-ARM#1);  Surgeon: Kris Solano MD;  Location: [facility];  Service:        Family History   Problem Relation Age of Onset   • Thyroid disease Mother    • Hypertension Mother    • Hypertension Father    • Hypertension Other        Social History     Social History   • Marital status:      Social History Main Topics   • Smoking status: Former Smoker     Packs/day: 1.25     Years: 38.00     Quit date: 2/13/2017   • Smokeless tobacco: Never Used      Comment: 02/27/2018 - Patient confirmed he ceased utilization of tobacco products 02/13/2017.   • Alcohol use No   • Drug use: No   • Sexual activity: Defer     Other Topics Concern   • Not on file           Objective   Physical Exam   Constitutional: He is oriented to person, place, and time. He appears well-developed and well-nourished.   HENT:   Head: Normocephalic and atraumatic.   Nose: Nose normal.   Mouth/Throat: Oropharynx is clear and moist.   Eyes: Conjunctivae are normal.   Neck: Normal range of motion.   Cardiovascular: Normal rate, regular rhythm and normal heart sounds.    Pulmonary/Chest: Effort normal and breath sounds normal.   Abdominal: Soft. He exhibits no distension. There is no tenderness. There is no guarding.   PEG tube insertion site opening..  No active bleeding or blood ooz.   Musculoskeletal: Normal range of motion.   Neurological: He is alert and oriented to person, place, and time.   Skin: Skin is warm. Capillary refill takes less than 2 seconds.   Nursing note and vitals reviewed.      Feeding Tube Replacement  Date/Time: 10/16/2018 10:00 AM  Performed by: DAVE STEWART  Authorized by: DAVE STEWART     Consent:     Consent obtained:  Verbal    Consent given by:  Patient    Risks discussed:  Bleeding, infection and pain     Alternatives discussed:  No treatment  Pre-procedure details:     Old tube type:  Gastrostomy  Anesthesia (see MAR for exact dosages):     Anesthesia method:  None  Procedure details:     Patient position:  Supine    Procedure type:  Replacement    Tube type:  Gastrostomy    Tube size: 20Fr.    Bulb inflation fluid:  Normal saline  Post-procedure details:     Placement/position confirmation:  Gastric contents aspirated and x-ray    Placement difficulty:  Minimal    Bleeding:  None    Patient tolerance of procedure:  Tolerated well, no immediate complications               ED Course                  MDM  Number of Diagnoses or Management Options  PEG tube malfunction (CMS/HCC):   Diagnosis management comments: PEG tube is replaced.  Confirmed with Gastrografin.  Follow up outpatient .       Amount and/or Complexity of Data Reviewed  Tests in the radiology section of CPT®: ordered and reviewed      Labs Reviewed - No data to display    Xr Chest 2 View    Result Date: 9/19/2018  Narrative: CT placement. Chest, AP and lateral views. Comparison is made with study dated August 3, 2018. The cardiac silhouette is within normal limits. There are bilateral interstitial markings. There is small pleural effusion in the left lung base. No pneumothorax is identified. There is left central line with its tip projecting in the proximal aspect of the left brachiocephalic vein. There is tracheostomy tube tube. There are median sternotomy wires.     Impression: CONCLUSION: Small left pleural effusion. Bilateral interstitial markings. Electronically signed by:  Liborio Odom MD  9/19/2018 1:49 PM CDT Workstation: [id number]    Xr Abdomen Kub    Result Date: 10/16/2018  Narrative: Procedure: Supine abdomen 2 views Reason for exam: Replacement of percutaneous gastrostomy tube. FINDINGS: 25 mL of Gastrografin contrast was administered into the percutaneous gastrostomy tube. The percutaneous gastrostomy tube appears within normal  position. Contrast is seen extending from the stomach into the proximal duodenum and small bowel. No acute osseous abnormality. Soft tissue structures normal. No pathologic calcifications. Nonobstructive bowel gas pattern.     Impression: 1.  Percutaneous gastrostomy tube appears in appropriate position with no evidence of extravasation of the contrast. Contrast is normally seen within the region of the stomach duodenum and proximal small bowel. 2.  Otherwise unremarkable supine abdomen. Electronically signed by:  Isaac Bonilla MD  10/16/2018 10:53 AM CDT Workstation: [id number]          Final diagnoses:   PEG tube malfunction (CMS/HCC)            Red Cheng MD  10/16/18 1102

## 2018-10-16 NOTE — ED NOTES
Redressed patient's PEG tube with 4x4 split gauze and 1 inch silk tape.      Jessi Gonzalez RN  10/16/18 3190

## 2018-10-16 NOTE — ED NOTES
Updated patient's mother at this time. Patient is in xray.      Jessi Gonzalez RN  10/16/18 8281

## 2018-10-16 NOTE — ED NOTES
PEG Tube Lot [id number]  Expiration Date 2020-06-01    Inserted by AMOR Weber Jessica L, RN  10/16/18 1016       Jessi Gonzalez RN  10/16/18 1016

## 2018-10-16 NOTE — ED NOTES
Area on patient's abdomen where PEG tube came out is intact. Some redness around insertion site with little drainage. Patient states the area stings at times, but is not constant.      Jessi Gonzalez RN  10/16/18 2922

## 2018-10-23 ENCOUNTER — APPOINTMENT (OUTPATIENT)
Dept: LAB | Facility: HOSPITAL | Age: 58
End: 2018-10-23

## 2018-10-23 ENCOUNTER — DOCUMENTATION (OUTPATIENT)
Dept: CARDIOLOGY | Facility: CLINIC | Age: 58
End: 2018-10-23

## 2018-10-23 LAB
25(OH)D3 SERPL-MCNC: 57.7 NG/ML (ref 30–100)
ALBUMIN SERPL-MCNC: 4.4 G/DL (ref 3.4–4.8)
ALBUMIN/GLOB SERPL: 1.3 G/DL (ref 1.1–1.8)
ALP SERPL-CCNC: 88 U/L (ref 38–126)
ALT SERPL W P-5'-P-CCNC: 32 U/L (ref 21–72)
ANION GAP SERPL CALCULATED.3IONS-SCNC: 13 MMOL/L (ref 5–15)
AST SERPL-CCNC: 78 U/L (ref 17–59)
BASOPHILS # BLD AUTO: 0.09 10*3/MM3 (ref 0–0.2)
BASOPHILS NFR BLD AUTO: 0.8 % (ref 0–2)
BILIRUB SERPL-MCNC: 0.6 MG/DL (ref 0.2–1.3)
BUN BLD-MCNC: 21 MG/DL (ref 7–21)
BUN/CREAT SERPL: 20 (ref 7–25)
CALCIUM SPEC-SCNC: 9.6 MG/DL (ref 8.4–10.2)
CHLORIDE SERPL-SCNC: 98 MMOL/L (ref 95–110)
CO2 SERPL-SCNC: 27 MMOL/L (ref 22–31)
CREAT BLD-MCNC: 1.05 MG/DL (ref 0.7–1.3)
DEPRECATED RDW RBC AUTO: 47.4 FL (ref 35.1–43.9)
EOSINOPHIL # BLD AUTO: 0.69 10*3/MM3 (ref 0–0.7)
EOSINOPHIL NFR BLD AUTO: 6.5 % (ref 0–7)
ERYTHROCYTE [DISTWIDTH] IN BLOOD BY AUTOMATED COUNT: 16.7 % (ref 11.5–14.5)
GFR SERPL CREATININE-BSD FRML MDRD: 73 ML/MIN/1.73 (ref 56–130)
GLOBULIN UR ELPH-MCNC: 3.5 GM/DL (ref 2.3–3.5)
GLUCOSE BLD-MCNC: 87 MG/DL (ref 60–100)
HCT VFR BLD AUTO: 38.8 % (ref 39–49)
HGB BLD-MCNC: 12.7 G/DL (ref 13.7–17.3)
IMM GRANULOCYTES # BLD: 0.02 10*3/MM3 (ref 0–0.02)
IMM GRANULOCYTES NFR BLD: 0.2 % (ref 0–0.5)
IRON 24H UR-MRATE: 77 MCG/DL (ref 49–181)
IRON SATN MFR SERPL: 19 % (ref 20–55)
LYMPHOCYTES # BLD AUTO: 1.05 10*3/MM3 (ref 0.6–4.2)
LYMPHOCYTES NFR BLD AUTO: 9.9 % (ref 10–50)
MCH RBC QN AUTO: 25.6 PG (ref 26.5–34)
MCHC RBC AUTO-ENTMCNC: 32.7 G/DL (ref 31.5–36.3)
MCV RBC AUTO: 78.2 FL (ref 80–98)
MONOCYTES # BLD AUTO: 1.04 10*3/MM3 (ref 0–0.9)
MONOCYTES NFR BLD AUTO: 9.8 % (ref 0–12)
NEUTROPHILS # BLD AUTO: 7.7 10*3/MM3 (ref 2–8.6)
NEUTROPHILS NFR BLD AUTO: 72.8 % (ref 37–80)
PLATELET # BLD AUTO: 401 10*3/MM3 (ref 150–450)
PMV BLD AUTO: 9.6 FL (ref 8–12)
POTASSIUM BLD-SCNC: 3.7 MMOL/L (ref 3.5–5.1)
PROT SERPL-MCNC: 7.9 G/DL (ref 6.3–8.6)
RBC # BLD AUTO: 4.96 10*6/MM3 (ref 4.37–5.74)
SODIUM BLD-SCNC: 138 MMOL/L (ref 137–145)
TIBC SERPL-MCNC: 409 MCG/DL (ref 261–462)
TSH SERPL DL<=0.05 MIU/L-ACNC: 11.6 MIU/ML (ref 0.46–4.68)
VIT B12 BLD-MCNC: 868 PG/ML (ref 239–931)
WBC NRBC COR # BLD: 10.59 10*3/MM3 (ref 3.2–9.8)

## 2018-10-23 PROCEDURE — 82306 VITAMIN D 25 HYDROXY: CPT | Performed by: INTERNAL MEDICINE

## 2018-10-23 PROCEDURE — 83540 ASSAY OF IRON: CPT | Performed by: INTERNAL MEDICINE

## 2018-10-23 PROCEDURE — 80053 COMPREHEN METABOLIC PANEL: CPT | Performed by: INTERNAL MEDICINE

## 2018-10-23 PROCEDURE — 85025 COMPLETE CBC W/AUTO DIFF WBC: CPT | Performed by: INTERNAL MEDICINE

## 2018-10-23 PROCEDURE — 83550 IRON BINDING TEST: CPT | Performed by: INTERNAL MEDICINE

## 2018-10-23 PROCEDURE — 84443 ASSAY THYROID STIM HORMONE: CPT | Performed by: INTERNAL MEDICINE

## 2018-10-23 PROCEDURE — 82607 VITAMIN B-12: CPT | Performed by: INTERNAL MEDICINE

## 2018-10-23 PROCEDURE — 36415 COLL VENOUS BLD VENIPUNCTURE: CPT | Performed by: INTERNAL MEDICINE

## 2018-10-24 ENCOUNTER — OFFICE VISIT (OUTPATIENT)
Dept: OTOLARYNGOLOGY | Facility: CLINIC | Age: 58
End: 2018-10-24

## 2018-10-24 ENCOUNTER — HOSPITAL ENCOUNTER (OUTPATIENT)
Dept: SPEECH THERAPY | Facility: HOSPITAL | Age: 58
Setting detail: THERAPIES SERIES
Discharge: HOME | End: 2018-10-24

## 2018-10-24 ENCOUNTER — APPOINTMENT (OUTPATIENT)
Dept: SPEECH THERAPY | Facility: HOSPITAL | Age: 58
End: 2018-10-24

## 2018-10-24 VITALS — WEIGHT: 204 LBS | BODY MASS INDEX: 27.63 KG/M2 | OXYGEN SATURATION: 97 % | HEIGHT: 72 IN

## 2018-10-24 DIAGNOSIS — Z85.21 PERSONAL HISTORY OF MALIGNANT NEOPLASM OF LARYNX: Primary | ICD-10-CM

## 2018-10-24 DIAGNOSIS — R13.12 OROPHARYNGEAL DYSPHAGIA: Primary | ICD-10-CM

## 2018-10-24 DIAGNOSIS — Z79.01 ANTICOAGULATED: ICD-10-CM

## 2018-10-24 DIAGNOSIS — J04.10 TRACHEITIS: ICD-10-CM

## 2018-10-24 DIAGNOSIS — Z93.0 TRACHEOSTOMY STATUS (HCC): ICD-10-CM

## 2018-10-24 PROCEDURE — 92526 ORAL FUNCTION THERAPY: CPT | Performed by: SPEECH-LANGUAGE PATHOLOGIST

## 2018-10-24 PROCEDURE — G8997 SWALLOW GOAL STATUS: HCPCS | Performed by: SPEECH-LANGUAGE PATHOLOGIST

## 2018-10-24 PROCEDURE — 99213 OFFICE O/P EST LOW 20 MIN: CPT | Performed by: OTOLARYNGOLOGY

## 2018-10-24 PROCEDURE — G8996 SWALLOW CURRENT STATUS: HCPCS | Performed by: SPEECH-LANGUAGE PATHOLOGIST

## 2018-10-24 NOTE — THERAPY RE-EVALUATION
"Outpatient Speech Language Pathology   Adult Swallow Progress Note  [facility]     Patient Name: Truman Esquivel  : 1960  MRN: [id number]  Today's Date: 10/24/2018         Visit Date: 10/24/2018     Patient has a hx of Laryngeal cancer; Stage III (cT3, cN0, cM0) with dysphagia. Patient currently has a trach. He does not use passy thaddeus valve but does have one.       Initial evaluation for this therapy series - 2018  Re-certification completed on 10/23/2018    Next therapy session scheduled for 4 weeks      Safe swallowing protocol was reviewed this date and patient verbalized compliance. SLP recommended chin tuck, upright position, slow rate, small bites/sips, cyclic eating, avoid mixed consistencies/crumbly foods, effortful/hard swallow, and multiple swallows per drink as needed. Patient verbalized compliance with recommendations and use of strategies. Pt has decreased mixed consistencies. Education was further provided on aspiration risks and how to protect airway suing upon strategies. Pt agreed. Pt completed thin liquid trials this date with no overt s/s of aspiration. Pt did present with mild throat clearing that was present prior to trials of thin. Pt stated that he is \"full of mucus\". Pt is tolerating least restrictive diet of mechanical soft and thin liquids using recommended strategies. SLP educated pt on the dangers of not following protocol was also reviewed with patient this date.       Patient would benefit from skilled speech therapy 1x a month with another MBS to be completed at the end of November. SLP encouraged pt to continue current diet with the use of strategies. Pt was in agreement. Pt is safe on mechanical soft and thin if strategies are used. Increased throat clearing noted on large bolus with no use of double/effortful swallow.   Pt was in agreement.      Agnes Barone MS CCC-SLP    Patient Active Problem List   Diagnosis   • Ascending aortic dissection (CMS/HCC) "   • Essential hypertension   • Deep vein thrombosis (DVT) of femoral vein of both lower extremities (CMS/HCC)   • Hypothyroidism   • Snoring   • Acute pain of right knee   • Knee effusion, right   • Knee pain   • Obstructive sleep apnea of adult   • TIA (transient ischemic attack)   • Dysphagia   • Squamous cell carcinoma of larynx (CMS/HCC)   • Pharyngeal dysphagia   • Anemia   • Abnormal positron emission tomography (PET) of colon   • Hypokalemia   • Encounter for venous access device care   • Dehydration   • Weight loss, abnormal   • Odynophagia   • Thrush, oral   • Thrombocytopenia (CMS/HCC)   • Pancytopenia due to antineoplastic chemotherapy (CMS/HCC)   • Unable to eat   • Leukocytosis   • Surgical aftercare, respiratory system   • Hemothorax on left        Visit Dx:    ICD-10-CM ICD-9-CM   1. Oropharyngeal dysphagia R13.12 787.22             SLP Adult Swallow Evaluation - 10/24/18 0946        Rehab Evaluation    Document Type therapy note (daily note)  -EA    Subjective Information no complaints  -EA    Patient Observations alert;cooperative;agree to therapy  -EA    Patient/Family Observations Pt attended therapy session alone.   -EA    Patient Effort good  -EA    Symptoms Noted During/After Treatment none  -EA       General Information    Patient Profile Reviewed yes  -EA    Current Method of Nutrition soft textures;thin liquids  -EA    Precautions/Limitations, Vision WFL with corrective lenses  -EA    Precautions/Limitations, Hearing WFL  -EA    Prior Level of Function-Communication WFL  -EA    Prior Level of Function-Swallowing no diet consistency restrictions;mechanical soft textures  -EA    Plans/Goals Discussed with patient  -EA    Barriers to Rehab none identified  -EA       Pain Scale: Numbers Pre/Post-Treatment    Pain Scale: Numbers, Pretreatment 0/10 - no pain  -EA    Pain Scale: Numbers, Post-Treatment 0/10 - no pain  -EA       Oral Motor and Function    Dentition Assessment natural, present and  adequate;other (see comments)  -EA    Secretion Management WNL/WFL  -EA    Mucosal Quality moist, healthy  -EA    Volitional Swallow delayed  -EA       Oral Musculature and Cranial Nerve Assessment    Oral Motor General Assessment WFL  -EA       General Eating/Swallowing Observations    Respiratory Support Currently in Use trach collar  -EA    Eating/Swallowing Skills self-fed  -EA    Positioning During Eating upright 90 degree;upright in chair  -EA    Utensils Used spoon  -EA    Consistencies Trialed thin liquids  -EA       Respiratory    Respiratory Status room air  -EA       Clinical Swallow Eval    Oral Prep Phase WFL  -EA    Oral Transit impaired  -EA    Oral Residue WFL  -EA    Pharyngeal Phase suspected pharyngeal impairment  -EA    Esophageal Phase unremarkable  -EA       Oral Prep Concerns    Oral Prep Concerns prolonged mastication;increased prep time  -EA    Prolonged Mastication thin;pudding  -EA    Increased Prep Time thin;pudding  -EA       Oral Transit Concerns    Oral Transit Concerns delayed initiation of bolus transit  -EA    Delayed Intiation of Bolus Transit thin;pudding  -EA       Pharyngeal Phase Concerns    Pharyngeal Phase Concerns throat clear  -EA    Cough thin  -EA       Clinical Impression    SLP Swallowing Diagnosis mild-moderate;oral dysfunction;suspected pharyngeal dysfunction  -EA    Functional Impact risk of malnutrition;risk of aspiration/pneumonia  -EA    Rehab Potential/Prognosis, Swallowing good, to achieve stated therapy goals  -EA    Swallow Criteria for Skilled Therapeutic Interventions Met demonstrates skilled criteria  -EA       Recommendations    Therapy Frequency (Swallow) 1 day per week  -EA    Predicted Duration Therapy Intervention (Days) until discharge  -EA    SLP Diet Recommendation mechanical soft with no mixed consistencies;thin liquids  -EA    Recommended Precautions and Strategies upright posture during/after eating;small bites of food and sips of liquid;multiple  swallows per bite of food;multiple swallows per sip of liquid;hard swallow with each bite or sip  -EA    Monitor for Signs of Aspiration yes;notify SLP if any concerns  -EA    Anticipated Dischage Disposition home  -EA       Oral Nutrition/Hydration Goal 1 (SLP)    Oral Nutrition/Hydration Goal 1, SLP Patient will tolerate least restrictive diet with no overt s/s of aspiration using safe swallow protocol.   -EA    Time Frame (Oral Nutrition/Hydration Goal 1, SLP) by discharge  -EA    Barriers (Oral Nutrition/Hydration Goal 1, SLP) laryngeal cancer   -EA    Progress/Outcomes (Oral Nutrition/Hydration Goal 1, SLP) goal ongoing  -EA       Pharyngeal Strengthening Exercise Goal 1 (SLP)    Activity (Pharyngeal Strengthening Goal 1, SLP) increase timing;increase tongue base retraction  -EA    Increase Timing ingrid;hard effortful swallow;prepping - 3 second prep or suck swallow or 3-step swallow   OME set - Prophylactic swallowing exercises   -EA    Increase Tongue Base Retraction ingrid;hard effortful swallow;prepping - 3 second prep or suck swallow or 3-step swallow  -EA    [place]/Accuracy (Pharyngeal Strengthening Goal 1, SLP) independently (over 90% accuracy)  -EA    Time Frame (Pharyngeal Strengthening Goal 1, SLP) by discharge  -EA    Barriers (Pharyngeal Strengthening Goal 1, SLP) laryngeal cancer   -EA    Progress/Outcomes (Pharyngeal Strengthening Goal 1, SLP) goal met  -EA       Improve oral skills    To Improve Oral Skills, patient will: Increase tongue A-P movement;90%  -EA       Improve timing of pharyngeal response    To improve timing of pharyngeal response, patient will: Swallow in timely way using three second prep;90%   Chin Tuck for all PO  -EA    Status: Improve timing of pharyngeal response Progressing as expected  -EA    Timing of Pharyngeal Response Progress 70%;with consistent cues  -EA    Comments: Improve timing of pharyngeal response Pt is using strategies with increased independence and  min verbal cues.   -EA       Oral Motor Structure and Function    Dysphagia Treatment Objectives Improve oral skills;Improve timing of pharyngeal response  -EA      User Key  (r) = Recorded By, (t) = Taken By, (c) = Cosigned By    Initials Name Provider Type    Agnes Damian MS CCC-SLP Speech and Language Pathologist                              SLP OP Goals     Row Name 10/24/18 1339          SLP Time Calculation    SLP Goal Re-Cert Due Date 11/23/18  -EA       User Key  (r) = Recorded By, (t) = Taken By, (c) = Cosigned By    Initials Name Provider Type    Agnes Damian MS CCC-SLP Speech and Language Pathologist                OP SLP Education     Row Name 10/24/18 1300       Education    Barriers to Learning No barriers identified  -EA    Education Provided Patient demonstrated recommended strategies;Patient requires further education on strategies, risks  -EA    Assessed Learning needs;Learning motivation  -EA    Learning Motivation Moderate  -EA    Learning Method Explanation;Demonstration  -EA    Teaching Response Verbalized understanding;Demonstrated understanding  -EA    Education Comments SLP reviewed recommendations and compensatory strategies/diet recommendations. Aspiration education provided. Pt was in agreement.   -EA      User Key  (r) = Recorded By, (t) = Taken By, (c) = Cosigned By    Initials Name Effective Dates    KAYLEY MunozGeovanna southglorai WILEY MS CCC-SLP 10/17/16 -                 OP SLP Assessment/Plan - 10/24/18 1000        SLP Assessment    Functional Problems Swallowing  -EA    Impact on Function: Swallowing Risk of aspiration  -EA    Clinical Impression: Swallowing Mild:;oropharyngeal phase dysphagia  -EA    Functional Problems Comment Strategies provided and reviewed. Pt is compliant and verbalized understanding.   -EA    Clinical Impression Comments Mild throat clear on thin liquids this date. Review of three sec prep, hard swallow, and multiple swallows reviewed. Pt in  agreement.   -EA    SLP Diagnosis Dysphagia   -EA    Prognosis Good (comment)  -EA    Patient/caregiver participated in establishment of treatment plan and goals Yes  -EA    Patient would benefit from skilled therapy intervention Yes  -EA       SLP Plan    Frequency 1x a month   -EA    Duration 4 weeks   -EA    Planned CPT's? SLP SWALLOW THERAPY: 06470  -EA    Expected Duration Therapy Session - minutes 30-45 minutes  -EA    Plan Comments Continue   -EA      User Key  (r) = Recorded By, (t) = Taken By, (c) = Cosigned By    Initials Name Provider Type    Agnes Damian MS CCC-SLP Speech and Language Pathologist                Time Calculation:   SLP Start Time: 0946  SLP Stop Time: 1040  SLP Time Calculation (min): 54 min  Total Timed Code Minutes- SLP: 54 minute(s)    Therapy Charges for Today     Code Description Service Date Service Provider Modifiers Qty    39745372418 HC ST SWALLOWING CURRENT STATUS 10/24/2018 Agnes Barone MS CCC-SLP GN, CI 1    79054635166 HC ST SWALLOWING PROJECTED 10/24/2018 Agnes Barone MS CCC-SLP GN, CI 1    42434971188 HC ST TREATMENT SWALLOW 4 10/24/2018 Agnes Barone MS CCC-SLP GN 1          SLP G-Codes  SLP NOMS Used?: Yes  Functional Limitations: Swallowing  Swallow Current Status (): At least 1 percent but less than 20 percent impaired, limited or restricted  Swallow Goal Status (): At least 1 percent but less than 20 percent impaired, limited or restricted        Agnes Barone MS CCC-SLP  10/24/2018

## 2018-10-26 ENCOUNTER — ANESTHESIA EVENT (OUTPATIENT)
Dept: PERIOP | Facility: HOSPITAL | Age: 58
End: 2018-10-26

## 2018-10-26 ENCOUNTER — HOSPITAL ENCOUNTER (EMERGENCY)
Facility: HOSPITAL | Age: 58
Discharge: HOME | End: 2018-10-26
Attending: EMERGENCY MEDICINE | Admitting: SURGERY

## 2018-10-26 ENCOUNTER — ANESTHESIA (OUTPATIENT)
Dept: PERIOP | Facility: HOSPITAL | Age: 58
End: 2018-10-26

## 2018-10-26 VITALS
HEART RATE: 60 BPM | HEIGHT: 72 IN | DIASTOLIC BLOOD PRESSURE: 93 MMHG | SYSTOLIC BLOOD PRESSURE: 185 MMHG | TEMPERATURE: 99 F | OXYGEN SATURATION: 98 % | RESPIRATION RATE: 20 BRPM | WEIGHT: 202.3 LBS | BODY MASS INDEX: 27.4 KG/M2

## 2018-10-26 DIAGNOSIS — K94.20 GASTROSTOMY COMPLICATION, UNSPECIFIED (HCC): Primary | ICD-10-CM

## 2018-10-26 DIAGNOSIS — T85.598A FEEDING TUBE DYSFUNCTION, INITIAL ENCOUNTER: ICD-10-CM

## 2018-10-26 LAB
HOLD SPECIMEN: NORMAL
HOLD SPECIMEN: NORMAL
WHOLE BLOOD HOLD SPECIMEN: NORMAL
WHOLE BLOOD HOLD SPECIMEN: NORMAL

## 2018-10-26 PROCEDURE — 43246 EGD PLACE GASTROSTOMY TUBE: CPT | Performed by: SURGERY

## 2018-10-26 PROCEDURE — 99283 EMERGENCY DEPT VISIT LOW MDM: CPT

## 2018-10-26 PROCEDURE — 25010000002 LEVOFLOXACIN PER 250 MG: Performed by: SURGERY

## 2018-10-26 PROCEDURE — 25010000002 MIDAZOLAM PER 1 MG: Performed by: NURSE ANESTHETIST, CERTIFIED REGISTERED

## 2018-10-26 PROCEDURE — 25010000002 PROPOFOL 10 MG/ML EMULSION: Performed by: NURSE ANESTHETIST, CERTIFIED REGISTERED

## 2018-10-26 PROCEDURE — C2627 CATH, SUPRAPUBIC/CYSTOSCOPIC: HCPCS | Performed by: SURGERY

## 2018-10-26 PROCEDURE — 99219 PR INITIAL OBSERVATION CARE/DAY 50 MINUTES: CPT | Performed by: SURGERY

## 2018-10-26 RX ORDER — DIPHENHYDRAMINE HYDROCHLORIDE 50 MG/ML
12.5 INJECTION INTRAMUSCULAR; INTRAVENOUS
Status: DISCONTINUED | OUTPATIENT
Start: 2018-10-26 | End: 2018-10-26 | Stop reason: HOSPADM

## 2018-10-26 RX ORDER — SODIUM CHLORIDE, SODIUM GLUCONATE, SODIUM ACETATE, POTASSIUM CHLORIDE, AND MAGNESIUM CHLORIDE 526; 502; 368; 37; 30 MG/100ML; MG/100ML; MG/100ML; MG/100ML; MG/100ML
INJECTION, SOLUTION INTRAVENOUS CONTINUOUS PRN
Status: DISCONTINUED | OUTPATIENT
Start: 2018-10-26 | End: 2018-10-26 | Stop reason: SURG

## 2018-10-26 RX ORDER — LEVOFLOXACIN 5 MG/ML
500 INJECTION, SOLUTION INTRAVENOUS
Status: COMPLETED | OUTPATIENT
Start: 2018-10-27 | End: 2018-10-26

## 2018-10-26 RX ORDER — PROMETHAZINE HYDROCHLORIDE 25 MG/1
25 TABLET ORAL ONCE AS NEEDED
Status: DISCONTINUED | OUTPATIENT
Start: 2018-10-26 | End: 2018-10-26 | Stop reason: HOSPADM

## 2018-10-26 RX ORDER — PROMETHAZINE HYDROCHLORIDE 25 MG/ML
12.5 INJECTION, SOLUTION INTRAMUSCULAR; INTRAVENOUS ONCE AS NEEDED
Status: DISCONTINUED | OUTPATIENT
Start: 2018-10-26 | End: 2018-10-26 | Stop reason: HOSPADM

## 2018-10-26 RX ORDER — MEPERIDINE HYDROCHLORIDE 50 MG/ML
12.5 INJECTION INTRAMUSCULAR; INTRAVENOUS; SUBCUTANEOUS
Status: DISCONTINUED | OUTPATIENT
Start: 2018-10-26 | End: 2018-10-26 | Stop reason: HOSPADM

## 2018-10-26 RX ORDER — ONDANSETRON 2 MG/ML
4 INJECTION INTRAMUSCULAR; INTRAVENOUS ONCE AS NEEDED
Status: DISCONTINUED | OUTPATIENT
Start: 2018-10-26 | End: 2018-10-26

## 2018-10-26 RX ORDER — DIPHENHYDRAMINE HYDROCHLORIDE 50 MG/ML
12.5 INJECTION INTRAMUSCULAR; INTRAVENOUS
Status: DISCONTINUED | OUTPATIENT
Start: 2018-10-26 | End: 2018-10-26 | Stop reason: SDUPTHER

## 2018-10-26 RX ORDER — MEPERIDINE HYDROCHLORIDE 50 MG/ML
12.5 INJECTION INTRAMUSCULAR; INTRAVENOUS; SUBCUTANEOUS
Status: DISCONTINUED | OUTPATIENT
Start: 2018-10-26 | End: 2018-10-26

## 2018-10-26 RX ORDER — LABETALOL HYDROCHLORIDE 5 MG/ML
5 INJECTION, SOLUTION INTRAVENOUS
Status: DISCONTINUED | OUTPATIENT
Start: 2018-10-26 | End: 2018-10-26 | Stop reason: SDUPTHER

## 2018-10-26 RX ORDER — LABETALOL HYDROCHLORIDE 5 MG/ML
5 INJECTION, SOLUTION INTRAVENOUS
Status: DISCONTINUED | OUTPATIENT
Start: 2018-10-26 | End: 2018-10-26 | Stop reason: HOSPADM

## 2018-10-26 RX ORDER — NALOXONE HCL 0.4 MG/ML
0.2 VIAL (ML) INJECTION AS NEEDED
Status: DISCONTINUED | OUTPATIENT
Start: 2018-10-26 | End: 2018-10-26 | Stop reason: SDUPTHER

## 2018-10-26 RX ORDER — PROMETHAZINE HYDROCHLORIDE 25 MG/1
25 SUPPOSITORY RECTAL ONCE AS NEEDED
Status: DISCONTINUED | OUTPATIENT
Start: 2018-10-26 | End: 2018-10-26 | Stop reason: HOSPADM

## 2018-10-26 RX ORDER — EPHEDRINE SULFATE 50 MG/ML
5 INJECTION, SOLUTION INTRAVENOUS ONCE AS NEEDED
Status: DISCONTINUED | OUTPATIENT
Start: 2018-10-26 | End: 2018-10-26 | Stop reason: SDUPTHER

## 2018-10-26 RX ORDER — ACETAMINOPHEN 650 MG/1
650 SUPPOSITORY RECTAL ONCE AS NEEDED
Status: DISCONTINUED | OUTPATIENT
Start: 2018-10-26 | End: 2018-10-26 | Stop reason: SDUPTHER

## 2018-10-26 RX ORDER — MIDAZOLAM HYDROCHLORIDE 1 MG/ML
INJECTION INTRAMUSCULAR; INTRAVENOUS AS NEEDED
Status: DISCONTINUED | OUTPATIENT
Start: 2018-10-26 | End: 2018-10-26 | Stop reason: SURG

## 2018-10-26 RX ORDER — ACETAMINOPHEN 325 MG/1
650 TABLET ORAL ONCE AS NEEDED
Status: DISCONTINUED | OUTPATIENT
Start: 2018-10-26 | End: 2018-10-26 | Stop reason: SDUPTHER

## 2018-10-26 RX ORDER — FLUMAZENIL 0.1 MG/ML
0.2 INJECTION INTRAVENOUS AS NEEDED
Status: DISCONTINUED | OUTPATIENT
Start: 2018-10-26 | End: 2018-10-26 | Stop reason: HOSPADM

## 2018-10-26 RX ORDER — NALOXONE HCL 0.4 MG/ML
0.2 VIAL (ML) INJECTION AS NEEDED
Status: DISCONTINUED | OUTPATIENT
Start: 2018-10-26 | End: 2018-10-26 | Stop reason: HOSPADM

## 2018-10-26 RX ORDER — PROPOFOL 10 MG/ML
VIAL (ML) INTRAVENOUS AS NEEDED
Status: DISCONTINUED | OUTPATIENT
Start: 2018-10-26 | End: 2018-10-26 | Stop reason: SURG

## 2018-10-26 RX ORDER — EPHEDRINE SULFATE 50 MG/ML
5 INJECTION, SOLUTION INTRAVENOUS ONCE AS NEEDED
Status: DISCONTINUED | OUTPATIENT
Start: 2018-10-26 | End: 2018-10-26 | Stop reason: HOSPADM

## 2018-10-26 RX ORDER — ONDANSETRON 2 MG/ML
4 INJECTION INTRAMUSCULAR; INTRAVENOUS ONCE AS NEEDED
Status: DISCONTINUED | OUTPATIENT
Start: 2018-10-26 | End: 2018-10-26 | Stop reason: HOSPADM

## 2018-10-26 RX ORDER — FLUMAZENIL 0.1 MG/ML
0.2 INJECTION INTRAVENOUS AS NEEDED
Status: DISCONTINUED | OUTPATIENT
Start: 2018-10-26 | End: 2018-10-26 | Stop reason: SDUPTHER

## 2018-10-26 RX ORDER — ACETAMINOPHEN 325 MG/1
650 TABLET ORAL ONCE AS NEEDED
Status: DISCONTINUED | OUTPATIENT
Start: 2018-10-26 | End: 2018-10-26 | Stop reason: HOSPADM

## 2018-10-26 RX ORDER — ACETAMINOPHEN 650 MG/1
650 SUPPOSITORY RECTAL ONCE AS NEEDED
Status: DISCONTINUED | OUTPATIENT
Start: 2018-10-26 | End: 2018-10-26 | Stop reason: HOSPADM

## 2018-10-26 RX ORDER — SODIUM CHLORIDE 0.9 % (FLUSH) 0.9 %
10 SYRINGE (ML) INJECTION AS NEEDED
Status: DISCONTINUED | OUTPATIENT
Start: 2018-10-26 | End: 2018-10-26

## 2018-10-26 RX ORDER — OXYCODONE HYDROCHLORIDE AND ACETAMINOPHEN 5; 325 MG/1; MG/1
1-2 TABLET ORAL EVERY 4 HOURS PRN
Qty: 10 TABLET | Refills: 0 | Status: SHIPPED | OUTPATIENT
Start: 2018-10-26 | End: 2019-02-21

## 2018-10-26 RX ORDER — LIDOCAINE HYDROCHLORIDE 10 MG/ML
INJECTION, SOLUTION INFILTRATION; PERINEURAL AS NEEDED
Status: DISCONTINUED | OUTPATIENT
Start: 2018-10-26 | End: 2018-10-26 | Stop reason: SURG

## 2018-10-26 RX ADMIN — PROPOFOL 50 MG: 10 INJECTION, EMULSION INTRAVENOUS at 18:07

## 2018-10-26 RX ADMIN — MIDAZOLAM HYDROCHLORIDE 2 MG: 2 INJECTION, SOLUTION INTRAMUSCULAR; INTRAVENOUS at 17:45

## 2018-10-26 RX ADMIN — PROPOFOL 100 MG: 10 INJECTION, EMULSION INTRAVENOUS at 18:01

## 2018-10-26 RX ADMIN — SODIUM CHLORIDE, SODIUM GLUCONATE, SODIUM ACETATE, POTASSIUM CHLORIDE, AND MAGNESIUM CHLORIDE: 526; 502; 368; 37; 30 INJECTION, SOLUTION INTRAVENOUS at 17:45

## 2018-10-26 RX ADMIN — PROPOFOL 50 MG: 10 INJECTION, EMULSION INTRAVENOUS at 17:54

## 2018-10-26 RX ADMIN — LIDOCAINE HYDROCHLORIDE 50 MG: 10 INJECTION, SOLUTION INFILTRATION; PERINEURAL at 17:54

## 2018-10-26 RX ADMIN — PROPOFOL 50 MG: 10 INJECTION, EMULSION INTRAVENOUS at 18:05

## 2018-10-26 RX ADMIN — LEVOFLOXACIN 500 MG: 5 INJECTION, SOLUTION INTRAVENOUS at 17:45

## 2018-10-26 NOTE — PROGRESS NOTES
Subjective   Truman Esquivel is a 57 y.o. male.       History of Present Illness   Patient is known to me with a history of squamous cell carcinoma the larynx status post tracheostomy.  Was treated with chemoradiation with what appears to be a complete response.  Has a size 6 trach in place.  Is chronically anticoagulated on Plavix.  States in the last several days he has begun expectorating blood from his tracheostomy.  This is intermittent.  Usually in the form of clots.  No associated fever.  He actually feels reasonably well.  No purulence.  He is not using any kind of humidification at home.      The following portions of the patient's history were reviewed and updated as appropriate: allergies, current medications, past family history, past medical history, past social history, past surgical history and problem list.     reports that he quit smoking about 20 months ago. He has a 47.50 pack-year smoking history. He has never used smokeless tobacco. He reports that he does not drink alcohol or use drugs.   Patient is not a tobacco user and has not been counseled for use of tobacco products      Review of Systems   Constitutional: Negative for fever.           Objective   Physical Exam  Exam: Well-developed, well-nourished male in no acute distress.  Alert and oriented.  Voice is slightly harsh with his trach occluded  Ears: External ears no deformity, canals no discharge, tympanic membranes intact clear and mobile bilaterally.  Nose: No discharge mass, polyp, purulence  Oral cavity: Lips and gums without lesions.  Tongue and floor of mouth without lesions.  Parotid and submandibular ducts unobstructed.  No mucosal lesions on the buccal mucosa or vestibule of the mouth.  Pharynx: Posttreatment changes but no erythema or blood  Neck: Tracheostomy tube in place.  The inner cannula is removed and has some thickened secretions which are cleaned as a courtesy.  The fiberoptic scope is passed through the  tracheostomy.  There is no blood within the tracheostomy tube itself.  The trachea shows some mild erythema but no evidence of purulence.  There is no fresh blood within the trachea or primary bronchi.      Assessment/Plan   Truman was seen today for follow-up.    Diagnoses and all orders for this visit:    Personal history of malignant neoplasm of larynx    Tracheostomy status (CMS/HCC)    Tracheitis    Anticoagulated        Plan: I suspect this is noninfectious tracheitis due to dryness.  I have advised him to obtain a cool mist vaporizer and to irrigate his tracheostomy with saline periodically.  If this results in symptom resolution he is to keep his previously scheduled surveillance visit.  He is to call for nonresolution within a week or sooner for worsening.

## 2018-10-26 NOTE — ED PROVIDER NOTES
Subjective   Patient presents to emergency department for feeding tube coming out.  Hx of squamous cell carcinoma of the larynx status post tracheostomy.  Had recent follow up with Dr Venegas.   One prior time feeding tube fell out and was reinserted on 10/16/2018.  States PEG tube has been out since just before lunch.          History provided by:  Patient   used: No    Illness   Location:  Feeding tube came out when patient took a deep breath  Duration:  1 day  Chronicity:  Recurrent  Associated symptoms: no abdominal pain, no chest pain, no fever, no headaches, no nausea, no rash, no shortness of breath, no sore throat and no vomiting        Review of Systems   Constitutional: Negative for chills and fever.   HENT: Negative for sore throat and trouble swallowing.    Respiratory: Negative for shortness of breath.    Cardiovascular: Negative for chest pain.   Gastrointestinal: Negative for abdominal pain, nausea and vomiting.   Genitourinary: Negative for dysuria and flank pain.   Skin: Negative for color change and rash.   Allergic/Immunologic: Negative for immunocompromised state.   Neurological: Negative for headaches.   Hematological: Does not bruise/bleed easily.   Psychiatric/Behavioral: Negative for confusion.       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   • Stroke (CMS/HCC)    • Swallowing difficulty        Allergies   Allergen Reactions   • Chlorhexidine Itching     Topical cleaning wipes causes severe skin irritation   • Eggs Or Egg-Derived Products Swelling   • Erythromycin Other (See Comments)     Joint pains and cold sweats   • Gabapentin Itching   • Other GI Intolerance     Mycins  farzad lotion causes rash   • Acth [Corticotropin] Rash   • Cephalosporins Itching and Rash     Pt  developed rash, itching after cefepime administration (2-3 doses) during 2/2017 admission. Itching was relieved with benadryl. Cefepime was continued because his infection was improving and no symptoms of anaphylaxis present   • Co Q 10 [Coenzyme Q10] Rash   • Keflex [Cephalexin] Rash   • Neomycin Rash   • Penicillins Rash     Tolerated cefepime during 2/2017 admission. Had rash and itching (relieved by benadryl) after few doses of cefepime and cefepime was continued.   • Tetracyclines & Related GI Intolerance       Past Surgical History:   Procedure Laterality Date   • AORTA SURGERY      ruptured aorta repair   • ASCENDING ARCH/HEMIARCH REPLACEMENT N/A 2/14/2017    Procedure: INTRAOPERATIVE TRANSESOPHAGEAL ECHOCARDIOGRAM, MIDLINE STERNOTOMY, ASCENDING AORTIC  AND PROXIMAL AORTIC ARCH REPAIR WITH 26MM GRAFT, AORTIC VALVE RESUSPENSION, AORTIC ROOT REPAIR, OPEN VEIN HARVEST OF RIGHT LEG;  Surgeon: German Arreguin MD;  Location: [facility] MAIN OR;  Service:    • BRONCHOSCOPY N/A 2/17/2017    Procedure: BRONCHOSCOPY BIOPSY AT BEDSIDE WITH BAL-LEFT LOWER LOBE;  Surgeon: Trent Chaney MD;  Location: [facility] ENDOSCOPY;  Service:    • COLONOSCOPY N/A 3/7/2018    Procedure: COLONOSCOPY WITH POSSIBLE POLYPECTOMY   ( DONE IN OR WITH ENDO)      COLONOSCOPY FIRST;  Surgeon: Kris Solano MD;  Location: [facility] OR;  Service:    • DIRECT LARYNGOSCOPY, ESOPHAGOSCOPY, BRONCHOSCOPY N/A 2/5/2018    Procedure: DIRECT LARYNGOSCOPY WITH BIOPSY, ESOPHAGOSCOPY     (no bronchoscopy, no laser);  Surgeon: Joseph Venegas MD;  Location: [facility] OR;  Service:    • ENDOSCOPY W/ PEG TUBE PLACEMENT N/A 5/2/2018    Procedure: ESOPHAGOGASTRODUODENOSCOPY WITH PERCUTANEOUS ENDOSCOPIC GASTROSTOMY TUBE INSERTION;  Surgeon: Angel Maciel MD;  Location: [facility] ENDOSCOPY;  Service: Gastroenterology   • SPLENECTOMY N/A 7/26/2018    Procedure: SPLENECTOMY, left chest tube placement, EXPLORATORY LAPAROTOMY, G-TUBE PALCEMENT;  Surgeon:  "Kris Solano MD;  Location: [facility];  Service: General   • THORACOSCOPY Left 7/31/2018    Procedure: LEFT THORACOSCOPY VIDEO ASSISTED POSSIBLE THORACOTOMY possible decortication bronchoscopy;  Surgeon: Joshua Pollock MD;  Location: [facility];  Service: Cardiothoracic   • TRACHEOSTOMY  02/05/2018   • TRACHEOSTOMY N/A 2/5/2018    Procedure: TRACHEOSTOMY  local injection @ 1106 incision @ 1119;  Surgeon: Joseph Venegas MD;  Location: [facility];  Service:    • VENOUS ACCESS DEVICE (PORT) INSERTION N/A 3/7/2018    Procedure: INSERTION OF MEDIPORT     (C-ARM#1);  Surgeon: Kris Solano MD;  Location: [facility];  Service:        Family History   Problem Relation Age of Onset   • Thyroid disease Mother    • Hypertension Mother    • Hypertension Father    • Hypertension Other        Social History     Social History   • Marital status:      Social History Main Topics   • Smoking status: Former Smoker     Packs/day: 1.25     Years: 38.00     Quit date: 2/13/2017   • Smokeless tobacco: Never Used      Comment: 02/27/2018 - Patient confirmed he ceased utilization of tobacco products 02/13/2017.   • Alcohol use No   • Drug use: No   • Sexual activity: Defer     Other Topics Concern   • Not on file           Objective      /96 (BP Location: Right arm, Patient Position: Sitting)   Pulse 64   Temp 98 °F (36.7 °C) (Tympanic)   Resp 18   Ht 182.9 cm (72\")   Wt 91.8 kg (202 lb 4.8 oz)   SpO2 96%   BMI 27.44 kg/m²     Physical Exam   Constitutional: He is oriented to person, place, and time. He appears well-developed and well-nourished.   HENT:   Head: Normocephalic and atraumatic.   Eyes: Conjunctivae are normal.   Cardiovascular: Normal rate, regular rhythm and normal heart sounds.    Pulmonary/Chest: Effort normal and breath sounds normal. No respiratory distress.   Abdominal: Soft. Bowel sounds are normal. He exhibits no distension. There is no tenderness.   Insertion " site of PEG tube not visibly occluded, no oozing or bleeding.     Musculoskeletal: He exhibits no edema.   Neurological: He is oriented to person, place, and time.   Skin: Skin is warm. Capillary refill takes less than 2 seconds. No erythema.   Psychiatric: He has a normal mood and affect. His behavior is normal. Thought content normal.   Nursing note and vitals reviewed.      Procedures           ED Course  ED Course as of Oct 26 1631   Fri Oct 26, 2018   1529 Unable to replace PEG tube.  Called endoscopy, they state nurses that usually do PEG tubes are not working today and recommended calling Dr Solano.    [LEANDER]   1624 Consulted Dr Solano.  He is taking patient to OR.    [LEANDER]      ED Course User Index  [LEANDER] Dwaine Tsang PA-C                  [facility]      Final diagnoses:   Feeding tube dysfunction, initial encounter            Dwaine Tsang PA-C  10/26/18 1631

## 2018-10-26 NOTE — ANESTHESIA POSTPROCEDURE EVALUATION
Patient: Truman Esquivel    Procedure Summary     Date:  10/26/18 Room / Location:  [facility] OR  / [facility] OR    Anesthesia Start:  1812 Anesthesia Stop:  1815    Procedures:       ESOPHAGOGASTRODUODENOSCOPY WITH PERCUTANEOUS ENDOSCOPIC GASTROSTOMY TUBE INSERTION WITH ANESTHESIA Possible open gastrostomy (N/A )      GASTROSTOMY FEEDING TUBE INSERTION (N/A Abdomen) Diagnosis:       Gastrostomy complication, unspecified (CMS/HCC)      (Gastrostomy complication, unspecified (CMS/HCC) [K94.20])    Surgeon:  Kris Solano MD Provider:      Anesthesia Type:  general ASA Status:  4          Anesthesia Type: general  Last vitals  BP   161/85 (10/26/18 1659)   Temp   98.8 °F (37.1 °C) (10/26/18 1701)   Pulse   62 (10/26/18 1659)   Resp   20 (10/26/18 1659)     SpO2   98 % (10/26/18 1659)     Post Anesthesia Care and Evaluation    Patient location during evaluation: PACU  Patient participation: complete - patient participated  Level of consciousness: awake and alert  Pain management: adequate  Airway patency: patent  Anesthetic complications: No anesthetic complications  PONV Status: none  Cardiovascular status: acceptable  Respiratory status: acceptable  Hydration status: acceptable

## 2018-10-26 NOTE — H&P
CHIEF COMPLAINT:    Displaced Gtube    HISTORY OF PRESENT ILLNESS:    Truman Esquivel is a 57 y.o. male who is known to me for prior emergent splenectomy with revision of a PEG tube to a gastrostomy tube at that time.  His gastrostomy tube apparently feel out once previously and was replaced in the ER.  He returns today with his Gtube having fallen out, unable to be replaced in the ER.  It fell out around 1130 today.  He last ate at breakfast time.  He uses the tube for his medications on a daily basis.    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   • Stroke (CMS/HCC)    • Swallowing difficulty        Past Surgical History:   Procedure Laterality Date   • AORTA SURGERY      ruptured aorta repair   • ASCENDING ARCH/HEMIARCH REPLACEMENT N/A 2/14/2017    Procedure: INTRAOPERATIVE TRANSESOPHAGEAL ECHOCARDIOGRAM, MIDLINE STERNOTOMY, ASCENDING AORTIC  AND PROXIMAL AORTIC ARCH REPAIR WITH 26MM GRAFT, AORTIC VALVE RESUSPENSION, AORTIC ROOT REPAIR, OPEN VEIN HARVEST OF RIGHT LEG;  Surgeon: German Arreguin MD;  Location: [facility] MAIN OR;  Service:    • BRONCHOSCOPY N/A 2/17/2017    Procedure: BRONCHOSCOPY BIOPSY AT BEDSIDE WITH BAL-LEFT LOWER LOBE;  Surgeon: Trent Chaney MD;  Location: [facility] ENDOSCOPY;  Service:    • COLONOSCOPY N/A 3/7/2018    Procedure: COLONOSCOPY WITH POSSIBLE POLYPECTOMY   ( DONE IN OR WITH ENDO)      COLONOSCOPY FIRST;  Surgeon: Kris Solano MD;  Location: [facility] OR;  Service:    • DIRECT LARYNGOSCOPY, ESOPHAGOSCOPY, BRONCHOSCOPY N/A 2/5/2018    Procedure: DIRECT LARYNGOSCOPY WITH BIOPSY, ESOPHAGOSCOPY     (no bronchoscopy, no laser);  Surgeon: Joseph Venegas MD;  Location: [facility] OR;  Service:    • ENDOSCOPY W/ PEG TUBE PLACEMENT N/A 5/2/2018    Procedure:  ESOPHAGOGASTRODUODENOSCOPY WITH PERCUTANEOUS ENDOSCOPIC GASTROSTOMY TUBE INSERTION;  Surgeon: Angel Maciel MD;  Location: [facility] ENDOSCOPY;  Service: Gastroenterology   • SPLENECTOMY N/A 7/26/2018    Procedure: SPLENECTOMY, left chest tube placement, EXPLORATORY LAPAROTOMY, G-TUBE PALCEMENT;  Surgeon: Kris Solano MD;  Location: [facility] OR;  Service: General   • THORACOSCOPY Left 7/31/2018    Procedure: LEFT THORACOSCOPY VIDEO ASSISTED POSSIBLE THORACOTOMY possible decortication bronchoscopy;  Surgeon: Joshua Pollock MD;  Location: [facility] OR;  Service: Cardiothoracic   • TRACHEOSTOMY  02/05/2018   • TRACHEOSTOMY N/A 2/5/2018    Procedure: TRACHEOSTOMY  local injection @ 1106 incision @ 1119;  Surgeon: Joseph Venegas MD;  Location: [facility] OR;  Service:    • VENOUS ACCESS DEVICE (PORT) INSERTION N/A 3/7/2018    Procedure: INSERTION OF MEDIPORT     (C-ARM#1);  Surgeon: Kris Solano MD;  Location: [facility] OR;  Service:        Prior to Admission medications    Medication Sig Start Date End Date Taking? Authorizing Provider   amLODIPine (NORVASC) 10 MG tablet Take 1 tablet by mouth Daily. 10/9/18  Yes Liborio Chandra MD   Cholecalciferol 92544 units tablet 50 thousand units po q  month 7/17/18  Yes Kp Oliveira MD   clopidogrel (PLAVIX) 75 MG tablet Take 75 mg by mouth Daily. Last dose approx greater than one month ago   Yes Tim Tim MD   docusate sodium (COLACE) 100 MG capsule Take 1 capsule by mouth 2 (Two) Times a Day As Needed for Constipation. 2/13/18  Yes Reza Patel MD   DULoxetine (CYMBALTA) 60 MG capsule Take 60 mg by mouth Daily.   Yes ProviderTim MD   ferrous sulfate, as mg of FE, (VISHAL-IN-SOL) 75 (15 Fe) MG/ML drops 25 mL by Per G Tube route Daily. 9/19/18  Yes Reza Patel MD   levothyroxine (SYNTHROID) 75 MCG tablet Take 1 tab daily Monday to Saturday and 1.5 on Sunday 7/17/18  Yes Kp Oliveira MD    losartan-hydrochlorothiazide (HYZAAR) 100-12.5 MG per tablet Take 1 tablet by mouth Daily. 9/20/18  Yes Liborio Chandra MD   metoprolol succinate XL (TOPROL-XL) 25 MG 24 hr tablet Take 1 tablet by mouth Every Night. Patient states he hasn't been taking this week, states he thinks he is hypotensive 3/15/18  Yes Liborio Chandra MD   Multiple Vitamins-Minerals (MULTIVITAMIN ADULT PO) Take 1 tablet by mouth Daily.   Yes Tim Tim MD   mupirocin (BACTROBAN) 2 % ointment Apply  topically 2 (Two) Times a Day. 6/25/18  Yes Joseph Venegas MD   ondansetron (ZOFRAN) 4 MG tablet Take 1 tablet by mouth 3 (Three) Times a Day As Needed for Nausea or Vomiting. 2/21/18  Yes Reza Patel MD   oxyCODONE-acetaminophen (PERCOCET) 5-325 MG per tablet Take 1-2 tablets by mouth Every 4 (Four) Hours As Needed (Pain). 9/4/18  Yes Kris Solano MD   POTASSIUM PO Take  by mouth.   Yes ProviderTim MD   sodium chloride (NS) 0.9 % irrigation USE FOR TRACHEOSTOMY AS DIRECTED 10/12/18  Yes Joseph Venegas MD   spironolactone (ALDACTONE) 25 MG tablet Take 0.5 tablets by mouth Daily. 10/9/18  Yes Liborio Chandra MD   traZODone (DESYREL) 100 MG tablet Take 100 mg by mouth Every Night.   Yes Tim Tim MD   triamcinolone (KENALOG) 0.1 % cream Apply  topically 2 (Two) Times a Day. 6/25/18  Yes Joseph Venegas MD   vitamin B-12 (CYANOCOBALAMIN) 100 MCG tablet Take 100 mcg by mouth Daily.   Yes Tim Tim MD       Allergies   Allergen Reactions   • Chlorhexidine Itching     Topical cleaning wipes causes severe skin irritation   • Eggs Or Egg-Derived Products Swelling   • Erythromycin Other (See Comments)     Joint pains and cold sweats   • Gabapentin Itching   • Other GI Intolerance     Mycins  farzad lotion causes rash   • Acth [Corticotropin] Rash   • Cephalosporins Itching and Rash     Pt developed rash, itching after cefepime administration (2-3  doses) during 2/2017 admission. Itching was relieved with benadryl. Cefepime was continued because his infection was improving and no symptoms of anaphylaxis present   • Co Q 10 [Coenzyme Q10] Rash   • Keflex [Cephalexin] Rash   • Neomycin Rash   • Penicillins Rash     Tolerated cefepime during 2/2017 admission. Had rash and itching (relieved by benadryl) after few doses of cefepime and cefepime was continued.   • Tetracyclines & Related GI Intolerance       Family History   Problem Relation Age of Onset   • Thyroid disease Mother    • Hypertension Mother    • Hypertension Father    • Hypertension Other        Social History     Social History   • Marital status:      Spouse name: N/A   • Number of children: N/A   • Years of education: N/A     Occupational History   • Not on file.     Social History Main Topics   • Smoking status: Former Smoker     Packs/day: 1.25     Years: 38.00     Quit date: 2/13/2017   • Smokeless tobacco: Never Used      Comment: 02/27/2018 - Patient confirmed he ceased utilization of tobacco products 02/13/2017.   • Alcohol use No   • Drug use: No   • Sexual activity: Defer     Other Topics Concern   • Not on file     Social History Narrative   • No narrative on file       Review of Systems   Constitutional: Negative for activity change, appetite change, chills and fever.   HENT: Negative for hearing loss, nosebleeds and trouble swallowing.    Cardiovascular: Negative for chest pain, palpitations and leg swelling.   Gastrointestinal: Negative for abdominal distention, abdominal pain, anal bleeding, blood in stool, constipation, diarrhea, nausea, rectal pain and vomiting.   Endocrine: Negative for cold intolerance, heat intolerance, polydipsia and polyuria.   Genitourinary: Negative for decreased urine volume, difficulty urinating, dysuria, enuresis, frequency, hematuria and urgency.   Musculoskeletal: Negative for arthralgias, back pain, gait problem, myalgias and neck pain.   Skin:  "Negative for pallor, rash and wound.   Allergic/Immunologic: Negative for immunocompromised state.   Neurological: Negative for dizziness, seizures, weakness, light-headedness, numbness and headaches.   Psychiatric/Behavioral: Negative for agitation and behavioral problems. The patient is not nervous/anxious.        Objective     /96 (BP Location: Right arm, Patient Position: Sitting)   Pulse 64   Temp 98 °F (36.7 °C) (Tympanic)   Resp 18   Ht 182.9 cm (72\")   Wt 91.8 kg (202 lb 4.8 oz)   SpO2 96%   BMI 27.44 kg/m²     Physical Exam   Constitutional: He is oriented to person, place, and time. He appears well-developed and well-nourished.   HENT:   Head: Normocephalic and atraumatic.   Nose: Nose normal.   Eyes: Conjunctivae and EOM are normal. Right eye exhibits no discharge. Left eye exhibits no discharge.   Neck: Trachea normal, normal range of motion and phonation normal. Neck supple. No JVD present. No tracheal deviation and no edema present. No thyromegaly present.       Cardiovascular: Normal rate, regular rhythm and normal heart sounds.  Exam reveals no gallop and no friction rub.    No murmur heard.  Pulmonary/Chest: Effort normal and breath sounds normal. No accessory muscle usage. No respiratory distress. He has no decreased breath sounds. He has no wheezes. He has no rales. He exhibits no tenderness.   Abdominal: Soft. He exhibits no distension, no fluid wave, no ascites, no pulsatile midline mass and no mass. There is no tenderness. There is no rebound and no guarding. No hernia.       Musculoskeletal: Normal range of motion. He exhibits no edema, tenderness or deformity.   Lymphadenopathy:     He has no cervical adenopathy.        Left: No supraclavicular adenopathy present.   Neurological: He is alert and oriented to person, place, and time. He has normal strength. No cranial nerve deficit.   Skin: Skin is warm and dry. No rash noted. He is not diaphoretic. No erythema. No pallor. "   Psychiatric: He has a normal mood and affect. His speech is normal and behavior is normal. Judgment and thought content normal. Cognition and memory are normal.   Vitals reviewed.        ASSESSMENT:    Dislodged gastrostomy tube, unable to be replaced.    PLAN:    Despite multiple attempts we are unable to replace the gtube in the ER.  The patient needs his gastrostomy to safely take medications given his aspiration s/p trach and chemoradiation for laryngeal cancer.  Will plan for tube placement tonight.  The risks and benefits of upper endoscopy with percutaneous endoscopic gastrostomy tube placement with possible open gastrostomy tube placement have been discussed with the patient and he wishes to proceed.          This document has been electronically signed by Kris Solano MD on October 26, 2018 4:32 PM

## 2018-10-26 NOTE — ANESTHESIA POSTPROCEDURE EVALUATION
Patient: Truman Esquivel    Procedure Summary     Date:  10/26/18 Room / Location:  [facility] OR  / [facility] OR    Anesthesia Start:  1748 Anesthesia Stop:  1754    Procedures:       ESOPHAGOGASTRODUODENOSCOPY WITH PERCUTANEOUS ENDOSCOPIC GASTROSTOMY TUBE INSERTION WITH ANESTHESIA Possible open gastrostomy (N/A )      GASTROSTOMY FEEDING TUBE INSERTION (N/A Abdomen) Diagnosis:       Gastrostomy complication, unspecified (CMS/HCC)      (Gastrostomy complication, unspecified (CMS/HCC) [K94.20])    Surgeon:  Kris Solano MD Provider:  Kris Antonio CRNA    Anesthesia Type:  general ASA Status:  4          Anesthesia Type: general  Last vitals  BP   161/85 (10/26/18 1659)   Temp   98.8 °F (37.1 °C) (10/26/18 1701)   Pulse   62 (10/26/18 1659)   Resp   20 (10/26/18 1659)     SpO2   98 % (10/26/18 1659)     Post Anesthesia Care and Evaluation    Patient location during evaluation: PACU  Patient participation: complete - patient participated  Level of consciousness: awake and alert  Pain management: adequate  Airway patency: patent  Anesthetic complications: No anesthetic complications  PONV Status: none  Cardiovascular status: acceptable  Respiratory status: acceptable  Hydration status: acceptable

## 2018-10-26 NOTE — ANESTHESIA PREPROCEDURE EVALUATION
Anesthesia Evaluation     Patient summary reviewed and Nursing notes reviewed   NPO Solid Status: > 4 hours  NPO Liquid Status: > 8 hours           Airway   Mallampati: II  TM distance: >3 FB  Neck ROM: full  No difficulty expected  Comment: Number 6 Trach without cuff  in place noted on exam.  Dental      Pulmonary    (+) a smoker Former, COPD mild, shortness of breath, sleep apnea, decreased breath sounds,   (-) pleural effusion  Cardiovascular   Exercise tolerance: good (4-7 METS)    NYHA Classification: II  ECG reviewed  PT is on anticoagulation therapy  Patient on routine beta blocker  Rhythm: regular  Rate: abnormal    (+) hypertension well controlled, murmur, PVD, DVT resolved,       Neuro/Psych  (+) TIA, CVA, tremors, weakness, psychiatric history Depression,     (-) syncope  GI/Hepatic/Renal/Endo    (+)  GERD poorly controlled,  hypothyroidism,   (-)  obesity    Musculoskeletal     (+) back pain, chronic pain,   Abdominal    Substance History      OB/GYN          Other      history of cancer active    ROS/Med Hx Other: S/p trach for larngeal cancer resection.                    Anesthesia Plan    ASA 4     general   (Will try sedation to avoid changing the trach to a cuffed shiley.)  intravenous induction   Anesthetic plan, all risks, benefits, and alternatives have been provided, discussed and informed consent has been obtained with: patient.

## 2018-10-26 NOTE — BRIEF OP NOTE
ESOPHAGOGASTRODUODENOSCOPY WITH PERCUTANEOUS ENDOSCOPIC GASTROSTOMY TUBE INSERTION WITH ANESTHESIA, GASTROSTOMY FEEDING TUBE INSERTION  Progress Note    Truman Esquivel  10/26/2018    Pre-op Diagnosis:   Gastrostomy complication, unspecified (CMS/HCC) [K94.20]       Post-Op Diagnosis Codes:     * Gastrostomy complication, unspecified (CMS/HCC) [K94.20]    Procedure/CPT® Codes:      Procedure(s):  ESOPHAGOGASTRODUODENOSCOPY WITH PERCUTANEOUS ENDOSCOPIC GASTROSTOMY TUBE INSERTION WITH ANESTHESIA     Surgeon(s):  Kris Solano MD    Anesthesia: General    Staff:   Assistant: Norma Lamb CSA  Endo Nurse: Rebeca Pollard RN; Maci Bunn RN    Estimated Blood Loss: minimal    Urine Voided: * No values recorded between 10/26/2018  5:45 PM and 10/26/2018  6:19 PM *    Specimens:                None      Drains:   Gastrostomy/Enterostomy Percutaneous endoscopic gastrostomy (PEG) 20 Fr. LUQ (Active)   Surrounding Skin Dry;Intact 10/26/2018  6:38 PM   Securement taped to abdomen 10/26/2018  6:38 PM       Findings: PEG at 3cm    Complications: none          This document has been electronically signed by Kris Solano MD on October 26, 2018 6:42 PM          Kris Solano MD     Date: 10/26/2018  Time: 6:42 PM

## 2018-10-30 ENCOUNTER — OFFICE VISIT (OUTPATIENT)
Dept: ENDOCRINOLOGY | Facility: CLINIC | Age: 58
End: 2018-10-30

## 2018-10-30 VITALS
HEART RATE: 66 BPM | BODY MASS INDEX: 27.1 KG/M2 | SYSTOLIC BLOOD PRESSURE: 142 MMHG | OXYGEN SATURATION: 96 % | DIASTOLIC BLOOD PRESSURE: 80 MMHG | HEIGHT: 72 IN | WEIGHT: 200.1 LBS

## 2018-10-30 DIAGNOSIS — E03.9 HYPOTHYROIDISM, UNSPECIFIED TYPE: Primary | ICD-10-CM

## 2018-10-30 DIAGNOSIS — E53.8 B12 DEFICIENCY: ICD-10-CM

## 2018-10-30 DIAGNOSIS — E55.9 VITAMIN D DEFICIENCY: ICD-10-CM

## 2018-10-30 PROCEDURE — 99214 OFFICE O/P EST MOD 30 MIN: CPT | Performed by: INTERNAL MEDICINE

## 2018-10-30 NOTE — PROGRESS NOTES
Subjective    Truman Esquivel is a 57 y.o. male. he is here today for follow-up.    Hypothyroidism   Pertinent negatives include no abdominal pain, arthralgias, chest pain, coughing, diaphoresis, fatigue, headaches, joint swelling, myalgias, nausea, neck pain, numbness, rash, sore throat, vomiting or weakness.          Primary Care Provider     Marybeth Harrison DO      Hypothyroidism      Duration 10 years     Timing - Hypothyroidism  is Constant     Quality -  needs improvement       Severity -  moderate     Complications - none     Current symptoms/problems  fatigue, weakness    Weight loss -- 60 lbs since December , no further weight loss      Alleviating Factors: Compliance       Side Effects  none         Evaluation history:  TSH   Date Value Ref Range Status   10/23/2018 11.600 (H) 0.460 - 4.680 mIU/mL Final     Free T4   Date Value Ref Range Status   02/18/2017 0.68 (L) 0.93 - 1.70 ng/dL Final       Current medications:  Current Outpatient Prescriptions   Medication Sig Dispense Refill   • amLODIPine (NORVASC) 10 MG tablet Take 1 tablet by mouth Daily. 30 tablet 11   • Cholecalciferol 78019 units tablet 50 thousand units po q  month 3 tablet 3   • clopidogrel (PLAVIX) 75 MG tablet Take 75 mg by mouth Daily. Last dose approx greater than one month ago     • docusate sodium (COLACE) 100 MG capsule Take 1 capsule by mouth 2 (Two) Times a Day As Needed for Constipation. 60 capsule 2   • DULoxetine (CYMBALTA) 60 MG capsule Take 60 mg by mouth Daily.     • ferrous sulfate, as mg of FE, (VISHAL-IN-SOL) 75 (15 Fe) MG/ML drops 25 mL by Per G Tube route Daily. 150 mL 2   • levothyroxine (SYNTHROID) 75 MCG tablet Take 1 tab daily Monday to Saturday and 1.5 on Sunday 32 tablet 11   • losartan-hydrochlorothiazide (HYZAAR) 100-12.5 MG per tablet Take 1 tablet by mouth Daily. 30 tablet 12   • metoprolol succinate XL (TOPROL-XL) 25 MG 24 hr tablet Take 1 tablet by mouth Every Night. Patient states he hasn't been taking this  week, states he thinks he is hypotensive 30 tablet 12   • Multiple Vitamins-Minerals (MULTIVITAMIN ADULT PO) Take 1 tablet by mouth Daily.     • mupirocin (BACTROBAN) 2 % ointment Apply  topically 2 (Two) Times a Day. 30 g 2   • ondansetron (ZOFRAN) 4 MG tablet Take 1 tablet by mouth 3 (Three) Times a Day As Needed for Nausea or Vomiting. 40 tablet 3   • oxyCODONE-acetaminophen (PERCOCET) 5-325 MG per tablet Take 1-2 tablets by mouth Every 4 (Four) Hours As Needed (Pain). 40 tablet 0   • oxyCODONE-acetaminophen (PERCOCET) 5-325 MG per tablet Take 1-2 tablets by mouth Every 4 (Four) Hours As Needed (Pain). 10 tablet 0   • POTASSIUM PO Take  by mouth.     • sodium chloride (NS) 0.9 % irrigation USE FOR TRACHEOSTOMY AS DIRECTED 1000 mL 8   • spironolactone (ALDACTONE) 25 MG tablet Take 0.5 tablets by mouth Daily. 15 tablet 6   • traZODone (DESYREL) 100 MG tablet Take 100 mg by mouth Every Night.     • triamcinolone (KENALOG) 0.1 % cream Apply  topically 2 (Two) Times a Day. 45 g 2   • vitamin B-12 (CYANOCOBALAMIN) 100 MCG tablet Take 100 mcg by mouth Daily.       No current facility-administered medications for this visit.        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   • Stroke (CMS/HCC)    • Swallowing difficulty      Past Surgical History:   Procedure Laterality Date   • AORTA SURGERY      ruptured aorta repair   • ASCENDING ARCH/HEMIARCH REPLACEMENT N/A 2/14/2017    Procedure: INTRAOPERATIVE TRANSESOPHAGEAL ECHOCARDIOGRAM, MIDLINE STERNOTOMY, ASCENDING AORTIC  AND PROXIMAL AORTIC ARCH REPAIR WITH 26MM GRAFT, AORTIC VALVE RESUSPENSION, AORTIC ROOT REPAIR, OPEN VEIN HARVEST OF RIGHT LEG;  Surgeon: German Arreguin MD;   Location: [facility] MAIN OR;  Service:    • BRONCHOSCOPY N/A 2/17/2017    Procedure: BRONCHOSCOPY BIOPSY AT BEDSIDE WITH BAL-LEFT LOWER LOBE;  Surgeon: Trent Chaney MD;  Location: [facility] ENDOSCOPY;  Service:    • COLONOSCOPY N/A 3/7/2018    Procedure: COLONOSCOPY WITH POSSIBLE POLYPECTOMY   ( DONE IN OR WITH ENDO)      COLONOSCOPY FIRST;  Surgeon: Kris Solano MD;  Location: [facility] OR;  Service:    • DIRECT LARYNGOSCOPY, ESOPHAGOSCOPY, BRONCHOSCOPY N/A 2/5/2018    Procedure: DIRECT LARYNGOSCOPY WITH BIOPSY, ESOPHAGOSCOPY     (no bronchoscopy, no laser);  Surgeon: Joseph Venegas MD;  Location: [facility] OR;  Service:    • ENDOSCOPY W/ PEG TUBE PLACEMENT N/A 5/2/2018    Procedure: ESOPHAGOGASTRODUODENOSCOPY WITH PERCUTANEOUS ENDOSCOPIC GASTROSTOMY TUBE INSERTION;  Surgeon: Angel Maciel MD;  Location: [facility] ENDOSCOPY;  Service: Gastroenterology   • SPLENECTOMY N/A 7/26/2018    Procedure: SPLENECTOMY, left chest tube placement, EXPLORATORY LAPAROTOMY, G-TUBE PALCEMENT;  Surgeon: Kris Solano MD;  Location: [facility] OR;  Service: General   • THORACOSCOPY Left 7/31/2018    Procedure: LEFT THORACOSCOPY VIDEO ASSISTED POSSIBLE THORACOTOMY possible decortication bronchoscopy;  Surgeon: Joshua Pollock MD;  Location: [facility] OR;  Service: Cardiothoracic   • TRACHEOSTOMY  02/05/2018   • TRACHEOSTOMY N/A 2/5/2018    Procedure: TRACHEOSTOMY  local injection @ 1106 incision @ 1119;  Surgeon: Joseph Venegas MD;  Location: [facility] OR;  Service:    • VENOUS ACCESS DEVICE (PORT) INSERTION N/A 3/7/2018    Procedure: INSERTION OF MEDIPORT     (C-ARM#1);  Surgeon: Kris Solano MD;  Location: [facility] OR;  Service:      Family History   Problem Relation Age of Onset   • Thyroid disease Mother    • Hypertension Mother    • Hypertension Father    • Hypertension Other        Allergies   Allergen Reactions   • Chlorhexidine Itching     Topical cleaning wipes causes severe skin irritation    • Eggs Or Egg-Derived Products Swelling   • Erythromycin Other (See Comments)     Joint pains and cold sweats   • Gabapentin Itching   • Other GI Intolerance     Mycins  farzad lotion causes rash   • Acth [Corticotropin] Rash   • Cephalosporins Itching and Rash     Pt developed rash, itching after cefepime administration (2-3 doses) during 2/2017 admission. Itching was relieved with benadryl. Cefepime was continued because his infection was improving and no symptoms of anaphylaxis present   • Co Q 10 [Coenzyme Q10] Rash   • Keflex [Cephalexin] Rash   • Neomycin Rash   • Penicillins Rash     Tolerated cefepime during 2/2017 admission. Had rash and itching (relieved by benadryl) after few doses of cefepime and cefepime was continued.   • Tetracyclines & Related GI Intolerance     Social History     Social History   • Marital status:      Social History Main Topics   • Smoking status: Former Smoker     Packs/day: 1.25     Years: 38.00     Quit date: 2/13/2017   • Smokeless tobacco: Never Used      Comment: 02/27/2018 - Patient confirmed he ceased utilization of tobacco products 02/13/2017.   • Alcohol use No   • Drug use: No   • Sexual activity: Defer     Other Topics Concern   • Not on file       Review of Systems  Review of Systems   Constitutional: Negative for activity change, appetite change, diaphoresis and fatigue.   HENT: Negative for facial swelling, sneezing, sore throat, tinnitus, trouble swallowing and voice change.    Eyes: Negative for photophobia, pain, discharge, redness, itching and visual disturbance.   Respiratory: Negative for apnea, cough, choking, chest tightness and shortness of breath.    Cardiovascular: Negative for chest pain, palpitations and leg swelling.   Gastrointestinal: Negative for abdominal distention, abdominal pain, constipation, diarrhea, nausea and vomiting.   Endocrine: Negative for cold intolerance, heat intolerance, polydipsia, polyphagia and polyuria.  "  Genitourinary: Negative for difficulty urinating, dysuria, frequency, hematuria and urgency.   Musculoskeletal: Negative for arthralgias, back pain, gait problem, joint swelling, myalgias, neck pain and neck stiffness.   Skin: Negative for color change, pallor, rash and wound.   Neurological: Negative for dizziness, tremors, weakness, light-headedness, numbness and headaches.   Hematological: Negative for adenopathy. Does not bruise/bleed easily.   Psychiatric/Behavioral: Negative for behavioral problems, confusion and sleep disturbance.        Objective    /80   Pulse 66   Ht 182.9 cm (72\")   Wt 90.8 kg (200 lb 1.6 oz)   SpO2 96%   BMI 27.14 kg/m²   Physical Exam   Constitutional: He is oriented to person, place, and time. He appears well-developed and well-nourished. No distress.   HENT:   Head: Normocephalic and atraumatic.   Right Ear: External ear normal.   Left Ear: External ear normal.   Nose: Nose normal.   Eyes: Pupils are equal, round, and reactive to light. Conjunctivae and EOM are normal.   Neck: Normal range of motion. Neck supple. No tracheal deviation present. No thyromegaly present.   Cardiovascular: Normal rate, regular rhythm and normal heart sounds.    No murmur heard.  Pulmonary/Chest: Effort normal and breath sounds normal. No respiratory distress. He has no wheezes.   Trach in place   Abdominal: Soft. Bowel sounds are normal. There is no tenderness. There is no rebound and no guarding.   Musculoskeletal: Normal range of motion. He exhibits no edema, tenderness or deformity.   Neurological: He is alert and oriented to person, place, and time. No cranial nerve deficit.   Skin: Skin is warm and dry. No rash noted.   Psychiatric: He has a normal mood and affect. His behavior is normal. Judgment and thought content normal.       Lab Review  Lab Results   Component Value Date    TSH 11.600 (H) 10/23/2018     Lab Results   Component Value Date    FREET4 0.68 (L) 02/18/2017      "   Assessment/Plan      No diagnosis found.. This diagnosis was discussed and reviewed with the patient including the advantages of drug therapy.     1. Orders placed during this encounter include:  No orders of the defined types were placed in this encounter.      Medications prescribed:  Outpatient Encounter Prescriptions as of 10/30/2018   Medication Sig Dispense Refill   • amLODIPine (NORVASC) 10 MG tablet Take 1 tablet by mouth Daily. 30 tablet 11   • Cholecalciferol 64439 units tablet 50 thousand units po q  month 3 tablet 3   • clopidogrel (PLAVIX) 75 MG tablet Take 75 mg by mouth Daily. Last dose approx greater than one month ago     • docusate sodium (COLACE) 100 MG capsule Take 1 capsule by mouth 2 (Two) Times a Day As Needed for Constipation. 60 capsule 2   • DULoxetine (CYMBALTA) 60 MG capsule Take 60 mg by mouth Daily.     • ferrous sulfate, as mg of FE, (VISHAL-IN-SOL) 75 (15 Fe) MG/ML drops 25 mL by Per G Tube route Daily. 150 mL 2   • levothyroxine (SYNTHROID) 75 MCG tablet Take 1 tab daily Monday to Saturday and 1.5 on Sunday 32 tablet 11   • losartan-hydrochlorothiazide (HYZAAR) 100-12.5 MG per tablet Take 1 tablet by mouth Daily. 30 tablet 12   • metoprolol succinate XL (TOPROL-XL) 25 MG 24 hr tablet Take 1 tablet by mouth Every Night. Patient states he hasn't been taking this week, states he thinks he is hypotensive 30 tablet 12   • Multiple Vitamins-Minerals (MULTIVITAMIN ADULT PO) Take 1 tablet by mouth Daily.     • mupirocin (BACTROBAN) 2 % ointment Apply  topically 2 (Two) Times a Day. 30 g 2   • ondansetron (ZOFRAN) 4 MG tablet Take 1 tablet by mouth 3 (Three) Times a Day As Needed for Nausea or Vomiting. 40 tablet 3   • oxyCODONE-acetaminophen (PERCOCET) 5-325 MG per tablet Take 1-2 tablets by mouth Every 4 (Four) Hours As Needed (Pain). 40 tablet 0   • oxyCODONE-acetaminophen (PERCOCET) 5-325 MG per tablet Take 1-2 tablets by mouth Every 4 (Four) Hours As Needed (Pain). 10 tablet 0   •  POTASSIUM PO Take  by mouth.     • sodium chloride (NS) 0.9 % irrigation USE FOR TRACHEOSTOMY AS DIRECTED 1000 mL 8   • spironolactone (ALDACTONE) 25 MG tablet Take 0.5 tablets by mouth Daily. 15 tablet 6   • traZODone (DESYREL) 100 MG tablet Take 100 mg by mouth Every Night.     • triamcinolone (KENALOG) 0.1 % cream Apply  topically 2 (Two) Times a Day. 45 g 2   • vitamin B-12 (CYANOCOBALAMIN) 100 MCG tablet Take 100 mcg by mouth Daily.       No facility-administered encounter medications on file as of 10/30/2018.         Patient has hypothyroidism for 10 years and his present symptoms include fatigue, weakness and weight gain      Was on 88 and we decreased to 75     TSH    Lab Results   Component Value Date    TSH 11.600 (H) 10/23/2018    TSH 5.160 (H) 07/27/2018    TSH 5.340 (H) 07/10/2018       Lab Results   Component Value Date    FREET4 0.68 (L) 02/18/2017     Keep 75 but on Sunday take 1.5 tabs     Has been taking with iron . We spoke about taking them separately     He is taking through G Tube     Lab Results   Component Value Date    JEHU31KY 57.7 10/23/2018    WAVC76XQ 27.4 (L) 07/10/2018    LRFS47YC 33.3 03/14/2018       Lab Results   Component Value Date    [id number] 868 10/23/2018     Normalized on 50 th u monthly  ---    Anemia of low iron     Now improved on liquid iron     Take apart from thyroid pill     4. No Follow-up on file.

## 2018-10-30 NOTE — OP NOTE
Operative Note    Truman Esquivel  10/26/2018    Pre-op Diagnosis:   Gastrostomy complication, unspecified (CMS/HCC) [K94.20]    Post-op Diagnosis:     Post-Op Diagnosis Codes:     * Gastrostomy complication, unspecified (CMS/HCC) [K94.20]    Procedure/CPT® Codes:      Procedure(s):  ESOPHAGOGASTRODUODENOSCOPY WITH PERCUTANEOUS ENDOSCOPIC GASTROSTOMY TUBE     Surgeon(s):  Kris Solano MD    Anesthesia: General    Staff:   Assistant: Norma Lamb CSA  Endo Nurse: Rebeca Pollard RN; Maci Bunn RN    Estimated Blood Loss: minimal    Specimens:                None      Drains:   Gastrostomy/Enterostomy Percutaneous endoscopic gastrostomy (PEG) 20 Fr. LUQ (Active)   Surrounding Skin Dry;Intact 10/26/2018  6:38 PM   Securement taped to abdomen 10/26/2018  6:38 PM       Gastrostomy/Enterostomy (Active)   Surrounding Skin Intact 10/26/2018  6:54 PM   Drainage Appearance None 10/26/2018  6:54 PM   Dressing Status Clean;Dry;Intact 10/26/2018  6:54 PM       Findings: PEG in place, 3cm at skin level    Complications: none    Indication: Dislodged gastrostomy tube    Operative Note:    The patient was seen in the emergency department after his previously placed gastrostomy tube had become dislodged and was removed.  It was unable to be replaced in the emergency department despite multiple attempts.  Therefore, consent was obtained for upper endoscopy, possible PEG tube, possible open gastrostomy tube placement.    Patient was taken to the operating room and placed in supine position on the OR table.  Gen. anesthetic was administered and the patient was connected to the ventilatory circuit via his tracheostomy.  A preoperative briefing was performed.  A bite block was inserted.  A preoperative timeout was performed.    A lighted endoscope was then inserted transorally and passed into the stomach without difficulty.  The site of the patient's previously placed gastrostomy tube was apparent within  the stomach.  With the stomach fully insufflated with air there appeared to be good one-to-one impulse on palpation of the patient's prior gastrostomy tube site.  This area was prepped and draped sterilely.  A large gauge needle with angiocatheter was inserted percutaneously directly into the stomach under endoscopic visualization.  A wire was then passed through the angiocatheter and grasped with a snare.  The wire was then brought out through the mouth.  The gastrostomy tube was then attached to the wire.  It was then pulled through the patient's mouth into the stomach and out through the skin wound of the prior gastrostomy tube site.  The endoscope was then reinserted and verify that the end of the tube was in the stomach and was freely movable.  The tube was then trimmed and the external appliances were attached.  The external bumper appeared to rest at the skin level at approximately 3 cm in depth.  The patient was then awakened and returned to recovery in good condition.          This document has been electronically signed by Kris Solano MD on October 30, 2018 12:51 PM        Kris Solano MD     Date: 10/30/2018  Time: 12:48 PM

## 2018-10-31 ENCOUNTER — INFUSION (OUTPATIENT)
Dept: ONCOLOGY | Facility: HOSPITAL | Age: 58
End: 2018-10-31

## 2018-10-31 DIAGNOSIS — Z45.2 ENCOUNTER FOR VENOUS ACCESS DEVICE CARE: Primary | ICD-10-CM

## 2018-10-31 PROCEDURE — 96523 IRRIG DRUG DELIVERY DEVICE: CPT | Performed by: INTERNAL MEDICINE

## 2018-10-31 RX ORDER — SODIUM CHLORIDE 0.9 % (FLUSH) 0.9 %
10 SYRINGE (ML) INJECTION AS NEEDED
Status: DISCONTINUED | OUTPATIENT
Start: 2018-10-31 | End: 2018-10-31 | Stop reason: HOSPADM

## 2018-10-31 RX ORDER — SODIUM CHLORIDE 0.9 % (FLUSH) 0.9 %
10 SYRINGE (ML) INJECTION AS NEEDED
Status: CANCELLED | OUTPATIENT
Start: 2018-10-31

## 2018-10-31 RX ADMIN — Medication 10 ML: at 10:26

## 2018-10-31 RX ADMIN — SODIUM CHLORIDE, PRESERVATIVE FREE 500 UNITS: 5 INJECTION INTRAVENOUS at 10:26

## 2018-11-01 ENCOUNTER — OFFICE VISIT (OUTPATIENT)
Dept: SURGERY | Facility: CLINIC | Age: 58
End: 2018-11-01

## 2018-11-01 VITALS
WEIGHT: 205 LBS | TEMPERATURE: 98.5 F | DIASTOLIC BLOOD PRESSURE: 82 MMHG | SYSTOLIC BLOOD PRESSURE: 164 MMHG | HEIGHT: 72 IN | BODY MASS INDEX: 27.77 KG/M2 | HEART RATE: 78 BPM

## 2018-11-01 DIAGNOSIS — Z09 FOLLOW UP: Primary | ICD-10-CM

## 2018-11-01 PROCEDURE — 99024 POSTOP FOLLOW-UP VISIT: CPT | Performed by: SURGERY

## 2018-11-04 NOTE — PROGRESS NOTES
CHIEF COMPLAINT:    Chief Complaint   Patient presents with   • Post-op     PEG tube placement on 10/26/18.       HISTORY OF PRESENT ILLNESS:    Truman Esquivel is a 58 y.o. male who underwent PEG tube placement on 10/26/2018 after inadvertent removal of his gastrostomy tube at home.  He returns today for follow-up.  He has no complaints.  He states that his PEG tube is functioning without difficulty.    EXAM:  Vitals:    11/01/18 1003   BP: 164/82   Pulse: 78   Temp: 98.5 °F (36.9 °C)         Abdomen soft, incisions well-healed, PEG tube in place    ASSESSMENT:    Status post PEG placement    PLAN:    Overall he appears to be doing well.  He can see me as needed.          This document has been electronically signed by Kris Solano MD on November 4, 2018 3:24 PM

## 2018-11-19 ENCOUNTER — DOCUMENTATION (OUTPATIENT)
Dept: OTOLARYNGOLOGY | Facility: CLINIC | Age: 58
End: 2018-11-19

## 2018-11-19 DIAGNOSIS — R13.12 OROPHARYNGEAL DYSPHAGIA: Primary | ICD-10-CM

## 2018-11-19 NOTE — PROGRESS NOTES
Patient is working with speech therapy and needs another modified barium swallow to evaluate dysphagia.  This will be ordered.

## 2018-12-05 ENCOUNTER — HOSPITAL ENCOUNTER (OUTPATIENT)
Dept: GENERAL RADIOLOGY | Facility: HOSPITAL | Age: 58
Discharge: HOME | End: 2018-12-05
Admitting: OTOLARYNGOLOGY

## 2018-12-05 DIAGNOSIS — R13.12 OROPHARYNGEAL DYSPHAGIA: ICD-10-CM

## 2018-12-05 PROCEDURE — 74230 X-RAY XM SWLNG FUNCJ C+: CPT

## 2018-12-05 PROCEDURE — G8997 SWALLOW GOAL STATUS: HCPCS | Performed by: SPEECH-LANGUAGE PATHOLOGIST

## 2018-12-05 PROCEDURE — G8996 SWALLOW CURRENT STATUS: HCPCS | Performed by: SPEECH-LANGUAGE PATHOLOGIST

## 2018-12-05 PROCEDURE — G8998 SWALLOW D/C STATUS: HCPCS | Performed by: SPEECH-LANGUAGE PATHOLOGIST

## 2018-12-05 PROCEDURE — 92611 MOTION FLUOROSCOPY/SWALLOW: CPT | Performed by: SPEECH-LANGUAGE PATHOLOGIST

## 2018-12-05 RX ADMIN — BARIUM SULFATE 183 ML: 960 POWDER, FOR SUSPENSION ORAL at 09:23

## 2018-12-05 NOTE — THERAPY EVALUATION
Outpatient Speech Language Pathology   Adult Swallow Initial Eval/Discharge  [facility]     Patient Name: Truman Esquivel  : 1960  MRN: [id number]  Today's Date: 2018         Visit Date: 2018   Patient Active Problem List   Diagnosis   • Ascending aortic dissection (CMS/HCC)   • Essential hypertension   • Deep vein thrombosis (DVT) of femoral vein of both lower extremities (CMS/HCC)   • Hypothyroidism   • Snoring   • Acute pain of right knee   • Knee effusion, right   • Knee pain   • Obstructive sleep apnea of adult   • TIA (transient ischemic attack)   • Dysphagia   • Squamous cell carcinoma of larynx (CMS/HCC)   • Pharyngeal dysphagia   • Anemia   • Abnormal positron emission tomography (PET) of colon   • Hypokalemia   • Encounter for venous access device care   • Dehydration   • Weight loss, abnormal   • Odynophagia   • Thrush, oral   • Thrombocytopenia (CMS/HCC)   • Pancytopenia due to antineoplastic chemotherapy (CMS/HCC)   • Unable to eat   • Leukocytosis   • Surgical aftercare, respiratory system   • Hemothorax on left        Past Medical History:   Diagnosis Date   • Aortic dissection (CMS/HCC)    • Chest pain    • Disease of thyroid gland    • HTN (hypertension)    • Knee pain    • Sleep apnea    • Smoker    • Squamous cell carcinoma of larynx (CMS/HCC) 2018   • Stroke (CMS/HCC)    • Swallowing difficulty         Past Surgical History:   Procedure Laterality Date   • AORTA SURGERY      ruptured aorta repair   • TRACHEOSTOMY  2018         Visit Dx:     ICD-10-CM ICD-9-CM   1. Oropharyngeal dysphagia R13.12 787.22           SLP Adult Swallow Evaluation - 18 0900        Rehab Evaluation    Document Type  evaluation   -EK    Subjective Information  no complaints   -EK    Patient Observations  alert;cooperative   -EK    Patient Effort  good   -EK       General Information    Patient Profile Reviewed  yes   -EK    Pertinent History Of Current Problem  dysphagia due to  laryngeal cancer; pt has trach and PEG tube at this time. Last radiation tx was May 2018.   -EK    Current Method of Nutrition  soft textures;thin liquids   -EK    Precautions/Limitations, Vision  WFL   -EK    Precautions/Limitations, Hearing  WFL   -EK    Prior Level of Function-Communication  WFL   -EK    Prior Level of Function-Swallowing  no diet consistency restrictions prior to laryngeal cancer   -EK    Plans/Goals Discussed with  patient   -EK       Pain Assessment    Additional Documentation  Pain Scale: Numbers Pre/Post-Treatment (Group)   -EK       Pain Scale: Numbers Pre/Post-Treatment    Pain Scale: Numbers, Pretreatment  0/10 - no pain   -EK    Pain Scale: Numbers, Post-Treatment  0/10 - no pain   -EK       Oral Motor and Function    Dentition Assessment  missing teeth   -EK    Secretion Management  WNL/WFL   -EK    Mucosal Quality  moist, healthy   -EK    Volitional Swallow  WFL   -EK    Volitional Cough  WFL   -EK       MBS/VFSS    Utensils Used  spoon;cup;straw   -EK       MBS/VFSS Interpretation    Oral Prep Phase  WFL   -EK    Oral Transit Phase  WFL   -EK    Oral Residue  WFL   -EK    Oral Phase, Comment  Pt presents with adequate mastication and bolus management. Pt is compliant with SLP recommendations for small bites and adequate masticaiton. Oral phase swallow function WFL. Pt reports consuming mostly soft texutres however has been trying other things such as chips.   -EK       Initiation of Pharyngeal Swallow    Initiation of Pharyngeal Swallow  --   -EK    Pharyngeal Phase  impaired pharyngeal phase of swallowing   -EK    Penetration During the Swallow  thin liquids one trial via straw resulted in flash penetration.    -EK    Response to Penetration  transient   -EK    Rosenbek's Scale  2--->level 2   -EK    Pharyngeal Residue  valleculae;pyriform sinuses;secondary to reduced laryngeal elevation;secondary to reduced hyolaryngeal excursion pt with anatomical changes to epiglottis since  radiation   -EK    Response to Residue  cleared residue with liquid wash   -EK    Pharyngeal Phase, Comment  Pt with anatomical changes to epiglottis since radiation tx. Pt displayed throat clearing initially however no evidence of aspiraiton or penetration. No penetration noted with thin liquids via cup however one instance of thin liqiud via straw with flash penetration. Pt displays need for double swallow and pt requested liquid wash during twyla cracker trials.   -EK       SLP Communication to Radiology    Severity Level of Dysphagia  mild dysphagia   -EK    Consistencies Aspirated/Penetrated  penetrated;thin liquids x 1 with straw; flash penetration.   -EK    Summary Statement  MBS completed; anatomical changes noted from radiation tx, minimal residue in valleculae and pyriform sinus. Recommend soft diet and regular liquids; no straw. SLPs edcuated on continued use of no straw, small sips, alternate solids and liquids, chew adequately, choose soft foods, and be cautious with mixed consistency and dry, crumbly textures.    -EK    Summary Statement Continued  SLPs educated pt and pt was able to recall and demonstrate use of swallow strategies. SLP discussed trying meds in pudding or applesauce versus attempting medications with water.    -EK       Recommendations    Therapy Frequency (Swallow)  evaluation only   -EK    SLP Diet Recommendation  soft textures;thin liquids   -EK    Recommended Precautions and Strategies  upright posture during/after eating;small bites of food and sips of liquid;alternate between small bites of food and sips of liquid;no straw   -EK    SLP Rec. for Method of Medication Administration  meds whole;with pudding or applesauce   -EK    Monitor for Signs of Aspiration  yes   -EK      User Key  (r) = Recorded By, (t) = Taken By, (c) = Cosigned By    Initials Name Provider Type    Agnes San CCC-SLP Speech and Language Pathologist                        OP SLP  Education     Row Name 12/05/18 1309       Education    Barriers to Learning  No barriers identified  -EK    Education Provided  Described results of evaluation  -EK    Assessed  Learning motivation  -EK    Learning Motivation  Strong  -EK    Learning Method  Explanation;Demonstration;Teach back  -EK    Teaching Response  Verbalized understanding;Demonstrated understanding  -EK    Education Comments  SLP and primary outpatient SLP provided results of MBS and recommendation of the following: soft diet and regular liquids; no straw, double swallow, effortful swallow, small bites and sips and eat slowly. Discussed attempted medications in pudding or applesauce as a carrier versus  meds whole with liquids.   -EK      User Key  (r) = Recorded By, (t) = Taken By, (c) = Cosigned By    Initials Name Effective Dates    EK Agnes Yañez CCC-SLP 06/08/18 -                          Time Calculation:   SLP Start Time: 0900  SLP Stop Time: 0925  SLP Time Calculation (min): 25 min    Therapy Charges for Today     Code Description Service Date Service Provider Modifiers Qty    42373698378 HC ST SWALLOWING CURRENT STATUS 12/5/2018 Agnes Yañez CCC-SLP GN, CI 1    82803366299 HC ST SWALLOWING PROJECTED 12/5/2018 Agnes Yañez CCC-SLP GN, CI 1    45982944197 HC ST SWALLOWING DISCHARGE 12/5/2018 Agnes Yañez CCC-SLP GN, CI 1    91554233967 HC ST MOTION FLUORO EVAL SWALLOW 2 12/5/2018 Agnes Yañez CCC-SLP GN 1          SLP G-Codes  Functional Limitations: Swallowing  Swallow Current Status (): At least 1 percent but less than 20 percent impaired, limited or restricted  Swallow Goal Status (): At least 1 percent but less than 20 percent impaired, limited or restricted  Swallow Discharge Status (): At least 1 percent but less than 20 percent impaired, limited or restricted           NABIL Prasad  12/5/2018

## 2018-12-12 ENCOUNTER — OFFICE VISIT (OUTPATIENT)
Dept: ONCOLOGY | Facility: CLINIC | Age: 58
End: 2018-12-12

## 2018-12-12 ENCOUNTER — LAB (OUTPATIENT)
Dept: ONCOLOGY | Facility: HOSPITAL | Age: 58
End: 2018-12-12

## 2018-12-12 VITALS
SYSTOLIC BLOOD PRESSURE: 164 MMHG | HEART RATE: 52 BPM | HEIGHT: 72 IN | TEMPERATURE: 98.2 F | BODY MASS INDEX: 28.79 KG/M2 | WEIGHT: 212.6 LBS | DIASTOLIC BLOOD PRESSURE: 83 MMHG | OXYGEN SATURATION: 98 % | RESPIRATION RATE: 18 BRPM

## 2018-12-12 DIAGNOSIS — D64.9 ANEMIA, UNSPECIFIED TYPE: Primary | ICD-10-CM

## 2018-12-12 DIAGNOSIS — D64.9 ANEMIA, UNSPECIFIED TYPE: ICD-10-CM

## 2018-12-12 DIAGNOSIS — Z45.2 ENCOUNTER FOR VENOUS ACCESS DEVICE CARE: ICD-10-CM

## 2018-12-12 DIAGNOSIS — C32.9 SQUAMOUS CELL CARCINOMA OF LARYNX (HCC): ICD-10-CM

## 2018-12-12 DIAGNOSIS — D72.828 OTHER ELEVATED WHITE BLOOD CELL (WBC) COUNT: ICD-10-CM

## 2018-12-12 DIAGNOSIS — C32.9 SQUAMOUS CELL CARCINOMA OF LARYNX (HCC): Primary | ICD-10-CM

## 2018-12-12 LAB
ALBUMIN SERPL-MCNC: 4.5 G/DL (ref 3.4–4.8)
ALBUMIN/GLOB SERPL: 1.6 G/DL (ref 1.1–1.8)
ALP SERPL-CCNC: 85 U/L (ref 38–126)
ALT SERPL W P-5'-P-CCNC: 23 U/L (ref 21–72)
ANION GAP SERPL CALCULATED.3IONS-SCNC: 11 MMOL/L (ref 5–15)
AST SERPL-CCNC: 21 U/L (ref 17–59)
BASOPHILS # BLD AUTO: 0.09 10*3/MM3 (ref 0–0.2)
BASOPHILS NFR BLD AUTO: 1 % (ref 0–2)
BILIRUB SERPL-MCNC: 0.6 MG/DL (ref 0.2–1.3)
BUN BLD-MCNC: 21 MG/DL (ref 7–21)
BUN/CREAT SERPL: 17.9 (ref 7–25)
CALCIUM SPEC-SCNC: 9.2 MG/DL (ref 8.4–10.2)
CHLORIDE SERPL-SCNC: 98 MMOL/L (ref 95–110)
CO2 SERPL-SCNC: 26 MMOL/L (ref 22–31)
CREAT BLD-MCNC: 1.17 MG/DL (ref 0.7–1.3)
DEPRECATED RDW RBC AUTO: 54 FL (ref 35.1–43.9)
EOSINOPHIL # BLD AUTO: 0.31 10*3/MM3 (ref 0–0.7)
EOSINOPHIL NFR BLD AUTO: 3.5 % (ref 0–7)
ERYTHROCYTE [DISTWIDTH] IN BLOOD BY AUTOMATED COUNT: 18.7 % (ref 11.5–14.5)
FERRITIN SERPL-MCNC: 28.1 NG/ML (ref 17.9–464)
FOLATE SERPL-MCNC: >20 NG/ML (ref 2.76–21)
GFR SERPL CREATININE-BSD FRML MDRD: 64 ML/MIN/1.73 (ref 56–130)
GLOBULIN UR ELPH-MCNC: 2.9 GM/DL (ref 2.3–3.5)
GLUCOSE BLD-MCNC: 95 MG/DL (ref 60–100)
HCT VFR BLD AUTO: 37.3 % (ref 39–49)
HGB BLD-MCNC: 13 G/DL (ref 13.7–17.3)
IMM GRANULOCYTES # BLD: 0.02 10*3/MM3 (ref 0–0.02)
IMM GRANULOCYTES NFR BLD: 0.2 % (ref 0–0.5)
IRON 24H UR-MRATE: 76 MCG/DL (ref 49–181)
IRON SATN MFR SERPL: 21 % (ref 20–55)
LYMPHOCYTES # BLD AUTO: 1.06 10*3/MM3 (ref 0.6–4.2)
LYMPHOCYTES NFR BLD AUTO: 12.1 % (ref 10–50)
MCH RBC QN AUTO: 27.4 PG (ref 26.5–34)
MCHC RBC AUTO-ENTMCNC: 34.9 G/DL (ref 31.5–36.3)
MCV RBC AUTO: 78.5 FL (ref 80–98)
MONOCYTES # BLD AUTO: 0.81 10*3/MM3 (ref 0–0.9)
MONOCYTES NFR BLD AUTO: 9.2 % (ref 0–12)
NEUTROPHILS # BLD AUTO: 6.47 10*3/MM3 (ref 2–8.6)
NEUTROPHILS NFR BLD AUTO: 74 % (ref 37–80)
PLATELET # BLD AUTO: 309 10*3/MM3 (ref 150–450)
PMV BLD AUTO: 9.4 FL (ref 8–12)
POTASSIUM BLD-SCNC: 3.9 MMOL/L (ref 3.5–5.1)
PROT SERPL-MCNC: 7.4 G/DL (ref 6.3–8.6)
RBC # BLD AUTO: 4.75 10*6/MM3 (ref 4.37–5.74)
SODIUM BLD-SCNC: 135 MMOL/L (ref 137–145)
TIBC SERPL-MCNC: 358 MCG/DL (ref 261–462)
VIT B12 BLD-MCNC: 962 PG/ML (ref 239–931)
WBC NRBC COR # BLD: 8.76 10*3/MM3 (ref 3.2–9.8)

## 2018-12-12 PROCEDURE — 85025 COMPLETE CBC W/AUTO DIFF WBC: CPT

## 2018-12-12 PROCEDURE — 99214 OFFICE O/P EST MOD 30 MIN: CPT | Performed by: INTERNAL MEDICINE

## 2018-12-12 PROCEDURE — 36591 DRAW BLOOD OFF VENOUS DEVICE: CPT | Performed by: INTERNAL MEDICINE

## 2018-12-12 PROCEDURE — G8417 CALC BMI ABV UP PARAM F/U: HCPCS | Performed by: INTERNAL MEDICINE

## 2018-12-12 PROCEDURE — 83540 ASSAY OF IRON: CPT

## 2018-12-12 PROCEDURE — 82746 ASSAY OF FOLIC ACID SERUM: CPT

## 2018-12-12 PROCEDURE — G0463 HOSPITAL OUTPT CLINIC VISIT: HCPCS | Performed by: INTERNAL MEDICINE

## 2018-12-12 PROCEDURE — 83550 IRON BINDING TEST: CPT

## 2018-12-12 PROCEDURE — 82728 ASSAY OF FERRITIN: CPT

## 2018-12-12 PROCEDURE — 1123F ACP DISCUSS/DSCN MKR DOCD: CPT | Performed by: INTERNAL MEDICINE

## 2018-12-12 PROCEDURE — 80053 COMPREHEN METABOLIC PANEL: CPT

## 2018-12-12 PROCEDURE — 82607 VITAMIN B-12: CPT

## 2018-12-12 RX ORDER — SODIUM CHLORIDE 0.9 % (FLUSH) 0.9 %
10 SYRINGE (ML) INJECTION AS NEEDED
Status: DISCONTINUED | OUTPATIENT
Start: 2018-12-12 | End: 2018-12-12 | Stop reason: HOSPADM

## 2018-12-12 RX ORDER — SODIUM CHLORIDE 0.9 % (FLUSH) 0.9 %
10 SYRINGE (ML) INJECTION AS NEEDED
Status: CANCELLED | OUTPATIENT
Start: 2018-12-12

## 2018-12-12 RX ADMIN — SODIUM CHLORIDE, PRESERVATIVE FREE 10 ML: 5 INJECTION INTRAVENOUS at 10:49

## 2018-12-12 RX ADMIN — SODIUM CHLORIDE, PRESERVATIVE FREE 500 UNITS: 5 INJECTION INTRAVENOUS at 10:49

## 2018-12-12 RX ADMIN — SODIUM CHLORIDE, PRESERVATIVE FREE 10 ML: 5 INJECTION INTRAVENOUS at 09:47

## 2018-12-12 NOTE — PROGRESS NOTES
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  Upon staging workup patient was found to have stage III squamous cell cancer of larynx for which she has been started on concurrent chemoradiation with weekly carboplatin and Taxol on 2018.  Last dose of chemotherapy and radiation was on May 15, 2018.  Patient is here for follow-up appointment today.  States he feels better.  Denies any active bleeding.  Denies any new lymph node enlargement.    PAST MEDICAL HISTORY:    Past Medical History:   Diagnosis Date   • Aortic dissection (CMS/HCC)    • Chest pain    • Disease of thyroid gland    • HTN (hypertension)    • Knee pain    • Sleep apnea    • Smoker    • Squamous cell carcinoma of larynx (CMS/HCC) 2018   • Stroke (CMS/HCC)    • Swallowing difficulty        SOCIAL HISTORY:    Social History     Tobacco Use   • Smoking status: Former Smoker     Packs/day: 1.25     Years: 38.00     Pack years: 47.50     Last attempt to quit: 2017     Years since quittin.8   • Smokeless tobacco: Never Used   • Tobacco comment: 2018 - Patient confirmed he ceased utilization of tobacco products 2017.   Substance Use Topics   • Alcohol use: No   • Drug use: No       Surgical History :  Past Surgical History:   Procedure Laterality Date   • AORTA SURGERY      ruptured aorta repair   • TRACHEOSTOMY  2018       ALLERGIES:    Allergies   Allergen Reactions   • Chlorhexidine Itching     Topical cleaning wipes causes severe skin irritation   • Eggs Or Egg-Derived Products Swelling   •  "Erythromycin Other (See Comments)     Joint pains and cold sweats   • Gabapentin Itching   • Other GI Intolerance     Mycins  farzad lotion causes rash   • Acth [Corticotropin] Rash   • Cephalosporins Itching and Rash     Pt developed rash, itching after cefepime administration (2-3 doses) during 2/2017 admission. Itching was relieved with benadryl. Cefepime was continued because his infection was improving and no symptoms of anaphylaxis present   • Co Q 10 [Coenzyme Q10] Rash   • Keflex [Cephalexin] Rash   • Neomycin Rash   • Penicillins Rash     Tolerated cefepime during 2/2017 admission. Had rash and itching (relieved by benadryl) after few doses of cefepime and cefepime was continued.   • Tetracyclines & Related GI Intolerance         FAMILY HISTORY:  Family History   Problem Relation Age of Onset   • Thyroid disease Mother    • Hypertension Mother    • Hypertension Father    • Hypertension Other            REVIEW OF SYSTEMS:      CONSTITUTIONAL:  States his fatigue is improved.  Denies any fever, chills .      HEENT:  Some visual disturbance affecting left eye since stroke in 2017.   States dysphagia is improved, still using feeding tube for nutritional purposes.     RESPIRATORY:  Complains of chronic shortness of breath, denies any recent worsening.  No new cough or hemoptysis.     CARDIOVASCULAR:  No chest pain or palpitations.     GASTROINTESTINAL:  No abdominal pain nausea, vomiting or blood in the stool.     GENITOURINARY: No Dysuria or Hematuria.     MUSCULOSKELETAL:  No new back pain or arthralgia..     LYMPHATICS:  Denies any abnormal swollen glands anywhere in the body.     NEUROLOGICAL : No tingling or numbness. No headache or dizziness. No seizures or balance problems.     SKIN: no new skin lesions.          PHYSICAL EXAMINATION:      VITAL SIGNS:  /83   Pulse 52   Temp 98.2 °F (36.8 °C) (Temporal)   Resp 18   Ht 182.9 cm (72.01\")   Wt 96.4 kg (212 lb 9.6 oz)   SpO2 98%   BMI 28.83 kg/m² "       12/12/18  1008   Weight: 96.4 kg (212 lb 9.6 oz)       ECOG  performance status: 1      GENERAL:  Not in any distress.    HEENT:  Normocephalic, Atraumatic.Mild Conjunctival pallor. No icterus. Extraocular Movements Intact. No Facial Asymmetry noted.     NECK:  No adenopathy. No JVD.Tracheostomy present.    RESPIRATORY:  Fair air entry bilateral. No rhonchi or wheezing.    CARDIOVASCULAR:  S1, S2. Regular rate and rhythm. No murmur or gallop appreciated.    ABDOMEN:  Soft, obese, nontender. Bowel sounds present in all four quadrants.  No hepatosplenomegaly appreciated.Well -healed surgical scar present in abdomen.Feeding tube present.    MUSCULOSKELETAL:  No edema.No Calf Tenderness.Decreased range of motion.    NEUROLOGIC:  Alert, awake and oriented ×3.  No  Motor  deficit appreciated. Cranial Nerves 2-12 grossly intact.    SKIN : Erythema present around tracheostomy tube.    LYMPHATICS: No new enlarged lymph node in neck or supraclavicular area.            DIAGNOSTIC DATA:    Glucose   Date Value Ref Range Status   12/12/2018 95 60 - 100 mg/dL Final     Glucose, Arterial   Date Value Ref Range Status   07/31/2018 113 (H) 65 - 95 mmol/L Final     Sodium   Date Value Ref Range Status   12/12/2018 135 (L) 137 - 145 mmol/L Final     Potassium   Date Value Ref Range Status   12/12/2018 3.9 3.5 - 5.1 mmol/L Final     CO2   Date Value Ref Range Status   12/12/2018 26.0 22.0 - 31.0 mmol/L Final     Chloride   Date Value Ref Range Status   12/12/2018 98 95 - 110 mmol/L Final     Anion Gap   Date Value Ref Range Status   12/12/2018 11.0 5.0 - 15.0 mmol/L Final     Creatinine   Date Value Ref Range Status   12/12/2018 1.17 0.70 - 1.30 mg/dL Final     BUN   Date Value Ref Range Status   12/12/2018 21 7 - 21 mg/dL Final     BUN/Creatinine Ratio   Date Value Ref Range Status   12/12/2018 17.9 7.0 - 25.0 Final     Calcium   Date Value Ref Range Status   12/12/2018 9.2 8.4 - 10.2 mg/dL Final     eGFR Non  Amer   Date  Value Ref Range Status   12/12/2018 64 56 - 130 mL/min/1.73 Final     Alkaline Phosphatase   Date Value Ref Range Status   12/12/2018 85 38 - 126 U/L Final     Total Protein   Date Value Ref Range Status   12/12/2018 7.4 6.3 - 8.6 g/dL Final     ALT (SGPT)   Date Value Ref Range Status   12/12/2018 23 21 - 72 U/L Final     AST (SGOT)   Date Value Ref Range Status   12/12/2018 21 17 - 59 U/L Final     Total Bilirubin   Date Value Ref Range Status   12/12/2018 0.6 0.2 - 1.3 mg/dL Final     Albumin   Date Value Ref Range Status   12/12/2018 4.50 3.40 - 4.80 g/dL Final     Globulin   Date Value Ref Range Status   12/12/2018 2.9 2.3 - 3.5 gm/dL Final     Lab Results   Component Value Date    WBC 8.76 12/12/2018    HGB 13.0 (L) 12/12/2018    HCT 37.3 (L) 12/12/2018    MCV 78.5 (L) 12/12/2018     12/12/2018     Lab Results   Component Value Date    NEUTROABS 6.47 12/12/2018    IRON 76 12/12/2018    TIBC 358 12/12/2018    LABIRON 21 12/12/2018    FERRITIN 28.10 12/12/2018    [id number] 868 10/23/2018    FOLATE >20.00 09/19/2018     No results found for: , LABCA2, AFPTM, HCGQUANT, , CHROMGRNA, [id number], CEA, REFLABREPO        Radiology Data :  PET CT done on February 16, 2018 was reviewed, discussed with patient, it showed:  IMPRESSION:  CONCLUSION:   1.  Soft tissue mass in the region of the epiglottis obstructing  the airway, associated with hypermetabolic activity, compatible  with known neoplasm.  2.  Activity around the tracheostomy site is likely inflammatory.  3.  Activity in the rectum (SUV max 7.1) is probably either  physiological or inflammatory. Recommend direct visualization.  4.  There is a focus of activity in the region of the right  common iliac artery without a definite CT abnormality (SUV max  5.4) possibly representing a lymph node, nonspecific but cannot  rule out neoplasm.  5.  Foci of activity posterior to the ischial tuberosities are  probably inflammatory.  6.  There are diffuse  punctate low-level foci of activity  throughout the osseous structures, liver, kidneys, mediastinum  which are more likely artifactual than due to metastatic disease.    ADDENDUM   ADDENDUM #1         Upon review of the patient's imaging with Dr. Tapia, there  appears to be extension of tumor into the left side of the  preepiglottic space.        Ct of Neck with contrast done on January 29, 2018 showed:  IMPRESSION:  3 x 3.5 x 3.5 cm soft tissue mass with lobular margin centered at  the level the epiglottis however flush 4 cm intimate with the  posterior margin of the tongue base and the posterior margin of  the oral pharynx. This does contribute to moderate compromise of  the airway. Finding is suspicious for malignancy.      Mostly subcentimeter cervical chain lymph nodes present  bilaterally. The largest node right level II measuring 1.3 cm. No  necrotic or conglomerate adenopathy.      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 The  dissection was described on a CTA coronary artery exam from  2/14/2017.        Pathology :  Pathology result from February 5, 2018 showed:  Final Diagnosis   1.  TUMOR, EPIGLOTTIS:   INVASIVE SQUAMOUS CELL CARCINOMA, NON KERATINIZING, POORLY DIFFERENTIATED.       2.  TUMOR, EPIGLOTTIS:   INVASIVE SQUAMOUS CELL CARCINOMA, NON KERATINIZING, POORLY DIFFERENTIATED.               ASSESSMENT AND PLAN:      1.  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  Based on PET/CT there is a tumor extension into preepiglottic space making a T3 lesion.  Result of PET CT were discussed with patient.  It was discussed with patient with T3 N0 lesion after his treatment option includes either surgery or concurrent chemoradiation.    -He was started on concurrent chemoradiation on March 19, 2018.  Patient received 6 weekly carboplatin and Taxol from March 19, 2018 until May 15, 2018.  Radiation was also  completed on May 15, 2018.  -ENT exam by Dr. Venegas in September 12, 2018 showed good response to chemoradiation without any evidence of any active tumor.  -We will ask patient to return to clinic in 4 months with a repeat CBC and anemia workup to be done on that day.    2.   anemia:   -Patient was started on liquid iron in October 2018.  -Hemoglobin earlier today is improved to 13.  Recommend continuing with liquid iron for now until next clinic visit in 4 months.  We'll repeat anemia workup upon next clinic visit in 4 months.    3.  Odynophagia and dysphagia: Improved.    4.  Leukocytosis: Most likely secondary to splenectomy.Resolved.  We will monitored with CBC for now    5.  Hypokalemia: Patient's potassium is 3.8 today.  Patient continues to take potassium supplement at home.     5.  History of aortic dissection status post repair.     6.  History of CVA with residual visual disturbance in left eye     7.  Health maintenance: Patient quit smoking in February 2017.  Never had a screening colonoscopy done.      8. Advance care planning: Advance Care Planning: For now patient remains full code and is able to make his decisions.  Patient has health care surrogate mentioned on chart.    9. BMI: Patient's Body mass index is 28.83 kg/m². BMI is higher than reference range.  Patient was notified about her elevated BMI.        Reza Patel MD  12/12/2018  5:51 PM        EMR Dragon/Transcription disclaimer:   Much of this encounter note is an electronic transcription/translation of spoken language to printed text. The electronic translation of spoken language may permit erroneous, or at times, nonsensical words or phrases to be inadvertently transcribed; Although I have reviewed the note for such errors, some may still exist.

## 2018-12-13 ENCOUNTER — TELEPHONE (OUTPATIENT)
Dept: ONCOLOGY | Facility: HOSPITAL | Age: 58
End: 2018-12-13

## 2018-12-13 ENCOUNTER — OFFICE VISIT (OUTPATIENT)
Dept: OTOLARYNGOLOGY | Facility: CLINIC | Age: 58
End: 2018-12-13

## 2018-12-13 VITALS — HEIGHT: 72 IN | OXYGEN SATURATION: 94 % | BODY MASS INDEX: 29.04 KG/M2 | WEIGHT: 214.4 LBS

## 2018-12-13 DIAGNOSIS — Z85.21 ENCOUNTER FOR FOLLOW-UP SURVEILLANCE OF LARYNGEAL CANCER: Primary | ICD-10-CM

## 2018-12-13 DIAGNOSIS — Z93.0 TRACHEOSTOMY STATUS (HCC): ICD-10-CM

## 2018-12-13 DIAGNOSIS — Z08 ENCOUNTER FOR FOLLOW-UP SURVEILLANCE OF LARYNGEAL CANCER: Primary | ICD-10-CM

## 2018-12-13 DIAGNOSIS — J37.0 CHRONIC LARYNGITIS: ICD-10-CM

## 2018-12-13 PROCEDURE — 99213 OFFICE O/P EST LOW 20 MIN: CPT | Performed by: OTOLARYNGOLOGY

## 2018-12-13 PROCEDURE — 31575 DIAGNOSTIC LARYNGOSCOPY: CPT | Performed by: OTOLARYNGOLOGY

## 2018-12-13 RX ORDER — FERROUS SULFATE 7.5 MG/0.5
375 SYRINGE (EA) ORAL DAILY
Qty: 150 ML | Refills: 2 | Status: SHIPPED | OUTPATIENT
Start: 2018-12-13 | End: 2020-06-30

## 2018-12-13 NOTE — TELEPHONE ENCOUNTER
Informed patient. He states understanding. He needs a refill on liquid iron sent to [facility] pharmacy. Thanks

## 2018-12-13 NOTE — TELEPHONE ENCOUNTER
----- Message from Reza Patel MD sent at 12/12/2018  6:18 PM CST -----  Please let patient know, he needs to continue taking iron supplement for now.  Thank you

## 2019-01-10 ENCOUNTER — OFFICE VISIT (OUTPATIENT)
Dept: RADIATION ONCOLOGY | Facility: HOSPITAL | Age: 59
End: 2019-01-10

## 2019-01-10 ENCOUNTER — HOSPITAL ENCOUNTER (OUTPATIENT)
Dept: RADIATION ONCOLOGY | Facility: HOSPITAL | Age: 59
Setting detail: RADIATION/ONCOLOGY SERIES
End: 2019-01-10

## 2019-01-10 VITALS
RESPIRATION RATE: 18 BRPM | DIASTOLIC BLOOD PRESSURE: 83 MMHG | HEART RATE: 62 BPM | HEIGHT: 72 IN | SYSTOLIC BLOOD PRESSURE: 177 MMHG | WEIGHT: 221.6 LBS | BODY MASS INDEX: 30.02 KG/M2 | TEMPERATURE: 98.2 F

## 2019-01-10 DIAGNOSIS — C32.9 SQUAMOUS CELL CARCINOMA OF LARYNX (HCC): Primary | ICD-10-CM

## 2019-01-10 PROCEDURE — G0463 HOSPITAL OUTPT CLINIC VISIT: HCPCS | Performed by: NURSE PRACTITIONER

## 2019-01-10 PROCEDURE — 99213 OFFICE O/P EST LOW 20 MIN: CPT | Performed by: NURSE PRACTITIONER

## 2019-01-10 NOTE — PROGRESS NOTES
DATE OF VISIT: 1/10/2019    REASON FOR VISIT:  6 month radiation oncology follow-up    HISTORY OF PRESENT ILLNESS:  Mr. Truman Esquivel returns to our clinic today for a routine follow-up exam. 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 He received 7000 cGy in 35 fractions and completed his radiation therapy on 5/15/18.     Since he was seen here last he has been seen by Dr. Venegas with flexible laryngoscope that was unremarkable for any disease recurrence. He is also being followed by medical oncology, Dr. Patel.     Patient states today that his swallowing is much better. He states he can pretty much eat whatever he wants at this time. He is hoping when he sees Dr. Venegas next they will discuss about having his tracheostomy removed.    PAST MEDICAL HISTORY:    Past Medical History:   Diagnosis Date   • Aortic dissection (CMS/HCC)    • Chest pain    • Disease of thyroid gland    • HTN (hypertension)    • Knee pain    • Sleep apnea    • Smoker    • Squamous cell carcinoma of larynx (CMS/HCC) 2018   • Stroke (CMS/HCC)    • Swallowing difficulty        SOCIAL HISTORY:    Social History     Tobacco Use   • Smoking status: Former Smoker     Packs/day: 1.25     Years: 38.00     Pack years: 47.50     Last attempt to quit: 2017     Years since quittin.9   • Smokeless tobacco: Never Used   • Tobacco comment: 2018 - Patient confirmed he ceased utilization of tobacco products 2017.   Substance Use Topics   • Alcohol use: No   • Drug use: No       Surgical History :  Past Surgical History:   Procedure Laterality Date   • AORTA SURGERY      ruptured aorta repair   • ASCENDING ARCH/HEMIARCH REPLACEMENT N/A 2017    Procedure: INTRAOPERATIVE TRANSESOPHAGEAL ECHOCARDIOGRAM, MIDLINE STERNOTOMY, ASCENDING AORTIC  AND PROXIMAL AORTIC ARCH  REPAIR WITH 26MM GRAFT, AORTIC VALVE RESUSPENSION, AORTIC ROOT REPAIR, OPEN VEIN HARVEST OF RIGHT LEG;  Surgeon: German Arreguin MD;  Location: [facility] MAIN OR;  Service:    • BRONCHOSCOPY N/A 2/17/2017    Procedure: BRONCHOSCOPY BIOPSY AT BEDSIDE WITH BAL-LEFT LOWER LOBE;  Surgeon: Trent Chaney MD;  Location: [facility] ENDOSCOPY;  Service:    • COLONOSCOPY N/A 3/7/2018    Procedure: COLONOSCOPY WITH POSSIBLE POLYPECTOMY   ( DONE IN OR WITH ENDO)      COLONOSCOPY FIRST;  Surgeon: Kris Solano MD;  Location: [facility] OR;  Service:    • DIRECT LARYNGOSCOPY, ESOPHAGOSCOPY, BRONCHOSCOPY N/A 2/5/2018    Procedure: DIRECT LARYNGOSCOPY WITH BIOPSY, ESOPHAGOSCOPY     (no bronchoscopy, no laser);  Surgeon: Joseph Venegas MD;  Location: [facility] OR;  Service:    • ENDOSCOPY W/ PEG TUBE PLACEMENT N/A 5/2/2018    Procedure: ESOPHAGOGASTRODUODENOSCOPY WITH PERCUTANEOUS ENDOSCOPIC GASTROSTOMY TUBE INSERTION;  Surgeon: Angel Maciel MD;  Location: [facility] ENDOSCOPY;  Service: Gastroenterology   • ENDOSCOPY W/ PEG TUBE PLACEMENT N/A 10/26/2018    Procedure: ESOPHAGOGASTRODUODENOSCOPY WITH PERCUTANEOUS ENDOSCOPIC GASTROSTOMY TUBE INSERTION WITH ANESTHESIA Possible open gastrostomy;  Surgeon: Kris Solano MD;  Location: [facility] OR;  Service: General   • GASTROSTOMY FEEDING TUBE INSERTION N/A 10/26/2018    Procedure: GASTROSTOMY FEEDING TUBE INSERTION;  Surgeon: Kris Solano MD;  Location: [facility] OR;  Service: General   • SPLENECTOMY N/A 7/26/2018    Procedure: SPLENECTOMY, left chest tube placement, EXPLORATORY LAPAROTOMY, G-TUBE PALCEMENT;  Surgeon: Kris Solano MD;  Location: [facility] OR;  Service: General   • SPLENECTOMY      2018   • THORACOSCOPY Left 7/31/2018    Procedure: LEFT THORACOSCOPY VIDEO ASSISTED POSSIBLE THORACOTOMY possible decortication bronchoscopy;  Surgeon: Joshua Pollock MD;  Location: [facility] OR;  Service: Cardiothoracic   • TRACHEOSTOMY  02/05/2018   •  TRACHEOSTOMY N/A 2/5/2018    Procedure: TRACHEOSTOMY  local injection @ 1106 incision @ 1119;  Surgeon: Joseph Venegas MD;  Location: [facility];  Service:    • VENOUS ACCESS DEVICE (PORT) INSERTION N/A 3/7/2018    Procedure: INSERTION OF MEDIPORT     (C-ARM#1);  Surgeon: Kris Solano MD;  Location: [facility];  Service:        ALLERGIES:    Allergies   Allergen Reactions   • Chlorhexidine Itching     Topical cleaning wipes causes severe skin irritation   • Eggs Or Egg-Derived Products Swelling   • Erythromycin Other (See Comments)     Joint pains and cold sweats   • Gabapentin Itching   • Other GI Intolerance     Mycins  farzad lotion causes rash   • Acth [Corticotropin] Rash   • Cephalosporins Itching and Rash     Pt developed rash, itching after cefepime administration (2-3 doses) during 2/2017 admission. Itching was relieved with benadryl. Cefepime was continued because his infection was improving and no symptoms of anaphylaxis present   • Co Q 10 [Coenzyme Q10] Rash   • Keflex [Cephalexin] Rash   • Neomycin Rash   • Penicillins Rash     Tolerated cefepime during 2/2017 admission. Had rash and itching (relieved by benadryl) after few doses of cefepime and cefepime was continued.   • Tetracyclines & Related GI Intolerance       REVIEW OF SYSTEMS:      CONSTITUTIONAL:  No fever, chills, or night sweats.     HEENT:  No epistaxis, mouth sores, or difficulty swallowing.    RESPIRATORY:  No new shortness of breath or cough at present.    CARDIOVASCULAR:  No chest pain or palpitations.    GASTROINTESTINAL:  No abdominal pain, nausea, vomiting, or blood in the stool.    GENITOURINARY:  No dysuria or hematuria.    MUSCULOSKELETAL:  No any new back pain or arthralgias.     NEUROLOGICAL:  No tingling or numbness. No new headache or dizziness.     LYMPHATICS:  Denies any abnormal swollen and anywhere in the body.    SKIN:  Denies any new skin rash.    PHYSICAL EXAMINATION:      VITAL SIGNS:  /83    "Pulse 62   Temp 98.2 °F (36.8 °C) (Temporal)   Resp 18   Ht 182.9 cm (72\")   Wt 101 kg (221 lb 9.6 oz)   BMI 30.05 kg/m²     GENERAL:  Not in any distress.    HEENT:  Normocephalic, Atraumatic.Mild Conjunctival pallor. No icterus. No Facial Asymmetry noted. Trach in place and functioning properly.    NECK:  No adenopathy. No JVD.    RESPIRATORY:  Fair air entry bilateral. No rhonchi or wheezing.    CARDIOVASCULAR:  S1, S2. Regular rate and rhythm. No murmur or gallop appreciated.    ABDOMEN:  Soft, obese, nontender. Bowel sounds present in all four quadrants.  No organomegaly appreciated.    EXTREMITIES:  No edema.No Calf Tenderness.    NEUROLOGIC:  Alert, awake and oriented ×3.      SKIN : No new skin lesion identified  DIAGNOSTIC DATA:    Glucose   Date Value Ref Range Status   12/12/2018 95 60 - 100 mg/dL Final     Glucose, Arterial   Date Value Ref Range Status   07/31/2018 113 (H) 65 - 95 mmol/L Final     Sodium   Date Value Ref Range Status   12/12/2018 135 (L) 137 - 145 mmol/L Final     Potassium   Date Value Ref Range Status   12/12/2018 3.9 3.5 - 5.1 mmol/L Final     CO2   Date Value Ref Range Status   12/12/2018 26.0 22.0 - 31.0 mmol/L Final     Chloride   Date Value Ref Range Status   12/12/2018 98 95 - 110 mmol/L Final     Anion Gap   Date Value Ref Range Status   12/12/2018 11.0 5.0 - 15.0 mmol/L Final     Creatinine   Date Value Ref Range Status   12/12/2018 1.17 0.70 - 1.30 mg/dL Final     BUN   Date Value Ref Range Status   12/12/2018 21 7 - 21 mg/dL Final     BUN/Creatinine Ratio   Date Value Ref Range Status   12/12/2018 17.9 7.0 - 25.0 Final     Calcium   Date Value Ref Range Status   12/12/2018 9.2 8.4 - 10.2 mg/dL Final     eGFR Non  Amer   Date Value Ref Range Status   12/12/2018 64 56 - 130 mL/min/1.73 Final     Alkaline Phosphatase   Date Value Ref Range Status   12/12/2018 85 38 - 126 U/L Final     Total Protein   Date Value Ref Range Status   12/12/2018 7.4 6.3 - 8.6 g/dL Final "     ALT (SGPT)   Date Value Ref Range Status   12/12/2018 23 21 - 72 U/L Final     AST (SGOT)   Date Value Ref Range Status   12/12/2018 21 17 - 59 U/L Final     Total Bilirubin   Date Value Ref Range Status   12/12/2018 0.6 0.2 - 1.3 mg/dL Final     Albumin   Date Value Ref Range Status   12/12/2018 4.50 3.40 - 4.80 g/dL Final     Globulin   Date Value Ref Range Status   12/12/2018 2.9 2.3 - 3.5 gm/dL Final     Lab Results   Component Value Date    WBC 8.76 12/12/2018    HGB 13.0 (L) 12/12/2018    HCT 37.3 (L) 12/12/2018    MCV 78.5 (L) 12/12/2018     12/12/2018     Lab Results   Component Value Date    NEUTROABS 6.47 12/12/2018    IRON 76 12/12/2018    TIBC 358 12/12/2018    LABIRON 21 12/12/2018    FERRITIN 28.10 12/12/2018    [id number] 962 (H) 12/12/2018    FOLATE >20.00 12/12/2018     No results found for: , LABCA2, AFPTM, HCGQUANT, , CHROMGRNA, [id number], CEA, REFLABREPO]      ASSESSMENT AND PLAN:       Mr. Truman Esquivel is a 58-year-old white male who is now 8 months status post completion of definitive radiation therapy with concurrent carboplatin/Taxol chemotherapy for Stage III  (cT3 N0 M0) squamous cell carcinoma of the supraglottic larynx. He is doing well and has no evidence of persistent, recurrent, or metastatic disease at this time. He continues to follow closely with Dr. Venegas, and has follow-up in his clinic scheduled in March and Dr. Patel in April. Will plan to see him back in our clinic in 6 months.           This document has been signed by ROWAN Watkins on January 10, 2019 1:32 PM

## 2019-01-14 ENCOUNTER — DOCUMENTATION (OUTPATIENT)
Dept: NUTRITION | Facility: HOSPITAL | Age: 59
End: 2019-01-14

## 2019-01-14 NOTE — PROGRESS NOTES
"Adult Outpatient Nutrition  Assessment    Patient Name:  Truman Esquivel  YOB: 1960  MRN: [id number]    Assessment Date:  Entry from 1/10/19 visit    Comments: Wt 221.8 lb. Pt stated po intake better, but majority of nutrition via tube. States MD going to take trache out \"when swelling goes down\". Pt hopes will be able to meet nutrition needs by mouth when swelling goes down too, so feeding tube can be removed.                        Electronically signed by:  Leslee Bonds RD  01/14/19 9:24 AM   "

## 2019-01-23 ENCOUNTER — INFUSION (OUTPATIENT)
Dept: ONCOLOGY | Facility: HOSPITAL | Age: 59
End: 2019-01-23

## 2019-01-23 DIAGNOSIS — Z45.2 ENCOUNTER FOR VENOUS ACCESS DEVICE CARE: Primary | ICD-10-CM

## 2019-01-23 PROCEDURE — 96523 IRRIG DRUG DELIVERY DEVICE: CPT | Performed by: INTERNAL MEDICINE

## 2019-01-23 RX ORDER — SODIUM CHLORIDE 0.9 % (FLUSH) 0.9 %
10 SYRINGE (ML) INJECTION AS NEEDED
Status: CANCELLED | OUTPATIENT
Start: 2019-03-06

## 2019-01-23 RX ORDER — SODIUM CHLORIDE 0.9 % (FLUSH) 0.9 %
10 SYRINGE (ML) INJECTION AS NEEDED
Status: DISCONTINUED | OUTPATIENT
Start: 2019-01-23 | End: 2019-01-23 | Stop reason: HOSPADM

## 2019-01-23 RX ADMIN — SODIUM CHLORIDE, PRESERVATIVE FREE 500 UNITS: 5 INJECTION INTRAVENOUS at 09:51

## 2019-01-23 RX ADMIN — SODIUM CHLORIDE, PRESERVATIVE FREE 10 ML: 5 INJECTION INTRAVENOUS at 09:51

## 2019-01-31 ENCOUNTER — TRANSCRIBE ORDERS (OUTPATIENT)
Dept: ORTHOPEDIC SURGERY | Facility: CLINIC | Age: 59
End: 2019-01-31

## 2019-01-31 DIAGNOSIS — M25.521 RIGHT ELBOW PAIN: Primary | ICD-10-CM

## 2019-02-04 ENCOUNTER — APPOINTMENT (OUTPATIENT)
Dept: LAB | Facility: HOSPITAL | Age: 59
End: 2019-02-04

## 2019-02-04 DIAGNOSIS — M25.521 RIGHT ELBOW PAIN: Primary | ICD-10-CM

## 2019-02-04 LAB
25(OH)D3 SERPL-MCNC: 54.5 NG/ML (ref 30–100)
ALBUMIN SERPL-MCNC: 4.5 G/DL (ref 3.4–4.8)
ALBUMIN/GLOB SERPL: 1.6 G/DL (ref 1.1–1.8)
ALP SERPL-CCNC: 93 U/L (ref 38–126)
ALT SERPL W P-5'-P-CCNC: 26 U/L (ref 21–72)
ANION GAP SERPL CALCULATED.3IONS-SCNC: 7 MMOL/L (ref 5–15)
AST SERPL-CCNC: 92 U/L (ref 17–59)
BASOPHILS # BLD AUTO: 0.11 10*3/MM3 (ref 0–0.2)
BASOPHILS NFR BLD AUTO: 1.1 % (ref 0–2)
BILIRUB SERPL-MCNC: 0.6 MG/DL (ref 0.2–1.3)
BUN BLD-MCNC: 22 MG/DL (ref 7–21)
BUN/CREAT SERPL: 16.7 (ref 7–25)
CALCIUM SPEC-SCNC: 9.7 MG/DL (ref 8.4–10.2)
CHLORIDE SERPL-SCNC: 99 MMOL/L (ref 95–110)
CO2 SERPL-SCNC: 30 MMOL/L (ref 22–31)
CREAT BLD-MCNC: 1.32 MG/DL (ref 0.7–1.3)
DEPRECATED RDW RBC AUTO: 51.7 FL (ref 35.1–43.9)
EOSINOPHIL # BLD AUTO: 0.39 10*3/MM3 (ref 0–0.7)
EOSINOPHIL NFR BLD AUTO: 4.1 % (ref 0–7)
ERYTHROCYTE [DISTWIDTH] IN BLOOD BY AUTOMATED COUNT: 16.8 % (ref 11.5–14.5)
GFR SERPL CREATININE-BSD FRML MDRD: 56 ML/MIN/1.73 (ref 56–130)
GLOBULIN UR ELPH-MCNC: 2.8 GM/DL (ref 2.3–3.5)
GLUCOSE BLD-MCNC: 87 MG/DL (ref 60–100)
HCT VFR BLD AUTO: 41.5 % (ref 39–49)
HGB BLD-MCNC: 14 G/DL (ref 13.7–17.3)
IMM GRANULOCYTES # BLD AUTO: 0.03 10*3/MM3 (ref 0–0.02)
IMM GRANULOCYTES NFR BLD AUTO: 0.3 % (ref 0–0.5)
IRON 24H UR-MRATE: 118 MCG/DL (ref 49–181)
IRON SATN MFR SERPL: 32 % (ref 20–55)
LYMPHOCYTES # BLD AUTO: 1.48 10*3/MM3 (ref 0.6–4.2)
LYMPHOCYTES NFR BLD AUTO: 15.5 % (ref 10–50)
MCH RBC QN AUTO: 28.1 PG (ref 26.5–34)
MCHC RBC AUTO-ENTMCNC: 33.7 G/DL (ref 31.5–36.3)
MCV RBC AUTO: 83.2 FL (ref 80–98)
MONOCYTES # BLD AUTO: 1.02 10*3/MM3 (ref 0–0.9)
MONOCYTES NFR BLD AUTO: 10.7 % (ref 0–12)
NEUTROPHILS # BLD AUTO: 6.54 10*3/MM3 (ref 2–8.6)
NEUTROPHILS NFR BLD AUTO: 68.3 % (ref 37–80)
PLATELET # BLD AUTO: 340 10*3/MM3 (ref 150–450)
PMV BLD AUTO: 9.8 FL (ref 8–12)
POTASSIUM BLD-SCNC: 4 MMOL/L (ref 3.5–5.1)
PROT SERPL-MCNC: 7.3 G/DL (ref 6.3–8.6)
RBC # BLD AUTO: 4.99 10*6/MM3 (ref 4.37–5.74)
SODIUM BLD-SCNC: 136 MMOL/L (ref 137–145)
TIBC SERPL-MCNC: 369 MCG/DL (ref 261–462)
TSH SERPL DL<=0.05 MIU/L-ACNC: 6.59 MIU/ML (ref 0.46–4.68)
WBC NRBC COR # BLD: 9.57 10*3/MM3 (ref 3.2–9.8)

## 2019-02-04 PROCEDURE — 36415 COLL VENOUS BLD VENIPUNCTURE: CPT | Performed by: INTERNAL MEDICINE

## 2019-02-04 PROCEDURE — 85025 COMPLETE CBC W/AUTO DIFF WBC: CPT | Performed by: INTERNAL MEDICINE

## 2019-02-04 PROCEDURE — 83550 IRON BINDING TEST: CPT | Performed by: INTERNAL MEDICINE

## 2019-02-04 PROCEDURE — 82306 VITAMIN D 25 HYDROXY: CPT | Performed by: INTERNAL MEDICINE

## 2019-02-04 PROCEDURE — 80053 COMPREHEN METABOLIC PANEL: CPT | Performed by: INTERNAL MEDICINE

## 2019-02-04 PROCEDURE — 83540 ASSAY OF IRON: CPT | Performed by: INTERNAL MEDICINE

## 2019-02-04 PROCEDURE — 84443 ASSAY THYROID STIM HORMONE: CPT | Performed by: INTERNAL MEDICINE

## 2019-02-06 ENCOUNTER — OFFICE VISIT (OUTPATIENT)
Dept: ORTHOPEDIC SURGERY | Facility: CLINIC | Age: 59
End: 2019-02-06

## 2019-02-06 VITALS — BODY MASS INDEX: 30.2 KG/M2 | HEIGHT: 72 IN | WEIGHT: 223 LBS

## 2019-02-06 DIAGNOSIS — G89.29 CHRONIC ELBOW PAIN, RIGHT: Primary | ICD-10-CM

## 2019-02-06 DIAGNOSIS — M25.521 CHRONIC ELBOW PAIN, RIGHT: Primary | ICD-10-CM

## 2019-02-06 PROCEDURE — 99213 OFFICE O/P EST LOW 20 MIN: CPT | Performed by: ORTHOPAEDIC SURGERY

## 2019-02-06 PROCEDURE — 20605 DRAIN/INJ JOINT/BURSA W/O US: CPT | Performed by: ORTHOPAEDIC SURGERY

## 2019-02-06 NOTE — PROGRESS NOTES
Truman Esquivel  1960  58 y.o. male    02/06/2019  Chief Complaint   Patient presents with   • Right Elbow - Pain           History of Present Illness    Truman Esquivel is a 58 y.o. male            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   • Stroke (CMS/HCC)    • Swallowing difficulty          Past Surgical History:   Procedure Laterality Date   • AORTA SURGERY      ruptured aorta repair   • ASCENDING ARCH/HEMIARCH REPLACEMENT N/A 2/14/2017    Procedure: INTRAOPERATIVE TRANSESOPHAGEAL ECHOCARDIOGRAM, MIDLINE STERNOTOMY, ASCENDING AORTIC  AND PROXIMAL AORTIC ARCH REPAIR WITH 26MM GRAFT, AORTIC VALVE RESUSPENSION, AORTIC ROOT REPAIR, OPEN VEIN HARVEST OF RIGHT LEG;  Surgeon: German Arreguin MD;  Location: [facility] OR;  Service:    • BRONCHOSCOPY N/A 2/17/2017    Procedure: BRONCHOSCOPY BIOPSY AT BEDSIDE WITH BAL-LEFT LOWER LOBE;  Surgeon: Trent Chaney MD;  Location: [facility] ENDOSCOPY;  Service:    • COLONOSCOPY N/A 3/7/2018    Procedure: COLONOSCOPY WITH POSSIBLE POLYPECTOMY   ( DONE IN OR WITH ENDO)      COLONOSCOPY FIRST;  Surgeon: Kris Solano MD;  Location: [facility] OR;  Service:    • DIRECT LARYNGOSCOPY, ESOPHAGOSCOPY, BRONCHOSCOPY N/A 2/5/2018    Procedure: DIRECT LARYNGOSCOPY WITH BIOPSY, ESOPHAGOSCOPY     (no bronchoscopy, no laser);  Surgeon: Joseph Venegas MD;  Location: [facility] OR;  Service:    • ENDOSCOPY W/ PEG TUBE PLACEMENT N/A 5/2/2018    Procedure: ESOPHAGOGASTRODUODENOSCOPY WITH PERCUTANEOUS ENDOSCOPIC GASTROSTOMY TUBE INSERTION;  Surgeon: Angel Maciel MD;  Location: [facility] ENDOSCOPY;  Service: Gastroenterology   • ENDOSCOPY W/ PEG TUBE PLACEMENT N/A 10/26/2018    Procedure: ESOPHAGOGASTRODUODENOSCOPY WITH PERCUTANEOUS ENDOSCOPIC GASTROSTOMY TUBE INSERTION WITH  ANESTHESIA Possible open gastrostomy;  Surgeon: Kris Solano MD;  Location: [facility];  Service: General   • GASTROSTOMY FEEDING TUBE INSERTION N/A 10/26/2018    Procedure: GASTROSTOMY FEEDING TUBE INSERTION;  Surgeon: rKis Solano MD;  Location: [facility] OR;  Service: General   • SPLENECTOMY N/A 7/26/2018    Procedure: SPLENECTOMY, left chest tube placement, EXPLORATORY LAPAROTOMY, G-TUBE PALCEMENT;  Surgeon: Kris Solano MD;  Location: [facility] OR;  Service: General   • SPLENECTOMY      2018   • THORACOSCOPY Left 7/31/2018    Procedure: LEFT THORACOSCOPY VIDEO ASSISTED POSSIBLE THORACOTOMY possible decortication bronchoscopy;  Surgeon: Joshua Pollock MD;  Location: [facility];  Service: Cardiothoracic   • TRACHEOSTOMY  02/05/2018   • TRACHEOSTOMY N/A 2/5/2018    Procedure: TRACHEOSTOMY  local injection @ 1106 incision @ 1119;  Surgeon: Joseph Venegas MD;  Location: [facility];  Service:    • VENOUS ACCESS DEVICE (PORT) INSERTION N/A 3/7/2018    Procedure: INSERTION OF MEDIPORT     (C-ARM#1);  Surgeon: Kris Solano MD;  Location: [facility];  Service:          Family History   Problem Relation Age of Onset   • Thyroid disease Mother    • Hypertension Mother    • Hypertension Father    • Hypertension Other          Social History     Socioeconomic History   • Marital status:      Spouse name: Not on file   • Number of children: Not on file   • Years of education: Not on file   • Highest education level: Not on file   Social Needs   • Financial resource strain: Not on file   • Food insecurity - worry: Not on file   • Food insecurity - inability: Not on file   • Transportation needs - medical: Not on file   • Transportation needs - non-medical: Not on file   Occupational History   • Not on file   Tobacco Use   • Smoking status: Former Smoker     Packs/day: 1.25     Years: 38.00     Pack years: 47.50     Last attempt to quit: 2/13/2017     Years since  quittin.9   • Smokeless tobacco: Never Used   • Tobacco comment: 2018 - Patient confirmed he ceased utilization of tobacco products 2017.   Substance and Sexual Activity   • Alcohol use: No   • Drug use: No   • Sexual activity: Defer   Other Topics Concern   • Not on file   Social History Narrative   • Not on file         Current Outpatient Medications   Medication Sig Dispense Refill   • amLODIPine (NORVASC) 10 MG tablet Take 1 tablet by mouth Daily. 30 tablet 11   • Cholecalciferol 98188 units tablet 50 thousand units po q  month 3 tablet 3   • clopidogrel (PLAVIX) 75 MG tablet Take 75 mg by mouth Daily. Last dose approx greater than one month ago     • docusate sodium (COLACE) 100 MG capsule Take 1 capsule by mouth 2 (Two) Times a Day As Needed for Constipation. 60 capsule 2   • DULoxetine (CYMBALTA) 60 MG capsule Take 60 mg by mouth Daily.     • ferrous sulfate, as mg of FE, (VISHAL-IN-SOL) 75 (15 Fe) MG/ML drops 25 mL by Per G Tube route Daily. 150 mL 2   • levothyroxine (SYNTHROID) 75 MCG tablet Take 1 tab daily Monday to Saturday and 1.5 on  32 tablet 11   • losartan-hydrochlorothiazide (HYZAAR) 100-12.5 MG per tablet Take 1 tablet by mouth Daily. 30 tablet 12   • metoprolol succinate XL (TOPROL-XL) 25 MG 24 hr tablet Take 1 tablet by mouth Every Night. Patient states he hasn't been taking this week, states he thinks he is hypotensive 30 tablet 12   • Multiple Vitamins-Minerals (MULTIVITAMIN ADULT PO) Take 1 tablet by mouth Daily.     • mupirocin (BACTROBAN) 2 % ointment Apply  topically 2 (Two) Times a Day. 30 g 2   • ondansetron (ZOFRAN) 4 MG tablet Take 1 tablet by mouth 3 (Three) Times a Day As Needed for Nausea or Vomiting. 40 tablet 3   • oxyCODONE-acetaminophen (PERCOCET) 5-325 MG per tablet Take 1-2 tablets by mouth Every 4 (Four) Hours As Needed (Pain). 10 tablet 0   • POTASSIUM PO Take  by mouth.     • sodium chloride (NS) 0.9 % irrigation USE FOR TRACHEOSTOMY AS DIRECTED 1000 mL  "8   • spironolactone (ALDACTONE) 25 MG tablet Take 0.5 tablets by mouth Daily. 15 tablet 6   • traZODone (DESYREL) 100 MG tablet Take 100 mg by mouth Every Night.     • triamcinolone (KENALOG) 0.1 % cream Apply  topically 2 (Two) Times a Day. 45 g 2   • vitamin B-12 (CYANOCOBALAMIN) 100 MCG tablet Take 100 mcg by mouth Daily.       No current facility-administered medications for this visit.          OBJECTIVE    Ht 182.9 cm (72\")   Wt 101 kg (223 lb)   BMI 30.24 kg/m²       Review of Systems      ***            ASSESSMENT AND PLAN    Truman was seen today for pain.    Diagnoses and all orders for this visit:    Chronic elbow pain, right              This document has been electronically signed by Georgie Rosenberg CSA on February 6, 2019 9:10 AM     EMR Dragon/Transcription disclaimer:   Much of this encounter note is an electronic transcription/translation of spoken language to printed text. The electronic translation of spoken language may permit erroneous, or at times, nonsensical words or phrases to be inadvertently transcribed; Although I have reviewed the note for such errors, some may still exist.    Georgie Rosenberg CSA  2/6/2019  9:10 AM            "

## 2019-02-06 NOTE — PROGRESS NOTES
Truman Esquivel is a 58 y.o. male   Primary provider:  Marybeth Harrison DO       Chief Complaint   Patient presents with   • Right Elbow - Pain     X-rays done today in office   HISTORY OF PRESENT ILLNESS: right elbow pain and swelling.  No known injury Pain scale today 0/10      58 y occasional pain, swelling of the elbow.  Swelling has been present for several weeks, nothing alleviates.  States pain is intermittent.  No fevers, no chills, no nighttime awakening pain  No redness or rash.      Pain   Associated symptoms include joint swelling.        CONCURRENT MEDICAL HISTORY:    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   • Stroke (CMS/[facility])    • Swallowing difficulty        Allergies   Allergen Reactions   • Chlorhexidine Itching     Topical cleaning wipes causes severe skin irritation   • Eggs Or Egg-Derived Products Swelling   • Erythromycin Other (See Comments)     Joint pains and cold sweats   • Gabapentin Itching   • Other GI Intolerance     Mycins  farzad lotion causes rash   • Acth [Corticotropin] Rash   • Cephalosporins Itching and Rash     Pt developed rash, itching after cefepime administration (2-3 doses) during 2/2017 admission. Itching was relieved with benadryl. Cefepime was continued because his infection was improving and no symptoms of anaphylaxis present   • Co Q 10 [Coenzyme Q10] Rash   • Keflex [Cephalexin] Rash   • Neomycin Rash   • Penicillins Rash     Tolerated cefepime during 2/2017 admission. Had rash and itching (relieved by benadryl) after few doses of cefepime and cefepime was continued.   • Tetracyclines & Related GI Intolerance         Current Outpatient Medications:   •  amLODIPine (NORVASC) 10 MG tablet, Take 1 tablet by mouth Daily., Disp: 30 tablet, Rfl: 11  •  Cholecalciferol  21421 units tablet, 50 thousand units po q  month, Disp: 3 tablet, Rfl: 3  •  clopidogrel (PLAVIX) 75 MG tablet, Take 75 mg by mouth Daily. Last dose approx greater than one month ago, Disp: , Rfl:   •  docusate sodium (COLACE) 100 MG capsule, Take 1 capsule by mouth 2 (Two) Times a Day As Needed for Constipation., Disp: 60 capsule, Rfl: 2  •  DULoxetine (CYMBALTA) 60 MG capsule, Take 60 mg by mouth Daily., Disp: , Rfl:   •  ferrous sulfate, as mg of FE, (VISHAL-IN-SOL) 75 (15 Fe) MG/ML drops, 25 mL by Per G Tube route Daily., Disp: 150 mL, Rfl: 2  •  levothyroxine (SYNTHROID) 75 MCG tablet, Take 1 tab daily Monday to Saturday and 1.5 on Sunday, Disp: 32 tablet, Rfl: 11  •  losartan-hydrochlorothiazide (HYZAAR) 100-12.5 MG per tablet, Take 1 tablet by mouth Daily., Disp: 30 tablet, Rfl: 12  •  metoprolol succinate XL (TOPROL-XL) 25 MG 24 hr tablet, Take 1 tablet by mouth Every Night. Patient states he hasn't been taking this week, states he thinks he is hypotensive, Disp: 30 tablet, Rfl: 12  •  Multiple Vitamins-Minerals (MULTIVITAMIN ADULT PO), Take 1 tablet by mouth Daily., Disp: , Rfl:   •  mupirocin (BACTROBAN) 2 % ointment, Apply  topically 2 (Two) Times a Day., Disp: 30 g, Rfl: 2  •  ondansetron (ZOFRAN) 4 MG tablet, Take 1 tablet by mouth 3 (Three) Times a Day As Needed for Nausea or Vomiting., Disp: 40 tablet, Rfl: 3  •  oxyCODONE-acetaminophen (PERCOCET) 5-325 MG per tablet, Take 1-2 tablets by mouth Every 4 (Four) Hours As Needed (Pain)., Disp: 10 tablet, Rfl: 0  •  POTASSIUM PO, Take  by mouth., Disp: , Rfl:   •  sodium chloride (NS) 0.9 % irrigation, USE FOR TRACHEOSTOMY AS DIRECTED, Disp: 1000 mL, Rfl: 8  •  spironolactone (ALDACTONE) 25 MG tablet, Take 0.5 tablets by mouth Daily., Disp: 15 tablet, Rfl: 6  •  traZODone (DESYREL) 100 MG tablet, Take 100 mg by mouth Every Night., Disp: , Rfl:   •  triamcinolone (KENALOG) 0.1 % cream, Apply  topically 2 (Two) Times a Day., Disp: 45 g, Rfl: 2  •  vitamin B-12  (CYANOCOBALAMIN) 100 MCG tablet, Take 100 mcg by mouth Daily., Disp: , Rfl:     Past Surgical History:   Procedure Laterality Date   • AORTA SURGERY      ruptured aorta repair   • ASCENDING ARCH/HEMIARCH REPLACEMENT N/A 2/14/2017    Procedure: INTRAOPERATIVE TRANSESOPHAGEAL ECHOCARDIOGRAM, MIDLINE STERNOTOMY, ASCENDING AORTIC  AND PROXIMAL AORTIC ARCH REPAIR WITH 26MM GRAFT, AORTIC VALVE RESUSPENSION, AORTIC ROOT REPAIR, OPEN VEIN HARVEST OF RIGHT LEG;  Surgeon: German Arreguin MD;  Location: [facility] MAIN OR;  Service:    • BRONCHOSCOPY N/A 2/17/2017    Procedure: BRONCHOSCOPY BIOPSY AT BEDSIDE WITH BAL-LEFT LOWER LOBE;  Surgeon: Trent Chaney MD;  Location: [facility] ENDOSCOPY;  Service:    • COLONOSCOPY N/A 3/7/2018    Procedure: COLONOSCOPY WITH POSSIBLE POLYPECTOMY   ( DONE IN OR WITH ENDO)      COLONOSCOPY FIRST;  Surgeon: Kris Solano MD;  Location: [facility] OR;  Service:    • DIRECT LARYNGOSCOPY, ESOPHAGOSCOPY, BRONCHOSCOPY N/A 2/5/2018    Procedure: DIRECT LARYNGOSCOPY WITH BIOPSY, ESOPHAGOSCOPY     (no bronchoscopy, no laser);  Surgeon: Joseph Venegas MD;  Location: [facility] OR;  Service:    • ENDOSCOPY W/ PEG TUBE PLACEMENT N/A 5/2/2018    Procedure: ESOPHAGOGASTRODUODENOSCOPY WITH PERCUTANEOUS ENDOSCOPIC GASTROSTOMY TUBE INSERTION;  Surgeon: Angel Maciel MD;  Location: [facility] ENDOSCOPY;  Service: Gastroenterology   • ENDOSCOPY W/ PEG TUBE PLACEMENT N/A 10/26/2018    Procedure: ESOPHAGOGASTRODUODENOSCOPY WITH PERCUTANEOUS ENDOSCOPIC GASTROSTOMY TUBE INSERTION WITH ANESTHESIA Possible open gastrostomy;  Surgeon: Kris Solano MD;  Location: [facility] OR;  Service: General   • GASTROSTOMY FEEDING TUBE INSERTION N/A 10/26/2018    Procedure: GASTROSTOMY FEEDING TUBE INSERTION;  Surgeon: Kris Solano MD;  Location: [facility] OR;  Service: General   • SPLENECTOMY N/A 7/26/2018    Procedure: SPLENECTOMY, left chest tube placement, EXPLORATORY LAPAROTOMY, G-TUBE PALCEMENT;   Surgeon: Kris Solano MD;  Location: [facility];  Service: General   • SPLENECTOMY      2018   • THORACOSCOPY Left 2018    Procedure: LEFT THORACOSCOPY VIDEO ASSISTED POSSIBLE THORACOTOMY possible decortication bronchoscopy;  Surgeon: Joshua Pollock MD;  Location: [facility];  Service: Cardiothoracic   • TRACHEOSTOMY  2018   • TRACHEOSTOMY N/A 2018    Procedure: TRACHEOSTOMY  local injection @ 1106 incision @ 1119;  Surgeon: Joseph Venegas MD;  Location: [facility];  Service:    • VENOUS ACCESS DEVICE (PORT) INSERTION N/A 3/7/2018    Procedure: INSERTION OF MEDIPORT     (C-ARM#1);  Surgeon: Kris Solano MD;  Location: [facility];  Service:        Family History   Problem Relation Age of Onset   • Thyroid disease Mother    • Hypertension Mother    • Hypertension Father    • Hypertension Other         Social History     Socioeconomic History   • Marital status:      Spouse name: Not on file   • Number of children: Not on file   • Years of education: Not on file   • Highest education level: Not on file   Social Needs   • Financial resource strain: Not on file   • Food insecurity - worry: Not on file   • Food insecurity - inability: Not on file   • Transportation needs - medical: Not on file   • Transportation needs - non-medical: Not on file   Occupational History   • Not on file   Tobacco Use   • Smoking status: Former Smoker     Packs/day: 1.25     Years: 38.00     Pack years: 47.50     Last attempt to quit: 2017     Years since quittin.9   • Smokeless tobacco: Never Used   • Tobacco comment: 2018 - Patient confirmed he ceased utilization of tobacco products 2017.   Substance and Sexual Activity   • Alcohol use: No   • Drug use: No   • Sexual activity: Defer   Other Topics Concern   • Not on file   Social History Narrative   • Not on file        Review of Systems   Constitutional: Negative.    HENT: Negative.    Eyes: Negative.   "  Respiratory: Negative.    Cardiovascular: Negative.    Gastrointestinal: Negative.    Endocrine: Negative.    Genitourinary: Negative.    Musculoskeletal: Positive for joint swelling.        Right elbow pain    Skin: Negative.    Allergic/Immunologic: Negative.    Neurological: Negative.    Hematological: Negative.    Psychiatric/Behavioral: Negative.        PHYSICAL EXAMINATION:       Ht 182.9 cm (72\")   Wt 101 kg (223 lb)   BMI 30.24 kg/m²     Physical Exam   Constitutional: He is oriented to person, place, and time. He appears well-developed and well-nourished. No distress.   Tracheostomy present   HENT:   Head: Normocephalic and atraumatic.   Eyes: EOM are normal. Pupils are equal, round, and reactive to light.   Neck: No JVD present. No tracheal deviation present. No thyromegaly present.   Tracheostomy tube present   Cardiovascular: Normal rate and intact distal pulses.   Pulmonary/Chest: Effort normal. No stridor. No respiratory distress. He has no wheezes.   Abdominal: Soft. He exhibits no distension. There is no tenderness. There is no guarding.   Musculoskeletal: He exhibits no edema or deformity.   Neurological: He is alert and oriented to person, place, and time. He displays normal reflexes. No cranial nerve deficit. Coordination normal.   Skin: Skin is warm and dry. Capillary refill takes less than 2 seconds. No erythema. No pallor.   Psychiatric: He has a normal mood and affect. His behavior is normal.       GAIT:     []  Normal  []  Antalgic    Assistive device: []  None  []  Walker     []  Crutches  []  Cane     []  Wheelchair  []  Stretcher    Right Elbow Exam     Tenderness   The patient is experiencing no tenderness.     Range of Motion   Extension: 0   Flexion: 120     Muscle Strength   Pronation:  5/5   Supination:  5/5     Tests   Varus: negative  Valgus: negative  Tinel's sign (cubital tunnel): negative    Other   Erythema: absent  Sensation: normal    Comments:  Swollen bursa of elbow, " fluctuant              Xr Elbow 3+ View Right    Result Date: 2/6/2019  Narrative: Ordering Provider:  Daren Prater MD Ordering Diagnosis/Indication:  Right elbow pain Procedure:  XR ELBOW 3+ VW RIGHT Exam Date:  2/6/19 RELEVANT PRIOR IMAGES:  FL Video Swallow With Speech 12/05/2018 [id number] Final COMPARISON:  Not applicable, no relevant images available.     Impression:  normal alignment, no fracture, good bone quality Bone quality: good Alignment:  normal Fracture: none Soft tissue: swelling posterior     Medium Joint Arthrocentesis: R elbow  Date/Time: 2/6/2019 10:04 AM  Supporting Documentation  Indications: joint swelling and pain   Procedure Details  Location: elbow - R elbow  Needle size: 18 G  Approach: anterolateral  Aspirate amount: 15 mL  Aspirate: yellow  Patient tolerance: patient tolerated the procedure well with no immediate complications            ASSESSMENT:    Diagnoses and all orders for this visit:    Chronic elbow pain, right  -     Medium Joint Arthrocentesis: R elbow    Other orders  -     Cancel: Large Joint Arthrocentesis          PLAN    Aspiration of elbow bursa, ice, rest, antinflammatory medicines for pain relief.    15cc aspirated from the elbow bursa, clear straw colored fluid.        Daren Prater MD

## 2019-02-11 ENCOUNTER — OFFICE VISIT (OUTPATIENT)
Dept: ENDOCRINOLOGY | Facility: CLINIC | Age: 59
End: 2019-02-11

## 2019-02-11 VITALS
HEART RATE: 70 BPM | HEIGHT: 72 IN | WEIGHT: 225.2 LBS | DIASTOLIC BLOOD PRESSURE: 70 MMHG | BODY MASS INDEX: 30.5 KG/M2 | SYSTOLIC BLOOD PRESSURE: 122 MMHG | OXYGEN SATURATION: 94 %

## 2019-02-11 DIAGNOSIS — E06.3 HYPOTHYROIDISM DUE TO HASHIMOTO'S THYROIDITIS: ICD-10-CM

## 2019-02-11 DIAGNOSIS — N17.9 ACUTE RENAL FAILURE, UNSPECIFIED ACUTE RENAL FAILURE TYPE (HCC): ICD-10-CM

## 2019-02-11 DIAGNOSIS — E03.8 HYPOTHYROIDISM DUE TO HASHIMOTO'S THYROIDITIS: ICD-10-CM

## 2019-02-11 DIAGNOSIS — E53.8 B12 DEFICIENCY: ICD-10-CM

## 2019-02-11 DIAGNOSIS — E55.9 VITAMIN D DEFICIENCY: Primary | ICD-10-CM

## 2019-02-11 PROCEDURE — 99214 OFFICE O/P EST MOD 30 MIN: CPT | Performed by: INTERNAL MEDICINE

## 2019-02-11 RX ORDER — LEVOTHYROXINE SODIUM 0.07 MG/1
TABLET ORAL
Qty: 34 TABLET | Refills: 11 | Status: SHIPPED | OUTPATIENT
Start: 2019-02-11 | End: 2019-03-29

## 2019-02-11 RX ORDER — LOSARTAN POTASSIUM 100 MG/1
100 TABLET ORAL DAILY
Qty: 30 TABLET | Refills: 11 | Status: SHIPPED | OUTPATIENT
Start: 2019-02-11 | End: 2019-04-12

## 2019-02-11 NOTE — PROGRESS NOTES
Subjective    Truman Esquivel is a 58 y.o. male. he is here today for follow-up.    Hypothyroidism   Pertinent negatives include no abdominal pain, arthralgias, chest pain, coughing, diaphoresis, fatigue, headaches, joint swelling, myalgias, nausea, neck pain, numbness, rash, sore throat, vomiting or weakness.          Primary Care Provider     Marybeth Harrison DO      Hypothyroidism      Duration 10 years     Timing - Hypothyroidism  is Constant     Quality -  needs improvement       Severity -  moderate     Complications - none     Current symptoms/problems  fatigue, weakness    Weight loss -- 60 lbs since December , no further weight loss      Alleviating Factors: Compliance       Side Effects  none         Evaluation history:  TSH   Date Value Ref Range Status   02/04/2019 6.590 (H) 0.460 - 4.680 mIU/mL Final     Free T4   Date Value Ref Range Status   02/18/2017 0.68 (L) 0.93 - 1.70 ng/dL Final       Current medications:  Current Outpatient Medications   Medication Sig Dispense Refill   • amLODIPine (NORVASC) 10 MG tablet Take 1 tablet by mouth Daily. 30 tablet 11   • Cholecalciferol 31690 units tablet 50 thousand units po q  month 3 tablet 3   • clopidogrel (PLAVIX) 75 MG tablet Take 75 mg by mouth Daily. Last dose approx greater than one month ago     • docusate sodium (COLACE) 100 MG capsule Take 1 capsule by mouth 2 (Two) Times a Day As Needed for Constipation. 60 capsule 2   • DULoxetine (CYMBALTA) 60 MG capsule Take 60 mg by mouth Daily.     • ferrous sulfate, as mg of FE, (VISHAL-IN-SOL) 75 (15 Fe) MG/ML drops 25 mL by Per G Tube route Daily. 150 mL 2   • levothyroxine (SYNTHROID) 75 MCG tablet Take 1 tab daily Monday to Saturday and 1.5 on Sunday 32 tablet 11   • losartan-hydrochlorothiazide (HYZAAR) 100-12.5 MG per tablet Take 1 tablet by mouth Daily. 30 tablet 12   • metoprolol succinate XL (TOPROL-XL) 25 MG 24 hr tablet Take 1 tablet by mouth Every Night. Patient states he hasn't been taking this  week, states he thinks he is hypotensive 30 tablet 12   • Multiple Vitamins-Minerals (MULTIVITAMIN ADULT PO) Take 1 tablet by mouth Daily.     • mupirocin (BACTROBAN) 2 % ointment Apply  topically 2 (Two) Times a Day. 30 g 2   • ondansetron (ZOFRAN) 4 MG tablet Take 1 tablet by mouth 3 (Three) Times a Day As Needed for Nausea or Vomiting. 40 tablet 3   • oxyCODONE-acetaminophen (PERCOCET) 5-325 MG per tablet Take 1-2 tablets by mouth Every 4 (Four) Hours As Needed (Pain). 10 tablet 0   • POTASSIUM PO Take  by mouth.     • sodium chloride (NS) 0.9 % irrigation USE FOR TRACHEOSTOMY AS DIRECTED 1000 mL 8   • spironolactone (ALDACTONE) 25 MG tablet Take 0.5 tablets by mouth Daily. 15 tablet 6   • traZODone (DESYREL) 100 MG tablet Take 100 mg by mouth Every Night.     • triamcinolone (KENALOG) 0.1 % cream Apply  topically 2 (Two) Times a Day. 45 g 2   • vitamin B-12 (CYANOCOBALAMIN) 100 MCG tablet Take 100 mcg by mouth Daily.       No current facility-administered medications for this visit.        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   • Stroke (CMS/HCC)    • Swallowing difficulty      Past Surgical History:   Procedure Laterality Date   • AORTA SURGERY      ruptured aorta repair   • ASCENDING ARCH/HEMIARCH REPLACEMENT N/A 2/14/2017    Procedure: INTRAOPERATIVE TRANSESOPHAGEAL ECHOCARDIOGRAM, MIDLINE STERNOTOMY, ASCENDING AORTIC  AND PROXIMAL AORTIC ARCH REPAIR WITH 26MM GRAFT, AORTIC VALVE RESUSPENSION, AORTIC ROOT REPAIR, OPEN VEIN HARVEST OF RIGHT LEG;  Surgeon: German Arreguin MD;  Location: [facility];  Service:    • BRONCHOSCOPY N/A 2/17/2017    Procedure: BRONCHOSCOPY BIOPSY AT BEDSIDE WITH BAL-LEFT LOWER LOBE;   Surgeon: Trent Chaney MD;  Location: [facility] ENDOSCOPY;  Service:    • COLONOSCOPY N/A 3/7/2018    Procedure: COLONOSCOPY WITH POSSIBLE POLYPECTOMY   ( DONE IN OR WITH ENDO)      COLONOSCOPY FIRST;  Surgeon: Kris Solano MD;  Location: [facility] OR;  Service:    • DIRECT LARYNGOSCOPY, ESOPHAGOSCOPY, BRONCHOSCOPY N/A 2/5/2018    Procedure: DIRECT LARYNGOSCOPY WITH BIOPSY, ESOPHAGOSCOPY     (no bronchoscopy, no laser);  Surgeon: Joseph Venegas MD;  Location: [facility] OR;  Service:    • ENDOSCOPY W/ PEG TUBE PLACEMENT N/A 5/2/2018    Procedure: ESOPHAGOGASTRODUODENOSCOPY WITH PERCUTANEOUS ENDOSCOPIC GASTROSTOMY TUBE INSERTION;  Surgeon: Angel Maciel MD;  Location: [facility] ENDOSCOPY;  Service: Gastroenterology   • ENDOSCOPY W/ PEG TUBE PLACEMENT N/A 10/26/2018    Procedure: ESOPHAGOGASTRODUODENOSCOPY WITH PERCUTANEOUS ENDOSCOPIC GASTROSTOMY TUBE INSERTION WITH ANESTHESIA Possible open gastrostomy;  Surgeon: Kris Solano MD;  Location: [facility] OR;  Service: General   • GASTROSTOMY FEEDING TUBE INSERTION N/A 10/26/2018    Procedure: GASTROSTOMY FEEDING TUBE INSERTION;  Surgeon: Kris Solano MD;  Location: [facility] OR;  Service: General   • SPLENECTOMY N/A 7/26/2018    Procedure: SPLENECTOMY, left chest tube placement, EXPLORATORY LAPAROTOMY, G-TUBE PALCEMENT;  Surgeon: Kris Solano MD;  Location: [facility] OR;  Service: General   • SPLENECTOMY      2018   • THORACOSCOPY Left 7/31/2018    Procedure: LEFT THORACOSCOPY VIDEO ASSISTED POSSIBLE THORACOTOMY possible decortication bronchoscopy;  Surgeon: Joshua Pollock MD;  Location: [facility] OR;  Service: Cardiothoracic   • TRACHEOSTOMY  02/05/2018   • TRACHEOSTOMY N/A 2/5/2018    Procedure: TRACHEOSTOMY  local injection @ 1106 incision @ 1119;  Surgeon: Joseph Venegas MD;  Location: [facility] OR;  Service:    • VENOUS ACCESS DEVICE (PORT) INSERTION N/A 3/7/2018    Procedure: INSERTION OF MEDIPORT     (C-ARM#1);   Surgeon: Kris Solano MD;  Location: [facility];  Service:      Family History   Problem Relation Age of Onset   • Thyroid disease Mother    • Hypertension Mother    • Hypertension Father    • Hypertension Other        Allergies   Allergen Reactions   • Chlorhexidine Itching     Topical cleaning wipes causes severe skin irritation   • Eggs Or Egg-Derived Products Swelling   • Erythromycin Other (See Comments)     Joint pains and cold sweats   • Gabapentin Itching   • Other GI Intolerance     Mycins  farzad lotion causes rash   • Acth [Corticotropin] Rash   • Cephalosporins Itching and Rash     Pt developed rash, itching after cefepime administration (2-3 doses) during 2017 admission. Itching was relieved with benadryl. Cefepime was continued because his infection was improving and no symptoms of anaphylaxis present   • Co Q 10 [Coenzyme Q10] Rash   • Keflex [Cephalexin] Rash   • Neomycin Rash   • Penicillins Rash     Tolerated cefepime during 2017 admission. Had rash and itching (relieved by benadryl) after few doses of cefepime and cefepime was continued.   • Tetracyclines & Related GI Intolerance     Social History     Socioeconomic History   • Marital status:      Spouse name: Not on file   • Number of children: Not on file   • Years of education: Not on file   • Highest education level: Not on file   Tobacco Use   • Smoking status: Former Smoker     Packs/day: 1.25     Years: 38.00     Pack years: 47.50     Last attempt to quit: 2017     Years since quittin.9   • Smokeless tobacco: Never Used   • Tobacco comment: 2018 - Patient confirmed he ceased utilization of tobacco products 2017.   Substance and Sexual Activity   • Alcohol use: No   • Drug use: No   • Sexual activity: Defer       Review of Systems  Review of Systems   Constitutional: Negative for activity change, appetite change, diaphoresis and fatigue.   HENT: Negative for facial swelling, sneezing, sore throat,  "tinnitus, trouble swallowing and voice change.    Eyes: Negative for photophobia, pain, discharge, redness, itching and visual disturbance.   Respiratory: Negative for apnea, cough, choking, chest tightness and shortness of breath.    Cardiovascular: Negative for chest pain, palpitations and leg swelling.   Gastrointestinal: Negative for abdominal distention, abdominal pain, constipation, diarrhea, nausea and vomiting.   Endocrine: Negative for cold intolerance, heat intolerance, polydipsia, polyphagia and polyuria.   Genitourinary: Negative for difficulty urinating, dysuria, frequency, hematuria and urgency.   Musculoskeletal: Negative for arthralgias, back pain, gait problem, joint swelling, myalgias, neck pain and neck stiffness.   Skin: Negative for color change, pallor, rash and wound.   Neurological: Negative for dizziness, tremors, weakness, light-headedness, numbness and headaches.   Hematological: Negative for adenopathy. Does not bruise/bleed easily.   Psychiatric/Behavioral: Negative for behavioral problems, confusion and sleep disturbance.        Objective    /70   Pulse 70   Ht 182.9 cm (72\")   Wt 102 kg (225 lb 3.2 oz)   SpO2 94%   BMI 30.54 kg/m²   Physical Exam   Constitutional: He is oriented to person, place, and time. He appears well-developed and well-nourished. No distress.   HENT:   Head: Normocephalic and atraumatic.   Right Ear: External ear normal.   Left Ear: External ear normal.   Nose: Nose normal.   Eyes: Conjunctivae and EOM are normal. Pupils are equal, round, and reactive to light.   Neck: Normal range of motion. Neck supple. No tracheal deviation present. No thyromegaly present.   Cardiovascular: Normal rate, regular rhythm and normal heart sounds.   No murmur heard.  Pulmonary/Chest: Effort normal and breath sounds normal. No respiratory distress. He has no wheezes.   Trach in place   Abdominal: Soft. Bowel sounds are normal. There is no tenderness. There is no rebound " and no guarding.   Musculoskeletal: Normal range of motion. He exhibits no edema, tenderness or deformity.   Neurological: He is alert and oriented to person, place, and time. No cranial nerve deficit.   Skin: Skin is warm and dry. No rash noted.   Psychiatric: He has a normal mood and affect. His behavior is normal. Judgment and thought content normal.       Lab Review  Lab Results   Component Value Date    TSH 6.590 (H) 02/04/2019     Lab Results   Component Value Date    FREET4 0.68 (L) 02/18/2017        Assessment/Plan      1. Vitamin D deficiency    2. B12 deficiency    3. Hypothyroidism due to Hashimoto's thyroiditis    . This diagnosis was discussed and reviewed with the patient including the advantages of drug therapy.     1. Orders placed during this encounter include:  No orders of the defined types were placed in this encounter.      Medications prescribed:  Outpatient Encounter Medications as of 2/11/2019   Medication Sig Dispense Refill   • amLODIPine (NORVASC) 10 MG tablet Take 1 tablet by mouth Daily. 30 tablet 11   • Cholecalciferol 03795 units tablet 50 thousand units po q  month 3 tablet 3   • clopidogrel (PLAVIX) 75 MG tablet Take 75 mg by mouth Daily. Last dose approx greater than one month ago     • docusate sodium (COLACE) 100 MG capsule Take 1 capsule by mouth 2 (Two) Times a Day As Needed for Constipation. 60 capsule 2   • DULoxetine (CYMBALTA) 60 MG capsule Take 60 mg by mouth Daily.     • ferrous sulfate, as mg of FE, (VISHAL-IN-SOL) 75 (15 Fe) MG/ML drops 25 mL by Per G Tube route Daily. 150 mL 2   • levothyroxine (SYNTHROID) 75 MCG tablet Take 1 tab daily Monday to Saturday and 1.5 on Sunday 32 tablet 11   • losartan-hydrochlorothiazide (HYZAAR) 100-12.5 MG per tablet Take 1 tablet by mouth Daily. 30 tablet 12   • metoprolol succinate XL (TOPROL-XL) 25 MG 24 hr tablet Take 1 tablet by mouth Every Night. Patient states he hasn't been taking this week, states he thinks he is hypotensive 30  tablet 12   • Multiple Vitamins-Minerals (MULTIVITAMIN ADULT PO) Take 1 tablet by mouth Daily.     • mupirocin (BACTROBAN) 2 % ointment Apply  topically 2 (Two) Times a Day. 30 g 2   • ondansetron (ZOFRAN) 4 MG tablet Take 1 tablet by mouth 3 (Three) Times a Day As Needed for Nausea or Vomiting. 40 tablet 3   • oxyCODONE-acetaminophen (PERCOCET) 5-325 MG per tablet Take 1-2 tablets by mouth Every 4 (Four) Hours As Needed (Pain). 10 tablet 0   • POTASSIUM PO Take  by mouth.     • sodium chloride (NS) 0.9 % irrigation USE FOR TRACHEOSTOMY AS DIRECTED 1000 mL 8   • spironolactone (ALDACTONE) 25 MG tablet Take 0.5 tablets by mouth Daily. 15 tablet 6   • traZODone (DESYREL) 100 MG tablet Take 100 mg by mouth Every Night.     • triamcinolone (KENALOG) 0.1 % cream Apply  topically 2 (Two) Times a Day. 45 g 2   • vitamin B-12 (CYANOCOBALAMIN) 100 MCG tablet Take 100 mcg by mouth Daily.       No facility-administered encounter medications on file as of 2/11/2019.         Patient has hypothyroidism for 10 years and his present symptoms include fatigue, weakness and weight gain      Was on 88 and we decreased to 75     TSH    Lab Results   Component Value Date    TSH 6.590 (H) 02/04/2019    TSH 11.600 (H) 10/23/2018    TSH 5.160 (H) 07/27/2018       Lab Results   Component Value Date    FREET4 0.68 (L) 02/18/2017     Keep 75 but on Sunday take 1.5 tabs     Now 75 daily Monday to Saturday but on Sunday 2 tabs     Has been taking with iron . We spoke about taking them separately ( he has been doing them separately )    He is taking through G Tube     Lab Results   Component Value Date    YEHY99VI 54.5 02/04/2019    PXZQ84ZZ 57.7 10/23/2018    YVUX35TR 27.4 (L) 07/10/2018       Lab Results   Component Value Date    OYVOLSVE37 962 (H) 12/12/2018     Normalized on 50 th u monthly  ---    Anemia of low iron     Now improved on liquid iron     Take apart from thyroid pill     --    HTN on losartan - hctz , creat slight  elevation    Change to losartan only     As he improved and gained weight after recovering from cancer   His bp increased but now nl     He is also on 12.5 mg of aldactone, amlodipine and toprol    Recheck TSH and BMP in 6 weeks         4. No Follow-up on file.      6 weeks appt to followup on TSH, renal function and potential K rise since stopping hctz and keep ing aldactone and losartan

## 2019-02-21 ENCOUNTER — OFFICE VISIT (OUTPATIENT)
Dept: SLEEP MEDICINE | Facility: HOSPITAL | Age: 59
End: 2019-02-21

## 2019-02-21 VITALS
HEIGHT: 72 IN | BODY MASS INDEX: 30.71 KG/M2 | SYSTOLIC BLOOD PRESSURE: 162 MMHG | WEIGHT: 226.7 LBS | OXYGEN SATURATION: 96 % | HEART RATE: 59 BPM | DIASTOLIC BLOOD PRESSURE: 88 MMHG

## 2019-02-21 DIAGNOSIS — G47.33 OBSTRUCTIVE SLEEP APNEA, ADULT: Primary | ICD-10-CM

## 2019-02-21 PROCEDURE — 99213 OFFICE O/P EST LOW 20 MIN: CPT | Performed by: INTERNAL MEDICINE

## 2019-02-21 NOTE — PROGRESS NOTES
Sleep Clinic Follow Up    Date: 2019  Primary Care Physician: Marybeth Harrison DO    Last office visit: 2018 (I reviewed this note)    CC: Follow up: EMELIA    Sleep Testin. PSG on 2017, AHI of 24.7   2. Was on autocpap 5-20 cm H2O  3. Currently with #6 Shiley trach for squamous cell CA of the throat    Assessment and Plan:    1. Obstructive sleep apnea Established, stable (1)  1. Has trach Shiley #6  2. Throat CA - squamous  1. Plan to de cannulate soon  2. Repeat PSG after decannulate    Interim History (1-3/4):  Since the last visit:    1) moderate EMELIA -  Truman Esquivel has trach for squamous cell CA    Bed time: 2200 - in recliner - upright  Sleep latency: 5-20 minutes on trazodone 100 mg po qhs  Number of times awakens during the night: 0-2  Wake time: 3792-9110  Estimated total sleep time at night: 6-8 hours  Caffeine intake: 0oz of coffee, 0oz of tea, and 16oz of soda  Alcohol intake: 0 drinks per week  Nap time: rare   Sleepiness with Driving: none    PMHx, FH, SH reviewed and pertinent changes are: unchanged from last office visit on 2018      REVIEW OF SYSTEMS:   Negative for chest pain, fever, cough, SOA, abdominal pain. Smoking:none      Exam ():  Vitals:    19 1009   BP: 162/88   Pulse: 59   SpO2: 96%           19  1009   Weight: 103 kg (226 lb 11.2 oz)     Body mass index is 30.74 kg/m². Patient's Body mass index is 30.74 kg/m². BMI is above normal parameters. Recommendations include: referral to primary care.      Gen:  No acute distress, alert, oriented - #6 Shiley  Lungs:  CTA with normal effort   CV:  RRR, no M/R/G  GI:  soft, non-tender  Psych:  Appropriate affect      Past Medical History:   Diagnosis Date   • Aortic dissection (CMS/HCC)    • Chest pain    • Disease of thyroid gland    • HTN (hypertension)    • Knee pain    • Sleep apnea    • Smoker    • Squamous cell carcinoma of larynx (CMS/HCC) 2018   • Stroke (CMS/HCC)    • Swallowing  difficulty        Current Outpatient Medications:   •  amLODIPine (NORVASC) 10 MG tablet, Take 1 tablet by mouth Daily., Disp: 30 tablet, Rfl: 11  •  Cholecalciferol 40450 units tablet, 50 thousand units po q  month, Disp: 3 tablet, Rfl: 3  •  clopidogrel (PLAVIX) 75 MG tablet, Take 75 mg by mouth Daily. Last dose approx greater than one month ago, Disp: , Rfl:   •  docusate sodium (COLACE) 100 MG capsule, Take 1 capsule by mouth 2 (Two) Times a Day As Needed for Constipation., Disp: 60 capsule, Rfl: 2  •  DULoxetine (CYMBALTA) 60 MG capsule, Take 60 mg by mouth Daily., Disp: , Rfl:   •  ferrous sulfate, as mg of FE, (VISHAL-IN-SOL) 75 (15 Fe) MG/ML drops, 25 mL by Per G Tube route Daily., Disp: 150 mL, Rfl: 2  •  levothyroxine (SYNTHROID) 75 MCG tablet, Take 1 tab daily Monday to Saturday and 2 on Sunday, Disp: 34 tablet, Rfl: 11  •  losartan (COZAAR) 100 MG tablet, Take 1 tablet by mouth Daily., Disp: 30 tablet, Rfl: 11  •  metoprolol succinate XL (TOPROL-XL) 25 MG 24 hr tablet, Take 1 tablet by mouth Every Night. Patient states he hasn't been taking this week, states he thinks he is hypotensive, Disp: 30 tablet, Rfl: 12  •  Multiple Vitamins-Minerals (MULTIVITAMIN ADULT PO), Take 1 tablet by mouth Daily., Disp: , Rfl:   •  mupirocin (BACTROBAN) 2 % ointment, Apply  topically 2 (Two) Times a Day., Disp: 30 g, Rfl: 2  •  sodium chloride (NS) 0.9 % irrigation, USE FOR TRACHEOSTOMY AS DIRECTED, Disp: 1000 mL, Rfl: 8  •  spironolactone (ALDACTONE) 25 MG tablet, Take 0.5 tablets by mouth Daily., Disp: 15 tablet, Rfl: 6  •  traZODone (DESYREL) 100 MG tablet, Take 100 mg by mouth Every Night., Disp: , Rfl:   •  triamcinolone (KENALOG) 0.1 % cream, Apply  topically 2 (Two) Times a Day., Disp: 45 g, Rfl: 2  •  vitamin B-12 (CYANOCOBALAMIN) 100 MCG tablet, Take 100 mcg by mouth Daily., Disp: , Rfl:   •  POTASSIUM PO, Take  by mouth., Disp: , Rfl:       RTC in 6 months     This document has been electronically signed by Manuel  MARISSA Rivas MD on February 21, 2019         CC: Marybeth Harrison, DO          No ref. provider found

## 2019-03-06 ENCOUNTER — INFUSION (OUTPATIENT)
Dept: ONCOLOGY | Facility: HOSPITAL | Age: 59
End: 2019-03-06

## 2019-03-06 DIAGNOSIS — Z45.2 ENCOUNTER FOR VENOUS ACCESS DEVICE CARE: Primary | ICD-10-CM

## 2019-03-06 PROCEDURE — 96523 IRRIG DRUG DELIVERY DEVICE: CPT | Performed by: INTERNAL MEDICINE

## 2019-03-06 RX ORDER — SODIUM CHLORIDE 0.9 % (FLUSH) 0.9 %
10 SYRINGE (ML) INJECTION AS NEEDED
Status: DISCONTINUED | OUTPATIENT
Start: 2019-03-06 | End: 2019-03-06 | Stop reason: HOSPADM

## 2019-03-06 RX ORDER — SODIUM CHLORIDE 0.9 % (FLUSH) 0.9 %
10 SYRINGE (ML) INJECTION AS NEEDED
Status: CANCELLED | OUTPATIENT
Start: 2019-04-12

## 2019-03-06 RX ADMIN — SODIUM CHLORIDE, PRESERVATIVE FREE 10 ML: 5 INJECTION INTRAVENOUS at 10:37

## 2019-03-06 RX ADMIN — SODIUM CHLORIDE, PRESERVATIVE FREE 500 UNITS: 5 INJECTION INTRAVENOUS at 10:37

## 2019-03-14 ENCOUNTER — OFFICE VISIT (OUTPATIENT)
Dept: OTOLARYNGOLOGY | Facility: CLINIC | Age: 59
End: 2019-03-14

## 2019-03-14 VITALS — OXYGEN SATURATION: 99 % | WEIGHT: 228 LBS | HEIGHT: 72 IN | BODY MASS INDEX: 30.88 KG/M2

## 2019-03-14 DIAGNOSIS — J37.0 CHRONIC LARYNGITIS: ICD-10-CM

## 2019-03-14 DIAGNOSIS — Z93.0 TRACHEOSTOMY STATUS (HCC): ICD-10-CM

## 2019-03-14 DIAGNOSIS — Z08 ENCOUNTER FOR FOLLOW-UP SURVEILLANCE OF LARYNGEAL CANCER: Primary | ICD-10-CM

## 2019-03-14 DIAGNOSIS — Z85.21 ENCOUNTER FOR FOLLOW-UP SURVEILLANCE OF LARYNGEAL CANCER: Primary | ICD-10-CM

## 2019-03-14 PROCEDURE — 31575 DIAGNOSTIC LARYNGOSCOPY: CPT | Performed by: OTOLARYNGOLOGY

## 2019-03-14 PROCEDURE — 99214 OFFICE O/P EST MOD 30 MIN: CPT | Performed by: OTOLARYNGOLOGY

## 2019-03-14 RX ORDER — TRIAMCINOLONE ACETONIDE 1 MG/G
CREAM TOPICAL 2 TIMES DAILY
Qty: 45 G | Refills: 2 | Status: SHIPPED | OUTPATIENT
Start: 2019-03-14 | End: 2021-07-20

## 2019-03-18 RX ORDER — METOPROLOL SUCCINATE 25 MG/1
TABLET, EXTENDED RELEASE ORAL
Qty: 30 TABLET | Refills: 12 | Status: SHIPPED | OUTPATIENT
Start: 2019-03-18 | End: 2020-04-20 | Stop reason: SDUPTHER

## 2019-03-18 NOTE — PROGRESS NOTES
Subjective   Truman Esquivel is a 58 y.o. male.       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  This was bulky.  Underwent tracheostomy followed by concurrent chemoradiation and has had a complete response.  Has been downsized to a size 6 tracheostomy but did not tolerate capping.  Subsequently underwent emergency splenectomy/laparotomy due to trauma with bleeding exacerbated by chronic antiplatelet therapy.  Has recovered from that.  States that he is tolerating some oral diet and has been under the supervision of speech therapy.  Denies any hemoptysis.  States he has some skin irritation around his tracheostomy and thinks he needs a refill of triamcinolone that I have given him to use for this in the past.      The following portions of the patient's history were reviewed and updated as appropriate: allergies, current medications, past family history, past medical history, past social history, past surgical history and problem list.     reports that he quit smoking about 2 years ago. He has a 47.50 pack-year smoking history. he has never used smokeless tobacco. He reports that he does not drink alcohol or use drugs.   Patient is not a tobacco user and has not been counseled for use of tobacco products      Review of Systems   Constitutional: Negative for fever.   Respiratory: Negative for cough.            Objective   Physical Exam  General: Well-developed well-nourished male in no acute distress.  Alert and oriented x3.  Voice: Mildly harsh with tracheostomy occluded but no stridor. Speech:Fluent  Ears: External ears no deformity, canals no discharge, tympanic membranes intact clear and mobile bilaterally.  Nose: Nares show no discharge mass polyp or purulence.  Boggy mucosa is present.  No gross external deformity.  Septum: Midline  Oral cavity: Lips and gums without lesions.  Tongue and floor of mouth without lesions.  Parotid  and submandibular ducts unobstructed.  No mucosal lesions on the buccal mucosa or vestibule of the mouth.  Pharynx: No erythema exudate mass or ulcer  Neck: No lymphadenopathy.  No thyromegaly.  Trachea and larynx midline.  No masses in the parotid or submandibular glands.  Tracheostomy tube in place without granulation tissue.  There is some mild erythema superficially of the skin beneath the flange of the tracheostomy.    Procedure Note    Pre-operative Diagnosis:   Chief Complaint   Patient presents with   • Follow-up   Cancer surveillance  Post-operative Diagnosis: No evidence of recurrence    Anesthesia: topical with xylocaine and neosynephrine    Endoscopy Type:  Flexible Laryngoscopy    Procedure Details:    The patient was placed in the sitting position.  After topical anesthesia and decongestion, the 4 mm laryngoscope was passed.  The nasal cavities, nasopharynx, oropharynx, hypopharynx, and larynx were all examined.  Vocal cords were examined during respiration and phonation.  The following findings were noted:    Findings: No masses in the nose, nasopharynx.  Hypopharynx shows extensive posttreatment change.  The epiglottis shows evidence of volume loss as well as decreased stiffness with some prolapse down over the supraglottic structures.  The endolarynx itself shows chronic appearing edema with no evidence of neoplasm and vocal cord mobility is intact.    Condition:  Stable.  Patient tolerated procedure well.    Complications:  None    Assessment/Plan   Truman was seen today for follow-up.    Diagnoses and all orders for this visit:    Encounter for follow-up surveillance of laryngeal cancer    Chronic laryngitis    Tracheostomy status (CMS/[facility])    Other orders  -     triamcinolone (KENALOG) 0.1 % cream; Apply  topically to the appropriate area as directed 2 (Two) Times a Day.      Plan: New prescription is sent for the patient's triamcinolone cream to be used as needed for skin irritation.  Otherwise  he should continue to follow up with speech therapy regarding his oral diet and whether or not he has an ongoing need for his feeding tube.  His tracheostomy needs to stay in place at this point since he is not tolerated capping.  Return in 4 months, call sooner for problems.

## 2019-03-21 ENCOUNTER — APPOINTMENT (OUTPATIENT)
Dept: LAB | Facility: HOSPITAL | Age: 59
End: 2019-03-21

## 2019-03-21 ENCOUNTER — OFFICE VISIT (OUTPATIENT)
Dept: CARDIOLOGY | Facility: CLINIC | Age: 59
End: 2019-03-21

## 2019-03-21 VITALS
HEIGHT: 72 IN | OXYGEN SATURATION: 98 % | HEART RATE: 68 BPM | SYSTOLIC BLOOD PRESSURE: 126 MMHG | DIASTOLIC BLOOD PRESSURE: 78 MMHG | WEIGHT: 228 LBS | BODY MASS INDEX: 30.88 KG/M2

## 2019-03-21 DIAGNOSIS — I10 ESSENTIAL HYPERTENSION: ICD-10-CM

## 2019-03-21 DIAGNOSIS — G45.9 TIA (TRANSIENT ISCHEMIC ATTACK): ICD-10-CM

## 2019-03-21 DIAGNOSIS — I71.010 ASCENDING AORTIC DISSECTION (HCC): Primary | ICD-10-CM

## 2019-03-21 DIAGNOSIS — C32.9 SQUAMOUS CELL CARCINOMA OF LARYNX (HCC): ICD-10-CM

## 2019-03-21 LAB
ANION GAP SERPL CALCULATED.3IONS-SCNC: 9 MMOL/L (ref 5–15)
BUN BLD-MCNC: 20 MG/DL (ref 7–21)
BUN/CREAT SERPL: 16 (ref 7–25)
CALCIUM SPEC-SCNC: 9.7 MG/DL (ref 8.4–10.2)
CHLORIDE SERPL-SCNC: 96 MMOL/L (ref 95–110)
CO2 SERPL-SCNC: 30 MMOL/L (ref 22–31)
CREAT BLD-MCNC: 1.25 MG/DL (ref 0.7–1.3)
GFR SERPL CREATININE-BSD FRML MDRD: 59 ML/MIN/1.73 (ref 56–130)
GLUCOSE BLD-MCNC: 96 MG/DL (ref 60–100)
POTASSIUM BLD-SCNC: 4 MMOL/L (ref 3.5–5.1)
SODIUM BLD-SCNC: 135 MMOL/L (ref 137–145)
TSH SERPL DL<=0.05 MIU/L-ACNC: 12.4 MIU/ML (ref 0.46–4.68)

## 2019-03-21 PROCEDURE — 84443 ASSAY THYROID STIM HORMONE: CPT | Performed by: INTERNAL MEDICINE

## 2019-03-21 PROCEDURE — 80048 BASIC METABOLIC PNL TOTAL CA: CPT | Performed by: INTERNAL MEDICINE

## 2019-03-21 PROCEDURE — 99214 OFFICE O/P EST MOD 30 MIN: CPT | Performed by: INTERNAL MEDICINE

## 2019-03-21 PROCEDURE — 36415 COLL VENOUS BLD VENIPUNCTURE: CPT | Performed by: INTERNAL MEDICINE

## 2019-03-21 RX ORDER — ATORVASTATIN CALCIUM 20 MG/1
20 TABLET, FILM COATED ORAL DAILY
Qty: 90 TABLET | Refills: 3 | Status: SHIPPED | OUTPATIENT
Start: 2019-03-21 | End: 2020-03-17 | Stop reason: SDUPTHER

## 2019-03-21 NOTE — PROGRESS NOTES
[facility]  OFFICE NOTE    Truman Esquivel  58 y.o. male    03/21/2019  1. Ascending aortic dissection (CMS/HCC)    2. Essential hypertension    3. TIA (transient ischemic attack)    4. Squamous cell carcinoma of larynx (CMS/HCC)        Chief complaint -follow-up    History of present Illness- 58-year-old gentleman who has history of ascending aortic dissection and subsequently had a repair with resuspension of the aortic valve and has been doing well.  He was diagnosed with squamous cell carcinoma of the larynx and had radiation and chemotherapy.  He is doing well.  He has a tracheostomy and a feeding tube.  He started eating food now and has a tracheostomy.  Blood pressure is well controlled with multiple medications and he did gain some weight after his radiation and chemotherapy.  He is planning to have his feeding tube removed along with the MediPort.  He can proceed with mild to moderate risk by ACC/ AHA guidelines            Allergies   Allergen Reactions   • Chlorhexidine Itching     Topical cleaning wipes causes severe skin irritation   • Eggs Or Egg-Derived Products Swelling   • Erythromycin Other (See Comments)     Joint pains and cold sweats   • Gabapentin Itching   • Other GI Intolerance     Mycins  farzad lotion causes rash   • Acth [Corticotropin] Rash   • Cephalosporins Itching and Rash     Pt developed rash, itching after cefepime administration (2-3 doses) during 2/2017 admission. Itching was relieved with benadryl. Cefepime was continued because his infection was improving and no symptoms of anaphylaxis present   • Co Q 10 [Coenzyme Q10] Rash   • Keflex [Cephalexin] Rash   • Neomycin Rash   • Penicillins Rash     Tolerated cefepime during 2/2017 admission. Had rash and itching (relieved by benadryl) after few doses of cefepime and cefepime was continued.   • Tetracyclines & Related GI Intolerance         Past Medical History:   Diagnosis Date   • Aortic dissection  (CMS/HCC)    • Chest pain    • Disease of thyroid gland    • HTN (hypertension)    • Knee pain    • Sleep apnea    • Smoker    • Squamous cell carcinoma of larynx (CMS/HCC) 2/5/2018   • Stroke (CMS/HCC)    • Swallowing difficulty          Past Surgical History:   Procedure Laterality Date   • AORTA SURGERY      ruptured aorta repair   • ASCENDING ARCH/HEMIARCH REPLACEMENT N/A 2/14/2017    Procedure: INTRAOPERATIVE TRANSESOPHAGEAL ECHOCARDIOGRAM, MIDLINE STERNOTOMY, ASCENDING AORTIC  AND PROXIMAL AORTIC ARCH REPAIR WITH 26MM GRAFT, AORTIC VALVE RESUSPENSION, AORTIC ROOT REPAIR, OPEN VEIN HARVEST OF RIGHT LEG;  Surgeon: German Arreguin MD;  Location: [facility] MAIN OR;  Service:    • BRONCHOSCOPY N/A 2/17/2017    Procedure: BRONCHOSCOPY BIOPSY AT BEDSIDE WITH BAL-LEFT LOWER LOBE;  Surgeon: Trent Chaney MD;  Location: [facility] ENDOSCOPY;  Service:    • COLONOSCOPY N/A 3/7/2018    Procedure: COLONOSCOPY WITH POSSIBLE POLYPECTOMY   ( DONE IN OR WITH ENDO)      COLONOSCOPY FIRST;  Surgeon: Kris Solano MD;  Location: [facility] OR;  Service:    • DIRECT LARYNGOSCOPY, ESOPHAGOSCOPY, BRONCHOSCOPY N/A 2/5/2018    Procedure: DIRECT LARYNGOSCOPY WITH BIOPSY, ESOPHAGOSCOPY     (no bronchoscopy, no laser);  Surgeon: Joseph Venegas MD;  Location: [facility] OR;  Service:    • ENDOSCOPY W/ PEG TUBE PLACEMENT N/A 5/2/2018    Procedure: ESOPHAGOGASTRODUODENOSCOPY WITH PERCUTANEOUS ENDOSCOPIC GASTROSTOMY TUBE INSERTION;  Surgeon: Angel Maciel MD;  Location: [facility] ENDOSCOPY;  Service: Gastroenterology   • ENDOSCOPY W/ PEG TUBE PLACEMENT N/A 10/26/2018    Procedure: ESOPHAGOGASTRODUODENOSCOPY WITH PERCUTANEOUS ENDOSCOPIC GASTROSTOMY TUBE INSERTION WITH ANESTHESIA Possible open gastrostomy;  Surgeon: Kris Solano MD;  Location: [facility] OR;  Service: General   • GASTROSTOMY FEEDING TUBE INSERTION N/A 10/26/2018    Procedure: GASTROSTOMY FEEDING TUBE INSERTION;  Surgeon: Kris Solano MD;  Location:   [facility] OR;  Service: General   • SPLENECTOMY N/A 2018    Procedure: SPLENECTOMY, left chest tube placement, EXPLORATORY LAPAROTOMY, G-TUBE PALCEMENT;  Surgeon: Kris Solano MD;  Location: [facility] OR;  Service: General   • SPLENECTOMY      2018   • THORACOSCOPY Left 2018    Procedure: LEFT THORACOSCOPY VIDEO ASSISTED POSSIBLE THORACOTOMY possible decortication bronchoscopy;  Surgeon: Joshua Pollock MD;  Location: [facility] OR;  Service: Cardiothoracic   • TRACHEOSTOMY  2018   • TRACHEOSTOMY N/A 2018    Procedure: TRACHEOSTOMY  local injection @ 1106 incision @ 1119;  Surgeon: Joseph Venegas MD;  Location: [facility] OR;  Service:    • VENOUS ACCESS DEVICE (PORT) INSERTION N/A 3/7/2018    Procedure: INSERTION OF MEDIPORT     (C-ARM#1);  Surgeon: Kris Solano MD;  Location: [facility];  Service:          Family History   Problem Relation Age of Onset   • Thyroid disease Mother    • Hypertension Mother    • Hypertension Father    • Hypertension Other          Social History     Socioeconomic History   • Marital status:      Spouse name: Not on file   • Number of children: Not on file   • Years of education: Not on file   • Highest education level: Not on file   Tobacco Use   • Smoking status: Former Smoker     Packs/day: 1.25     Years: 38.00     Pack years: 47.50     Last attempt to quit: 2017     Years since quittin.0   • Smokeless tobacco: Never Used   • Tobacco comment: 2018 - Patient confirmed he ceased utilization of tobacco products 2017.   Substance and Sexual Activity   • Alcohol use: No   • Drug use: No   • Sexual activity: Defer         Current Outpatient Medications   Medication Sig Dispense Refill   • amLODIPine (NORVASC) 10 MG tablet Take 1 tablet by mouth Daily. 30 tablet 11   • atorvastatin (LIPITOR) 20 MG tablet Take 1 tablet by mouth Daily. 90 tablet 3   • Cholecalciferol 51310 units tablet 50 thousand units po q  month 3  tablet 3   • clopidogrel (PLAVIX) 75 MG tablet Take 75 mg by mouth Daily. Last dose approx greater than one month ago     • docusate sodium (COLACE) 100 MG capsule Take 1 capsule by mouth 2 (Two) Times a Day As Needed for Constipation. 60 capsule 2   • DULoxetine (CYMBALTA) 60 MG capsule Take 60 mg by mouth Daily.     • ferrous sulfate, as mg of FE, (VISHAL-IN-SOL) 75 (15 Fe) MG/ML drops 25 mL by Per G Tube route Daily. 150 mL 2   • levothyroxine (SYNTHROID) 75 MCG tablet Take 1 tab daily Monday to Saturday and 2 on Sunday 34 tablet 11   • losartan (COZAAR) 100 MG tablet Take 1 tablet by mouth Daily. 30 tablet 11   • metoprolol succinate XL (TOPROL-XL) 25 MG 24 hr tablet TAKE 1 TABLET BY MOUTH AT BEDTIME 30 tablet 12   • Multiple Vitamins-Minerals (MULTIVITAMIN ADULT PO) Take 1 tablet by mouth Daily.     • mupirocin (BACTROBAN) 2 % ointment Apply  topically 2 (Two) Times a Day. 30 g 2   • POTASSIUM PO Take  by mouth.     • sodium chloride (NS) 0.9 % irrigation USE FOR TRACHEOSTOMY AS DIRECTED 1000 mL 8   • spironolactone (ALDACTONE) 25 MG tablet Take 0.5 tablets by mouth Daily. 15 tablet 6   • traZODone (DESYREL) 100 MG tablet Take 100 mg by mouth Every Night.     • triamcinolone (KENALOG) 0.1 % cream Apply  topically to the appropriate area as directed 2 (Two) Times a Day. 45 g 2   • vitamin B-12 (CYANOCOBALAMIN) 100 MCG tablet Take 100 mcg by mouth Daily.       No current facility-administered medications for this visit.          Review of Systems     Constitution: Denies any fatigue, fever or chills    HENT: Denies any headache, hearing impairment,     Eyes: Denies any blurring of vision, or photophobia     Cardivascular - As per history of present illness     Respiratory system- shortness of breath NYHA class II     Endocrine:  history of hyperlipidemia                       Musculoskeletal:   history of arthritis with musculoskeletal problems    Gastrointestinal: No nausea, vomiting, or melena    Genitourinary: No  "dysuria or hematuria    Neurological:   No history of seizure disorder, stroke, memory problems    Psychiatric/Behavioral:        No history of depression    Hematological- had laryngeal carcinoma status post radiation and chemotherapy            OBJECTIVE    /78   Pulse 68   Ht 182.9 cm (72.01\")   Wt 103 kg (228 lb)   SpO2 98%   BMI 30.92 kg/m²       Physical Exam     Constitutional: is oriented to person, place, and time.     Skin-warm and dry    Well developed and nourished in no acute distress      Head: Normocephalic and atraumatic.     Eyes: Pupils are equal    Neck: Tracheostomy tube in place    Cardiovascular: [place] in the fifth intercostal space   Regular rate, and  Rhythm,    S1 greater than S2, no S3 or S4, no gallop     Pulmonary/Chest:   Air  Entry is equal on both sides  No wheezing or crackles,      Abdominal: Soft.  No hepatosplenomegaly, PEG tube in situ    Musculoskeletal: No kyphoscoliosis, no significant thickening of the joints    Neurological: is alert and oriented to person, place, and time.    cranial nerve are intact .   No motor or sensory deficit    Extremities-no edema, no radial femoral delay      Psychiatric: He has a normal mood and affect.                  His behavior is normal.           Procedures      Lab Results   Component Value Date    WBC 9.57 02/04/2019    HGB 14.0 02/04/2019    HCT 41.5 02/04/2019    MCV 83.2 02/04/2019     02/04/2019     Lab Results   Component Value Date    GLUCOSE 87 02/04/2019    BUN 22 (H) 02/04/2019    CREATININE 1.32 (H) 02/04/2019    EGFRIFNONA 56 02/04/2019    BCR 16.7 02/04/2019    CO2 30.0 02/04/2019    CALCIUM 9.7 02/04/2019    ALBUMIN 4.50 02/04/2019    AST 92 (H) 02/04/2019    ALT 26 02/04/2019       Lab Results   Component Value Date    HGBA1C 5.52 02/15/2017     Lab Results   Component Value Date    TSH 6.590 (H) 02/04/2019                  A/P    Status post aortic dissection of the ascending aorta with repair and aortic " valve resuspension doing well no problems.    Hypertension-on amlodipine, losartan, Aldactone, and Toprol-XL and his blood pressure is well controlled    On antiplatelet therapy with Plavix, as he has history of DVT.    Laryngeal carcinoma-being followed at the [facility].  And has tracheostomy and also feeding tube since he is cancer free they are planning to remove that and he can proceed that with mild to moderate risk by ACC/ AHA guidelines    Hyperlipidemia started him on atorvastatin 20 mg daily    Follow-up in 6 months            This document has been electronically signed by Liborio Chandra MD on March 21, 2019 10:24 AM       EMR Dragon/Transcription disclaimer:   Some of this note may be an electronic transcription/translation of spoken language to printed text. The electronic translation of spoken language may permit erroneous, or at times, nonsensical words or phrases to be inadvertently transcribed; Although I have reviewed the note for such errors, some may still exist.

## 2019-03-24 DIAGNOSIS — E53.8 B12 DEFICIENCY: ICD-10-CM

## 2019-03-24 DIAGNOSIS — E55.9 VITAMIN D DEFICIENCY: ICD-10-CM

## 2019-03-24 DIAGNOSIS — E06.3 HYPOTHYROIDISM DUE TO HASHIMOTO'S THYROIDITIS: ICD-10-CM

## 2019-03-24 DIAGNOSIS — E03.8 HYPOTHYROIDISM DUE TO HASHIMOTO'S THYROIDITIS: ICD-10-CM

## 2019-03-25 RX ORDER — LEVOTHYROXINE SODIUM 0.07 MG/1
TABLET ORAL
Qty: 30 TABLET | Refills: 11 | Status: SHIPPED | OUTPATIENT
Start: 2019-03-25 | End: 2019-03-29

## 2019-03-29 ENCOUNTER — OFFICE VISIT (OUTPATIENT)
Dept: ENDOCRINOLOGY | Facility: CLINIC | Age: 59
End: 2019-03-29

## 2019-03-29 VITALS
HEIGHT: 72 IN | DIASTOLIC BLOOD PRESSURE: 78 MMHG | HEART RATE: 63 BPM | WEIGHT: 230.6 LBS | BODY MASS INDEX: 31.23 KG/M2 | SYSTOLIC BLOOD PRESSURE: 132 MMHG | OXYGEN SATURATION: 94 %

## 2019-03-29 DIAGNOSIS — E53.8 B12 DEFICIENCY: ICD-10-CM

## 2019-03-29 DIAGNOSIS — E06.3 HYPOTHYROIDISM DUE TO HASHIMOTO'S THYROIDITIS: Primary | ICD-10-CM

## 2019-03-29 DIAGNOSIS — E03.8 HYPOTHYROIDISM DUE TO HASHIMOTO'S THYROIDITIS: Primary | ICD-10-CM

## 2019-03-29 DIAGNOSIS — E55.9 VITAMIN D DEFICIENCY: ICD-10-CM

## 2019-03-29 PROCEDURE — 99214 OFFICE O/P EST MOD 30 MIN: CPT | Performed by: INTERNAL MEDICINE

## 2019-03-29 RX ORDER — VITAMIN B COMPLEX
1 CAPSULE ORAL DAILY
COMMUNITY

## 2019-03-29 RX ORDER — LEVOTHYROXINE SODIUM 112 MCG
112 TABLET ORAL DAILY
Qty: 30 TABLET | Refills: 11 | Status: SHIPPED | OUTPATIENT
Start: 2019-03-29 | End: 2019-07-30

## 2019-03-29 RX ORDER — LEVOTHYROXINE SODIUM 112 MCG
112 TABLET ORAL DAILY
Qty: 30 TABLET | Refills: 11 | Status: SHIPPED | OUTPATIENT
Start: 2019-03-29 | End: 2019-03-29 | Stop reason: SDUPTHER

## 2019-03-29 NOTE — PROGRESS NOTES
Subjective    Truman Esquivel is a 58 y.o. male. he is here today for follow-up.    Hypothyroidism   Pertinent negatives include no abdominal pain, arthralgias, chest pain, coughing, diaphoresis, fatigue, headaches, joint swelling, myalgias, nausea, neck pain, numbness, rash, sore throat, vomiting or weakness.          Primary Care Provider     Marybeth Harrison,       Hypothyroidism      Duration 10 years     Timing - Hypothyroidism  is Constant     Quality -  needs improvement       Severity -  moderate     Complications - none     Current symptoms/problems  fatigue, weakness    Weight loss -- 60 lbs since December , no further weight loss  Now has gained weight      Alleviating Factors: Compliance      Aggravating Factors some degree of compromised absorption through gastric tube     Side Effects  none         Evaluation history:  TSH   Date Value Ref Range Status   03/21/2019 12.400 (H) 0.460 - 4.680 mIU/mL Final     Free T4   Date Value Ref Range Status   02/18/2017 0.68 (L) 0.93 - 1.70 ng/dL Final       Current medications:  Current Outpatient Medications   Medication Sig Dispense Refill   • amLODIPine (NORVASC) 10 MG tablet Take 1 tablet by mouth Daily. 30 tablet 11   • atorvastatin (LIPITOR) 20 MG tablet Take 1 tablet by mouth Daily. 90 tablet 3   • Cholecalciferol 98029 units tablet 50 thousand units po q  month 3 tablet 3   • clopidogrel (PLAVIX) 75 MG tablet Take 75 mg by mouth Daily. Last dose approx greater than one month ago     • docusate sodium (COLACE) 100 MG capsule Take 1 capsule by mouth 2 (Two) Times a Day As Needed for Constipation. 60 capsule 2   • DULoxetine (CYMBALTA) 60 MG capsule Take 60 mg by mouth Daily.     • ferrous sulfate, as mg of FE, (VISHAL-IN-SOL) 75 (15 Fe) MG/ML drops 25 mL by Per G Tube route Daily. 150 mL 2   • levothyroxine (SYNTHROID) 75 MCG tablet Take 1 tab daily Monday to Saturday and 2 on Sunday 34 tablet 11   • levothyroxine (SYNTHROID, LEVOTHROID) 75 MCG tablet TAKE  1 TABLET BY MOUTH ONCE DAILY 30 tablet 11   • losartan (COZAAR) 100 MG tablet Take 1 tablet by mouth Daily. 30 tablet 11   • metoprolol succinate XL (TOPROL-XL) 25 MG 24 hr tablet TAKE 1 TABLET BY MOUTH AT BEDTIME 30 tablet 12   • Multiple Vitamins-Minerals (MULTIVITAMIN ADULT PO) Take 1 tablet by mouth Daily.     • mupirocin (BACTROBAN) 2 % ointment Apply  topically 2 (Two) Times a Day. 30 g 2   • POTASSIUM PO Take  by mouth.     • sodium chloride (NS) 0.9 % irrigation USE FOR TRACHEOSTOMY AS DIRECTED 1000 mL 8   • spironolactone (ALDACTONE) 25 MG tablet Take 0.5 tablets by mouth Daily. 15 tablet 6   • traZODone (DESYREL) 100 MG tablet Take 100 mg by mouth Every Night.     • triamcinolone (KENALOG) 0.1 % cream Apply  topically to the appropriate area as directed 2 (Two) Times a Day. 45 g 2   • vitamin B-12 (CYANOCOBALAMIN) 100 MCG tablet Take 100 mcg by mouth Daily.       No current facility-administered medications for this visit.        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   • Stroke (CMS/HCC)    • Swallowing difficulty      Past Surgical History:   Procedure Laterality Date   • AORTA SURGERY      ruptured aorta repair   • ASCENDING ARCH/HEMIARCH REPLACEMENT N/A 2/14/2017    Procedure: INTRAOPERATIVE TRANSESOPHAGEAL ECHOCARDIOGRAM, MIDLINE STERNOTOMY, ASCENDING AORTIC  AND PROXIMAL AORTIC ARCH REPAIR WITH 26MM GRAFT, AORTIC VALVE RESUSPENSION, AORTIC ROOT REPAIR, OPEN VEIN HARVEST OF RIGHT LEG;  Surgeon: German Arreguin MD;  [place] [facility] [place];  Service:    • BRONCHOSCOPY N/A 2/17/2017    Procedure: BRONCHOSCOPY BIOPSY AT BEDSIDE WITH BAL-LEFT LOWER LOBE;  Surgeon: Trent Chaney MD;  [place] [facility] [place];   Service:    • COLONOSCOPY N/A 3/7/2018    Procedure: COLONOSCOPY WITH POSSIBLE POLYPECTOMY   ( DONE IN OR WITH ENDO)      COLONOSCOPY FIRST;  Surgeon: Kris Solano MD;  Location: [facility] OR;  Service:    • DIRECT LARYNGOSCOPY, ESOPHAGOSCOPY, BRONCHOSCOPY N/A 2/5/2018    Procedure: DIRECT LARYNGOSCOPY WITH BIOPSY, ESOPHAGOSCOPY     (no bronchoscopy, no laser);  Surgeon: Joseph Venegas MD;  Location: [facility] OR;  Service:    • ENDOSCOPY W/ PEG TUBE PLACEMENT N/A 5/2/2018    Procedure: ESOPHAGOGASTRODUODENOSCOPY WITH PERCUTANEOUS ENDOSCOPIC GASTROSTOMY TUBE INSERTION;  Surgeon: Angel Maciel MD;  Location: [facility] ENDOSCOPY;  Service: Gastroenterology   • ENDOSCOPY W/ PEG TUBE PLACEMENT N/A 10/26/2018    Procedure: ESOPHAGOGASTRODUODENOSCOPY WITH PERCUTANEOUS ENDOSCOPIC GASTROSTOMY TUBE INSERTION WITH ANESTHESIA Possible open gastrostomy;  Surgeon: Kris Solano MD;  Location: [facility] OR;  Service: General   • GASTROSTOMY FEEDING TUBE INSERTION N/A 10/26/2018    Procedure: GASTROSTOMY FEEDING TUBE INSERTION;  Surgeon: Kris Solano MD;  Location: [facility] OR;  Service: General   • SPLENECTOMY N/A 7/26/2018    Procedure: SPLENECTOMY, left chest tube placement, EXPLORATORY LAPAROTOMY, G-TUBE PALCEMENT;  Surgeon: Kris Solano MD;  Location: [facility] OR;  Service: General   • SPLENECTOMY      2018   • THORACOSCOPY Left 7/31/2018    Procedure: LEFT THORACOSCOPY VIDEO ASSISTED POSSIBLE THORACOTOMY possible decortication bronchoscopy;  Surgeon: Joshua Pollock MD;  Location: [facility] OR;  Service: Cardiothoracic   • TRACHEOSTOMY  02/05/2018   • TRACHEOSTOMY N/A 2/5/2018    Procedure: TRACHEOSTOMY  local injection @ 1106 incision @ 1119;  Surgeon: Joseph Venegas MD;  Location: [facility] OR;  Service:    • VENOUS ACCESS DEVICE (PORT) INSERTION N/A 3/7/2018    Procedure: INSERTION OF MEDIPORT     (C-ARM#1);  Surgeon: Kris Solano MD;  Location: [facility] OR;   Service:      Family History   Problem Relation Age of Onset   • Thyroid disease Mother    • Hypertension Mother    • Hypertension Father    • Hypertension Other        Allergies   Allergen Reactions   • Chlorhexidine Itching     Topical cleaning wipes causes severe skin irritation   • Eggs Or Egg-Derived Products Swelling   • Erythromycin Other (See Comments)     Joint pains and cold sweats   • Gabapentin Itching   • Other GI Intolerance     Mycins  farzad lotion causes rash   • Acth [Corticotropin] Rash   • Cephalosporins Itching and Rash     Pt developed rash, itching after cefepime administration (2-3 doses) during 2017 admission. Itching was relieved with benadryl. Cefepime was continued because his infection was improving and no symptoms of anaphylaxis present   • Co Q 10 [Coenzyme Q10] Rash   • Keflex [Cephalexin] Rash   • Neomycin Rash   • Penicillins Rash     Tolerated cefepime during 2017 admission. Had rash and itching (relieved by benadryl) after few doses of cefepime and cefepime was continued.   • Tetracyclines & Related GI Intolerance     Social History     Socioeconomic History   • Marital status:      Spouse name: Not on file   • Number of children: Not on file   • Years of education: Not on file   • Highest education level: Not on file   Tobacco Use   • Smoking status: Former Smoker     Packs/day: 1.25     Years: 38.00     Pack years: 47.50     Last attempt to quit: 2017     Years since quittin.1   • Smokeless tobacco: Never Used   • Tobacco comment: 2018 - Patient confirmed he ceased utilization of tobacco products 2017.   Substance and Sexual Activity   • Alcohol use: No   • Drug use: No   • Sexual activity: Defer       Review of Systems  Review of Systems   Constitutional: Negative for activity change, appetite change, diaphoresis and fatigue.   HENT: Negative for facial swelling, sneezing, sore throat, tinnitus, trouble swallowing and voice change.    Eyes:  Negative for photophobia, pain, discharge, redness, itching and visual disturbance.   Respiratory: Negative for apnea, cough, choking, chest tightness and shortness of breath.    Cardiovascular: Negative for chest pain, palpitations and leg swelling.   Gastrointestinal: Negative for abdominal distention, abdominal pain, constipation, diarrhea, nausea and vomiting.   Endocrine: Negative for cold intolerance, heat intolerance, polydipsia, polyphagia and polyuria.   Genitourinary: Negative for difficulty urinating, dysuria, frequency, hematuria and urgency.   Musculoskeletal: Negative for arthralgias, back pain, gait problem, joint swelling, myalgias, neck pain and neck stiffness.   Skin: Negative for color change, pallor, rash and wound.   Neurological: Negative for dizziness, tremors, weakness, light-headedness, numbness and headaches.   Hematological: Negative for adenopathy. Bruises/bleeds easily.   Psychiatric/Behavioral: Negative for behavioral problems, confusion and sleep disturbance.        Objective    There were no vitals taken for this visit.  Physical Exam   Constitutional: He is oriented to person, place, and time. He appears well-developed and well-nourished. No distress.   HENT:   Head: Normocephalic and atraumatic.   Right Ear: External ear normal.   Left Ear: External ear normal.   Nose: Nose normal.   Has tracheostomy   Eyes: Conjunctivae and EOM are normal. Pupils are equal, round, and reactive to light.   Neck: Normal range of motion. Neck supple. No tracheal deviation present. No thyromegaly present.   Cardiovascular: Normal rate, regular rhythm and normal heart sounds.   No murmur heard.  Pulmonary/Chest: Effort normal and breath sounds normal. No respiratory distress. He has no wheezes.   Left sided port in place   Abdominal: Soft. Bowel sounds are normal. There is no tenderness. There is no rebound and no guarding.   Has G tube in place, mild erythema and skin irritation   Musculoskeletal:  Normal range of motion. He exhibits no edema, tenderness or deformity.   Neurological: He is alert and oriented to person, place, and time. No cranial nerve deficit.   Skin: Skin is warm and dry. No rash noted.   Psychiatric: He has a normal mood and affect. His behavior is normal. Judgment and thought content normal.       Lab Review  Lab Results   Component Value Date    TSH 12.400 (H) 03/21/2019     Lab Results   Component Value Date    FREET4 0.68 (L) 02/18/2017        Assessment/Plan      No diagnosis found.. This diagnosis was discussed and reviewed with the patient including the advantages of drug therapy.     1. Orders placed during this encounter include:  No orders of the defined types were placed in this encounter.      Medications prescribed:  Outpatient Encounter Medications as of 3/29/2019   Medication Sig Dispense Refill   • amLODIPine (NORVASC) 10 MG tablet Take 1 tablet by mouth Daily. 30 tablet 11   • atorvastatin (LIPITOR) 20 MG tablet Take 1 tablet by mouth Daily. 90 tablet 3   • Cholecalciferol 79123 units tablet 50 thousand units po q  month 3 tablet 3   • clopidogrel (PLAVIX) 75 MG tablet Take 75 mg by mouth Daily. Last dose approx greater than one month ago     • docusate sodium (COLACE) 100 MG capsule Take 1 capsule by mouth 2 (Two) Times a Day As Needed for Constipation. 60 capsule 2   • DULoxetine (CYMBALTA) 60 MG capsule Take 60 mg by mouth Daily.     • ferrous sulfate, as mg of FE, (VISHAL-IN-SOL) 75 (15 Fe) MG/ML drops 25 mL by Per G Tube route Daily. 150 mL 2   • levothyroxine (SYNTHROID) 75 MCG tablet Take 1 tab daily Monday to Saturday and 2 on Sunday 34 tablet 11   • levothyroxine (SYNTHROID, LEVOTHROID) 75 MCG tablet TAKE 1 TABLET BY MOUTH ONCE DAILY 30 tablet 11   • losartan (COZAAR) 100 MG tablet Take 1 tablet by mouth Daily. 30 tablet 11   • metoprolol succinate XL (TOPROL-XL) 25 MG 24 hr tablet TAKE 1 TABLET BY MOUTH AT BEDTIME 30 tablet 12   • Multiple Vitamins-Minerals  (MULTIVITAMIN ADULT PO) Take 1 tablet by mouth Daily.     • mupirocin (BACTROBAN) 2 % ointment Apply  topically 2 (Two) Times a Day. 30 g 2   • POTASSIUM PO Take  by mouth.     • sodium chloride (NS) 0.9 % irrigation USE FOR TRACHEOSTOMY AS DIRECTED 1000 mL 8   • spironolactone (ALDACTONE) 25 MG tablet Take 0.5 tablets by mouth Daily. 15 tablet 6   • traZODone (DESYREL) 100 MG tablet Take 100 mg by mouth Every Night.     • triamcinolone (KENALOG) 0.1 % cream Apply  topically to the appropriate area as directed 2 (Two) Times a Day. 45 g 2   • vitamin B-12 (CYANOCOBALAMIN) 100 MCG tablet Take 100 mcg by mouth Daily.       No facility-administered encounter medications on file as of 3/29/2019.         Patient has hypothyroidism for 10 years and his present symptoms include fatigue, weakness and weight gain      Was on 88 and we decreased to 75     TSH    Lab Results   Component Value Date    TSH 12.400 (H) 03/21/2019    TSH 6.590 (H) 02/04/2019    TSH 11.600 (H) 10/23/2018       Lab Results   Component Value Date    FREET4 0.68 (L) 02/18/2017     Doing 75 x 8 tabs per week    Was taking it with iron but now on empty stomach    Change to bran name 112 mcgs daily     He is taking through G Tube     Lab Results   Component Value Date    [id number] 54.5 02/04/2019    [id number] 57.7 10/23/2018    [id number] 27.4 (L) 07/10/2018       Lab Results   Component Value Date    [id number] 962 (H) 12/12/2018     Normalized on 50 th u monthly  ---    Anemia of low iron     Now improved on liquid iron     Take apart from thyroid pill     --    HTN on losartan - hctz , creat slight elevation    Change to losartan only     As he improved and gained weight after recovering from cancer   His bp increased but now nl     He is also on 12.5 mg of aldactone, amlodipine and toprol    Recheck TSH and BMP in 6 weeks         4. No Follow-up on file.

## 2019-04-12 ENCOUNTER — OFFICE VISIT (OUTPATIENT)
Dept: ONCOLOGY | Facility: CLINIC | Age: 59
End: 2019-04-12

## 2019-04-12 ENCOUNTER — TELEPHONE (OUTPATIENT)
Dept: FAMILY MEDICINE CLINIC | Facility: CLINIC | Age: 59
End: 2019-04-12

## 2019-04-12 ENCOUNTER — INFUSION (OUTPATIENT)
Dept: ONCOLOGY | Facility: HOSPITAL | Age: 59
End: 2019-04-12

## 2019-04-12 VITALS
WEIGHT: 228.6 LBS | TEMPERATURE: 98.6 F | RESPIRATION RATE: 20 BRPM | DIASTOLIC BLOOD PRESSURE: 85 MMHG | OXYGEN SATURATION: 94 % | HEIGHT: 72 IN | SYSTOLIC BLOOD PRESSURE: 168 MMHG | HEART RATE: 56 BPM | BODY MASS INDEX: 30.96 KG/M2

## 2019-04-12 DIAGNOSIS — D64.9 ANEMIA, UNSPECIFIED TYPE: ICD-10-CM

## 2019-04-12 DIAGNOSIS — D72.828 OTHER ELEVATED WHITE BLOOD CELL (WBC) COUNT: ICD-10-CM

## 2019-04-12 DIAGNOSIS — C32.9 SQUAMOUS CELL CARCINOMA OF LARYNX (HCC): Primary | ICD-10-CM

## 2019-04-12 DIAGNOSIS — Z45.2 ENCOUNTER FOR VENOUS ACCESS DEVICE CARE: ICD-10-CM

## 2019-04-12 LAB
ANION GAP SERPL CALCULATED.3IONS-SCNC: 12 MMOL/L
BASOPHILS # BLD AUTO: 0.11 10*3/MM3 (ref 0–0.2)
BASOPHILS NFR BLD AUTO: 1 % (ref 0–1.5)
BUN BLD-MCNC: 19 MG/DL (ref 6–20)
BUN/CREAT SERPL: 15.1 (ref 7–25)
CALCIUM SPEC-SCNC: 9.6 MG/DL (ref 8.6–10.5)
CHLORIDE SERPL-SCNC: 102 MMOL/L (ref 98–107)
CO2 SERPL-SCNC: 25 MMOL/L (ref 22–29)
CREAT BLD-MCNC: 1.26 MG/DL (ref 0.76–1.27)
DEPRECATED RDW RBC AUTO: 46.5 FL (ref 37–54)
EOSINOPHIL # BLD AUTO: 0.6 10*3/MM3 (ref 0–0.4)
EOSINOPHIL NFR BLD AUTO: 5.4 % (ref 0.3–6.2)
ERYTHROCYTE [DISTWIDTH] IN BLOOD BY AUTOMATED COUNT: 15.3 % (ref 12.3–15.4)
FERRITIN SERPL-MCNC: 68.34 NG/ML (ref 30–400)
FOLATE SERPL-MCNC: >20 NG/ML (ref 4.78–24.2)
GFR SERPL CREATININE-BSD FRML MDRD: 59 ML/MIN/1.73
GLUCOSE BLD-MCNC: 103 MG/DL (ref 65–99)
HCT VFR BLD AUTO: 42.6 % (ref 37.5–51)
HGB BLD-MCNC: 14.3 G/DL (ref 13–17.7)
IMM GRANULOCYTES # BLD AUTO: 0.03 10*3/MM3 (ref 0–0.05)
IMM GRANULOCYTES NFR BLD AUTO: 0.3 % (ref 0–0.5)
IRON 24H UR-MRATE: 103 MCG/DL (ref 59–158)
IRON SATN MFR SERPL: 26 % (ref 20–50)
LYMPHOCYTES # BLD AUTO: 1.12 10*3/MM3 (ref 0.7–3.1)
LYMPHOCYTES NFR BLD AUTO: 10.1 % (ref 19.6–45.3)
MAGNESIUM SERPL-MCNC: 2.1 MG/DL (ref 1.6–2.6)
MCH RBC QN AUTO: 27.9 PG (ref 26.6–33)
MCHC RBC AUTO-ENTMCNC: 33.6 G/DL (ref 31.5–35.7)
MCV RBC AUTO: 83.2 FL (ref 79–97)
MONOCYTES # BLD AUTO: 1.01 10*3/MM3 (ref 0.1–0.9)
MONOCYTES NFR BLD AUTO: 9.1 % (ref 5–12)
NEUTROPHILS # BLD AUTO: 8.26 10*3/MM3 (ref 1.4–7)
NEUTROPHILS NFR BLD AUTO: 74.1 % (ref 42.7–76)
NRBC BLD AUTO-RTO: 0 /100 WBC (ref 0–0)
PLATELET # BLD AUTO: 334 10*3/MM3 (ref 140–450)
PMV BLD AUTO: 9.6 FL (ref 6–12)
POTASSIUM BLD-SCNC: 4 MMOL/L (ref 3.5–5.2)
RBC # BLD AUTO: 5.12 10*6/MM3 (ref 4.14–5.8)
SODIUM BLD-SCNC: 139 MMOL/L (ref 136–145)
TIBC SERPL-MCNC: 396 MCG/DL (ref 298–536)
TRANSFERRIN SERPL-MCNC: 266 MG/DL (ref 200–360)
VIT B12 BLD-MCNC: >2000 PG/ML (ref 211–946)
WBC NRBC COR # BLD: 11.13 10*3/MM3 (ref 3.4–10.8)

## 2019-04-12 PROCEDURE — G8417 CALC BMI ABV UP PARAM F/U: HCPCS | Performed by: INTERNAL MEDICINE

## 2019-04-12 PROCEDURE — 80048 BASIC METABOLIC PNL TOTAL CA: CPT | Performed by: INTERNAL MEDICINE

## 2019-04-12 PROCEDURE — 82607 VITAMIN B-12: CPT | Performed by: INTERNAL MEDICINE

## 2019-04-12 PROCEDURE — 85025 COMPLETE CBC W/AUTO DIFF WBC: CPT | Performed by: INTERNAL MEDICINE

## 2019-04-12 PROCEDURE — 99214 OFFICE O/P EST MOD 30 MIN: CPT | Performed by: INTERNAL MEDICINE

## 2019-04-12 PROCEDURE — 36591 DRAW BLOOD OFF VENOUS DEVICE: CPT | Performed by: INTERNAL MEDICINE

## 2019-04-12 PROCEDURE — 83735 ASSAY OF MAGNESIUM: CPT | Performed by: INTERNAL MEDICINE

## 2019-04-12 PROCEDURE — 1123F ACP DISCUSS/DSCN MKR DOCD: CPT | Performed by: INTERNAL MEDICINE

## 2019-04-12 PROCEDURE — 83540 ASSAY OF IRON: CPT | Performed by: INTERNAL MEDICINE

## 2019-04-12 PROCEDURE — 82746 ASSAY OF FOLIC ACID SERUM: CPT | Performed by: INTERNAL MEDICINE

## 2019-04-12 PROCEDURE — 82728 ASSAY OF FERRITIN: CPT | Performed by: INTERNAL MEDICINE

## 2019-04-12 PROCEDURE — 84466 ASSAY OF TRANSFERRIN: CPT | Performed by: INTERNAL MEDICINE

## 2019-04-12 RX ORDER — SODIUM CHLORIDE 0.9 % (FLUSH) 0.9 %
10 SYRINGE (ML) INJECTION AS NEEDED
Status: DISCONTINUED | OUTPATIENT
Start: 2019-04-12 | End: 2019-04-12 | Stop reason: HOSPADM

## 2019-04-12 RX ORDER — IRBESARTAN 300 MG/1
300 TABLET ORAL DAILY
Qty: 30 TABLET | Refills: 11 | Status: SHIPPED | OUTPATIENT
Start: 2019-04-12 | End: 2020-04-13

## 2019-04-12 RX ORDER — SODIUM CHLORIDE 0.9 % (FLUSH) 0.9 %
10 SYRINGE (ML) INJECTION AS NEEDED
Status: CANCELLED | OUTPATIENT
Start: 2019-08-09

## 2019-04-12 RX ADMIN — Medication 500 UNITS: at 10:29

## 2019-04-12 RX ADMIN — SODIUM CHLORIDE, PRESERVATIVE FREE 10 ML: 5 INJECTION INTRAVENOUS at 10:01

## 2019-04-12 RX ADMIN — SODIUM CHLORIDE, PRESERVATIVE FREE 10 ML: 5 INJECTION INTRAVENOUS at 10:29

## 2019-04-12 NOTE — TELEPHONE ENCOUNTER
Patient called and is asking why his losartan was changed to irbesartan please call patient at [phone]

## 2019-04-12 NOTE — PROGRESS NOTES
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  Upon staging workup patient was found to have stage III squamous cell cancer of larynx for which she has been started on concurrent chemoradiation with weekly carboplatin and Taxol on 2018.  Last dose of chemotherapy and radiation was on May 15, 2018.  Patient is here for follow-up appointment today.  States he feels better.  Denies any active bleeding.  Denies any new lymph node enlargement.    PAST MEDICAL HISTORY:    Past Medical History:   Diagnosis Date   • Aortic dissection (CMS/HCC)    • Chest pain    • Disease of thyroid gland    • HTN (hypertension)    • Knee pain    • Sleep apnea    • Smoker    • Squamous cell carcinoma of larynx (CMS/HCC) 2018   • Stroke (CMS/HCC)    • Swallowing difficulty        SOCIAL HISTORY:    Social History     Tobacco Use   • Smoking status: Former Smoker     Packs/day: 1.25     Years: 38.00     Pack years: 47.50     Last attempt to quit: 2017     Years since quittin.1   • Smokeless tobacco: Never Used   • Tobacco comment: 2018 - Patient confirmed he ceased utilization of tobacco products 2017.   Substance Use Topics   • Alcohol use: No   • Drug use: No       Surgical History :  Past Surgical History:   Procedure Laterality Date   • AORTA SURGERY      ruptured aorta repair   • ASCENDING ARCH/HEMIARCH REPLACEMENT N/A 2017    Procedure: INTRAOPERATIVE TRANSESOPHAGEAL ECHOCARDIOGRAM, MIDLINE STERNOTOMY, ASCENDING AORTIC  AND PROXIMAL AORTIC ARCH REPAIR WITH 26MM GRAFT, AORTIC  VALVE RESUSPENSION, AORTIC ROOT REPAIR, OPEN VEIN HARVEST OF RIGHT LEG;  Surgeon: German Arreguin MD;  Location: [facility] MAIN OR;  Service:    • BRONCHOSCOPY N/A 2/17/2017    Procedure: BRONCHOSCOPY BIOPSY AT BEDSIDE WITH BAL-LEFT LOWER LOBE;  Surgeon: Trent Chaney MD;  Location: [facility] ENDOSCOPY;  Service:    • COLONOSCOPY N/A 3/7/2018    Procedure: COLONOSCOPY WITH POSSIBLE POLYPECTOMY   ( DONE IN OR WITH ENDO)      COLONOSCOPY FIRST;  Surgeon: Kris Solano MD;  Location: [facility] OR;  Service:    • DIRECT LARYNGOSCOPY, ESOPHAGOSCOPY, BRONCHOSCOPY N/A 2/5/2018    Procedure: DIRECT LARYNGOSCOPY WITH BIOPSY, ESOPHAGOSCOPY     (no bronchoscopy, no laser);  Surgeon: Joseph Venegas MD;  Location: [facility] OR;  Service:    • ENDOSCOPY W/ PEG TUBE PLACEMENT N/A 5/2/2018    Procedure: ESOPHAGOGASTRODUODENOSCOPY WITH PERCUTANEOUS ENDOSCOPIC GASTROSTOMY TUBE INSERTION;  Surgeon: Angel Maciel MD;  Location: [facility] ENDOSCOPY;  Service: Gastroenterology   • ENDOSCOPY W/ PEG TUBE PLACEMENT N/A 10/26/2018    Procedure: ESOPHAGOGASTRODUODENOSCOPY WITH PERCUTANEOUS ENDOSCOPIC GASTROSTOMY TUBE INSERTION WITH ANESTHESIA Possible open gastrostomy;  Surgeon: Kris Solano MD;  Location: [facility] OR;  Service: General   • GASTROSTOMY FEEDING TUBE INSERTION N/A 10/26/2018    Procedure: GASTROSTOMY FEEDING TUBE INSERTION;  Surgeon: Kris Solano MD;  Location: [facility] OR;  Service: General   • SPLENECTOMY N/A 7/26/2018    Procedure: SPLENECTOMY, left chest tube placement, EXPLORATORY LAPAROTOMY, G-TUBE PALCEMENT;  Surgeon: Kris Solano MD;  Location: [facility] OR;  Service: General   • SPLENECTOMY      2018   • THORACOSCOPY Left 7/31/2018    Procedure: LEFT THORACOSCOPY VIDEO ASSISTED POSSIBLE THORACOTOMY possible decortication bronchoscopy;  Surgeon: Joshua Pollock MD;  Location: [facility] OR;  Service: Cardiothoracic   • TRACHEOSTOMY  02/05/2018   • TRACHEOSTOMY N/A 2/5/2018     Procedure: TRACHEOSTOMY  local injection @ 1106 incision @ 1119;  Surgeon: Joseph Venegas MD;  Location: [facility];  Service:    • VENOUS ACCESS DEVICE (PORT) INSERTION N/A 3/7/2018    Procedure: INSERTION OF MEDIPORT     (C-ARM#1);  Surgeon: Kris Solano MD;  Location: [facility];  Service:        ALLERGIES:    Allergies   Allergen Reactions   • Chlorhexidine Itching     Topical cleaning wipes causes severe skin irritation   • Eggs Or Egg-Derived Products Swelling   • Erythromycin Other (See Comments)     Joint pains and cold sweats   • Gabapentin Itching   • Other GI Intolerance     Mycins  farzad lotion causes rash   • Acth [Corticotropin] Rash   • Cephalosporins Itching and Rash     Pt developed rash, itching after cefepime administration (2-3 doses) during 2/2017 admission. Itching was relieved with benadryl. Cefepime was continued because his infection was improving and no symptoms of anaphylaxis present   • Co Q 10 [Coenzyme Q10] Rash   • Keflex [Cephalexin] Rash   • Neomycin Rash   • Penicillins Rash     Tolerated cefepime during 2/2017 admission. Had rash and itching (relieved by benadryl) after few doses of cefepime and cefepime was continued.   • Tetracyclines & Related GI Intolerance         FAMILY HISTORY:  Family History   Problem Relation Age of Onset   • Thyroid disease Mother    • Hypertension Mother    • Hypertension Father    • Hypertension Other            REVIEW OF SYSTEMS:      CONSTITUTIONAL:  States his fatigue is improved.  Has gained 16 pounds since last clinic visit. Denies any fever, chills .      HEENT:  Some visual disturbance affecting left eye since stroke in 2017.    Denies any difficulty swallowing food.     RESPIRATORY:  Complains of chronic shortness of breath, denies any recent worsening.  No new cough or hemoptysis.     CARDIOVASCULAR:  No chest pain or palpitations.     GASTROINTESTINAL:  No abdominal pain nausea, vomiting or blood in the  "stool.     GENITOURINARY: No Dysuria or Hematuria.     MUSCULOSKELETAL:  No new back pain or arthralgia..     LYMPHATICS:  Denies any abnormal swollen glands anywhere in the body.     NEUROLOGICAL : No tingling or numbness. No headache or dizziness. No seizures or balance problems.     SKIN: no new skin lesions.          PHYSICAL EXAMINATION:      VITAL SIGNS:  /85 Comment: left arm  Pulse 56   Temp 98.6 °F (37 °C) (Temporal)   Resp 20   Ht 182.9 cm (72.01\")   Wt 104 kg (228 lb 9.6 oz)   SpO2 94%   BMI 31.00 kg/m²       04/12/19  0955   Weight: 104 kg (228 lb 9.6 oz)       ECOG  performance status: 1      GENERAL:  Not in any distress.    HEENT:  Normocephalic, Atraumatic.Mild Conjunctival pallor. No icterus. Extraocular Movements Intact. No Facial Asymmetry noted.     NECK:  No adenopathy. No JVD.Tracheostomy present.    RESPIRATORY:  Fair air entry bilateral. No rhonchi or wheezing.    CARDIOVASCULAR:  S1, S2. Regular rate and rhythm. No murmur or gallop appreciated.  Port-A-Cath present on left chest wall.    ABDOMEN:  Soft, obese, nontender. Bowel sounds present in all four quadrants.  No hepatosplenomegaly appreciated.Well -healed surgical scar present in abdomen.Feeding tube present.    MUSCULOSKELETAL:  No edema.No Calf Tenderness.Decreased range of motion.    NEUROLOGIC:  Alert, awake and oriented ×3.  No  Motor  deficit appreciated. Cranial Nerves 2-12 grossly intact.    SKIN : No new skin lesion.  Skin is warm and moist.    LYMPHATICS: No new enlarged lymph node in neck or supraclavicular area.            DIAGNOSTIC DATA:    Glucose   Date Value Ref Range Status   04/12/2019 103 (H) 65 - 99 mg/dL Final     Glucose, Arterial   Date Value Ref Range Status   07/31/2018 113 (H) 65 - 95 mmol/L Final     Sodium   Date Value Ref Range Status   04/12/2019 139 136 - 145 mmol/L Final     Potassium   Date Value Ref Range Status   04/12/2019 4.0 3.5 - 5.2 mmol/L Final     CO2   Date Value Ref Range Status "   04/12/2019 25.0 22.0 - 29.0 mmol/L Final     Chloride   Date Value Ref Range Status   04/12/2019 102 98 - 107 mmol/L Final     Anion Gap   Date Value Ref Range Status   04/12/2019 12.0 mmol/L Final     Creatinine   Date Value Ref Range Status   04/12/2019 1.26 0.76 - 1.27 mg/dL Final     BUN   Date Value Ref Range Status   04/12/2019 19 6 - 20 mg/dL Final     BUN/Creatinine Ratio   Date Value Ref Range Status   04/12/2019 15.1 7.0 - 25.0 Final     Calcium   Date Value Ref Range Status   04/12/2019 9.6 8.6 - 10.5 mg/dL Final     eGFR Non  Amer   Date Value Ref Range Status   04/12/2019 59 (L) >60 mL/min/1.73 Final     Alkaline Phosphatase   Date Value Ref Range Status   02/04/2019 93 38 - 126 U/L Final     Total Protein   Date Value Ref Range Status   02/04/2019 7.3 6.3 - 8.6 g/dL Final     ALT (SGPT)   Date Value Ref Range Status   02/04/2019 26 21 - 72 U/L Final     AST (SGOT)   Date Value Ref Range Status   02/04/2019 92 (H) 17 - 59 U/L Final     Total Bilirubin   Date Value Ref Range Status   02/04/2019 0.6 0.2 - 1.3 mg/dL Final     Albumin   Date Value Ref Range Status   02/04/2019 4.50 3.40 - 4.80 g/dL Final     Globulin   Date Value Ref Range Status   02/04/2019 2.8 2.3 - 3.5 gm/dL Final     Lab Results   Component Value Date    WBC 11.13 (H) 04/12/2019    HGB 14.3 04/12/2019    HCT 42.6 04/12/2019    MCV 83.2 04/12/2019     04/12/2019     Lab Results   Component Value Date    NEUTROABS 8.26 (H) 04/12/2019    IRON 103 04/12/2019    TIBC 396 04/12/2019    LABIRON 26 04/12/2019    FERRITIN 68.34 04/12/2019    [id number] 962 (H) 12/12/2018    FOLATE >20.00 12/12/2018     No results found for: , LABCA2, AFPTM, HCGQUANT, , CHROMGRNA, [id number], CEA, REFLABREPO        Radiology Data :  PET CT done on February 16, 2018 was reviewed, discussed with patient, it showed:  IMPRESSION:  CONCLUSION:   1.  Soft tissue mass in the region of the epiglottis obstructing  the airway, associated with  hypermetabolic activity, compatible  with known neoplasm.  2.  Activity around the tracheostomy site is likely inflammatory.  3.  Activity in the rectum (SUV max 7.1) is probably either  physiological or inflammatory. Recommend direct visualization.  4.  There is a focus of activity in the region of the right  common iliac artery without a definite CT abnormality (SUV max  5.4) possibly representing a lymph node, nonspecific but cannot  rule out neoplasm.  5.  Foci of activity posterior to the ischial tuberosities are  probably inflammatory.  6.  There are diffuse punctate low-level foci of activity  throughout the osseous structures, liver, kidneys, mediastinum  which are more likely artifactual than due to metastatic disease.    ADDENDUM   ADDENDUM #1         Upon review of the patient's imaging with Dr. Tapia, there  appears to be extension of tumor into the left side of the  preepiglottic space.        Ct of Neck with contrast done on January 29, 2018 showed:  IMPRESSION:  3 x 3.5 x 3.5 cm soft tissue mass with lobular margin centered at  the level the epiglottis however flush 4 cm intimate with the  posterior margin of the tongue base and the posterior margin of  the oral pharynx. This does contribute to moderate compromise of  the airway. Finding is suspicious for malignancy.      Mostly subcentimeter cervical chain lymph nodes present  bilaterally. The largest node right level II measuring 1.3 cm. No  necrotic or conglomerate adenopathy.      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 The  dissection was described on a CTA coronary artery exam from  2/14/2017.        Pathology :  Pathology result from February 5, 2018 showed:  Final Diagnosis   1.  TUMOR, EPIGLOTTIS:   INVASIVE SQUAMOUS CELL CARCINOMA, NON KERATINIZING, POORLY DIFFERENTIATED.       2.  TUMOR, EPIGLOTTIS:   INVASIVE SQUAMOUS CELL CARCINOMA, NON KERATINIZING,  POORLY DIFFERENTIATED.               ASSESSMENT AND PLAN:      1.  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  Based on PET/CT there is a tumor extension into preepiglottic space making a T3 lesion.  Result of PET CT were discussed with patient.  It was discussed with patient with T3 N0 lesion after his treatment option includes either surgery or concurrent chemoradiation.    -He was started on concurrent chemoradiation on March 19, 2018.  Patient received 6 weekly carboplatin and Taxol from March 19, 2018 until May 15, 2018.  Radiation was also completed on May 15, 2018.  -ENT exam by Dr. Venegas in March 17,2019 showed good response to chemoradiation without any evidence of any active tumor.  -We will ask patient to return to clinic in 4 months with a repeat CBC and anemia workup to be done on that day.  -Patient is requesting port removal, will refer him back to Dr. Solano for port removal.    2.   anemia:   -Patient was started on liquid iron in October 2018.  -Hemoglobin earlier today is improved to 14.3.    -Anemia workup from today on April 12, 2019 shows adequate iron level.  Will discontinue ferrous sulfate supplement until next clinic visit in 4 months.      3.  Odynophagia and dysphagia: Improved.    4.  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  White blood cell count is 11,000.  We will monitored with CBC for now     5.  History of aortic dissection status post repair.     6.  History of CVA with residual visual disturbance in left eye     7.  Health maintenance: Patient quit smoking in February 2017.  Never had a screening colonoscopy done.      8. Advance care planning: Advance Care Planning: For now patient remains full code and is able to make his decisions.  Patient has health care surrogate mentioned on chart.    9. BMI: Patient's Body mass index is 31 kg/m². BMI is higher than reference range.  Patient was notified about her elevated BMI.        Reza Patel,  MD  4/12/2019  7:18 PM        EMR Dragon/Transcription disclaimer:   Much of this encounter note is an electronic transcription/translation of spoken language to printed text. The electronic translation of spoken language may permit erroneous, or at times, nonsensical words or phrases to be inadvertently transcribed; Although I have reviewed the note for such errors, some may still exist.

## 2019-04-15 ENCOUNTER — TELEPHONE (OUTPATIENT)
Dept: ONCOLOGY | Facility: HOSPITAL | Age: 59
End: 2019-04-15

## 2019-04-15 NOTE — TELEPHONE ENCOUNTER
----- Message from Reza Patel MD sent at 4/12/2019  7:21 PM CDT -----  Please let patient know, his iron level is adequate.  He can stop taking iron supplement until next clinic visit in 4 months.  Thank you

## 2019-04-25 ENCOUNTER — PROCEDURE VISIT (OUTPATIENT)
Dept: SURGERY | Facility: CLINIC | Age: 59
End: 2019-04-25

## 2019-04-25 VITALS
BODY MASS INDEX: 31.69 KG/M2 | TEMPERATURE: 98.7 F | HEIGHT: 72 IN | HEART RATE: 63 BPM | DIASTOLIC BLOOD PRESSURE: 88 MMHG | SYSTOLIC BLOOD PRESSURE: 130 MMHG | WEIGHT: 234 LBS

## 2019-04-25 DIAGNOSIS — C32.9 LARYNGEAL CANCER (HCC): ICD-10-CM

## 2019-04-25 DIAGNOSIS — C32.9 LARYNGEAL CANCER (HCC): Primary | ICD-10-CM

## 2019-04-25 PROCEDURE — 88300 SURGICAL PATH GROSS: CPT | Performed by: SURGERY

## 2019-04-25 PROCEDURE — 36590 REMOVAL TUNNELED CV CATH: CPT | Performed by: SURGERY

## 2019-04-25 PROCEDURE — 88300 SURGICAL PATH GROSS: CPT | Performed by: PATHOLOGY

## 2019-04-25 RX ORDER — INDOMETHACIN 50 MG/1
50 CAPSULE ORAL DAILY
COMMUNITY
End: 2019-12-06

## 2019-04-29 LAB
LAB AP CASE REPORT: NORMAL
LAB AP CLINICAL INFORMATION: NORMAL
PATH REPORT.FINAL DX SPEC: NORMAL
PATH REPORT.GROSS SPEC: NORMAL

## 2019-04-29 RX ORDER — SPIRONOLACTONE 25 MG/1
TABLET ORAL
Qty: 15 TABLET | Refills: 6 | Status: SHIPPED | OUTPATIENT
Start: 2019-04-29 | End: 2019-11-24 | Stop reason: SDUPTHER

## 2019-04-30 DIAGNOSIS — I71.010 ASCENDING AORTIC DISSECTION (HCC): Primary | ICD-10-CM

## 2019-05-01 NOTE — PROGRESS NOTES
58-year-old male with history of laryngeal cancer status post treatment with port and gastrostomy tube in place which he desires removal of.  The risks and benefits of port removal as well as gastrostomy tube removal were discussed with the patient he is agreeable to proceeding.    Procedure     Procedure Performed: Removal of port from left chest  Preop Dx: port in place, gtube in place- unneeded  Postop Dx: Same  Surgeon: Russ  Assistant: VARSHA Lamb  EBL: <5cc  Specimen: port for gross inspection    Note: Site was identified by the patient.  Consent was obtained.  Timeout was performed.  The area was prepped and draped in normal sterile fashion.  Local anesthetic was then injected in a field block.  The skin incision was made directly overlying the port through the prior scar.  Sharp dissection was then undertaken to expose the capsule of tissue around the port.  The capsule was then opened sharply.  The port was then elevated upwards out of the skin incision.  The sutures holding the port in place were then cut.  The port and attached catheter were then backed outward while pressure was held at the catheter exit site.  Prior to complete removal of the port and catheter U stitch of 3-0 Vicryl was placed at the catheter exit site.  This was tied down as the port and catheter were then removed.  The port and catheter appeared to be intact.  The wound was then closed in layers using 3-0 Vicryl suture in interrupted fashion and a running 4-0 Vicryl.  Sterile dressings were then applied.    Attention was then placed over the patient's PEG tube.  Gentle firm pressure was applied to remove the PEG tube which appeared to be intact.  Sterile dressing was applied over the site.    Patient will follow-up with me in 1 week for recheck.          This document has been electronically signed by Kris Solano MD on May 1, 2019 3:02 PM

## 2019-05-02 ENCOUNTER — OFFICE VISIT (OUTPATIENT)
Dept: SURGERY | Facility: CLINIC | Age: 59
End: 2019-05-02

## 2019-05-02 VITALS
SYSTOLIC BLOOD PRESSURE: 140 MMHG | WEIGHT: 230.8 LBS | HEART RATE: 61 BPM | BODY MASS INDEX: 31.26 KG/M2 | TEMPERATURE: 97.7 F | DIASTOLIC BLOOD PRESSURE: 78 MMHG | HEIGHT: 72 IN

## 2019-05-02 DIAGNOSIS — Z09 FOLLOW UP: Primary | ICD-10-CM

## 2019-05-02 PROCEDURE — 99024 POSTOP FOLLOW-UP VISIT: CPT | Performed by: SURGERY

## 2019-05-02 RX ORDER — INDOMETHACIN 25 MG/1
1 CAPSULE ORAL DAILY PRN
Refills: 0 | COMMUNITY
Start: 2019-03-25 | End: 2021-07-20

## 2019-05-02 NOTE — PROGRESS NOTES
CHIEF COMPLAINT:    Chief Complaint   Patient presents with   • Follow-up     1 Wk. srini- S/P Removal of Mediport & G-tube 4-25-19.       HISTORY OF PRESENT ILLNESS:    Truman Esquivel is a 58 y.o. male who underwent port removal and removal of gastrostomy tube last week.  He returns today for follow-up.  He has no complaints related to these procedures.  He continues to tolerate oral diet without issue.    EXAM:  Vitals:    05/02/19 0949   BP: 140/78   Pulse: 61   Temp: 97.7 °F (36.5 °C)         Well-healed port site on left chest.  Healing gastrostomy tube site on left upper abdomen    ASSESSMENT:    Status post removal of port and G-tube    PLAN:    Overall he appears to be healing appropriately.  Continue follow-up with ear nose and throat for his tracheostomy.  See me as needed.          This document has been electronically signed by Kris Solano MD on May 2, 2019 10:19 AM

## 2019-05-20 ENCOUNTER — HOSPITAL ENCOUNTER (OUTPATIENT)
Dept: CT IMAGING | Facility: HOSPITAL | Age: 59
Discharge: HOME | End: 2019-05-20
Admitting: THORACIC SURGERY (CARDIOTHORACIC VASCULAR SURGERY)

## 2019-05-20 DIAGNOSIS — I71.010 ASCENDING AORTIC DISSECTION (HCC): ICD-10-CM

## 2019-05-20 PROCEDURE — 71275 CT ANGIOGRAPHY CHEST: CPT

## 2019-05-20 PROCEDURE — 0 IOPAMIDOL PER 1 ML: Performed by: THORACIC SURGERY (CARDIOTHORACIC VASCULAR SURGERY)

## 2019-05-20 RX ADMIN — IOPAMIDOL 62 ML: 755 INJECTION, SOLUTION INTRAVENOUS at 13:39

## 2019-06-05 ENCOUNTER — OFFICE VISIT (OUTPATIENT)
Dept: CARDIAC SURGERY | Facility: CLINIC | Age: 59
End: 2019-06-05

## 2019-06-05 VITALS
OXYGEN SATURATION: 95 % | RESPIRATION RATE: 20 BRPM | WEIGHT: 230 LBS | BODY MASS INDEX: 31.15 KG/M2 | TEMPERATURE: 98.4 F | HEART RATE: 70 BPM | HEIGHT: 72 IN | SYSTOLIC BLOOD PRESSURE: 145 MMHG | DIASTOLIC BLOOD PRESSURE: 74 MMHG

## 2019-06-05 DIAGNOSIS — I71.010 ASCENDING AORTIC DISSECTION (HCC): Primary | ICD-10-CM

## 2019-06-05 DIAGNOSIS — I71.23 DESCENDING THORACIC AORTIC ANEURYSM (HCC): ICD-10-CM

## 2019-06-05 PROCEDURE — 99214 OFFICE O/P EST MOD 30 MIN: CPT | Performed by: NURSE PRACTITIONER

## 2019-06-05 NOTE — PROGRESS NOTES
6/5/2019      Subjective:      Marybeth Harrison DO    Chief Complaint: Ascending and descending thoracic aortic aneurysm    History of Present Illness:       Dear Marybeth Masterson DO and Colleagues,    It was nice to see Truman Esquivel in follow-up for his thoracic aortic aneurysm. He is a 58 y.o. male with a history of acute type a aortic dissection status post ascending aortic and proximal arch repair/graft with aortic valve resuspension 2/14/2017 with Dr. Arreguin.  In 2018 it was discovered he had laryngeal cancer and he underwent tracheostomy 2/8/2018 and subsequent chemo and radiation treatments.  Additionally in July 2018 he had a ruptured spleen requiring splenectomy, at that time he had a CT of the chest abdomen and pelvis that Dr. Arreguin reviewed remotely that demonstrated stable ascending aortic grafting with a flap dissection extending from the left subclavian artery to left brachial and left iliac arteries.  He states that he has completed his chemo and radiation, his port has been removed, his G-tube has been removed, and he is now working on having 48 hours of consistent nonuse of his tracheostomy prior to removal.  He states that he is increasing his stamina and mobility and he was able to walk 1.5 miles yesterday without significant shortness of breath.  He comes in today for routine follow-up for aneurysm surveillence.  The CTA of chest on 5/20/2019 was reviewed with Dr. Arreguin and is consistent with the radiologists reading of stable ascending aortic grafting and descending dissection with celiac and mesenteric artery supplied by the true lumen.  He denies shortness of breath, chest/back pain, numbness/tingling/pain of extremities.  No vascular deficiencies are noted on exam.      Patient Active Problem List   Diagnosis   • Ascending aortic dissection (CMS/HCC)   • Essential hypertension   • Deep vein thrombosis (DVT) of femoral vein of both lower extremities (CMS/HCC)   • Hypothyroidism   •  Snoring   • Acute pain of right knee   • Knee effusion, right   • Knee pain   • Obstructive sleep apnea of adult   • TIA (transient ischemic attack)   • Dysphagia   • Squamous cell carcinoma of larynx (CMS/HCC)   • Pharyngeal dysphagia   • Anemia   • Abnormal positron emission tomography (PET) of colon   • Hypokalemia   • Encounter for venous access device care   • Dehydration   • Weight loss, abnormal   • Odynophagia   • Thrush, oral   • Thrombocytopenia (CMS/HCC)   • Pancytopenia due to antineoplastic chemotherapy (CMS/HCC)   • Unable to eat   • Leukocytosis   • Surgical aftercare, respiratory system   • Hemothorax on left   • Chronic elbow pain, right       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   • Stroke (CMS/HCC)    • Swallowing difficulty        Past Surgical History:   Procedure Laterality Date   • AORTA SURGERY      ruptured aorta repair   • ASCENDING ARCH/HEMIARCH REPLACEMENT N/A 2/14/2017    Procedure: INTRAOPERATIVE TRANSESOPHAGEAL ECHOCARDIOGRAM, MIDLINE STERNOTOMY, ASCENDING AORTIC  AND PROXIMAL AORTIC ARCH REPAIR WITH 26MM GRAFT, AORTIC VALVE RESUSPENSION, AORTIC ROOT REPAIR, OPEN VEIN HARVEST OF RIGHT LEG;  Surgeon: German Arreguin MD;  Location: [facility] OR;  Service:    • BRONCHOSCOPY N/A 2/17/2017    Procedure: BRONCHOSCOPY BIOPSY AT BEDSIDE WITH BAL-LEFT LOWER LOBE;  Surgeon: Trent Chaney MD;  Location: [facility] ENDOSCOPY;  Service:    • COLONOSCOPY N/A 3/7/2018    Procedure: COLONOSCOPY WITH POSSIBLE POLYPECTOMY   ( DONE IN OR WITH ENDO)      COLONOSCOPY FIRST;  Surgeon: Kris Solano MD;  Location: [facility] OR;  Service:    • DIRECT LARYNGOSCOPY, ESOPHAGOSCOPY, BRONCHOSCOPY N/A 2/5/2018    Procedure: DIRECT LARYNGOSCOPY WITH BIOPSY, ESOPHAGOSCOPY     (no bronchoscopy, no laser);   Surgeon: Joseph Venegas MD;  Location: [facility] OR;  Service:    • ENDOSCOPY W/ PEG TUBE PLACEMENT N/A 5/2/2018    Procedure: ESOPHAGOGASTRODUODENOSCOPY WITH PERCUTANEOUS ENDOSCOPIC GASTROSTOMY TUBE INSERTION;  Surgeon: Angel Maciel MD;  Location: [facility] ENDOSCOPY;  Service: Gastroenterology   • ENDOSCOPY W/ PEG TUBE PLACEMENT N/A 10/26/2018    Procedure: ESOPHAGOGASTRODUODENOSCOPY WITH PERCUTANEOUS ENDOSCOPIC GASTROSTOMY TUBE INSERTION WITH ANESTHESIA Possible open gastrostomy;  Surgeon: Kris Solano MD;  Location: [facility] OR;  Service: General   • GASTROSTOMY FEEDING TUBE INSERTION N/A 10/26/2018    Procedure: GASTROSTOMY FEEDING TUBE INSERTION;  Surgeon: Kris Solano MD;  Location: [facility] OR;  Service: General   • SPLENECTOMY N/A 7/26/2018    Procedure: SPLENECTOMY, left chest tube placement, EXPLORATORY LAPAROTOMY, G-TUBE PALCEMENT;  Surgeon: Kris Solano MD;  Location: [facility] OR;  Service: General   • SPLENECTOMY      2018   • THORACOSCOPY Left 7/31/2018    Procedure: LEFT THORACOSCOPY VIDEO ASSISTED POSSIBLE THORACOTOMY possible decortication bronchoscopy;  Surgeon: Joshua Pollock MD;  Location: [facility] OR;  Service: Cardiothoracic   • TRACHEOSTOMY  02/05/2018   • TRACHEOSTOMY N/A 2/5/2018    Procedure: TRACHEOSTOMY  local injection @ 1106 incision @ 1119;  Surgeon: Joseph Venegas MD;  Location: [facility] OR;  Service:    • VENOUS ACCESS DEVICE (PORT) INSERTION N/A 3/7/2018    Procedure: INSERTION OF MEDIPORT     (C-ARM#1);  Surgeon: Kris Solano MD;  Location: [facility] OR;  Service:        Allergies   Allergen Reactions   • Chlorhexidine Itching     Topical cleaning wipes causes severe skin irritation   • Eggs Or Egg-Derived Products Swelling   • Erythromycin Other (See Comments)     Joint pains and cold sweats   • Gabapentin Itching   • Other GI Intolerance     Mycins  farzad lotion causes rash   • Acth [Corticotropin] Rash   • Cephalosporins  Itching and Rash     Pt developed rash, itching after cefepime administration (2-3 doses) during 2/2017 admission. Itching was relieved with benadryl. Cefepime was continued because his infection was improving and no symptoms of anaphylaxis present   • Co Q 10 [Coenzyme Q10] Rash   • Keflex [Cephalexin] Rash   • Neomycin Rash   • Penicillins Rash     Tolerated cefepime during 2/2017 admission. Had rash and itching (relieved by benadryl) after few doses of cefepime and cefepime was continued.   • Tetracyclines & Related GI Intolerance         Current Outpatient Medications:   •  amLODIPine (NORVASC) 10 MG tablet, Take 1 tablet by mouth Daily., Disp: 30 tablet, Rfl: 11  •  atorvastatin (LIPITOR) 20 MG tablet, Take 1 tablet by mouth Daily., Disp: 90 tablet, Rfl: 3  •  B Complex Vitamins (VITAMIN B COMPLEX) capsule capsule, Take 1 capsule by mouth Daily., Disp: , Rfl:   •  Cholecalciferol 66825 units tablet, 50 thousand units po q  month, Disp: 3 tablet, Rfl: 3  •  clopidogrel (PLAVIX) 75 MG tablet, Take 75 mg by mouth Daily. Last dose approx greater than one month ago, Disp: , Rfl:   •  docusate sodium (COLACE) 100 MG capsule, Take 1 capsule by mouth 2 (Two) Times a Day As Needed for Constipation., Disp: 60 capsule, Rfl: 2  •  DULoxetine (CYMBALTA) 60 MG capsule, Take 60 mg by mouth Daily., Disp: , Rfl:   •  indomethacin (INDOCIN) 50 MG capsule, Take 50 mg by mouth Daily., Disp: , Rfl:   •  irbesartan (AVAPRO) 300 MG tablet, Take 1 tablet by mouth Daily. This replaces losartan, Disp: 30 tablet, Rfl: 11  •  metoprolol succinate XL (TOPROL-XL) 25 MG 24 hr tablet, TAKE 1 TABLET BY MOUTH AT BEDTIME, Disp: 30 tablet, Rfl: 12  •  Multiple Vitamins-Minerals (MULTIVITAMIN ADULT PO), Take 1 tablet by mouth Daily., Disp: , Rfl:   •  POTASSIUM PO, Take  by mouth., Disp: , Rfl:   •  spironolactone (ALDACTONE) 25 MG tablet, TAKE 1/2 (ONE-HALF) TABLET BY MOUTH ONCE DAILY, Disp: 15 tablet, Rfl: 6  •  SYNTHROID 112 MCG tablet, Take 1  tablet by mouth Daily., Disp: 30 tablet, Rfl: 11  •  traZODone (DESYREL) 100 MG tablet, Take 100 mg by mouth Every Night., Disp: , Rfl:   •  vitamin B-12 (CYANOCOBALAMIN) 100 MCG tablet, Take 100 mcg by mouth Daily., Disp: , Rfl:   •  ferrous sulfate, as mg of FE, (VISHAL-IN-SOL) 75 (15 Fe) MG/ML drops, 25 mL by Per G Tube route Daily., Disp: 150 mL, Rfl: 2  •  indomethacin (INDOCIN) 25 MG capsule, Take 1 capsule by mouth Daily., Disp: , Rfl: 0  •  mupirocin (BACTROBAN) 2 % ointment, Apply  topically 2 (Two) Times a Day., Disp: 30 g, Rfl: 2  •  sodium chloride (NS) 0.9 % irrigation, USE FOR TRACHEOSTOMY AS DIRECTED, Disp: 1000 mL, Rfl: 8  •  triamcinolone (KENALOG) 0.1 % cream, Apply  topically to the appropriate area as directed 2 (Two) Times a Day., Disp: 45 g, Rfl: 2    Social History     Socioeconomic History   • Marital status:      Spouse name: Not on file   • Number of children: Not on file   • Years of education: Not on file   • Highest education level: Not on file   Tobacco Use   • Smoking status: Former Smoker     Packs/day: 1.25     Years: 38.00     Pack years: 47.50     Last attempt to quit: 2017     Years since quittin.3   • Smokeless tobacco: Never Used   • Tobacco comment: 2018 - Patient confirmed he ceased utilization of tobacco products 2017.   Substance and Sexual Activity   • Alcohol use: No   • Drug use: No   • Sexual activity: Defer       Family History   Problem Relation Age of Onset   • Thyroid disease Mother    • Hypertension Mother    • Hypertension Father    • Hypertension Other            Review of Systems:  Review of Systems   Constitutional: Negative for activity change, appetite change, diaphoresis, fatigue and fever.   Respiratory: Negative for apnea, cough, chest tightness, shortness of breath and wheezing.    Cardiovascular: Negative for chest pain, palpitations and leg swelling.   Gastrointestinal: Negative for abdominal distention, abdominal pain, blood in  stool, diarrhea, nausea and vomiting.   Genitourinary: Negative for difficulty urinating, dysuria, flank pain, frequency, hematuria and urgency.   Musculoskeletal: Negative for arthralgias, back pain, gait problem and myalgias.   Skin: Negative for pallor and rash.   Neurological: Positive for weakness. Negative for dizziness, seizures, syncope, light-headedness, numbness and headaches.   Hematological: Does not bruise/bleed easily.   Psychiatric/Behavioral: Negative.    All other systems reviewed and are negative.      Physical Exam:    Vital Signs:  Weight: 104 kg (230 lb)   Body mass index is 31.19 kg/m².  Temp: 98.4 °F (36.9 °C)   Heart Rate: 70   BP: 145/74     Physical Exam   Constitutional: He is oriented to person, place, and time. He appears well-developed and well-nourished. He is cooperative.   Eyes: No scleral icterus.   Neck: Neck supple. Normal carotid pulses and no JVD present. Carotid bruit is not present.   Cardiovascular: Normal rate, regular rhythm, normal heart sounds and intact distal pulses. Exam reveals no gallop and no friction rub.   No murmur heard.  Pulses:       Carotid pulses are 2+ on the right side, and 2+ on the left side.       Radial pulses are 2+ on the right side, and 2+ on the left side.        Posterior tibial pulses are 2+ on the right side, and 2+ on the left side.   Pulmonary/Chest: Effort normal and breath sounds normal. He has no decreased breath sounds. He has no wheezes. He has no rhonchi. He has no rales.   Abdominal: Soft. Bowel sounds are normal. He exhibits no distension and no mass. There is no hepatosplenomegaly. There is no tenderness. There is no guarding and no CVA tenderness.   Musculoskeletal: He exhibits no edema or tenderness.   Lymphadenopathy:        Head (right side): No submental, no submandibular, no preauricular and no posterior auricular adenopathy present.        Head (left side): No submental, no submandibular, no preauricular and no posterior  auricular adenopathy present.        Right: No supraclavicular adenopathy present.        Left: No supraclavicular adenopathy present.   Neurological: He is alert and oriented to person, place, and time.   Skin: Skin is warm and dry. No rash noted. No erythema. No pallor.   Psychiatric: He has a normal mood and affect. His speech is normal and behavior is normal. Judgment and thought content normal.        Assessment:     1.  Ascending aortic aneurysm dissection status post repair----stable  2.  Descending thoracic aortic dissection----chronic, stable  3.  Hypertension----mildly elevated in the office today, patient states that he is checked weekly by his cardiologist and it is consistently less than systolic 130  4.  Laryngeal cancer status post chemoradiation----completed treatment  5.  Ruptured spleen status post splenectomy----stable  6.  Deconditioning----continued improvement    Recommendation/Plan:     Repeat CTA of the chest abdomen and pelvis with follow-up in 1 year prior to office appointment with Dr. Arreguin    Continued risk factor modification of hypertension with a goal blood pressure less than 130/80, hyperlipidemia optimization,  and continued avoidance of tobacco/nicotine products.    We discussed the importance of avoidance of over the counter decongestants and stimulants, specifically pseudoephedrine and continued avoidance of caffeine, for hypertension and aneurysm management    Continuation of low aerobic exercise is indicated to help reduce body habitus, assist with blood pressure and cholesterol control.       Emergency department presentation is warranted for acute chest, back, or abdominal pain, syncope, or stroke like symptoms    Thank you for allowing us to participate in his care.    Regards,    ROWAN Vale      **Extensive review of records prior to and during patient visit  **Spent greater than 35 minutes in history, risk modification discussion in face to face time with patient  and family

## 2019-06-07 ENCOUNTER — LAB (OUTPATIENT)
Dept: LAB | Facility: HOSPITAL | Age: 59
End: 2019-06-07

## 2019-06-07 DIAGNOSIS — E06.3 HYPOTHYROIDISM DUE TO HASHIMOTO'S THYROIDITIS: ICD-10-CM

## 2019-06-07 DIAGNOSIS — E03.8 HYPOTHYROIDISM DUE TO HASHIMOTO'S THYROIDITIS: ICD-10-CM

## 2019-06-07 LAB
ANION GAP SERPL CALCULATED.3IONS-SCNC: 13 MMOL/L
BUN BLD-MCNC: 21 MG/DL (ref 6–20)
BUN/CREAT SERPL: 16.7 (ref 7–25)
CALCIUM SPEC-SCNC: 9.8 MG/DL (ref 8.6–10.5)
CHLORIDE SERPL-SCNC: 103 MMOL/L (ref 98–107)
CO2 SERPL-SCNC: 25 MMOL/L (ref 22–29)
CREAT BLD-MCNC: 1.26 MG/DL (ref 0.76–1.27)
GFR SERPL CREATININE-BSD FRML MDRD: 59 ML/MIN/1.73
GLUCOSE BLD-MCNC: 93 MG/DL (ref 65–99)
POTASSIUM BLD-SCNC: 4.5 MMOL/L (ref 3.5–5.2)
SODIUM BLD-SCNC: 141 MMOL/L (ref 136–145)
TSH SERPL DL<=0.05 MIU/L-ACNC: 6.47 MIU/ML (ref 0.27–4.2)

## 2019-06-07 PROCEDURE — 36415 COLL VENOUS BLD VENIPUNCTURE: CPT

## 2019-06-07 PROCEDURE — 84443 ASSAY THYROID STIM HORMONE: CPT

## 2019-06-07 PROCEDURE — 80048 BASIC METABOLIC PNL TOTAL CA: CPT

## 2019-07-09 DIAGNOSIS — E55.9 VITAMIN D DEFICIENCY: ICD-10-CM

## 2019-07-09 DIAGNOSIS — E06.3 HYPOTHYROIDISM DUE TO HASHIMOTO'S THYROIDITIS: Primary | ICD-10-CM

## 2019-07-09 DIAGNOSIS — E03.8 HYPOTHYROIDISM DUE TO HASHIMOTO'S THYROIDITIS: Primary | ICD-10-CM

## 2019-07-09 DIAGNOSIS — E53.8 B12 DEFICIENCY: ICD-10-CM

## 2019-07-12 ENCOUNTER — TELEPHONE (OUTPATIENT)
Dept: ENDOCRINOLOGY | Facility: CLINIC | Age: 59
End: 2019-07-12

## 2019-07-15 ENCOUNTER — OFFICE VISIT (OUTPATIENT)
Dept: RADIATION ONCOLOGY | Facility: HOSPITAL | Age: 59
End: 2019-07-15

## 2019-07-15 ENCOUNTER — HOSPITAL ENCOUNTER (OUTPATIENT)
Dept: RADIATION ONCOLOGY | Facility: HOSPITAL | Age: 59
Setting detail: RADIATION/ONCOLOGY SERIES
End: 2019-07-15

## 2019-07-15 VITALS
RESPIRATION RATE: 20 BRPM | TEMPERATURE: 97.5 F | HEIGHT: 72 IN | SYSTOLIC BLOOD PRESSURE: 141 MMHG | BODY MASS INDEX: 31.07 KG/M2 | DIASTOLIC BLOOD PRESSURE: 82 MMHG | HEART RATE: 56 BPM | WEIGHT: 229.4 LBS

## 2019-07-15 DIAGNOSIS — C32.9 SQUAMOUS CELL CARCINOMA OF LARYNX (HCC): Primary | ICD-10-CM

## 2019-07-15 PROCEDURE — 99213 OFFICE O/P EST LOW 20 MIN: CPT | Performed by: NURSE PRACTITIONER

## 2019-07-15 PROCEDURE — G0463 HOSPITAL OUTPT CLINIC VISIT: HCPCS | Performed by: NURSE PRACTITIONER

## 2019-07-15 NOTE — PROGRESS NOTES
DATE OF VISIT: 7/15/2019    REASON FOR VISIT:  6 month radiation oncology follow-up    HISTORY OF PRESENT ILLNESS:  Mr. Truman Esquivel returns to our clinic today for a routine follow-up exam. 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 He received 7000 cGy in 35 fractions and completed his radiation therapy on 5/15/18.     Since he was here last he was seen by  and Dr. Patel for follow-up. Flexible laryngoscopy showed no evidence of disease recurrence. It was recommended that he keep his trachestomy since he could not tolerate capping. He is scheduled to see ENT again later this week. He is seeing Dr. Patel in August.      PAST MEDICAL HISTORY:    Past Medical History:   Diagnosis Date   • Aortic dissection (CMS/HCC)    • Chest pain    • Disease of thyroid gland    • HTN (hypertension)    • Knee pain    • Sleep apnea    • Smoker    • Squamous cell carcinoma of larynx (CMS/HCC) 2018   • Stroke (CMS/HCC)    • Swallowing difficulty        SOCIAL HISTORY:    Social History     Tobacco Use   • Smoking status: Former Smoker     Packs/day: 1.25     Years: 38.00     Pack years: 47.50     Last attempt to quit: 2017     Years since quittin.4   • Smokeless tobacco: Never Used   • Tobacco comment: 2018 - Patient confirmed he ceased utilization of tobacco products 2017.   Substance Use Topics   • Alcohol use: No   • Drug use: No       Surgical History :  Past Surgical History:   Procedure Laterality Date   • AORTA SURGERY      ruptured aorta repair   • ASCENDING ARCH/HEMIARCH REPLACEMENT N/A 2017    Procedure: INTRAOPERATIVE TRANSESOPHAGEAL ECHOCARDIOGRAM, MIDLINE STERNOTOMY, ASCENDING AORTIC  AND PROXIMAL AORTIC ARCH REPAIR WITH 26MM GRAFT, AORTIC VALVE RESUSPENSION, AORTIC ROOT REPAIR, OPEN VEIN HARVEST OF RIGHT LEG;   Surgeon: German Arreguin MD;  Location: [facility] MAIN OR;  Service:    • BRONCHOSCOPY N/A 2/17/2017    Procedure: BRONCHOSCOPY BIOPSY AT BEDSIDE WITH BAL-LEFT LOWER LOBE;  Surgeon: Trent Chaney MD;  Location: [facility] ENDOSCOPY;  Service:    • COLONOSCOPY N/A 3/7/2018    Procedure: COLONOSCOPY WITH POSSIBLE POLYPECTOMY   ( DONE IN OR WITH ENDO)      COLONOSCOPY FIRST;  Surgeon: Kris Solano MD;  Location: [facility] OR;  Service:    • DIRECT LARYNGOSCOPY, ESOPHAGOSCOPY, BRONCHOSCOPY N/A 2/5/2018    Procedure: DIRECT LARYNGOSCOPY WITH BIOPSY, ESOPHAGOSCOPY     (no bronchoscopy, no laser);  Surgeon: Joseph Venegas MD;  Location: [facility] OR;  Service:    • ENDOSCOPY W/ PEG TUBE PLACEMENT N/A 5/2/2018    Procedure: ESOPHAGOGASTRODUODENOSCOPY WITH PERCUTANEOUS ENDOSCOPIC GASTROSTOMY TUBE INSERTION;  Surgeon: Angel Maciel MD;  Location: [facility] ENDOSCOPY;  Service: Gastroenterology   • ENDOSCOPY W/ PEG TUBE PLACEMENT N/A 10/26/2018    Procedure: ESOPHAGOGASTRODUODENOSCOPY WITH PERCUTANEOUS ENDOSCOPIC GASTROSTOMY TUBE INSERTION WITH ANESTHESIA Possible open gastrostomy;  Surgeon: Kris Solano MD;  Location: [facility] OR;  Service: General   • GASTROSTOMY FEEDING TUBE INSERTION N/A 10/26/2018    Procedure: GASTROSTOMY FEEDING TUBE INSERTION;  Surgeon: Kris Solano MD;  Location: [facility] OR;  Service: General   • SPLENECTOMY N/A 7/26/2018    Procedure: SPLENECTOMY, left chest tube placement, EXPLORATORY LAPAROTOMY, G-TUBE PALCEMENT;  Surgeon: Kris Solano MD;  Location: [facility] OR;  Service: General   • SPLENECTOMY      2018   • THORACOSCOPY Left 7/31/2018    Procedure: LEFT THORACOSCOPY VIDEO ASSISTED POSSIBLE THORACOTOMY possible decortication bronchoscopy;  Surgeon: Joshua Pollock MD;  Location: [facility] OR;  Service: Cardiothoracic   • TRACHEOSTOMY  02/05/2018   • TRACHEOSTOMY N/A 2/5/2018    Procedure: TRACHEOSTOMY  local injection @ 1106 incision @ 1119;  Surgeon:  Joseph Venegas MD;  Location: [facility];  Service:    • VENOUS ACCESS DEVICE (PORT) INSERTION N/A 3/7/2018    Procedure: INSERTION OF MEDIPORT     (C-ARM#1);  Surgeon: Kris Solano MD;  Location: [facility];  Service:        ALLERGIES:    Allergies   Allergen Reactions   • Chlorhexidine Itching     Topical cleaning wipes causes severe skin irritation   • Eggs Or Egg-Derived Products Swelling   • Erythromycin Other (See Comments)     Joint pains and cold sweats   • Gabapentin Itching   • Other GI Intolerance     Mycins  farzad lotion causes rash   • Acth [Corticotropin] Rash   • Cephalosporins Itching and Rash     Pt developed rash, itching after cefepime administration (2-3 doses) during 2/2017 admission. Itching was relieved with benadryl. Cefepime was continued because his infection was improving and no symptoms of anaphylaxis present   • Co Q 10 [Coenzyme Q10] Rash   • Keflex [Cephalexin] Rash   • Neomycin Rash   • Penicillins Rash     Tolerated cefepime during 2/2017 admission. Had rash and itching (relieved by benadryl) after few doses of cefepime and cefepime was continued.   • Tetracyclines & Related GI Intolerance       REVIEW OF SYSTEMS:      CONSTITUTIONAL:  No fever, chills, or night sweats.     HEENT:  No epistaxis, mouth sores, or difficulty swallowing.    RESPIRATORY:  No new shortness of breath or cough at present.    CARDIOVASCULAR:  No chest pain or palpitations.    GASTROINTESTINAL:  No abdominal pain, nausea, vomiting, or blood in the stool.    GENITOURINARY:  No dysuria or hematuria.    MUSCULOSKELETAL:  No any new back pain or arthralgias.     NEUROLOGICAL:  No tingling or numbness. No new headache or dizziness.     LYMPHATICS:  Denies any abnormal swollen and anywhere in the body.    SKIN:  Denies any new skin rash.    PHYSICAL EXAMINATION:      VITAL SIGNS:  There were no vitals taken for this visit.    GENERAL:  Not in any distress.    HEENT:  Normocephalic, Atraumatic.Mild  Conjunctival pallor. No icterus. Extraocular Movements Intact. No Facial Asymmetry noted.    NECK:  No adenopathy. No JVD.    RESPIRATORY:  Fair air entry bilateral. No rhonchi or wheezing.    CARDIOVASCULAR:  S1, S2. Regular rate and rhythm. No murmur or gallop appreciated.    ABDOMEN:  Soft, obese, nontender. Bowel sounds present in all four quadrants.  No organomegaly appreciated.    EXTREMITIES:  No edema.No Calf Tenderness.    NEUROLOGIC:  Alert, awake and oriented ×3.  No  Motor or sensory deficit appreciated. Cranial Nerves 2-12 grossly intact.    SKIN : No new skin lesion identified  DIAGNOSTIC DATA:    Glucose   Date Value Ref Range Status   06/07/2019 93 65 - 99 mg/dL Final     Glucose, Arterial   Date Value Ref Range Status   07/31/2018 113 (H) 65 - 95 mmol/L Final     Sodium   Date Value Ref Range Status   06/07/2019 141 136 - 145 mmol/L Final     Potassium   Date Value Ref Range Status   06/07/2019 4.5 3.5 - 5.2 mmol/L Final     CO2   Date Value Ref Range Status   06/07/2019 25.0 22.0 - 29.0 mmol/L Final     Chloride   Date Value Ref Range Status   06/07/2019 103 98 - 107 mmol/L Final     Anion Gap   Date Value Ref Range Status   06/07/2019 13.0 mmol/L Final     Creatinine   Date Value Ref Range Status   06/07/2019 1.26 0.76 - 1.27 mg/dL Final     BUN   Date Value Ref Range Status   06/07/2019 21 (H) 6 - 20 mg/dL Final     BUN/Creatinine Ratio   Date Value Ref Range Status   06/07/2019 16.7 7.0 - 25.0 Final     Calcium   Date Value Ref Range Status   06/07/2019 9.8 8.6 - 10.5 mg/dL Final     eGFR Non  Amer   Date Value Ref Range Status   06/07/2019 59 (L) >60 mL/min/1.73 Final     Alkaline Phosphatase   Date Value Ref Range Status   02/04/2019 93 38 - 126 U/L Final     Total Protein   Date Value Ref Range Status   02/04/2019 7.3 6.3 - 8.6 g/dL Final     ALT (SGPT)   Date Value Ref Range Status   02/04/2019 26 21 - 72 U/L Final     AST (SGOT)   Date Value Ref Range Status   02/04/2019 92 (H) 17 -  59 U/L Final     Total Bilirubin   Date Value Ref Range Status   02/04/2019 0.6 0.2 - 1.3 mg/dL Final     Albumin   Date Value Ref Range Status   02/04/2019 4.50 3.40 - 4.80 g/dL Final     Globulin   Date Value Ref Range Status   02/04/2019 2.8 2.3 - 3.5 gm/dL Final     Lab Results   Component Value Date    WBC 11.13 (H) 04/12/2019    HGB 14.3 04/12/2019    HCT 42.6 04/12/2019    MCV 83.2 04/12/2019     04/12/2019     Lab Results   Component Value Date    NEUTROABS 8.26 (H) 04/12/2019    IRON 103 04/12/2019    TIBC 396 04/12/2019    LABIRON 26 04/12/2019    FERRITIN 68.34 04/12/2019    [id number] >2,000 (H) 04/12/2019    FOLATE >20.00 04/12/2019     No results found for: , LABCA2, AFPTM, HCGQUANT, , CHROMGRNA, [id number], CEA, REFLABREPO]      ASSESSMENT AND PLAN:       Mr. Truman Esquivel is a 58-year-old white male who is now 14 months status post completion of definitive radiation therapy with concurrent carboplatin/Taxol chemotherapy for Stage III  (cT3 N0 M0) squamous cell carcinoma of the supraglottic larynx. He is doing well and has no evidence of persistent, recurrent, or metastatic disease at this time. He continues to follow closely with Dr. Venegas, and Jorge Weber. Will plan to see him back in our clinic in one year.       This document has been signed by ROWAN Watkins on July 15, 2019 1:27 PM

## 2019-07-18 ENCOUNTER — OFFICE VISIT (OUTPATIENT)
Dept: OTOLARYNGOLOGY | Facility: CLINIC | Age: 59
End: 2019-07-18

## 2019-07-18 ENCOUNTER — LAB (OUTPATIENT)
Dept: LAB | Facility: HOSPITAL | Age: 59
End: 2019-07-18

## 2019-07-18 VITALS — HEIGHT: 72 IN | BODY MASS INDEX: 31.42 KG/M2 | WEIGHT: 232 LBS | OXYGEN SATURATION: 98 %

## 2019-07-18 DIAGNOSIS — J37.0 CHRONIC LARYNGITIS: ICD-10-CM

## 2019-07-18 DIAGNOSIS — Z85.21 ENCOUNTER FOR FOLLOW-UP SURVEILLANCE OF LARYNGEAL CANCER: Primary | ICD-10-CM

## 2019-07-18 DIAGNOSIS — E03.8 HYPOTHYROIDISM DUE TO HASHIMOTO'S THYROIDITIS: ICD-10-CM

## 2019-07-18 DIAGNOSIS — Z93.0 TRACHEOSTOMY STATUS (HCC): ICD-10-CM

## 2019-07-18 DIAGNOSIS — Z08 ENCOUNTER FOR FOLLOW-UP SURVEILLANCE OF LARYNGEAL CANCER: Primary | ICD-10-CM

## 2019-07-18 DIAGNOSIS — E06.3 HYPOTHYROIDISM DUE TO HASHIMOTO'S THYROIDITIS: ICD-10-CM

## 2019-07-18 LAB
25(OH)D3 SERPL-MCNC: 50.2 NG/ML (ref 30–100)
ALBUMIN SERPL-MCNC: 4.3 G/DL (ref 3.5–5.2)
ALBUMIN/GLOB SERPL: 1.4 G/DL
ALP SERPL-CCNC: 87 U/L (ref 39–117)
ALT SERPL W P-5'-P-CCNC: 19 U/L (ref 1–41)
ANION GAP SERPL CALCULATED.3IONS-SCNC: 9.3 MMOL/L (ref 5–15)
AST SERPL-CCNC: 18 U/L (ref 1–40)
BASOPHILS # BLD AUTO: 0.12 10*3/MM3 (ref 0–0.2)
BASOPHILS NFR BLD AUTO: 1.1 % (ref 0–1.5)
BILIRUB SERPL-MCNC: 0.5 MG/DL (ref 0.2–1.2)
BUN BLD-MCNC: 17 MG/DL (ref 6–20)
BUN/CREAT SERPL: 14.8 (ref 7–25)
CALCIUM SPEC-SCNC: 9.6 MG/DL (ref 8.6–10.5)
CHLORIDE SERPL-SCNC: 103 MMOL/L (ref 98–107)
CO2 SERPL-SCNC: 26.7 MMOL/L (ref 22–29)
CREAT BLD-MCNC: 1.15 MG/DL (ref 0.76–1.27)
DEPRECATED RDW RBC AUTO: 50.7 FL (ref 37–54)
EOSINOPHIL # BLD AUTO: 0.45 10*3/MM3 (ref 0–0.4)
EOSINOPHIL NFR BLD AUTO: 4 % (ref 0.3–6.2)
ERYTHROCYTE [DISTWIDTH] IN BLOOD BY AUTOMATED COUNT: 15.9 % (ref 12.3–15.4)
GFR SERPL CREATININE-BSD FRML MDRD: 65 ML/MIN/1.73
GLOBULIN UR ELPH-MCNC: 3.1 GM/DL
GLUCOSE BLD-MCNC: 93 MG/DL (ref 65–99)
HCT VFR BLD AUTO: 44 % (ref 37.5–51)
HGB BLD-MCNC: 14.1 G/DL (ref 13–17.7)
IMM GRANULOCYTES # BLD AUTO: 0.06 10*3/MM3 (ref 0–0.05)
IMM GRANULOCYTES NFR BLD AUTO: 0.5 % (ref 0–0.5)
LYMPHOCYTES # BLD AUTO: 1.36 10*3/MM3 (ref 0.7–3.1)
LYMPHOCYTES NFR BLD AUTO: 12 % (ref 19.6–45.3)
MCH RBC QN AUTO: 28.1 PG (ref 26.6–33)
MCHC RBC AUTO-ENTMCNC: 32 G/DL (ref 31.5–35.7)
MCV RBC AUTO: 87.6 FL (ref 79–97)
MONOCYTES # BLD AUTO: 0.98 10*3/MM3 (ref 0.1–0.9)
MONOCYTES NFR BLD AUTO: 8.6 % (ref 5–12)
NEUTROPHILS # BLD AUTO: 8.4 10*3/MM3 (ref 1.7–7)
NEUTROPHILS NFR BLD AUTO: 73.8 % (ref 42.7–76)
NRBC BLD AUTO-RTO: 0 /100 WBC (ref 0–0.2)
PLATELET # BLD AUTO: 310 10*3/MM3 (ref 140–450)
PMV BLD AUTO: 10.6 FL (ref 6–12)
POTASSIUM BLD-SCNC: 4.9 MMOL/L (ref 3.5–5.2)
PROT SERPL-MCNC: 7.4 G/DL (ref 6–8.5)
RBC # BLD AUTO: 5.02 10*6/MM3 (ref 4.14–5.8)
SODIUM BLD-SCNC: 139 MMOL/L (ref 136–145)
TSH SERPL DL<=0.05 MIU/L-ACNC: 6.24 MIU/ML (ref 0.27–4.2)
VIT B12 BLD-MCNC: 1864 PG/ML (ref 211–946)
WBC NRBC COR # BLD: 11.37 10*3/MM3 (ref 3.4–10.8)

## 2019-07-18 PROCEDURE — 82306 VITAMIN D 25 HYDROXY: CPT | Performed by: INTERNAL MEDICINE

## 2019-07-18 PROCEDURE — 99213 OFFICE O/P EST LOW 20 MIN: CPT | Performed by: OTOLARYNGOLOGY

## 2019-07-18 PROCEDURE — 36415 COLL VENOUS BLD VENIPUNCTURE: CPT | Performed by: INTERNAL MEDICINE

## 2019-07-18 PROCEDURE — 84443 ASSAY THYROID STIM HORMONE: CPT

## 2019-07-18 PROCEDURE — 80053 COMPREHEN METABOLIC PANEL: CPT | Performed by: INTERNAL MEDICINE

## 2019-07-18 PROCEDURE — 82607 VITAMIN B-12: CPT | Performed by: INTERNAL MEDICINE

## 2019-07-18 PROCEDURE — 31575 DIAGNOSTIC LARYNGOSCOPY: CPT | Performed by: OTOLARYNGOLOGY

## 2019-07-18 PROCEDURE — 85025 COMPLETE CBC W/AUTO DIFF WBC: CPT | Performed by: INTERNAL MEDICINE

## 2019-07-18 RX ORDER — LEVOFLOXACIN 500 MG/1
TABLET, FILM COATED ORAL
Refills: 0 | COMMUNITY
Start: 2019-07-16 | End: 2019-12-06

## 2019-07-19 ENCOUNTER — DOCUMENTATION (OUTPATIENT)
Dept: NUTRITION | Facility: HOSPITAL | Age: 59
End: 2019-07-19

## 2019-07-19 NOTE — PROGRESS NOTES
Adult Outpatient Nutrition  Assessment    Patient Name:  Truman Esquivel  YOB: 1960  MRN: [id number]    Assessment Date:  Entry from 7/15/19 [facility]    Comments:  Follow-up visit to check nutrition 6 month post rad to larynx. Pt states tube for feeding and port removed. Chewing/swallowing without problems. Still has trach--hopes it will be removed soon. Wt 232 lb. Pt very upbeat--so happy to being feeling stronger.                          Electronically signed by:  Leslee Bonds RD  07/19/19 2:30 PM

## 2019-07-22 NOTE — PROGRESS NOTES
Subjective   Truman Esquivel is a 58 y.o. male.       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  This was bulky.  He underwent tracheostomy followed by concurrent chemoradiation with a complete response.  Tracheostomy tube was downsized to a size 6 in hopes of pursuing decannulation but he has not yet tolerated capping.   Previously had dysphasia but is tolerating an oral diet after undergoing speech therapy.  Returns today stating he wants his tracheostomy out.  He states he has been capping it and generally done well but describes an episode that sounds like an upper respiratory infection in which he had to uncap the trach due to drainage.  He says he has had some trouble with granulation tissue getting into the fenestration and causing bleeding. He remains tobacco abstinent.      The following portions of the patient's history were reviewed and updated as appropriate: allergies, current medications, past family history, past medical history, past social history, past surgical history and problem list.     reports that he quit smoking about 2 years ago. He has a 47.50 pack-year smoking history. He has never used smokeless tobacco. He reports that he does not drink alcohol or use drugs.   Patient is not a tobacco user and has not been counseled for use of tobacco products      Review of Systems   Constitutional: Negative for fever.           Objective   Physical Exam  General: Well-developed well-nourished male in no acute distress.  Alert and oriented x3.  Voice: Harsh with mild stridor with the tracheostomy capped. Speech:Fluent  Ears: External ears no deformity, canals no discharge, tympanic membranes intact clear and mobile bilaterally.  Nose: Nares show no discharge mass polyp or purulence.  Boggy mucosa is present.  No gross external deformity.  Septum: Midline  Oral cavity: Lips and gums without lesions.  Tongue  and floor of mouth without lesions.  Parotid and submandibular ducts unobstructed.  No mucosal lesions on the buccal mucosa or vestibule of the mouth.  Pharynx: No erythema exudate mass or ulcer  Neck: No lymphadenopathy.  No thyromegaly.  Trachea and larynx midline.  No masses in the parotid or submandibular glands.  Tracheostomy is in place.  I do not see significant granulation tissue either or around the external tracheostomy or in the fenestration.    Procedure Note    Pre-operative Diagnosis:   Chief Complaint   Patient presents with   • Follow-up   Cancer surveillance; airway obstruction    Post-operative Diagnosis: Chronic laryngitis with incompetence of the epiglottis and significant supraglottic edema    Anesthesia: topical with xylocaine and neosynephrine    Endoscopy Type:  Flexible Laryngoscopy    Procedure Details:    The patient was placed in the sitting position.  After topical anesthesia and decongestion, the 4 mm laryngoscope was passed.  The nasal cavities, nasopharynx, oropharynx, hypopharynx, and larynx were all examined.  Vocal cords were examined during respiration and phonation.  The following findings were noted:    Findings: No masses in the nose or nasopharynx.  Tongue base and hypopharynx show postradiation changes.  What remains of the epiglottis projects posteriorly and overhangs the supraglottis.  There is significant edema of the arytenoids and aryepiglottic folds.  There is no evidence of recurrent tumor.  Vocal cord mobility appears to be intact    Condition:  Stable.  Patient tolerated procedure well.    Complications:  None    Assessment/Plan   Truman was seen today for follow-up.    Diagnoses and all orders for this visit:    Encounter for follow-up surveillance of laryngeal cancer    Chronic laryngitis    Tracheostomy status (CMS/[facility])      Plan: I reassured the patient that I saw no evidence of cancer but I also told him that I did not think he was a good candidate for  decannulation at this point.  He appears to have some stridor with the tracheostomy capped and with the anatomy of the residual epiglottis along with supraglottic edema I think he would be at significant risk for airway obstruction without his tracheostomy in place.  He is to remain tobacco abstinent and return in 4 months, call sooner for problems.

## 2019-07-28 DIAGNOSIS — E55.9 VITAMIN D DEFICIENCY: ICD-10-CM

## 2019-07-28 DIAGNOSIS — E03.9 HYPOTHYROIDISM, UNSPECIFIED TYPE: ICD-10-CM

## 2019-07-28 DIAGNOSIS — E06.3 HYPOTHYROIDISM DUE TO HASHIMOTO'S THYROIDITIS: ICD-10-CM

## 2019-07-28 DIAGNOSIS — E03.8 HYPOTHYROIDISM DUE TO HASHIMOTO'S THYROIDITIS: ICD-10-CM

## 2019-07-28 DIAGNOSIS — E53.8 B12 DEFICIENCY: ICD-10-CM

## 2019-07-29 RX ORDER — CHOLECALCIFEROL (VITAMIN D3) 1250 MCG
CAPSULE ORAL
Qty: 1 CAPSULE | Refills: 11 | Status: SHIPPED | OUTPATIENT
Start: 2019-07-29 | End: 2020-08-03

## 2019-07-30 ENCOUNTER — OFFICE VISIT (OUTPATIENT)
Dept: ENDOCRINOLOGY | Facility: CLINIC | Age: 59
End: 2019-07-30

## 2019-07-30 VITALS
OXYGEN SATURATION: 97 % | SYSTOLIC BLOOD PRESSURE: 130 MMHG | HEART RATE: 67 BPM | DIASTOLIC BLOOD PRESSURE: 76 MMHG | HEIGHT: 72 IN | WEIGHT: 235.1 LBS | BODY MASS INDEX: 31.84 KG/M2

## 2019-07-30 DIAGNOSIS — E03.9 ACQUIRED HYPOTHYROIDISM: Primary | ICD-10-CM

## 2019-07-30 DIAGNOSIS — E55.9 VITAMIN D DEFICIENCY: ICD-10-CM

## 2019-07-30 DIAGNOSIS — E61.1 IRON DEFICIENCY: ICD-10-CM

## 2019-07-30 DIAGNOSIS — E53.8 B12 DEFICIENCY: ICD-10-CM

## 2019-07-30 PROCEDURE — 99214 OFFICE O/P EST MOD 30 MIN: CPT | Performed by: INTERNAL MEDICINE

## 2019-07-30 RX ORDER — LEVOTHYROXINE SODIUM 125 MCG
125 TABLET ORAL DAILY
Qty: 30 TABLET | Refills: 11 | Status: SHIPPED | OUTPATIENT
Start: 2019-07-30 | End: 2019-09-03 | Stop reason: SDUPTHER

## 2019-07-30 NOTE — PROGRESS NOTES
Subjective    Truman Esquivel is a 58 y.o. male. he is here today for follow-up.    Hypothyroidism   Pertinent negatives include no abdominal pain, arthralgias, chest pain, coughing, diaphoresis, fatigue, headaches, joint swelling, myalgias, nausea, neck pain, numbness, rash, sore throat, vomiting or weakness.   Thyroid Problem   Patient reports no cold intolerance, constipation, diaphoresis, diarrhea, fatigue, heat intolerance, palpitations or tremors.          Primary Care Provider     Marybeth Harrison DO      Hypothyroidism      Duration 10 years     Timing - Hypothyroidism  is Constant     Quality -  near goal       Severity -  moderate     Complications - none     Current symptoms/problems  fatigue, weakness    Weight loss -- 60 lbs since December , no further weight loss  Now has gained weight      Alleviating Factors: Compliance      Aggravating Factors none      Side Effects  none         Evaluation history:  TSH   Date Value Ref Range Status   07/18/2019 6.240 (H) 0.270 - 4.200 mIU/mL Final     Free T4   Date Value Ref Range Status   02/18/2017 0.68 (L) 0.93 - 1.70 ng/dL Final       Current medications:  Current Outpatient Medications   Medication Sig Dispense Refill   • amLODIPine (NORVASC) 10 MG tablet Take 1 tablet by mouth Daily. 30 tablet 11   • atorvastatin (LIPITOR) 20 MG tablet Take 1 tablet by mouth Daily. 90 tablet 3   • B Complex Vitamins (VITAMIN B COMPLEX) capsule capsule Take 1 capsule by mouth Daily.     • Cholecalciferol (VITAMIN D3) 22327 units capsule TAKE 1 CAPSULE BY MOUTH AS DIRECTED EVERY  MONTH 1 capsule 11   • clopidogrel (PLAVIX) 75 MG tablet Take 75 mg by mouth Daily. Last dose approx greater than one month ago     • docusate sodium (COLACE) 100 MG capsule Take 1 capsule by mouth 2 (Two) Times a Day As Needed for Constipation. 60 capsule 2   • DULoxetine (CYMBALTA) 60 MG capsule Take 60 mg by mouth Daily.     • ferrous sulfate, as mg of FE, (VISHAL-IN-SOL) 75 (15 Fe) MG/ML drops  25 mL by Per G Tube route Daily. 150 mL 2   • indomethacin (INDOCIN) 25 MG capsule Take 1 capsule by mouth Daily.  0   • indomethacin (INDOCIN) 50 MG capsule Take 50 mg by mouth Daily.     • irbesartan (AVAPRO) 300 MG tablet Take 1 tablet by mouth Daily. This replaces losartan 30 tablet 11   • levoFLOXacin (LEVAQUIN) 500 MG tablet TAKE 1 TABLET BY MOUTH ONCE DAILY FOR 10 DAYS  0   • metoprolol succinate XL (TOPROL-XL) 25 MG 24 hr tablet TAKE 1 TABLET BY MOUTH AT BEDTIME 30 tablet 12   • Multiple Vitamins-Minerals (MULTIVITAMIN ADULT PO) Take 1 tablet by mouth Daily.     • mupirocin (BACTROBAN) 2 % ointment Apply  topically 2 (Two) Times a Day. 30 g 2   • POTASSIUM PO Take  by mouth.     • sodium chloride (NS) 0.9 % irrigation USE FOR TRACHEOSTOMY AS DIRECTED 1000 mL 8   • spironolactone (ALDACTONE) 25 MG tablet TAKE 1/2 (ONE-HALF) TABLET BY MOUTH ONCE DAILY 15 tablet 6   • SYNTHROID 112 MCG tablet Take 1 tablet by mouth Daily. 30 tablet 11   • traZODone (DESYREL) 100 MG tablet Take 100 mg by mouth Every Night.     • triamcinolone (KENALOG) 0.1 % cream Apply  topically to the appropriate area as directed 2 (Two) Times a Day. 45 g 2   • vitamin B-12 (CYANOCOBALAMIN) 100 MCG tablet Take 100 mcg by mouth Daily.       No current facility-administered medications for this visit.        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   • Stroke (CMS/HCC)    • Swallowing difficulty      Past Surgical History:   Procedure Laterality Date   • AORTA SURGERY      ruptured aorta repair   • ASCENDING ARCH/HEMIARCH REPLACEMENT N/A 2/14/2017    Procedure: INTRAOPERATIVE TRANSESOPHAGEAL ECHOCARDIOGRAM, MIDLINE STERNOTOMY, ASCENDING AORTIC  AND  PROXIMAL AORTIC ARCH REPAIR WITH 26MM GRAFT, AORTIC VALVE RESUSPENSION, AORTIC ROOT REPAIR, OPEN VEIN HARVEST OF RIGHT LEG;  Surgeon: German Arreguin MD;  Location: [facility] MAIN OR;  Service:    • BRONCHOSCOPY N/A 2/17/2017    Procedure: BRONCHOSCOPY BIOPSY AT BEDSIDE WITH BAL-LEFT LOWER LOBE;  Surgeon: Trent Chaney MD;  Location: [facility] ENDOSCOPY;  Service:    • COLONOSCOPY N/A 3/7/2018    Procedure: COLONOSCOPY WITH POSSIBLE POLYPECTOMY   ( DONE IN OR WITH ENDO)      COLONOSCOPY FIRST;  Surgeon: Kris Solano MD;  Location: [facility] OR;  Service:    • DIRECT LARYNGOSCOPY, ESOPHAGOSCOPY, BRONCHOSCOPY N/A 2/5/2018    Procedure: DIRECT LARYNGOSCOPY WITH BIOPSY, ESOPHAGOSCOPY     (no bronchoscopy, no laser);  Surgeon: Joseph Venegas MD;  Location: [facility] OR;  Service:    • ENDOSCOPY W/ PEG TUBE PLACEMENT N/A 5/2/2018    Procedure: ESOPHAGOGASTRODUODENOSCOPY WITH PERCUTANEOUS ENDOSCOPIC GASTROSTOMY TUBE INSERTION;  Surgeon: Angel Maciel MD;  Location: [facility] ENDOSCOPY;  Service: Gastroenterology   • ENDOSCOPY W/ PEG TUBE PLACEMENT N/A 10/26/2018    Procedure: ESOPHAGOGASTRODUODENOSCOPY WITH PERCUTANEOUS ENDOSCOPIC GASTROSTOMY TUBE INSERTION WITH ANESTHESIA Possible open gastrostomy;  Surgeon: Kris Solaon MD;  Location: [facility] OR;  Service: General   • GASTROSTOMY FEEDING TUBE INSERTION N/A 10/26/2018    Procedure: GASTROSTOMY FEEDING TUBE INSERTION;  Surgeon: Kris Solano MD;  Location: [facility] OR;  Service: General   • SPLENECTOMY N/A 7/26/2018    Procedure: SPLENECTOMY, left chest tube placement, EXPLORATORY LAPAROTOMY, G-TUBE PALCEMENT;  Surgeon: Kris Solano MD;  Location: [facility] OR;  Service: General   • SPLENECTOMY      2018   • THORACOSCOPY Left 7/31/2018    Procedure: LEFT THORACOSCOPY VIDEO ASSISTED POSSIBLE THORACOTOMY possible decortication bronchoscopy;  Surgeon: Joshua Pollock MD;  Location: [facility] OR;  Service: Cardiothoracic   •  TRACHEOSTOMY  2018   • TRACHEOSTOMY N/A 2018    Procedure: TRACHEOSTOMY  local injection @ 1106 incision @ 1119;  Surgeon: Joseph Venegas MD;  Location: [facility];  Service:    • VENOUS ACCESS DEVICE (PORT) INSERTION N/A 3/7/2018    Procedure: INSERTION OF MEDIPORT     (C-ARM#1);  Surgeon: Kris Solano MD;  Location: [facility];  Service:      Family History   Problem Relation Age of Onset   • Thyroid disease Mother    • Hypertension Mother    • Hypertension Father    • Hypertension Other        Allergies   Allergen Reactions   • Chlorhexidine Itching     Topical cleaning wipes causes severe skin irritation   • Eggs Or Egg-Derived Products Swelling   • Erythromycin Other (See Comments)     Joint pains and cold sweats   • Gabapentin Itching   • Other GI Intolerance     Mycins  farzad lotion causes rash   • Acth [Corticotropin] Rash   • Cephalosporins Itching and Rash     Pt developed rash, itching after cefepime administration (2-3 doses) during 2017 admission. Itching was relieved with benadryl. Cefepime was continued because his infection was improving and no symptoms of anaphylaxis present   • Co Q 10 [Coenzyme Q10] Rash   • Keflex [Cephalexin] Rash   • Neomycin Rash   • Penicillins Rash     Tolerated cefepime during 2017 admission. Had rash and itching (relieved by benadryl) after few doses of cefepime and cefepime was continued.   • Tetracyclines & Related GI Intolerance     Social History     Socioeconomic History   • Marital status:      Spouse name: Not on file   • Number of children: Not on file   • Years of education: Not on file   • Highest education level: Not on file   Tobacco Use   • Smoking status: Former Smoker     Packs/day: 1.25     Years: 38.00     Pack years: 47.50     Last attempt to quit: 2017     Years since quittin.4   • Smokeless tobacco: Never Used   • Tobacco comment: 2018 - Patient confirmed he ceased utilization of tobacco products  "02/13/2017.   Substance and Sexual Activity   • Alcohol use: No   • Drug use: No   • Sexual activity: Defer       Review of Systems  Review of Systems   Constitutional: Negative for activity change, appetite change, diaphoresis and fatigue.   HENT: Negative for facial swelling, sneezing, sore throat, tinnitus, trouble swallowing and voice change.    Eyes: Negative for photophobia, pain, discharge, redness, itching and visual disturbance.   Respiratory: Negative for apnea, cough, choking, chest tightness and shortness of breath.    Cardiovascular: Negative for chest pain, palpitations and leg swelling.   Gastrointestinal: Negative for abdominal distention, abdominal pain, constipation, diarrhea, nausea and vomiting.   Endocrine: Negative for cold intolerance, heat intolerance, polydipsia, polyphagia and polyuria.   Genitourinary: Negative for difficulty urinating, dysuria, frequency, hematuria and urgency.   Musculoskeletal: Negative for arthralgias, back pain, gait problem, joint swelling, myalgias, neck pain and neck stiffness.   Skin: Negative for color change, pallor, rash and wound.   Neurological: Negative for dizziness, tremors, weakness, light-headedness, numbness and headaches.   Hematological: Negative for adenopathy. Bruises/bleeds easily.   Psychiatric/Behavioral: Negative for behavioral problems, confusion and sleep disturbance.        Objective    /76   Pulse 67   Ht 182.9 cm (72\")   Wt 107 kg (235 lb 1.6 oz)   SpO2 97%   BMI 31.89 kg/m²   Physical Exam   Constitutional: He is oriented to person, place, and time. He appears well-developed and well-nourished. No distress.   HENT:   Head: Normocephalic and atraumatic.   Right Ear: External ear normal.   Left Ear: External ear normal.   Nose: Nose normal.   Has tracheostomy   Eyes: Conjunctivae and EOM are normal. Pupils are equal, round, and reactive to light.   Neck: Normal range of motion. Neck supple. No tracheal deviation present. No " thyromegaly present.   Cardiovascular: Normal rate, regular rhythm and normal heart sounds.   No murmur heard.  Pulmonary/Chest: Effort normal and breath sounds normal. No respiratory distress. He has no wheezes.   Left sided port in place   Abdominal: Soft. Bowel sounds are normal. There is no tenderness. There is no rebound and no guarding.   Musculoskeletal: Normal range of motion. He exhibits no edema, tenderness or deformity.   Neurological: He is alert and oriented to person, place, and time. No cranial nerve deficit.   Skin: Skin is warm and dry. No rash noted.   Psychiatric: He has a normal mood and affect. His behavior is normal. Judgment and thought content normal.       Lab Review  Lab Results   Component Value Date    TSH 6.240 (H) 07/18/2019     Lab Results   Component Value Date    FREET4 0.68 (L) 02/18/2017        Assessment/Plan      1. Acquired hypothyroidism    2. Vitamin D deficiency    3. B12 deficiency    . This diagnosis was discussed and reviewed with the patient including the advantages of drug therapy.     1. Orders placed during this encounter include:  No orders of the defined types were placed in this encounter.      Medications prescribed:  Outpatient Encounter Medications as of 7/30/2019   Medication Sig Dispense Refill   • amLODIPine (NORVASC) 10 MG tablet Take 1 tablet by mouth Daily. 30 tablet 11   • atorvastatin (LIPITOR) 20 MG tablet Take 1 tablet by mouth Daily. 90 tablet 3   • B Complex Vitamins (VITAMIN B COMPLEX) capsule capsule Take 1 capsule by mouth Daily.     • Cholecalciferol (VITAMIN D3) 62201 units capsule TAKE 1 CAPSULE BY MOUTH AS DIRECTED EVERY  MONTH 1 capsule 11   • clopidogrel (PLAVIX) 75 MG tablet Take 75 mg by mouth Daily. Last dose approx greater than one month ago     • docusate sodium (COLACE) 100 MG capsule Take 1 capsule by mouth 2 (Two) Times a Day As Needed for Constipation. 60 capsule 2   • DULoxetine (CYMBALTA) 60 MG capsule Take 60 mg by mouth Daily.      • ferrous sulfate, as mg of FE, (VISHAL-IN-SOL) 75 (15 Fe) MG/ML drops 25 mL by Per G Tube route Daily. 150 mL 2   • indomethacin (INDOCIN) 25 MG capsule Take 1 capsule by mouth Daily.  0   • indomethacin (INDOCIN) 50 MG capsule Take 50 mg by mouth Daily.     • irbesartan (AVAPRO) 300 MG tablet Take 1 tablet by mouth Daily. This replaces losartan 30 tablet 11   • levoFLOXacin (LEVAQUIN) 500 MG tablet TAKE 1 TABLET BY MOUTH ONCE DAILY FOR 10 DAYS  0   • metoprolol succinate XL (TOPROL-XL) 25 MG 24 hr tablet TAKE 1 TABLET BY MOUTH AT BEDTIME 30 tablet 12   • Multiple Vitamins-Minerals (MULTIVITAMIN ADULT PO) Take 1 tablet by mouth Daily.     • mupirocin (BACTROBAN) 2 % ointment Apply  topically 2 (Two) Times a Day. 30 g 2   • POTASSIUM PO Take  by mouth.     • sodium chloride (NS) 0.9 % irrigation USE FOR TRACHEOSTOMY AS DIRECTED 1000 mL 8   • spironolactone (ALDACTONE) 25 MG tablet TAKE 1/2 (ONE-HALF) TABLET BY MOUTH ONCE DAILY 15 tablet 6   • SYNTHROID 112 MCG tablet Take 1 tablet by mouth Daily. 30 tablet 11   • traZODone (DESYREL) 100 MG tablet Take 100 mg by mouth Every Night.     • triamcinolone (KENALOG) 0.1 % cream Apply  topically to the appropriate area as directed 2 (Two) Times a Day. 45 g 2   • vitamin B-12 (CYANOCOBALAMIN) 100 MCG tablet Take 100 mcg by mouth Daily.       No facility-administered encounter medications on file as of 7/30/2019.         Patient has hypothyroidism for 10 years         TSH    Lab Results   Component Value Date    TSH 6.240 (H) 07/18/2019    TSH 6.470 (H) 06/07/2019    TSH 12.400 (H) 03/21/2019       Lab Results   Component Value Date    FREET4 0.68 (L) 02/18/2017      synthroid     was taking it with iron but now on empty stomach    Brand name 112 mcgs daily -- increase to 125 mcgs daily     He is taking through [facility]     Lab Results   Component Value Date    VDOA46ER 50.2 07/18/2019    UTLR84VA 54.5 02/04/2019    YPCK60LS 57.7 10/23/2018       Lab Results   Component Value  Date    HBKMQSAN29 1,864 (H) 07/18/2019     Normalized on 50 th u monthly  ---    Anemia of low iron     Now improved on liquid iron     Take apart from thyroid pill     Lab Results   Component Value Date    WBC 11.37 (H) 07/18/2019    HGB 14.1 07/18/2019    HCT 44.0 07/18/2019    MCV 87.6 07/18/2019     07/18/2019         --    HTN on losartan - hctz , creat slight elevation    Change to losartan only     As he improved and gained weight after recovering from cancer   His bp increased but now nl     He is also on 12.5 mg of aldactone, amlodipine and toprol    Recheck TSH and BMP in 6 weeks         4. No Follow-up on file.

## 2019-08-09 ENCOUNTER — OFFICE VISIT (OUTPATIENT)
Dept: ONCOLOGY | Facility: CLINIC | Age: 59
End: 2019-08-09

## 2019-08-09 ENCOUNTER — LAB (OUTPATIENT)
Dept: ONCOLOGY | Facility: HOSPITAL | Age: 59
End: 2019-08-09

## 2019-08-09 VITALS
OXYGEN SATURATION: 94 % | RESPIRATION RATE: 18 BRPM | HEART RATE: 70 BPM | SYSTOLIC BLOOD PRESSURE: 143 MMHG | DIASTOLIC BLOOD PRESSURE: 73 MMHG | HEIGHT: 72 IN | BODY MASS INDEX: 31.34 KG/M2 | WEIGHT: 231.4 LBS | TEMPERATURE: 98.8 F

## 2019-08-09 DIAGNOSIS — D64.9 ANEMIA, UNSPECIFIED TYPE: ICD-10-CM

## 2019-08-09 DIAGNOSIS — D72.828 OTHER ELEVATED WHITE BLOOD CELL (WBC) COUNT: ICD-10-CM

## 2019-08-09 DIAGNOSIS — C32.9 SQUAMOUS CELL CARCINOMA OF LARYNX (HCC): Primary | ICD-10-CM

## 2019-08-09 DIAGNOSIS — Z45.2 ENCOUNTER FOR VENOUS ACCESS DEVICE CARE: ICD-10-CM

## 2019-08-09 LAB
ANION GAP SERPL CALCULATED.3IONS-SCNC: 11 MMOL/L (ref 5–15)
BASOPHILS # BLD AUTO: 0.15 10*3/MM3 (ref 0–0.2)
BASOPHILS NFR BLD AUTO: 0.7 % (ref 0–1.5)
BUN BLD-MCNC: 25 MG/DL (ref 6–20)
BUN/CREAT SERPL: 17.6 (ref 7–25)
CALCIUM SPEC-SCNC: 9.5 MG/DL (ref 8.6–10.5)
CHLORIDE SERPL-SCNC: 103 MMOL/L (ref 98–107)
CO2 SERPL-SCNC: 25 MMOL/L (ref 22–29)
CREAT BLD-MCNC: 1.42 MG/DL (ref 0.76–1.27)
DEPRECATED RDW RBC AUTO: 49.1 FL (ref 37–54)
EOSINOPHIL # BLD AUTO: 0.64 10*3/MM3 (ref 0–0.4)
EOSINOPHIL NFR BLD AUTO: 3 % (ref 0.3–6.2)
ERYTHROCYTE [DISTWIDTH] IN BLOOD BY AUTOMATED COUNT: 15.6 % (ref 12.3–15.4)
FERRITIN SERPL-MCNC: 105.7 NG/ML (ref 30–400)
FOLATE SERPL-MCNC: >20 NG/ML (ref 4.78–24.2)
GFR SERPL CREATININE-BSD FRML MDRD: 51 ML/MIN/1.73
GLUCOSE BLD-MCNC: 133 MG/DL (ref 65–99)
HCT VFR BLD AUTO: 41.1 % (ref 37.5–51)
HGB BLD-MCNC: 13.9 G/DL (ref 13–17.7)
IMM GRANULOCYTES # BLD AUTO: 0.24 10*3/MM3 (ref 0–0.05)
IMM GRANULOCYTES NFR BLD AUTO: 1.1 % (ref 0–0.5)
IRON 24H UR-MRATE: 44 MCG/DL (ref 59–158)
IRON SATN MFR SERPL: 12 % (ref 20–50)
LYMPHOCYTES # BLD AUTO: 0.96 10*3/MM3 (ref 0.7–3.1)
LYMPHOCYTES NFR BLD AUTO: 4.4 % (ref 19.6–45.3)
MCH RBC QN AUTO: 29.2 PG (ref 26.6–33)
MCHC RBC AUTO-ENTMCNC: 33.8 G/DL (ref 31.5–35.7)
MCV RBC AUTO: 86.3 FL (ref 79–97)
MONOCYTES # BLD AUTO: 1.53 10*3/MM3 (ref 0.1–0.9)
MONOCYTES NFR BLD AUTO: 7.1 % (ref 5–12)
NEUTROPHILS # BLD AUTO: 18.15 10*3/MM3 (ref 1.7–7)
NEUTROPHILS NFR BLD AUTO: 83.7 % (ref 42.7–76)
NRBC BLD AUTO-RTO: 0 /100 WBC (ref 0–0.2)
PLATELET # BLD AUTO: 323 10*3/MM3 (ref 140–450)
PMV BLD AUTO: 9.8 FL (ref 6–12)
POTASSIUM BLD-SCNC: 4.5 MMOL/L (ref 3.5–5.2)
RBC # BLD AUTO: 4.76 10*6/MM3 (ref 4.14–5.8)
SODIUM BLD-SCNC: 139 MMOL/L (ref 136–145)
TIBC SERPL-MCNC: 356 MCG/DL (ref 298–536)
TRANSFERRIN SERPL-MCNC: 239 MG/DL (ref 200–360)
VIT B12 BLD-MCNC: 1979 PG/ML (ref 211–946)
WBC NRBC COR # BLD: 21.67 10*3/MM3 (ref 3.4–10.8)

## 2019-08-09 PROCEDURE — 85025 COMPLETE CBC W/AUTO DIFF WBC: CPT | Performed by: INTERNAL MEDICINE

## 2019-08-09 PROCEDURE — 82607 VITAMIN B-12: CPT | Performed by: INTERNAL MEDICINE

## 2019-08-09 PROCEDURE — 99214 OFFICE O/P EST MOD 30 MIN: CPT | Performed by: INTERNAL MEDICINE

## 2019-08-09 PROCEDURE — 82728 ASSAY OF FERRITIN: CPT | Performed by: INTERNAL MEDICINE

## 2019-08-09 PROCEDURE — 1157F ADVNC CARE PLAN IN RCRD: CPT | Performed by: INTERNAL MEDICINE

## 2019-08-09 PROCEDURE — 36415 COLL VENOUS BLD VENIPUNCTURE: CPT | Performed by: INTERNAL MEDICINE

## 2019-08-09 PROCEDURE — 84466 ASSAY OF TRANSFERRIN: CPT | Performed by: INTERNAL MEDICINE

## 2019-08-09 PROCEDURE — G8731 PAIN NEG NO PLAN: HCPCS | Performed by: INTERNAL MEDICINE

## 2019-08-09 PROCEDURE — 80048 BASIC METABOLIC PNL TOTAL CA: CPT | Performed by: INTERNAL MEDICINE

## 2019-08-09 PROCEDURE — 82746 ASSAY OF FOLIC ACID SERUM: CPT | Performed by: INTERNAL MEDICINE

## 2019-08-09 PROCEDURE — 83540 ASSAY OF IRON: CPT | Performed by: INTERNAL MEDICINE

## 2019-08-09 PROCEDURE — G0463 HOSPITAL OUTPT CLINIC VISIT: HCPCS | Performed by: INTERNAL MEDICINE

## 2019-08-09 PROCEDURE — G9903 PT SCRN TBCO ID AS NON USER: HCPCS | Performed by: INTERNAL MEDICINE

## 2019-08-09 RX ORDER — SODIUM CHLORIDE 0.9 % (FLUSH) 0.9 %
10 SYRINGE (ML) INJECTION AS NEEDED
Status: DISCONTINUED | OUTPATIENT
Start: 2019-08-09 | End: 2019-08-09 | Stop reason: HOSPADM

## 2019-08-09 RX ORDER — SODIUM CHLORIDE 0.9 % (FLUSH) 0.9 %
10 SYRINGE (ML) INJECTION AS NEEDED
Status: CANCELLED | OUTPATIENT
Start: 2019-08-09

## 2019-08-09 NOTE — PROGRESS NOTES
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  Upon staging workup patient was found to have stage III squamous cell cancer of larynx for which she has been started on concurrent chemoradiation with weekly carboplatin and Taxol on 2018.  Last dose of chemotherapy and radiation was on May 15, 2018.  Patient is here for follow-up appointment today.  Complains of sinus congestion and drainage.  Denies any fever.  States he feels better.  Denies any active bleeding.  Denies any new lymph node enlargement.        PAST MEDICAL HISTORY:    Past Medical History:   Diagnosis Date   • Aortic dissection (CMS/HCC)    • Chest pain    • Disease of thyroid gland    • HTN (hypertension)    • Knee pain    • Sleep apnea    • Smoker    • Squamous cell carcinoma of larynx (CMS/HCC) 2018   • Stroke (CMS/HCC)    • Swallowing difficulty        SOCIAL HISTORY:    Social History     Tobacco Use   • Smoking status: Former Smoker     Packs/day: 1.25     Years: 38.00     Pack years: 47.50     Last attempt to quit: 2017     Years since quittin.4   • Smokeless tobacco: Never Used   • Tobacco comment: 2018 - Patient confirmed he ceased utilization of tobacco products 2017.   Substance Use Topics   • Alcohol use: No   • Drug use: No       Surgical History :  Past Surgical History:   Procedure Laterality Date   • AORTA SURGERY      ruptured aorta repair   • ASCENDING ARCH/HEMIARCH REPLACEMENT N/A 2017    Procedure: INTRAOPERATIVE TRANSESOPHAGEAL ECHOCARDIOGRAM,  MIDLINE STERNOTOMY, ASCENDING AORTIC  AND PROXIMAL AORTIC ARCH REPAIR WITH 26MM GRAFT, AORTIC VALVE RESUSPENSION, AORTIC ROOT REPAIR, OPEN VEIN HARVEST OF RIGHT LEG;  Surgeon: German Arreguin MD;  Location: [facility] MAIN OR;  Service:    • BRONCHOSCOPY N/A 2/17/2017    Procedure: BRONCHOSCOPY BIOPSY AT BEDSIDE WITH BAL-LEFT LOWER LOBE;  Surgeon: Trent Chaney MD;  Location: [facility] ENDOSCOPY;  Service:    • COLONOSCOPY N/A 3/7/2018    Procedure: COLONOSCOPY WITH POSSIBLE POLYPECTOMY   ( DONE IN OR WITH ENDO)      COLONOSCOPY FIRST;  Surgeon: Kris Solano MD;  Location: [facility] OR;  Service:    • DIRECT LARYNGOSCOPY, ESOPHAGOSCOPY, BRONCHOSCOPY N/A 2/5/2018    Procedure: DIRECT LARYNGOSCOPY WITH BIOPSY, ESOPHAGOSCOPY     (no bronchoscopy, no laser);  Surgeon: Joseph Venegas MD;  Location: [facility] OR;  Service:    • ENDOSCOPY W/ PEG TUBE PLACEMENT N/A 5/2/2018    Procedure: ESOPHAGOGASTRODUODENOSCOPY WITH PERCUTANEOUS ENDOSCOPIC GASTROSTOMY TUBE INSERTION;  Surgeon: Angel Maciel MD;  Location: [facility] ENDOSCOPY;  Service: Gastroenterology   • ENDOSCOPY W/ PEG TUBE PLACEMENT N/A 10/26/2018    Procedure: ESOPHAGOGASTRODUODENOSCOPY WITH PERCUTANEOUS ENDOSCOPIC GASTROSTOMY TUBE INSERTION WITH ANESTHESIA Possible open gastrostomy;  Surgeon: Kris Solano MD;  Location: [facility] OR;  Service: General   • GASTROSTOMY FEEDING TUBE INSERTION N/A 10/26/2018    Procedure: GASTROSTOMY FEEDING TUBE INSERTION;  Surgeon: Kris Solano MD;  Location: [facility] OR;  Service: General   • SPLENECTOMY N/A 7/26/2018    Procedure: SPLENECTOMY, left chest tube placement, EXPLORATORY LAPAROTOMY, G-TUBE PALCEMENT;  Surgeon: Kris Solano MD;  Location: [facility] OR;  Service: General   • SPLENECTOMY      2018   • THORACOSCOPY Left 7/31/2018    Procedure: LEFT THORACOSCOPY VIDEO ASSISTED POSSIBLE THORACOTOMY possible decortication bronchoscopy;  Surgeon: Joshua Pollock MD;  Location: [facility]  OR;  Service: Cardiothoracic   • TRACHEOSTOMY  02/05/2018   • TRACHEOSTOMY N/A 2/5/2018    Procedure: TRACHEOSTOMY  local injection @ 1106 incision @ 1119;  Surgeon: Joseph Venegas MD;  Location: [facility] OR;  Service:    • VENOUS ACCESS DEVICE (PORT) INSERTION N/A 3/7/2018    Procedure: INSERTION OF MEDIPORT     (C-ARM#1);  Surgeon: Kris Solano MD;  Location: [facility] OR;  Service:        ALLERGIES:    Allergies   Allergen Reactions   • Chlorhexidine Itching     Topical cleaning wipes causes severe skin irritation   • Eggs Or Egg-Derived Products Swelling   • Erythromycin Other (See Comments)     Joint pains and cold sweats   • Gabapentin Itching   • Other GI Intolerance     Mycins  farzad lotion causes rash   • Acth [Corticotropin] Rash   • Cephalosporins Itching and Rash     Pt developed rash, itching after cefepime administration (2-3 doses) during 2/2017 admission. Itching was relieved with benadryl. Cefepime was continued because his infection was improving and no symptoms of anaphylaxis present   • Co Q 10 [Coenzyme Q10] Rash   • Keflex [Cephalexin] Rash   • Neomycin Rash   • Penicillins Rash     Tolerated cefepime during 2/2017 admission. Had rash and itching (relieved by benadryl) after few doses of cefepime and cefepime was continued.   • Tetracyclines & Related GI Intolerance         FAMILY HISTORY:  Family History   Problem Relation Age of Onset   • Thyroid disease Mother    • Hypertension Mother    • Hypertension Father    • Hypertension Other            REVIEW OF SYSTEMS:      CONSTITUTIONAL:  States his fatigue is improved.  Has gained 4 pounds since last clinic visit. Denies any fever, chills .      HEENT:   Complains of sinus congestion and postnasal drainage.  Some visual disturbance affecting left eye since stroke in 2017.    Denies any difficulty swallowing food.     RESPIRATORY:  Complains of chronic shortness of breath, denies any recent worsening.  No new cough or  "hemoptysis.     CARDIOVASCULAR:  No chest pain or palpitations.     GASTROINTESTINAL:  No abdominal pain nausea, vomiting or blood in the stool.     GENITOURINARY: No Dysuria or Hematuria.     MUSCULOSKELETAL:  No new back pain or arthralgia..     LYMPHATICS:  Denies any abnormal swollen glands anywhere in the body.     NEUROLOGICAL : No tingling or numbness. No headache or dizziness. No seizures or balance problems.     SKIN: no new skin lesions.          PHYSICAL EXAMINATION:      VITAL SIGNS:  /73   Pulse 70   Temp 98.8 °F (37.1 °C) (Temporal)   Resp 18   Ht 182.9 cm (72.01\")   Wt 105 kg (231 lb 6.4 oz)   SpO2 94%   BMI 31.38 kg/m²       08/09/19  0835   Weight: 105 kg (231 lb 6.4 oz)       ECOG  performance status: 1      GENERAL:  Not in any distress.    HEENT:  Normocephalic, Atraumatic.Mild Conjunctival pallor. No icterus. Extraocular Movements Intact. No Facial Asymmetry noted.     NECK:  No adenopathy. No JVD.Tracheostomy present.    RESPIRATORY:  Fair air entry bilateral. No rhonchi or wheezing.    CARDIOVASCULAR:  S1, S2. Regular rate and rhythm. No murmur or gallop appreciated.  Port-A-Cath present on left chest wall.    ABDOMEN:  Soft, obese, nontender. Bowel sounds present in all four quadrants.  No hepatosplenomegaly appreciated.Well -healed surgical scar present in abdomen.Feeding tube present.    MUSCULOSKELETAL:  No edema.No Calf Tenderness.Decreased range of motion.    NEUROLOGIC:  Alert, awake and oriented ×3.  No  Motor  deficit appreciated. Cranial Nerves 2-12 grossly intact.    SKIN : No new skin lesion.  Skin is warm and moist.    LYMPHATICS: No new enlarged lymph node in neck or supraclavicular area.            DIAGNOSTIC DATA:    Glucose   Date Value Ref Range Status   08/09/2019 133 (H) 65 - 99 mg/dL Final     Glucose, Arterial   Date Value Ref Range Status   07/31/2018 113 (H) 65 - 95 mmol/L Final     Sodium   Date Value Ref Range Status   08/09/2019 139 136 - 145 mmol/L " Final     Potassium   Date Value Ref Range Status   08/09/2019 4.5 3.5 - 5.2 mmol/L Final     CO2   Date Value Ref Range Status   08/09/2019 25.0 22.0 - 29.0 mmol/L Final     Chloride   Date Value Ref Range Status   08/09/2019 103 98 - 107 mmol/L Final     Anion Gap   Date Value Ref Range Status   08/09/2019 11.0 5.0 - 15.0 mmol/L Final     Creatinine   Date Value Ref Range Status   08/09/2019 1.42 (H) 0.76 - 1.27 mg/dL Final     BUN   Date Value Ref Range Status   08/09/2019 25 (H) 6 - 20 mg/dL Final     BUN/Creatinine Ratio   Date Value Ref Range Status   08/09/2019 17.6 7.0 - 25.0 Final     Calcium   Date Value Ref Range Status   08/09/2019 9.5 8.6 - 10.5 mg/dL Final     eGFR Non  Amer   Date Value Ref Range Status   08/09/2019 51 (L) >60 mL/min/1.73 Final     Alkaline Phosphatase   Date Value Ref Range Status   07/18/2019 87 39 - 117 U/L Final     Total Protein   Date Value Ref Range Status   07/18/2019 7.4 6.0 - 8.5 g/dL Final     ALT (SGPT)   Date Value Ref Range Status   07/18/2019 19 1 - 41 U/L Final     AST (SGOT)   Date Value Ref Range Status   07/18/2019 18 1 - 40 U/L Final     Total Bilirubin   Date Value Ref Range Status   07/18/2019 0.5 0.2 - 1.2 mg/dL Final     Albumin   Date Value Ref Range Status   07/18/2019 4.30 3.50 - 5.20 g/dL Final     Globulin   Date Value Ref Range Status   07/18/2019 3.1 gm/dL Final     Lab Results   Component Value Date    WBC 21.67 (H) 08/09/2019    HGB 13.9 08/09/2019    HCT 41.1 08/09/2019    MCV 86.3 08/09/2019     08/09/2019     Lab Results   Component Value Date    NEUTROABS 18.15 (H) 08/09/2019    IRON 44 (L) 08/09/2019    TIBC 356 08/09/2019    LABIRON 12 (L) 08/09/2019    FERRITIN 105.70 08/09/2019    [id number] 1,979 (H) 08/09/2019    FOLATE >20.00 08/09/2019     No results found for: , LABCA2, AFPTM, HCGQUANT, , CHROMGRNA, [id number], CEA, REFLABREPO        Radiology Data :  PET CT done on February 16, 2018 was reviewed, discussed with  patient, it showed:  IMPRESSION:  CONCLUSION:   1.  Soft tissue mass in the region of the epiglottis obstructing  the airway, associated with hypermetabolic activity, compatible  with known neoplasm.  2.  Activity around the tracheostomy site is likely inflammatory.  3.  Activity in the rectum (SUV max 7.1) is probably either  physiological or inflammatory. Recommend direct visualization.  4.  There is a focus of activity in the region of the right  common iliac artery without a definite CT abnormality (SUV max  5.4) possibly representing a lymph node, nonspecific but cannot  rule out neoplasm.  5.  Foci of activity posterior to the ischial tuberosities are  probably inflammatory.  6.  There are diffuse punctate low-level foci of activity  throughout the osseous structures, liver, kidneys, mediastinum  which are more likely artifactual than due to metastatic disease.    ADDENDUM   ADDENDUM #1         Upon review of the patient's imaging with Dr. Tapia, there  appears to be extension of tumor into the left side of the  preepiglottic space.        Ct of Neck with contrast done on January 29, 2018 showed:  IMPRESSION:  3 x 3.5 x 3.5 cm soft tissue mass with lobular margin centered at  the level the epiglottis however flush 4 cm intimate with the  posterior margin of the tongue base and the posterior margin of  the oral pharynx. This does contribute to moderate compromise of  the airway. Finding is suspicious for malignancy.      Mostly subcentimeter cervical chain lymph nodes present  bilaterally. The largest node right level II measuring 1.3 cm. No  necrotic or conglomerate adenopathy.      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 The  dissection was described on a CTA coronary artery exam from  2/14/2017.        Pathology :  Pathology result from February 5, 2018 showed:  Final Diagnosis   1.  TUMOR, EPIGLOTTIS:   INVASIVE SQUAMOUS  CELL CARCINOMA, NON KERATINIZING, POORLY DIFFERENTIATED.       2.  TUMOR, EPIGLOTTIS:   INVASIVE SQUAMOUS CELL CARCINOMA, NON KERATINIZING, POORLY DIFFERENTIATED.               ASSESSMENT AND PLAN:      1.  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  Based on PET/CT there is a tumor extension into preepiglottic space making a T3 lesion.  Result of PET CT were discussed with patient.  It was discussed with patient with T3 N0 lesion after his treatment option includes either surgery or concurrent chemoradiation.    -He was started on concurrent chemoradiation on March 19, 2018.  Patient received 6 weekly carboplatin and Taxol from March 19, 2018 until May 15, 2018.  Radiation was also completed on May 15, 2018.  -ENT exam by Dr. Venegas in July 18,2019 showed good response to chemoradiation without any evidence of any active tumor.  -We will ask patient to return to clinic in 4 months with a repeat CBC,bmp and anemia workup to be done on that day.    2.   anemia:   -Patient was started on liquid iron in October 2018.  -Hemoglobin earlier today is 13.9    -Currently patient is taking ferrous sulfate 1 tablet p.o. daily along with vitamin B12 supplement.  -Iron study shows iron saturation is only 12%.  Recommend continuing with ferrous sulfate 1 tablet p.o. daily.  B12 and folate is higher than normal.  Will recommend discontinuing B12 and folic acid from today on August 9, 2019.      3.  Acute kidney injury:( problem is new to me)  -Patient is found to have acute kidney injury with a creatinine of 1.42.  Patient will be notified about elevated creatinine and need to increase hydration.    4.  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  White blood cell count is 21,000.  We will monitored with CBC for now     5.  History of aortic dissection status post repair.     6.  History of CVA with residual visual disturbance in left eye     7.  Health maintenance: Patient quit smoking in February 2017.   Never had a screening colonoscopy done.      8. Advance care planning: Patient has living will on chart.    9. BMI: Patient's Body mass index is 31.38 kg/m². BMI is higher than reference range.  Patient was notified about her elevated BMI.    10.  Pain assessment:  -Patient denies any pain today.        Reza Paetl MD  8/9/2019  7:01 PM        EMR Dragon/Transcription disclaimer:   Much of this encounter note is an electronic transcription/translation of spoken language to printed text. The electronic translation of spoken language may permit erroneous, or at times, nonsensical words or phrases to be inadvertently transcribed; Although I have reviewed the note for such errors, some may still exist.

## 2019-08-12 ENCOUNTER — TELEPHONE (OUTPATIENT)
Dept: ONCOLOGY | Facility: HOSPITAL | Age: 59
End: 2019-08-12

## 2019-08-12 NOTE — TELEPHONE ENCOUNTER
----- Message from Reza Patel MD sent at 8/9/2019  7:01 PM CDT -----  Please let patient know, his kidney blood work was slightly higher than normal at 1.42.  He needs to increase hydration.  He needs to continue taking ferrous sulfate 1 tablet p.o. daily.  He can stop taking any vitamin B12 and folic acid supplement.  Thank you

## 2019-08-26 ENCOUNTER — OFFICE VISIT (OUTPATIENT)
Dept: SLEEP MEDICINE | Facility: HOSPITAL | Age: 59
End: 2019-08-26

## 2019-08-26 VITALS
DIASTOLIC BLOOD PRESSURE: 78 MMHG | BODY MASS INDEX: 31.69 KG/M2 | HEIGHT: 72 IN | WEIGHT: 234 LBS | SYSTOLIC BLOOD PRESSURE: 133 MMHG | OXYGEN SATURATION: 94 % | HEART RATE: 68 BPM

## 2019-08-26 DIAGNOSIS — G47.33 OBSTRUCTIVE SLEEP APNEA, ADULT: Primary | ICD-10-CM

## 2019-08-26 PROCEDURE — 99213 OFFICE O/P EST LOW 20 MIN: CPT | Performed by: NURSE PRACTITIONER

## 2019-08-26 NOTE — PROGRESS NOTES
Sleep Clinic Follow Up    Date: 2019  Primary Care Physician: Marybeth Harrison DO    Last office visit: 2019 (I reviewed this note)    CC: Follow up: EMELIA      Interim History:  Since the last visit:    1) moderate EMELIA -  Truman Esquivel is not currently on CPAP therapy. Patient was previously on autoCPAP 5-20 cm H2O, however, he historically had a tracheostomy placed due to squamous cell cancer of the throat. He will require a repeat PSG after tracheostomy decannulation. Patient has not yet had trach decannulated. Has still been sleeping in the recliner but has purchased a bed with adjustable head.    Sleep Testin. PSG on 2017, AHI of 24.7   2. CPAP recommended 5-20 cm H2O   3. Currently on no therapy      Bed time: 8788-3820   Sleep latency: 5-20 minutes  Number of times awakens during the night: 2  Wake time: 4105-4874  Estimated total sleep time at night: 6-8 hours  Caffeine intake: 0 cups of coffee, 0 cups of tea, and 16 sodas per day  Alcohol intake: 0 drinks per week  Nap time: rare   Sleepiness with Driving: none       2) Patient reports occasional RLS symptoms.     PMHx, FH, SH reviewed and pertinent changes are: unchanged from last office visit on 2019      REVIEW OF SYSTEMS:   Negative for chest pain, SOA, fever, chills, cough, N/V/D, abdominal pain.    Smoking:none        Exam:  Vitals:    19 1013   BP: 133/78   Pulse: 68   SpO2: 94%           19  1013   Weight: 106 kg (234 lb)     Body mass index is 31.73 kg/m². Patient's Body mass index is 31.73 kg/m². BMI is above normal parameters. Recommendations include: referral to primary care.      Gen:                No distress, conversant, pleasant, appears stated age, alert, oriented  Eyes:               Anicteric sclera, moist conjunctiva, no lid lag                           PERRL, EOMI   Heent:             NC/AT                          Oropharynx clear                          Normal hearing  Lungs:              Tracheostomy in place midline    Normal effort, non-labored breathing                          Rales bilaterally          CV:                  Normal S1/S2, no murmur                          No lower extremity edema  ABD:               Soft, rounded, non-distended                          Normal bowel sounds                    Psych:             Appropriate affect  Neuro:             CN 2-12 appear intact    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   • Stroke (CMS/HCC)    • Swallowing difficulty        Current Outpatient Medications:   •  amLODIPine (NORVASC) 10 MG tablet, Take 1 tablet by mouth Daily., Disp: 30 tablet, Rfl: 11  •  atorvastatin (LIPITOR) 20 MG tablet, Take 1 tablet by mouth Daily., Disp: 90 tablet, Rfl: 3  •  B Complex Vitamins (VITAMIN B COMPLEX) capsule capsule, Take 1 capsule by mouth Daily., Disp: , Rfl:   •  Cholecalciferol (VITAMIN D3) 55334 units capsule, TAKE 1 CAPSULE BY MOUTH AS DIRECTED EVERY  MONTH, Disp: 1 capsule, Rfl: 11  •  clopidogrel (PLAVIX) 75 MG tablet, Take 75 mg by mouth Daily. Last dose approx greater than one month ago, Disp: , Rfl:   •  docusate sodium (COLACE) 100 MG capsule, Take 1 capsule by mouth 2 (Two) Times a Day As Needed for Constipation., Disp: 60 capsule, Rfl: 2  •  DULoxetine (CYMBALTA) 60 MG capsule, Take 60 mg by mouth Daily., Disp: , Rfl:   •  ferrous sulfate, as mg of FE, (VISHAL-IN-SOL) 75 (15 Fe) MG/ML drops, 25 mL by Per G Tube route Daily., Disp: 150 mL, Rfl: 2  •  indomethacin (INDOCIN) 25 MG capsule, Take 1 capsule by mouth Daily., Disp: , Rfl: 0  •  indomethacin (INDOCIN) 50 MG capsule, Take 50 mg by mouth Daily., Disp: , Rfl:   •  irbesartan (AVAPRO) 300 MG tablet, Take 1 tablet by mouth Daily. This replaces losartan, Disp: 30 tablet, Rfl: 11  •   levoFLOXacin (LEVAQUIN) 500 MG tablet, TAKE 1 TABLET BY MOUTH ONCE DAILY FOR 10 DAYS, Disp: , Rfl: 0  •  metoprolol succinate XL (TOPROL-XL) 25 MG 24 hr tablet, TAKE 1 TABLET BY MOUTH AT BEDTIME, Disp: 30 tablet, Rfl: 12  •  Multiple Vitamins-Minerals (MULTIVITAMIN ADULT PO), Take 1 tablet by mouth Daily., Disp: , Rfl:   •  mupirocin (BACTROBAN) 2 % ointment, Apply  topically 2 (Two) Times a Day., Disp: 30 g, Rfl: 2  •  POTASSIUM PO, Take  by mouth., Disp: , Rfl:   •  sodium chloride (NS) 0.9 % irrigation, USE FOR TRACHEOSTOMY AS DIRECTED, Disp: 1000 mL, Rfl: 8  •  spironolactone (ALDACTONE) 25 MG tablet, TAKE 1/2 (ONE-HALF) TABLET BY MOUTH ONCE DAILY, Disp: 15 tablet, Rfl: 6  •  SYNTHROID 125 MCG tablet, Take 1 tablet by mouth Daily., Disp: 30 tablet, Rfl: 11  •  traZODone (DESYREL) 100 MG tablet, Take 100 mg by mouth Every Night., Disp: , Rfl:   •  triamcinolone (KENALOG) 0.1 % cream, Apply  topically to the appropriate area as directed 2 (Two) Times a Day., Disp: 45 g, Rfl: 2  •  vitamin B-12 (CYANOCOBALAMIN) 100 MCG tablet, Take 100 mcg by mouth Daily., Disp: , Rfl:       Assessment and Plan:    1. Obstructive sleep apnea Established, stable (1)  1. Return to clinic in 6 months for follow up  2. Repeat PSG pending once tracheostomy decannulated   2. Squamous cell throat cancer  1. Tracheostomy decannulation pending        Total time 15 min, more than half spent in face to face counseling and coordination of care.    RTC in 6 months. Patient agrees to return sooner if changes in symptoms.        This document has been electronically signed by ROWAN Irizarry on August 26, 2019 10:29 AM          CC: Marybeth Harrison,           No ref. provider found

## 2019-09-03 ENCOUNTER — TELEPHONE (OUTPATIENT)
Dept: ENDOCRINOLOGY | Facility: CLINIC | Age: 59
End: 2019-09-03

## 2019-09-03 RX ORDER — LEVOTHYROXINE SODIUM 0.12 MG/1
125 TABLET ORAL DAILY
Qty: 90 TABLET | Refills: 3 | Status: SHIPPED | OUTPATIENT
Start: 2019-09-03 | End: 2019-12-05

## 2019-09-03 NOTE — TELEPHONE ENCOUNTER
Pt needs synthroid changed . Has gone up an additional $32.00 a month . Will be out Saturday .     [facility]   [phone] Pt

## 2019-09-03 NOTE — TELEPHONE ENCOUNTER
Pt needs synthroid changed . Has gone up an additional $32.00 a month . Will be out Saturday .      [facility] is pharmacy   [phone] Pt     Change to Levothyroxine???  Thanks, camacho

## 2019-10-09 DIAGNOSIS — I10 ESSENTIAL HYPERTENSION: Primary | ICD-10-CM

## 2019-10-10 ENCOUNTER — OFFICE VISIT (OUTPATIENT)
Dept: CARDIOLOGY | Facility: CLINIC | Age: 59
End: 2019-10-10

## 2019-10-10 VITALS
BODY MASS INDEX: 34.16 KG/M2 | WEIGHT: 238.6 LBS | SYSTOLIC BLOOD PRESSURE: 160 MMHG | DIASTOLIC BLOOD PRESSURE: 80 MMHG | HEIGHT: 70 IN | HEART RATE: 60 BPM | OXYGEN SATURATION: 95 %

## 2019-10-10 DIAGNOSIS — Z86.79 H/O THORACIC AORTIC ANEURYSM REPAIR: ICD-10-CM

## 2019-10-10 DIAGNOSIS — Z98.890 H/O THORACIC AORTIC ANEURYSM REPAIR: ICD-10-CM

## 2019-10-10 DIAGNOSIS — I10 ESSENTIAL HYPERTENSION: Primary | ICD-10-CM

## 2019-10-10 PROCEDURE — 93000 ELECTROCARDIOGRAM COMPLETE: CPT | Performed by: INTERNAL MEDICINE

## 2019-10-10 PROCEDURE — 99213 OFFICE O/P EST LOW 20 MIN: CPT | Performed by: INTERNAL MEDICINE

## 2019-10-10 RX ORDER — AMLODIPINE BESYLATE 10 MG/1
10 TABLET ORAL DAILY
Qty: 30 TABLET | Refills: 11 | Status: SHIPPED | OUTPATIENT
Start: 2019-10-10 | End: 2020-10-19

## 2019-10-10 NOTE — PROGRESS NOTES
Truman Esquivel  58 y.o. male    10/10/2019  Chief Complaint   Patient presents with   • blood pressure followup       History of Present Illness patient is here today doing well.  He is status post a thoracic aortic aneurysm repair with aortic valve suspension.  He is on amlodipine and his blood pressure is a little bit elevated today though he said he had not taken it.  He has no chest pain.  He says he is actually felt much better he is exercising and under the direction of his heart surgery group lab by Dr. Arreguin.  He has had no shortness of air.      -        SUBJECTIVE    Allergies   Allergen Reactions   • Chlorhexidine Itching     Topical cleaning wipes causes severe skin irritation   • Eggs Or Egg-Derived Products Swelling   • Erythromycin Other (See Comments)     Joint pains and cold sweats   • Gabapentin Itching   • Other GI Intolerance     Mycins  farzad lotion causes rash   • Acth [Corticotropin] Rash   • Cephalosporins Itching and Rash     Pt developed rash, itching after cefepime administration (2-3 doses) during 2/2017 admission. Itching was relieved with benadryl. Cefepime was continued because his infection was improving and no symptoms of anaphylaxis present   • Co Q 10 [Coenzyme Q10] Rash   • Keflex [Cephalexin] Rash   • Neomycin Rash   • Penicillins Rash     Tolerated cefepime during 2/2017 admission. Had rash and itching (relieved by benadryl) after few doses of cefepime and cefepime was continued.   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   • Stroke (CMS/HCC)    • Swallowing difficulty          Past Surgical History:   Procedure Laterality Date   • AORTA SURGERY      ruptured aorta repair   • ASCENDING ARCH/HEMIARCH REPLACEMENT N/A 2/14/2017     Procedure: INTRAOPERATIVE TRANSESOPHAGEAL ECHOCARDIOGRAM, MIDLINE STERNOTOMY, ASCENDING AORTIC  AND PROXIMAL AORTIC ARCH REPAIR WITH 26MM GRAFT, AORTIC VALVE RESUSPENSION, AORTIC ROOT REPAIR, OPEN VEIN HARVEST OF RIGHT LEG;  Surgeon: German Arreguin MD;  Location: [facility] MAIN OR;  Service:    • BRONCHOSCOPY N/A 2/17/2017    Procedure: BRONCHOSCOPY BIOPSY AT BEDSIDE WITH BAL-LEFT LOWER LOBE;  Surgeon: Trent Chaney MD;  Location: [facility] ENDOSCOPY;  Service:    • COLONOSCOPY N/A 3/7/2018    Procedure: COLONOSCOPY WITH POSSIBLE POLYPECTOMY   ( DONE IN OR WITH ENDO)      COLONOSCOPY FIRST;  Surgeon: Kris Solano MD;  Location: [facility] OR;  Service:    • DIRECT LARYNGOSCOPY, ESOPHAGOSCOPY, BRONCHOSCOPY N/A 2/5/2018    Procedure: DIRECT LARYNGOSCOPY WITH BIOPSY, ESOPHAGOSCOPY     (no bronchoscopy, no laser);  Surgeon: Joseph Venegas MD;  Location: [facility] OR;  Service:    • ENDOSCOPY W/ PEG TUBE PLACEMENT N/A 5/2/2018    Procedure: ESOPHAGOGASTRODUODENOSCOPY WITH PERCUTANEOUS ENDOSCOPIC GASTROSTOMY TUBE INSERTION;  Surgeon: Angel Maciel MD;  Location: [facility] ENDOSCOPY;  Service: Gastroenterology   • ENDOSCOPY W/ PEG TUBE PLACEMENT N/A 10/26/2018    Procedure: ESOPHAGOGASTRODUODENOSCOPY WITH PERCUTANEOUS ENDOSCOPIC GASTROSTOMY TUBE INSERTION WITH ANESTHESIA Possible open gastrostomy;  Surgeon: Kris Solano MD;  Location: [facility] OR;  Service: General   • GASTROSTOMY FEEDING TUBE INSERTION N/A 10/26/2018    Procedure: GASTROSTOMY FEEDING TUBE INSERTION;  Surgeon: Kris Solano MD;  Location: [facility] OR;  Service: General   • SPLENECTOMY N/A 7/26/2018    Procedure: SPLENECTOMY, left chest tube placement, EXPLORATORY LAPAROTOMY, G-TUBE PALCEMENT;  Surgeon: Kris Solano MD;  Location: [facility] OR;  Service: General   • SPLENECTOMY      2018   • THORACOSCOPY Left 7/31/2018    Procedure: LEFT THORACOSCOPY VIDEO ASSISTED POSSIBLE THORACOTOMY possible decortication  bronchoscopy;  Surgeon: Joshua Pollock MD;  Location: [facility];  Service: Cardiothoracic   • TRACHEOSTOMY  2018   • TRACHEOSTOMY N/A 2018    Procedure: TRACHEOSTOMY  local injection @ 1106 incision @ 1119;  Surgeon: Joseph Venegas MD;  Location: [facility] OR;  Service:    • VENOUS ACCESS DEVICE (PORT) INSERTION N/A 3/7/2018    Procedure: INSERTION OF MEDIPORT     (C-ARM#1);  Surgeon: Kris Solano MD;  Location: [facility] OR;  Service:          Family History   Problem Relation Age of Onset   • Thyroid disease Mother    • Hypertension Mother    • Hypertension Father    • Hypertension Other          Social History     Socioeconomic History   • Marital status:      Spouse name: Not on file   • Number of children: Not on file   • Years of education: Not on file   • Highest education level: Not on file   Tobacco Use   • Smoking status: Former Smoker     Packs/day: 1.25     Years: 38.00     Pack years: 47.50     Last attempt to quit: 2017     Years since quittin.6   • Smokeless tobacco: Never Used   • Tobacco comment: 2018 - Patient confirmed he ceased utilization of tobacco products 2017.   Substance and Sexual Activity   • Alcohol use: No   • Drug use: No   • Sexual activity: Defer         Current Outpatient Medications   Medication Sig Dispense Refill   • amLODIPine (NORVASC) 10 MG tablet Take 1 tablet by mouth Daily. 30 tablet 11   • atorvastatin (LIPITOR) 20 MG tablet Take 1 tablet by mouth Daily. 90 tablet 3   • B Complex Vitamins (VITAMIN B COMPLEX) capsule capsule Take 1 capsule by mouth Daily.     • Cholecalciferol (VITAMIN D3) 79158 units capsule TAKE 1 CAPSULE BY MOUTH AS DIRECTED EVERY  MONTH 1 capsule 11   • clopidogrel (PLAVIX) 75 MG tablet Take 75 mg by mouth Daily. Last dose approx greater than one month ago     • DULoxetine (CYMBALTA) 60 MG capsule Take 60 mg by mouth Daily.     • ferrous sulfate, as mg of FE, (VISHAL-IN-SOL) 75 (15 Fe) MG/ML  "drops 25 mL by Per G Tube route Daily. 150 mL 2   • indomethacin (INDOCIN) 25 MG capsule Take 1 capsule by mouth Daily.  0   • irbesartan (AVAPRO) 300 MG tablet Take 1 tablet by mouth Daily. This replaces losartan 30 tablet 11   • levothyroxine (SYNTHROID) 125 MCG tablet Take 1 tablet by mouth Daily. 90 tablet 3   • metoprolol succinate XL (TOPROL-XL) 25 MG 24 hr tablet TAKE 1 TABLET BY MOUTH AT BEDTIME 30 tablet 12   • Multiple Vitamins-Minerals (MULTIVITAMIN ADULT PO) Take 1 tablet by mouth Daily.     • POTASSIUM PO Take  by mouth.     • spironolactone (ALDACTONE) 25 MG tablet TAKE 1/2 (ONE-HALF) TABLET BY MOUTH ONCE DAILY 15 tablet 6   • traZODone (DESYREL) 100 MG tablet Take 100 mg by mouth Every Night.     • triamcinolone (KENALOG) 0.1 % cream Apply  topically to the appropriate area as directed 2 (Two) Times a Day. 45 g 2   • vitamin B-12 (CYANOCOBALAMIN) 100 MCG tablet Take 100 mcg by mouth Daily.     • docusate sodium (COLACE) 100 MG capsule Take 1 capsule by mouth 2 (Two) Times a Day As Needed for Constipation. 60 capsule 2   • indomethacin (INDOCIN) 50 MG capsule Take 50 mg by mouth Daily.     • levoFLOXacin (LEVAQUIN) 500 MG tablet TAKE 1 TABLET BY MOUTH ONCE DAILY FOR 10 DAYS  0   • mupirocin (BACTROBAN) 2 % ointment Apply  topically 2 (Two) Times a Day. 30 g 2   • sodium chloride (NS) 0.9 % irrigation USE FOR TRACHEOSTOMY AS DIRECTED 1000 mL 8     No current facility-administered medications for this visit.          OBJECTIVE    /80 (BP Location: Left arm) Comment (BP Location): 158/85 right arm  Pulse 60   Ht 177.8 cm (70\")   Wt 108 kg (238 lb 9.6 oz)   SpO2 95%   BMI 34.24 kg/m²         Review of Systems     Constitutional:  Denies recent weight loss, weight gain, fever or chills, no change in exercise tolerance     HENT:  Denies any hearing loss, epistaxis, hoarseness, or difficulty speaking.     Eyes: Wears eyeglasses or contact lenses     Respiratory:  Denies dyspnea with exertion,no " cough, wheezing, or hemoptysis.     Cardiovascular: Negative for palpations, chest pain, orthopnea, PND, peripheral edema, syncope, or claudication.     Gastrointestinal:  Denies change in bowel habits, dyspepsia, ulcer disease, hematochezia, or melena.     Endocrine: Negative for cold intolerance, heat intolerance, polydipsia, polyphagia and polyuria. Denies any history of weight change, heat/cold intolerance, polydipsia, polyuria     Genitourinary: Negative.      Musculoskeletal: Denies any history of arthritic symptoms or back problems     Skin:  Denies any change in hair or nails, rashes, or skin lesions.     Allergic/Immunologic: Negative.  Negative for environmental allergies, food allergies and immunocompromised state.     Neurological:  Denies any history of recurrent headaches, strokes, TIA, or seizure disorder.     Hematological: Denies any food allergies, seasonal allergies, bleeding disorders, or lymphadenopathy.     Psychiatric/Behavioral: Denies any history of depression, substance abuse, or change in cognitive function.         Physical Exam     Constitutional: Cooperative, alert and oriented, well-developed, well-nourished, in no acute distress.     HENT:   Head: Normocephalic, normal hair patterns, no masses or tenderness.  Ears, Nose, and Throat: No gross abnormalities. No pallor or cyanosis. Dentition good.   Eyes: EOMS intact, PERRL, conjunctivae and lids unremarkable. Fundoscopic exam and visual fields not performed.   Neck: No palpable masses or adenopathy, no thyromegaly, no JVD, carotid pulses are full and equal bilaterally and without  Bruits. Has a tracheostomy.    Cardiovascular: Regular rhythm, S1 and S2 normal, no S3 or S4. Apical impulse not displaced. No murmurs, gallops, or rubs detected.     Pulmonary/Chest: Chest: normal symmetry, no tenderness to palpation, normal respiratory excursion, no intercostal retraction, no use of accessory muscles.            Pulmonary: Normal breath  sounds. No rales or ronchi.    Abdominal: Abdomen soft, bowel sounds normoactive, no masses, no hepatosplenomegaly, non-tender, no bruits.     Musculoskeletal: No deformities, clubbing, cyanosis, erythema, or edema observed. There are no spinal abnormalities noted. Normal muscle strength and tone. Pulses full and equal in all extremities, no bruits auscultated.     Neurological: No gross motor or sensory deficits noted, affect appropriate, oriented to time, person, place.     Skin: Warm and dry to the touch, no apparent skin lesions or masses noted.     Psychiatric: He has a normal mood and affect. His behavior is normal. Judgment and thought content normal.         Procedures      Lab Results   Component Value Date    WBC 21.67 (H) 08/09/2019    HGB 13.9 08/09/2019    HCT 41.1 08/09/2019    MCV 86.3 08/09/2019     08/09/2019     Lab Results   Component Value Date    GLUCOSE 133 (H) 08/09/2019    BUN 25 (H) 08/09/2019    CREATININE 1.42 (H) 08/09/2019    EGFRIFNONA 51 (L) 08/09/2019    BCR 17.6 08/09/2019    CO2 25.0 08/09/2019    CALCIUM 9.5 08/09/2019    ALBUMIN 4.30 07/18/2019    AST 18 07/18/2019    ALT 19 07/18/2019     No results found for: CHOL  No results found for: TRIG  No results found for: HDL  No components found for: LDLCALC  No results found for: LDL  No results found for: HDLLDLRATIO  No components found for: CHOLHDL  Lab Results   Component Value Date    HGBA1C 5.52 02/15/2017     Lab Results   Component Value Date    TSH 6.240 (H) 07/18/2019           ASSESSMENT AND PLAN  Essential hypertension    Truman was seen today for blood pressure followup.  His blood pressures little bit elevated today though he had not taking his medicine.  He has been working out more and possibly this could be part of this and he has gained a little weight.  He is going to monitor his blood pressure after we refilled his amlodipine and further recommendations will be pending this.  Diagnoses and all orders for  this visit:        H/O thoracic aortic aneurysm repair  This is stable otherwise.  Other orders  -     amLODIPine (NORVASC) 10 MG tablet; Take 1 tablet by mouth Daily.        Patient's Body mass index is 34.24 kg/m². BMI is above normal parameters. Recommendations include: exercise counseling.                Kassie Gaxiola MD  10/10/2019  4:30 PM

## 2019-11-19 ENCOUNTER — OFFICE VISIT (OUTPATIENT)
Dept: OTOLARYNGOLOGY | Facility: CLINIC | Age: 59
End: 2019-11-19

## 2019-11-19 VITALS — BODY MASS INDEX: 32.93 KG/M2 | WEIGHT: 230 LBS | HEART RATE: 65 BPM | HEIGHT: 70 IN | OXYGEN SATURATION: 96 %

## 2019-11-19 DIAGNOSIS — Z08 ENCOUNTER FOR FOLLOW-UP SURVEILLANCE OF LARYNGEAL CANCER: Primary | ICD-10-CM

## 2019-11-19 DIAGNOSIS — J37.0 CHRONIC LARYNGITIS: ICD-10-CM

## 2019-11-19 DIAGNOSIS — Z93.0 TRACHEOSTOMY STATUS (HCC): ICD-10-CM

## 2019-11-19 DIAGNOSIS — Z85.21 ENCOUNTER FOR FOLLOW-UP SURVEILLANCE OF LARYNGEAL CANCER: Primary | ICD-10-CM

## 2019-11-19 PROCEDURE — 99214 OFFICE O/P EST MOD 30 MIN: CPT | Performed by: OTOLARYNGOLOGY

## 2019-11-19 PROCEDURE — 31575 DIAGNOSTIC LARYNGOSCOPY: CPT | Performed by: OTOLARYNGOLOGY

## 2019-11-19 RX ORDER — DOXYCYCLINE HYCLATE 100 MG/1
100 CAPSULE ORAL
COMMUNITY
Start: 2019-11-18 | End: 2019-11-26

## 2019-11-21 NOTE — PROGRESS NOTES
Subjective   Truman Esquivel is a 59 y.o. male.       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  Still has his tracheostomy in place.  Is not able to tolerate 24/7 capping so has not been decannulated.  Is recently recovering from a stomach virus in which he vomited several times and as a result his throat is more sore than usual.  Otherwise no specific problems.  Remains tobacco abstinent.    The following portions of the patient's history were reviewed and updated as appropriate: allergies, current medications, past family history, past medical history, past social history, past surgical history and problem list.     reports that he quit smoking about 2 years ago. He has a 47.50 pack-year smoking history. He has never used smokeless tobacco. He reports that he does not drink alcohol or use drugs.   Patient is not a tobacco user and has not been counseled for use of tobacco products      Review of Systems   HENT: Positive for sore throat.            Objective   Physical Exam  General: Well-developed well-nourished male in no acute distress.  Alert and oriented x3.Voice: Harsh but intelligible.  Mild stridor with his tracheostomy capped. Speech:Fluent  Ears: External ears no deformity, canals no discharge, tympanic membranes intact clear and mobile bilaterally.  Nose: Nares show no discharge mass polyp or purulence.  Boggy mucosa is present.  No gross external deformity.  Septum: Midline  Oral cavity: Lips and gums without lesions.  Tongue and floor of mouth without lesions.  Parotid and submandibular ducts unobstructed.  No mucosal lesions on the buccal mucosa or vestibule of the mouth.  Pharynx: No erythema exudate mass or ulcer  Neck: No lymphadenopathy.  No thyromegaly.  Trachea and larynx midline.  No masses in the parotid or submandibular glands.  Tracheostomy is in place.  There  is no granulation tissue or purulence although it appears he has had a little bit of secretions leaking around the trach site itself there is no evidence of infection of the skin    Procedure Note    Pre-operative Diagnosis:   Chief Complaint   Patient presents with   • Cancer Surveillance       Post-operative Diagnosis: Chronic laryngitis; no evidence of recurrent disease    Anesthesia: topical with xylocaine and neosynephrine    Endoscopy Type:  Flexible Laryngoscopy    Procedure Details:    The patient was placed in the sitting position.  After topical anesthesia and decongestion, the 4 mm laryngoscope was passed.  The nasal cavities, nasopharynx, oropharynx, hypopharynx, and larynx were all examined.  Vocal cords were examined during respiration and phonation.  The following findings were noted:    Findings: No masses in the nose or nasopharynx.  Hypopharynx shows posttreatment changes.  The epiglottis that remains projected posteriorly and is thickened.  The endolarynx shows chronic appearing edema and erythema of the arytenoids.  No evidence of recurrent tumor.  Vocal cord mobility appears intact.    Condition:  Stable.  Patient tolerated procedure well.    Complications:  None      Assessment/Plan   Truman was seen today for cancer surveillance.    Diagnoses and all orders for this visit:    Encounter for follow-up surveillance of laryngeal cancer    Chronic laryngitis    Tracheostomy status (CMS/[facility])      Plan: Told the patient that I saw no evidence of recurrence, but is also unlikely that he will be decannulated anytime soon.  I told him he should cap his trach when he knows he is going to need to speak quite a bit but otherwise he should go ahead and leave it open rather than It and struggled to breathe.  Maintain tobacco abstinence.  Return 4 months.  Call sooner for problems.

## 2019-11-25 RX ORDER — SPIRONOLACTONE 25 MG/1
TABLET ORAL
Qty: 15 TABLET | Refills: 6 | Status: SHIPPED | OUTPATIENT
Start: 2019-11-25 | End: 2020-06-29

## 2019-12-02 ENCOUNTER — LAB (OUTPATIENT)
Dept: LAB | Facility: HOSPITAL | Age: 59
End: 2019-12-02

## 2019-12-02 ENCOUNTER — LAB REQUISITION (OUTPATIENT)
Dept: LAB | Facility: HOSPITAL | Age: 59
End: 2019-12-02

## 2019-12-02 DIAGNOSIS — E03.8 HYPOTHYROIDISM DUE TO HASHIMOTO'S THYROIDITIS: ICD-10-CM

## 2019-12-02 DIAGNOSIS — E55.9 VITAMIN D DEFICIENCY, UNSPECIFIED: ICD-10-CM

## 2019-12-02 DIAGNOSIS — E53.8 DEFICIENCY OF OTHER SPECIFIED B GROUP VITAMINS: ICD-10-CM

## 2019-12-02 DIAGNOSIS — E61.1 IRON DEFICIENCY: ICD-10-CM

## 2019-12-02 DIAGNOSIS — E06.3 HYPOTHYROIDISM DUE TO HASHIMOTO'S THYROIDITIS: ICD-10-CM

## 2019-12-02 DIAGNOSIS — E03.8 OTHER SPECIFIED HYPOTHYROIDISM: ICD-10-CM

## 2019-12-02 DIAGNOSIS — D64.9 ANEMIA, UNSPECIFIED TYPE: ICD-10-CM

## 2019-12-02 DIAGNOSIS — E03.9 HYPOTHYROIDISM, UNSPECIFIED: ICD-10-CM

## 2019-12-02 DIAGNOSIS — D72.828 OTHER ELEVATED WHITE BLOOD CELL (WBC) COUNT: ICD-10-CM

## 2019-12-02 DIAGNOSIS — C32.9 SQUAMOUS CELL CARCINOMA OF LARYNX (HCC): Primary | ICD-10-CM

## 2019-12-02 PROCEDURE — 36415 COLL VENOUS BLD VENIPUNCTURE: CPT | Performed by: INTERNAL MEDICINE

## 2019-12-05 ENCOUNTER — OFFICE VISIT (OUTPATIENT)
Dept: ENDOCRINOLOGY | Facility: CLINIC | Age: 59
End: 2019-12-05

## 2019-12-05 VITALS
SYSTOLIC BLOOD PRESSURE: 136 MMHG | HEIGHT: 70 IN | OXYGEN SATURATION: 96 % | DIASTOLIC BLOOD PRESSURE: 80 MMHG | WEIGHT: 234.2 LBS | BODY MASS INDEX: 33.53 KG/M2 | HEART RATE: 67 BPM

## 2019-12-05 DIAGNOSIS — E61.1 IRON DEFICIENCY: ICD-10-CM

## 2019-12-05 DIAGNOSIS — R73.01 IMPAIRED FASTING GLUCOSE: ICD-10-CM

## 2019-12-05 DIAGNOSIS — E55.9 VITAMIN D DEFICIENCY: ICD-10-CM

## 2019-12-05 DIAGNOSIS — E53.8 B12 DEFICIENCY: ICD-10-CM

## 2019-12-05 DIAGNOSIS — E03.9 ACQUIRED HYPOTHYROIDISM: Primary | ICD-10-CM

## 2019-12-05 PROCEDURE — 99214 OFFICE O/P EST MOD 30 MIN: CPT | Performed by: INTERNAL MEDICINE

## 2019-12-05 RX ORDER — TRAMADOL HYDROCHLORIDE 50 MG/1
25 TABLET ORAL EVERY 12 HOURS PRN
Qty: 60 TABLET | Refills: 5 | Status: SHIPPED | OUTPATIENT
Start: 2019-12-05 | End: 2020-06-08

## 2019-12-05 RX ORDER — LEVOTHYROXINE SODIUM 137 UG/1
137 TABLET ORAL DAILY
Qty: 30 TABLET | Refills: 11 | Status: SHIPPED | OUTPATIENT
Start: 2019-12-05 | End: 2020-03-10 | Stop reason: SDUPTHER

## 2019-12-05 NOTE — PROGRESS NOTES
Subjective    Truman Esquivel is a 59 y.o. male. he is here today for follow-up.    Thyroid Problem   Patient reports no cold intolerance, constipation, diaphoresis, diarrhea, fatigue, heat intolerance, palpitations or tremors.   Hypothyroidism   Pertinent negatives include no abdominal pain, arthralgias, chest pain, coughing, diaphoresis, fatigue, headaches, joint swelling, myalgias, nausea, neck pain, numbness, rash, sore throat, vomiting or weakness.          Primary Care Provider     Marybeth Harrison DO      Hypothyroidism      Duration 10 years     Timing - Hypothyroidism  is Constant     Quality -  near goal       Severity -  moderate     Complications - none     Current symptoms/problems  fatigue, weakness    Weight loss -- 60 lbs since December , no further weight loss  Now has gained weight      Alleviating Factors: Compliance      Aggravating Factors none      Side Effects  none         Evaluation history:  TSH   Date Value Ref Range Status   07/18/2019 6.240 (H) 0.270 - 4.200 mIU/mL Final     Free T4   Date Value Ref Range Status   02/18/2017 0.68 (L) 0.93 - 1.70 ng/dL Final       Current medications:  Current Outpatient Medications   Medication Sig Dispense Refill   • amLODIPine (NORVASC) 10 MG tablet Take 1 tablet by mouth Daily. 30 tablet 11   • atorvastatin (LIPITOR) 20 MG tablet Take 1 tablet by mouth Daily. 90 tablet 3   • B Complex Vitamins (VITAMIN B COMPLEX) capsule capsule Take 1 capsule by mouth Daily.     • Cholecalciferol (VITAMIN D3) 19482 units capsule TAKE 1 CAPSULE BY MOUTH AS DIRECTED EVERY  MONTH 1 capsule 11   • clopidogrel (PLAVIX) 75 MG tablet Take 75 mg by mouth Daily. Last dose approx greater than one month ago     • docusate sodium (COLACE) 100 MG capsule Take 1 capsule by mouth 2 (Two) Times a Day As Needed for Constipation. 60 capsule 2   • DULoxetine (CYMBALTA) 60 MG capsule Take 60 mg by mouth Daily.     • ferrous sulfate, as mg of FE, (VISHAL-IN-SOL) 75 (15 Fe) MG/ML drops  25 mL by Per G Tube route Daily. 150 mL 2   • indomethacin (INDOCIN) 25 MG capsule Take 1 capsule by mouth Daily.  0   • indomethacin (INDOCIN) 50 MG capsule Take 50 mg by mouth Daily.     • irbesartan (AVAPRO) 300 MG tablet Take 1 tablet by mouth Daily. This replaces losartan 30 tablet 11   • levoFLOXacin (LEVAQUIN) 500 MG tablet TAKE 1 TABLET BY MOUTH ONCE DAILY FOR 10 DAYS  0   • levothyroxine (SYNTHROID) 125 MCG tablet Take 1 tablet by mouth Daily. 90 tablet 3   • metoprolol succinate XL (TOPROL-XL) 25 MG 24 hr tablet TAKE 1 TABLET BY MOUTH AT BEDTIME 30 tablet 12   • Multiple Vitamins-Minerals (MULTIVITAMIN ADULT PO) Take 1 tablet by mouth Daily.     • mupirocin (BACTROBAN) 2 % ointment Apply  topically 2 (Two) Times a Day. 30 g 2   • POTASSIUM PO Take  by mouth.     • sodium chloride (NS) 0.9 % irrigation USE FOR TRACHEOSTOMY AS DIRECTED 1000 mL 8   • spironolactone (ALDACTONE) 25 MG tablet TAKE 1/2 (ONE-HALF) TABLET BY MOUTH ONCE DAILY 15 tablet 6   • traZODone (DESYREL) 100 MG tablet Take 100 mg by mouth Every Night.     • triamcinolone (KENALOG) 0.1 % cream Apply  topically to the appropriate area as directed 2 (Two) Times a Day. 45 g 2   • vitamin B-12 (CYANOCOBALAMIN) 100 MCG tablet Take 100 mcg by mouth Daily.       No current facility-administered medications for this visit.        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   • Stroke (CMS/HCC)    • Swallowing difficulty      Past Surgical History:   Procedure Laterality Date   • AORTA SURGERY      ruptured aorta repair   • ASCENDING ARCH/HEMIARCH REPLACEMENT N/A 2/14/2017    Procedure: INTRAOPERATIVE TRANSESOPHAGEAL ECHOCARDIOGRAM, MIDLINE STERNOTOMY, ASCENDING  AORTIC  AND PROXIMAL AORTIC ARCH REPAIR WITH 26MM GRAFT, AORTIC VALVE RESUSPENSION, AORTIC ROOT REPAIR, OPEN VEIN HARVEST OF RIGHT LEG;  Surgeon: German Arreguin MD;  Location: [facility] MAIN OR;  Service:    • BRONCHOSCOPY N/A 2/17/2017    Procedure: BRONCHOSCOPY BIOPSY AT BEDSIDE WITH BAL-LEFT LOWER LOBE;  Surgeon: Trent Chaney MD;  Location: [facility] ENDOSCOPY;  Service:    • COLONOSCOPY N/A 3/7/2018    Procedure: COLONOSCOPY WITH POSSIBLE POLYPECTOMY   ( DONE IN OR WITH ENDO)      COLONOSCOPY FIRST;  Surgeon: Kris Solano MD;  Location: [facility] OR;  Service:    • DIRECT LARYNGOSCOPY, ESOPHAGOSCOPY, BRONCHOSCOPY N/A 2/5/2018    Procedure: DIRECT LARYNGOSCOPY WITH BIOPSY, ESOPHAGOSCOPY     (no bronchoscopy, no laser);  Surgeon: Joseph Venegas MD;  Location: [facility] OR;  Service:    • ENDOSCOPY W/ PEG TUBE PLACEMENT N/A 5/2/2018    Procedure: ESOPHAGOGASTRODUODENOSCOPY WITH PERCUTANEOUS ENDOSCOPIC GASTROSTOMY TUBE INSERTION;  Surgeon: Angel Maciel MD;  Location: [facility] ENDOSCOPY;  Service: Gastroenterology   • ENDOSCOPY W/ PEG TUBE PLACEMENT N/A 10/26/2018    Procedure: ESOPHAGOGASTRODUODENOSCOPY WITH PERCUTANEOUS ENDOSCOPIC GASTROSTOMY TUBE INSERTION WITH ANESTHESIA Possible open gastrostomy;  Surgeon: Kris Solano MD;  Location: [facility] OR;  Service: General   • GASTROSTOMY FEEDING TUBE INSERTION N/A 10/26/2018    Procedure: GASTROSTOMY FEEDING TUBE INSERTION;  Surgeon: Kris Solano MD;  Location: [facility] OR;  Service: General   • SPLENECTOMY N/A 7/26/2018    Procedure: SPLENECTOMY, left chest tube placement, EXPLORATORY LAPAROTOMY, G-TUBE PALCEMENT;  Surgeon: Kris Solano MD;  Location: [facility] OR;  Service: General   • SPLENECTOMY      2018   • THORACOSCOPY Left 7/31/2018    Procedure: LEFT THORACOSCOPY VIDEO ASSISTED POSSIBLE THORACOTOMY possible decortication bronchoscopy;  Surgeon: Joshua Pollock MD;  Location: [facility] OR;  Service: Cardiothoracic    • TRACHEOSTOMY  2018   • TRACHEOSTOMY N/A 2018    Procedure: TRACHEOSTOMY  local injection @ 1106 incision @ 1119;  Surgeon: Joseph Venegas MD;  Location: [facility];  Service:    • VENOUS ACCESS DEVICE (PORT) INSERTION N/A 3/7/2018    Procedure: INSERTION OF MEDIPORT     (C-ARM#1);  Surgeon: Kris Solano MD;  Location: [facility];  Service:      Family History   Problem Relation Age of Onset   • Thyroid disease Mother    • Hypertension Mother    • Hypertension Father    • Hypertension Other        Allergies   Allergen Reactions   • Chlorhexidine Itching     Topical cleaning wipes causes severe skin irritation   • Eggs Or Egg-Derived Products Swelling   • Erythromycin Other (See Comments)     Joint pains and cold sweats   • Gabapentin Itching   • Other GI Intolerance     Mycins  farzad lotion causes rash   • Acth [Corticotropin] Rash   • Cephalosporins Itching and Rash     Pt developed rash, itching after cefepime administration (2-3 doses) during 2017 admission. Itching was relieved with benadryl. Cefepime was continued because his infection was improving and no symptoms of anaphylaxis present   • Co Q 10 [Coenzyme Q10] Rash   • Keflex [Cephalexin] Rash   • Neomycin Rash   • Penicillins Rash     Tolerated cefepime during 2017 admission. Had rash and itching (relieved by benadryl) after few doses of cefepime and cefepime was continued.   • Tetracyclines & Related GI Intolerance     Social History     Socioeconomic History   • Marital status:      Spouse name: Not on file   • Number of children: Not on file   • Years of education: Not on file   • Highest education level: Not on file   Tobacco Use   • Smoking status: Former Smoker     Packs/day: 1.25     Years: 38.00     Pack years: 47.50     Last attempt to quit: 2017     Years since quittin.8   • Smokeless tobacco: Never Used   • Tobacco comment: 2018 - Patient confirmed he ceased utilization of tobacco  "products 02/13/2017.   Substance and Sexual Activity   • Alcohol use: No   • Drug use: No   • Sexual activity: Defer       Review of Systems  Review of Systems   Constitutional: Negative for activity change, appetite change, diaphoresis and fatigue.   HENT: Negative for facial swelling, sneezing, sore throat, tinnitus, trouble swallowing and voice change.    Eyes: Negative for photophobia, pain, discharge, redness, itching and visual disturbance.   Respiratory: Negative for apnea, cough, choking, chest tightness and shortness of breath.    Cardiovascular: Negative for chest pain, palpitations and leg swelling.   Gastrointestinal: Negative for abdominal distention, abdominal pain, constipation, diarrhea, nausea and vomiting.   Endocrine: Negative for cold intolerance, heat intolerance, polydipsia, polyphagia and polyuria.   Genitourinary: Negative for difficulty urinating, dysuria, frequency, hematuria and urgency.   Musculoskeletal: Negative for arthralgias, back pain, gait problem, joint swelling, myalgias, neck pain and neck stiffness.   Skin: Negative for color change, pallor, rash and wound.   Neurological: Negative for dizziness, tremors, weakness, light-headedness, numbness and headaches.   Hematological: Negative for adenopathy. Bruises/bleeds easily.   Psychiatric/Behavioral: Negative for behavioral problems, confusion and sleep disturbance.        Objective    /80   Pulse 67   Ht 177.8 cm (70\")   Wt 106 kg (234 lb 3.2 oz)   SpO2 96%   BMI 33.60 kg/m²   Physical Exam   Constitutional: He is oriented to person, place, and time. He appears well-developed and well-nourished. No distress.   HENT:   Head: Normocephalic and atraumatic.   Right Ear: External ear normal.   Left Ear: External ear normal.   Nose: Nose normal.   Has tracheostomy   Eyes: Conjunctivae and EOM are normal. Pupils are equal, round, and reactive to light.   Neck: Normal range of motion. Neck supple. No tracheal deviation present. " No thyromegaly present.   Cardiovascular: Normal rate, regular rhythm and normal heart sounds.   No murmur heard.  Pulmonary/Chest: Effort normal and breath sounds normal. No respiratory distress. He has no wheezes.   Left sided port in place   Abdominal: Soft. Bowel sounds are normal. There is no tenderness. There is no rebound and no guarding.   Musculoskeletal: Normal range of motion. He exhibits no edema, tenderness or deformity.   Neurological: He is alert and oriented to person, place, and time. No cranial nerve deficit.   Skin: Skin is warm and dry. No rash noted.   Psychiatric: He has a normal mood and affect. His behavior is normal. Judgment and thought content normal.       Lab Review  Lab Results   Component Value Date    TSH 6.240 (H) 07/18/2019     Lab Results   Component Value Date    FREET4 0.68 (L) 02/18/2017        Assessment/Plan      1. Acquired hypothyroidism    2. Vitamin D deficiency    3. B12 deficiency    4. Iron deficiency    . This diagnosis was discussed and reviewed with the patient including the advantages of drug therapy.     1. Orders placed during this encounter include:  No orders of the defined types were placed in this encounter.      Medications prescribed:  Outpatient Encounter Medications as of 12/5/2019   Medication Sig Dispense Refill   • amLODIPine (NORVASC) 10 MG tablet Take 1 tablet by mouth Daily. 30 tablet 11   • atorvastatin (LIPITOR) 20 MG tablet Take 1 tablet by mouth Daily. 90 tablet 3   • B Complex Vitamins (VITAMIN B COMPLEX) capsule capsule Take 1 capsule by mouth Daily.     • Cholecalciferol (VITAMIN D3) 37397 units capsule TAKE 1 CAPSULE BY MOUTH AS DIRECTED EVERY  MONTH 1 capsule 11   • clopidogrel (PLAVIX) 75 MG tablet Take 75 mg by mouth Daily. Last dose approx greater than one month ago     • docusate sodium (COLACE) 100 MG capsule Take 1 capsule by mouth 2 (Two) Times a Day As Needed for Constipation. 60 capsule 2   • DULoxetine (CYMBALTA) 60 MG capsule Take  60 mg by mouth Daily.     • ferrous sulfate, as mg of FE, (VISHAL-IN-SOL) 75 (15 Fe) MG/ML drops 25 mL by Per G Tube route Daily. 150 mL 2   • indomethacin (INDOCIN) 25 MG capsule Take 1 capsule by mouth Daily.  0   • indomethacin (INDOCIN) 50 MG capsule Take 50 mg by mouth Daily.     • irbesartan (AVAPRO) 300 MG tablet Take 1 tablet by mouth Daily. This replaces losartan 30 tablet 11   • levoFLOXacin (LEVAQUIN) 500 MG tablet TAKE 1 TABLET BY MOUTH ONCE DAILY FOR 10 DAYS  0   • levothyroxine (SYNTHROID) 125 MCG tablet Take 1 tablet by mouth Daily. 90 tablet 3   • metoprolol succinate XL (TOPROL-XL) 25 MG 24 hr tablet TAKE 1 TABLET BY MOUTH AT BEDTIME 30 tablet 12   • Multiple Vitamins-Minerals (MULTIVITAMIN ADULT PO) Take 1 tablet by mouth Daily.     • mupirocin (BACTROBAN) 2 % ointment Apply  topically 2 (Two) Times a Day. 30 g 2   • POTASSIUM PO Take  by mouth.     • sodium chloride (NS) 0.9 % irrigation USE FOR TRACHEOSTOMY AS DIRECTED 1000 mL 8   • spironolactone (ALDACTONE) 25 MG tablet TAKE 1/2 (ONE-HALF) TABLET BY MOUTH ONCE DAILY 15 tablet 6   • traZODone (DESYREL) 100 MG tablet Take 100 mg by mouth Every Night.     • triamcinolone (KENALOG) 0.1 % cream Apply  topically to the appropriate area as directed 2 (Two) Times a Day. 45 g 2   • vitamin B-12 (CYANOCOBALAMIN) 100 MCG tablet Take 100 mcg by mouth Daily.       No facility-administered encounter medications on file as of 12/5/2019.         Patient has hypothyroidism for 10 years         TSH    Lab Results   Component Value Date    TSH 6.240 (H) 07/18/2019    TSH 6.470 (H) 06/07/2019    TSH 12.400 (H) 03/21/2019       Lab Results   Component Value Date    FREET4 0.68 (L) 02/18/2017      synthroid     was taking it with iron but now on empty stomach    Brand name 112 mcgs daily -- increase to 125 mcgs daily -137       Lab Results   Component Value Date    WDXN53ZL 50.2 07/18/2019    [id number] 54.5 02/04/2019    RKLY27GV 57.7 10/23/2018       Lab Results    Component Value Date    MCFEBTLA67 1,979 (H) 08/09/2019     Normalized on 50 th u monthly  ---    Anemia of low iron     Now improved on liquid iron - now otc ferrous sulfate 325     Take apart from thyroid pill     Lab Results   Component Value Date    WBC 21.67 (H) 08/09/2019    HGB 13.9 08/09/2019    HCT 41.1 08/09/2019    MCV 86.3 08/09/2019     08/09/2019         --    HTN on losartan - hctz , creat slight elevation    Change to losartan only     As he improved and gained weight after recovering from cancer   His bp increased but now nl     He is also on 12.5 mg of aldactone, amlodipine and toprol    Recheck TSH and BMP in 6 weeks     --    CKD stage III    Acute decline   Taking indomethacin = stop     Use tramadol and decrease trazodone     IFG    Monitor glucose     4. No Follow-up on file.

## 2019-12-06 ENCOUNTER — LAB (OUTPATIENT)
Dept: ONCOLOGY | Facility: HOSPITAL | Age: 59
End: 2019-12-06

## 2019-12-06 ENCOUNTER — OFFICE VISIT (OUTPATIENT)
Dept: ONCOLOGY | Facility: CLINIC | Age: 59
End: 2019-12-06

## 2019-12-06 VITALS
BODY MASS INDEX: 34.07 KG/M2 | HEART RATE: 68 BPM | HEIGHT: 70 IN | RESPIRATION RATE: 18 BRPM | DIASTOLIC BLOOD PRESSURE: 84 MMHG | SYSTOLIC BLOOD PRESSURE: 153 MMHG | WEIGHT: 238 LBS | TEMPERATURE: 99 F

## 2019-12-06 DIAGNOSIS — D64.9 ANEMIA, UNSPECIFIED TYPE: ICD-10-CM

## 2019-12-06 DIAGNOSIS — D75.839 THROMBOCYTOSIS: ICD-10-CM

## 2019-12-06 DIAGNOSIS — C32.9 SQUAMOUS CELL CARCINOMA OF LARYNX (HCC): ICD-10-CM

## 2019-12-06 DIAGNOSIS — C32.9 SQUAMOUS CELL CARCINOMA OF LARYNX (HCC): Primary | ICD-10-CM

## 2019-12-06 DIAGNOSIS — D72.828 OTHER ELEVATED WHITE BLOOD CELL (WBC) COUNT: ICD-10-CM

## 2019-12-06 PROCEDURE — 1157F ADVNC CARE PLAN IN RCRD: CPT | Performed by: INTERNAL MEDICINE

## 2019-12-06 PROCEDURE — G0463 HOSPITAL OUTPT CLINIC VISIT: HCPCS | Performed by: INTERNAL MEDICINE

## 2019-12-06 PROCEDURE — G8731 PAIN NEG NO PLAN: HCPCS | Performed by: INTERNAL MEDICINE

## 2019-12-06 PROCEDURE — 99214 OFFICE O/P EST MOD 30 MIN: CPT | Performed by: INTERNAL MEDICINE

## 2019-12-06 PROCEDURE — G9903 PT SCRN TBCO ID AS NON USER: HCPCS | Performed by: INTERNAL MEDICINE

## 2019-12-06 NOTE — PROGRESS NOTES
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  Upon staging workup patient was found to have stage III squamous cell cancer of larynx for which she has been started on concurrent chemoradiation with weekly carboplatin and Taxol on 2018.  Last dose of chemotherapy and radiation was on May 15, 2018.  Patient is here for follow-up appointment today. States he feels well.  Denies any fever.  States he feels better.  Denies any active bleeding.  Denies any new lymph node enlargement.        PAST MEDICAL HISTORY:    Past Medical History:   Diagnosis Date   • Aortic dissection (CMS/HCC)    • Chest pain    • Disease of thyroid gland    • HTN (hypertension)    • Knee pain    • Sleep apnea    • Smoker    • Squamous cell carcinoma of larynx (CMS/HCC) 2018   • Stroke (CMS/HCC)    • Swallowing difficulty        SOCIAL HISTORY:    Social History     Tobacco Use   • Smoking status: Former Smoker     Packs/day: 1.25     Years: 38.00     Pack years: 47.50     Last attempt to quit: 2017     Years since quittin.8   • Smokeless tobacco: Never Used   • Tobacco comment: 2018 - Patient confirmed he ceased utilization of tobacco products 2017.   Substance Use Topics   • Alcohol use: No   • Drug use: No       Surgical History :  Past Surgical History:   Procedure Laterality Date   • AORTA SURGERY      ruptured aorta repair   • ASCENDING ARCH/HEMIARCH REPLACEMENT N/A 2017    Procedure: INTRAOPERATIVE TRANSESOPHAGEAL ECHOCARDIOGRAM, MIDLINE STERNOTOMY, ASCENDING AORTIC  AND  PROXIMAL AORTIC ARCH REPAIR WITH 26MM GRAFT, AORTIC VALVE RESUSPENSION, AORTIC ROOT REPAIR, OPEN VEIN HARVEST OF RIGHT LEG;  Surgeon: German Arreguin MD;  Location: [facility] MAIN OR;  Service:    • BRONCHOSCOPY N/A 2/17/2017    Procedure: BRONCHOSCOPY BIOPSY AT BEDSIDE WITH BAL-LEFT LOWER LOBE;  Surgeon: Trent Chaney MD;  Location: [facility] ENDOSCOPY;  Service:    • COLONOSCOPY N/A 3/7/2018    Procedure: COLONOSCOPY WITH POSSIBLE POLYPECTOMY   ( DONE IN OR WITH ENDO)      COLONOSCOPY FIRST;  Surgeon: Kris Solano MD;  Location: [facility] OR;  Service:    • DIRECT LARYNGOSCOPY, ESOPHAGOSCOPY, BRONCHOSCOPY N/A 2/5/2018    Procedure: DIRECT LARYNGOSCOPY WITH BIOPSY, ESOPHAGOSCOPY     (no bronchoscopy, no laser);  Surgeon: Joseph Venegas MD;  Location: [facility] OR;  Service:    • ENDOSCOPY W/ PEG TUBE PLACEMENT N/A 5/2/2018    Procedure: ESOPHAGOGASTRODUODENOSCOPY WITH PERCUTANEOUS ENDOSCOPIC GASTROSTOMY TUBE INSERTION;  Surgeon: Angel Maciel MD;  Location: [facility] ENDOSCOPY;  Service: Gastroenterology   • ENDOSCOPY W/ PEG TUBE PLACEMENT N/A 10/26/2018    Procedure: ESOPHAGOGASTRODUODENOSCOPY WITH PERCUTANEOUS ENDOSCOPIC GASTROSTOMY TUBE INSERTION WITH ANESTHESIA Possible open gastrostomy;  Surgeon: Kris Solano MD;  Location: [facility] OR;  Service: General   • GASTROSTOMY FEEDING TUBE INSERTION N/A 10/26/2018    Procedure: GASTROSTOMY FEEDING TUBE INSERTION;  Surgeon: Kris Solano MD;  Location: [facility] OR;  Service: General   • SPLENECTOMY N/A 7/26/2018    Procedure: SPLENECTOMY, left chest tube placement, EXPLORATORY LAPAROTOMY, G-TUBE PALCEMENT;  Surgeon: Kris Solano MD;  Location: [facility] OR;  Service: General   • SPLENECTOMY      2018   • THORACOSCOPY Left 7/31/2018    Procedure: LEFT THORACOSCOPY VIDEO ASSISTED POSSIBLE THORACOTOMY possible decortication bronchoscopy;  Surgeon: Joshua Pollock MD;  Location: [facility] OR;  Service: Cardiothoracic   •  TRACHEOSTOMY  02/05/2018   • TRACHEOSTOMY N/A 2/5/2018    Procedure: TRACHEOSTOMY  local injection @ 1106 incision @ 1119;  Surgeon: Joseph Venegas MD;  Location: [facility];  Service:    • VENOUS ACCESS DEVICE (PORT) INSERTION N/A 3/7/2018    Procedure: INSERTION OF MEDIPORT     (C-ARM#1);  Surgeon: Kris Solano MD;  Location: [facility];  Service:        ALLERGIES:    Allergies   Allergen Reactions   • Chlorhexidine Itching     Topical cleaning wipes causes severe skin irritation   • Eggs Or Egg-Derived Products Swelling   • Erythromycin Other (See Comments)     Joint pains and cold sweats   • Gabapentin Itching   • Other GI Intolerance     Mycins  farzad lotion causes rash   • Acth [Corticotropin] Rash   • Cephalosporins Itching and Rash     Pt developed rash, itching after cefepime administration (2-3 doses) during 2/2017 admission. Itching was relieved with benadryl. Cefepime was continued because his infection was improving and no symptoms of anaphylaxis present   • Co Q 10 [Coenzyme Q10] Rash   • Keflex [Cephalexin] Rash   • Neomycin Rash   • Penicillins Rash     Tolerated cefepime during 2/2017 admission. Had rash and itching (relieved by benadryl) after few doses of cefepime and cefepime was continued.   • Tetracyclines & Related GI Intolerance         FAMILY HISTORY:  Family History   Problem Relation Age of Onset   • Thyroid disease Mother    • Hypertension Mother    • Hypertension Father    • Hypertension Other            REVIEW OF SYSTEMS:      CONSTITUTIONAL:  States his fatigue is improved.  Has gained 9 pounds since last clinic visit. Denies any fever, chills .      HEENT:   Complains of sinus congestion and postnasal drainage.  Some visual disturbance affecting left eye since stroke in 2017.    Denies any difficulty swallowing food.     RESPIRATORY:  Complains of chronic shortness of breath, denies any recent worsening.  No new cough or hemoptysis.     CARDIOVASCULAR:  No chest  "pain or palpitations.     GASTROINTESTINAL:  No abdominal pain nausea, vomiting or blood in the stool.     GENITOURINARY: No Dysuria or Hematuria.     MUSCULOSKELETAL:  No new back pain or arthralgia..     LYMPHATICS:  Denies any abnormal swollen glands anywhere in the body.     NEUROLOGICAL : No tingling or numbness. No headache or dizziness. No seizures or balance problems.     SKIN: no new skin lesions.          PHYSICAL EXAMINATION:      VITAL SIGNS:  /84   Pulse 68   Temp 99 °F (37.2 °C)   Resp 18   Ht 177.8 cm (70\")   Wt 108 kg (238 lb)   BMI 34.15 kg/m²       12/06/19  0805   Weight: 108 kg (238 lb)       ECOG  performance status: 1      GENERAL:  Not in any distress.    HEENT:  Normocephalic, Atraumatic.Mild Conjunctival pallor. No icterus. Extraocular Movements Intact. No Facial Asymmetry noted.     NECK:  No adenopathy. No JVD.Tracheostomy present.    RESPIRATORY:  Fair air entry bilateral. No rhonchi or wheezing.    CARDIOVASCULAR:  S1, S2. Regular rate and rhythm. No murmur or gallop appreciated.     ABDOMEN:  Soft, obese, nontender. Bowel sounds present in all four quadrants.  No hepatosplenomegaly appreciated.Well -healed surgical scar present in abdomen.    MUSCULOSKELETAL:  No edema.No Calf Tenderness.Decreased range of motion.    NEUROLOGIC:  Alert, awake and oriented ×3.  No  Motor  deficit appreciated. Cranial Nerves 2-12 grossly intact.    SKIN : No new skin lesion.  Skin is warm and moist.    LYMPHATICS: No new enlarged lymph node in neck or supraclavicular area.            DIAGNOSTIC DATA:    Glucose   Date Value Ref Range Status   08/09/2019 133 (H) 65 - 99 mg/dL Final     Glucose, Arterial   Date Value Ref Range Status   07/31/2018 113 (H) 65 - 95 mmol/L Final     Sodium   Date Value Ref Range Status   08/09/2019 139 136 - 145 mmol/L Final     Potassium   Date Value Ref Range Status   08/09/2019 4.5 3.5 - 5.2 mmol/L Final     CO2   Date Value Ref Range Status   08/09/2019 25.0 " 22.0 - 29.0 mmol/L Final     Chloride   Date Value Ref Range Status   08/09/2019 103 98 - 107 mmol/L Final     Anion Gap   Date Value Ref Range Status   08/09/2019 11.0 5.0 - 15.0 mmol/L Final     Creatinine   Date Value Ref Range Status   08/09/2019 1.42 (H) 0.76 - 1.27 mg/dL Final     BUN   Date Value Ref Range Status   08/09/2019 25 (H) 6 - 20 mg/dL Final     BUN/Creatinine Ratio   Date Value Ref Range Status   08/09/2019 17.6 7.0 - 25.0 Final     Calcium   Date Value Ref Range Status   08/09/2019 9.5 8.6 - 10.5 mg/dL Final     eGFR Non  Amer   Date Value Ref Range Status   08/09/2019 51 (L) >60 mL/min/1.73 Final     Alkaline Phosphatase   Date Value Ref Range Status   07/18/2019 87 39 - 117 U/L Final     Total Protein   Date Value Ref Range Status   07/18/2019 7.4 6.0 - 8.5 g/dL Final     ALT (SGPT)   Date Value Ref Range Status   07/18/2019 19 1 - 41 U/L Final     AST (SGOT)   Date Value Ref Range Status   07/18/2019 18 1 - 40 U/L Final     Total Bilirubin   Date Value Ref Range Status   07/18/2019 0.5 0.2 - 1.2 mg/dL Final     Albumin   Date Value Ref Range Status   07/18/2019 4.30 3.50 - 5.20 g/dL Final     Globulin   Date Value Ref Range Status   07/18/2019 3.1 gm/dL Final     Lab Results   Component Value Date    WBC 21.67 (H) 08/09/2019    HGB 13.9 08/09/2019    HCT 41.1 08/09/2019    MCV 86.3 08/09/2019     08/09/2019     Lab Results   Component Value Date    NEUTROABS 18.15 (H) 08/09/2019    IRON 44 (L) 08/09/2019    TIBC 356 08/09/2019    LABIRON 12 (L) 08/09/2019    FERRITIN 105.70 08/09/2019    FVLTDCKC49 1,979 (H) 08/09/2019    FOLATE >20.00 08/09/2019     No results found for: , LABCA2, AFPTM, HCGQUANT, , CHROMGRNA, [id number], CEA, REFLABREPO        CBC done on outside lab on December 3, 2019 showed white blood cell count is 9.2, hemoglobin is 14.7, MCV is 84, platelet count is elevated at 4 47,000.  B12 level is 644.  Iron saturation is 35%, iron total is  111        Radiology Data :  PET CT done on February 16, 2018 was reviewed, discussed with patient, it showed:  IMPRESSION:  CONCLUSION:   1.  Soft tissue mass in the region of the epiglottis obstructing  the airway, associated with hypermetabolic activity, compatible  with known neoplasm.  2.  Activity around the tracheostomy site is likely inflammatory.  3.  Activity in the rectum (SUV max 7.1) is probably either  physiological or inflammatory. Recommend direct visualization.  4.  There is a focus of activity in the region of the right  common iliac artery without a definite CT abnormality (SUV max  5.4) possibly representing a lymph node, nonspecific but cannot  rule out neoplasm.  5.  Foci of activity posterior to the ischial tuberosities are  probably inflammatory.  6.  There are diffuse punctate low-level foci of activity  throughout the osseous structures, liver, kidneys, mediastinum  which are more likely artifactual than due to metastatic disease.    ADDENDUM   ADDENDUM #1         Upon review of the patient's imaging with Dr. Tapia, there  appears to be extension of tumor into the left side of the  preepiglottic space.        Ct of Neck with contrast done on January 29, 2018 showed:  IMPRESSION:  3 x 3.5 x 3.5 cm soft tissue mass with lobular margin centered at  the level the epiglottis however flush 4 cm intimate with the  posterior margin of the tongue base and the posterior margin of  the oral pharynx. This does contribute to moderate compromise of  the airway. Finding is suspicious for malignancy.      Mostly subcentimeter cervical chain lymph nodes present  bilaterally. The largest node right level II measuring 1.3 cm. No  necrotic or conglomerate adenopathy.      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 The  dissection was described on a CTA coronary artery exam from  2/14/2017.        Pathology :  Pathology result  from February 5, 2018 showed:  Final Diagnosis   1.  TUMOR, EPIGLOTTIS:   INVASIVE SQUAMOUS CELL CARCINOMA, NON KERATINIZING, POORLY DIFFERENTIATED.       2.  TUMOR, EPIGLOTTIS:   INVASIVE SQUAMOUS CELL CARCINOMA, NON KERATINIZING, POORLY DIFFERENTIATED.               ASSESSMENT AND PLAN:      1.  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  Based on PET/CT there is a tumor extension into preepiglottic space making a T3 lesion.  Result of PET CT were discussed with patient.  It was discussed with patient with T3 N0 lesion after his treatment option includes either surgery or concurrent chemoradiation.    -He was started on concurrent chemoradiation on March 19, 2018.  Patient received 6 weekly carboplatin and Taxol from March 19, 2018 until May 15, 2018.  Radiation was also completed on May 15, 2018.  -ENT exam by Dr. Venegas on November 19,2019 showed good response to chemoradiation without any evidence of any active tumor.  -We will ask patient to return to clinic in 4 months with a repeat CBC,bmp and anemia workup to be done on that day.    2.   anemia:   -Patient was started on liquid iron in October 2018.  -Hemoglobin is 14.7.  -Recommend decreasing ferrous sulfate to 1 tablet 3 times a week.  Also recommend restarting B12 1 tablet 3 times a week.  -We will repeat anemia work-up prior to next clinic visit in 4 months.      3.  Thrombocytosis:( problem is new to me)  -Secondary to splenectomy status.  -Platelet count is elevated at 447,000.  Will monitor with CBC for now    4.  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  White blood cell count is 9,000.  We will monitored with CBC for now     5.  History of aortic dissection status post repair.     6.  History of CVA with residual visual disturbance in left eye     7.  Health maintenance: Patient quit smoking in February 2017.  Never had a screening colonoscopy done.      8. Advance care planning: Patient has living will on chart.    9.  BMI: Patient's Body mass index is 34.15 kg/m². BMI is higher than reference range.  Patient was notified about her elevated BMI.    10.  Pain assessment:  -Patient denies any pain today.        Reza Patel MD  12/6/2019  8:23 AM        EMR Dragon/Transcription disclaimer:   Much of this encounter note is an electronic transcription/translation of spoken language to printed text. The electronic translation of spoken language may permit erroneous, or at times, nonsensical words or phrases to be inadvertently transcribed; Although I have reviewed the note for such errors, some may still exist.

## 2019-12-09 ENCOUNTER — DOCUMENTATION (OUTPATIENT)
Dept: NUTRITION | Facility: HOSPITAL | Age: 59
End: 2019-12-09

## 2019-12-09 NOTE — PROGRESS NOTES
Adult Outpatient Nutrition  Assessment    Patient Name:  Truman Esquivel  YOB: 1960  MRN: [id number]    Assessment Date:  Entry from 12/6/19    Comments:  Follow-up to check nutrition. Appetite good. Continues to chew/swallow without problems. Remains trach dependant. Wt 238 lb (trend up). Urged nutrient dense food choices.                        Electronically signed by:  Leslee Bonds RD  12/09/19 5:12 PM

## 2020-02-28 ENCOUNTER — OFFICE VISIT (OUTPATIENT)
Dept: SLEEP MEDICINE | Facility: HOSPITAL | Age: 60
End: 2020-02-28

## 2020-02-28 VITALS
HEART RATE: 62 BPM | WEIGHT: 243 LBS | BODY MASS INDEX: 34.79 KG/M2 | HEIGHT: 70 IN | DIASTOLIC BLOOD PRESSURE: 80 MMHG | OXYGEN SATURATION: 96 % | SYSTOLIC BLOOD PRESSURE: 160 MMHG

## 2020-02-28 DIAGNOSIS — G47.33 OBSTRUCTIVE SLEEP APNEA, ADULT: Primary | ICD-10-CM

## 2020-02-28 PROCEDURE — 99213 OFFICE O/P EST LOW 20 MIN: CPT | Performed by: NURSE PRACTITIONER

## 2020-02-28 RX ORDER — LEVOFLOXACIN 500 MG/1
TABLET, FILM COATED ORAL
COMMUNITY
Start: 2020-02-24 | End: 2020-03-20

## 2020-02-28 NOTE — PROGRESS NOTES
Sleep Clinic Follow Up    Date: 2020  Primary Care Physician: Marybeth Harrison DO    Last office visit: 2019 (I reviewed this note)    CC: Follow up: EMELIA, S/P tracheostomy following laryngeal cancer      Interim History:  Since the last visit:    1) moderate EMELIA -  Truman Esquivel has not been on any therapy since tracheostomy placement. He has still remained sleeping elevated in his recliner. He has purchased a new bed with elevating head, but has not installed it yet.    Sleep Testin. PSG on 2017, AHI of 24.7   2. CPAP titration recommended 5-20 cm H2O   3. Currently on no therapy      Bed time: 2200  Sleep latency: 5-30 minutes  Number of times awakens during the night: 2-3  Wake time: 9475-7997  Estimated total sleep time at night: 7-8 hours  Caffeine intake: 0 cups of coffee, 0 cups of tea, and 2 sodas per day  Alcohol intake: 0 drinks per week  Nap time: very rare   Sleepiness with Driving: none     [place] - 4    2) Patient denies RLS symptoms.     PMHx, FH, SH reviewed and pertinent changes are: unchanged from last office visit on 2019      REVIEW OF SYSTEMS:   Negative for chest pain, SOA, fever, chills, cough, N/V/D, abdominal pain.    Smoking:none        Exam:  Vitals:    20   BP: 160/80   Pulse: 62   SpO2: 96%           20   Weight: 110 kg (243 lb)     Body mass index is 34.87 kg/m². Patient's Body mass index is 34.87 kg/m². BMI is above normal parameters. Recommendations include: referral to primary care.      Gen:                No distress, conversant, pleasant, appears stated age, alert, oriented  Eyes:               Anicteric sclera, moist conjunctiva, no lid lag                           PERRL, EOMI   Heent:             NC/AT    Tracheostomy in place midline, capped                          Oropharynx clear                          Normal hearing  Lungs:             Normal effort, non-labored breathing                          Clear to  auscultation bilaterally          CV:                  Normal S1/S2, no murmur                          No lower extremity edema  ABD:               Soft, rounded, non-distended                          Normal bowel sounds                    Psych:             Appropriate affect  Neuro:             CN 2-12 appear intact      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   • Stroke (CMS/HCC)    • Swallowing difficulty        Current Outpatient Medications:   •  amLODIPine (NORVASC) 10 MG tablet, Take 1 tablet by mouth Daily., Disp: 30 tablet, Rfl: 11  •  atorvastatin (LIPITOR) 20 MG tablet, Take 1 tablet by mouth Daily., Disp: 90 tablet, Rfl: 3  •  B Complex Vitamins (VITAMIN B COMPLEX) capsule capsule, Take 1 capsule by mouth Daily., Disp: , Rfl:   •  Cholecalciferol (VITAMIN D3) 49333 units capsule, TAKE 1 CAPSULE BY MOUTH AS DIRECTED EVERY  MONTH, Disp: 1 capsule, Rfl: 11  •  clopidogrel (PLAVIX) 75 MG tablet, Take 75 mg by mouth Daily. Last dose approx greater than one month ago, Disp: , Rfl:   •  docusate sodium (COLACE) 100 MG capsule, Take 1 capsule by mouth 2 (Two) Times a Day As Needed for Constipation., Disp: 60 capsule, Rfl: 2  •  DULoxetine (CYMBALTA) 60 MG capsule, Take 60 mg by mouth Daily., Disp: , Rfl:   •  ferrous sulfate, as mg of FE, (VISHAL-IN-SOL) 75 (15 Fe) MG/ML drops, 25 mL by Per G Tube route Daily., Disp: 150 mL, Rfl: 2  •  indomethacin (INDOCIN) 25 MG capsule, Take 1 capsule by mouth Daily As Needed., Disp: , Rfl: 0  •  irbesartan (AVAPRO) 300 MG tablet, Take 1 tablet by mouth Daily. This replaces losartan, Disp: 30 tablet, Rfl: 11  •  levoFLOXacin (LEVAQUIN) 500 MG tablet, TAKE 1 TABLET BY MOUTH ONCE DAILY FOR 7 DAYS, Disp: , Rfl:   •  levothyroxine (SYNTHROID, LEVOTHROID) 137 MCG tablet, Take 1  tablet by mouth Daily., Disp: 30 tablet, Rfl: 11  •  metoprolol succinate XL (TOPROL-XL) 25 MG 24 hr tablet, TAKE 1 TABLET BY MOUTH AT BEDTIME, Disp: 30 tablet, Rfl: 12  •  Multiple Vitamins-Minerals (MULTIVITAMIN ADULT PO), Take 1 tablet by mouth Daily., Disp: , Rfl:   •  mupirocin (BACTROBAN) 2 % ointment, Apply  topically 2 (Two) Times a Day., Disp: 30 g, Rfl: 2  •  POTASSIUM PO, Take  by mouth., Disp: , Rfl:   •  sodium chloride (NS) 0.9 % irrigation, USE FOR TRACHEOSTOMY AS DIRECTED, Disp: 1000 mL, Rfl: 8  •  spironolactone (ALDACTONE) 25 MG tablet, TAKE 1/2 (ONE-HALF) TABLET BY MOUTH ONCE DAILY, Disp: 15 tablet, Rfl: 6  •  traMADol (ULTRAM) 50 MG tablet, Take 0.5 tablets by mouth Every 12 (Twelve) Hours As Needed for Moderate Pain ., Disp: 60 tablet, Rfl: 5  •  traZODone (DESYREL) 100 MG tablet, Take 100 mg by mouth Every Night., Disp: , Rfl:   •  triamcinolone (KENALOG) 0.1 % cream, Apply  topically to the appropriate area as directed 2 (Two) Times a Day., Disp: 45 g, Rfl: 2  •  vitamin B-12 (CYANOCOBALAMIN) 100 MCG tablet, Take 100 mcg by mouth Daily., Disp: , Rfl:       Assessment and Plan:    1. Obstructive sleep apnea Established, stable (1)  1. Repeat PSG once tracheostomy decannulated  2. Return to clinic in 1 year with compliance report unless change in symptoms in interim period  3. Instructed patient to return sooner if tracheostomy removed before 1 year's time  2. S/P tracheostomy placement for laryngeal cancer        8 of 15 minutes spent face-to-face counseling.      RTC in 12 months. Patient agrees to return sooner if changes in symptoms.        This document has been electronically signed by ROWAN Irizarry on February 28, 2020 9:30 AM          CC: Marybeth Harrison DO          No ref. provider found

## 2020-03-03 ENCOUNTER — LAB REQUISITION (OUTPATIENT)
Dept: LAB | Facility: HOSPITAL | Age: 60
End: 2020-03-03

## 2020-03-03 ENCOUNTER — APPOINTMENT (OUTPATIENT)
Dept: LAB | Facility: HOSPITAL | Age: 60
End: 2020-03-03

## 2020-03-03 DIAGNOSIS — E03.9 HYPOTHYROIDISM, UNSPECIFIED: ICD-10-CM

## 2020-03-03 DIAGNOSIS — E55.9 VITAMIN D DEFICIENCY, UNSPECIFIED: ICD-10-CM

## 2020-03-03 PROCEDURE — 36415 COLL VENOUS BLD VENIPUNCTURE: CPT | Performed by: INTERNAL MEDICINE

## 2020-03-10 ENCOUNTER — OFFICE VISIT (OUTPATIENT)
Dept: ENDOCRINOLOGY | Facility: CLINIC | Age: 60
End: 2020-03-10

## 2020-03-10 VITALS
OXYGEN SATURATION: 98 % | SYSTOLIC BLOOD PRESSURE: 140 MMHG | DIASTOLIC BLOOD PRESSURE: 82 MMHG | WEIGHT: 245 LBS | HEART RATE: 98 BPM | HEIGHT: 70 IN | BODY MASS INDEX: 35.07 KG/M2

## 2020-03-10 DIAGNOSIS — E55.9 VITAMIN D DEFICIENCY: ICD-10-CM

## 2020-03-10 DIAGNOSIS — E53.8 B12 DEFICIENCY: ICD-10-CM

## 2020-03-10 DIAGNOSIS — R73.01 IMPAIRED FASTING GLUCOSE: ICD-10-CM

## 2020-03-10 DIAGNOSIS — E61.1 IRON DEFICIENCY: ICD-10-CM

## 2020-03-10 DIAGNOSIS — E03.9 ACQUIRED HYPOTHYROIDISM: Primary | ICD-10-CM

## 2020-03-10 PROBLEM — R63.8 UNABLE TO EAT: Status: RESOLVED | Noted: 2018-05-01 | Resolved: 2020-03-10

## 2020-03-10 PROBLEM — T45.1X5A PANCYTOPENIA DUE TO ANTINEOPLASTIC CHEMOTHERAPY: Status: RESOLVED | Noted: 2018-04-23 | Resolved: 2020-03-10

## 2020-03-10 PROBLEM — D72.829 LEUKOCYTOSIS: Status: RESOLVED | Noted: 2018-09-19 | Resolved: 2020-03-10

## 2020-03-10 PROBLEM — G45.9 TIA (TRANSIENT ISCHEMIC ATTACK): Status: RESOLVED | Noted: 2017-12-21 | Resolved: 2020-03-10

## 2020-03-10 PROBLEM — R94.8: Status: RESOLVED | Noted: 2018-02-27 | Resolved: 2020-03-10

## 2020-03-10 PROBLEM — M25.461 KNEE EFFUSION, RIGHT: Status: RESOLVED | Noted: 2017-05-05 | Resolved: 2020-03-10

## 2020-03-10 PROBLEM — R13.10 ODYNOPHAGIA: Status: RESOLVED | Noted: 2018-04-09 | Resolved: 2020-03-10

## 2020-03-10 PROBLEM — I71.010 ASCENDING AORTIC DISSECTION (HCC): Status: RESOLVED | Noted: 2017-02-15 | Resolved: 2020-03-10

## 2020-03-10 PROBLEM — M25.521 CHRONIC ELBOW PAIN, RIGHT: Status: RESOLVED | Noted: 2019-02-06 | Resolved: 2020-03-10

## 2020-03-10 PROBLEM — M25.561 ACUTE PAIN OF RIGHT KNEE: Status: RESOLVED | Noted: 2017-04-26 | Resolved: 2020-03-10

## 2020-03-10 PROBLEM — Z45.2 ENCOUNTER FOR VENOUS ACCESS DEVICE CARE: Status: RESOLVED | Noted: 2018-03-19 | Resolved: 2020-03-10

## 2020-03-10 PROBLEM — I82.413 DEEP VEIN THROMBOSIS (DVT) OF FEMORAL VEIN OF BOTH LOWER EXTREMITIES: Status: RESOLVED | Noted: 2017-03-20 | Resolved: 2020-03-10

## 2020-03-10 PROBLEM — I71.23 DESCENDING THORACIC AORTIC ANEURYSM: Status: RESOLVED | Noted: 2019-06-05 | Resolved: 2020-03-10

## 2020-03-10 PROBLEM — R63.4 WEIGHT LOSS, ABNORMAL: Status: RESOLVED | Noted: 2018-04-09 | Resolved: 2020-03-10

## 2020-03-10 PROBLEM — R13.10 DYSPHAGIA: Status: RESOLVED | Noted: 2018-01-30 | Resolved: 2020-03-10

## 2020-03-10 PROBLEM — Z48.813 SURGICAL AFTERCARE, RESPIRATORY SYSTEM: Status: RESOLVED | Noted: 2018-09-19 | Resolved: 2020-03-10

## 2020-03-10 PROBLEM — J94.2 HEMOTHORAX ON LEFT: Status: RESOLVED | Noted: 2018-09-19 | Resolved: 2020-03-10

## 2020-03-10 PROBLEM — E87.6 HYPOKALEMIA: Status: RESOLVED | Noted: 2018-03-19 | Resolved: 2020-03-10

## 2020-03-10 PROBLEM — B37.0 THRUSH, ORAL: Status: RESOLVED | Noted: 2018-04-09 | Resolved: 2020-03-10

## 2020-03-10 PROBLEM — R13.13 PHARYNGEAL DYSPHAGIA: Status: RESOLVED | Noted: 2018-02-05 | Resolved: 2020-03-10

## 2020-03-10 PROBLEM — D61.810 PANCYTOPENIA DUE TO ANTINEOPLASTIC CHEMOTHERAPY: Status: RESOLVED | Noted: 2018-04-23 | Resolved: 2020-03-10

## 2020-03-10 PROBLEM — G89.29 CHRONIC ELBOW PAIN, RIGHT: Status: RESOLVED | Noted: 2019-02-06 | Resolved: 2020-03-10

## 2020-03-10 LAB — TSH SERPL DL<=0.005 MIU/L-ACNC: 6.7 M[IU]/ML (ref 0.27–4.2)

## 2020-03-10 PROCEDURE — 99214 OFFICE O/P EST MOD 30 MIN: CPT | Performed by: INTERNAL MEDICINE

## 2020-03-10 RX ORDER — LEVOTHYROXINE SODIUM 137 UG/1
TABLET ORAL
Qty: 32 TABLET | Refills: 11 | Status: SHIPPED | OUTPATIENT
Start: 2020-03-10 | End: 2021-03-03

## 2020-03-10 NOTE — PROGRESS NOTES
Subjective    Truman Esquivel is a 59 y.o. male. he is here today for follow-up.    Hypothyroidism   Pertinent negatives include no abdominal pain, arthralgias, chest pain, coughing, diaphoresis, fatigue, headaches, joint swelling, myalgias, nausea, neck pain, numbness, rash, sore throat, vomiting or weakness.   Thyroid Problem   Patient reports no cold intolerance, constipation, diaphoresis, diarrhea, fatigue, heat intolerance, palpitations or tremors.          Primary Care Provider     Marybeth Harrison DO      Hypothyroidism      Duration around 2017  after having aortic dissection      Timing - Hypothyroidism  is Constant     Quality -  near goal       Severity -  moderate     Complications - none     Current symptoms/problems  had weight loss due cancer now gaining weight      Alleviating Factors: Compliance      Aggravating Factors none      Side Effects  none       Current medications:  Current Outpatient Medications   Medication Sig Dispense Refill   • amLODIPine (NORVASC) 10 MG tablet Take 1 tablet by mouth Daily. 30 tablet 11   • atorvastatin (LIPITOR) 20 MG tablet Take 1 tablet by mouth Daily. 90 tablet 3   • B Complex Vitamins (VITAMIN B COMPLEX) capsule capsule Take 1 capsule by mouth Daily.     • Cholecalciferol (VITAMIN D3) 67621 units capsule TAKE 1 CAPSULE BY MOUTH AS DIRECTED EVERY  MONTH 1 capsule 11   • clopidogrel (PLAVIX) 75 MG tablet Take 75 mg by mouth Daily. Last dose approx greater than one month ago     • docusate sodium (COLACE) 100 MG capsule Take 1 capsule by mouth 2 (Two) Times a Day As Needed for Constipation. 60 capsule 2   • DULoxetine (CYMBALTA) 60 MG capsule Take 60 mg by mouth Daily.     • ferrous sulfate, as mg of FE, (VISHAL-IN-SOL) 75 (15 Fe) MG/ML drops 25 mL by Per G Tube route Daily. 150 mL 2   • indomethacin (INDOCIN) 25 MG capsule Take 1 capsule by mouth Daily As Needed.  0   • irbesartan (AVAPRO) 300 MG tablet Take 1 tablet by mouth Daily. This replaces losartan 30  tablet 11   • levoFLOXacin (LEVAQUIN) 500 MG tablet TAKE 1 TABLET BY MOUTH ONCE DAILY FOR 7 DAYS     • levothyroxine (SYNTHROID, LEVOTHROID) 137 MCG tablet Take 1 tab daily Monday to Saturday and 1.5 tabs on Sunday 32 tablet 11   • metoprolol succinate XL (TOPROL-XL) 25 MG 24 hr tablet TAKE 1 TABLET BY MOUTH AT BEDTIME 30 tablet 12   • Multiple Vitamins-Minerals (MULTIVITAMIN ADULT PO) Take 1 tablet by mouth Daily.     • mupirocin (BACTROBAN) 2 % ointment Apply  topically 2 (Two) Times a Day. 30 g 2   • POTASSIUM PO Take  by mouth.     • sodium chloride (NS) 0.9 % irrigation USE FOR TRACHEOSTOMY AS DIRECTED 1000 mL 8   • spironolactone (ALDACTONE) 25 MG tablet TAKE 1/2 (ONE-HALF) TABLET BY MOUTH ONCE DAILY 15 tablet 6   • traMADol (ULTRAM) 50 MG tablet Take 0.5 tablets by mouth Every 12 (Twelve) Hours As Needed for Moderate Pain . 60 tablet 5   • traZODone (DESYREL) 100 MG tablet Take 100 mg by mouth Every Night.     • triamcinolone (KENALOG) 0.1 % cream Apply  topically to the appropriate area as directed 2 (Two) Times a Day. 45 g 2   • vitamin B-12 (CYANOCOBALAMIN) 100 MCG tablet Take 100 mcg by mouth Daily.       No current facility-administered medications for this visit.        The following portions of the patient's history were reviewed and updated as appropriate:   Past Medical History:   Diagnosis Date   • Aortic dissection (CMS/HCC)    • Chest pain    • Descending thoracic aortic aneurysm (CMS/HCC) 6/5/2019   • Disease of thyroid gland    • HTN (hypertension)    • Knee pain    • Sleep apnea    • Smoker    • Squamous cell carcinoma of larynx (CMS/HCC) 2/5/2018   • Stroke (CMS/HCC)    • Swallowing difficulty    • TIA (transient ischemic attack) 12/21/2017     Past Surgical History:   Procedure Laterality Date   • AORTA SURGERY      ruptured aorta repair   • ASCENDING ARCH/HEMIARCH REPLACEMENT N/A 2/14/2017    Procedure: INTRAOPERATIVE TRANSESOPHAGEAL ECHOCARDIOGRAM, MIDLINE STERNOTOMY, ASCENDING AORTIC  AND  PROXIMAL AORTIC ARCH REPAIR WITH 26MM GRAFT, AORTIC VALVE RESUSPENSION, AORTIC ROOT REPAIR, OPEN VEIN HARVEST OF RIGHT LEG;  Surgeon: German Arreguin MD;  Location: [facility] MAIN OR;  Service:    • BRONCHOSCOPY N/A 2/17/2017    Procedure: BRONCHOSCOPY BIOPSY AT BEDSIDE WITH BAL-LEFT LOWER LOBE;  Surgeon: Trent Chaney MD;  Location: [facility] ENDOSCOPY;  Service:    • COLONOSCOPY N/A 3/7/2018    Procedure: COLONOSCOPY WITH POSSIBLE POLYPECTOMY   ( DONE IN OR WITH ENDO)      COLONOSCOPY FIRST;  Surgeon: Kris Solano MD;  Location: [facility] OR;  Service:    • DIRECT LARYNGOSCOPY, ESOPHAGOSCOPY, BRONCHOSCOPY N/A 2/5/2018    Procedure: DIRECT LARYNGOSCOPY WITH BIOPSY, ESOPHAGOSCOPY     (no bronchoscopy, no laser);  Surgeon: Joseph Venegas MD;  Location: [facility] OR;  Service:    • ENDOSCOPY W/ PEG TUBE PLACEMENT N/A 5/2/2018    Procedure: ESOPHAGOGASTRODUODENOSCOPY WITH PERCUTANEOUS ENDOSCOPIC GASTROSTOMY TUBE INSERTION;  Surgeon: Angel Maciel MD;  Location: [facility] ENDOSCOPY;  Service: Gastroenterology   • ENDOSCOPY W/ PEG TUBE PLACEMENT N/A 10/26/2018    Procedure: ESOPHAGOGASTRODUODENOSCOPY WITH PERCUTANEOUS ENDOSCOPIC GASTROSTOMY TUBE INSERTION WITH ANESTHESIA Possible open gastrostomy;  Surgeon: Kris Solano MD;  Location: [facility] OR;  Service: General   • GASTROSTOMY FEEDING TUBE INSERTION N/A 10/26/2018    Procedure: GASTROSTOMY FEEDING TUBE INSERTION;  Surgeon: Kris Solano MD;  Location: [facility] OR;  Service: General   • SPLENECTOMY N/A 7/26/2018    Procedure: SPLENECTOMY, left chest tube placement, EXPLORATORY LAPAROTOMY, G-TUBE PALCEMENT;  Surgeon: Kris Solano MD;  Location: [facility] OR;  Service: General   • SPLENECTOMY      2018   • THORACOSCOPY Left 7/31/2018    Procedure: LEFT THORACOSCOPY VIDEO ASSISTED POSSIBLE THORACOTOMY possible decortication bronchoscopy;  Surgeon: Joshua Pollock MD;  Location: [facility] OR;  Service: Cardiothoracic   •  TRACHEOSTOMY  02/05/2018   • TRACHEOSTOMY N/A 2/5/2018    Procedure: TRACHEOSTOMY  local injection @ 1106 incision @ 1119;  Surgeon: Joseph Venegas MD;  Location: [facility];  Service:    • VENOUS ACCESS DEVICE (PORT) INSERTION N/A 3/7/2018    Procedure: INSERTION OF MEDIPORT     (C-ARM#1);  Surgeon: Kris Solano MD;  Location: [facility];  Service:      Family History   Problem Relation Age of Onset   • Thyroid disease Mother    • Hypertension Mother    • Hypertension Father    • Hypertension Other        Allergies   Allergen Reactions   • Chlorhexidine Itching     Topical cleaning wipes causes severe skin irritation   • Eggs Or Egg-Derived Products Swelling   • Erythromycin Other (See Comments)     Joint pains and cold sweats   • Gabapentin Itching   • Other GI Intolerance     Mycins  farzad lotion causes rash   • Acth [Corticotropin] Rash   • Cephalosporins Itching and Rash     Pt developed rash, itching after cefepime administration (2-3 doses) during 2/2017 admission. Itching was relieved with benadryl. Cefepime was continued because his infection was improving and no symptoms of anaphylaxis present   • Co Q 10 [Coenzyme Q10] Rash   • Keflex [Cephalexin] Rash   • Neomycin Rash   • Penicillins Rash     Tolerated cefepime during 2/2017 admission. Had rash and itching (relieved by benadryl) after few doses of cefepime and cefepime was continued.   • Tetracyclines & Related GI Intolerance     Social History     Socioeconomic History   • Marital status:      Spouse name: Not on file   • Number of children: Not on file   • Years of education: Not on file   • Highest education level: Not on file   Tobacco Use   • Smoking status: Former Smoker     Packs/day: 1.25     Years: 38.00     Pack years: 47.50     Last attempt to quit: 2/13/2017     Years since quitting: 3.0   • Smokeless tobacco: Never Used   • Tobacco comment: 02/27/2018 - Patient confirmed he ceased utilization of tobacco products  "02/13/2017.   Substance and Sexual Activity   • Alcohol use: No   • Drug use: No   • Sexual activity: Defer       Review of Systems  Review of Systems   Constitutional: Negative for activity change, appetite change, diaphoresis and fatigue.   HENT: Negative for facial swelling, sneezing, sore throat, tinnitus, trouble swallowing and voice change.    Eyes: Negative for photophobia, pain, discharge, redness, itching and visual disturbance.   Respiratory: Negative for apnea, cough, choking, chest tightness and shortness of breath.    Cardiovascular: Negative for chest pain, palpitations and leg swelling.   Gastrointestinal: Negative for abdominal distention, abdominal pain, constipation, diarrhea, nausea and vomiting.   Endocrine: Negative for cold intolerance, heat intolerance, polydipsia, polyphagia and polyuria.   Genitourinary: Negative for difficulty urinating, dysuria, frequency, hematuria and urgency.   Musculoskeletal: Negative for arthralgias, back pain, gait problem, joint swelling, myalgias, neck pain and neck stiffness.   Skin: Negative for color change, pallor, rash and wound.   Neurological: Negative for dizziness, tremors, weakness, light-headedness, numbness and headaches.   Hematological: Negative for adenopathy. Bruises/bleeds easily.   Psychiatric/Behavioral: Negative for behavioral problems, confusion and sleep disturbance.        Objective    /82   Pulse 98   Ht 177.8 cm (70\")   Wt 111 kg (245 lb)   SpO2 98%   BMI 35.15 kg/m²   Physical Exam   Constitutional: He is oriented to person, place, and time. He appears well-developed and well-nourished. No distress.   HENT:   Head: Normocephalic and atraumatic.   Right Ear: External ear normal.   Left Ear: External ear normal.   Nose: Nose normal.   Has tracheostomy   Eyes: Pupils are equal, round, and reactive to light. Conjunctivae and EOM are normal.   Neck: Normal range of motion. Neck supple. No tracheal deviation present. No thyromegaly " present.   Cardiovascular: Normal rate, regular rhythm and normal heart sounds.   No murmur heard.  Pulmonary/Chest: Effort normal and breath sounds normal. No respiratory distress. He has no wheezes.   Abdominal: Soft. Bowel sounds are normal. There is no tenderness. There is no rebound and no guarding.   Musculoskeletal: Normal range of motion. He exhibits no edema, tenderness or deformity.   Neurological: He is alert and oriented to person, place, and time. No cranial nerve deficit.   Skin: Skin is warm and dry. No rash noted.   Psychiatric: He has a normal mood and affect. His behavior is normal. Judgment and thought content normal.       Lab Review  Lab Results   Component Value Date    TSH 6.240 (H) 07/18/2019     Lab Results   Component Value Date    FREET4 0.68 (L) 02/18/2017        Assessment/Plan      1. Acquired hypothyroidism    2. Vitamin D deficiency    3. B12 deficiency    4. Iron deficiency    5. Impaired fasting glucose    . This diagnosis was discussed and reviewed with the patient including the advantages of drug therapy.     1. Orders placed during this encounter include:  Orders Placed This Encounter   Procedures   • CBC Auto Differential   • Comprehensive Metabolic Panel   • Hemoglobin A1c   • Lipid Panel   • Microalbumin / Creatinine Urine Ratio - Urine, Clean Catch   • TSH   • Vitamin B12   • Vitamin D 25 Hydroxy   • Iron Profile   • Hemoglobin A1c   • TSH     This order was created through External Result Entry       Medications prescribed:  Outpatient Encounter Medications as of 3/10/2020   Medication Sig Dispense Refill   • amLODIPine (NORVASC) 10 MG tablet Take 1 tablet by mouth Daily. 30 tablet 11   • atorvastatin (LIPITOR) 20 MG tablet Take 1 tablet by mouth Daily. 90 tablet 3   • B Complex Vitamins (VITAMIN B COMPLEX) capsule capsule Take 1 capsule by mouth Daily.     • Cholecalciferol (VITAMIN D3) 07811 units capsule TAKE 1 CAPSULE BY MOUTH AS DIRECTED EVERY  MONTH 1 capsule 11   •  clopidogrel (PLAVIX) 75 MG tablet Take 75 mg by mouth Daily. Last dose approx greater than one month ago     • docusate sodium (COLACE) 100 MG capsule Take 1 capsule by mouth 2 (Two) Times a Day As Needed for Constipation. 60 capsule 2   • DULoxetine (CYMBALTA) 60 MG capsule Take 60 mg by mouth Daily.     • ferrous sulfate, as mg of FE, (VISHAL-IN-SOL) 75 (15 Fe) MG/ML drops 25 mL by Per G Tube route Daily. 150 mL 2   • indomethacin (INDOCIN) 25 MG capsule Take 1 capsule by mouth Daily As Needed.  0   • irbesartan (AVAPRO) 300 MG tablet Take 1 tablet by mouth Daily. This replaces losartan 30 tablet 11   • levoFLOXacin (LEVAQUIN) 500 MG tablet TAKE 1 TABLET BY MOUTH ONCE DAILY FOR 7 DAYS     • levothyroxine (SYNTHROID, LEVOTHROID) 137 MCG tablet Take 1 tab daily Monday to Saturday and 1.5 tabs on Sunday 32 tablet 11   • metoprolol succinate XL (TOPROL-XL) 25 MG 24 hr tablet TAKE 1 TABLET BY MOUTH AT BEDTIME 30 tablet 12   • Multiple Vitamins-Minerals (MULTIVITAMIN ADULT PO) Take 1 tablet by mouth Daily.     • mupirocin (BACTROBAN) 2 % ointment Apply  topically 2 (Two) Times a Day. 30 g 2   • POTASSIUM PO Take  by mouth.     • sodium chloride (NS) 0.9 % irrigation USE FOR TRACHEOSTOMY AS DIRECTED 1000 mL 8   • spironolactone (ALDACTONE) 25 MG tablet TAKE 1/2 (ONE-HALF) TABLET BY MOUTH ONCE DAILY 15 tablet 6   • traMADol (ULTRAM) 50 MG tablet Take 0.5 tablets by mouth Every 12 (Twelve) Hours As Needed for Moderate Pain . 60 tablet 5   • traZODone (DESYREL) 100 MG tablet Take 100 mg by mouth Every Night.     • triamcinolone (KENALOG) 0.1 % cream Apply  topically to the appropriate area as directed 2 (Two) Times a Day. 45 g 2   • vitamin B-12 (CYANOCOBALAMIN) 100 MCG tablet Take 100 mcg by mouth Daily.     • [DISCONTINUED] levothyroxine (SYNTHROID, LEVOTHROID) 137 MCG tablet Take 1 tablet by mouth Daily. 30 tablet 11     No facility-administered encounter medications on file as of 3/10/2020.         Patient has  hypothyroidism after having aortic dissection        TSH    Lab Results   Component Value Date    TSHHIGHSENSI 6.700 (A) 03/04/2020         Lab Results   Component Value Date    TSH 6.240 (H) 07/18/2019    TSH 6.470 (H) 06/07/2019    TSH 12.400 (H) 03/21/2019       Lab Results   Component Value Date    FREET4 0.68 (L) 02/18/2017      synthroid     was taking it with iron but now on empty stomach    Brand name 112 mcgs daily -- increase to 125 mcgs daily -137 ( change to 7.5 )       Lab Results   Component Value Date    AWAZ20NG 50.2 07/18/2019    OMTJ92VU 54.5 02/04/2019    ARCP72EE 57.7 10/23/2018       Lab Results   Component Value Date    MZHAGVZG89 1,979 (H) 08/09/2019     Normalized on 50 th u monthly  ---    Anemia of low iron     Now improved on liquid iron - now otc ferrous sulfate 325 , 3 x weekly     Take apart from thyroid pill     Lab Results   Component Value Date    WBC 21.67 (H) 08/09/2019    HGB 13.9 08/09/2019    HCT 41.1 08/09/2019    MCV 86.3 08/09/2019     08/09/2019         --    On avapro, amlodipine, metoprolol and     aldactone 25 mg - now only half     Used to be on hctz that I stopped since Acute on CKD    --    CKD stage III    Acute decline   Taking indomethacin , decreased to 3 x weekly, didn't like idea of tramadol since he wants anti inflammatory       IFG    Monitor glucose     4. No follow-ups on file.

## 2020-03-17 ENCOUNTER — OFFICE VISIT (OUTPATIENT)
Dept: OTOLARYNGOLOGY | Facility: CLINIC | Age: 60
End: 2020-03-17

## 2020-03-17 VITALS — BODY MASS INDEX: 33.43 KG/M2 | HEIGHT: 72 IN | OXYGEN SATURATION: 95 % | WEIGHT: 246.8 LBS

## 2020-03-17 DIAGNOSIS — Z85.21 ENCOUNTER FOR FOLLOW-UP SURVEILLANCE OF LARYNGEAL CANCER: Primary | ICD-10-CM

## 2020-03-17 DIAGNOSIS — Z08 ENCOUNTER FOR FOLLOW-UP SURVEILLANCE OF LARYNGEAL CANCER: Primary | ICD-10-CM

## 2020-03-17 DIAGNOSIS — J37.0 CHRONIC LARYNGITIS: ICD-10-CM

## 2020-03-17 DIAGNOSIS — Z93.0 TRACHEOSTOMY STATUS (HCC): ICD-10-CM

## 2020-03-17 PROCEDURE — 99213 OFFICE O/P EST LOW 20 MIN: CPT | Performed by: OTOLARYNGOLOGY

## 2020-03-17 PROCEDURE — 31575 DIAGNOSTIC LARYNGOSCOPY: CPT | Performed by: OTOLARYNGOLOGY

## 2020-03-17 RX ORDER — ATORVASTATIN CALCIUM 20 MG/1
20 TABLET, FILM COATED ORAL DAILY
Qty: 90 TABLET | Refills: 3 | Status: SHIPPED | OUTPATIENT
Start: 2020-03-17 | End: 2020-04-01 | Stop reason: SDUPTHER

## 2020-03-20 PROBLEM — Z93.0 TRACHEOSTOMY STATUS (HCC): Status: ACTIVE | Noted: 2020-03-20

## 2020-03-21 NOTE — PROGRESS NOTES
Subjective   Truman Esquivel is a 59 y.o. male.       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  This was treated with concurrent chemoradiation with a complete response.  Has been unable to tolerate around-the-clock tracheostomy capping so the tracheostomy remains in place.  Is here today for surveillance.  Is having no specific complaints.  Swallowing, breathing, and speaking are all at baseline.      The following portions of the patient's history were reviewed and updated as appropriate: allergies, current medications, past family history, past medical history, past social history, past surgical history and problem list.     reports that he quit smoking about 3 years ago. He has a 47.50 pack-year smoking history. He has never used smokeless tobacco. He reports that he does not drink alcohol or use drugs.   Patient is not a tobacco user and has not been counseled for use of tobacco products      Review of Systems   Constitutional: Negative for fever.           Objective   Physical Exam  General: Well-developed well-nourished male in no acute distress.  Alert and oriented x3. Voice: Harsh but no stridor. Speech:Fluent  Ears: External ears no deformity, canals no discharge, tympanic membranes intact clear and mobile bilaterally.  Nose: Nares show no discharge mass polyp or purulence.  Boggy mucosa is present.  No gross external deformity.  Septum: Midline  Oral cavity: Lips and gums without lesions.  Tongue and floor of mouth without lesions.  Parotid and submandibular ducts unobstructed.  No mucosal lesions on the buccal mucosa or vestibule of the mouth.  Pharynx: No erythema exudate mass or ulcer  Neck: No lymphadenopathy.  No thyromegaly.  Trachea and larynx midline.  No masses in the parotid or submandibular glands.  Tracheostomy in place.  No evidence of granulation tissue or purulence.  Post radiation changes in the neck.  Procedure  Note    Pre-operative Diagnosis:   Chief Complaint   Patient presents with   • Follow-up   Cancer surveillance    Post-operative Diagnosis: Chronic laryngitis; no evidence of recurrent disease    Anesthesia: topical with xylocaine and neosynephrine    Endoscopy Type:  Flexible Laryngoscopy    Procedure Details:    The patient was placed in the sitting position.  After topical anesthesia and decongestion, the 4 mm laryngoscope was passed.  The nasal cavities, nasopharynx, oropharynx, hypopharynx, and larynx were all examined.  Vocal cords were examined during respiration and phonation.  The following findings were noted:    Findings: No masses in the nose or nasopharynx.  Oropharynx shows no evidence of neoplasm.  Tongue base shows posttreatment changes.  What remains of the epiglottis is partially overhanging the rest of the supraglottis.  There is chronic appearing edema of the arytenoids and false cords.  True cords move appropriately with no evidence of neoplasm.    Condition:  Stable.  Patient tolerated procedure well.    Complications:  None    Assessment/Plan   Truman was seen today for follow-up.    Diagnoses and all orders for this visit:    Encounter for follow-up surveillance of laryngeal cancer    Chronic laryngitis    Tracheostomy status (CMS/[facility])      Plan: Reassured the patient I saw no evidence of recurrent disease.  I still do not think he would tolerate decannulation.  Continued tobacco abstinence and return in 4 months, call sooner for problems.

## 2020-04-01 RX ORDER — ATORVASTATIN CALCIUM 20 MG/1
20 TABLET, FILM COATED ORAL DAILY
Qty: 90 TABLET | Refills: 3 | Status: SHIPPED | OUTPATIENT
Start: 2020-04-01 | End: 2020-12-14 | Stop reason: ALTCHOICE

## 2020-04-08 ENCOUNTER — APPOINTMENT (OUTPATIENT)
Dept: ONCOLOGY | Facility: CLINIC | Age: 60
End: 2020-04-08

## 2020-04-08 ENCOUNTER — APPOINTMENT (OUTPATIENT)
Dept: ONCOLOGY | Facility: HOSPITAL | Age: 60
End: 2020-04-08

## 2020-04-13 RX ORDER — IRBESARTAN 300 MG/1
TABLET ORAL
Qty: 30 TABLET | Refills: 5 | Status: SHIPPED | OUTPATIENT
Start: 2020-04-13 | End: 2020-10-12

## 2020-04-20 RX ORDER — METOPROLOL SUCCINATE 25 MG/1
25 TABLET, EXTENDED RELEASE ORAL
Qty: 30 TABLET | Refills: 12 | Status: SHIPPED | OUTPATIENT
Start: 2020-04-20 | End: 2021-05-17

## 2020-06-01 ENCOUNTER — LAB (OUTPATIENT)
Dept: ONCOLOGY | Facility: HOSPITAL | Age: 60
End: 2020-06-01

## 2020-06-01 DIAGNOSIS — D64.9 ANEMIA, UNSPECIFIED TYPE: ICD-10-CM

## 2020-06-01 DIAGNOSIS — D75.839 THROMBOCYTOSIS: ICD-10-CM

## 2020-06-01 DIAGNOSIS — C32.9 SQUAMOUS CELL CARCINOMA OF LARYNX (HCC): ICD-10-CM

## 2020-06-01 LAB
25(OH)D3 SERPL-MCNC: 41.8 NG/ML (ref 30–100)
ALBUMIN SERPL-MCNC: 4.5 G/DL (ref 3.5–5.2)
ALBUMIN/GLOB SERPL: 1.5 G/DL
ALP SERPL-CCNC: 98 U/L (ref 39–117)
ALT SERPL W P-5'-P-CCNC: 22 U/L (ref 1–41)
ANION GAP SERPL CALCULATED.3IONS-SCNC: 10 MMOL/L (ref 5–15)
AST SERPL-CCNC: 19 U/L (ref 1–40)
BASOPHILS # BLD AUTO: 0.13 10*3/MM3 (ref 0–0.2)
BASOPHILS NFR BLD AUTO: 0.9 % (ref 0–1.5)
BILIRUB SERPL-MCNC: 0.3 MG/DL (ref 0.2–1.2)
BUN BLD-MCNC: 26 MG/DL (ref 6–20)
BUN/CREAT SERPL: 19.7 (ref 7–25)
CALCIUM SPEC-SCNC: 9.2 MG/DL (ref 8.6–10.5)
CHLORIDE SERPL-SCNC: 106 MMOL/L (ref 98–107)
CO2 SERPL-SCNC: 23 MMOL/L (ref 22–29)
CREAT BLD-MCNC: 1.32 MG/DL (ref 0.76–1.27)
DEPRECATED RDW RBC AUTO: 46.6 FL (ref 37–54)
EOSINOPHIL # BLD AUTO: 0.63 10*3/MM3 (ref 0–0.4)
EOSINOPHIL NFR BLD AUTO: 4.1 % (ref 0.3–6.2)
ERYTHROCYTE [DISTWIDTH] IN BLOOD BY AUTOMATED COUNT: 15 % (ref 12.3–15.4)
FERRITIN SERPL-MCNC: 92.24 NG/ML (ref 30–400)
FOLATE SERPL-MCNC: >20 NG/ML (ref 4.78–24.2)
GFR SERPL CREATININE-BSD FRML MDRD: 56 ML/MIN/1.73
GLOBULIN UR ELPH-MCNC: 3 GM/DL
GLUCOSE BLD-MCNC: 107 MG/DL (ref 65–99)
HCT VFR BLD AUTO: 43.5 % (ref 37.5–51)
HGB BLD-MCNC: 14.7 G/DL (ref 13–17.7)
IMM GRANULOCYTES # BLD AUTO: 0.04 10*3/MM3 (ref 0–0.05)
IMM GRANULOCYTES NFR BLD AUTO: 0.3 % (ref 0–0.5)
IRON 24H UR-MRATE: 72 MCG/DL (ref 59–158)
IRON SATN MFR SERPL: 19 % (ref 20–50)
LYMPHOCYTES # BLD AUTO: 1.8 10*3/MM3 (ref 0.7–3.1)
LYMPHOCYTES NFR BLD AUTO: 11.8 % (ref 19.6–45.3)
MCH RBC QN AUTO: 28.9 PG (ref 26.6–33)
MCHC RBC AUTO-ENTMCNC: 33.8 G/DL (ref 31.5–35.7)
MCV RBC AUTO: 85.5 FL (ref 79–97)
MONOCYTES # BLD AUTO: 1.18 10*3/MM3 (ref 0.1–0.9)
MONOCYTES NFR BLD AUTO: 7.7 % (ref 5–12)
NEUTROPHILS # BLD AUTO: 11.49 10*3/MM3 (ref 1.7–7)
NEUTROPHILS NFR BLD AUTO: 75.2 % (ref 42.7–76)
NRBC BLD AUTO-RTO: 0 /100 WBC (ref 0–0.2)
PLATELET # BLD AUTO: 330 10*3/MM3 (ref 140–450)
PMV BLD AUTO: 10.1 FL (ref 6–12)
POTASSIUM BLD-SCNC: 4.8 MMOL/L (ref 3.5–5.2)
PROT SERPL-MCNC: 7.5 G/DL (ref 6–8.5)
RBC # BLD AUTO: 5.09 10*6/MM3 (ref 4.14–5.8)
SODIUM BLD-SCNC: 139 MMOL/L (ref 136–145)
TIBC SERPL-MCNC: 380 MCG/DL (ref 298–536)
TRANSFERRIN SERPL-MCNC: 255 MG/DL (ref 200–360)
VIT B12 BLD-MCNC: 835 PG/ML (ref 211–946)
VIT B12 BLD-MCNC: 853 PG/ML (ref 211–946)
WBC NRBC COR # BLD: 15.27 10*3/MM3 (ref 3.4–10.8)

## 2020-06-01 PROCEDURE — 82728 ASSAY OF FERRITIN: CPT | Performed by: INTERNAL MEDICINE

## 2020-06-01 PROCEDURE — 82306 VITAMIN D 25 HYDROXY: CPT | Performed by: INTERNAL MEDICINE

## 2020-06-01 PROCEDURE — 85025 COMPLETE CBC W/AUTO DIFF WBC: CPT | Performed by: INTERNAL MEDICINE

## 2020-06-01 PROCEDURE — 36591 DRAW BLOOD OFF VENOUS DEVICE: CPT | Performed by: INTERNAL MEDICINE

## 2020-06-01 PROCEDURE — 80061 LIPID PANEL: CPT | Performed by: INTERNAL MEDICINE

## 2020-06-01 PROCEDURE — 83036 HEMOGLOBIN GLYCOSYLATED A1C: CPT | Performed by: INTERNAL MEDICINE

## 2020-06-01 PROCEDURE — 82607 VITAMIN B-12: CPT | Performed by: INTERNAL MEDICINE

## 2020-06-01 PROCEDURE — 80053 COMPREHEN METABOLIC PANEL: CPT | Performed by: INTERNAL MEDICINE

## 2020-06-01 PROCEDURE — 84466 ASSAY OF TRANSFERRIN: CPT | Performed by: INTERNAL MEDICINE

## 2020-06-01 PROCEDURE — 83540 ASSAY OF IRON: CPT | Performed by: INTERNAL MEDICINE

## 2020-06-01 PROCEDURE — 84443 ASSAY THYROID STIM HORMONE: CPT | Performed by: INTERNAL MEDICINE

## 2020-06-01 PROCEDURE — 82746 ASSAY OF FOLIC ACID SERUM: CPT | Performed by: INTERNAL MEDICINE

## 2020-06-02 LAB
CHOLEST SERPL-MCNC: 123 MG/DL (ref 0–200)
HBA1C MFR BLD: 6 % (ref 4.8–5.6)
HDLC SERPL-MCNC: 41 MG/DL (ref 40–60)
LDLC SERPL CALC-MCNC: 60 MG/DL (ref 0–100)
LDLC/HDLC SERPL: 1.46 {RATIO}
TRIGL SERPL-MCNC: 110 MG/DL (ref 0–150)
TSH SERPL DL<=0.05 MIU/L-ACNC: 3.18 UIU/ML (ref 0.27–4.2)
VLDLC SERPL-MCNC: 22 MG/DL

## 2020-06-03 ENCOUNTER — OFFICE VISIT (OUTPATIENT)
Dept: ONCOLOGY | Facility: CLINIC | Age: 60
End: 2020-06-03

## 2020-06-03 VITALS
WEIGHT: 251.3 LBS | TEMPERATURE: 98.7 F | HEIGHT: 70 IN | RESPIRATION RATE: 20 BRPM | DIASTOLIC BLOOD PRESSURE: 79 MMHG | HEART RATE: 63 BPM | BODY MASS INDEX: 35.98 KG/M2 | SYSTOLIC BLOOD PRESSURE: 162 MMHG

## 2020-06-03 DIAGNOSIS — D75.839 THROMBOCYTOSIS: ICD-10-CM

## 2020-06-03 DIAGNOSIS — D69.6 THROMBOCYTOPENIA (HCC): ICD-10-CM

## 2020-06-03 DIAGNOSIS — C32.9 SQUAMOUS CELL CARCINOMA OF LARYNX (HCC): ICD-10-CM

## 2020-06-03 PROCEDURE — 99214 OFFICE O/P EST MOD 30 MIN: CPT | Performed by: NURSE PRACTITIONER

## 2020-06-03 PROCEDURE — G0463 HOSPITAL OUTPT CLINIC VISIT: HCPCS | Performed by: NURSE PRACTITIONER

## 2020-06-04 NOTE — PROGRESS NOTES
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  Upon staging workup patient was found to have stage III squamous cell cancer of larynx for which she has been started on concurrent chemoradiation with weekly carboplatin and Taxol on March 19, 2018.  Last dose of chemotherapy and radiation was on May 15, 2018.  Patient is here for follow-up appointment today. States he feels well.  Denies any fever.  States he feels better.  Denies any active bleeding.  Denies any new lymph node enlargement. Was seen by Dr. Venegas in March with laryngoscope examination that was unremarkable for disease recurrence.    PAST MEDICAL HISTORY:    Past Medical History:   Diagnosis Date   • Aortic dissection (CMS/HCC)    • Chest pain    • Descending thoracic aortic aneurysm (CMS/HCC) 6/5/2019   • Disease of thyroid gland    • HTN (hypertension)    • Knee pain    • Sleep apnea    • Smoker    • Squamous cell carcinoma of larynx (CMS/HCC) 2/5/2018   • Squamous cell carcinoma of larynx (CMS/HCC) 2/5/2018   • Stroke (CMS/HCC)    • Swallowing difficulty    • TIA (transient ischemic attack) 12/21/2017       SOCIAL HISTORY:    Social History     Tobacco Use   • Smoking status: Former Smoker     Packs/day: 1.25     Years: 38.00     Pack years: 47.50     Last attempt to quit: 2/13/2017     Years since quitting: 3.3   • Smokeless tobacco: Never Used   • Tobacco comment: 02/27/2018 - Patient confirmed he ceased utilization of tobacco products 02/13/2017.   Substance Use Topics   • Alcohol use: No   • Drug use: No       Surgical  History :  Past Surgical History:   Procedure Laterality Date   • AORTA SURGERY      ruptured aorta repair   • ASCENDING ARCH/HEMIARCH REPLACEMENT N/A 2/14/2017    Procedure: INTRAOPERATIVE TRANSESOPHAGEAL ECHOCARDIOGRAM, MIDLINE STERNOTOMY, ASCENDING AORTIC  AND PROXIMAL AORTIC ARCH REPAIR WITH 26MM GRAFT, AORTIC VALVE RESUSPENSION, AORTIC ROOT REPAIR, OPEN VEIN HARVEST OF RIGHT LEG;  Surgeon: German Arreguin MD;  Location: [facility] MAIN OR;  Service:    • BRONCHOSCOPY N/A 2/17/2017    Procedure: BRONCHOSCOPY BIOPSY AT BEDSIDE WITH BAL-LEFT LOWER LOBE;  Surgeon: Trent Chaney MD;  Location: [facility] ENDOSCOPY;  Service:    • COLONOSCOPY N/A 3/7/2018    Procedure: COLONOSCOPY WITH POSSIBLE POLYPECTOMY   ( DONE IN OR WITH ENDO)      COLONOSCOPY FIRST;  Surgeon: Kris Solano MD;  Location: [facility] OR;  Service:    • DIRECT LARYNGOSCOPY, ESOPHAGOSCOPY, BRONCHOSCOPY N/A 2/5/2018    Procedure: DIRECT LARYNGOSCOPY WITH BIOPSY, ESOPHAGOSCOPY     (no bronchoscopy, no laser);  Surgeon: Joseph Venegas MD;  Location: [facility] OR;  Service:    • ENDOSCOPY W/ PEG TUBE PLACEMENT N/A 5/2/2018    Procedure: ESOPHAGOGASTRODUODENOSCOPY WITH PERCUTANEOUS ENDOSCOPIC GASTROSTOMY TUBE INSERTION;  Surgeon: Angel Maciel MD;  Location: [facility] ENDOSCOPY;  Service: Gastroenterology   • ENDOSCOPY W/ PEG TUBE PLACEMENT N/A 10/26/2018    Procedure: ESOPHAGOGASTRODUODENOSCOPY WITH PERCUTANEOUS ENDOSCOPIC GASTROSTOMY TUBE INSERTION WITH ANESTHESIA Possible open gastrostomy;  Surgeon: Kris Solano MD;  Location: [facility] OR;  Service: General   • GASTROSTOMY FEEDING TUBE INSERTION N/A 10/26/2018    Procedure: GASTROSTOMY FEEDING TUBE INSERTION;  Surgeon: Kris Solano MD;  Location: [facility] OR;  Service: General   • SPLENECTOMY N/A 7/26/2018    Procedure: SPLENECTOMY, left chest tube placement, EXPLORATORY LAPAROTOMY, G-TUBE PALCEMENT;  Surgeon: Kris Solano MD;  Location: [facility] OR;  Service:  General   • SPLENECTOMY      2018   • THORACOSCOPY Left 7/31/2018    Procedure: LEFT THORACOSCOPY VIDEO ASSISTED POSSIBLE THORACOTOMY possible decortication bronchoscopy;  Surgeon: Joshua Pollock MD;  Location: [facility];  Service: Cardiothoracic   • TRACHEOSTOMY  02/05/2018   • TRACHEOSTOMY N/A 2/5/2018    Procedure: TRACHEOSTOMY  local injection @ 1106 incision @ 1119;  Surgeon: Joseph Venegas MD;  Location: [facility] OR;  Service:    • VENOUS ACCESS DEVICE (PORT) INSERTION N/A 3/7/2018    Procedure: INSERTION OF MEDIPORT     (C-ARM#1);  Surgeon: Kris Solano MD;  Location: [facility];  Service:        ALLERGIES:    Allergies   Allergen Reactions   • Chlorhexidine Itching     Topical cleaning wipes causes severe skin irritation   • Eggs Or Egg-Derived Products Swelling   • Erythromycin Other (See Comments)     Joint pains and cold sweats   • Gabapentin Itching   • Other GI Intolerance     Mycins  farzad lotion causes rash   • Acth [Corticotropin] Rash   • Cephalosporins Itching and Rash     Pt developed rash, itching after cefepime administration (2-3 doses) during 2/2017 admission. Itching was relieved with benadryl. Cefepime was continued because his infection was improving and no symptoms of anaphylaxis present   • Co Q 10 [Coenzyme Q10] Rash   • Keflex [Cephalexin] Rash   • Neomycin Rash   • Penicillins Rash     Tolerated cefepime during 2/2017 admission. Had rash and itching (relieved by benadryl) after few doses of cefepime and cefepime was continued.   • Tetracyclines & Related GI Intolerance       REVIEW OF SYSTEMS:      CONSTITUTIONAL:  No fever, chills, or night sweats.     HEENT:  No epistaxis, mouth sores, or difficulty swallowing.    RESPIRATORY:  No new shortness of breath or cough at present.    CARDIOVASCULAR:  No chest pain or palpitations.    GASTROINTESTINAL:  No abdominal pain, nausea, vomiting, or blood in the stool.    GENITOURINARY:  No dysuria or  "hematuria.    MUSCULOSKELETAL:  No any new back pain or arthralgias.     NEUROLOGICAL:  No tingling or numbness. No new headache or dizziness.     LYMPHATICS:  Denies any abnormal swollen and anywhere in the body.    SKIN:  Denies any new skin rash.    PHYSICAL EXAMINATION:      VITAL SIGNS:  /79   Pulse 63   Temp 98.7 °F (37.1 °C) (Temporal)   Resp 20   Ht 177.8 cm (70\")   Wt 114 kg (251 lb 4.8 oz)   BMI 36.06 kg/m²     GENERAL:  Not in any distress.    HEENT:  Normocephalic, Atraumatic.Mild Conjunctival pallor. No icterus.  No Facial Asymmetry noted.    NECK:  No adenopathy. No JVD. Tracheostomy present    RESPIRATORY:  Fair air entry bilateral. No rhonchi or wheezing.    CARDIOVASCULAR:  S1, S2. Regular rate and rhythm. No murmur or gallop appreciated.    ABDOMEN:  Soft, obese, nontender. Bowel sounds present in all four quadrants.  No organomegaly appreciated.    EXTREMITIES:  No edema.No Calf Tenderness.    NEUROLOGIC:  Alert, awake and oriented ×3.      SKIN : No new skin lesion identified  DIAGNOSTIC DATA:    Glucose   Date Value Ref Range Status   06/01/2020 107 (H) 65 - 99 mg/dL Final     Glucose, Arterial   Date Value Ref Range Status   07/31/2018 113 (H) 65 - 95 mmol/L Final     Sodium   Date Value Ref Range Status   06/01/2020 139 136 - 145 mmol/L Final     Potassium   Date Value Ref Range Status   06/01/2020 4.8 3.5 - 5.2 mmol/L Final     CO2   Date Value Ref Range Status   06/01/2020 23.0 22.0 - 29.0 mmol/L Final     Chloride   Date Value Ref Range Status   06/01/2020 106 98 - 107 mmol/L Final     Anion Gap   Date Value Ref Range Status   06/01/2020 10.0 5.0 - 15.0 mmol/L Final     Creatinine   Date Value Ref Range Status   06/01/2020 1.32 (H) 0.76 - 1.27 mg/dL Final     BUN   Date Value Ref Range Status   06/01/2020 26 (H) 6 - 20 mg/dL Final     BUN/Creatinine Ratio   Date Value Ref Range Status   06/01/2020 19.7 7.0 - 25.0 Final     Calcium   Date Value Ref Range Status   06/01/2020 9.2 " 8.6 - 10.5 mg/dL Final     eGFR Non  Amer   Date Value Ref Range Status   06/01/2020 56 (L) >60 mL/min/1.73 Final     Alkaline Phosphatase   Date Value Ref Range Status   06/01/2020 98 39 - 117 U/L Final     Total Protein   Date Value Ref Range Status   06/01/2020 7.5 6.0 - 8.5 g/dL Final     ALT (SGPT)   Date Value Ref Range Status   06/01/2020 22 1 - 41 U/L Final     AST (SGOT)   Date Value Ref Range Status   06/01/2020 19 1 - 40 U/L Final     Total Bilirubin   Date Value Ref Range Status   06/01/2020 0.3 0.2 - 1.2 mg/dL Final     Albumin   Date Value Ref Range Status   06/01/2020 4.50 3.50 - 5.20 g/dL Final     Globulin   Date Value Ref Range Status   06/01/2020 3.0 gm/dL Final     Lab Results   Component Value Date    WBC 15.27 (H) 06/01/2020    HGB 14.7 06/01/2020    HCT 43.5 06/01/2020    MCV 85.5 06/01/2020     06/01/2020     Lab Results   Component Value Date    NEUTROABS 11.49 (H) 06/01/2020    IRON 72 06/01/2020    TIBC 380 06/01/2020    LABIRON 19 (L) 06/01/2020    FERRITIN 92.24 06/01/2020    [id number] 835 06/01/2020    FOLATE >20.00 06/01/2020     No results found for: , LABCA2, AFPTM, HCGQUANT, , CHROMGRNA, [id number], CEA, REFLABREPO]  PET CT done on February 16, 2018 was reviewed, discussed with patient, it showed:  IMPRESSION:  CONCLUSION:   1.  Soft tissue mass in the region of the epiglottis obstructing  the airway, associated with hypermetabolic activity, compatible  with known neoplasm.  2.  Activity around the tracheostomy site is likely inflammatory.  3.  Activity in the rectum (SUV max 7.1) is probably either  physiological or inflammatory. Recommend direct visualization.  4.  There is a focus of activity in the region of the right  common iliac artery without a definite CT abnormality (SUV max  5.4) possibly representing a lymph node, nonspecific but cannot  rule out neoplasm.  5.  Foci of activity posterior to the ischial tuberosities are  probably inflammatory.  6.   There are diffuse punctate low-level foci of activity  throughout the osseous structures, liver, kidneys, mediastinum  which are more likely artifactual than due to metastatic disease.     ADDENDUM   ADDENDUM #1         Upon review of the patient's imaging with Dr. Tapia, there  appears to be extension of tumor into the left side of the  preepiglottic space.           Ct of Neck with contrast done on January 29, 2018 showed:  IMPRESSION:  3 x 3.5 x 3.5 cm soft tissue mass with lobular margin centered at  the level the epiglottis however flush 4 cm intimate with the  posterior margin of the tongue base and the posterior margin of  the oral pharynx. This does contribute to moderate compromise of  the airway. Finding is suspicious for malignancy.      Mostly subcentimeter cervical chain lymph nodes present  bilaterally. The largest node right level II measuring 1.3 cm. No  necrotic or conglomerate adenopathy.      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 The  dissection was described on a CTA coronary artery exam from  2/14/2017.        Pathology :  Pathology result from February 5, 2018 showed:  Final Diagnosis   1.  TUMOR, EPIGLOTTIS:   INVASIVE SQUAMOUS CELL CARCINOMA, NON KERATINIZING, POORLY DIFFERENTIATED.       2.  TUMOR, EPIGLOTTIS:   INVASIVE SQUAMOUS CELL CARCINOMA, NON KERATINIZING, POORLY DIFFERENTIATED.                   ASSESSMENT AND PLAN:       1.  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  Based on PET/CT there is a tumor extension into preepiglottic space making a T3 lesion.  Result of PET CT were discussed with patient.  It was discussed with patient with T3 N0 lesion after his treatment option includes either surgery or concurrent chemoradiation.    -He was started on concurrent chemoradiation on March 19, 2018.  Patient received 6 weekly carboplatin and Taxol from March 19, 2018 until May 15,  2018.  Radiation was also completed on May 15, 2018.  -ENT exam by Dr. Venegas in March 2020 showed good response to chemoradiation without any evidence of any active tumor.  -We will ask patient to return to clinic in 4 months with a repeat CBC,bmp and anemia workup to be done on that day.     2.   anemia:   -Patient was started on liquid iron in October 2018.  -Hemoglobin is 14.7.  -Continue ferrous sulfate, B-12 and folic acid three times per week.  -We will repeat anemia work-up prior to next clinic visit in 4 months.        3.  Thrombocytosis:( problem is new to me)  -Secondary to splenectomy status.  -Platelet count today is 330,000.  Will monitor with CBC for now     4.  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  White blood cell count is 15,000.  We will monitored with CBC for now     5.  Health maintenance: Patient quit smoking in February 2017.  Never had a screening colonoscopy done.      6.ACP- pt has living will on chart.    .Truman Esquivel reports a pain score of 0.  Given his pain assessment as noted, treatment options were discussed and the following options were decided upon as a follow-up plan to address the patient's pain: pain sscore 0 no follow-up needed.    Patient screened positive for depression based on a PHQ-9 score of 0 on 6/3/2020. Follow-up recommendations include: depression score 0 no follow-up needed.     Patient's Body mass index is 36.06 kg/m². BMI is above normal parameters. Recommendations include: exercise counseling and nutrition counseling.          This document has been signed by ROWAN Watkins on June 4, 2020 09:41

## 2020-06-08 ENCOUNTER — TELEPHONE (OUTPATIENT)
Dept: ENDOCRINOLOGY | Facility: CLINIC | Age: 60
End: 2020-06-08

## 2020-06-08 RX ORDER — TRAMADOL HYDROCHLORIDE 50 MG/1
TABLET ORAL
Qty: 30 TABLET | Refills: 0 | Status: SHIPPED | OUTPATIENT
Start: 2020-06-08 | End: 2020-06-09 | Stop reason: SDUPTHER

## 2020-06-09 RX ORDER — TRAMADOL HYDROCHLORIDE 50 MG/1
TABLET ORAL
Qty: 30 TABLET | Refills: 5 | Status: SHIPPED | OUTPATIENT
Start: 2020-06-09 | End: 2020-12-10

## 2020-06-15 ENCOUNTER — OFFICE VISIT (OUTPATIENT)
Dept: CARDIOLOGY | Facility: CLINIC | Age: 60
End: 2020-06-15

## 2020-06-15 VITALS
OXYGEN SATURATION: 95 % | BODY MASS INDEX: 35.21 KG/M2 | SYSTOLIC BLOOD PRESSURE: 174 MMHG | DIASTOLIC BLOOD PRESSURE: 79 MMHG | HEART RATE: 59 BPM | WEIGHT: 260 LBS | HEIGHT: 72 IN

## 2020-06-15 DIAGNOSIS — I71.010 ASCENDING AORTIC DISSECTION (HCC): ICD-10-CM

## 2020-06-15 DIAGNOSIS — Z98.890 H/O THORACIC AORTIC ANEURYSM REPAIR: Primary | ICD-10-CM

## 2020-06-15 DIAGNOSIS — Z86.79 H/O THORACIC AORTIC ANEURYSM REPAIR: Primary | ICD-10-CM

## 2020-06-15 PROCEDURE — 99213 OFFICE O/P EST LOW 20 MIN: CPT | Performed by: INTERNAL MEDICINE

## 2020-06-15 NOTE — PROGRESS NOTES
Truman Esquivel  59 y.o. 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  Been very active.  He just finished a 12-hour shift and is coming from there.  He denies any back pain chest pain shortness of air or the like.  Is also been mowing yards.  Allergies   Allergen Reactions   • Chlorhexidine Itching     Topical cleaning wipes causes severe skin irritation   • Eggs Or Egg-Derived Products Swelling   • Erythromycin Other (See Comments)     Joint pains and cold sweats   • Gabapentin Itching   • Other GI Intolerance     Mycins  farzad lotion causes rash   • Acth [Corticotropin] Rash   • Cephalosporins Itching and Rash     Pt developed rash, itching after cefepime administration (2-3 doses) during 2/2017 admission. Itching was relieved with benadryl. Cefepime was continued because his infection was improving and no symptoms of anaphylaxis present   • Co Q 10 [Coenzyme Q10] Rash   • Keflex [Cephalexin] Rash   • Neomycin Rash   • Penicillins Rash     Tolerated cefepime during 2/2017 admission. Had rash and itching (relieved by benadryl) after few doses of cefepime and cefepime was continued.   • Tetracyclines & Related GI Intolerance         Past Medical History:   Diagnosis Date   • Aortic dissection (CMS/HCC)    • Chest pain    • Descending thoracic aortic aneurysm (CMS/HCC) 6/5/2019   • Disease of thyroid gland    • HTN (hypertension)    • Knee pain    • Sleep apnea    • Smoker    • Squamous cell carcinoma of larynx (CMS/HCC) 2/5/2018   • Squamous cell carcinoma of larynx (CMS/HCC) 2/5/2018   • Stroke (CMS/HCC)    • Swallowing difficulty    • TIA (transient ischemic attack) 12/21/2017         Past Surgical History:   Procedure Laterality Date   • AORTA SURGERY      ruptured aorta repair   •  ASCENDING ARCH/HEMIARCH REPLACEMENT N/A 2/14/2017    Procedure: INTRAOPERATIVE TRANSESOPHAGEAL ECHOCARDIOGRAM, MIDLINE STERNOTOMY, ASCENDING AORTIC  AND PROXIMAL AORTIC ARCH REPAIR WITH 26MM GRAFT, AORTIC VALVE RESUSPENSION, AORTIC ROOT REPAIR, OPEN VEIN HARVEST OF RIGHT LEG;  Surgeon: German Arreguin MD;  Location: [facility] MAIN OR;  Service:    • BRONCHOSCOPY N/A 2/17/2017    Procedure: BRONCHOSCOPY BIOPSY AT BEDSIDE WITH BAL-LEFT LOWER LOBE;  Surgeon: Trent Chaney MD;  Location: [facility] ENDOSCOPY;  Service:    • COLONOSCOPY N/A 3/7/2018    Procedure: COLONOSCOPY WITH POSSIBLE POLYPECTOMY   ( DONE IN OR WITH ENDO)      COLONOSCOPY FIRST;  Surgeon: Kris Solano MD;  Location: [facility] OR;  Service:    • DIRECT LARYNGOSCOPY, ESOPHAGOSCOPY, BRONCHOSCOPY N/A 2/5/2018    Procedure: DIRECT LARYNGOSCOPY WITH BIOPSY, ESOPHAGOSCOPY     (no bronchoscopy, no laser);  Surgeon: Joseph Venegas MD;  Location: [facility] OR;  Service:    • ENDOSCOPY W/ PEG TUBE PLACEMENT N/A 5/2/2018    Procedure: ESOPHAGOGASTRODUODENOSCOPY WITH PERCUTANEOUS ENDOSCOPIC GASTROSTOMY TUBE INSERTION;  Surgeon: Angel Maciel MD;  Location: [facility] ENDOSCOPY;  Service: Gastroenterology   • ENDOSCOPY W/ PEG TUBE PLACEMENT N/A 10/26/2018    Procedure: ESOPHAGOGASTRODUODENOSCOPY WITH PERCUTANEOUS ENDOSCOPIC GASTROSTOMY TUBE INSERTION WITH ANESTHESIA Possible open gastrostomy;  Surgeon: Kris Solano MD;  Location: [facility] OR;  Service: General   • GASTROSTOMY FEEDING TUBE INSERTION N/A 10/26/2018    Procedure: GASTROSTOMY FEEDING TUBE INSERTION;  Surgeon: Kris Solano MD;  Location: [facility] OR;  Service: General   • SPLENECTOMY N/A 7/26/2018    Procedure: SPLENECTOMY, left chest tube placement, EXPLORATORY LAPAROTOMY, G-TUBE PALCEMENT;  Surgeon: Kris Solano MD;  Location: [facility] OR;  Service: General   • SPLENECTOMY      2018   • THORACOSCOPY Left 7/31/2018    Procedure: LEFT THORACOSCOPY VIDEO ASSISTED  POSSIBLE THORACOTOMY possible decortication bronchoscopy;  Surgeon: Joshua Pollock MD;  Location: [facility];  Service: Cardiothoracic   • TRACHEOSTOMY  02/05/2018   • TRACHEOSTOMY N/A 2/5/2018    Procedure: TRACHEOSTOMY  local injection @ 1106 incision @ 1119;  Surgeon: Joseph Venegas MD;  Location: [facility] OR;  Service:    • VENOUS ACCESS DEVICE (PORT) INSERTION N/A 3/7/2018    Procedure: INSERTION OF MEDIPORT     (C-ARM#1);  Surgeon: Kris Solano MD;  Location: [facility] OR;  Service:          Family History   Problem Relation Age of Onset   • Thyroid disease Mother    • Hypertension Mother    • Hypertension Father    • Hypertension Other          Social History     Socioeconomic History   • Marital status:      Spouse name: Not on file   • Number of children: Not on file   • Years of education: Not on file   • Highest education level: Not on file   Tobacco Use   • Smoking status: Former Smoker     Packs/day: 1.25     Years: 38.00     Pack years: 47.50     Last attempt to quit: 2/13/2017     Years since quitting: 3.3   • Smokeless tobacco: Never Used   • Tobacco comment: 02/27/2018 - Patient confirmed he ceased utilization of tobacco products 02/13/2017.   Substance and Sexual Activity   • Alcohol use: No   • Drug use: No   • Sexual activity: Defer         Prior to Admission medications    Medication Sig Start Date End Date Taking? Authorizing Provider   amLODIPine (NORVASC) 10 MG tablet Take 1 tablet by mouth Daily. 10/10/19  Yes Kassie Gaxiola MD   atorvastatin (LIPITOR) 20 MG tablet Take 1 tablet by mouth Daily. 4/1/20  Yes Kassie Gaxiola MD   B Complex Vitamins (VITAMIN B COMPLEX) capsule capsule Take 1 capsule by mouth Daily.   Yes Provider, MD Tim   Cholecalciferol (VITAMIN D3) 29937 units capsule TAKE 1 CAPSULE BY MOUTH AS DIRECTED EVERY  MONTH 7/29/19  Yes Kp Oliveira MD   clopidogrel (PLAVIX) 75 MG tablet Take 75 mg by mouth Daily. Last  dose approx greater than one month ago   Yes Tim Tim MD   docusate sodium (COLACE) 100 MG capsule Take 1 capsule by mouth 2 (Two) Times a Day As Needed for Constipation. 2/13/18  Yes Reza Patel MD   DULoxetine (CYMBALTA) 60 MG capsule Take 60 mg by mouth Daily.   Yes Tim Tim MD   ferrous sulfate, as mg of FE, (VISHAL-IN-SOL) 75 (15 Fe) MG/ML drops 25 mL by Per G Tube route Daily. 12/13/18  Yes Reza Patel MD   indomethacin (INDOCIN) 25 MG capsule Take 1 capsule by mouth Daily As Needed. 3/25/19  Yes Tim Tim MD   irbesartan (AVAPRO) 300 MG tablet TAKE 1 TABLET BY MOUTH ONCE DAILY (REPLACING  LOSARTAN) 4/13/20  Yes Kp Oliveira MD   levothyroxine (SYNTHROID, LEVOTHROID) 137 MCG tablet Take 1 tab daily Monday to Saturday and 1.5 tabs on Joao 3/10/20  Yes Kp Oliveira MD   metoprolol succinate XL (TOPROL-XL) 25 MG 24 hr tablet Take 1 tablet by mouth every night at bedtime. 4/20/20  Yes Kassie Gaxiola MD   Multiple Vitamins-Minerals (MULTIVITAMIN ADULT PO) Take 1 tablet by mouth Daily.   Yes Tim Tim MD   mupirocin (BACTROBAN) 2 % ointment Apply  topically 2 (Two) Times a Day. 6/25/18  Yes Joseph Venegas MD   POTASSIUM PO Take  by mouth.   Yes Tim Tim MD   sodium chloride (NS) 0.9 % irrigation USE FOR TRACHEOSTOMY AS DIRECTED 10/12/18  Yes Joseph Venegas MD   spironolactone (ALDACTONE) 25 MG tablet TAKE 1/2 (ONE-HALF) TABLET BY MOUTH ONCE DAILY 11/25/19  Yes Anny Melendez MD   traMADol (ULTRAM) 50 MG tablet Half a tab po every day as needed for pain 6/9/20  Yes Kp Oliveira MD   traZODone (DESYREL) 100 MG tablet Take 100 mg by mouth Every Night.   Yes Tim Tim MD   triamcinolone (KENALOG) 0.1 % cream Apply  topically to the appropriate area as directed 2 (Two) Times a Day. 3/14/19  Yes Joseph Venegas MD   vitamin B-12 (CYANOCOBALAMIN) 100 MCG  "tablet Take 100 mcg by mouth Daily.   Yes Provider, MD Tim         OBJECTIVE    /79   Pulse 59   Ht 182.9 cm (72\")   Wt 118 kg (260 lb)   SpO2 95%   BMI 35.26 kg/m²         Review of Systems     Constitutional:  Denies recent weight loss, weight gain, fever or chills, no change in exercise tolerance     HENT:  Denies any hearing loss, epistaxis, hoarseness, or difficulty speaking.     Eyes: Wears eyeglasses or contact lenses     Respiratory:  Denies dyspnea with exertion,no cough, wheezing, or hemoptysis.     Cardiovascular: Negative for palpations, chest pain, orthopnea, PND, peripheral edema, syncope, or claudication.     Gastrointestinal:  Denies change in bowel habits, dyspepsia, ulcer disease, hematochezia, or melena.     Endocrine: Negative for cold intolerance, heat intolerance, polydipsia, polyphagia and polyuria. Denies any history of weight change, heat/cold intolerance, polydipsia, polyuria     Genitourinary: Negative.      Musculoskeletal: Denies any history of arthritic symptoms or back problems     Skin:  Denies any change in hair or nails, rashes, or skin lesions.     Allergic/Immunologic: Negative.  Negative for environmental allergies, food allergies and immunocompromised state.     Neurological:  Denies any history of recurrent headaches, strokes, TIA, or seizure disorder.     Hematological: Denies any food allergies, seasonal allergies, bleeding disorders, or lymphadenopathy.     Psychiatric/Behavioral: Denies any history of depression, substance abuse, or change in cognitive function.         Physical Exam     Constitutional: Cooperative, alert and oriented, well-developed, well-nourished, in no acute distress.     HENT:   Head: Normocephalic, normal hair patterns, no masses or tenderness.  Ears, Nose, and Throat: No gross abnormalities. No pallor or cyanosis. Dentition good.   Eyes: EOMS intact, PERRL, conjunctivae and lids unremarkable. Fundoscopic exam and visual fields not " performed.   Neck: No palpable masses or adenopathy, no thyromegaly, no JVD, carotid pulses are full and equal bilaterally and without  Bruits.     Cardiovascular: Regular rhythm, S1 and S2 normal, no S3 or S4. Apical impulse not displaced. No murmurs, gallops, or rubs detected.     Pulmonary/Chest: Chest: normal symmetry, no tenderness to palpation, normal respiratory excursion, no intercostal retraction, no use of accessory muscles.            Pulmonary: Normal breath sounds. No rales or ronchi.    Abdominal: Abdomen soft, bowel sounds normoactive, no masses, no hepatosplenomegaly, non-tender, no bruits.     Musculoskeletal: No deformities, clubbing, cyanosis, erythema, or edema observed. There are no spinal abnormalities noted. Normal muscle strength and tone. Pulses full and equal in all extremities, no bruits auscultated.     Neurological: No gross motor or sensory deficits noted, affect appropriate, oriented to time, person, place.     Skin: Warm and dry to the touch, no apparent skin lesions or masses noted.     Psychiatric: He has a normal mood and affect. His behavior is normal. Judgment and thought content normal.         Procedures      Lab Results   Component Value Date    WBC 15.27 (H) 06/01/2020    HGB 14.7 06/01/2020    HCT 43.5 06/01/2020    MCV 85.5 06/01/2020     06/01/2020     Lab Results   Component Value Date    GLUCOSE 107 (H) 06/01/2020    BUN 26 (H) 06/01/2020    CREATININE 1.32 (H) 06/01/2020    EGFRIFNONA 56 (L) 06/01/2020    BCR 19.7 06/01/2020    CO2 23.0 06/01/2020    CALCIUM 9.2 06/01/2020    ALBUMIN 4.50 06/01/2020    AST 19 06/01/2020    ALT 22 06/01/2020     Lab Results   Component Value Date    CHOL 123 06/01/2020     Lab Results   Component Value Date    TRIG 110 06/01/2020     Lab Results   Component Value Date    HDL 41 06/01/2020     No components found for: LDLCALC  Lab Results   Component Value Date    LDL 60 06/01/2020     No results found for: HDLLDLRATIO  No  components found for: CHOLHDL  Lab Results   Component Value Date    HGBA1C 6.00 (H) 06/01/2020     Lab Results   Component Value Date    TSH 3.180 06/01/2020           ASSESSMENT AND PLAN      Truman was seen today for follow-up.    Diagnoses and all orders for this visit:    H/O thoracic aortic aneurysm repair  He is stable at this point.  His blood pressure once he settled down was better.  He is taking his medicines though he may forget to take 1.  We will continue him on his current medication.  He has no chest pain or shortness of air.  Continue to follow his blood pressure.  He has missed his follow-up with Dr. Geiger so we will set up a rebound follow-up after he gets his annual CT angiogram of his chest and abdomen.  -     CT Angiogram Chest With & Without Contrast  -     CT Angio Abdominal Aorta Bilateral Iliofem Runoff; Future    Ascending aortic dissection (CMS/HCC)  -     CT Angiogram Chest With & Without Contrast  -     CT Angio Abdominal Aorta Bilateral Iliofem Runoff; Future    His laboratories look good.    Patient's Body mass index is 35.26 kg/m². BMI is above normal parameters. Recommendations include: referral to primary care.                Kassie Gaxiola MD  6/15/2020  09:37

## 2020-06-16 DIAGNOSIS — C32.9 SQUAMOUS CELL CARCINOMA OF LARYNX (HCC): ICD-10-CM

## 2020-06-16 DIAGNOSIS — Z86.79 H/O THORACIC AORTIC ANEURYSM REPAIR: ICD-10-CM

## 2020-06-16 DIAGNOSIS — I10 ESSENTIAL HYPERTENSION: Primary | ICD-10-CM

## 2020-06-16 DIAGNOSIS — Z98.890 H/O THORACIC AORTIC ANEURYSM REPAIR: ICD-10-CM

## 2020-06-16 RX ORDER — SODIUM CHLORIDE 9 MG/ML
150 INJECTION, SOLUTION INTRAVENOUS CONTINUOUS
Status: CANCELLED | OUTPATIENT
Start: 2020-06-16 | End: 2020-06-16

## 2020-06-25 ENCOUNTER — HOSPITAL ENCOUNTER (OUTPATIENT)
Dept: CT IMAGING | Facility: HOSPITAL | Age: 60
Discharge: HOME | End: 2020-06-25
Admitting: INTERNAL MEDICINE

## 2020-06-25 DIAGNOSIS — I71.010 ASCENDING AORTIC DISSECTION (HCC): ICD-10-CM

## 2020-06-25 DIAGNOSIS — Z98.890 H/O THORACIC AORTIC ANEURYSM REPAIR: ICD-10-CM

## 2020-06-25 DIAGNOSIS — Z86.79 H/O THORACIC AORTIC ANEURYSM REPAIR: ICD-10-CM

## 2020-06-25 PROCEDURE — 75635 CT ANGIO ABDOMINAL ARTERIES: CPT

## 2020-06-25 PROCEDURE — 0 IOPAMIDOL PER 1 ML: Performed by: INTERNAL MEDICINE

## 2020-06-25 PROCEDURE — 71275 CT ANGIOGRAPHY CHEST: CPT

## 2020-06-25 RX ADMIN — IOPAMIDOL 120 ML: 755 INJECTION, SOLUTION INTRAVENOUS at 13:56

## 2020-06-26 ENCOUNTER — TELEPHONE (OUTPATIENT)
Dept: CARDIOLOGY | Facility: CLINIC | Age: 60
End: 2020-06-26

## 2020-06-26 NOTE — TELEPHONE ENCOUNTER
Called and let patient know results.   He voiced understanding     ----- Message from Kassie Gaxiola MD sent at 6/26/2020  9:48 AM CDT -----  Regarding: cta   Let him know that I sent Dr. Arreguin  a message to look at his CTA.  Let him know that the radiologist told me it looks similar.  Dr. Hylton's office should call him for a phone follow-up.  If he do not hear from him by Wednesday tell him to let us know.  ----- Message -----  From: [place], [facility] [employer] In  Sent: 6/25/2020   3:13 PM CDT  To: Kassie Gaxiola MD

## 2020-06-29 RX ORDER — SPIRONOLACTONE 25 MG/1
TABLET ORAL
Qty: 15 TABLET | Refills: 0 | Status: SHIPPED | OUTPATIENT
Start: 2020-06-29 | End: 2020-07-27

## 2020-06-30 ENCOUNTER — TELEMEDICINE (OUTPATIENT)
Dept: CARDIAC SURGERY | Facility: CLINIC | Age: 60
End: 2020-06-30

## 2020-06-30 ENCOUNTER — OFFICE VISIT (OUTPATIENT)
Dept: ENDOCRINOLOGY | Facility: CLINIC | Age: 60
End: 2020-06-30

## 2020-06-30 VITALS
DIASTOLIC BLOOD PRESSURE: 82 MMHG | WEIGHT: 255.3 LBS | OXYGEN SATURATION: 98 % | SYSTOLIC BLOOD PRESSURE: 130 MMHG | HEART RATE: 52 BPM | HEIGHT: 72 IN | BODY MASS INDEX: 34.58 KG/M2

## 2020-06-30 VITALS — BODY MASS INDEX: 33.18 KG/M2 | WEIGHT: 245 LBS | HEIGHT: 72 IN

## 2020-06-30 DIAGNOSIS — E53.8 B12 DEFICIENCY: ICD-10-CM

## 2020-06-30 DIAGNOSIS — C32.9 SQUAMOUS CELL CARCINOMA OF LARYNX (HCC): ICD-10-CM

## 2020-06-30 DIAGNOSIS — I10 ESSENTIAL HYPERTENSION: Primary | ICD-10-CM

## 2020-06-30 DIAGNOSIS — D64.9 ANEMIA, UNSPECIFIED TYPE: ICD-10-CM

## 2020-06-30 DIAGNOSIS — E03.9 ACQUIRED HYPOTHYROIDISM: Primary | ICD-10-CM

## 2020-06-30 DIAGNOSIS — N18.30 CHRONIC KIDNEY DISEASE (CKD), STAGE III (MODERATE) (HCC): ICD-10-CM

## 2020-06-30 DIAGNOSIS — E61.1 LOW IRON: ICD-10-CM

## 2020-06-30 DIAGNOSIS — Z93.0 TRACHEOSTOMY STATUS (HCC): ICD-10-CM

## 2020-06-30 DIAGNOSIS — R73.01 IMPAIRED FASTING GLUCOSE: ICD-10-CM

## 2020-06-30 DIAGNOSIS — E55.9 VITAMIN D DEFICIENCY: ICD-10-CM

## 2020-06-30 DIAGNOSIS — R01.1 MURMUR: ICD-10-CM

## 2020-06-30 PROCEDURE — 99441 PR PHYS/QHP TELEPHONE EVALUATION 5-10 MIN: CPT | Performed by: THORACIC SURGERY (CARDIOTHORACIC VASCULAR SURGERY)

## 2020-06-30 PROCEDURE — 99214 OFFICE O/P EST MOD 30 MIN: CPT | Performed by: INTERNAL MEDICINE

## 2020-06-30 NOTE — PROGRESS NOTES
Subjective    Truman Esquivel is a 59 y.o. male. he is here today for follow-up.    Hypothyroidism   Pertinent negatives include no abdominal pain, arthralgias, chest pain, coughing, diaphoresis, fatigue, headaches, joint swelling, myalgias, nausea, neck pain, numbness, rash, sore throat, vomiting or weakness.   Thyroid Problem   Patient reports no cold intolerance, constipation, diaphoresis, diarrhea, fatigue, heat intolerance, palpitations or tremors.          Primary Care Provider     Marybeth Harrison DO      Hypothyroidism      Duration around 2017  after having aortic dissection      Timing - Hypothyroidism  is Constant     Quality -  near goal       Severity -  moderate     Complications - none     Current symptoms/problems  had weight loss due cancer now gaining weight      Alleviating Factors: Compliance      Aggravating Factors none      Side Effects  none       Current medications:  Current Outpatient Medications   Medication Sig Dispense Refill   • amLODIPine (NORVASC) 10 MG tablet Take 1 tablet by mouth Daily. 30 tablet 11   • atorvastatin (LIPITOR) 20 MG tablet Take 1 tablet by mouth Daily. 90 tablet 3   • B Complex Vitamins (VITAMIN B COMPLEX) capsule capsule Take 1 capsule by mouth Daily.     • Cholecalciferol (VITAMIN D3) 42004 units capsule TAKE 1 CAPSULE BY MOUTH AS DIRECTED EVERY  MONTH 1 capsule 11   • clopidogrel (PLAVIX) 75 MG tablet Take 75 mg by mouth Daily. Last dose approx greater than one month ago     • DULoxetine (CYMBALTA) 60 MG capsule Take 60 mg by mouth Daily.     • indomethacin (INDOCIN) 25 MG capsule Take 1 capsule by mouth Daily As Needed.  0   • irbesartan (AVAPRO) 300 MG tablet TAKE 1 TABLET BY MOUTH ONCE DAILY (REPLACING  LOSARTAN) 30 tablet 5   • levothyroxine (SYNTHROID, LEVOTHROID) 137 MCG tablet Take 1 tab daily Monday to Saturday and 1.5 tabs on Sunday 32 tablet 11   • metoprolol succinate XL (TOPROL-XL) 25 MG 24 hr tablet Take 1 tablet by mouth every night at bedtime.  30 tablet 12   • Multiple Vitamins-Minerals (MULTIVITAMIN ADULT PO) Take 1 tablet by mouth Daily.     • mupirocin (BACTROBAN) 2 % ointment Apply  topically 2 (Two) Times a Day. 30 g 2   • POTASSIUM PO Take  by mouth.     • spironolactone (ALDACTONE) 25 MG tablet Take 1/2 (one-half) tablet by mouth once daily 15 tablet 0   • traMADol (ULTRAM) 50 MG tablet Half a tab po every day as needed for pain 30 tablet 5   • traZODone (DESYREL) 100 MG tablet Take 100 mg by mouth Every Night.     • triamcinolone (KENALOG) 0.1 % cream Apply  topically to the appropriate area as directed 2 (Two) Times a Day. 45 g 2   • vitamin B-12 (CYANOCOBALAMIN) 100 MCG tablet Take 100 mcg by mouth Daily.       No current facility-administered medications for this visit.        The following portions of the patient's history were reviewed and updated as appropriate:   Past Medical History:   Diagnosis Date   • Aortic dissection (CMS/HCC)    • Chest pain    • Descending thoracic aortic aneurysm (CMS/HCC) 6/5/2019   • Disease of thyroid gland    • HTN (hypertension)    • Knee pain    • Sleep apnea    • Smoker    • Squamous cell carcinoma of larynx (CMS/HCC) 2/5/2018   • Squamous cell carcinoma of larynx (CMS/HCC) 2/5/2018   • Stroke (CMS/HCC)    • Swallowing difficulty    • TIA (transient ischemic attack) 12/21/2017     Past Surgical History:   Procedure Laterality Date   • AORTA SURGERY      ruptured aorta repair   • ASCENDING ARCH/HEMIARCH REPLACEMENT N/A 2/14/2017    Procedure: INTRAOPERATIVE TRANSESOPHAGEAL ECHOCARDIOGRAM, MIDLINE STERNOTOMY, ASCENDING AORTIC  AND PROXIMAL AORTIC ARCH REPAIR WITH 26MM GRAFT, AORTIC VALVE RESUSPENSION, AORTIC ROOT REPAIR, OPEN VEIN HARVEST OF RIGHT LEG;  Surgeon: German Arreguin MD;  Location: [facility] OR;  Service:    • BRONCHOSCOPY N/A 2/17/2017    Procedure: BRONCHOSCOPY BIOPSY AT BEDSIDE WITH BAL-LEFT LOWER LOBE;  Surgeon: Trent Chaney MD;  Location: [facility] ENDOSCOPY;  Service:    • COLONOSCOPY N/A  3/7/2018    Procedure: COLONOSCOPY WITH POSSIBLE POLYPECTOMY   ( DONE IN OR WITH ENDO)      COLONOSCOPY FIRST;  Surgeon: Kris Solano MD;  Location: [facility] OR;  Service:    • DIRECT LARYNGOSCOPY, ESOPHAGOSCOPY, BRONCHOSCOPY N/A 2/5/2018    Procedure: DIRECT LARYNGOSCOPY WITH BIOPSY, ESOPHAGOSCOPY     (no bronchoscopy, no laser);  Surgeon: Joseph Venegas MD;  Location: [facility] OR;  Service:    • ENDOSCOPY W/ PEG TUBE PLACEMENT N/A 5/2/2018    Procedure: ESOPHAGOGASTRODUODENOSCOPY WITH PERCUTANEOUS ENDOSCOPIC GASTROSTOMY TUBE INSERTION;  Surgeon: Angel Maciel MD;  Location: [facility] ENDOSCOPY;  Service: Gastroenterology   • ENDOSCOPY W/ PEG TUBE PLACEMENT N/A 10/26/2018    Procedure: ESOPHAGOGASTRODUODENOSCOPY WITH PERCUTANEOUS ENDOSCOPIC GASTROSTOMY TUBE INSERTION WITH ANESTHESIA Possible open gastrostomy;  Surgeon: Kris Solano MD;  Location: [facility] OR;  Service: General   • GASTROSTOMY FEEDING TUBE INSERTION N/A 10/26/2018    Procedure: GASTROSTOMY FEEDING TUBE INSERTION;  Surgeon: Kris Solano MD;  Location: [facility] OR;  Service: General   • SPLENECTOMY N/A 7/26/2018    Procedure: SPLENECTOMY, left chest tube placement, EXPLORATORY LAPAROTOMY, G-TUBE PALCEMENT;  Surgeon: Kris Solano MD;  Location: [facility] OR;  Service: General   • SPLENECTOMY      2018   • THORACOSCOPY Left 7/31/2018    Procedure: LEFT THORACOSCOPY VIDEO ASSISTED POSSIBLE THORACOTOMY possible decortication bronchoscopy;  Surgeon: Joshua Pollock MD;  Location: [facility] OR;  Service: Cardiothoracic   • TRACHEOSTOMY  02/05/2018   • TRACHEOSTOMY N/A 2/5/2018    Procedure: TRACHEOSTOMY  local injection @ 1106 incision @ 1119;  Surgeon: Joseph Venegas MD;  Location: [facility] OR;  Service:    • VENOUS ACCESS DEVICE (PORT) INSERTION N/A 3/7/2018    Procedure: INSERTION OF MEDIPORT     (C-ARM#1);  Surgeon: Kris Solano MD;  Location: [facility] OR;  Service:      Family History    Problem Relation Age of Onset   • Thyroid disease Mother    • Hypertension Mother    • Hypertension Father    • Hypertension Other        Allergies   Allergen Reactions   • Chlorhexidine Itching     Topical cleaning wipes causes severe skin irritation   • Eggs Or Egg-Derived Products Swelling   • Erythromycin Other (See Comments)     Joint pains and cold sweats   • Gabapentin Itching   • Other GI Intolerance     Mycins  farzad lotion causes rash   • Acth [Corticotropin] Rash   • Cephalosporins Itching and Rash     Pt developed rash, itching after cefepime administration (2-3 doses) during 2/2017 admission. Itching was relieved with benadryl. Cefepime was continued because his infection was improving and no symptoms of anaphylaxis present   • Co Q 10 [Coenzyme Q10] Rash   • Keflex [Cephalexin] Rash   • Neomycin Rash   • Penicillins Rash     Tolerated cefepime during 2/2017 admission. Had rash and itching (relieved by benadryl) after few doses of cefepime and cefepime was continued.   • Tetracyclines & Related GI Intolerance     Social History     Socioeconomic History   • Marital status:      Spouse name: Not on file   • Number of children: Not on file   • Years of education: Not on file   • Highest education level: Not on file   Tobacco Use   • Smoking status: Former Smoker     Packs/day: 1.25     Years: 38.00     Pack years: 47.50     Last attempt to quit: 2/13/2017     Years since quitting: 3.3   • Smokeless tobacco: Never Used   • Tobacco comment: 02/27/2018 - Patient confirmed he ceased utilization of tobacco products 02/13/2017.   Substance and Sexual Activity   • Alcohol use: No   • Drug use: No   • Sexual activity: Defer       Review of Systems  Review of Systems   Constitutional: Negative for activity change, appetite change, diaphoresis and fatigue.   HENT: Negative for facial swelling, sneezing, sore throat, tinnitus, trouble swallowing and voice change.    Eyes: Negative for photophobia, pain,  "discharge, redness, itching and visual disturbance.   Respiratory: Negative for apnea, cough, choking, chest tightness and shortness of breath.    Cardiovascular: Negative for chest pain, palpitations and leg swelling.   Gastrointestinal: Negative for abdominal distention, abdominal pain, constipation, diarrhea, nausea and vomiting.   Endocrine: Negative for cold intolerance, heat intolerance, polydipsia, polyphagia and polyuria.   Genitourinary: Negative for difficulty urinating, dysuria, frequency, hematuria and urgency.   Musculoskeletal: Negative for arthralgias, back pain, gait problem, joint swelling, myalgias, neck pain and neck stiffness.   Skin: Negative for color change, pallor, rash and wound.   Neurological: Negative for dizziness, tremors, weakness, light-headedness, numbness and headaches.   Hematological: Negative for adenopathy. Bruises/bleeds easily.   Psychiatric/Behavioral: Negative for behavioral problems, confusion and sleep disturbance.        Objective    /82 (BP Location: Right arm, Patient Position: Sitting, Cuff Size: Large Adult)   Pulse 52   Ht 182.9 cm (72\")   Wt 116 kg (255 lb 4.8 oz)   SpO2 98%   BMI 34.62 kg/m²   Physical Exam   Constitutional: He is oriented to person, place, and time. He appears well-developed and well-nourished. No distress.   HENT:   Head: Normocephalic and atraumatic.   Right Ear: External ear normal.   Left Ear: External ear normal.   Nose: Nose normal.   Has tracheostomy   Eyes: Pupils are equal, round, and reactive to light. Conjunctivae and EOM are normal.   Neck: Normal range of motion. Neck supple. No tracheal deviation present. No thyromegaly present.   Cardiovascular: Normal rate, regular rhythm and normal heart sounds.   No murmur heard.  Pulmonary/Chest: Effort normal and breath sounds normal. No respiratory distress. He has no wheezes.   Abdominal: Soft. Bowel sounds are normal. There is no tenderness. There is no rebound and no guarding. "   Musculoskeletal: Normal range of motion. He exhibits no edema, tenderness or deformity.   Neurological: He is alert and oriented to person, place, and time. No cranial nerve deficit.   Skin: Skin is warm and dry. No rash noted.   Psychiatric: He has a normal mood and affect. His behavior is normal. Judgment and thought content normal.       Lab Review  Lab Results   Component Value Date    TSH 3.180 06/01/2020     Lab Results   Component Value Date    FREET4 0.68 (L) 02/18/2017        Assessment/Plan      No diagnosis found.. This diagnosis was discussed and reviewed with the patient including the advantages of drug therapy.     1. Orders placed during this encounter include:  No orders of the defined types were placed in this encounter.      Medications prescribed:  Outpatient Encounter Medications as of 6/30/2020   Medication Sig Dispense Refill   • amLODIPine (NORVASC) 10 MG tablet Take 1 tablet by mouth Daily. 30 tablet 11   • atorvastatin (LIPITOR) 20 MG tablet Take 1 tablet by mouth Daily. 90 tablet 3   • B Complex Vitamins (VITAMIN B COMPLEX) capsule capsule Take 1 capsule by mouth Daily.     • Cholecalciferol (VITAMIN D3) 15817 units capsule TAKE 1 CAPSULE BY MOUTH AS DIRECTED EVERY  MONTH 1 capsule 11   • clopidogrel (PLAVIX) 75 MG tablet Take 75 mg by mouth Daily. Last dose approx greater than one month ago     • DULoxetine (CYMBALTA) 60 MG capsule Take 60 mg by mouth Daily.     • indomethacin (INDOCIN) 25 MG capsule Take 1 capsule by mouth Daily As Needed.  0   • irbesartan (AVAPRO) 300 MG tablet TAKE 1 TABLET BY MOUTH ONCE DAILY (REPLACING  LOSARTAN) 30 tablet 5   • levothyroxine (SYNTHROID, LEVOTHROID) 137 MCG tablet Take 1 tab daily Monday to Saturday and 1.5 tabs on Sunday 32 tablet 11   • metoprolol succinate XL (TOPROL-XL) 25 MG 24 hr tablet Take 1 tablet by mouth every night at bedtime. 30 tablet 12   • Multiple Vitamins-Minerals (MULTIVITAMIN ADULT PO) Take 1 tablet by mouth Daily.     •  mupirocin (BACTROBAN) 2 % ointment Apply  topically 2 (Two) Times a Day. 30 g 2   • POTASSIUM PO Take  by mouth.     • spironolactone (ALDACTONE) 25 MG tablet Take 1/2 (one-half) tablet by mouth once daily 15 tablet 0   • traMADol (ULTRAM) 50 MG tablet Half a tab po every day as needed for pain 30 tablet 5   • traZODone (DESYREL) 100 MG tablet Take 100 mg by mouth Every Night.     • triamcinolone (KENALOG) 0.1 % cream Apply  topically to the appropriate area as directed 2 (Two) Times a Day. 45 g 2   • vitamin B-12 (CYANOCOBALAMIN) 100 MCG tablet Take 100 mcg by mouth Daily.     • [DISCONTINUED] docusate sodium (COLACE) 100 MG capsule Take 1 capsule by mouth 2 (Two) Times a Day As Needed for Constipation. 60 capsule 2   • [DISCONTINUED] ferrous sulfate, as mg of FE, (IVSHAL-IN-SOL) 75 (15 Fe) MG/ML drops 25 mL by Per G Tube route Daily. 150 mL 2   • [DISCONTINUED] sodium chloride (NS) 0.9 % irrigation USE FOR TRACHEOSTOMY AS DIRECTED 1000 mL 8     No facility-administered encounter medications on file as of 6/30/2020.         Patient has hypothyroidism after having aortic dissection        TSH       Lab Results   Component Value Date    TSH 3.180 06/01/2020    TSH 6.240 (H) 07/18/2019    TSH 6.470 (H) 06/07/2019       Lab Results   Component Value Date    FREET4 0.68 (L) 02/18/2017      synthroid     was taking it with iron but now on empty stomach    Brand name 112 mcgs daily -- increase to 125 mcgs daily -137 ( change to 7.5 ) - working        Lab Results   Component Value Date    CTPK03WH 41.8 06/01/2020    LAGN77RX 50.2 07/18/2019    TMGZ82NZ 54.5 02/04/2019       Lab Results   Component Value Date    TBPPXVUL04 835 06/01/2020     Normalized on 50 th u monthly  ---    Anemia of low iron     Now improved on liquid iron - now otc ferrous sulfate 325 , 3 x weekly     Take apart from thyroid pill     Lab Results   Component Value Date    WBC 15.27 (H) 06/01/2020    HGB 14.7 06/01/2020    HCT 43.5 06/01/2020    MCV 85.5  06/01/2020     06/01/2020         --    On avapro, amlodipine, metoprolol and     aldactone 25 mg - now only half     Used to be on hctz that I stopped since Acute on CKD    --    CKD stage III    Acute decline - mild improvement   Taking indomethacin , decreased to 3 x weekly, didn't like idea of tramadol since he wants anti inflammatory       IFG  Lab Results   Component Value Date    HGBA1C 6.00 (H) 06/01/2020       Monitor glucose --    4. No follow-ups on file.

## 2020-07-05 NOTE — PROGRESS NOTES
7/5/2020    Service on 6/30/20    Chief Complaint:     Yearly follow-up of aortic dissection    History of Present Illness:       Dear Dr. Gaxiola and Colleagues,  It was nice to talk (telemedicine)  Truman Albert Esquivel in follow up evaluation for his aortic dissection. He is a 59 y.o. male with a history of acute type a aortic dissection who I operated on February 2017.  He did very well from a surgical standpoint he continues to do well. He denies aortic related symptoms in the interval. His last chest CT showed typical changes post surgery on the aorta.  There is no evidence of aneurysms but dissection that showed any enlargement.  There is flow in both lumens and there are no false aneurysms.  In the interval, he had a laryngeal cancer resection with a permanent tracheostomy.  He seems to be doing well overall.    Patient Active Problem List   Diagnosis   • Essential hypertension   • Knee pain   • Obstructive sleep apnea of adult   • Squamous cell carcinoma of larynx (CMS/HCC)   • Anemia   • Dehydration   • Thrombocytopenia (CMS/HCC)   • Thrombocytosis (CMS/HCC)   • Tracheostomy status (CMS/HCC)       Past Medical History:   Diagnosis Date   • Aortic dissection (CMS/HCC)    • Chest pain    • Descending thoracic aortic aneurysm (CMS/HCC) 6/5/2019   • Disease of thyroid gland    • HTN (hypertension)    • Knee pain    • Sleep apnea    • Smoker    • Squamous cell carcinoma of larynx (CMS/HCC) 2/5/2018   • Squamous cell carcinoma of larynx (CMS/HCC) 2/5/2018   • Stroke (CMS/HCC)    • Swallowing difficulty    • TIA (transient ischemic attack) 12/21/2017       Past Surgical History:   Procedure Laterality Date   • AORTA SURGERY      ruptured aorta repair   • ASCENDING ARCH/HEMIARCH REPLACEMENT N/A 2/14/2017    Procedure: INTRAOPERATIVE TRANSESOPHAGEAL ECHOCARDIOGRAM, MIDLINE STERNOTOMY, ASCENDING AORTIC  AND PROXIMAL AORTIC ARCH REPAIR WITH 26MM GRAFT, AORTIC VALVE RESUSPENSION, AORTIC ROOT REPAIR, OPEN VEIN HARVEST OF  RIGHT LEG;  Surgeon: German Arreguin MD;  Location: [facility] MAIN OR;  Service:    • BRONCHOSCOPY N/A 2/17/2017    Procedure: BRONCHOSCOPY BIOPSY AT BEDSIDE WITH BAL-LEFT LOWER LOBE;  Surgeon: Trent Chaney MD;  Location: [facility] ENDOSCOPY;  Service:    • COLONOSCOPY N/A 3/7/2018    Procedure: COLONOSCOPY WITH POSSIBLE POLYPECTOMY   ( DONE IN OR WITH ENDO)      COLONOSCOPY FIRST;  Surgeon: Kris Solano MD;  Location: [facility] OR;  Service:    • DIRECT LARYNGOSCOPY, ESOPHAGOSCOPY, BRONCHOSCOPY N/A 2/5/2018    Procedure: DIRECT LARYNGOSCOPY WITH BIOPSY, ESOPHAGOSCOPY     (no bronchoscopy, no laser);  Surgeon: Joseph Venegas MD;  Location: [facility] OR;  Service:    • ENDOSCOPY W/ PEG TUBE PLACEMENT N/A 5/2/2018    Procedure: ESOPHAGOGASTRODUODENOSCOPY WITH PERCUTANEOUS ENDOSCOPIC GASTROSTOMY TUBE INSERTION;  Surgeon: Angel Maciel MD;  Location: [facility] ENDOSCOPY;  Service: Gastroenterology   • ENDOSCOPY W/ PEG TUBE PLACEMENT N/A 10/26/2018    Procedure: ESOPHAGOGASTRODUODENOSCOPY WITH PERCUTANEOUS ENDOSCOPIC GASTROSTOMY TUBE INSERTION WITH ANESTHESIA Possible open gastrostomy;  Surgeon: Kris Solano MD;  Location: [facility] OR;  Service: General   • GASTROSTOMY FEEDING TUBE INSERTION N/A 10/26/2018    Procedure: GASTROSTOMY FEEDING TUBE INSERTION;  Surgeon: Kris Solano MD;  Location: [facility] OR;  Service: General   • SPLENECTOMY N/A 7/26/2018    Procedure: SPLENECTOMY, left chest tube placement, EXPLORATORY LAPAROTOMY, G-TUBE PALCEMENT;  Surgeon: Kris Solano MD;  Location: [facility] OR;  Service: General   • SPLENECTOMY      2018   • THORACOSCOPY Left 7/31/2018    Procedure: LEFT THORACOSCOPY VIDEO ASSISTED POSSIBLE THORACOTOMY possible decortication bronchoscopy;  Surgeon: Joshua Pollock MD;  Location: [facility] OR;  Service: Cardiothoracic   • TRACHEOSTOMY  02/05/2018   • TRACHEOSTOMY N/A 2/5/2018    Procedure: TRACHEOSTOMY  local injection @ 1106 incision @ 1119;   Surgeon: Joseph Venegsa MD;  Location: [facility] OR;  Service:    • VENOUS ACCESS DEVICE (PORT) INSERTION N/A 3/7/2018    Procedure: INSERTION OF MEDIPORT     (C-ARM#1);  Surgeon: Kris Solano MD;  Location: [facility];  Service:        Allergies   Allergen Reactions   • Chlorhexidine Itching     Topical cleaning wipes causes severe skin irritation   • Eggs Or Egg-Derived Products Swelling   • Erythromycin Other (See Comments)     Joint pains and cold sweats   • Gabapentin Itching   • Other GI Intolerance     Mycins  farzad lotion causes rash   • Acth [Corticotropin] Rash   • Cephalosporins Itching and Rash     Pt developed rash, itching after cefepime administration (2-3 doses) during 2/2017 admission. Itching was relieved with benadryl. Cefepime was continued because his infection was improving and no symptoms of anaphylaxis present   • Co Q 10 [Coenzyme Q10] Rash   • Keflex [Cephalexin] Rash   • Neomycin Rash   • Penicillins Rash     Tolerated cefepime during 2/2017 admission. Had rash and itching (relieved by benadryl) after few doses of cefepime and cefepime was continued.   • Tetracyclines & Related GI Intolerance         Current Outpatient Medications:   •  amLODIPine (NORVASC) 10 MG tablet, Take 1 tablet by mouth Daily., Disp: 30 tablet, Rfl: 11  •  atorvastatin (LIPITOR) 20 MG tablet, Take 1 tablet by mouth Daily., Disp: 90 tablet, Rfl: 3  •  B Complex Vitamins (VITAMIN B COMPLEX) capsule capsule, Take 1 capsule by mouth Daily., Disp: , Rfl:   •  Cholecalciferol (VITAMIN D3) 64948 units capsule, TAKE 1 CAPSULE BY MOUTH AS DIRECTED EVERY  MONTH, Disp: 1 capsule, Rfl: 11  •  clopidogrel (PLAVIX) 75 MG tablet, Take 75 mg by mouth Daily. Last dose approx greater than one month ago, Disp: , Rfl:   •  DULoxetine (CYMBALTA) 60 MG capsule, Take 60 mg by mouth Daily., Disp: , Rfl:   •  indomethacin (INDOCIN) 25 MG capsule, Take 1 capsule by mouth Daily As Needed., Disp: , Rfl: 0  •  irbesartan  (AVAPRO) 300 MG tablet, TAKE 1 TABLET BY MOUTH ONCE DAILY (REPLACING  LOSARTAN), Disp: 30 tablet, Rfl: 5  •  levothyroxine (SYNTHROID, LEVOTHROID) 137 MCG tablet, Take 1 tab daily Monday to Saturday and 1.5 tabs on Sunday, Disp: 32 tablet, Rfl: 11  •  metoprolol succinate XL (TOPROL-XL) 25 MG 24 hr tablet, Take 1 tablet by mouth every night at bedtime., Disp: 30 tablet, Rfl: 12  •  Multiple Vitamins-Minerals (MULTIVITAMIN ADULT PO), Take 1 tablet by mouth Daily., Disp: , Rfl:   •  mupirocin (BACTROBAN) 2 % ointment, Apply  topically 2 (Two) Times a Day., Disp: 30 g, Rfl: 2  •  POTASSIUM PO, Take  by mouth., Disp: , Rfl:   •  spironolactone (ALDACTONE) 25 MG tablet, Take 1/2 (one-half) tablet by mouth once daily, Disp: 15 tablet, Rfl: 0  •  traMADol (ULTRAM) 50 MG tablet, Half a tab po every day as needed for pain, Disp: 30 tablet, Rfl: 5  •  traZODone (DESYREL) 100 MG tablet, Take 100 mg by mouth Every Night., Disp: , Rfl:   •  triamcinolone (KENALOG) 0.1 % cream, Apply  topically to the appropriate area as directed 2 (Two) Times a Day., Disp: 45 g, Rfl: 2  •  vitamin B-12 (CYANOCOBALAMIN) 100 MCG tablet, Take 100 mcg by mouth Daily., Disp: , Rfl:     Social History     Socioeconomic History   • Marital status:      Spouse name: Not on file   • Number of children: Not on file   • Years of education: Not on file   • Highest education level: Not on file   Tobacco Use   • Smoking status: Former Smoker     Packs/day: 1.25     Years: 38.00     Pack years: 47.50     Last attempt to quit: 2/13/2017     Years since quitting: 3.3   • Smokeless tobacco: Never Used   • Tobacco comment: 02/27/2018 - Patient confirmed he ceased utilization of tobacco products 02/13/2017.   Substance and Sexual Activity   • Alcohol use: No   • Drug use: No   • Sexual activity: Defer       Family History   Problem Relation Age of Onset   • Thyroid disease Mother    • Hypertension Mother    • Hypertension Father    • Hypertension Other       Review of Systems   Constitutional: Positive for fatigue.   HENT: Positive for sore throat.    Respiratory: Positive for shortness of breath.    Cardiovascular: Negative for chest pain, palpitations and leg swelling.   Neurological: Negative for dizziness and weakness.   All other systems reviewed and are negative.      Physical Exam:    Not performed because this was a telephonic visit.  He states having no issues with infections, wounds or masses.    Vital Signs:  Weight: 111 kg (245 lb)   Body mass index is 33.23 kg/m².                Physical Exam     Assessment:     Problem List Items Addressed This Visit     None          1. Status post type a acute dissection repair with ascending aorta and severino-arch graft interposition.  2. Karyn post tracheostomy after initial tumor resection  3. Chronic thoracoabdominal aortic dissection without evidence of malperfusion    Recommendation/Plan:     1. Overall he seems to be doing well.  His blood pressure is slightly elevated and he may need some management of the DP DT with a systolic blood pressure below 130 if possible.  Will consult you in that regards  2. He will need a CT of the chest abdomen and pelvis in 1 year and also a 2D echocardiogram.  We will see him in our thoracic aortic surgical clinic in 1 year    Thank you for allowing me to participate in his care.    Regards,    German Arreguin M.D.

## 2020-07-14 ENCOUNTER — OFFICE VISIT (OUTPATIENT)
Dept: OTOLARYNGOLOGY | Facility: CLINIC | Age: 60
End: 2020-07-14

## 2020-07-14 VITALS — BODY MASS INDEX: 34.73 KG/M2 | WEIGHT: 256.4 LBS | HEIGHT: 72 IN | OXYGEN SATURATION: 97 % | TEMPERATURE: 98.7 F

## 2020-07-14 DIAGNOSIS — J37.0 CHRONIC LARYNGITIS: ICD-10-CM

## 2020-07-14 DIAGNOSIS — Z85.21 ENCOUNTER FOR FOLLOW-UP SURVEILLANCE OF LARYNGEAL CANCER: Primary | ICD-10-CM

## 2020-07-14 DIAGNOSIS — Z93.0 TRACHEOSTOMY STATUS (HCC): ICD-10-CM

## 2020-07-14 DIAGNOSIS — Z08 ENCOUNTER FOR FOLLOW-UP SURVEILLANCE OF LARYNGEAL CANCER: Primary | ICD-10-CM

## 2020-07-14 PROCEDURE — 31575 DIAGNOSTIC LARYNGOSCOPY: CPT | Performed by: OTOLARYNGOLOGY

## 2020-07-14 PROCEDURE — 99213 OFFICE O/P EST LOW 20 MIN: CPT | Performed by: OTOLARYNGOLOGY

## 2020-07-17 NOTE — PROGRESS NOTES
Subjective   Truman Esquivel is a 59 y.o. male.       History of Present Illness   Patient is here for cancer surveillance.  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  Was treated with concurrent chemoradiation with a complete response but has remained tracheostomy dependent due to posttreatment changes and persistent supraglottic edema.  Reports no problems since he was last seen.  Breathing, swallowing, and voice are all at baseline.  No hemoptysis.      The following portions of the patient's history were reviewed and updated as appropriate: allergies, current medications, past family history, past medical history, past social history, past surgical history and problem list.      Review of Systems   Constitutional: Negative for fever.           Objective   Physical Exam  General: Well-developed well-nourished male in no acute distress.  Alert and oriented x3. Voice: Harsh with no breathiness or stridor.  Modest air leak around his Passy-Nelly valve. Speech:Fluent  Ears: External ears no deformity, canals no discharge, tympanic membranes intact clear and mobile bilaterally.  Nose: Nares show no discharge mass polyp or purulence.  Boggy mucosa is present.  No gross external deformity.  Septum: Midline  Oral cavity: Lips and gums without lesions.  Tongue and floor of mouth without lesions.  Parotid and submandibular ducts unobstructed.  No mucosal lesions on the buccal mucosa or vestibule of the mouth.  Pharynx: No erythema exudate mass or ulcer  Neck: No lymphadenopathy.  No thyromegaly.  Tracheostomy in place without evidence of infection or granulation tissue.  Postradiation changes are noted no masses in the parotid or submandibular glands.  Procedure Note    Pre-operative Diagnosis:   Chief Complaint   Patient presents with   • Follow-up   Cancer surveillance    Post-operative Diagnosis: No evidence of recurrent disease; chronic laryngitis    Anesthesia: topical with  xylocaine and neosynephrine    Endoscopy Type:  Flexible Laryngoscopy    Procedure Details:    The patient was placed in the sitting position.  After topical anesthesia and decongestion, the 4 mm laryngoscope was passed.  The nasal cavities, nasopharynx, oropharynx, hypopharynx, and larynx were all examined.  Vocal cords were examined during respiration and phonation.  The following findings were noted:    Findings: No masses in the nose or nasopharynx.  Tongue base shows posttreatment changes with no evidence of residual mass.  The residual epiglottis is tilted posteriorly over the glottis but there is no evidence of mucosal lesion.  Edema of the arytenoids and aryepiglottic folds is present.  Vocal cord mobility appears intact and there is no evidence of endolaryngeal neoplasm.    Condition:  Stable.  Patient tolerated procedure well.    Complications:  None    Assessment/Plan   Truman was seen today for follow-up.    Diagnoses and all orders for this visit:    Encounter for follow-up surveillance of laryngeal cancer    Chronic laryngitis    Tracheostomy status (CMS/HCC)      Plan: Reassured the patient I see no evidence of recurrent disease however he continues to have significant obstruction of the supraglottic airway such that I do not think decannulation is an option at this point.  He is to maintain tobacco abstinence and return in 6 months for surveillance, call sooner for problems.

## 2020-07-27 RX ORDER — SPIRONOLACTONE 25 MG/1
TABLET ORAL
Qty: 15 TABLET | Refills: 0 | Status: SHIPPED | OUTPATIENT
Start: 2020-07-27 | End: 2020-09-02

## 2020-08-02 DIAGNOSIS — E55.9 VITAMIN D DEFICIENCY: ICD-10-CM

## 2020-08-02 DIAGNOSIS — E03.9 HYPOTHYROIDISM, UNSPECIFIED TYPE: ICD-10-CM

## 2020-08-02 DIAGNOSIS — E03.8 HYPOTHYROIDISM DUE TO HASHIMOTO'S THYROIDITIS: ICD-10-CM

## 2020-08-02 DIAGNOSIS — E06.3 HYPOTHYROIDISM DUE TO HASHIMOTO'S THYROIDITIS: ICD-10-CM

## 2020-08-02 DIAGNOSIS — E53.8 B12 DEFICIENCY: ICD-10-CM

## 2020-08-03 RX ORDER — CHOLECALCIFEROL (VITAMIN D3) 1250 MCG
CAPSULE ORAL
Qty: 1 CAPSULE | Refills: 0 | Status: SHIPPED | OUTPATIENT
Start: 2020-08-03 | End: 2020-09-09

## 2020-09-02 RX ORDER — SPIRONOLACTONE 25 MG/1
TABLET ORAL
Qty: 15 TABLET | Refills: 0 | Status: SHIPPED | OUTPATIENT
Start: 2020-09-02 | End: 2020-09-28

## 2020-09-07 DIAGNOSIS — E03.8 HYPOTHYROIDISM DUE TO HASHIMOTO'S THYROIDITIS: ICD-10-CM

## 2020-09-07 DIAGNOSIS — E53.8 B12 DEFICIENCY: ICD-10-CM

## 2020-09-07 DIAGNOSIS — E06.3 HYPOTHYROIDISM DUE TO HASHIMOTO'S THYROIDITIS: ICD-10-CM

## 2020-09-07 DIAGNOSIS — E55.9 VITAMIN D DEFICIENCY: ICD-10-CM

## 2020-09-07 DIAGNOSIS — E03.9 HYPOTHYROIDISM, UNSPECIFIED TYPE: ICD-10-CM

## 2020-09-09 RX ORDER — CHOLECALCIFEROL (VITAMIN D3) 1250 MCG
CAPSULE ORAL
Qty: 1 CAPSULE | Refills: 0 | Status: SHIPPED | OUTPATIENT
Start: 2020-09-09 | End: 2020-09-29

## 2020-09-28 DIAGNOSIS — E55.9 VITAMIN D DEFICIENCY: ICD-10-CM

## 2020-09-28 DIAGNOSIS — E06.3 HYPOTHYROIDISM DUE TO HASHIMOTO'S THYROIDITIS: ICD-10-CM

## 2020-09-28 DIAGNOSIS — E53.8 B12 DEFICIENCY: ICD-10-CM

## 2020-09-28 DIAGNOSIS — E03.8 HYPOTHYROIDISM DUE TO HASHIMOTO'S THYROIDITIS: ICD-10-CM

## 2020-09-28 DIAGNOSIS — E03.9 HYPOTHYROIDISM, UNSPECIFIED TYPE: ICD-10-CM

## 2020-09-28 RX ORDER — SPIRONOLACTONE 25 MG/1
TABLET ORAL
Qty: 15 TABLET | Refills: 0 | Status: SHIPPED | OUTPATIENT
Start: 2020-09-28 | End: 2020-10-26

## 2020-09-29 RX ORDER — CHOLECALCIFEROL (VITAMIN D3) 1250 MCG
CAPSULE ORAL
Qty: 1 CAPSULE | Refills: 0 | Status: SHIPPED | OUTPATIENT
Start: 2020-09-29 | End: 2020-10-30 | Stop reason: SDUPTHER

## 2020-10-12 RX ORDER — IRBESARTAN 300 MG/1
TABLET ORAL
Qty: 30 TABLET | Refills: 0 | Status: SHIPPED | OUTPATIENT
Start: 2020-10-12 | End: 2020-11-09

## 2020-10-18 DIAGNOSIS — I10 ESSENTIAL HYPERTENSION: Primary | ICD-10-CM

## 2020-10-21 RX ORDER — AMLODIPINE BESYLATE 10 MG/1
TABLET ORAL
Qty: 90 TABLET | Refills: 11 | Status: SHIPPED | OUTPATIENT
Start: 2020-10-21 | End: 2022-01-17

## 2020-10-23 ENCOUNTER — LAB REQUISITION (OUTPATIENT)
Dept: LAB | Facility: HOSPITAL | Age: 60
End: 2020-10-23

## 2020-10-23 DIAGNOSIS — E61.1 IRON DEFICIENCY: ICD-10-CM

## 2020-10-23 DIAGNOSIS — E53.8 DEFICIENCY OF OTHER SPECIFIED B GROUP VITAMINS: ICD-10-CM

## 2020-10-23 DIAGNOSIS — R73.01 IMPAIRED FASTING GLUCOSE: ICD-10-CM

## 2020-10-23 DIAGNOSIS — N18.30 CHRONIC KIDNEY DISEASE, STAGE 3 UNSPECIFIED (HCC): ICD-10-CM

## 2020-10-23 DIAGNOSIS — E55.9 VITAMIN D DEFICIENCY, UNSPECIFIED: ICD-10-CM

## 2020-10-23 DIAGNOSIS — E03.9 HYPOTHYROIDISM, UNSPECIFIED: ICD-10-CM

## 2020-10-23 PROCEDURE — 36415 COLL VENOUS BLD VENIPUNCTURE: CPT | Performed by: INTERNAL MEDICINE

## 2020-10-26 RX ORDER — SPIRONOLACTONE 25 MG/1
TABLET ORAL
Qty: 15 TABLET | Refills: 3 | Status: SHIPPED | OUTPATIENT
Start: 2020-10-26 | End: 2021-03-03

## 2020-10-30 ENCOUNTER — OFFICE VISIT (OUTPATIENT)
Dept: ENDOCRINOLOGY | Facility: CLINIC | Age: 60
End: 2020-10-30

## 2020-10-30 VITALS
DIASTOLIC BLOOD PRESSURE: 80 MMHG | SYSTOLIC BLOOD PRESSURE: 131 MMHG | OXYGEN SATURATION: 93 % | BODY MASS INDEX: 35.07 KG/M2 | HEART RATE: 65 BPM | HEIGHT: 72 IN | WEIGHT: 258.9 LBS

## 2020-10-30 DIAGNOSIS — E55.9 VITAMIN D DEFICIENCY: Primary | ICD-10-CM

## 2020-10-30 DIAGNOSIS — N18.31 STAGE 3A CHRONIC KIDNEY DISEASE (HCC): ICD-10-CM

## 2020-10-30 DIAGNOSIS — E53.8 B12 DEFICIENCY: ICD-10-CM

## 2020-10-30 DIAGNOSIS — R73.01 IMPAIRED FASTING GLUCOSE: ICD-10-CM

## 2020-10-30 DIAGNOSIS — E06.3 HYPOTHYROIDISM DUE TO HASHIMOTO'S THYROIDITIS: ICD-10-CM

## 2020-10-30 DIAGNOSIS — E61.1 LOW IRON: ICD-10-CM

## 2020-10-30 DIAGNOSIS — E03.8 HYPOTHYROIDISM DUE TO HASHIMOTO'S THYROIDITIS: ICD-10-CM

## 2020-10-30 PROCEDURE — 99214 OFFICE O/P EST MOD 30 MIN: CPT | Performed by: INTERNAL MEDICINE

## 2020-10-30 RX ORDER — CHOLECALCIFEROL (VITAMIN D3) 1250 MCG
CAPSULE ORAL
Qty: 3 CAPSULE | Refills: 3 | Status: SHIPPED | OUTPATIENT
Start: 2020-10-30 | End: 2021-04-30 | Stop reason: SDUPTHER

## 2020-11-05 ENCOUNTER — TELEPHONE (OUTPATIENT)
Dept: ENDOCRINOLOGY | Facility: CLINIC | Age: 60
End: 2020-11-05

## 2020-11-09 RX ORDER — IRBESARTAN 300 MG/1
TABLET ORAL
Qty: 30 TABLET | Refills: 0 | Status: SHIPPED | OUTPATIENT
Start: 2020-11-09 | End: 2020-12-15

## 2020-11-12 ENCOUNTER — TELEPHONE (OUTPATIENT)
Dept: ENDOCRINOLOGY | Facility: CLINIC | Age: 60
End: 2020-11-12

## 2020-11-12 NOTE — TELEPHONE ENCOUNTER
Elva,     Please let MR. Esquivel know that I am very sorry for the delay in his labs.     A. Thyroid is normal    B. He had anemia, not anymore. I believe he takes iron, please continue to take apart from thyroid     C. His renal function is slightly low but stable when compared to June . There is no protein in the urine. It is stable    D. Nl Vit D , nl Cholesterol    E. He has prediabetes but better than last time.     If he could please repeat labs 1 to 2 weeks before next appt . It guarantees we get them.

## 2020-11-30 ENCOUNTER — LAB (OUTPATIENT)
Dept: ONCOLOGY | Facility: HOSPITAL | Age: 60
End: 2020-11-30

## 2020-11-30 DIAGNOSIS — C32.9 SQUAMOUS CELL CARCINOMA OF LARYNX (HCC): ICD-10-CM

## 2020-11-30 LAB
ALBUMIN SERPL-MCNC: 4.4 G/DL (ref 3.5–5.2)
ALBUMIN/GLOB SERPL: 1.6 G/DL
ALP SERPL-CCNC: 70 U/L (ref 39–117)
ALT SERPL W P-5'-P-CCNC: 21 U/L (ref 1–41)
ANION GAP SERPL CALCULATED.3IONS-SCNC: 11 MMOL/L (ref 5–15)
AST SERPL-CCNC: 18 U/L (ref 1–40)
BASOPHILS # BLD AUTO: 0.1 10*3/MM3 (ref 0–0.2)
BASOPHILS NFR BLD AUTO: 0.9 % (ref 0–1.5)
BILIRUB SERPL-MCNC: 0.8 MG/DL (ref 0–1.2)
BUN SERPL-MCNC: 22 MG/DL (ref 8–23)
BUN/CREAT SERPL: 16.1 (ref 7–25)
CALCIUM SPEC-SCNC: 9.6 MG/DL (ref 8.6–10.5)
CHLORIDE SERPL-SCNC: 105 MMOL/L (ref 98–107)
CO2 SERPL-SCNC: 24 MMOL/L (ref 22–29)
CREAT SERPL-MCNC: 1.37 MG/DL (ref 0.76–1.27)
DEPRECATED RDW RBC AUTO: 47.2 FL (ref 37–54)
EOSINOPHIL # BLD AUTO: 0.61 10*3/MM3 (ref 0–0.4)
EOSINOPHIL NFR BLD AUTO: 5.7 % (ref 0.3–6.2)
ERYTHROCYTE [DISTWIDTH] IN BLOOD BY AUTOMATED COUNT: 14.8 % (ref 12.3–15.4)
FERRITIN SERPL-MCNC: 96.24 NG/ML (ref 30–400)
FOLATE SERPL-MCNC: >20 NG/ML (ref 4.78–24.2)
GFR SERPL CREATININE-BSD FRML MDRD: 53 ML/MIN/1.73
GLOBULIN UR ELPH-MCNC: 2.7 GM/DL
GLUCOSE SERPL-MCNC: 102 MG/DL (ref 65–99)
HCT VFR BLD AUTO: 45.5 % (ref 37.5–51)
HGB BLD-MCNC: 14.9 G/DL (ref 13–17.7)
IMM GRANULOCYTES # BLD AUTO: 0.04 10*3/MM3 (ref 0–0.05)
IMM GRANULOCYTES NFR BLD AUTO: 0.4 % (ref 0–0.5)
IRON 24H UR-MRATE: 153 MCG/DL (ref 59–158)
IRON SATN MFR SERPL: 38 % (ref 20–50)
LYMPHOCYTES # BLD AUTO: 1.58 10*3/MM3 (ref 0.7–3.1)
LYMPHOCYTES NFR BLD AUTO: 14.8 % (ref 19.6–45.3)
MCH RBC QN AUTO: 28.8 PG (ref 26.6–33)
MCHC RBC AUTO-ENTMCNC: 32.7 G/DL (ref 31.5–35.7)
MCV RBC AUTO: 88 FL (ref 79–97)
MONOCYTES # BLD AUTO: 1.11 10*3/MM3 (ref 0.1–0.9)
MONOCYTES NFR BLD AUTO: 10.4 % (ref 5–12)
NEUTROPHILS NFR BLD AUTO: 67.8 % (ref 42.7–76)
NEUTROPHILS NFR BLD AUTO: 7.2 10*3/MM3 (ref 1.7–7)
NRBC BLD AUTO-RTO: 0 /100 WBC (ref 0–0.2)
PLATELET # BLD AUTO: 354 10*3/MM3 (ref 140–450)
PMV BLD AUTO: 9.9 FL (ref 6–12)
POTASSIUM SERPL-SCNC: 4.2 MMOL/L (ref 3.5–5.2)
PROT SERPL-MCNC: 7.1 G/DL (ref 6–8.5)
RBC # BLD AUTO: 5.17 10*6/MM3 (ref 4.14–5.8)
SODIUM SERPL-SCNC: 140 MMOL/L (ref 136–145)
TIBC SERPL-MCNC: 405 MCG/DL (ref 298–536)
TRANSFERRIN SERPL-MCNC: 272 MG/DL (ref 200–360)
VIT B12 BLD-MCNC: 696 PG/ML (ref 211–946)
WBC # BLD AUTO: 10.64 10*3/MM3 (ref 3.4–10.8)

## 2020-11-30 PROCEDURE — 82607 VITAMIN B-12: CPT

## 2020-11-30 PROCEDURE — 83540 ASSAY OF IRON: CPT

## 2020-11-30 PROCEDURE — 84466 ASSAY OF TRANSFERRIN: CPT

## 2020-11-30 PROCEDURE — 82746 ASSAY OF FOLIC ACID SERUM: CPT

## 2020-11-30 PROCEDURE — 80053 COMPREHEN METABOLIC PANEL: CPT

## 2020-11-30 PROCEDURE — 85025 COMPLETE CBC W/AUTO DIFF WBC: CPT

## 2020-11-30 PROCEDURE — 36415 COLL VENOUS BLD VENIPUNCTURE: CPT | Performed by: INTERNAL MEDICINE

## 2020-11-30 PROCEDURE — 82728 ASSAY OF FERRITIN: CPT

## 2020-12-03 ENCOUNTER — OFFICE VISIT (OUTPATIENT)
Dept: ONCOLOGY | Facility: CLINIC | Age: 60
End: 2020-12-03

## 2020-12-03 VITALS
BODY MASS INDEX: 35.51 KG/M2 | HEART RATE: 63 BPM | TEMPERATURE: 98.5 F | DIASTOLIC BLOOD PRESSURE: 84 MMHG | HEIGHT: 72 IN | SYSTOLIC BLOOD PRESSURE: 160 MMHG | RESPIRATION RATE: 20 BRPM | WEIGHT: 262.2 LBS

## 2020-12-03 DIAGNOSIS — C32.9 SQUAMOUS CELL CARCINOMA OF LARYNX (HCC): ICD-10-CM

## 2020-12-03 PROCEDURE — G0463 HOSPITAL OUTPT CLINIC VISIT: HCPCS | Performed by: NURSE PRACTITIONER

## 2020-12-03 PROCEDURE — 99213 OFFICE O/P EST LOW 20 MIN: CPT | Performed by: NURSE PRACTITIONER

## 2020-12-07 NOTE — PROGRESS NOTES
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  Upon staging workup patient was found to have stage III squamous cell cancer of larynx for which she has been started on concurrent chemoradiation with weekly carboplatin and Taxol on March 19, 2018.  Last dose of chemotherapy and radiation was on May 15, 2018.  Patient is here for follow-up appointment today. States he feels well.  Denies any fever.  States he feels better.  Denies any active bleeding.  Denies any new lymph node enlargement. Was seen by Dr. Venegas in July with laryngoscope examination that was unremarkable for disease recurrence      PAST MEDICAL HISTORY:    Past Medical History:   Diagnosis Date   • Aortic dissection (CMS/HCC)    • Chest pain    • Descending thoracic aortic aneurysm (CMS/HCC) 6/5/2019   • Disease of thyroid gland    • HTN (hypertension)    • Knee pain    • Sleep apnea    • Smoker    • Squamous cell carcinoma of larynx (CMS/HCC) 2/5/2018   • Squamous cell carcinoma of larynx (CMS/HCC) 2/5/2018   • Stroke (CMS/HCC)    • Swallowing difficulty    • TIA (transient ischemic attack) 12/21/2017       SOCIAL HISTORY:    Social History     Tobacco Use   • Smoking status: Former Smoker     Packs/day: 1.25     Years: 38.00     Pack years: 47.50     Quit date: 2/13/2017     Years since quitting: 3.8   • Smokeless tobacco: Never Used   • Tobacco comment: 02/27/2018 - Patient confirmed he ceased utilization of tobacco products 02/13/2017.   Substance Use Topics   • Alcohol use: No   • Drug use: No       Surgical History  :  Past Surgical History:   Procedure Laterality Date   • AORTA SURGERY      ruptured aorta repair   • ASCENDING ARCH/HEMIARCH REPLACEMENT N/A 2/14/2017    Procedure: INTRAOPERATIVE TRANSESOPHAGEAL ECHOCARDIOGRAM, MIDLINE STERNOTOMY, ASCENDING AORTIC  AND PROXIMAL AORTIC ARCH REPAIR WITH 26MM GRAFT, AORTIC VALVE RESUSPENSION, AORTIC ROOT REPAIR, OPEN VEIN HARVEST OF RIGHT LEG;  Surgeon: German Arreguin MD;  Location: [facility] MAIN OR;  Service:    • BRONCHOSCOPY N/A 2/17/2017    Procedure: BRONCHOSCOPY BIOPSY AT BEDSIDE WITH BAL-LEFT LOWER LOBE;  Surgeon: Trent Chaney MD;  Location: [facility] ENDOSCOPY;  Service:    • COLONOSCOPY N/A 3/7/2018    Procedure: COLONOSCOPY WITH POSSIBLE POLYPECTOMY   ( DONE IN OR WITH ENDO)      COLONOSCOPY FIRST;  Surgeon: Kris Solano MD;  Location: [facility] OR;  Service:    • DIRECT LARYNGOSCOPY, ESOPHAGOSCOPY, BRONCHOSCOPY N/A 2/5/2018    Procedure: DIRECT LARYNGOSCOPY WITH BIOPSY, ESOPHAGOSCOPY     (no bronchoscopy, no laser);  Surgeon: Joseph Venegas MD;  Location: [facility] OR;  Service:    • ENDOSCOPY W/ PEG TUBE PLACEMENT N/A 5/2/2018    Procedure: ESOPHAGOGASTRODUODENOSCOPY WITH PERCUTANEOUS ENDOSCOPIC GASTROSTOMY TUBE INSERTION;  Surgeon: Angel Maciel MD;  Location: [facility] ENDOSCOPY;  Service: Gastroenterology   • ENDOSCOPY W/ PEG TUBE PLACEMENT N/A 10/26/2018    Procedure: ESOPHAGOGASTRODUODENOSCOPY WITH PERCUTANEOUS ENDOSCOPIC GASTROSTOMY TUBE INSERTION WITH ANESTHESIA Possible open gastrostomy;  Surgeon: Kris Solano MD;  Location: [facility] OR;  Service: General   • GASTROSTOMY FEEDING TUBE INSERTION N/A 10/26/2018    Procedure: GASTROSTOMY FEEDING TUBE INSERTION;  Surgeon: Kris Solano MD;  Location: [facility] OR;  Service: General   • SPLENECTOMY N/A 7/26/2018    Procedure: SPLENECTOMY, left chest tube placement, EXPLORATORY LAPAROTOMY, G-TUBE PALCEMENT;  Surgeon: Kris Solano MD;  Location: [facility] OR;  Service: General   •  SPLENECTOMY      2018   • THORACOSCOPY Left 7/31/2018    Procedure: LEFT THORACOSCOPY VIDEO ASSISTED POSSIBLE THORACOTOMY possible decortication bronchoscopy;  Surgeon: Joshua Pollock MD;  Location: [facility];  Service: Cardiothoracic   • TRACHEOSTOMY  02/05/2018   • TRACHEOSTOMY N/A 2/5/2018    Procedure: TRACHEOSTOMY  local injection @ 1106 incision @ 1119;  Surgeon: Joseph Venegas MD;  Location: [facility];  Service:    • VENOUS ACCESS DEVICE (PORT) INSERTION N/A 3/7/2018    Procedure: INSERTION OF MEDIPORT     (C-ARM#1);  Surgeon: Kris Solano MD;  Location: [facility];  Service:        ALLERGIES:    Allergies   Allergen Reactions   • Chlorhexidine Itching     Topical cleaning wipes causes severe skin irritation   • Eggs Or Egg-Derived Products Swelling   • Erythromycin Other (See Comments)     Joint pains and cold sweats   • Gabapentin Itching   • Other GI Intolerance     Mycins  farzad lotion causes rash   • Acth [Corticotropin] Rash   • Cephalosporins Itching and Rash     Pt developed rash, itching after cefepime administration (2-3 doses) during 2/2017 admission. Itching was relieved with benadryl. Cefepime was continued because his infection was improving and no symptoms of anaphylaxis present   • Co Q 10 [Coenzyme Q10] Rash   • Keflex [Cephalexin] Rash   • Neomycin Rash   • Penicillins Rash     Tolerated cefepime during 2/2017 admission. Had rash and itching (relieved by benadryl) after few doses of cefepime and cefepime was continued.   • Tetracyclines & Related GI Intolerance       REVIEW OF SYSTEMS:      CONSTITUTIONAL:  No fever, chills, or night sweats.     HEENT:  No epistaxis, mouth sores, or difficulty swallowing. Tracheostomy present    RESPIRATORY:  No new shortness of breath or cough at present.    CARDIOVASCULAR:  No chest pain or palpitations.    GASTROINTESTINAL:  No abdominal pain, nausea, vomiting, or blood in the stool.    GENITOURINARY:  No dysuria or  "hematuria.    MUSCULOSKELETAL:  No any new back pain or arthralgias.     NEUROLOGICAL:  No tingling or numbness. No new headache or dizziness.     LYMPHATICS:  Denies any abnormal swollen and anywhere in the body.    SKIN:  Denies any new skin rash.    PHYSICAL EXAMINATION:      VITAL SIGNS:  /84   Pulse 63   Temp 98.5 °F (36.9 °C) (Temporal)   Resp 20   Ht 182.9 cm (72.01\")   Wt 119 kg (262 lb 3.2 oz)   BMI 35.55 kg/m²     GENERAL:  Not in any distress.    HEENT:  Normocephalic, Atraumatic.Mild Conjunctival pallor. No icterus. Extraocular Movements Intact. No Facial Asymmetry noted.    NECK:  No adenopathy. No JVD.    RESPIRATORY:  Fair air entry bilateral. No rhonchi or wheezing.    CARDIOVASCULAR:  S1, S2. Regular rate and rhythm. No murmur or gallop appreciated.    ABDOMEN:  Soft, obese, nontender. Bowel sounds present in all four quadrants.  No organomegaly appreciated.    EXTREMITIES:  No edema.No Calf Tenderness.    NEUROLOGIC:  Alert, awake and oriented ×3.  No  Motor or sensory deficit appreciated. Cranial Nerves 2-12 grossly intact.    SKIN : No new skin lesion identified  DIAGNOSTIC DATA:    Glucose   Date Value Ref Range Status   11/30/2020 102 (H) 65 - 99 mg/dL Final     Glucose, Arterial   Date Value Ref Range Status   07/31/2018 113 (H) 65 - 95 mmol/L Final     Sodium   Date Value Ref Range Status   11/30/2020 140 136 - 145 mmol/L Final     Potassium   Date Value Ref Range Status   11/30/2020 4.2 3.5 - 5.2 mmol/L Final     CO2   Date Value Ref Range Status   11/30/2020 24.0 22.0 - 29.0 mmol/L Final     Chloride   Date Value Ref Range Status   11/30/2020 105 98 - 107 mmol/L Final     Anion Gap   Date Value Ref Range Status   11/30/2020 11.0 5.0 - 15.0 mmol/L Final     Creatinine   Date Value Ref Range Status   11/30/2020 1.37 (H) 0.76 - 1.27 mg/dL Final     BUN   Date Value Ref Range Status   11/30/2020 22 8 - 23 mg/dL Final     BUN/Creatinine Ratio   Date Value Ref Range Status "   11/30/2020 16.1 7.0 - 25.0 Final     Calcium   Date Value Ref Range Status   11/30/2020 9.6 8.6 - 10.5 mg/dL Final     eGFR Non  Amer   Date Value Ref Range Status   11/30/2020 53 (L) >60 mL/min/1.73 Final     Alkaline Phosphatase   Date Value Ref Range Status   11/30/2020 70 39 - 117 U/L Final     Total Protein   Date Value Ref Range Status   11/30/2020 7.1 6.0 - 8.5 g/dL Final     ALT (SGPT)   Date Value Ref Range Status   11/30/2020 21 1 - 41 U/L Final     AST (SGOT)   Date Value Ref Range Status   11/30/2020 18 1 - 40 U/L Final     Total Bilirubin   Date Value Ref Range Status   11/30/2020 0.8 0.0 - 1.2 mg/dL Final     Albumin   Date Value Ref Range Status   11/30/2020 4.40 3.50 - 5.20 g/dL Final     Globulin   Date Value Ref Range Status   11/30/2020 2.7 gm/dL Final     Lab Results   Component Value Date    WBC 10.64 11/30/2020    HGB 14.9 11/30/2020    HCT 45.5 11/30/2020    MCV 88.0 11/30/2020     11/30/2020     Lab Results   Component Value Date    NEUTROABS 7.20 (H) 11/30/2020    IRON 153 11/30/2020    TIBC 405 11/30/2020    LABIRON 38 11/30/2020    FERRITIN 96.24 11/30/2020    [id number] 11/30/2020    FOLATE >20.00 11/30/2020     No results found for: , LABCA2, AFPTM, HCGQUANT, , CHROMGRNA, [id number], CEA, REFLABREPO]        ASSESSMENT AND PLAN:    1.Squamous cell cancer of larynx, epiglottis, stage III, T3 N0.  Based on PET/CT there is a tumor extension into preepiglottic space making a T3 lesion.  Result of PET CT were discussed with patient.  It was discussed with patient with T3 N0 lesion after his treatment option includes either surgery or concurrent chemoradiation.    -He was started on concurrent chemoradiation on March 19, 2018.  Patient received 6 weekly carboplatin and Taxol from March 19, 2018 until May 15, 2018.  Radiation was also completed on May 15, 2018.  -ENT exam by Dr. Venegas in July 2020 showed good response to chemoradiation without any evidence of any  active tumor. Next appt is January 2021.  -We will ask patient to return to clinic in 6 months with a repeat CBC,bmp and anemia workup to be done on that day.     2.   anemia:   -Hgb today is   14.9 with adequate iron stores.Patient was started on liquid iron in October 2018.  .        3.  Thrombocytosis:  -Secondary to splenectomy status.  -Platelet count today is 354,000.  Will monitor with CBC for now     4.  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  White blood cell count is 10,000.  We will monitored with CBC for now     5.  Health maintenance: Patient quit smoking in February 2017.  Never had a screening colonoscopy done.       6.ACP- pt has living will on chart.       This document has been signed by ROWAN Watkins on December 7, 2020 08:48 CST

## 2020-12-10 RX ORDER — TRAMADOL HYDROCHLORIDE 50 MG/1
TABLET ORAL
Qty: 15 TABLET | Refills: 0 | Status: SHIPPED | OUTPATIENT
Start: 2020-12-10 | End: 2021-01-20

## 2020-12-14 ENCOUNTER — OFFICE VISIT (OUTPATIENT)
Dept: CARDIOLOGY | Facility: CLINIC | Age: 60
End: 2020-12-14

## 2020-12-14 VITALS
OXYGEN SATURATION: 97 % | HEART RATE: 61 BPM | BODY MASS INDEX: 35.97 KG/M2 | HEIGHT: 72 IN | DIASTOLIC BLOOD PRESSURE: 72 MMHG | WEIGHT: 265.6 LBS | SYSTOLIC BLOOD PRESSURE: 154 MMHG

## 2020-12-14 DIAGNOSIS — I10 ESSENTIAL HYPERTENSION: Primary | ICD-10-CM

## 2020-12-14 DIAGNOSIS — Z86.79 H/O AORTIC DISSECTION: ICD-10-CM

## 2020-12-14 LAB
QT INTERVAL: 426 MS
QTC INTERVAL: 418 MS

## 2020-12-14 PROCEDURE — 99214 OFFICE O/P EST MOD 30 MIN: CPT | Performed by: INTERNAL MEDICINE

## 2020-12-14 PROCEDURE — 93000 ELECTROCARDIOGRAM COMPLETE: CPT | Performed by: INTERNAL MEDICINE

## 2020-12-14 RX ORDER — TADALAFIL 5 MG/1
5 TABLET ORAL DAILY
COMMUNITY
Start: 2020-12-09 | End: 2020-12-14 | Stop reason: ALTCHOICE

## 2020-12-14 RX ORDER — ATORVASTATIN CALCIUM 20 MG/1
20 TABLET, FILM COATED ORAL DAILY
COMMUNITY
End: 2021-06-14

## 2020-12-14 RX ORDER — CLOPIDOGREL BISULFATE 75 MG/1
75 TABLET ORAL EVERY OTHER DAY
COMMUNITY

## 2020-12-14 NOTE — PROGRESS NOTES
Truman Esquivel  60 y.o. male    12/14/2020  Chief Complaint   Patient presents with   • Hypertension     Chief Complaint   History of aortic dissection with the descending thoracic aortic aneurysm   2 TIA    History of Present Illness  Patient has a known aortic dissection he underwent in 2017-S/p Acute Type A aortic dissection s/p ascending aortic and proximal aortic arch repair with 26mm graft, aortic valve re-suspension and aortic root repair. -        SUBJECTIVE   He has been doing well since then.  This being surveilled via annual CTs.  He continues to see Dr. Arreguin  He denies any chest pain.  He is actually just worked a 12-hour shift getting off early on this morning.  He is breathing well.  All in all he feels good.      Allergies   Allergen Reactions   • Chlorhexidine Itching     Topical cleaning wipes causes severe skin irritation   • Eggs Or Egg-Derived Products Swelling   • Erythromycin Other (See Comments)     Joint pains and cold sweats   • Gabapentin Itching   • Other GI Intolerance     Mycins  farzad lotion causes rash   • Acth [Corticotropin] Rash   • Cephalosporins Itching and Rash     Pt developed rash, itching after cefepime administration (2-3 doses) during 2/2017 admission. Itching was relieved with benadryl. Cefepime was continued because his infection was improving and no symptoms of anaphylaxis present   • Co Q 10 [Coenzyme Q10] Rash   • Keflex [Cephalexin] Rash   • Neomycin Rash   • Penicillins Rash     Tolerated cefepime during 2/2017 admission. Had rash and itching (relieved by benadryl) after few doses of cefepime and cefepime was continued.   • Tetracyclines & Related GI Intolerance         Past Medical History:   Diagnosis Date   • Aortic dissection (CMS/HCC)    • Chest pain    • Descending thoracic aortic aneurysm (CMS/HCC) 6/5/2019   • Disease of thyroid gland    • HTN (hypertension)    • Knee pain    • Sleep apnea    • Smoker    • Squamous cell carcinoma of larynx (CMS/HCC)  2/5/2018   • Squamous cell carcinoma of larynx (CMS/HCC) 2/5/2018   • Stroke (CMS/HCC)    • Swallowing difficulty    • TIA (transient ischemic attack) 12/21/2017         Past Surgical History:   Procedure Laterality Date   • AORTA SURGERY      ruptured aorta repair   • ASCENDING ARCH/HEMIARCH REPLACEMENT N/A 2/14/2017    Procedure: INTRAOPERATIVE TRANSESOPHAGEAL ECHOCARDIOGRAM, MIDLINE STERNOTOMY, ASCENDING AORTIC  AND PROXIMAL AORTIC ARCH REPAIR WITH 26MM GRAFT, AORTIC VALVE RESUSPENSION, AORTIC ROOT REPAIR, OPEN VEIN HARVEST OF RIGHT LEG;  Surgeon: German Arreguin MD;  Location: [facility] MAIN OR;  Service:    • BRONCHOSCOPY N/A 2/17/2017    Procedure: BRONCHOSCOPY BIOPSY AT BEDSIDE WITH BAL-LEFT LOWER LOBE;  Surgeon: Trent Chaney MD;  Location: [facility] ENDOSCOPY;  Service:    • COLONOSCOPY N/A 3/7/2018    Procedure: COLONOSCOPY WITH POSSIBLE POLYPECTOMY   ( DONE IN OR WITH ENDO)      COLONOSCOPY FIRST;  Surgeon: Kris Solano MD;  Location: [facility] OR;  Service:    • DIRECT LARYNGOSCOPY, ESOPHAGOSCOPY, BRONCHOSCOPY N/A 2/5/2018    Procedure: DIRECT LARYNGOSCOPY WITH BIOPSY, ESOPHAGOSCOPY     (no bronchoscopy, no laser);  Surgeon: Joseph Venegas MD;  Location: [facility] OR;  Service:    • ENDOSCOPY W/ PEG TUBE PLACEMENT N/A 5/2/2018    Procedure: ESOPHAGOGASTRODUODENOSCOPY WITH PERCUTANEOUS ENDOSCOPIC GASTROSTOMY TUBE INSERTION;  Surgeon: Angel Maciel MD;  Location: [facility] ENDOSCOPY;  Service: Gastroenterology   • ENDOSCOPY W/ PEG TUBE PLACEMENT N/A 10/26/2018    Procedure: ESOPHAGOGASTRODUODENOSCOPY WITH PERCUTANEOUS ENDOSCOPIC GASTROSTOMY TUBE INSERTION WITH ANESTHESIA Possible open gastrostomy;  Surgeon: Kris Solano MD;  Location: [facility] OR;  Service: General   • GASTROSTOMY FEEDING TUBE INSERTION N/A 10/26/2018    Procedure: GASTROSTOMY FEEDING TUBE INSERTION;  Surgeon: Kris Solano MD;  Location: [facility] OR;  Service: General   • SPLENECTOMY N/A 7/26/2018     Procedure: SPLENECTOMY, left chest tube placement, EXPLORATORY LAPAROTOMY, G-TUBE PALCEMENT;  Surgeon: Kris Solano MD;  Location: [facility];  Service: General   • SPLENECTOMY      2018   • THORACOSCOPY Left 7/31/2018    Procedure: LEFT THORACOSCOPY VIDEO ASSISTED POSSIBLE THORACOTOMY possible decortication bronchoscopy;  Surgeon: Joshua Pollock MD;  Location: [facility];  Service: Cardiothoracic   • TRACHEOSTOMY  02/05/2018   • TRACHEOSTOMY N/A 2/5/2018    Procedure: TRACHEOSTOMY  local injection @ 1106 incision @ 1119;  Surgeon: Joseph Venegas MD;  Location: [facility];  Service:    • VENOUS ACCESS DEVICE (PORT) INSERTION N/A 3/7/2018    Procedure: INSERTION OF MEDIPORT     (C-ARM#1);  Surgeon: Kris Solano MD;  Location: [facility];  Service:          Family History   Problem Relation Age of Onset   • Thyroid disease Mother    • Hypertension Mother    • Hypertension Father    • Hypertension Other          Social History     Socioeconomic History   • Marital status:      Spouse name: Not on file   • Number of children: Not on file   • Years of education: Not on file   • Highest education level: Not on file   Tobacco Use   • Smoking status: Former Smoker     Packs/day: 1.25     Years: 38.00     Pack years: 47.50     Quit date: 2/13/2017     Years since quitting: 3.8   • Smokeless tobacco: Never Used   • Tobacco comment: 02/27/2018 - Patient confirmed he ceased utilization of tobacco products 02/13/2017.   Substance and Sexual Activity   • Alcohol use: No   • Drug use: No   • Sexual activity: Defer         Prior to Admission medications    Medication Sig Start Date End Date Taking? Authorizing Provider   amLODIPine (NORVASC) 10 MG tablet Take 1 tablet by mouth once daily 10/21/20  Yes Kassie Gaxiola MD   atorvastatin (LIPITOR) 20 MG tablet Take 20 mg by mouth Daily.   Yes Provider, MD Tim   B Complex Vitamins (VITAMIN B COMPLEX) capsule capsule Take 1  capsule by mouth Daily.   Yes Tim Tim MD   Cholecalciferol (Vitamin D3) 1.25 MG (33629 UT) capsule One capsule monthly 10/30/20  Yes Kp Oliveira MD   clopidogrel (PLAVIX) 75 MG tablet Take 75 mg by mouth Daily.   Yes Tim Tim MD   DULoxetine (CYMBALTA) 60 MG capsule Take 60 mg by mouth Daily.   Yes Tim Tim MD   indomethacin (INDOCIN) 25 MG capsule Take 1 capsule by mouth Daily As Needed. 3/25/19  Yes Tim Tim MD   irbesartan (AVAPRO) 300 MG tablet TAKE 1 TABLET BY MOUTH ONCE DAILY (REPLACING  LOSARTAN) 11/9/20  Yes Kp Oliveira MD   levothyroxine (SYNTHROID, LEVOTHROID) 137 MCG tablet Take 1 tab daily Monday to Saturday and 1.5 tabs on Joao 3/10/20  Yes Kp Oliveira MD   metoprolol succinate XL (TOPROL-XL) 25 MG 24 hr tablet Take 1 tablet by mouth every night at bedtime. 4/20/20  Yes Kassie Gaxiola MD   Multiple Vitamins-Minerals (MULTIVITAMIN ADULT PO) Take 1 tablet by mouth Daily.   Yes ProviderTim MD   POTASSIUM PO Take  by mouth.   Yes ProviderTim MD   spironolactone (ALDACTONE) 25 MG tablet Take 1/2 (one-half) tablet by mouth once daily 10/26/20  Yes Kassie Gaxiola MD   traMADol (ULTRAM) 50 MG tablet TAKE 1/2 (ONE-HALF) TABLET BY MOUTH ONCE DAILY AS NEEDED FOR PAIN 12/10/20  Yes Kp Oliveira MD   traZODone (DESYREL) 100 MG tablet Take 100 mg by mouth Every Night.   Yes ProviderTim MD   triamcinolone (KENALOG) 0.1 % cream Apply  topically to the appropriate area as directed 2 (Two) Times a Day. 3/14/19  Yes Joseph Venegas MD   vitamin B-12 (CYANOCOBALAMIN) 100 MCG tablet Take 100 mcg by mouth Daily.   Yes Tim Tim MD   atorvastatin (LIPITOR) 20 MG tablet Take 1 tablet by mouth Daily. 4/1/20 12/14/20  Kassie Gaxiola MD   clopidogrel (PLAVIX) 75 MG tablet Take 75 mg by mouth Daily. Last dose approx greater than one month ago  12/14/20   "Provider, MD Tim   mupirocin (BACTROBAN) 2 % ointment Apply  topically 2 (Two) Times a Day. 6/25/18 12/14/20  Joseph Venegas MD   tadalafil (CIALIS) 5 MG tablet Take 5 mg by mouth Daily. 12/9/20 12/14/20  Tim Tim MD         OBJECTIVE    /72 (BP Location: Left arm, Patient Position: Sitting, Cuff Size: Adult)   Pulse 61   Ht 182.9 cm (72.01\")   Wt 120 kg (265 lb 9.6 oz)   SpO2 97%   BMI 36.01 kg/m²         Review of Systems     Constitutional:  Denies recent weight loss, weight gain, fever or chills, no change in exercise tolerance     HENT:  Denies any hearing loss, epistaxis, hoarseness, or difficulty speaking.     Eyes: Wears eyeglasses or contact lenses     Respiratory:  Denies dyspnea with exertion,no cough, wheezing, or hemoptysis.     Cardiovascular: Negative for palpations, chest pain, orthopnea, PND, peripheral edema, syncope, or claudication.     Gastrointestinal:  Denies change in bowel habits, dyspepsia, ulcer disease, hematochezia, or melena.     Endocrine: Negative for cold intolerance, heat intolerance, polydipsia, polyphagia and polyuria. Denies any history of weight change, heat/cold intolerance, polydipsia, polyuria     Genitourinary: Negative.      Musculoskeletal: Denies any history of arthritic symptoms or back problems     Skin:  Denies any change in hair or nails, rashes, or skin lesions.     Allergic/Immunologic: Negative.  Negative for environmental allergies, food allergies and immunocompromised state.     Neurological:  Denies any history of recurrent headaches, strokes, TIA, or seizure disorder.     Hematological: Denies any food allergies, seasonal allergies, bleeding disorders, or lymphadenopathy.     Psychiatric/Behavioral: Denies any history of depression, substance abuse, or change in cognitive function.         Physical Exam     Constitutional: Cooperative, alert and oriented, well-developed, well-nourished, in no acute distress.     HENT: "   Head: Normocephalic, normal hair patterns, no masses or tenderness.  Ears, Nose, and Throat: No gross abnormalities. No pallor or cyanosis. Dentition good.   Eyes: EOMS intact, PERRL, conjunctivae and lids unremarkable. Fundoscopic exam and visual fields not performed.   Neck: No palpable masses or adenopathy, no thyromegaly, no JVD, carotid pulses are full and equal bilaterally and without  Bruits.     Cardiovascular: Regular rhythm, S1 and S2 normal, no S3 or S4. Apical impulse not displaced. No murmurs, gallops, or rubs detected.     Pulmonary/Chest: Chest: normal symmetry, no tenderness to palpation, normal respiratory excursion, no intercostal retraction, no use of accessory muscles.            Pulmonary: Normal breath sounds. No rales or ronchi.    Abdominal: Abdomen soft, bowel sounds normoactive, no masses, no hepatosplenomegaly, non-tender, no bruits.     Musculoskeletal: No deformities, clubbing, cyanosis, erythema, or edema observed. There are no spinal abnormalities noted. Normal muscle strength and tone. Pulses full and equal in all extremities, no bruits auscultated.     Neurological: No gross motor or sensory deficits noted, affect appropriate, oriented to time, person, place.     Skin: Warm and dry to the touch, no apparent skin lesions or masses noted.     Psychiatric: He has a normal mood and affect. His behavior is normal. Judgment and thought content normal.         Procedures      Lab Results   Component Value Date    WBC 10.64 11/30/2020    HGB 14.9 11/30/2020    HCT 45.5 11/30/2020    MCV 88.0 11/30/2020     11/30/2020     Lab Results   Component Value Date    GLUCOSE 102 (H) 11/30/2020    BUN 22 11/30/2020    CREATININE 1.37 (H) 11/30/2020    EGFRIFNONA 53 (L) 11/30/2020    BCR 16.1 11/30/2020    CO2 24.0 11/30/2020    CALCIUM 9.6 11/30/2020    ALBUMIN 4.40 11/30/2020    AST 18 11/30/2020    ALT 21 11/30/2020     Lab Results   Component Value Date    CHOL 123 06/01/2020     Lab  Results   Component Value Date    TRIG 110 06/01/2020     Lab Results   Component Value Date    HDL 41 06/01/2020     No components found for: LDLCALC  Lab Results   Component Value Date    LDL 60 06/01/2020     No results found for: HDLLDLRATIO  No components found for: CHOLHDL  Lab Results   Component Value Date    HGBA1C 6.00 (H) 06/01/2020     Lab Results   Component Value Date    TSH 3.180 06/01/2020           ASSESSMENT AND PLAN      Diagnoses and all orders for this visit:    1. Essential hypertension (Primary)  -     ECG 12 Lead  -     CT Angio Abdominal Aorta Bilateral Iliofem Runoff; Future  -     CT Angiogram Chest With Contrast; Future  His blood pressure is slightly up however he has not taken his medicines as he has gotten off of a 12-hour shift early this morning.  He says at home his been doing well.  He has no other complaints at the present time.  2. H/O aortic dissection-  -     CT Angio Abdominal Aorta Bilateral Iliofem Runoff; Future  -     CT Angiogram Chest With Contrast; Future    2017-S/p Acute Type A aortic dissection s/p ascending aortic and proximal aortic arch repair with 26mm graft, aortic valve re-suspension and aortic root repair. -  We will get a CT scan before his next visit with Dr. Arreguin  Patient's Body mass index is 36.01 kg/m². BMI is within normal parameters. No follow-up required..                Kassie Gaxiola MD  12/14/2020  09:35 CST

## 2020-12-15 RX ORDER — IRBESARTAN 300 MG/1
TABLET ORAL
Qty: 30 TABLET | Refills: 0 | Status: SHIPPED | OUTPATIENT
Start: 2020-12-15 | End: 2021-01-12

## 2020-12-17 ENCOUNTER — DOCUMENTATION (OUTPATIENT)
Dept: CARDIOLOGY | Facility: CLINIC | Age: 60
End: 2020-12-17

## 2020-12-17 NOTE — PROGRESS NOTES
Kassie Gaxiola MD Brown, Karen M, RN             Not till March or April.       Per Dr Gaxiola CTA's should be scheduled for March or April

## 2021-01-12 ENCOUNTER — DOCUMENTATION (OUTPATIENT)
Dept: ENDOCRINOLOGY | Facility: CLINIC | Age: 61
End: 2021-01-12

## 2021-01-12 RX ORDER — IRBESARTAN 300 MG/1
TABLET ORAL
Qty: 30 TABLET | Refills: 0 | Status: SHIPPED | OUTPATIENT
Start: 2021-01-12 | End: 2021-09-27

## 2021-01-14 ENCOUNTER — OFFICE VISIT (OUTPATIENT)
Dept: OTOLARYNGOLOGY | Facility: CLINIC | Age: 61
End: 2021-01-14

## 2021-01-14 VITALS — HEIGHT: 70 IN | TEMPERATURE: 98.2 F | WEIGHT: 266.4 LBS | BODY MASS INDEX: 38.14 KG/M2 | OXYGEN SATURATION: 94 %

## 2021-01-14 DIAGNOSIS — Z93.0 TRACHEOSTOMY STATUS (HCC): ICD-10-CM

## 2021-01-14 DIAGNOSIS — Z08 ENCOUNTER FOR FOLLOW-UP SURVEILLANCE OF LARYNGEAL CANCER: Primary | ICD-10-CM

## 2021-01-14 DIAGNOSIS — J38.4 SUPRAGLOTTIC EDEMA: ICD-10-CM

## 2021-01-14 DIAGNOSIS — Z85.21 ENCOUNTER FOR FOLLOW-UP SURVEILLANCE OF LARYNGEAL CANCER: Primary | ICD-10-CM

## 2021-01-14 DIAGNOSIS — J37.0 CHRONIC LARYNGITIS: ICD-10-CM

## 2021-01-14 PROCEDURE — 31575 DIAGNOSTIC LARYNGOSCOPY: CPT | Performed by: OTOLARYNGOLOGY

## 2021-01-14 PROCEDURE — 99214 OFFICE O/P EST MOD 30 MIN: CPT | Performed by: OTOLARYNGOLOGY

## 2021-01-18 NOTE — PROGRESS NOTES
Subjective   Truman Esquivel is a 60 y.o. male.       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  Was treated with concurrent chemoradiation and has had a complete response but has been unable to be decannulated due to anatomic change in his laryngeal remnant along with posttreatment edema.  Currently has a size 6 CFN tracheostomy in place.  Says that at times he has bloody expectoration (he is chronically anticoagulated).  Normally uses a Passy-Nelly valve but has lost this.  Has a cap for his trach but cannot tolerate continuous capping.      The following portions of the patient's history were reviewed and updated as appropriate: allergies, current medications, past family history, past medical history, past social history, past surgical history and problem list.     reports that he quit smoking about 3 years ago. He has a 47.50 pack-year smoking history. He has never used smokeless tobacco. He reports that he does not drink alcohol or use drugs.   Patient is not a tobacco user and has not been counseled for use of tobacco products      Review of Systems        Objective   Physical Exam  General: Well-developed, well-nourished male in no acute distress voice is harsh with inspiratory stridor with the tracheostomy capped.  Ears: External ears no deformity, canals no discharge, tympanic membranes intact clear and mobile bilaterally.  Nose: No discharge or purulence  Oral cavity: Lips and gums without lesions.  Tongue and floor of mouth without lesions.  Parotid and submandibular ducts unobstructed.  No mucosal lesions on the buccal mucosa or vestibule of the mouth.  Pharynx: No erythema, exudate, mass  Neck: Tracheostomy tube in place.  Inner cannula is removed and has some dried bloody secretions present.  This is cleaned as a courtesy.  Postradiation changes are noted with no palpable mass or lymphadenopathy    Procedure Note    Pre-operative  Diagnosis:   Chief Complaint   Patient presents with   • Follow-up   Cancer surveillance    Post-operative Diagnosis: No evidence of recurrent disease; supraglottic edema    Anesthesia: topical with xylocaine and neosynephrine    Endoscopy Type:  Flexible Laryngoscopy    Procedure Details:    The patient was placed in the sitting position.  After topical anesthesia and decongestion, the 4 mm laryngoscope was passed.  The nasal cavities, nasopharynx, oropharynx, hypopharynx, and larynx were all examined.  Vocal cords were examined during respiration and phonation.  The following findings were noted:    Findings: No masses in the nose or nasopharynx.  Hypopharynx shows posttreatment changes.  Epiglottic remnant is edematous and somewhat posteriorly displaced partially obstructing the supraglottic airway.  Also there is still significant edema of the arytenoids and aryepiglottic folds.  No evidence of recurrent tumor in the larynx.  Vocal cord mobility appears to be intact.  Scope was also passed through the tracheostomy.  There is blood-tinged mucosa in the trachea but no evidence of purulence or active bleeding site.  Cannula is then replaced without difficulty.    Condition:  Stable.  Patient tolerated procedure well.    Complications:  None      Assessment/Plan   Diagnoses and all orders for this visit:    1. Encounter for follow-up surveillance of laryngeal cancer (Primary)    2. Tracheostomy status (CMS/[facility])    3. Chronic laryngitis    4. Supraglottic edema      Plan: Replace the patient's Velcro tracheostomy strap as a courtesy today.  Wrote a prescription for a new Passy-Kaufman valve.  Also wrote a prescription for a new tracheostomy tube.  Advised him to contact me when he had the new tracheostomy tube and I would put it in for him.  Reassured him that I saw no evidence of recurrent tumor but also explained that his laryngeal exam is such that he continues to be tracheostomy dependent and would not be a  candidate for decannulation anytime soon.  He is to return in 6 months for surveillance sooner for problems or to get his new tracheostomy placed.

## 2021-01-20 RX ORDER — TRAMADOL HYDROCHLORIDE 50 MG/1
TABLET ORAL
Qty: 15 TABLET | Refills: 0 | Status: SHIPPED | OUTPATIENT
Start: 2021-01-20 | End: 2021-02-15

## 2021-02-15 RX ORDER — TRAMADOL HYDROCHLORIDE 50 MG/1
TABLET ORAL
Qty: 15 TABLET | Refills: 0 | Status: SHIPPED | OUTPATIENT
Start: 2021-02-15 | End: 2021-03-22

## 2021-02-18 NOTE — ED PROVIDER NOTES
Subjective   History of Present Illness    Review of Systems    Past Medical History:   Diagnosis Date   • Chest pain    • HTN (hypertension)    • Smoker        Allergies   Allergen Reactions   • Acth [Corticotropin]    • Eggs Or Egg-Derived Products    • Erythromycin    • Penicillins      Tolerated cefepime during 2/2017 admission. Had rash and itching (relieved by benadryl) after few doses of cefepime and cefepime was continued.   • Tetracyclines & Related    • Cephalosporins Itching and Rash     Pt developed rash, itching after cefepime administration (2-3 doses) during 2/2017 admission. Itching was relieved with benadryl. Cefepime was continued because his infection was improving and no symptoms of anaphylaxis present       Past Surgical History:   Procedure Laterality Date   • ASCENDING ARCH/HEMIARCH REPLACEMENT N/A 2/14/2017    Procedure: INTRAOPERATIVE TRANSESOPHAGEAL ECHOCARDIOGRAM, MIDLINE STERNOTOMY, ASCENDING AORTIC  AND PROXIMAL AORTIC ARCH REPAIR WITH 26MM GRAFT, AORTIC VALVE RESUSPENSION, AORTIC ROOT REPAIR, OPEN VEIN HARVEST OF RIGHT LEG;  Surgeon: German Arreguin MD;  Location: [facility] MAIN OR;  Service:    • BRONCHOSCOPY N/A 2/17/2017    Procedure: BRONCHOSCOPY BIOPSY AT BEDSIDE WITH BAL-LEFT LOWER LOBE;  Surgeon: Trent Chaney MD;  Location: [facility] ENDOSCOPY;  Service:        History reviewed. No pertinent family history.    Social History     Social History   • Marital status:      Spouse name: N/A   • Number of children: N/A   • Years of education: N/A     Social History Main Topics   • Smoking status: Former Smoker     Quit date: 2/13/2017   • Smokeless tobacco: Never Used   • Alcohol use No   • Drug use: No   • Sexual activity: Defer     Other Topics Concern   • None     Social History Narrative           Objective   Physical Exam    Procedures         ED Course  ED Course   Comment By Time   <25K WBC in grossly bloody fluid, not c/w septic joint.  Discussed followup of the culture  [x][facility] [address]      ELSIE RIVERA [facility]     Outpatient Pediatric Rehab Dept      Outpatient Pediatric Rehab Dept     [address], [address], [address] [facility],Third Floor       [address]     [phone]     Fax [phone]        Fax: [phone] THERAPY EVALUATION    Patient Name: Jossy Nunez   MR#  [id number]  Patient WIA:[birth year]     Referring Physician: SHARI Calvin CNP  Date of Evaluation: 2/18/2021         Referring Diagnosis and ICD 10: F80.9 Speech Delay      Date of Onset: birth  Secondary Diagnoses: None  Treatment Diagnosis and ICD 10: F80.9 Speech Delay    SUBJECTIVE    Reason for Referral: Jossy Nunez is a 19 month old boy who was referred to [facility] for an initial speech and language evaluation due to concerns with his overall language development. Patient was accompanied to this initial evaluation by: Mother, Shelby Corado    Caregiver primary concerns include: Per mom, \"vocabulary and articulation. \"  Mom reports concerns re: positive family history of speech delays with 2 cousins and older sister's language skills were \"further along\" than Td's at his age. Medical History: Per mom, Td's pertinent medical history is insignificant. Pregnancy/Birth History:  [x]  Full Term     []  Premature     [] Caesarian                                             [] Complications    Developmental Milestones:  Sat Independently: 6 mos  Crawled: 8 mos   Walked: ~1 yr    Speech/Language Milestones:  Use single words: 1 yr  Combine words: 2 yrs  Name simple objects: 20 mos    Speech-Language History: Mom's concern has always been present d/t family hx of speech disorders (2 cousins with articulation and language disorders).  Mom reports she felt he was [place] meeting milestones\" for language around 18 mos. Educational History/Setting: Hoda Ag does not attend . He spends his days at home with his grandma. /Phone: N/A  Other Healthcare services the patient is currently being provided:  [] PT   [] OT  [x] None        Equipment the patient is currently using: None  Current Living Situation: Hoda Ag lives at home with mom, dad, sister (4 yrs), and grandparents. Barriers to learning: Karen Arnold age    Prior Therapy for same condition: N/A. This is Estuardo's initial speech and language evaluation. Patient previous status: Hoda Ag currently communicates via 1-word and some 2-word phrases. He is able to consistently follow 2-step directions. Pt/Family goal: Per mom,  \"Continuing to progress and gain skills appropriately for his age\"      Pain rating (faces):           [x]             []              []              []             []              []    OBJECTIVE/ASSESSMENT:  A. Oral Mechanism Examination:   [x] WFL via informal observations/assessment          []   Reduced function   []   Reduced Strength     []   Not assessed due to lack of rapport []  Administered Dana Carbajal              B. Voice:       [] WFL    [x] Unable to assess due to age   []  Hyponasal     [] Hypernasal       C. Fluency:   [] WFL    [x] Unable to assess    [] Fluency Disorder     [] Apraxia         D. Articulation/Phonology:    A formal articulation/phonology assessment was not completed due to young age. During the assessment, Hoda Ag was observed to produce the following consonant sounds: /p, b, d, m, n, t, k, g/.  Via parent survey, Estuardo's mom reports he can correctly say the following sounds: /p, b, d, m, n, h, w, k, t, g/. Hoda Ag demonstrated a frontal tongue position when producing /s/. SLP discussed recommendation to seek additional speech-language evaluation if frontal lisp persists to age 3.5-4 yrs.  Based on parent report and clinical observation, with patient and wife.  Patient is to hold coumadin.  Patient has appointment with Dr. Chandra tomorrow where he plans to discuss other anticoaluation options.  Stable for discharge. Rhett Brownlee MD 04/24 0237                  [facility]    Final diagnoses:   Hemarthrosis of knee joint, right            Rhett Brownlee MD  04/24/17 0235     Age Range/ Skill: Parent Report/ Observation:   19-24 months    Does your toddler imitate sounds around her or him during play, such as the sounds of cars or animals? [x]able to perform     []unable to perform     []unknown/ not observed   Do you believe that your toddler says about 50 words? [x]able to perform     []unable to perform     []unknown/ not observed   Have you heard your toddler use any two- word sentences or phrases, such as Throw ball or Rawleigh Rast gone?  [x]able to perform     []unable to perform     []unknown/ not observed   25-36 months    Does your child say at least two new words each week? [x]able to perform     []unable to perform     []unknown/ not observed   Does your child try to tell you what has happened to him or her, using some real words? [x]able to perform     []unable to perform     []unknown/ not observed   Do you hear your child use words such as I, it, or my in her or his conversations? []able to perform     [x]unable to perform     []unknown/ not observed   Does your child refer to himself or herself by using his own name? []able to perform     [x]unable to perform     []unknown/ not observed   When your child cant make people understand what she or he is saying, does she or he show signs of frustration? [x]able to perform     []unable to perform     []unknown/ not observed   Does your child use words ending in -ing, as in making? []able to perform     [x]unable to perform     []unknown/ not observed   When your child says words that have definite beginning and ending sounds, does she or he pronounce both the beginning and ending sounds? Examples: both the /k/ and /t/ sounds in cat []able to perform     []unable to perform     [x] sometimes   Does your child tell you that she or he needs help with personal needs such as getting a drink, washing hands, or going to the toilet?  [x]able to perform     []unable to perform     []unknown/ not observed   Does your child tell you where something might be, using words such as in, on, or by? []able to perform     [x]unable to perform     []unknown/ not observed   Does your child generally refer to more than one thing by adding an s as in dogs or cats? []able to perform     [x]unable to perform     []unknown/ not observed   Does your child sometimes use complicated sentences, such as I hope that this bear is mine?  []able to perform     [x]unable to perform     []unknown/ not observed   Does your child answer what, where, when, and who questions with more than just a yes or no? []able to perform     [x]unable to perform     []unknown/ not observed   Does your child use words that tell about color and size? []able to perform     [x]unable to perform     []unknown/ not observed   Do most people understand what your child is saying most of the time? []able to perform     [x]unable to perform     []unknown/ not observed      The following receptive strengths and weaknesses were reported or observed:  Age Range/ Skill: Parent Report/ Observation:   13-18 months    Does your toddler point to many different objects, or pictures of objects, when someone names them? [x]able to perform     []unable to perform     []unknown/ not observed   Does your toddler carry out a two-step request, like Please go to your room and bring back a diaper, or  your ball and give it to me?  [x]able to perform     []unable to perform     []unknown/ not observed   When you announce familiar routines such as Snack time!  or Bath time!, does your toddler seem to anticipate what is going to happen? [x]able to perform     []unable to perform     []unknown/ not observed   When someone names major body parts such as hands, legs, feet, and nose, does your toddler point to these on her or his own body?  [x]able to perform     []unable to perform     []unknown/ not observed   19-24 months    If you talk to your toddler about a toy that is in another room, does she or he longer sentences, does your child understand the whole sentence rather than just a few key words? []able to perform     [x]unable to perform     []unknown/ not observed   When your child hears a complex sentence, does she or he remember what it means? Examples: When we get to the store, Ill buy you an ice cream cone, or Do you want to go for a walk and then play on the swings in the park?  []able to perform     [x]unable to perform     []unknown/ not observed   While sharing books or magazines, can your child point to pictures involving five simple actions when you ask questions like Who is eating?  or Can you show me someone who is swinging?  []able to perform     [x]unable to perform     []unknown/ not observed   Does your child point to smaller body parts when asked? For example, her or his chin, eyebrow, belly button, toe? [x]able to perform     []unable to perform     []unknown/ not observed   Can your child answer yes or no to questions about playmates or family members when you mention them by names of their relationships? For example, Ashanti Safer have any brothers or sisters?  or Did you meet Carson reed?  []able to perform     [x]unable to perform     []unknown/ not observed   Can your child give specific examples in answers to questions that require her or him to know what things are called and how they go together? For example, when you ask, Jalil Gonzalez do you want to eat for lunch?  or Which toy do you want me to get? , does she or he give a specific answer? []able to perform     [x]unable to perform     []unknown/ not observed   Does your child understand questions or requests for things that differ in size or in color? For example, Which doll is the biggest? or Lets get the red ball.  []able to perform     [x]unable to perform     []unknown/ not observed   Does your child understand questions or requests for things that differ in size or in color?  For example, Which doll is the biggest? or Lets get the red ball.  []able to perform     [x]unable to perform     []unknown/ not observed   Does your child understand about events that have happened in the past or will happen in the future? For example, when you say, We went there yesterday or Well be going to the circus tomorrow.  []able to perform     [x]unable to perform     []unknown/ not observed   Can you child usually follow two requests that are not directly related to one another? For example, Please take off your jacket and then find that puppy!  [x]able to perform     []unable to perform     []unknown/ not observed   When adults talk with your child in normal adult language rather than in baby talk, does your child generally understand what they are talking about? []able to perform     [x]unable to perform     []unknown/ not observed   Does your child understand most words that describe objects or people? For example, the fat dog, the spotted kitten, or a rough board. []able to perform     [x]unable to perform     []unknown/ not observed   Does your child listen to explanations of why or how things work? For example, The clothes go in the dryer, and then the heat takes out the water or We have to put money in the machine to get a soda.  []able to perform     [x]unable to perform     []unknown/ not observed   Does your child understand position words? For example, when you talk about an object or person behind the door on on top of the table? [x]able to perform     []unable to perform     []unknown/ not observed   Does your child remember the events and sequences of favorite stories so that she or he is able to anticipate what will happen next?  []able to perform     [x]unable to perform     []unknown/ not observed      In addition, it was observed or reported during the evaluation  that Rufino Gardner communicates expressively using the following:  facial expression   [x]      points           [x]      gestures Evaluation, modification, and Training of Voice Prosthetic     [] Evaluation for Speech-Generating Augmentative and Alternative Communication Device   [] Therapeutic Services for the use of Speech-Generating Device. [x] Other: Home programming and in-person visits as needed to monitor expressive language progress     It is recommended that Jose Luis Bran be seen as needed to address the following goals:     LTG: Jose Luis Shant and family will participate in home program activities to support his speech-language development. Rehab Potential:        [x] Excellent     [] Good          [] Fair             [] Poor        This plan was reviewed with the patient/family and they were in agreement. Duration of therapy is based on many variables including patients attendance, motivation, learning capacity, physiological status, and follow-through with home programming. Results of this eval were discussed with mom, who verbalized understanding and agreement. The following education was provided to the patient/family: assessment results, typical speech and language development, parent handouts outlining home programming strategies provided    Time in: 2:55p  Time out: 4:05p   Timed code minutes: N/A  Total Time: 60 min    Therapist: Kamala Edward MA, CCC-SLP, Date: 2/18/2021, Time: 2:41 PM    Dear Jolene Bland APRN - BONY,  [facility] and for therapy to continue, insurance  requires initial and periodic physician review of the treatment plan. Please review the above evaluation and verify that you agree therapy should continue by signing and faxing back to the number above.       Physician Signature:______________________Date:______ Time:________  By signing above, therapists plan is approved by physician

## 2021-03-01 ENCOUNTER — OFFICE VISIT (OUTPATIENT)
Dept: SLEEP MEDICINE | Facility: HOSPITAL | Age: 61
End: 2021-03-01

## 2021-03-01 VITALS
BODY MASS INDEX: 38.94 KG/M2 | DIASTOLIC BLOOD PRESSURE: 82 MMHG | HEIGHT: 70 IN | WEIGHT: 272 LBS | OXYGEN SATURATION: 95 % | HEART RATE: 63 BPM | SYSTOLIC BLOOD PRESSURE: 144 MMHG

## 2021-03-01 DIAGNOSIS — G47.33 OBSTRUCTIVE SLEEP APNEA, ADULT: Primary | ICD-10-CM

## 2021-03-01 DIAGNOSIS — Z93.0 TRACHEOSTOMY PRESENT (HCC): ICD-10-CM

## 2021-03-01 DIAGNOSIS — F51.04 PSYCHOPHYSIOLOGICAL INSOMNIA: ICD-10-CM

## 2021-03-01 PROCEDURE — 99213 OFFICE O/P EST LOW 20 MIN: CPT | Performed by: NURSE PRACTITIONER

## 2021-03-01 RX ORDER — TADALAFIL 5 MG/1
5 TABLET ORAL DAILY
COMMUNITY
Start: 2021-02-08

## 2021-03-01 NOTE — PROGRESS NOTES
Sleep Clinic Follow Up    Date: 3/1/2021  Primary Care Provider: Marybeth Harrison DO    Last office visit: 2020 (I reviewed this note)    CC: Follow up: EMELIA, S/P tracheostomy following laryngeal cancer      Interim History:  Since the last visit:    1) moderate EMELIA -  Truman Esquivel has not been on CPAP therapy for ~3 years since his diagnosis of laryngeal cancer and tracheostomy placement. He did get a new bed last year and usually always sleeps in an elevated position. He does not endorse any reoccurrance of symptoms, such as excessive/unacceptable daytime sleepiness, snoring, or unrefreshing sleep.    2) Patient denies RLS symptoms.     3) Insomnia - Patient currently takes trazodone 100 mg QHS. He states that he has occasional issues with sleep onset maybe once per week - he usually cannot shut his mind off.     Sleep Testin. PSG on 2017, AHI of 24.7   2. CPAP titration recommended 5-20 cm H2O   3. Currently on no therapy      Bed time: varies  Sleep latency: 10-60 minutes  Number of times awakens during the night: 2-3  Wake time: varies  Estimated total sleep time at night: 4-8 hours  Caffeine intake: 0 cups of coffee, 0 cups of tea, and 2 sodas per day  Alcohol intake: 0 drinks per week  Nap time: couple times per week   Sleepiness with Driving: none     [place] - 6    PMHx, FH, SH reviewed and pertinent changes are:  Reportedly unchanged from last office visit with us.      REVIEW OF SYSTEMS:   Negative for chest pain, SOA, fever, chills, cough, N/V/D, abdominal pain.    Smoking:none    Truman Esquivel  reports that he quit smoking about 4 years ago. He has a 47.50 pack-year smoking history. He has never used smokeless tobacco.     Exam:  Vitals:    21 1126   BP: 144/82   Pulse: 63   SpO2: 95%           21  1126   Weight: 123 kg (272 lb)     Body mass index is 39.03 kg/m². Patient's Body mass index is 39.03 kg/m². BMI is above normal parameters. Recommendations include:  referral to primary care.      Gen:                No distress, conversant, pleasant, appears stated age, alert, oriented  Eyes:               Anicteric sclera, moist conjunctiva, no lid lag                           PERRL, EOMI   Heent:             Size #6 tracheostomy in place, midline, uncapped, no Passy valve    NC/AT                          Oropharynx clear                          Normal hearing  Lungs:             Normal effort, non-labored breathing                          Clear to auscultation bilaterally          CV:                  Normal S1/S2, no murmur                          No lower extremity edema  ABD:               Soft, rounded, non-distended                          Normal bowel sounds                    Psych:             Appropriate affect  Neuro:             CN 2-12 appear intact    Past Medical History:   Diagnosis Date   • Aortic dissection (CMS/HCC)    • Chest pain    • Descending thoracic aortic aneurysm (CMS/HCC) 6/5/2019   • Disease of thyroid gland    • HTN (hypertension)    • Knee pain    • Sleep apnea    • Smoker    • Squamous cell carcinoma of larynx (CMS/HCC) 2/5/2018   • Squamous cell carcinoma of larynx (CMS/HCC) 2/5/2018   • Stroke (CMS/[facility])    • Swallowing difficulty    • TIA (transient ischemic attack) 12/21/2017       Current Outpatient Medications:   •  amLODIPine (NORVASC) 10 MG tablet, Take 1 tablet by mouth once daily, Disp: 90 tablet, Rfl: 11  •  atorvastatin (LIPITOR) 20 MG tablet, Take 20 mg by mouth Daily., Disp: , Rfl:   •  B Complex Vitamins (VITAMIN B COMPLEX) capsule capsule, Take 1 capsule by mouth Daily., Disp: , Rfl:   •  Cholecalciferol (Vitamin D3) 1.25 MG (90028 UT) capsule, One capsule monthly, Disp: 3 capsule, Rfl: 3  •  clopidogrel (PLAVIX) 75 MG tablet, Take 75 mg by mouth Daily., Disp: , Rfl:   •  DULoxetine (CYMBALTA) 60 MG capsule, Take 60 mg by mouth Daily., Disp: , Rfl:   •  indomethacin (INDOCIN) 25 MG capsule, Take 1 capsule by mouth Daily As  Needed., Disp: , Rfl: 0  •  irbesartan (AVAPRO) 300 MG tablet, Take 1 tablet by mouth once daily, Disp: 30 tablet, Rfl: 0  •  levothyroxine (SYNTHROID, LEVOTHROID) 137 MCG tablet, Take 1 tab daily Monday to Saturday and 1.5 tabs on Sunday, Disp: 32 tablet, Rfl: 11  •  metoprolol succinate XL (TOPROL-XL) 25 MG 24 hr tablet, Take 1 tablet by mouth every night at bedtime., Disp: 30 tablet, Rfl: 12  •  Multiple Vitamins-Minerals (MULTIVITAMIN ADULT PO), Take 1 tablet by mouth Daily., Disp: , Rfl:   •  POTASSIUM PO, Take  by mouth., Disp: , Rfl:   •  spironolactone (ALDACTONE) 25 MG tablet, Take 1/2 (one-half) tablet by mouth once daily, Disp: 15 tablet, Rfl: 3  •  tadalafil (CIALIS) 5 MG tablet, Take 5 mg by mouth Daily., Disp: , Rfl:   •  traMADol (ULTRAM) 50 MG tablet, TAKE 1/2 (ONE-HALF) TABLET BY MOUTH ONCE DAILY AS NEEDED FOR PAIN, Disp: 15 tablet, Rfl: 0  •  traZODone (DESYREL) 100 MG tablet, Take 100 mg by mouth Every Night., Disp: , Rfl:   •  triamcinolone (KENALOG) 0.1 % cream, Apply  topically to the appropriate area as directed 2 (Two) Times a Day., Disp: 45 g, Rfl: 2  •  vitamin B-12 (CYANOCOBALAMIN) 100 MCG tablet, Take 100 mcg by mouth Daily., Disp: , Rfl:     WBC   Date Value Ref Range Status   11/30/2020 10.64 3.40 - 10.80 10*3/mm3 Final     RBC   Date Value Ref Range Status   11/30/2020 5.17 4.14 - 5.80 10*6/mm3 Final     Hemoglobin   Date Value Ref Range Status   11/30/2020 14.9 13.0 - 17.7 g/dL Final     Hematocrit   Date Value Ref Range Status   11/30/2020 45.5 37.5 - 51.0 % Final     MCV   Date Value Ref Range Status   11/30/2020 88.0 79.0 - 97.0 fL Final     MCH   Date Value Ref Range Status   11/30/2020 28.8 26.6 - 33.0 pg Final     MCHC   Date Value Ref Range Status   11/30/2020 32.7 31.5 - 35.7 g/dL Final     RDW   Date Value Ref Range Status   11/30/2020 14.8 12.3 - 15.4 % Final     RDW-SD   Date Value Ref Range Status   11/30/2020 47.2 37.0 - 54.0 fl Final     MPV   Date Value Ref Range Status    11/30/2020 9.9 6.0 - 12.0 fL Final     Platelets   Date Value Ref Range Status   11/30/2020 354 140 - 450 10*3/mm3 Final     Neutrophil %   Date Value Ref Range Status   11/30/2020 67.8 42.7 - 76.0 % Final     Lymphocyte %   Date Value Ref Range Status   11/30/2020 14.8 (L) 19.6 - 45.3 % Final     Monocyte %   Date Value Ref Range Status   11/30/2020 10.4 5.0 - 12.0 % Final     Eosinophil %   Date Value Ref Range Status   11/30/2020 5.7 0.3 - 6.2 % Final     Basophil %   Date Value Ref Range Status   11/30/2020 0.9 0.0 - 1.5 % Final     Immature Grans %   Date Value Ref Range Status   11/30/2020 0.4 0.0 - 0.5 % Final     Neutrophils, Absolute   Date Value Ref Range Status   11/30/2020 7.20 (H) 1.70 - 7.00 10*3/mm3 Final     Lymphocytes, Absolute   Date Value Ref Range Status   11/30/2020 1.58 0.70 - 3.10 10*3/mm3 Final     Monocytes, Absolute   Date Value Ref Range Status   11/30/2020 1.11 (H) 0.10 - 0.90 10*3/mm3 Final     Eosinophils, Absolute   Date Value Ref Range Status   11/30/2020 0.61 (H) 0.00 - 0.40 10*3/mm3 Final     Basophils, Absolute   Date Value Ref Range Status   11/30/2020 0.10 0.00 - 0.20 10*3/mm3 Final     Immature Grans, Absolute   Date Value Ref Range Status   11/30/2020 0.04 0.00 - 0.05 10*3/mm3 Final     nRBC   Date Value Ref Range Status   11/30/2020 0.0 0.0 - 0.2 /100 WBC Final       Lab Results   Component Value Date    GLUCOSE 102 (H) 11/30/2020    BUN 22 11/30/2020    CREATININE 1.37 (H) 11/30/2020    EGFRIFNONA 53 (L) 11/30/2020    BCR 16.1 11/30/2020    K 4.2 11/30/2020    CO2 24.0 11/30/2020    CALCIUM 9.6 11/30/2020    ALBUMIN 4.40 11/30/2020    AST 18 11/30/2020    ALT 21 11/30/2020       Assessment and Plan:    1. Obstructive sleep apnea - Established, stable (1)  1. Continue watch and wait period - will repeat testing in the future if tracheostomy decannulation occurs  2. Continue to sleep in elevated position if comfortable  3. Return to clinic in 1 year unless change in symptoms  in interim period   2. Chronic tracheostomy S/P laryngeal cancer - Established, stable (1)   1. Continue following ENT  3. Insomnia - sleep onset and or maintenance - Established, stable (1)   1. Continue trazodone 100 mg QHS      I spent 25 minutes caring for Truman on this date of service. This time includes time spent by me in the following activities: preparing for the visit, obtaining and/or reviewing a separately obtained history, performing a medically appropriate examination and/or evaluation , counseling and educating the patient/family/caregiver, documenting information in the medical record and care coordination; discussing Sleep hygiene and Further testing    RTC in 12 months. Patient agrees to return sooner if changes in symptoms.        This document has been electronically signed by ROWAN Irizarry on March 1, 2021 11:33 CST          CC: Marybeth Harrison DO          No ref. provider found

## 2021-03-03 RX ORDER — LEVOTHYROXINE SODIUM 137 UG/1
TABLET ORAL
Qty: 32 TABLET | Refills: 0 | Status: SHIPPED | OUTPATIENT
Start: 2021-03-03 | End: 2021-04-07 | Stop reason: SDUPTHER

## 2021-03-03 RX ORDER — SPIRONOLACTONE 25 MG/1
TABLET ORAL
Qty: 30 TABLET | Refills: 5 | Status: SHIPPED | OUTPATIENT
Start: 2021-03-03 | End: 2021-04-30

## 2021-03-17 ENCOUNTER — LAB (OUTPATIENT)
Dept: LAB | Facility: HOSPITAL | Age: 61
End: 2021-03-17

## 2021-03-17 ENCOUNTER — HOSPITAL ENCOUNTER (OUTPATIENT)
Dept: CT IMAGING | Facility: HOSPITAL | Age: 61
Discharge: HOME | End: 2021-03-17

## 2021-03-17 DIAGNOSIS — C32.9 SQUAMOUS CELL CARCINOMA OF LARYNX (HCC): ICD-10-CM

## 2021-03-17 DIAGNOSIS — I10 ESSENTIAL HYPERTENSION: ICD-10-CM

## 2021-03-17 DIAGNOSIS — Z86.79 H/O AORTIC DISSECTION: ICD-10-CM

## 2021-03-17 LAB
BUN SERPL-MCNC: 19 MG/DL (ref 8–23)
CREAT SERPL-MCNC: 1.33 MG/DL (ref 0.76–1.27)
GFR SERPL CREATININE-BSD FRML MDRD: 55 ML/MIN/1.73

## 2021-03-17 PROCEDURE — 75635 CT ANGIO ABDOMINAL ARTERIES: CPT

## 2021-03-17 PROCEDURE — 71275 CT ANGIOGRAPHY CHEST: CPT

## 2021-03-17 PROCEDURE — 82565 ASSAY OF CREATININE: CPT | Performed by: RADIOLOGY

## 2021-03-17 PROCEDURE — 0 IOPAMIDOL PER 1 ML: Performed by: INTERNAL MEDICINE

## 2021-03-17 PROCEDURE — 84520 ASSAY OF UREA NITROGEN: CPT | Performed by: RADIOLOGY

## 2021-03-17 RX ADMIN — IOPAMIDOL 90 ML: 755 INJECTION, SOLUTION INTRAVENOUS at 10:04

## 2021-03-19 ENCOUNTER — TELEPHONE (OUTPATIENT)
Dept: CARDIOLOGY | Facility: CLINIC | Age: 61
End: 2021-03-19

## 2021-03-22 RX ORDER — TRAMADOL HYDROCHLORIDE 50 MG/1
TABLET ORAL
Qty: 15 TABLET | Refills: 0 | Status: SHIPPED | OUTPATIENT
Start: 2021-03-22 | End: 2021-04-19

## 2021-04-07 RX ORDER — LEVOTHYROXINE SODIUM 137 UG/1
TABLET ORAL
Qty: 32 TABLET | Refills: 5 | Status: SHIPPED | OUTPATIENT
Start: 2021-04-07 | End: 2021-04-30

## 2021-04-08 RX ORDER — LEVOTHYROXINE SODIUM 137 UG/1
TABLET ORAL
Qty: 32 TABLET | Refills: 0 | Status: SHIPPED | OUTPATIENT
Start: 2021-04-08 | End: 2021-04-30

## 2021-04-19 RX ORDER — TRAMADOL HYDROCHLORIDE 50 MG/1
TABLET ORAL
Qty: 30 TABLET | Refills: 5 | Status: SHIPPED | OUTPATIENT
Start: 2021-04-19 | End: 2021-10-27

## 2021-04-27 ENCOUNTER — LAB (OUTPATIENT)
Dept: LAB | Facility: HOSPITAL | Age: 61
End: 2021-04-27

## 2021-04-27 LAB
25(OH)D3 SERPL-MCNC: 38.2 NG/ML
ALBUMIN SERPL-MCNC: 4.5 G/DL (ref 3.5–5.2)
ALBUMIN UR-MCNC: 7.7 MG/DL
ALBUMIN/GLOB SERPL: 1.6 G/DL
ALP SERPL-CCNC: 72 U/L (ref 39–117)
ALT SERPL W P-5'-P-CCNC: 19 U/L (ref 1–41)
ANION GAP SERPL CALCULATED.3IONS-SCNC: 10.9 MMOL/L (ref 5–15)
AST SERPL-CCNC: 17 U/L (ref 1–40)
BASOPHILS # BLD AUTO: 0.12 10*3/MM3 (ref 0–0.2)
BASOPHILS NFR BLD AUTO: 1.4 % (ref 0–1.5)
BILIRUB SERPL-MCNC: 0.6 MG/DL (ref 0–1.2)
BUN SERPL-MCNC: 16 MG/DL (ref 8–23)
BUN/CREAT SERPL: 11.8 (ref 7–25)
CALCIUM SPEC-SCNC: 9.5 MG/DL (ref 8.6–10.5)
CHLORIDE SERPL-SCNC: 105 MMOL/L (ref 98–107)
CHOLEST SERPL-MCNC: 150 MG/DL (ref 0–200)
CO2 SERPL-SCNC: 28.1 MMOL/L (ref 22–29)
CREAT SERPL-MCNC: 1.36 MG/DL (ref 0.76–1.27)
CREAT UR-MCNC: 311 MG/DL
CREAT UR-MCNC: 321.8 MG/DL
CREAT UR-MCNC: 321.8 MG/DL
DEPRECATED RDW RBC AUTO: 43.2 FL (ref 37–54)
EOSINOPHIL # BLD AUTO: 0.75 10*3/MM3 (ref 0–0.4)
EOSINOPHIL NFR BLD AUTO: 8.7 % (ref 0.3–6.2)
ERYTHROCYTE [DISTWIDTH] IN BLOOD BY AUTOMATED COUNT: 14.5 % (ref 12.3–15.4)
FERRITIN SERPL-MCNC: 81.9 NG/ML (ref 30–400)
GFR SERPL CREATININE-BSD FRML MDRD: 53 ML/MIN/1.73
GLOBULIN UR ELPH-MCNC: 2.8 GM/DL
GLUCOSE SERPL-MCNC: 101 MG/DL (ref 65–99)
HBA1C MFR BLD: 5.9 % (ref 4.8–5.6)
HCT VFR BLD AUTO: 45.6 % (ref 37.5–51)
HDLC SERPL-MCNC: 41 MG/DL (ref 40–60)
HGB BLD-MCNC: 16 G/DL (ref 13–17.7)
IMM GRANULOCYTES # BLD AUTO: 0.05 10*3/MM3 (ref 0–0.05)
IMM GRANULOCYTES NFR BLD AUTO: 0.6 % (ref 0–0.5)
IRON 24H UR-MRATE: 123 MCG/DL (ref 59–158)
IRON SATN MFR SERPL: 30 % (ref 20–50)
LDLC SERPL CALC-MCNC: 93 MG/DL (ref 0–100)
LDLC/HDLC SERPL: 2.24 {RATIO}
LYMPHOCYTES # BLD AUTO: 1.6 10*3/MM3 (ref 0.7–3.1)
LYMPHOCYTES NFR BLD AUTO: 18.6 % (ref 19.6–45.3)
MCH RBC QN AUTO: 29.5 PG (ref 26.6–33)
MCHC RBC AUTO-ENTMCNC: 35.1 G/DL (ref 31.5–35.7)
MCV RBC AUTO: 84.1 FL (ref 79–97)
MICROALBUMIN/CREAT UR: 23.9 MG/G
MONOCYTES # BLD AUTO: 1.01 10*3/MM3 (ref 0.1–0.9)
MONOCYTES NFR BLD AUTO: 11.7 % (ref 5–12)
NEUTROPHILS NFR BLD AUTO: 5.07 10*3/MM3 (ref 1.7–7)
NEUTROPHILS NFR BLD AUTO: 59 % (ref 42.7–76)
NRBC BLD AUTO-RTO: 0 /100 WBC (ref 0–0.2)
PLATELET # BLD AUTO: 310 10*3/MM3 (ref 140–450)
PMV BLD AUTO: 10.8 FL (ref 6–12)
POTASSIUM SERPL-SCNC: 4.8 MMOL/L (ref 3.5–5.2)
PROT SERPL-MCNC: 7.3 G/DL (ref 6–8.5)
PROT UR-MCNC: 32 MG/DL
PROT UR-MCNC: 32 MG/DL
PROT/CREAT UR: 102.9 MG/G CREA (ref 0–200)
PROT/CREAT UR: 99.4 MG/G CREA (ref 0–200)
RBC # BLD AUTO: 5.42 10*6/MM3 (ref 4.14–5.8)
SODIUM SERPL-SCNC: 144 MMOL/L (ref 136–145)
TIBC SERPL-MCNC: 404 MCG/DL (ref 298–536)
TRANSFERRIN SERPL-MCNC: 271 MG/DL (ref 200–360)
TRIGL SERPL-MCNC: 86 MG/DL (ref 0–150)
TSH SERPL DL<=0.05 MIU/L-ACNC: 4.26 UIU/ML (ref 0.27–4.2)
VIT B12 BLD-MCNC: 742 PG/ML (ref 211–946)
VLDLC SERPL-MCNC: 16 MG/DL (ref 5–40)
WBC # BLD AUTO: 8.6 10*3/MM3 (ref 3.4–10.8)

## 2021-04-27 PROCEDURE — 82607 VITAMIN B-12: CPT | Performed by: INTERNAL MEDICINE

## 2021-04-27 PROCEDURE — 80061 LIPID PANEL: CPT | Performed by: INTERNAL MEDICINE

## 2021-04-27 PROCEDURE — 82728 ASSAY OF FERRITIN: CPT | Performed by: INTERNAL MEDICINE

## 2021-04-27 PROCEDURE — 84156 ASSAY OF PROTEIN URINE: CPT | Performed by: INTERNAL MEDICINE

## 2021-04-27 PROCEDURE — 36415 COLL VENOUS BLD VENIPUNCTURE: CPT | Performed by: INTERNAL MEDICINE

## 2021-04-27 PROCEDURE — 82570 ASSAY OF URINE CREATININE: CPT | Performed by: INTERNAL MEDICINE

## 2021-04-27 PROCEDURE — 84443 ASSAY THYROID STIM HORMONE: CPT | Performed by: INTERNAL MEDICINE

## 2021-04-27 PROCEDURE — 82306 VITAMIN D 25 HYDROXY: CPT | Performed by: INTERNAL MEDICINE

## 2021-04-27 PROCEDURE — 85025 COMPLETE CBC W/AUTO DIFF WBC: CPT | Performed by: INTERNAL MEDICINE

## 2021-04-27 PROCEDURE — 84466 ASSAY OF TRANSFERRIN: CPT | Performed by: INTERNAL MEDICINE

## 2021-04-27 PROCEDURE — 80053 COMPREHEN METABOLIC PANEL: CPT | Performed by: INTERNAL MEDICINE

## 2021-04-27 PROCEDURE — 83036 HEMOGLOBIN GLYCOSYLATED A1C: CPT | Performed by: INTERNAL MEDICINE

## 2021-04-27 PROCEDURE — 83540 ASSAY OF IRON: CPT | Performed by: INTERNAL MEDICINE

## 2021-04-27 PROCEDURE — 82043 UR ALBUMIN QUANTITATIVE: CPT | Performed by: INTERNAL MEDICINE

## 2021-04-28 ENCOUNTER — OFFICE VISIT (OUTPATIENT)
Dept: OTOLARYNGOLOGY | Facility: CLINIC | Age: 61
End: 2021-04-28

## 2021-04-28 VITALS — HEIGHT: 70 IN | BODY MASS INDEX: 39.2 KG/M2 | WEIGHT: 273.8 LBS | OXYGEN SATURATION: 93 % | TEMPERATURE: 98.8 F

## 2021-04-28 DIAGNOSIS — J95.09 GRANULATION TISSUE OF TRACHEAL STOMA (HCC): ICD-10-CM

## 2021-04-28 DIAGNOSIS — Z43.0 ATTENTION TO TRACHEOSTOMY (HCC): Primary | ICD-10-CM

## 2021-04-28 PROCEDURE — 99213 OFFICE O/P EST LOW 20 MIN: CPT | Performed by: OTOLARYNGOLOGY

## 2021-04-28 NOTE — PROGRESS NOTES
Subjective   Truman Esquivel is a 60 y.o. male.       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  At last visit was given a prescription for a new tracheostomy tube and has now been able to get this.  Is here to have it changed.  Also has a new Passy-Nelly valve.  Is scheduled for his regular surveillance visit in July      The following portions of the patient's history were reviewed and updated as appropriate: allergies, current medications, past family history, past medical history, past social history, past surgical history and problem list.     reports that he quit smoking about 4 years ago. He has a 47.50 pack-year smoking history. He has never used smokeless tobacco. He reports that he does not drink alcohol and does not use drugs.   Patient is not a tobacco user and has not been counseled for use of tobacco products      Review of Systems        Objective   Physical Exam  His old tracheostomy is removed and a new 6 CFN tracheostomy is placed without difficulty.  There is a granuloma on the right side of the tracheal stoma that is cauterized with silver nitrate.      Assessment/Plan   Diagnoses and all orders for this visit:    1. Attention to tracheostomy (CMS/HCC) (Primary)    2. Granulation tissue of tracheal stoma (CMS/HCC)        Plan: Trach changed as described above and granulation tissue cauterized.  Keep scheduled follow-up visit for July and call sooner for problems

## 2021-04-30 ENCOUNTER — OFFICE VISIT (OUTPATIENT)
Dept: ENDOCRINOLOGY | Facility: CLINIC | Age: 61
End: 2021-04-30

## 2021-04-30 VITALS
HEART RATE: 64 BPM | DIASTOLIC BLOOD PRESSURE: 80 MMHG | BODY MASS INDEX: 39.3 KG/M2 | SYSTOLIC BLOOD PRESSURE: 140 MMHG | OXYGEN SATURATION: 94 % | HEIGHT: 70 IN | WEIGHT: 274.5 LBS

## 2021-04-30 DIAGNOSIS — I10 ESSENTIAL HYPERTENSION: ICD-10-CM

## 2021-04-30 DIAGNOSIS — D50.8 OTHER IRON DEFICIENCY ANEMIA: ICD-10-CM

## 2021-04-30 DIAGNOSIS — R73.01 IFG (IMPAIRED FASTING GLUCOSE): ICD-10-CM

## 2021-04-30 DIAGNOSIS — E03.9 ACQUIRED HYPOTHYROIDISM: ICD-10-CM

## 2021-04-30 DIAGNOSIS — E55.9 VITAMIN D DEFICIENCY: ICD-10-CM

## 2021-04-30 DIAGNOSIS — N18.31 STAGE 3A CHRONIC KIDNEY DISEASE (HCC): Primary | ICD-10-CM

## 2021-04-30 DIAGNOSIS — E79.0 HYPERURICEMIA: ICD-10-CM

## 2021-04-30 DIAGNOSIS — E53.8 B12 DEFICIENCY: ICD-10-CM

## 2021-04-30 DIAGNOSIS — E78.5 DYSLIPIDEMIA: ICD-10-CM

## 2021-04-30 PROBLEM — D75.839 THROMBOCYTOSIS: Status: RESOLVED | Noted: 2019-12-06 | Resolved: 2021-04-30

## 2021-04-30 PROBLEM — Z87.39 HISTORY OF GOUT: Status: ACTIVE | Noted: 2021-04-30

## 2021-04-30 PROBLEM — D69.6 THROMBOCYTOPENIA (HCC): Status: RESOLVED | Noted: 2018-04-16 | Resolved: 2021-04-30

## 2021-04-30 PROBLEM — D64.9 ANEMIA: Status: RESOLVED | Noted: 2018-02-13 | Resolved: 2021-04-30

## 2021-04-30 PROCEDURE — 99214 OFFICE O/P EST MOD 30 MIN: CPT | Performed by: INTERNAL MEDICINE

## 2021-04-30 RX ORDER — SPIRONOLACTONE 25 MG/1
25 TABLET ORAL DAILY
Qty: 30 TABLET | Refills: 11 | Status: SHIPPED | OUTPATIENT
Start: 2021-04-30 | End: 2022-05-23

## 2021-04-30 RX ORDER — COLCHICINE 0.6 MG/1
TABLET ORAL
Qty: 10 TABLET | Refills: 5 | Status: SHIPPED | OUTPATIENT
Start: 2021-04-30 | End: 2021-07-20

## 2021-04-30 RX ORDER — CHOLECALCIFEROL (VITAMIN D3) 1250 MCG
CAPSULE ORAL
Qty: 3 CAPSULE | Refills: 3 | Status: SHIPPED | OUTPATIENT
Start: 2021-04-30 | End: 2022-05-09

## 2021-04-30 RX ORDER — LEVOTHYROXINE SODIUM 137 UG/1
137 TABLET ORAL DAILY
Qty: 34 TABLET | Refills: 11 | Status: SHIPPED | OUTPATIENT
Start: 2021-04-30 | End: 2022-05-03

## 2021-04-30 NOTE — PROGRESS NOTES
"  Subjective    Truman Esquivel is a 60 y.o. male. he is here today for follow-up of hypothyroidism    Hypothyroidism     Duration around 2017  after having aortic dissection.  Compliant with levothyroxine    His main symptom is swelling    He takes indomethacin for gout pain ?        Objective    /80   Pulse 64   Ht 177.8 cm (70\")   Wt 125 kg (274 lb 8 oz)   SpO2 94%   BMI 39.39 kg/m²   AOx3  No neck masses,   Tracheostomy in place  RRR  CTA  Mild edema     Results       Lab Results   Component Value Date    WBC 8.60 04/27/2021    HGB 16.0 04/27/2021    HCT 45.6 04/27/2021    MCV 84.1 04/27/2021     04/27/2021     Lab Results   Component Value Date    GLUCOSE 101 (H) 04/27/2021    BUN 16 04/27/2021    CREATININE 1.36 (H) 04/27/2021    EGFRIFNONA 53 (L) 04/27/2021    BCR 11.8 04/27/2021    K 4.8 04/27/2021    CO2 28.1 04/27/2021    CALCIUM 9.5 04/27/2021    ALBUMIN 4.50 04/27/2021    AST 17 04/27/2021    ALT 19 04/27/2021          Assessment/Plan           ICD-10-CM ICD-9-CM   1. Stage 3a chronic kidney disease (CMS/HCC)  N18.31 585.3   2. Acquired hypothyroidism  E03.9 244.9   3. Essential hypertension  I10 401.9   4. Other iron deficiency anemia  D50.8 280.8   5. Vitamin D deficiency  E55.9 268.9   6. 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 He may not need, I will measure uric acid and use colchicine if needed instead of indomethacin     CKD stage III    GFR 50s between 2019 and 2020 , " no proteinuria   Taking indomethacin , decreased to 3 x weekly, didn't like idea of tramadol since he wants anti inflammatory         IFG  Lab Results   Component Value Date    HGBA1C 5.90 (H) 04/27/2021       Lifestyle changes    Dyslipidemia    Lab Results   Component Value Date    CHOL 150 04/27/2021    TRIG 86 04/27/2021    HDL 41 04/27/2021    LDL 93 04/27/2021       On statin       Vit D Def    Once monthly 50 th units     Lab Results   Component Value Date    GAGJ23MP 38.2 04/27/2021    JQCL15PI 41.8 06/01/2020    BCDT75DW 50.2 07/18/2019      ---    Anemia of low iron   That was during cancer tx    Now improved on liquid iron - now otc ferrous sulfate 325 , 3 x weekly   Stop and reevaluate     Take apart from thyroid pill   Nl cbc , Oct 2020         4. No follow-ups on file.

## 2021-05-17 RX ORDER — METOPROLOL SUCCINATE 25 MG/1
25 TABLET, EXTENDED RELEASE ORAL
Qty: 30 TABLET | Refills: 5 | Status: SHIPPED | OUTPATIENT
Start: 2021-05-17 | End: 2021-11-15

## 2021-05-28 ENCOUNTER — TELEPHONE (OUTPATIENT)
Dept: CARDIOLOGY | Facility: CLINIC | Age: 61
End: 2021-05-28

## 2021-05-28 NOTE — TELEPHONE ENCOUNTER
Per Dr. 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      ----- Message -----  From: Sveta Gonzalez RegSched Rep  Sent: 5/27/2021   2:11 PM CDT  To: Tran Whaley CSA    Pt said he went today and had the medicare physical done, and was questioning the provider that done that if he could do a DOT physical.   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       Pt is wanting to know if this is something that could be done, and if so could he get a letter to take to get this done.      Can reach pt at [phone]

## 2021-06-04 DIAGNOSIS — D64.9 ANEMIA, UNSPECIFIED TYPE: ICD-10-CM

## 2021-06-04 DIAGNOSIS — C32.9 SQUAMOUS CELL CARCINOMA OF LARYNX (HCC): Primary | ICD-10-CM

## 2021-06-09 ENCOUNTER — LAB (OUTPATIENT)
Dept: ONCOLOGY | Facility: HOSPITAL | Age: 61
End: 2021-06-09

## 2021-06-09 ENCOUNTER — OFFICE VISIT (OUTPATIENT)
Dept: ONCOLOGY | Facility: CLINIC | Age: 61
End: 2021-06-09

## 2021-06-09 VITALS
BODY MASS INDEX: 39.63 KG/M2 | WEIGHT: 276.8 LBS | DIASTOLIC BLOOD PRESSURE: 92 MMHG | RESPIRATION RATE: 18 BRPM | TEMPERATURE: 97.8 F | SYSTOLIC BLOOD PRESSURE: 173 MMHG | HEIGHT: 70 IN | HEART RATE: 61 BPM

## 2021-06-09 DIAGNOSIS — D64.9 ANEMIA, UNSPECIFIED TYPE: Primary | ICD-10-CM

## 2021-06-09 DIAGNOSIS — D64.9 ANEMIA, UNSPECIFIED TYPE: ICD-10-CM

## 2021-06-09 DIAGNOSIS — C32.9 SQUAMOUS CELL CARCINOMA OF LARYNX (HCC): ICD-10-CM

## 2021-06-09 LAB
ALBUMIN SERPL-MCNC: 4.4 G/DL (ref 3.5–5.2)
ALBUMIN/GLOB SERPL: 1.5 G/DL
ALP SERPL-CCNC: 72 U/L (ref 39–117)
ALT SERPL W P-5'-P-CCNC: 19 U/L (ref 1–41)
ANION GAP SERPL CALCULATED.3IONS-SCNC: 7 MMOL/L (ref 5–15)
AST SERPL-CCNC: 16 U/L (ref 1–40)
BASOPHILS # BLD AUTO: 0.11 10*3/MM3 (ref 0–0.2)
BASOPHILS NFR BLD AUTO: 1 % (ref 0–1.5)
BILIRUB SERPL-MCNC: 0.4 MG/DL (ref 0–1.2)
BUN SERPL-MCNC: 17 MG/DL (ref 8–23)
BUN/CREAT SERPL: 11.9 (ref 7–25)
CALCIUM SPEC-SCNC: 9.4 MG/DL (ref 8.6–10.5)
CHLORIDE SERPL-SCNC: 105 MMOL/L (ref 98–107)
CO2 SERPL-SCNC: 30 MMOL/L (ref 22–29)
CREAT SERPL-MCNC: 1.43 MG/DL (ref 0.76–1.27)
DEPRECATED RDW RBC AUTO: 49.5 FL (ref 37–54)
EOSINOPHIL # BLD AUTO: 0.78 10*3/MM3 (ref 0–0.4)
EOSINOPHIL NFR BLD AUTO: 7.3 % (ref 0.3–6.2)
ERYTHROCYTE [DISTWIDTH] IN BLOOD BY AUTOMATED COUNT: 16 % (ref 12.3–15.4)
FERRITIN SERPL-MCNC: 97.63 NG/ML (ref 30–400)
FOLATE SERPL-MCNC: >20 NG/ML (ref 4.78–24.2)
GFR SERPL CREATININE-BSD FRML MDRD: 50 ML/MIN/1.73
GLOBULIN UR ELPH-MCNC: 3 GM/DL
GLUCOSE SERPL-MCNC: 105 MG/DL (ref 65–99)
HCT VFR BLD AUTO: 44.9 % (ref 37.5–51)
HGB BLD-MCNC: 15.3 G/DL (ref 13–17.7)
HOLD SPECIMEN: NORMAL
IMM GRANULOCYTES # BLD AUTO: 0.05 10*3/MM3 (ref 0–0.05)
IMM GRANULOCYTES NFR BLD AUTO: 0.5 % (ref 0–0.5)
IRON 24H UR-MRATE: 78 MCG/DL (ref 59–158)
IRON SATN MFR SERPL: 21 % (ref 20–50)
LYMPHOCYTES # BLD AUTO: 1.85 10*3/MM3 (ref 0.7–3.1)
LYMPHOCYTES NFR BLD AUTO: 17.3 % (ref 19.6–45.3)
MCH RBC QN AUTO: 30.1 PG (ref 26.6–33)
MCHC RBC AUTO-ENTMCNC: 34.1 G/DL (ref 31.5–35.7)
MCV RBC AUTO: 88.4 FL (ref 79–97)
MONOCYTES # BLD AUTO: 1.07 10*3/MM3 (ref 0.1–0.9)
MONOCYTES NFR BLD AUTO: 10 % (ref 5–12)
NEUTROPHILS NFR BLD AUTO: 6.83 10*3/MM3 (ref 1.7–7)
NEUTROPHILS NFR BLD AUTO: 63.9 % (ref 42.7–76)
NRBC BLD AUTO-RTO: 0 /100 WBC (ref 0–0.2)
PLATELET # BLD AUTO: 299 10*3/MM3 (ref 140–450)
PMV BLD AUTO: 10 FL (ref 6–12)
POTASSIUM SERPL-SCNC: 4.6 MMOL/L (ref 3.5–5.2)
PROT SERPL-MCNC: 7.4 G/DL (ref 6–8.5)
RBC # BLD AUTO: 5.08 10*6/MM3 (ref 4.14–5.8)
SODIUM SERPL-SCNC: 142 MMOL/L (ref 136–145)
TIBC SERPL-MCNC: 373 MCG/DL (ref 298–536)
TRANSFERRIN SERPL-MCNC: 250 MG/DL (ref 200–360)
VIT B12 BLD-MCNC: 790 PG/ML (ref 211–946)
WBC # BLD AUTO: 10.69 10*3/MM3 (ref 3.4–10.8)

## 2021-06-09 PROCEDURE — G0463 HOSPITAL OUTPT CLINIC VISIT: HCPCS | Performed by: NURSE PRACTITIONER

## 2021-06-09 PROCEDURE — 36415 COLL VENOUS BLD VENIPUNCTURE: CPT

## 2021-06-09 PROCEDURE — 82746 ASSAY OF FOLIC ACID SERUM: CPT

## 2021-06-09 PROCEDURE — 80053 COMPREHEN METABOLIC PANEL: CPT

## 2021-06-09 PROCEDURE — 85025 COMPLETE CBC W/AUTO DIFF WBC: CPT

## 2021-06-09 PROCEDURE — 99212 OFFICE O/P EST SF 10 MIN: CPT | Performed by: NURSE PRACTITIONER

## 2021-06-09 PROCEDURE — 82728 ASSAY OF FERRITIN: CPT

## 2021-06-09 PROCEDURE — 84466 ASSAY OF TRANSFERRIN: CPT

## 2021-06-09 PROCEDURE — 83540 ASSAY OF IRON: CPT

## 2021-06-09 PROCEDURE — 82607 VITAMIN B-12: CPT

## 2021-06-11 NOTE — PROGRESS NOTES
DATE OF VISIT: 6/9/2021      REASON FOR VISIT:  Squamous cell cancer of larynx, stage III, T3N0 ,anemia, thrombocytosis        HISTORY OF PRESENT ILLNESS:    60-year-old male with a past medical history significant for history of hypertension, history of CVA with residual visual disturbance on left eye, history of aortic dissection status post surgery in February 2017 was initially seen in consultation on February 13, 2018 for newly diagnosed squamous cell cancer of larynx.  Upon staging workup patient was found to have stage III squamous cell cancer of larynx for which she has been started on concurrent chemoradiation with weekly carboplatin and Taxol on March 19, 2018.  Last dose of chemotherapy and radiation was on May 15, 2018.  Patient is here for follow-up appointment today. States he feels well.  Denies any fever.  States he feels better.  Denies any active bleeding.  Denies any new lymph node enlargement. He continues to follow with Dr. Venegas at every 6 months and continues to follow with Dr. Sellers in endocrinology.         Past Medical History, Past Surgical History, Social History, Family History have been reviewed and are without significant changes except as mentioned.    Review of Systems   A comprehensive 14 point review of systems was performed and was negative except as mentioned.    Medications:  The current medication list was reviewed in the EMR    ALLERGIES:    Allergies   Allergen Reactions   • Chlorhexidine Itching     Topical cleaning wipes causes severe skin irritation   • Eggs Or Egg-Derived Products Swelling   • Erythromycin Other (See Comments)     Joint pains and cold sweats   • Gabapentin Itching   • Other GI Intolerance     Mycins  farzad lotion causes rash   • Acth [Corticotropin] Rash   • Cephalosporins Itching and Rash     Pt developed rash, itching after cefepime administration (2-3 doses) during 2/2017 admission. Itching was relieved with benadryl. Cefepime was continued because  "his infection was improving and no symptoms of anaphylaxis present   • Co Q 10 [Coenzyme Q10] Rash   • Keflex [Cephalexin] Rash   • Neomycin Rash   • Penicillins Rash     Tolerated cefepime during 2/2017 admission. Had rash and itching (relieved by benadryl) after few doses of cefepime and cefepime was continued.   • Tetracyclines & Related GI Intolerance       Objective      Vitals:    06/09/21 1048   BP: 173/92   Pulse: 61   Resp: 18   Temp: 97.8 °F (36.6 °C)   Weight: 126 kg (276 lb 12.8 oz)   Height: 177.8 cm (70\")   PainSc: 0-No pain     Current Status 6/9/2021   ECOG score 0       Physical Exam  GENERAL:  Not in any distress.     HEENT:  Normocephalic, Atraumatic.Mild Conjunctival pallor. No icterus.  No Facial Asymmetry noted.     NECK:  No adenopathy. No JVD.     RESPIRATORY:  Fair air entry bilateral. No rhonchi or wheezing.     CARDIOVASCULAR:  S1, S2. Regular rate and rhythm. No murmur or gallop appreciated.     ABDOMEN:  Soft, obese, nontender. Bowel sounds present in all four quadrants.  No organomegaly appreciated.     EXTREMITIES:  No edema.No Calf Tenderness.     NEUROLOGIC:  Alert, awake and oriented ×3.       SKIN : No new skin lesion identified    RECENT LABS:  Glucose   Date Value Ref Range Status   06/09/2021 105 (H) 65 - 99 mg/dL Final     Glucose, Arterial   Date Value Ref Range Status   07/31/2018 113 (H) 65 - 95 mmol/L Final     Sodium   Date Value Ref Range Status   06/09/2021 142 136 - 145 mmol/L Final     Potassium   Date Value Ref Range Status   06/09/2021 4.6 3.5 - 5.2 mmol/L Final     CO2   Date Value Ref Range Status   06/09/2021 30.0 (H) 22.0 - 29.0 mmol/L Final     Chloride   Date Value Ref Range Status   06/09/2021 105 98 - 107 mmol/L Final     Anion Gap   Date Value Ref Range Status   06/09/2021 7.0 5.0 - 15.0 mmol/L Final     Creatinine   Date Value Ref Range Status   06/09/2021 1.43 (H) 0.76 - 1.27 mg/dL Final     BUN   Date Value Ref Range Status   06/09/2021 17 8 - 23 mg/dL " Final     BUN/Creatinine Ratio   Date Value Ref Range Status   06/09/2021 11.9 7.0 - 25.0 Final     Calcium   Date Value Ref Range Status   06/09/2021 9.4 8.6 - 10.5 mg/dL Final     eGFR Non  Amer   Date Value Ref Range Status   06/09/2021 50 (L) >60 mL/min/1.73 Final     Alkaline Phosphatase   Date Value Ref Range Status   06/09/2021 72 39 - 117 U/L Final     Total Protein   Date Value Ref Range Status   06/09/2021 7.4 6.0 - 8.5 g/dL Final     ALT (SGPT)   Date Value Ref Range Status   06/09/2021 19 1 - 41 U/L Final     AST (SGOT)   Date Value Ref Range Status   06/09/2021 16 1 - 40 U/L Final     Total Bilirubin   Date Value Ref Range Status   06/09/2021 0.4 0.0 - 1.2 mg/dL Final     Albumin   Date Value Ref Range Status   06/09/2021 4.40 3.50 - 5.20 g/dL Final     Globulin   Date Value Ref Range Status   06/09/2021 3.0 gm/dL Final     Lab Results   Component Value Date    WBC 10.69 06/09/2021    HGB 15.3 06/09/2021    HCT 44.9 06/09/2021    MCV 88.4 06/09/2021     06/09/2021     Lab Results   Component Value Date    NEUTROABS 6.83 06/09/2021    IRON 78 06/09/2021    IRON 123 04/27/2021    IRON 153 11/30/2020    TIBC 373 06/09/2021    TIBC 404 04/27/2021    TIBC 405 11/30/2020    LABIRON 21 06/09/2021    LABIRON 30 04/27/2021    LABIRON 38 11/30/2020    FERRITIN 97.63 06/09/2021    FERRITIN 81.90 04/27/2021    FERRITIN 96.24 11/30/2020    YIHBNCDP26 790 06/09/2021    QFNCZREY83 742 04/27/2021    TWHLSOCH33 696 11/30/2020    FOLATE >20.00 06/09/2021    FOLATE >20.00 11/30/2020    FOLATE >20.00 06/01/2020     No results found for: , LABCA2, AFPTM, HCGQUANT, , CHROMGRNA, [id number], CEA, REFLABREPO            RADIOLOGY DATA :  No radiology results for the last 7 days        Assessment/Plan     Squamous cell cancer of larynx, epiglottis, stage III, 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  Based on PET/CT there is a tumor extension into preepiglottic space making a T3 lesion.  Result of PET CT were discussed with patient.  It  was discussed with patient with T3 N0 lesion after his treatment option includes either surgery or concurrent chemoradiation.    -He was started on concurrent chemoradiation on March 19, 2018.  Patient received 6 weekly carboplatin and Taxol from March 19, 2018 until May 15, 2018.  Radiation was also completed on May 15, 2018.  -He continues to follow with Dr. Venegas with ENT exam still showing good response to chemoradiation without any evidence of any active tumor. Next appt is January 2021.  -We will ask patient to return to clinic in 6 months with a repeat CBC,bmp and anemia workup to be done on that day.                         PHQ-9 Total Score: 0     Truman Esquivel reports a pain score of 0.  Given his pain assessment as noted, treatment options were discussed and the following options were decided upon as a follow-up plan to address the patient's pain: continuation of current treatment plan for pain.         Yarelis Ames, APRN  6/11/2021  09:37 CDT        Part of this note may be an electronic transcription/translation of spoken language to printed text using the Dragon Dictation System.          CC:

## 2021-06-14 RX ORDER — ATORVASTATIN CALCIUM 20 MG/1
TABLET, FILM COATED ORAL
Qty: 90 TABLET | Refills: 0 | Status: SHIPPED | OUTPATIENT
Start: 2021-06-14 | End: 2021-09-22 | Stop reason: SDUPTHER

## 2021-06-15 ENCOUNTER — TELEPHONE (OUTPATIENT)
Dept: CARDIAC SURGERY | Facility: CLINIC | Age: 61
End: 2021-06-15

## 2021-06-29 ENCOUNTER — HOSPITAL ENCOUNTER (OUTPATIENT)
Dept: CT IMAGING | Facility: HOSPITAL | Age: 61
Discharge: HOME | End: 2021-06-29
Admitting: NURSE PRACTITIONER

## 2021-06-29 DIAGNOSIS — Z87.891 PERSONAL HISTORY OF TOBACCO USE, PRESENTING HAZARDS TO HEALTH: ICD-10-CM

## 2021-06-29 PROCEDURE — 71271 CT THORAX LUNG CANCER SCR C-: CPT

## 2021-07-15 ENCOUNTER — OFFICE VISIT (OUTPATIENT)
Dept: CARDIOLOGY | Facility: CLINIC | Age: 61
End: 2021-07-15

## 2021-07-15 VITALS
DIASTOLIC BLOOD PRESSURE: 86 MMHG | WEIGHT: 283.8 LBS | OXYGEN SATURATION: 95 % | HEART RATE: 59 BPM | SYSTOLIC BLOOD PRESSURE: 158 MMHG | BODY MASS INDEX: 40.63 KG/M2 | HEIGHT: 70 IN | TEMPERATURE: 97.8 F

## 2021-07-15 DIAGNOSIS — R94.31 ABNORMAL ELECTROCARDIOGRAM (ECG) (EKG): ICD-10-CM

## 2021-07-15 DIAGNOSIS — Z86.79 H/O AORTIC DISSECTION: Primary | ICD-10-CM

## 2021-07-15 PROCEDURE — 99213 OFFICE O/P EST LOW 20 MIN: CPT | Performed by: INTERNAL MEDICINE

## 2021-07-15 NOTE — PROGRESS NOTES
Truman Esquivel  60 y.o. male    07/15/2021  Chief Complaint   Patient presents with   • Hypertension       History of Present Illness  Patient is status post aortic root dissection status post surgery in [place] by Dr. German Arreguin.  -        SUBJECTIVE  Patient is overall doing well.  He is main complaint he has has edema at the end of the day.  He is actually been up all night working and is waiting to go home to sleep.  He denies any chest pain or shortness of air.  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  He had an echo ordered by Dr. Sol but that was I do not think he ever was actually scheduled so we will schedule that.  Allergies   Allergen Reactions   • Chlorhexidine Itching     Topical cleaning wipes causes severe skin irritation   • Eggs Or Egg-Derived Products Swelling   • Erythromycin Other (See Comments)     Joint pains and cold sweats   • Gabapentin Itching   • Other GI Intolerance     Mycins  farzad lotion causes rash   • Acth [Corticotropin] Rash   • Cephalosporins Itching and Rash     Pt developed rash, itching after cefepime administration (2-3 doses) during 2/2017 admission. Itching was relieved with benadryl. Cefepime was continued because his infection was improving and no symptoms of anaphylaxis present   • Co Q 10 [Coenzyme Q10] Rash   • Keflex [Cephalexin] Rash   • Neomycin Rash   • Penicillins Rash     Tolerated cefepime during 2/2017 admission. Had rash and itching (relieved by benadryl) after few doses of cefepime and cefepime was continued.   • Tetracyclines & Related GI Intolerance         Past Medical History:   Diagnosis Date   • Aortic dissection (CMS/HCC)    • Chest pain    • Descending thoracic aortic aneurysm (CMS/HCC) 6/5/2019   • Disease of thyroid gland    • HTN (hypertension)    • Knee pain    • Sleep apnea    • Smoker    • Squamous cell carcinoma of larynx (CMS/HCC) 2/5/2018   • Squamous cell carcinoma of larynx (CMS/HCC) 2/5/2018      • Squamous cell carcinoma of larynx (CMS/HCC) 2/5/2018   • Stroke (CMS/HCC)    • Swallowing difficulty    • TIA (transient ischemic attack) 12/21/2017         Past Surgical History:   Procedure Laterality Date   • AORTA SURGERY      ruptured aorta repair   • ASCENDING ARCH/HEMIARCH REPLACEMENT N/A 2/14/2017    Procedure: INTRAOPERATIVE TRANSESOPHAGEAL ECHOCARDIOGRAM, MIDLINE STERNOTOMY, ASCENDING AORTIC  AND PROXIMAL AORTIC ARCH REPAIR WITH 26MM GRAFT, AORTIC VALVE RESUSPENSION, AORTIC ROOT REPAIR, OPEN VEIN HARVEST OF RIGHT LEG;  Surgeon: German Arreguin MD;  Location: [facility] MAIN OR;  Service:    • BRONCHOSCOPY N/A 2/17/2017    Procedure: BRONCHOSCOPY BIOPSY AT BEDSIDE WITH BAL-LEFT LOWER LOBE;  Surgeon: Trent Chaney MD;  Location: [facility] ENDOSCOPY;  Service:    • COLONOSCOPY N/A 3/7/2018    Procedure: COLONOSCOPY WITH POSSIBLE POLYPECTOMY   ( DONE IN OR WITH ENDO)      COLONOSCOPY FIRST;  Surgeon: Kris Solano MD;  Location: [facility] OR;  Service:    • DIRECT LARYNGOSCOPY, ESOPHAGOSCOPY, BRONCHOSCOPY N/A 2/5/2018    Procedure: DIRECT LARYNGOSCOPY WITH BIOPSY, ESOPHAGOSCOPY     (no bronchoscopy, no laser);  Surgeon: Joseph Venegas MD;  Location: [facility] OR;  Service:    • ENDOSCOPY W/ PEG TUBE PLACEMENT N/A 5/2/2018    Procedure: ESOPHAGOGASTRODUODENOSCOPY WITH PERCUTANEOUS ENDOSCOPIC GASTROSTOMY TUBE INSERTION;  Surgeon: Angel Maciel MD;  Location: [facility] ENDOSCOPY;  Service: Gastroenterology   • ENDOSCOPY W/ PEG TUBE PLACEMENT N/A 10/26/2018    Procedure: ESOPHAGOGASTRODUODENOSCOPY WITH PERCUTANEOUS ENDOSCOPIC GASTROSTOMY TUBE INSERTION WITH ANESTHESIA Possible open gastrostomy;  Surgeon: Kris Solano MD;  Location: [facility] OR;  Service: General   • GASTROSTOMY FEEDING TUBE INSERTION N/A 10/26/2018    Procedure: GASTROSTOMY FEEDING TUBE INSERTION;  Surgeon: Kris Solano MD;  Location: [facility] OR;  Service: General   • SPLENECTOMY N/A 7/26/2018    Procedure:  SPLENECTOMY, left chest tube placement, EXPLORATORY LAPAROTOMY, G-TUBE PALCEMENT;  Surgeon: Kris Solano MD;  Location: [facility];  Service: General   • SPLENECTOMY         • THORACOSCOPY Left 2018    Procedure: LEFT THORACOSCOPY VIDEO ASSISTED POSSIBLE THORACOTOMY possible decortication bronchoscopy;  Surgeon: Joshua Pollock MD;  Location: [facility];  Service: Cardiothoracic   • TRACHEOSTOMY  2018   • TRACHEOSTOMY N/A 2018    Procedure: TRACHEOSTOMY  local injection @ 1106 incision @ 1119;  Surgeon: Joseph Venegas MD;  Location: [facility];  Service:    • VENOUS ACCESS DEVICE (PORT) INSERTION N/A 3/7/2018    Procedure: INSERTION OF MEDIPORT     (C-ARM#1);  Surgeon: Kris Solano MD;  Location: [facility];  Service:          Family History   Problem Relation Age of Onset   • Thyroid disease Mother    • Hypertension Mother    • Hypertension Father    • Hypertension Other          Social History     Socioeconomic History   • Marital status:      Spouse name: Not on file   • Number of children: Not on file   • Years of education: Not on file   • Highest education level: Not on file   Tobacco Use   • Smoking status: Former Smoker     Packs/day: 1.25     Years: 38.00     Pack years: 47.50     Quit date: 2017     Years since quittin.4   • Smokeless tobacco: Never Used   • Tobacco comment: 2018 - Patient confirmed he ceased utilization of tobacco products 2017.   Vaping Use   • Vaping Use: Never used   Substance and Sexual Activity   • Alcohol use: No   • Drug use: No   • Sexual activity: Defer         Prior to Admission medications    Medication Sig Start Date End Date Taking? Authorizing Provider   amLODIPine (NORVASC) 10 MG tablet Take 1 tablet by mouth once daily 10/21/20  Yes Kassie Gaxiola MD   atorvastatin (LIPITOR) 20 MG tablet Take 1 tablet by mouth once daily 21  Yes Kassie Gaxiola MD   B Complex Vitamins  (VITAMIN B COMPLEX) capsule capsule Take 1 capsule by mouth Daily.   Yes Tim Tim MD   Cholecalciferol (Vitamin D3) 1.25 MG (76139 UT) capsule One capsule monthly 4/30/21  Yes Kp Oliveira MD   clopidogrel (PLAVIX) 75 MG tablet Take 75 mg by mouth Every Other Day.   Yes Tim Tim MD   colchicine 0.6 MG tablet Day of the flare you can take 2 tabs  1 hour apart and you can continue 1 tab daily therafter until flare subsides 4/30/21  Yes Kp Oliveira MD   DULoxetine (CYMBALTA) 60 MG capsule Take 60 mg by mouth Daily.   Yes ProviderTim MD   indomethacin (INDOCIN) 25 MG capsule Take 1 capsule by mouth Daily As Needed. 3/25/19  Yes Tim Tim MD   irbesartan (AVAPRO) 300 MG tablet Take 1 tablet by mouth once daily 1/12/21  Yes Kp Oliveira MD   levothyroxine (SYNTHROID, LEVOTHROID) 137 MCG tablet Take 1 tablet by mouth Daily. 1 tab daily Monday to Saturday but 2 on Sunday 4/30/21  Yes Kp Oliveira MD   metoprolol succinate XL (TOPROL-XL) 25 MG 24 hr tablet TAKE 1 TABLET BY MOUTH EVERY NIGHT AT BEDTIME 5/17/21  Yes Kassie Gaxiola MD   Multiple Vitamins-Minerals (MULTIVITAMIN ADULT PO) Take 1 tablet by mouth Daily.   Yes Tim Tim MD   POTASSIUM PO Take  by mouth.   Yes ProviderTim MD   spironolactone (Aldactone) 25 MG tablet Take 1 tablet by mouth Daily. 4/30/21 4/30/22 Yes Kp Oliveira MD   tadalafil (CIALIS) 5 MG tablet Take 5 mg by mouth Daily. 2/8/21  Yes Tim Tim MD   traMADol (ULTRAM) 50 MG tablet TAKE 1/2 (ONE-HALF) TABLET BY MOUTH bid  AS NEEDED FOR PAIN 4/19/21  Yes Kp Oliveira MD   traZODone (DESYREL) 100 MG tablet Take 100 mg by mouth Every Night.   Yes ProviderTim MD   triamcinolone (KENALOG) 0.1 % cream Apply  topically to the appropriate area as directed 2 (Two) Times a Day. 3/14/19   Joseph Venegas MD          "        OBJECTIVE    /86 (BP Location: Left arm, Patient Position: Sitting, Cuff Size: Adult)   Pulse 59   Temp 97.8 °F (36.6 °C)   Ht 177.8 cm (70\")   Wt 129 kg (283 lb 12.8 oz)   SpO2 95%   BMI 40.72 kg/m²         Review of Systems     Constitutional:  Denies recent weight loss, weight gain, fever or chills, no change in exercise tolerance     HENT:  Denies any hearing loss, epistaxis, hoarseness, or difficulty speaking.     Eyes: Wears eyeglasses or contact lenses     Respiratory:  Denies dyspnea with exertion,no cough, wheezing, or hemoptysis.     Cardiovascular: Negative for palpations, chest pain, orthopnea, PND, peripheral edema, syncope, or claudication.     Gastrointestinal:  Denies change in bowel habits, dyspepsia, ulcer disease, hematochezia, or melena.     Endocrine: Negative for cold intolerance, heat intolerance, polydipsia, polyphagia and polyuria. Denies any history of weight change, heat/cold intolerance, polydipsia, polyuria     Genitourinary: Negative.      Musculoskeletal: Denies any history of arthritic symptoms or back problems     Skin:  Denies any change in hair or nails, rashes, or skin lesions.     Allergic/Immunologic: Negative.  Negative for environmental allergies, food allergies and immunocompromised state.     Neurological:  Denies any history of recurrent headaches, strokes, TIA, or seizure disorder.     Hematological: Denies any food allergies, seasonal allergies, bleeding disorders, or lymphadenopathy.     Psychiatric/Behavioral: Denies any history of depression, substance abuse, or change in cognitive function.         Physical Exam     Constitutional: Cooperative, alert and oriented, well-developed, well-nourished, in no acute distress.     HENT:   Head: Normocephalic, normal hair patterns, no masses or tenderness.  Ears, Nose, and Throat: No gross abnormalities. No pallor or cyanosis. Dentition good.   Eyes: EOMS intact, PERRL, conjunctivae and lids unremarkable. " Fundoscopic exam and visual fields not performed.   Neck: No palpable masses or adenopathy, no thyromegaly, no JVD, carotid pulses are full and equal bilaterally and without  Bruits.     Cardiovascular: Regular rhythm, S1 and S2 normal, no S3 or S4. Apical impulse not displaced. No murmurs, gallops, or rubs detected.     Pulmonary/Chest: Chest: normal symmetry, no tenderness to palpation, normal respiratory excursion, no intercostal retraction, no use of accessory muscles.            Pulmonary: Normal breath sounds. No rales or ronchi.    Abdominal: Abdomen soft, bowel sounds normoactive, no masses, no hepatosplenomegaly, non-tender, no bruits.     Musculoskeletal: No deformities, clubbing, cyanosis, erythema, or edema observed. There are no spinal abnormalities noted. Normal muscle strength and tone. Pulses full and equal in all extremities, no bruits auscultated.     Neurological: No gross motor or sensory deficits noted, affect appropriate, oriented to time, person, place.     Skin: Warm and dry to the touch, no apparent skin lesions or masses noted.     Psychiatric: He has a normal mood and affect. His behavior is normal. Judgment and thought content normal.         Procedures      Lab Results   Component Value Date    WBC 10.69 06/09/2021    HGB 15.3 06/09/2021    HCT 44.9 06/09/2021    MCV 88.4 06/09/2021     06/09/2021     Lab Results   Component Value Date    GLUCOSE 105 (H) 06/09/2021    BUN 17 06/09/2021    CREATININE 1.43 (H) 06/09/2021    EGFRIFNONA 50 (L) 06/09/2021    BCR 11.9 06/09/2021    CO2 30.0 (H) 06/09/2021    CALCIUM 9.4 06/09/2021    ALBUMIN 4.40 06/09/2021    AST 16 06/09/2021    ALT 19 06/09/2021     Lab Results   Component Value Date    CHOL 150 04/27/2021    CHOL 123 06/01/2020     Lab Results   Component Value Date    TRIG 86 04/27/2021    TRIG 110 06/01/2020     Lab Results   Component Value Date    HDL 41 04/27/2021    HDL 41 06/01/2020     No components found for: LDLCALC  Lab  Results   Component Value Date    LDL 93 04/27/2021    LDL 60 06/01/2020     No results found for: HDLLDLRATIO  No components found for: CHOLHDL  Lab Results   Component Value Date    HGBA1C 5.90 (H) 04/27/2021     Lab Results   Component Value Date    TSH 4.260 (H) 04/27/2021           ASSESSMENT AND PLAN      Diagnoses and all orders for this visit:    1. H/O aortic dissection (Primary)  -     Adult Transthoracic Echo Complete W/ Cont if Necessary Per Protocol    2. Abnormal electrocardiogram (ECG) (EKG)   -     Adult Transthoracic Echo Complete W/ Cont if Necessary Per Protocol    Patient has a history of a reported dissection repair.  He needs an echo done to assess his LV function and his aortic valve.  We will go ahead and order this.  I told him that his swelling is most likely secondary to his amlodipine as it goes away after his goes to bed.-Always put him on some hydrochlorothiazide but he would just rather    Patient's Body mass index is 40.72 kg/m². indicating that he is morbidly obese (BMI > 40 or > 35 with obesity - related health condition). Obesity-related health conditions include the following: hypertension. Obesity is unchanged. BMI is is above average; BMI management plan is completed. We discussed portion control and increasing exercise..                Kassie Gaxiola MD  7/15/2021  11:22 CDT

## 2021-07-20 ENCOUNTER — OFFICE VISIT (OUTPATIENT)
Dept: OTOLARYNGOLOGY | Facility: CLINIC | Age: 61
End: 2021-07-20

## 2021-07-20 VITALS — HEIGHT: 70 IN | HEART RATE: 64 BPM | OXYGEN SATURATION: 93 % | WEIGHT: 280 LBS | BODY MASS INDEX: 40.09 KG/M2

## 2021-07-20 DIAGNOSIS — J37.0 CHRONIC LARYNGITIS: ICD-10-CM

## 2021-07-20 DIAGNOSIS — Z85.21 ENCOUNTER FOR FOLLOW-UP SURVEILLANCE OF LARYNGEAL CANCER: Primary | ICD-10-CM

## 2021-07-20 DIAGNOSIS — Z08 ENCOUNTER FOR FOLLOW-UP SURVEILLANCE OF LARYNGEAL CANCER: Primary | ICD-10-CM

## 2021-07-20 DIAGNOSIS — Z93.0 TRACHEOSTOMY STATUS (HCC): ICD-10-CM

## 2021-07-20 PROCEDURE — 31575 DIAGNOSTIC LARYNGOSCOPY: CPT | Performed by: OTOLARYNGOLOGY

## 2021-07-20 PROCEDURE — 99213 OFFICE O/P EST LOW 20 MIN: CPT | Performed by: OTOLARYNGOLOGY

## 2021-07-20 RX ORDER — MULTIVIT WITH MINERALS/LUTEIN
1000 TABLET ORAL DAILY
COMMUNITY

## 2021-07-20 RX ORDER — LANOLIN ALCOHOL/MO/W.PET/CERES
1000 CREAM (GRAM) TOPICAL DAILY
COMMUNITY
End: 2021-10-28

## 2021-07-21 ENCOUNTER — LAB (OUTPATIENT)
Dept: LAB | Facility: HOSPITAL | Age: 61
End: 2021-07-21

## 2021-07-21 LAB
25(OH)D3 SERPL-MCNC: 36.4 NG/ML
ALBUMIN SERPL-MCNC: 4.5 G/DL (ref 3.5–5.2)
ALBUMIN/GLOB SERPL: 2 G/DL
ALP SERPL-CCNC: 68 U/L (ref 39–117)
ALT SERPL W P-5'-P-CCNC: 19 U/L (ref 1–41)
ANION GAP SERPL CALCULATED.3IONS-SCNC: 10.7 MMOL/L (ref 5–15)
AST SERPL-CCNC: 18 U/L (ref 1–40)
BASOPHILS # BLD AUTO: 0.12 10*3/MM3 (ref 0–0.2)
BASOPHILS NFR BLD AUTO: 1.1 % (ref 0–1.5)
BH CV ECHO MEAS - ACS: 2.1 CM
BH CV ECHO MEAS - AO MAX PG (FULL): 1.8 MMHG
BH CV ECHO MEAS - AO MAX PG: 5.3 MMHG
BH CV ECHO MEAS - AO MEAN PG (FULL): 2 MMHG
BH CV ECHO MEAS - AO MEAN PG: 3 MMHG
BH CV ECHO MEAS - AO ROOT AREA (BSA CORRECTED): 1.3
BH CV ECHO MEAS - AO ROOT AREA: 8 CM^2
BH CV ECHO MEAS - AO ROOT DIAM: 3.2 CM
BH CV ECHO MEAS - AO V2 MAX: 115 CM/SEC
BH CV ECHO MEAS - AO V2 MEAN: 76.1 CM/SEC
BH CV ECHO MEAS - AO V2 VTI: 24 CM
BH CV ECHO MEAS - ASC AORTA: 3.2 CM
BH CV ECHO MEAS - BSA(HAYCOCK): 2.6 M^2
BH CV ECHO MEAS - BSA: 2.4 M^2
BH CV ECHO MEAS - BZI_BMI: 40.2 KILOGRAMS/M^2
BH CV ECHO MEAS - BZI_METRIC_HEIGHT: 177.8 CM
BH CV ECHO MEAS - BZI_METRIC_WEIGHT: 127 KG
BH CV ECHO MEAS - EDV(CUBED): 112 ML
BH CV ECHO MEAS - EDV(TEICH): 108.6 ML
BH CV ECHO MEAS - EF(CUBED): 65.5 %
BH CV ECHO MEAS - EF(TEICH): 56.9 %
BH CV ECHO MEAS - EPSS: 0.1 CM
BH CV ECHO MEAS - ESV(CUBED): 38.6 ML
BH CV ECHO MEAS - ESV(TEICH): 46.8 ML
BH CV ECHO MEAS - FS: 29.9 %
BH CV ECHO MEAS - IVS/LVPW: 0.94
BH CV ECHO MEAS - IVSD: 1.7 CM
BH CV ECHO MEAS - LV MASS(C)D: 372.9 GRAMS
BH CV ECHO MEAS - LV MASS(C)DI: 154.9 GRAMS/M^2
BH CV ECHO MEAS - LV MAX PG: 3.5 MMHG
BH CV ECHO MEAS - LV MEAN PG: 1 MMHG
BH CV ECHO MEAS - LV V1 MAX: 93.3 CM/SEC
BH CV ECHO MEAS - LV V1 MEAN: 53 CM/SEC
BH CV ECHO MEAS - LV V1 VTI: 16.8 CM
BH CV ECHO MEAS - LVIDD: 4.8 CM
BH CV ECHO MEAS - LVIDS: 3.4 CM
BH CV ECHO MEAS - LVPWD: 1.8 CM
BH CV ECHO MEAS - MV A MAX VEL: 73.3 CM/SEC
BH CV ECHO MEAS - MV DEC SLOPE: 347 CM/SEC^2
BH CV ECHO MEAS - MV E MAX VEL: 69 CM/SEC
BH CV ECHO MEAS - MV E/A: 0.94
BH CV ECHO MEAS - MV MAX PG: 3.4 MMHG
BH CV ECHO MEAS - MV MEAN PG: 1 MMHG
BH CV ECHO MEAS - MV P1/2T MAX VEL: 73.8 CM/SEC
BH CV ECHO MEAS - MV P1/2T: 62.3 MSEC
BH CV ECHO MEAS - MV V2 MAX: 92.4 CM/SEC
BH CV ECHO MEAS - MV V2 MEAN: 47 CM/SEC
BH CV ECHO MEAS - MV V2 VTI: 25.2 CM
BH CV ECHO MEAS - MVA P1/2T LCG: 3 CM^2
BH CV ECHO MEAS - MVA(P1/2T): 3.5 CM^2
BH CV ECHO MEAS - PA MAX PG (FULL): 3.2 MMHG
BH CV ECHO MEAS - PA MAX PG: 4.8 MMHG
BH CV ECHO MEAS - PA MEAN PG (FULL): 2 MMHG
BH CV ECHO MEAS - PA MEAN PG: 3 MMHG
BH CV ECHO MEAS - PA V2 MAX: 110 CM/SEC
BH CV ECHO MEAS - PA V2 MEAN: 73.8 CM/SEC
BH CV ECHO MEAS - PA V2 VTI: 19.6 CM
BH CV ECHO MEAS - PI END-D VEL: 119 CM/SEC
BH CV ECHO MEAS - RV MAX PG: 1.6 MMHG
BH CV ECHO MEAS - RV MEAN PG: 1 MMHG
BH CV ECHO MEAS - RV V1 MAX: 63.3 CM/SEC
BH CV ECHO MEAS - RV V1 MEAN: 40.2 CM/SEC
BH CV ECHO MEAS - RV V1 VTI: 13.5 CM
BH CV ECHO MEAS - RVDD: 3.4 CM
BH CV ECHO MEAS - SI(AO): 80.2 ML/M^2
BH CV ECHO MEAS - SI(CUBED): 30.5 ML/M^2
BH CV ECHO MEAS - SI(TEICH): 25.7 ML/M^2
BH CV ECHO MEAS - SV(AO): 193 ML
BH CV ECHO MEAS - SV(CUBED): 73.4 ML
BH CV ECHO MEAS - SV(TEICH): 61.8 ML
BH CV VAS BP LEFT ARM: NORMAL MMHG
BILIRUB SERPL-MCNC: 0.6 MG/DL (ref 0–1.2)
BUN SERPL-MCNC: 17 MG/DL (ref 8–23)
BUN/CREAT SERPL: 12.7 (ref 7–25)
CALCIUM SPEC-SCNC: 9.4 MG/DL (ref 8.6–10.5)
CHLORIDE SERPL-SCNC: 106 MMOL/L (ref 98–107)
CHOLEST SERPL-MCNC: 134 MG/DL (ref 0–200)
CO2 SERPL-SCNC: 24.3 MMOL/L (ref 22–29)
CREAT SERPL-MCNC: 1.34 MG/DL (ref 0.76–1.27)
DEPRECATED RDW RBC AUTO: 43.4 FL (ref 37–54)
EOSINOPHIL # BLD AUTO: 0.7 10*3/MM3 (ref 0–0.4)
EOSINOPHIL NFR BLD AUTO: 6.3 % (ref 0.3–6.2)
ERYTHROCYTE [DISTWIDTH] IN BLOOD BY AUTOMATED COUNT: 14.4 % (ref 12.3–15.4)
FERRITIN SERPL-MCNC: 78.9 NG/ML (ref 30–400)
GFR SERPL CREATININE-BSD FRML MDRD: 54 ML/MIN/1.73
GLOBULIN UR ELPH-MCNC: 2.3 GM/DL
GLUCOSE SERPL-MCNC: 102 MG/DL (ref 65–99)
HCT VFR BLD AUTO: 46.3 % (ref 37.5–51)
HDLC SERPL-MCNC: 40 MG/DL (ref 40–60)
HGB BLD-MCNC: 15.9 G/DL (ref 13–17.7)
IMM GRANULOCYTES # BLD AUTO: 0.05 10*3/MM3 (ref 0–0.05)
IMM GRANULOCYTES NFR BLD AUTO: 0.5 % (ref 0–0.5)
IRON 24H UR-MRATE: 136 MCG/DL (ref 59–158)
IRON SATN MFR SERPL: 34 % (ref 20–50)
LDLC SERPL CALC-MCNC: 75 MG/DL (ref 0–100)
LDLC/HDLC SERPL: 1.85 {RATIO}
LYMPHOCYTES # BLD AUTO: 1.65 10*3/MM3 (ref 0.7–3.1)
LYMPHOCYTES NFR BLD AUTO: 14.9 % (ref 19.6–45.3)
MCH RBC QN AUTO: 29.1 PG (ref 26.6–33)
MCHC RBC AUTO-ENTMCNC: 34.3 G/DL (ref 31.5–35.7)
MCV RBC AUTO: 84.6 FL (ref 79–97)
MONOCYTES # BLD AUTO: 1.1 10*3/MM3 (ref 0.1–0.9)
MONOCYTES NFR BLD AUTO: 10 % (ref 5–12)
NEUTROPHILS NFR BLD AUTO: 67.2 % (ref 42.7–76)
NEUTROPHILS NFR BLD AUTO: 7.42 10*3/MM3 (ref 1.7–7)
NRBC BLD AUTO-RTO: 0 /100 WBC (ref 0–0.2)
PLATELET # BLD AUTO: 320 10*3/MM3 (ref 140–450)
PMV BLD AUTO: 10.6 FL (ref 6–12)
POTASSIUM SERPL-SCNC: 4.2 MMOL/L (ref 3.5–5.2)
PROT SERPL-MCNC: 6.8 G/DL (ref 6–8.5)
RBC # BLD AUTO: 5.47 10*6/MM3 (ref 4.14–5.8)
SODIUM SERPL-SCNC: 141 MMOL/L (ref 136–145)
TIBC SERPL-MCNC: 398 MCG/DL (ref 298–536)
TRANSFERRIN SERPL-MCNC: 267 MG/DL (ref 200–360)
TRIGL SERPL-MCNC: 101 MG/DL (ref 0–150)
TSH SERPL DL<=0.05 MIU/L-ACNC: 1.92 UIU/ML (ref 0.27–4.2)
URATE SERPL-MCNC: 7.5 MG/DL (ref 3.4–7)
VIT B12 BLD-MCNC: 730 PG/ML (ref 211–946)
VLDLC SERPL-MCNC: 19 MG/DL (ref 5–40)
WBC # BLD AUTO: 11.04 10*3/MM3 (ref 3.4–10.8)

## 2021-07-21 PROCEDURE — 80053 COMPREHEN METABOLIC PANEL: CPT | Performed by: INTERNAL MEDICINE

## 2021-07-21 PROCEDURE — 82607 VITAMIN B-12: CPT | Performed by: INTERNAL MEDICINE

## 2021-07-21 PROCEDURE — 83540 ASSAY OF IRON: CPT | Performed by: INTERNAL MEDICINE

## 2021-07-21 PROCEDURE — 36415 COLL VENOUS BLD VENIPUNCTURE: CPT | Performed by: INTERNAL MEDICINE

## 2021-07-21 PROCEDURE — 80061 LIPID PANEL: CPT | Performed by: INTERNAL MEDICINE

## 2021-07-21 PROCEDURE — 85025 COMPLETE CBC W/AUTO DIFF WBC: CPT | Performed by: INTERNAL MEDICINE

## 2021-07-21 PROCEDURE — 82728 ASSAY OF FERRITIN: CPT | Performed by: INTERNAL MEDICINE

## 2021-07-21 PROCEDURE — 84550 ASSAY OF BLOOD/URIC ACID: CPT | Performed by: INTERNAL MEDICINE

## 2021-07-21 PROCEDURE — 82306 VITAMIN D 25 HYDROXY: CPT | Performed by: INTERNAL MEDICINE

## 2021-07-21 PROCEDURE — 84443 ASSAY THYROID STIM HORMONE: CPT | Performed by: INTERNAL MEDICINE

## 2021-07-21 PROCEDURE — 84466 ASSAY OF TRANSFERRIN: CPT | Performed by: INTERNAL MEDICINE

## 2021-07-22 ENCOUNTER — TELEPHONE (OUTPATIENT)
Dept: CARDIOLOGY | Facility: CLINIC | Age: 61
End: 2021-07-22

## 2021-07-22 NOTE — PROGRESS NOTES
Subjective   Truman Esquivel is a 60 y.o. male.       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  Has had a complete response to chemoradiation but remains tracheostomy dependent and has failed capping.  Is here for surveillance.  Was initially diagnosed in January 2018.  States he is having no trouble eating and swallowing.  Voice is about the same.      The following portions of the patient's history were reviewed and updated as appropriate: allergies, current medications, past family history, past medical history, past social history, past surgical history and problem list.     reports that he quit smoking about 4 years ago. He has a 47.50 pack-year smoking history. He has never used smokeless tobacco. He reports that he does not drink alcohol and does not use drugs.   Patient is not a tobacco user and has not been counseled for use of tobacco products      Review of Systems        Objective   Physical Exam  General: Male in no acute distress.  Appears well-nourished.  Voice is harsh but unchanged from previous  Ears: External ears no deformity, canals no discharge, tympanic membranes intact clear and mobile bilaterally.  Nares: Boggy mucosa septum to the right no discharge or purulence  Oral cavity: No masses or lesions  Pharynx: No erythema exudate or mass postradiation changes noted  Neck: Tracheostomy in place without granulation or infection.  Post radiation changes with no abnormal mass or lymphadenopathy    Procedure Note    Pre-operative Diagnosis:   Chief Complaint   Patient presents with   • Follow-up   Cancer surveillance  Post-operative Diagnosis: Chronic laryngitis; no evidence of recurrence    Anesthesia: topical with xylocaine and neosynephrine    Endoscopy Type:  Flexible Laryngoscopy    Procedure Details:    The patient was placed in the sitting position.  After topical anesthesia and decongestion, the 4 mm laryngoscope was  passed.  The nasal cavities, nasopharynx, oropharynx, hypopharynx, and larynx were all examined.  Vocal cords were examined during respiration and phonation.  The following findings were noted:    Findings: No masses in the nose or nasopharynx.  Tongue base shows post radiation changes.  Epiglottis shows folding and significant tissue loss with posttreatment edema.  This obstructs the supraglottic larynx anteriorly.  Posteriorly there is edema of the arytenoids and false cords.  No evidence of neoplasm.  Vocal cord mobility is intact.    Condition:  Stable.  Patient tolerated procedure well.    Complications:  None      Assessment/Plan   Diagnoses and all orders for this visit:    1. Encounter for follow-up surveillance of laryngeal cancer (Primary)    2. Chronic laryngitis    3. Tracheostomy status (CMS/[facility])        Plan: Reassured the patient that I saw no evidence of recurrent tumor.  I told him that unfortunately given the posttreatment changes in his supraglottic larynx he may be tracheostomy dependent from now on.  I told him before I would consider decannulation he would have to tolerate capping day and night for at least 2 weeks.  He tried this before and failed.  Advise maintaining tobacco abstinence and return in 6 months.

## 2021-08-11 DIAGNOSIS — E03.9 ACQUIRED HYPOTHYROIDISM: Primary | ICD-10-CM

## 2021-08-11 DIAGNOSIS — E79.0 HYPERURICEMIA: ICD-10-CM

## 2021-08-11 DIAGNOSIS — R73.01 IFG (IMPAIRED FASTING GLUCOSE): ICD-10-CM

## 2021-08-11 DIAGNOSIS — E53.8 B12 DEFICIENCY: ICD-10-CM

## 2021-08-11 DIAGNOSIS — E55.9 VITAMIN D DEFICIENCY: ICD-10-CM

## 2021-08-11 DIAGNOSIS — N18.31 STAGE 3A CHRONIC KIDNEY DISEASE (HCC): ICD-10-CM

## 2021-08-11 DIAGNOSIS — E78.5 DYSLIPIDEMIA: ICD-10-CM

## 2021-08-11 DIAGNOSIS — I10 ESSENTIAL HYPERTENSION: ICD-10-CM

## 2021-09-22 RX ORDER — ATORVASTATIN CALCIUM 20 MG/1
20 TABLET, FILM COATED ORAL DAILY
Qty: 90 TABLET | Refills: 5 | Status: SHIPPED | OUTPATIENT
Start: 2021-09-22 | End: 2023-01-06 | Stop reason: SDUPTHER

## 2021-09-27 RX ORDER — IRBESARTAN 300 MG/1
TABLET ORAL
Qty: 90 TABLET | Refills: 0 | Status: SHIPPED | OUTPATIENT
Start: 2021-09-27 | End: 2022-01-17

## 2021-10-05 ENCOUNTER — OFFICE VISIT (OUTPATIENT)
Dept: CARDIAC SURGERY | Facility: CLINIC | Age: 61
End: 2021-10-05

## 2021-10-05 VITALS — BODY MASS INDEX: 39.37 KG/M2 | HEIGHT: 70 IN | RESPIRATION RATE: 20 BRPM | WEIGHT: 275 LBS

## 2021-10-05 DIAGNOSIS — I71.019 ACUTE THORACIC AORTIC DISSECTION (HCC): Primary | ICD-10-CM

## 2021-10-05 DIAGNOSIS — I10 ESSENTIAL HYPERTENSION: ICD-10-CM

## 2021-10-05 PROCEDURE — 99442 PR PHYS/QHP TELEPHONE EVALUATION 11-20 MIN: CPT | Performed by: NURSE PRACTITIONER

## 2021-10-05 NOTE — PROGRESS NOTES
"10/5/2021      Subjective:      Marybeth Harrison DO    Chief Complaint: follow up s/p type A aortic dissection repair    History of Present Illness:       Dear Marybeth Masterson DO and Colleagues,    It was nice to see Truman Esquivel in [facility] for follow-up via telephone visit. He is a 60 y.o. male with a history of type A aortic dissection repair in Feb 2017 by Dr. Arreguin at [facility].  He had been at [place] and transferred to [place] for emergent care.  His surgery was a repair fo the ascending aorta and proximal arch dissection using a 26 mm Dacron graft in a hemiarch configuration, aortic root repair and resuspension of the aortic valve, and deep hyperthermic circulatory arrest.  Additional PMHx includes: HTN, previous tobacco abuse, laryngeal cancer--s/p tracheostomy and g-tube placement, splenectomy d/t ruptured spleen, TIAs (on Plavix). He remains with his tracheostomy but his g-tube has been removed.  He is seen today for routine follow-up for aneurysm surveillence.  The CTA of chest/abdomen/pelvis on 3/17/2021 was reviewed.  The images are not accessible for measuring.  They are coming up in a [employer] folder.  The report was reviewed and d/w pt.   Per the report:   \"Dissection flap is starting immediately at right brachiocephalic trunk bifurcation from aorta and extends into right external iliac artery. Overall the appearance of the aortic arch, ascending and descending thoracic aorta are similar to patient's previous exams. No dissection flap in the visualized right subclavian, bilateral common carotid or left subclavian artery is seen. Restricted flow in the lateral half of the dissection flap is seen especially in the descending thoracic aorta level. There is probably fenestration at the abdominal aortic level with lesser degree decreased flow in the dissection flap portion of the distal abdominal aorta.\"  He is aware that I could not measure his aorta and that I have " asked that the study be sent on a disc to [facility].  Echo 7/21/2021 showed EF 51-55%, no AI/AS and trace TR.  He denies shortness of breath, chest/back pain, numbness/tingling/pain of extremities.  No vascular deficiencies or hyperextensible or hypermobile joints are noted on exam.  The patient's family history is negative for aneurysms or dissections, negative for connective tissue disease, and negative for coronary disease in first degree family members.  He has returned to his  job in [employer].  He is so happy to be back to work, seeing his friends, and being able to eat.  He is not smoking.  He is currently in [place] and on a break while we have his office visit.      Patient Active Problem List   Diagnosis   • Essential hypertension   • Acquired hypothyroidism   • Obstructive sleep apnea of adult   • Squamous cell carcinoma of larynx (HCC)   • Absolute anemia   • Dehydration   • Tracheostomy status ([facility])   • History of gout   • Stage 3a chronic kidney disease (HCC)   • Vitamin D deficiency   • B12 deficiency   • Dyslipidemia   • IFG (impaired fasting glucose)   • Hyperuricemia       Past Medical History:   Diagnosis Date   • Aneurysm (HCC) 02/15/2017    type a aortic dissection repair   • Aortic dissection (HCC)    • Chest pain    • Descending thoracic aortic aneurysm (HCC) 6/5/2019   • Disease of thyroid gland    • HTN (hypertension)    • Knee pain    • Sleep apnea    • Smoker    • Squamous cell carcinoma of larynx (HCC) 2/5/2018   • Squamous cell carcinoma of larynx (HCC) 2/5/2018   • Squamous cell carcinoma of larynx (HCC) 2/5/2018   • Stroke ([facility])    • Swallowing difficulty    • TIA (transient ischemic attack) 12/21/2017       Past Surgical History:   Procedure Laterality Date   • AORTA SURGERY      ruptured aorta repair   • ASCENDING ARCH/HEMIARCH REPLACEMENT N/A 2/14/2017    Procedure: INTRAOPERATIVE TRANSESOPHAGEAL ECHOCARDIOGRAM, MIDLINE STERNOTOMY, ASCENDING AORTIC  AND PROXIMAL AORTIC  ARCH REPAIR WITH 26MM GRAFT, AORTIC VALVE RESUSPENSION, AORTIC ROOT REPAIR, OPEN VEIN HARVEST OF RIGHT LEG;  Surgeon: German Arreguin MD;  Location: [facility] MAIN OR;  Service:    • BRONCHOSCOPY N/A 2/17/2017    Procedure: BRONCHOSCOPY BIOPSY AT BEDSIDE WITH BAL-LEFT LOWER LOBE;  Surgeon: Trent Chaney MD;  Location: [facility] ENDOSCOPY;  Service:    • COLONOSCOPY N/A 3/7/2018    Procedure: COLONOSCOPY WITH POSSIBLE POLYPECTOMY   ( DONE IN OR WITH ENDO)      COLONOSCOPY FIRST;  Surgeon: Kris Solano MD;  Location: [facility] OR;  Service:    • DIRECT LARYNGOSCOPY, ESOPHAGOSCOPY, BRONCHOSCOPY N/A 2/5/2018    Procedure: DIRECT LARYNGOSCOPY WITH BIOPSY, ESOPHAGOSCOPY     (no bronchoscopy, no laser);  Surgeon: Joseph Venegas MD;  Location: [facility] OR;  Service:    • ENDOSCOPY W/ PEG TUBE PLACEMENT N/A 5/2/2018    Procedure: ESOPHAGOGASTRODUODENOSCOPY WITH PERCUTANEOUS ENDOSCOPIC GASTROSTOMY TUBE INSERTION;  Surgeon: Angel Maciel MD;  Location: [facility] ENDOSCOPY;  Service: Gastroenterology   • ENDOSCOPY W/ PEG TUBE PLACEMENT N/A 10/26/2018    Procedure: ESOPHAGOGASTRODUODENOSCOPY WITH PERCUTANEOUS ENDOSCOPIC GASTROSTOMY TUBE INSERTION WITH ANESTHESIA Possible open gastrostomy;  Surgeon: Kris Solano MD;  Location: [facility] OR;  Service: General   • GASTROSTOMY FEEDING TUBE INSERTION N/A 10/26/2018    Procedure: GASTROSTOMY FEEDING TUBE INSERTION;  Surgeon: Kris Solano MD;  Location: [facility] OR;  Service: General   • SPLENECTOMY N/A 7/26/2018    Procedure: SPLENECTOMY, left chest tube placement, EXPLORATORY LAPAROTOMY, G-TUBE PALCEMENT;  Surgeon: Kris Solano MD;  Location: [facility] OR;  Service: General   • SPLENECTOMY      2018   • THORACOSCOPY Left 7/31/2018    Procedure: LEFT THORACOSCOPY VIDEO ASSISTED POSSIBLE THORACOTOMY possible decortication bronchoscopy;  Surgeon: Joshua Pollock MD;  Location: [facility] OR;  Service: Cardiothoracic   • TRACHEOSTOMY  02/05/2018    • TRACHEOSTOMY N/A 2/5/2018    Procedure: TRACHEOSTOMY  local injection @ 1106 incision @ 1119;  Surgeon: Joseph Venegas MD;  Location: [facility] OR;  Service:    • VENOUS ACCESS DEVICE (PORT) INSERTION N/A 3/7/2018    Procedure: INSERTION OF MEDIPORT     (C-ARM#1);  Surgeon: Kris Solano MD;  Location: [facility];  Service:        Allergies   Allergen Reactions   • Chlorhexidine Itching     Topical cleaning wipes causes severe skin irritation   • Eggs Or Egg-Derived Products Swelling   • Erythromycin Other (See Comments)     Joint pains and cold sweats   • Gabapentin Itching   • Other GI Intolerance     Mycins  farzad lotion causes rash   • Acth [Corticotropin] Rash   • Cephalosporins Itching and Rash     Pt developed rash, itching after cefepime administration (2-3 doses) during 2/2017 admission. Itching was relieved with benadryl. Cefepime was continued because his infection was improving and no symptoms of anaphylaxis present   • Co Q 10 [Coenzyme Q10] Rash   • Keflex [Cephalexin] Rash   • Neomycin Rash   • Penicillins Rash     Tolerated cefepime during 2/2017 admission. Had rash and itching (relieved by benadryl) after few doses of cefepime and cefepime was continued.   • Tetracyclines & Related GI Intolerance         Current Outpatient Medications:   •  amLODIPine (NORVASC) 10 MG tablet, Take 1 tablet by mouth once daily, Disp: 90 tablet, Rfl: 11  •  atorvastatin (LIPITOR) 20 MG tablet, Take 1 tablet by mouth Daily., Disp: 90 tablet, Rfl: 5  •  B Complex Vitamins (VITAMIN B COMPLEX) capsule capsule, Take 1 capsule by mouth Daily., Disp: , Rfl:   •  Cholecalciferol (Vitamin D3) 1.25 MG (84742 UT) capsule, One capsule monthly, Disp: 3 capsule, Rfl: 3  •  clopidogrel (PLAVIX) 75 MG tablet, Take 75 mg by mouth Every Other Day., Disp: , Rfl:   •  DULoxetine (CYMBALTA) 60 MG capsule, Take 60 mg by mouth Daily., Disp: , Rfl:   •  irbesartan (AVAPRO) 300 MG tablet, Take 1 tablet by mouth once daily,  Disp: 90 tablet, Rfl: 0  •  levothyroxine (SYNTHROID, LEVOTHROID) 137 MCG tablet, Take 1 tablet by mouth Daily. 1 tab daily Monday to Saturday but 2 on , Disp: 34 tablet, Rfl: 11  •  metoprolol succinate XL (TOPROL-XL) 25 MG 24 hr tablet, TAKE 1 TABLET BY MOUTH EVERY NIGHT AT BEDTIME, Disp: 30 tablet, Rfl: 5  •  Multiple Vitamins-Minerals (MULTIVITAMIN ADULT PO), Take 1 tablet by mouth Daily., Disp: , Rfl:   •  POTASSIUM PO, Take  by mouth., Disp: , Rfl:   •  spironolactone (Aldactone) 25 MG tablet, Take 1 tablet by mouth Daily., Disp: 30 tablet, Rfl: 11  •  tadalafil (CIALIS) 5 MG tablet, Take 5 mg by mouth Daily., Disp: , Rfl:   •  traMADol (ULTRAM) 50 MG tablet, TAKE 1/2 (ONE-HALF) TABLET BY MOUTH bid  AS NEEDED FOR PAIN, Disp: 30 tablet, Rfl: 5  •  traZODone (DESYREL) 100 MG tablet, Take 100 mg by mouth Every Night., Disp: , Rfl:   •  vitamin B-12 (CYANOCOBALAMIN) 1000 MCG tablet, Take 1,000 mcg by mouth Daily., Disp: , Rfl:   •  vitamin E 1000 UNIT capsule, Take 1,000 Units by mouth Daily., Disp: , Rfl:     Social History     Socioeconomic History   • Marital status:      Spouse name: Not on file   • Number of children: Not on file   • Years of education: Not on file   • Highest education level: Not on file   Tobacco Use   • Smoking status: Former Smoker     Packs/day: 1.25     Years: 38.00     Pack years: 47.50     Quit date: 2017     Years since quittin.6   • Smokeless tobacco: Never Used   • Tobacco comment: 2018 - Patient confirmed he ceased utilization of tobacco products 2017.   Vaping Use   • Vaping Use: Never used   Substance and Sexual Activity   • Alcohol use: No   • Drug use: No   • Sexual activity: Defer       Family History   Problem Relation Age of Onset   • Thyroid disease Mother    • Hypertension Mother    • Hypertension Father    • Hypertension Other            Review of Systems:  Review of Systems   Constitutional: Negative for fatigue and fever.    Respiratory: Negative for cough and shortness of breath.    Cardiovascular: Negative for chest pain, palpitations and leg swelling.   Gastrointestinal: Negative for abdominal pain.   Musculoskeletal: Negative for back pain.   Skin: Negative for rash and wound.   Neurological: Negative for dizziness, weakness and light-headedness.   Psychiatric/Behavioral: The patient is not nervous/anxious.        Physical Exam:    Vital Signs:  Weight: 125 kg (275 lb)   Body mass index is 39.46 kg/m².                Physical Exam  Constitutional:       General: He is awake.   Neurological:      Mental Status: He is alert.   Psychiatric:         Attention and Perception: Attention and perception normal.         Mood and Affect: Mood and affect normal.         Speech: Speech normal.         Behavior: Behavior normal. Behavior is cooperative.         Thought Content: Thought content normal.         Cognition and Memory: Cognition and memory normal.         Judgment: Judgment normal.     **Physical exam limited d/t telephone visit       Assessment:     1.  Type A aortic dissection with moderate aortic insufficiency--s/p repair of aorta and proximal arch dissection with hemiarch configuration, aortic root repair and resuspension of the aortic valve--per Dr. Arreguin 2/2017, per report stable  2.  HTN  3.  Hx laryngeal cancer with tracheostomy/g-tube, chemotherapy, & XRT--followed by ENT  4.  Plavix use d/t TIA hx  5.  CKD stage 3--stable    Recommendation/Plan:       Continued risk factor modification of hypertension with a goal blood pressure less than 130/80, hyperlipidemia optimization, and continued avoidance of tobacco/nicotine products.    We discussed the importance of avoidance of over the counter decongestants and stimulants, specifically pseudoephedrine, for hypertension and aneurysm management.    Initiation of low aerobic exercise is indicated to help reduce body habitus, assist with blood pressure and cholesterol control.        Although risk of rupture is low, emergency department presentation is warranted for acute chest, back, or abdominal pain, syncope, or stroke like symptoms.    Follow up in Aorta Clinic in one year(s) with CTA chest.  Office to obtain disc with images to independently measure aorta and review.    This patient has consented to a telehealth visit via [employer]. The visit was scheduled as a telephone visit to comply with patient safety concerns in accordance with [facility] recommendations and pt living in [facility].  All vitals recorded within this visit are reported by the patient.  I spent 50 minutes in total including but not limited to the 18 minutes spent in direct conversation with this patient.   I was located in [place], [place] at [facility] office.  Pt was in [facility] [facility].    Thank you for allowing us to participate in his care.    Regards,    Joleen Dumont, ROWAN      Addendum:  CD obtained with CT imaging.  Ascending aorta measures 4 cm, chronic dissection flap noted, DTA measures 3.4 cm.  Pt was notified that imaging had been reviewed and stable.    **All problems and history are new to this examiner.  Extensive review of records prior to and during patient visit

## 2021-10-13 ENCOUNTER — TELEPHONE (OUTPATIENT)
Dept: CARDIAC SURGERY | Facility: CLINIC | Age: 61
End: 2021-10-13

## 2021-10-13 NOTE — TELEPHONE ENCOUNTER
After Joleen DONAHUE reviewed recent imaging I called Mr Esquivel and let him know we had received the disc and that Joleen had reviewed it and was in agreement with the radiologist report He will be seen in office next year with a CT scan which he is agreeable with

## 2021-10-20 ENCOUNTER — LAB (OUTPATIENT)
Dept: LAB | Facility: HOSPITAL | Age: 61
End: 2021-10-20

## 2021-10-27 RX ORDER — TRAMADOL HYDROCHLORIDE 50 MG/1
TABLET ORAL
Qty: 30 TABLET | Refills: 5 | Status: SHIPPED | OUTPATIENT
Start: 2021-10-27 | End: 2022-06-27

## 2021-10-28 ENCOUNTER — OFFICE VISIT (OUTPATIENT)
Dept: ENDOCRINOLOGY | Facility: CLINIC | Age: 61
End: 2021-10-28

## 2021-10-28 ENCOUNTER — MEDICATION THERAPY MANAGEMENT (OUTPATIENT)
Dept: PHARMACY | Facility: HOSPITAL | Age: 61
End: 2021-10-28

## 2021-10-28 VITALS
WEIGHT: 290 LBS | OXYGEN SATURATION: 95 % | HEART RATE: 64 BPM | DIASTOLIC BLOOD PRESSURE: 80 MMHG | HEIGHT: 72 IN | SYSTOLIC BLOOD PRESSURE: 138 MMHG | BODY MASS INDEX: 39.28 KG/M2

## 2021-10-28 DIAGNOSIS — E55.9 VITAMIN D DEFICIENCY: ICD-10-CM

## 2021-10-28 DIAGNOSIS — E11.65 TYPE 2 DIABETES MELLITUS WITH HYPERGLYCEMIA, WITHOUT LONG-TERM CURRENT USE OF INSULIN (HCC): ICD-10-CM

## 2021-10-28 DIAGNOSIS — I10 ESSENTIAL HYPERTENSION: ICD-10-CM

## 2021-10-28 DIAGNOSIS — N18.31 STAGE 3A CHRONIC KIDNEY DISEASE (HCC): ICD-10-CM

## 2021-10-28 DIAGNOSIS — E03.9 ACQUIRED HYPOTHYROIDISM: Primary | ICD-10-CM

## 2021-10-28 DIAGNOSIS — E78.5 DYSLIPIDEMIA: ICD-10-CM

## 2021-10-28 DIAGNOSIS — E79.0 HYPERURICEMIA: ICD-10-CM

## 2021-10-28 DIAGNOSIS — E53.8 B12 DEFICIENCY: ICD-10-CM

## 2021-10-28 PROCEDURE — 99214 OFFICE O/P EST MOD 30 MIN: CPT | Performed by: INTERNAL MEDICINE

## 2021-10-28 RX ORDER — METFORMIN HYDROCHLORIDE 500 MG/1
TABLET, EXTENDED RELEASE ORAL
Qty: 120 TABLET | Refills: 11 | Status: SHIPPED | OUTPATIENT
Start: 2021-10-28 | End: 2022-04-28

## 2021-10-28 RX ORDER — SEMAGLUTIDE 1.34 MG/ML
0.5 INJECTION, SOLUTION SUBCUTANEOUS WEEKLY
Qty: 1.5 ML | Refills: 11 | Status: SHIPPED | OUTPATIENT
Start: 2021-10-28 | End: 2021-10-28 | Stop reason: SDUPTHER

## 2021-10-28 RX ORDER — CALCIUM CARBONATE 260MG(650)
TABLET,CHEWABLE ORAL
COMMUNITY

## 2021-10-28 RX ORDER — SEMAGLUTIDE 1.34 MG/ML
0.5 INJECTION, SOLUTION SUBCUTANEOUS WEEKLY
Qty: 1.5 ML | Refills: 11 | Status: SHIPPED | OUTPATIENT
Start: 2021-10-28 | End: 2022-04-28

## 2021-10-28 RX ORDER — MULTIVIT WITH MINERALS/LUTEIN
1000 TABLET ORAL DAILY
COMMUNITY

## 2021-10-28 NOTE — PROGRESS NOTES
"  Subjective    Truman Esquivel is a 60 y.o. male. he is here today for follow-up of hypothyroidism    Hypothyroidism     Duration around 2017  after having aortic dissection.  Compliant with levothyroxine    His main symptom is swelling    He takes indomethacin for gout pain ?        Objective    /80   Pulse 64   Ht 182.9 cm (72\")   Wt 132 kg (290 lb)   SpO2 95%   BMI 39.33 kg/m²   AOx3  No neck masses,   Tracheostomy in place  RRR  CTA  Mild edema     Results       Lab Results   Component Value Date    WBC 11.04 (H) 07/21/2021    HGB 15.9 07/21/2021    HCT 46.3 07/21/2021    MCV 84.6 07/21/2021     07/21/2021     Lab Results   Component Value Date    GLUCOSE 102 (H) 07/21/2021    BUN 17 07/21/2021    CREATININE 1.34 (H) 07/21/2021    EGFRIFNONA 54 (L) 07/21/2021    BCR 12.7 07/21/2021    K 4.2 07/21/2021    CO2 24.3 07/21/2021    CALCIUM 9.4 07/21/2021    ALBUMIN 4.50 07/21/2021    AST 18 07/21/2021    ALT 19 07/21/2021          Assessment/Plan           ICD-10-CM ICD-9-CM   1. Acquired hypothyroidism  E03.9 244.9   2. Essential hypertension  I10 401.9   3. Dyslipidemia  E78.5 272.4   4. IFG (impaired fasting glucose)  R73.01 790.21   5. Stage 3a chronic kidney disease (HCC)  N18.31 585.3   6. Vitamin D deficiency  E55.9 268.9   7. B12 deficiency  E53.8 266.2   8. Hyperuricemia  E79.0 790.6       Hypothyroidism after aortic dissection  Lab Results   Component Value Date    TSH 1.920 07/21/2021        synthroid was taking it with iron but now on empty stomach    Brand name synthroid 137 , 8 tabs per week working        Essential HTN - Edema - h o gout - CKD stage III  Lab Results   Component Value Date    URICACID 7.5 (H) 07/21/2021       Avapro, amlodipine, max dose     metoprolol 25 and HR 60s     Aldactone 25 mg daily     Ozempic, weight loss, will aid in BP control         CKD stage III    GFR 50s between 2019 and 2021 , no proteinuria     52 to 56 on GFR   Taking indomethacin in the past " but I stopped and changed to tramadol, renal function improved         Diabetes      Lab Results   Component Value Date    HGBA1C 5.90 (H) 04/27/2021       Metformin !    ozempic that will aid in weight loss       Dyslipidemia    Lab Results   Component Value Date    CHOL 134 07/21/2021    TRIG 101 07/21/2021    HDL 40 07/21/2021    LDL 75 07/21/2021       On statin       Vit D Def    Once monthly 50 th units     Lab Results   Component Value Date    FHCJ60NU 36.4 07/21/2021    HCZK55SY 38.2 04/27/2021    BOWL03YY 41.8 06/01/2020      ---    Anemia of low iron   That was during cancer tx    Now improved on liquid iron - now otc ferrous sulfate 325 , 3 x weekly   Stop and reevaluate     Take apart from thyroid pill   Nl cbc , Oct 2021        4. No follow-ups on file.

## 2021-10-28 NOTE — PROGRESS NOTES
[facility] Pharmacy Visit - Endocrinology    Truman Esquivel is a 60 y.o. male seen by Endocrinology and assessed by the [facility]ication Management Clinic Pharmacist.     Truman Esquivel was introduced to the [facility] and allergies/PMH/medications were reviewed.       Past Medical History:   Diagnosis Date   • Aneurysm (HCC) 02/15/2017    type a aortic dissection repair   • Aortic dissection (HCC)    • Chest pain    • Descending thoracic aortic aneurysm (HCC) 2019   • Disease of thyroid gland    • HTN (hypertension)    • Knee pain    • Sleep apnea    • Smoker    • Squamous cell carcinoma of larynx (HCC) 2018   • Squamous cell carcinoma of larynx (HCC) 2018   • Squamous cell carcinoma of larynx (HCC) 2018   • Stroke (HCC)    • Swallowing difficulty    • TIA (transient ischemic attack) 2017     Social History     Socioeconomic History   • Marital status:    Tobacco Use   • Smoking status: Former Smoker     Packs/day: 1.25     Years: 38.00     Pack years: 47.50     Quit date: 2017     Years since quittin.7   • Smokeless tobacco: Never Used   • Tobacco comment: 2018 - Patient confirmed he ceased utilization of tobacco products 2017.   Vaping Use   • Vaping Use: Never used   Substance and Sexual Activity   • Alcohol use: No   • Drug use: No   • Sexual activity: Defer       Medication Allergies:  Chlorhexidine, Eggs or egg-derived products, Erythromycin, Gabapentin, Other, Acth [corticotropin], Cephalosporins, Co q 10 [coenzyme q10], Keflex [cephalexin], Neomycin, Penicillins, and Tetracyclines & related      Current Outpatient Medications:   •  amLODIPine (NORVASC) 10 MG tablet, Take 1 tablet by mouth once daily, Disp: 90 tablet, Rfl: 11  •  atorvastatin (LIPITOR) 20 MG tablet, Take 1 tablet by mouth Daily., Disp: 90 tablet, Rfl: 5  •  B Complex Vitamins (VITAMIN B COMPLEX) capsule capsule, Take 1 capsule by mouth Daily., Disp: , Rfl:    •  Cholecalciferol (Vitamin D3) 1.25 MG (58262 UT) capsule, One capsule monthly, Disp: 3 capsule, Rfl: 3  •  clopidogrel (PLAVIX) 75 MG tablet, Take 75 mg by mouth Every Other Day., Disp: , Rfl:   •  DULoxetine (CYMBALTA) 60 MG capsule, Take 60 mg by mouth Daily., Disp: , Rfl:   •  ECHINACEA PO, Take  by mouth Daily., Disp: , Rfl:   •  irbesartan (AVAPRO) 300 MG tablet, Take 1 tablet by mouth once daily, Disp: 90 tablet, Rfl: 0  •  levothyroxine (SYNTHROID, LEVOTHROID) 137 MCG tablet, Take 1 tablet by mouth Daily. 1 tab daily Monday to Saturday but 2 on Sunday, Disp: 34 tablet, Rfl: 11  •  Magnesium Citrate 100 MG tablet, Take  by mouth., Disp: , Rfl:   •  metFORMIN ER (Glucophage XR) 500 MG 24 hr tablet, 2 tabs twice daily with meals , generic ok, Disp: 120 tablet, Rfl: 11  •  metoprolol succinate XL (TOPROL-XL) 25 MG 24 hr tablet, TAKE 1 TABLET BY MOUTH EVERY NIGHT AT BEDTIME, Disp: 30 tablet, Rfl: 5  •  Multiple Vitamins-Minerals (MULTIVITAMIN ADULT PO), Take 1 tablet by mouth Daily., Disp: , Rfl:   •  POTASSIUM PO, Take  by mouth., Disp: , Rfl:   •  Semaglutide,0.25 or 0.5MG/DOS, (Ozempic, 0.25 or 0.5 MG/DOSE,) 2 MG/1.5ML solution pen-injector, Inject 0.5 mg under the skin into the appropriate area as directed 1 (One) Time Per Week., Disp: 1.5 mL, Rfl: 11  •  spironolactone (Aldactone) 25 MG tablet, Take 1 tablet by mouth Daily., Disp: 30 tablet, Rfl: 11  •  tadalafil (CIALIS) 5 MG tablet, Take 5 mg by mouth Daily., Disp: , Rfl:   •  traMADol (ULTRAM) 50 MG tablet, TAKE 1/2 (ONE-HALF) TABLET BY MOUTH TWICE DAILY AS NEEDED FOR PAIN, Disp: 30 tablet, Rfl: 5  •  traZODone (DESYREL) 100 MG tablet, Take 100 mg by mouth Every Night., Disp: , Rfl:   •  vitamin C (ASCORBIC ACID) 250 MG tablet, Take 1,000 mg by mouth Daily. takes 2000mg daily, Disp: , Rfl:   •  vitamin E 1000 UNIT capsule, Take 1,000 Units by mouth Daily., Disp: , Rfl:     Labs:  There were no vitals filed for this visit.  There were no vitals filed  for this visit.  Lab Results   Component Value Date    HGBA1C 5.90 (H) 04/27/2021     Lab Results   Component Value Date    GLUCOSE 102 (H) 07/21/2021    CALCIUM 9.4 07/21/2021     07/21/2021    K 4.2 07/21/2021    CO2 24.3 07/21/2021     07/21/2021    BUN 17 07/21/2021    CREATININE 1.34 (H) 07/21/2021    EGFRIFNONA 54 (L) 07/21/2021    BCR 12.7 07/21/2021    ANIONGAP 10.7 07/21/2021     Lab Results   Component Value Date    CHOL 134 07/21/2021    TRIG 101 07/21/2021    HDL 40 07/21/2021    LDL 75 07/21/2021       Visit Summary:  Patient was not interested in our service at this time  He did not trust our price compared to [facility]  I went ahead and switched his ozempic to [facility] as was his wish    Medication Education on Specialty Medication:  Ozempic (semaglutide)   • Discussed the medication's MOA and that it helps you feel full, helps your body release more insulin and helps your body make less sugar after meals.  • Administer by subcutaneous injection into the abdomen, thigh, or upper arm on the same day each week without regard to food. Rotate injection sites weekly if injecting in the same area of the body and use a new needle every time Ozempic is used. Do not mix with other products.   o For each new prefilled pen, prime the needle before the first injection by turning the dose selector to the flow check symbol and injecting into the air (priming is not required for subsequent injections). Once injected, continue to depress the button until the dial has returned to 0 and for an additional 6 seconds. Then, remove the needle and discard in sharps container.  • Unopened pens should be stored in refrigerator, opened pens may be stored in refrigerator or at room temperature for up to 56 days  • If you miss a dose, take it as soon as you remember and then get back on schedule  o If it has been > 5 days, skip that dose and get back on your regular schedule allowing at least 48 hours between 2 doses.    o Never double up doses to make up for a missed dose  • Nausea, vomiting, and diarrhea are most common when first starting Ozempic, but they should decrease over time  • Make sure to eat smaller meals throughout the day versus larger meals and this should help with GI symptoms as well.        Plan:  1. The patient was provided medication education via verbal and written information. Patient expressed understanding and had no further questions at this time.  2. Recommendations: we will continue to follow and help if we can at his next appointment   3. Discussed the [facility] pharmacy services that are available including monitoring of refills, prior authorizations, and copay coupon cards, as applicable, as well as mail-order as needed.  4. Care Coordinator, Patrick Blackman, will assist with the transfer of prescriptions as applicable.  5. Contact information for the pharmacy team was provided to the patient.      Daren Del Angel, PharmD  10/28/2021  10:29 CDT

## 2021-11-11 ENCOUNTER — SPECIALTY PHARMACY (OUTPATIENT)
Dept: PHARMACY | Facility: HOSPITAL | Age: 61
End: 2021-11-11

## 2021-11-15 RX ORDER — METOPROLOL SUCCINATE 25 MG/1
TABLET, EXTENDED RELEASE ORAL
Qty: 30 TABLET | Refills: 0 | Status: SHIPPED | OUTPATIENT
Start: 2021-11-15 | End: 2021-12-22

## 2021-12-09 ENCOUNTER — LAB (OUTPATIENT)
Dept: ONCOLOGY | Facility: HOSPITAL | Age: 61
End: 2021-12-09

## 2021-12-09 ENCOUNTER — OFFICE VISIT (OUTPATIENT)
Dept: ONCOLOGY | Facility: CLINIC | Age: 61
End: 2021-12-09

## 2021-12-09 VITALS
OXYGEN SATURATION: 91 % | HEART RATE: 67 BPM | WEIGHT: 287.6 LBS | SYSTOLIC BLOOD PRESSURE: 147 MMHG | DIASTOLIC BLOOD PRESSURE: 89 MMHG | BODY MASS INDEX: 39.01 KG/M2 | RESPIRATION RATE: 18 BRPM | TEMPERATURE: 97.3 F

## 2021-12-09 DIAGNOSIS — C32.9 SQUAMOUS CELL CARCINOMA OF LARYNX (HCC): Primary | ICD-10-CM

## 2021-12-09 DIAGNOSIS — D64.9 ANEMIA, UNSPECIFIED TYPE: ICD-10-CM

## 2021-12-09 LAB
BASOPHILS # BLD AUTO: 0.13 10*3/MM3 (ref 0–0.2)
BASOPHILS NFR BLD AUTO: 1.1 % (ref 0–1.5)
DEPRECATED RDW RBC AUTO: 45 FL (ref 37–54)
EOSINOPHIL # BLD AUTO: 0.56 10*3/MM3 (ref 0–0.4)
EOSINOPHIL NFR BLD AUTO: 4.5 % (ref 0.3–6.2)
ERYTHROCYTE [DISTWIDTH] IN BLOOD BY AUTOMATED COUNT: 14.6 % (ref 12.3–15.4)
FERRITIN SERPL-MCNC: 80.36 NG/ML (ref 30–400)
HCT VFR BLD AUTO: 46.2 % (ref 37.5–51)
HGB BLD-MCNC: 15.6 G/DL (ref 13–17.7)
IMM GRANULOCYTES # BLD AUTO: 0.07 10*3/MM3 (ref 0–0.05)
IMM GRANULOCYTES NFR BLD AUTO: 0.6 % (ref 0–0.5)
IRON 24H UR-MRATE: 120 MCG/DL (ref 59–158)
IRON SATN MFR SERPL: 31 % (ref 20–50)
LYMPHOCYTES # BLD AUTO: 1.63 10*3/MM3 (ref 0.7–3.1)
LYMPHOCYTES NFR BLD AUTO: 13.2 % (ref 19.6–45.3)
MCH RBC QN AUTO: 28.9 PG (ref 26.6–33)
MCHC RBC AUTO-ENTMCNC: 33.8 G/DL (ref 31.5–35.7)
MCV RBC AUTO: 85.6 FL (ref 79–97)
MONOCYTES # BLD AUTO: 1.1 10*3/MM3 (ref 0.1–0.9)
MONOCYTES NFR BLD AUTO: 8.9 % (ref 5–12)
NEUTROPHILS NFR BLD AUTO: 71.7 % (ref 42.7–76)
NEUTROPHILS NFR BLD AUTO: 8.89 10*3/MM3 (ref 1.7–7)
NRBC BLD AUTO-RTO: 0 /100 WBC (ref 0–0.2)
PLATELET # BLD AUTO: 330 10*3/MM3 (ref 140–450)
PMV BLD AUTO: 9.9 FL (ref 6–12)
RBC # BLD AUTO: 5.4 10*6/MM3 (ref 4.14–5.8)
TIBC SERPL-MCNC: 389 MCG/DL (ref 298–536)
TRANSFERRIN SERPL-MCNC: 261 MG/DL (ref 200–360)
WBC NRBC COR # BLD: 12.38 10*3/MM3 (ref 3.4–10.8)

## 2021-12-09 PROCEDURE — 83540 ASSAY OF IRON: CPT

## 2021-12-09 PROCEDURE — 84466 ASSAY OF TRANSFERRIN: CPT

## 2021-12-09 PROCEDURE — 85025 COMPLETE CBC W/AUTO DIFF WBC: CPT

## 2021-12-09 PROCEDURE — 82728 ASSAY OF FERRITIN: CPT

## 2021-12-09 PROCEDURE — G0463 HOSPITAL OUTPT CLINIC VISIT: HCPCS | Performed by: NURSE PRACTITIONER

## 2021-12-09 PROCEDURE — 99212 OFFICE O/P EST SF 10 MIN: CPT | Performed by: NURSE PRACTITIONER

## 2021-12-09 NOTE — PROGRESS NOTES
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  Upon staging workup patient was found to have stage III squamous cell cancer of larynx for which she has been started on concurrent chemoradiation with weekly carboplatin and Taxol on March 19, 2018.  Last dose of chemotherapy and radiation was on May 15, 2018.   Patient continues to follow with Dr. Venegas in ENT clinic. He is back to work driving trucks. Feeling well.        Past Medical History, Past Surgical History, Social History, Family History have been reviewed and are without significant changes except as mentioned.    Review of Systems   A comprehensive 14 point review of systems was performed and was negative except as mentioned.    Medications:  The current medication list was reviewed in the EMR    ALLERGIES:    Allergies   Allergen Reactions   • Chlorhexidine Itching     Topical cleaning wipes causes severe skin irritation   • Eggs Or Egg-Derived Products Swelling   • Erythromycin Other (See Comments)     Joint pains and cold sweats   • Gabapentin Itching   • Other GI Intolerance     Mycins  farzad lotion causes rash   • Acth [Corticotropin] Rash   • Cephalosporins Itching and Rash     Pt developed rash, itching after cefepime administration (2-3 doses) during 2/2017 admission. Itching was relieved with benadryl. Cefepime was continued because his infection was improving and no symptoms of anaphylaxis present   • Co Q 10 [Coenzyme Q10] Rash   • Keflex [Cephalexin] Rash   • Neomycin Rash   • Penicillins Rash     Tolerated cefepime during  2/2017 admission. Had rash and itching (relieved by benadryl) after few doses of cefepime and cefepime was continued.   • Tetracyclines & Related GI Intolerance       Objective      Vitals:    12/09/21 1035   BP: 147/89   Pulse: 67   Resp: 18   Temp: 97.3 °F (36.3 °C)   SpO2: 91%   Weight: 130 kg (287 lb 9.6 oz)   PainSc: 0-No pain     Current Status 6/9/2021   ECOG score 0       Physical Exam    GENERAL:  Not in any distress.     HEENT:  Normocephalic, Atraumatic.Mild Conjunctival pallor. No icterus.  No Facial Asymmetry noted.     NECK:  tracheostomy still in place.     RESPIRATORY:  Fair air entry bilateral. No rhonchi or wheezing.     CARDIOVASCULAR:  S1, S2. Regular rate and rhythm. No murmur or gallop appreciated.     ABDOMEN:  Soft, obese, nontender. Bowel sounds present in all four quadrants.  No organomegaly appreciated.     EXTREMITIES:  No edema.No Calf Tenderness.     NEUROLOGIC:  Alert, awake and oriented ×3.       SKIN : No new skin lesion identified    I have reexamined the patient and the results are consistent with the previously documented exam. ROWAN Watkins   RECENT LABS:  Glucose   Date Value Ref Range Status   07/21/2021 102 (H) 65 - 99 mg/dL Final     Glucose, Arterial   Date Value Ref Range Status   07/31/2018 113 (H) 65 - 95 mmol/L Final     Sodium   Date Value Ref Range Status   07/21/2021 141 136 - 145 mmol/L Final     Potassium   Date Value Ref Range Status   07/21/2021 4.2 3.5 - 5.2 mmol/L Final     CO2   Date Value Ref Range Status   07/21/2021 24.3 22.0 - 29.0 mmol/L Final     Chloride   Date Value Ref Range Status   07/21/2021 106 98 - 107 mmol/L Final     Anion Gap   Date Value Ref Range Status   07/21/2021 10.7 5.0 - 15.0 mmol/L Final     Creatinine   Date Value Ref Range Status   07/21/2021 1.34 (H) 0.76 - 1.27 mg/dL Final     BUN   Date Value Ref Range Status   07/21/2021 17 8 - 23 mg/dL Final     BUN/Creatinine Ratio   Date Value Ref Range Status   07/21/2021 12.7 7.0 -  25.0 Final     Calcium   Date Value Ref Range Status   07/21/2021 9.4 8.6 - 10.5 mg/dL Final     eGFR Non  Amer   Date Value Ref Range Status   07/21/2021 54 (L) >60 mL/min/1.73 Final     Alkaline Phosphatase   Date Value Ref Range Status   07/21/2021 68 39 - 117 U/L Final     Total Protein   Date Value Ref Range Status   07/21/2021 6.8 6.0 - 8.5 g/dL Final     ALT (SGPT)   Date Value Ref Range Status   07/21/2021 19 1 - 41 U/L Final     AST (SGOT)   Date Value Ref Range Status   07/21/2021 18 1 - 40 U/L Final     Total Bilirubin   Date Value Ref Range Status   07/21/2021 0.6 0.0 - 1.2 mg/dL Final     Albumin   Date Value Ref Range Status   07/21/2021 4.50 3.50 - 5.20 g/dL Final     Globulin   Date Value Ref Range Status   07/21/2021 2.3 gm/dL Final     Lab Results   Component Value Date    WBC 12.38 (H) 12/09/2021    HGB 15.6 12/09/2021    HCT 46.2 12/09/2021    MCV 85.6 12/09/2021     12/09/2021     Lab Results   Component Value Date    NEUTROABS 8.89 (H) 12/09/2021    IRON 120 12/09/2021    IRON 136 07/21/2021    IRON 78 06/09/2021    TIBC 389 12/09/2021    TIBC 398 07/21/2021    TIBC 373 06/09/2021    LABIRON 31 12/09/2021    LABIRON 34 07/21/2021    LABIRON 21 06/09/2021    FERRITIN 80.36 12/09/2021    FERRITIN 78.90 07/21/2021    FERRITIN 97.63 06/09/2021    PPQUQWNW69 730 07/21/2021    PQMWMYJI13 790 06/09/2021    APFXCFPY38 742 04/27/2021    FOLATE >20.00 06/09/2021    FOLATE >20.00 11/30/2020    FOLATE >20.00 06/01/2020     No results found for: , LABCA2, AFPTM, HCGQUANT, , CHROMGRNA, 7MCFR13ARQ, CEA, REFLABREPO      RADIOLOGY DATA :  No radiology results for the last 7 days        Assessment/Plan     Squamous cell cancer of larynx, epiglottis, stage III, 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  Based on PET/CT there is a tumor extension into preepiglottic space making a T3 lesion.  Result of PET CT were discussed with patient.  It was discussed with patient with T3 N0 lesion after his treatment option includes  either surgery or concurrent chemoradiation.    -He was started on concurrent chemoradiation on March 19, 2018.  Patient received 6 weekly carboplatin and Taxol from March 19, 2018 until May 15, 2018.  Radiation was also completed on May 15, 2018.  -He continues to follow with Dr. Venegas with ENT exam still showing good response to chemoradiation without any evidence of any active tumor. Next appt is January 2021.  -Labs reviewed today and clinically patient is doing well, he will return to our clinic in 6 months with repeat CBC, CMP and anemia work-up.           PHQ-9 Total Score: 0     Trumanrhoda Price Alvin reports a pain score of 0.  Given his pain assessment as noted, treatment options were discussed and the following options were decided upon as a follow-up plan to address the patient's pain: no pain today.         Yarelis Ames, APRN  12/9/2021  13:45 CST        Part of this note may be an electronic transcription/translation of spoken language to printed text using the Dragon Dictation System.          CC:

## 2021-12-22 RX ORDER — METOPROLOL SUCCINATE 25 MG/1
TABLET, EXTENDED RELEASE ORAL
Qty: 30 TABLET | Refills: 5 | Status: SHIPPED | OUTPATIENT
Start: 2021-12-22 | End: 2022-06-20

## 2022-01-13 NOTE — PROGRESS NOTES
01/13/22 1223   Anxiety Tool LEONEL - 7   Feeling Nervous, Anxious, or on Edge 2 - More than half the days   Not Being Able to Stop or Control Worrying 3 - Nearly every day   Worrying Too Much About Different Things 3 - Nearly every day   Trouble Relaxing 3 - Nearly every day   Being So Restless it is Hard to Sit Still 1 - Several days   Becoming Easily Annoyed or Irritable 2 - More than half the days   Feeling Afraid as if Something Awful Might Happen 2 - More than half the days   Total Score 16      GENERAL SURGERY PROGRESS NOTE  Chief Complaint:  Surgery Follow up   LOS: 19 days       Subjective     Interval History:     Feels well, PT has recommended home PT    Objective     Vital Signs  Temp:  [96.7 °F (35.9 °C)-97.8 °F (36.6 °C)] 96.7 °F (35.9 °C)  Heart Rate:  [53-75] 61  Resp:  [18-19] 18  BP: (118-142)/(61-71) 142/69    Physical Exam:   Abdomen soft, incision intact  Labs:  Lab Results (last 24 hours)     Procedure Component Value Units Date/Time    Fungus Culture - Tissue, Lung, L [[id number]] Collected:  07/31/18 1628    Specimen:  Tissue from Lung, L Updated:  08/14/18 1731     Fungus Culture No fungus isolated at 2 weeks    AFB Culture - Tissue, Lung, L [[id number]]  (Normal) Collected:  07/31/18 1628    Specimen:  Tissue from Lung, L Updated:  08/14/18 1731     AFB Culture No AFB isolated at 2 weeks     AFB Stain No acid fast bacilli seen on concentrated smear    POC Glucose Once [[id number]]  (Normal) Collected:  08/14/18 1621    Specimen:  Blood Updated:  08/14/18 1649     Glucose 90 mg/dL      Comment: RN NotifiedOperator: [id number] LoHaria ID: [id number]       Fungus Culture - Body Fluid, Lung, L [[id number]] Collected:  07/31/18 1625    Specimen:  Body Fluid from Lung, L Updated:  08/14/18 1645     Fungus Culture No fungus isolated at 2 weeks    AFB Culture - Body Fluid, Lung, L [[id number]]  (Normal) Collected:  07/31/18 1625    Specimen:  Body Fluid from Lung, L Updated:  08/14/18 1645     AFB Culture No AFB isolated at 2 weeks     AFB Stain No acid fast bacilli seen on concentrated smear    POC Glucose Once [[id number]]  (Abnormal) Collected:  08/14/18 1026    Specimen:  Blood Updated:  08/14/18 1101     Glucose 147 (H) mg/dL      Comment: RN NotifiedOperator: [id number] [employer]ter[employer]eter ID: [id number]       POC Glucose Once [[id number]]  (Normal) Collected:  08/14/18 0718    Specimen:  Blood Updated:  08/14/18 1054     Glucose 105 mg/dL      Comment: RN NotifiedOperator:  [id number] [facility]: [id number]       Basic Metabolic Panel [[id number]]  (Abnormal) Collected:  08/14/18 0700    Specimen:  Blood Updated:  08/14/18 0817     Glucose 94 mg/dL      BUN 16 mg/dL      Creatinine 0.77 mg/dL      Sodium 134 (L) mmol/L      Potassium 4.1 mmol/L      Chloride 99 mmol/L      CO2 30.0 mmol/L      Calcium 8.2 (L) mg/dL      eGFR Non African Amer 104 mL/min/1.73      BUN/Creatinine Ratio 20.8     Anion Gap 5.0 mmol/L     CBC (No Diff) [[id number]]  (Abnormal) Collected:  08/14/18 0700    Specimen:  Blood Updated:  08/14/18 0813     WBC 9.41 10*3/mm3      RBC 2.94 (L) 10*6/mm3      Hemoglobin 8.1 (L) g/dL      Hematocrit 25.1 (L) %      MCV 85.4 fL      MCH 27.6 pg      MCHC 32.3 g/dL      RDW 16.7 (H) %      RDW-SD 52.4 (H) fl      MPV 9.6 fL      Platelets 579 (H) 10*3/mm3     POC Glucose Once [[id number]]  (Abnormal) Collected:  08/13/18 2119    Specimen:  Blood Updated:  08/14/18 0718     Glucose 143 (H) mg/dL      Comment: RN NotifiedOperator: [id number] AZUL Vallecillo ID: [id number]              Results Review:     Labs and imaging for today were reviewed.    Assessment/Plan     Truman Esquivel is a 57 y.o. male who is s/p splenectomy, left decortication.      Anticipate home tomorrow with home PT.          This document has been electronically signed by Kris Solano MD on August 14, 2018 6:26 PM        Kris Solano MD  08/14/18  6:26 PM

## 2022-01-16 DIAGNOSIS — I10 ESSENTIAL HYPERTENSION: ICD-10-CM

## 2022-01-17 RX ORDER — IRBESARTAN 300 MG/1
TABLET ORAL
Qty: 90 TABLET | Refills: 0 | Status: SHIPPED | OUTPATIENT
Start: 2022-01-17 | End: 2022-05-03

## 2022-01-17 RX ORDER — AMLODIPINE BESYLATE 10 MG/1
TABLET ORAL
Qty: 90 TABLET | Refills: 3 | Status: SHIPPED | OUTPATIENT
Start: 2022-01-17 | End: 2023-02-13 | Stop reason: SDUPTHER

## 2022-01-18 ENCOUNTER — OFFICE VISIT (OUTPATIENT)
Dept: OTOLARYNGOLOGY | Facility: CLINIC | Age: 62
End: 2022-01-18

## 2022-01-18 VITALS — WEIGHT: 283 LBS | HEIGHT: 72 IN | HEART RATE: 68 BPM | BODY MASS INDEX: 38.33 KG/M2 | OXYGEN SATURATION: 95 %

## 2022-01-18 DIAGNOSIS — Z85.21 ENCOUNTER FOR FOLLOW-UP SURVEILLANCE OF LARYNGEAL CANCER: Primary | ICD-10-CM

## 2022-01-18 DIAGNOSIS — Z93.0 TRACHEOSTOMY STATUS: ICD-10-CM

## 2022-01-18 DIAGNOSIS — J37.0 CHRONIC LARYNGITIS: ICD-10-CM

## 2022-01-18 DIAGNOSIS — Z08 ENCOUNTER FOR FOLLOW-UP SURVEILLANCE OF LARYNGEAL CANCER: Primary | ICD-10-CM

## 2022-01-18 PROCEDURE — 99213 OFFICE O/P EST LOW 20 MIN: CPT | Performed by: OTOLARYNGOLOGY

## 2022-01-18 PROCEDURE — 31575 DIAGNOSTIC LARYNGOSCOPY: CPT | Performed by: OTOLARYNGOLOGY

## 2022-01-19 NOTE — PROGRESS NOTES
Subjective   Truman Esquivel is a 61 y.o. male.       History of Present Illness   Patient is here for cancer surveillance.  Has a history of a large supraglottic tumor that was treated with chemoradiation with a complete response.  Remains tracheostomy dependent and has failed capping in the past.  Uses a Passy-Nelly valve.  Original diagnosis was January 2018.  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  Remains tobacco abstinent.      The following portions of the patient's history were reviewed and updated as appropriate: allergies, current medications, past family history, past medical history, past social history, past surgical history and problem list.     reports that he quit smoking about 4 years ago. He has a 47.50 pack-year smoking history. He has never used smokeless tobacco. He reports that he does not drink alcohol and does not use drugs.   Patient is not a tobacco user and has not been counseled for use of tobacco products      Review of Systems        Objective   Physical Exam  General: Voice is harsh but no breathiness and his speech is intelligible  Ears: No discharge  Nares: No discharge or purulence  Oral cavity: Lips and gums without lesions.  Tongue and floor of mouth without lesions.  Parotid and submandibular ducts unobstructed.  No mucosal lesions on the buccal mucosa or vestibule of the mouth.  Pharynx: No erythema exudate or mass  Neck: Tracheostomy in place without granulation tissue.  Radiation changes are noted abnormal mass or lymphadenopathy  Procedure Note    Pre-operative Diagnosis:   Chief Complaint   Patient presents with   • Cancer Surveillance       Post-operative Diagnosis: No evidence of recurrent disease; chronic laryngitis    Anesthesia: topical with xylocaine and neosynephrine    Endoscopy Type:  Flexible Laryngoscopy    Procedure Details:    The patient was placed in the sitting position.   After topical anesthesia and decongestion, the 4 mm laryngoscope was passed.  The nasal cavities, nasopharynx, oropharynx, hypopharynx, and larynx were all examined.  Vocal cords were examined during respiration and phonation.  The following findings were noted:    Findings: No masses in the nose or nasopharynx.  Tongue base shows posttreatment changes.  Epiglottis is malformed and edematous consistent with posttreatment changes.  Significant edema of the arytenoids and aryepiglottic folds create substantial supraglottic airway obstruction.  There is no evidence of endolaryngeal neoplasm and vocal cord mobility is intact.    Condition:  Stable.  Patient tolerated procedure well.    Complications:  None      Assessment/Plan   Diagnoses and all orders for this visit:    1. Encounter for follow-up surveillance of laryngeal cancer (Primary)    2. Chronic laryngitis    3. Tracheostomy status (HCC)      Plan: Reassurance to the patient that I see no evidence of recurrent malignancy.  Unfortunately the anatomic changes following treatment are causing significant supraglottic airway narrowing and I suspect he will remain tracheostomy dependent.  He should maintain tobacco abstinence and return in 6 months for ongoing surveillance, call sooner for problems.

## 2022-02-10 NOTE — THERAPY TREATMENT NOTE
Ongoing SW/CM Assessment/Plan of Care Note     See SW/CM flowsheets for goals and other objective data.    Patient/Family discharge goal (s):             PT Recommendation:  Recommendation for Discharge: PT IL: Patient requires  intermittent assistance to perform mobility and/or ADLs safely, Patient is appropriate for Physical Therapy 1-3 times per week    OT Recommendation:       SLP Recommendation:       Disposition:  Planned Discharge Destination: Home/apartment with Spouse/SO    Progress note:   Contacted patient with assist of [employer]  [id number] to discuss home health recommendation. Patient repeatedly stated \"I am not leaving the hospital\" and would not discuss discharge plan. Contacted primary contact/son in law [facility] home health. Edgard approves home health and requests writer to obtain Polish speaking agency name from Dr. Giron. Edgard states that patient is normally pretty independent at home with his spouse. He states daughter will be visiting daily when patient is discharged. S/w Dr. Giron. Referral sent to [facility].        Outpatient Speech Language Pathology   Adult Swallow Treatment Note  [facility]     Patient Name: Truman Esquivel  : 1960  MRN: [id number]  Today's Date: 2018         Visit Date: 2018     Patient has a hx of Laryngeal cancer; Stage III (cT3, cN0, cM0) with dysphagia. Patient currently has a trach. He does not use passy thaddeus valve but does have one.       Patient was seen this date following initial evaluation on 2018 at which time a safe swallowing protocol was developed for patient. SLP recommended chin tuck, upright position, slow rate, small bites/sips, cyclic eating, avoid mixed consistencies/crumbly foods. Patient verbalized compliance with recommendations. Patient stated that he continues to have trouble with his cereal in the mornings. Pt stated that he knew it was a mixed consistency but he does not want to give it up. SLP reviewed safe swallow strategies with patient. Patient was given frosted flakes this date with chocolate milk. SLP provided consistent verbal cue for chin tuck. Education on placement of chin and position provided. Pt was in agreement. With the use of strategies pt presented with no difficulties with cereal and had no removal of food from trach. Trach was cleaned following PO and nothing was present on trach. Education provided for importance of strategies. Pt agreed.        Patient would benefit from skilled speech therapy 2x a month for 4 weeks for increased swallowing safety and preservation of swallowing function following completion of radiation and chemo as well as diet upgrade and increasing safety.     Pt was in agreement.     Agnes Barone MS CCC-SLP          Hx of current dysphagia:     2018 - MBS was completed which showed aspiration and penetration on most trials as well as a discontinuation of assessment due to increased pain (see MBS for full report). NPO was recommended and pt received a PEG tube for nutrition and hydration. Pt is  maintaining weight with PEG tube and is following recommendations of NPO with small teaspoon sips throughout the day following oral care.      6/8/2018 - MBS was completed with no aspiration noted. Laryngeal penetration noted on large thin sip from cup rim. Piece meal noted for solid trials with recommendations of small bites. Decreased epiglottic formation and movement. Recommendation of increasing PO intake with follow-up with OP SLP and RD.      9/13/2018 - MBS was completed that reveled aspiration on thin liquids during 1 trial as well as penetration present. Chin tuck was recommended as well as safe swallow strategies of slow rate, small bites/sips, multiple swallows, no straw, upright position, cyclic eating. Outpatient speech therapy was recommended for education on safe swallow strategies and use of chin tuck to decrease risk of aspiration.             Patient Active Problem List   Diagnosis   • Ascending aortic dissection (CMS/HCC)   • Essential hypertension   • Deep vein thrombosis (DVT) of femoral vein of both lower extremities (CMS/HCC)   • Hypothyroidism   • Snoring   • Acute pain of right knee   • Knee effusion, right   • Knee pain   • Obstructive sleep apnea of adult   • TIA (transient ischemic attack)   • Dysphagia   • Squamous cell carcinoma of larynx (CMS/HCC)   • Pharyngeal dysphagia   • Anemia   • Abnormal positron emission tomography (PET) of colon   • Hypokalemia   • Encounter for venous access device care   • Dehydration   • Weight loss, abnormal   • Odynophagia   • Thrush, oral   • Thrombocytopenia (CMS/HCC)   • Pancytopenia due to antineoplastic chemotherapy (CMS/HCC)   • Unable to eat   • Leukocytosis   • Surgical aftercare, respiratory system   • Hemothorax on left        Visit Dx:    ICD-10-CM ICD-9-CM   1. Oropharyngeal dysphagia R13.12 787.22             SLP Adult Swallow Evaluation - 09/24/18 1009        Rehab Evaluation    Document Type therapy note (daily note)  -KAYLEY Issa  Information no complaints  -EA    Patient Observations alert;cooperative;agree to therapy  -EA    Patient/Family Observations Pt stated that he is doing well with all food except cereal   -EA    Patient Effort good  -EA    Symptoms Noted During/After Treatment none  -EA       General Information    Patient Profile Reviewed yes  -EA    Current Method of Nutrition soft textures;thin liquids  -EA    Precautions/Limitations, Vision WFL with corrective lenses  -EA    Precautions/Limitations, Hearing WFL  -EA    Prior Level of Function-Communication WFL  -EA    Prior Level of Function-Swallowing no diet consistency restrictions;mechanical soft textures  -EA    Plans/Goals Discussed with patient  -EA    Barriers to Rehab none identified  -EA       Pain Scale: Numbers Pre/Post-Treatment    Pain Scale: Numbers, Pretreatment 0/10 - no pain  -EA    Pain Scale: Numbers, Post-Treatment 0/10 - no pain  -EA    Pain Location - Side --  -EA    Pain Location - Orientation --  -EA    Pain Location --  -EA       Oral Motor and Function    Dentition Assessment natural, present and adequate;other (see comments)  -EA    Secretion Management WNL/WFL  -EA    Mucosal Quality moist, healthy  -EA    Volitional Swallow delayed  -EA       Oral Musculature and Cranial Nerve Assessment    Oral Motor General Assessment WFL  -EA       General Eating/Swallowing Observations    Respiratory Support Currently in Use trach collar  -EA    Eating/Swallowing Skills self-fed  -EA    Positioning During Eating upright 90 degree;upright in chair  -EA    Utensils Used spoon  -EA    Consistencies Trialed thin liquids  -EA       Respiratory    Respiratory Status room air  -EA       Clinical Swallow Eval    Oral Prep Phase impaired  -EA    Oral Transit impaired  -EA    Oral Residue WFL  -EA    Pharyngeal Phase suspected pharyngeal impairment  -EA    Esophageal Phase unremarkable  -EA       Oral Prep Concerns    Oral Prep Concerns prolonged mastication;increased  prep time  -EA    Prolonged Mastication thin;pudding  -EA    Increased Prep Time thin;pudding  -EA       Oral Transit Concerns    Oral Transit Concerns delayed initiation of bolus transit  -EA    Delayed Intiation of Bolus Transit thin;pudding  -EA       Pharyngeal Phase Concerns    Pharyngeal Phase Concerns cough  -EA    Cough thin;pudding  -EA       Clinical Impression    SLP Swallowing Diagnosis mild-moderate;oral dysfunction;suspected pharyngeal dysfunction  -EA    Functional Impact risk of malnutrition;risk of aspiration/pneumonia  -EA    Rehab Potential/Prognosis, Swallowing good, to achieve stated therapy goals  -EA    Swallow Criteria for Skilled Therapeutic Interventions Met demonstrates skilled criteria  -EA       Recommendations    Therapy Frequency (Swallow) 1 day per week  -EA    Predicted Duration Therapy Intervention (Days) until discharge  -EA    SLP Diet Recommendation mechanical soft with no mixed consistencies;thin liquids  -EA    Recommended Precautions and Strategies upright posture during/after eating;small bites of food and sips of liquid;multiple swallows per bite of food;multiple swallows per sip of liquid;hard swallow with each bite or sip  -EA    Monitor for Signs of Aspiration yes;notify SLP if any concerns  -EA    Anticipated Dischage Disposition home  -EA       Oral Nutrition/Hydration Goal 1 (SLP)    Oral Nutrition/Hydration Goal 1, SLP Patient will tolerate least restrictive diet with no overt s/s of aspiration using safe swallow protocol.   -EA    Time Frame (Oral Nutrition/Hydration Goal 1, SLP) by discharge  -EA    Barriers (Oral Nutrition/Hydration Goal 1, SLP) laryngeal cancer   -EA    Progress/Outcomes (Oral Nutrition/Hydration Goal 1, SLP) goal ongoing  -EA       Pharyngeal Strengthening Exercise Goal 1 (SLP)    Activity (Pharyngeal Strengthening Goal 1, SLP) increase timing;increase tongue base retraction  -EA    Increase Timing ingrid;hard effortful swallow;prepping - 3  second prep or suck swallow or 3-step swallow   OME set - Prophylactic swallowing exercises   -EA    Increase Tongue Base Retraction ingrid;hard effortful swallow;prepping - 3 second prep or suck swallow or 3-step swallow  -EA    [place]/Accuracy (Pharyngeal Strengthening Goal 1, SLP) independently (over 90% accuracy)  -EA    Time Frame (Pharyngeal Strengthening Goal 1, SLP) by discharge  -EA    Barriers (Pharyngeal Strengthening Goal 1, SLP) laryngeal cancer   -EA    Progress/Outcomes (Pharyngeal Strengthening Goal 1, SLP) goal met  -EA       Improve oral skills    To Improve Oral Skills, patient will: Increase tongue A-P movement;90%  -EA       Improve timing of pharyngeal response    To improve timing of pharyngeal response, patient will: Swallow in timely way using three second prep;90%   Chin Tuck for all PO  -EA    Status: Improve timing of pharyngeal response Progressing as expected  -EA    Timing of Pharyngeal Response Progress 70%;with consistent cues  -EA    Comments: Improve timing of pharyngeal response Pt required verbal cue for chin tuck and limiting talking during meals. Pt was in agreement.   -EA       Oral Motor Structure and Function    Dysphagia Treatment Objectives Improve oral skills;Improve timing of pharyngeal response  -EA      User Key  (r) = Recorded By, (t) = Taken By, (c) = Cosigned By    Initials Name Provider Type    Agnes Damian MS CCC-SLP Speech and Language Pathologist                              SLP OP Goals     Row Name 09/24/18 1228          SLP Time Calculation    SLP Goal Re-Cert Due Date 10/17/18  -EA       User Key  (r) = Recorded By, (t) = Taken By, (c) = Cosigned By    Initials Name Provider Type    Agnes Damian MS CCC-SLP Speech and Language Pathologist                OP SLP Education     Row Name 09/24/18 1000       Education    Barriers to Learning No barriers identified  -EA    Education Provided Patient demonstrated recommended  strategies;Patient requires further education on strategies, risks  -EA    Assessed Learning needs;Learning motivation  -EA    Learning Motivation Strong  -EA    Learning Method Explanation;Demonstration;Written materials  -EA    Teaching Response Verbalized understanding;Demonstrated understanding  -EA    Education Comments SLP reviewed recommendations for diet and safe swallow strategies. Pt was in agreement.   -EA      User Key  (r) = Recorded By, (t) = Taken By, (c) = Cosigned By    Initials Name Effective Dates    Agnes Damian MS CCC-SLP 10/17/16 -                 OP SLP Assessment/Plan - 09/24/18 1000        SLP Assessment    Functional Problems Swallowing  -EA    Impact on Function: Swallowing Risk of aspiration  -EA    Clinical Impression: Swallowing Mild:;oropharyngeal phase dysphagia  -EA    Functional Problems Comment MBS reviewed; strategies reviewed; education provided on anatomy and chin tuck  -EA    Clinical Impression Comments Pt was in agreement. Pt requires consistent cues for chin tuck   -EA    SLP Diagnosis Dysphagia   -EA    Prognosis Good (comment)  -EA    Patient/caregiver participated in establishment of treatment plan and goals Yes  -EA    Patient would benefit from skilled therapy intervention Yes  -EA       SLP Plan    Frequency 2x a month   -EA    Duration 3 weeks   -EA    Planned CPT's? SLP SWALLOW THERAPY: [id number]  -EA    Expected Duration Therapy Session - minutes 30-45 minutes  -EA    Plan Comments Continue   -EA      User Key  (r) = Recorded By, (t) = Taken By, (c) = Cosigned By    Initials Name Provider Type    Agnes Damian MS CCC-SLP Speech and Language Pathologist                Time Calculation:   SLP Start Time: 1009  SLP Stop Time: 1054  SLP Time Calculation (min): 45 min  Total Timed Code Minutes- SLP: 45 minute(s)    Therapy Charges for Today     Code Description Service Date Service Provider Modifiers Qty    [id number]  ST TREATMENT SWALLOW 3 9/24/2018  Agnes Barone, MS CCC-SLP GN 1                   Agnes Barone MS CCC-SLP  9/24/2018

## 2022-02-17 ENCOUNTER — OFFICE VISIT (OUTPATIENT)
Dept: CARDIOLOGY | Facility: CLINIC | Age: 62
End: 2022-02-17

## 2022-02-17 VITALS
TEMPERATURE: 96.8 F | HEART RATE: 77 BPM | BODY MASS INDEX: 39.04 KG/M2 | DIASTOLIC BLOOD PRESSURE: 72 MMHG | WEIGHT: 288.2 LBS | OXYGEN SATURATION: 97 % | SYSTOLIC BLOOD PRESSURE: 130 MMHG | HEIGHT: 72 IN

## 2022-02-17 DIAGNOSIS — Z86.79 H/O THORACIC AORTIC ANEURYSM REPAIR: Primary | ICD-10-CM

## 2022-02-17 DIAGNOSIS — Z86.79 H/O AORTIC DISSECTION: ICD-10-CM

## 2022-02-17 DIAGNOSIS — Z98.890 H/O THORACIC AORTIC ANEURYSM REPAIR: Primary | ICD-10-CM

## 2022-02-17 DIAGNOSIS — I72.1 ANEURYSM OF ARTERY OF UPPER EXTREMITY: ICD-10-CM

## 2022-02-17 PROCEDURE — 99214 OFFICE O/P EST MOD 30 MIN: CPT | Performed by: INTERNAL MEDICINE

## 2022-02-17 PROCEDURE — 93000 ELECTROCARDIOGRAM COMPLETE: CPT | Performed by: INTERNAL MEDICINE

## 2022-02-17 NOTE — PROGRESS NOTES
Truman Esquivel  61 y.o. male    02/17/2022  Chief Complaint   Patient presents with   • H/O aortic dissection     Chief Complaint       History of Present Illness    Patient was status post repair of aortic dissection, aortic valve re suspension by Dr. Tirado in [place]  -        SUBJECTIVE  Patient is actually feeling well.  He is having no complaints.  He gets annual CTs of the aortic arch.  He is not having any chest pain or shortness of air.  He says he feels as good now as he ever does.  His only complaint today is of a head cold.  Allergies   Allergen Reactions   • Chlorhexidine Itching     Topical cleaning wipes causes severe skin irritation   • Eggs Or Egg-Derived Products Swelling   • Erythromycin Other (See Comments)     Joint pains and cold sweats   • Gabapentin Itching   • Other GI Intolerance     Mycins  farzad lotion causes rash   • Acth [Corticotropin] Rash   • Cephalosporins Itching and Rash     Pt developed rash, itching after cefepime administration (2-3 doses) during 2/2017 admission. Itching was relieved with benadryl. Cefepime was continued because his infection was improving and no symptoms of anaphylaxis present   • Co Q 10 [Coenzyme Q10] Rash   • Keflex [Cephalexin] Rash   • Neomycin Rash   • Penicillins Rash     Tolerated cefepime during 2/2017 admission. Had rash and itching (relieved by benadryl) after few doses of cefepime and cefepime was continued.   • Tetracyclines & Related GI Intolerance         Past Medical History:   Diagnosis Date   • Aneurysm ([facility]) 02/15/2017    type a aortic dissection repair   • Aortic dissection ([facility])    • Chest pain    • Descending thoracic aortic aneurysm ([facility]) 6/5/2019   • Disease of thyroid gland    • HTN (hypertension)    • Knee pain    • Sleep apnea    • Smoker    • Squamous cell carcinoma of larynx ([facility]) 2/5/2018   • Squamous cell carcinoma of larynx ([facility]) 2/5/2018   • Squamous cell carcinoma of larynx ([facility]) 2/5/2018   • Stroke ([facility])    •  Swallowing difficulty    • TIA (transient ischemic attack) 12/21/2017         Past Surgical History:   Procedure Laterality Date   • AORTA SURGERY      ruptured aorta repair   • ASCENDING ARCH/HEMIARCH REPLACEMENT N/A 2/14/2017    Procedure: INTRAOPERATIVE TRANSESOPHAGEAL ECHOCARDIOGRAM, MIDLINE STERNOTOMY, ASCENDING AORTIC  AND PROXIMAL AORTIC ARCH REPAIR WITH 26MM GRAFT, AORTIC VALVE RESUSPENSION, AORTIC ROOT REPAIR, OPEN VEIN HARVEST OF RIGHT LEG;  Surgeon: German Arreguin MD;  Location: [facility] MAIN OR;  Service:    • BRONCHOSCOPY N/A 2/17/2017    Procedure: BRONCHOSCOPY BIOPSY AT BEDSIDE WITH BAL-LEFT LOWER LOBE;  Surgeon: Trent Chaney MD;  Location: [facility] ENDOSCOPY;  Service:    • COLONOSCOPY N/A 3/7/2018    Procedure: COLONOSCOPY WITH POSSIBLE POLYPECTOMY   ( DONE IN OR WITH ENDO)      COLONOSCOPY FIRST;  Surgeon: Kris Solano MD;  Location: [facility] OR;  Service:    • DIRECT LARYNGOSCOPY, ESOPHAGOSCOPY, BRONCHOSCOPY N/A 2/5/2018    Procedure: DIRECT LARYNGOSCOPY WITH BIOPSY, ESOPHAGOSCOPY     (no bronchoscopy, no laser);  Surgeon: Joseph Venegas MD;  Location: [facility] OR;  Service:    • ENDOSCOPY W/ PEG TUBE PLACEMENT N/A 5/2/2018    Procedure: ESOPHAGOGASTRODUODENOSCOPY WITH PERCUTANEOUS ENDOSCOPIC GASTROSTOMY TUBE INSERTION;  Surgeon: Angel Maciel MD;  Location: [facility] ENDOSCOPY;  Service: Gastroenterology   • ENDOSCOPY W/ PEG TUBE PLACEMENT N/A 10/26/2018    Procedure: ESOPHAGOGASTRODUODENOSCOPY WITH PERCUTANEOUS ENDOSCOPIC GASTROSTOMY TUBE INSERTION WITH ANESTHESIA Possible open gastrostomy;  Surgeon: Kris Solano MD;  Location: [facility] OR;  Service: General   • GASTROSTOMY FEEDING TUBE INSERTION N/A 10/26/2018    Procedure: GASTROSTOMY FEEDING TUBE INSERTION;  Surgeon: Kris Solano MD;  Location: [facility] OR;  Service: General   • SPLENECTOMY N/A 7/26/2018    Procedure: SPLENECTOMY, left chest tube placement, EXPLORATORY LAPAROTOMY, G-TUBE PALCEMENT;   Surgeon: Kris Solano MD;  Location: [facility];  Service: General   • SPLENECTOMY      2018   • THORACOSCOPY Left 2018    Procedure: LEFT THORACOSCOPY VIDEO ASSISTED POSSIBLE THORACOTOMY possible decortication bronchoscopy;  Surgeon: Joshua Pollock MD;  Location: [facility];  Service: Cardiothoracic   • TRACHEOSTOMY  2018   • TRACHEOSTOMY N/A 2018    Procedure: TRACHEOSTOMY  local injection @ 1106 incision @ 1119;  Surgeon: Joseph Venegas MD;  Location: [facility];  Service:    • VENOUS ACCESS DEVICE (PORT) INSERTION N/A 3/7/2018    Procedure: INSERTION OF MEDIPORT     (C-ARM#1);  Surgeon: Kris Solano MD;  Location: [facility];  Service:          Family History   Problem Relation Age of Onset   • Thyroid disease Mother    • Hypertension Mother    • Hypertension Father    • Hypertension Other          Social History     Socioeconomic History   • Marital status:    Tobacco Use   • Smoking status: Former Smoker     Packs/day: 1.25     Years: 38.00     Pack years: 47.50     Quit date: 2017     Years since quittin.0   • Smokeless tobacco: Never Used   • Tobacco comment: 2018 - Patient confirmed he ceased utilization of tobacco products 2017.   Vaping Use   • Vaping Use: Never used   Substance and Sexual Activity   • Alcohol use: No   • Drug use: No   • Sexual activity: Defer         Prior to Admission medications    Medication Sig Start Date End Date Taking? Authorizing Provider   amLODIPine (NORVASC) 10 MG tablet Take 1 tablet by mouth once daily 22  Yes Kassie Gaxiola MD   atorvastatin (LIPITOR) 20 MG tablet Take 1 tablet by mouth Daily. 21  Yes Kassie Gaxiola MD   B Complex Vitamins (VITAMIN B COMPLEX) capsule capsule Take 1 capsule by mouth Daily.   Yes Provider, MD Tim   Cholecalciferol (Vitamin D3) 1.25 MG (08722 UT) capsule One capsule monthly 21  Yes pK Oliveira MD   clopidogrel  (PLAVIX) 75 MG tablet Take 75 mg by mouth Every Other Day.   Yes Tim Tim MD   DULoxetine (CYMBALTA) 60 MG capsule Take 60 mg by mouth Daily.   Yes Tim Tim MD   ECHINACEA PO Take  by mouth Daily.   Yes Tim Tim MD   irbesartan (AVAPRO) 300 MG tablet Take 1 tablet by mouth once daily 1/17/22  Yes Kp Oliveira MD   levothyroxine (SYNTHROID, LEVOTHROID) 137 MCG tablet Take 1 tablet by mouth Daily. 1 tab daily Monday to Saturday but 2 on Sunday 4/30/21  Yes Kp Oliveira MD   Magnesium Citrate 100 MG tablet Take  by mouth.   Yes Tim Tim MD   metFORMIN ER (Glucophage XR) 500 MG 24 hr tablet 2 tabs twice daily with meals , generic ok 10/28/21  Yes Kp Oliveira MD   metoprolol succinate XL (TOPROL-XL) 25 MG 24 hr tablet TAKE 1 TABLET BY MOUTH ONCE DAILY AT BEDTIME 12/22/21  Yes Kassie Gaxiola MD   Multiple Vitamins-Minerals (MULTIVITAMIN ADULT PO) Take 1 tablet by mouth Daily.   Yes Tim Tim MD   POTASSIUM PO Take  by mouth.   Yes Tim Tim MD   Semaglutide,0.25 or 0.5MG/DOS, (Ozempic, 0.25 or 0.5 MG/DOSE,) 2 MG/1.5ML solution pen-injector Inject 0.5 mg under the skin into the appropriate area as directed 1 (One) Time Per Week. 10/28/21  Yes Kp Oliveira MD   spironolactone (Aldactone) 25 MG tablet Take 1 tablet by mouth Daily. 4/30/21 4/30/22 Yes Kp Oliveira MD   tadalafil (CIALIS) 5 MG tablet Take 5 mg by mouth Daily. 2/8/21  Yes Tim Tim MD   traMADol (ULTRAM) 50 MG tablet TAKE 1/2 (ONE-HALF) TABLET BY MOUTH TWICE DAILY AS NEEDED FOR PAIN 10/27/21  Yes Kp Oliveira MD   traZODone (DESYREL) 100 MG tablet Take 100 mg by mouth Every Night.   Yes Tim Tim MD   vitamin C (ASCORBIC ACID) 250 MG tablet Take 1,000 mg by mouth Daily. takes 2000mg daily   Yes Provider, MD Tim   vitamin E 1000 UNIT capsule Take 1,000 Units by mouth  "Daily.   Yes Provider, MD Tim         OBJECTIVE    /72 (BP Location: Left arm, Patient Position: Sitting, Cuff Size: Adult)   Pulse 77   Temp 96.8 °F (36 °C)   Ht 182.9 cm (72\")   Wt 131 kg (288 lb 3.2 oz)   SpO2 97%   BMI 39.09 kg/m²         Review of Systems     Constitutional:  Denies recent weight loss, weight gain, fever or chills, no change in exercise tolerance     HENT:  Denies any hearing loss, epistaxis, hoarseness, or difficulty speaking.     Eyes: Wears eyeglasses or contact lenses     Respiratory:  Denies dyspnea with exertion,no cough, wheezing, or hemoptysis.     Cardiovascular: Negative for palpations, chest pain, orthopnea, PND, peripheral edema, syncope, or claudication.     Gastrointestinal:  Denies change in bowel habits, dyspepsia, ulcer disease, hematochezia, or melena.     Endocrine: Negative for cold intolerance, heat intolerance, polydipsia, polyphagia and polyuria. Denies any history of weight change, heat/cold intolerance, polydipsia, polyuria     Genitourinary: Negative.      Musculoskeletal: Denies any history of arthritic symptoms or back problems     Skin:  Denies any change in hair or nails, rashes, or skin lesions.     Allergic/Immunologic: Negative.  Negative for environmental allergies, food allergies and immunocompromised state.     Neurological:  Denies any history of recurrent headaches, strokes, TIA, or seizure disorder.     Hematological: Denies any food allergies, seasonal allergies, bleeding disorders, or lymphadenopathy.     Psychiatric/Behavioral: Denies any history of depression, substance abuse, or change in cognitive function.         Physical Exam     Constitutional: Cooperative, alert and oriented, well-developed, well-nourished, in no acute distress.     HENT:   Head: Normocephalic, normal hair patterns, no masses or tenderness.  Ears, Nose, and Throat: No gross abnormalities. No pallor or cyanosis. Dentition good.   Eyes: EOMS intact, PERRL, " conjunctivae and lids unremarkable. Fundoscopic exam and visual fields not performed.   Neck: No palpable masses or adenopathy, no thyromegaly, no JVD, carotid pulses are full and equal bilaterally and without  Bruits.     Cardiovascular: Regular rhythm, S1 and S2 normal, no S3 or S4. Apical impulse not displaced. No murmurs, gallops, or rubs detected.     Pulmonary/Chest: Chest: normal symmetry, no tenderness to palpation, normal respiratory excursion, no intercostal retraction, no use of accessory muscles.            Pulmonary: Normal breath sounds. No rales or ronchi.    Abdominal: Abdomen soft, bowel sounds normoactive, no masses, no hepatosplenomegaly, non-tender, no bruits.     Musculoskeletal: No deformities, clubbing, cyanosis, erythema, or edema observed. There are no spinal abnormalities noted. Normal muscle strength and tone. Pulses full and equal in all extremities, no bruits auscultated.     Neurological: No gross motor or sensory deficits noted, affect appropriate, oriented to time, person, place.     Skin: Warm and dry to the touch, no apparent skin lesions or masses noted.     Psychiatric: He has a normal mood and affect. His behavior is normal. Judgment and thought content normal.         Procedures      Lab Results   Component Value Date    WBC 12.38 (H) 12/09/2021    HGB 15.6 12/09/2021    HCT 46.2 12/09/2021    MCV 85.6 12/09/2021     12/09/2021     Lab Results   Component Value Date    GLUCOSE 102 (H) 07/21/2021    BUN 17 07/21/2021    CREATININE 1.34 (H) 07/21/2021    EGFRIFNONA 54 (L) 07/21/2021    BCR 12.7 07/21/2021    CO2 24.3 07/21/2021    CALCIUM 9.4 07/21/2021    ALBUMIN 4.50 07/21/2021    AST 18 07/21/2021    ALT 19 07/21/2021     Lab Results   Component Value Date    CHOL 134 07/21/2021    CHOL 150 04/27/2021    CHOL 123 06/01/2020     Lab Results   Component Value Date    TRIG 101 07/21/2021    TRIG 86 04/27/2021    TRIG 110 06/01/2020     Lab Results   Component Value Date     HDL 40 07/21/2021    HDL 41 04/27/2021    HDL 41 06/01/2020     No components found for: LDLCALC  Lab Results   Component Value Date    LDL 75 07/21/2021    LDL 93 04/27/2021    LDL 60 06/01/2020     No results found for: HDLLDLRATIO  No components found for: CHOLHDL  Lab Results   Component Value Date    HGBA1C 5.90 (H) 04/27/2021     Lab Results   Component Value Date    TSH 1.920 07/21/2021           ASSESSMENT AND PLAN      Diagnoses and all orders for this visit:    1. H/O thoracic aortic aneurysm repair (Primary)    2. H/O aortic dissection  -     ECG 12 Lead    He is status post repair of his aortic auric aneurysm.  I am going to go ahead and order the CT of his thoracic aorta and I have spoken to him about going as far into the aorta as they can to get a full study.  He will have this reviewed by Dr. Sol.  Other than that his blood pressure is doing well he is not having any chest pain.    Patient's Body mass index is 39.09 kg/m². indicating that he is obese (BMI >30). Obesity-related health conditions include the following: hypertension. Obesity is improving with treatment. BMI is is above average; BMI management plan is completed. We discussed portion control and increasing exercise..                Kassie Gaxiola MD  2/17/2022  10:37 CST

## 2022-02-18 LAB
QT INTERVAL: 372 MS
QTC INTERVAL: 420 MS

## 2022-02-19 ENCOUNTER — TRANSCRIBE ORDERS (OUTPATIENT)
Dept: CT IMAGING | Facility: HOSPITAL | Age: 62
End: 2022-02-19

## 2022-02-19 DIAGNOSIS — Z86.79 HISTORY OF THORACIC AORTIC ANEURYSM REPAIR: Primary | ICD-10-CM

## 2022-02-19 DIAGNOSIS — Z98.890 HISTORY OF THORACIC AORTIC ANEURYSM REPAIR: Primary | ICD-10-CM

## 2022-02-24 ENCOUNTER — TELEPHONE (OUTPATIENT)
Dept: GENERAL RADIOLOGY | Facility: HOSPITAL | Age: 62
End: 2022-02-24

## 2022-03-01 ENCOUNTER — OFFICE VISIT (OUTPATIENT)
Dept: SLEEP MEDICINE | Facility: HOSPITAL | Age: 62
End: 2022-03-01

## 2022-03-01 VITALS
WEIGHT: 290 LBS | HEIGHT: 72 IN | OXYGEN SATURATION: 90 % | SYSTOLIC BLOOD PRESSURE: 149 MMHG | DIASTOLIC BLOOD PRESSURE: 76 MMHG | HEART RATE: 66 BPM | BODY MASS INDEX: 39.28 KG/M2

## 2022-03-01 DIAGNOSIS — F51.04 PSYCHOPHYSIOLOGICAL INSOMNIA: ICD-10-CM

## 2022-03-01 DIAGNOSIS — Z93.0 TRACHEOSTOMY PRESENT: ICD-10-CM

## 2022-03-01 DIAGNOSIS — G47.33 OBSTRUCTIVE SLEEP APNEA, ADULT: Primary | ICD-10-CM

## 2022-03-01 PROCEDURE — 99213 OFFICE O/P EST LOW 20 MIN: CPT | Performed by: NURSE PRACTITIONER

## 2022-03-01 NOTE — PROGRESS NOTES
Sleep Clinic Follow Up    Date: 3/1/2022  Primary Care Provider: Marybeth Harrison DO    Last office visit: 2021 (I reviewed this note)    CC: Follow up: EMELIA, S/P tracheostomy following laryngeal cancer      Interim History:  Since the last visit:    1) moderate EMELIA -  Truman Esquivel has not use CPAP since 2018 following his diagnosis of laryngeal caner and tracheostomy placement. Patient has continued following with ENT. He recently underwent a bronchoscopy, which revealed significant supraglottic airway narrowing. His ENT provider feels as though tracheostomy dependency will be long term. Patient has still been sleeping in an upright position and denies any significant reoccurrence of EMELIA symptoms.    2) Patient denies RLS symptoms.     3) Insomnia - He continues to take trazodone 100 mg nightly per PCP. This is still working well for him, no noted side effects.    Sleep Testin. PSG on 2017, AHI of 24.7  2. Currently on no therapy     Bed time: 5080-5919  Sleep latency: 5-10 minutes  Number of times awakens during the night: 2  Wake time: 0400  Estimated total sleep time at night: 7 hours  Caffeine intake: 0 cups of coffee, 0 cups of tea, and 0-1 sodas per day  Alcohol intake: 0 drinks per week  Nap time: very rare if watching TV   Sleepiness with Driving: none     [place] - 2    PMHx, FH, SH reviewed and pertinent changes are: Reportedly unchanged from last office visit with us.      REVIEW OF SYSTEMS:   Negative for chest pain, SOA, fever, chills, cough, N/V/D, abdominal pain.    Smoking:none    Truman Esquivel  reports that he quit smoking about 5 years ago. He has a 47.50 pack-year smoking history. He has never used smokeless tobacco.      Exam:  Vitals:    22 1018   BP: 149/76   Pulse: 66   SpO2: 90%           22  1018   Weight: 132 kg (290 lb)     Body mass index is 39.32 kg/m². Patient's Body mass index is 39.32 kg/m². indicating that he is morbidly obese (BMI > 40 or >  35 with obesity - related health condition). Obesity-related health conditions include the following: obstructive sleep apnea, hypertension and dyslipidemias. Obesity is unchanged. BMI is is above average; BMI management plan is completed. I recommend portion control and increasing exercise.      Gen:                No distress, conversant, pleasant, appears stated age, alert, oriented  Eyes:               Anicteric sclera, moist conjunctiva, no lid lag                           PERRL, EOMI   Heent:             Tracheostomy in place, midline, no Passy valve    NC/AT                          Oropharynx clear                          Normal hearing  Lungs:             Normal effort, non-labored breathing                          RUL wheezing, otherwise CTA         CV:                  Normal S1/S2, no murmur                          No lower extremity edema  ABD:               Soft, rounded, non-distended                          Normal bowel sounds                    Psych:             Appropriate affect  Neuro:             CN 2-12 appear intact    Past Medical History:   Diagnosis Date   • Aneurysm ([facility]) 02/15/2017    type a aortic dissection repair   • Aortic dissection ([facility])    • Chest pain    • Descending thoracic aortic aneurysm ([facility]) 6/5/2019   • Disease of thyroid gland    • HTN (hypertension)    • Knee pain    • Sleep apnea    • Smoker    • Squamous cell carcinoma of larynx ([facility]) 2/5/2018   • Squamous cell carcinoma of larynx ([facility]) 2/5/2018   • Squamous cell carcinoma of larynx ([facility]) 2/5/2018   • Stroke ([facility])    • Swallowing difficulty    • TIA (transient ischemic attack) 12/21/2017       Current Outpatient Medications:   •  amLODIPine (NORVASC) 10 MG tablet, Take 1 tablet by mouth once daily, Disp: 90 tablet, Rfl: 3  •  atorvastatin (LIPITOR) 20 MG tablet, Take 1 tablet by mouth Daily., Disp: 90 tablet, Rfl: 5  •  B Complex Vitamins (VITAMIN B COMPLEX) capsule capsule, Take 1 capsule by mouth Daily., Disp: ,  Rfl:   •  Cholecalciferol (Vitamin D3) 1.25 MG (44152 UT) capsule, One capsule monthly, Disp: 3 capsule, Rfl: 3  •  clopidogrel (PLAVIX) 75 MG tablet, Take 75 mg by mouth Every Other Day., Disp: , Rfl:   •  DULoxetine (CYMBALTA) 60 MG capsule, Take 60 mg by mouth Daily., Disp: , Rfl:   •  irbesartan (AVAPRO) 300 MG tablet, Take 1 tablet by mouth once daily, Disp: 90 tablet, Rfl: 0  •  levothyroxine (SYNTHROID, LEVOTHROID) 137 MCG tablet, Take 1 tablet by mouth Daily. 1 tab daily Monday to Saturday but 2 on Sunday, Disp: 34 tablet, Rfl: 11  •  Magnesium Citrate 100 MG tablet, Take  by mouth., Disp: , Rfl:   •  metFORMIN ER (Glucophage XR) 500 MG 24 hr tablet, 2 tabs twice daily with meals , generic ok, Disp: 120 tablet, Rfl: 11  •  metoprolol succinate XL (TOPROL-XL) 25 MG 24 hr tablet, TAKE 1 TABLET BY MOUTH ONCE DAILY AT BEDTIME, Disp: 30 tablet, Rfl: 5  •  Multiple Vitamins-Minerals (MULTIVITAMIN ADULT PO), Take 1 tablet by mouth Daily., Disp: , Rfl:   •  POTASSIUM PO, Take  by mouth., Disp: , Rfl:   •  Semaglutide,0.25 or 0.5MG/DOS, (Ozempic, 0.25 or 0.5 MG/DOSE,) 2 MG/1.5ML solution pen-injector, Inject 0.5 mg under the skin into the appropriate area as directed 1 (One) Time Per Week., Disp: 1.5 mL, Rfl: 11  •  spironolactone (Aldactone) 25 MG tablet, Take 1 tablet by mouth Daily., Disp: 30 tablet, Rfl: 11  •  tadalafil (CIALIS) 5 MG tablet, Take 5 mg by mouth Daily., Disp: , Rfl:   •  traMADol (ULTRAM) 50 MG tablet, TAKE 1/2 (ONE-HALF) TABLET BY MOUTH TWICE DAILY AS NEEDED FOR PAIN, Disp: 30 tablet, Rfl: 5  •  traZODone (DESYREL) 100 MG tablet, Take 100 mg by mouth Every Night., Disp: , Rfl:   •  vitamin C (ASCORBIC ACID) 250 MG tablet, Take 1,000 mg by mouth Daily. takes 2000mg daily, Disp: , Rfl:   •  vitamin E 1000 UNIT capsule, Take 1,000 Units by mouth Daily., Disp: , Rfl:   •  ECHINACEA PO, Take  by mouth Daily., Disp: , Rfl:     WBC   Date Value Ref Range Status   12/09/2021 12.38 (H) 3.40 - 10.80  10*3/mm3 Final     RBC   Date Value Ref Range Status   12/09/2021 5.40 4.14 - 5.80 10*6/mm3 Final     Hemoglobin   Date Value Ref Range Status   12/09/2021 15.6 13.0 - 17.7 g/dL Final     Hematocrit   Date Value Ref Range Status   12/09/2021 46.2 37.5 - 51.0 % Final     MCV   Date Value Ref Range Status   12/09/2021 85.6 79.0 - 97.0 fL Final     MCH   Date Value Ref Range Status   12/09/2021 28.9 26.6 - 33.0 pg Final     MCHC   Date Value Ref Range Status   12/09/2021 33.8 31.5 - 35.7 g/dL Final     RDW   Date Value Ref Range Status   12/09/2021 14.6 12.3 - 15.4 % Final     RDW-SD   Date Value Ref Range Status   12/09/2021 45.0 37.0 - 54.0 fl Final     MPV   Date Value Ref Range Status   12/09/2021 9.9 6.0 - 12.0 fL Final     Platelets   Date Value Ref Range Status   12/09/2021 330 140 - 450 10*3/mm3 Final     Neutrophil %   Date Value Ref Range Status   12/09/2021 71.7 42.7 - 76.0 % Final     Lymphocyte %   Date Value Ref Range Status   12/09/2021 13.2 (L) 19.6 - 45.3 % Final     Monocyte %   Date Value Ref Range Status   12/09/2021 8.9 5.0 - 12.0 % Final     Eosinophil %   Date Value Ref Range Status   12/09/2021 4.5 0.3 - 6.2 % Final     Basophil %   Date Value Ref Range Status   12/09/2021 1.1 0.0 - 1.5 % Final     Immature Grans %   Date Value Ref Range Status   12/09/2021 0.6 (H) 0.0 - 0.5 % Final     Neutrophils, Absolute   Date Value Ref Range Status   12/09/2021 8.89 (H) 1.70 - 7.00 10*3/mm3 Final     Lymphocytes, Absolute   Date Value Ref Range Status   12/09/2021 1.63 0.70 - 3.10 10*3/mm3 Final     Monocytes, Absolute   Date Value Ref Range Status   12/09/2021 1.10 (H) 0.10 - 0.90 10*3/mm3 Final     Eosinophils, Absolute   Date Value Ref Range Status   12/09/2021 0.56 (H) 0.00 - 0.40 10*3/mm3 Final     Basophils, Absolute   Date Value Ref Range Status   12/09/2021 0.13 0.00 - 0.20 10*3/mm3 Final     Immature Grans, Absolute   Date Value Ref Range Status   12/09/2021 0.07 (H) 0.00 - 0.05 10*3/mm3 Final      [facility]   Date Value Ref Range Status   12/09/2021 0.0 0.0 - 0.2 /100 WBC Final       Lab Results   Component Value Date    GLUCOSE 102 (H) 07/21/2021    BUN 17 07/21/2021    CREATININE 1.34 (H) 07/21/2021    EGFRIFNONA 54 (L) 07/21/2021    BCR 12.7 07/21/2021    K 4.2 07/21/2021    CO2 24.3 07/21/2021    CALCIUM 9.4 07/21/2021    ALBUMIN 4.50 07/21/2021    AST 18 07/21/2021    ALT 19 07/21/2021       Assessment and Plan:    1. Obstructive sleep apnea - Established, stable (1)  1. Continue watch and wait - repeat testing in the future if tracheostomy decannulated   2. Continue sleeping in elevated position as tolerated  3. Return to clinic in 2 years with compliance report unless change in symptoms in interim period (to remain established patient)  2. Chronic tracheostomy S/P laryngeal cancer - Established, stable (1)   1. Continue following with ENT  3. Insomnia - sleep onset and or maintenance - Established, stable (1)  4. Morbid obesity - BMI 39.3 - stable chronic illness      I spent 20 minutes caring for Truman on this date of service. This time includes time spent by me in the following activities: preparing for the visit, reviewing tests, obtaining and/or reviewing a separately obtained history, performing a medically appropriate examination and/or evaluation , counseling and educating the patient/family/caregiver, documenting information in the medical record and care coordination    RTC in 2 years. Patient agrees to return sooner if changes in symptoms.        This document has been electronically signed by ROWAN Marrero on March 1, 2022 10:23 CST          CC: Marybeth Harrison,           No ref. provider found

## 2022-04-21 ENCOUNTER — LAB (OUTPATIENT)
Dept: LAB | Facility: HOSPITAL | Age: 62
End: 2022-04-21

## 2022-04-21 LAB
25(OH)D3 SERPL-MCNC: 38.9 NG/ML (ref 30–100)
ALBUMIN SERPL-MCNC: 4.3 G/DL (ref 3.5–5.2)
ALBUMIN/GLOB SERPL: 1.7 G/DL
ALP SERPL-CCNC: 68 U/L (ref 39–117)
ALT SERPL W P-5'-P-CCNC: 18 U/L (ref 1–41)
ANION GAP SERPL CALCULATED.3IONS-SCNC: 13 MMOL/L (ref 5–15)
AST SERPL-CCNC: 14 U/L (ref 1–40)
BASOPHILS # BLD AUTO: 0.11 10*3/MM3 (ref 0–0.2)
BASOPHILS NFR BLD AUTO: 0.9 % (ref 0–1.5)
BILIRUB SERPL-MCNC: 0.5 MG/DL (ref 0–1.2)
BUN SERPL-MCNC: 20 MG/DL (ref 8–23)
BUN/CREAT SERPL: 15.3 (ref 7–25)
CALCIUM SPEC-SCNC: 9 MG/DL (ref 8.6–10.5)
CHLORIDE SERPL-SCNC: 103 MMOL/L (ref 98–107)
CHOLEST SERPL-MCNC: 122 MG/DL (ref 0–200)
CO2 SERPL-SCNC: 23 MMOL/L (ref 22–29)
CREAT SERPL-MCNC: 1.31 MG/DL (ref 0.76–1.27)
DEPRECATED RDW RBC AUTO: 44.9 FL (ref 37–54)
EGFRCR SERPLBLD CKD-EPI 2021: 61.9 ML/MIN/1.73
EOSINOPHIL # BLD AUTO: 0.49 10*3/MM3 (ref 0–0.4)
EOSINOPHIL NFR BLD AUTO: 3.9 % (ref 0.3–6.2)
ERYTHROCYTE [DISTWIDTH] IN BLOOD BY AUTOMATED COUNT: 14.4 % (ref 12.3–15.4)
FERRITIN SERPL-MCNC: 62.9 NG/ML (ref 30–400)
GLOBULIN UR ELPH-MCNC: 2.6 GM/DL
GLUCOSE SERPL-MCNC: 101 MG/DL (ref 65–99)
HBA1C MFR BLD: 6 % (ref 4.8–5.6)
HCT VFR BLD AUTO: 44.4 % (ref 37.5–51)
HDLC SERPL-MCNC: 37 MG/DL (ref 40–60)
HGB BLD-MCNC: 14.7 G/DL (ref 13–17.7)
IMM GRANULOCYTES # BLD AUTO: 0.05 10*3/MM3 (ref 0–0.05)
IMM GRANULOCYTES NFR BLD AUTO: 0.4 % (ref 0–0.5)
IRON 24H UR-MRATE: 70 MCG/DL (ref 59–158)
IRON SATN MFR SERPL: 17 % (ref 20–50)
LDLC SERPL CALC-MCNC: 70 MG/DL (ref 0–100)
LDLC/HDLC SERPL: 1.89 {RATIO}
LYMPHOCYTES # BLD AUTO: 2.12 10*3/MM3 (ref 0.7–3.1)
LYMPHOCYTES NFR BLD AUTO: 16.8 % (ref 19.6–45.3)
MCH RBC QN AUTO: 28.7 PG (ref 26.6–33)
MCHC RBC AUTO-ENTMCNC: 33.1 G/DL (ref 31.5–35.7)
MCV RBC AUTO: 86.5 FL (ref 79–97)
MONOCYTES # BLD AUTO: 1.11 10*3/MM3 (ref 0.1–0.9)
MONOCYTES NFR BLD AUTO: 8.8 % (ref 5–12)
NEUTROPHILS NFR BLD AUTO: 69.2 % (ref 42.7–76)
NEUTROPHILS NFR BLD AUTO: 8.72 10*3/MM3 (ref 1.7–7)
NRBC BLD AUTO-RTO: 0 /100 WBC (ref 0–0.2)
PLATELET # BLD AUTO: 284 10*3/MM3 (ref 140–450)
PMV BLD AUTO: 11.2 FL (ref 6–12)
POTASSIUM SERPL-SCNC: 4.3 MMOL/L (ref 3.5–5.2)
PROT SERPL-MCNC: 6.9 G/DL (ref 6–8.5)
RBC # BLD AUTO: 5.13 10*6/MM3 (ref 4.14–5.8)
SODIUM SERPL-SCNC: 139 MMOL/L (ref 136–145)
TIBC SERPL-MCNC: 402 MCG/DL (ref 298–536)
TRANSFERRIN SERPL-MCNC: 270 MG/DL (ref 200–360)
TRIGL SERPL-MCNC: 76 MG/DL (ref 0–150)
TSH SERPL DL<=0.05 MIU/L-ACNC: 0.91 UIU/ML (ref 0.27–4.2)
URATE SERPL-MCNC: 7.7 MG/DL (ref 3.4–7)
VIT B12 BLD-MCNC: 549 PG/ML (ref 211–946)
VLDLC SERPL-MCNC: 15 MG/DL (ref 5–40)
WBC NRBC COR # BLD: 12.6 10*3/MM3 (ref 3.4–10.8)

## 2022-04-21 PROCEDURE — 82728 ASSAY OF FERRITIN: CPT | Performed by: INTERNAL MEDICINE

## 2022-04-21 PROCEDURE — 83036 HEMOGLOBIN GLYCOSYLATED A1C: CPT | Performed by: INTERNAL MEDICINE

## 2022-04-21 PROCEDURE — 85025 COMPLETE CBC W/AUTO DIFF WBC: CPT | Performed by: INTERNAL MEDICINE

## 2022-04-21 PROCEDURE — 84550 ASSAY OF BLOOD/URIC ACID: CPT | Performed by: INTERNAL MEDICINE

## 2022-04-21 PROCEDURE — 84466 ASSAY OF TRANSFERRIN: CPT | Performed by: INTERNAL MEDICINE

## 2022-04-21 PROCEDURE — 82607 VITAMIN B-12: CPT | Performed by: INTERNAL MEDICINE

## 2022-04-21 PROCEDURE — 83540 ASSAY OF IRON: CPT | Performed by: INTERNAL MEDICINE

## 2022-04-21 PROCEDURE — 80061 LIPID PANEL: CPT | Performed by: INTERNAL MEDICINE

## 2022-04-21 PROCEDURE — 82043 UR ALBUMIN QUANTITATIVE: CPT | Performed by: INTERNAL MEDICINE

## 2022-04-21 PROCEDURE — 82570 ASSAY OF URINE CREATININE: CPT | Performed by: INTERNAL MEDICINE

## 2022-04-21 PROCEDURE — 84443 ASSAY THYROID STIM HORMONE: CPT | Performed by: INTERNAL MEDICINE

## 2022-04-21 PROCEDURE — 80053 COMPREHEN METABOLIC PANEL: CPT | Performed by: INTERNAL MEDICINE

## 2022-04-21 PROCEDURE — 36415 COLL VENOUS BLD VENIPUNCTURE: CPT | Performed by: INTERNAL MEDICINE

## 2022-04-21 PROCEDURE — 82306 VITAMIN D 25 HYDROXY: CPT | Performed by: INTERNAL MEDICINE

## 2022-04-22 LAB
ALBUMIN UR-MCNC: 1.4 MG/DL
CREAT UR-MCNC: 153.6 MG/DL
MICROALBUMIN/CREAT UR: 9.1 MG/G

## 2022-04-28 ENCOUNTER — OFFICE VISIT (OUTPATIENT)
Dept: ENDOCRINOLOGY | Facility: CLINIC | Age: 62
End: 2022-04-28

## 2022-04-28 VITALS
HEIGHT: 72 IN | HEART RATE: 73 BPM | BODY MASS INDEX: 39.74 KG/M2 | WEIGHT: 293.4 LBS | SYSTOLIC BLOOD PRESSURE: 138 MMHG | OXYGEN SATURATION: 90 % | DIASTOLIC BLOOD PRESSURE: 80 MMHG

## 2022-04-28 DIAGNOSIS — E53.8 B12 DEFICIENCY: ICD-10-CM

## 2022-04-28 DIAGNOSIS — E06.3 HYPOTHYROIDISM DUE TO HASHIMOTO'S THYROIDITIS: ICD-10-CM

## 2022-04-28 DIAGNOSIS — E55.9 VITAMIN D DEFICIENCY: ICD-10-CM

## 2022-04-28 DIAGNOSIS — I10 ESSENTIAL HYPERTENSION: ICD-10-CM

## 2022-04-28 DIAGNOSIS — E79.0 HYPERURICEMIA: ICD-10-CM

## 2022-04-28 DIAGNOSIS — E03.8 HYPOTHYROIDISM DUE TO HASHIMOTO'S THYROIDITIS: ICD-10-CM

## 2022-04-28 DIAGNOSIS — R73.01 IFG (IMPAIRED FASTING GLUCOSE): ICD-10-CM

## 2022-04-28 DIAGNOSIS — E78.5 DYSLIPIDEMIA: Primary | ICD-10-CM

## 2022-04-28 PROCEDURE — 99214 OFFICE O/P EST MOD 30 MIN: CPT | Performed by: INTERNAL MEDICINE

## 2022-04-28 RX ORDER — METFORMIN HYDROCHLORIDE 500 MG/1
TABLET, EXTENDED RELEASE ORAL
Qty: 120 TABLET | Refills: 11 | Status: SHIPPED | OUTPATIENT
Start: 2022-04-28

## 2022-04-28 NOTE — PROGRESS NOTES
"  Subjective    Truman Esquivel is a 61 y.o. male. he is here today for follow-up of hypothyroidism    Hypothyroidism     Duration around 2017  after having aortic dissection.  Compliant with levothyroxine    He has IFG , didn't tolerate ozempic,     HTN controlled, lipids controlled, since stopping indomethacin renal function improved         Objective    /80   Pulse 73   Ht 182.9 cm (72.01\")   Wt 133 kg (293 lb 6.4 oz)   SpO2 90%   BMI 39.78 kg/m²   AOx3  No neck masses,   Tracheostomy in place  RRR  CTA  Mild edema     Results       Lab Results   Component Value Date    WBC 12.60 (H) 04/21/2022    HGB 14.7 04/21/2022    HCT 44.4 04/21/2022    MCV 86.5 04/21/2022     04/21/2022     Lab Results   Component Value Date    GLUCOSE 101 (H) 04/21/2022    BUN 20 04/21/2022    CREATININE 1.31 (H) 04/21/2022    EGFRIFNONA 54 (L) 07/21/2021    BCR 15.3 04/21/2022    K 4.3 04/21/2022    CO2 23.0 04/21/2022    CALCIUM 9.0 04/21/2022    ALBUMIN 4.30 04/21/2022    AST 14 04/21/2022    ALT 18 04/21/2022          Assessment/Plan           ICD-10-CM ICD-9-CM   1. Dyslipidemia  E78.5 272.4   2. Hypothyroidism due to Hashimoto's thyroiditis  E03.8 244.8    E06.3 245.2   3. IFG (impaired fasting glucose)  R73.01 790.21   4. Essential hypertension  I10 401.9   5. Vitamin D deficiency  E55.9 268.9   6. B12 deficiency  E53.8 266.2   7. Hyperuricemia  E79.0 790.6       Hypothyroidism after aortic dissection  Lab Results   Component Value Date    TSH 0.911 04/21/2022        synthroid was taking it with iron but now on empty stomach    Brand name synthroid 137 , 8 tabs per week working        Essential HTN - Edema - h o gout - CKD stage III  /80   Pulse 73   Ht 182.9 cm (72.01\")   Wt 133 kg (293 lb 6.4 oz)   SpO2 90%   BMI 39.78 kg/m²     Lab Results   Component Value Date    URICACID 7.7 (H) 04/21/2022       Avapro, amlodipine, max dose     metoprolol 25 and HR 60s     Aldactone 25 mg daily          CKD " stage III- now improved to stage II    GFR 50s between 2019 and 2021 , no proteinuria     52 to 56 on GFR - now 60s  Taking indomethacin in the past but I stopped and changed to tramadol, renal function improved         Diabetes      Lab Results   Component Value Date    HGBA1C 6.00 (H) 04/21/2022       Metformin , restart    ozempic , side effects       Dyslipidemia    Lab Results   Component Value Date    CHOL 122 04/21/2022    TRIG 76 04/21/2022    HDL 37 (L) 04/21/2022    LDL 70 04/21/2022       On statin       Vit D Def    Once monthly 50 th units     Lab Results   Component Value Date    JNQO37WJ 38.9 04/21/2022    EDRU42LT 36.4 07/21/2021    MFBT08HF 38.2 04/27/2021      ---    Anemia of low iron   That was during cancer tx    Now improved on liquid iron - now otc ferrous sulfate 325 , 3 x weekly   Stop and reevaluate     Take apart from thyroid pill   Nl cbc , Oct 2021        4. No follow-ups on file.

## 2022-05-03 RX ORDER — LEVOTHYROXINE SODIUM 137 UG/1
TABLET ORAL
Qty: 2 TABLET | Refills: 0 | Status: SHIPPED | OUTPATIENT
Start: 2022-05-03 | End: 2022-05-04

## 2022-05-03 RX ORDER — IRBESARTAN 300 MG/1
TABLET ORAL
Qty: 90 TABLET | Refills: 0 | Status: SHIPPED | OUTPATIENT
Start: 2022-05-03 | End: 2022-07-26

## 2022-05-04 RX ORDER — LEVOTHYROXINE SODIUM 137 UG/1
TABLET ORAL
Qty: 2 TABLET | Refills: 0 | Status: SHIPPED | OUTPATIENT
Start: 2022-05-04 | End: 2022-05-09

## 2022-05-08 DIAGNOSIS — E55.9 VITAMIN D DEFICIENCY: ICD-10-CM

## 2022-05-08 DIAGNOSIS — E53.8 B12 DEFICIENCY: ICD-10-CM

## 2022-05-08 DIAGNOSIS — E03.9 ACQUIRED HYPOTHYROIDISM: ICD-10-CM

## 2022-05-09 RX ORDER — LEVOTHYROXINE SODIUM 137 UG/1
TABLET ORAL
Qty: 2 TABLET | Refills: 0 | Status: SHIPPED | OUTPATIENT
Start: 2022-05-09 | End: 2022-11-21

## 2022-05-09 RX ORDER — CHOLECALCIFEROL (VITAMIN D3) 1250 MCG
CAPSULE ORAL
Qty: 3 CAPSULE | Refills: 0 | Status: SHIPPED | OUTPATIENT
Start: 2022-05-09 | End: 2022-12-19

## 2022-05-09 RX ORDER — LEVOTHYROXINE SODIUM 137 UG/1
TABLET ORAL
Qty: 2 TABLET | Refills: 0 | Status: SHIPPED | OUTPATIENT
Start: 2022-05-09 | End: 2022-06-27

## 2022-05-23 RX ORDER — SPIRONOLACTONE 25 MG/1
TABLET ORAL
Qty: 90 TABLET | Refills: 0 | Status: SHIPPED | OUTPATIENT
Start: 2022-05-23 | End: 2022-08-29

## 2022-06-09 ENCOUNTER — OFFICE VISIT (OUTPATIENT)
Dept: ONCOLOGY | Facility: CLINIC | Age: 62
End: 2022-06-09

## 2022-06-09 ENCOUNTER — LAB (OUTPATIENT)
Dept: ONCOLOGY | Facility: HOSPITAL | Age: 62
End: 2022-06-09

## 2022-06-09 VITALS
BODY MASS INDEX: 39.66 KG/M2 | DIASTOLIC BLOOD PRESSURE: 72 MMHG | RESPIRATION RATE: 18 BRPM | TEMPERATURE: 97 F | WEIGHT: 292.5 LBS | SYSTOLIC BLOOD PRESSURE: 136 MMHG | OXYGEN SATURATION: 92 % | HEART RATE: 60 BPM

## 2022-06-09 DIAGNOSIS — C32.9 SQUAMOUS CELL CARCINOMA OF LARYNX: Primary | ICD-10-CM

## 2022-06-09 DIAGNOSIS — C32.9 SQUAMOUS CELL CARCINOMA OF LARYNX: ICD-10-CM

## 2022-06-09 LAB
ALBUMIN SERPL-MCNC: 4.5 G/DL (ref 3.5–5.2)
ALBUMIN/GLOB SERPL: 1.7 G/DL
ALP SERPL-CCNC: 77 U/L (ref 39–117)
ALT SERPL W P-5'-P-CCNC: 21 U/L (ref 1–41)
ANION GAP SERPL CALCULATED.3IONS-SCNC: 8 MMOL/L (ref 5–15)
AST SERPL-CCNC: 19 U/L (ref 1–40)
BASOPHILS # BLD AUTO: 0.1 10*3/MM3 (ref 0–0.2)
BASOPHILS NFR BLD AUTO: 0.9 % (ref 0–1.5)
BILIRUB SERPL-MCNC: 0.6 MG/DL (ref 0–1.2)
BUN SERPL-MCNC: 17 MG/DL (ref 8–23)
BUN/CREAT SERPL: 12.6 (ref 7–25)
CALCIUM SPEC-SCNC: 9.7 MG/DL (ref 8.6–10.5)
CHLORIDE SERPL-SCNC: 103 MMOL/L (ref 98–107)
CO2 SERPL-SCNC: 27 MMOL/L (ref 22–29)
CREAT SERPL-MCNC: 1.35 MG/DL (ref 0.76–1.27)
DEPRECATED RDW RBC AUTO: 45.5 FL (ref 37–54)
EGFRCR SERPLBLD CKD-EPI 2021: 59.7 ML/MIN/1.73
EOSINOPHIL # BLD AUTO: 0.65 10*3/MM3 (ref 0–0.4)
EOSINOPHIL NFR BLD AUTO: 5.8 % (ref 0.3–6.2)
ERYTHROCYTE [DISTWIDTH] IN BLOOD BY AUTOMATED COUNT: 15 % (ref 12.3–15.4)
GLOBULIN UR ELPH-MCNC: 2.7 GM/DL
GLUCOSE SERPL-MCNC: 101 MG/DL (ref 65–99)
HCT VFR BLD AUTO: 44.3 % (ref 37.5–51)
HGB BLD-MCNC: 15.1 G/DL (ref 13–17.7)
HOLD SPECIMEN: NORMAL
IMM GRANULOCYTES # BLD AUTO: 0.06 10*3/MM3 (ref 0–0.05)
IMM GRANULOCYTES NFR BLD AUTO: 0.5 % (ref 0–0.5)
LYMPHOCYTES # BLD AUTO: 2.13 10*3/MM3 (ref 0.7–3.1)
LYMPHOCYTES NFR BLD AUTO: 18.8 % (ref 19.6–45.3)
MCH RBC QN AUTO: 28.8 PG (ref 26.6–33)
MCHC RBC AUTO-ENTMCNC: 34.1 G/DL (ref 31.5–35.7)
MCV RBC AUTO: 84.4 FL (ref 79–97)
MONOCYTES # BLD AUTO: 1.09 10*3/MM3 (ref 0.1–0.9)
MONOCYTES NFR BLD AUTO: 9.6 % (ref 5–12)
NEUTROPHILS NFR BLD AUTO: 64.4 % (ref 42.7–76)
NEUTROPHILS NFR BLD AUTO: 7.27 10*3/MM3 (ref 1.7–7)
NRBC BLD AUTO-RTO: 0 /100 WBC (ref 0–0.2)
PLATELET # BLD AUTO: 291 10*3/MM3 (ref 140–450)
PMV BLD AUTO: 10 FL (ref 6–12)
POTASSIUM SERPL-SCNC: 4.5 MMOL/L (ref 3.5–5.2)
PROT SERPL-MCNC: 7.2 G/DL (ref 6–8.5)
RBC # BLD AUTO: 5.25 10*6/MM3 (ref 4.14–5.8)
SODIUM SERPL-SCNC: 138 MMOL/L (ref 136–145)
WBC NRBC COR # BLD: 11.3 10*3/MM3 (ref 3.4–10.8)

## 2022-06-09 PROCEDURE — 80053 COMPREHEN METABOLIC PANEL: CPT

## 2022-06-09 PROCEDURE — G0463 HOSPITAL OUTPT CLINIC VISIT: HCPCS | Performed by: NURSE PRACTITIONER

## 2022-06-09 PROCEDURE — 85025 COMPLETE CBC W/AUTO DIFF WBC: CPT

## 2022-06-09 PROCEDURE — 99213 OFFICE O/P EST LOW 20 MIN: CPT | Performed by: NURSE PRACTITIONER

## 2022-06-10 NOTE — PROGRESS NOTES
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  Upon staging workup patient was found to have stage III squamous cell cancer of larynx for which she has been started on concurrent chemoradiation with weekly carboplatin and Taxol on March 19, 2018.  Last dose of chemotherapy and radiation was on May 15, 2018.   Patient continues to follow with Dr. Venegas in ENT clinic; he was seen in January with no evidence of recurrence by laryngoscopy. He is back to work driving trucks. Feeling well.      Past Medical History, Past Surgical History, Social History, Family History have been reviewed and are without significant changes except as mentioned.    Review of Systems   A comprehensive 14 point review of systems was performed and was negative except as mentioned.    Medications:  The current medication list was reviewed in the EMR    ALLERGIES:    Allergies   Allergen Reactions   • Chlorhexidine Itching     Topical cleaning wipes causes severe skin irritation   • Eggs Or Egg-Derived Products Swelling   • Erythromycin Other (See Comments)     Joint pains and cold sweats   • Gabapentin Itching   • Other GI Intolerance     Mycins  farzad lotion causes rash   • Acth [Corticotropin] Rash   • Cephalosporins Itching and Rash     Pt developed rash, itching after cefepime administration (2-3 doses) during 2/2017 admission. Itching was relieved with benadryl. Cefepime was continued because his infection was improving and no symptoms of anaphylaxis present   • Co Q 10 [Coenzyme Q10] Rash   • Keflex [Cephalexin]  Rash   • Neomycin Rash   • Penicillins Rash     Tolerated cefepime during 2/2017 admission. Had rash and itching (relieved by benadryl) after few doses of cefepime and cefepime was continued.   • Tetracyclines & Related GI Intolerance       Objective      Vitals:    06/09/22 1050 06/09/22 1059   BP: 154/77 136/72  Comment: Manaul   Pulse: 60    Resp: 18    Temp: 97 °F (36.1 °C)    SpO2: 92%    Weight: 133 kg (292 lb 8 oz)    PainSc: 0-No pain      Current Status 6/9/2021   ECOG score 0       Physical Exam  General: no acute distress  Lungs CTAB  Card RRR  Ext no edema    RECENT LABS:  Glucose   Date Value Ref Range Status   06/09/2022 101 (H) 65 - 99 mg/dL Final     Glucose, Arterial   Date Value Ref Range Status   07/31/2018 113 (H) 65 - 95 mmol/L Final     Sodium   Date Value Ref Range Status   06/09/2022 138 136 - 145 mmol/L Final     Potassium   Date Value Ref Range Status   06/09/2022 4.5 3.5 - 5.2 mmol/L Final     CO2   Date Value Ref Range Status   06/09/2022 27.0 22.0 - 29.0 mmol/L Final     Chloride   Date Value Ref Range Status   06/09/2022 103 98 - 107 mmol/L Final     Anion Gap   Date Value Ref Range Status   06/09/2022 8.0 5.0 - 15.0 mmol/L Final     Creatinine   Date Value Ref Range Status   06/09/2022 1.35 (H) 0.76 - 1.27 mg/dL Final     BUN   Date Value Ref Range Status   06/09/2022 17 8 - 23 mg/dL Final     BUN/Creatinine Ratio   Date Value Ref Range Status   06/09/2022 12.6 7.0 - 25.0 Final     Calcium   Date Value Ref Range Status   06/09/2022 9.7 8.6 - 10.5 mg/dL Final     eGFR Non  Amer   Date Value Ref Range Status   07/21/2021 54 (L) >60 mL/min/1.73 Final     Alkaline Phosphatase   Date Value Ref Range Status   06/09/2022 77 39 - 117 U/L Final     Total Protein   Date Value Ref Range Status   06/09/2022 7.2 6.0 - 8.5 g/dL Final     ALT (SGPT)   Date Value Ref Range Status   06/09/2022 21 1 - 41 U/L Final     AST (SGOT)   Date Value Ref Range Status   06/09/2022 19 1 - 40 U/L Final      Total Bilirubin   Date Value Ref Range Status   06/09/2022 0.6 0.0 - 1.2 mg/dL Final     Albumin   Date Value Ref Range Status   06/09/2022 4.50 3.50 - 5.20 g/dL Final     Globulin   Date Value Ref Range Status   06/09/2022 2.7 gm/dL Final     Lab Results   Component Value Date    WBC 11.30 (H) 06/09/2022    HGB 15.1 06/09/2022    HCT 44.3 06/09/2022    MCV 84.4 06/09/2022     06/09/2022     Lab Results   Component Value Date    NEUTROABS 7.27 (H) 06/09/2022    IRON 70 04/21/2022    IRON 120 12/09/2021    IRON 136 07/21/2021    TIBC 402 04/21/2022    TIBC 389 12/09/2021    TIBC 398 07/21/2021    LABIRON 17 (L) 04/21/2022    LABIRON 31 12/09/2021    LABIRON 34 07/21/2021    FERRITIN 62.90 04/21/2022    FERRITIN 80.36 12/09/2021    FERRITIN 78.90 07/21/2021    MNVGQSBI14 549 04/21/2022    TVGDWORP44 730 07/21/2021    GBXRWPRX80 790 06/09/2021    FOLATE >20.00 06/09/2021    FOLATE >20.00 11/30/2020    FOLATE >20.00 06/01/2020     No results found for: , LABCA2, AFPTM, HCGQUANT, , CHROMGRNA, 3QZYG93PRG, CEA, REFLABREPO            RADIOLOGY DATA :  No radiology results for the last 7 days        Assessment & Plan       Squamous cell cancer of larynx, epiglottis, stage III, 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  Based on PET/CT there is a tumor extension into preepiglottic space making a T3 lesion.  Result of PET CT were discussed with patient.  It was discussed with patient with T3 N0 lesion after his treatment option includes either surgery or concurrent chemoradiation.    -He was started on concurrent chemoradiation on March 19, 2018.  Patient received 6 weekly carboplatin and Taxol from March 19, 2018 until May 15, 2018.  Radiation was also completed on May 15, 2018.  -He continues to follow with Dr. Venegas with ENT exam still showing good response to chemoradiation without any evidence of any active tumor. Next appt is next month.  -Labs reviewed today and clinically patient is doing well, he will return to our clinic in 6  months with repeat CBC, CMP          PHQ-9 Total Score: 0     Truman Esquivel reports a pain score of 0.  Given his pain assessment as noted, treatment options were discussed and the following options were decided upon as a follow-up plan to address the patient's pain: no pain today.         Yarelis Ames, ROWAN  6/10/2022  12:45 CDT        Part of this note may be an electronic transcription/translation of spoken language to printed text using the Dragon Dictation System.          CC:

## 2022-06-20 RX ORDER — METOPROLOL SUCCINATE 25 MG/1
TABLET, EXTENDED RELEASE ORAL
Qty: 90 TABLET | Refills: 3 | Status: SHIPPED | OUTPATIENT
Start: 2022-06-20

## 2022-06-27 RX ORDER — TRAMADOL HYDROCHLORIDE 50 MG/1
TABLET ORAL
Qty: 30 TABLET | Refills: 5 | Status: SHIPPED | OUTPATIENT
Start: 2022-06-27 | End: 2023-02-20

## 2022-06-27 RX ORDER — LEVOTHYROXINE SODIUM 137 UG/1
TABLET ORAL
Qty: 34 TABLET | Refills: 0 | Status: SHIPPED | OUTPATIENT
Start: 2022-06-27 | End: 2022-07-25

## 2022-07-19 ENCOUNTER — OFFICE VISIT (OUTPATIENT)
Dept: OTOLARYNGOLOGY | Facility: CLINIC | Age: 62
End: 2022-07-19

## 2022-07-19 VITALS — WEIGHT: 296.8 LBS | TEMPERATURE: 97.7 F | HEIGHT: 72 IN | BODY MASS INDEX: 40.2 KG/M2

## 2022-07-19 DIAGNOSIS — Z93.0 TRACHEOSTOMY STATUS: ICD-10-CM

## 2022-07-19 DIAGNOSIS — Z85.21 ENCOUNTER FOR FOLLOW-UP SURVEILLANCE OF LARYNGEAL CANCER: Primary | ICD-10-CM

## 2022-07-19 DIAGNOSIS — J37.0 CHRONIC LARYNGITIS: ICD-10-CM

## 2022-07-19 DIAGNOSIS — Z08 ENCOUNTER FOR FOLLOW-UP SURVEILLANCE OF LARYNGEAL CANCER: Primary | ICD-10-CM

## 2022-07-19 PROCEDURE — 31575 DIAGNOSTIC LARYNGOSCOPY: CPT | Performed by: OTOLARYNGOLOGY

## 2022-07-19 PROCEDURE — 99213 OFFICE O/P EST LOW 20 MIN: CPT | Performed by: OTOLARYNGOLOGY

## 2022-07-22 NOTE — PROGRESS NOTES
Subjective   Truman Esquivel is a 61 y.o. male.       History of Present Illness   Patient has a history of a sizable supraglottic squamous cell carcinoma that was treated with chemoradiation with a complete response.  Unfortunately due to anatomic changes and posttreatment edema he is tracheostomy dependent and has failed capping previously.  Is currently not using a Passy-Nelly valve.  Is here for surveillance and says he is a little sore on the left side of his neck but otherwise no significant problems.    The following portions of the patient's history were reviewed and updated as appropriate: allergies, current medications, past family history, past medical history, past social history, past surgical history and problem list.     reports that he quit smoking about 5 years ago. He has a 47.50 pack-year smoking history. He has never used smokeless tobacco. He reports that he does not drink alcohol and does not use drugs.   Patient is not a tobacco user and has not been counseled for use of tobacco products      Review of Systems        Objective   Physical Exam  General: Voice is harsh with the trach occluded  Ears: External ears no deformity, canals no discharge, tympanic membranes intact clear and mobile bilaterally.  Nares: No discharge or purulence  Oral cavity: No masses or lesions  Pharynx: No erythema exudate or mass  Neck: Tracheostomy in place.  No evidence of abnormal mass.  Posttreatment changes noted.  Mild subjective tenderness in the left neck that appears to be musculoskeletal.  Procedure Note    Pre-operative Diagnosis:   Chief Complaint   Patient presents with   • Cancer Surveillance       Post-operative Diagnosis: Chronic laryngitis/posttreatment changes with no evidence of recurrent disease    Anesthesia: topical with xylocaine and neosynephrine    Endoscopy Type:  Flexible Laryngoscopy    Procedure Details:    The patient was placed in the sitting position.  After topical anesthesia and  decongestion, the 4 mm laryngoscope was passed.  The nasal cavities, nasopharynx, oropharynx, hypopharynx, and larynx were all examined.  Vocal cords were examined during respiration and phonation.  The following findings were noted:    Findings: No masses in the nose or nasopharynx.  Tongue base shows posttreatment changes.  Epiglottis is significantly deformed and there is substantial supraglottic edema.  The endolarynx shows no evidence of recurrent tumor.  Piriform sinuses show no evidence of tumor.  Vocal cord mobility appears intact    Condition:  Stable.  Patient tolerated procedure well.    Complications:  None         Assessment and Plan   Diagnoses and all orders for this visit:    1. Encounter for follow-up surveillance of laryngeal cancer (Primary)    2. Chronic laryngitis    3. Tracheostomy status (HCC)             Plan: Reassurance to the patient that I saw no evidence of malignancy.  Unfortunately I do not think there is any chance of him being decannulated anytime soon.  I have given him a prescription for new fenestrated trach.  He can pick this up at his discretion and either bring it by when he gets it or bring it to his next 6-month checkup.  Call for problems.

## 2022-07-25 RX ORDER — LEVOTHYROXINE SODIUM 137 UG/1
TABLET ORAL
Qty: 34 TABLET | Refills: 0 | Status: SHIPPED | OUTPATIENT
Start: 2022-07-25 | End: 2022-08-29

## 2022-07-26 RX ORDER — IRBESARTAN 300 MG/1
TABLET ORAL
Qty: 90 TABLET | Refills: 0 | Status: SHIPPED | OUTPATIENT
Start: 2022-07-26 | End: 2022-11-01

## 2022-08-10 ENCOUNTER — OFFICE VISIT (OUTPATIENT)
Dept: OTOLARYNGOLOGY | Facility: CLINIC | Age: 62
End: 2022-08-10

## 2022-08-10 VITALS — OXYGEN SATURATION: 93 % | HEIGHT: 72 IN | WEIGHT: 297 LBS | BODY MASS INDEX: 40.23 KG/M2 | TEMPERATURE: 97.9 F

## 2022-08-10 DIAGNOSIS — Z43.0 ATTENTION TO TRACHEOSTOMY: Primary | ICD-10-CM

## 2022-08-10 PROCEDURE — 99213 OFFICE O/P EST LOW 20 MIN: CPT | Performed by: OTOLARYNGOLOGY

## 2022-08-10 NOTE — PROGRESS NOTES
Subjective   Truman Esquivel is a 61 y.o. male.       History of Present Illness   Patient has a longstanding tracheostomy and a history of supraglottic laryngeal cancer and has his new tracheostomy and came by to get this put in.      The following portions of the patient's history were reviewed and updated as appropriate: allergies, current medications, past family history, past medical history, past social history, past surgical history and problem list.     reports that he quit smoking about 5 years ago. He has a 47.50 pack-year smoking history. He has never used smokeless tobacco. He reports that he does not drink alcohol and does not use drugs.   Patient is not a tobacco user and has not been counseled for use of tobacco products      Review of Systems        Objective   Physical Exam  His old trach is removed and his new 6 CFN tracheostomy was inserted without difficulty.       Assessment and Plan   Diagnoses and all orders for this visit:    1. Attention to tracheostomy (HCC) (Primary)             Plan: Trach change as described above.  Keep his scheduled follow-up visit with me for cancer surveillance.  Call for problems.

## 2022-08-24 NOTE — PROGRESS NOTES
Subjective   Truman Esquivel is a 58 y.o. male.       History of Present Illness   Patient has a history of squamous cell carcinoma the larynx that was bulky, involving primarily the epiglottis and supraglottic larynx.  Underwent tracheostomy and concurrent chemoradiation with complete response.  Down sized to a size 6 tracheostomy but did not tolerate capping.  Subsequently had trauma and emergency splenectomy/laparotomy.  Has recovered from that and has begun eating under the supervision of speech therapy.  He is tolerating a soft diet.  Was seen on 10/24/18 with some bleeding from his trach that was thought to just be due to dryness.  He's been using a cool mist vaporizer and intermittent saline irrigation and reports that the bloody expectoration has completely resolved.      The following portions of the patient's history were reviewed and updated as appropriate: allergies, current medications, past family history, past medical history, past social history, past surgical history and problem list.     reports that he quit smoking about 22 months ago. He has a 47.50 pack-year smoking history. he has never used smokeless tobacco. He reports that he does not drink alcohol or use drugs.   Patient is not a tobacco user and has not been counseled for use of tobacco products      Review of Systems        Objective   Physical Exam  General: Well-developed well-nourished male in no acute distress.  Alert and oriented ×3.  Voice:SLightly harsh with tracheostomy occluded. Speech:Fluent  Ears: External ears no deformity, canals no discharge, tympanic membranes intact clear and mobile bilaterally.  Nose: Nares show no discharge mass polyp or purulence.  Boggy mucosa is present.  No gross external deformity.  Septum: Midline  Oral cavity: Lips and gums without lesions.  Tongue and floor of mouth without lesions.  Parotid and submandibular ducts unobstructed.  No mucosal lesions on the buccal mucosa or vestibule of the  mouth.  Pharynx: No erythema exudate mass or ulcer  Neck: No lymphadenopathy.  No thyromegaly.  Trachea and larynx midline.  Tracheostomy tube in place with no evidence of infection.  Postradiation changes noted in the neck with mild brawny lymphedema. No masses in the parotid or submandibular glands.    Procedure Note    Pre-operative Diagnosis:   Chief Complaint   Patient presents with   • Cancer Surveillance       Post-operative Diagnosis: No evidence of recurrent disease; chronic laryngitis    Anesthesia: topical with xylocaine and neosynephrine    Endoscopy Type:  Flexible Laryngoscopy    Procedure Details:    The patient was placed in the sitting position.  After topical anesthesia and decongestion, the 4 mm laryngoscope was passed.  The nasal cavities, nasopharynx, oropharynx, hypopharynx, and larynx were all examined.  Vocal cords were examined during respiration and phonation.  The following findings were noted:    Findings: No masses in the nose or nasopharynx.  Hypopharynx shows posttreatment changes.  The epiglottis shows significant posttreatment change with edema and loss of size as well as some prolapse down over the airway compared to a normal epiglottis presumably due to destruction of cartilage from the previous tumor.  The endolarynx shows diffuse chronic appearing edema and mild erythema.  Vocal cord mobility is intact.  There is no evidence of neoplasm.    Condition:  Stable.  Patient tolerated procedure well.    Complications:  None    Assessment/Plan   Truman was seen today for cancer surveillance.    Diagnoses and all orders for this visit:    Encounter for follow-up surveillance of laryngeal cancer    Chronic laryngitis    Tracheostomy status (CMS/[facility])          Plan: Continue diet as per speech therapy.  Maintain tobacco abstinence and humidification.  Return in 3 months, call sooner for problems.   Colchicine Counseling:  Patient counseled regarding adverse effects including but not limited to stomach upset (nausea, vomiting, stomach pain, or diarrhea).  Patient instructed to limit alcohol consumption while taking this medication.  Colchicine may reduce blood counts especially with prolonged use.  The patient understands that monitoring of kidney function and blood counts may be required, especially at baseline. The patient verbalized understanding of the proper use and possible adverse effects of colchicine.  All of the patient's questions and concerns were addressed.

## 2022-08-29 RX ORDER — SPIRONOLACTONE 25 MG/1
TABLET ORAL
Qty: 90 TABLET | Refills: 0 | Status: SHIPPED | OUTPATIENT
Start: 2022-08-29 | End: 2022-11-28

## 2022-08-29 RX ORDER — LEVOTHYROXINE SODIUM 137 UG/1
TABLET ORAL
Qty: 34 TABLET | Refills: 0 | Status: SHIPPED | OUTPATIENT
Start: 2022-08-29 | End: 2022-09-19

## 2022-09-11 NOTE — H&P (VIEW-ONLY)
Subjective   Truman Esquivel is a 57 y.o. male.     History of Present Illness   Patient was seen previously with what appeared to be an exophytic mass either of the tongue base or the supraglottic larynx.  Also was having dysphagia and occasional hemoptysis.  Returns today having undergone a CT scan of the neck.  This is personally reviewed and shows what appears to be an exophytic mass involving the epiglottis extending toward and abutting the tongue base but does not appear to be arising out of the tongue base.  No obvious suspicious lymphadenopathy is noted.  Patient reports his symptoms remain the same.  He feels like he has a mass in his throat.  Has trouble swallowing.  Is not experiencing any trouble breathing at this point.      The following portions of the patient's history were reviewed and updated as appropriate: allergies, current medications, past family history, past medical history, past social history, past surgical history and problem list.      Truman Esquivel reports that he quit smoking about a year ago. He has never used smokeless tobacco. He reports that he does not drink alcohol or use illicit drugs.  Patient is not a tobacco user and has not been counseled for use of tobacco products    Family History   Problem Relation Age of Onset   • Thyroid disease Mother        Allergies   Allergen Reactions   • Acth [Corticotropin]    • Eggs Or Egg-Derived Products    • Erythromycin    • Gabapentin Itching   • Other      Mycins     • Penicillins      Tolerated cefepime during 2/2017 admission. Had rash and itching (relieved by benadryl) after few doses of cefepime and cefepime was continued.   • Tetracyclines & Related    • Cephalosporins Itching and Rash     Pt developed rash, itching after cefepime administration (2-3 doses) during 2/2017 admission. Itching was relieved with benadryl. Cefepime was continued because his infection was improving and no symptoms of anaphylaxis present          Current Outpatient Prescriptions:   •  amLODIPine (NORVASC) 2.5 MG tablet, Take 1 tablet by mouth Daily With Dinner., Disp: 30 tablet, Rfl: 11  •  aspirin 81 MG tablet, Take 81 mg by mouth Daily., Disp: , Rfl:   •  levothyroxine (SYNTHROID, LEVOTHROID) 25 MCG tablet, Take 1 tablet by mouth Daily. (Patient taking differently: Take 88 mcg by mouth Daily.), Disp: 30 tablet, Rfl: 2  •  levothyroxine (SYNTHROID, LEVOTHROID) 88 MCG tablet, , Disp: , Rfl:   •  losartan-hydrochlorothiazide (HYZAAR) 100-25 MG per tablet, Take 1 tablet by mouth Daily., Disp: 30 tablet, Rfl: 12  •  metoprolol succinate XL (TOPROL XL) 25 MG 24 hr tablet, Take 1 tablet by mouth Daily With Dinner., Disp: 30 tablet, Rfl: 12  •  Multiple Vitamins-Minerals (MULTIVITAMIN ADULT PO), Take  by mouth., Disp: , Rfl:   •  vitamin B-12 (CYANOCOBALAMIN) 100 MCG tablet, Take 50 mcg by mouth Daily., Disp: , Rfl:   •  vitamin E 100 UNIT capsule, Take 100 Units by mouth Daily., Disp: , Rfl:   •  atorvastatin (LIPITOR) 20 MG tablet, Take 1 tablet by mouth Daily., Disp: 30 tablet, Rfl: 11  •  cetirizine (zyrTEC) 10 MG tablet, Take 1 tablet by mouth Daily., Disp: 30 tablet, Rfl: 0  •  clopidogrel (PLAVIX) 75 MG tablet, Take 75 mg by mouth Daily., Disp: , Rfl:   •  coenzyme Q10 50 MG capsule capsule, Take  by mouth Daily., Disp: , Rfl:   No current facility-administered medications for this visit.     Past Medical History:   Diagnosis Date   • Chest pain    • Disease of thyroid gland    • HTN (hypertension)    • Knee pain    • Smoker          Review of Systems   Constitutional: Negative for fever.   HENT: Positive for trouble swallowing and voice change.            Objective   Physical Exam  General: Well-developed well-nourished male in no acute distress.  Alert and oriented ×3. Head: Normocephalic. Face: Symmetrical strength and appearance Voice: Somewhat muffled but no breathiness or stridor. Speech:Fluent  Ears: External ears no deformity, canals no  discharge, tympanic membranes intact clear and mobile bilaterally.  Nose: Nares show no discharge mass polyp or purulence.  Boggy mucosa is present.  No gross external deformity.  Septum: Midline  Oral cavity: Lips and gums without lesions.  Tongue and floor of mouth without lesions.  Parotid and submandibular ducts unobstructed.  No mucosal lesions on the buccal mucosa or vestibule of the mouth.  Pharynx: No erythema exudate mass or ulcer  Neck: No lymphadenopathy.  No thyromegaly.  Trachea and larynx midline.  No masses in the parotid or submandibular glands.  Chest: Clear.  Heart: Regular.  Abdomen: Benign.    With the CT results reviewed, fiberoptic laryngoscopy is repeated.    Procedure Note    Pre-operative Diagnosis:   Chief Complaint   Patient presents with   • Follow-up     CT results    Hypopharyngeal mass    Post-operative Diagnosis: Exophytic lesion of the epiglottis    Anesthesia: topical with xylocaine and neosynephrine    Endoscopy Type:  Flexible Laryngoscopy    Procedure Details:    The patient was placed in the sitting position.  After topical anesthesia and decongestion, the 4 mm laryngoscope was passed.  The nasal cavities, nasopharynx, oropharynx, hypopharynx, and larynx were all examined.  Vocal cords were examined during respiration and phonation.  The following findings were noted:    Findings: No masses in the nose, nasopharynx.  The hypopharynx shows an exophytic lesion in the midline that appears friable and is overtly neoplastic.  Today I'm able to maneuver past the mass and see into the larynx itself which shows some chronic appearing edema but no evidence of obstruction of the vocal cords and no involvement of the larynx other than the epiglottis.  Vocal cord mobility is intact.    Condition:  Stable.  Patient tolerated procedure well.    Complications:  None      Assessment/Plan   Truman was seen today for follow-up.    Diagnoses and all orders for this visit:    Neoplasm of uncertain  behavior of larynx    Dysphagia, unspecified type    Chronic laryngitis      Plan: Told the patient that I this lesion is highly suspicious for malignancy and biopsy is warrant.  I have recommended direct laryngoscopy with biopsy and esophagoscopy, however given the size of the mass as well as the fact that it involves the majority of the epiglottis, I am concerned that oral endotracheal intubation would not be feasible and would have a significant risk of airway obstruction.  Therefore I recommended proceeding with tracheostomy under local anesthesia prior to the laryngoscopy with biopsy and esophagoscopy.  I explained the nature of all these procedures to the patient in layman's terms.  I've explain risks including bleeding, damage to the throat or voicebox, and the almost certain need for further treatment after pathology is confirmed.  Proposed benefit would be a secure airway which would allow treatment of his laryngeal disease without worrying about acute laryngeal obstruction, along with establishing a definitive diagnosis of the lesion.  The alternative would be observation or attempted oral endotracheal intubation both of which I think have significant risk.  Patient voices understanding of all the above and wishes to proceed with surgery.  This is scheduled.     There are no Wet Read(s) to document.

## 2022-09-19 RX ORDER — LEVOTHYROXINE SODIUM 137 UG/1
TABLET ORAL
Qty: 34 TABLET | Refills: 0 | Status: SHIPPED | OUTPATIENT
Start: 2022-09-19 | End: 2022-10-24

## 2022-09-26 DIAGNOSIS — I71.019 ACUTE THORACIC AORTIC DISSECTION: Primary | ICD-10-CM

## 2022-10-01 ENCOUNTER — TRANSCRIBE ORDERS (OUTPATIENT)
Dept: GENERAL RADIOLOGY | Facility: HOSPITAL | Age: 62
End: 2022-10-01

## 2022-10-01 DIAGNOSIS — I71.019 ACUTE THORACIC AORTIC DISSECTION: Primary | ICD-10-CM

## 2022-10-07 ENCOUNTER — HOSPITAL ENCOUNTER (OUTPATIENT)
Dept: CT IMAGING | Facility: HOSPITAL | Age: 62
Discharge: HOME | End: 2022-10-07

## 2022-10-07 ENCOUNTER — LAB (OUTPATIENT)
Dept: LAB | Facility: HOSPITAL | Age: 62
End: 2022-10-07

## 2022-10-07 DIAGNOSIS — I71.019 ACUTE THORACIC AORTIC DISSECTION: ICD-10-CM

## 2022-10-07 LAB
BUN SERPL-MCNC: 20 MG/DL (ref 8–23)
CREAT SERPL-MCNC: 1.35 MG/DL (ref 0.76–1.27)
EGFRCR SERPLBLD CKD-EPI 2021: 59.7 ML/MIN/1.73

## 2022-10-07 PROCEDURE — 84520 ASSAY OF UREA NITROGEN: CPT

## 2022-10-07 PROCEDURE — 36415 COLL VENOUS BLD VENIPUNCTURE: CPT

## 2022-10-07 PROCEDURE — 0 IOPAMIDOL PER 1 ML: Performed by: NURSE PRACTITIONER

## 2022-10-07 PROCEDURE — 71275 CT ANGIOGRAPHY CHEST: CPT

## 2022-10-07 PROCEDURE — 82565 ASSAY OF CREATININE: CPT

## 2022-10-07 RX ADMIN — IOPAMIDOL 90 ML: 755 INJECTION, SOLUTION INTRAVENOUS at 14:17

## 2022-10-21 ENCOUNTER — LAB (OUTPATIENT)
Dept: LAB | Facility: HOSPITAL | Age: 62
End: 2022-10-21

## 2022-10-21 LAB
25(OH)D3 SERPL-MCNC: 52.3 NG/ML (ref 30–100)
ALBUMIN SERPL-MCNC: 4.3 G/DL (ref 3.5–5.2)
ALBUMIN/GLOB SERPL: 1.5 G/DL
ALP SERPL-CCNC: 76 U/L (ref 39–117)
ALT SERPL W P-5'-P-CCNC: 18 U/L (ref 1–41)
ANION GAP SERPL CALCULATED.3IONS-SCNC: 9 MMOL/L (ref 5–15)
AST SERPL-CCNC: 21 U/L (ref 1–40)
BASOPHILS # BLD AUTO: 0.11 10*3/MM3 (ref 0–0.2)
BASOPHILS NFR BLD AUTO: 1.1 % (ref 0–1.5)
BILIRUB SERPL-MCNC: 0.6 MG/DL (ref 0–1.2)
BUN SERPL-MCNC: 15 MG/DL (ref 8–23)
BUN/CREAT SERPL: 9.6 (ref 7–25)
CALCIUM SPEC-SCNC: 9.3 MG/DL (ref 8.6–10.5)
CHLORIDE SERPL-SCNC: 106 MMOL/L (ref 98–107)
CHOLEST SERPL-MCNC: 140 MG/DL (ref 0–200)
CO2 SERPL-SCNC: 27 MMOL/L (ref 22–29)
CREAT SERPL-MCNC: 1.56 MG/DL (ref 0.76–1.27)
CREAT UR-MCNC: 233.1 MG/DL
DEPRECATED RDW RBC AUTO: 47.5 FL (ref 37–54)
EGFRCR SERPLBLD CKD-EPI 2021: 50.2 ML/MIN/1.73
EOSINOPHIL # BLD AUTO: 0.44 10*3/MM3 (ref 0–0.4)
EOSINOPHIL NFR BLD AUTO: 4.2 % (ref 0.3–6.2)
ERYTHROCYTE [DISTWIDTH] IN BLOOD BY AUTOMATED COUNT: 14.7 % (ref 12.3–15.4)
GLOBULIN UR ELPH-MCNC: 2.9 GM/DL
GLUCOSE SERPL-MCNC: 109 MG/DL (ref 65–99)
HBA1C MFR BLD: 6.1 % (ref 4.8–5.6)
HCT VFR BLD AUTO: 46.1 % (ref 37.5–51)
HDLC SERPL-MCNC: 44 MG/DL (ref 40–60)
HGB BLD-MCNC: 14.8 G/DL (ref 13–17.7)
IMM GRANULOCYTES # BLD AUTO: 0.04 10*3/MM3 (ref 0–0.05)
IMM GRANULOCYTES NFR BLD AUTO: 0.4 % (ref 0–0.5)
LDLC SERPL CALC-MCNC: 81 MG/DL (ref 0–100)
LDLC/HDLC SERPL: 1.82 {RATIO}
LYMPHOCYTES # BLD AUTO: 1.75 10*3/MM3 (ref 0.7–3.1)
LYMPHOCYTES NFR BLD AUTO: 16.8 % (ref 19.6–45.3)
MCH RBC QN AUTO: 28.4 PG (ref 26.6–33)
MCHC RBC AUTO-ENTMCNC: 32.1 G/DL (ref 31.5–35.7)
MCV RBC AUTO: 88.5 FL (ref 79–97)
MONOCYTES # BLD AUTO: 0.99 10*3/MM3 (ref 0.1–0.9)
MONOCYTES NFR BLD AUTO: 9.5 % (ref 5–12)
NEUTROPHILS NFR BLD AUTO: 68 % (ref 42.7–76)
NEUTROPHILS NFR BLD AUTO: 7.06 10*3/MM3 (ref 1.7–7)
NRBC BLD AUTO-RTO: 0 /100 WBC (ref 0–0.2)
PLATELET # BLD AUTO: 315 10*3/MM3 (ref 140–450)
PMV BLD AUTO: 10.5 FL (ref 6–12)
POTASSIUM SERPL-SCNC: 5.4 MMOL/L (ref 3.5–5.2)
PROT ?TM UR-MCNC: 18.5 MG/DL
PROT SERPL-MCNC: 7.2 G/DL (ref 6–8.5)
PROT/CREAT UR: 79.4 MG/G CREA (ref 0–200)
RBC # BLD AUTO: 5.21 10*6/MM3 (ref 4.14–5.8)
SODIUM SERPL-SCNC: 142 MMOL/L (ref 136–145)
TRIGL SERPL-MCNC: 79 MG/DL (ref 0–150)
TSH SERPL DL<=0.05 MIU/L-ACNC: 1.09 UIU/ML (ref 0.27–4.2)
VIT B12 BLD-MCNC: 712 PG/ML (ref 211–946)
VLDLC SERPL-MCNC: 15 MG/DL (ref 5–40)
WBC NRBC COR # BLD: 10.39 10*3/MM3 (ref 3.4–10.8)

## 2022-10-21 PROCEDURE — 36415 COLL VENOUS BLD VENIPUNCTURE: CPT | Performed by: INTERNAL MEDICINE

## 2022-10-21 PROCEDURE — 82607 VITAMIN B-12: CPT | Performed by: INTERNAL MEDICINE

## 2022-10-21 PROCEDURE — 80061 LIPID PANEL: CPT | Performed by: INTERNAL MEDICINE

## 2022-10-21 PROCEDURE — 83036 HEMOGLOBIN GLYCOSYLATED A1C: CPT | Performed by: INTERNAL MEDICINE

## 2022-10-21 PROCEDURE — 84156 ASSAY OF PROTEIN URINE: CPT | Performed by: INTERNAL MEDICINE

## 2022-10-21 PROCEDURE — 82306 VITAMIN D 25 HYDROXY: CPT | Performed by: INTERNAL MEDICINE

## 2022-10-21 PROCEDURE — 85025 COMPLETE CBC W/AUTO DIFF WBC: CPT | Performed by: INTERNAL MEDICINE

## 2022-10-21 PROCEDURE — 80053 COMPREHEN METABOLIC PANEL: CPT | Performed by: INTERNAL MEDICINE

## 2022-10-21 PROCEDURE — 84443 ASSAY THYROID STIM HORMONE: CPT | Performed by: INTERNAL MEDICINE

## 2022-10-21 PROCEDURE — 82570 ASSAY OF URINE CREATININE: CPT | Performed by: INTERNAL MEDICINE

## 2022-10-24 RX ORDER — LEVOTHYROXINE SODIUM 137 UG/1
TABLET ORAL
Qty: 34 TABLET | Refills: 0 | Status: SHIPPED | OUTPATIENT
Start: 2022-10-24 | End: 2022-11-07

## 2022-10-28 ENCOUNTER — OFFICE VISIT (OUTPATIENT)
Dept: ENDOCRINOLOGY | Facility: CLINIC | Age: 62
End: 2022-10-28

## 2022-10-28 VITALS
RESPIRATION RATE: 18 BRPM | HEART RATE: 56 BPM | HEIGHT: 72 IN | DIASTOLIC BLOOD PRESSURE: 82 MMHG | SYSTOLIC BLOOD PRESSURE: 126 MMHG | BODY MASS INDEX: 40.31 KG/M2 | OXYGEN SATURATION: 96 % | WEIGHT: 297.6 LBS

## 2022-10-28 DIAGNOSIS — E03.9 ACQUIRED HYPOTHYROIDISM: ICD-10-CM

## 2022-10-28 DIAGNOSIS — E29.1 HYPOGONADISM IN MALE: ICD-10-CM

## 2022-10-28 DIAGNOSIS — I10 ESSENTIAL HYPERTENSION: ICD-10-CM

## 2022-10-28 DIAGNOSIS — E78.5 DYSLIPIDEMIA: ICD-10-CM

## 2022-10-28 DIAGNOSIS — N40.0 PROSTATISM: Primary | ICD-10-CM

## 2022-10-28 DIAGNOSIS — N18.31 STAGE 3A CHRONIC KIDNEY DISEASE: ICD-10-CM

## 2022-10-28 DIAGNOSIS — E11.9 WELL CONTROLLED TYPE 2 DIABETES MELLITUS: ICD-10-CM

## 2022-10-28 PROCEDURE — 99214 OFFICE O/P EST MOD 30 MIN: CPT | Performed by: INTERNAL MEDICINE

## 2022-10-28 NOTE — PROGRESS NOTES
"  Subjective    Truman Esquivel is a 61 y.o. male. he is here today for follow-up of hypothyroidism    Hypothyroidism     Duration around 2017  after having aortic dissection.  Compliant with levothyroxine    He has IFG-Diabetes  , didn't tolerate ozempic,     HTN controlled, lipids controlled, he has ckd stage 3a    Since last appt he has increased dyspnea and hot flashes    Had echo and cardiac ct that are normal          Objective    /82   Pulse 56   Resp 18   Ht 182.9 cm (72\")   Wt 135 kg (297 lb 9.6 oz)   SpO2 96%   BMI 40.36 kg/m²   AOx3  No neck masses,   Tracheostomy in place  RRR  CTA  No  edema     Results       Lab Results   Component Value Date    WBC 10.39 10/21/2022    HGB 14.8 10/21/2022    HCT 46.1 10/21/2022    MCV 88.5 10/21/2022     10/21/2022     Lab Results   Component Value Date    GLUCOSE 109 (H) 10/21/2022    BUN 15 10/21/2022    CREATININE 1.56 (H) 10/21/2022    EGFRIFNONA 54 (L) 07/21/2021    BCR 9.6 10/21/2022    K 5.4 (H) 10/21/2022    CO2 27.0 10/21/2022    CALCIUM 9.3 10/21/2022    ALBUMIN 4.30 10/21/2022    AST 21 10/21/2022    ALT 18 10/21/2022     Lab Results   Component Value Date    MALBCRERATIO 9.1 04/21/2022    MALBCRERATIO 23.9 04/27/2021          Assessment & Plan           ICD-10-CM ICD-9-CM   1. Hypothyroidism due to Hashimoto's thyroiditis  E03.8 244.8    E06.3 245.2   2. Acquired hypothyroidism  E03.9 244.9   3. Type 2 diabetes mellitus with hyperglycemia, without long-term current use of insulin (HCC)  E11.65 250.00     790.29   4. B12 deficiency  E53.8 266.2   5. IFG (impaired fasting glucose)  R73.01 790.21   6. Vitamin D deficiency  E55.9 268.9   7. Essential hypertension  I10 401.9   8. Dyslipidemia  E78.5 272.4   9. 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  Lab Results   Component Value Date    TSH 1.090 10/21/2022        synthroid was taking it with iron but now on empty stomach    Brand " "name synthroid 137 , 8 tabs per week working        Essential HTN - Edema - h o gout - CKD stage III  /82   Pulse 56   Resp 18   Ht 182.9 cm (72\")   Wt 135 kg (297 lb 9.6 oz)   SpO2 96%   BMI 40.36 kg/m²     Lab Results   Component Value Date    URICACID 7.7 (H) 04/21/2022       Avapro, amlodipine, max dose     metoprolol 25 and HR 60s     Aldactone 25 mg daily     Add jardiance for DM and stage 3 ckd , no proteinuria        Taking indomethacin in the past but I stopped and changed to tramadol         Diabetes      Lab Results   Component Value Date    HGBA1C 6.10 (H) 10/21/2022       Metformin     ozempic , side effects     Added jardiance today       Dyslipidemia    Lab Results   Component Value Date    CHOL 140 10/21/2022    TRIG 79 10/21/2022    HDL 44 10/21/2022    LDL 81 10/21/2022       On statin       Vit D Def    Once monthly 50 th units     Lab Results   Component Value Date    ILCI63WE 52.3 10/21/2022    KQPS51MQ 38.9 04/21/2022    ZZGF98KB 36.4 07/21/2021      ---    Anemia of low iron   That was during cancer tx    Now improved on liquid iron - now otc ferrous sulfate 325 , 3 x weekly   Stop and reevaluate     Take apart from thyroid pill   Nl cbc , Oct 2021       --    Hot flashes    Evaluate for hypogonadism      4. No follow-ups on file.         "

## 2022-10-31 ENCOUNTER — LAB (OUTPATIENT)
Dept: LAB | Facility: HOSPITAL | Age: 62
End: 2022-10-31

## 2022-10-31 LAB
FSH SERPL-ACNC: 14.2 MIU/ML
IRON 24H UR-MRATE: 129 MCG/DL (ref 59–158)
IRON SATN MFR SERPL: 31 % (ref 20–50)
LH SERPL-ACNC: 8.44 MIU/ML
PROLACTIN SERPL-MCNC: 14 NG/ML (ref 4.04–15.2)
TIBC SERPL-MCNC: 410 MCG/DL (ref 298–536)
TRANSFERRIN SERPL-MCNC: 275 MG/DL (ref 200–360)

## 2022-10-31 PROCEDURE — 84153 ASSAY OF PSA TOTAL: CPT | Performed by: INTERNAL MEDICINE

## 2022-10-31 PROCEDURE — 83540 ASSAY OF IRON: CPT | Performed by: INTERNAL MEDICINE

## 2022-10-31 PROCEDURE — 84466 ASSAY OF TRANSFERRIN: CPT | Performed by: INTERNAL MEDICINE

## 2022-10-31 PROCEDURE — 83001 ASSAY OF GONADOTROPIN (FSH): CPT | Performed by: INTERNAL MEDICINE

## 2022-10-31 PROCEDURE — 84403 ASSAY OF TOTAL TESTOSTERONE: CPT | Performed by: INTERNAL MEDICINE

## 2022-10-31 PROCEDURE — 84146 ASSAY OF PROLACTIN: CPT | Performed by: INTERNAL MEDICINE

## 2022-10-31 PROCEDURE — 36415 COLL VENOUS BLD VENIPUNCTURE: CPT | Performed by: INTERNAL MEDICINE

## 2022-10-31 PROCEDURE — 83002 ASSAY OF GONADOTROPIN (LH): CPT | Performed by: INTERNAL MEDICINE

## 2022-10-31 PROCEDURE — 84410 TESTOSTERONE BIOAVAILABLE: CPT | Performed by: INTERNAL MEDICINE

## 2022-11-01 LAB
PSA SERPL-MCNC: 0.6 NG/ML (ref 0–4)
REFLEX: NORMAL

## 2022-11-01 RX ORDER — IRBESARTAN 300 MG/1
TABLET ORAL
Qty: 90 TABLET | Refills: 0 | Status: SHIPPED | OUTPATIENT
Start: 2022-11-01 | End: 2023-02-06

## 2022-11-04 LAB
TESTOST SERPL-MCNC: 306 NG/DL (ref 264–916)
TESTOSTERONE.FREE+WB MFR SERPL: 19.8 % (ref 9–46)
TESTOSTERONE.FREE+WB SERPL-MCNC: 60.6 NG/DL (ref 40–250)

## 2022-11-05 LAB — TESTOST SERPL-MCNC: 315 NG/DL

## 2022-11-07 ENCOUNTER — TELEMEDICINE (OUTPATIENT)
Dept: CARDIAC SURGERY | Facility: CLINIC | Age: 62
End: 2022-11-07

## 2022-11-07 DIAGNOSIS — I10 ESSENTIAL HYPERTENSION: ICD-10-CM

## 2022-11-07 DIAGNOSIS — I71.019 ACUTE THORACIC AORTIC DISSECTION: Primary | ICD-10-CM

## 2022-11-07 PROCEDURE — 99212 OFFICE O/P EST SF 10 MIN: CPT | Performed by: NURSE PRACTITIONER

## 2022-11-07 NOTE — PROGRESS NOTES
11/7/2022      Subjective:      Marybeth Harrison DO    Chief Complaint: ***    History of Present Illness:       Dear Marybeth Masterson DO and Colleagues,    It was nice to see Truman Esquivel in follow up for ***.  He is a 62 y.o. male with *** who had a CT of the chest for *** and an incidental finding of ***.  The CT of chest on *** was reviewed and the *** measures maximally at ***.  He denies shortness of breath, chest/back pain, numbness/tingling/pain of extremities.  No vascular deficiencies or hyperextensible or hypermobile joints are noted on exam.  The patient's family history is *** for aneurysms or dissections, *** for connective tissue disease, and *** for coronary disease in first degree family members.          Patient Active Problem List   Diagnosis   • Essential hypertension   • Acquired hypothyroidism   • Obstructive sleep apnea of adult   • Squamous cell carcinoma of larynx (HCC)   • Absolute anemia   • Dehydration   • Tracheostomy status ([facility])   • History of gout   • Stage 3a chronic kidney disease (HCC)   • Vitamin D deficiency   • B12 deficiency   • Dyslipidemia   • IFG (impaired fasting glucose)   • Hyperuricemia   • Acute thoracic aortic dissection   • Psychophysiological insomnia       Past Medical History:   Diagnosis Date   • Aneurysm ([facility]) 02/15/2017    type a aortic dissection repair   • Aortic dissection (HCC)    • Chest pain    • Descending thoracic aortic aneurysm 6/5/2019   • Disease of thyroid gland    • HTN (hypertension)    • Knee pain    • Sleep apnea    • Smoker    • Squamous cell carcinoma of larynx (HCC) 2/5/2018   • Squamous cell carcinoma of larynx (HCC) 2/5/2018   • Squamous cell carcinoma of larynx (HCC) 2/5/2018   • Stroke (HCC)    • Swallowing difficulty    • TIA (transient ischemic attack) 12/21/2017       Past Surgical History:   Procedure Laterality Date   • AORTA SURGERY      ruptured aorta repair   • ASCENDING ARCH/HEMIARCH REPLACEMENT N/A 2/14/2017    Procedure:  INTRAOPERATIVE TRANSESOPHAGEAL ECHOCARDIOGRAM, MIDLINE STERNOTOMY, ASCENDING AORTIC  AND PROXIMAL AORTIC ARCH REPAIR WITH 26MM GRAFT, AORTIC VALVE RESUSPENSION, AORTIC ROOT REPAIR, OPEN VEIN HARVEST OF RIGHT LEG;  Surgeon: German Arreguin MD;  Location: [facility] MAIN OR;  Service:    • BRONCHOSCOPY N/A 2/17/2017    Procedure: BRONCHOSCOPY BIOPSY AT BEDSIDE WITH BAL-LEFT LOWER LOBE;  Surgeon: Trent Chaney MD;  Location: [facility] ENDOSCOPY;  Service:    • COLONOSCOPY N/A 3/7/2018    Procedure: COLONOSCOPY WITH POSSIBLE POLYPECTOMY   ( DONE IN OR WITH ENDO)      COLONOSCOPY FIRST;  Surgeon: Kris Solano MD;  Location: [facility] OR;  Service:    • DIRECT LARYNGOSCOPY, ESOPHAGOSCOPY, BRONCHOSCOPY N/A 2/5/2018    Procedure: DIRECT LARYNGOSCOPY WITH BIOPSY, ESOPHAGOSCOPY     (no bronchoscopy, no laser);  Surgeon: Joseph Venegas MD;  Location: [facility] OR;  Service:    • ENDOSCOPY W/ PEG TUBE PLACEMENT N/A 5/2/2018    Procedure: ESOPHAGOGASTRODUODENOSCOPY WITH PERCUTANEOUS ENDOSCOPIC GASTROSTOMY TUBE INSERTION;  Surgeon: Angel Maciel MD;  Location: [facility] ENDOSCOPY;  Service: Gastroenterology   • ENDOSCOPY W/ PEG TUBE PLACEMENT N/A 10/26/2018    Procedure: ESOPHAGOGASTRODUODENOSCOPY WITH PERCUTANEOUS ENDOSCOPIC GASTROSTOMY TUBE INSERTION WITH ANESTHESIA Possible open gastrostomy;  Surgeon: Kris Solano MD;  Location: [facility] OR;  Service: General   • GASTROSTOMY FEEDING TUBE INSERTION N/A 10/26/2018    Procedure: GASTROSTOMY FEEDING TUBE INSERTION;  Surgeon: Kris Solano MD;  Location: [facility] OR;  Service: General   • SPLENECTOMY N/A 7/26/2018    Procedure: SPLENECTOMY, left chest tube placement, EXPLORATORY LAPAROTOMY, G-TUBE PALCEMENT;  Surgeon: Kris Solano MD;  Location: [facility] OR;  Service: General   • SPLENECTOMY      2018   • THORACOSCOPY Left 7/31/2018    Procedure: LEFT THORACOSCOPY VIDEO ASSISTED POSSIBLE THORACOTOMY possible decortication bronchoscopy;   Surgeon: Joshua Pollock MD;  Location: [facility];  Service: Cardiothoracic   • TRACHEOSTOMY  02/05/2018   • TRACHEOSTOMY N/A 2/5/2018    Procedure: TRACHEOSTOMY  local injection @ 1106 incision @ 1119;  Surgeon: Joseph Venegas MD;  Location: [facility];  Service:    • VENOUS ACCESS DEVICE (PORT) INSERTION N/A 3/7/2018    Procedure: INSERTION OF MEDIPORT     (C-ARM#1);  Surgeon: Kris Solano MD;  Location: [facility];  Service:        Allergies   Allergen Reactions   • Chlorhexidine Itching     Topical cleaning wipes causes severe skin irritation   • Eggs Or Egg-Derived Products Swelling   • Erythromycin Other (See Comments)     Joint pains and cold sweats   • Gabapentin Itching   • Other GI Intolerance     Mycins  farzad lotion causes rash   • Acth [Corticotropin] Rash   • Cephalosporins Itching and Rash     Pt developed rash, itching after cefepime administration (2-3 doses) during 2/2017 admission. Itching was relieved with benadryl. Cefepime was continued because his infection was improving and no symptoms of anaphylaxis present   • Co Q 10 [Coenzyme Q10] Rash   • Keflex [Cephalexin] Rash   • Neomycin Rash   • Penicillins Rash     Tolerated cefepime during 2/2017 admission. Had rash and itching (relieved by benadryl) after few doses of cefepime and cefepime was continued.   • Tetracyclines & Related GI Intolerance         Current Outpatient Medications:   •  amLODIPine (NORVASC) 10 MG tablet, Take 1 tablet by mouth once daily, Disp: 90 tablet, Rfl: 3  •  atorvastatin (LIPITOR) 20 MG tablet, Take 1 tablet by mouth Daily., Disp: 90 tablet, Rfl: 5  •  B Complex Vitamins (VITAMIN B COMPLEX) capsule capsule, Take 1 capsule by mouth Daily., Disp: , Rfl:   •  Cholecalciferol (Vitamin D3) 1.25 MG (50520 UT) capsule, TAKE 1 CAPSULE BY MOUTH ONCE EVERY MONTH, Disp: 3 capsule, Rfl: 0  •  clopidogrel (PLAVIX) 75 MG tablet, Take 75 mg by mouth Every Other Day., Disp: , Rfl:   •  DULoxetine (CYMBALTA)  60 MG capsule, Take 60 mg by mouth Daily., Disp: , Rfl:   •  ECHINACEA PO, Take  by mouth Daily., Disp: , Rfl:   •  empagliflozin (Jardiance) 10 MG tablet tablet, Take 1 tablet by mouth Daily. Before bkfast, Disp: 30 tablet, Rfl: 11  •  irbesartan (AVAPRO) 300 MG tablet, Take 1 tablet by mouth once daily, Disp: 90 tablet, Rfl: 0  •  levothyroxine (SYNTHROID, LEVOTHROID) 137 MCG tablet, TAKE ONE TABLET BY MOUTH DAILY MONDAY TO SATURDAY, BUT TWO ON , Disp: 2 tablet, Rfl: 0  •  Magnesium Citrate 100 MG tablet, Take  by mouth., Disp: , Rfl:   •  metFORMIN ER (Glucophage XR) 500 MG 24 hr tablet, 2 tabs twice daily with meals , generic ok, Disp: 120 tablet, Rfl: 11  •  metoprolol succinate XL (TOPROL-XL) 25 MG 24 hr tablet, TAKE 1 TABLET BY MOUTH ONCE DAILY AT BEDTIME, Disp: 90 tablet, Rfl: 3  •  Multiple Vitamins-Minerals (MULTIVITAMIN ADULT PO), Take 1 tablet by mouth Daily., Disp: , Rfl:   •  spironolactone (ALDACTONE) 25 MG tablet, Take 1 tablet by mouth once daily, Disp: 90 tablet, Rfl: 0  •  tadalafil (CIALIS) 5 MG tablet, Take 5 mg by mouth Daily., Disp: , Rfl:   •  traMADol (ULTRAM) 50 MG tablet, TAKE 1/2 (ONE-HALF) TABLET BY MOUTH TWICE DAILY AS NEEDED FOR PAIN, Disp: 30 tablet, Rfl: 5  •  traZODone (DESYREL) 100 MG tablet, Take 100 mg by mouth Every Night., Disp: , Rfl:   •  vitamin C (ASCORBIC ACID) 250 MG tablet, Take 1,000 mg by mouth Daily. takes 2000mg daily, Disp: , Rfl:   •  vitamin E 1000 UNIT capsule, Take 1,000 Units by mouth Daily., Disp: , Rfl:     Social History     Socioeconomic History   • Marital status:    Tobacco Use   • Smoking status: Former     Packs/day: 1.25     Years: 38.00     Pack years: 47.50     Types: Cigarettes     Quit date: 2017     Years since quittin.7   • Smokeless tobacco: Never   • Tobacco comments:     2018 - Patient confirmed he ceased utilization of tobacco products 2017.   Vaping Use   • Vaping Use: Never used   Substance and Sexual  Activity   • Alcohol use: No   • Drug use: No   • Sexual activity: Defer       Family History   Problem Relation Age of Onset   • Thyroid disease Mother    • Hypertension Mother    • Hypertension Father    • Hypertension Other        {Common H&P Review Areas:06713}    Review of Systems:  Review of Systems    Physical Exam:    Vital Signs:      There is no height or weight on file to calculate BMI.                Physical Exam     Assessment:                 Recommendation/Plan:     We discussed the importance of avoidance of over the counter decongestants and stimulants, specifically pseudoephedrine and caffeine, for hypertension and aneurysm management    Risk factor modification of hypertension with a goal blood pressure less than 130/80, hyperlipidemia optimization, and continued avoidance of tobacco/nicotine products.    Initiation of low aerobic exercise is indicated to help reduce body habitus, assist with blood pressure and cholesterol control.       Although risk of rupture is low, emergency department presentation is warranted for acute chest, back, or abdominal pain, syncope, or stroke like symptoms    Thank you for allowing us to participate in his care.    Regards,    Monet Sanabria, ROWAN    ** >*** minutes in face to face conversation regarding treatment plan, education, and answering any questions the patient may have had

## 2022-11-08 NOTE — PROGRESS NOTES
We were unable to get video visit to initiate for yearly surveillance.  Discussed scans with Mr. Esquivel over the phone.  He states there have been no changes to his health history, his hypertension is well managed, he continues to drive for living.  He states that he does not smoke, does not participate in any illicit drug use, and drinks approximately 2 cups of caffeinated beverages daily.  On review of the CTA of chest it demonstrates stable aortic grafting without pseudoaneurysm, he does have residual dissection from the right brachiocephalic into the right subclavian extending down to the abdomen.  There is no significant change from last year study.    Repeat CTA chest abdomen and pelvis in 1 year, will need to have office visit for physical evaluation with residual dissection.    ROWAN Rodriguez  Cardiothoracic surgery

## 2022-11-11 DIAGNOSIS — E23.0 HYPOGONADOTROPIC HYPOGONADISM: ICD-10-CM

## 2022-11-11 DIAGNOSIS — E11.9 WELL CONTROLLED TYPE 2 DIABETES MELLITUS: Primary | ICD-10-CM

## 2022-11-11 DIAGNOSIS — E55.9 VITAMIN D DEFICIENCY: ICD-10-CM

## 2022-11-11 DIAGNOSIS — I10 ESSENTIAL HYPERTENSION: ICD-10-CM

## 2022-11-11 DIAGNOSIS — E03.9 ACQUIRED HYPOTHYROIDISM: ICD-10-CM

## 2022-11-11 DIAGNOSIS — N18.31 STAGE 3A CHRONIC KIDNEY DISEASE: ICD-10-CM

## 2022-11-21 RX ORDER — LEVOTHYROXINE SODIUM 137 UG/1
TABLET ORAL
Qty: 34 TABLET | Refills: 0 | Status: SHIPPED | OUTPATIENT
Start: 2022-11-21 | End: 2022-12-21

## 2022-11-28 RX ORDER — SPIRONOLACTONE 25 MG/1
TABLET ORAL
Qty: 90 TABLET | Refills: 0 | Status: SHIPPED | OUTPATIENT
Start: 2022-11-28 | End: 2023-02-20

## 2022-12-05 ENCOUNTER — OFFICE VISIT (OUTPATIENT)
Dept: CARDIOLOGY | Facility: CLINIC | Age: 62
End: 2022-12-05

## 2022-12-05 ENCOUNTER — LAB (OUTPATIENT)
Dept: LAB | Facility: HOSPITAL | Age: 62
End: 2022-12-05

## 2022-12-05 VITALS
SYSTOLIC BLOOD PRESSURE: 138 MMHG | OXYGEN SATURATION: 96 % | WEIGHT: 296 LBS | HEART RATE: 77 BPM | BODY MASS INDEX: 40.09 KG/M2 | DIASTOLIC BLOOD PRESSURE: 74 MMHG | HEIGHT: 72 IN

## 2022-12-05 DIAGNOSIS — I10 ESSENTIAL HYPERTENSION: ICD-10-CM

## 2022-12-05 DIAGNOSIS — E66.01 MORBID OBESITY WITH BMI OF 40.0-44.9, ADULT: ICD-10-CM

## 2022-12-05 DIAGNOSIS — I48.0 PAROXYSMAL ATRIAL FIBRILLATION: ICD-10-CM

## 2022-12-05 DIAGNOSIS — Z86.79 H/O AORTIC DISSECTION: Primary | ICD-10-CM

## 2022-12-05 LAB
ANION GAP SERPL CALCULATED.3IONS-SCNC: 8 MMOL/L (ref 5–15)
BUN SERPL-MCNC: 20 MG/DL (ref 8–23)
BUN/CREAT SERPL: 12.7 (ref 7–25)
CALCIUM SPEC-SCNC: 9.4 MG/DL (ref 8.6–10.5)
CHLORIDE SERPL-SCNC: 108 MMOL/L (ref 98–107)
CO2 SERPL-SCNC: 25 MMOL/L (ref 22–29)
CREAT SERPL-MCNC: 1.58 MG/DL (ref 0.76–1.27)
EGFRCR SERPLBLD CKD-EPI 2021: 49.2 ML/MIN/1.73
GLUCOSE SERPL-MCNC: 93 MG/DL (ref 65–99)
POTASSIUM SERPL-SCNC: 4.4 MMOL/L (ref 3.5–5.2)
SODIUM SERPL-SCNC: 141 MMOL/L (ref 136–145)

## 2022-12-05 PROCEDURE — 36415 COLL VENOUS BLD VENIPUNCTURE: CPT | Performed by: INTERNAL MEDICINE

## 2022-12-05 PROCEDURE — 80048 BASIC METABOLIC PNL TOTAL CA: CPT | Performed by: INTERNAL MEDICINE

## 2022-12-05 PROCEDURE — 99214 OFFICE O/P EST MOD 30 MIN: CPT | Performed by: INTERNAL MEDICINE

## 2022-12-05 NOTE — PROGRESS NOTES
[facility] Cardiology  OFFICE NOTE    Cardiovascular Medicine  Rudy He M.D., [facility]         No referring provider defined for this encounter.    History of Present Illness    Truman Esquivel is a 62 y.o. male who presents for a follow up visit today.  He is transitioning care from Dr. Gaxiola to me.  According to prior notes, the patient is s/p repair of ascending thoracic aortic dissection (along with aortic valve resuspension).  According to the patient, this surgery was done in February 2017 at [facility] by Dr. Arreguin.  He also has a history of hypertension.  Based on OSH CTA report from March 2021, he had a stable chronic dissection flap extending from the aortic arch all the way into the left external iliac artery.  Another CTA in October 2022 also showed an aortic dissection flap extending from right brachiocephalic artery extending into the abdominal aorta.  There was some mention of flap extending more prominently into the origin of the right brachiocephalic artery based on the CTA report.  The patient was subsequently informed by Dr. Arreguin's office that everything looked good.  Last transthoracic echocardiogram was in July 2021.  According to the report, LVEF was 51 to 55%, mild to moderate concentric LVH was noted, biatrial sizes were normal, RV cavity size/systolic function was normal, there were no hemodynamically significant valvular abnormalities and there was no evidence of pericardial effusion.  The patient denies any prior history of pacemaker placement or defibrillator replacement.  Review of prior ECGs suggests some ECGs showing atrial fibrillation in February 2017.  He has not been maintained on anticoagulant therapy.  He also appears to have a history of right upper extremity (in internal jugular) DVT in February 2017.  He is not currently on anticoagulation therapy.  He has a tracheostomy in place.  This is in the setting of SCC of larynx.  He  denies having any exertional or nonexertional chest discomfort at this point.  He has not had any PND or orthopnea.  Denies having any palpitations, lightheadedness, presyncope or syncope.    Review of Systems - ROS  Constitution: Negative for weight gain and weight loss.   HENT: Negative for congestion.    Eyes: Negative for blurred vision.   Cardiovascular: As mentioned above  Respiratory: Negative for hemoptysis.    Endocrine: Negative for polydipsia and polyuria.   Hematologic/Lymphatic: Negative for bleeding problem. Does not bruise/bleed easily.   Skin: Negative for flushing.   Musculoskeletal: Negative for neck pain and stiffness.   Gastrointestinal: Negative for abdominal pain,  nausea and vomiting.   Genitourinary: Negative for dysuria and hematuria.   Neurological: Negative for dizziness, focal weakness and numbness.   Psychiatric/Behavioral: Negative for altered mental status and depression.      All other systems were reviewed and were negative.    Past Medical History:   Diagnosis Date   • Aneurysm (HCC) 02/15/2017    type a aortic dissection repair   • Aortic dissection (HCC)    • Chest pain    • Descending thoracic aortic aneurysm 6/5/2019   • Disease of thyroid gland    • HTN (hypertension)    • Knee pain    • Sleep apnea    • Smoker    • Squamous cell carcinoma of larynx (HCC) 2/5/2018   • Squamous cell carcinoma of larynx (HCC) 2/5/2018   • Squamous cell carcinoma of larynx (HCC) 2/5/2018   • Stroke (HCC)    • Swallowing difficulty    • TIA (transient ischemic attack) 12/21/2017       Family History:  family history includes Hypertension in his father, mother, and another family member; Thyroid disease in his mother.    Social History: He quit smoking cigarettes in 2017.  He used to smoke 1.5 PPD of cigarettes before that.  He used to drink alcohol occasionally in the past.  Denies current or past illicit drug abuse.   reports that he quit smoking about 5 years ago. His smoking use included  cigarettes. He has a 47.50 pack-year smoking history. He has never used smokeless tobacco. He reports that he does not drink alcohol and does not use drugs.    Allergies:  Allergies   Allergen Reactions   • Chlorhexidine Itching     Topical cleaning wipes causes severe skin irritation   • Eggs Or Egg-Derived Products Swelling   • Erythromycin Other (See Comments)     Joint pains and cold sweats   • Gabapentin Itching   • Other GI Intolerance     Mycins  farzad lotion causes rash   • Acth [Corticotropin] Rash   • Cephalosporins Itching and Rash     Pt developed rash, itching after cefepime administration (2-3 doses) during 2/2017 admission. Itching was relieved with benadryl. Cefepime was continued because his infection was improving and no symptoms of anaphylaxis present   • Co Q 10 [Coenzyme Q10] Rash   • Keflex [Cephalexin] Rash   • Neomycin Rash   • Penicillins Rash     Tolerated cefepime during 2/2017 admission. Had rash and itching (relieved by benadryl) after few doses of cefepime and cefepime was continued.   • Tetracyclines & Related GI Intolerance         Current Outpatient Medications:   •  amLODIPine (NORVASC) 10 MG tablet, Take 1 tablet by mouth once daily, Disp: 90 tablet, Rfl: 3  •  atorvastatin (LIPITOR) 20 MG tablet, Take 1 tablet by mouth Daily., Disp: 90 tablet, Rfl: 5  •  B Complex Vitamins (VITAMIN B COMPLEX) capsule capsule, Take 1 capsule by mouth Daily., Disp: , Rfl:   •  Cholecalciferol (Vitamin D3) 1.25 MG (88918 UT) capsule, TAKE 1 CAPSULE BY MOUTH ONCE EVERY MONTH, Disp: 3 capsule, Rfl: 0  •  clopidogrel (PLAVIX) 75 MG tablet, Take 75 mg by mouth Every Other Day., Disp: , Rfl:   •  DULoxetine (CYMBALTA) 60 MG capsule, Take 60 mg by mouth Daily., Disp: , Rfl:   •  ECHINACEA PO, Take  by mouth Daily., Disp: , Rfl:   •  empagliflozin (Jardiance) 10 MG tablet tablet, Take 1 tablet by mouth Daily. Before bkfast, Disp: 30 tablet, Rfl: 11  •  irbesartan (AVAPRO) 300 MG tablet, Take 1 tablet by  "mouth once daily, Disp: 90 tablet, Rfl: 0  •  levothyroxine (SYNTHROID, LEVOTHROID) 137 MCG tablet, TAKE ONE TABLET BY MOUTH ONCE DAILY MONDAY - SATURDAY AND TAKE 2 TABLETS ON SUNDAY, Disp: 34 tablet, Rfl: 0  •  Magnesium Citrate 100 MG tablet, Take  by mouth., Disp: , Rfl:   •  metFORMIN ER (Glucophage XR) 500 MG 24 hr tablet, 2 tabs twice daily with meals , generic ok, Disp: 120 tablet, Rfl: 11  •  metoprolol succinate XL (TOPROL-XL) 25 MG 24 hr tablet, TAKE 1 TABLET BY MOUTH ONCE DAILY AT BEDTIME, Disp: 90 tablet, Rfl: 3  •  Multiple Vitamins-Minerals (MULTIVITAMIN ADULT PO), Take 1 tablet by mouth Daily., Disp: , Rfl:   •  spironolactone (ALDACTONE) 25 MG tablet, Take 1 tablet by mouth once daily, Disp: 90 tablet, Rfl: 0  •  tadalafil (CIALIS) 5 MG tablet, Take 5 mg by mouth Daily., Disp: , Rfl:   •  traMADol (ULTRAM) 50 MG tablet, TAKE 1/2 (ONE-HALF) TABLET BY MOUTH TWICE DAILY AS NEEDED FOR PAIN, Disp: 30 tablet, Rfl: 5  •  traZODone (DESYREL) 100 MG tablet, Take 100 mg by mouth Every Night., Disp: , Rfl:   •  vitamin C (ASCORBIC ACID) 250 MG tablet, Take 1,000 mg by mouth Daily. takes 2000mg daily, Disp: , Rfl:   •  vitamin E 1000 UNIT capsule, Take 1,000 Units by mouth Daily., Disp: , Rfl:     Physical Exam:  Vitals:    12/05/22 0829   BP: 138/74   Pulse: 77   SpO2: 96%   Weight: 134 kg (296 lb)   Height: 182.9 cm (72\")     Current Pain Level: none  Pulse Ox: Normal  on room air  General: alert, appears stated age and cooperative     Body Habitus: Obese  HEENT: Head: Normocephalic, has a tracheostomy in place.     Neuro: alert, oriented x3  JVP: Volume/Pulsation: Normal.  Normal waveforms.   Appropriate inspiratory decrease.      Respirations: no increased work of breathing   Chest:  Normal    Pulmonary:Normal   Heart rate: normal    Heart Rhythm: regular     Heart Sounds: S1: normal  S2: normal  S3: absent     Abdomen:   Appearance: normal .  Palpation: Soft, non-tender to palpation, bowel sounds positive " in all four quadrants  Extremity: Trace bilateral pitting lower extremity edema.     DATA REVIEWED:     EKG. I personally reviewed and interpreted the EKG.  normal EKG, normal sinus rhythm on 2/17/2022    ECG/EMG Results (all)     None        ---------------------------------------------------  TTE/MERI:  Results for orders placed in visit on 07/15/21    Adult Transthoracic Echo Complete W/ Cont if Necessary Per Protocol    Interpretation Summary  · Left ventricular wall thickness is consistent with mild to moderate concentric hypertrophy.  · Left ventricular ejection fraction appears to be 51 - 55%.  · Left ventricular diastolic function was normal.  · Estimated right ventricular systolic pressure from tricuspid regurgitation is normal (<35 mmHg).      -----------------------------------------------------  CXR/Imaging:   Imaging Results (Most Recent)     None          -----------------------------------------------------  CT:   No radiology results for the last 30 days.    ----------------------------------------------------      --------------------------------------------------------------------------------------------------  LABS:     The 10-year CVD risk score (Kiara et al., 2008) is: 29.7%    Values used to calculate the score:      Age: 62 years      Sex: Male      Diabetic: Yes      Tobacco smoker: No      Systolic Blood Pressure: 138 mmHg      Is BP treated: Yes      HDL Cholesterol: 44 mg/dL      Total Cholesterol: 140 mg/dL         Lab Results   Component Value Date    GLUCOSE 109 (H) 10/21/2022    BUN 15 10/21/2022    CREATININE 1.56 (H) 10/21/2022    EGFRIFNONA 54 (L) 07/21/2021    BCR 9.6 10/21/2022    K 5.4 (H) 10/21/2022    CO2 27.0 10/21/2022    CALCIUM 9.3 10/21/2022    ALBUMIN 4.30 10/21/2022    LABIL2 60.6 10/31/2022    AST 21 10/21/2022    ALT 18 10/21/2022     Lab Results   Component Value Date    WBC 10.39 10/21/2022    HGB 14.8 10/21/2022    HCT 46.1 10/21/2022    MCV 88.5 10/21/2022      10/21/2022     Lab Results   Component Value Date    CHOL 140 10/21/2022    TRIG 79 10/21/2022    HDL 44 10/21/2022    LDL 81 10/21/2022     Lab Results   Component Value Date    TSH 1.090 10/21/2022     Lab Results   Component Value Date    CKTOTAL 26 (L) 07/26/2018    CKMB 0.92 07/26/2018    TROPONINI 0.014 07/26/2018     Lab Results   Component Value Date    HGBA1C 6.10 (H) 10/21/2022     No results found for: DDIMER  Lab Results   Component Value Date    ALT 18 10/21/2022     Lab Results   Component Value Date    HGBA1C 6.10 (H) 10/21/2022    HGBA1C 6.00 (H) 04/21/2022    HGBA1C 5.90 (H) 04/27/2021     Lab Results   Component Value Date    MICROALBUR 1.4 04/21/2022    CREATININE 1.56 (H) 10/21/2022     Lab Results   Component Value Date    IRON 129 10/31/2022    TIBC 410 10/31/2022    FERRITIN 62.90 04/21/2022     Lab Results   Component Value Date    INR 1.66 (H) 07/26/2018    INR 1.71 (H) 07/26/2018    INR 3.20 (H) 04/25/2017    PROTIME 19.0 (H) 07/26/2018    PROTIME 19.5 (H) 07/26/2018    PROTIME 33.9 (H) 04/23/2017       [unfilled]    1. H/O aortic dissection  According to prior notes, the patient is s/p repair of ascending thoracic aortic dissection (along with aortic valve resuspension).  According to the patient, this surgery was done in February 2017 at [facility] by Dr. Arreguin.    Based on OSH CTA report from March 2021, he had a stable chronic dissection flap extending from the aortic arch all the way into the left external iliac artery.  Another CTA in October 2022 also showed an aortic dissection flap extending from right brachiocephalic artery extending into the abdominal aorta.  There was some mention of flap extending more prominently into the origin of the right brachiocephalic artery based on the CTA report.  The patient was subsequently informed by Dr. Arreguin's office that everything looked good.  He is well-established with CT surgery team at [facility] for postoperative monitoring of aortic dissection.    2. Essential hypertension  Clinic BP was 138/74 mmHg today.  Overall, his hypertension control appears to be reasonable based on review of clinic BP readings.  He is currently on amlodipine 10 mg oral daily, irbesartan 300 mg orally daily, Toprol-XL 25 mg oral daily and spironolactone 25 mg oral daily.  He appears to have CKD based on recent labs; most recent BMP showed borderline elevated serum K levels.  We will repeat BMP today.  - Basic Metabolic Panel    3. Paroxysmal atrial fibrillation (HCC)  Review of prior ECGs suggests some ECGs showing atrial fibrillation in February 2017.  This was in the postoperative period after repair of ascending thoracic aortic dissection.  He has not been maintained on anticoagulant therapy by prior cardiologists.  There has been no recurrence of A. fib based on clinic ECGs.  We will continue to monitor.    4. Morbid obesity with BMI of 40.0-44.9, adult (HCC)  Dietary modification and regular physical activity were discussed.      Prevention:  Class 3 Severe Obesity (BMI >=40). Obesity-related health conditions include the following: hypertension and dyslipidemias.  We discussed low calorie, low carb based diet program and portion control.      Truman Esquivel  reports that he quit smoking about 5 years ago. His smoking use included cigarettes. He has a 47.50 pack-year smoking history. He has never used smokeless tobacco.. I have educated him on continued tobacco cessation.      Return in about 6 months (around 6/5/2023).            Electronically signed by Rudy He MD on 12/05/22 at 08:38 CST

## 2022-12-07 ENCOUNTER — TELEPHONE (OUTPATIENT)
Dept: CARDIOLOGY | Facility: CLINIC | Age: 62
End: 2022-12-07

## 2022-12-07 NOTE — TELEPHONE ENCOUNTER
Called patient. Informed patient of test results. Patient voiced their understandings.     ----- Message from Rudy He MD sent at 12/6/2022 10:48 AM CST -----  Serum creatinine remains stable.  Serum K levels are now normal.

## 2022-12-07 NOTE — TELEPHONE ENCOUNTER
Called patient. Informed patient of test results. Patient voiced their understandings.     ----- Message from Rudy He MD sent at 12/6/2022 10:49 AM CST -----  Continue same medications.

## 2022-12-08 ENCOUNTER — APPOINTMENT (OUTPATIENT)
Dept: ONCOLOGY | Facility: CLINIC | Age: 62
End: 2022-12-08

## 2022-12-08 ENCOUNTER — APPOINTMENT (OUTPATIENT)
Dept: ONCOLOGY | Facility: HOSPITAL | Age: 62
End: 2022-12-08

## 2022-12-16 ENCOUNTER — LAB (OUTPATIENT)
Dept: ONCOLOGY | Facility: HOSPITAL | Age: 62
End: 2022-12-16

## 2022-12-16 ENCOUNTER — OFFICE VISIT (OUTPATIENT)
Dept: ONCOLOGY | Facility: CLINIC | Age: 62
End: 2022-12-16

## 2022-12-16 VITALS
OXYGEN SATURATION: 92 % | HEART RATE: 64 BPM | DIASTOLIC BLOOD PRESSURE: 93 MMHG | RESPIRATION RATE: 18 BRPM | SYSTOLIC BLOOD PRESSURE: 160 MMHG | BODY MASS INDEX: 40.01 KG/M2 | WEIGHT: 295 LBS

## 2022-12-16 DIAGNOSIS — C32.9 SQUAMOUS CELL CARCINOMA OF LARYNX: ICD-10-CM

## 2022-12-16 DIAGNOSIS — C32.9 SQUAMOUS CELL CARCINOMA OF LARYNX: Primary | ICD-10-CM

## 2022-12-16 LAB
ALBUMIN SERPL-MCNC: 4.5 G/DL (ref 3.5–5.2)
ALBUMIN/GLOB SERPL: 1.6 G/DL
ALP SERPL-CCNC: 82 U/L (ref 39–117)
ALT SERPL W P-5'-P-CCNC: 18 U/L (ref 1–41)
ANION GAP SERPL CALCULATED.3IONS-SCNC: 11 MMOL/L (ref 5–15)
AST SERPL-CCNC: 16 U/L (ref 1–40)
BASOPHILS # BLD AUTO: 0.12 10*3/MM3 (ref 0–0.2)
BASOPHILS NFR BLD AUTO: 1 % (ref 0–1.5)
BILIRUB SERPL-MCNC: 0.4 MG/DL (ref 0–1.2)
BUN SERPL-MCNC: 15 MG/DL (ref 8–23)
BUN/CREAT SERPL: 11.3 (ref 7–25)
CALCIUM SPEC-SCNC: 9.3 MG/DL (ref 8.6–10.5)
CHLORIDE SERPL-SCNC: 104 MMOL/L (ref 98–107)
CO2 SERPL-SCNC: 25 MMOL/L (ref 22–29)
CREAT SERPL-MCNC: 1.33 MG/DL (ref 0.76–1.27)
DEPRECATED RDW RBC AUTO: 46.7 FL (ref 37–54)
EGFRCR SERPLBLD CKD-EPI 2021: 60.4 ML/MIN/1.73
EOSINOPHIL # BLD AUTO: 0.64 10*3/MM3 (ref 0–0.4)
EOSINOPHIL NFR BLD AUTO: 5.2 % (ref 0.3–6.2)
ERYTHROCYTE [DISTWIDTH] IN BLOOD BY AUTOMATED COUNT: 14.9 % (ref 12.3–15.4)
GLOBULIN UR ELPH-MCNC: 2.8 GM/DL
GLUCOSE SERPL-MCNC: 104 MG/DL (ref 65–99)
HCT VFR BLD AUTO: 45.8 % (ref 37.5–51)
HGB BLD-MCNC: 15.4 G/DL (ref 13–17.7)
IMM GRANULOCYTES # BLD AUTO: 0.06 10*3/MM3 (ref 0–0.05)
IMM GRANULOCYTES NFR BLD AUTO: 0.5 % (ref 0–0.5)
LYMPHOCYTES # BLD AUTO: 2.31 10*3/MM3 (ref 0.7–3.1)
LYMPHOCYTES NFR BLD AUTO: 18.8 % (ref 19.6–45.3)
MCH RBC QN AUTO: 29.1 PG (ref 26.6–33)
MCHC RBC AUTO-ENTMCNC: 33.6 G/DL (ref 31.5–35.7)
MCV RBC AUTO: 86.6 FL (ref 79–97)
MONOCYTES # BLD AUTO: 1.22 10*3/MM3 (ref 0.1–0.9)
MONOCYTES NFR BLD AUTO: 9.9 % (ref 5–12)
NEUTROPHILS NFR BLD AUTO: 64.6 % (ref 42.7–76)
NEUTROPHILS NFR BLD AUTO: 7.92 10*3/MM3 (ref 1.7–7)
NRBC BLD AUTO-RTO: 0 /100 WBC (ref 0–0.2)
PLATELET # BLD AUTO: 333 10*3/MM3 (ref 140–450)
PMV BLD AUTO: 9.4 FL (ref 6–12)
POTASSIUM SERPL-SCNC: 4.1 MMOL/L (ref 3.5–5.2)
PROT SERPL-MCNC: 7.3 G/DL (ref 6–8.5)
RBC # BLD AUTO: 5.29 10*6/MM3 (ref 4.14–5.8)
SODIUM SERPL-SCNC: 140 MMOL/L (ref 136–145)
WBC NRBC COR # BLD: 12.27 10*3/MM3 (ref 3.4–10.8)

## 2022-12-16 PROCEDURE — 99212 OFFICE O/P EST SF 10 MIN: CPT | Performed by: NURSE PRACTITIONER

## 2022-12-16 PROCEDURE — G0463 HOSPITAL OUTPT CLINIC VISIT: HCPCS | Performed by: NURSE PRACTITIONER

## 2022-12-16 PROCEDURE — 85025 COMPLETE CBC W/AUTO DIFF WBC: CPT

## 2022-12-16 PROCEDURE — 80053 COMPREHEN METABOLIC PANEL: CPT

## 2022-12-18 DIAGNOSIS — E53.8 B12 DEFICIENCY: ICD-10-CM

## 2022-12-18 DIAGNOSIS — E55.9 VITAMIN D DEFICIENCY: ICD-10-CM

## 2022-12-18 DIAGNOSIS — E03.9 ACQUIRED HYPOTHYROIDISM: ICD-10-CM

## 2022-12-19 RX ORDER — CHOLECALCIFEROL (VITAMIN D3) 1250 MCG
CAPSULE ORAL
Qty: 3 CAPSULE | Refills: 0 | Status: SHIPPED | OUTPATIENT
Start: 2022-12-19

## 2022-12-21 DIAGNOSIS — E03.9 ACQUIRED HYPOTHYROIDISM: Primary | ICD-10-CM

## 2022-12-21 RX ORDER — LEVOTHYROXINE SODIUM 137 UG/1
TABLET ORAL
Qty: 34 TABLET | Refills: 5 | Status: SHIPPED | OUTPATIENT
Start: 2022-12-21

## 2022-12-21 NOTE — PROGRESS NOTES
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  Upon staging workup patient was found to have stage III squamous cell cancer of larynx for which she has been started on concurrent chemoradiation with weekly carboplatin and Taxol on March 19, 2018.  Last dose of chemotherapy and radiation was on May 15, 2018.   Patient continues to follow with Dr. Venegas in ENT clinic; he was seen in January with no evidence of recurrence by laryngoscopy. He is back to work driving trucks. Feeling well.        Past Medical History, Past Surgical History, Social History, Family History have been reviewed and are without significant changes except as mentioned.    Review of Systems   A comprehensive 14 point review of systems was performed and was negative except as mentioned.    Medications:  The current medication list was reviewed in the EMR    ALLERGIES:    Allergies   Allergen Reactions   • Chlorhexidine Itching     Topical cleaning wipes causes severe skin irritation   • Eggs Or Egg-Derived Products Swelling   • Erythromycin Other (See Comments)     Joint pains and cold sweats   • Gabapentin Itching   • Other GI Intolerance     Mycins  farzad lotion causes rash   • Acth [Corticotropin] Rash   • Cephalosporins Itching and Rash     Pt developed rash, itching after cefepime administration (2-3 doses) during 2/2017 admission. Itching was relieved with benadryl. Cefepime was continued because his infection was improving and no symptoms of anaphylaxis present   • Co Q 10 [Coenzyme Q10] Rash   • Keflex [Cephalexin] Rash   •  Neomycin Rash   • Penicillins Rash     Tolerated cefepime during 2/2017 admission. Had rash and itching (relieved by benadryl) after few doses of cefepime and cefepime was continued.   • Tetracyclines & Related GI Intolerance       Objective      Vitals:    12/16/22 1315   BP: 160/93   Pulse: 64   Resp: 18   SpO2: 92%   Weight: 134 kg (295 lb)   PainSc: 0-No pain     Current Status 6/9/2021   ECOG score 0       Physical Exam    General: alert and oriented no acute distress  Lungs;normal breath sounds  Card:RRR  Ext; no edema    RECENT LABS:  Glucose   Date Value Ref Range Status   12/16/2022 104 (H) 65 - 99 mg/dL Final     Glucose, Arterial   Date Value Ref Range Status   07/31/2018 113 (H) 65 - 95 mmol/L Final     Sodium   Date Value Ref Range Status   12/16/2022 140 136 - 145 mmol/L Final     Potassium   Date Value Ref Range Status   12/16/2022 4.1 3.5 - 5.2 mmol/L Final     CO2   Date Value Ref Range Status   12/16/2022 25.0 22.0 - 29.0 mmol/L Final     Chloride   Date Value Ref Range Status   12/16/2022 104 98 - 107 mmol/L Final     Anion Gap   Date Value Ref Range Status   12/16/2022 11.0 5.0 - 15.0 mmol/L Final     Creatinine   Date Value Ref Range Status   12/16/2022 1.33 (H) 0.76 - 1.27 mg/dL Final     BUN   Date Value Ref Range Status   12/16/2022 15 8 - 23 mg/dL Final     BUN/Creatinine Ratio   Date Value Ref Range Status   12/16/2022 11.3 7.0 - 25.0 Final     Calcium   Date Value Ref Range Status   12/16/2022 9.3 8.6 - 10.5 mg/dL Final     eGFR Non  Amer   Date Value Ref Range Status   07/21/2021 54 (L) >60 mL/min/1.73 Final     Alkaline Phosphatase   Date Value Ref Range Status   12/16/2022 82 39 - 117 U/L Final     Total Protein   Date Value Ref Range Status   12/16/2022 7.3 6.0 - 8.5 g/dL Final     ALT (SGPT)   Date Value Ref Range Status   12/16/2022 18 1 - 41 U/L Final     AST (SGOT)   Date Value Ref Range Status   12/16/2022 16 1 - 40 U/L Final     Total Bilirubin   Date Value Ref Range  Status   12/16/2022 0.4 0.0 - 1.2 mg/dL Final     Albumin   Date Value Ref Range Status   12/16/2022 4.50 3.50 - 5.20 g/dL Final     Globulin   Date Value Ref Range Status   12/16/2022 2.8 gm/dL Final     Testostosterone, Free & Weakly Bound   Date Value Ref Range Status   10/31/2022 60.6 40.0 - 250.0 ng/dL Final     Lab Results   Component Value Date    WBC 12.27 (H) 12/16/2022    HGB 15.4 12/16/2022    HCT 45.8 12/16/2022    MCV 86.6 12/16/2022     12/16/2022     Lab Results   Component Value Date    NEUTROABS 7.92 (H) 12/16/2022    IRON 129 10/31/2022    IRON 70 04/21/2022    IRON 120 12/09/2021    TIBC 410 10/31/2022    TIBC 402 04/21/2022    TIBC 389 12/09/2021    LABIRON 31 10/31/2022    LABIRON 17 (L) 04/21/2022    LABIRON 31 12/09/2021    FERRITIN 62.90 04/21/2022    FERRITIN 80.36 12/09/2021    FERRITIN 78.90 07/21/2021    ZGOCMFPD14 712 10/21/2022    MBWFOMRE75 549 04/21/2022    CWFVVZYX80 730 07/21/2021    FOLATE >20.00 06/09/2021    FOLATE >20.00 11/30/2020    FOLATE >20.00 06/01/2020     No results found for: , LABCA2, AFPTM, HCGQUANT, , CHROMGRNA, [id number], CEA, REFLABREPO        RADIOLOGY DATA :  No radiology results for the last 7 days        Assessment & Plan       Squamous cell cancer of larynx, epiglottis, stage III, 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  Based on PET/CT there is a tumor extension into preepiglottic space making a T3 lesion.  Result of PET CT were discussed with patient.  It was discussed with patient with T3 N0 lesion after his treatment option includes either surgery or concurrent chemoradiation.    -He was started on concurrent chemoradiation on March 19, 2018.  Patient received 6 weekly carboplatin and Taxol from March 19, 2018 until May 15, 2018.  Radiation was also completed on May 15, 2018.  -He continues to follow with Dr. Venegas with ENT exam still showing good response to chemoradiation without any evidence of any active tumor.   -RTC in 6 mos with CBC and CMP.           PHQ-9 Total  Score: 0     Truman Esquivel reports a pain score of 0.  Given his pain assessment as noted, treatment options were discussed and the following options were decided upon as a follow-up plan to address the patient's pain: no pain today.         Yarelis Ames, APRN  12/21/2022  10:55 CST        Part of this note may be an electronic transcription/translation of spoken language to printed text using the Dragon Dictation System.          CC:

## 2023-01-06 ENCOUNTER — TELEPHONE (OUTPATIENT)
Dept: CARDIOLOGY | Facility: CLINIC | Age: 63
End: 2023-01-06
Payer: MEDICARE

## 2023-01-06 RX ORDER — ATORVASTATIN CALCIUM 20 MG/1
20 TABLET, FILM COATED ORAL DAILY
Qty: 90 TABLET | Refills: 1 | Status: SHIPPED | OUTPATIENT
Start: 2023-01-06

## 2023-01-06 NOTE — TELEPHONE ENCOUNTER
----- Message from Wendy Gudino sent at 2023 12:53 PM CST -----  Regarding: refill  Contact: [phone]  Truman Alvin valle [birth year]  [phone] need a refill on his Atorvastatin 20 mg @ [facility] [place], [place]. Thxs

## 2023-01-18 ENCOUNTER — OFFICE VISIT (OUTPATIENT)
Dept: OTOLARYNGOLOGY | Facility: CLINIC | Age: 63
End: 2023-01-18
Payer: MEDICAID

## 2023-01-18 VITALS — HEIGHT: 72 IN | BODY MASS INDEX: 40.34 KG/M2 | TEMPERATURE: 97.2 F | WEIGHT: 297.8 LBS

## 2023-01-18 DIAGNOSIS — J38.3 VOCAL CORD DYSFUNCTION: ICD-10-CM

## 2023-01-18 DIAGNOSIS — Z93.0 TRACHEOSTOMY STATUS: ICD-10-CM

## 2023-01-18 DIAGNOSIS — Z08 ENCOUNTER FOR FOLLOW-UP SURVEILLANCE OF LARYNGEAL CANCER: Primary | ICD-10-CM

## 2023-01-18 DIAGNOSIS — Z85.21 ENCOUNTER FOR FOLLOW-UP SURVEILLANCE OF LARYNGEAL CANCER: Primary | ICD-10-CM

## 2023-01-18 DIAGNOSIS — J37.0 CHRONIC LARYNGITIS: ICD-10-CM

## 2023-01-18 PROCEDURE — 31575 DIAGNOSTIC LARYNGOSCOPY: CPT | Performed by: OTOLARYNGOLOGY

## 2023-01-18 PROCEDURE — 99213 OFFICE O/P EST LOW 20 MIN: CPT | Performed by: OTOLARYNGOLOGY

## 2023-01-20 NOTE — PROGRESS NOTES
Subjective   Truman Esquivel is a 62 y.o. male.       History of Present Illness   Patient has a history of squamous cell carcinoma of the supraglottis treated with chemoradiation with a complete response.  Diagnosis was now more than 5 years ago.  Is tracheostomy dependent.  Attempted capping previously but could not tolerate this.  Is here for surveillance stating he is not having any new symptoms to speak of.  Tolerating an oral diet.      The following portions of the patient's history were reviewed and updated as appropriate: allergies, current medications, past family history, past medical history, past social history, past surgical history and problem list.     reports that he quit smoking about 5 years ago. His smoking use included cigarettes. He has a 47.50 pack-year smoking history. He has never used smokeless tobacco. He reports that he does not drink alcohol and does not use drugs.   Patient is not a tobacco user and has not been counseled for use of tobacco products      Review of Systems        Objective   Physical Exam  General: Male no acute distress.  Voice is harsh.  Mild stridor with his tracheostomy occluded  Ears no discharge  Nares no discharge or purulence  Oral cavity no masses or lesions  Pharynx: No erythema exudate or mass.  Neck: Tracheostomy in place without granulation tissue or purulence.  No lymphadenopathy or abnormal mass.  Posttreatment changes noted.  Procedure Note    Pre-operative Diagnosis:   Chief Complaint   Patient presents with   • Cancer Surveillance       Post-operative Diagnosis: No evidence of recurrence; limited vocal cord mobility; chronic laryngitis    Anesthesia: topical with xylocaine and neosynephrine    Endoscopy Type:  Flexible Laryngoscopy    Procedure Details:    The patient was placed in the sitting position.  After topical anesthesia and decongestion, the 4 mm laryngoscope was passed.  The nasal cavities, nasopharynx, oropharynx, hypopharynx, and larynx  were all examined.  Vocal cords were examined during respiration and phonation.  The following findings were noted:    Findings: No masses in the nose or nasopharynx.  Tongue base and hypopharynx show posttreatment changes including thickening and deformity of the remnant of his epiglottis.  Today he tolerated passing the scope past the epiglottis.  There is still supraglottic edema of the arytenoids bilaterally.  The vocal cords were visualized and found to be without evidence of neoplasm but there is limited abduction on inspiration.    Condition:  Stable.  Patient tolerated procedure well.    Complications:  None         Assessment and Plan   Diagnoses and all orders for this visit:    1. Encounter for follow-up surveillance of laryngeal cancer (Primary)    2. Chronic laryngitis    3. Tracheostomy status (HCC)    4. Vocal cord dysfunction             Plan: Reassured the patient that I saw no evidence of disease.  Explained that he has limited vocal cord mobility in addition to his posttreatment supraglottic changes both of which are contributing to his airway obstruction with his tracheostomy occluded.  I told him that unfortunately I suspect he will be tracheostomy dependent from now on.  I have written him a prescription for a new size 6 cuffless trach.  Told him to call me when he has this and he can come in and get this changed.  Otherwise he is to maintain tobacco abstinence.  Since it is now been 5 years since diagnosis he can return for yearly surveillance visits but sooner if he has problems.

## 2023-02-06 RX ORDER — IRBESARTAN 300 MG/1
TABLET ORAL
Qty: 90 TABLET | Refills: 0 | Status: SHIPPED | OUTPATIENT
Start: 2023-02-06

## 2023-02-13 ENCOUNTER — TELEPHONE (OUTPATIENT)
Dept: CARDIOLOGY | Facility: CLINIC | Age: 63
End: 2023-02-13
Payer: MEDICAID

## 2023-02-13 DIAGNOSIS — I10 ESSENTIAL HYPERTENSION: ICD-10-CM

## 2023-02-13 RX ORDER — AMLODIPINE BESYLATE 10 MG/1
10 TABLET ORAL DAILY
Qty: 90 TABLET | Refills: 3 | Status: SHIPPED | OUTPATIENT
Start: 2023-02-13

## 2023-02-13 NOTE — TELEPHONE ENCOUNTER
----- Message from Wendy Gudino sent at 2023  3:02 PM CST -----  Regarding: refill  Contact: [phone]  Truman Tolentinoremi valle 60 wanted a refill on his Amlodlpine 10 mg @ [facility] [place]. Thxs

## 2023-02-20 ENCOUNTER — DOCUMENTATION (OUTPATIENT)
Dept: ENDOCRINOLOGY | Facility: CLINIC | Age: 63
End: 2023-02-20
Payer: MEDICAID

## 2023-02-20 RX ORDER — SPIRONOLACTONE 25 MG/1
TABLET ORAL
Qty: 90 TABLET | Refills: 0 | Status: SHIPPED | OUTPATIENT
Start: 2023-02-20

## 2023-02-20 RX ORDER — TRAMADOL HYDROCHLORIDE 50 MG/1
TABLET ORAL
Qty: 30 TABLET | Refills: 5 | Status: SHIPPED | OUTPATIENT
Start: 2023-02-20

## 2023-05-15 RX ORDER — IRBESARTAN 300 MG/1
TABLET ORAL
Qty: 90 TABLET | Refills: 0 | Status: SHIPPED | OUTPATIENT
Start: 2023-05-15

## 2023-05-19 ENCOUNTER — LAB (OUTPATIENT)
Dept: LAB | Facility: HOSPITAL | Age: 63
End: 2023-05-19
Payer: MEDICAID

## 2023-05-19 LAB
ALBUMIN SERPL-MCNC: 4.1 G/DL (ref 3.5–5.2)
ALBUMIN UR-MCNC: <1.2 MG/DL
ALBUMIN/GLOB SERPL: 1.4 G/DL
ALP SERPL-CCNC: 76 U/L (ref 39–117)
ALT SERPL W P-5'-P-CCNC: 16 U/L (ref 1–41)
ANION GAP SERPL CALCULATED.3IONS-SCNC: 11 MMOL/L (ref 5–15)
AST SERPL-CCNC: 15 U/L (ref 1–40)
BASOPHILS # BLD AUTO: 0.12 10*3/MM3 (ref 0–0.2)
BASOPHILS NFR BLD AUTO: 1.3 % (ref 0–1.5)
BILIRUB SERPL-MCNC: 0.6 MG/DL (ref 0–1.2)
BUN SERPL-MCNC: 24 MG/DL (ref 8–23)
BUN/CREAT SERPL: 16.8 (ref 7–25)
CALCIUM SPEC-SCNC: 9 MG/DL (ref 8.6–10.5)
CHLORIDE SERPL-SCNC: 106 MMOL/L (ref 98–107)
CHOLEST SERPL-MCNC: 132 MG/DL (ref 0–200)
CO2 SERPL-SCNC: 24 MMOL/L (ref 22–29)
CREAT SERPL-MCNC: 1.43 MG/DL (ref 0.76–1.27)
CREAT UR-MCNC: 91.1 MG/DL
DEPRECATED RDW RBC AUTO: 50.9 FL (ref 37–54)
EGFRCR SERPLBLD CKD-EPI 2021: 55.4 ML/MIN/1.73
EOSINOPHIL # BLD AUTO: 0.53 10*3/MM3 (ref 0–0.4)
EOSINOPHIL NFR BLD AUTO: 5.7 % (ref 0.3–6.2)
ERYTHROCYTE [DISTWIDTH] IN BLOOD BY AUTOMATED COUNT: 16.5 % (ref 12.3–15.4)
GLOBULIN UR ELPH-MCNC: 3 GM/DL
GLUCOSE SERPL-MCNC: 102 MG/DL (ref 65–99)
HBA1C MFR BLD: 6.3 % (ref 4.8–5.6)
HCT VFR BLD AUTO: 47.9 % (ref 37.5–51)
HDLC SERPL-MCNC: 40 MG/DL (ref 40–60)
HGB BLD-MCNC: 15.8 G/DL (ref 13–17.7)
IMM GRANULOCYTES # BLD AUTO: 0.04 10*3/MM3 (ref 0–0.05)
IMM GRANULOCYTES NFR BLD AUTO: 0.4 % (ref 0–0.5)
LDLC SERPL CALC-MCNC: 78 MG/DL (ref 0–100)
LDLC/HDLC SERPL: 1.97 {RATIO}
LYMPHOCYTES # BLD AUTO: 1.89 10*3/MM3 (ref 0.7–3.1)
LYMPHOCYTES NFR BLD AUTO: 20.3 % (ref 19.6–45.3)
MCH RBC QN AUTO: 28.6 PG (ref 26.6–33)
MCHC RBC AUTO-ENTMCNC: 33 G/DL (ref 31.5–35.7)
MCV RBC AUTO: 86.8 FL (ref 79–97)
MICROALBUMIN/CREAT UR: NORMAL MG/G{CREAT}
MONOCYTES # BLD AUTO: 1.01 10*3/MM3 (ref 0.1–0.9)
MONOCYTES NFR BLD AUTO: 10.9 % (ref 5–12)
NEUTROPHILS NFR BLD AUTO: 5.7 10*3/MM3 (ref 1.7–7)
NEUTROPHILS NFR BLD AUTO: 61.4 % (ref 42.7–76)
NRBC BLD AUTO-RTO: 0 /100 WBC (ref 0–0.2)
PLATELET # BLD AUTO: 306 10*3/MM3 (ref 140–450)
PMV BLD AUTO: 10.4 FL (ref 6–12)
POTASSIUM SERPL-SCNC: 5.4 MMOL/L (ref 3.5–5.2)
PROT SERPL-MCNC: 7.1 G/DL (ref 6–8.5)
RBC # BLD AUTO: 5.52 10*6/MM3 (ref 4.14–5.8)
SODIUM SERPL-SCNC: 141 MMOL/L (ref 136–145)
TESTOST SERPL-MCNC: 297 NG/DL (ref 193–740)
TRIGL SERPL-MCNC: 66 MG/DL (ref 0–150)
TSH SERPL DL<=0.05 MIU/L-ACNC: 1.15 UIU/ML (ref 0.27–4.2)
VIT B12 BLD-MCNC: 736 PG/ML (ref 211–946)
VLDLC SERPL-MCNC: 14 MG/DL (ref 5–40)
WBC NRBC COR # BLD: 9.29 10*3/MM3 (ref 3.4–10.8)

## 2023-05-19 PROCEDURE — 82607 VITAMIN B-12: CPT | Performed by: INTERNAL MEDICINE

## 2023-05-19 PROCEDURE — 83036 HEMOGLOBIN GLYCOSYLATED A1C: CPT | Performed by: INTERNAL MEDICINE

## 2023-05-19 PROCEDURE — 82043 UR ALBUMIN QUANTITATIVE: CPT | Performed by: INTERNAL MEDICINE

## 2023-05-19 PROCEDURE — 80050 GENERAL HEALTH PANEL: CPT | Performed by: INTERNAL MEDICINE

## 2023-05-19 PROCEDURE — 84403 ASSAY OF TOTAL TESTOSTERONE: CPT | Performed by: INTERNAL MEDICINE

## 2023-05-19 PROCEDURE — 80061 LIPID PANEL: CPT | Performed by: INTERNAL MEDICINE

## 2023-05-19 PROCEDURE — 82570 ASSAY OF URINE CREATININE: CPT | Performed by: INTERNAL MEDICINE

## 2023-05-22 RX ORDER — SPIRONOLACTONE 25 MG/1
TABLET ORAL
Qty: 90 TABLET | Refills: 0 | Status: SHIPPED | OUTPATIENT
Start: 2023-05-22 | End: 2023-05-25 | Stop reason: SDUPTHER

## 2023-05-25 ENCOUNTER — OFFICE VISIT (OUTPATIENT)
Dept: ENDOCRINOLOGY | Facility: CLINIC | Age: 63
End: 2023-05-25
Payer: MEDICAID

## 2023-05-25 VITALS
HEIGHT: 72 IN | BODY MASS INDEX: 40.5 KG/M2 | SYSTOLIC BLOOD PRESSURE: 130 MMHG | HEART RATE: 64 BPM | DIASTOLIC BLOOD PRESSURE: 70 MMHG | OXYGEN SATURATION: 94 % | WEIGHT: 299 LBS

## 2023-05-25 DIAGNOSIS — I10 ESSENTIAL HYPERTENSION: ICD-10-CM

## 2023-05-25 DIAGNOSIS — N18.31 STAGE 3A CHRONIC KIDNEY DISEASE: ICD-10-CM

## 2023-05-25 DIAGNOSIS — E11.9 WELL CONTROLLED TYPE 2 DIABETES MELLITUS: Primary | ICD-10-CM

## 2023-05-25 DIAGNOSIS — E03.9 ACQUIRED HYPOTHYROIDISM: ICD-10-CM

## 2023-05-25 DIAGNOSIS — E55.9 VITAMIN D DEFICIENCY: ICD-10-CM

## 2023-05-25 DIAGNOSIS — E78.5 DYSLIPIDEMIA: ICD-10-CM

## 2023-05-25 DIAGNOSIS — E53.8 B12 DEFICIENCY: ICD-10-CM

## 2023-05-25 RX ORDER — CHOLECALCIFEROL (VITAMIN D3) 1250 MCG
CAPSULE ORAL
Qty: 3 CAPSULE | Refills: 3 | Status: SHIPPED | OUTPATIENT
Start: 2023-05-25

## 2023-05-25 RX ORDER — SPIRONOLACTONE 25 MG/1
25 TABLET ORAL DAILY
Qty: 90 TABLET | Refills: 3 | Status: SHIPPED | OUTPATIENT
Start: 2023-05-25

## 2023-05-25 RX ORDER — METFORMIN HYDROCHLORIDE 500 MG/1
TABLET, EXTENDED RELEASE ORAL
Qty: 360 TABLET | Refills: 3 | Status: SHIPPED | OUTPATIENT
Start: 2023-05-25

## 2023-05-25 RX ORDER — IRBESARTAN 300 MG/1
300 TABLET ORAL DAILY
Qty: 90 TABLET | Refills: 3 | Status: SHIPPED | OUTPATIENT
Start: 2023-05-25

## 2023-05-25 RX ORDER — LEVOTHYROXINE SODIUM 137 UG/1
TABLET ORAL
Qty: 102 TABLET | Refills: 3 | Status: SHIPPED | OUTPATIENT
Start: 2023-05-25

## 2023-05-25 NOTE — PROGRESS NOTES
"  Subjective    Truman Esquivel is a 62 y.o. male. he is here today for follow-up of hypothyroidism    Hypothyroidism     Duration around 2017  after having aortic dissection.  Compliant with levothyroxine    He has IFG-Diabetes  , didn't tolerate ozempic,     HTN controlled, lipids controlled, he has ckd stage 3a    Since last appt he has increased dyspnea and hot flashes    Had echo and cardiac ct that are normal          Objective    /70   Pulse 64   Ht 182.9 cm (72\")   Wt 136 kg (299 lb)   SpO2 94%   BMI 40.55 kg/m²   AOx3  No neck masses,   Tracheostomy in place  RRR  CTA  No  edema     Results       Lab Results   Component Value Date    WBC 9.29 05/19/2023    HGB 15.8 05/19/2023    HCT 47.9 05/19/2023    MCV 86.8 05/19/2023     05/19/2023     Lab Results   Component Value Date    GLUCOSE 102 (H) 05/19/2023    BUN 24 (H) 05/19/2023    CREATININE 1.43 (H) 05/19/2023    EGFRIFNONA 54 (L) 07/21/2021    BCR 16.8 05/19/2023    K 5.4 (H) 05/19/2023    CO2 24.0 05/19/2023    CALCIUM 9.0 05/19/2023    ALBUMIN 4.1 05/19/2023    LABIL2 60.6 10/31/2022    AST 15 05/19/2023    ALT 16 05/19/2023     Lab Results   Component Value Date    MALBCRERATIO  05/19/2023      Comment:      Unable to calculate    MALBCRERATIO 9.1 04/21/2022          Assessment & Plan           ICD-10-CM ICD-9-CM   1. Well controlled type 2 diabetes mellitus  E11.9 250.00   2. Essential hypertension  I10 401.9   3. Dyslipidemia  E78.5 272.4   4. Acquired hypothyroidism  E03.9 244.9   5. Vitamin D deficiency  E55.9 268.9   6. B12 deficiency  E53.8 266.2   7. 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 aortic dissection  Lab Results   Component Value Date    TSH 1.150 05/19/2023        synthroid was taking it with iron but now on empty stomach    Brand name synthroid 137 , 8 tabs per week working        Essential HTN - Edema - h o gout - CKD stage III  /70   Pulse 64   Ht 182.9 cm (72\")   Wt 136 " kg (299 lb)   SpO2 94%   BMI 40.55 kg/m²     Lab Results   Component Value Date    URICACID 7.7 (H) 04/21/2022       Avapro, amlodipine, max dose     metoprolol 25 and HR 60s     Aldactone 25 mg daily , cut in half , states too little, change to every other day     Add jardiance for DM and stage 3 ckd , no proteinuria   Effective but expensive  Change to 25 mg tab , break in half        Taking indomethacin in the past but I stopped and changed to tramadol         Diabetes      Lab Results   Component Value Date    HGBA1C 6.30 (H) 05/19/2023       Metformin     ozempic , side effects   On jardiance     Dyslipidemia    Lab Results   Component Value Date    CHOL 132 05/19/2023    TRIG 66 05/19/2023    HDL 40 05/19/2023    LDL 78 05/19/2023       On statin       Vit D Def    Once monthly 50 th units     Lab Results   Component Value Date    WFKR47RY 52.3 10/21/2022    ILEO74TN 38.9 04/21/2022    SCKE24FC 36.4 07/21/2021      ---    Anemia of low iron   Lab Results   Component Value Date    WBC 9.29 05/19/2023    HGB 15.8 05/19/2023    HCT 47.9 05/19/2023    MCV 86.8 05/19/2023     05/19/2023        That was during cancer tx    Now improved on liquid iron - now otc ferrous sulfate 325 , 3 x weekly   Stop and reevaluate     Take apart from thyroid pill   Nl cbc , Oct 2021       --    Hot flashes    Evaluate for hypogonadism      4. No follow-ups on file.      No orders of the defined types were placed in this encounter.      No orders of the defined types were placed in this encounter.            This document has been electronically signed by Kp Sellers MD on May 25, 2023 08:27 CDT

## 2023-06-04 DIAGNOSIS — E55.9 VITAMIN D DEFICIENCY: ICD-10-CM

## 2023-06-04 DIAGNOSIS — E03.9 ACQUIRED HYPOTHYROIDISM: ICD-10-CM

## 2023-06-04 DIAGNOSIS — E53.8 B12 DEFICIENCY: ICD-10-CM

## 2023-06-05 RX ORDER — CHOLECALCIFEROL (VITAMIN D3) 1250 MCG
CAPSULE ORAL
Qty: 3 CAPSULE | Refills: 0 | Status: SHIPPED | OUTPATIENT
Start: 2023-06-05

## 2023-06-16 ENCOUNTER — LAB (OUTPATIENT)
Dept: ONCOLOGY | Facility: HOSPITAL | Age: 63
End: 2023-06-16
Payer: MEDICAID

## 2023-06-16 DIAGNOSIS — C32.9 SQUAMOUS CELL CARCINOMA OF LARYNX: ICD-10-CM

## 2023-06-16 LAB
ALBUMIN SERPL-MCNC: 4.4 G/DL (ref 3.5–5.2)
ALBUMIN/GLOB SERPL: 1.7 G/DL
ALP SERPL-CCNC: 80 U/L (ref 39–117)
ALT SERPL W P-5'-P-CCNC: 17 U/L (ref 1–41)
ANION GAP SERPL CALCULATED.3IONS-SCNC: 11 MMOL/L (ref 5–15)
AST SERPL-CCNC: 16 U/L (ref 1–40)
BASOPHILS # BLD AUTO: 0.11 10*3/MM3 (ref 0–0.2)
BASOPHILS NFR BLD AUTO: 0.9 % (ref 0–1.5)
BILIRUB SERPL-MCNC: 0.7 MG/DL (ref 0–1.2)
BUN SERPL-MCNC: 19 MG/DL (ref 8–23)
BUN/CREAT SERPL: 13.9 (ref 7–25)
CALCIUM SPEC-SCNC: 9.3 MG/DL (ref 8.6–10.5)
CHLORIDE SERPL-SCNC: 106 MMOL/L (ref 98–107)
CO2 SERPL-SCNC: 24 MMOL/L (ref 22–29)
CREAT SERPL-MCNC: 1.37 MG/DL (ref 0.76–1.27)
DEPRECATED RDW RBC AUTO: 51.1 FL (ref 37–54)
EGFRCR SERPLBLD CKD-EPI 2021: 58.3 ML/MIN/1.73
EOSINOPHIL # BLD AUTO: 0.57 10*3/MM3 (ref 0–0.4)
EOSINOPHIL NFR BLD AUTO: 4.5 % (ref 0.3–6.2)
ERYTHROCYTE [DISTWIDTH] IN BLOOD BY AUTOMATED COUNT: 16.6 % (ref 12.3–15.4)
GLOBULIN UR ELPH-MCNC: 2.6 GM/DL
GLUCOSE SERPL-MCNC: 96 MG/DL (ref 65–99)
HCT VFR BLD AUTO: 49 % (ref 37.5–51)
HGB BLD-MCNC: 16.1 G/DL (ref 13–17.7)
HOLD SPECIMEN: NORMAL
IMM GRANULOCYTES # BLD AUTO: 0.04 10*3/MM3 (ref 0–0.05)
IMM GRANULOCYTES NFR BLD AUTO: 0.3 % (ref 0–0.5)
LYMPHOCYTES # BLD AUTO: 1.54 10*3/MM3 (ref 0.7–3.1)
LYMPHOCYTES NFR BLD AUTO: 12.2 % (ref 19.6–45.3)
MCH RBC QN AUTO: 28.4 PG (ref 26.6–33)
MCHC RBC AUTO-ENTMCNC: 32.9 G/DL (ref 31.5–35.7)
MCV RBC AUTO: 86.6 FL (ref 79–97)
MONOCYTES # BLD AUTO: 1.29 10*3/MM3 (ref 0.1–0.9)
MONOCYTES NFR BLD AUTO: 10.2 % (ref 5–12)
NEUTROPHILS NFR BLD AUTO: 71.9 % (ref 42.7–76)
NEUTROPHILS NFR BLD AUTO: 9.06 10*3/MM3 (ref 1.7–7)
NRBC BLD AUTO-RTO: 0 /100 WBC (ref 0–0.2)
PLATELET # BLD AUTO: 273 10*3/MM3 (ref 140–450)
PMV BLD AUTO: 9.9 FL (ref 6–12)
POTASSIUM SERPL-SCNC: 4.1 MMOL/L (ref 3.5–5.2)
PROT SERPL-MCNC: 7 G/DL (ref 6–8.5)
RBC # BLD AUTO: 5.66 10*6/MM3 (ref 4.14–5.8)
SODIUM SERPL-SCNC: 141 MMOL/L (ref 136–145)
WBC NRBC COR # BLD: 12.61 10*3/MM3 (ref 3.4–10.8)

## 2023-08-04 ENCOUNTER — OFFICE VISIT (OUTPATIENT)
Dept: CARDIOLOGY | Facility: CLINIC | Age: 63
End: 2023-08-04
Payer: MEDICARE

## 2023-08-04 VITALS
HEART RATE: 65 BPM | BODY MASS INDEX: 40.5 KG/M2 | OXYGEN SATURATION: 95 % | DIASTOLIC BLOOD PRESSURE: 80 MMHG | SYSTOLIC BLOOD PRESSURE: 142 MMHG | WEIGHT: 299 LBS | HEIGHT: 72 IN

## 2023-08-04 DIAGNOSIS — I48.0 PAROXYSMAL ATRIAL FIBRILLATION: ICD-10-CM

## 2023-08-04 DIAGNOSIS — I10 ESSENTIAL HYPERTENSION: ICD-10-CM

## 2023-08-04 DIAGNOSIS — E66.01 MORBID OBESITY WITH BMI OF 40.0-44.9, ADULT: ICD-10-CM

## 2023-08-04 DIAGNOSIS — Z86.79 H/O AORTIC DISSECTION: Primary | ICD-10-CM

## 2023-08-04 LAB
QT INTERVAL: 398 MS
QTC INTERVAL: 413 MS

## 2023-08-04 RX ORDER — METOPROLOL SUCCINATE 50 MG/1
50 TABLET, EXTENDED RELEASE ORAL DAILY
Qty: 90 TABLET | Refills: 3 | Status: SHIPPED | OUTPATIENT
Start: 2023-08-04

## 2023-08-12 LAB
QT INTERVAL: 398 MS
QTC INTERVAL: 413 MS

## 2023-08-21 RX ORDER — TRAMADOL HYDROCHLORIDE 50 MG/1
TABLET ORAL
Qty: 30 TABLET | Refills: 0 | Status: SHIPPED | OUTPATIENT
Start: 2023-08-21

## 2023-09-01 ENCOUNTER — TELEPHONE (OUTPATIENT)
Dept: CARDIAC SURGERY | Facility: CLINIC | Age: 63
End: 2023-09-01
Payer: MEDICARE

## 2023-09-01 DIAGNOSIS — I71.019 ACUTE THORACIC AORTIC DISSECTION: Primary | ICD-10-CM

## 2023-09-01 NOTE — TELEPHONE ENCOUNTER
Caller: Truman Esquivel    Relationship: Self    Best call back number: [phone]     What orders are you requesting (i.e. lab or imaging): CT SCAN     In what timeframe would the patient need to come in: NEXT AVAILABLE       Additional notes: PATIENT STATES THAT HIS CARDIOLOGIST WOULD LIKE FOR DR. NGUYEN TO ORDER A CT SCAN.    PATIENT WOULD LIKE A CALL BACK.

## 2023-09-01 NOTE — TELEPHONE ENCOUNTER
Notified patient that orders have been placed and central scheduling will call him to get it scheduled. Patient verbalized understanding.

## 2023-09-12 ENCOUNTER — LAB (OUTPATIENT)
Dept: LAB | Facility: HOSPITAL | Age: 63
End: 2023-09-12
Payer: MEDICARE

## 2023-09-12 ENCOUNTER — HOSPITAL ENCOUNTER (OUTPATIENT)
Dept: CT IMAGING | Facility: HOSPITAL | Age: 63
Discharge: HOME | End: 2023-09-12
Payer: MEDICARE

## 2023-09-12 DIAGNOSIS — C32.9 SQUAMOUS CELL CARCINOMA OF LARYNX: ICD-10-CM

## 2023-09-12 DIAGNOSIS — I71.019 ACUTE THORACIC AORTIC DISSECTION: ICD-10-CM

## 2023-09-12 LAB
ALBUMIN SERPL-MCNC: 4.1 G/DL (ref 3.5–5.2)
ALBUMIN/GLOB SERPL: 1.1 G/DL
ALP SERPL-CCNC: 79 U/L (ref 39–117)
ALT SERPL W P-5'-P-CCNC: 18 U/L (ref 1–41)
ANION GAP SERPL CALCULATED.3IONS-SCNC: 12 MMOL/L (ref 5–15)
AST SERPL-CCNC: 20 U/L (ref 1–40)
BASOPHILS # BLD AUTO: 0.12 10*3/MM3 (ref 0–0.2)
BASOPHILS NFR BLD AUTO: 0.9 % (ref 0–1.5)
BILIRUB SERPL-MCNC: 0.4 MG/DL (ref 0–1.2)
BUN SERPL-MCNC: 19 MG/DL (ref 8–23)
BUN/CREAT SERPL: 14.5 (ref 7–25)
CALCIUM SPEC-SCNC: 9.2 MG/DL (ref 8.6–10.5)
CHLORIDE SERPL-SCNC: 105 MMOL/L (ref 98–107)
CO2 SERPL-SCNC: 21 MMOL/L (ref 22–29)
CREAT SERPL-MCNC: 1.31 MG/DL (ref 0.76–1.27)
DEPRECATED RDW RBC AUTO: 46.1 FL (ref 37–54)
EGFRCR SERPLBLD CKD-EPI 2021: 61.5 ML/MIN/1.73
EOSINOPHIL # BLD AUTO: 0.9 10*3/MM3 (ref 0–0.4)
EOSINOPHIL NFR BLD AUTO: 6.7 % (ref 0.3–6.2)
ERYTHROCYTE [DISTWIDTH] IN BLOOD BY AUTOMATED COUNT: 14.6 % (ref 12.3–15.4)
GLOBULIN UR ELPH-MCNC: 3.6 GM/DL
GLUCOSE SERPL-MCNC: 98 MG/DL (ref 65–99)
HCT VFR BLD AUTO: 48.6 % (ref 37.5–51)
HGB BLD-MCNC: 16.1 G/DL (ref 13–17.7)
IMM GRANULOCYTES # BLD AUTO: 0.06 10*3/MM3 (ref 0–0.05)
IMM GRANULOCYTES NFR BLD AUTO: 0.4 % (ref 0–0.5)
LYMPHOCYTES # BLD AUTO: 2.59 10*3/MM3 (ref 0.7–3.1)
LYMPHOCYTES NFR BLD AUTO: 19.2 % (ref 19.6–45.3)
MCH RBC QN AUTO: 28.6 PG (ref 26.6–33)
MCHC RBC AUTO-ENTMCNC: 33.1 G/DL (ref 31.5–35.7)
MCV RBC AUTO: 86.5 FL (ref 79–97)
MONOCYTES # BLD AUTO: 1.32 10*3/MM3 (ref 0.1–0.9)
MONOCYTES NFR BLD AUTO: 9.8 % (ref 5–12)
NEUTROPHILS NFR BLD AUTO: 63 % (ref 42.7–76)
NEUTROPHILS NFR BLD AUTO: 8.48 10*3/MM3 (ref 1.7–7)
NRBC BLD AUTO-RTO: 0 /100 WBC (ref 0–0.2)
PLATELET # BLD AUTO: 254 10*3/MM3 (ref 140–450)
PMV BLD AUTO: 10.1 FL (ref 6–12)
POTASSIUM SERPL-SCNC: 4.7 MMOL/L (ref 3.5–5.2)
PROT SERPL-MCNC: 7.7 G/DL (ref 6–8.5)
RBC # BLD AUTO: 5.62 10*6/MM3 (ref 4.14–5.8)
SODIUM SERPL-SCNC: 138 MMOL/L (ref 136–145)
WBC NRBC COR # BLD: 13.47 10*3/MM3 (ref 3.4–10.8)

## 2023-09-12 PROCEDURE — 36415 COLL VENOUS BLD VENIPUNCTURE: CPT

## 2023-09-12 PROCEDURE — 71275 CT ANGIOGRAPHY CHEST: CPT

## 2023-09-12 PROCEDURE — 25510000001 IOPAMIDOL PER 1 ML: Performed by: THORACIC SURGERY (CARDIOTHORACIC VASCULAR SURGERY)

## 2023-09-12 PROCEDURE — 85025 COMPLETE CBC W/AUTO DIFF WBC: CPT

## 2023-09-12 PROCEDURE — 74174 CTA ABD&PLVS W/CONTRAST: CPT

## 2023-09-12 PROCEDURE — 80053 COMPREHEN METABOLIC PANEL: CPT

## 2023-09-12 RX ORDER — SODIUM CHLORIDE 9 MG/ML
100 INJECTION, SOLUTION INTRAVENOUS
Status: DISCONTINUED | OUTPATIENT
Start: 2023-09-12 | End: 2023-09-13 | Stop reason: HOSPADM

## 2023-09-12 RX ADMIN — IOPAMIDOL 90 ML: 755 INJECTION, SOLUTION INTRAVENOUS at 18:07

## 2023-09-27 ENCOUNTER — LAB (OUTPATIENT)
Dept: LAB | Facility: HOSPITAL | Age: 63
End: 2023-09-27
Payer: MEDICARE

## 2023-09-27 DIAGNOSIS — N18.31 STAGE 3A CHRONIC KIDNEY DISEASE: ICD-10-CM

## 2023-09-27 DIAGNOSIS — E11.9 WELL CONTROLLED TYPE 2 DIABETES MELLITUS: ICD-10-CM

## 2023-09-27 DIAGNOSIS — I10 ESSENTIAL HYPERTENSION: ICD-10-CM

## 2023-09-27 DIAGNOSIS — E03.9 ACQUIRED HYPOTHYROIDISM: ICD-10-CM

## 2023-09-27 DIAGNOSIS — E53.8 B12 DEFICIENCY: ICD-10-CM

## 2023-09-27 DIAGNOSIS — E55.9 VITAMIN D DEFICIENCY: ICD-10-CM

## 2023-09-27 DIAGNOSIS — E78.5 DYSLIPIDEMIA: ICD-10-CM

## 2023-09-27 LAB
ALBUMIN SERPL-MCNC: 4.3 G/DL (ref 3.5–5.2)
ALBUMIN/GLOB SERPL: 1.4 G/DL
ALP SERPL-CCNC: 77 U/L (ref 39–117)
ALT SERPL W P-5'-P-CCNC: 17 U/L (ref 1–41)
ANION GAP SERPL CALCULATED.3IONS-SCNC: 10 MMOL/L (ref 5–15)
AST SERPL-CCNC: 19 U/L (ref 1–40)
BASOPHILS # BLD AUTO: 0.12 10*3/MM3 (ref 0–0.2)
BASOPHILS NFR BLD AUTO: 1.1 % (ref 0–1.5)
BILIRUB SERPL-MCNC: 0.7 MG/DL (ref 0–1.2)
BUN SERPL-MCNC: 18 MG/DL (ref 8–23)
BUN/CREAT SERPL: 13.2 (ref 7–25)
CALCIUM SPEC-SCNC: 9.3 MG/DL (ref 8.6–10.5)
CHLORIDE SERPL-SCNC: 104 MMOL/L (ref 98–107)
CHOLEST SERPL-MCNC: 151 MG/DL (ref 0–200)
CO2 SERPL-SCNC: 26 MMOL/L (ref 22–29)
CREAT SERPL-MCNC: 1.36 MG/DL (ref 0.76–1.27)
DEPRECATED RDW RBC AUTO: 46.2 FL (ref 37–54)
EGFRCR SERPLBLD CKD-EPI 2021: 58.8 ML/MIN/1.73
EOSINOPHIL # BLD AUTO: 0.62 10*3/MM3 (ref 0–0.4)
EOSINOPHIL NFR BLD AUTO: 5.5 % (ref 0.3–6.2)
ERYTHROCYTE [DISTWIDTH] IN BLOOD BY AUTOMATED COUNT: 14.7 % (ref 12.3–15.4)
GLOBULIN UR ELPH-MCNC: 3 GM/DL
GLUCOSE SERPL-MCNC: 98 MG/DL (ref 65–99)
HBA1C MFR BLD: 6.2 % (ref 4.8–5.6)
HCT VFR BLD AUTO: 51.6 % (ref 37.5–51)
HDLC SERPL-MCNC: 42 MG/DL (ref 40–60)
HGB BLD-MCNC: 16.9 G/DL (ref 13–17.7)
IMM GRANULOCYTES # BLD AUTO: 0.04 10*3/MM3 (ref 0–0.05)
IMM GRANULOCYTES NFR BLD AUTO: 0.4 % (ref 0–0.5)
LDLC SERPL CALC-MCNC: 89 MG/DL (ref 0–100)
LDLC/HDLC SERPL: 2.07 {RATIO}
LYMPHOCYTES # BLD AUTO: 2.07 10*3/MM3 (ref 0.7–3.1)
LYMPHOCYTES NFR BLD AUTO: 18.5 % (ref 19.6–45.3)
MCH RBC QN AUTO: 28.2 PG (ref 26.6–33)
MCHC RBC AUTO-ENTMCNC: 32.8 G/DL (ref 31.5–35.7)
MCV RBC AUTO: 86.1 FL (ref 79–97)
MONOCYTES # BLD AUTO: 1.18 10*3/MM3 (ref 0.1–0.9)
MONOCYTES NFR BLD AUTO: 10.5 % (ref 5–12)
NEUTROPHILS NFR BLD AUTO: 64 % (ref 42.7–76)
NEUTROPHILS NFR BLD AUTO: 7.18 10*3/MM3 (ref 1.7–7)
NRBC BLD AUTO-RTO: 0 /100 WBC (ref 0–0.2)
PLATELET # BLD AUTO: 241 10*3/MM3 (ref 140–450)
PMV BLD AUTO: 11 FL (ref 6–12)
POTASSIUM SERPL-SCNC: 4.6 MMOL/L (ref 3.5–5.2)
PROT SERPL-MCNC: 7.3 G/DL (ref 6–8.5)
RBC # BLD AUTO: 5.99 10*6/MM3 (ref 4.14–5.8)
SODIUM SERPL-SCNC: 140 MMOL/L (ref 136–145)
TRIGL SERPL-MCNC: 110 MG/DL (ref 0–150)
TSH SERPL DL<=0.05 MIU/L-ACNC: 1.13 UIU/ML (ref 0.27–4.2)
VLDLC SERPL-MCNC: 20 MG/DL (ref 5–40)
WBC NRBC COR # BLD: 11.21 10*3/MM3 (ref 3.4–10.8)

## 2023-09-27 PROCEDURE — 80053 COMPREHEN METABOLIC PANEL: CPT

## 2023-09-27 PROCEDURE — 84443 ASSAY THYROID STIM HORMONE: CPT

## 2023-09-27 PROCEDURE — 80061 LIPID PANEL: CPT

## 2023-09-27 PROCEDURE — 36415 COLL VENOUS BLD VENIPUNCTURE: CPT

## 2023-09-27 PROCEDURE — 82043 UR ALBUMIN QUANTITATIVE: CPT

## 2023-09-27 PROCEDURE — 83036 HEMOGLOBIN GLYCOSYLATED A1C: CPT

## 2023-09-27 PROCEDURE — 85025 COMPLETE CBC W/AUTO DIFF WBC: CPT

## 2023-09-27 PROCEDURE — 84550 ASSAY OF BLOOD/URIC ACID: CPT

## 2023-09-27 PROCEDURE — 82570 ASSAY OF URINE CREATININE: CPT

## 2023-09-27 PROCEDURE — 82607 VITAMIN B-12: CPT

## 2023-09-28 LAB
ALBUMIN UR-MCNC: 2 MG/DL
CREAT UR-MCNC: 100.3 MG/DL
MICROALBUMIN/CREAT UR: 19.9 MG/G
URATE SERPL-MCNC: 6.2 MG/DL (ref 3.4–7)
VIT B12 BLD-MCNC: 666 PG/ML (ref 211–946)

## 2023-12-20 ENCOUNTER — TELEPHONE (OUTPATIENT)
Dept: CARDIAC SURGERY | Facility: CLINIC | Age: 63
End: 2023-12-20
Payer: MEDICARE

## 2023-12-20 NOTE — TELEPHONE ENCOUNTER
Patient refused he's 2 hours away asked if he could get a phone call. Advised patient we can do a video but his phone is unable to do video.

## 2024-05-15 ENCOUNTER — TELEPHONE (OUTPATIENT)
Dept: CARDIAC SURGERY | Facility: CLINIC | Age: 64
End: 2024-05-15
Payer: MEDICARE

## 2024-05-15 DIAGNOSIS — I71.019 ACUTE THORACIC AORTIC DISSECTION: Primary | ICD-10-CM

## 2024-05-15 DIAGNOSIS — Z98.890 S/P AORTIC DISSECTION REPAIR: ICD-10-CM

## 2024-05-15 NOTE — TELEPHONE ENCOUNTER
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 Patient frustrated stating that he is going to lose his job because documentation is due in the middle of June. Discussed with patient that he was called after his last scan in September 2023 and notified that he needed an office visit and he declined. Patient agreeable to repeat CT scan. Orders placed and faxed to [place]. Will schedule patient for an office visit once CT is complete.

## 2024-05-15 NOTE — TELEPHONE ENCOUNTER
Caller: Truman Esquivel    Relationship: Self    Best call back number:     [phone] (Mobile)       What form or medical record are you requesting: STATEMENT FOR CDL LICENSE    Who is requesting this form or medical record from you: DR RATNA KUMAR FOR CDL RECERTIFICATION    How would you like to receive the form or medical records (pick-up, mail, fax): FAX    If fax, what is the fax number: [phone]    Timeframe paperwork needed: ASAP - MUST BE COMPLETED BY 6/10/24    Additional notes: PT NEEDS TO FAX A NOTE FROM CT SURGERY STATING HIS CONDITION IS CONTROLLED AND PT IS CLEARED TO DRIVE DOT REGULATED VEHICLE SAFELY AND WITHOUT RISK OF SUDDEN INCAPACITATION.

## 2024-06-14 ENCOUNTER — TELEPHONE (OUTPATIENT)
Dept: CARDIAC SURGERY | Facility: CLINIC | Age: 64
End: 2024-06-14
Payer: MEDICARE

## 2024-06-14 NOTE — TELEPHONE ENCOUNTER
Spoke to Denise in Radiology at [facility] in [place]. Request a disc be mailed regarding CTA images from 6/10/24. Denise asked that I fax a cover sheet for continuation of care and she will mail out the disc. I have sent a fax and will upload the confirmation of fax to patients chart once received.

## 2024-09-10 ENCOUNTER — OFFICE VISIT (OUTPATIENT)
Dept: CARDIAC SURGERY | Facility: CLINIC | Age: 64
End: 2024-09-10
Payer: MEDICARE

## 2024-09-10 VITALS
TEMPERATURE: 98.2 F | DIASTOLIC BLOOD PRESSURE: 72 MMHG | WEIGHT: 292 LBS | HEIGHT: 72 IN | HEART RATE: 61 BPM | SYSTOLIC BLOOD PRESSURE: 135 MMHG | RESPIRATION RATE: 20 BRPM | BODY MASS INDEX: 39.55 KG/M2 | OXYGEN SATURATION: 92 %

## 2024-09-10 DIAGNOSIS — I10 ESSENTIAL HYPERTENSION: ICD-10-CM

## 2024-09-10 DIAGNOSIS — E78.5 DYSLIPIDEMIA: ICD-10-CM

## 2024-09-10 DIAGNOSIS — I71.019 ACUTE THORACIC AORTIC DISSECTION: Primary | ICD-10-CM

## 2024-09-10 PROCEDURE — 3078F DIAST BP <80 MM HG: CPT | Performed by: NURSE PRACTITIONER

## 2024-09-10 PROCEDURE — 99214 OFFICE O/P EST MOD 30 MIN: CPT | Performed by: NURSE PRACTITIONER

## 2024-09-10 PROCEDURE — 3075F SYST BP GE 130 - 139MM HG: CPT | Performed by: NURSE PRACTITIONER

## 2024-09-10 NOTE — PROGRESS NOTES
"9/10/2024      Subjective:      Marybeth Harrison DO    Chief Complaint: Follow-up, s/p type A aortic dissection repair    History of Present Illness:       Dear Marybeth Masterson DO and Colleagues,    It was nice to see Truman Esquivel in [facility] for follow-up. He is a 63 y.o. 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  He underwent emergent repair of the ascending aorta and proximal aortic arch dissection using a 26 mm Dacron graft and hemiarch configuration, aortic root repair and resuspension of aortic valve, and steep hypothermic circulatory arrest by Dr. Arreguin in February 2017.  His laryngeal cancer was diagnosed following his type a dissection repair.  His G-tube has been removed but he remains with a tracheostomy.  Mr. Esquivel reports that he was eating carrots at a local restaurant and felt a \"pop\" when his aorta dissected.  He comes in today for routine follow-up for aneurysm surveillence.  The CTA of chest on 6/10/2024 was reviewed.  His imaging shows stable aortic grafting.  He has a stable type a dissection which originates in the mid ascending aorta with the dissection flap extending into the proximal brachiocephalic and left subclavian arteries.  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  The DTA is stable at 3.8 cm and stable left common iliac artery at 2.6 cm.  These findings are stable.  His last transthoracic echo was in July 2021 showing an EF of 50 to 55%, his aortic valve has notable sclerosis, no AI/ AS, and trace TR.  He does report some shortness of breath/cough which she relates to postnasal drip.  He denies any chest/back pain yes or numbness/tingling/pain of extremities.  No vascular deficiencies or hyperextensible or hypermobile joints are noted on exam.  The patient's family history is negative for " aneurysms or dissections, negative for connective tissue disease, and positive for coronary disease in first degree family members.  Father had CABG before he .  His BP today is 135/72.  He is a former smoker and quit smoking while in the hospital for his dissection repair. 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  He is very active in a motorcycle ministry and rides his [employer] motorcycle often.  He does NOT always wear a helmet.  Wearing a helmet was encouraged.  He is alone for the appt today.        Patient Active Problem List   Diagnosis    Essential hypertension    Acquired hypothyroidism    Obstructive sleep apnea of adult    Squamous cell carcinoma of larynx    Absolute anemia    Dehydration    Tracheostomy status    History of gout    Stage 3a chronic kidney disease    Vitamin D deficiency    B12 deficiency    Dyslipidemia    IFG (impaired fasting glucose)    Hyperuricemia    Acute thoracic aortic dissection    Psychophysiological insomnia       Past Medical History:   Diagnosis Date    Aneurysm 02/15/2017    type a aortic dissection repair    Aortic dissection     Chest pain     Descending thoracic aortic aneurysm 2019    Disease of thyroid gland     HTN (hypertension)     Knee pain     Sleep apnea     Smoker     Squamous cell carcinoma of larynx 2018    Squamous cell carcinoma of larynx 2018    Squamous cell carcinoma of larynx 2018    Stroke     Swallowing difficulty     TIA (transient ischemic attack) 2017       Past Surgical History:   Procedure Laterality Date    AORTA SURGERY      ruptured aorta repair    ASCENDING ARCH/HEMIARCH REPLACEMENT N/A 2017    Procedure: INTRAOPERATIVE TRANSESOPHAGEAL ECHOCARDIOGRAM, MIDLINE STERNOTOMY, ASCENDING AORTIC  AND PROXIMAL AORTIC ARCH REPAIR WITH 26MM GRAFT, AORTIC VALVE RESUSPENSION, AORTIC ROOT REPAIR, OPEN VEIN HARVEST OF RIGHT LEG;  Surgeon: German Arreguin MD;  Location: [facility];  Service:     BRONCHOSCOPY  N/A 2/17/2017    Procedure: BRONCHOSCOPY BIOPSY AT BEDSIDE WITH BAL-LEFT LOWER LOBE;  Surgeon: Trent Chaney MD;  [facility];  Service:     COLONOSCOPY N/A 3/7/2018    Procedure: COLONOSCOPY WITH POSSIBLE POLYPECTOMY   ( DONE IN OR WITH ENDO)      COLONOSCOPY FIRST;  Surgeon: Kris Solano MD;  Location: [facility] OR;  Service:     DIRECT LARYNGOSCOPY, ESOPHAGOSCOPY, BRONCHOSCOPY N/A 2/5/2018    Procedure: DIRECT LARYNGOSCOPY WITH BIOPSY, ESOPHAGOSCOPY     (no bronchoscopy, no laser);  Surgeon: Joseph Venegas MD;  Location: [facility] OR;  Service:     ENDOSCOPY W/ PEG TUBE PLACEMENT N/A 5/2/2018    Procedure: ESOPHAGOGASTRODUODENOSCOPY WITH PERCUTANEOUS ENDOSCOPIC GASTROSTOMY TUBE INSERTION;  Surgeon: Angel Maciel MD;  Location: [facility] ENDOSCOPY;  Service: Gastroenterology    ENDOSCOPY W/ PEG TUBE PLACEMENT N/A 10/26/2018    Procedure: ESOPHAGOGASTRODUODENOSCOPY WITH PERCUTANEOUS ENDOSCOPIC GASTROSTOMY TUBE INSERTION WITH ANESTHESIA Possible open gastrostomy;  Surgeon: Kris Solano MD;  Location: [facility] OR;  Service: General    GASTROSTOMY FEEDING TUBE INSERTION N/A 10/26/2018    Procedure: GASTROSTOMY FEEDING TUBE INSERTION;  Surgeon: Kris Solano MD;  Location: [facility] OR;  Service: General    SPLENECTOMY N/A 7/26/2018    Procedure: SPLENECTOMY, left chest tube placement, EXPLORATORY LAPAROTOMY, G-TUBE PALCEMENT;  Surgeon: Kris Solano MD;  Location: [facility] OR;  Service: General    SPLENECTOMY      2018    THORACOSCOPY Left 7/31/2018    Procedure: LEFT THORACOSCOPY VIDEO ASSISTED POSSIBLE THORACOTOMY possible decortication bronchoscopy;  Surgeon: Joshua Pollock MD;  Location: [facility] OR;  Service: Cardiothoracic    TRACHEOSTOMY  02/05/2018    TRACHEOSTOMY N/A 2/5/2018    Procedure: TRACHEOSTOMY  local injection @ 1106 incision @ 1119;  Surgeon: Joseph Venegas MD;  Location: [facility] OR;  Service:     VENOUS ACCESS DEVICE (PORT)  INSERTION N/A 3/7/2018    Procedure: INSERTION OF MEDIPORT     (C-ARM#1);  Surgeon: Kris Solano MD;  Location: [facility] OR;  Service:        Allergies   Allergen Reactions    Chlorhexidine Itching     Topical cleaning wipes causes severe skin irritation    Egg-Derived Products Swelling    Erythromycin Other (See Comments)     Joint pains and cold sweats    Gabapentin Itching    Other GI Intolerance     Mycins  farzad lotion causes rash    Acth [Corticotropin] Rash    Cephalosporins Itching and Rash     Pt developed rash, itching after cefepime administration (2-3 doses) during 2/2017 admission. Itching was relieved with benadryl. Cefepime was continued because his infection was improving and no symptoms of anaphylaxis present    Co Q 10 [Coenzyme Q10] Rash    Keflex [Cephalexin] Rash    Neomycin Rash    Penicillins Rash     Tolerated cefepime during 2/2017 admission. Had rash and itching (relieved by benadryl) after few doses of cefepime and cefepime was continued.    Tetracyclines & Related GI Intolerance         Current Outpatient Medications:     amLODIPine (NORVASC) 10 MG tablet, Take 1 tablet by mouth Daily., Disp: 90 tablet, Rfl: 3    atorvastatin (LIPITOR) 20 MG tablet, Take 1 tablet by mouth once daily, Disp: 90 tablet, Rfl: 3    B Complex Vitamins (VITAMIN B COMPLEX) capsule capsule, Take 1 capsule by mouth Daily., Disp: , Rfl:     Cholecalciferol (Vitamin D3) 1.25 MG (59609 UT) capsule, TAKE 1 CAPSULE BY MOUTH ONCE EVERY MONTH, Disp: 3 capsule, Rfl: 0    DULoxetine (CYMBALTA) 60 MG capsule, Take 1 capsule by mouth Daily., Disp: , Rfl:     empagliflozin (Jardiance) 25 MG tablet tablet, Take 1 tablet by mouth Daily. One tablet daily before breakfast, Disp: 30 tablet, Rfl: 11    irbesartan (AVAPRO) 300 MG tablet, Take 1 tablet by mouth Daily., Disp: 90 tablet, Rfl: 3    levothyroxine (SYNTHROID, LEVOTHROID) 137 MCG tablet, One tab daily Monday to Saturday and 2 on Sunday, Disp: 102 tablet, Rfl: 3     Magnesium Citrate 100 MG tablet, Take  by mouth., Disp: , Rfl:     metFORMIN ER (Glucophage XR) 500 MG 24 hr tablet, 2 tabs twice daily with meals , generic ok, Disp: 360 tablet, Rfl: 3    metoprolol succinate XL (TOPROL-XL) 50 MG 24 hr tablet, Take 1 tablet by mouth Daily., Disp: 90 tablet, Rfl: 3    Multiple Vitamins-Minerals (MULTIVITAMIN ADULT PO), Take 1 tablet by mouth Daily., Disp: , Rfl:     spironolactone (ALDACTONE) 25 MG tablet, Take 1 tablet by mouth Daily., Disp: 90 tablet, Rfl: 3    tadalafil (CIALIS) 5 MG tablet, Take 1 tablet by mouth Daily., Disp: , Rfl:     traMADol (ULTRAM) 50 MG tablet, TAKE 1/2 (ONE-HALF) TABLET BY MOUTH TWICE DAILY AS NEEDED FOR PAIN, Disp: 30 tablet, Rfl: 0    traZODone (DESYREL) 100 MG tablet, Take 1 tablet by mouth Every Night., Disp: , Rfl:     vitamin C (ASCORBIC ACID) 250 MG tablet, Take 4 tablets by mouth Daily. takes 2000mg daily, Disp: , Rfl:     vitamin E 1000 UNIT capsule, Take 1 capsule by mouth Daily., Disp: , Rfl:     Social History     Socioeconomic History    Marital status:    Tobacco Use    Smoking status: Former     Current packs/day: 0.00     Average packs/day: 1.3 packs/day for 38.0 years (47.5 ttl pk-yrs)     Types: Cigarettes     Start date: 1979     Quit date: 2017     Years since quittin.5     Passive exposure: Never    Smokeless tobacco: Never    Tobacco comments:     2018 - Patient confirmed he ceased utilization of tobacco products 2017.   Vaping Use    Vaping status: Never Used   Substance and Sexual Activity    Alcohol use: No    Drug use: No    Sexual activity: Defer       Family History   Problem Relation Age of Onset    Thyroid disease Mother     Hypertension Mother     Hypertension Father     Hypertension Other            Review of Systems:  Review of Systems   HENT:  Positive for postnasal drip.    Respiratory:  Positive for cough and shortness of breath.    Cardiovascular:  Negative for chest pain,  palpitations and leg swelling.   Neurological:  Negative for dizziness and light-headedness.       Physical Exam:    Vital Signs:  Weight: 132 kg (292 lb)   Body mass index is 39.6 kg/m².  Temp: 98.2 °F (36.8 °C)   Heart Rate: 61   BP: 135/72     Physical Exam  Vitals and nursing note reviewed.   Constitutional:       General: He is awake.      Appearance: Normal appearance. He is well-developed and well-groomed. He is morbidly obese.   HENT:      Head: Normocephalic and atraumatic.      Nose: Nose normal.      Mouth/Throat:      Lips: Pink.      Mouth: Mucous membranes are moist.      Pharynx: Uvula midline.   Eyes:      General: Lids are normal. No scleral icterus.     Extraocular Movements: Extraocular movements intact.      Conjunctiva/sclera: Conjunctivae normal.      Pupils: Pupils are equal, round, and reactive to light.      Comments: Wearing glasses   Neck:      Thyroid: No thyroid mass or thyromegaly.      Vascular: Normal carotid pulses. No carotid bruit, hepatojugular reflux or JVD.      Trachea: Trachea normal.   Cardiovascular:      Rate and Rhythm: Normal rate and regular rhythm.      Pulses:           Carotid pulses are 2+ on the right side and 2+ on the left side.       Radial pulses are 2+ on the right side and 2+ on the left side.        Femoral pulses are 2+ on the right side and 2+ on the left side.       Popliteal pulses are 2+ on the right side and 2+ on the left side.        Dorsalis pedis pulses are 2+ on the right side and 2+ on the left side.        Posterior tibial pulses are 2+ on the right side and 2+ on the left side.      Heart sounds: Normal heart sounds. No murmur heard.  Pulmonary:      Effort: Pulmonary effort is normal.      Breath sounds: Normal breath sounds.      Comments: + tracheostomy  Abdominal:      General: Bowel sounds are normal. There is no distension.      Palpations: Abdomen is soft.      Tenderness: There is no abdominal tenderness.   Musculoskeletal:      Cervical  back: Neck supple.      Right lower leg: No edema.      Left lower leg: No edema.      Comments: Gait steady and strong without use of assistive devices   Lymphadenopathy:      Cervical: No cervical adenopathy.      Upper Body:      Right upper body: No supraclavicular adenopathy.      Left upper body: No supraclavicular adenopathy.   Skin:     General: Skin is warm and dry.      Capillary Refill: Capillary refill takes less than 2 seconds.      Findings: No erythema or rash.      Nails: There is no clubbing.             Comments: Sternotomy well healed.   Neurological:      Mental Status: He is alert and oriented to person, place, and time.      GCS: GCS eye subscore is 4. GCS verbal subscore is 5. GCS motor subscore is 6.   Psychiatric:         Attention and Perception: Attention and perception normal.         Mood and Affect: Mood and affect normal.         Speech: Speech normal.         Behavior: Behavior normal. Behavior is cooperative.         Thought Content: Thought content normal.         Cognition and Memory: Cognition and memory normal.         Judgment: Judgment normal.        Assessment:     1.  Type A aortic dissection with moderate aortic insufficiency--s/p emergent repair of the ascending aorta and proximal aortic arch dissection using a 26 mm Dacron graft and hemiarch configuration, aortic root repair and resuspension of aortic valve, and steep hypothermic circulatory arrest  2.  Left common iliac artery aneurysm, 2.6 cm--stable  3.  HTN--stable  4.  Hx laryngeal cancer with tracheostomy/g-tube, chemotherapy, & XRT--followed by ENT  5.  Hypothyroidism/DM type II--followed by Endo    Recommendation/Plan:       Continued risk factor modification of hypertension with a goal blood pressure less than 130/80, hyperlipidemia optimization, and continued avoidance of tobacco/nicotine products.    We discussed the importance of avoidance of over the counter decongestants and stimulants, specifically  pseudoephedrine, for hypertension and aneurysm management.    Initiation of low aerobic exercise is indicated to help reduce body habitus, assist with blood pressure and cholesterol control.       Although risk of rupture is low, emergency department presentation is warranted for acute chest, back, or abdominal pain, syncope, or stroke like symptoms.    Follow up in [facility] in one year(s) with CTA chest.  His aortic measurements are stable.  I discussed his left common iliac artery aneurysm with Dr. Arreguin.  He did not feel anything needed to be done at this time.  He lives in the [place] area and coming to [place] is a hardship for him at times.  We discussed that if he felt he could not make the trip to [place] next year that we could try a telehealth visit.  He continues to be very active doing what he loves.    I spent approximately 30 minutes total in evaluating the data, examining the patient, discussing the options and counseling.  Independent interpretation of imaging.  Imaging shown to pt.     Thank you for allowing us to participate in his care.    Regards,    ROWAN Pettit

## 2024-09-11 ENCOUNTER — TELEPHONE (OUTPATIENT)
Dept: CARDIAC SURGERY | Facility: CLINIC | Age: 64
End: 2024-09-11
Payer: MEDICARE

## 2024-09-11 NOTE — TELEPHONE ENCOUNTER
Notified patient that Dr. Arreguin has reviewed his most recent CTA chest/abd/pelvis and recommends a repeat CTA and appointment with ROWAN Goodrich, in one year. Patient verbalized understanding and agreed to plan of care.

## 2025-02-04 NOTE — TELEPHONE ENCOUNTER
Per Dr. Gaxiola, I have attempted to reach patient to discuss his ECHO results.  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    1/17/25 CT scan:  Biopsy-proven supraglottic squamous cell carcinoma, overall progressed in size and extent since prior study including worsening involvement within the epiglottis, right epiglottic fold and right false cord, progressive extension to the right vallecula and right piriform sinus and suspected new involvement at the level of the petiole and anterior commissure and within the left aryepiglottic fold and false cord .  -No suspicious lymph nodes.  1/29/25: Port and tube feeding were placed     1/30/25: Patient began concurrent chemoRT     SUBJECTIVE                                                                                                             Patient presented for a pretreatment video swallow study.  Patient reported previously stated mouth pain has improved with magic mouthwash. He offered no complaints of swallow difficulty.    Function:  - Prior level of function: no concerns with swallowing.  Patient/Caregiver Goals: Successful chemoradiation treatment    Prior treatment: no therapies  Discharged from hospital, home health, or skilled nursing facility in last 30 days: no  Home Environment:   Patient lives with: significant other  Assistance available: as needed  Feel safe at home/work/school: Feels safe at home/work/school.  Denies 2 or more falls or an unexplained fall with injury in the  last year.    OBJECTIVE                                                                                                                             Video Fluoroscopy  Numbers listed with consistency indicate IDDSI diet level.  Completed in lateral view unless otherwise stated.     Thin (0); cup; teaspoon    - Oral Phase: intact    - Pharyngeal Phase: impaired        Residue: trace base of tongue, trace valleculae and trace pyriform sinuses, Clearance: did not reduce or clear    - Penetration Aspiration Scale: 8 - material enters the airway, passes below the vocal folds, and no effort is made to eject    Mildly thick (2); cup; teaspoon    - Pharyngeal Phase: impaired        Residue: trace base of tongue, trace valleculae and trace pyriform sinuses, Clearance: did not reduce or clear    - Penetration Aspiration Scale: 8 - material enters the airway, passes below the vocal folds, and no effort is made to eject  Silent aspiration with mildly thick via cup    Moderately thick (3); pureed (4), piecemeal swallow    - Penetration Aspiration Scale: 1 - material does not enter the airway    Regular, piecemeal swallow    - Pharyngeal Phase: intact    - Penetration Aspiration Scale: 1 - material does not enter the airway    Thin (0); pill; cup, piecemeal swallow    - Penetration Aspiration Scale: 8 - material enters the airway, passes below the vocal folds, and no effort is made to eject    Physiological Findings  Pharyngeal    - Base of Tongue Retraction: reduced    - Epiglottic Inversion: reduced  Compensatory Strategies Trialed     - Found ineffective: chin tuck   Copy of Study Stored In: PACS  Results Discussed With: patient's family, patient and radiologist                 ASSESSMENT                                                                                                          Dysphagia, Pharyngeal Phase  Dynamic Imaging Grade of Swallowing Toxicity (DIGEST) (Betina et al, 2022)    Results consistent with  location of tumor involving the epiglottis.  Patient presented with a severe pharyngeal dysphagia Overall DIGEST Score: 3 = Severe Dysphagia impacting efficiency (Grade 1) of bolus clearance characterized by trace pharyngeal residue with thin and mildly thick liquids and safety (Grade 3) resulting in silent aspiration with thin liquid via cup and teaspoon and mildly thick via cup. A chin tuck was ineffective to prevent aspiration.    (Definitions: 1. Symptomatic and able to eat a regular diet; 2. Symptomatic and altered eating/swallowing; 3. Severely altered eating/swallowing; tube feeding or total parenteral nutrition or hospitalization indicated; 4. Life-threatening consequences; urgent intervention indicated)    Results and recommendations were shared with patient and patient's spouse. Due to silent aspiration and patient's likelihood of becoming deconditioned during treatment it was recommended that patient take sips of clear water only after cleaning mouth thoroughly and take all other liquids via feeding tube. Recommend Regular consistency solids per patient tolerance.  Education:   - Results of above outlined education: Needs reinforcement  Recommendations  NPO: No  NPO with Alternative Feeding: No  P.O. Diet Consistency: Regular and sips of clear water only  Strategies/Guidelines: Small bites/sips, Solids only, Remain sitting for 30 minutes following PO intake, Pills in puree, all other liquids via feeding tube only  Level of Supervision: None Required  Swallow Therapy: Yes  Repeat Videofluoroscopy: Yes      PLAN                                                                                                                           Suggestions for next session as indicated: Patient to be seen in OP speech therapy for pretreatment swallow preservation and further education on results of video swallow study and diet recommendations.      GOALS                                                                                                                            Long Term Goals: to be met by end of plan of care  1. Patient will participate in initial videofluoroscopic swallow study     Status: met      Therapy procedure time and total treatment time can be found documented on the Time Entry flowsheet

## (undated) DEVICE — CONN STR 1/2INX3/8IN

## (undated) DEVICE — ANTIBACTERIAL UNDYED BRAIDED (POLYGLACTIN 910), SYNTHETIC ABSORBABLE SUTURE: Brand: COATED VICRYL

## (undated) DEVICE — VESSEL LOOPS X-RAY DETECTABLE: Brand: DEROYAL

## (undated) DEVICE — MARKR SKIN W/RULR AND LBL

## (undated) DEVICE — ELECTRD BLD STD SS 3/32X6.5IN

## (undated) DEVICE — SUT VICRYL 3-0 SH-1 PO 18IN J772D

## (undated) DEVICE — LN SMPL O2 NASL/ORL SMART/CAPNOLINE PLS A/

## (undated) DEVICE — IRRIGATOR BULB ASEPTO 60CC STRL

## (undated) DEVICE — CANN VESL FREE FLO 2MM

## (undated) DEVICE — PK HEART OPN 40

## (undated) DEVICE — SUT PROLN 5/0 V5 36IN 8934H

## (undated) DEVICE — SOL IRR NACL 0.9PCT BT 1000ML

## (undated) DEVICE — DALE TRACHEOSTOMY TUBE HOLDER, 1" WIDE, FITS UP TO 19.5" NECK.: Brand: DALE 240 BLUE

## (undated) DEVICE — TEMP PACING WIRE: Brand: MYO/WIRE

## (undated) DEVICE — ELECTRD BLD EZ CLN MOD 2.5IN

## (undated) DEVICE — GAUZE,SPONGE,4"X4",16PLY,XRAY,STRL,LF: Brand: MEDLINE

## (undated) DEVICE — SUPPLIED AS A PAIR FOR USE IN JAWS OF RESUABLE CLAMPS.: Brand: INTRACK® INSERTS

## (undated) DEVICE — PK PERFUS CUST W/CARDIOPLEGIA

## (undated) DEVICE — GLV SURG SENSICARE GREEN W/ALOE PF LF 8 STRL

## (undated) DEVICE — SPNG DISECTOR KTNER XRAY COTN 1/4X9/16IN PK/5

## (undated) DEVICE — SKIN AFFIX SURG ADHESIVE 72/CS 0.55ML: Brand: MEDLINE

## (undated) DEVICE — GLV SURG TRIUMPH LT PF LTX 6.5 STRL

## (undated) DEVICE — DRN WND CH RND FUL/FLUT NO/TROC 3/8IN 28F

## (undated) DEVICE — 3M™ STERI-STRIP™ REINFORCED ADHESIVE SKIN CLOSURES, R1547, 1/2 IN X 4 IN (12 MM X 100 MM), 6 STRIPS/ENVELOPE: Brand: 3M™ STERI-STRIP™

## (undated) DEVICE — PERCUTANEOUS ENDOSCOPIC GASTROSTOMY KIT: Brand: ENDOVIVE SAFETY PEG KIT

## (undated) DEVICE — SPNG GZ WOVN 4X4IN 12PLY 10/BX STRL

## (undated) DEVICE — GLV SURG BIOGEL LTX PF 8 1/2

## (undated) DEVICE — TRAP,MUCUS SPECIMEN,40CC: Brand: MEDLINE

## (undated) DEVICE — SUT ETHIB NO 0 OS4 X517H ETX517H

## (undated) DEVICE — HEMOCONCENTRATOR PERFUS LPS06

## (undated) DEVICE — SOL IRR H2O BTL 1000ML STRL

## (undated) DEVICE — ST. SORBAVIEW ULTIMATE IJ SYSTEM A,C: Brand: CENTURION

## (undated) DEVICE — OASIS DRAIN, SINGLE, INLINE & ATS COMPATIBLE: Brand: OASIS

## (undated) DEVICE — SINGLE USE SUCTION VALVE MAJ-209: Brand: SINGLE USE SUCTION VALVE (STERILE)

## (undated) DEVICE — PK ENT LF 60

## (undated) DEVICE — SUT SILK B SUTUPK 2/0 18IN SA65H

## (undated) DEVICE — GOWN,NON-REINFORCED,SIRUS,SET IN SLV,XXL: Brand: MEDLINE

## (undated) DEVICE — SUT NLY 2/0 664G

## (undated) DEVICE — APPL TP BIOGLUE STD

## (undated) DEVICE — SHEET,DRAPE,53X77,STERILE: Brand: MEDLINE

## (undated) DEVICE — TOWEL,OR,DSP,ST,WHITE,DLX,4/PK,20PK/CS: Brand: MEDLINE

## (undated) DEVICE — CANN SMPL SOFTECH BIFLO ETCO2 A/M 7FT

## (undated) DEVICE — SPNG DRN AMD EXCILON 6PLY 4X4IN PK/2

## (undated) DEVICE — SUT PDS 0 CT1 36IN Z346H

## (undated) DEVICE — SUT SILK 0 CT1 CR8 18IN C021D

## (undated) DEVICE — CVR SURG EQUIP BND RECTG 36X28

## (undated) DEVICE — SOL ISO/ALC RUB 70PCT 4OZ

## (undated) DEVICE — CONN TBG Y 5 IN 1 LF STRL

## (undated) DEVICE — CATH MALECOT 4WING 22F

## (undated) DEVICE — MYOCARDIAL PROBE NON-PYROGENIC: Brand: DEROYAL

## (undated) DEVICE — TBG ART PRESS 60 IN

## (undated) DEVICE — 8 FOOT DISPOSABLE EXTENSION CABLE WITH SAFE CONNECT / ALLIGATOR CLIP

## (undated) DEVICE — TOWEL,OR,DSP,ST,BLUE,DLX,4/PK,20PK/CS: Brand: MEDLINE

## (undated) DEVICE — GLV SURG SENSICARE GREEN W/ALOE PF LF 7 STRL

## (undated) DEVICE — Device: Brand: RETRACT-O-TAPE 18G X 30.5CM BLUNT NEEDLE

## (undated) DEVICE — SENSR CERBRL O2 PK/2

## (undated) DEVICE — SOL NACL 0.9PCT 1000ML

## (undated) DEVICE — SYS PUMP PREVENA125 INCSN MNGT PP/DRSNG 45ML 1P/U

## (undated) DEVICE — CONTAINER,SPECIMEN,OR STERILE,4OZ: Brand: MEDLINE

## (undated) DEVICE — SUT SILK 2/0 TIES 18IN A185H

## (undated) DEVICE — SUT VIC 3/0 SH 27IN J416H

## (undated) DEVICE — SUT SILK 0 FSL 18IN 678G

## (undated) DEVICE — BITEBLOCK OMNI BLOC

## (undated) DEVICE — PK MAJ PROC LF 60

## (undated) DEVICE — SUT GORE CV5 PT 13IN 5N08A

## (undated) DEVICE — STERILE POLYISOPRENE POWDER-FREE SURGICAL GLOVES: Brand: PROTEXIS

## (undated) DEVICE — GOWN,AURORA,NOREINF,RAGLAN,XL,STERILE: Brand: MEDLINE

## (undated) DEVICE — GLV SURG SENSICARE ALOE LF PF SZ7.5 GRN

## (undated) DEVICE — INTENDED FOR TISSUE SEPARATION, AND OTHER PROCEDURES THAT REQUIRE A SHARP SURGICAL BLADE TO PUNCTURE OR CUT.: Brand: BARD-PARKER ® CARBON RIB-BACK BLADES

## (undated) DEVICE — DRP WARMR MACH

## (undated) DEVICE — SUT SILK 2/0 SH CR8 18IN CR8 C012D

## (undated) DEVICE — ELECTRD DEFIB M/FUNC STATPADZ PK/2

## (undated) DEVICE — KT CATH SXN MINI W/GLV LF 10F

## (undated) DEVICE — DEFOGGER!" ANTI FOG KIT: Brand: DEROYAL

## (undated) DEVICE — ADAPT ANTEGRADE RETRGR

## (undated) DEVICE — SPNG LAP 18X18IN LF STRL PK/5

## (undated) DEVICE — PREP SOL POVIDONE/IODINE BT 4OZ

## (undated) DEVICE — GLV SURG SENSICARE GREEN W/ALOE PF LF 6.5 STRL

## (undated) DEVICE — DRSNG SURESITE WNDW 4X4.5

## (undated) DEVICE — PK ATS CUST W CARDIOTOMY RESEVOIR

## (undated) DEVICE — BITEBLOCK ENDO W/STRAP 60F A/ LF DISP

## (undated) DEVICE — SUT SILK 2/0 FS BLK 18IN 685G

## (undated) DEVICE — CATH VENT DLP W/CONN MALL NOVNT SILICON 16FR 16IN

## (undated) DEVICE — CANN AORT ROOT DLP VNT/8IN 14G 7F

## (undated) DEVICE — GLV SURG TRIUMPH LT PF LTX 7.5 STRL

## (undated) DEVICE — SYR LL TP 10ML STRL

## (undated) DEVICE — RESERVOIR,SUCTION,100CC,SILICONE: Brand: MEDLINE

## (undated) DEVICE — SUT PDS 2 0 CT1 27IN CLR Z259H

## (undated) DEVICE — INTENDED TO BE USED TO OCCLUDE, RETRACT AND IDENTIFY ARTERIES, VEINS, TENDONS AND NERVES IN SURGICAL PROCEDURES: Brand: STERION®  VESSEL LOOP

## (undated) DEVICE — Device

## (undated) DEVICE — DISPOSABLE BIPOLAR CODE, 12' (3.66 M): Brand: CONMED

## (undated) DEVICE — CLAMP INSERT: Brand: STEALTH® CLAMP INSERT

## (undated) DEVICE — SUT VIC 2 TP 1MS/4 27IN DYED J649G

## (undated) DEVICE — TP UMB COTN 1/8X36 U12T

## (undated) DEVICE — SOL ANTISEP SCRB PVPI 7.5PCT 4OZ

## (undated) DEVICE — SUT PROLN 3/0 SH D/A 36IN 8522H

## (undated) DEVICE — CVR FLUOROSCOPE C/ARM W/TP 36X28IN

## (undated) DEVICE — ADAPT SWVL FIBROPTIC BRONCH

## (undated) DEVICE — DRSNG WND GZ PAD BORDERED 4X8IN STRL

## (undated) DEVICE — SINGLE USE BIOPSY VALVE MAJ-210: Brand: SINGLE USE BIOPSY VALVE (STERILE)

## (undated) DEVICE — STPLR SKIN VISISTAT WD 35CT

## (undated) DEVICE — APPL CHLORAPREP W/TINT 26ML ORNG

## (undated) DEVICE — SUT VIC 2/0 SH 27IN

## (undated) DEVICE — SUT VIC 2/0 TIES 18IN J111T

## (undated) DEVICE — GLV SURG SENSICARE GREEN W/ALOE PF LF 6 STRL

## (undated) DEVICE — SUT PDS 3/0 SH 27IN DYED Z316H

## (undated) DEVICE — CODMAN® SURGICAL PATTIES 1/2" X 1" (1.27CM X 2.54CM): Brand: CODMAN®

## (undated) DEVICE — SYR LUERLOK 10CC

## (undated) DEVICE — TUBING, SUCTION, 1/4" X 10', STRAIGHT: Brand: MEDLINE

## (undated) DEVICE — GLV SURG TRIUMPH LT PF LTX 8 STRL

## (undated) DEVICE — SUT PROLN 4/0 BB D/A 36IN 8581H

## (undated) DEVICE — PLUG,CATHETER,DRAINAGE PROTECTOR,TUBE: Brand: MEDLINE

## (undated) DEVICE — NDL SFTY GLD 21X1

## (undated) DEVICE — CANN RETRGR STYLET RSCP 15F

## (undated) DEVICE — REDUCING CONN CANN/PUMP 3/8X1/4

## (undated) DEVICE — COVER,TABLE,EXT,80"X84",STRL: Brand: MEDLINE

## (undated) DEVICE — GLV SURG TRIUMPH LT PF LTX 7 STRL

## (undated) DEVICE — 3M™ IOBAN™ 2 ANTIMICROBIAL INCISE DRAPE 6651EZ: Brand: IOBAN™ 2

## (undated) DEVICE — STERILE POLYISOPRENE POWDER-FREE SURGICAL GLOVES WITH EMOLLIENT COATING: Brand: PROTEXIS

## (undated) DEVICE — TBG ART PRESS 24 IN

## (undated) DEVICE — PREP PVP-I 7.5P BT 4OZ

## (undated) DEVICE — GLV SURG TRIUMPH ORTHO W/ALOE PF LTX 6.5 STRL

## (undated) DEVICE — SUT SILK 0 TIES 30IN A306H

## (undated) DEVICE — SPONGE,LAP,12"X12",XR,ST,5/PK,40PK/CS: Brand: MEDLINE

## (undated) DEVICE — PENCL E/S HNDSWCH PUSHBTN HOLSTR 10FT

## (undated) DEVICE — SUT PROLN 4/0 RB1 D/A 36IN 8557H

## (undated) DEVICE — SUT PROLN 3/0 V7 D/A 36IN 8976H

## (undated) DEVICE — CANN VESL CORNRY STR W/4MM BALN

## (undated) DEVICE — OASIS DRAIN, DUAL, IN-LINE, ATS COMPATIBLE: Brand: OASIS

## (undated) DEVICE — SUT PROLN 4/0 V5 36IN 8935H

## (undated) DEVICE — SUT SILK 3-0 TIES 18IN SA64H

## (undated) DEVICE — GOWN,NON-REINFORCED,4XL: Brand: MEDLINE

## (undated) DEVICE — SUT PROLN 6/0 RB2 D/A 30IN 8711H

## (undated) DEVICE — ST TOURNI COMPL A/ 7IN

## (undated) DEVICE — BIOPATCH™ ANTIMICROBIAL DRESSING WITH CHLORHEXIDINE GLUCONATE IS A HYDROPHILLIC POLYURETHANE ABSORPTIVE FOAM WITH CHLORHEXIDINE GLUCONATE (CHG) WHICH INHIBITS BACTERIAL GROWTH UNDER THE DRESSING. THE DRESSING IS INTENDED TO BE USED TO ABSORB EXUDATE, COVER A WOUND CAUSED BY VASCULAR AND NONVASCULAR PERCUTANEOUS MEDICAL DEVICES DURING SURGERY, AS WELL AS REDUCE LOCAL INFECTION AND COLONIZATION OF MICROORGANISMS.: Brand: BIOPATCH

## (undated) DEVICE — CANN VESL CORNRY STR W/6MM BALN

## (undated) DEVICE — ST CVR PROB PULLUP ULTRASND 5X48IN

## (undated) DEVICE — ORGANIZER SUT SHELIGH 3T 213013

## (undated) DEVICE — VISUALIZATION SYSTEM: Brand: CLEARIFY

## (undated) DEVICE — SPNG DISSCT SECTO KTTNER PK/5

## (undated) DEVICE — VITAL SIGNS™ JACKSON-REES CIRCUITS: Brand: VITAL SIGNS™

## (undated) DEVICE — DRP SLUSH MACH FOR STND ALONE OM-ORS-321

## (undated) DEVICE — DRSNG WND BORDR/ADHS NONADHR/GZ LF 4X14IN STRL

## (undated) DEVICE — TB SXN DLP RIGD MACROSUCKER 6F 3IN

## (undated) DEVICE — DRSNG TELFA PAD NONADH STR 1S 3X8IN

## (undated) DEVICE — 28 FR RIGHT ANGLE – SOFT PVC CATHETER: Brand: PVC THORACIC CATHETERS

## (undated) DEVICE — COVER,MAYO STAND,STERILE: Brand: MEDLINE

## (undated) DEVICE — PAD GRND REM POLYHESIVE A/ DISP

## (undated) DEVICE — SUT SILK 2 SUTUPAK TIE 60IN SA8H 2STRAND

## (undated) DEVICE — MSK AIRWY LARYNG LMA UNIQUE STD PK SZ4

## (undated) DEVICE — NDL HYPO ECLPS SFTY 25G 1 1/2IN

## (undated) DEVICE — TP SXN YANKR BLB TIP W/TBG 10F LF STRL

## (undated) DEVICE — GUARD TEETH

## (undated) DEVICE — ST PERFUS M/

## (undated) DEVICE — SUT PDS CLOSURE CT1 1/0 27IN Z341H

## (undated) DEVICE — SYS PERFUS SEP PLATLT W TIPS CUST

## (undated) DEVICE — 28 FR STRAIGHT – SOFT PVC CATHETER: Brand: PVC THORACIC CATHETERS

## (undated) DEVICE — GLV SURG BIOGEL LTX PF 7 1/2

## (undated) DEVICE — GLV SURG SENSICARE W/ALOE PF LF 7.5 STRL

## (undated) DEVICE — ELECTRD BLD EXT EDGE/INSUL 6IN

## (undated) DEVICE — CONNECT Y INTERSEPT W/LL 3/8 X 3/8 X 3/8IN

## (undated) DEVICE — DRSNG SURESITE WNDW 2.38X2.75

## (undated) DEVICE — NDL HYPO PRECISIONGLIDE/REG 18G 1IN PNK

## (undated) DEVICE — PAD,ABDOMINAL,8"X10",ST,LF: Brand: MEDLINE